# Patient Record
Sex: FEMALE | Race: WHITE | NOT HISPANIC OR LATINO | Employment: FULL TIME | URBAN - METROPOLITAN AREA
[De-identification: names, ages, dates, MRNs, and addresses within clinical notes are randomized per-mention and may not be internally consistent; named-entity substitution may affect disease eponyms.]

---

## 2016-12-28 ASSESSMENT — ENCOUNTER SYMPTOMS
NIGHT SWEATS: 0
PANIC: 1
ALTERED TEMPERATURE REGULATION: 1
JAUNDICE: 0
BLOOD IN STOOL: 0
HOT FLASHES: 0
POLYDIPSIA: 1
SHORTNESS OF BREATH: 1
DECREASED LIBIDO: 1
CHILLS: 0
WEIGHT LOSS: 0
BOWEL INCONTINENCE: 0
SNORES LOUDLY: 1
RESPIRATORY PAIN: 0
SINUS CONGESTION: 1
TASTE DISTURBANCE: 0
WHEEZING: 1
SPUTUM PRODUCTION: 1
SINUS PAIN: 0
DECREASED CONCENTRATION: 1
RECTAL PAIN: 0
DIARRHEA: 1
HEARTBURN: 0
POLYPHAGIA: 0
BREAST PAIN: 1
DEPRESSION: 1
COUGH DISTURBING SLEEP: 1
TROUBLE SWALLOWING: 0
INSOMNIA: 1
HEMOPTYSIS: 0
INCREASED ENERGY: 1
POSTURAL DYSPNEA: 0
DECREASED APPETITE: 1
COUGH: 1
RECTAL BLEEDING: 0
VOMITING: 0
FEVER: 0
HOARSE VOICE: 0
FATIGUE: 1
HALLUCINATIONS: 0
SORE THROAT: 0
BLOATING: 0
NERVOUS/ANXIOUS: 1
WEIGHT GAIN: 0
NAUSEA: 0
CONSTIPATION: 0
DYSPNEA ON EXERTION: 1
ABDOMINAL PAIN: 0
SMELL DISTURBANCE: 0
BREAST MASS: 0
NECK MASS: 0

## 2017-01-01 ENCOUNTER — MYC MEDICAL ADVICE (OUTPATIENT)
Dept: FAMILY MEDICINE | Facility: CLINIC | Age: 37
End: 2017-01-01

## 2017-01-01 DIAGNOSIS — J45.990 EXERCISE-INDUCED ASTHMA: Primary | ICD-10-CM

## 2017-01-02 NOTE — TELEPHONE ENCOUNTER
Sent [employer] message as per below advising to ED for SOB, office visit within 24 hours, and home care advice    Writer called patient left non detailed message requesting return call to clinic and ask to speak with nurse    Maria Antonia Clark RN

## 2017-01-03 RX ORDER — FLUTICASONE PROPIONATE 44 UG/1
2 AEROSOL, METERED RESPIRATORY (INHALATION) 2 TIMES DAILY
Qty: 1 INHALER | Refills: 1 | Status: SHIPPED | OUTPATIENT
Start: 2017-01-03 | End: 2017-01-18

## 2017-01-03 NOTE — TELEPHONE ENCOUNTER
Writer notes patient mychart message response. Patient has an appointment scheduled tomorrow in clinic at 1545.    Nata Giraldo RN  [facility]

## 2017-01-04 ENCOUNTER — OFFICE VISIT (OUTPATIENT)
Dept: ENDOCRINOLOGY | Facility: CLINIC | Age: 37
End: 2017-01-04

## 2017-01-04 ENCOUNTER — MYC MEDICAL ADVICE (OUTPATIENT)
Dept: ENDOCRINOLOGY | Facility: CLINIC | Age: 37
End: 2017-01-04

## 2017-01-04 VITALS
DIASTOLIC BLOOD PRESSURE: 86 MMHG | SYSTOLIC BLOOD PRESSURE: 122 MMHG | WEIGHT: 239.6 LBS | HEART RATE: 91 BPM | BODY MASS INDEX: 36.31 KG/M2 | HEIGHT: 68 IN

## 2017-01-04 DIAGNOSIS — E04.1 THYROID NODULE: Primary | ICD-10-CM

## 2017-01-04 DIAGNOSIS — E04.1 THYROID NODULE: ICD-10-CM

## 2017-01-04 DIAGNOSIS — R76.8 ANTI-TPO ANTIBODIES PRESENT: ICD-10-CM

## 2017-01-04 LAB
T4 FREE SERPL-MCNC: 1.21 NG/DL (ref 0.76–1.46)
TSH SERPL DL<=0.05 MIU/L-ACNC: 0.2 MU/L (ref 0.4–4)

## 2017-01-04 ASSESSMENT — PAIN SCALES - GENERAL: PAINLEVEL: NO PAIN (0)

## 2017-01-04 NOTE — Clinical Note
1/4/2017       RE: Cathy Arroyo  [address]  [facility] [address]     Dear Colleague,    Thank you for referring your patient, Cathy Arroyo, to the [facility] ENDOCRINOLOGY at [facility]. Please see a copy of my visit note below.    Endocrinology Attending Physician Progress note  A/p  1.  Left Thyroid nodule 1.8 cm. FNAB benign cytology 10/15 -   Repeat US shows the left dominat nodule has increased in size by significant amount.  Recommend repeat FNAB.    Repeat TFTS when not sick    2.  Autoimmune thyroid marker  - high TPO;     3.  Obesity    4.  Post partum < 1 year.     Leatha Franco MD        Cc/HPI:    Cathy returns for follow up of thyorid nodule, high TPO in the context of recent pregnancy.   I have seen her once prevoiusly, along with fellow Dr Murdock, in 1/16 . Since then, she has completed a full term pregnancy, with vaginal delivery 10/11/16 . She had TFTS regularly during the pregnancy, last was 7/17/16, normal for pregnancy.  She has not had TFTS since delivery.       Hypothyroidsm age 17, LT4 x 3 years, later didn't need it.  She has been off LT4 x about 11 years.  CT of the neck 10/13/15 showed poorly defined thyrorid nodule on the left.  Since then, she had  US thyroid, FNAB of thyroid nodule. Cytology was benign.     6/24/15: .32, TSH 0.52, free T4 0.99, T3 120    Review of neck US  Left  dominant nodule 1.9 x 1.2 x 2.3 - (previously 1.8 x 1 x 1.2)  volume 2.62 --was 1.08      ROS  Had a virus but not feeling better-- has been sick about one week  No fever  No neck symptoms  Voice a little hoarse now with URI  No choking  Not aware of thyroid nodule.   Respiratory: asthma worse now - more wheezing; Coughing - productive  She is seeing her PCP later today  Was breast feeding but has stopped.       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     Thyroid disease      hashimotos     Past Surgical History   Procedure Laterality Date     Craniotomy, excise tumor complex, combined  2/19/2014     Procedure: COMBINED CRANIOTOMY, EXCISE TUMOR COMPLEX;  Supracerabellar  Infratentorial Approach for Resection of Tumor ;  Surgeon: Kate Mckeon MD;  Location: [facility] OR     Procedure placeholder neuro  2/2014     Craniotomy for a benign turmo resection     Blood patch N/A 10/11/2016     Procedure: EPIDURAL BLOOD PATCH;  Surgeon: GENERIC ANESTHESIA PROVIDER;  Location: [facility] OR       Family History   Problem Relation Age of Onset     Thyroid Disease Paternal Aunt      Asthma Father      Asthma Maternal Grandmother      DIABETES Paternal Grandfather      Other Cancer Mother      Genitourinary Problems Mother      Bipolar Disorder Mother      unipolar depression     Bipolar Disorder Brother      unipolar depression     Depression Mother      Depression Brother      MENTAL ILLNESS Father      Anesthesia Reaction Brother      OSTEOPOROSIS Maternal Grandmother      Thyroid Disease Mother      benign tumor     Obesity Father      Current Outpatient Prescriptions   Medication Sig Dispense Refill     fluticasone (FLOVENT HFA) 44 MCG/ACT Inhaler Inhale 2 puffs into the lungs 2 times daily 1 Inhaler 1     montelukast (SINGULAIR) 10 MG tablet Take 1 tablet (10 mg) by mouth At Bedtime 90 tablet 3     Sertraline HCl (ZOLOFT PO) Take 50 mg by mouth daily       ARIPiprazole (ABILIFY) 1 MG/ML SOLN solution Take 10 mg by mouth daily        Fexofenadine HCl (ALLEGRA PO)        albuterol (PROAIR HFA, PROVENTIL HFA, VENTOLIN HFA) 108 (90 BASE) MCG/ACT inhaler Inhale 2 puffs into the lungs every 4 hours as needed for shortness of breath / dyspnea or wheezing 1 Inhaler 3     Prenatal Vit-Fe Fumarate-FA (PRENATAL MULTIVITAMIN  PLUS IRON) 27-0.8 MG TABS Take 1 tablet by mouth daily    "    Montelukast Sodium (SINGULAIR PO) Take 10 mg by mouth        Social History     Social History     Marital Status:      Spouse Name: N/A     Number of Children: N/A     Years of Education: N/A     Occupational History     Not on file.     Social History Main Topics     Smoking status: Former Smoker -- 0.30 packs/day for 12 years     Types: Cigarettes     Start date: 2004     Quit date: 2016     Smokeless tobacco: Never Used      Comment: quit 3 days ago     Alcohol Use: No     Drug Use: No     Sexual Activity:     Partners: Male     Birth Control/ Protection: Inserts      Comment: will use condoms for post-christian birth control     Other Topics Concern     Parent/Sibling W/ Cabg, Mi Or Angioplasty Before 65f 55m? No     Social History Narrative    Caffeine intake/servings daily - 1    Calcium intake/servings daily - 3    Exercise 0 times weekly - describe ; encouraged walking daily    Sunscreen used - Yes    Seatbelts used - Yes    Guns stored in the home - No    Self Breast Exam - Yes    Pap test up to date -  No    Eye exam up to date -  No    Dental exam up to date -  No    DEXA scan up to date -  No    Flex Sig/Colonoscopy up to date -  No    Mammography up to date -  No    Immunizations reviewed and up to date - Yes    Abuse: Current or Past (Physical, Sexual or Emotional) - No    Do you feel safe in your environment - Yes    Do you cope well with stress - Yes    Do you suffer from insomnia - No    Last updated by: Noemi Cortez  3/1/2016             Baby is 12 weeks old    GENERAL young woman in NAD  /86 mmHg  Pulse 91  Ht 1.727 m (5' 8\")  Wt 108.682 kg (239 lb 9.6 oz)  BMI 36.44 kg/m2  SKIN: normal color, temperature, texture   HEENT: PER, no scleral icterus, eyelid retraction, stare, lid lag, proptosis or conjunctival injection.    MOUTH: moist, pink, pharynx clear.    NECK: supple.  No visible neck masses, cervical adenopathy, carotid bruits.    Thyroid palpable " on the left - suggestion of nodule.    LUNGS: clear to auscultation bilaterally.   CARDIAC: tachy,S1, S2 without murmurs, rubs or gallops.    BACK: normal spinal contour.    NEURO: Alert, responds appropriately to questions, CN intact, moves all extremities, DTRs 1/4, gait normal, no tremor of the outstretched hand      DATA :    ENDO THYROID LABS-[facility] Latest Ref Rng 7/27/2016 5/31/2016 3/1/2016   TSH 0.40 - 4.00 mU/L 0.29 (L) 0.32 (L) 0.19 (L)   T4 TOTAL 4.5 - 13.9 ug/dL 12.9  12.9   T4 FREE 0.76 - 1.46 ng/dL  0.83    TRIIODOTHYRONINE(T3) 60 - 181 ng/dL        ENDO THYROID LABS-[facility] Latest Ref Rng 2/1/2016 6/30/2009 4/12/2007   TSH 0.40 - 4.00 mU/L 0.56 0.32 (L) 1.42   T4 TOTAL 4.5 - 13.9 ug/dL 10.4     T4 FREE 0.76 - 1.46 ng/dL 1.02 1.15    TRIIODOTHYRONINE(T3) 60 - 181 ng/dL  176        Patient Name: ZEINA NICHOLS   MR#: [id number]   Specimen #: [id number]   Collected: 1/13/2016   Received: 1/13/2016   Reported: 1/13/2016 17:42   Ordering Phy(s): JUANITO ROMO   Additional Phy(s): [facility], Dept. of Pathology     TEST(S):   6 Slides, case #[id number]     FINAL DIAGNOSIS:   Thyroid, Left, Ultrasound-guided fine needle aspiration (OSC [id number]   obtained 10/20/2015):   Benign thyroid nodule.     I have personally reviewed all specimens and or slides, including the   listed special stains, and used them with my medical judgement to   determine the final diagnosis.     Electronically signed out by:   Eduard Lopez M.D., [facility]     CLINICAL HISTORY:   The patient is a 35-year-old female.     GROSS:   Received from [facility] in [place], [place] are 6 stained   slides labeled [id number] (obtained 10/20/15) and a copy of the   referring pathologist's report with patient identifying information.   All slides are returned.     MICROSCOPIC:   Microscopic examination is performed.     Shamika Fitzgerald MD, cytology fellow, John Chapa MD     CPT Codes:   A: 26159-RYL1      TESTING LAB LOCATION:   [address], [facility] [address]   [place]   [address]   [phone]     COLLECTION SITE:   Client:  [facility]   Location:  CHRIS JACOBS)     EXAMINATION: Thyroid ultrasound on, 1/4/2017 4:03 PM       COMPARISON: None.     HISTORY: Nontoxic single thyroid nodule     Technique: Grayscale and color ultrasound imaging of the thyroid was  performed.     Findings:     Thyroid parenchyma: homogenous     The right lobe of the thyroid measures: 1.5 x 1.7 x 4.6 cm       The thyroid isthmus measures: 0.4 cm       The left lobe of the thyroid measures: 1.7 x 1.5 x 4.2 cm       Right lobe:  Nodule 1:  Nodule measurement: 0.7 x 0.7 x 0.8 cm ,  Echogenicity: Predominantly isoechoic  Consistency: solid  Calcifications: no  Hypervascular: Peripherally  Interval growth (>20%): No priors     Isthmus: No nodules are seen     Left Lobe:    Nodule 1:  Nodule measurement: 0.8 x 0.6 x 0.9 cm ,  Echogenicity: Isoechoic  Consistency: Solid and cystic  Calcifications: no  Hypervascular: Peripherally  Interval growth (>20%): No priors     Nodule 2:  Nodule measurement: 1.9 x 1.2 x 2.3 cm ,  Echogenicity: Mildly hyperechoic  Consistency: solid  Calcifications: no  Hypervascular: Peripherally  Interval growth (>20%): No priors                                                                       Impression:  Multiple thyroid nodules as detailed above, the largest is in the  inferior aspect of the left thyroid lobe measuring 1.9 x 1.2 x 2.3 cm.  This nodule according to the outside reports has been biopsied and the  biopsy result showed no evidence of malignancy.     I have personally reviewed the examination and initial interpretation  and I agree with the findings.     MARGARITO BURNETT MD

## 2017-01-04 NOTE — NURSING NOTE
"Chief Complaint   Patient presents with     RECHECK     f/u for thyroid nodule        Initial /86 mmHg  Pulse 91  Ht 1.727 m (5' 8\")  Wt 108.682 kg (239 lb 9.6 oz)  BMI 36.44 kg/m2 Estimated body mass index is 36.44 kg/(m^2) as calculated from the following:    Height as of this encounter: 1.727 m (5' 8\").    Weight as of this encounter: 108.682 kg (239 lb 9.6 oz).  BP completed using cuff size: noel Cornejo CMA     "

## 2017-01-04 NOTE — PROGRESS NOTES
Endocrinology Attending Physician Progress note  A/p  1.  Left Thyroid nodule 1.8 cm. FNAB benign cytology 10/15 -   Repeat US shows the left dominat nodule has increased in size by significant amount.  Recommend repeat FNAB.    Repeat TFTS when not sick    2.  Autoimmune thyroid marker  - high TPO;     3.  Obesity    4.  Post partum < 1 year.     Leatha Franco MD        Cc/HPI:    Cathy returns for follow up of thyorid nodule, high TPO in the context of recent pregnancy.   I have seen her once prevoiusly, along with fellow Dr Murdock, in 1/16 . Since then, she has completed a full term pregnancy, with vaginal delivery 10/11/16 . She had TFTS regularly during the pregnancy, last was 7/17/16, normal for pregnancy.  She has not had TFTS since delivery.       Hypothyroidsm age 17, LT4 x 3 years, later didn't need it.  She has been off LT4 x about 11 years.  CT of the neck 10/13/15 showed poorly defined thyrorid nodule on the left.  Since then, she had  US thyroid, FNAB of thyroid nodule. Cytology was benign.     6/24/15: .32, TSH 0.52, free T4 0.99, T3 120    Review of neck US  Left  dominant nodule 1.9 x 1.2 x 2.3 - (previously 1.8 x 1 x 1.2)  volume 2.62 --was 1.08      ROS  Had a virus but not feeling better-- has been sick about one week  No fever  No neck symptoms  Voice a little hoarse now with URI  No choking  Not aware of thyroid nodule.   Respiratory: asthma worse now - more wheezing; Coughing - productive  She is seeing her PCP later today  Was breast feeding but has stopped.       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     Thyroid disease      hashimotos     Past Surgical History   Procedure Laterality Date     Craniotomy, excise tumor complex, combined  2/19/2014     Procedure: COMBINED CRANIOTOMY, EXCISE TUMOR COMPLEX;   Supracerabellar  Infratentorial Approach for Resection of Tumor ;  Surgeon: Kate Mckeon MD;  Location: [facility] OR     Procedure placeholder neuro  2/2014     Craniotomy for a benign turmo resection     Blood patch N/A 10/11/2016     Procedure: EPIDURAL BLOOD PATCH;  Surgeon: GENERIC ANESTHESIA PROVIDER;  Location: [facility] OR       Family History   Problem Relation Age of Onset     Thyroid Disease Paternal Aunt      Asthma Father      Asthma Maternal Grandmother      DIABETES Paternal Grandfather      Other Cancer Mother      Genitourinary Problems Mother      Bipolar Disorder Mother      unipolar depression     Bipolar Disorder Brother      unipolar depression     Depression Mother      Depression Brother      MENTAL ILLNESS Father      Anesthesia Reaction Brother      OSTEOPOROSIS Maternal Grandmother      Thyroid Disease Mother      benign tumor     Obesity Father      Current Outpatient Prescriptions   Medication Sig Dispense Refill     fluticasone (FLOVENT HFA) 44 MCG/ACT Inhaler Inhale 2 puffs into the lungs 2 times daily 1 Inhaler 1     montelukast (SINGULAIR) 10 MG tablet Take 1 tablet (10 mg) by mouth At Bedtime 90 tablet 3     Sertraline HCl (ZOLOFT PO) Take 50 mg by mouth daily       ARIPiprazole (ABILIFY) 1 MG/ML SOLN solution Take 10 mg by mouth daily        Fexofenadine HCl (ALLEGRA PO)        albuterol (PROAIR HFA, PROVENTIL HFA, VENTOLIN HFA) 108 (90 BASE) MCG/ACT inhaler Inhale 2 puffs into the lungs every 4 hours as needed for shortness of breath / dyspnea or wheezing 1 Inhaler 3     Prenatal Vit-Fe Fumarate-FA (PRENATAL MULTIVITAMIN  PLUS IRON) 27-0.8 MG TABS Take 1 tablet by mouth daily       Montelukast Sodium (SINGULAIR PO) Take 10 mg by mouth        Social History     Social History     Marital Status:      Spouse Name: N/A     Number of Children: N/A     Years of Education: N/A     Occupational History     Not on file.     Social History Main Topics     Smoking status: Former  "Smoker -- 0.30 packs/day for 12 years     Types: Cigarettes     Start date: 2004     Quit date: 2016     Smokeless tobacco: Never Used      Comment: quit 3 days ago     Alcohol Use: No     Drug Use: No     Sexual Activity:     Partners: Male     Birth Control/ Protection: Inserts      Comment: will use condoms for post-christina birth control     Other Topics Concern     Parent/Sibling W/ Cabg, Mi Or Angioplasty Before 65f 55m? No     Social History Narrative    Caffeine intake/servings daily - 1    Calcium intake/servings daily - 3    Exercise 0 times weekly - describe ; encouraged walking daily    Sunscreen used - Yes    Seatbelts used - Yes    Guns stored in the home - No    Self Breast Exam - Yes    Pap test up to date -  No    Eye exam up to date -  No    Dental exam up to date -  No    DEXA scan up to date -  No    Flex Sig/Colonoscopy up to date -  No    Mammography up to date -  No    Immunizations reviewed and up to date - Yes    Abuse: Current or Past (Physical, Sexual or Emotional) - No    Do you feel safe in your environment - Yes    Do you cope well with stress - Yes    Do you suffer from insomnia - No    Last updated by: Noemi Cortez  3/1/2016             Baby is 12 weeks old    GENERAL young woman in NAD  /86 mmHg  Pulse 91  Ht 1.727 m (5' 8\")  Wt 108.682 kg (239 lb 9.6 oz)  BMI 36.44 kg/m2  SKIN: normal color, temperature, texture   HEENT: PER, no scleral icterus, eyelid retraction, stare, lid lag, proptosis or conjunctival injection.    MOUTH: moist, pink, pharynx clear.    NECK: supple.  No visible neck masses, cervical adenopathy, carotid bruits.    Thyroid palpable on the left - suggestion of nodule.    LUNGS: clear to auscultation bilaterally.   CARDIAC: tachy,S1, S2 without murmurs, rubs or gallops.    BACK: normal spinal contour.    NEURO: Alert, responds appropriately to questions, CN intact, moves all extremities, DTRs 1/4, gait normal, no tremor of the " outstretched hand      DATA :    ENDO THYROID LABS-[facility] Latest Ref Rng 7/27/2016 5/31/2016 3/1/2016   TSH 0.40 - 4.00 mU/L 0.29 (L) 0.32 (L) 0.19 (L)   T4 TOTAL 4.5 - 13.9 ug/dL 12.9  12.9   T4 FREE 0.76 - 1.46 ng/dL  0.83    TRIIODOTHYRONINE(T3) 60 - 181 ng/dL        ENDO THYROID LABS-[facility] Latest Ref Rng 2/1/2016 6/30/2009 4/12/2007   TSH 0.40 - 4.00 mU/L 0.56 0.32 (L) 1.42   T4 TOTAL 4.5 - 13.9 ug/dL 10.4     T4 FREE 0.76 - 1.46 ng/dL 1.02 1.15    TRIIODOTHYRONINE(T3) 60 - 181 ng/dL  176        Patient Name: ZEINA NICHOLS   MR#: [id number]   Specimen #: [id number]   Collected: 1/13/2016   Received: 1/13/2016   Reported: 1/13/2016 17:42   Ordering Phy(s): JUANITO ROMO   Additional Phy(s): [facility], Dept. of Pathology     TEST(S):   6 Slides, case #[id number]     FINAL DIAGNOSIS:   Thyroid, Left, Ultrasound-guided fine needle aspiration (OSC [id number]   obtained 10/20/2015):   Benign thyroid nodule.     I have personally reviewed all specimens and or slides, including the   listed special stains, and used them with my medical judgement to   determine the final diagnosis.     Electronically signed out by:   Eduard Lopez M.D., [facility] [place]     CLINICAL HISTORY:   The patient is a 35-year-old female.     GROSS:   Received from [facility] in [place], [place] are 6 stained   slides labeled [id number] (obtained 10/20/15) and a copy of the   referring pathologist's report with patient identifying information.   All slides are returned.     MICROSCOPIC:   Microscopic examination is performed.     Shamika Fitzgerald MD, cytology fellow, John Chapa MD     CPT Codes:   A: 94844-XPB7     TESTING LAB LOCATION:   [address], [address]   [address]   [phone]     COLLECTION SITE:   Client:  [facility]   Location:  [facility]     EXAMINATION: Thyroid ultrasound  on, 1/4/2017 4:03 PM       COMPARISON: None.     HISTORY: Nontoxic single thyroid nodule     Technique: Grayscale and color ultrasound imaging of the thyroid was  performed.     Findings:     Thyroid parenchyma: homogenous     The right lobe of the thyroid measures: 1.5 x 1.7 x 4.6 cm       The thyroid isthmus measures: 0.4 cm       The left lobe of the thyroid measures: 1.7 x 1.5 x 4.2 cm       Right lobe:  Nodule 1:  Nodule measurement: 0.7 x 0.7 x 0.8 cm ,  Echogenicity: Predominantly isoechoic  Consistency: solid  Calcifications: no  Hypervascular: Peripherally  Interval growth (>20%): No priors     Isthmus: No nodules are seen     Left Lobe:    Nodule 1:  Nodule measurement: 0.8 x 0.6 x 0.9 cm ,  Echogenicity: Isoechoic  Consistency: Solid and cystic  Calcifications: no  Hypervascular: Peripherally  Interval growth (>20%): No priors     Nodule 2:  Nodule measurement: 1.9 x 1.2 x 2.3 cm ,  Echogenicity: Mildly hyperechoic  Consistency: solid  Calcifications: no  Hypervascular: Peripherally  Interval growth (>20%): No priors                                                                       Impression:  Multiple thyroid nodules as detailed above, the largest is in the  inferior aspect of the left thyroid lobe measuring 1.9 x 1.2 x 2.3 cm.  This nodule according to the outside reports has been biopsied and the  biopsy result showed no evidence of malignancy.     I have personally reviewed the examination and initial interpretation  and I agree with the findings.     MARGARITO BURNETT MD

## 2017-01-06 ENCOUNTER — TELEPHONE (OUTPATIENT)
Dept: FAMILY MEDICINE | Facility: CLINIC | Age: 37
End: 2017-01-06

## 2017-01-10 ENCOUNTER — OFFICE VISIT (OUTPATIENT)
Dept: NEUROSURGERY | Facility: CLINIC | Age: 37
End: 2017-01-10
Payer: COMMERCIAL

## 2017-01-10 VITALS
WEIGHT: 240 LBS | SYSTOLIC BLOOD PRESSURE: 131 MMHG | BODY MASS INDEX: 36.37 KG/M2 | DIASTOLIC BLOOD PRESSURE: 80 MMHG | HEART RATE: 91 BPM | HEIGHT: 68 IN

## 2017-01-10 DIAGNOSIS — D44.5 PINEOCYTOMA (H): Primary | ICD-10-CM

## 2017-01-10 RX ORDER — ARIPIPRAZOLE 15 MG/1
5 TABLET ORAL EVERY MORNING
COMMUNITY
End: 2017-10-23

## 2017-01-10 ASSESSMENT — PAIN SCALES - GENERAL: PAINLEVEL: NO PAIN (0)

## 2017-01-10 NOTE — Clinical Note
1/10/2017       RE: Cathy Arroyo  [address]  [place] [address]     Dear Colleague,    Thank you for referring your patient, Cathy Arroyo, to the [facility] NEUROSURGERY at [facility]. Please see a copy of my visit note below.      We saw Mrs. 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 Today, she is doing well. She got  last January and gave birth to a girl 3 months ago. Her pregnancy was without complication and she delivered vaginally. She denies any vision changes. Her mother  of salivary gland cancer in November.     On exam, she appears well. Extraocular movements intact. There is no lid lag or ptosis. She moves upper and lower extremities equally and with good coordination. Fluent and coherent speech. Gross neurological examination is normal.     We reviewed her MRI from today and compared to previous studies. There is no evidence of tumor recurrence or residual. Ventricle size is normal.    We will see her back in 2 years with another MRI. Please do not hesitate to contact us with questions. We will keep you informed of her progress.       MD BROOKLYN MARKHAM Madison Smith, am serving as a scribe to document services personally performed by Kate Mckeon MD, based upon my observations and the provider's statements to me. All documentation has been reviewed by the aforementioned doctor prior to being entered into the official medical record.    I, Kate Mckeon, attest that above named individual is acting in scribe capacity, has observed my performance of the services and has documented them in accordance with my direction. The documentation recorded by the scribe accurately reflects the service I personally performed and the decisions made by me.          Again, thank you for allowing me to participate in  the care of your patient.      Sincerely,    Kate Mckeon MD

## 2017-01-10 NOTE — PROGRESS NOTES
We saw Mrs. 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 Today, she is doing well. She got  last January and gave birth to a girl 3 months ago. Her pregnancy was without complication and she delivered vaginally. She denies any vision changes. Her mother  of salivary gland cancer in November.     On exam, she appears well. Extraocular movements intact. There is no lid lag or ptosis. She moves upper and lower extremities equally and with good coordination. Fluent and coherent speech. Gross neurological examination is normal.     We reviewed her MRI from today and compared to previous studies. There is no evidence of tumor recurrence or residual. Ventricle size is normal.    We will see her back in 2 years with another MRI. Please do not hesitate to contact us with questions. We will keep you informed of her progress.       MD BROOKLYN MARKHAM Madison Smith, am serving as a scribe to document services personally performed by Kate Mckeon MD, based upon my observations and the provider's statements to me. All documentation has been reviewed by the aforementioned doctor prior to being entered into the official medical record.    I, Kate Mckeon, attest that above named individual is acting in scribe capacity, has observed my performance of the services and has documented them in accordance with my direction. The documentation recorded by the scribe accurately reflects the service I personally performed and the decisions made by me.

## 2017-01-17 NOTE — PROGRESS NOTES
SUBJECTIVE:                                                    Cathy Arroyo is a 36 year old female who presents to clinic today for the following health issues:    Asthma Follow-Up    Was ACT completed today?    Yes    ACT Total Scores 1/18/2017   ACT TOTAL SCORE (Goal Greater than or Equal to 20) 8   In the past 12 months, how many times did you visit the emergency room for your asthma without being admitted to the hospital? 0   In the past 12 months, how many times were you hospitalized overnight because of your asthma? 0         Amount of exercise or physical activity: None    Problems taking medications regularly: No    Medication side effects: none    Diet: vegetarian/vegan    Questions/Concerns: Singular is being dropped from insurance and want to keep taking it. Find a way to keep Singular.      Problem list and histories reviewed & adjusted, as indicated.  Additional history: as documented    Problem list, Medication list, Allergies, and Medical/Social/Surgical histories reviewed in EPIC and updated as appropriate.    ROS:  Constitutional, HEENT, cardiovascular, pulmonary, gi and gu systems are negative, except as otherwise noted.    OBJECTIVE:                                                    /79 mmHg  Pulse 93  Temp(Src) 97.7  F (36.5  C) (Oral)  Wt 241 lb 4.8 oz (109.453 kg)  SpO2 96%  Body mass index is 36.7 kg/(m^2).  GENERAL: alert and no distress  EYES: Eyes grossly normal to inspection, PERRL and conjunctivae and sclerae normal  HENT: normal cephalic/atraumatic, both ears: clear effusion, nose and mouth without ulcers or lesions, nasal mucosa edematous , oropharynx clear and oral mucous membranes moist  NECK: no adenopathy, no asymmetry, masses, or scars and thyroid normal to palpation  RESP: expiratory wheezes throughout and inspiratory wheezes throughout  CV: regular rate and rhythm, normal S1 S2, no S3 or S4, no murmur, click or rub, no peripheral edema and peripheral  pulses strong  ABDOMEN: soft, nontender, no hepatosplenomegaly, no masses and bowel sounds normal  MS: no gross musculoskeletal defects noted, no edema  SKIN: no suspicious lesions or rashes  PSYCH: mentation appears normal, affect normal/bright    Diagnostic Test Results:  none      ASSESSMENT/PLAN:                                                    Asthma: Mild persistent with exacerbation   Plan:  Asthma Action Plan given  Medications:  Anti-inflammatory inhaler:   Advair  Leukotriene:   Singulair          (J45.41) Moderate persistent asthma with acute exacerbation  (primary encounter diagnosis)  Comment:   Plan: fluticasone-salmeterol (ADVAIR-HFA) 115-21         MCG/ACT Inhaler, fexofenadine (ALLEGRA) 180 MG         tablet   Unable to take prednisone due to affect on moods and precipitating jose eduardo.  Has been on combined ICS-LABA in the past with good control.  Diagnosis changed from intermittent to mild persistent.  Restarting advair, allegra and sinugular.  Return in two weeks.    (J31.0) Chronic rhinitis  Comment:   Plan: montelukast (SINGULAIR) 10 MG tablet,         fexofenadine (ALLEGRA) 180 MG tablet            (E04.1) Thyroid nodule  Comment:   Plan: known    (R76.8) Anti-TPO antibodies present  Comment:   Plan: TSH, T4 free        Send labs to endo when completed    (K59.01) Slow transit constipation  Comment:   Plan:     (F17.200) Tobacco use disorder  Comment:   Plan: nicotine polacrilex 4 MG lozenge              Patient Instructions   Thyroid labs today and I will send to endo  Start Advair - if not covered, please have pharmacy look into which combined ICS-LABA is covered  Singulair reordered to new pharmacy - if not covered have them contact us to start a prior authorization  Stop smoking - can try the lozenges  Would be willing to try chantix with close follow-up    Return on 2/15  Sooner if fever or chills or abrupt worsening of symptoms  Please go to the ER if you are in respiratory  distress            JANNET Liu [facility]

## 2017-01-18 ENCOUNTER — OFFICE VISIT (OUTPATIENT)
Dept: FAMILY MEDICINE | Facility: CLINIC | Age: 37
End: 2017-01-18
Payer: COMMERCIAL

## 2017-01-18 VITALS
TEMPERATURE: 97.7 F | OXYGEN SATURATION: 96 % | SYSTOLIC BLOOD PRESSURE: 142 MMHG | BODY MASS INDEX: 36.7 KG/M2 | WEIGHT: 241.3 LBS | DIASTOLIC BLOOD PRESSURE: 79 MMHG | HEART RATE: 93 BPM

## 2017-01-18 DIAGNOSIS — E04.1 THYROID NODULE: ICD-10-CM

## 2017-01-18 DIAGNOSIS — F17.200 TOBACCO USE DISORDER: ICD-10-CM

## 2017-01-18 DIAGNOSIS — J31.0 CHRONIC RHINITIS: ICD-10-CM

## 2017-01-18 DIAGNOSIS — J45.41 MODERATE PERSISTENT ASTHMA WITH ACUTE EXACERBATION: Primary | ICD-10-CM

## 2017-01-18 DIAGNOSIS — R76.8 ANTI-TPO ANTIBODIES PRESENT: ICD-10-CM

## 2017-01-18 DIAGNOSIS — K59.01 SLOW TRANSIT CONSTIPATION: ICD-10-CM

## 2017-01-18 PROBLEM — J45.31 MILD PERSISTENT ASTHMA WITH ACUTE EXACERBATION: Status: ACTIVE | Noted: 2017-01-18

## 2017-01-18 PROBLEM — J45.40 MODERATE PERSISTENT ASTHMA WITHOUT COMPLICATION: Status: ACTIVE | Noted: 2017-01-18

## 2017-01-18 PROCEDURE — 84439 ASSAY OF FREE THYROXINE: CPT | Performed by: NURSE PRACTITIONER

## 2017-01-18 PROCEDURE — 84443 ASSAY THYROID STIM HORMONE: CPT | Performed by: NURSE PRACTITIONER

## 2017-01-18 PROCEDURE — 36415 COLL VENOUS BLD VENIPUNCTURE: CPT | Performed by: NURSE PRACTITIONER

## 2017-01-18 PROCEDURE — 99214 OFFICE O/P EST MOD 30 MIN: CPT | Performed by: NURSE PRACTITIONER

## 2017-01-18 RX ORDER — FEXOFENADINE HCL 180 MG/1
180 TABLET ORAL DAILY
Qty: 90 TABLET | Refills: 3 | Status: SHIPPED | OUTPATIENT
Start: 2017-01-18 | End: 2018-09-18

## 2017-01-18 RX ORDER — MONTELUKAST SODIUM 10 MG/1
10 TABLET ORAL AT BEDTIME
Qty: 90 TABLET | Refills: 3 | Status: SHIPPED | OUTPATIENT
Start: 2017-01-18 | End: 2017-11-06

## 2017-01-18 RX ORDER — FLUTICASONE PROPIONATE AND SALMETEROL XINAFOATE 115; 21 UG/1; UG/1
2 AEROSOL, METERED RESPIRATORY (INHALATION) 2 TIMES DAILY
Qty: 3 INHALER | Refills: 0 | Status: SHIPPED | OUTPATIENT
Start: 2017-01-18 | End: 2017-02-08

## 2017-01-18 NOTE — MR AVS SNAPSHOT
After Visit Summary   1/18/2017    Cathy Arroyo    MRN: [id number]           Patient Information     Date Of Birth          1980        Visit Information        Provider Department      1/18/2017 1:00 PM Sherrill Dasilva APRN CNP [facility]        Today's Diagnoses     Moderate persistent asthma with acute exacerbation    -  1     Chronic rhinitis         Thyroid nodule         Anti-TPO antibodies present         Slow transit constipation         Tobacco use disorder           Care Instructions    Thyroid labs today and I will send to endo  Start Advair - if not covered, please have pharmacy look into which combined ICS-LABA is covered  Singulair reordered to new pharmacy - if not covered have them contact us to start a prior authorization  Stop smoking - can try the lozenges  Would be willing to try chantix with close follow-up    Return on 2/15  Sooner if fever or chills or abrupt worsening of symptoms  Please go to the ER if you are in respiratory distress            Follow-ups after your visit        Who to contact     If you have questions or need follow up information about today's clinic visit or your schedule please contact [facility] directly at [phone].  Normal or non-critical lab and imaging results will be communicated to you by [employer], letter or phone within 4 business days after the clinic has received the results. If you do not hear from us within 7 days, please contact the clinic through [employer] or phone. If you have a critical or abnormal lab result, we will notify you by phone as soon as possible.  Submit refill requests through meevl or call your pharmacy and they will forward the refill request to us. Please allow 3 business days for your refill to be completed.          Additional Information About Your Visit        [employer]ishing Hunter[employer]DT Information     meevl gives you secure access to your electronic health record. If you see a primary  care provider, you can also send messages to your care team and make appointments. If you have questions, please call your primary care clinic.  If you do not have a primary care provider, please call [phone] and they will assist you.        Care EveryWhere ID     This is your Care EveryWhere ID. This could be used by other organizations to access your [facility] medical records  [id number]        Your Vitals Were     Pulse Temperature Pulse Oximetry             93 97.7  F (36.5  C) (Oral) 96%          Blood Pressure from Last 3 Encounters:   01/18/17 142/79   01/10/17 131/80   01/04/17 122/86    Weight from Last 3 Encounters:   01/18/17 241 lb 4.8 oz (109.453 kg)   01/10/17 240 lb (108.863 kg)   01/04/17 239 lb 9.6 oz (108.682 kg)              We Performed the Following     T4 free     TSH          Today's Medication Changes          These changes are accurate as of: 1/18/17  1:40 PM.  If you have any questions, ask your nurse or doctor.               Start taking these medicines.        Dose/Directions    fluticasone-salmeterol 115-21 MCG/ACT Inhaler   Commonly known as:  ADVAIR-HFA   Used for:  Moderate persistent asthma with acute exacerbation   Started by:  Sherrill Dasilva APRN CNP        Dose:  2 puff   Inhale 2 puffs into the lungs 2 times daily   Quantity:  3 Inhaler   Refills:  0       nicotine polacrilex 4 MG lozenge   Used for:  Tobacco use disorder   Started by:  Sherrill Dasilva APRN CNP        Dose:  4 mg   Place 1 lozenge (4 mg) inside cheek as needed for smoking cessation   Quantity:  360 tablet   Refills:  0         These medicines have changed or have updated prescriptions.        Dose/Directions    fexofenadine 180 MG tablet   Commonly known as:  ALLEGRA   This may have changed:    - medication strength  - how much to take  - when to take this   Used for:  Moderate persistent asthma with acute exacerbation, Chronic rhinitis   Changed by:  Sherrill Dasilva APRN CNP        Dose:  180 mg   Take  1 tablet (180 mg) by mouth daily   Quantity:  90 tablet   Refills:  3       * montelukast 10 MG tablet   Commonly known as:  SINGULAIR   This may have changed:  Another medication with the same name was changed. Make sure you understand how and when to take each.   Used for:  Mild persistent asthma without complication   Changed by:  Sherrill Dasilva APRN CNP        Dose:  10 mg   Take 1 tablet (10 mg) by mouth At Bedtime   Quantity:  90 tablet   Refills:  3       * montelukast 10 MG tablet   Commonly known as:  SINGULAIR   This may have changed:    - medication strength  - when to take this   Used for:  Chronic rhinitis   Changed by:  Sherrill Dasilva APRN CNP        Dose:  10 mg   Take 1 tablet (10 mg) by mouth At Bedtime   Quantity:  90 tablet   Refills:  3       * Notice:  This list has 2 medication(s) that are the same as other medications prescribed for you. Read the directions carefully, and ask your doctor or other care provider to review them with you.      Stop taking these medicines if you haven't already. Please contact your care team if you have questions.     fluticasone 44 MCG/ACT Inhaler   Commonly known as:  FLOVENT HFA   Stopped by:  Sherrill Dasilva APRN CNP                Where to get your medicines      These medications were sent to [address] [address]     Phone:  [phone]    - fexofenadine 180 MG tablet  - fluticasone-salmeterol 115-21 MCG/ACT Inhaler  - montelukast 10 MG tablet  - nicotine polacrilex 4 MG lozenge             Primary Care Provider    Physician No Ref-Primary       No address on file        Thank you!     Thank you for choosing [facility]  [place] your care. Our goal is always to provide you with excellent care. Hearing back from our patients is one way we can continue to improve our services. Please take a few minutes to complete the written survey that you may receive in the mail  after your visit with us. Thank you!             Your Updated Medication List - Protect others around you: Learn how to safely use, store and throw away your medicines at www.disposemymeds.org.          This list is accurate as of: 1/18/17  1:40 PM.  Always use your most recent med list.                   Brand Name Dispense Instructions for use    ABILIFY 15 MG tablet   Generic drug:  ARIPiprazole      Take 15 mg by mouth daily       albuterol 108 (90 BASE) MCG/ACT Inhaler    PROAIR HFA/PROVENTIL HFA/VENTOLIN HFA    1 Inhaler    Inhale 2 puffs into the lungs every 4 hours as needed for shortness of breath / dyspnea or wheezing       fexofenadine 180 MG tablet    ALLEGRA    90 tablet    Take 1 tablet (180 mg) by mouth daily       fluticasone-salmeterol 115-21 MCG/ACT Inhaler    ADVAIR-HFA    3 Inhaler    Inhale 2 puffs into the lungs 2 times daily       * montelukast 10 MG tablet    SINGULAIR    90 tablet    Take 1 tablet (10 mg) by mouth At Bedtime       * montelukast 10 MG tablet    SINGULAIR    90 tablet    Take 1 tablet (10 mg) by mouth At Bedtime       nicotine polacrilex 4 MG lozenge     360 tablet    Place 1 lozenge (4 mg) inside cheek as needed for smoking cessation       prenatal multivitamin  plus iron 27-0.8 MG Tabs per tablet      Take 1 tablet by mouth daily       ZOLOFT PO      Take 25 mg by mouth daily       * Notice:  This list has 2 medication(s) that are the same as other medications prescribed for you. Read the directions carefully, and ask your doctor or other care provider to review them with you.

## 2017-01-18 NOTE — PATIENT INSTRUCTIONS
Thyroid labs today and I will send to endo  Start Advair - if not covered, please have pharmacy look into which combined ICS-LABA is covered  Singulair reordered to new pharmacy - if not covered have them contact us to start a prior authorization  Stop smoking - can try the lozenges  Would be willing to try chantix with close follow-up    Return on 2/15  Sooner if fever or chills or abrupt worsening of symptoms  Please go to the ER if you are in respiratory distress

## 2017-01-18 NOTE — NURSING NOTE
"Chief Complaint   Patient presents with     Asthma       Initial /79 mmHg  Pulse 93  Temp(Src) 97.7  F (36.5  C) (Oral)  Wt 241 lb 4.8 oz (109.453 kg)  SpO2 96% Estimated body mass index is 36.7 kg/(m^2) as calculated from the following:    Height as of 1/10/17: 5' 8\" (1.727 m).    Weight as of this encounter: 241 lb 4.8 oz (109.453 kg).  BP completed using cuff size: noel Capps MA      "

## 2017-01-19 LAB
T4 FREE SERPL-MCNC: 1.12 NG/DL (ref 0.76–1.46)
TSH SERPL DL<=0.05 MIU/L-ACNC: 0.18 MU/L (ref 0.4–4)

## 2017-01-19 ASSESSMENT — ASTHMA QUESTIONNAIRES: ACT_TOTALSCORE: 8

## 2017-01-19 NOTE — PROGRESS NOTES
Quick Note:    Cathy,    Similar thyroid results as last check 2 weeks ago. I'll forward to endocrinology. Were you able to get the inhalers and singulair? If you have any questions, please feel free to contact the clinic.    DEVEN Winston  ______

## 2017-01-31 DIAGNOSIS — R79.89 LOW TSH LEVEL: ICD-10-CM

## 2017-01-31 DIAGNOSIS — E04.1 THYROID NODULE: Primary | ICD-10-CM

## 2017-02-01 ENCOUNTER — HOSPITAL ENCOUNTER (OUTPATIENT)
Facility: CLINIC | Age: 37
End: 2017-02-01

## 2017-02-01 DIAGNOSIS — R79.89 LOW TSH LEVEL: ICD-10-CM

## 2017-02-01 DIAGNOSIS — E04.1 THYROID NODULE: ICD-10-CM

## 2017-02-01 LAB — T3 SERPL-MCNC: 172 NG/DL (ref 60–181)

## 2017-02-01 PROCEDURE — 84480 ASSAY TRIIODOTHYRONINE (T3): CPT

## 2017-02-01 PROCEDURE — 36415 COLL VENOUS BLD VENIPUNCTURE: CPT

## 2017-02-01 PROCEDURE — 84439 ASSAY OF FREE THYROXINE: CPT

## 2017-02-01 PROCEDURE — 84443 ASSAY THYROID STIM HORMONE: CPT

## 2017-02-01 PROCEDURE — 84445 ASSAY OF TSI GLOBULIN: CPT | Mod: 90

## 2017-02-01 PROCEDURE — 99000 SPECIMEN HANDLING OFFICE-LAB: CPT

## 2017-02-02 LAB
T4 FREE SERPL-MCNC: 1.26 NG/DL (ref 0.76–1.46)
TSH SERPL DL<=0.05 MIU/L-ACNC: 0.02 MU/L (ref 0.4–4)

## 2017-02-07 LAB — TSI SER-ACNC: 5.8

## 2017-02-08 ENCOUNTER — TELEPHONE (OUTPATIENT)
Dept: ENDOCRINOLOGY | Facility: CLINIC | Age: 37
End: 2017-02-08

## 2017-02-08 ENCOUNTER — OFFICE VISIT (OUTPATIENT)
Dept: FAMILY MEDICINE | Facility: CLINIC | Age: 37
End: 2017-02-08
Payer: COMMERCIAL

## 2017-02-08 VITALS
SYSTOLIC BLOOD PRESSURE: 130 MMHG | HEART RATE: 103 BPM | DIASTOLIC BLOOD PRESSURE: 72 MMHG | TEMPERATURE: 97.4 F | BODY MASS INDEX: 36.04 KG/M2 | WEIGHT: 237.78 LBS | OXYGEN SATURATION: 96 % | HEIGHT: 68 IN

## 2017-02-08 DIAGNOSIS — J45.40 MODERATE PERSISTENT ASTHMA WITHOUT COMPLICATION: ICD-10-CM

## 2017-02-08 DIAGNOSIS — F17.200 TOBACCO USE DISORDER: ICD-10-CM

## 2017-02-08 DIAGNOSIS — J01.90 ACUTE SINUSITIS WITH SYMPTOMS > 10 DAYS: Primary | ICD-10-CM

## 2017-02-08 PROCEDURE — 99214 OFFICE O/P EST MOD 30 MIN: CPT | Performed by: PHYSICIAN ASSISTANT

## 2017-02-08 RX ORDER — NICOTINE 21 MG/24HR
1 PATCH, TRANSDERMAL 24 HOURS TRANSDERMAL EVERY 24 HOURS
Qty: 14 PATCH | Refills: 0 | Status: SHIPPED | OUTPATIENT
Start: 2017-02-08 | End: 2017-02-22

## 2017-02-08 RX ORDER — NICOTINE 21 MG/24HR
1 PATCH, TRANSDERMAL 24 HOURS TRANSDERMAL EVERY 24 HOURS
Qty: 42 PATCH | Refills: 0 | Status: SHIPPED | OUTPATIENT
Start: 2017-02-08 | End: 2017-03-22

## 2017-02-08 ASSESSMENT — ENCOUNTER SYMPTOMS
CHILLS: 0
COUGH: 1
ABDOMINAL PAIN: 0
DIARRHEA: 0
SHORTNESS OF BREATH: 1
WHEEZING: 1
FEVER: 0
VOMITING: 0
FOCAL WEAKNESS: 0
NAUSEA: 0
HEADACHES: 1

## 2017-02-08 NOTE — NURSING NOTE
"Chief Complaint   Patient presents with     Sinus Problem       Initial /72 mmHg  Pulse 103  Temp(Src) 97.4  F (36.3  C) (Oral)  Ht 5' 8\" (1.727 m)  Wt 237 lb 12.5 oz (107.856 kg)  BMI 36.16 kg/m2  SpO2 96% Estimated body mass index is 36.16 kg/(m^2) as calculated from the following:    Height as of this encounter: 5' 8\" (1.727 m).    Weight as of this encounter: 237 lb 12.5 oz (107.856 kg).  Medication Reconciliation: complete     Sade Villalta MA      "

## 2017-02-08 NOTE — Clinical Note
Mau Mccartney, I saw Cathy today for a sinus infection. She has decided she would prefer to try nicotine patch (rather than the lozenges) for her smoking cessation so I prescribed those for her. She had moderate wheezing b/c she was unable to fill the Advair due to insurance not covering it. She said you were aware of this and working on an alternative, and that she has an appt with you next week. I offered to try and get her a different inhaler but she stated she would rather wait until she sees you.   ANNABELLE YoungC

## 2017-02-08 NOTE — PROGRESS NOTES
SUBJECTIVE:                                                    Cathy Arroyo is a 36 year old female who presents to clinic today for the following health issues:    Acute Illness   Acute illness concerns: sinus congestion   Onset: 3 weeks     Fever: no     Chills/Sweats: no     Headache (location?): YES    Sinus Pressure:YES    Conjunctivitis:  no    Ear Pain: Yes     Rhinorrhea: YES- yellow and green     Congestion: YES    Sore Throat: no      Cough: YES-productive of yellow sputum    Wheeze: no     Decreased Appetite: no    Nausea: YES    Vomiting: no     Diarrhea:  no    Dysuria/Freq.: no     Fatigue/Achiness: no    Sick/Strep Exposure: no      Therapies Tried and outcome: Pt takes Allegra and has also tried Netipot- no relief    Additional complaints: Pt also reports her asthma is not doing well. C/o wheezing and SOB. She was seen by PCP on 1/18/17 and prescribed Singulair and Advair. She was unable to fill the Advair as her insurance wouldn't cover it. She states PCP is working on finding alternative and she has appt with PCP next week. She is using albuterol daily, as well as every night.      Chart Review:  History   Smoking status     Never Smoker    Smokeless tobacco     Never Used     Comment: quit 3 days ago     PHQ-9 SCORE 4/21/2016 6/13/2016   Total Score 1 2     No flowsheet data found.    Patient Active Problem List   Diagnosis     Pineal gland, tumor     Thyroid nodule     Anti-TPO antibodies present     Attention deficit disorder     Human papilloma virus (HPV) infection     Seasonal allergic rhinitis     Need for Tdap vaccination     Bipolar affective disorder in remission (H)     Pregnancy, normal first     GBS (group B Streptococcus carrier), +RV culture, currently pregnant     Labor and delivery, indication for care     Pre-eclampsia     Mild persistent asthma with acute exacerbation     Moderate persistent asthma without complication     Chronic rhinitis     Tobacco use disorder      Past Surgical History   Procedure Laterality Date     Craniotomy, excise tumor complex, combined  2/19/2014     Procedure: COMBINED CRANIOTOMY, EXCISE TUMOR COMPLEX;  Supracerabellar  Infratentorial Approach for Resection of Tumor ;  Surgeon: Kate Mckeon MD;  Location: [facility] OR     Procedure placeholder neuro  2/2014     Craniotomy for a benign turmo resection     Blood patch N/A 10/11/2016     Procedure: EPIDURAL BLOOD PATCH;  Surgeon: GENERIC ANESTHESIA PROVIDER;  Location: [facility] OR     Problem list, Medication list, Allergies, Medical/Social/Surg hx reviewed in [facility], updated as appropriate.    HPI      Review of Systems   Constitutional: Negative for fever and chills.   HENT: Positive for congestion and ear pain. Negative for ear discharge.         Rhinorrhea, sinus pressure   Respiratory: Positive for cough, shortness of breath and wheezing.    Cardiovascular: Negative for chest pain.   Gastrointestinal: Negative for nausea, vomiting, abdominal pain and diarrhea.   Skin: Negative for rash.   Neurological: Positive for headaches. Negative for focal weakness.   All other systems reviewed and are negative.        Physical Exam   Constitutional: She is oriented to person, place, and time and well-developed, well-nourished, and in no distress.   HENT:   Head: Normocephalic and atraumatic.   Right Ear: Tympanic membrane, external ear and ear canal normal.   Left Ear: Tympanic membrane, external ear and ear canal normal.   Nose: Mucosal edema and sinus tenderness present.   Mouth/Throat: Uvula is midline, oropharynx is clear and moist and mucous membranes are normal.   Postnasal drip   Cardiovascular: Normal rate, regular rhythm and normal heart sounds.    Pulmonary/Chest: Effort normal. No accessory muscle usage. No respiratory distress. She has wheezes.   Speaking in complete sentences   Musculoskeletal: Normal range of motion.   Neurological: She is alert and oriented to person, place, and time. Gait  "normal.   Skin: Skin is warm and dry.   Nursing note and vitals reviewed.      Vital Signs  /72 mmHg  Pulse 103  Temp(Src) 97.4  F (36.3  C) (Oral)  Ht 5' 8\" (1.727 m)  Wt 237 lb 12.5 oz (107.856 kg)  BMI 36.16 kg/m2  SpO2 96%   Body mass index is 36.16 kg/(m^2).    Diagnostic Test Results:  none     ASSESSMENT/PLAN:                                                    Tobacco Cessation:   reports that she has never smoked. She has never used smokeless tobacco.  Tobacco Cessation Action Plan: Pharmacotherapies : Nicotine patch      ICD-10-CM    1. Acute sinusitis with symptoms > 10 days J01.90 amoxicillin-clavulanate (AUGMENTIN) 875-125 MG per tablet   2. Moderate persistent asthma without complication J45.40    3. Tobacco use disorder F17.200 nicotine (NICODERM CQ) 21 MG/24HR 24 hr patch     nicotine (NICODERM CQ) 14 MG/24HR 24 hr patch     nicotine (NICODERM CQ) 7 MG/24HR 24 hr patch   Suspect bacterial sinusitis, Rx Augmentin. Continue Allegra, also recommended Flonase and nasal saline/warm compresses.    O2 sat 96%, no resp distress. Offered to attempt to find alternative to Advair for patient, she declines and states she would prefer to wait until she sees PCP next week. She states she cannot tolerate PO steroids as it precipitates jose eduardo. Go to urgent care or ER if SOB worsens.    Pt did not like nicotine lozenges- wants to try nicotine patch.      I have discussed any lab or imaging results, the patient's diagnosis, and my plan of treatment with the patient and/or family. Patient is aware to come back in if with worsening symptoms or if no relief despite treatment plan.  Patient voiced understanding and had no further questions.       Follow Up: Return in about 1 week (around 2/15/2017).    WILLIE Alexandra, PAMarloC  [facility]            "

## 2017-02-08 NOTE — TELEPHONE ENCOUNTER
----- Message from Verena Chin sent at 2/8/2017  4:08 PM CST -----  Regarding: RE: appt with me on Friday at 11 ???   She is coming on Friday!  ----- Message -----     From: Karina Zhou RN     Sent: 2/8/2017   3:24 PM       To: [id number]  Subject: FW: appt with me on Friday at 11 ???             Salvador will see her Friday at 11 if you can reach her to schedule .   ----- Message -----     From: Karina Zhou RN     Sent: 2/8/2017   3:19 PM       To:   Subject: FW: appt with me on Friday at 11 ???                 ----- Message -----     From: Leatha Franco MD     Sent: 2/8/2017   2:42 PM       To: Karina Zhou RN  Subject: appt with me on Friday at 11 ???                 Please call her and see if she can come see me on Friday at 11 (which is currently open).  I would like to discuss her thyroid with her, including recent labs and biopsy.    Leatha Franco

## 2017-02-09 ASSESSMENT — ENCOUNTER SYMPTOMS
DISTURBANCES IN COORDINATION: 0
ARTHRALGIAS: 0
TROUBLE SWALLOWING: 0
HEADACHES: 1
TINGLING: 1
SINUS CONGESTION: 1
TREMORS: 0
DEPRESSION: 0
NECK PAIN: 0
SORE THROAT: 1
MUSCLE WEAKNESS: 0
WHEEZING: 1
STIFFNESS: 0
SHORTNESS OF BREATH: 1
POSTURAL DYSPNEA: 1
WEAKNESS: 0
LOSS OF CONSCIOUSNESS: 0
NERVOUS/ANXIOUS: 1
SNORES LOUDLY: 1
SINUS PAIN: 1
DIZZINESS: 0
SPUTUM PRODUCTION: 1
NECK MASS: 0
DECREASED CONCENTRATION: 1
PARALYSIS: 0
MYALGIAS: 1
BACK PAIN: 1
SPEECH CHANGE: 0
TASTE DISTURBANCE: 1
SMELL DISTURBANCE: 1
JOINT SWELLING: 0
HOARSE VOICE: 1
COUGH: 1
MEMORY LOSS: 0
MUSCLE CRAMPS: 1
SEIZURES: 0
RESPIRATORY PAIN: 0
DYSPNEA ON EXERTION: 1
PANIC: 1
HEMOPTYSIS: 0
COUGH DISTURBING SLEEP: 1
INSOMNIA: 0
NUMBNESS: 1

## 2017-02-10 ENCOUNTER — OFFICE VISIT (OUTPATIENT)
Dept: ENDOCRINOLOGY | Facility: CLINIC | Age: 37
End: 2017-02-10

## 2017-02-10 ENCOUNTER — TRANSFERRED RECORDS (OUTPATIENT)
Dept: HEALTH INFORMATION MANAGEMENT | Facility: CLINIC | Age: 37
End: 2017-02-10

## 2017-02-10 VITALS
HEART RATE: 105 BPM | HEIGHT: 68 IN | SYSTOLIC BLOOD PRESSURE: 129 MMHG | BODY MASS INDEX: 36.98 KG/M2 | DIASTOLIC BLOOD PRESSURE: 85 MMHG | WEIGHT: 244 LBS

## 2017-02-10 DIAGNOSIS — E04.1 THYROID NODULE: Primary | ICD-10-CM

## 2017-02-10 DIAGNOSIS — E05.90 THYROTOXICOSIS WITHOUT THYROID STORM, UNSPECIFIED THYROTOXICOSIS TYPE: ICD-10-CM

## 2017-02-10 NOTE — PROGRESS NOTES
Endocrinology Attending Physician Progress note  A/p  1.  Abnormal thyroid cytology - [facility] - Left Thyroid nodule 2.3 cm. This nodule had   increased in size by significant amount, had past benign cytology outside.  Plan as per # 2.  Consider molecular if not clear answer on next step.      2.  Low TSH, consistent with subclinical thyrotoxicosis.  She has high TSI on most recent labs.  She is months postpartum.   Differential includes Subclinical Graves' hyperthyroidism but I think the differential also still includes thyroiditis and hot nodule with superimposed autoimmunity.  Recommend thyroid scan/ uptake.  She can't do it until next week.      3.  Obesity    4.  Post partum < 1 year. She is not lactating.      Greater than 50% of 20 minute appt on counseling.   RTC 2 months no matter what.     Leatha Franco MD        Cc/HPI:    Cathy returns, along with her  and baby,  for follow up of recent tests.  She has a history of  thyorid nodule, high TPO, abnormal TFTS, abrormal thyroid cytology in the context of recent pregnancy.       Cathy has a history of Hypothyroidsm age 17, treated with LT4 x 3 years.  She has been off LT4 x about 11 years.      CT of the neck 10/13/15 showed poorly defined thyrorid nodule on the left.  Neck US 1/4/17 showed that the left nodule had increased insize.   Left  dominant nodule 1.9 x 1.2 x 2.3 - (previously 1.8 x 1 x 1.2) - isoechoic with thick halo  volume 2.62 --was 1.08  Right thyroid nodule 0.7 x 0.7 x 0.8 cm inferior pole tip  Left thyroid nodule 0.8 x 0.6 x   She has not had thyroid scan.     She has now had FNAB of the left thyroid nodule x 2  10/20/15 FNAB cytology ([facility], [id number]): benign   2/6/17 FNAB cytology [facility]    6/24/15: .32, TSH 0.52, free T4 0.99, T3 120  7/27/16 during pregnancy TSH 0.29, total T4 12.9  10/16 baby born  1/4/17: TSH 0.2, free T4 1.21  1/18/17: TSH 0.18, free T4 1.12  2/1/17: TSH 0.02, free T4 1.26, T3 172, TSI 5.8      ROS  Not  lactating      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     Thyroid disease      hashimotos     Past Surgical History   Procedure Laterality Date     Craniotomy, excise tumor complex, combined  2/19/2014     Procedure: COMBINED CRANIOTOMY, EXCISE TUMOR COMPLEX;  Supracerabellar  Infratentorial Approach for Resection of Tumor ;  Surgeon: Kate Mckeon MD;  Location: [facility] OR     Procedure placeholder neuro  2/2014     Craniotomy for a benign turmo resection     Blood patch N/A 10/11/2016     Procedure: EPIDURAL BLOOD PATCH;  Surgeon: GENERIC ANESTHESIA PROVIDER;  Location: [facility] OR       Family History   Problem Relation Age of Onset     Thyroid Disease Paternal Aunt      Asthma Father      Asthma Maternal Grandmother      DIABETES Paternal Grandfather      Other Cancer Mother      Genitourinary Problems Mother      Bipolar Disorder Mother      unipolar depression     Bipolar Disorder Brother      unipolar depression     Depression Mother      Depression Brother      MENTAL ILLNESS Father      Anesthesia Reaction Brother      OSTEOPOROSIS Maternal Grandmother      Thyroid Disease Mother      benign tumor     Obesity Father      Current Outpatient Prescriptions   Medication Sig Dispense Refill     nicotine (NICODERM CQ) 21 MG/24HR 24 hr patch Place 1 patch onto the skin every 24 hours 42 patch 0     nicotine (NICODERM CQ) 14 MG/24HR 24 hr patch Place 1 patch onto the skin every 24 hours for 14 days 14 patch 0     nicotine (NICODERM CQ) 7 MG/24HR 24 hr patch Place 1 patch onto the skin every 24 hours for 14 days 14 patch 0     amoxicillin-clavulanate (AUGMENTIN) 875-125 MG per tablet Take 1 tablet by mouth 2 times daily for 10 days 20 tablet 0     montelukast (SINGULAIR) 10 MG tablet Take 1 tablet (10 mg) by mouth At Bedtime 90 tablet 3      fexofenadine (ALLEGRA) 180 MG tablet Take 1 tablet (180 mg) by mouth daily 90 tablet 3     ARIPiprazole (ABILIFY) 15 MG tablet Take 15 mg by mouth daily       albuterol (PROAIR HFA, PROVENTIL HFA, VENTOLIN HFA) 108 (90 BASE) MCG/ACT inhaler Inhale 2 puffs into the lungs every 4 hours as needed for shortness of breath / dyspnea or wheezing 1 Inhaler 3     Prenatal Vit-Fe Fumarate-FA (PRENATAL MULTIVITAMIN  PLUS IRON) 27-0.8 MG TABS Take 1 tablet by mouth daily       Social History     Social History     Marital Status:      Spouse Name: N/A     Number of Children: N/A     Years of Education: N/A     Occupational History     Not on file.     Social History Main Topics     Smoking status: Current Every Day Smoker -- 0.50 packs/day for 12 years     Types: Cigarettes     Start date: 2004     Last Attempt to Quit: 2016     Smokeless tobacco: Never Used      Comment: quit 3 days ago     Alcohol Use: No     Drug Use: No     Sexual Activity:     Partners: Male     Birth Control/ Protection: Inserts      Comment: will use condoms for post- birth control     Other Topics Concern     Parent/Sibling W/ Cabg, Mi Or Angioplasty Before 65f 55m? No     Social History Narrative    Caffeine intake/servings daily - 1    Calcium intake/servings daily - 3    Exercise 0 times weekly - describe ; encouraged walking daily    Sunscreen used - Yes    Seatbelts used - Yes    Guns stored in the home - No    Self Breast Exam - Yes    Pap test up to date -  No    Eye exam up to date -  No    Dental exam up to date -  No    DEXA scan up to date -  No    Flex Sig/Colonoscopy up to date -  No    Mammography up to date -  No    Immunizations reviewed and up to date - Yes    Abuse: Current or Past (Physical, Sexual or Emotional) - No    Do you feel safe in your environment - Yes    Do you cope well with stress - Yes    Do you suffer from insomnia - No    Last updated by: Noemi Cortez  3/1/2016          "    ; baby is months old; ;     GENERAL young woman in NAD  /85 mmHg  Pulse 105  Ht 1.727 m (5' 8\")  Wt 110.678 kg (244 lb)  BMI 37.11 kg/m2  SKIN: normal color,   HEENT: PER, no scleral icterus,   NEURO: Alert, responds appropriately to questions,  moves all extremities,  gait normal      DATA :    ENDO THYROID LABS-[facility] Latest Ref Rng 7/27/2016 5/31/2016 3/1/2016   TSH 0.40 - 4.00 mU/L 0.29 (L) 0.32 (L) 0.19 (L)   T4 TOTAL 4.5 - 13.9 ug/dL 12.9  12.9   T4 FREE 0.76 - 1.46 ng/dL  0.83    TRIIODOTHYRONINE(T3) 60 - 181 ng/dL        ENDO THYROID LABS-[facility] Latest Ref Rng 2/1/2016 6/30/2009 4/12/2007   TSH 0.40 - 4.00 mU/L 0.56 0.32 (L) 1.42   T4 TOTAL 4.5 - 13.9 ug/dL 10.4     T4 FREE 0.76 - 1.46 ng/dL 1.02 1.15    TRIIODOTHYRONINE(T3) 60 - 181 ng/dL  176        Patient Name: ZEINA NICHOLS   MR#: [id number]   Specimen #: [id number]   Collected: 1/13/2016   Received: 1/13/2016   Reported: 1/13/2016 17:42   Ordering Phy(s): JUANITO ROMO   Additional Phy(s): [facility], Dept. of Pathology     TEST(S):   6 Slides, case #[id number]     FINAL DIAGNOSIS:   Thyroid, Left, Ultrasound-guided fine needle aspiration ([facility] [id number]   obtained 10/20/2015):   Benign thyroid nodule.     I have personally reviewed all specimens and or slides, including the   listed special stains, and used them with my medical judgement to   determine the final diagnosis.     Electronically signed out by:   Eduard Lopez M.D., [facility]     CLINICAL HISTORY:   The patient is a 35-year-old female.     GROSS:   Received from [facility] in [place], [place] are 6 stained   slides labeled [id number] (obtained 10/20/15) and a copy of the   referring pathologist's report with patient identifying information.   All slides are returned.     MICROSCOPIC:   Microscopic examination is performed.     Shamika Fitzgerald MD, cytology fellow, John Chapa MD     CPT Codes:   A: 99035-[id number] "     TESTING LAB LOCATION:   [address], [facility] [address]   [place]   [address]   [phone]     COLLECTION SITE:   Client:  [facility]   Location:  CHRIS JACOBS)     EXAMINATION: Thyroid ultrasound on, 1/4/2017 4:03 PM       COMPARISON: None.     HISTORY: Nontoxic single thyroid nodule     Technique: Grayscale and color ultrasound imaging of the thyroid was  performed.     Findings:     Thyroid parenchyma: homogenous     The right lobe of the thyroid measures: 1.5 x 1.7 x 4.6 cm       The thyroid isthmus measures: 0.4 cm       The left lobe of the thyroid measures: 1.7 x 1.5 x 4.2 cm       Right lobe:  Nodule 1:  Nodule measurement: 0.7 x 0.7 x 0.8 cm ,  Echogenicity: Predominantly isoechoic  Consistency: solid  Calcifications: no  Hypervascular: Peripherally  Interval growth (>20%): No priors     Isthmus: No nodules are seen     Left Lobe:    Nodule 1:  Nodule measurement: 0.8 x 0.6 x 0.9 cm ,  Echogenicity: Isoechoic  Consistency: Solid and cystic  Calcifications: no  Hypervascular: Peripherally  Interval growth (>20%): No priors     Nodule 2:  Nodule measurement: 1.9 x 1.2 x 2.3 cm ,  Echogenicity: Mildly hyperechoic  Consistency: solid  Calcifications: no  Hypervascular: Peripherally  Interval growth (>20%): No priors                                                                       Impression:  Multiple thyroid nodules as detailed above, the largest is in the  inferior aspect of the left thyroid lobe measuring 1.9 x 1.2 x 2.3 cm.  This nodule according to the outside reports has been biopsied and the  biopsy result showed no evidence of malignancy.     I have personally reviewed the examination and initial interpretation  and I agree with the findings.     MARGARITO BURNETT MD

## 2017-02-10 NOTE — NURSING NOTE
"Chief Complaint   Patient presents with     RECHECK     f/u Thyroid Nodule        Initial /85 mmHg  Pulse 105  Ht 1.727 m (5' 8\")  Wt 110.678 kg (244 lb)  BMI 37.11 kg/m2 Estimated body mass index is 37.11 kg/(m^2) as calculated from the following:    Height as of this encounter: 1.727 m (5' 8\").    Weight as of this encounter: 110.678 kg (244 lb).  Medication Reconciliation: complete         Jennifer Cornejo CMA     "

## 2017-02-10 NOTE — LETTER
2/10/2017       RE: Cathy Arroyo  [address]  [place] [address]     Dear Colleague,    Thank you for referring your patient, Cathy Arroyo, to the [facility] ENDOCRINOLOGY at [facility]. Please see a copy of my visit note below.    Endocrinology Attending Physician Progress note  A/p  1.  Abnormal thyroid cytology - [facility] - Left Thyroid nodule 2.3 cm. This nodule had   increased in size by significant amount, had past benign cytology outside.  Plan as per # 2.  Consider molecular if not clear answer on next step.      2.  Low TSH, consistent with subclinical thyrotoxicosis.  She has high TSI on most recent labs.  She is months postpartum.   Differential includes Subclinical Graves' hyperthyroidism but I think the differential also still includes thyroiditis and hot nodule with superimposed autoimmunity.  Recommend thyroid scan/ uptake.  She can't do it until next week.      3.  Obesity    4.  Post partum < 1 year. She is not lactating.      Greater than 50% of 20 minute appt on counseling.   RTC 2 months no matter what.     Leatha Franco MD        Cc/HPI:    Cathy returns, along with her  and baby,  for follow up of recent tests.  She has a history of  thyorid nodule, high TPO, abnormal TFTS, abrormal thyroid cytology in the context of recent pregnancy.       Cathy has a history of Hypothyroidsm age 17, treated with LT4 x 3 years.  She has been off LT4 x about 11 years.      CT of the neck 10/13/15 showed poorly defined thyrorid nodule on the left.  Neck US 1/4/17 showed that the left nodule had increased insize.   Left  dominant nodule 1.9 x 1.2 x 2.3 - (previously 1.8 x 1 x 1.2) - isoechoic with thick halo  volume 2.62 --was 1.08  Right thyroid nodule 0.7 x 0.7 x 0.8 cm inferior pole tip  Left thyroid nodule 0.8 x 0.6 x   She has not had thyroid scan.     She has now had FNAB of the left thyroid nodule x 2  10/20/15 FNAB  cytology ([facility], [id number]): benign   2/6/17 FNAB cytology [facility]    6/24/15: .32, TSH 0.52, free T4 0.99, T3 120  7/27/16 during pregnancy TSH 0.29, total T4 12.9  10/16 baby born  1/4/17: TSH 0.2, free T4 1.21  1/18/17: TSH 0.18, free T4 1.12  2/1/17: TSH 0.02, free T4 1.26, T3 172, TSI 5.8      ROS  Not lactating      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     Thyroid disease      hashimotos     Past Surgical History   Procedure Laterality Date     Craniotomy, excise tumor complex, combined  2/19/2014     Procedure: COMBINED CRANIOTOMY, EXCISE TUMOR COMPLEX;  Supracerabellar  Infratentorial Approach for Resection of Tumor ;  Surgeon: Kate Mckeon MD;  Location: [facility] OR     Procedure placeholder neuro  2/2014     Craniotomy for a benign turmo resection     Blood patch N/A 10/11/2016     Procedure: EPIDURAL BLOOD PATCH;  Surgeon: GENERIC ANESTHESIA PROVIDER;  Location: [facility] OR       Family History   Problem Relation Age of Onset     Thyroid Disease Paternal Aunt      Asthma Father      Asthma Maternal Grandmother      DIABETES Paternal Grandfather      Other Cancer Mother      Genitourinary Problems Mother      Bipolar Disorder Mother      unipolar depression     Bipolar Disorder Brother      unipolar depression     Depression Mother      Depression Brother      MENTAL ILLNESS Father      Anesthesia Reaction Brother      OSTEOPOROSIS Maternal Grandmother      Thyroid Disease Mother      benign tumor     Obesity Father      Current Outpatient Prescriptions   Medication Sig Dispense Refill     nicotine (NICODERM CQ) 21 MG/24HR 24 hr patch Place 1 patch onto the skin every 24 hours 42 patch 0     nicotine (NICODERM CQ) 14 MG/24HR 24 hr patch Place 1 patch onto the skin every 24 hours for 14 days 14 patch 0     nicotine (NICODERM CQ) 7  MG/24HR 24 hr patch Place 1 patch onto the skin every 24 hours for 14 days 14 patch 0     amoxicillin-clavulanate (AUGMENTIN) 875-125 MG per tablet Take 1 tablet by mouth 2 times daily for 10 days 20 tablet 0     montelukast (SINGULAIR) 10 MG tablet Take 1 tablet (10 mg) by mouth At Bedtime 90 tablet 3     fexofenadine (ALLEGRA) 180 MG tablet Take 1 tablet (180 mg) by mouth daily 90 tablet 3     ARIPiprazole (ABILIFY) 15 MG tablet Take 15 mg by mouth daily       albuterol (PROAIR HFA, PROVENTIL HFA, VENTOLIN HFA) 108 (90 BASE) MCG/ACT inhaler Inhale 2 puffs into the lungs every 4 hours as needed for shortness of breath / dyspnea or wheezing 1 Inhaler 3     Prenatal Vit-Fe Fumarate-FA (PRENATAL MULTIVITAMIN  PLUS IRON) 27-0.8 MG TABS Take 1 tablet by mouth daily       Social History     Social History     Marital Status:      Spouse Name: N/A     Number of Children: N/A     Years of Education: N/A     Occupational History     Not on file.     Social History Main Topics     Smoking status: Current Every Day Smoker -- 0.50 packs/day for 12 years     Types: Cigarettes     Start date: 2004     Last Attempt to Quit: 2016     Smokeless tobacco: Never Used      Comment: quit 3 days ago     Alcohol Use: No     Drug Use: No     Sexual Activity:     Partners: Male     Birth Control/ Protection: Inserts      Comment: will use condoms for post-christina birth control     Other Topics Concern     Parent/Sibling W/ Cabg, Mi Or Angioplasty Before 65f 55m? No     Social History Narrative    Caffeine intake/servings daily - 1    Calcium intake/servings daily - 3    Exercise 0 times weekly - describe ; encouraged walking daily    Sunscreen used - Yes    Seatbelts used - Yes    Guns stored in the home - No    Self Breast Exam - Yes    Pap test up to date -  No    Eye exam up to date -  No    Dental exam up to date -  No    DEXA scan up to date -  No    Flex Sig/Colonoscopy up to date -  No    Mammography up to date -  No  "   Immunizations reviewed and up to date - Yes    Abuse: Current or Past (Physical, Sexual or Emotional) - No    Do you feel safe in your environment - Yes    Do you cope well with stress - Yes    Do you suffer from insomnia - No    Last updated by: Noemi Cortez  3/1/2016             ; baby is months old; ;     GENERAL young woman in NAD  /85 mmHg  Pulse 105  Ht 1.727 m (5' 8\")  Wt 110.678 kg (244 lb)  BMI 37.11 kg/m2  SKIN: normal color,   HEENT: PER, no scleral icterus,   NEURO: Alert, responds appropriately to questions,  moves all extremities,  gait normal      DATA :    ENDO THYROID LABS-[facility] Latest Ref Rng 7/27/2016 5/31/2016 3/1/2016   TSH 0.40 - 4.00 mU/L 0.29 (L) 0.32 (L) 0.19 (L)   T4 TOTAL 4.5 - 13.9 ug/dL 12.9  12.9   T4 FREE 0.76 - 1.46 ng/dL  0.83    TRIIODOTHYRONINE(T3) 60 - 181 ng/dL        ENDO THYROID LABS-[facility] Latest Ref Rng 2/1/2016 6/30/2009 4/12/2007   TSH 0.40 - 4.00 mU/L 0.56 0.32 (L) 1.42   T4 TOTAL 4.5 - 13.9 ug/dL 10.4     T4 FREE 0.76 - 1.46 ng/dL 1.02 1.15    TRIIODOTHYRONINE(T3) 60 - 181 ng/dL  176        Patient Name: ZEINA NICHOLS   MR#: [id number]   Specimen #: [id number]   Collected: 1/13/2016   Received: 1/13/2016   Reported: 1/13/2016 17:42   Ordering Phy(s): JUANITO ROMO   Additional Phy(s): [facility], Dept. of Pathology     TEST(S):   6 Slides, case #[id number]     FINAL DIAGNOSIS:   Thyroid, Left, Ultrasound-guided fine needle aspiration ([facility] [id number]   obtained 10/20/2015):   Benign thyroid nodule.     I have personally reviewed all specimens and or slides, including the   listed special stains, and used them with my medical judgement to   determine the final diagnosis.     Electronically signed out by:   Eduard Lopez M.D., [facility]     CLINICAL HISTORY:   The patient is a 35-year-old female.     GROSS:   Received from [facility] in [place], [place] are 6 stained   slides labeled " [id number] (obtained 10/20/15) and a copy of the   referring pathologist's report with patient identifying information.   All slides are returned.     MICROSCOPIC:   Microscopic examination is performed.     Shamika Fitzgerald MD, cytology fellow, John Chapa MD     CPT Codes:   A: 81943-IIU9     TESTING LAB LOCATION:   [address], [address]   [address] [phone]     COLLECTION SITE:   Client:  [facility]   [facility]     EXAMINATION: Thyroid ultrasound on, 1/4/2017 4:03 PM       COMPARISON: None.     HISTORY: Nontoxic single thyroid nodule     Technique: Grayscale and color ultrasound imaging of the thyroid was  performed.     Findings:     Thyroid parenchyma: homogenous     The right lobe of the thyroid measures: 1.5 x 1.7 x 4.6 cm       The thyroid isthmus measures: 0.4 cm       The left lobe of the thyroid measures: 1.7 x 1.5 x 4.2 cm       Right lobe:  Nodule 1:  Nodule measurement: 0.7 x 0.7 x 0.8 cm ,  Echogenicity: Predominantly isoechoic  Consistency: solid  Calcifications: no  Hypervascular: Peripherally  Interval growth (>20%): No priors     Isthmus: No nodules are seen     Left Lobe:    Nodule 1:  Nodule measurement: 0.8 x 0.6 x 0.9 cm ,  Echogenicity: Isoechoic  Consistency: Solid and cystic  Calcifications: no  Hypervascular: Peripherally  Interval growth (>20%): No priors     Nodule 2:  Nodule measurement: 1.9 x 1.2 x 2.3 cm ,  Echogenicity: Mildly hyperechoic  Consistency: solid  Calcifications: no  Hypervascular: Peripherally  Interval growth (>20%): No priors                                                                       Impression:  Multiple thyroid nodules as detailed above, the largest is in the  inferior aspect of the left thyroid lobe measuring 1.9 x 1.2 x 2.3 cm.  This nodule according to the outside reports has been biopsied and the  biopsy  result showed no evidence of malignancy.     I have personally reviewed the examination and initial interpretation  and I agree with the findings.     MARGARITO BURNETT MD    Again, thank you for allowing me to participate in the care of your patient.      Sincerely,    Leatha Franco MD

## 2017-02-12 ENCOUNTER — HOSPITAL ENCOUNTER (EMERGENCY)
Facility: CLINIC | Age: 37
Discharge: HOME | End: 2017-02-12
Attending: EMERGENCY MEDICINE | Admitting: EMERGENCY MEDICINE
Payer: COMMERCIAL

## 2017-02-12 VITALS
SYSTOLIC BLOOD PRESSURE: 126 MMHG | OXYGEN SATURATION: 98 % | RESPIRATION RATE: 18 BRPM | TEMPERATURE: 98 F | DIASTOLIC BLOOD PRESSURE: 71 MMHG | HEART RATE: 100 BPM

## 2017-02-12 DIAGNOSIS — R40.4 SPELL OF ALTERED CONSCIOUSNESS: ICD-10-CM

## 2017-02-12 DIAGNOSIS — R20.2 PARESTHESIA: ICD-10-CM

## 2017-02-12 PROCEDURE — 99282 EMERGENCY DEPT VISIT SF MDM: CPT | Performed by: EMERGENCY MEDICINE

## 2017-02-12 PROCEDURE — 99282 EMERGENCY DEPT VISIT SF MDM: CPT | Mod: Z6 | Performed by: EMERGENCY MEDICINE

## 2017-02-12 ASSESSMENT — ENCOUNTER SYMPTOMS
SHORTNESS OF BREATH: 0
FEVER: 0
NECK STIFFNESS: 0
SLEEP DISTURBANCE: 1
CONFUSION: 1
SPEECH DIFFICULTY: 1
NUMBNESS: 1
DIFFICULTY URINATING: 0
DECREASED CONCENTRATION: 1
ABDOMINAL PAIN: 0
HEADACHES: 0
COLOR CHANGE: 0
EYE REDNESS: 0
ARTHRALGIAS: 0

## 2017-02-12 NOTE — ED PROVIDER NOTES
History     Chief Complaint   Patient presents with     Dizziness     Numbness     Depression     post partum     HPI  Cathy Arroyo is a 36 year old female with a history of anxiety, bipolar disorder and recent diagnosis of thyroid nodule who presents for evaluation of an episode of paresthesias of bilateral upper and lower extremities as well as difficulty in remembering episode of yesterday, difficulty with sleeping and mild headache.  At the time of my interview, patient had been sleeping over an hour and had no symptoms other than feeling tired.  She's had similar symptoms in the past  related to stress/anxiety/lack of sleep/possible medications.  She apparently was told recently about this thyroid nodule and she notes that this has produced some exogenous stress.      I have reviewed the Medications, Allergies, Past Medical and Surgical History, and Social History in the Epic system.    Review of Systems   Constitutional: Negative for fever.   HENT: Negative for congestion.    Eyes: Negative for redness.   Respiratory: Negative for shortness of breath.    Cardiovascular: Negative for chest pain.   Gastrointestinal: Negative for abdominal pain.   Genitourinary: Negative for difficulty urinating.   Musculoskeletal: Negative for arthralgias and neck stiffness.   Skin: Negative for color change.   Neurological: Positive for speech difficulty and numbness. Negative for headaches.   Psychiatric/Behavioral: Positive for confusion, decreased concentration and sleep disturbance.       Physical Exam   BP: 119/63  Heart Rate: 84  Temp: 98  F (36.7  C)  Resp: 14  SpO2: 94 %  Physical Exam   Constitutional: No distress.   HENT:   Head: Atraumatic.   Mouth/Throat: Oropharynx is clear and moist. No oropharyngeal exudate.   Eyes: Pupils are equal, round, and reactive to light. No scleral icterus.   Cardiovascular: Normal heart sounds and intact distal pulses.    Pulmonary/Chest: Breath sounds normal. No  respiratory distress.   Abdominal: Soft. Bowel sounds are normal. There is no tenderness.   Musculoskeletal: She exhibits no edema or tenderness.   Skin: Skin is warm. No rash noted. She is not diaphoretic.       ED Course     Procedures               Labs Ordered and Resulted from Time of ED Arrival Up to the Time of Departure from the ED - No data to display    Assessments & Plan (with Medical Decision Making)   36-year-old female presents with a constellation of symptoms including inability to remember certain portion of the events yesterday, decreased concentration,  sleep issues, paresthesias, headache all of which have resolved.  Her neurologic exam is entirely unremarkable.  She has had a recent MRI in the past month that's entirely normal.  She has had these symptoms in the past without obvious organic basis.  I believe that underlying anxiety is most likely contributing to her symptoms.  I have recommended follow-up with primary physician and psychiatrist.    I have reviewed the nursing notes.    I have reviewed the findings, diagnosis, plan and need for follow up with the patient.    Discharge Medication List as of 2/12/2017  8:06 AM          Final diagnoses:   Paresthesia   Spell of altered consciousness       2/12/2017   [facility], [place], EMERGENCY DEPARTMENT     Scott Mcclain MD  02/12/17 [id number]

## 2017-02-12 NOTE — ED AVS SNAPSHOT
" [facility], Emergency Department    [address] [address]    Phone:  [phone]                                       Cathy Arroyo   MRN: [id number]    Department:  [facility], Emergency Department   Date of Visit:  2/12/2017           Patient Information     Date Of Birth          1980        Your diagnoses for this visit were:     Paresthesia     Spell of altered consciousness        You were seen by Scott Mcclain MD.      Follow-up Information     Call Your primary physician and psychiatrist.        Discharge Instructions         Paraesthesias  Paraesthesia refers to a burning or prickling sensation that is sometimes felt in the hands, arms, legs or feet. It can also occur in other parts of the body. It can also feel like tingling or numbness, skin crawling, or itching. The feeling is not comfortable, but it is not painful. (The \"pins and needles\" feeling that happens when a foot or hand \"falls asleep\" is a temporary paraesthesia.)  Paraesthesias that last or come and go may be caused by medical issues that need to be treated. These include stroke, bulging disk (pressing on a nerve), a trapped nerve), vitamin deficiencies, or even certain medicines.  Tests are often done. These tests may include blood tests, X-ray, CT (computerized tomography) scan, or a muscle test (electromyography). Depending on the cause, treatment may include physical therapy.  Home care    Tell the healthcare provider about all medicines you take. This includes prescription and over-the-counter medicines, vitamins, and herbs. Ask if any of the medicines may be causing your problems. Do not make any changes to prescription medicines without talking to your healthcare provider first.    You may be prescribed medicines to help relieve the tingling feeling or for pain. Take all medicines as directed.    A numb hand or foot may be more prone to injury. To help protect it:    Always use oven " mitts.    Test water with an unaffected hand or foot.    Use caution when trimming nails. File sharp areas.    Wear shoes that fit well to avoid pressure points, blisters, and ulcers.    Inspect your hands and feet carefully (including the soles of your feet and between your toes) at least once a week. If you see red areas, sores, or other problems, tell your healthcare provider.  Follow-up care  Follow up with your doctor or as advised by our staff. You may need further testing or evaluation.  When to seek medical advice  Call your healthcare provider right away if any of the following occur:    Numbness or weakness of the face, one arm, or one leg    Slurred speech, confusion, trouble speaking, walking, or seeing    Severe headache, fainting spell, dizziness, or seizure    Chest, arm, neck, or upper back pain    Loss of bladder or bowel control    Open wound with redness, swelling, or pus    6208-4285 [employer]. [address] [place] [address]. All rights reserved. This information is not intended as a substitute for professional medical care. Always follow your healthcare professional's instructions.          Altered Level of Consciousness  Level of consciousness (LOC) is a measure of a person s ability to interact with other people and to react to the surroundings. A person with an altered level of consciousness may not respond to touch or voices. He or she may look vacant or blank and may not make eye contact with others. He or she may be limp and may not move for a long time or show little interest in moving. He or she may also be confused.  There are many causes of altered LOC. They include low blood sugar, infection, medicines, injuries, or other medical problems    Altered LOC is a medical emergency. The healthcare provider will do tests to help find the cause. These may include blood tests and imaging tests. The person is treated so breathing and heart rate are stable. An  intravenous (IV) line may be put into a vein in the arm or hand to give medicines. Once the cause of altered LOC is found, the goal is to treat the cause.   In almost all cases, the person will be admitted to the hospital for observation.  Home care  When your loved one is released from the hospital, you will be given guidelines for caring for him or her. In general:    Follow the healthcare provider's instructions for giving any prescribed medicines to your child.     Stay with your loved one or have another responsible adult look after him or her. Watch carefully for any return of symptoms or changes in behavior.    If the person has diabetes, make sure that any approved medicines are given on time and as prescribed.  Follow-up care  Follow up with the healthcare provider or our staff.  When to seek medical advice  Call the healthcare provider right away for any of the following:    Symptoms of altered LOC return    New symptoms appear    3053-8209 [employer]. [address] [place] [address]. All rights reserved. This information is not intended as a substitute for professional medical care. Always follow your healthcare professional's instructions.          Future Appointments        Provider Department Dept Phone Center    2/15/2017 12:00 PM [facility] [phone] [facility]    2/16/2017 10:00 AM [facility] NUC MED INJECTION ROOM 2 [facility] Nuclear Medicine [phone] [place]    2/17/2017 10:00 AM [facility] NUC MED ROOM 3 [facility], Nuclear Medicine [phone] [facility]    4/19/2017 3:30 PM Leatha Franco MD  Health Endocrinology [phone] [facility]      24 Hour Appointment Hotline       To make an appointment at any [place] clinic, call [phone] ([phone]). If you don't have a family doctor or clinic, we will help you find one. [place] clinics are conveniently located to serve the needs of you and your family.              Review of your medicines      Our records show that you are taking the medicines listed below. If these are incorrect, please call your family doctor or clinic.        Dose / Directions Last dose taken    ABILIFY 15 MG tablet   Dose:  15 mg   Generic drug:  ARIPiprazole        Take 15 mg by mouth daily   Refills:  0        albuterol 108 (90 BASE) MCG/ACT Inhaler   Commonly known as:  PROAIR HFA/PROVENTIL HFA/VENTOLIN HFA   Dose:  2 puff   Quantity:  1 Inhaler        Inhale 2 puffs into the lungs every 4 hours as needed for shortness of breath / dyspnea or wheezing   Refills:  3        amoxicillin-clavulanate 875-125 MG per tablet   Commonly known as:  AUGMENTIN   Dose:  1 tablet   Quantity:  20 tablet        Take 1 tablet by mouth 2 times daily for 10 days   Refills:  0        fexofenadine 180 MG tablet   Commonly known as:  ALLEGRA   Dose:  180 mg   Quantity:  90 tablet        Take 1 tablet (180 mg) by mouth daily   Refills:  3        montelukast 10 MG tablet   Commonly known as:  SINGULAIR   Dose:  10 mg   Quantity:  90 tablet        Take 1 tablet (10 mg) by mouth At Bedtime   Refills:  3        * nicotine 21 MG/24HR 24 hr patch   Commonly known as:  NICODERM CQ   Dose:  1 patch   Quantity:  42 patch        Place 1 patch onto the skin every 24 hours   Refills:  0        * nicotine 14 MG/24HR 24 hr patch   Commonly known as:  NICODERM CQ   Dose:  1 patch   Quantity:  14 patch        Place 1 patch onto the skin every 24 hours for 14 days   Refills:  0        * nicotine 7 MG/24HR 24 hr patch   Commonly known as:  NICODERM CQ   Dose:  1 patch   Quantity:  14 patch        Place 1 patch onto the skin every 24 hours for 14 days   Refills:  0        prenatal multivitamin  plus iron 27-0.8 MG Tabs per tablet   Dose:  1 tablet        Take 1 tablet by mouth daily   Refills:  0        * Notice:  This list has 3 medication(s) that are the same as other medications prescribed for you. Read the directions carefully, and ask  your doctor or other care provider to review them with you.            Orders Needing Specimen Collection     None      Pending Results     No orders found from 2/10/2017 to 2/13/2017.            Pending Culture Results     No orders found from 2/10/2017 to 2/13/2017.            Thank you for choosing [facility]       Thank you for choosing [facility] for your care. Our goal is always to provide you with excellent care. Hearing back from our patients is one way we can continue to improve our services. Please take a few minutes to complete the written survey that you may receive in the mail after you visit with us. Thank you!        [facility] Information     [employer] gives you secure access to your electronic health record. If you see a primary care provider, you can also send messages to your care team and make appointments. If you have questions, please call your primary care clinic.  If you do not have a primary care provider, please call [phone] and they will assist you.        Care EveryWhere ID     This is your Care EveryWhere ID. This could be used by other organizations to access your [facility] medical records  [id number]        After Visit Summary       This is your record. Keep this with you and show to your community pharmacist(s) and doctor(s) at your next visit.

## 2017-02-12 NOTE — DISCHARGE INSTRUCTIONS
"  Paraesthesias  Paraesthesia refers to a burning or prickling sensation that is sometimes felt in the hands, arms, legs or feet. It can also occur in other parts of the body. It can also feel like tingling or numbness, skin crawling, or itching. The feeling is not comfortable, but it is not painful. (The \"pins and needles\" feeling that happens when a foot or hand \"falls asleep\" is a temporary paraesthesia.)  Paraesthesias that last or come and go may be caused by medical issues that need to be treated. These include stroke, bulging disk (pressing on a nerve), a trapped nerve), vitamin deficiencies, or even certain medicines.  Tests are often done. These tests may include blood tests, X-ray, CT (computerized tomography) scan, or a muscle test (electromyography). Depending on the cause, treatment may include physical therapy.  Home care    Tell the healthcare provider about all medicines you take. This includes prescription and over-the-counter medicines, vitamins, and herbs. Ask if any of the medicines may be causing your problems. Do not make any changes to prescription medicines without talking to your healthcare provider first.    You may be prescribed medicines to help relieve the tingling feeling or for pain. Take all medicines as directed.    A numb hand or foot may be more prone to injury. To help protect it:    Always use oven mitts.    Test water with an unaffected hand or foot.    Use caution when trimming nails. File sharp areas.    Wear shoes that fit well to avoid pressure points, blisters, and ulcers.    Inspect your hands and feet carefully (including the soles of your feet and between your toes) at least once a week. If you see red areas, sores, or other problems, tell your healthcare provider.  Follow-up care  Follow up with your doctor or as advised by our staff. You may need further testing or evaluation.  When to seek medical advice  Call your healthcare provider right away if any of the " following occur:    Numbness or weakness of the face, one arm, or one leg    Slurred speech, confusion, trouble speaking, walking, or seeing    Severe headache, fainting spell, dizziness, or seizure    Chest, arm, neck, or upper back pain    Loss of bladder or bowel control    Open wound with redness, swelling, or pus    0562-5799 [employer]. [address] [place] [address]. All rights reserved. This information is not intended as a substitute for professional medical care. Always follow your healthcare professional's instructions.          Altered Level of Consciousness  Level of consciousness (LOC) is a measure of a person s ability to interact with other people and to react to the surroundings. A person with an altered level of consciousness may not respond to touch or voices. He or she may look vacant or blank and may not make eye contact with others. He or she may be limp and may not move for a long time or show little interest in moving. He or she may also be confused.  There are many causes of altered LOC. They include low blood sugar, infection, medicines, injuries, or other medical problems    Altered LOC is a medical emergency. The healthcare provider will do tests to help find the cause. These may include blood tests and imaging tests. The person is treated so breathing and heart rate are stable. An intravenous (IV) line may be put into a vein in the arm or hand to give medicines. Once the cause of altered LOC is found, the goal is to treat the cause.   In almost all cases, the person will be admitted to the hospital for observation.  Home care  When your loved one is released from the hospital, you will be given guidelines for caring for him or her. In general:    Follow the healthcare provider's instructions for giving any prescribed medicines to your child.     Stay with your loved one or have another responsible adult look after him or her. Watch carefully for any return of  symptoms or changes in behavior.    If the person has diabetes, make sure that any approved medicines are given on time and as prescribed.  Follow-up care  Follow up with the healthcare provider or our staff.  When to seek medical advice  Call the healthcare provider right away for any of the following:    Symptoms of altered LOC return    New symptoms appear    7848-2175 [employer]. [address] [place] [address], [place] [address]. All rights reserved. This information is not intended as a substitute for professional medical care. Always follow your healthcare professional's instructions.

## 2017-02-12 NOTE — ED AVS SNAPSHOT
[facility], [place], Emergency Department    [address] [place] [address]    Phone:  [phone]                                       Cathy Arroyo   MRN: [id number]    Department:  [facility], Emergency Department   Date of Visit:  2/12/2017           After Visit Summary Signature Page     I have received my discharge instructions, and my questions have been answered. I have discussed any challenges I see with this plan with the nurse or doctor.    ..........................................................................................................................................  Patient/Patient Representative Signature      ..........................................................................................................................................  Patient Representative Print Name and Relationship to Patient    ..................................................               ................................................  Date                                            Time    ..........................................................................................................................................  Reviewed by Signature/Title    ...................................................              ..............................................  Date                                                            Time

## 2017-02-13 DIAGNOSIS — E04.1 THYROID NODULE: Primary | ICD-10-CM

## 2017-02-14 ENCOUNTER — TELEPHONE (OUTPATIENT)
Dept: ENDOCRINOLOGY | Facility: CLINIC | Age: 37
End: 2017-02-14

## 2017-02-14 NOTE — TELEPHONE ENCOUNTER
----- Message from Leatha Franco MD sent at 2/14/2017  9:44 AM CST -----  Regarding: cancel my last request to cancel .  Sorry  Please CANCEL my request to cancel her 123I thyroid scan for this week. She should still get it on 2/16 and 2/17.  Let me know if this can't happen. Make sure we don't mess up nuclear medicine with this back and forth (which is all due to the patient)    Leatha Dias

## 2017-02-14 NOTE — TELEPHONE ENCOUNTER
----- Message from Gela Hinkle RN sent at 2/14/2017  1:14 PM CST -----  Regarding: FW: next generation sequencing on thyroid specimen      ----- Message -----     From: Leatha Franco MD     Sent: 2/13/2017   8:46 PM       To: Leatha Franco MD, Gela Hinkle, RN  Subject: next generation sequencing on thyroid specim#    Please call pathology and tell them we want the IN HOUSE next generation sequencing panel on the thyroid cytology specimen they have on her form last week. I have filled out the order form on EPIC.   I have not filled ou the genetic testing form.  IF they seriously need this please get her in to sign the form.    Thanks    Leatha Franco

## 2017-02-14 NOTE — TELEPHONE ENCOUNTER
Contacted  Pathology  and had to fax the orders  for In house  Next Generation Sequencing  To [phone]. The orders did not print there and so was asked to fax them . I did verify received with  Isadora  that the fax was received and they will get that going and let us know if anything else is needed.

## 2017-02-14 NOTE — TELEPHONE ENCOUNTER
Verified with Nuclear Medicine the dates of 2/16/17 and 2/17/17 10 AM Uptake and scan scheduled with  pregnancy test   Wednesday .

## 2017-02-15 DIAGNOSIS — E05.90 THYROTOXICOSIS WITHOUT THYROID STORM, UNSPECIFIED THYROTOXICOSIS TYPE: ICD-10-CM

## 2017-02-15 DIAGNOSIS — E04.1 THYROID NODULE: ICD-10-CM

## 2017-02-15 LAB — B-HCG SERPL-ACNC: <1 IU/L

## 2017-02-16 ENCOUNTER — HOSPITAL ENCOUNTER (OUTPATIENT)
Dept: NUCLEAR MEDICINE | Facility: CLINIC | Age: 37
Setting detail: NUCLEAR MEDICINE
Discharge: HOME | End: 2017-02-17
Payer: COMMERCIAL

## 2017-02-16 DIAGNOSIS — E05.90 THYROTOXICOSIS WITHOUT THYROID STORM, UNSPECIFIED THYROTOXICOSIS TYPE: ICD-10-CM

## 2017-02-16 DIAGNOSIS — E04.1 THYROID NODULE: ICD-10-CM

## 2017-02-16 PROCEDURE — 34300033 ZZH RX 343

## 2017-02-16 PROCEDURE — A9516 IODINE I-123 SOD IODIDE MIC: HCPCS

## 2017-02-16 RX ADMIN — Medication 277 UCI.: at 10:29

## 2017-02-17 ENCOUNTER — HOSPITAL ENCOUNTER (OUTPATIENT)
Dept: NUCLEAR MEDICINE | Facility: CLINIC | Age: 37
Setting detail: NUCLEAR MEDICINE
End: 2017-02-17
Payer: COMMERCIAL

## 2017-02-17 ENCOUNTER — MYC MEDICAL ADVICE (OUTPATIENT)
Dept: FAMILY MEDICINE | Facility: CLINIC | Age: 37
End: 2017-02-17

## 2017-02-17 DIAGNOSIS — J45.40 MODERATE PERSISTENT ASTHMA WITHOUT COMPLICATION: Primary | ICD-10-CM

## 2017-02-17 PROCEDURE — 78014 THYROID IMAGING W/BLOOD FLOW: CPT

## 2017-02-21 NOTE — TELEPHONE ENCOUNTER
Routing to provider - Dasilva - please review and advise as appropriate  1. Formulary:  Per patient Dulera is covered    Thank you,  Maria Antonia Clark RN

## 2017-02-28 LAB — COPATH REPORT: NORMAL

## 2017-03-17 DIAGNOSIS — E05.00 GRAVES DISEASE: ICD-10-CM

## 2017-03-17 DIAGNOSIS — E05.90 SUBCLINICAL HYPERTHYROIDISM: ICD-10-CM

## 2017-03-17 DIAGNOSIS — E04.1 THYROID NODULE: ICD-10-CM

## 2017-03-17 LAB — T3 SERPL-MCNC: 179 NG/DL (ref 60–181)

## 2017-03-17 PROCEDURE — 84439 ASSAY OF FREE THYROXINE: CPT

## 2017-03-17 PROCEDURE — 84480 ASSAY TRIIODOTHYRONINE (T3): CPT

## 2017-03-17 PROCEDURE — 36415 COLL VENOUS BLD VENIPUNCTURE: CPT

## 2017-03-17 PROCEDURE — 84443 ASSAY THYROID STIM HORMONE: CPT

## 2017-03-18 LAB
T4 FREE SERPL-MCNC: 1.13 NG/DL (ref 0.76–1.46)
TSH SERPL DL<=0.05 MIU/L-ACNC: <0.01 MU/L (ref 0.4–4)

## 2017-03-20 ENCOUNTER — MYC MEDICAL ADVICE (OUTPATIENT)
Dept: ENDOCRINOLOGY | Facility: CLINIC | Age: 37
End: 2017-03-20

## 2017-03-20 DIAGNOSIS — R89.6 ABNORMAL CYTOLOGY: ICD-10-CM

## 2017-03-20 DIAGNOSIS — E05.90 SUBCLINICAL HYPERTHYROIDISM: Primary | ICD-10-CM

## 2017-03-20 DIAGNOSIS — E05.00 GRAVES DISEASE: ICD-10-CM

## 2017-03-20 DIAGNOSIS — E04.1 THYROID NODULE: ICD-10-CM

## 2017-03-20 RX ORDER — METHIMAZOLE 5 MG/1
5 TABLET ORAL DAILY
Qty: 30 TABLET | Refills: 3 | Status: SHIPPED | OUTPATIENT
Start: 2017-03-20 | End: 2017-05-02

## 2017-03-27 ENCOUNTER — TELEPHONE (OUTPATIENT)
Dept: FAMILY MEDICINE | Facility: CLINIC | Age: 37
End: 2017-03-27

## 2017-03-27 NOTE — LETTER
[facility]  [address] [place] [address] [address]  [facility] [address] [phone]        March 27, 2017      Cathy Arroyo  [address]  [facility] [address]          Dear Cathy    In order to ensure we are providing the best quality care, we have reviewed your chart and see that you are due for:    1. Asthma follow up    Please call the clinic at your earliest convenience to schedule an appointment.  If you have completed these please contact our office via phone or [email or web address] to update our records.  We would like to know the date (approximately month and year), the result, and ideally where the procedure was performed.    Thank you for trusting us with your health care.      Sincerely,    Care Team for DEVEN Winston

## 2017-03-27 NOTE — TELEPHONE ENCOUNTER
Panel Management Review      Patient has the following on her problem list:     Asthma review     ACT Total Scores 1/18/2017   ACT TOTAL SCORE (Goal Greater than or Equal to 20) 8   In the past 12 months, how many times did you visit the emergency room for your asthma without being admitted to the hospital? 0   In the past 12 months, how many times were you hospitalized overnight because of your asthma? 0      1. Is Asthma diagnosis on the Problem List? Yes    2. Is Asthma listed on Health Maintenance? Yes    3. Patient is due for:  ACT      Composite cancer screening  Chart review shows that this patient is due/due soon for the following None  Summary:    Patient is due/failing the following:   ACT    Action needed:   Patient needs office visit for asthma follow up.    Type of outreach:    Sent letter.    Questions for provider review:    None                                                                                                                                    Terell Capps MA       Chart routed to none.

## 2017-03-29 ENCOUNTER — TELEPHONE (OUTPATIENT)
Dept: FAMILY MEDICINE | Facility: CLINIC | Age: 37
End: 2017-03-29

## 2017-03-29 DIAGNOSIS — J45.40 MODERATE PERSISTENT ASTHMA WITHOUT COMPLICATION: Primary | ICD-10-CM

## 2017-03-29 NOTE — TELEPHONE ENCOUNTER
[facility] sent over a request for a new presctiption, for patient to use a spacer with inhalers.    [facility] telephone number: [phone]  [facility] Fax number: [phone]

## 2017-04-14 DIAGNOSIS — E05.00 GRAVES DISEASE: Primary | ICD-10-CM

## 2017-04-18 ENCOUNTER — OFFICE VISIT (OUTPATIENT)
Dept: FAMILY MEDICINE | Facility: CLINIC | Age: 37
End: 2017-04-18
Payer: COMMERCIAL

## 2017-04-18 VITALS
BODY MASS INDEX: 37.54 KG/M2 | TEMPERATURE: 98.2 F | HEART RATE: 95 BPM | WEIGHT: 246.9 LBS | SYSTOLIC BLOOD PRESSURE: 132 MMHG | DIASTOLIC BLOOD PRESSURE: 72 MMHG | OXYGEN SATURATION: 96 %

## 2017-04-18 DIAGNOSIS — E05.00 GRAVES DISEASE: ICD-10-CM

## 2017-04-18 DIAGNOSIS — R07.0 THROAT PAIN: ICD-10-CM

## 2017-04-18 DIAGNOSIS — L50.9 HIVES: Primary | ICD-10-CM

## 2017-04-18 LAB
BASOPHILS # BLD AUTO: 0 10E9/L (ref 0–0.2)
BASOPHILS NFR BLD AUTO: 0.1 %
DEPRECATED S PYO AG THROAT QL EIA: NORMAL
DIFFERENTIAL METHOD BLD: NORMAL
EOSINOPHIL # BLD AUTO: 0 10E9/L (ref 0–0.7)
EOSINOPHIL NFR BLD AUTO: 0 %
ERYTHROCYTE [DISTWIDTH] IN BLOOD BY AUTOMATED COUNT: 14.8 % (ref 10–15)
HCT VFR BLD AUTO: 41.5 % (ref 35–47)
HGB BLD-MCNC: 13.7 G/DL (ref 11.7–15.7)
LYMPHOCYTES # BLD AUTO: 2.2 10E9/L (ref 0.8–5.3)
LYMPHOCYTES NFR BLD AUTO: 29.5 %
MCH RBC QN AUTO: 28.4 PG (ref 26.5–33)
MCHC RBC AUTO-ENTMCNC: 33 G/DL (ref 31.5–36.5)
MCV RBC AUTO: 86 FL (ref 78–100)
MICRO REPORT STATUS: NORMAL
MONOCYTES # BLD AUTO: 0.6 10E9/L (ref 0–1.3)
MONOCYTES NFR BLD AUTO: 8.4 %
NEUTROPHILS # BLD AUTO: 4.7 10E9/L (ref 1.6–8.3)
NEUTROPHILS NFR BLD AUTO: 62 %
PLATELET # BLD AUTO: 288 10E9/L (ref 150–450)
RBC # BLD AUTO: 4.83 10E12/L (ref 3.8–5.2)
SPECIMEN SOURCE: NORMAL
T3 SERPL-MCNC: 143 NG/DL (ref 60–181)
WBC # BLD AUTO: 7.5 10E9/L (ref 4–11)

## 2017-04-18 PROCEDURE — 87880 STREP A ASSAY W/OPTIC: CPT | Performed by: PHYSICIAN ASSISTANT

## 2017-04-18 PROCEDURE — 84443 ASSAY THYROID STIM HORMONE: CPT

## 2017-04-18 PROCEDURE — 99214 OFFICE O/P EST MOD 30 MIN: CPT | Performed by: PHYSICIAN ASSISTANT

## 2017-04-18 PROCEDURE — 84439 ASSAY OF FREE THYROXINE: CPT

## 2017-04-18 PROCEDURE — 36415 COLL VENOUS BLD VENIPUNCTURE: CPT

## 2017-04-18 PROCEDURE — 84480 ASSAY TRIIODOTHYRONINE (T3): CPT

## 2017-04-18 PROCEDURE — 87081 CULTURE SCREEN ONLY: CPT | Performed by: PHYSICIAN ASSISTANT

## 2017-04-18 PROCEDURE — 85025 COMPLETE CBC W/AUTO DIFF WBC: CPT | Performed by: PHYSICIAN ASSISTANT

## 2017-04-18 RX ORDER — TRIAMCINOLONE ACETONIDE 1 MG/G
OINTMENT TOPICAL
Qty: 80 G | Refills: 0 | Status: SHIPPED | OUTPATIENT
Start: 2017-04-18 | End: 2017-05-02

## 2017-04-18 ASSESSMENT — ENCOUNTER SYMPTOMS
WEIGHT GAIN: 0
BOWEL INCONTINENCE: 0
ABDOMINAL PAIN: 0
BLOOD IN STOOL: 0
HEARTBURN: 0
RESPIRATORY PAIN: 0
DECREASED CONCENTRATION: 1
VOMITING: 0
HALLUCINATIONS: 0
INSOMNIA: 1
POLYDIPSIA: 1
CONSTIPATION: 0
POLYPHAGIA: 0
SMELL DISTURBANCE: 0
SWOLLEN GLANDS: 0
SINUS CONGESTION: 0
BRUISES/BLEEDS EASILY: 1
PANIC: 1
CHILLS: 0
NAUSEA: 0
DIARRHEA: 1
DEPRESSION: 0
RECTAL BLEEDING: 0
COUGH: 1
FEVER: 0
WHEEZING: 1
POSTURAL DYSPNEA: 1
WEIGHT LOSS: 0
SINUS PAIN: 0
HOARSE VOICE: 1
DYSPNEA ON EXERTION: 1
POOR WOUND HEALING: 0
RECTAL PAIN: 0
ALTERED TEMPERATURE REGULATION: 1
INCREASED ENERGY: 0
COUGH DISTURBING SLEEP: 1
JAUNDICE: 0
NECK MASS: 1
DECREASED APPETITE: 0
DIFFICULTY URINATING: 0
SORE THROAT: 1
TROUBLE SWALLOWING: 0
BLOATING: 1
NERVOUS/ANXIOUS: 1
TASTE DISTURBANCE: 0
SHORTNESS OF BREATH: 1
HEMATURIA: 0
DYSURIA: 0
SPUTUM PRODUCTION: 1
NAIL CHANGES: 0
NIGHT SWEATS: 1
FATIGUE: 0
FLANK PAIN: 0
SKIN CHANGES: 1
HEMOPTYSIS: 0
SNORES LOUDLY: 1

## 2017-04-18 NOTE — NURSING NOTE
"Chief Complaint   Patient presents with     Allergic Reaction       Initial /72  Pulse 95  Temp 98.2  F (36.8  C) (Oral)  Wt 246 lb 14.4 oz (112 kg)  SpO2 96%  BMI 37.54 kg/m2 Estimated body mass index is 37.54 kg/(m^2) as calculated from the following:    Height as of 2/10/17: 5' 8\" (1.727 m).    Weight as of this encounter: 246 lb 14.4 oz (112 kg).  Medication Reconciliation: complete     Dipika Contreras MA      "

## 2017-04-18 NOTE — PATIENT INSTRUCTIONS
Restart dulera  Use steroid cream as directed  Continue allegra  Ok to use benadryl at night as needed  Ibuprofen 800 mg three times daily for 5-7 days  Return to clinic for any new or worsening symptoms or go to ER Urgent care in off hours    Hives (Adult)  Hives are pink or red bumps on the skin. These bumps are also known as  wheals.  The bumps can itch, burn, or sting. Hives can occur anywhere on the body. They vary in size and shape and can form in clusters. Individual hives can appear and go away quickly. New hives may develop as old ones fade. Hives are common and usually harmless. Occasionally hives are a sign of a serious allergy.  Hives are often caused by an allergic reaction. It may be an allergic reaction to foods such as fruit, shellfish, chocolate, nuts, or tomatoes. It may be a reaction to pollens, animal fur, or mold spores. Medications, chemicals, and insect bites can also cause hives. And hives can be caused by hot sun or cold air. The cause of hives can be difficult to find.  You may be given medications to relieve swelling and itching. Follow all instructions when using these medications. The hives will fade in a few days, but can last up to 2 weeks.  Home care  Follow these tips:    Try to find the cause of the hives and eliminate it. Discuss possible causes with your health care provider. Future reactions to the same allergen may be worse.    Don t scratch the hives. Scratching will delay healing. To reduce itching, apply cool, wet compresses to the skin.    Dress in soft, loose cotton clothing.    Don t bathe in hot water. This can make the itching worse.    Apply an ice pack or cool pack wrapped in a thin towel to your skin. This will help reduce redness and itching.    Try a topical spray or cream with benzocaine to help reduce itching.    Use oral diphenhydramine to help reduce itching. This is an antihistamine you can buy at drug and grocery stores. It can make you sleepy, so use lower  doses during the daytime. Or you can use loratadine. This is an antihistamine that will not make you sleepy. Don t use diphenhydramine if you have glaucoma or have trouble urinating because of an enlarged prostate.  Follow-up care  Follow up with your health care provider if your symptoms don't get better in 2 days. Ask your provider about allergy testing if you have had a severe reaction, or have had several episodes of hives. He or she can use the allergy testing to find out what you are allergic to.  When to seek medical advice  Call your health care provider right away if any of these occur:    Fever of 100.4 F (38.0 C) or higher    Swelling of the face, throat, or tongue    Trouble breathing or swallowing    Redness, swelling, or pain    Foul-smelling fluid coming from the rash    Dizziness, weakness, or fainting    9972-2905 [employer]. [address] [place] [address], [place] [address]. All rights reserved. This information is not intended as a substitute for professional medical care. Always follow your healthcare professional's instructions.

## 2017-04-18 NOTE — PROGRESS NOTES
"  SUBJECTIVE:                                                    Cathy Arroyo is a 36 year old female who presents to clinic today for the following health issues:    Hives     Onset: Yesterday    Description:   Location: Stomach and lower back   Character: blotchy, red  Itching (Pruritis): YES    Progression of Symptoms:  same    Accompanying Signs & Symptoms:  Fever: no   Body aches or joint pain: no   Sore throat symptoms: no   Recent cold symptoms: YES- \"swollen uvula\"   History:   Previous similar rash: YES- has had them in the past but normally just 1 dot    Precipitating factors:   Exposure to similar rash: no   New exposures: medication Methimazole, Dulera, Valium   Recent travel: no     Alleviating factors:  Benadryl has helped with itching     Therapies Tried and outcome:     Is on Methimazole, accidentally skipped 1 dose, 2 days ago  Wants to put an order in for a CBC. Concerned about infection    Unsure of what she was exposed to. Has had 1-2 hives here and there in the past   No trouble breathing  She forgot to use her inhaler today      Denies visual changes, abdominal pain, ear pain, sore throat, numbness or tingling, bleeding problems, SI/HI      Problem list and histories reviewed & adjusted, as indicated.  Additional history: as documented    ROS:  C: NEGATIVE for fever, chills, change in weight  E/M: NEGATIVE for ear, mouth and throat problems  R: NEGATIVE for significant cough or SOB  CV: NEGATIVE for chest pain, palpitations or peripheral edema    Patient Active Problem List   Diagnosis     Pineal gland, tumor     Thyroid nodule     Anti-TPO antibodies present     Attention deficit disorder     Human papilloma virus (HPV) infection     Seasonal allergic rhinitis     Need for Tdap vaccination     Bipolar affective disorder in remission (H)     Pregnancy, normal first     GBS (group B Streptococcus carrier), +RV culture, currently pregnant     Labor and delivery, indication for care "     Pre-eclampsia     Mild persistent asthma with acute exacerbation     Moderate persistent asthma without complication     Chronic rhinitis     Tobacco use disorder     Past Surgical History:   Procedure Laterality Date     BLOOD PATCH N/A 10/11/2016    Procedure: EPIDURAL BLOOD PATCH;  Surgeon: GENERIC ANESTHESIA PROVIDER;  Location: [facility]     CRANIOTOMY, EXCISE TUMOR COMPLEX, COMBINED  2/19/2014    Procedure: COMBINED CRANIOTOMY, EXCISE TUMOR COMPLEX;  Supracerabellar  Infratentorial Approach for Resection of Tumor ;  Surgeon: Kate Mckeon MD;  Location: [facility] OR     PROCEDURE PLACEHOLDER NEURO  2/2014    Craniotomy for a benign turmo resection       Social History   Substance Use Topics     Smoking status: Current Every Day Smoker     Packs/day: 0.50     Years: 12.00     Types: Cigarettes     Start date: 1/21/2004     Last attempt to quit: 1/23/2016     Smokeless tobacco: Never Used      Comment: quit 3 days ago     Alcohol use No     Family History   Problem Relation Age of Onset     Thyroid Disease Paternal Aunt      Asthma Father      Asthma Maternal Grandmother      DIABETES Paternal Grandfather      Other Cancer Mother      Genitourinary Problems Mother      Bipolar Disorder Mother      unipolar depression     Bipolar Disorder Brother      unipolar depression     Depression Mother      Depression Brother      MENTAL ILLNESS Father      Anesthesia Reaction Brother      OSTEOPOROSIS Maternal Grandmother      Thyroid Disease Mother      benign tumor     Obesity Father            Problem list, Medication list, Allergies, and Medical/Social/Surgical histories reviewed in [facility] and updated as appropriate.  Labs reviewed in EPIC    OBJECTIVE:                                                    /72  Pulse 95  Temp 98.2  F (36.8  C) (Oral)  Wt 246 lb 14.4 oz (112 kg)  SpO2 96%  BMI 37.54 kg/m2 Body mass index is 37.54 kg/(m^2).   GEN: Pt in no acute distress.  HEENT: AT, NC, eyes and ears  "normal, throat normal. Mildly swollen and red uvula.   NODES: no anterior cervical LA, no supraclavicular LA  HEART: Regular rate and rhythm without murmurs, rubs or gallops  LUNGS: upper respiratory wheezing  SKIN:  Several urticarial lesions on abdomen and low back. No lymphangitis, fluctuance, induration, bleeding or discharge  Neuro: CN 2-12 grossly intact        Results for orders placed or performed in visit on 04/18/17 (from the past 24 hour(s))   Strep, Rapid Screen   Result Value Ref Range    Specimen Description Throat     Rapid Strep A Screen       NEGATIVE: No Group A streptococcal antigen detected by immunoassay, await   culture report.      Micro Report Status FINAL 04/18/2017    CBC with platelets and differential   Result Value Ref Range    WBC 7.5 4.0 - 11.0 10e9/L    RBC Count 4.83 3.8 - 5.2 10e12/L    Hemoglobin 13.7 11.7 - 15.7 g/dL    Hematocrit 41.5 35.0 - 47.0 %    MCV 86 78 - 100 fl    MCH 28.4 26.5 - 33.0 pg    MCHC 33.0 31.5 - 36.5 g/dL    RDW 14.8 10.0 - 15.0 %    Platelet Count 288 150 - 450 10e9/L    Diff Method Automated Method     % Neutrophils 62.0 %    % Lymphocytes 29.5 %    % Monocytes 8.4 %    % Eosinophils 0.0 %    % Basophils 0.1 %    Absolute Neutrophil 4.7 1.6 - 8.3 10e9/L    Absolute Lymphocytes 2.2 0.8 - 5.3 10e9/L    Absolute Monocytes 0.6 0.0 - 1.3 10e9/L    Absolute Eosinophils 0.0 0.0 - 0.7 10e9/L    Absolute Basophils 0.0 0.0 - 0.2 10e9/L          ASSESSMENT/PLAN:                                                        ICD-10-CM    1. Hives L50.9 triamcinolone (KENALOG) 0.1 % ointment   2. Graves disease E05.00 TSH     T4 free     T3 total   3. Throat pain R07.0 Strep, Rapid Screen     CBC with platelets and differential     Beta strep group A culture     Patient is unable to take oral steroids because it \"throws my into jose eduardo.\" I'll have her use topical steroids for the rash. No sign of breathing difficulties. Advised to restart dulera which will help with uvula " swelling and start ibuprofen. CBC normal. Negative for strep. Keep a journal of new exposures. Return to clinic for any new or worsening symptoms or go to ER Urgent care in off hours    Patient Instructions   Restart dulera  Use steroid cream as directed  Continue allegra  Ok to use benadryl at night as needed  Ibuprofen 800 mg three times daily for 5-7 days  Return to clinic for any new or worsening symptoms or go to ER Urgent care in off hours    Hives (Adult)  Hives are pink or red bumps on the skin. These bumps are also known as  wheals.  The bumps can itch, burn, or sting. Hives can occur anywhere on the body. They vary in size and shape and can form in clusters. Individual hives can appear and go away quickly. New hives may develop as old ones fade. Hives are common and usually harmless. Occasionally hives are a sign of a serious allergy.  Hives are often caused by an allergic reaction. It may be an allergic reaction to foods such as fruit, shellfish, chocolate, nuts, or tomatoes. It may be a reaction to pollens, animal fur, or mold spores. Medications, chemicals, and insect bites can also cause hives. And hives can be caused by hot sun or cold air. The cause of hives can be difficult to find.  You may be given medications to relieve swelling and itching. Follow all instructions when using these medications. The hives will fade in a few days, but can last up to 2 weeks.  Home care  Follow these tips:    Try to find the cause of the hives and eliminate it. Discuss possible causes with your health care provider. Future reactions to the same allergen may be worse.    Don t scratch the hives. Scratching will delay healing. To reduce itching, apply cool, wet compresses to the skin.    Dress in soft, loose cotton clothing.    Don t bathe in hot water. This can make the itching worse.    Apply an ice pack or cool pack wrapped in a thin towel to your skin. This will help reduce redness and itching.    Try a topical  "spray or cream with benzocaine to help reduce itching.    Use oral diphenhydramine to help reduce itching. This is an antihistamine you can buy at drug and grocery stores. It can make you sleepy, so use lower doses during the daytime. Or you can use loratadine. This is an antihistamine that will not make you sleepy. Don t use diphenhydramine if you have glaucoma or have trouble urinating because of an enlarged prostate.  Follow-up care  Follow up with your health care provider if your symptoms don't get better in 2 days. Ask your provider about allergy testing if you have had a severe reaction, or have had several episodes of hives. He or she can use the allergy testing to find out what you are allergic to.  When to seek medical advice  Call your health care provider right away if any of these occur:    Fever of 100.4 F (38.0 C) or higher    Swelling of the face, throat, or tongue    Trouble breathing or swallowing    Redness, swelling, or pain    Foul-smelling fluid coming from the rash    Dizziness, weakness, or fainting    5901-7341 [employer]. [address] [place] [address]. All rights reserved. This information is not intended as a substitute for professional medical care. Always follow your healthcare professional's instructions.              Estimated body mass index is 37.54 kg/(m^2) as calculated from the following:    Height as of 2/10/17: 5' 8\" (1.727 m).    Weight as of this encounter: 246 lb 14.4 oz (112 kg).       Allyson Melendez  [facility]      "

## 2017-04-18 NOTE — MR AVS SNAPSHOT
After Visit Summary   4/18/2017    Cathy Arroyo    MRN: [id number]           Patient Information     Date Of Birth          1980        Visit Information        Provider Department      4/18/2017 12:00 PM Allyson Melendez PA-C [facility]        Today's Diagnoses     Hives    -  1    Graves disease        Throat pain          Care Instructions    Restart dulera  Use steroid cream as directed  Continue allegra  Ok to use benadryl at night as needed  Ibuprofen 800 mg three times daily for 5-7 days  Return to clinic for any new or worsening symptoms or go to ER Urgent care in off hours    Hives (Adult)  Hives are pink or red bumps on the skin. These bumps are also known as  wheals.  The bumps can itch, burn, or sting. Hives can occur anywhere on the body. They vary in size and shape and can form in clusters. Individual hives can appear and go away quickly. New hives may develop as old ones fade. Hives are common and usually harmless. Occasionally hives are a sign of a serious allergy.  Hives are often caused by an allergic reaction. It may be an allergic reaction to foods such as fruit, shellfish, chocolate, nuts, or tomatoes. It may be a reaction to pollens, animal fur, or mold spores. Medications, chemicals, and insect bites can also cause hives. And hives can be caused by hot sun or cold air. The cause of hives can be difficult to find.  You may be given medications to relieve swelling and itching. Follow all instructions when using these medications. The hives will fade in a few days, but can last up to 2 weeks.  Home care  Follow these tips:    Try to find the cause of the hives and eliminate it. Discuss possible causes with your health care provider. Future reactions to the same allergen may be worse.    Don t scratch the hives. Scratching will delay healing. To reduce itching, apply cool, wet compresses to the skin.    Dress in soft, loose cotton  clothing.    Don t bathe in hot water. This can make the itching worse.    Apply an ice pack or cool pack wrapped in a thin towel to your skin. This will help reduce redness and itching.    Try a topical spray or cream with benzocaine to help reduce itching.    Use oral diphenhydramine to help reduce itching. This is an antihistamine you can buy at drug and grocery stores. It can make you sleepy, so use lower doses during the daytime. Or you can use loratadine. This is an antihistamine that will not make you sleepy. Don t use diphenhydramine if you have glaucoma or have trouble urinating because of an enlarged prostate.  Follow-up care  Follow up with your health care provider if your symptoms don't get better in 2 days. Ask your provider about allergy testing if you have had a severe reaction, or have had several episodes of hives. He or she can use the allergy testing to find out what you are allergic to.  When to seek medical advice  Call your health care provider right away if any of these occur:    Fever of 100.4 F (38.0 C) or higher    Swelling of the face, throat, or tongue    Trouble breathing or swallowing    Redness, swelling, or pain    Foul-smelling fluid coming from the rash    Dizziness, weakness, or fainting    8757-5325 [employer]. [address] [place] [address]. All rights reserved. This information is not intended as a substitute for professional medical care. Always follow your healthcare professional's instructions.              Follow-ups after your visit        Your next 10 appointments already scheduled     Apr 19, 2017  3:30 PM CDT   (Arrive by 3:15 PM)   RETURN ENDOCRINE with Leatha Franco MD   [facility] ([facility])    [address] [place] [id number]   [phone]            May 01, 2017  4:00 PM CDT   (Arrive by 3:45 PM)   New Patient Visit with MD NANCY De La Fuente [facility] ([facility])    [address]  [address]  [facility] [address] [phone]            May 03, 2017  1:30 PM CDT   Office Visit with JANNET Nascimento CNP   [facility] ([facility])    [address] [place] [address] [address]  [facility] [address] [phone]           Bring a current list of meds and any records pertaining to this visit.  For Physicals, please bring immunization records and any forms needing to be filled out.  Please arrive 10 minutes early to complete paperwork.              Who to contact     If you have questions or need follow up information about today's clinic visit or your schedule please contact [facility] directly at [phone].  Normal or non-critical lab and imaging results will be communicated to you by [employer], letter or phone within 4 business days after the clinic has received the results. If you do not hear from us within 7 days, please contact the clinic through [employer] or phone. If you have a critical or abnormal lab result, we will notify you by phone as soon as possible.  Submit refill requests through [employer] or call your pharmacy and they will forward the refill request to us. Please allow 3 business days for your refill to be completed.          Additional Information About Your Visit        [employer] Information     [employer] gives you secure access to your electronic health record. If you see a primary care provider, you can also send messages to your care team and make appointments. If you have questions, please call your primary care clinic.  If you do not have a primary care provider, please call [phone] and they will assist you.        Care EveryWhere ID     This is your Care EveryWhere ID. This could be used by other organizations to access your [place] medical records  [id number]        Your Vitals Were     Pulse Temperature Pulse Oximetry BMI (Body Mass Index)           95 98.2  F (36.8  C) (Oral) 96% 37.54 kg/m2         Blood Pressure from Last 3 Encounters:   04/18/17 132/72   02/12/17 126/71   02/10/17 129/85    Weight from Last 3 Encounters:   04/18/17 246 lb 14.4 oz (112 kg)   02/10/17 244 lb (110.7 kg)   02/08/17 237 lb 12.5 oz (107.9 kg)              We Performed the Following     CBC with platelets and differential     Strep, Rapid Screen     T3 total     T4 free     TSH          Today's Medication Changes          These changes are accurate as of: 4/18/17 12:17 PM.  If you have any questions, ask your nurse or doctor.               Start taking these medicines.        Dose/Directions    triamcinolone 0.1 % ointment   Commonly known as:  KENALOG   Used for:  Hives   Started by:  Allyson Melendez PA-C        Apply sparingly to affected area three times daily for 14 days.   Quantity:  80 g   Refills:  0            Where to get your medicines      These medications were sent to [address] [address]     Phone:  [phone]     triamcinolone 0.1 % ointment                Primary Care Provider    Physician No Ref-Primary       No address on file        Thank you!     Thank you for choosing [facility]  for your care. Our goal is always to provide you with excellent care. Hearing back from our patients is one way we can continue to improve our services. Please take a few minutes to complete the written survey that you may receive in the mail after your visit with us. Thank you!             Your Updated Medication List - Protect others around you: Learn how to safely use, store and throw away your medicines at www.disposemymeds.org.          This list is accurate as of: 4/18/17 12:17 PM.  Always use your most recent med list.                   Brand Name Dispense Instructions for use    ABILIFY 15 MG tablet   Generic drug:  ARIPiprazole      Take 15 mg by mouth daily        albuterol 108 (90 BASE) MCG/ACT Inhaler    PROAIR HFA/PROVENTIL HFA/VENTOLIN HFA    1 Inhaler    Inhale 2 puffs into the lungs every 4 hours as needed for shortness of breath / dyspnea or wheezing       fexofenadine 180 MG tablet    ALLEGRA    90 tablet    Take 1 tablet (180 mg) by mouth daily       methimazole 5 MG tablet    TAPAZOLE    30 tablet    Take 1 tablet (5 mg) by mouth daily       mometasone-formoterol 200-5 MCG/ACT oral inhaler    DULERA    3 Inhaler    Inhale 2 puffs into the lungs 2 times daily       montelukast 10 MG tablet    SINGULAIR    90 tablet    Take 1 tablet (10 mg) by mouth At Bedtime       order for DME     1 Device    Equipment being ordered: Inhalation Spacer       prenatal multivitamin  plus iron 27-0.8 MG Tabs per tablet      Take 1 tablet by mouth daily       triamcinolone 0.1 % ointment    KENALOG    80 g    Apply sparingly to affected area three times daily for 14 days.

## 2017-04-19 ENCOUNTER — OFFICE VISIT (OUTPATIENT)
Dept: ENDOCRINOLOGY | Facility: CLINIC | Age: 37
End: 2017-04-19

## 2017-04-19 VITALS
HEIGHT: 68 IN | HEART RATE: 88 BPM | BODY MASS INDEX: 37.28 KG/M2 | DIASTOLIC BLOOD PRESSURE: 84 MMHG | WEIGHT: 246 LBS | SYSTOLIC BLOOD PRESSURE: 132 MMHG

## 2017-04-19 DIAGNOSIS — R21 RASH: ICD-10-CM

## 2017-04-19 DIAGNOSIS — E05.00 GRAVES DISEASE: Primary | ICD-10-CM

## 2017-04-19 LAB
T4 FREE SERPL-MCNC: 1.07 NG/DL (ref 0.76–1.46)
TSH SERPL DL<=0.05 MIU/L-ACNC: 0.01 MU/L (ref 0.4–4)

## 2017-04-19 RX ORDER — DIAZEPAM 10 MG
10 TABLET ORAL PRN
COMMUNITY
Start: 2017-03-20 | End: 2017-05-18

## 2017-04-19 ASSESSMENT — PAIN SCALES - GENERAL: PAINLEVEL: NO PAIN (0)

## 2017-04-19 NOTE — PROGRESS NOTES
TELEPHONE VISIT  A/p  1.   Subclinical Graves' hyperthyroidism . Unstable due to start of MMI mid 3/17.  TFTS have improved but TSH remains suppressed.    She leans toward the surgical option but still hasn't seen surgeon.     RASH/ ? urticaria - onset Sunday night.    I have noted that if rash or allergic reaction concern gets worse will have to stop MMI.  For now we will keep it going.  I will see if I can help her get in with Dr Villasenor before 5/1/17, because if we have to stop the MMI she may not have the surgical option anymore.     Thyroid nodules, bilateral.      Abnormal thyroid cytology - AUS - Left  dominantThyroid nodule 2.3 cm. This nodule had   increased in size by significant amount, had past benign cytology outside. Molecular (in house panel) negative.  It is possibly cool on thyroid scan - not cold.    Obesity    Post partum < 1 year. She is not lactating.      SMOKER - she reports she is smoking less, now < 10 cigarettes/day.    Anxiety has been apparent .  Bipolar requiring more medication.        Leatha Franco MD        Cc/HPI:    Cathy presents for follow up of subclinical hyperthyroidism due to Graves'. She also has left dominant thyroid nodule with AUS cytology.      Cathy has a history of Hypothyroidsm age 17, treated with LT4 x 3 years.  She has been off LT4 x about 11 years.   During recent pregnancy she had normal TFTS.  However, since then, the TFTS have had suppressed TSH and high TSI. She is 6 months post partum.     We started methimazole 5 mg/day following the 3/15/17 telephone visit. She had TFTS yesterday in anticipation of this visit.  On 4/18/17 the TSH was 0.01, free T4 1.07, T3 143.  She states she missed one dose MMI on Sunday.  She doesn't have a surgical date scheduled.  She hasn't seen surgeon yet, is scheduled to see her 5/1/17. She is hoping to have surgery either late May or ASAP due to mid May conflict with her mother's memorial.    Cathy presents today with a  "new problem, which is a rash/ hives, starting on Sunday evening.  The rash involves her trunk and legs.  It also had been on her arm but has improved there.  The rash has not gotten worse, despite no intervention.    On Monday she had a new tattoo applied to the left inner forearm.    Yesterday (Tuesday) she had significant swelling of the uvula, to the point she was seen for this, had throat culture for strep.  The uvula swelling is much better today, not 100% back to normal, despite no intervention or treatment.      CT of the neck 10/13/15 showed poorly defined thyrorid nodule on the left.  Neck US 1/4/17 showed that the left nodule had increased in size.   Left  dominant nodule 1.9 x 1.2 x 2.3 - (previously 1.8 x 1 x 1.2) - isoechoic with thick halo  volume 2.62 --was 1.08  Right thyroid nodule 0.7 x 0.7 x 0.8 cm inferior pole tip  Left thyroid nodule 0.8 x 0.6 x   Thyroid scan 2/17/17 showed uptake 17.2%, slight inhomogeneity of the gland but the nodule is neither hot nor cold.      She has had FNAB of the left thyroid nodule x 2  10/20/15 FNAB cytology ([facility], [id number]): benign   2/6/17 FNAB cytology [facility]- in house molecular negative.       ROS  Weight is up  Mostly hot   Painful swallow  Uvula is slightly enlarged and still red ; onset yesterday, better today despite no intervention.    hives started on Sunday night; she ate brunch on Sunday - family had shellfish but she didn't  Eyes: negative; no dryness; no diplopia; \"dizziness where I see stars\" with getting up quickly  Cardiac: palpitations whenever she starts to talk too fast  Respiratory: SOB when talking too fast.  Asthma is doing better when she takes her medicine - forgot the Dulera yesterday, took today.    GI: BM 5 times/day;   Really manic all the time - using valium bid to control symptoms of anxiety  Rapid speech  Weird being bipolar and having graves.   LMP today - very heavy flow -      Past Medical History:   Diagnosis Date     Allergic state "      Anxiety      Bipolar 1 disorder (H)      Elevated cholesterol      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     Post partum depression      Thyroid disease     hashimotos     Uncomplicated asthma      History of benzodiazepine dependence     Past Surgical History:   Procedure Laterality Date     BLOOD PATCH N/A 10/11/2016    Procedure: EPIDURAL BLOOD PATCH;  Surgeon: GENERIC ANESTHESIA PROVIDER;  [facility]: [facility]     CRANIOTOMY, EXCISE TUMOR COMPLEX, COMBINED  2/19/2014    Procedure: COMBINED CRANIOTOMY, EXCISE TUMOR COMPLEX;  Supracerabellar  Infratentorial Approach for Resection of Tumor ;  Surgeon: Kate Mckeon MD;  [facility]: [facility]     PROCEDURE PLACEHOLDER NEURO  2/2014    Craniotomy for a benign turmo resection         Current Outpatient Prescriptions   Medication Sig Dispense Refill     triamcinolone (KENALOG) 0.1 % ointment Apply sparingly to affected area three times daily for 14 days. 80 g 0     order for DME Equipment being ordered: Inhalation Spacer 1 Device 0     methimazole (TAPAZOLE) 5 MG tablet Take 1 tablet (5 mg) by mouth daily 30 tablet 3     mometasone-formoterol (DULERA) 200-5 MCG/ACT oral inhaler Inhale 2 puffs into the lungs 2 times daily 3 Inhaler 1     montelukast (SINGULAIR) 10 MG tablet Take 1 tablet (10 mg) by mouth At Bedtime 90 tablet 3     fexofenadine (ALLEGRA) 180 MG tablet Take 1 tablet (180 mg) by mouth daily 90 tablet 3     ARIPiprazole (ABILIFY) 15 MG tablet Take 15 mg by mouth daily       albuterol (PROAIR HFA, PROVENTIL HFA, VENTOLIN HFA) 108 (90 BASE) MCG/ACT inhaler Inhale 2 puffs into the lungs every 4 hours as needed for shortness of breath / dyspnea or wheezing 1 Inhaler 3     Prenatal Vit-Fe Fumarate-FA (PRENATAL MULTIVITAMIN  PLUS IRON) 27-0.8 MG TABS Take 1 tablet by mouth daily       Valium 10 mg bid to tid - every 6 hours. This is being administered by her psychiatrist whom she says she is in close contact with.      ; 5 months  "post partum; mother  in   -- memorial is mid May, 2017.      GENERAL young woman in NAD. She is wearing tank top, which is inappropriate for the weather.    /84  Pulse 88  Ht 1.727 m (5' 8\")  Wt 111.6 kg (246 lb)  BMI 37.4 kg/m2  SKIN: normal color, temperature, texture with multiple tattoos on arms and chest.  No hives apparent on arm.  Abdomen has pink hives approx 1.5 cm diameter.    HEENT: + slight stare.  PERRL, EOMI, no scleral icterus,  proptosis or conjunctival injection.    MOUTH: moist, pink, pharynx clear.  Uvula touches tongue but width and color appears normal.    NECK: supple.  No visible neck masses, cervical adenopathy, carotid bruits or goiter.   LUNGS: clear to auscultation bilaterally. No wheezing.  She is moving air well.    CARDIAC: RRR, S1, S2 without murmurs, rubs or gallops.    BACK: normal spinal contour.    NEURO: Alert, responds appropriately to questions, moves all extremities, DTRs 2/4, gait normal, + tremor of the outstretched hand      DATA :    "

## 2017-04-19 NOTE — NURSING NOTE
Chief Complaint   Patient presents with     RECHECK     F/U THYROID NODULE     Dipika Marie, CMA  Endocrinology & Diabetes 3G

## 2017-04-19 NOTE — LETTER
4/19/2017       RE: Cathy Arroyo  [address]  [place] [address]     Dear Colleague,    Thank you for referring your patient, Cathy Arroyo, to the [facility] ENDOCRINOLOGY at [facility]. Please see a copy of my visit note below.    TELEPHONE VISIT  A/p  1.   Subclinical Graves' hyperthyroidism . Unstable due to start of MMI mid 3/17.  TFTS have improved but TSH remains suppressed.    She leans toward the surgical option but still hasn't seen surgeon.     RASH/ ? urticaria - onset Sunday night.    I have noted that if rash or allergic reaction concern gets worse will have to stop MMI.  For now we will keep it going.  I will see if I can help her get in with Dr Villasenor before 5/1/17, because if we have to stop the MMI she may not have the surgical option anymore.     Thyroid nodules, bilateral.      Abnormal thyroid cytology - [facility] - Left  dominantThyroid nodule 2.3 cm. This nodule had   increased in size by significant amount, had past benign cytology outside. Molecular (in house panel) negative.  It is possibly cool on thyroid scan - not cold.    Obesity    Post partum < 1 year. She is not lactating.      SMOKER - she reports she is smoking less, now < 10 cigarettes/day.    Anxiety has been apparent .  Bipolar requiring more medication.        Leatha Franco MD        Cc/HPI:    Cathy presents for follow up of subclinical hyperthyroidism due to Graves'. She also has left dominant thyroid nodule with AUS cytology.      Cathy has a history of Hypothyroidsm age 17, treated with LT4 x 3 years.  She has been off LT4 x about 11 years.   During recent pregnancy she had normal TFTS.  However, since then, the TFTS have had suppressed TSH and high TSI. She is 6 months post partum.     We started methimazole 5 mg/day following the 3/15/17 telephone visit. She had TFTS yesterday in anticipation of this visit.  On 4/18/17 the TSH was 0.01,  "free T4 1.07, T3 143.  She states she missed one dose MMI on Sunday.  She doesn't have a surgical date scheduled.  She hasn't seen surgeon yet, is scheduled to see her 5/1/17. She is hoping to have surgery either late May or ASAP due to mid May conflict with her mother's memorial.    Cathy presents today with a new problem, which is a rash/ hives, starting on Sunday evening.  The rash involves her trunk and legs.  It also had been on her arm but has improved there.  The rash has not gotten worse, despite no intervention.    On Monday she had a new tattoo applied to the left inner forearm.    Yesterday (Tuesday) she had significant swelling of the uvula, to the point she was seen for this, had throat culture for strep.  The uvula swelling is much better today, not 100% back to normal, despite no intervention or treatment.      CT of the neck 10/13/15 showed poorly defined thyrorid nodule on the left.  Neck US 1/4/17 showed that the left nodule had increased in size.   Left  dominant nodule 1.9 x 1.2 x 2.3 - (previously 1.8 x 1 x 1.2) - isoechoic with thick halo  volume 2.62 --was 1.08  Right thyroid nodule 0.7 x 0.7 x 0.8 cm inferior pole tip  Left thyroid nodule 0.8 x 0.6 x   Thyroid scan 2/17/17 showed uptake 17.2%, slight inhomogeneity of the gland but the nodule is neither hot nor cold.      She has had FNAB of the left thyroid nodule x 2  10/20/15 FNAB cytology ([facility], [id number]): benign   2/6/17 FNAB cytology AUS- in house molecular negative.       ROS  Weight is up  Mostly hot   Painful swallow  Uvula is slightly enlarged and still red ; onset yesterday, better today despite no intervention.    hives started on Sunday night; she ate brunch on Sunday - family had shellfish but she didn't  Eyes: negative; no dryness; no diplopia; \"dizziness where I see stars\" with getting up quickly  Cardiac: palpitations whenever she starts to talk too fast  Respiratory: SOB when talking too fast.  Asthma is doing better when " she takes her medicine - forgot the Dulera yesterday, took today.    GI: BM 5 times/day;   Really manic all the time - using valium bid to control symptoms of anxiety  Rapid speech  Weird being bipolar and having graves.   LMP today - very heavy flow -      Past Medical History:   Diagnosis Date     Allergic state      Anxiety      Bipolar 1 disorder (H)      Elevated cholesterol      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     Post partum depression      Thyroid disease     hashimotos     Uncomplicated asthma      History of benzodiazepine dependence     Past Surgical History:   Procedure Laterality Date     BLOOD PATCH N/A 10/11/2016    Procedure: EPIDURAL BLOOD PATCH;  Surgeon: GENERIC ANESTHESIA PROVIDER;  [facility]     CRANIOTOMY, EXCISE TUMOR COMPLEX, COMBINED  2/19/2014    Procedure: COMBINED CRANIOTOMY, EXCISE TUMOR COMPLEX;  Supracerabellar  Infratentorial Approach for Resection of Tumor ;  Surgeon: Kate Mckeon MD;  [facility] OR     PROCEDURE PLACEHOLDER NEURO  2/2014    Craniotomy for a benign turmo resection         Current Outpatient Prescriptions   Medication Sig Dispense Refill     triamcinolone (KENALOG) 0.1 % ointment Apply sparingly to affected area three times daily for 14 days. 80 g 0     order for DME Equipment being ordered: Inhalation Spacer 1 Device 0     methimazole (TAPAZOLE) 5 MG tablet Take 1 tablet (5 mg) by mouth daily 30 tablet 3     mometasone-formoterol (DULERA) 200-5 MCG/ACT oral inhaler Inhale 2 puffs into the lungs 2 times daily 3 Inhaler 1     montelukast (SINGULAIR) 10 MG tablet Take 1 tablet (10 mg) by mouth At Bedtime 90 tablet 3     fexofenadine (ALLEGRA) 180 MG tablet Take 1 tablet (180 mg) by mouth daily 90 tablet 3     ARIPiprazole (ABILIFY) 15 MG tablet Take 15 mg by mouth daily       albuterol (PROAIR HFA, PROVENTIL HFA, VENTOLIN HFA) 108 (90 BASE) MCG/ACT inhaler Inhale 2 puffs into the lungs every 4 hours as needed for shortness of  "breath / dyspnea or wheezing 1 Inhaler 3     Prenatal Vit-Fe Fumarate-FA (PRENATAL MULTIVITAMIN  PLUS IRON) 27-0.8 MG TABS Take 1 tablet by mouth daily       Valium 10 mg bid to tid - every 6 hours. This is being administered by her psychiatrist whom she says she is in close contact with.      ; 5 months post partum; mother  in   -- memorial is mid May, 2017.      GENERAL young woman in NAD. She is wearing tank top, which is inappropriate for the weather.    /84  Pulse 88  Ht 1.727 m (5' 8\")  Wt 111.6 kg (246 lb)  BMI 37.4 kg/m2  SKIN: normal color, temperature, texture with multiple tattoos on arms and chest.  No hives apparent on arm.  Abdomen has pink hives approx 1.5 cm diameter.    HEENT: + slight stare.  PERRL, EOMI, no scleral icterus,  proptosis or conjunctival injection.    MOUTH: moist, pink, pharynx clear.  Uvula touches tongue but width and color appears normal.    NECK: supple.  No visible neck masses, cervical adenopathy, carotid bruits or goiter.   LUNGS: clear to auscultation bilaterally. No wheezing.  She is moving air well.    CARDIAC: RRR, S1, S2 without murmurs, rubs or gallops.    BACK: normal spinal contour.    NEURO: Alert, responds appropriately to questions, moves all extremities, DTRs 2/4, gait normal, + tremor of the outstretched hand      DATA :      Again, thank you for allowing me to participate in the care of your patient.      Sincerely,    Leatha Franco MD      "

## 2017-04-19 NOTE — MR AVS SNAPSHOT
After Visit Summary   4/19/2017    Cathy Arroyo    MRN: [id number]           Patient Information     Date Of Birth          1980        Visit Information        Provider Department      4/19/2017 3:30 PM Leatha Franco MD [facility]        Care Instructions    To expedite your medication refill(s), please contact your pharmacy and have them fax a refill request to: [phone].  *Please allow 3 business days for routine medication refills.  *Please allow 5 business days for controlled substance medication refills.  --------------------  For scheduling appointments (including lab work), please request an appointment through [employer], or call: [phone].    For questions for your provider or the endocrine nurse, please send a [employer] message.  For after-hours urgent issues, please dial [phone], and ask to speak with the Endocrinologist On-Call.  --------------------  Please Note: If you are active on [employer], all future test results will be sent by [employer] message only and will no longer be sent by mail. You may also receive communication directly from your physician.          Follow-ups after your visit        Follow-up notes from your care team     Return in about 6 weeks (around 6/2/2017).      Your next 10 appointments already scheduled     May 01, 2017  4:00 PM CDT   (Arrive by 3:45 PM)   New Patient Visit with Pamela Villasenor MD   [facility] ([facility])    [address]  [address] [place] [address] [phone]            May 03, 2017  1:30 PM CDT   Office Visit with JANNET Nascimento [facility] [place] ([facility]    [address] [place] [address] [phone]           Bring a current list of meds and any records pertaining to this visit.  For Physicals, please bring immunization records and any forms needing to be filled  "out.  Please arrive 10 minutes early to complete paperwork.              Who to contact     Please call your clinic at [phone] to:    Ask questions about your health    Make or cancel appointments    Discuss your medicines    Learn about your test results    Speak to your doctor   If you have compliments or concerns about an experience at your clinic, or if you wish to file a complaint, please contact [facility] Patient Relations at [phone] or email us at [email or web address]         Additional Information About Your Visit        Where's [employer] Information     [employer] gives you secure access to your electronic health record. If you see a primary care provider, you can also send messages to your care team and make appointments. If you have questions, please call your primary care clinic.  If you do not have a primary care provider, please call [phone] and they will assist you.      [employer] is an electronic gateway that provides easy, online access to your medical records. With [employer], you can request a clinic appointment, read your test results, renew a prescription or communicate with your care team.     To access your existing account, please contact your [facility] or call [phone] for assistance.        Care EveryWhere ID     This is your Care EveryWhere ID. This could be used by other organizations to access your [facility] medical records  [id number]        Your Vitals Were     Pulse Height BMI (Body Mass Index)             88 1.727 m (5' 8\") 37.4 kg/m2          Blood Pressure from Last 3 Encounters:   04/19/17 132/84   04/18/17 132/72   02/12/17 126/71    Weight from Last 3 Encounters:   04/19/17 111.6 kg (246 lb)   04/18/17 112 kg (246 lb 14.4 oz)   02/10/17 110.7 kg (244 lb)              Today, you had the following     No orders found for display       Primary Care Provider    Physician No Ref-Primary       No address on " file        Thank you!     Thank you for choosing [facility] ENDOCRINOLOGY  for your care. Our goal is always to provide you with excellent care. Hearing back from our patients is one way we can continue to improve our services. Please take a few minutes to complete the written survey that you may receive in the mail after your visit with us. Thank you!             Your Updated Medication List - Protect others around you: Learn how to safely use, store and throw away your medicines at www.disposemymeds.org.          This list is accurate as of: 4/19/17  4:03 PM.  Always use your most recent med list.                   Brand Name Dispense Instructions for use    ABILIFY 15 MG tablet   Generic drug:  ARIPiprazole      Take 15 mg by mouth daily       albuterol 108 (90 BASE) MCG/ACT Inhaler    PROAIR HFA/PROVENTIL HFA/VENTOLIN HFA    1 Inhaler    Inhale 2 puffs into the lungs every 4 hours as needed for shortness of breath / dyspnea or wheezing       diazepam 10 MG tablet    VALIUM         fexofenadine 180 MG tablet    ALLEGRA    90 tablet    Take 1 tablet (180 mg) by mouth daily       methimazole 5 MG tablet    TAPAZOLE    30 tablet    Take 1 tablet (5 mg) by mouth daily       mometasone-formoterol 200-5 MCG/ACT oral inhaler    DULERA    3 Inhaler    Inhale 2 puffs into the lungs 2 times daily       montelukast 10 MG tablet    SINGULAIR    90 tablet    Take 1 tablet (10 mg) by mouth At Bedtime       order for DME     1 Device    Equipment being ordered: Inhalation Spacer       prenatal multivitamin  plus iron 27-0.8 MG Tabs per tablet      Take 1 tablet by mouth daily       triamcinolone 0.1 % ointment    KENALOG    80 g    Apply sparingly to affected area three times daily for 14 days.       VITAMIN D3 ULTRA STRENGTH 5000 UNITS Caps   Generic drug:  cholecalciferol

## 2017-04-19 NOTE — PATIENT INSTRUCTIONS
To expedite your medication refill(s), please contact your pharmacy and have them fax a refill request to: [phone].  *Please allow 3 business days for routine medication refills.  *Please allow 5 business days for controlled substance medication refills.  --------------------  For scheduling appointments (including lab work), please request an appointment through [employer], or call: [phone].    For questions for your provider or the endocrine nurse, please send a [employer] message.  For after-hours urgent issues, please dial [phone], and ask to speak with the Endocrinologist On-Call.  --------------------  Please Note: If you are active on [employer], all future test results will be sent by [employer] message only and will no longer be sent by mail. You may also receive communication directly from your physician.

## 2017-04-20 ENCOUNTER — OFFICE VISIT (OUTPATIENT)
Dept: SURGERY | Facility: CLINIC | Age: 37
End: 2017-04-20
Payer: COMMERCIAL

## 2017-04-20 ENCOUNTER — TELEPHONE (OUTPATIENT)
Dept: FAMILY MEDICINE | Facility: CLINIC | Age: 37
End: 2017-04-20

## 2017-04-20 VITALS
WEIGHT: 248 LBS | BODY MASS INDEX: 37.71 KG/M2 | HEART RATE: 83 BPM | SYSTOLIC BLOOD PRESSURE: 138 MMHG | DIASTOLIC BLOOD PRESSURE: 92 MMHG

## 2017-04-20 DIAGNOSIS — E05.00 GRAVES DISEASE: Primary | ICD-10-CM

## 2017-04-20 LAB
BACTERIA SPEC CULT: NORMAL
MICRO REPORT STATUS: NORMAL
SPECIMEN SOURCE: NORMAL

## 2017-04-20 PROCEDURE — 99243 OFF/OP CNSLTJ NEW/EST LOW 30: CPT | Performed by: SURGERY

## 2017-04-20 ASSESSMENT — PAIN SCALES - GENERAL: PAINLEVEL: NO PAIN (0)

## 2017-04-20 NOTE — MR AVS SNAPSHOT
After Visit Summary   4/20/2017    Cathy Arroyo    MRN: [id number]           Patient Information     Date Of Birth          1980        Visit Information        Provider Department      4/20/2017 12:15 PM Pamela Villasenor MD [facility]        Care Instructions    Surgery Instructions    Always follow your surgeon s instructions. If you don t, your surgery could be cancelled. Please use the following checklist.  Your surgery is on: The surgery scheduler will contact you within 1 week of your consult with the surgeon. If you do not hear from them, please call the clinic or RN Care Coordinator for your provider.    Time: Prearrival times can vary depending on location/type of surgery.  [place] - 2 hour pre-arrival  [facility] - 2 hour pre-arrival  [place] - 1 hour pre-arrival    Note:  These times may change. A nurse will call you before surgery to confirm. If you have not received a call or if you have more questions, please call us on the working day before your surgery:  ? [place]: [phone] or [phone] (9am to 5:30pm)  ? [facility]: [phone] (8am to 6pm)  ? [place]: [phone] (9am to 5pm)  ? [facility] [phone] (7am to 4pm)  Prior to surgery  ? Have a pre-op physical exam with your Primary Doctor within 30 days of surgery  - Ask your doctor to send all of your results to the surgery center/hospital before surgery. Your doctor also may ask you to bring the results with you on the day of surgery.  - Tell your doctor if:  - You are allergic to latex or rubber (latex and rubber gloves are often used in medical care).  - You are taking any medicines (including aspirin), vitamins, or herbal products. You may need to stop taking some medicines before surgery.  - You have any medical problems (allergies, diabetes, or heart disease, for example).  - You have a pacemaker or an AICD (automatic implanted cardiac defibrillator). If you  do, please bring the ID card with you on the day of surgery.  - You are a smoker. People who smoke have a higher risk of infection after surgery. Ask your doctor how you can quit smoking.  - If you Primary Doctor is not within the [email or web address] system, you will need to have your pre-op physical faxed to us to be scanned into your chart.  - [place]: [phone] or [phone]  - [facility] ([place]): [phone]  - [facility] ([facility]): [phone]  ? Call your insurance company. Ask if you need pre-approval for your surgery. If you do not have insurance, please let us know. If you wish to speak to the , please alert the clinic staff so this can be arranged.  ? Arrange for someone to drive you home after surgery.  will need to be a responsible adult (18 years or older) that will provide transportation to and from surgery and stay in the waiting room during your surgery. You may not drive yourself or take public transportation to and from surgery.  ? Arrange for someone to stay with you for 24 hours after you go home. This person must be a responsible adult (18 years or older).  ? Call your surgeon or their nurse if there is any change in your health (cold, flu, infections, hospitalizations).  ? Do not smoke, drink alcohol, or take over-the-counter medicine for 24 hours before and after surgery.  ? If you take prescribed drugs, you may need to stop them until after the surgery.  Discuss what medications to take or not take prior to surgery with your Primary Doctor at your pre-op physical. Avoid over-the-counter blood-thinning medications such as Aspirin, Ibuprofen, vitamin E, or fish oil 7 days prior to surgery (unless otherwise directed by your Primary Doctor). Tylenol is a good alternative for mild pain relief prior to surgery.  ? Eating and drinking guidelines prior to surgery:  - Stop all solid food consumption 8 hours prior to surgery  - You may drink clear liquids up to  2 hours prior to surgery (water, fruit juices without pulp, jello, tea/coffee without creamer, sports drinks, clear-fat free broth (bouillon or consomme), popsicles (without milk, bits of fruit, or seeds/nuts)  ? Follow instructions given for showering or bathing before surgery.    - Use 8 ounces of antiseptic surgical soap, like:  - Hibiclens, Scrub Care, or Exidine  - You can find it at your local pharmacy, clinic, or retail store. If you have trouble, ask your pharmacist to help you find the right substitute.  - Please wash with one of the above soaps twice before coming to the hospital for your surgery. This will decrease bacteria (germs) on your skin. It will also help reduce your chance of infection after surgery.  - Items you will need for showering:  - 4 newly washed washcloths  - 2 newly washed towels  - 8 ounces of one of the above soaps  - Following these instructions:  - The evening before surgery: Shower or bathe as you normally would, using your regular soap and a clean washcloth. Give special attention to places where your incision (surgical cut) or catheters will be. This includes your groin area. Rinse well. You may wash your hair with your regular shampoo. Next, wash your body with 4 ounces of the antiseptic soap. Use a clean, damp washcloth and gently clean your body (from the chin down). If your surgery involves your head, use the special soap on your head and scalp. Rinse well and dry off using a newly washed towel.  - The morning of surgery: Repeat the same process as the evening shower.  - Other suggestions:    Do not shave within 12 inches of your incision (surgical cut) area for at least 3 days before surgery. Shaving can make small cuts in the skin. This puts you at higher risk of infection.    Wear freshly washed pajamas or clothing after your evening shower.    Wear freshly washed clothes the day of surgery.    Wash and change your bed sheets the day before surgery to have clean bed  sheets after your shower and when you get home from surgery.    If you have trouble washing all areas, make sure someone helps you.    Don't use any deodorant, lotion or powder after your shower.    Women who are menstruating should wear a fresh sanitary pad to the hospital.  ? Do not wear or add deodorant, cologne, lotion, makeup, nail polish or jewelry to surgery. If you wear fake nails, please remove at least one nail before coming to surgery (an oxygen monitor needs to be placed on your finger during surgery).  ? Bring these items to the surgery center/hospital:  - Insurance card  - Money for parking and co-pays, if needed  - A list of all the medicines you take. Include vitamins, minerals, herbs, and over-the-counter drugs.  Note any drug allergies.  - A copy of your advance health care directive, if you have one. This tells us what treatment you would want--and who would make health care decisions--if you could no longer speak for yourself. You may request this form in advance or download it from [email or web address].  - A case for glasses, contact lenses, hearing aids, or dentures.  - Your inhaler or CPAP machine, if you use these at home.  ? Leave extra cash, jewelry, and other valuables at home.  When you arrive  When you get to the surgery center/hospital, you will:  ? Check in. If you are under age 18, you must be with a parent or legal guardian.  ? Sign consent forms, if you haven t already. These forms state that you know the risks and benefits of surgery. When you sign the forms, you give us permission to do the surgery. Do not sign them unless you understand what will happen during and after your surgery. If you have any questions about your surgery, ask to speak with your doctor before you sign the forms. If you don t understand the answers, ask again.  ? Receive a copy of the Patient s Bill of Rights. If you do not receive a copy, please ask for one.  ? Change into hospital clothes. Your  belongings will be placed in a bag. We will return them to you after surgery.  ? Meet with the anesthesia provider. He or she will tell you what kind of anesthesia (medicine) will be used to keep you comfortable during surgery.  Remember: it s okay to remind doctors and nurses to wash their hands before touching you.  In most cases, your surgeon will use a marker to write his or her initials on the surgery site. This ensures that the exact site is operated on.  For safety reasons, we will ask you the same questions many times. For example, we may ask your name and birth date over and over again.  Friends and family can stay with you until it s time for surgery. While you re in surgery, they will be in the waiting area. Please note that cell phones are not allowed in some patient care areas.  If you have questions about what will happen in the operating room, talk to your care team.  After surgery  We will move you to a recovery room, where we will watch you closely. If you have any pain or discomfort, tell your nurse. He or she will try to make you comfortable.  If you are staying overnight, we will move you to your hospital room after you are awake.  If you are going home, we will move you to another room. Friends and family may be able to join you. The length of time you spend in recovery depend on the type of medicine you received, your medical condition, the type of surgery you had, or your response to the anesthesia given during your procedure.  When you are discharged from the recovery room, the nurses will review instructions with you and your caregiver.  ? Please wash your hands every time you touch the wound or change bandages or dressings.  ? Do not submerge the wound in water.  You may not use a bathtub or hot tub until the wound is closed. The wait time frame is generally 2-3 weeks, but any open area can be a source of incoming bacteria, so it is better to be on the safe side and avoid water submersion  until your wound is fully healed.  ? You may take a shower 24 hours after surgery. Double check with your surgeon if it is OK for water to run over the wound, whether it has been sutured, stapled, glued, or is open. You may gently wash the wound using the antiseptic soap provided for your pre-surgery showering (do not use a washcloth). Any mild soap will work as well.  ? Many surgical wounds will have small white strips of tape on them called steri-strips.  Do not remove these. The edges will curl and fall off within 7-10 days with normal showering.  ? If you are going home with sutures (stitches) or staples, you must return to the clinic to have them taken out, usually within 1-2 weeks. Some stitches are dissolvable and do not require removal. Make sure to clarify with your surgeon or surgery nurse reviewing discharge paperwork what kind of sutures you have.  ? Signs and symptoms of infection include:  - Fever, temperature over 101.5   F  - Redness  - Swelling  - Increased pain  - Green or yellow drainage which may or may not have a foul odor  Dealing with pain  A nurse will check your comfort level often during your stay. He or she will work with you to manage your pain.  Remember:  ? All pain is real. There are many ways to control pain. We can help you decide what works best for you.  ? Ask for pain medicine when you need it. Don t try to  tough it out --this can make you feel worse. Always take your medicine as ordered.  ? Medicine doesn t work the same for everyone. If your medicine isn t working, tell your nurse. There may be other medicines or treatments we can try.  Going home  We will let you know when you re ready to leave the surgery center or hospital. Before you leave, we will tell you how to care for yourself at home and prevent infections. If you do not understand something, please say so. We will answer any questions you have. We will then help you get ready to leave.  Remember, you must have a  responsible adult (18 years or older) to stay with you 24 hours after you leave the hospital.  Take it easy when you get home. You will need some time to recover--you may be more tired than you realize at first. Rest and relax for at least the first 24 hours at home. You ll feel better and heal faster if you take good care of yourself.  Follow the discharge instructions that are given to you when you leave the surgery center or hospital  Please call the clinic if you experience any problems during regular clinic hours (Monday-Friday 8:00am-5:00pm).  If you experience problems during non-clinic hours, please call the [facility] on-call line at [phone] and ask the  to page the on-call Provider for your specialty. The on-call Provider will call you back and can triage your symptoms and further advise. If you are having an emergency, always call 911 or seek immediate evaluation at the Emergency Room.  Locations  [facility] ([facility]  [address] [place] [id number]  [phone]   [email or web address]                Follow-ups after your visit        Your next 10 appointments already scheduled     May 03, 2017  1:30 PM CDT   Office Visit with JANNET Nascimento [facility] ([facility])    [address] [place] [id number] [phone]           Bring a current list of meds and any records pertaining to this visit.  For Physicals, please bring immunization records and any forms needing to be filled out.  Please arrive 10 minutes early to complete paperwork.            Jun 16, 2017  1:00 PM CDT   (Arrive by 12:45 PM)   RETURN ENDOCRINE with Leatha Franco MD   [facility] ([facility] [place])    [address] [place] [id number] [phone]              Who to  contact     If you have questions or need follow up information about today's clinic visit or your schedule please contact [facility] directly at [phone].  Normal or non-critical lab and imaging results will be communicated to you by [employer], letter or phone within 4 business days after the clinic has received the results. If you do not hear from us within 7 days, please contact the clinic through [employer] or phone. If you have a critical or abnormal lab result, we will notify you by phone as soon as possible.  Submit refill requests through [employer] or call your pharmacy and they will forward the refill request to us. Please allow 3 business days for your refill to be completed.          Additional Information About Your Visit        [employer] Information     [employer] gives you secure access to your electronic health record. If you see a primary care provider, you can also send messages to your care team and make appointments. If you have questions, please call your primary care clinic.  If you do not have a primary care provider, please call [phone] and they will assist you.      [employer] is an electronic gateway that provides easy, online access to your medical records. With [employer], you can request a clinic appointment, read your test results, renew a prescription or communicate with your care team.     To access your existing account, please contact your [facility] or call [phone] for assistance.        Care EveryWhere ID     This is your Care EveryWhere ID. This could be used by other organizations to access your [facility] medical records  [id number]        Your Vitals Were     Pulse BMI (Body Mass Index)                83 37.71 kg/m2           Blood Pressure from Last 3 Encounters:   04/20/17 (!) 138/92   04/19/17 132/84   04/18/17 132/72    Weight from Last 3 Encounters:   04/20/17 112.5 kg (248 lb)   04/19/17 111.6 kg (246 lb)   04/18/17 112 kg (246  lb 14.4 oz)              Today, you had the following     No orders found for display       Primary Care Provider    Physician No Ref-Primary       No address on file        Thank you!     Thank you for choosing [facility]  for your care. Our goal is always to provide you with excellent care. Hearing back from our patients is one way we can continue to improve our services. Please take a few minutes to complete the written survey that you may receive in the mail after your visit with us. Thank you!             Your Updated Medication List - Protect others around you: Learn how to safely use, store and throw away your medicines at www.disposemymeds.org.          This list is accurate as of: 4/20/17  1:03 PM.  Always use your most recent med list.                   Brand Name Dispense Instructions for use    ABILIFY 15 MG tablet   Generic drug:  ARIPiprazole      Take 15 mg by mouth daily       albuterol 108 (90 BASE) MCG/ACT Inhaler    PROAIR HFA/PROVENTIL HFA/VENTOLIN HFA    1 Inhaler    Inhale 2 puffs into the lungs every 4 hours as needed for shortness of breath / dyspnea or wheezing       diazepam 10 MG tablet    VALIUM         fexofenadine 180 MG tablet    ALLEGRA    90 tablet    Take 1 tablet (180 mg) by mouth daily       methimazole 5 MG tablet    TAPAZOLE    30 tablet    Take 1 tablet (5 mg) by mouth daily       mometasone-formoterol 200-5 MCG/ACT oral inhaler    DULERA    3 Inhaler    Inhale 2 puffs into the lungs 2 times daily       montelukast 10 MG tablet    SINGULAIR    90 tablet    Take 1 tablet (10 mg) by mouth At Bedtime       order for DME     1 Device    Equipment being ordered: Inhalation Spacer       prenatal multivitamin  plus iron 27-0.8 MG Tabs per tablet      Take 1 tablet by mouth daily       triamcinolone 0.1 % ointment    KENALOG    80 g    Apply sparingly to affected area three times daily for 14 days.       VITAMIN D3 ULTRA STRENGTH 5000 UNITS Caps   Generic drug:   cholecalciferol

## 2017-04-20 NOTE — TELEPHONE ENCOUNTER
Telephone call to patient, informed her of provider message. She states her understanding, denies further questions or concerns at this time.     Nata Giraldo RN  [facility]

## 2017-04-20 NOTE — TELEPHONE ENCOUNTER
"What we typically say is \"put as much distance between stopping smoking and surgery as possible.\"  If unable to stop now, aim for two weeks.  DEVEN Winston   "

## 2017-04-20 NOTE — PATIENT INSTRUCTIONS
Surgery Instructions    Always follow your surgeon s instructions. If you don t, your surgery could be cancelled. Please use the following checklist.  Your surgery is on: The surgery scheduler will contact you within 1 week of your consult with the surgeon. If you do not hear from them, please call the clinic or RN Care Coordinator for your provider.    Time: Prearrival times can vary depending on location/type of surgery.  [place] - 2 hour pre-arrival  [facility] - 2 hour pre-arrival  [place] - 1 hour pre-arrival    Note:  These times may change. A nurse will call you before surgery to confirm. If you have not received a call or if you have more questions, please call us on the working day before your surgery:  ? [place]: [phone] or [phone] (9am to 5:30pm)  ? [facility]: [phone] (8am to 6pm)  ? [place]: [phone] (9am to 5pm)  ? [facility] [phone] (7am to 4pm)  Prior to surgery  ? Have a pre-op physical exam with your Primary Doctor within 30 days of surgery  - Ask your doctor to send all of your results to the surgery center/hospital before surgery. Your doctor also may ask you to bring the results with you on the day of surgery.  - Tell your doctor if:  - You are allergic to latex or rubber (latex and rubber gloves are often used in medical care).  - You are taking any medicines (including aspirin), vitamins, or herbal products. You may need to stop taking some medicines before surgery.  - You have any medical problems (allergies, diabetes, or heart disease, for example).  - You have a pacemaker or an AICD (automatic implanted cardiac defibrillator). If you do, please bring the ID card with you on the day of surgery.  - You are a smoker. People who smoke have a higher risk of infection after surgery. Ask your doctor how you can quit smoking.  - If you Primary Doctor is not within the [email or web address] system, you will need to have your pre-op physical faxed to us to be scanned  into your chart.  - [place]: [phone] or [phone]  - [facility] ([place]): [phone]  - [facility] [phone]  ? Call your insurance company. Ask if you need pre-approval for your surgery. If you do not have insurance, please let us know. If you wish to speak to the , please alert the clinic staff so this can be arranged.  ? Arrange for someone to drive you home after surgery.  will need to be a responsible adult (18 years or older) that will provide transportation to and from surgery and stay in the waiting room during your surgery. You may not drive yourself or take public transportation to and from surgery.  ? Arrange for someone to stay with you for 24 hours after you go home. This person must be a responsible adult (18 years or older).  ? Call your surgeon or their nurse if there is any change in your health (cold, flu, infections, hospitalizations).  ? Do not smoke, drink alcohol, or take over-the-counter medicine for 24 hours before and after surgery.  ? If you take prescribed drugs, you may need to stop them until after the surgery.  Discuss what medications to take or not take prior to surgery with your Primary Doctor at your pre-op physical. Avoid over-the-counter blood-thinning medications such as Aspirin, Ibuprofen, vitamin E, or fish oil 7 days prior to surgery (unless otherwise directed by your Primary Doctor). Tylenol is a good alternative for mild pain relief prior to surgery.  ? Eating and drinking guidelines prior to surgery:  - Stop all solid food consumption 8 hours prior to surgery  - You may drink clear liquids up to 2 hours prior to surgery (water, fruit juices without pulp, jello, tea/coffee without creamer, sports drinks, clear-fat free broth (bouillon or consomme), popsicles (without milk, bits of fruit, or seeds/nuts)  ? Follow instructions given for showering or bathing before surgery.    - Use 8 ounces of antiseptic  surgical soap, like:  - Hibiclens, Scrub Care, or Exidine  - You can find it at your local pharmacy, clinic, or retail store. If you have trouble, ask your pharmacist to help you find the right substitute.  - Please wash with one of the above soaps twice before coming to the hospital for your surgery. This will decrease bacteria (germs) on your skin. It will also help reduce your chance of infection after surgery.  - Items you will need for showering:  - 4 newly washed washcloths  - 2 newly washed towels  - 8 ounces of one of the above soaps  - Following these instructions:  - The evening before surgery: Shower or bathe as you normally would, using your regular soap and a clean washcloth. Give special attention to places where your incision (surgical cut) or catheters will be. This includes your groin area. Rinse well. You may wash your hair with your regular shampoo. Next, wash your body with 4 ounces of the antiseptic soap. Use a clean, damp washcloth and gently clean your body (from the chin down). If your surgery involves your head, use the special soap on your head and scalp. Rinse well and dry off using a newly washed towel.  - The morning of surgery: Repeat the same process as the evening shower.  - Other suggestions:    Do not shave within 12 inches of your incision (surgical cut) area for at least 3 days before surgery. Shaving can make small cuts in the skin. This puts you at higher risk of infection.    Wear freshly washed pajamas or clothing after your evening shower.    Wear freshly washed clothes the day of surgery.    Wash and change your bed sheets the day before surgery to have clean bed sheets after your shower and when you get home from surgery.    If you have trouble washing all areas, make sure someone helps you.    Don't use any deodorant, lotion or powder after your shower.    Women who are menstruating should wear a fresh sanitary pad to the hospital.  ? Do not wear or add deodorant, cologne,  lotion, makeup, nail polish or jewelry to surgery. If you wear fake nails, please remove at least one nail before coming to surgery (an oxygen monitor needs to be placed on your finger during surgery).  ? Bring these items to the surgery center/hospital:  - Insurance card  - Money for parking and co-pays, if needed  - A list of all the medicines you take. Include vitamins, minerals, herbs, and over-the-counter drugs.  Note any drug allergies.  - A copy of your advance health care directive, if you have one. This tells us what treatment you would want--and who would make health care decisions--if you could no longer speak for yourself. You may request this form in advance or download it from [email or web address].  - A case for glasses, contact lenses, hearing aids, or dentures.  - Your inhaler or CPAP machine, if you use these at home.  ? Leave extra cash, jewelry, and other valuables at home.  When you arrive  When you get to the surgery center/hospital, you will:  ? Check in. If you are under age 18, you must be with a parent or legal guardian.  ? Sign consent forms, if you haven t already. These forms state that you know the risks and benefits of surgery. When you sign the forms, you give us permission to do the surgery. Do not sign them unless you understand what will happen during and after your surgery. If you have any questions about your surgery, ask to speak with your doctor before you sign the forms. If you don t understand the answers, ask again.  ? Receive a copy of the Patient s Bill of Rights. If you do not receive a copy, please ask for one.  ? Change into hospital clothes. Your belongings will be placed in a bag. We will return them to you after surgery.  ? Meet with the anesthesia provider. He or she will tell you what kind of anesthesia (medicine) will be used to keep you comfortable during surgery.  Remember: it s okay to remind doctors and nurses to wash their hands before touching you.  In  most cases, your surgeon will use a marker to write his or her initials on the surgery site. This ensures that the exact site is operated on.  For safety reasons, we will ask you the same questions many times. For example, we may ask your name and birth date over and over again.  Friends and family can stay with you until it s time for surgery. While you re in surgery, they will be in the waiting area. Please note that cell phones are not allowed in some patient care areas.  If you have questions about what will happen in the operating room, talk to your care team.  After surgery  We will move you to a recovery room, where we will watch you closely. If you have any pain or discomfort, tell your nurse. He or she will try to make you comfortable.  If you are staying overnight, we will move you to your hospital room after you are awake.  If you are going home, we will move you to another room. Friends and family may be able to join you. The length of time you spend in recovery depend on the type of medicine you received, your medical condition, the type of surgery you had, or your response to the anesthesia given during your procedure.  When you are discharged from the recovery room, the nurses will review instructions with you and your caregiver.  ? Please wash your hands every time you touch the wound or change bandages or dressings.  ? Do not submerge the wound in water.  You may not use a bathtub or hot tub until the wound is closed. The wait time frame is generally 2-3 weeks, but any open area can be a source of incoming bacteria, so it is better to be on the safe side and avoid water submersion until your wound is fully healed.  ? You may take a shower 24 hours after surgery. Double check with your surgeon if it is OK for water to run over the wound, whether it has been sutured, stapled, glued, or is open. You may gently wash the wound using the antiseptic soap provided for your pre-surgery showering (do not use  a washcloth). Any mild soap will work as well.  ? Many surgical wounds will have small white strips of tape on them called steri-strips.  Do not remove these. The edges will curl and fall off within 7-10 days with normal showering.  ? If you are going home with sutures (stitches) or staples, you must return to the clinic to have them taken out, usually within 1-2 weeks. Some stitches are dissolvable and do not require removal. Make sure to clarify with your surgeon or surgery nurse reviewing discharge paperwork what kind of sutures you have.  ? Signs and symptoms of infection include:  - Fever, temperature over 101.5   F  - Redness  - Swelling  - Increased pain  - Green or yellow drainage which may or may not have a foul odor  Dealing with pain  A nurse will check your comfort level often during your stay. He or she will work with you to manage your pain.  Remember:  ? All pain is real. There are many ways to control pain. We can help you decide what works best for you.  ? Ask for pain medicine when you need it. Don t try to  tough it out --this can make you feel worse. Always take your medicine as ordered.  ? Medicine doesn t work the same for everyone. If your medicine isn t working, tell your nurse. There may be other medicines or treatments we can try.  Going home  We will let you know when you re ready to leave the surgery center or hospital. Before you leave, we will tell you how to care for yourself at home and prevent infections. If you do not understand something, please say so. We will answer any questions you have. We will then help you get ready to leave.  Remember, you must have a responsible adult (18 years or older) to stay with you 24 hours after you leave the hospital.  Take it easy when you get home. You will need some time to recover--you may be more tired than you realize at first. Rest and relax for at least the first 24 hours at home. You ll feel better and heal faster if you take good care of  yourself.  Follow the discharge instructions that are given to you when you leave the surgery center or hospital  Please call the clinic if you experience any problems during regular clinic hours (Monday-Friday 8:00am-5:00pm).  If you experience problems during non-clinic hours, please call the [facility] on-call line at [phone] and ask the  to page the on-call Provider for your specialty. The on-call Provider will call you back and can triage your symptoms and further advise. If you are having an emergency, always call 911 or seek immediate evaluation at the Emergency Room.  Locations  [facility] ([facility])  [address] [place] [id number]  [phone]   [email or web address]

## 2017-04-20 NOTE — TELEPHONE ENCOUNTER
"Zamzam--    Patient calling to inquire about an optimal \"quit date\" for smoking prior to thyroid surgery scheduled for 05/23/17.     She reports that she wants to stop without the use of medications, as that is what worked best for her during her pregnancy.     She currently smokes ten cigarettes per day. Patient has a pre-op scheduled at [facility] for 05/18/2017.       Thank you,  Nata Giraldo RN  [facility]        "

## 2017-04-20 NOTE — TELEPHONE ENCOUNTER
Reason for call: Other   Patient called regarding (reason for call): Quit date for smoking before surgery  Additional comments: Pt would like Zamzam to call her regarding her definite quit date for smoking before her surgery. She stated that she couldn't recall what the provider said in her pre-op meeting with her today, and she just wants to make sure that she is safe for the surgery.     Phone Number Pt can be reached at: Home number on file [phone] (home)  Best Time: Any  Can we leave a detailed message on this number? YES

## 2017-04-20 NOTE — NURSING NOTE
"Cathy Arroyo's goals for this visit include:   Chief Complaint   Patient presents with     Consult     hyperthyroidism, thyroid nodules      She requests these members of her care team be copied on today's visit information: YES -     Referring Provider:  Leatha Franco MD   [facility]  [address] [facility] [address]  [place] [address]    Initial BP (!) 138/92 (BP Location: Left arm, Patient Position: Chair, Cuff Size: Adult Large)  Pulse 83  Wt 112.5 kg (248 lb)  BMI 37.71 kg/m2 Estimated body mass index is 37.71 kg/(m^2) as calculated from the following:    Height as of 4/19/17: 1.727 m (5' 8\").    Weight as of this encounter: 112.5 kg (248 lb).  BP completed using cuff size: large    "

## 2017-04-24 ENCOUNTER — TELEPHONE (OUTPATIENT)
Dept: FAMILY MEDICINE | Facility: CLINIC | Age: 37
End: 2017-04-24

## 2017-04-24 ENCOUNTER — OFFICE VISIT (OUTPATIENT)
Dept: FAMILY MEDICINE | Facility: CLINIC | Age: 37
End: 2017-04-24
Payer: COMMERCIAL

## 2017-04-24 VITALS
WEIGHT: 245 LBS | BODY MASS INDEX: 37.25 KG/M2 | HEART RATE: 111 BPM | DIASTOLIC BLOOD PRESSURE: 68 MMHG | OXYGEN SATURATION: 95 % | SYSTOLIC BLOOD PRESSURE: 118 MMHG | TEMPERATURE: 99.1 F

## 2017-04-24 DIAGNOSIS — J45.40 MODERATE PERSISTENT ASTHMA WITHOUT COMPLICATION: ICD-10-CM

## 2017-04-24 DIAGNOSIS — L50.9 HIVES: Primary | ICD-10-CM

## 2017-04-24 PROCEDURE — 99214 OFFICE O/P EST MOD 30 MIN: CPT | Performed by: PHYSICIAN ASSISTANT

## 2017-04-24 NOTE — TELEPHONE ENCOUNTER
Telephone call to patient, left a message with return call request.       Nata Giraldo RN  [facility]

## 2017-04-24 NOTE — NURSING NOTE
"Chief Complaint   Patient presents with     Derm Problem       Initial /68  Pulse 111  Temp 99.1  F (37.3  C) (Oral)  Wt 245 lb (111.1 kg)  SpO2 95%  BMI 37.25 kg/m2 Estimated body mass index is 37.25 kg/(m^2) as calculated from the following:    Height as of 4/19/17: 5' 8\" (1.727 m).    Weight as of this encounter: 245 lb (111.1 kg).     Dipika Contreras MA      "

## 2017-04-24 NOTE — LETTER
My Asthma Action Plan  Name: Cathy Arroyo   YOB: 1980  Date: 4/24/2017   My doctor: Allyson Melendez PA-C   My clinic: [facility]        My Control Medicine: Mometasone + formoterol (Dulera) -  200/5 mcg 2 puffs every 2 hours  My Rescue Medicine: Albuterol (Proair/Ventolin/Proventil) inhaler 2 puffs every 6 hours   My Asthma Severity: moderate persistent  Avoid your asthma triggers: triggers               GREEN ZONE     Good Control    I feel good    No cough or wheeze    Can work, sleep and play without asthma symptoms       Take your asthma control medicine every day.     1. If exercise triggers your asthma, take your rescue medication    15 minutes before exercise or sports, and    During exercise if you have asthma symptoms  2. Spacer to use with inhaler: If you have a spacer, make sure to use it with your inhaler             YELLOW ZONE     Getting Worse  I have ANY of these:    I do not feel good    Cough or wheeze    Chest feels tight    Wake up at night   1. Keep taking your Green Zone medications  2. Start taking your rescue medicine:    every 20 minutes for up to 1 hour. Then every 4 hours for 24-48 hours.  3. If you stay in the Yellow Zone for more than 12-24 hours, contact your doctor.  4. If you do not return to the Green Zone in 12-24 hours or you get worse, start taking your oral steroid medicine if prescribed by your provider.           RED ZONE     Medical Alert - Get Help  I have ANY of these:    I feel awful    Medicine is not helping    Breathing getting harder    Trouble walking or talking    Nose opens wide to breathe       1. Take your rescue medicine NOW  2. If your provider has prescribed an oral steroid medicine, start taking it NOW  3. Call your doctor NOW  4. If you are still in the Red Zone after 20 minutes and you have not reached your doctor:    Take your rescue medicine again and    Call 911 or go to the emergency room right  away    See your regular doctor within 2 weeks of an Emergency Room or Urgent Care visit for follow-up treatment.        Electronically signed by: Dipika Contreras, April 24, 2017    Annual Reminders:  Meet with Asthma Educator,  Flu Shot in the Fall, consider Pneumonia Vaccination for patients with asthma (aged 19 and older).    Pharmacy: [facility] [address]                    Asthma Triggers  How To Control Things That Make Your Asthma Worse    Triggers are things that make your asthma worse.  Look at the list below to help you find your triggers and what you can do about them.  You can help prevent asthma flare-ups by staying away from your triggers.      Trigger                                                          What you can do   Cigarette Smoke  Tobacco smoke can make asthma worse. Do not allow smoking in your home, car or around you.  Be sure no one smokes at a child s day care or school.  If you smoke, ask your health care provider for ways to help you quit.  Ask family members to quit too.  Ask your health care provider for a referral to Quit Plan to help you quit smoking, or call 2-221-974-PLAN.     Colds, Flu, Bronchitis  These are common triggers of asthma. Wash your hands often.  Don t touch your eyes, nose or mouth.  Get a flu shot every year.     Dust Mites  These are tiny bugs that live in cloth or carpet. They are too small to see. Wash sheets and blankets in hot water every week.   Encase pillows and mattress in dust mite proof covers.  Avoid having carpet if you can. If you have carpet, vacuum weekly.   Use a dust mask and HEPA vacuum.   Pollen and Outdoor Mold  Some people are allergic to trees, grass, or weed pollen, or molds. Try to keep your windows closed.  Limit time out doors when pollen count is high.   Ask you health care provider about taking medicine during allergy season.     Animal Dander  Some people are allergic to skin flakes, urine or saliva  from pets with fur or feathers. Keep pets with fur or feathers out of your home.    If you can t keep the pet outdoors, then keep the pet out of your bedroom.  Keep the bedroom door closed.  Keep pets off cloth furniture and away from stuffed toys.     Mice, Rats, and Cockroaches  Some people are allergic to the waste from these pests.   Cover food and garbage.  Clean up spills and food crumbs.  Store grease in the refrigerator.   Keep food out of the bedroom.   Indoor Mold  This can be a trigger if your home has high moisture. Fix leaking faucets, pipes, or other sources of water.   Clean moldy surfaces.  Dehumidify basement if it is damp and smelly.   Smoke, Strong Odors, and Sprays  These can reduce air quality. Stay away from strong odors and sprays, such as perfume, powder, hair spray, paints, smoke incense, paint, cleaning products, candles and new carpet.   Exercise or Sports  Some people with asthma have this trigger. Be active!  Ask your doctor about taking medicine before sports or exercise to prevent symptoms.    Warm up for 5-10 minutes before and after sports or exercise.     Other Triggers of Asthma  Cold air:  Cover your nose and mouth with a scarf.  Sometimes laughing or crying can be a trigger.  Some medicines and food can trigger asthma.

## 2017-04-24 NOTE — PATIENT INSTRUCTIONS
Stop antihistamine  Ok to continue topical steroid three times daily  Follow up with allergist tomorrow  Return to clinic for any new or worsening symptoms or go to ER Urgent care in off hours

## 2017-04-24 NOTE — TELEPHONE ENCOUNTER
Reason for call:  Patient reporting a symptom    Symptom or request: Cathy is wondering if she should go to the ER, she has a rash all over her body. Did make an appointment with Zamzam for this week, but said that she had been in before for this and had gotten a script for a cream which does calm it down, but it has not gone away.    Duration (how long have symptoms been present): For a while now    Have you been treated for this before? Yes    Additional comments:     Phone Number patient can be reached at:  [phone]    Best Time:      Can we leave a detailed message on this number:  YES    Call taken on 4/24/2017 at 12:36 PM by Jennifer Weaver

## 2017-04-24 NOTE — MR AVS SNAPSHOT
After Visit Summary   4/24/2017    Cathy Arroyo    MRN: [id number]           Patient Information     Date Of Birth          1980        Visit Information        Provider Department      4/24/2017 3:40 PM Allyson Melendez PA-C [facility]        Today's Diagnoses     Hives    -  1    Moderate persistent asthma without complication          Care Instructions    Stop antihistamine  Ok to continue topical steroid three times daily  Follow up with allergist tomorrow  Return to clinic for any new or worsening symptoms or go to ER Urgent care in off hours          Follow-ups after your visit        Additional Services     ALLERGY/ASTHMA ADULT REFERRAL       Your provider has referred you to: FMG: [facility]  [phone] [email or web address].[email or web address]/[place]/  FMG: [facility] [phone] http://www.[place].[facility]/  FMG: [facility] [phone]  http://www.[place].[facility]/[place]/  FMG:  [facility] [phone] http://www.[facility]/  FMG: [facility] [phone] https://www.[place].[facility]/[place]/  [facility] Allergy/Ast[facility] - [phone]  http://[email or web address]/care/specialties/lung-care-pulmonology-adult/  N: [facility], [facility]. [facility] [phone]  Fax:  [phone] [email or web address]  FHN: [facility] [phone]   [email or web address]://[email or web address]  [place] [phone]   http://[email or web address]  [place]ell Spence [phone]   http://[email or web address]  FHN: [facility] [place] [phone]   http://www.[email or web address]-asthma-docs.[email or web address]/  [place] [phone]   http://www.[email or web address]-asthma-docs.com/  [place] [phone]   http://www.allergy-asthma-docs.com/  N:  [facility] [place] [phone]   http://www.[place].org/Clinics/ClinicalServices/EarNoseThroat(ENT).aspx  FHN: [facility] [place] [phone]   [email or web address]://www.[email or web address]/  [place] [phone]   https://www.[employer].net/  FHN: [facility], [facility]. - [place] [phone]   http://[employer]    Please be aware that coverage of these services is subject to the terms and limitations of your health insurance plan.  Call member services at your health plan with any benefit or coverage questions.      Please bring the following with you to your appointment:    (1) Any X-Rays, CTs or MRIs which have been performed.  Contact the facility where they were done to arrange for  prior to your scheduled appointment.    (2) List of current medications  (3) This referral request   (4) Any documents/labs given to you for this referral                  Your next 10 appointments already scheduled     May 03, 2017  1:30 PM CDT   Office Visit with JANNET Nascimento CNP   [facility] ([facility])    [address] [place] [address] [phone]           Bring a current list of meds and any records pertaining to this visit.  For Physicals, please bring immunization records and any forms needing to be filled out.  Please arrive 10 minutes early to complete paperwork.            May 18, 2017  1:30 PM CDT   Pre-Op physical with JANNET Nascimento CNP   [facility] ([facility])    [address] [place] [address] [phone]            May 19, 2017  3:00 PM CDT   (Arrive by 2:45 PM)   PAC PHONE RN ASSESSMENT with Mendoza Pac Rn   [facility] ([facility])    [address]  4th Floor  [facility] [address] [phone]           Note: this is not an onsite visit; there is no need to come to  the facility.            May 23, 2017   Procedure with Pamela Villasenor MD   [facility] ([facility])    [address] [place] [address]  5th [facility] [id number] [phone]           Located in the Clinics and Surgery Center at [address] [place] [address].   parking is very convenient and highly recommended.  is a $6 flat rate fee.  Both  and self parkers should enter the main arrival plaza from [facility]; parking attendants will direct you based on your parking preference.            Jun 12, 2017  1:30 PM CDT   (Arrive by 1:15 PM)   Return Visit with Pamela Villasenor MD   [facility] Nose [facility] Throat ([facility])    [address] [place] [address]  4th [facility] [id number] [phone]            Jun 16, 2017  1:00 PM CDT   (Arrive by 12:45 PM)   RETURN ENDOCRINE with Leatha Franco MD   [facility] Endocrinology ([facility])    [address] [place] [address]  3rd [facility] [id number] [phone]              Who to contact     If you have questions or need follow up information about today's clinic visit or your schedule please contact [facility] directly at [phone].  Normal or non-critical lab and imaging results will be communicated to you by [employer], letter or phone within 4 business days after the clinic has received the results. If you do not hear from us within 7 days, please contact the clinic through [employer] or phone. If you have a critical or abnormal lab result, we will notify you by phone as soon as possible.  Submit refill requests through [employer] or call your pharmacy and they will forward the refill request to us. Please allow 3 business days for your refill to be completed.          Additional Information About Your Visit        [employer] Information     [employer] gives you secure access to your electronic health record. If you  see a primary care provider, you can also send messages to your care team and make appointments. If you have questions, please call your primary care clinic.  If you do not have a primary care provider, please call [phone] and they will assist you.        Care EveryWhere ID     This is your Care EveryWhere ID. This could be used by other organizations to access your [place] medical records  [id number]        Your Vitals Were     Pulse Temperature Pulse Oximetry BMI (Body Mass Index)          111 99.1  F (37.3  C) (Oral) 95% 37.25 kg/m2         Blood Pressure from Last 3 Encounters:   04/24/17 118/68   04/20/17 (!) 138/92   04/19/17 132/84    Weight from Last 3 Encounters:   04/24/17 245 lb (111.1 kg)   04/20/17 248 lb (112.5 kg)   04/19/17 246 lb (111.6 kg)              We Performed the Following     ALLERGY/ASTHMA ADULT REFERRAL     Asthma Action Plan (AAP)        Primary Care Provider Office Phone # Fax #    JANNET Nascimento [facility] [phone]       [facility]  Student Nurse Practitioner        Thank you!     Thank you for choosing [facility]  for your care. Our goal is always to provide you with excellent care. Hearing back from our patients is one way we can continue to improve our services. Please take a few minutes to complete the written survey that you may receive in the mail after your visit with us. Thank you!             Your Updated Medication List - Protect others around you: Learn how to safely use, store and throw away your medicines at www.disposemymeds.org.          This list is accurate as of: 4/24/17  4:11 PM.  Always use your most recent med list.                   Brand Name Dispense Instructions for use    ABILIFY 15 MG tablet   Generic drug:  ARIPiprazole      Take 15 mg by mouth daily       albuterol 108 (90 BASE) MCG/ACT Inhaler    PROAIR HFA/PROVENTIL HFA/VENTOLIN HFA    1 Inhaler    Inhale 2 puffs into the lungs every 4 hours as needed for  shortness of breath / dyspnea or wheezing       diazepam 10 MG tablet    VALIUM         fexofenadine 180 MG tablet    ALLEGRA    90 tablet    Take 1 tablet (180 mg) by mouth daily       methimazole 5 MG tablet    TAPAZOLE    30 tablet    Take 1 tablet (5 mg) by mouth daily       mometasone-formoterol 200-5 MCG/ACT oral inhaler    DULERA    3 Inhaler    Inhale 2 puffs into the lungs 2 times daily       montelukast 10 MG tablet    SINGULAIR    90 tablet    Take 1 tablet (10 mg) by mouth At Bedtime       order for DME     1 Device    Equipment being ordered: Inhalation Spacer       prenatal multivitamin  plus iron 27-0.8 MG Tabs per tablet      Take 1 tablet by mouth daily       triamcinolone 0.1 % ointment    KENALOG    80 g    Apply sparingly to affected area three times daily for 14 days.       VITAMIN D3 ULTRA STRENGTH 5000 UNITS Caps   Generic drug:  cholecalciferol

## 2017-04-24 NOTE — PROGRESS NOTES
SUBJECTIVE:                                                    Cathy Arroyo is a 36 year old female who presents to clinic today for the following health issues:    Rash     Onset: 4/17/17    Description:   Location: legs, torso, lower back, shoulders, under her arms  Character: blotchy, painful, red  Itching (Pruritis): YES- itches mainly at night    Progression of Symptoms:  worsening    Accompanying Signs & Symptoms:  Fever: YES- 99.1 she states it is high for her   Body aches or joint pain: no   Sore throat symptoms: no   Recent cold symptoms: no    History:   Previous similar rash: no    Precipitating factors:   Exposure to similar rash: no   New exposures: None   Recent travel: no     Alleviating factors:       Therapies Tried and outcome: Cream- little relief    Wants a referral to see an allergist    Rash is worse  She is unable to tolerate prednisone so she hasn't been on this  She's been using topical steroids and taking allegra and singulair    The concern is that she's allergic to methimazole.         Problem list and histories reviewed & adjusted, as indicated.  Additional history: as documented    ROS:  C: NEGATIVE for fever, chills, change in weight  E/M: NEGATIVE for ear, mouth and throat problems  R: NEGATIVE for significant cough or SOB  CV: NEGATIVE for chest pain, palpitations or peripheral edema    Patient Active Problem List   Diagnosis     Pineal gland, tumor     Thyroid nodule     Anti-TPO antibodies present     Attention deficit disorder     Human papilloma virus (HPV) infection     Seasonal allergic rhinitis     Need for Tdap vaccination     Bipolar affective disorder in remission (H)     Pregnancy, normal first     GBS (group B Streptococcus carrier), +RV culture, currently pregnant     Labor and delivery, indication for care     Pre-eclampsia     Mild persistent asthma with acute exacerbation     Moderate persistent asthma without complication     Chronic rhinitis      Tobacco use disorder     Past Surgical History:   Procedure Laterality Date     BLOOD PATCH N/A 10/11/2016    Procedure: EPIDURAL BLOOD PATCH;  Surgeon: GENERIC ANESTHESIA PROVIDER;  Location: [facility] OR     CRANIOTOMY, EXCISE TUMOR COMPLEX, COMBINED  2/19/2014    Procedure: COMBINED CRANIOTOMY, EXCISE TUMOR COMPLEX;  Supracerabellar  Infratentorial Approach for Resection of Tumor ;  Surgeon: Kate Mckeon MD;  Location: [facility] OR     PROCEDURE PLACEHOLDER NEURO  2/2014    Craniotomy for a benign turmo resection       Social History   Substance Use Topics     Smoking status: Current Every Day Smoker     Packs/day: 0.50     Years: 12.00     Types: Cigarettes     Start date: 1/21/2004     Last attempt to quit: 1/23/2016     Smokeless tobacco: Never Used      Comment: quit 3 days ago     Alcohol use No     Family History   Problem Relation Age of Onset     Thyroid Disease Paternal Aunt      Asthma Father      MENTAL ILLNESS Father      Obesity Father      Asthma Maternal Grandmother      OSTEOPOROSIS Maternal Grandmother      DIABETES Paternal Grandfather      Other Cancer Mother      Genitourinary Problems Mother      Bipolar Disorder Mother      unipolar depression     Depression Mother      Thyroid Disease Mother      benign tumor     Bipolar Disorder Brother      unipolar depression     Depression Brother      Anesthesia Reaction Brother            Problem list, Medication list, Allergies, and Medical/Social/Surgical histories reviewed in [facility] and updated as appropriate.  Labs reviewed in EPIC    OBJECTIVE:                                                    /68  Pulse 111  Temp 99.1  F (37.3  C) (Oral)  Wt 245 lb (111.1 kg)  SpO2 95%  BMI 37.25 kg/m2 Body mass index is 37.25 kg/(m^2).   GEN: Pt in no acute distress.  HEENT: AT, NC, eyes and ears normal, throat normal.  NODES: no anterior cervical LA, no supraclavicular LA  HEART: Regular rate and rhythm without murmurs, rubs or gallops  LUNGS:  "Clear to auscultation  SKIN:  Diffuse hives over entire body sparing face  Neuro: CN 2-12 grossly intact         ASSESSMENT/PLAN:                                                        ICD-10-CM    1. Hives L50.9 ALLERGY/ASTHMA ADULT REFERRAL   2. Moderate persistent asthma without complication J45.40 ALLERGY/ASTHMA ADULT REFERRAL     STAT referral placed to allergist. She has an appointment tomorrow. Advised to stop antihistamine so allergy testing can be done sooner than later. Ok to continue topical steroid. Recent CBC and strep were normal. Return to clinic for any new or worsening symptoms or go to ER Urgent care in off hours        Estimated body mass index is 37.25 kg/(m^2) as calculated from the following:    Height as of 4/19/17: 5' 8\" (1.727 m).    Weight as of this encounter: 245 lb (111.1 kg).       Allyson Melendez  [facility]      "

## 2017-04-24 NOTE — TELEPHONE ENCOUNTER
"Patient reports that her rash is continuing to occur, \"looks like it has gotten worse\" on her stomach, legs, breasts and back. It is reddened and today she reports \"open sores\"     She has taken two benadryl this morning. She denies difficulty breathing.    Acute appointment scheduled for today with ARMEN Melendez at 3:40pm.       Nata Giraldo RN  [facility]        "

## 2017-04-25 ENCOUNTER — TRANSFERRED RECORDS (OUTPATIENT)
Dept: HEALTH INFORMATION MANAGEMENT | Facility: CLINIC | Age: 37
End: 2017-04-25

## 2017-04-25 ENCOUNTER — TELEPHONE (OUTPATIENT)
Dept: ENDOCRINOLOGY | Facility: CLINIC | Age: 37
End: 2017-04-25

## 2017-04-25 NOTE — TELEPHONE ENCOUNTER
Pt wants Dr. Franco to know allergist thinks hives not methimazole RXN. Instead: stress and/or Graves induced. Allergist Rx'd 20 mg Zyrtec q PM. Could not do allergy scratch test d/t pt having taken anti-histamine day before appt. Pt will not be taking anti-histamine 3 days before next allergy appt if still has hives. Can be reached at [phone]. DVNANCY fine. Sent to [facility].

## 2017-04-26 ENCOUNTER — TELEPHONE (OUTPATIENT)
Dept: ENDOCRINOLOGY | Facility: CLINIC | Age: 37
End: 2017-04-26

## 2017-04-26 NOTE — TELEPHONE ENCOUNTER
----- Message from Tiana Underwood RN sent at 4/25/2017  4:14 PM CDT -----  Regarding: Pt update  Pt wants Dr. Franco to know allergist thinks hives not methimazole RXN. Instead: stress and/or Graves induced. Allergist Rx'd 20 mg Zyrtec q PM. Could not do allergy scratch test d/t pt having taken anti-histamine day before appt. Pt will not be taking anti-histamine 3 days before next allergy appt if still has hives. Can be reached at [phone]. SAVANNAHM steffi.    Thanks,   Tiana NIX

## 2017-04-30 ENCOUNTER — HOSPITAL ENCOUNTER (EMERGENCY)
Facility: CLINIC | Age: 37
Discharge: HOME | End: 2017-04-30
Attending: INTERNAL MEDICINE | Admitting: INTERNAL MEDICINE
Payer: COMMERCIAL

## 2017-04-30 VITALS
RESPIRATION RATE: 18 BRPM | WEIGHT: 249.1 LBS | TEMPERATURE: 99.2 F | BODY MASS INDEX: 37.88 KG/M2 | DIASTOLIC BLOOD PRESSURE: 89 MMHG | OXYGEN SATURATION: 96 % | HEART RATE: 86 BPM | SYSTOLIC BLOOD PRESSURE: 128 MMHG

## 2017-04-30 DIAGNOSIS — T38.2X5A: ICD-10-CM

## 2017-04-30 DIAGNOSIS — L27.0 ALLERGIC DRUG RASH: ICD-10-CM

## 2017-04-30 LAB
ALBUMIN SERPL-MCNC: 3.7 G/DL (ref 3.4–5)
ALBUMIN UR-MCNC: NEGATIVE MG/DL
ALP SERPL-CCNC: 125 U/L (ref 40–150)
ALT SERPL W P-5'-P-CCNC: 22 U/L (ref 0–50)
ANION GAP SERPL CALCULATED.3IONS-SCNC: 7 MMOL/L (ref 3–14)
APPEARANCE UR: CLEAR
APTT PPP: 31 SEC (ref 22–37)
AST SERPL W P-5'-P-CCNC: 14 U/L (ref 0–45)
BACTERIA #/AREA URNS HPF: ABNORMAL /HPF
BASOPHILS # BLD AUTO: 0 10E9/L (ref 0–0.2)
BASOPHILS NFR BLD AUTO: 0 %
BILIRUB SERPL-MCNC: 0.2 MG/DL (ref 0.2–1.3)
BILIRUB UR QL STRIP: NEGATIVE
BUN SERPL-MCNC: 10 MG/DL (ref 7–30)
CALCIUM SERPL-MCNC: 8.9 MG/DL (ref 8.5–10.1)
CAOX CRY #/AREA URNS HPF: ABNORMAL /HPF
CHLORIDE SERPL-SCNC: 109 MMOL/L (ref 94–109)
CO2 SERPL-SCNC: 25 MMOL/L (ref 20–32)
COLOR UR AUTO: ABNORMAL
CREAT SERPL-MCNC: 0.62 MG/DL (ref 0.52–1.04)
CRP SERPL-MCNC: 8.9 MG/L (ref 0–8)
DIFFERENTIAL METHOD BLD: NORMAL
EOSINOPHIL # BLD AUTO: 0 10E9/L (ref 0–0.7)
EOSINOPHIL NFR BLD AUTO: 0 %
ERYTHROCYTE [DISTWIDTH] IN BLOOD BY AUTOMATED COUNT: 13.4 % (ref 10–15)
GFR SERPL CREATININE-BSD FRML MDRD: NORMAL ML/MIN/1.7M2
GLUCOSE SERPL-MCNC: 93 MG/DL (ref 70–99)
GLUCOSE UR STRIP-MCNC: NEGATIVE MG/DL
HCT VFR BLD AUTO: 42.3 % (ref 35–47)
HGB BLD-MCNC: 14 G/DL (ref 11.7–15.7)
HGB UR QL STRIP: NEGATIVE
IMM GRANULOCYTES # BLD: 0 10E9/L (ref 0–0.4)
IMM GRANULOCYTES NFR BLD: 0.2 %
INR PPP: 0.87 (ref 0.86–1.14)
KETONES UR STRIP-MCNC: NEGATIVE MG/DL
LEUKOCYTE ESTERASE UR QL STRIP: NEGATIVE
LYMPHOCYTES # BLD AUTO: 3.8 10E9/L (ref 0.8–5.3)
LYMPHOCYTES NFR BLD AUTO: 37.6 %
MCH RBC QN AUTO: 28.1 PG (ref 26.5–33)
MCHC RBC AUTO-ENTMCNC: 33.1 G/DL (ref 31.5–36.5)
MCV RBC AUTO: 85 FL (ref 78–100)
MONOCYTES # BLD AUTO: 0.5 10E9/L (ref 0–1.3)
MONOCYTES NFR BLD AUTO: 5.2 %
NEUTROPHILS # BLD AUTO: 5.8 10E9/L (ref 1.6–8.3)
NEUTROPHILS NFR BLD AUTO: 57 %
NITRATE UR QL: NEGATIVE
NRBC # BLD AUTO: 0 10*3/UL
NRBC BLD AUTO-RTO: 0 /100
PH UR STRIP: 5.5 PH (ref 5–7)
PLATELET # BLD AUTO: 356 10E9/L (ref 150–450)
POTASSIUM SERPL-SCNC: 4 MMOL/L (ref 3.4–5.3)
PROT SERPL-MCNC: 7.5 G/DL (ref 6.8–8.8)
RBC # BLD AUTO: 4.99 10E12/L (ref 3.8–5.2)
RBC #/AREA URNS AUTO: 1 /HPF (ref 0–2)
SODIUM SERPL-SCNC: 141 MMOL/L (ref 133–144)
SP GR UR STRIP: 1 (ref 1–1.03)
SQUAMOUS #/AREA URNS AUTO: <1 /HPF (ref 0–1)
T4 FREE SERPL-MCNC: 1.02 NG/DL (ref 0.76–1.46)
TSH SERPL DL<=0.005 MIU/L-ACNC: 0.04 MU/L (ref 0.4–4)
URN SPEC COLLECT METH UR: ABNORMAL
UROBILINOGEN UR STRIP-MCNC: NORMAL MG/DL (ref 0–2)
WBC # BLD AUTO: 10.1 10E9/L (ref 4–11)
WBC #/AREA URNS AUTO: 1 /HPF (ref 0–2)

## 2017-04-30 PROCEDURE — 81001 URINALYSIS AUTO W/SCOPE: CPT | Performed by: INTERNAL MEDICINE

## 2017-04-30 PROCEDURE — 84443 ASSAY THYROID STIM HORMONE: CPT | Performed by: INTERNAL MEDICINE

## 2017-04-30 PROCEDURE — 86140 C-REACTIVE PROTEIN: CPT | Performed by: INTERNAL MEDICINE

## 2017-04-30 PROCEDURE — 80053 COMPREHEN METABOLIC PANEL: CPT | Performed by: INTERNAL MEDICINE

## 2017-04-30 PROCEDURE — 99284 EMERGENCY DEPT VISIT MOD MDM: CPT | Performed by: INTERNAL MEDICINE

## 2017-04-30 PROCEDURE — 84439 ASSAY OF FREE THYROXINE: CPT | Performed by: INTERNAL MEDICINE

## 2017-04-30 PROCEDURE — 85730 THROMBOPLASTIN TIME PARTIAL: CPT | Performed by: INTERNAL MEDICINE

## 2017-04-30 PROCEDURE — 85025 COMPLETE CBC W/AUTO DIFF WBC: CPT | Performed by: INTERNAL MEDICINE

## 2017-04-30 PROCEDURE — 99283 EMERGENCY DEPT VISIT LOW MDM: CPT | Mod: Z6 | Performed by: INTERNAL MEDICINE

## 2017-04-30 PROCEDURE — 85610 PROTHROMBIN TIME: CPT | Performed by: INTERNAL MEDICINE

## 2017-04-30 ASSESSMENT — ENCOUNTER SYMPTOMS
CHILLS: 0
WHEEZING: 0
ADENOPATHY: 0
SHORTNESS OF BREATH: 0
VOMITING: 0
DIFFICULTY URINATING: 0
COUGH: 0
PALPITATIONS: 0
LIGHT-HEADEDNESS: 0
CONFUSION: 0
BACK PAIN: 0
ABDOMINAL PAIN: 0
FEVER: 0
NUMBNESS: 0
TROUBLE SWALLOWING: 0
NECK PAIN: 0
NAUSEA: 0
SORE THROAT: 0
FLANK PAIN: 0
HEADACHES: 0
WEAKNESS: 0

## 2017-04-30 NOTE — ED AVS SNAPSHOT
[facility], Emergency Department    [address]    [place] [address]    Phone:  [phone]    Fax:  [phone]                                       Cathy Arroyo   MRN: [id number]    Department:  [facility], Emergency Department   Date of Visit:  4/30/2017           Patient Information     Date Of Birth          1980        Your diagnoses for this visit were:     Allergic drug rash        You were seen by Monster Vazquez MD.      Follow-up Information     Follow up with Sherrill Dasilva APRN CNP.    Specialty:  Nurse Practitioner - Family    Contact information:    [facility]  Student Nurse Practitioner          Discharge Instructions       Follow up in the Dermatology clinic (at the [facility]) at 9AM tomorrow to see Dr Noyola.  Do not take Methimazole tomorrow until you have seen the dermatologist and the situation has been discussed with Dr Franco.  Continue your Zyrtec.  Return for swelling of the mouth, trouble breathing or swallowing.  It should be OK to use something like Aveeno bath or lotion for the irritation.    Future Appointments        Provider Department Dept Phone Center    5/3/2017 1:30 PM JANNET Liu CNP [facility] [phone] [facility]    5/18/2017 1:30 PM JANNET Liu CNP [facility] [phone] [facility]    5/19/2017 3:00 PM PAC Nurse [facility] [phone] [facility]    6/12/2017 1:30 PM Pamela Villasenor MD [facility] Ear Nose and Throat [phone] [facility]    6/16/2017 1:00 PM Leatha Franco MD [facility] Endocrinology [phone] [facility]      24 Hour Appointment Hotline       To make an appointment at any [facility], call [phone] ([phone]). If you don't have a family doctor or clinic, we will help you find one. [facility] are conveniently located to serve the needs of you and your family.             Review of  your medicines      Our records show that you are taking the medicines listed below. If these are incorrect, please call your family doctor or clinic.        Dose / Directions Last dose taken    ABILIFY 15 MG tablet   Dose:  15 mg   Generic drug:  ARIPiprazole        Take 15 mg by mouth daily   Refills:  0        albuterol 108 (90 BASE) MCG/ACT Inhaler   Commonly known as:  PROAIR HFA/PROVENTIL HFA/VENTOLIN HFA   Dose:  2 puff   Quantity:  1 Inhaler        Inhale 2 puffs into the lungs every 4 hours as needed for shortness of breath / dyspnea or wheezing   Refills:  3        diazepam 10 MG tablet   Commonly known as:  VALIUM        Refills:  0        fexofenadine 180 MG tablet   Commonly known as:  ALLEGRA   Dose:  180 mg   Quantity:  90 tablet        Take 1 tablet (180 mg) by mouth daily   Refills:  3        methimazole 5 MG tablet   Commonly known as:  TAPAZOLE   Dose:  5 mg   Quantity:  30 tablet        Take 1 tablet (5 mg) by mouth daily   Refills:  3        mometasone-formoterol 200-5 MCG/ACT oral inhaler   Commonly known as:  DULERA   Dose:  2 puff   Quantity:  3 Inhaler        Inhale 2 puffs into the lungs 2 times daily   Refills:  1        montelukast 10 MG tablet   Commonly known as:  SINGULAIR   Dose:  10 mg   Quantity:  90 tablet        Take 1 tablet (10 mg) by mouth At Bedtime   Refills:  3        order for DME   Quantity:  1 Device        Equipment being ordered: Inhalation Spacer   Refills:  0        prenatal multivitamin  plus iron 27-0.8 MG Tabs per tablet   Dose:  1 tablet        Take 1 tablet by mouth daily   Refills:  0        triamcinolone 0.1 % ointment   Commonly known as:  KENALOG   Quantity:  80 g        Apply sparingly to affected area three times daily for 14 days.   Refills:  0        VITAMIN D3 ULTRA STRENGTH 5000 UNITS Caps   Generic drug:  cholecalciferol        Refills:  0                Procedures and tests performed during your visit     CBC with platelets differential    CRP  inflammation    Comprehensive metabolic panel    INR    Partial thromboplastin time    T4 free    TSH with free T4 reflex    UA with Microscopic reflex to Culture      Orders Needing Specimen Collection     None      Pending Results     No orders found from 4/28/2017 to 5/1/2017.            Pending Culture Results     No orders found from 4/28/2017 to 5/1/2017.            Thank you for choosing [place]       Thank you for choosing [facility] for your care. Our goal is always to provide you with excellent care. Hearing back from our patients is one way we can continue to improve our services. Please take a few minutes to complete the written survey that you may receive in the mail after you visit with us. Thank you!        [employer] Information     [employer] gives you secure access to your electronic health record. If you see a primary care provider, you can also send messages to your care team and make appointments. If you have questions, please call your primary care clinic.  If you do not have a primary care provider, please call [phone] and they will assist you.        Care EveryWhere ID     This is your Care EveryWhere ID. This could be used by other organizations to access your [place] medical records  [id number]        After Visit Summary       This is your record. Keep this with you and show to your community pharmacist(s) and doctor(s) at your next visit.

## 2017-04-30 NOTE — ED AVS SNAPSHOT
[facility], Emergency Department    [address]    [place] [place] [address]    Phone:  [phone]    Fax:  [phone]                                       Cathy Arroyo   MRN: [id number]    Department:  [facility], Emergency Department   Date of Visit:  4/30/2017           After Visit Summary Signature Page     I have received my discharge instructions, and my questions have been answered. I have discussed any challenges I see with this plan with the nurse or doctor.    ..........................................................................................................................................  Patient/Patient Representative Signature      ..........................................................................................................................................  Patient Representative Print Name and Relationship to Patient    ..................................................               ................................................  Date                                            Time    ..........................................................................................................................................  Reviewed by Signature/Title    ...................................................              ..............................................  Date                                                            Time

## 2017-04-30 NOTE — ED NOTES
Patient presents with hives throughout body and red rash to bilateral inner thighs; patient states that she brook out into hives on Easter Sunday but unsure what caused it; today at 2pm, after drinking Guiness Stout, noticed redness/rash to bilateral inner thighs; increased itching and hot to touch.

## 2017-04-30 NOTE — ED PROVIDER NOTES
History     Chief Complaint   Patient presents with     Rash     Red blistery rash primarily to inner thigh area but redness/hives all over body. Started approx 2 weeks ago, worsened today     HPI  Cathy Arroyo is a 36 year old female who presents with worsening rash. She has had an urticarial rash for the past month for which she has been taking zyrtec. She did have some swelling of the uvula about 1 week ago which has resolved. She has been having itching and waxing and waning urticaria and erythematous macules on her trunk and extremities, but this morning woke with large erythematous patches on her inner thighs. She has developed an indeterminate thyroid nodule and hyperthyroidism since delivery of a child a couple of months ago. She was started on Methimazole in March. Her urticarial rash started about 4 weeks after starting the medication. Plan has been to control the hyperthyroidism in order to have surgical treatment of the thyroid. She has been trying to avoid radioactive iodine treatment since she is breast feeding. She has had no fever, chills, sweats, sore throat, cough, swallowing difficulty, palpitations, nausea, vomiting, abdominal pain or difficulty with urination. She does have anxiety which is managed with valium. 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 She delivered and infant earlier this year.    PAST MEDICAL HISTORY:   Past Medical History:   Diagnosis Date     Allergic state      Anxiety      Bipolar 1 disorder (H)      Elevated cholesterol      Graves disease 2017     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     Post partum depression      Thyroid disease     hashimotos     Uncomplicated asthma        PAST SURGICAL HISTORY:   Past Surgical History:   Procedure Laterality Date     BLOOD PATCH N/A 10/11/2016    Procedure: EPIDURAL BLOOD PATCH;  Surgeon: GENERIC ANESTHESIA  PROVIDER;  Location: [facility]     CRANIOTOMY, EXCISE TUMOR COMPLEX, COMBINED  2/19/2014    Procedure: COMBINED CRANIOTOMY, EXCISE TUMOR COMPLEX;  Supracerabellar  Infratentorial Approach for Resection of Tumor ;  Surgeon: Kate Mckeon MD;  Location: [facility] OR     PROCEDURE PLACEHOLDER NEURO  2/2014    Craniotomy for a benign turmo resection       FAMILY HISTORY:   Family History   Problem Relation Age of Onset     Thyroid Disease Paternal Aunt      Asthma Father      MENTAL ILLNESS Father      Obesity Father      Asthma Maternal Grandmother      OSTEOPOROSIS Maternal Grandmother      DIABETES Paternal Grandfather      Other Cancer Mother      Genitourinary Problems Mother      Bipolar Disorder Mother      unipolar depression     Depression Mother      Thyroid Disease Mother      benign tumor     Bipolar Disorder Brother      unipolar depression     Depression Brother      Anesthesia Reaction Brother        SOCIAL HISTORY:   Social History   Substance Use Topics     Smoking status: Current Every Day Smoker     Packs/day: 0.50     Years: 12.00     Types: Cigarettes     Start date: 1/21/2004     Last attempt to quit: 1/23/2016     Smokeless tobacco: Never Used     Alcohol use 0.0 oz/week      Comment: social         I have reviewed the Medications, Allergies, Past Medical and Surgical History, and Social History in the Epic system.    Review of Systems   Constitutional: Negative for chills and fever.   HENT: Negative for congestion, sore throat and trouble swallowing.    Eyes: Negative for visual disturbance.   Respiratory: Negative for cough, shortness of breath and wheezing.    Cardiovascular: Negative for chest pain, palpitations and leg swelling.   Gastrointestinal: Negative for abdominal pain, nausea and vomiting.   Endocrine: Negative for cold intolerance, heat intolerance and polyuria.   Genitourinary: Negative for difficulty urinating and flank pain.   Musculoskeletal: Negative for back pain and neck  pain.   Skin: Positive for rash.   Neurological: Negative for weakness, light-headedness, numbness and headaches.   Hematological: Negative for adenopathy.   Psychiatric/Behavioral: Negative for confusion.       Physical Exam   BP: (!) 152/97  Pulse: 86  Temp: 97.6  F (36.4  C)  Resp: 16  Weight: 113 kg (249 lb 1.6 oz)  SpO2: 97 %  Physical Exam   Constitutional: She is oriented to person, place, and time. She appears well-developed and well-nourished. No distress.   HENT:   Head: Normocephalic and atraumatic.   Right Ear: External ear normal.   Left Ear: External ear normal.   Nose: Nose normal.   Mouth/Throat: Oropharynx is clear and moist. No oropharyngeal exudate.   Eyes: EOM are normal. Pupils are equal, round, and reactive to light. No scleral icterus.   Neck: Normal range of motion. Neck supple. No JVD present. No tracheal deviation present.   Cardiovascular: Normal rate, regular rhythm and normal heart sounds.  Exam reveals no friction rub.    No murmur heard.  Pulmonary/Chest: Effort normal and breath sounds normal. No stridor. She has no wheezes. She has no rales.   Abdominal: Soft. Bowel sounds are normal. There is no tenderness. There is no rebound and no guarding.   Musculoskeletal: She exhibits no edema or tenderness.   Lymphadenopathy:     She has no cervical adenopathy.   Neurological: She is alert and oriented to person, place, and time. No cranial nerve deficit.   Skin: Skin is warm and dry. Rash noted. Rash is macular and urticarial. There is erythema.        Mild facial erythema. Scattered pink maculopapular lesions on the lower thighs and calves. Deann are symmetrical beefy red non blanching large patches covering the anterior and inner thighs bilaterally. These do not appear excoriated. There are no vesicles. These border on purpura.   Psychiatric: Her speech is normal and behavior is normal. Judgment and thought content normal. Her mood appears anxious. Cognition and memory are normal.    Nursing note and vitals reviewed.      ED Course     ED Course     Procedures      Labs/Imaging    Results for orders placed or performed during the hospital encounter of 04/30/17 (from the past 24 hour(s))   UA with Microscopic reflex to Culture   Result Value Ref Range    Color Urine Light Yellow     Appearance Urine Clear     Glucose Urine Negative NEG mg/dL    Bilirubin Urine Negative NEG    Ketones Urine Negative NEG mg/dL    Specific Gravity Urine 1.005 1.003 - 1.035    Blood Urine Negative NEG    pH Urine 5.5 5.0 - 7.0 pH    Protein Albumin Urine Negative NEG mg/dL    Urobilinogen mg/dL Normal 0.0 - 2.0 mg/dL    Nitrite Urine Negative NEG    Leukocyte Esterase Urine Negative NEG    Source Midstream Urine     WBC Urine 1 0 - 2 /HPF    RBC Urine 1 0 - 2 /HPF    Bacteria Urine Few (A) NEG /HPF    Squamous Epithelial /HPF Urine <1 0 - 1 /HPF    Calcium Oxalate Few (A) NEG /HPF   CBC with platelets differential   Result Value Ref Range    WBC 10.1 4.0 - 11.0 10e9/L    RBC Count 4.99 3.8 - 5.2 10e12/L    Hemoglobin 14.0 11.7 - 15.7 g/dL    Hematocrit 42.3 35.0 - 47.0 %    MCV 85 78 - 100 fl    MCH 28.1 26.5 - 33.0 pg    MCHC 33.1 31.5 - 36.5 g/dL    RDW 13.4 10.0 - 15.0 %    Platelet Count 356 150 - 450 10e9/L    Diff Method Automated Method     % Neutrophils 57.0 %    % Lymphocytes 37.6 %    % Monocytes 5.2 %    % Eosinophils 0.0 %    % Basophils 0.0 %    % Immature Granulocytes 0.2 %    Nucleated RBCs 0 0 /100    Absolute Neutrophil 5.8 1.6 - 8.3 10e9/L    Absolute Lymphocytes 3.8 0.8 - 5.3 10e9/L    Absolute Monocytes 0.5 0.0 - 1.3 10e9/L    Absolute Eosinophils 0.0 0.0 - 0.7 10e9/L    Absolute Basophils 0.0 0.0 - 0.2 10e9/L    Abs Immature Granulocytes 0.0 0 - 0.4 10e9/L    Absolute Nucleated RBC 0.0    Comprehensive metabolic panel   Result Value Ref Range    Sodium 141 133 - 144 mmol/L    Potassium 4.0 3.4 - 5.3 mmol/L    Chloride 109 94 - 109 mmol/L    Carbon Dioxide 25 20 - 32 mmol/L    Anion Gap 7 3 - 14  mmol/L    Glucose 93 70 - 99 mg/dL    Urea Nitrogen 10 7 - 30 mg/dL    Creatinine 0.62 0.52 - 1.04 mg/dL    GFR Estimate >90  Non  GFR Calc   >60 mL/min/1.7m2    GFR Estimate If Black >90   GFR Calc   >60 mL/min/1.7m2    Calcium 8.9 8.5 - 10.1 mg/dL    Bilirubin Total 0.2 0.2 - 1.3 mg/dL    Albumin 3.7 3.4 - 5.0 g/dL    Protein Total 7.5 6.8 - 8.8 g/dL    Alkaline Phosphatase 125 40 - 150 U/L    ALT 22 0 - 50 U/L    AST 14 0 - 45 U/L   CRP inflammation   Result Value Ref Range    CRP Inflammation 8.9 (H) 0.0 - 8.0 mg/L   TSH with free T4 reflex   Result Value Ref Range    TSH 0.04 (L) 0.40 - 4.00 mU/L   T4 free   Result Value Ref Range    T4 Free 1.02 0.76 - 1.46 ng/dL   INR   Result Value Ref Range    INR 0.87 0.86 - 1.14   Partial thromboplastin time   Result Value Ref Range    PTT 31 22 - 37 sec     Discussed with dermatology on call. She will see the patient tomorrow morning at 0900 in the [facility].    Will get photo of thigh rash for chart.    Assessments & Plan (with Medical Decision Making)   Impression:  Young woman with hyperthyroidism and thyroid nodule has been having a rash for several weeks which started 4 weeks after starting methimazole. I am highly suspicious that this rash is due to the medication. It has now evolved into a new pattern with pink indurated patches. This may represent evolution from urticaria to more of an erythema multiforme pattern. It may also represent onset of vasculitic process such as drug induced lupus or ANCA positive vasculitis. I will have her hold the methimazole tomorrow pending her appointment with dermatology. GINO and ANCA are pending. If there is agreement that the rash is medication related, it is now progressed to the degree that discontinuation of the medication will be needed, and alternate management of the thyroid disease such as I-131 may need to be considered.    I have reviewed the nursing notes.    I have reviewed the  findings, diagnosis, plan and need for follow up with the patient.    New Prescriptions    No medications on file       Final diagnoses:   Allergic drug rash       4/30/2017   [facility], [place], EMERGENCY DEPARTMENT     Monster Vazquez MD  04/30/17 2000

## 2017-05-01 ENCOUNTER — MYC MEDICAL ADVICE (OUTPATIENT)
Dept: ENDOCRINOLOGY | Facility: CLINIC | Age: 37
End: 2017-05-01

## 2017-05-01 ENCOUNTER — OFFICE VISIT (OUTPATIENT)
Dept: DERMATOLOGY | Facility: CLINIC | Age: 37
End: 2017-05-01

## 2017-05-01 DIAGNOSIS — L50.9 URTICARIA OF UNKNOWN ORIGIN: Primary | ICD-10-CM

## 2017-05-01 DIAGNOSIS — Z53.9 ERRONEOUS ENCOUNTER--DISREGARD: Primary | ICD-10-CM

## 2017-05-01 RX ORDER — LIDOCAINE HYDROCHLORIDE AND EPINEPHRINE 10; 10 MG/ML; UG/ML
3 INJECTION, SOLUTION INFILTRATION; PERINEURAL ONCE
Qty: 3 ML | Refills: 0 | OUTPATIENT
Start: 2017-05-01 | End: 2017-05-02

## 2017-05-01 RX ORDER — LIDOCAINE HYDROCHLORIDE AND EPINEPHRINE 10; 10 MG/ML; UG/ML
3 INJECTION, SOLUTION INFILTRATION; PERINEURAL ONCE
Qty: 3 ML | Refills: 0 | Status: CANCELLED | OUTPATIENT
Start: 2017-05-01 | End: 2017-05-01

## 2017-05-01 ASSESSMENT — PAIN SCALES - GENERAL
PAINLEVEL: NO PAIN (0)
PAINLEVEL: NO PAIN (0)

## 2017-05-01 NOTE — NURSING NOTE
"Dermatology Rooming Note    Cathydevendra Arroyo's goals for this visit include:   Chief Complaint   Patient presents with     Derm Problem     Cathy is here today for a rash on her thighs. She states \" I have been breaking out with hives since Easter. The rash on my thighs is hot to the touch and way worse than my hives have been\"           Pamela Morrison, RMFARNAZ    "

## 2017-05-01 NOTE — PATIENT INSTRUCTIONS
Allegra 180mg by mouth twice daily  Zyrtec 20mg by mouth twice daily    Wound Care After a Biopsy    What is a skin biopsy?  A skin biopsy allows the doctor to examine a very small piece of tissue under the microscope to determine the diagnosis and the best treatment for the skin condition. A local anesthetic (numbing medicine)  is injected with a very small needle into the skin area to be tested. A small piece of skin is taken from the area. Sometimes a suture (stitch) is used.     What are the risks of a skin biopsy?  I will experience scar, bleeding, swelling, pain, crusting and redness. I may experience incomplete removal or recurrence. Risks of this procedure are excessive bleeding, bruising, infection, nerve damage, numbness, thick (hypertrophic or keloidal) scar and non-diagnostic biopsy.    How should I care for my wound for the first 24 hours?    Keep the wound dry and covered for 24 hours    If it bleeds, hold direct pressure on the area for 15 minutes. If bleeding does not stop then go to the emergency room    Avoid strenuous exercise the first 1-2 days or as your doctor instructs you    How should I care for the wound after 24 hours?    After 24 hours, remove the bandage    You may bathe or shower as normal    If you had a scalp biopsy, you can shampoo as usual and can use shower water to clean the biopsy site daily    Clean the wound twice a day with gentle soap and water    Do not scrub, be gentle    Apply white petroleum/Vaseline after cleaning the wound with a cotton swab or a clean finger, and keep the site covered with a Bandaid /bandage. Bandages are not necessary with a scalp biopsy    If you are unable to cover the site with a Bandaid /bandage, re-apply ointment 2-3 times a day to keep the site moist. Moisture will help with healing    Avoid strenuous activity for first 1-2 days    Avoid lakes, rivers, pools, and oceans until the stitches are removed or the site is healed    How do I clean my  wound?    Wash hands for 15 minutes with soap or use hand  before all wound care    Clean the wound with gentle soap and water    Apply white petroleum/Vaseline  to wound after it is clean    Replace the Bandaid /bandage to keep the wound covered for the first few days or as instructed by your doctor    If you had a scalp biopsy, warm shower water to the area on a daily basis should suffice    What should I use to clean my wound?     Cotton-tipped applicators (Qtips )    White petroleum jelly (Vaseline ). Use a clean new container and use Q-tips to apply.    Bandaids   as needed    Gentle soap     How should I care for my wound long term?    Do not get your wound dirty    Keep up with wound care for one week or until the area is healed.    A small scab will form and fall off by itself when the area is completely healed. The area will be red and will become pink in color as it heals. Sun protection is very important for how your scar will turn out. Sunscreen with an SPF 30 or greater is recommended once the area is healed.    If you have stitches, stitches need to be removed in 14 days. You may return to our clinic for this or you may have it done locally at your doctor s office.    You should have some soreness but it should be mild and slowly go away over several days. Talk to your doctor about using tylenol for pain,    When should I call my doctor?  If you have increased:     Pain or swelling    Pus or drainage (clear or slightly yellow drainage is ok)    Temperature over 100F    Spreading redness or warmth around wound    When will I hear about my results?  The biopsy results can take 2-3 weeks to come back. The clinic will call you with the results, send you a [facility] [employer] message, or have you schedule a follow-up clinic or phone time to discuss the results. Contact our clinics if you do not hear from us in 3 weeks.     Who should I call with questions?    [facility]:  [phone]     [facility]: [phone]    For urgent needs outside of business hours call the [facility] at [phone] and ask for the dermatology resident on call

## 2017-05-01 NOTE — LETTER
"2017       RE: Cathy Arroyo  [address]  [place] [address]     Dear Colleague,    Thank you for referring your patient, Cathy Arroyo, to the [facility] DERMATOLOGY at [facility]. Please see a copy of my visit note below.    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 She states \" I have been breaking out with hives since . The rash on my thighs is hot to the touch and way worse than my hives have been\"         History of Present Illness:  Ms. Cathy Arroyo is a 36 year old female w/ hx of seasonal allergies, asthma, recent diagnosis of hyperthyroidism started on methimazole 6 weeks ago, BPD.   Her recent PMH is notable for:10/11/16 had , whch was complicated by pre-eclampsia. Not actively breast feeding. She also has a history of Grave's disease diagnosed 1.5 months ago, treated with methimazole. She was hoping to avoid radiation, so on  is slated to have thyroidectomy.  Since  she has been struggling with urticaria, and even reported uvular swelling initially which had resolved. She reports having a history of 1 \"hive\" on R medial forearm which would flare with allergies, however no history of urticaria before. They are incredibly pruritic but also burn. She says they have come and gone, until yesterday when she developed large erythematous pruritic plaques on the medial thighs.  She came into the ED last night and was told to hold the methimazole and followup with dermatology     She had previously been seen by an allergist, and " for her hives, taking Zyrtec 20 mg qHS, benadryl 25mg PO qAM, Allegra 180mg in the AM. Marijuana did help with the itching as did gentle emollient.    Past Medical History:   Patient Active Problem List   Diagnosis     Pineal gland, tumor     Thyroid nodule     Anti-TPO antibodies present     Attention deficit disorder     Human papilloma virus (HPV) infection     Seasonal allergic rhinitis     Need for Tdap vaccination     Bipolar affective disorder in remission (H)     Pregnancy, normal first     GBS (group B Streptococcus carrier), +RV culture, currently pregnant     Labor and delivery, indication for care     Pre-eclampsia     Mild persistent asthma with acute exacerbation     Moderate persistent asthma without complication     Chronic rhinitis     Tobacco use disorder     Past Medical History:   Diagnosis Date     Allergic state      Anxiety      Bipolar 1 disorder (H)      Elevated cholesterol      Graves disease 2017     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     Post partum depression      Thyroid disease     hashimotos     Uncomplicated asthma      Past Surgical History:   Procedure Laterality Date     BLOOD PATCH N/A 10/11/2016    Procedure: EPIDURAL BLOOD PATCH;  Surgeon: GENERIC ANESTHESIA PROVIDER;  Location[facility] OR     CRANIOTOMY, EXCISE TUMOR COMPLEX, COMBINED  2/19/2014    Procedure: COMBINED CRANIOTOMY, EXCISE TUMOR COMPLEX;  Supracerabellar  Infratentorial Approach for Resection of Tumor ;  Surgeon: Kate Mckeon MD;  Location: [facility] OR     PROCEDURE PLACEHOLDER NEURO  2/2014    Craniotomy for a benign turmo resection       Social History:  The patient works as a hairdresser,  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   Family History:  Mom- hyperthyroidism  Grandfather- T1D    Medications:  Current Outpatient Prescriptions   Medication Sig Dispense Refill     cholecalciferol (VITAMIN D3 ULTRA STRENGTH) 5000 UNITS CAPS        diazepam (VALIUM) 10 MG tablet        triamcinolone (KENALOG)  0.1 % ointment Apply sparingly to affected area three times daily for 14 days. 80 g 0     order for DME Equipment being ordered: Inhalation Spacer 1 Device 0     methimazole (TAPAZOLE) 5 MG tablet Take 1 tablet (5 mg) by mouth daily 30 tablet 3     mometasone-formoterol (DULERA) 200-5 MCG/ACT oral inhaler Inhale 2 puffs into the lungs 2 times daily 3 Inhaler 1     montelukast (SINGULAIR) 10 MG tablet Take 1 tablet (10 mg) by mouth At Bedtime 90 tablet 3     fexofenadine (ALLEGRA) 180 MG tablet Take 1 tablet (180 mg) by mouth daily 90 tablet 3     ARIPiprazole (ABILIFY) 15 MG tablet Take 15 mg by mouth daily       albuterol (PROAIR HFA, PROVENTIL HFA, VENTOLIN HFA) 108 (90 BASE) MCG/ACT inhaler Inhale 2 puffs into the lungs every 4 hours as needed for shortness of breath / dyspnea or wheezing 1 Inhaler 3     Prenatal Vit-Fe Fumarate-FA (PRENATAL MULTIVITAMIN  PLUS IRON) 27-0.8 MG TABS Take 1 tablet by mouth daily          Allergies   Allergen Reactions     Codeine Sulfate Nausea and Vomiting     Tetanus Toxoid      Pt states she had an infection at injection site.      Vicodin [Hydrocodone-Acetaminophen] Nausea and Vomiting         Review of Systems:  -+ fevers, chills, temperature intolerance  -Constitutional: The patient denies fatigue, fevers, chills, unintended weight loss, and night sweats.  -HEENT: Patient denies nonhealing oral sores.  -Skin: As above in HPI. No additional skin concerns.    Physical exam:  Vitals: LMP 04/16/2017 (Approximate)  GEN: This is a well developed, well-nourished female in no acute distress, in a pleasant mood.    SKIN: Focused examination of the thighs, legs, feet, arms, chest, face was performed.  -pale pink urticarial papules and plaques on the medial legs, thighs with blanching white halo  - bilateral medial thighs with bright red irregularly bordered blanching patches >10cm, no white halo or secondary change surrounding.   -No other lesions of concern on areas examined.      Impression/Plan:  1. Urticaria + new deeper red macular patches on medial thighs. 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 Punch biopsy obtained to assist with diagnosis  Increase allegra to 180 mg bid and zyrtec to 20 mg bid      Discussed with Dr. Franco, cannot stop the methimazole without a spike within days.     Punch Biopsy Procedure Note:  The risks and benefits of the procedure were described to the patient. These include but are not limited to bleeding, infection, scar, incomplete removal, and non-diagnostic biopsy. Written informed consent was obtained. The L medial thigh was cleansed with an alcohol pad and injected with lidocaine with epinephrine (<1mL used). Once anesthesia was obtained, a 4 mm punch biopsy was performed. The tissue was placed in a labeled container with formalin and sent to pathology. The site was closed using two simple interrupted 4-0 Prolene sutures. Vaseline and a bandage were applied to the wound. The patient tolerated the procedure well and was given post biopsy care instructions.      CC Dr. Franco on close of this encounter.  Follow-up in 5 weeks, earlier for new or changing lesions.        staffed the patient.    Staff Involved:  Resident(Luisa Butler)/Staff(as above)  I was present for key portions of the procedure. KB  .I, Noemi Ayala MD, saw this patient with the resident and agree with the resident s findings and plan of care as documented in the resident s note.      Again, thank you for allowing me to participate in the care of your patient.      Sincerely,    Noemi Ayala MD

## 2017-05-01 NOTE — PROGRESS NOTES
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 She states \" I have been breaking out with hives since . The rash on my thighs is hot to the touch and way worse than my hives have been\"         History of Present Illness:  Ms. Cathy Arroyo is a 36 year old female w/ hx of seasonal allergies, asthma, recent diagnosis of hyperthyroidism started on methimazole 6 weeks ago, BPD.   Her recent PMH is notable for:10/11/16 had , whch was complicated by pre-eclampsia. Not actively breast feeding. She also has a history of Grave's disease diagnosed 1.5 months ago, treated with methimazole. She was hoping to avoid radiation, so on  is slated to have thyroidectomy.  Since  she has been struggling with urticaria, and even reported uvular swelling initially which had resolved. She reports having a history of 1 \"hive\" on R medial forearm which would flare with allergies, however no history of urticaria before. They are incredibly pruritic but also burn. She says they have come and gone, until yesterday when she developed large erythematous pruritic plaques on the medial thighs.  She came into the ED last night and was told to hold the methimazole and followup with dermatology     She had previously been seen by an allergist, and for her hives, taking Zyrtec 20 mg qHS, benadryl 25mg PO qAM, Allegra 180mg in the AM. Marijuana did help with the itching as did gentle emollient.    Past Medical History:   Patient Active Problem List   Diagnosis     Pineal gland, tumor     Thyroid nodule     Anti-TPO antibodies present     Attention deficit " disorder     Human papilloma virus (HPV) infection     Seasonal allergic rhinitis     Need for Tdap vaccination     Bipolar affective disorder in remission (H)     Pregnancy, normal first     GBS (group B Streptococcus carrier), +RV culture, currently pregnant     Labor and delivery, indication for care     Pre-eclampsia     Mild persistent asthma with acute exacerbation     Moderate persistent asthma without complication     Chronic rhinitis     Tobacco use disorder     Past Medical History:   Diagnosis Date     Allergic state      Anxiety      Bipolar 1 disorder (H)      Elevated cholesterol      Graves disease 2017     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     Post partum depression      Thyroid disease     hashimotos     Uncomplicated asthma      Past Surgical History:   Procedure Laterality Date     BLOOD PATCH N/A 10/11/2016    Procedure: EPIDURAL BLOOD PATCH;  Surgeon: GENERIC ANESTHESIA PROVIDER;  Location: [facility] OR     CRANIOTOMY, EXCISE TUMOR COMPLEX, COMBINED  2/19/2014    Procedure: COMBINED CRANIOTOMY, EXCISE TUMOR COMPLEX;  Supracerabellar  Infratentorial Approach for Resection of Tumor ;  Surgeon: Kate Mckeon MD;  Location: [facility] OR     PROCEDURE PLACEHOLDER NEURO  2/2014    Craniotomy for a benign turmo resection       Social History:  The patient works as a hairdresser,  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   Family History:  Mom- hyperthyroidism  Grandfather- T1D    Medications:  Current Outpatient Prescriptions   Medication Sig Dispense Refill     cholecalciferol (VITAMIN D3 ULTRA STRENGTH) 5000 UNITS CAPS        diazepam (VALIUM) 10 MG tablet        triamcinolone (KENALOG) 0.1 % ointment Apply sparingly to affected area three times daily for 14 days. 80 g 0     order for DME Equipment being ordered: Inhalation Spacer 1 Device 0     methimazole (TAPAZOLE) 5 MG tablet Take 1 tablet (5 mg) by mouth daily 30 tablet 3     mometasone-formoterol (DULERA) 200-5 MCG/ACT oral inhaler  Inhale 2 puffs into the lungs 2 times daily 3 Inhaler 1     montelukast (SINGULAIR) 10 MG tablet Take 1 tablet (10 mg) by mouth At Bedtime 90 tablet 3     fexofenadine (ALLEGRA) 180 MG tablet Take 1 tablet (180 mg) by mouth daily 90 tablet 3     ARIPiprazole (ABILIFY) 15 MG tablet Take 15 mg by mouth daily       albuterol (PROAIR HFA, PROVENTIL HFA, VENTOLIN HFA) 108 (90 BASE) MCG/ACT inhaler Inhale 2 puffs into the lungs every 4 hours as needed for shortness of breath / dyspnea or wheezing 1 Inhaler 3     Prenatal Vit-Fe Fumarate-FA (PRENATAL MULTIVITAMIN  PLUS IRON) 27-0.8 MG TABS Take 1 tablet by mouth daily          Allergies   Allergen Reactions     Codeine Sulfate Nausea and Vomiting     Tetanus Toxoid      Pt states she had an infection at injection site.      Vicodin [Hydrocodone-Acetaminophen] Nausea and Vomiting         Review of Systems:  -+ fevers, chills, temperature intolerance  -Constitutional: The patient denies fatigue, fevers, chills, unintended weight loss, and night sweats.  -HEENT: Patient denies nonhealing oral sores.  -Skin: As above in HPI. No additional skin concerns.    Physical exam:  Vitals: LMP 04/16/2017 (Approximate)  GEN: This is a well developed, well-nourished female in no acute distress, in a pleasant mood.    SKIN: Focused examination of the thighs, legs, feet, arms, chest, face was performed.  -pale pink urticarial papules and plaques on the medial legs, thighs with blanching white halo  - bilateral medial thighs with bright red irregularly bordered blanching patches >10cm, no white halo or secondary change surrounding.   -No other lesions of concern on areas examined.     Impression/Plan:  1. Urticaria + new deeper red macular patches on medial thighs. Given timing, high concern this is associated with her methimazole, which we recommend stopping, particuarly in the setting of possible uvular swelling as reported previously.  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 She does have history of autoimmunity. We will proceed with further workup if biopsy returns with urticarial vasculitis (JACOB/ANCA). Punch biopsy obtained to assist with diagnosis  Increase allegra to 180 mg bid and zyrtec to 20 mg bid, with benadryl PRN. She is not breast feeding.       Discussed with Dr. Franco, cannot stop the methimazole without a spike within days, which could put her surgery at risk, as it's not scheduled until late this month. Dr. Franco will reach out to patient to plan next steps. Discussed with patient and recommended pushing her surgery forward to as soon as possible.     Punch Biopsy Procedure Note:  The risks and benefits of the procedure were described to the patient. These include but are not limited to bleeding, infection, scar, incomplete removal, and non-diagnostic biopsy. Written informed consent was obtained. The L medial thigh was cleansed with an alcohol pad and injected with lidocaine with epinephrine (<1mL used). Once anesthesia was obtained, a 4 mm punch biopsy was performed. The tissue was placed in a labeled container with formalin and sent to pathology. The site was closed using two simple interrupted 4-0 Prolene sutures. Vaseline and a bandage were applied to the wound. The patient tolerated the procedure well and was given post biopsy care instructions.      CC Dr. Franco on close of this encounter.  Follow-up in 5 weeks, earlier for new or changing lesions.        staffed the patient.    Staff Involved:  Resident(Luisa Butler)/Staff(as above)  I was present for key portions of the procedure. SANDY  .I, Noemi Ayala MD, saw this patient with the resident and agree with the resident s findings and plan of care as documented in the resident s note.

## 2017-05-01 NOTE — MR AVS SNAPSHOT
After Visit Summary   5/1/2017    Cathy Arroyo    MRN: [id number]           Patient Information     Date Of Birth          1980        Visit Information        Provider Department      5/1/2017 9:45 AM Noemi Ayala MD [facility] Dermatology        Today's Diagnoses     Urticaria of unknown origin    -  1      Care Instructions    Allegra 180mg by mouth twice daily  Zyrtec 20mg by mouth twice daily    Wound Care After a Biopsy    What is a skin biopsy?  A skin biopsy allows the doctor to examine a very small piece of tissue under the microscope to determine the diagnosis and the best treatment for the skin condition. A local anesthetic (numbing medicine)  is injected with a very small needle into the skin area to be tested. A small piece of skin is taken from the area. Sometimes a suture (stitch) is used.     What are the risks of a skin biopsy?  I will experience scar, bleeding, swelling, pain, crusting and redness. I may experience incomplete removal or recurrence. Risks of this procedure are excessive bleeding, bruising, infection, nerve damage, numbness, thick (hypertrophic or keloidal) scar and non-diagnostic biopsy.    How should I care for my wound for the first 24 hours?    Keep the wound dry and covered for 24 hours    If it bleeds, hold direct pressure on the area for 15 minutes. If bleeding does not stop then go to the emergency room    Avoid strenuous exercise the first 1-2 days or as your doctor instructs you    How should I care for the wound after 24 hours?    After 24 hours, remove the bandage    You may bathe or shower as normal    If you had a scalp biopsy, you can shampoo as usual and can use shower water to clean the biopsy site daily    Clean the wound twice a day with gentle soap and water    Do not scrub, be gentle    Apply white petroleum/Vaseline after cleaning the wound with a cotton swab or a clean finger, and keep the site covered with a  Bandaid /bandage. Bandages are not necessary with a scalp biopsy    If you are unable to cover the site with a Bandaid /bandage, re-apply ointment 2-3 times a day to keep the site moist. Moisture will help with healing    Avoid strenuous activity for first 1-2 days    Avoid lakes, rivers, pools, and oceans until the stitches are removed or the site is healed    How do I clean my wound?    Wash hands for 15 minutes with soap or use hand  before all wound care    Clean the wound with gentle soap and water    Apply white petroleum/Vaseline  to wound after it is clean    Replace the Bandaid /bandage to keep the wound covered for the first few days or as instructed by your doctor    If you had a scalp biopsy, warm shower water to the area on a daily basis should suffice    What should I use to clean my wound?     Cotton-tipped applicators (Qtips )    White petroleum jelly (Vaseline ). Use a clean new container and use Q-tips to apply.    Bandaids   as needed    Gentle soap     How should I care for my wound long term?    Do not get your wound dirty    Keep up with wound care for one week or until the area is healed.    A small scab will form and fall off by itself when the area is completely healed. The area will be red and will become pink in color as it heals. Sun protection is very important for how your scar will turn out. Sunscreen with an SPF 30 or greater is recommended once the area is healed.    If you have stitches, stitches need to be removed in 14 days. You may return to our clinic for this or you may have it done locally at your doctor s office.    You should have some soreness but it should be mild and slowly go away over several days. Talk to your doctor about using tylenol for pain,    When should I call my doctor?  If you have increased:     Pain or swelling    Pus or drainage (clear or slightly yellow drainage is ok)    Temperature over 100F    Spreading redness or warmth around wound    When  will I hear about my results?  The biopsy results can take 2-3 weeks to come back. The clinic will call you with the results, send you a mychart message, or have you schedule a follow-up clinic or phone time to discuss the results. Contact our clinics if you do not hear from us in 3 weeks.     Who should I call with questions?    [facility]: [phone]     [facility]: [phone]    For urgent needs outside of business hours call the [facility] at [phone] and ask for the dermatology resident on call            Follow-ups after your visit        Follow-up notes from your care team     Return in about 5 weeks (around 6/8/2017).      Your next 10 appointments already scheduled     May 03, 2017  1:30 PM CDT   Office Visit with JANNET Nascimento CNP   [facility] ([facility])    [address] [place] [id number] [phone]           Bring a current list of meds and any records pertaining to this visit.  For Physicals, please bring immunization records and any forms needing to be filled out.  Please arrive 10 minutes early to complete paperwork.            May 18, 2017  1:30 PM CDT   Pre-Op physical with JANNET Nascimento CNP   [facility] ([facility])    [address] [place] [id number] [phone]            May 19, 2017  3:00 PM CDT   (Arrive by 2:45 PM)   PAC PHONE RN ASSESSMENT with Mendoza Pac Rn   [facility] ([facility])    [address] [place] [address]  4th Floor  [facility] [id number] [phone]           Note: this is not an onsite visit; there is no need to come to the facility.            May 23, 2017   Procedure with Pamela Villasenor MD   [facility] ([facility])    [address] [place] [address]  5th  [facility] [id number] [phone]           Located in the [facility] at [address] [place] [address].   parking is very convenient and highly recommended.  is a $6 flat rate fee.  Both  and self parkers should enter the main arrival plaza from [facility]; parking attendants will direct you based on your parking preference.            Jun 08, 2017 10:30 AM CDT   (Arrive by 10:15 AM)   Return Visit with Luisa Butler MD   [facility] Dermat[facility] ([facility])    [address] [place] [address]  3rd [facility] [id number] [phone]            Jun 12, 2017  1:30 PM CDT   (Arrive by 1:15 PM)   Return Visit with Pamela Villasenor MD   [facility] Nose [facility] Throat ([facility])    [address] [place] [address]  4th [facility] [id number] [phone]            Jun 16, 2017  1:00 PM CDT   (Arrive by 12:45 PM)   RETURN ENDOCRINE with Leatha Franco MD   [facility] Endocrinology ([facility])    [address] [place] [id number] [phone]              Who to contact     Please call your clinic at [phone] to:    Ask questions about your health    Make or cancel appointments    Discuss your medicines    Learn about your test results    Speak to your doctor   If you have compliments or concerns about an experience at your clinic, or if you wish to file a complaint, please contact [facility] Patient Relations at [phone] or email us at [email or web address]         Additional Information About Your Visit        MyChart Information     [employer] gives you secure access to your electronic health record. If you see a primary care provider, you can also send messages to your care team and make appointments. If you have questions, please call your primary care clinic.  If you do not have a primary care provider,  please call [phone] and they will assist you.      [employer] is an electronic gateway that provides easy, online access to your medical records. With [employer], you can request a clinic appointment, read your test results, renew a prescription or communicate with your care team.     To access your existing account, please contact your [facility] or call [phone] for assistance.        Care EveryWhere ID     This is your Care EveryWhere ID. This could be used by other organizations to access your [place] medical records  [id number]        Your Vitals Were     Last Period                   04/16/2017 (Approximate)            Blood Pressure from Last 3 Encounters:   04/30/17 128/89   04/24/17 118/68   04/20/17 (!) 138/92    Weight from Last 3 Encounters:   04/30/17 113 kg (249 lb 1.6 oz)   04/24/17 111.1 kg (245 lb)   04/20/17 112.5 kg (248 lb)              Today, you had the following     No orders found for display       Primary Care Provider Office Phone # Fax #    JANNET Nascimento [facility] [phone]       [facility]  Student Nurse Practitioner        Thank you!     Thank you for choosing [facility] DERMATOLOGY  for your care. Our goal is always to provide you with excellent care. Hearing back from our patients is one way we can continue to improve our services. Please take a few minutes to complete the written survey that you may receive in the mail after your visit with us. Thank you!             Your Updated Medication List - Protect others around you: Learn how to safely use, store and throw away your medicines at www.disposemymeds.org.          This list is accurate as of: 5/1/17  9:51 AM.  Always use your most recent med list.                   Brand Name Dispense Instructions for use    ABILIFY 15 MG tablet   Generic drug:  ARIPiprazole      Take 15 mg by mouth daily       albuterol 108 (90 BASE) MCG/ACT Inhaler    PROAIR HFA/PROVENTIL HFA/VENTOLIN HFA     1 Inhaler    Inhale 2 puffs into the lungs every 4 hours as needed for shortness of breath / dyspnea or wheezing       diazepam 10 MG tablet    VALIUM         fexofenadine 180 MG tablet    ALLEGRA    90 tablet    Take 1 tablet (180 mg) by mouth daily       methimazole 5 MG tablet    TAPAZOLE    30 tablet    Take 1 tablet (5 mg) by mouth daily       mometasone-formoterol 200-5 MCG/ACT oral inhaler    DULERA    3 Inhaler    Inhale 2 puffs into the lungs 2 times daily       montelukast 10 MG tablet    SINGULAIR    90 tablet    Take 1 tablet (10 mg) by mouth At Bedtime       order for DME     1 Device    Equipment being ordered: Inhalation Spacer       prenatal multivitamin  plus iron 27-0.8 MG Tabs per tablet      Take 1 tablet by mouth daily       triamcinolone 0.1 % ointment    KENALOG    80 g    Apply sparingly to affected area three times daily for 14 days.       VITAMIN D3 ULTRA STRENGTH 5000 UNITS Caps   Generic drug:  cholecalciferol

## 2017-05-01 NOTE — DISCHARGE INSTRUCTIONS
Follow up in the Dermatology clinic (at the [facility] on [facility]) at 9AM tomorrow to see Dr Noyola.  Do not take Methimazole tomorrow until you have seen the dermatologist and the situation has been discussed with Dr Franco.  Continue your Zyrtec.  Return for swelling of the mouth, trouble breathing or swallowing.  It should be OK to use something like Aveeno bath or lotion for the irritation.

## 2017-05-02 ENCOUNTER — TELEPHONE (OUTPATIENT)
Dept: ENDOCRINOLOGY | Facility: CLINIC | Age: 37
End: 2017-05-02

## 2017-05-02 ENCOUNTER — OFFICE VISIT (OUTPATIENT)
Dept: SURGERY | Facility: CLINIC | Age: 37
End: 2017-05-02

## 2017-05-02 ENCOUNTER — ANESTHESIA EVENT (OUTPATIENT)
Dept: SURGERY | Facility: AMBULATORY SURGERY CENTER | Age: 37
End: 2017-05-02

## 2017-05-02 ENCOUNTER — ALLIED HEALTH/NURSE VISIT (OUTPATIENT)
Dept: SURGERY | Facility: CLINIC | Age: 37
End: 2017-05-02

## 2017-05-02 VITALS
WEIGHT: 245.7 LBS | TEMPERATURE: 98.9 F | SYSTOLIC BLOOD PRESSURE: 122 MMHG | DIASTOLIC BLOOD PRESSURE: 76 MMHG | BODY MASS INDEX: 37.24 KG/M2 | OXYGEN SATURATION: 95 % | HEIGHT: 68 IN | RESPIRATION RATE: 16 BRPM | HEART RATE: 86 BPM

## 2017-05-02 DIAGNOSIS — Z01.818 PREOP GENERAL PHYSICAL EXAM: Primary | ICD-10-CM

## 2017-05-02 DIAGNOSIS — E05.00 GRAVES DISEASE: ICD-10-CM

## 2017-05-02 DIAGNOSIS — R21 RASH: ICD-10-CM

## 2017-05-02 PROBLEM — R89.6 ABNORMAL CYTOLOGY: Status: ACTIVE | Noted: 2017-05-02

## 2017-05-02 LAB — T3 SERPL-MCNC: 124 NG/DL (ref 60–181)

## 2017-05-02 PROCEDURE — 36415 COLL VENOUS BLD VENIPUNCTURE: CPT

## 2017-05-02 PROCEDURE — 84439 ASSAY OF FREE THYROXINE: CPT

## 2017-05-02 PROCEDURE — 84480 ASSAY TRIIODOTHYRONINE (T3): CPT

## 2017-05-02 RX ORDER — CETIRIZINE HYDROCHLORIDE 10 MG/1
10 TABLET ORAL 2 TIMES DAILY
COMMUNITY
End: 2017-10-23

## 2017-05-02 ASSESSMENT — LIFESTYLE VARIABLES: TOBACCO_USE: 1

## 2017-05-02 NOTE — H&P
Pre-Operative H & P     CC:  Preoperative exam to assess for increased cardiopulmonary risk while undergoing surgery and anesthesia.    Date of Encounter: 5/2/2017  Primary Care Physician:  Sherrill Dasilva    LAISHA Arroyo is a 37 yo female scheduled for Total Thyroidectomy on 5/3/2017, at [facility],  by Dr. Villasenor. PAC referral by Dr. Villasenor for assessment and optimization of anesthesia due to Graves disease. Previous anesthesia without complications.   1) Cardiac: No current cardiac diagnosis or symptoms. EKG 10/14/2016 SR with non-specific T wave abnormality.  2) Pulmonary: Current smoker and has smoked   PPD for 12 years. Asthma, controlled with singular and dulera. Albuterol less than once a month  3) Endo: Subclinical Graves' hyperthyroidism , last dose of MMI 4/30/17 (developed rash). Was started on Lugols drops. 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     Post partum depression      Thyroid disease     hashimotos     Uncomplicated asthma        Past Surgical History  Past Surgical History:   Procedure Laterality Date     BLOOD PATCH N/A 10/11/2016    Procedure: EPIDURAL BLOOD PATCH;  Surgeon: GENERIC ANESTHESIA PROVIDER;  Location: [facility] OR     CRANIOTOMY, EXCISE TUMOR COMPLEX, COMBINED  2/19/2014    Procedure: COMBINED CRANIOTOMY, EXCISE TUMOR COMPLEX;  Supracerabellar  Infratentorial Approach for Resection of Tumor ;  Surgeon: Kate Mckeon MD;  Location: [facility] OR       Hx of Blood transfusions/reactions: none    Hx of abnormal bleeding or anti-platelet use: none    Menstrual history: Patient's last menstrual period was 04/16/2017 (approximate).:     Steroid use in the last year: none    Personal or FH with difficulty with Anesthesia:   None        Prior to Admission Medications  Current Outpatient Prescriptions   Medication Sig Dispense Refill     cetirizine (ZYRTEC) 10 MG tablet Take 10 mg by mouth 2 times daily       strong iodine (LUGOL'S) 5 % solution Take 0.3 mLs by mouth 3 times daily Take last dose 5/3/17 AM before surgery 5 mL 0     cholecalciferol (VITAMIN D3 ULTRA STRENGTH) 5000 UNITS CAPS Take 5,000 Units by mouth every morning        diazepam (VALIUM) 10 MG tablet Take 10 mg by mouth as needed        order for DME Equipment being ordered: Inhalation Spacer 1 Device 0     mometasone-formoterol (DULERA) 200-5 MCG/ACT oral inhaler Inhale 2 puffs into the lungs 2 times daily 3 Inhaler 1     montelukast (SINGULAIR) 10 MG tablet Take 1 tablet (10 mg) by mouth At Bedtime (Patient taking differently: Take 10 mg by mouth every morning ) 90 tablet 3     fexofenadine (ALLEGRA) 180 MG tablet Take 1 tablet (180 mg) by mouth daily (Patient taking differently: Take 180 mg by mouth 2 times daily ) 90 tablet 3     ARIPiprazole (ABILIFY) 15 MG tablet Take 15 mg by mouth every morning        albuterol (PROAIR HFA, PROVENTIL HFA, VENTOLIN HFA) 108 (90 BASE) MCG/ACT inhaler Inhale 2 puffs into the lungs every 4 hours as needed for shortness of breath / dyspnea or wheezing 1 Inhaler 3     Prenatal Vit-Fe Fumarate-FA (PRENATAL MULTIVITAMIN  PLUS IRON) 27-0.8 MG TABS Take 1 tablet by mouth every morning          Allergies  Allergies   Allergen Reactions     Codeine Sulfate Nausea and Vomiting     Tetanus Toxoid      Pt states she had an infection at injection site.      Vicodin [Hydrocodone-Acetaminophen] Nausea and Vomiting       Social History  Social History     Social History     Marital status:      Spouse name: N/A     Number of children: N/A     Years of education: N/A     Occupational History     Not on file.     Social History Main Topics     Smoking status: Current Every Day Smoker     Packs/day: 0.50     Years: 12.00     Types: Cigarettes      Start date: 2004     Last attempt to quit: 2016     Smokeless tobacco: Never Used     Alcohol use 0.0 oz/week      Comment: social     Drug use: Yes     Special: Marijuana     Sexual activity: Yes     Partners: Male     Birth control/ protection: Inserts      Comment: will use condoms for post- birth control     Other Topics Concern     Parent/Sibling W/ Cabg, Mi Or Angioplasty Before 65f 55m? No     Social History Narrative    Caffeine intake/servings daily - 1    Calcium intake/servings daily - 3    Exercise 0 times weekly - describe ; encouraged walking daily    Sunscreen used - Yes    Seatbelts used - Yes    Guns stored in the home - No    Self Breast Exam - Yes    Pap test up to date -  No    Eye exam up to date -  No    Dental exam up to date -  No    DEXA scan up to date -  No    Flex Sig/Colonoscopy up to date -  No    Mammography up to date -  No    Immunizations reviewed and up to date - Yes    Abuse: Current or Past (Physical, Sexual or Emotional) - No    Do you feel safe in your environment - Yes    Do you cope well with stress - Yes    Do you suffer from insomnia - No    Last updated by: Noemi Cortez  3/1/2016               Family History  Family History   Problem Relation Age of Onset     Thyroid Disease Paternal Aunt      Asthma Father      MENTAL ILLNESS Father      Obesity Father      Asthma Maternal Grandmother      OSTEOPOROSIS Maternal Grandmother      DIABETES Paternal Grandfather      Other Cancer Mother      Genitourinary Problems Mother      Bipolar Disorder Mother      unipolar depression     Depression Mother      Thyroid Disease Mother      benign tumor     Bipolar Disorder Brother      unipolar depression     Depression Brother      Anesthesia Reaction Brother            Anesthesia Evaluation     . Pt has had prior anesthetic. Type: General and MAC           ROS/MED HX    ENT/Pulmonary:     (+)tobacco use, Current use 1/2 PPD for 12 hours packs/day  Mild  "Persistent asthma Treatment: Inhaler prn and Inhaler daily,  , . .    Neurologic:  - neg neurologic ROS     Cardiovascular:  - neg cardiovascular ROS   (+) ----. : . . . :. . Previous cardiac testing date:results:date: results:ECG reviewed date:10/14/2016 results:SR, non- specific T wave abnormality date: results:          METS/Exercise Tolerance:  >4 METS   Hematologic:  - neg hematologic  ROS       Musculoskeletal:  - neg musculoskeletal ROS       GI/Hepatic:  - neg GI/hepatic ROS       Renal/Genitourinary:  - ROS Renal section negative       Endo:     (+) thyroid problem  hyperthyroidism, Obesity, .      Psychiatric:     (+) psychiatric history anxiety and bipolar      Infectious Disease:  - neg infectious disease ROS       Malignancy:      - no malignancy   Other:    (+) No chance of pregnancy C-spine cleared: N/A, no H/O Chronic Pain,no other significant disability              Physical Exam  Normal systems: cardiovascular, pulmonary and dental    Airway   Mallampati: II  TM distance: >3 FB  Neck ROM: full    Dental   Normal    Cardiovascular   Rhythm and rate: regular and normal      Pulmonary    breath sounds clear to auscultation             The complete review of systems is negative other than noted in the HPI or here.   Temp: 98.9  F (37.2  C) Temp src: Oral BP: 122/76 Pulse: 86   Resp: 16 SpO2: 95 %         245 lbs 11.2 oz  5' 8\"   Body mass index is 37.36 kg/(m^2).       Physical Exam  Constitutional: Awake, alert, cooperative, no apparent distress, and appears stated age.  Eyes: Pupils equal, round and reactive to light, extra ocular muscles intact, sclera clear, conjunctiva normal. Discoloration left eye  HENT: Normocephalic, oral pharynx with moist mucus membranes, good dentition. No goiter appreciated.   Respiratory: Clear to auscultation bilaterally, no crackles or wheezing.  Cardiovascular: Regular rate and rhythm, normal S1 and S2, and no murmur noted.  Carotids +2, no bruits. No edema. Palpable " pulses to radial  DP and PT arteries.   GI: Normal bowel sounds, soft, non-distended, non-tender, no masses palpated, no hepatosplenomegaly.    Lymph/Hematologic: No cervical lymphadenopathy and no supraclavicular lymphadenopathy.  Genitourinary:  deferred   Skin: Warm and dry.  No rashes at anticipated surgical site. Left thigh open area from recent biopsy  Musculoskeletal: Full ROM of neck. There is no redness, warmth, or swelling of the joints. Gross motor strength is normal.    Neurologic: Awake, alert, oriented to name, place and time. Cranial nerves II-XII are grossly intact. Gait is normal.   Neuropsychiatric: Calm, cooperative. Normal affect.     Labs: (personally reviewed)  Lab Results   Component Value Date    WBC 10.1 04/30/2017     Lab Results   Component Value Date    RBC 4.99 04/30/2017     Lab Results   Component Value Date    HGB 14.0 04/30/2017     Lab Results   Component Value Date    HCT 42.3 04/30/2017     No components found for: MCT  Lab Results   Component Value Date    MCV 85 04/30/2017     Lab Results   Component Value Date    MCH 28.1 04/30/2017     Lab Results   Component Value Date    MCHC 33.1 04/30/2017     Lab Results   Component Value Date    RDW 13.4 04/30/2017     Lab Results   Component Value Date     04/30/2017       Last Basic Metabolic Panel:  Lab Results   Component Value Date     04/30/2017      Lab Results   Component Value Date    POTASSIUM 4.0 04/30/2017     Lab Results   Component Value Date    CHLORIDE 109 04/30/2017     Lab Results   Component Value Date    RUPALI 8.9 04/30/2017     Lab Results   Component Value Date    CO2 25 04/30/2017     Lab Results   Component Value Date    BUN 10 04/30/2017     Lab Results   Component Value Date    CR 0.62 04/30/2017     Lab Results   Component Value Date    GLC 93 04/30/2017       UA RESULTS:  Recent Labs   Lab Test  04/30/17   1820   08/29/16   1017   COLOR  Light Yellow   < >  Yellow   APPEARANCE  Clear   < >  Clear    URINEGLC  Negative   < >  Negative   URINEBILI  Negative   < >  Negative   URINEKETONE  Negative   < >  Negative   SG  1.005   < >  1.020   UBLD  Negative   < >  Negative   URINEPH  5.5   < >  6.0   PROTEIN  Negative   < >  Negative   UROBILINOGEN   --    --   0.2   NITRITE  Negative   < >  Negative   LEUKEST  Negative   < >  Negative   RBCU  1   < >   --    WBCU  1   < >   --     < > = values in this interval not displayed.       EKG: Personally reviewed but formal cardiology read pending: 10/14/2016 SR with non-specific T wave abnormality        ASSESSMENT and PLAN  Cathy Arroyo is a 36 year old female scheduled to undergo Total Thyroidectomy. She has the following specific operative considerations:   - RCRI : Low serious cardiac risks.  0.4% risk of major adverse cardiac event.   - Anesthesia considerations:  Refer to PAC assessment in anesthesia records  - VTE risk: low risk  - MELINA # of risks 1/8 = 13%  - Post-op delirium risk: low risk  - Risk of PONV score = 2.  If > 2, anti-emetic intervention recommended.    Pt optimized for surgery. AVS with information on surgery time/arrival time, meds and NPO status given by nursing staff      Patient was discussed with Dr Branch.    JANNET Romero CNS  Preoperative Assessment Center  [facility]  [facility]  Phone: [phone]  Fax: [phone]

## 2017-05-02 NOTE — PATIENT INSTRUCTIONS
Preparing for Your Surgery      Name:  Cathy Arroyo   MRN:  [id number]   :  1980   Today's Date:  2017     Arriving for surgery:  Surgery date:  2017  Surgery time:  09:50 am  Arrival time:     08:10 am  Please come to:       [facility] [address]  [address] [place]  [address]    -   parking is available in front of the hospital from 5:15 am to 8:00 pm    -  Stop at the Information Desk in the lobby    -   Inform the information person that you are here for surgery. An escort to 3c will be provided. If you would not like an escort, please proceed to 3C on the 3rd floor. [phone]     What can I eat or drink?  -  You may have solid food or milk products until 8 hours prior to your surgery.  01:50 am.  -  You may have water, apple juice or 7up/Sprite until 2 hours prior to your surgery.  07:50 am.    Which medicines can I take?  -  Do NOT take these medications in the morning, the day of surgery:     No aspirin, vitamins, minerals, herbs, ibuprofen products.    -  Please take these medications the day of surgery:      Please have your inhalers along.          Take your tapazole, singulair, allegra, abilify    How do I prepare myself?  -  Take two showers: one the night before surgery; and one the morning of surgery.         Use Scrubcare or Hibiclens to wash from neck down.  You may use your own shampoo and conditioner. No other hair products.   -  Do NOT use lotion, powder, deodorant, or antiperspirant the day of your surgery.  -  Do NOT wear any makeup, fingernail polish or jewelry.  -Do not bring your own medications to the hospital, except for inhalers and eye drops.  -  Bring your ID and insurance card.    Questions or Concerns:  If you have questions or concerns, please call the  Preoperative Assessment Center, Monday-Friday 7AM-7PM:  [phone]          AFTER YOUR SURGERY  Breathing exercises   Breathing exercises help you  recover faster. Take deep breaths and let the air out slowly. This will:     Help you wake up after surgery.    Help prevent complications like pneumonia.  Preventing complications will help you go home sooner.   Nausea and vomiting   You may feel sick to your stomach after surgery; if so, let your nurse know.    Pain control:  After surgery, you may have pain. Our goal is to help you manage your pain. Pain medicine will help you feel comfortable enough to do activities that will help you heal.  These activities may include breathing exercises, walking and physical therapy.   To help your health care team treat your pain we will ask: 1) If you have pain  2) where it is located 3) describe your pain in your words  Methods of pain control include medications given by mouth, vein or by nerve block for some surgeries.  We may give you a pain control pump that will:  1) Deliver the medicine through a tube placed in your vein  2) Control the amount of medicine you receive  3) Allow you to push a button to deliver a dose of pain medicine  Sequential Compression Device (SCD) or Pneumo Boots:  You may need to wear SCD S on your legs or feet. These are wraps connected to a machine that pumps in air and releases it. The repeated pumping helps prevent blood clots from forming.

## 2017-05-02 NOTE — TELEPHONE ENCOUNTER
----- Message from Leatha Franco MD sent at 5/2/2017 10:43 AM CDT -----  Regarding: call  Please call her and read her the mychart I just wrote . I would like her to start Lugols (iodine) in preparation of surgery. This is to block the thyroid, something we sometimes do for surgery .     I submitted Rx to [facility] pharmacy. She should  asap and get started.  I am hoping we can get 2 doses in today and one tomorrow AM .    Leatha Franco

## 2017-05-02 NOTE — MR AVS SNAPSHOT
After Visit Summary   2017    Cathy Arroyo    MRN: [id number]           Patient Information     Date Of Birth          1980        Visit Information        Provider Department      2017 4:00 PM Rn, [facility]        Care Instructions    Preparing for Your Surgery      Name:  Cathy Arroyo   MRN:  [id number]   :  1980   Today's Date:  2017     Arriving for surgery:  Surgery date:  2017  Surgery time:  09:50 am  Arrival time:     08:10 am  Please come to:       [facility] [address]  [address] [place]  [address]    -   parking is available in front of the hospital from 5:15 am to 8:00 pm    -  Stop at the Information Desk in the lobby    -   Inform the information person that you are here for surgery. An escort to 3c will be provided. If you would not like an escort, please proceed to 3C on the 3rd floor. [phone]     What can I eat or drink?  -  You may have solid food or milk products until 8 hours prior to your surgery.  01:50 am.  -  You may have water, apple juice or 7up/Sprite until 2 hours prior to your surgery.  07:50 am.    Which medicines can I take?  -  Do NOT take these medications in the morning, the day of surgery:     No aspirin, vitamins, minerals, herbs, ibuprofen products.    -  Please take these medications the day of surgery:      Please have your inhalers along.          Take your tapazole, singulair, allegra, abilify    How do I prepare myself?  -  Take two showers: one the night before surgery; and one the morning of surgery.         Use Scrubcare or Hibiclens to wash from neck down.  You may use your own shampoo and conditioner. No other hair products.   -  Do NOT use lotion, powder, deodorant, or antiperspirant the day of your surgery.  -  Do NOT wear any makeup, fingernail polish or jewelry.  -Do not bring your own medications to the  hospital, except for inhalers and eye drops.  -  Bring your ID and insurance card.    Questions or Concerns:  If you have questions or concerns, please call the  Preoperative Assessment Center, Monday-Friday 7AM-7PM:  [phone]          AFTER YOUR SURGERY  Breathing exercises   Breathing exercises help you recover faster. Take deep breaths and let the air out slowly. This will:     Help you wake up after surgery.    Help prevent complications like pneumonia.  Preventing complications will help you go home sooner.   Nausea and vomiting   You may feel sick to your stomach after surgery; if so, let your nurse know.    Pain control:  After surgery, you may have pain. Our goal is to help you manage your pain. Pain medicine will help you feel comfortable enough to do activities that will help you heal.  These activities may include breathing exercises, walking and physical therapy.   To help your health care team treat your pain we will ask: 1) If you have pain  2) where it is located 3) describe your pain in your words  Methods of pain control include medications given by mouth, vein or by nerve block for some surgeries.  We may give you a pain control pump that will:  1) Deliver the medicine through a tube placed in your vein  2) Control the amount of medicine you receive  3) Allow you to push a button to deliver a dose of pain medicine  Sequential Compression Device (SCD) or Pneumo Boots:  You may need to wear SCD S on your legs or feet. These are wraps connected to a machine that pumps in air and releases it. The repeated pumping helps prevent blood clots from forming.         Follow-ups after your visit        Your next 10 appointments already scheduled     May 02, 2017  4:00 PM CDT   (Arrive by 3:45 PM)   PAC RN ASSESSMENT with Mendoza Pac Rn   [facility] ([facility])    [address] [place] [id number] [phone]            May 02,  2017  4:30 PM CDT   (Arrive by 4:15 PM)   PAC Anesthesia Consult with  Pac Anesthesiologist   [facility] ([facility])    [address] [place] [address]  4th Floor  [facility] [address] [phone]            May 02, 2017  4:45 PM CDT   LAB with  LAB   [facility] ([facility])    [address] [place] [address]  1st [facility] [id number] [phone]           Patient must bring picture ID.  Patient should be prepared to give a urine specimen  Please do not eat 10-12 hours before your appointment if you are coming in fasting for labs on lipids, cholesterol, or glucose (sugar).  Pregnant women should follow their Care Team instructions. Water with medications is okay. Do not drink coffee or other fluids.   If you have concerns about taking  your medications, please ask at office or if scheduling via [employer], send a message by clicking on Secure Messaging, Message Your Care Team.            May 03, 2017   Procedure with Pamela Villasenor MD   [facility] ([facility])    [address] [place] [address]  5th [facility] [id number] [phone]           Located in the Clinics [facility] at [address] [place] [address].   parking is very convenient and highly recommended.  is a $6 flat rate fee.  Both  and self parkers should enter the main arrival plaza from [facility]; parking attendants will direct you based on your parking preference.            May 03, 2017  1:30 PM CDT   Office Visit with JANNET Nascimento CNP   [facility] ([facility])    [address] [place] [address]  [facility] [address] [phone]           Bring a current list of meds and any records pertaining to this visit.  For Physicals, please bring immunization records and any forms needing to be filled out.  Please arrive 10 minutes  early to complete paperwork.            May 18, 2017  1:30 PM CDT   Pre-Op physical with JANNET Nascimento CNP   [facility] [place] ([facility] [place])    [address] [place] [id number] [phone]            May 22, 2017 12:30 PM CDT   (Arrive by 12:15 PM)   Return Visit with Pamela Villasenor MD   [facility] ([facility])    [address] [place] [address]  [address]th [facility] [phone] [phone]            Jun 08, 2017 10:30 AM CDT   (Arrive by 10:15 AM)   Return Visit with Luisa Butler MD   [facility] ([facility])    [address] [place] [id number] [phone]            Jun 16, 2017  1:00 PM CDT   (Arrive by 12:45 PM)   RETURN ENDOCRINE with Leatha Franco MD   [facility] ([facility])    [address] [place] [id number] [phone]              Future tests that were ordered for you today     Open Future Orders        Priority Expected Expires Ordered    Antineutrophil Cytoplasmic Ab ([facility]) Routine  5/2/2018 5/2/2017            Who to contact     Please call your clinic at [phone] to:    Ask questions about your health    Make or cancel appointments    Discuss your medicines    Learn about your test results    Speak to your doctor   If you have compliments or concerns about an experience at your clinic, or if you wish to file a complaint, please contact [facility] Patient Relations at [phone] or email us at [email or web address]         Additional Information About Your Visit        MyChart Information     [employer] gives you secure access to your electronic health record. If you see a primary care provider, you can also send messages to your care team and make appointments. If you have questions, please call your primary care  clinic.  If you do not have a primary care provider, please call [phone] and they will assist you.      [employer] is an electronic gateway that provides easy, online access to your medical records. With [employer], you can request a clinic appointment, read your test results, renew a prescription or communicate with your care team.     To access your existing account, please contact your [facility] or call [phone] for assistance.        Care EveryWhere ID     This is your Care EveryWhere ID. This could be used by other organizations to access your [place] medical records  [id number]        Your Vitals Were     Last Period                   04/16/2017 (Approximate)            Blood Pressure from Last 3 Encounters:   05/02/17 122/76   04/30/17 128/89   04/24/17 118/68    Weight from Last 3 Encounters:   05/02/17 111.4 kg (245 lb 11.2 oz)   04/30/17 113 kg (249 lb 1.6 oz)   04/24/17 111.1 kg (245 lb)              Today, you had the following     No orders found for display         Today's Medication Changes          These changes are accurate as of: 5/2/17  3:33 PM.  If you have any questions, ask your nurse or doctor.               Start taking these medicines.        Dose/Directions    strong iodine 5 % solution   Commonly known as:  LUGOL'S   Used for:  Graves disease, Rash   Started by:  Leatha Franco MD        Dose:  0.3 mL   Take 0.3 mLs by mouth 3 times daily Take last dose 5/3/17 AM before surgery   Quantity:  5 mL   Refills:  0         These medicines have changed or have updated prescriptions.        Dose/Directions    fexofenadine 180 MG tablet   Commonly known as:  ALLEGRA   This may have changed:  when to take this   Used for:  Moderate persistent asthma with acute exacerbation, Chronic rhinitis        Dose:  180 mg   Take 1 tablet (180 mg) by mouth daily   Quantity:  90 tablet   Refills:  3       montelukast 10 MG tablet   Commonly known as:  SINGULAIR   This  may have changed:  when to take this   Used for:  Chronic rhinitis, Moderate persistent asthma with acute exacerbation        Dose:  10 mg   Take 1 tablet (10 mg) by mouth At Bedtime   Quantity:  90 tablet   Refills:  3            Where to get your medicines      These medications were sent to [facility] [place], [place] - [address]  [address], [facility] [address]    Hours:  TRANSPLANT PHONE NUMBER [phone] Phone:  [phone]     strong iodine 5 % solution                Primary Care Provider Office Phone # Fax #    JANNET Nascimento [phone]       [facility]  Student Nurse Practitioner        Thank you!     Thank you for choosing [facility]  for your care. Our goal is always to provide you with excellent care. Hearing back from our patients is one way we can continue to improve our services. Please take a few minutes to complete the written survey that you may receive in the mail after your visit with us. Thank you!             Your Updated Medication List - Protect others around you: Learn how to safely use, store and throw away your medicines at www.disposemymeds.org.          This list is accurate as of: 5/2/17  3:33 PM.  Always use your most recent med list.                   Brand Name Dispense Instructions for use    ABILIFY 15 MG tablet   Generic drug:  ARIPiprazole      Take 15 mg by mouth every morning       albuterol 108 (90 BASE) MCG/ACT Inhaler    PROAIR HFA/PROVENTIL HFA/VENTOLIN HFA    1 Inhaler    Inhale 2 puffs into the lungs every 4 hours as needed for shortness of breath / dyspnea or wheezing       cetirizine 10 MG tablet    zyrTEC     Take 10 mg by mouth 2 times daily       diazepam 10 MG tablet    VALIUM     Take 10 mg by mouth as needed       fexofenadine 180 MG tablet    ALLEGRA    90 tablet    Take 1 tablet (180 mg) by mouth daily       mometasone-formoterol 200-5  MCG/ACT oral inhaler    DULERA    3 Inhaler    Inhale 2 puffs into the lungs 2 times daily       montelukast 10 MG tablet    SINGULAIR    90 tablet    Take 1 tablet (10 mg) by mouth At Bedtime       order for DME     1 Device    Equipment being ordered: Inhalation Spacer       prenatal multivitamin  plus iron 27-0.8 MG Tabs per tablet      Take 1 tablet by mouth every morning       strong iodine 5 % solution    LUGOL'S    5 mL    Take 0.3 mLs by mouth 3 times daily Take last dose 5/3/17 AM before surgery       VITAMIN D3 ULTRA STRENGTH 5000 UNITS Caps   Generic drug:  cholecalciferol      Take 5,000 Units by mouth every morning

## 2017-05-02 NOTE — ANESTHESIA PREPROCEDURE EVALUATION
Anesthesia Evaluation     . Pt has had prior anesthetic. Type: General and MAC           ROS/MED HX    ENT/Pulmonary:     (+)tobacco use, Current use 1/2 PPD for 12 hours packs/day  Mild Persistent asthma Treatment: Inhaler prn and Inhaler daily,  , . .    Neurologic:  - neg neurologic ROS     Cardiovascular:  - neg cardiovascular ROS   (+) ----. : . . . :. . Previous cardiac testing date:results:date: results:ECG reviewed date:10/14/2016 results:SR, non- specific T wave abnormality date: results:          METS/Exercise Tolerance:  >4 METS   Hematologic:  - neg hematologic  ROS       Musculoskeletal:  - neg musculoskeletal ROS       GI/Hepatic:  - neg GI/hepatic ROS       Renal/Genitourinary:  - ROS Renal section negative       Endo:     (+) thyroid problem  hyperthyroidism, Obesity, .      Psychiatric:     (+) psychiatric history anxiety and bipolar      Infectious Disease:  - neg infectious disease ROS       Malignancy:      - no malignancy   Other:    (+) No chance of pregnancy C-spine cleared: N/A, no H/O Chronic Pain,no other significant disability                    Physical Exam  Normal systems: cardiovascular, pulmonary and dental    Airway   Mallampati: II  TM distance: >3 FB  Neck ROM: full    Dental     Cardiovascular   Rhythm and rate: regular and normal      Pulmonary    breath sounds clear to auscultation    Other findings: Lab Results      Component                Value               Date                      WBC                      10.1                04/30/2017            Lab Results      Component                Value               Date                      RBC                      4.99                04/30/2017            Lab Results      Component                Value               Date                      HGB                      14.0                04/30/2017            Lab Results      Component                Value               Date                      HCT                      42.3                 04/30/2017            No components found for: MCT  Lab Results      Component                Value               Date                      MCV                      85                  04/30/2017            Lab Results      Component                Value               Date                      MCH                      28.1                04/30/2017            Lab Results      Component                Value               Date                      MCHC                     33.1                04/30/2017            Lab Results      Component                Value               Date                      RDW                      13.4                04/30/2017            Lab Results      Component                Value               Date                      PLT                      356                 04/30/2017              Last Basic Metabolic Panel:  Lab Results      Component                Value               Date                      NA                       141                 04/30/2017             Lab Results      Component                Value               Date                      POTASSIUM                4.0                 04/30/2017            Lab Results      Component                Value               Date                      CHLORIDE                 109                 04/30/2017            Lab Results      Component                Value               Date                      RUPALI                      8.9                 04/30/2017            Lab Results      Component                Value               Date                      CO2                      25                  04/30/2017            Lab Results      Component                Value               Date                      BUN                      10                  04/30/2017            Lab Results      Component                Value               Date                      CR                       0.62                04/30/2017             Lab Results      Component                Value               Date                      GLC                      93                  04/30/2017              Lab Results      Component                Value               Date                      AST                      14                  04/30/2017            Lab Results      Component                Value               Date                      ALT                      22                  04/30/2017            No results found for: BILICONJ   Lab Results      Component                Value               Date                      BILITOTAL                0.2                 04/30/2017            Lab Results      Component                Value               Date                      ALBUMIN                  3.7                 04/30/2017            Lab Results      Component                Value               Date                      PROTTOTAL                7.5                 04/30/2017             Lab Results      Component                Value               Date                      ALKPHOS                  125                 04/30/2017                     PAC Discussion and Assessment    ASA Classification: 2  Case is suitable for: ASC  Anesthetic techniques and relevant risks discussed: GA  Invasive monitoring and risk discussed: Yes  Types:   Possibility and Risk of blood transfusion discussed: Yes  NPO instructions given:   Additional anesthetic preparation and risks discussed:   Needs early admission to pre-op area:   Other:     PAC Resident/NP Anesthesia Assessment:  Cathy Arroyo is a 37 yo female scheduled for Total Thyroidectomy on 5/3/2017 by Dr. Villasenor. PAC referral by Dr. Villasenor for assessment and optimization of anesthesia due to Graves disease. Previous anesthesia without complications.    1) Cardiac: No current cardiac diagnosis or symptoms. EKG 10/14/2016 SR with non-specific T wave abnormality.  2) Pulmonary: Current smoker and has  smoked   PPD for 12 years. Asthma, controlled with singular and dulera. Albuterol less than once a month  3) Endo: Subclinical Graves' hyperthyroidism , last dose of MMI 4/30/17 (developed rash). Was started on Lugols drops. Thyroid levels drawn today      Pt also evaluated by Dr. Branch. See recommendations below.   I spent 20 minutes face to face with pt, assessing, examining, and educating            Reviewed and Signed by PAC Mid-Level Provider/Resident  Mid-Level Provider/Resident: Neetu Zafar, APRN  Date: 5/2/2017  Time: 1530    Attending Anesthesiologist Anesthesia Assessment:  I have examined the patient and reviewed the chart.  I have discussed the patient with the KYUNG and concur with her assessment.  The patient has a 6 mo daughter but does not breast feed.  Her RADz is under good control.  She denies and cardiac symptoms and has an activity level > 4 METS.  (She did have palpitations when had initial thyroiditis)    PE.  MPC 1, good extension.  Lungs clear, CVS  RRR without murmur.    No further testing beyond Hcg the day of surgery is needed.    Reviewed and Signed by PAC Anesthesiologist  Anesthesiologist: Prieto Branch MD  Date: 05/02/2017  Time:   Pass/Fail:   Disposition:     PAC Pharmacist Assessment:        Pharmacist:   Date:   Time:      Anesthesia Plan      History & Physical Review  History and physical reviewed and following examination; no interval change.    ASA Status:  3 .    NPO Status:  > 8 hours    Plan for General and ETT with Intravenous and Propofol induction. Maintenance will be Balanced.    PONV prophylaxis:  Ondansetron (or other 5HT-3) and Dexamethasone or Solumedrol       Postoperative Care  Postoperative pain management:  IV analgesics and Multi-modal analgesia.      Consents  Anesthetic plan, risks, benefits and alternatives discussed with:  Patient.  Use of blood products discussed: No .   .                          .

## 2017-05-03 ENCOUNTER — HOSPITAL ENCOUNTER (OUTPATIENT)
Facility: AMBULATORY SURGERY CENTER | Age: 37
End: 2017-05-03
Attending: SURGERY

## 2017-05-03 ENCOUNTER — ANESTHESIA (OUTPATIENT)
Dept: SURGERY | Facility: AMBULATORY SURGERY CENTER | Age: 37
End: 2017-05-03

## 2017-05-03 ENCOUNTER — SURGERY (OUTPATIENT)
Age: 37
End: 2017-05-03

## 2017-05-03 VITALS
DIASTOLIC BLOOD PRESSURE: 92 MMHG | SYSTOLIC BLOOD PRESSURE: 149 MMHG | OXYGEN SATURATION: 97 % | HEIGHT: 68 IN | BODY MASS INDEX: 37.24 KG/M2 | TEMPERATURE: 98.5 F | WEIGHT: 245.7 LBS | RESPIRATION RATE: 16 BRPM

## 2017-05-03 DIAGNOSIS — E89.0 POSTOPERATIVE HYPOTHYROIDISM: ICD-10-CM

## 2017-05-03 DIAGNOSIS — G89.18 ACUTE POST-OPERATIVE PAIN: Primary | ICD-10-CM

## 2017-05-03 LAB — T4 FREE SERPL-MCNC: 0.99 NG/DL (ref 0.76–1.46)

## 2017-05-03 RX ORDER — MEPERIDINE HYDROCHLORIDE 25 MG/ML
12.5 INJECTION INTRAMUSCULAR; INTRAVENOUS; SUBCUTANEOUS
Status: DISCONTINUED | OUTPATIENT
Start: 2017-05-03 | End: 2017-05-04 | Stop reason: HOSPADM

## 2017-05-03 RX ORDER — PROPOFOL 10 MG/ML
INJECTION, EMULSION INTRAVENOUS PRN
Status: DISCONTINUED | OUTPATIENT
Start: 2017-05-03 | End: 2017-05-03

## 2017-05-03 RX ORDER — SODIUM CHLORIDE, SODIUM LACTATE, POTASSIUM CHLORIDE, CALCIUM CHLORIDE 600; 310; 30; 20 MG/100ML; MG/100ML; MG/100ML; MG/100ML
INJECTION, SOLUTION INTRAVENOUS CONTINUOUS
Status: DISCONTINUED | OUTPATIENT
Start: 2017-05-03 | End: 2017-05-04 | Stop reason: HOSPADM

## 2017-05-03 RX ORDER — ONDANSETRON 4 MG/1
4 TABLET, ORALLY DISINTEGRATING ORAL EVERY 30 MIN PRN
Status: DISCONTINUED | OUTPATIENT
Start: 2017-05-03 | End: 2017-05-04 | Stop reason: HOSPADM

## 2017-05-03 RX ORDER — NALOXONE HYDROCHLORIDE 0.4 MG/ML
.1-.4 INJECTION, SOLUTION INTRAMUSCULAR; INTRAVENOUS; SUBCUTANEOUS
Status: DISCONTINUED | OUTPATIENT
Start: 2017-05-03 | End: 2017-05-04 | Stop reason: HOSPADM

## 2017-05-03 RX ORDER — EPHEDRINE SULFATE 50 MG/ML
INJECTION, SOLUTION INTRAMUSCULAR; INTRAVENOUS; SUBCUTANEOUS PRN
Status: DISCONTINUED | OUTPATIENT
Start: 2017-05-03 | End: 2017-05-03

## 2017-05-03 RX ORDER — LIDOCAINE HYDROCHLORIDE 20 MG/ML
INJECTION, SOLUTION INFILTRATION; PERINEURAL PRN
Status: DISCONTINUED | OUTPATIENT
Start: 2017-05-03 | End: 2017-05-03

## 2017-05-03 RX ORDER — GABAPENTIN 300 MG/1
300 CAPSULE ORAL ONCE
Status: COMPLETED | OUTPATIENT
Start: 2017-05-03 | End: 2017-05-03

## 2017-05-03 RX ORDER — LEVOTHYROXINE SODIUM 150 UG/1
150 TABLET ORAL DAILY
Qty: 90 TABLET | Refills: 1 | Status: SHIPPED | OUTPATIENT
Start: 2017-05-03 | End: 2017-06-16

## 2017-05-03 RX ORDER — ACETAMINOPHEN 325 MG/1
650 TABLET ORAL
Status: DISCONTINUED | OUTPATIENT
Start: 2017-05-03 | End: 2017-05-04 | Stop reason: HOSPADM

## 2017-05-03 RX ORDER — ONDANSETRON 2 MG/ML
4 INJECTION INTRAMUSCULAR; INTRAVENOUS EVERY 30 MIN PRN
Status: DISCONTINUED | OUTPATIENT
Start: 2017-05-03 | End: 2017-05-04 | Stop reason: HOSPADM

## 2017-05-03 RX ORDER — ACETAMINOPHEN 325 MG/1
975 TABLET ORAL ONCE
Status: COMPLETED | OUTPATIENT
Start: 2017-05-03 | End: 2017-05-03

## 2017-05-03 RX ORDER — FENTANYL CITRATE 50 UG/ML
INJECTION, SOLUTION INTRAMUSCULAR; INTRAVENOUS PRN
Status: DISCONTINUED | OUTPATIENT
Start: 2017-05-03 | End: 2017-05-03

## 2017-05-03 RX ORDER — DEXAMETHASONE SODIUM PHOSPHATE 4 MG/ML
INJECTION, SOLUTION INTRA-ARTICULAR; INTRALESIONAL; INTRAMUSCULAR; INTRAVENOUS; SOFT TISSUE PRN
Status: DISCONTINUED | OUTPATIENT
Start: 2017-05-03 | End: 2017-05-03

## 2017-05-03 RX ORDER — FENTANYL CITRATE 50 UG/ML
25-50 INJECTION, SOLUTION INTRAMUSCULAR; INTRAVENOUS EVERY 5 MIN PRN
Status: DISCONTINUED | OUTPATIENT
Start: 2017-05-03 | End: 2017-05-03 | Stop reason: HOSPADM

## 2017-05-03 RX ORDER — OXYCODONE AND ACETAMINOPHEN 10; 325 MG/1; MG/1
1-2 TABLET ORAL EVERY 4 HOURS PRN
Qty: 30 TABLET | Refills: 0 | Status: SHIPPED | OUTPATIENT
Start: 2017-05-03 | End: 2017-05-15

## 2017-05-03 RX ORDER — BUPIVACAINE HYDROCHLORIDE 2.5 MG/ML
INJECTION, SOLUTION EPIDURAL; INFILTRATION; INTRACAUDAL PRN
Status: DISCONTINUED | OUTPATIENT
Start: 2017-05-03 | End: 2017-05-03 | Stop reason: HOSPADM

## 2017-05-03 RX ORDER — CALCIUM CARBONATE 500 MG/1
2 TABLET, CHEWABLE ORAL 3 TIMES DAILY
Qty: 200 TABLET | Refills: 0 | Status: SHIPPED | OUTPATIENT
Start: 2017-05-03 | End: 2017-05-18

## 2017-05-03 RX ORDER — SODIUM CHLORIDE, SODIUM LACTATE, POTASSIUM CHLORIDE, CALCIUM CHLORIDE 600; 310; 30; 20 MG/100ML; MG/100ML; MG/100ML; MG/100ML
INJECTION, SOLUTION INTRAVENOUS CONTINUOUS
Status: DISCONTINUED | OUTPATIENT
Start: 2017-05-03 | End: 2017-05-03 | Stop reason: HOSPADM

## 2017-05-03 RX ORDER — ONDANSETRON 4 MG/1
4-8 TABLET, ORALLY DISINTEGRATING ORAL EVERY 8 HOURS PRN
Qty: 20 TABLET | Refills: 0 | Status: SHIPPED | OUTPATIENT
Start: 2017-05-03 | End: 2017-05-15

## 2017-05-03 RX ORDER — ONDANSETRON 4 MG/1
4 TABLET, ORALLY DISINTEGRATING ORAL
Status: DISCONTINUED | OUTPATIENT
Start: 2017-05-03 | End: 2017-05-04 | Stop reason: HOSPADM

## 2017-05-03 RX ORDER — CALCITRIOL 0.25 UG/1
0.25 CAPSULE, LIQUID FILLED ORAL DAILY
Qty: 30 CAPSULE | Refills: 1 | Status: SHIPPED | OUTPATIENT
Start: 2017-05-03 | End: 2017-05-18

## 2017-05-03 RX ORDER — FENTANYL CITRATE 50 UG/ML
25-50 INJECTION, SOLUTION INTRAMUSCULAR; INTRAVENOUS EVERY 5 MIN PRN
Status: DISCONTINUED | OUTPATIENT
Start: 2017-05-03 | End: 2017-05-04 | Stop reason: HOSPADM

## 2017-05-03 RX ORDER — OXYCODONE AND ACETAMINOPHEN 5; 325 MG/1; MG/1
1-2 TABLET ORAL
Status: COMPLETED | OUTPATIENT
Start: 2017-05-03 | End: 2017-05-03

## 2017-05-03 RX ADMIN — FENTANYL CITRATE 25 MCG: 50 INJECTION, SOLUTION INTRAMUSCULAR; INTRAVENOUS at 12:56

## 2017-05-03 RX ADMIN — EPHEDRINE SULFATE 10 MG: 50 INJECTION, SOLUTION INTRAMUSCULAR; INTRAVENOUS; SUBCUTANEOUS at 11:29

## 2017-05-03 RX ADMIN — FENTANYL CITRATE 25 MCG: 50 INJECTION, SOLUTION INTRAMUSCULAR; INTRAVENOUS at 12:45

## 2017-05-03 RX ADMIN — LIDOCAINE HYDROCHLORIDE 2 ML: 20 INJECTION, SOLUTION INFILTRATION; PERINEURAL at 10:44

## 2017-05-03 RX ADMIN — PROPOFOL 150 MG: 10 INJECTION, EMULSION INTRAVENOUS at 10:44

## 2017-05-03 RX ADMIN — PROPOFOL 50 MG: 10 INJECTION, EMULSION INTRAVENOUS at 11:02

## 2017-05-03 RX ADMIN — OXYCODONE AND ACETAMINOPHEN 1 TABLET: 5; 325 TABLET ORAL at 13:03

## 2017-05-03 RX ADMIN — GABAPENTIN 300 MG: 300 CAPSULE ORAL at 09:02

## 2017-05-03 RX ADMIN — SODIUM CHLORIDE, SODIUM LACTATE, POTASSIUM CHLORIDE, CALCIUM CHLORIDE: 600; 310; 30; 20 INJECTION, SOLUTION INTRAVENOUS at 10:35

## 2017-05-03 RX ADMIN — DEXAMETHASONE SODIUM PHOSPHATE 10 MG: 4 INJECTION, SOLUTION INTRA-ARTICULAR; INTRALESIONAL; INTRAMUSCULAR; INTRAVENOUS; SOFT TISSUE at 10:48

## 2017-05-03 RX ADMIN — FENTANYL CITRATE 50 MCG: 50 INJECTION, SOLUTION INTRAMUSCULAR; INTRAVENOUS at 12:49

## 2017-05-03 RX ADMIN — BUPIVACAINE HYDROCHLORIDE 4 ML: 2.5 INJECTION, SOLUTION EPIDURAL; INFILTRATION; INTRACAUDAL at 12:27

## 2017-05-03 RX ADMIN — ACETAMINOPHEN 975 MG: 325 TABLET ORAL at 09:02

## 2017-05-03 RX ADMIN — FENTANYL CITRATE 50 MCG: 50 INJECTION, SOLUTION INTRAMUSCULAR; INTRAVENOUS at 10:44

## 2017-05-03 RX ADMIN — FENTANYL CITRATE 50 MCG: 50 INJECTION, SOLUTION INTRAMUSCULAR; INTRAVENOUS at 12:35

## 2017-05-03 RX ADMIN — Medication 0.3 MG: at 13:05

## 2017-05-03 NOTE — PROGRESS NOTES
I called and left message for the ENT/ Surgical Oncology Clinic manager to call me back to explain why they did not send the patient  Back to us for the pre op as has been done for years

## 2017-05-03 NOTE — IP AVS SNAPSHOT
MRN:[id number]                      After Visit Summary   5/3/2017    Cathy Arroyo    MRN: [id number]           Thank you!     Thank you for choosing [facility] for your care. Our goal is always to provide you with excellent care. Hearing back from our patients is one way we can continue to improve our services. Please take a few minutes to complete the written survey that you may receive in the mail after you visit with us. Thank you!        Patient Information     Date Of Birth          1980        About your hospital stay     You were admitted on:  May 3, 2017 [facility]    You were discharged on:  May 3, 2017       Who to Call     For medical emergencies, please call 911.  For non-urgent questions about your medical care, please call your primary care provider or clinic, [phone]  For questions related to your surgery, please call your surgery clinic        Attending Provider     Provider Specialty    Pamela Villasenor MD General Surgery       Primary Care Provider Office Phone # Fax #    JANNET Nascimento [facility] [phone]       [facility]  Student Nurse Practitioner        After Care Instructions     Diet Instructions       Resume pre-procedure diet            Discharge Instructions       Patient to follow up with appointment in 2 weeks            Discharge Instructions       General Instructions    You may shower after operation, let warm soapy water run over incision and pat dry. Do not submerge, soak, or scrub incision. NO swimming, bathing or soaking for at least two weeks.     You will be discharged with narcotic pain medications. Please take them only as needed and do not operate a car or heavy machinery for 24 hours after taking them.  Narcotic pain medications like oxycodone are constipating. Please ensure that you are drinking adequate amounts of fluids and taking stool softeners  "while you are taking narcotics. Take Miralax as needed for constipation.     Do not drive until you can with stand pressing the brake pedal quickly and fully without pain and you are not distracted by pain. No driving within 12 hours of taking a narcotic pain medication.     Stop taking Lugol's solution and methimazole.     Please call clinic [phone] for fever greater than 101.5, chills, red skin around incision, drainage from incision, increased swelling from the incision, bleeding from the incision that is not controlled with light pressure, inability to tolerate diet,new nausea/vomiting or other new/worsening symptoms. If you develop neck pressure, or the feeling of choking, please report to the nearest emergency department.     For after hours questions or concerns you can contact the on-call surgical oncology resident (nights and weekends [phone] and ask \"I would like to page the Surgical Oncology Resident on call.\"). In case of emergencies, please call 911. If you need to reach Dr. Villasenor, her personal cell phone number is [phone].    Follow up in clinic with Dr Villasenor in 2 weeks. You should be called to make an appointment within 3 business days. If you are not contacted, call [phone] to make an appointment.            Dressing       Your wound is dressed with glue, this will fall off in 1-2 weeks.            No driving or operating machinery        until the day after procedure            Shower       No shower for 24 hours post procedure. May shower Postoperative Day (POD)  1                  Your next 10 appointments already scheduled     May 22, 2017 12:30 PM CDT   (Arrive by 12:15 PM)   Return Visit with Pamela Villasenor MD   [facility] ([facility] [place])    [address] [address] [phone]            Jun 08, 2017 10:30 AM CDT   (Arrive by 10:15 AM)   Return Visit with MD NANCY Simpson [facility] ([facility])    [address] [facility]  [address] [facility] [address] [phone]            Jun 16, 2017  1:00 PM CDT   (Arrive by 12:45 PM)   RETURN ENDOCRINE with Leatha Franco MD   [facility] ([facility] [place])    [address] [address]   [phone]              Further instructions from your care team       [facility]  Home Care Following Anesthesia  For 24 hours after surgery:  1. Get plenty of rest.  A responsible adult must stay with you for at least 24 hours after you leave the surgery center.  2. Do not drive or use heavy equipment.  If you have weakness or tingling, don't drive or use heavy equipment until this feeling goes away.   3. Do not drink alcohol.   4. Avoid strenuous or risky activities.  Ask for help when climbing stairs.  5. You may feel lightheaded.  IF so, sit for a few minutes before standing.  Have someone help you get up.   6. If you have nausea (feel sick to your stomach): Drink only clear liquids such as apple juice, ginger ale, broth or 7-Up.  Rest may also help.  Be sure to drink enough fluids.  Move to a regular diet as you feel able.   7. You may have a slight fever.  Call the doctor if your fever is over 100 F (37.7 C) (taken under the tongue) or lasts longer than 24 hours.  8. You may have a dry mouth, a sore throat, muscle aches or trouble sleeping. These should go away after 24 hours.  9. Do not make important or legal decisions.               Tips for taking pain medications  To get the best pain relief possible, remember these points:    Take pain medications as directed, before pain becomes severe.    Pain medication can upset your stomach: taking it with food may help.    Constipation is a common side effect of pain medication. Drink plenty of  fluids.    Eat foods high in fiber. Take a stool softener if  "recommended by your doctor or pharmacist.    Do not drink alcohol, drive or operate machinery while taking pain medications.    Ask about other ways to control pain, such as with heat, ice or relaxation.    Call a doctor for any of the followin. Signs of infection (fever, growing tenderness at the surgery site, a large amount of drainage or bleeding, severe pain, foul-smelling drainage, redness, swelling).  2. It has been over 8 to 10 hours since surgery and you are still not able to urinate (pass water).  3. Headache for over 24 hours.  4. Numbness, tingling or weakness the day after surgery (if you had spinal anesthesia).  Your doctor is:  Dr. Pamela Villasenor, ENT Otolaryngology: [phone]                    Or dial [phone] and ask for the resident on call for:  ENT Otolaryngology  For emergency care, call the:  [facility]:  [phone] (TTY for hearing impaired: [phone])                  Pending Results     Date and Time Order Name Status Description    2017 1041 SURGICAL PATHOLOGY EXAM In process             Admission Information     Date & Time Provider Department Dept. Phone    5/3/2017 Pamela Villasenor MD [facility] [phone]      Your Vitals Were     Blood Pressure Temperature Respirations Height Weight Last Period    157/108 97.6  F (36.4  C) (Temporal) 18 1.727 m (5' 7.99\") 111.4 kg (245 lb 11.2 oz) 2017 (Approximate)    Pulse Oximetry BMI (Body Mass Index)                97% 37.37 kg/m2          [employer] Information     [employer] gives you secure access to your electronic health record. If you see a primary care provider, you can also send messages to your care team and make appointments. If you have questions, please call your primary care clinic.  If you do not have a primary care provider, please call [phone] and they will assist you.      [employer] is an electronic gateway that provides easy, online access to your " medical records. With [employer], you can request a clinic appointment, read your test results, renew a prescription or communicate with your care team.     To access your existing account, please contact your [facility] or call [phone] for assistance.        Care EveryWhere ID     This is your Care EveryWhere ID. This could be used by other organizations to access your [facility] medical records  [id number]           Review of your medicines      START taking        Dose / Directions    calcitRIOL 0.25 MCG capsule   Commonly known as:  ROCALTROL   Used for:  Postoperative hypothyroidism        Dose:  0.25 mcg   Take 1 capsule (0.25 mcg) by mouth daily   Quantity:  30 capsule   Refills:  1       calcium carbonate 500 MG chewable tablet   Commonly known as:  TUMS   Used for:  Postoperative hypothyroidism        Dose:  2 chew tab   Take 2 tablets (1,000 mg) by mouth 3 times daily   Quantity:  200 tablet   Refills:  0       levothyroxine 150 MCG tablet   Commonly known as:  SYNTHROID/LEVOTHROID   Used for:  Postoperative hypothyroidism        Dose:  150 mcg   Take 1 tablet (150 mcg) by mouth daily   Quantity:  90 tablet   Refills:  1       ondansetron 4 MG ODT tab   Commonly known as:  ZOFRAN-ODT   Used for:  Acute post-operative pain        Dose:  4-8 mg   Take 1-2 tablets (4-8 mg) by mouth every 8 hours as needed for nausea Dissolve ON the tongue.   Quantity:  20 tablet   Refills:  0       oxyCODONE-acetaminophen  MG per tablet   Commonly known as:  PERCOCET   Used for:  Acute post-operative pain        Dose:  1-2 tablet   Take 1-2 tablets by mouth every 4 hours as needed for pain (moderate to severe)   Quantity:  30 tablet   Refills:  0         CONTINUE these medicines which may have CHANGED, or have new prescriptions. If we are uncertain of the size of tablets/capsules you have at home, strength may be listed as something that might have changed.        Dose / Directions     fexofenadine 180 MG tablet   Commonly known as:  ALLEGRA   This may have changed:  when to take this   Used for:  Moderate persistent asthma with acute exacerbation, Chronic rhinitis        Dose:  180 mg   Take 1 tablet (180 mg) by mouth daily   Quantity:  90 tablet   Refills:  3       montelukast 10 MG tablet   Commonly known as:  SINGULAIR   This may have changed:  when to take this   Used for:  Chronic rhinitis, Moderate persistent asthma with acute exacerbation        Dose:  10 mg   Take 1 tablet (10 mg) by mouth At Bedtime   Quantity:  90 tablet   Refills:  3         CONTINUE these medicines which have NOT CHANGED        Dose / Directions    ABILIFY 15 MG tablet   Generic drug:  ARIPiprazole        Dose:  15 mg   Take 15 mg by mouth every morning   Refills:  0       albuterol 108 (90 BASE) MCG/ACT Inhaler   Commonly known as:  PROAIR HFA/PROVENTIL HFA/VENTOLIN HFA   Used for:  Mild intermittent asthma without complication        Dose:  2 puff   Inhale 2 puffs into the lungs every 4 hours as needed for shortness of breath / dyspnea or wheezing   Quantity:  1 Inhaler   Refills:  3       cetirizine 10 MG tablet   Commonly known as:  zyrTEC        Dose:  10 mg   Take 10 mg by mouth 2 times daily   Refills:  0       diazepam 10 MG tablet   Commonly known as:  VALIUM        Dose:  10 mg   Take 10 mg by mouth as needed   Refills:  0       mometasone-formoterol 200-5 MCG/ACT oral inhaler   Commonly known as:  DULERA   Used for:  Moderate persistent asthma without complication        Dose:  2 puff   Inhale 2 puffs into the lungs 2 times daily   Quantity:  3 Inhaler   Refills:  1       order for DME   Used for:  Moderate persistent asthma without complication        Equipment being ordered: Inhalation Spacer   Quantity:  1 Device   Refills:  0       prenatal multivitamin  plus iron 27-0.8 MG Tabs per tablet        Dose:  1 tablet   Take 1 tablet by mouth every morning   Refills:  0       VITAMIN D3 ULTRA STRENGTH 5000  UNITS Caps   Generic drug:  cholecalciferol        Dose:  5000 Units   Take 5,000 Units by mouth every morning   Refills:  0         STOP taking     strong iodine 5 % solution   Commonly known as:  LUGOL'S                Where to get your medicines      These medications were sent to [place], [place] - [address] [facility] [address]  [address] [facility] [address], [facility] [address]    Hours:  TRANSPLANT PHONE NUMBER [phone] Phone:  [phone]     calcitRIOL 0.25 MCG capsule    calcium carbonate 500 MG chewable tablet    levothyroxine 150 MCG tablet    ondansetron 4 MG ODT tab         Some of these will need a paper prescription and others can be bought over the counter. Ask your nurse if you have questions.     Bring a paper prescription for each of these medications     oxyCODONE-acetaminophen  MG per tablet                Protect others around you: Learn how to safely use, store and throw away your medicines at www.disposemymeds.org.             Medication List: This is a list of all your medications and when to take them. Check marks below indicate your daily home schedule. Keep this list as a reference.      Medications           Morning Afternoon Evening Bedtime As Needed    ABILIFY 15 MG tablet   Take 15 mg by mouth every morning   Generic drug:  ARIPiprazole                                albuterol 108 (90 BASE) MCG/ACT Inhaler   Commonly known as:  PROAIR HFA/PROVENTIL HFA/VENTOLIN HFA   Inhale 2 puffs into the lungs every 4 hours as needed for shortness of breath / dyspnea or wheezing                                calcitRIOL 0.25 MCG capsule   Commonly known as:  ROCALTROL   Take 1 capsule (0.25 mcg) by mouth daily                                calcium carbonate 500 MG chewable tablet   Commonly known as:  TUMS   Take 2 tablets (1,000 mg) by mouth 3 times daily                                cetirizine 10 MG tablet   Commonly known as:  zyrTEC   Take 10 mg by  mouth 2 times daily                                diazepam 10 MG tablet   Commonly known as:  VALIUM   Take 10 mg by mouth as needed                                fexofenadine 180 MG tablet   Commonly known as:  ALLEGRA   Take 1 tablet (180 mg) by mouth daily                                levothyroxine 150 MCG tablet   Commonly known as:  SYNTHROID/LEVOTHROID   Take 1 tablet (150 mcg) by mouth daily                                mometasone-formoterol 200-5 MCG/ACT oral inhaler   Commonly known as:  DULERA   Inhale 2 puffs into the lungs 2 times daily                                montelukast 10 MG tablet   Commonly known as:  SINGULAIR   Take 1 tablet (10 mg) by mouth At Bedtime                                ondansetron 4 MG ODT tab   Commonly known as:  ZOFRAN-ODT   Take 1-2 tablets (4-8 mg) by mouth every 8 hours as needed for nausea Dissolve ON the tongue.                                order for DME   Equipment being ordered: Inhalation Spacer                                oxyCODONE-acetaminophen  MG per tablet   Commonly known as:  PERCOCET   Take 1-2 tablets by mouth every 4 hours as needed for pain (moderate to severe)                                prenatal multivitamin  plus iron 27-0.8 MG Tabs per tablet   Take 1 tablet by mouth every morning                                VITAMIN D3 ULTRA STRENGTH 5000 UNITS Caps   Take 5,000 Units by mouth every morning   Generic drug:  cholecalciferol

## 2017-05-03 NOTE — BRIEF OP NOTE
[facility]    Brief Operative Note    Pre-operative diagnosis: Grave's Disease  Post-operative diagnosis * No post-op diagnosis entered *  Procedure: Procedure(s):  Total Thyroidectomy - Wound Class: I-Clean  Surgeon: Surgeon(s) and Role:     * Pamela Villasenor MD - Primary  Anesthesia: General   Estimated blood loss: Less than 10 ml  Drains: None  Specimens:   ID Type Source Tests Collected by Time Destination   A : Total thyroid Tissue Thyroid SURGICAL PATHOLOGY EXAM Pamela Villasenor MD 5/3/2017 12:11 PM    B : Lymph node @ left posterior cricoid Tissue Neck SURGICAL PATHOLOGY EXAM Pamela Villasenor MD 5/3/2017 12:14 PM      Findings:   None.- large hard LN at level of left posterior cricoid  Complications: None.  Implants: None.

## 2017-05-03 NOTE — DISCHARGE INSTRUCTIONS
[facility]  Home Care Following Anesthesia  For 24 hours after surgery:  1. Get plenty of rest.  A responsible adult must stay with you for at least 24 hours after you leave the surgery center.  2. Do not drive or use heavy equipment.  If you have weakness or tingling, don't drive or use heavy equipment until this feeling goes away.   3. Do not drink alcohol.   4. Avoid strenuous or risky activities.  Ask for help when climbing stairs.  5. You may feel lightheaded.  IF so, sit for a few minutes before standing.  Have someone help you get up.   6. If you have nausea (feel sick to your stomach): Drink only clear liquids such as apple juice, ginger ale, broth or 7-Up.  Rest may also help.  Be sure to drink enough fluids.  Move to a regular diet as you feel able.   7. You may have a slight fever.  Call the doctor if your fever is over 100 F (37.7 C) (taken under the tongue) or lasts longer than 24 hours.  8. You may have a dry mouth, a sore throat, muscle aches or trouble sleeping. These should go away after 24 hours.  9. Do not make important or legal decisions.               Tips for taking pain medications  To get the best pain relief possible, remember these points:    Take pain medications as directed, before pain becomes severe.    Pain medication can upset your stomach: taking it with food may help.    Constipation is a common side effect of pain medication. Drink plenty of  fluids.    Eat foods high in fiber. Take a stool softener if recommended by your doctor or pharmacist.    Do not drink alcohol, drive or operate machinery while taking pain medications.    Ask about other ways to control pain, such as with heat, ice or relaxation.    Call a doctor for any of the followin. Signs of infection (fever, growing tenderness at the surgery site, a large amount of drainage or bleeding, severe pain, foul-smelling drainage, redness, swelling).  2. It has been over 8 to 10 hours  since surgery and you are still not able to urinate (pass water).  3. Headache for over 24 hours.  4. Numbness, tingling or weakness the day after surgery (if you had spinal anesthesia).  Your doctor is:  Dr. Pamela Villasenor, ENT Otolaryngology: [phone]                    Or dial [phone] and ask for the resident on call for:  ENT Otolaryngology  For emergency care, call the:  [facility]:  [phone] (TTY for hearing impaired: [phone])

## 2017-05-03 NOTE — ANESTHESIA POSTPROCEDURE EVALUATION
Patient: Cathy Arroyo    Procedure(s):  Total Thyroidectomy - Wound Class: I-Clean    Diagnosis:Grave's Disease  Diagnosis Additional Information: No value filed.    Anesthesia Type:  General, ETT    Note:  Anesthesia Post Evaluation    Patient location during evaluation: Phase 2 and PACU  Patient participation: Able to fully participate in evaluation  Level of consciousness: awake and alert  Pain management: adequate  Airway patency: patent  Cardiovascular status: acceptable  Respiratory status: acceptable  Hydration status: acceptable  PONV: none     Anesthetic complications: None          Last vitals:  Vitals:    05/03/17 1300 05/03/17 1315 05/03/17 1322   BP: (!) 162/114 (!) 173/108 (!) 161/107   Resp: 16 16    Temp: 36.7  C (98  F)     SpO2: 94% 95%          Electronically Signed By: Adonis Chin DO  May 3, 2017  1:31 PM

## 2017-05-03 NOTE — IP AVS SNAPSHOT
[facility]    [address] [place] [address]    Phone:  [phone]    Fax:  [phone]                                       After Visit Summary   5/3/2017    Cathy Arroyo    MRN: [id number]           After Visit Summary Signature Page     I have received my discharge instructions, and my questions have been answered. I have discussed any challenges I see with this plan with the nurse or doctor.    ..........................................................................................................................................  Patient/Patient Representative Signature      ..........................................................................................................................................  Patient Representative Print Name and Relationship to Patient    ..................................................               ................................................  Date                                            Time    ..........................................................................................................................................  Reviewed by Signature/Title    ...................................................              ..............................................  Date                                                            Time

## 2017-05-03 NOTE — ANESTHESIA CARE TRANSFER NOTE
Patient: Cathy Arroyo    Procedure(s):  Total Thyroidectomy - Wound Class: I-Clean    Diagnosis: Grave's Disease  Diagnosis Additional Information: No value filed.    Anesthesia Type:   General, ETT     Note:  Airway :Face Mask  Patient transferred to:PACU  Comments: VSS/WNL. C/O pain.      Vitals: (Last set prior to Anesthesia Care Transfer)    CRNA VITALS  5/3/2017 1206 - 5/3/2017 1243      5/3/2017             Pulse: 97    SpO2: 92 %    Resp Rate (observed): (!)  3                Electronically Signed By: JANNET Burks CRNA  May 3, 2017  12:43 PM

## 2017-05-04 LAB — PTH-INTACT SERPL-MCNC: 5 PG/ML (ref 12–72)

## 2017-05-05 NOTE — PROGRESS NOTES
REASON FOR CONSULTATION:  I was asked by Dr. Leatha Franco to see this patient for surgical options for Graves' disease.      Cathy Arroyo is a 36-year-old female who has a diagnosis of Graves' disease.  She has been placed on methimazole for her suppressed TSH and elevated T3 and T4.  However, there was some concern that she developed an allergic reaction to this based on this new onset of hives.  She presented to me today primarily for surgical options due to allergic reaction for MMI.      Today, the patient states that she does not have any signs of a rash.  She felt as if this was short-lived and she and her  thought it was due to allergic reaction.      Regarding the Graves' disease, the patient denies any problems with voice quality, inspiration or swallowing.  She stated that there was a recent time where it was difficult to swallow but that when she had what she believed to have a virus.  This has been completely resolved.  She does admit to getting more short of breath than she thought she would when walking.  However, she does have asthma and does smoke.  She has no significant weight loss, no diarrhea.  Her appetite is unchanged.  There is no family history of thyroid disease.  No previous history of thyroid carcinoma.      PAST MEDICAL HISTORY, SURGICAL HISTORY, MEDICATIONS:  Have been reviewed and are available in the EMR.      REVIEW OF SYSTEMS:  A 10-point review of systems is pertinent for that noted in the HPI.      PHYSICAL EXAMINATION:  The patient has no physical findings of a skin rash or hives.   She has no evidence of exophthalmus or proptosis.  Her neck is soft.  The thyroid gland is mildly enlarged but does not feel firm.  I put it at about 1-1/2 times the size of normal.  There are no masses noted within the thyroid gland.  Both the superior and inferior borders are palpable.  She has no lateral cervical lymphadenopathy.      LABORATORY DATA:  From 04/18 revealed a T4 of 1.07.  T3 of 143, TSH less than 0.01.  This is on methimazole.      ASSESSMENT:  Graves' disease, reported intolerance to methimazole.      PLAN:  I discussed with the patient that we should confirm whether indeed this allergic reaction is to methimazole or not.  The patient feels that this is related to her change in laundry detergent.  If this is indeed the case, she seems to be stabilized quite nicely on methimazole.  Yet as a result would not require an urgent surgery.  Yet I do recommend a total thyroidectomy for her Graves' disease.  I discussed with her the surgical procedure including but not limited to the risks of bleeding, infection, injury to the recurrent laryngeal nerve or nerves and potential permanent hypocalcemia.  I further stressed to her that patients undergoing total thyroidectomy for Graves' disease have a higher risk of developing hypocalcemia.  The patient is aware of this and will contact me as to the timing of when she would like to have the surgery.         MERCEDES TEJADA MD             D: 2017 13:08   T: 2017 06:12   MT: VERO#[id number]      Name:     ZEINA CABAN   MRN:      [id number]        Account:      [id number]   :      1980           Visit Date:   2017      Document: [id number]

## 2017-05-08 ENCOUNTER — TELEPHONE (OUTPATIENT)
Dept: DERMATOLOGY | Facility: CLINIC | Age: 37
End: 2017-05-08

## 2017-05-08 DIAGNOSIS — L95.8 URTICARIAL VASCULITIS: Primary | ICD-10-CM

## 2017-05-08 LAB — COPATH REPORT: NORMAL

## 2017-05-08 NOTE — TELEPHONE ENCOUNTER
Called Cathy to discuss the biopsy result- c/w urticarial vasculitis. Suspect her urticarial eruption is all 2/2 her thyroid disease, however concern with the vasculitis component, that further workup indicated. Have ordered JACOB/ANCA/SOHAIL, C3/4, CH50, C1q, Hep C to be drawn. Discussed this with Cathy, she will try to come in Wednesday for labs. She says her urticaria have improved significantly since she had her thyroid removed. Will followup with Cathy once labs come back- we have a f/u scheduled in June, but will call with results and further management once lab tests avail.     Discussed with Dr. Ayala.       Luisa Butler MD  Medicine/Dermatology, PGY-4  (p) [phone]

## 2017-05-10 LAB — COPATH REPORT: NORMAL

## 2017-05-15 ENCOUNTER — OFFICE VISIT (OUTPATIENT)
Dept: OTOLARYNGOLOGY | Facility: CLINIC | Age: 37
End: 2017-05-15

## 2017-05-15 VITALS — HEIGHT: 67 IN | WEIGHT: 241 LBS | BODY MASS INDEX: 37.83 KG/M2

## 2017-05-15 DIAGNOSIS — E89.0 S/P TOTAL THYROIDECTOMY: Primary | ICD-10-CM

## 2017-05-15 DIAGNOSIS — L95.8 URTICARIAL VASCULITIS: ICD-10-CM

## 2017-05-15 LAB — HCV AB SERPL QL IA: NORMAL

## 2017-05-15 ASSESSMENT — PAIN SCALES - GENERAL: PAINLEVEL: NO PAIN (0)

## 2017-05-15 NOTE — PATIENT INSTRUCTIONS
Follow up is on an as needed basis. Please call our triage RN at [phone], for questions or concerns.

## 2017-05-15 NOTE — MR AVS SNAPSHOT
After Visit Summary   5/15/2017    Cathy Arroyo    MRN: [id number]           Patient Information     Date Of Birth          1980        Visit Information        Provider Department      5/15/2017 1:30 PM Pamela Villasenor MD [facility]        Today's Diagnoses     S/P total thyroidectomy    -  1      Care Instructions    Follow up is on an as needed basis. Please call our triage RN at [phone], for questions or concerns.            Follow-ups after your visit        Your next 10 appointments already scheduled     Jun 01, 2017  1:00 PM CDT   Office Visit with JANNET Nascimento [facility] ([facility])    [address] [place] [id number] [phone]           Bring a current list of meds and any records pertaining to this visit.  For Physicals, please bring immunization records and any forms needing to be filled out.  Please arrive 10 minutes early to complete paperwork.            Jun 05, 2017  1:30 PM CDT   (Arrive by 1:15 PM)   MA DIAGNOSTIC BILATERAL W/ PATRICE with [email or web address]   [facility] ([facility])    [address] [place] [id number] [phone]           Do not use any powder, lotion or deodorant under your arms or on your breast. If you do, we will ask you to remove it before your exam.  Wear comfortable, two-piece clothing.  If you have any allergies, tell your care team.  Bring any previous mammograms from other facilities or have them mailed to the breast center.            Jun 05, 2017  2:00 PM CDT   US BREAST RIGHT LIMITED 1-3 QUAD with UCBCUS1   [facility] ([facility])    [address] [place] [id number] [phone]           Please bring a list of your medicines (including vitamins, minerals and over-the-counter drugs). Also, tell your  doctor about any allergies you may have. Wear comfortable clothes and leave your valuables at home.  You do not need to do anything special to prepare for your exam.  Please call the Imaging Department at your exam site with any questions.            Jun 08, 2017 10:30 AM CDT   (Arrive by 10:15 AM)   Return Visit with Luisa Butler MD   [facility] ([facility])    [address] [place] [address]  3rd [facility] [id number] [phone]            Jun 16, 2017  1:00 PM CDT   (Arrive by 12:45 PM)   RETURN ENDOCRINE with Leatha Franco MD   [facility] ([facility])    [address] [place] [id number] [phone]            Jul 05, 2017 11:00 AM CDT   (Arrive by 10:45 AM)   New Patient Visit with Aaliyah Alcazar MD   [facility] [place] ([facility])    [address] [place] [id number] [phone]              Future tests that were ordered for you today     Open Future Orders        Priority Expected Expires Ordered    US Breast Right Limited 1-3 Quadrants Routine  5/31/2018 5/31/2017    MA Diagnostic Bilateral w/Zain Routine  5/31/2018 5/31/2017            Who to contact     Please call your clinic at [phone] to:    Ask questions about your health    Make or cancel appointments    Discuss your medicines    Learn about your test results    Speak to your doctor   If you have compliments or concerns about an experience at your clinic, or if you wish to file a complaint, please contact [facility] Patient Relations at [phone] or email us at [email or web address]         Additional Information About Your Visit        MyChart Information     [employer] gives you secure access to your electronic health record. If you see a primary care provider, you can also send messages to your care team and make appointments. If  "you have questions, please call your primary care clinic.  If you do not have a primary care provider, please call [phone] and they will assist you.      [employer] is an electronic gateway that provides easy, online access to your medical records. With [employer], you can request a clinic appointment, read your test results, renew a prescription or communicate with your care team.     To access your existing account, please contact your [facility] or call [phone] for assistance.        Care EveryWhere ID     This is your Care EveryWhere ID. This could be used by other organizations to access your [facility] medical records  [id number]        Your Vitals Were     Height Last Period BMI (Body Mass Index)             1.69 m (5' 6.53\") 04/16/2017 (Approximate) 38.28 kg/m2          Blood Pressure from Last 3 Encounters:   05/26/17 120/82   05/24/17 132/82   05/18/17 140/89    Weight from Last 3 Encounters:   05/26/17 111.1 kg (245 lb)   05/24/17 111.1 kg (245 lb)   05/15/17 109.3 kg (241 lb)              Today, you had the following     No orders found for display         Today's Medication Changes          These changes are accurate as of: 5/15/17 11:59 PM.  If you have any questions, ask your nurse or doctor.               These medicines have changed or have updated prescriptions.        Dose/Directions    fexofenadine 180 MG tablet   Commonly known as:  ALLEGRA   This may have changed:  when to take this   Used for:  Moderate persistent asthma with acute exacerbation, Chronic rhinitis        Dose:  180 mg   Take 1 tablet (180 mg) by mouth daily   Quantity:  90 tablet   Refills:  3       montelukast 10 MG tablet   Commonly known as:  SINGULAIR   This may have changed:  when to take this   Used for:  Chronic rhinitis, Moderate persistent asthma with acute exacerbation        Dose:  10 mg   Take 1 tablet (10 mg) by mouth At Bedtime   Quantity:  90 tablet   Refills:  3         Stop " taking these medicines if you haven't already. Please contact your care team if you have questions.     ondansetron 4 MG ODT tab   Commonly known as:  ZOFRAN-ODT   Stopped by:  Pamela Villasenor MD           oxyCODONE-acetaminophen  MG per tablet   Commonly known as:  PERCOCET   Stopped by:  Pamela Villasenor MD                    Primary Care Provider Office Phone # Fax #    JANNET Nascimento [facility] [phone]       [facility]  Student Nurse Practitioner        Thank you!     Thank you for choosing [facility]  for your care. Our goal is always to provide you with excellent care. Hearing back from our patients is one way we can continue to improve our services. Please take a few minutes to complete the written survey that you may receive in the mail after your visit with us. Thank you!             Your Updated Medication List - Protect others around you: Learn how to safely use, store and throw away your medicines at www.disposemymeds.org.          This list is accurate as of: 5/15/17 11:59 PM.  Always use your most recent med list.                   Brand Name Dispense Instructions for use    ABILIFY 15 MG tablet   Generic drug:  ARIPiprazole      Take 10 mg by mouth every morning       calcitRIOL 0.25 MCG capsule    ROCALTROL    30 capsule    Take 1 capsule (0.25 mcg) by mouth daily       calcium carbonate 500 MG chewable tablet    TUMS    200 tablet    Take 2 tablets (1,000 mg) by mouth 3 times daily       cetirizine 10 MG tablet    zyrTEC     Take 10 mg by mouth 2 times daily       diazepam 10 MG tablet    VALIUM     Take 10 mg by mouth as needed       fexofenadine 180 MG tablet    ALLEGRA    90 tablet    Take 1 tablet (180 mg) by mouth daily       levothyroxine 150 MCG tablet    SYNTHROID/LEVOTHROID    90 tablet    Take 1 tablet (150 mcg) by mouth daily       mometasone-formoterol 200-5 MCG/ACT oral inhaler    DULERA    3 Inhaler    Inhale 2 puffs into the  lungs 2 times daily       montelukast 10 MG tablet    SINGULAIR    90 tablet    Take 1 tablet (10 mg) by mouth At Bedtime       order for DME     1 Device    Equipment being ordered: Inhalation Spacer       prenatal multivitamin  plus iron 27-0.8 MG Tabs per tablet      Take 1 tablet by mouth every morning       VITAMIN D3 ULTRA STRENGTH 5000 UNITS Caps   Generic drug:  cholecalciferol      Take 5,000 Units by mouth every morning

## 2017-05-16 LAB
ANA SER QL IA: NORMAL
C3 SERPL-MCNC: 154 MG/DL (ref 76–169)
C4 SERPL-MCNC: 29 MG/DL (ref 15–50)
ENA RNP IGG SER IA-ACNC: NORMAL AI (ref 0–0.9)
ENA SCL70 IGG SER IA-ACNC: NORMAL AI (ref 0–0.9)
ENA SM IGG SER-ACNC: NORMAL AI (ref 0–0.9)
ENA SS-A IGG SER IA-ACNC: NORMAL AI (ref 0–0.9)
ENA SS-B IGG SER IA-ACNC: 0.6 AI (ref 0–0.9)

## 2017-05-17 LAB — ANCA IGG TITR SER IF: NORMAL {TITER}

## 2017-05-18 ENCOUNTER — HOSPITAL ENCOUNTER (EMERGENCY)
Facility: CLINIC | Age: 37
Discharge: HOME | End: 2017-05-18
Attending: EMERGENCY MEDICINE | Admitting: EMERGENCY MEDICINE
Payer: COMMERCIAL

## 2017-05-18 ENCOUNTER — APPOINTMENT (OUTPATIENT)
Dept: GENERAL RADIOLOGY | Facility: CLINIC | Age: 37
End: 2017-05-18
Attending: EMERGENCY MEDICINE
Payer: COMMERCIAL

## 2017-05-18 VITALS
DIASTOLIC BLOOD PRESSURE: 89 MMHG | RESPIRATION RATE: 16 BRPM | SYSTOLIC BLOOD PRESSURE: 140 MMHG | HEART RATE: 90 BPM | OXYGEN SATURATION: 98 % | TEMPERATURE: 98.2 F

## 2017-05-18 DIAGNOSIS — F17.210 CIGARETTE SMOKER: ICD-10-CM

## 2017-05-18 DIAGNOSIS — J45.901 ASTHMA WITH ACUTE EXACERBATION, UNSPECIFIED ASTHMA SEVERITY: ICD-10-CM

## 2017-05-18 LAB — CH50 SERPL-ACNC: 154

## 2017-05-18 PROCEDURE — 71020 XR CHEST 2 VW: CPT

## 2017-05-18 PROCEDURE — 94640 AIRWAY INHALATION TREATMENT: CPT | Performed by: EMERGENCY MEDICINE

## 2017-05-18 PROCEDURE — 99284 EMERGENCY DEPT VISIT MOD MDM: CPT | Mod: Z6 | Performed by: EMERGENCY MEDICINE

## 2017-05-18 PROCEDURE — 25000125 ZZHC RX 250: Performed by: EMERGENCY MEDICINE

## 2017-05-18 PROCEDURE — 99284 EMERGENCY DEPT VISIT MOD MDM: CPT | Mod: 25 | Performed by: EMERGENCY MEDICINE

## 2017-05-18 RX ORDER — PREDNISONE 20 MG/1
60 TABLET ORAL ONCE
Status: COMPLETED | OUTPATIENT
Start: 2017-05-18 | End: 2017-05-18

## 2017-05-18 RX ORDER — PREDNISONE 20 MG/1
TABLET ORAL
Qty: 10 TABLET | Refills: 0 | Status: SHIPPED | OUTPATIENT
Start: 2017-05-18 | End: 2017-06-01

## 2017-05-18 RX ORDER — IPRATROPIUM BROMIDE AND ALBUTEROL SULFATE 2.5; .5 MG/3ML; MG/3ML
3 SOLUTION RESPIRATORY (INHALATION) ONCE
Status: COMPLETED | OUTPATIENT
Start: 2017-05-18 | End: 2017-05-18

## 2017-05-18 RX ORDER — ALBUTEROL SULFATE 0.83 MG/ML
2.5 SOLUTION RESPIRATORY (INHALATION) ONCE
Status: COMPLETED | OUTPATIENT
Start: 2017-05-18 | End: 2017-05-18

## 2017-05-18 RX ADMIN — ALBUTEROL SULFATE 2.5 MG: 2.5 SOLUTION RESPIRATORY (INHALATION) at 16:35

## 2017-05-18 RX ADMIN — IPRATROPIUM BROMIDE AND ALBUTEROL SULFATE 3 ML: .5; 3 SOLUTION RESPIRATORY (INHALATION) at 15:34

## 2017-05-18 RX ADMIN — PREDNISONE 60 MG: 20 TABLET ORAL at 16:34

## 2017-05-18 ASSESSMENT — ENCOUNTER SYMPTOMS
ABDOMINAL PAIN: 0
SHORTNESS OF BREATH: 1
COUGH: 1
WHEEZING: 1
FEVER: 1
NAUSEA: 0
VOMITING: 0

## 2017-05-18 NOTE — ED PROVIDER NOTES
History     Chief Complaint   Patient presents with     Respiratory Problems     asthma attack     HPI  Cathy Arroyo is a 36 year old female who presents for evaluation following an asthma attack. Patient reports she has been having difficulty breathing for the past 1.5 weeks, has become lethargic due to difficulty breathing. She states she has been taking her prescribed inhalers including Ventolin every four hours, Dulera in the morning and at night, and has been doubling her Zertec and Allegra with no improvement in her breathing. She has had a productive cough as well as low-grade fevers around 99 degrees. Patient does have an appointment in clinic tomorrow for further evaluation, but states she feels her breathing has worsened to the point where she cannot wait. She has been on prednisone in the past, though dislikes this medication because it makes her feel manic. She is a smoker.   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     Post partum depression      Thyroid disease     hashimotos     Uncomplicated asthma        Past Surgical History:   Procedure Laterality Date     BLOOD PATCH N/A 10/11/2016    Procedure: EPIDURAL BLOOD PATCH;  Surgeon: GENERIC ANESTHESIA PROVIDER;  Location:  OR     CRANIOTOMY, EXCISE TUMOR COMPLEX, COMBINED  2/19/2014    Procedure: COMBINED CRANIOTOMY, EXCISE TUMOR COMPLEX;  Supracerabellar  Infratentorial Approach for Resection of Tumor ;  Surgeon: Kate Mckeon MD;  Location: [facility] OR     THYROIDECTOMY N/A 5/3/2017    Procedure: THYROIDECTOMY;  Total Thyroidectomy;  Surgeon: Pamela Villasenor MD;  Location:  OR        Family History   Problem Relation Age of Onset     Thyroid Disease Paternal Aunt      Asthma Father      MENTAL ILLNESS Father      Obesity Father      Asthma Maternal Grandmother      OSTEOPOROSIS Maternal Grandmother      DIABETES Paternal Grandfather      Other Cancer Mother      Genitourinary Problems Mother      Bipolar Disorder Mother      unipolar depression     Depression Mother      Thyroid Disease Mother      benign tumor     Bipolar Disorder Brother      unipolar depression     Depression Brother      Anesthesia Reaction Brother        Social History   Substance Use Topics     Smoking status: Current Every Day Smoker     Packs/day: 0.50     Years: 12.00     Types: Cigarettes     Start date: 1/21/2004     Last attempt to quit: 1/23/2016     Smokeless tobacco: Never Used     Alcohol use No      Comment: quit 16 days ago     No current facility-administered medications for this encounter.      Current Outpatient Prescriptions   Medication     predniSONE (DELTASONE) 20 MG tablet     levothyroxine (SYNTHROID/LEVOTHROID) 150 MCG tablet     cetirizine (ZYRTEC) 10 MG tablet     cholecalciferol (VITAMIN D3 ULTRA STRENGTH) 5000 UNITS CAPS     mometasone-formoterol (DULERA) 200-5 MCG/ACT oral inhaler     montelukast (SINGULAIR) 10 MG tablet     fexofenadine (ALLEGRA) 180 MG tablet     ARIPiprazole (ABILIFY) 15 MG tablet     albuterol (PROAIR HFA, PROVENTIL HFA, VENTOLIN HFA) 108 (90 BASE) MCG/ACT inhaler     Prenatal Vit-Fe Fumarate-FA (PRENATAL MULTIVITAMIN  PLUS IRON) 27-0.8 MG TABS     order for DME        Allergies   Allergen Reactions     Codeine Sulfate Nausea and Vomiting     Tetanus Toxoid      Pt states she had an infection at injection site.      Vicodin [Hydrocodone-Acetaminophen] Nausea and Vomiting     Methimazole Rash       Review of Systems   Constitutional: Positive for fever (low-grade).   Respiratory: Positive for cough (productive), shortness of breath and wheezing.    Gastrointestinal:  Negative for abdominal pain, nausea and vomiting.   All other systems reviewed and are negative.      Physical Exam    /77  Pulse 89  Temp 97.7  F (36.5  C) (Oral)  Resp 16  LMP 05/13/2017  SpO2 98%   Physical Exam  Physical Exam   Constitutional:   well nourished, well developed, resting comfortably   HENT:   Head: Normocephalic and atraumatic.   Eyes: Conjunctivae are normal. Pupils are equal, round, and reactive to light.   pharynx has no erythema or exudate, mucous membranes are moist  Neck:   no adenopathy, no bony tenderness  Cardiovascular: regular rate and rhythm without murmurs or gallops  Pulmonary/Chest: Diffuse inspiratory and expiratory wheezes, no retractions No respiratory distress.  GI: Soft with good bowel sounds.  Non-tender, non-distended, with no guarding, no rebound, no peritoneal signs.   Back:  No bony or CVA tenderness   Musculoskeletal:  no edema or clubbing   Skin: Skin is warm and dry. No rash noted.   Neurological: alert and oriented to person, place, and time. Nonfocal exam  Psychiatric:  normal mood and affect.   ED Course     ED Course     Procedures       3:24 PM  The patient was seen and examined by Dr. Charles in Room 8.          Critical Care time:  none              Results for orders placed or performed during the hospital encounter of 05/18/17 (from the past 24 hour(s))   Chest XR,  PA & LAT    Narrative    XR CHEST 2 VW 5/18/2017 3:55 PM     HISTORY: asthma, cough    COMPARISON: 12/19/2014      Impression    IMPRESSION: The heart size and pulmonary vasculature are normal. The  lungs are clear.    CARLOS VILLALTA MD        Labs Ordered and Resulted from Time of ED Arrival Up to the Time of Departure from the ED - No data to display         Assessments & Plan (with Medical Decision Making)       I have reviewed the nursing notes.  Emergency Department course:  The patient was seen and examined at 1524 pm. I treated the patient with a duoneb po.  CXR is  unremarkable.  The patient was still wheezy when she returned from x-ray.  I discussed benefits of giving her a dose of prednisone and she agreed to take it.  She states she would prefer to be on it no longer than 5 days.  I treated her with an additional albuterol nebulizer treatment and prednisone 60 mg by mouth.  The patient has an acute asthma exacerbation.  She is feeling better after the above interventions.  I prescribed 5 days of prednisone.  She should follow-up with her regular physician within a week.  She actually has an appointment for tomorrow that she can keep for recheck.  She was counseled to stop smoking.  I have reviewed the findings, diagnosis, plan and need for follow up with the patient.    New Prescriptions    PREDNISONE (DELTASONE) 20 MG TABLET    Take two tablets (= 40mg) each day for 5 (five) days       Final diagnoses:   Asthma with acute exacerbation, unspecified asthma severity   I, Saundra Mckeon, am serving as a trained medical scribe to document services personally performed by Eva Charles MD, based on the provider's statements to me.   I, Eva Charles MD, was physically present and have reviewed and verified the accuracy of this note documented by Saundra Mckeon.  This note was created in part by the use of Dragon voice recognition dictation system. Inadvertent grammatical errors and typographical errors may still exist.  Eva Charles MD        5/18/2017   [facility], [place], EMERGENCY DEPARTMENT     Eva Charles MD  05/18/17 3320

## 2017-05-18 NOTE — ED AVS SNAPSHOT
[facility], Emergency Department    [address]    [place] [address]    Phone:  [phone]    Fax:  [phone]                                       Cathy Arroyo   MRN: [id number]    Department:  [facility], Emergency Department   Date of Visit:  5/18/2017           Patient Information     Date Of Birth          1980        Your diagnoses for this visit were:     Asthma with acute exacerbation, unspecified asthma severity        You were seen by Eva Charles MD.        Discharge Instructions       You should stop smoking!  Please follow-up with your regular doctor for help with this  Take prednisone daily for 5 days.  Please make an appointment to follow up with Your Primary Care Provider in 1-7 days.     Discharge References/Attachments     ASTHMA, ACUTE (ADULT) (ENGLISH)      Future Appointments        Provider Department Dept Phone Center    5/19/2017 10:20 AM Shelley Membreno PA-C [facility] [phone] [facility]    6/8/2017 10:30 AM Luisa Butler MD [facility] Dermatology [phone] [facility]    6/16/2017 1:00 PM Leatha Franco MD [facility] Endocrinology [phone] [facility]      24 Hour Appointment Hotline       To make an appointment at any [facility], call [phone] ([phone]). If you don't have a family doctor or clinic, we will help you find one. [facility] are conveniently located to serve the needs of you and your family.             Review of your medicines      START taking        Dose / Directions Last dose taken    predniSONE 20 MG tablet   Commonly known as:  DELTASONE   Quantity:  10 tablet        Take two tablets (= 40mg) each day for 5 (five) days   Refills:  0          CONTINUE these medicines which may have CHANGED, or have new prescriptions. If we are uncertain of the size of tablets/capsules you have at home, strength may be listed as something that might have changed.        Dose / Directions Last dose taken     fexofenadine 180 MG tablet   Commonly known as:  ALLEGRA   Dose:  180 mg   What changed:  when to take this   Quantity:  90 tablet        Take 1 tablet (180 mg) by mouth daily   Refills:  3        montelukast 10 MG tablet   Commonly known as:  SINGULAIR   Dose:  10 mg   What changed:  when to take this   Quantity:  90 tablet        Take 1 tablet (10 mg) by mouth At Bedtime   Refills:  3          Our records show that you are taking the medicines listed below. If these are incorrect, please call your family doctor or clinic.        Dose / Directions Last dose taken    ABILIFY 15 MG tablet   Dose:  15 mg   Generic drug:  ARIPiprazole        Take 15 mg by mouth every morning   Refills:  0        albuterol 108 (90 BASE) MCG/ACT Inhaler   Commonly known as:  PROAIR HFA/PROVENTIL HFA/VENTOLIN HFA   Dose:  2 puff   Quantity:  1 Inhaler        Inhale 2 puffs into the lungs every 4 hours as needed for shortness of breath / dyspnea or wheezing   Refills:  3        cetirizine 10 MG tablet   Commonly known as:  zyrTEC   Dose:  10 mg        Take 10 mg by mouth 2 times daily   Refills:  0        levothyroxine 150 MCG tablet   Commonly known as:  SYNTHROID/LEVOTHROID   Dose:  150 mcg   Quantity:  90 tablet        Take 1 tablet (150 mcg) by mouth daily   Refills:  1        mometasone-formoterol 200-5 MCG/ACT oral inhaler   Commonly known as:  DULERA   Dose:  2 puff   Quantity:  3 Inhaler        Inhale 2 puffs into the lungs 2 times daily   Refills:  1        order for DME   Quantity:  1 Device        Equipment being ordered: Inhalation Spacer   Refills:  0        prenatal multivitamin  plus iron 27-0.8 MG Tabs per tablet   Dose:  1 tablet        Take 1 tablet by mouth every morning   Refills:  0        VITAMIN D3 ULTRA STRENGTH 5000 UNITS Caps   Dose:  5000 Units   Generic drug:  cholecalciferol        Take 5,000 Units by mouth every morning   Refills:  0                Prescriptions were sent or printed at these locations (1  Prescription)                   Other Prescriptions                Printed at Department/Unit printer (1 of 1)         predniSONE (DELTASONE) 20 MG tablet                Procedures and tests performed during your visit     Chest XR,  PA & LAT      Orders Needing Specimen Collection     None      Pending Results     No orders found from 5/16/2017 to 5/19/2017.            Pending Culture Results     No orders found from 5/16/2017 to 5/19/2017.            Pending Results Instructions     If you had any lab results that were not finalized at the time of your Discharge, you can call the ED Lab Result RN at [phone]. You will be contacted by this team for any positive Lab results or changes in treatment. The nurses are available 7 days a week from 10A to 6:30P.  You can leave a message 24 hours per day and they will return your call.        Thank you for choosing [facility]       Thank you for choosing [facility] for your care. Our goal is always to provide you with excellent care. Hearing back from our patients is one way we can continue to improve our services. Please take a few minutes to complete the written survey that you may receive in the mail after you visit with us. Thank you!        [id number] Information     [employer] gives you secure access to your electronic health record. If you see a primary care provider, you can also send messages to your care team and make appointments. If you have questions, please call your primary care clinic.  If you do not have a primary care provider, please call [phone] and they will assist you.        Care EveryWhere ID     This is your Care EveryWhere ID. This could be used by other organizations to access your [facility] medical records  [id number]        After Visit Summary       This is your record. Keep this with you and show to your community pharmacist(s) and doctor(s) at your next visit.

## 2017-05-18 NOTE — ED AVS SNAPSHOT
[facility], Emergency Department    [address]    [place] [address]    Phone:  [phone]    Fax:  [phone]                                       Cathy Arroyo   MRN: [id number]    Department:  [facility], Emergency Department   Date of Visit:  5/18/2017           After Visit Summary Signature Page     I have received my discharge instructions, and my questions have been answered. I have discussed any challenges I see with this plan with the nurse or doctor.    ..........................................................................................................................................  Patient/Patient Representative Signature      ..........................................................................................................................................  Patient Representative Print Name and Relationship to Patient    ..................................................               ................................................  Date                                            Time    ..........................................................................................................................................  Reviewed by Signature/Title    ...................................................              ..............................................  Date                                                            Time

## 2017-05-18 NOTE — DISCHARGE INSTRUCTIONS
You should stop smoking!  Please follow-up with your regular doctor for help with this  Take prednisone daily for 5 days.  Please make an appointment to follow up with Your Primary Care Provider in 1-7 days.

## 2017-05-23 NOTE — PROGRESS NOTES
SUBJECTIVE:                                                    Cathy Arroyo is a 36 year old female who presents to clinic today for the following health issues:    Asthma Follow-Up    Was ACT completed today?    Yes    Smoking 3/4 ppd - chantix previously caused depressed symptoms  Has had no success with nicotine patches or gum  Stopped drinking 23 days ago  ACT Total Scores 5/24/2017   ACT TOTAL SCORE (Goal Greater than or Equal to 20) 6   In the past 12 months, how many times did you visit the emergency room for your asthma without being admitted to the hospital? 1   In the past 12 months, how many times were you hospitalized overnight because of your asthma? 0       Recent asthma triggers that patient is dealing with: smoke, pollens and hay fever      Amount of exercise or physical activity: 2-3 days/week for an average of 15-30 minutes    Problems taking medications regularly: No    Medication side effects: none    Diet: vegetarian/vegan    Questions/Concerns: Need a nebulizer? Prednisone doesn't seem to help.      Component      Latest Ref Rng & Units 3/17/2017 4/18/2017 4/30/2017 5/2/2017   TSH      0.40 - 4.00 mU/L <0.01 (L) 0.01 (L) 0.04 (L)    T4 Free      0.76 - 1.46 ng/dL 1.13 1.07 1.02 0.99   Triiodothyronine (T3)      60 - 181 ng/dL 179 143  124  Day prior to thyroidectomy - started on 150 mcg after surgery       Problem list and histories reviewed & adjusted, as indicated.  Additional history: as documented    Patient Active Problem List   Diagnosis     Pineal gland, tumor     Thyroid nodule     Anti-TPO antibodies present     Attention deficit disorder     Human papilloma virus (HPV) infection     Seasonal allergic rhinitis     Need for Tdap vaccination     Bipolar affective disorder in remission (H)     Pregnancy, normal first     GBS (group B Streptococcus carrier), +RV culture, currently pregnant     Labor and delivery, indication for care     Pre-eclampsia     Mild persistent  asthma with acute exacerbation     Moderate persistent asthma without complication     Chronic rhinitis     Tobacco use disorder     Abnormal cytology     Past Surgical History:   Procedure Laterality Date     BLOOD PATCH N/A 10/11/2016    Procedure: EPIDURAL BLOOD PATCH;  Surgeon: GENERIC ANESTHESIA PROVIDER;  Location: [facility] OR     CRANIOTOMY, EXCISE TUMOR COMPLEX, COMBINED  2/19/2014    Procedure: COMBINED CRANIOTOMY, EXCISE TUMOR COMPLEX;  Supracerabellar  Infratentorial Approach for Resection of Tumor ;  Surgeon: Kate Mckeon MD;  Location: [facility] OR     THYROIDECTOMY N/A 5/3/2017    Procedure: THYROIDECTOMY;  Total Thyroidectomy;  Surgeon: Pamela Villasenor MD;  Location:  OR       Social History   Substance Use Topics     Smoking status: Current Every Day Smoker     Packs/day: 0.50     Years: 12.00     Types: Cigarettes     Start date: 1/21/2004     Last attempt to quit: 1/23/2016     Smokeless tobacco: Never Used     Alcohol use No      Comment: quit 16 days ago     Family History   Problem Relation Age of Onset     Thyroid Disease Paternal Aunt      Asthma Father      MENTAL ILLNESS Father      Obesity Father      Asthma Maternal Grandmother      OSTEOPOROSIS Maternal Grandmother      DIABETES Paternal Grandfather      Other Cancer Mother      Genitourinary Problems Mother      Bipolar Disorder Mother      unipolar depression     Depression Mother      Thyroid Disease Mother      benign tumor     Bipolar Disorder Brother      unipolar depression     Depression Brother      Anesthesia Reaction Brother          Current Outpatient Prescriptions   Medication Sig Dispense Refill     predniSONE (DELTASONE) 20 MG tablet Take two tablets (= 40mg) each day for 5 (five) days 10 tablet 0     levothyroxine (SYNTHROID/LEVOTHROID) 150 MCG tablet Take 1 tablet (150 mcg) by mouth daily 90 tablet 1     cetirizine (ZYRTEC) 10 MG tablet Take 10 mg by mouth 2 times daily       cholecalciferol (VITAMIN D3 ULTRA  STRENGTH) 5000 UNITS CAPS Take 5,000 Units by mouth every morning        order for DME Equipment being ordered: Inhalation Spacer 1 Device 0     mometasone-formoterol (DULERA) 200-5 MCG/ACT oral inhaler Inhale 2 puffs into the lungs 2 times daily 3 Inhaler 1     montelukast (SINGULAIR) 10 MG tablet Take 1 tablet (10 mg) by mouth At Bedtime (Patient taking differently: Take 10 mg by mouth every morning ) 90 tablet 3     fexofenadine (ALLEGRA) 180 MG tablet Take 1 tablet (180 mg) by mouth daily (Patient taking differently: Take 180 mg by mouth 2 times daily ) 90 tablet 3     ARIPiprazole (ABILIFY) 15 MG tablet Take 10 mg by mouth every morning        albuterol (PROAIR HFA, PROVENTIL HFA, VENTOLIN HFA) 108 (90 BASE) MCG/ACT inhaler Inhale 2 puffs into the lungs every 4 hours as needed for shortness of breath / dyspnea or wheezing 1 Inhaler 3     Prenatal Vit-Fe Fumarate-FA (PRENATAL MULTIVITAMIN  PLUS IRON) 27-0.8 MG TABS Take 1 tablet by mouth every morning          Reviewed and updated as needed this visit by clinical staff       Reviewed and updated as needed this visit by Provider         ROS:  Constitutional, HEENT, cardiovascular, pulmonary, gi and gu systems are negative, except as otherwise noted.    OBJECTIVE:                                                    /82 (BP Location: Right arm, Patient Position: Chair, Cuff Size: Adult Large)  Pulse 107  Temp 98.6  F (37  C) (Oral)  Wt 245 lb (111.1 kg)  LMP 05/13/2017  SpO2 96%  BMI 38.91 kg/m2  Body mass index is 38.91 kg/(m^2).  GENERAL: healthy, alert and mild distress  EYES: Eyes grossly normal to inspection, PERRL and conjunctivae and sclerae normal  HENT: ear canals and TM's normal, nose and mouth without ulcers or lesions  NECK: no adenopathy, no asymmetry, masses, or scars and thyroid normal to palpation  RESP: expiratory wheezes throughout; minor improvement with duoneb inhaler  CV: regular rate and rhythm, normal S1 S2, no S3 or S4, no murmur,  "click or rub, no peripheral edema and peripheral pulses strong  SKIN: no suspicious lesions or rashes  PSYCH: mentation appears normal, affect normal/bright    Diagnostic Test Results:  none      ASSESSMENT/PLAN:                                                    Asthma: Mild persistent with exacerbation   Plan:  Referrals:  Pulmonary consult  Medications:  Oral steroid:  Continue prednisone      (J45.31) Mild persistent asthma with acute exacerbation  (primary encounter diagnosis)  Comment:   Plan: ALBUTEROL/IPRATROPIUM 3ML NEB - .001,         INHALATION/NEBULIZER TREATMENT, INITIAL, order         for DME, PULMONARY MEDICINE REFERRAL,         predniSONE (DELTASONE) 20 MG tablet,         ipratropium - albuterol 0.5 mg/2.5 mg/3 mL         (DUONEB) 0.5-2.5 (3) MG/3ML neb solution  Patient did not note much subjective change with duoneb, but lung sounds did improve somewhat.  She had avoided prednisone due to jose eduardo concerns and has seen some improvement, but needs to continue prednisone.  I'll see her again in two days and we will discuss taper then.  Discussed referral to pulmonology as she is taking the max dose of her combined ICS-LABA in addition to this current prednisone.  Also strongly encouraged smoking cessation.  She had quit \"cold turkey\" in pregnancy, but has restarted and is unsure what will be helpful to quit.  She had a bad experience with Chantix in the past.  In general, Wellbutrin causes jose eduardo for the patient.  However, she does typically experience unipolar depression in the summer so may be a period of time to consider wellbutrin.  Advised to discuss with psychiatry.  In the interim, she is interested in the nicotine inhaler which she has not tried in the past    (F17.200) Tobacco use disorder  Comment:   Plan: nicotine (NICOTROL) 10 MG Inhaler                Patient Instructions   Talk to psychiatry about Wellbutrin - consider dual purpose of depression and smoking cessation  Ask about " wellbutrin and chantix together    Try the nicotine inhaler for smoking cessation    Duoneb and nebulizer  Prednisone - continue 40 mg  Return on Friday 12:45 pm    Schedule pulmonology      JANNET Liu [facility]

## 2017-05-24 ENCOUNTER — OFFICE VISIT (OUTPATIENT)
Dept: FAMILY MEDICINE | Facility: CLINIC | Age: 37
End: 2017-05-24
Payer: COMMERCIAL

## 2017-05-24 VITALS
DIASTOLIC BLOOD PRESSURE: 82 MMHG | SYSTOLIC BLOOD PRESSURE: 132 MMHG | TEMPERATURE: 98.6 F | HEART RATE: 107 BPM | OXYGEN SATURATION: 96 % | WEIGHT: 245 LBS | BODY MASS INDEX: 38.91 KG/M2

## 2017-05-24 DIAGNOSIS — F17.200 TOBACCO USE DISORDER: ICD-10-CM

## 2017-05-24 DIAGNOSIS — J45.31 MILD PERSISTENT ASTHMA WITH ACUTE EXACERBATION: Primary | ICD-10-CM

## 2017-05-24 PROCEDURE — 94640 AIRWAY INHALATION TREATMENT: CPT | Performed by: NURSE PRACTITIONER

## 2017-05-24 PROCEDURE — 99214 OFFICE O/P EST MOD 30 MIN: CPT | Mod: 25 | Performed by: NURSE PRACTITIONER

## 2017-05-24 RX ORDER — IPRATROPIUM BROMIDE AND ALBUTEROL SULFATE 2.5; .5 MG/3ML; MG/3ML
1 SOLUTION RESPIRATORY (INHALATION) EVERY 6 HOURS PRN
Qty: 90 VIAL | Refills: 3 | Status: SHIPPED | OUTPATIENT
Start: 2017-05-24 | End: 2021-12-14

## 2017-05-24 RX ORDER — PREDNISONE 20 MG/1
40 TABLET ORAL DAILY
Qty: 10 TABLET | Refills: 0 | Status: SHIPPED | OUTPATIENT
Start: 2017-05-24 | End: 2017-05-29

## 2017-05-24 NOTE — NURSING NOTE
"Chief Complaint   Patient presents with     Asthma       Initial /82 (BP Location: Right arm, Patient Position: Chair, Cuff Size: Adult Large)  Pulse 107  Temp 98.6  F (37  C) (Oral)  Wt 245 lb (111.1 kg)  LMP 05/13/2017  SpO2 96%  BMI 38.91 kg/m2 Estimated body mass index is 38.91 kg/(m^2) as calculated from the following:    Height as of 5/15/17: 5' 6.53\" (1.69 m).    Weight as of this encounter: 245 lb (111.1 kg).  Medication Reconciliation: complete     Terell Capps MA      "

## 2017-05-24 NOTE — PATIENT INSTRUCTIONS
Talk to psychiatry about Wellbutrin - consider dual purpose of depression and smoking cessation  Ask about wellbutrin and chantix together    Try the nicotine inhaler for smoking cessation    Duoneb and nebulizer  Prednisone - continue 40 mg  Return on Friday 12:45 pm    Schedule pulmonology

## 2017-05-24 NOTE — MR AVS SNAPSHOT
After Visit Summary   5/24/2017    Cathy Arroyo    MRN: [id number]           Patient Information     Date Of Birth          1980        Visit Information        Provider Department      5/24/2017 3:45 PM Sherrill Dasilva APRN [facility]        Today's Diagnoses     Mild persistent asthma with acute exacerbation    -  1    Tobacco use disorder          Care Instructions    Talk to psychiatry about Wellbutrin - consider dual purpose of depression and smoking cessation  Ask about wellbutrin and chantix together    Try the nicotine inhaler for smoking cessation    Duoneb and nebulizer  Prednisone - continue 40 mg  Return on Friday 12:45 pm    Schedule pulmonology          Follow-ups after your visit        Additional Services     PULMONARY MEDICINE REFERRAL       Your provider has referred you to:   [facility]: [facility] [place] [phone]   [email or web address]  [facility] [place] [phone]   [email or web address]  [facility]: [facility] [phone]   [email or web address]    Please be aware that coverage of these services is subject to the terms and limitations of your health insurance plan.  Call member services at your health plan with any benefit or coverage questions.      Please bring the following with you to your appointment:    (1) Any X-Rays, CTs or MRIs which have been performed.  Contact the facility where they were done to arrange for  prior to your scheduled appointment.    (2) List of current medications   (3) This referral request   (4) Any documents/labs given to you for this referral                  Your next 10 appointments already scheduled     Jun 08, 2017 10:30 AM CDT   (Arrive by 10:15 AM)   Return Visit with MD NANCY Simpson [facility] Dermatology (M  [facility] [place])    [address]  [address] [facility] [id number] [phone]            Jun 16, 2017  1:00 PM CDT   (Arrive by 12:45 PM)   RETURN ENDOCRINE with Leatha Franco MD   [facility] Endocrinology ([facility] [place])    [address] [id number] [phone]              Who to contact     If you have questions or need follow up information about today's clinic visit or your schedule please contact [facility] directly at [phone].  Normal or non-critical lab and imaging results will be communicated to you by [employer]on[employer], letter or phone within 4 business days after the clinic has received the results. If you do not hear from us within 7 days, please contact the clinic through [employer] or phone. If you have a critical or abnormal lab result, we will notify you by phone as soon as possible.  Submit refill requests through [employer] or call your pharmacy and they will forward the refill request to us. Please allow 3 business days for your refill to be completed.          Additional Information About Your Visit        CrayonPixelhart Information     [employer] gives you secure access to your electronic health record. If you see a primary care provider, you can also send messages to your care team and make appointments. If you have questions, please call your primary care clinic.  If you do not have a primary care provider, please call [phone] and they will assist you.        Care EveryWhere ID     This is your Care EveryWhere ID. This could be used by other organizations to access your [place] medical records  [id number]        Your Vitals Were     Pulse Temperature Last Period Pulse Oximetry BMI (Body Mass Index)       107 98.6  F (37  C) (Oral) 05/13/2017 96% 38.91 kg/m2        Blood Pressure from Last 3 Encounters:   05/24/17 132/82   05/18/17 140/89   05/03/17 (!) 149/92    Weight from Last 3 Encounters:    05/24/17 245 lb (111.1 kg)   05/15/17 241 lb (109.3 kg)   05/03/17 245 lb 11.2 oz (111.4 kg)              We Performed the Following     ALBUTEROL/IPRATROPIUM 3ML NEB - .001     INHALATION/NEBULIZER TREATMENT, INITIAL     PULMONARY MEDICINE REFERRAL          Today's Medication Changes          These changes are accurate as of: 5/24/17  5:21 PM.  If you have any questions, ask your nurse or doctor.               Start taking these medicines.        Dose/Directions    ipratropium - albuterol 0.5 mg/2.5 mg/3 mL 0.5-2.5 (3) MG/3ML neb solution   Commonly known as:  DUONEB   Used for:  Mild persistent asthma with acute exacerbation   Started by:  Sherrill Dasilva APRN CNP        Dose:  1 vial   Take 1 vial (3 mLs) by nebulization every 6 hours as needed for shortness of breath / dyspnea or wheezing   Quantity:  90 vial   Refills:  3       nicotine 10 MG Inhaler   Commonly known as:  NICOTROL   Used for:  Tobacco use disorder   Started by:  Sherrill Dasilva APRN CNP        Dose:  6-10 cartridge   Inhale 6-10 cartridge into the lungs daily as needed for smoking cessation   Quantity:  180 each   Refills:  1         These medicines have changed or have updated prescriptions.        Dose/Directions    fexofenadine 180 MG tablet   Commonly known as:  ALLEGRA   This may have changed:  when to take this   Used for:  Moderate persistent asthma with acute exacerbation, Chronic rhinitis        Dose:  180 mg   Take 1 tablet (180 mg) by mouth daily   Quantity:  90 tablet   Refills:  3       montelukast 10 MG tablet   Commonly known as:  SINGULAIR   This may have changed:  when to take this   Used for:  Chronic rhinitis, Moderate persistent asthma with acute exacerbation        Dose:  10 mg   Take 1 tablet (10 mg) by mouth At Bedtime   Quantity:  90 tablet   Refills:  3       * order for DME   This may have changed:  Another medication with the same name was added. Make sure you understand how and when to take each.   Used for:   Moderate persistent asthma without complication   Changed by:  Sherrill Dasilva APRN CNP        Equipment being ordered: Inhalation Spacer   Quantity:  1 Device   Refills:  0       * order for DME   This may have changed:  You were already taking a medication with the same name, and this prescription was added. Make sure you understand how and when to take each.   Used for:  Mild persistent asthma with acute exacerbation   Changed by:  Sherrill Dasilva APRN CNP        nebulizer   Quantity:  1 each   Refills:  0       * predniSONE 20 MG tablet   Commonly known as:  DELTASONE   This may have changed:  Another medication with the same name was added. Make sure you understand how and when to take each.        Take two tablets (= 40mg) each day for 5 (five) days   Quantity:  10 tablet   Refills:  0       * predniSONE 20 MG tablet   Commonly known as:  DELTASONE   This may have changed:  You were already taking a medication with the same name, and this prescription was added. Make sure you understand how and when to take each.   Used for:  Mild persistent asthma with acute exacerbation   Changed by:  Sherrill Dasilva APRN CNP        Dose:  40 mg   Take 2 tablets (40 mg) by mouth daily for 5 days   Quantity:  10 tablet   Refills:  0       * Notice:  This list has 4 medication(s) that are the same as other medications prescribed for you. Read the directions carefully, and ask your doctor or other care provider to review them with you.         Where to get your medicines      These medications were sent to [facility] [address] [address]     Phone:  [phone]     ipratropium - albuterol 0.5 mg/2.5 mg/3 mL 0.5-2.5 (3) MG/3ML neb solution    nicotine 10 MG Inhaler    predniSONE 20 MG tablet         Some of these will need a paper prescription and others can be bought over the counter.  Ask your nurse if you have questions.     Bring a paper prescription for  each of these medications     order for DME                Primary Care Provider Office Phone # Fax #    JANNET Nascimento [facility] [phone]       [facility]  Student Nurse Practitioner        Thank you!     Thank you for choosing [facility]  for your care. Our goal is always to provide you with excellent care. Hearing back from our patients is one way we can continue to improve our services. Please take a few minutes to complete the written survey that you may receive in the mail after your visit with us. Thank you!             Your Updated Medication List - Protect others around you: Learn how to safely use, store and throw away your medicines at www.disposemymeds.org.          This list is accurate as of: 5/24/17  5:21 PM.  Always use your most recent med list.                   Brand Name Dispense Instructions for use    ABILIFY 15 MG tablet   Generic drug:  ARIPiprazole      Take 10 mg by mouth every morning       albuterol 108 (90 BASE) MCG/ACT Inhaler    PROAIR HFA/PROVENTIL HFA/VENTOLIN HFA    1 Inhaler    Inhale 2 puffs into the lungs every 4 hours as needed for shortness of breath / dyspnea or wheezing       cetirizine 10 MG tablet    zyrTEC     Take 10 mg by mouth 2 times daily       fexofenadine 180 MG tablet    ALLEGRA    90 tablet    Take 1 tablet (180 mg) by mouth daily       ipratropium - albuterol 0.5 mg/2.5 mg/3 mL 0.5-2.5 (3) MG/3ML neb solution    DUONEB    90 vial    Take 1 vial (3 mLs) by nebulization every 6 hours as needed for shortness of breath / dyspnea or wheezing       levothyroxine 150 MCG tablet    SYNTHROID/LEVOTHROID    90 tablet    Take 1 tablet (150 mcg) by mouth daily       mometasone-formoterol 200-5 MCG/ACT oral inhaler    DULERA    3 Inhaler    Inhale 2 puffs into the lungs 2 times daily       montelukast 10 MG tablet    SINGULAIR    90 tablet    Take 1 tablet (10 mg) by mouth At Bedtime       nicotine 10 MG Inhaler    NICOTROL    180 each     Inhale 6-10 cartridge into the lungs daily as needed for smoking cessation       * order for DME     1 Device    Equipment being ordered: Inhalation Spacer       * order for DME     1 each    nebulizer       * predniSONE 20 MG tablet    DELTASONE    10 tablet    Take two tablets (= 40mg) each day for 5 (five) days       * predniSONE 20 MG tablet    DELTASONE    10 tablet    Take 2 tablets (40 mg) by mouth daily for 5 days       prenatal multivitamin  plus iron 27-0.8 MG Tabs per tablet      Take 1 tablet by mouth every morning       VITAMIN D3 ULTRA STRENGTH 5000 UNITS Caps   Generic drug:  cholecalciferol      Take 5,000 Units by mouth every morning       * Notice:  This list has 4 medication(s) that are the same as other medications prescribed for you. Read the directions carefully, and ask your doctor or other care provider to review them with you.

## 2017-05-24 NOTE — NURSING NOTE
The following nebulizer treatment was given:     MEDICATION: Albuterol Sulfate 3 mg  : [employer]  LOT #: [id number]  EXPIRATION DATE:  09/2017   NDC # 2096-0620-03     Terell Capps MA

## 2017-05-25 ASSESSMENT — ASTHMA QUESTIONNAIRES: ACT_TOTALSCORE: 6

## 2017-05-25 NOTE — PROGRESS NOTES
HISTORY OF PRESENT ILLNESS:  This is a 36-year-old female who underwent a total thyroidectomy for poorly controlled Graves disease on 2017.  She was discharged to home the same day.  Since the surgery, the patient has had no problems with voice quality, inspiration or swallowing.  She has had no symptoms of hypocalcemia.  She was discharged home on 1 gram of elemental calcium daily and 2 Tums 3 times a day.  She states she has not required more Tums.      PHYSICAL EXAMINATION:  Her wound is healing well.  Dermabond was removed and the sutures were clipped at the skin edges.  There is no evidence of hematoma, seroma or infection.      Pathology was reviewed with the patient and there was no evidence of malignancy, consistent with Graves disease.      ASSESSMENT:  Followup status post total thyroidectomy.      PLAN:  Dr. Franco has placed an order for the patient to have thyroid function tests at 2-4 weeks postop.  We will also check her calcium and vitamin D to assure that she does not need any long-term calcium or vitamin D replacement.         MERCEDES TEJADA MD             D: 2017 13:46   T: 2017 14:11   MT: andrea      Name:     ZEINA CABAN   MRN:      [id number]        Account:      [id number]   :      1980           Service Date: 05/15/2017      Document: [id number]

## 2017-05-26 ENCOUNTER — OFFICE VISIT (OUTPATIENT)
Dept: FAMILY MEDICINE | Facility: CLINIC | Age: 37
End: 2017-05-26
Payer: COMMERCIAL

## 2017-05-26 VITALS
OXYGEN SATURATION: 96 % | WEIGHT: 245 LBS | BODY MASS INDEX: 38.91 KG/M2 | SYSTOLIC BLOOD PRESSURE: 120 MMHG | HEART RATE: 96 BPM | DIASTOLIC BLOOD PRESSURE: 82 MMHG | TEMPERATURE: 98.5 F

## 2017-05-26 DIAGNOSIS — E89.0 POSTSURGICAL HYPOTHYROIDISM: Primary | ICD-10-CM

## 2017-05-26 DIAGNOSIS — J45.31 MILD PERSISTENT ASTHMA WITH ACUTE EXACERBATION: Primary | ICD-10-CM

## 2017-05-26 DIAGNOSIS — K92.1 BLACK STOOLS: ICD-10-CM

## 2017-05-26 PROCEDURE — 99214 OFFICE O/P EST MOD 30 MIN: CPT | Performed by: NURSE PRACTITIONER

## 2017-05-26 RX ORDER — PREDNISONE 20 MG/1
20 TABLET ORAL DAILY
Qty: 10 TABLET | Refills: 0 | Status: SHIPPED | OUTPATIENT
Start: 2017-05-26 | End: 2017-06-16

## 2017-05-26 NOTE — NURSING NOTE
"Chief Complaint   Patient presents with     Asthma       Initial /82 (BP Location: Right arm, Patient Position: Chair, Cuff Size: Adult Large)  Pulse 96  Temp 98.5  F (36.9  C) (Oral)  Wt 245 lb (111.1 kg)  LMP 05/13/2017  SpO2 96%  BMI 38.91 kg/m2 Estimated body mass index is 38.91 kg/(m^2) as calculated from the following:    Height as of 5/15/17: 5' 6.53\" (1.69 m).    Weight as of this encounter: 245 lb (111.1 kg).  Medication Reconciliation: complete       Terell Capps MA      "

## 2017-05-26 NOTE — PROGRESS NOTES
SUBJECTIVE:                                                    Cathy Arroyo is a 36 year old female who presents to clinic today for the following health issues:    Asthma Follow-Up    Was ACT completed today?  No      Respiratory symptoms:   Cough: Yes-  Getting better   Wheezing: Yes-  Getting better    Shortness of breath: Yes-  Improving a little    Use of short- acting(rescue) inhaler: Not today.    Taking controlled (daily) meds as prescribed: Yes, singular    ER/UC visits or hospital admissions since last visit: none     Recent asthma triggers that patient is dealing with: pollens     Amount of exercise or physical activity: 2-3 days/week for an average of 30-45 minutes    Problems taking medications regularly: No    Medication side effects: Puking up the prednisone that she took this morning.     Diet: vegetarian/vegan      Questions/Concerns: Is it ok to use the nicotine vapor inside? Taking prednisone again?   Starting nicotine inhaler Monday  Psychiatry - wants patient to give inhaler a try, reassess in three weeks at appointment  Vomited with prednisone today and no food    Black stools for months  Not diarrhea, but loose    Problem list and histories reviewed & adjusted, as indicated.  Additional history: as documented    Reviewed and updated as needed this visit by clinical staff  Tobacco  Allergies  Meds  Med Hx  Surg Hx  Fam Hx  Soc Hx      Reviewed and updated as needed this visit by Provider         ROS:  Constitutional, HEENT, cardiovascular, pulmonary, gi and gu systems are negative, except as otherwise noted.    OBJECTIVE:                                                    /82 (BP Location: Right arm, Patient Position: Chair, Cuff Size: Adult Large)  Pulse 96  Temp 98.5  F (36.9  C) (Oral)  Wt 245 lb (111.1 kg)  LMP 05/13/2017  SpO2 96%  BMI 38.91 kg/m2  Body mass index is 38.91 kg/(m^2).  GENERAL: healthy, alert and no distress  EYES: Eyes grossly normal to  inspection, PERRL and conjunctivae and sclerae normal  HENT: ear canals and TM's normal, nose and mouth without ulcers or lesions  NECK: no adenopathy, no asymmetry, masses, or scars and thyroid normal to palpation  RESP: mild expiratory wheezes R mid posterior and L mid posterior  CV: regular rate and rhythm, normal S1 S2, no S3 or S4, no murmur, click or rub, no peripheral edema and peripheral pulses strong  PSYCH: mentation appears normal, affect normal/bright    Diagnostic Test Results:  none    Please note greater than 50% of this 25 minute appointment were spent in counseling with the patient of the issues described above in the history of present illness and in the plan, including asthma goals and management, smoking cessation, testing for blood in stools      ASSESSMENT/PLAN:                                                    Asthma: Mild persistent with exacerbation   Plan:  Medications:  Oral steroid:  Steroid taper          (J45.31) Mild persistent asthma with acute exacerbation  (primary encounter diagnosis)  Comment:   Plan: predniSONE (DELTASONE) 20 MG tablet, order for         DME    Improving lung sounds with no new worsening symptoms.  Will start tapering prednisone.    (K92.1) Black stools  Comment:   Plan: CBC with platelets, Occult blood stool 1-3         spec, Occult blood stool 1-3 spec, Occult blood        stool 1-3 spec              Patient Instructions   Finish out the prednisone course from previously, then start the taper   the nebulizer   the stool tests  Return next week      JANNET Liu [facility]

## 2017-05-26 NOTE — PATIENT INSTRUCTIONS
Finish out the prednisone course from previously, then start the taper   the nebulizer   the stool tests  Return next week

## 2017-05-29 LAB — ANTI-C1Q ABY IGG: 2

## 2017-05-30 ENCOUNTER — MYC MEDICAL ADVICE (OUTPATIENT)
Dept: FAMILY MEDICINE | Facility: CLINIC | Age: 37
End: 2017-05-30

## 2017-05-30 DIAGNOSIS — R22.31 AXILLARY LUMP, RIGHT: Primary | ICD-10-CM

## 2017-05-30 NOTE — TELEPHONE ENCOUNTER
Patient called to state she has a pea-sized lump on her right side between her breast and armpit. She said it is painful. She said it has been there since pregnancy, she is 7.5 months postpartum.     She requests to undergo an ultrasound. She has an appointment with DEVEN Albright on Thursday, June 1st. She was notified provider is out of clinic today.    Routing to [facility] for review.      Thank you,  Nata Giraldo RN  [facility]

## 2017-05-31 ENCOUNTER — TELEPHONE (OUTPATIENT)
Dept: DERMATOLOGY | Facility: CLINIC | Age: 37
End: 2017-05-31

## 2017-05-31 DIAGNOSIS — K92.1 BLACK STOOLS: ICD-10-CM

## 2017-05-31 DIAGNOSIS — J45.20 MILD INTERMITTENT ASTHMA WITHOUT COMPLICATION: ICD-10-CM

## 2017-05-31 LAB
ERYTHROCYTE [DISTWIDTH] IN BLOOD BY AUTOMATED COUNT: 14.6 % (ref 10–15)
HCT VFR BLD AUTO: 43.4 % (ref 35–47)
HGB BLD-MCNC: 14.3 G/DL (ref 11.7–15.7)
MCH RBC QN AUTO: 28.4 PG (ref 26.5–33)
MCHC RBC AUTO-ENTMCNC: 32.9 G/DL (ref 31.5–36.5)
MCV RBC AUTO: 86 FL (ref 78–100)
PLATELET # BLD AUTO: 353 10E9/L (ref 150–450)
RBC # BLD AUTO: 5.03 10E12/L (ref 3.8–5.2)
WBC # BLD AUTO: 15.7 10E9/L (ref 4–11)

## 2017-05-31 PROCEDURE — 36415 COLL VENOUS BLD VENIPUNCTURE: CPT | Performed by: NURSE PRACTITIONER

## 2017-05-31 PROCEDURE — 85027 COMPLETE CBC AUTOMATED: CPT | Performed by: NURSE PRACTITIONER

## 2017-05-31 RX ORDER — ALBUTEROL SULFATE 90 UG/1
2 AEROSOL, METERED RESPIRATORY (INHALATION) EVERY 4 HOURS PRN
Qty: 1 INHALER | Refills: 3 | Status: SHIPPED | OUTPATIENT
Start: 2017-05-31 | End: 2017-12-04

## 2017-05-31 NOTE — TELEPHONE ENCOUNTER
Patient returning call by Dr Castro regarding results. No notes documented in results, unable to give results to patient.    Routing to [facility].

## 2017-05-31 NOTE — TELEPHONE ENCOUNTER
Pending Prescriptions:                       Disp   Refills    albuterol (PROAIR HFA/PROVENTIL HFA/JAN*1 Inha*             Sig: Inhale 2 puffs into the lungs every 4 hours as           needed for shortness of breath / dyspnea or           wheezing    Last OV was 5/26/17 with [facility]. Should not be filled by [facility] who last saw pt on 3/31/16. Routing to [facility]. Please sign if okay. Thanks.   Beatriz Johnson, RN-BSN

## 2017-05-31 NOTE — PROGRESS NOTES
"  SUBJECTIVE:                                                    Cathy Arroyo is a 36 year old female who presents to clinic today for the following health issues:    Asthma Follow-Up    Was ACT completed today?  No    Taper down on prednisone    Respiratory symptoms:   Cough: Yes-  Staying the same    Wheezing: Yes-  same   Shortness of breath: Yes-  same    Use of short- acting(rescue) inhaler: Not using    Taking controlled (daily) meds as prescribed: Yes    ER/UC visits or hospital admissions since last visit: none     Recent asthma triggers that patient is dealing with: pollens     Headache - for three days, light sensitivity, \"feeling like crap,\" thirsty and nauseous       Amount of exercise or physical activity: 2-3 days/week for an average of 30-45 minutes    Problems taking medications regularly: No    Medication side effects: none    Diet: vegetarian/vegan    Questions/Concerns: want to go over the lab results. Feel the lump on breast.   Noticed the lump first in pregnancy and has not gone away.  Does seem to have gotten larger and smaller at times    Problem list and histories reviewed & adjusted, as indicated.  Additional history: as documented    Reviewed and updated as needed this visit by clinical staff       Reviewed and updated as needed this visit by Provider         ROS:  Constitutional, HEENT, cardiovascular, pulmonary, gi and gu systems are negative, except as otherwise noted.    OBJECTIVE:                                                    /86 (BP Location: Right arm, Patient Position: Chair, Cuff Size: Adult Large)  Pulse 97  Temp 99  F (37.2  C) (Oral)  Wt 240 lb 9.6 oz (109.1 kg)  LMP 05/13/2017  SpO2 96%  BMI 38.21 kg/m2  Body mass index is 38.21 kg/(m^2).  GENERAL: healthy, alert and no distress  EYES: Eyes grossly normal to inspection, PERRL and conjunctivae and sclerae normal  HENT: ear canals and TM's normal, nose and mouth without ulcers or lesions  NECK: no " adenopathy, no asymmetry, masses, or scars and thyroid normal to palpation  RESP: lungs clear to auscultation - no rales, rhonchi or wheezes and decreased breath sounds R mid posterior, L mid posterior and bibasilar  CV: regular rate and rhythm, normal S1 S2, no S3 or S4, no murmur, click or rub, no peripheral edema and peripheral pulses strong  SKIN: 1 cm round area of petechial rash on left knee  NEURO: Normal strength and tone, sensory exam grossly normal, mentation intact, oriented times 3, speech normal, cranial nerves 2-12 intact, DTR's normal and symmetric UE and LE, Romberg normal and rapid alternating movements normal  PSYCH: mentation appears normal, affect normal/bright  LYMPH: axillary: enlarged lymph nodes in the right axilla area.    Diagnostic Test Results:  Results for orders placed or performed in visit on 05/31/17   CBC with platelets   Result Value Ref Range    WBC 15.7 (H) 4.0 - 11.0 10e9/L    RBC Count 5.03 3.8 - 5.2 10e12/L    Hemoglobin 14.3 11.7 - 15.7 g/dL    Hematocrit 43.4 35.0 - 47.0 %    MCV 86 78 - 100 fl    MCH 28.4 26.5 - 33.0 pg    MCHC 32.9 31.5 - 36.5 g/dL    RDW 14.6 10.0 - 15.0 %    Platelet Count 353 150 - 450 10e9/L        ASSESSMENT/PLAN:                                                    Asthma: Mild persistent with exacerbation, improving   Plan:  Medications:  Continue prednisone taper    (G44.209) Tension headache  (primary encounter diagnosis)  Comment:   Plan: KETOROLAC TROMETHAMINE 15MG, INJECTION         INTRAMUSCULAR OR SUB-Q    Improvement with 30 mg.  Suspect side effect of taper prednisone    (J45.31) Mild persistent asthma with acute exacerbation  Comment:   Plan: Continues to be improved.  Has not been able to start nicotine inhaler due to stress.  Finish taper      (R22.31) Axillary lump, right  Comment:   Plan: [facility] referral            JANNET Liu [facility]

## 2017-06-01 ENCOUNTER — OFFICE VISIT (OUTPATIENT)
Dept: FAMILY MEDICINE | Facility: CLINIC | Age: 37
End: 2017-06-01
Payer: COMMERCIAL

## 2017-06-01 VITALS
DIASTOLIC BLOOD PRESSURE: 86 MMHG | OXYGEN SATURATION: 96 % | WEIGHT: 240.6 LBS | TEMPERATURE: 99 F | HEART RATE: 97 BPM | SYSTOLIC BLOOD PRESSURE: 130 MMHG | BODY MASS INDEX: 38.21 KG/M2

## 2017-06-01 DIAGNOSIS — R22.31 AXILLARY LUMP, RIGHT: ICD-10-CM

## 2017-06-01 DIAGNOSIS — J45.31 MILD PERSISTENT ASTHMA WITH ACUTE EXACERBATION: ICD-10-CM

## 2017-06-01 DIAGNOSIS — G44.209 TENSION HEADACHE: Primary | ICD-10-CM

## 2017-06-01 PROBLEM — E89.0 S/P TOTAL THYROIDECTOMY: Status: ACTIVE | Noted: 2017-06-01

## 2017-06-01 PROBLEM — Z90.89 S/P TOTAL THYROIDECTOMY: Status: ACTIVE | Noted: 2017-06-01

## 2017-06-01 PROBLEM — Z98.890 S/P TOTAL THYROIDECTOMY: Status: ACTIVE | Noted: 2017-06-01

## 2017-06-01 PROCEDURE — 96372 THER/PROPH/DIAG INJ SC/IM: CPT | Performed by: NURSE PRACTITIONER

## 2017-06-01 PROCEDURE — 99214 OFFICE O/P EST MOD 30 MIN: CPT | Mod: 25 | Performed by: NURSE PRACTITIONER

## 2017-06-01 NOTE — MR AVS SNAPSHOT
After Visit Summary   6/1/2017    Cathy Arroyo    MRN: [id number]           Patient Information     Date Of Birth          1980        Visit Information        Provider Department      6/1/2017 1:00 PM Sherrill Dasilva APRN CNP [facility] [place]        Today's Diagnoses     Tension headache    -  1    Mild persistent asthma with acute exacerbation        Axillary lump, right           Follow-ups after your visit        Your next 10 appointments already scheduled     Jun 16, 2017  1:00 PM CDT   (Arrive by 12:45 PM)   RETURN ENDOCRINE with Leatha Franco MD   [facility] ([facility])    [address] [place] [address]  [address] [facility] [id number] [phone]            Jul 05, 2017 11:00 AM CDT   (Arrive by 10:45 AM)   New Patient Visit with Aaliyah Alcazar MD   [facility] ([facility])    [address] [place] [address]  [address] [facility] [id number] [phone]              Who to contact     If you have questions or need follow up information about today's clinic visit or your schedule please contact [facility] at [phone].  Normal or non-critical lab and imaging results will be communicated to you by MyChart, letter or phone within 4 business days after the clinic has received the results. If you do not hear from us within 7 days, please contact the clinic through MyChart or phone. If you have a critical or abnormal lab result, we will notify you by phone as soon as possible.  Submit refill requests through [employer] or call your pharmacy and they will forward the refill request to us. Please allow 3 business days for your refill to be completed.          Additional Information About Your Visit        MyChart Information     [employer] gives you secure access to your electronic health record. If you see a primary care provider, you can also send messages to your care team  and make appointments. If you have questions, please call your primary care clinic.  If you do not have a primary care provider, please call [phone] and they will assist you.        Care EveryWhere ID     This is your Care EveryWhere ID. This could be used by other organizations to access your [facility] medical records  [id number]        Your Vitals Were     Pulse Temperature Last Period Pulse Oximetry BMI (Body Mass Index)       97 99  F (37.2  C) (Oral) 05/13/2017 96% 38.21 kg/m2        Blood Pressure from Last 3 Encounters:   06/01/17 130/86   05/26/17 120/82   05/24/17 132/82    Weight from Last 3 Encounters:   06/01/17 240 lb 9.6 oz (109.1 kg)   05/26/17 245 lb (111.1 kg)   05/24/17 245 lb (111.1 kg)              We Performed the Following     INJECTION INTRAMUSCULAR OR SUB-Q     KETOROLAC TROMETHAMINE 15MG          Today's Medication Changes          These changes are accurate as of: 6/1/17 11:59 PM.  If you have any questions, ask your nurse or doctor.               These medicines have changed or have updated prescriptions.        Dose/Directions    fexofenadine 180 MG tablet   Commonly known as:  ALLEGRA   This may have changed:  when to take this   Used for:  Moderate persistent asthma with acute exacerbation, Chronic rhinitis        Dose:  180 mg   Take 1 tablet (180 mg) by mouth daily   Quantity:  90 tablet   Refills:  3       montelukast 10 MG tablet   Commonly known as:  SINGULAIR   This may have changed:  when to take this   Used for:  Chronic rhinitis, Moderate persistent asthma with acute exacerbation        Dose:  10 mg   Take 1 tablet (10 mg) by mouth At Bedtime   Quantity:  90 tablet   Refills:  3       predniSONE 20 MG tablet   Commonly known as:  DELTASONE   This may have changed:  Another medication with the same name was removed. Continue taking this medication, and follow the directions you see here.   Used for:  Mild persistent asthma with acute exacerbation   Changed by:  Brown  JANNET Chicas CNP        Dose:  20 mg   Take 1 tablet (20 mg) by mouth daily CHANGE TO: take 1.5 tab (30 mg) for three days, then 1 tab (20 mg) daily for three days, then 1/2 tab (10 mg) for three days, then stop.   Quantity:  10 tablet   Refills:  0                Primary Care Provider Office Phone # Fax #    JANNET Nascimento [phone]       [facility]  Student Nurse Practitioner        Thank you!     Thank you for choosing [facility]  for your care. Our goal is always to provide you with excellent care. Hearing back from our patients is one way we can continue to improve our services. Please take a few minutes to complete the written survey that you may receive in the mail after your visit with us. Thank you!             Your Updated Medication List - Protect others around you: Learn how to safely use, store and throw away your medicines at www.disposemymeds.org.          This list is accurate as of: 6/1/17 11:59 PM.  Always use your most recent med list.                   Brand Name Dispense Instructions for use    ABILIFY 15 MG tablet   Generic drug:  ARIPiprazole      Take 10 mg by mouth every morning       albuterol 108 (90 BASE) MCG/ACT Inhaler    PROAIR HFA/PROVENTIL HFA/VENTOLIN HFA    1 Inhaler    Inhale 2 puffs into the lungs every 4 hours as needed for shortness of breath / dyspnea or wheezing       cetirizine 10 MG tablet    zyrTEC     Take 10 mg by mouth 2 times daily       fexofenadine 180 MG tablet    ALLEGRA    90 tablet    Take 1 tablet (180 mg) by mouth daily       ipratropium - albuterol 0.5 mg/2.5 mg/3 mL 0.5-2.5 (3) MG/3ML neb solution    DUONEB    90 vial    Take 1 vial (3 mLs) by nebulization every 6 hours as needed for shortness of breath / dyspnea or wheezing       levothyroxine 150 MCG tablet    SYNTHROID/LEVOTHROID    90 tablet    Take 1 tablet (150 mcg) by mouth daily       mometasone-formoterol 200-5 MCG/ACT oral inhaler    DULERA    3  Inhaler    Inhale 2 puffs into the lungs 2 times daily       montelukast 10 MG tablet    SINGULAIR    90 tablet    Take 1 tablet (10 mg) by mouth At Bedtime       nicotine 10 MG Inhaler    NICOTROL    180 each    Inhale 6-10 cartridge into the lungs daily as needed for smoking cessation       * order for DME     1 Device    Equipment being ordered: Inhalation Spacer       * order for DME     1 each    nebulizer       * order for DME     1 each    nebulizer       predniSONE 20 MG tablet    DELTASONE    10 tablet    Take 1 tablet (20 mg) by mouth daily CHANGE TO: take 1.5 tab (30 mg) for three days, then 1 tab (20 mg) daily for three days, then 1/2 tab (10 mg) for three days, then stop.       prenatal multivitamin  plus iron 27-0.8 MG Tabs per tablet      Take 1 tablet by mouth every morning       VITAMIN D3 ULTRA STRENGTH 5000 UNITS Caps   Generic drug:  cholecalciferol      Take 5,000 Units by mouth every morning       * Notice:  This list has 3 medication(s) that are the same as other medications prescribed for you. Read the directions carefully, and ask your doctor or other care provider to review them with you.

## 2017-06-01 NOTE — NURSING NOTE
"Chief Complaint   Patient presents with     Asthma       Initial /86 (BP Location: Right arm, Patient Position: Chair, Cuff Size: Adult Large)  Pulse 97  Temp 99  F (37.2  C) (Oral)  Wt 240 lb 9.6 oz (109.1 kg)  LMP 05/13/2017  SpO2 96%  BMI 38.21 kg/m2 Estimated body mass index is 38.21 kg/(m^2) as calculated from the following:    Height as of 5/15/17: 5' 6.53\" (1.69 m).    Weight as of this encounter: 240 lb 9.6 oz (109.1 kg).  Medication Reconciliation: complete     Terell Capps MA       "

## 2017-06-02 NOTE — TELEPHONE ENCOUNTER
Spoke with patient who states she has still not heard back regarding results. She states that her labs were released to her mychart and the results caused her to worry as she looked them up online and felt they were indicative of cancer. States her PCC denied this being a factor. Writer informed patient message would be sent Resident on call who could go over results with her.    Routing to DIONISIO

## 2017-06-02 NOTE — TELEPHONE ENCOUNTER
Patient called back concerned she hasn't heard anything back yet. Assured her that she will receive a call and sometimes can take up to 24 hours. Patient wanted writer to let DIONISIO know it is okay to leave a detailed message with lab information if she does not reach her.

## 2017-06-02 NOTE — TELEPHONE ENCOUNTER
Called patient about lab values that Dr. Ayala ordered, all are nml save for her elevated total complement level (which is only slightly elevated) I do suspect this is due to her underlying thyroid disease and reassured her that all other lab values are nml.  Her PCP ordered a CBC which as expected on prednisone had leukocytosis.      Nata Mcdaniel MD  Dermatology Resident, PGY4  [phone]

## 2017-06-08 ENCOUNTER — OFFICE VISIT (OUTPATIENT)
Dept: DERMATOLOGY | Facility: CLINIC | Age: 37
End: 2017-06-08

## 2017-06-08 DIAGNOSIS — L50.9 URTICARIA: Primary | ICD-10-CM

## 2017-06-08 DIAGNOSIS — L74.3 MILIARIA: ICD-10-CM

## 2017-06-08 ASSESSMENT — PAIN SCALES - GENERAL: PAINLEVEL: NO PAIN (0)

## 2017-06-08 NOTE — PROGRESS NOTES
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    - taking allegra 180mg qHS  now for seasonal allergies  2. Mild folliculitis/ miliaria: BPO 5% wash     Encounter Date: Jun 8, 2017    CC:   Chief Complaint   Patient presents with     Derm Problem     Cathy is here today for a follow up for a rash.  States that the rash has improved.            History of Present Illness:  Ms. Cathy Arroyo is a 36 year old female w/ hx of seasonal allergies, asthma, BPD, recent episode of urticaria after starting methimazole for hyperthyroidism. Since we last saw her she underwent thyroidectomy and her urticaria have resolved. She has been taking a zyrtec or allegra BID since she was seen. Skin is clear except for small bumps in the intertriginous skin that are asymptomatic but occur every summer when she is \"hot and sweaty\".      Past Medical History:   Patient Active Problem List   Diagnosis     Pineal gland, tumor     Thyroid nodule     Anti-TPO antibodies present     Attention deficit disorder     Human papilloma virus (HPV) infection     Seasonal allergic rhinitis     Need for Tdap vaccination     Bipolar affective disorder in remission (H)     Pregnancy, normal first     GBS (group B Streptococcus carrier), +RV culture, currently pregnant     Labor and delivery, indication for care     Pre-eclampsia     Mild persistent asthma with acute exacerbation     Moderate persistent asthma without complication     Chronic rhinitis     Tobacco use disorder     Abnormal cytology     S/P total thyroidectomy     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     Post partum depression      " Thyroid disease     hashimotos     Uncomplicated asthma      Past Surgical History:   Procedure Laterality Date     BLOOD PATCH N/A 10/11/2016    Procedure: EPIDURAL BLOOD PATCH;  Surgeon: GENERIC ANESTHESIA PROVIDER;  Location: [facility] OR     CRANIOTOMY, EXCISE TUMOR COMPLEX, COMBINED  2/19/2014    Procedure: COMBINED CRANIOTOMY, EXCISE TUMOR COMPLEX;  Supracerabellar  Infratentorial Approach for Resection of Tumor ;  Surgeon: Kate Mckeon MD;  Location: [facility] OR     THYROIDECTOMY N/A 5/3/2017    Procedure: THYROIDECTOMY;  Total Thyroidectomy;  Surgeon: Pamela Villasenor MD;  Location:  OR       Social History:  The patient works as a hairdresser,  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   Family History:  Mom- hyperthyroidism  Grandfather- T1D    Medications:  Current Outpatient Prescriptions   Medication Sig Dispense Refill     albuterol (PROAIR HFA/PROVENTIL HFA/VENTOLIN HFA) 108 (90 BASE) MCG/ACT Inhaler Inhale 2 puffs into the lungs every 4 hours as needed for shortness of breath / dyspnea or wheezing 1 Inhaler 3     predniSONE (DELTASONE) 20 MG tablet Take 1 tablet (20 mg) by mouth daily CHANGE TO: take 1.5 tab (30 mg) for three days, then 1 tab (20 mg) daily for three days, then 1/2 tab (10 mg) for three days, then stop. 10 tablet 0     order for DME nebulizer 1 each 0     nicotine (NICOTROL) 10 MG Inhaler Inhale 6-10 cartridge into the lungs daily as needed for smoking cessation 180 each 1     order for DME nebulizer 1 each 0     ipratropium - albuterol 0.5 mg/2.5 mg/3 mL (DUONEB) 0.5-2.5 (3) MG/3ML neb solution Take 1 vial (3 mLs) by nebulization every 6 hours as needed for shortness of breath / dyspnea or wheezing 90 vial 3     levothyroxine (SYNTHROID/LEVOTHROID) 150 MCG tablet Take 1 tablet (150 mcg) by mouth daily 90 tablet 1     cetirizine (ZYRTEC) 10 MG tablet Take 10 mg by mouth 2 times daily       cholecalciferol (VITAMIN D3 ULTRA STRENGTH) 5000 UNITS CAPS Take 5,000 Units by mouth every morning         order for DME Equipment being ordered: Inhalation Spacer 1 Device 0     mometasone-formoterol (DULERA) 200-5 MCG/ACT oral inhaler Inhale 2 puffs into the lungs 2 times daily 3 Inhaler 1     montelukast (SINGULAIR) 10 MG tablet Take 1 tablet (10 mg) by mouth At Bedtime (Patient taking differently: Take 10 mg by mouth every morning ) 90 tablet 3     fexofenadine (ALLEGRA) 180 MG tablet Take 1 tablet (180 mg) by mouth daily (Patient taking differently: Take 180 mg by mouth 2 times daily ) 90 tablet 3     ARIPiprazole (ABILIFY) 15 MG tablet Take 10 mg by mouth every morning        Prenatal Vit-Fe Fumarate-FA (PRENATAL MULTIVITAMIN  PLUS IRON) 27-0.8 MG TABS Take 1 tablet by mouth every morning           Allergies   Allergen Reactions     Codeine Sulfate Nausea and Vomiting     Tetanus Toxoid      Pt states she had an infection at injection site.      Vicodin [Hydrocodone-Acetaminophen] Nausea and Vomiting     Methimazole Rash         Review of Systems:  -+ fevers, chills, temperature intolerance  -Constitutional: The patient denies fatigue, fevers, chills, unintended weight loss, and night sweats.  -HEENT: Patient denies nonhealing oral sores.  -Skin: As above in HPI. No additional skin concerns.    Physical exam:  Vitals: LMP 05/13/2017  GEN: This is a well developed, well-nourished female in no acute distress, in a pleasant mood.    SKIN: Focused examination of the thighs, legs, feet, arms, chest, face was performed.  -no urticarial lesions  - well healed biopsy site on L medial thigh  - between breasts, as well as in L axilla small 1mm follicularly based papules. No pusutles  -No other lesions of concern on areas examined.     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 No other underlying cause identified other than thyroid issues as above. At this point, she can scale back on her antihistamines to  seasonal allergy dosing once nightly. Instructed her to call back if she notices new urticaria with peeling back BID dosing of antihistamine    - taking allegra 180mg qHS  now for seasonal allergies    2. Mild folliculitis/ miliaria: asymptomatic to patient, she's not particularly in any treatments, hwoever encouraged her to try daily  BPO 5% wash, rinsing thoroughly only as needed. Cautioned that it can be irritating or drying, so to be careful with BPO especially during the winter. She will consider using BPO if it becomes bothersome.     Follow-up PRN       staffed the patient.    Staff Involved:  Resident(Luisa Butler)/Staff(as above)   .I, Noemi Ayala MD, saw this patient with the resident and agree with the resident s findings and plan of care as documented in the resident s note.

## 2017-06-08 NOTE — MR AVS SNAPSHOT
After Visit Summary   6/8/2017    Cathy Arroyo    MRN: [id number]           Patient Information     Date Of Birth          1980        Visit Information        Provider Department      6/8/2017 10:30 AM Luisa Butler MD [facility] Derm[facility]        Care Instructions    Benzoyl peroxide 5% wash daily to body, wash for 20 secs and then rinse off thoroughly          Follow-ups after your visit        Follow-up notes from your care team     Return if symptoms worsen or fail to improve.      Your next 10 appointments already scheduled     Jun 16, 2017  1:00 PM CDT   (Arrive by 12:45 PM)   RETURN ENDOCRINE with Leatha Franco MD   [facility] End[facility] ([facility])    [address] [place] [address]  3rd [facility] [id number] [phone]            Jul 05, 2017 11:00 AM CDT   (Arrive by 10:45 AM)   New Patient Visit with Aaliyah Alcazar MD   [facility] ([facility])    [address] [place] [id number] [phone]              Who to contact     Please call your clinic at [phone] to:    Ask questions about your health    Make or cancel appointments    Discuss your medicines    Learn about your test results    Speak to your doctor   If you have compliments or concerns about an experience at your clinic, or if you wish to file a complaint, please contact [facility] Patient Relations at [phone] or email us at [email or web address]         Additional Information About Your Visit        MyChart Information     [employer] gives you secure access to your electronic health record. If you see a primary care provider, you can also send messages to your care team and make appointments. If you have questions, please call your primary care clinic.  If you do not have a primary care provider, please call [phone] and they will assist you.      [employer] is  an electronic gateway that provides easy, online access to your medical records. With [employer], you can request a clinic appointment, read your test results, renew a prescription or communicate with your care team.     To access your existing account, please contact your [facility] or call [phone] for assistance.        Care EveryWhere ID     This is your Care EveryWhere ID. This could be used by other organizations to access your [facility] medical records  [id number]        Your Vitals Were     Last Period                   05/13/2017            Blood Pressure from Last 3 Encounters:   06/01/17 130/86   05/26/17 120/82   05/24/17 132/82    Weight from Last 3 Encounters:   06/01/17 109.1 kg (240 lb 9.6 oz)   05/26/17 111.1 kg (245 lb)   05/24/17 111.1 kg (245 lb)              Today, you had the following     No orders found for display         Today's Medication Changes          These changes are accurate as of: 6/8/17 11:16 AM.  If you have any questions, ask your nurse or doctor.               These medicines have changed or have updated prescriptions.        Dose/Directions    fexofenadine 180 MG tablet   Commonly known as:  ALLEGRA   This may have changed:  when to take this   Used for:  Moderate persistent asthma with acute exacerbation, Chronic rhinitis        Dose:  180 mg   Take 1 tablet (180 mg) by mouth daily   Quantity:  90 tablet   Refills:  3       montelukast 10 MG tablet   Commonly known as:  SINGULAIR   This may have changed:  when to take this   Used for:  Chronic rhinitis, Moderate persistent asthma with acute exacerbation        Dose:  10 mg   Take 1 tablet (10 mg) by mouth At Bedtime   Quantity:  90 tablet   Refills:  3                Primary Care Provider Office Phone # Fax #    JANNET Nascimento [phone]       [facility]  Student Nurse Practitioner        Thank you!     Thank you for choosing [facility]  for your care.  Our goal is always to provide you with excellent care. Hearing back from our patients is one way we can continue to improve our services. Please take a few minutes to complete the written survey that you may receive in the mail after your visit with us. Thank you!             Your Updated Medication List - Protect others around you: Learn how to safely use, store and throw away your medicines at www.disposemymeds.org.          This list is accurate as of: 6/8/17 11:16 AM.  Always use your most recent med list.                   Brand Name Dispense Instructions for use    ABILIFY 15 MG tablet   Generic drug:  ARIPiprazole      Take 10 mg by mouth every morning       albuterol 108 (90 BASE) MCG/ACT Inhaler    PROAIR HFA/PROVENTIL HFA/VENTOLIN HFA    1 Inhaler    Inhale 2 puffs into the lungs every 4 hours as needed for shortness of breath / dyspnea or wheezing       cetirizine 10 MG tablet    zyrTEC     Take 10 mg by mouth 2 times daily       fexofenadine 180 MG tablet    ALLEGRA    90 tablet    Take 1 tablet (180 mg) by mouth daily       ipratropium - albuterol 0.5 mg/2.5 mg/3 mL 0.5-2.5 (3) MG/3ML neb solution    DUONEB    90 vial    Take 1 vial (3 mLs) by nebulization every 6 hours as needed for shortness of breath / dyspnea or wheezing       levothyroxine 150 MCG tablet    SYNTHROID/LEVOTHROID    90 tablet    Take 1 tablet (150 mcg) by mouth daily       mometasone-formoterol 200-5 MCG/ACT oral inhaler    DULERA    3 Inhaler    Inhale 2 puffs into the lungs 2 times daily       montelukast 10 MG tablet    SINGULAIR    90 tablet    Take 1 tablet (10 mg) by mouth At Bedtime       nicotine 10 MG Inhaler    NICOTROL    180 each    Inhale 6-10 cartridge into the lungs daily as needed for smoking cessation       * order for DME     1 Device    Equipment being ordered: Inhalation Spacer       * order for DME     1 each    nebulizer       * order for DME     1 each    nebulizer       predniSONE 20 MG tablet    DELTASONE     10 tablet    Take 1 tablet (20 mg) by mouth daily CHANGE TO: take 1.5 tab (30 mg) for three days, then 1 tab (20 mg) daily for three days, then 1/2 tab (10 mg) for three days, then stop.       prenatal multivitamin  plus iron 27-0.8 MG Tabs per tablet      Take 1 tablet by mouth every morning       VITAMIN D3 ULTRA STRENGTH 5000 UNITS Caps   Generic drug:  cholecalciferol      Take 5,000 Units by mouth every morning       * Notice:  This list has 3 medication(s) that are the same as other medications prescribed for you. Read the directions carefully, and ask your doctor or other care provider to review them with you.

## 2017-06-08 NOTE — NURSING NOTE
Dermatology Rooming Note    Cathy Arroyo's goals for this visit include:   Chief Complaint   Patient presents with     Derm Problem     Cathy is here today for a follow up for a rash.  States that the rash has improved.          Zamzam Reveles LPN

## 2017-06-08 NOTE — LETTER
"6/8/2017       RE: Cathy Arroyo  [address]  [address]  [place] [place] [facility] [address]     Dear Colleague,    Thank you for referring your patient, Cathy Arroyo, to the [facility] DERMATOLOGY at [facility]. Please see a copy of my visit note below.    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    - taking allegra 180mg qHS  now for seasonal allergies  2. Mild folliculitis/ miliaria: BPO 5% wash     Encounter Date: Jun 8, 2017    CC:   Chief Complaint   Patient presents with     Derm Problem     Cathy is here today for a follow up for a rash.  States that the rash has improved.            History of Present Illness:  Ms. Cathy Arroyo is a 36 year old female w/ hx of seasonal allergies, asthma, BPD, recent episode of urticaria after starting methimazole for hyperthyroidism. Since we last saw her she underwent thyroidectomy and her urticaria have resolved. She has been taking a zyrtec or allegra BID since she was seen. Skin is clear except for small bumps in the intertriginous skin that are asymptomatic but occur every summer when she is \"hot and sweaty\".      Past Medical History:   Patient Active Problem List   Diagnosis     Pineal gland, tumor     Thyroid nodule     Anti-TPO antibodies present     Attention deficit disorder     Human papilloma virus (HPV) infection     Seasonal allergic rhinitis     Need for Tdap vaccination     Bipolar affective disorder in remission (H)     Pregnancy, normal first     GBS (group B Streptococcus carrier), +RV culture, currently pregnant     Labor and delivery, indication for care     Pre-eclampsia     Mild persistent asthma with acute exacerbation     Moderate persistent asthma without complication     Chronic rhinitis     Tobacco use disorder     " Abnormal cytology     S/P total thyroidectomy     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     Post partum depression      Thyroid disease     hashimotos     Uncomplicated asthma      Past Surgical History:   Procedure Laterality Date     BLOOD PATCH N/A 10/11/2016    Procedure: EPIDURAL BLOOD PATCH;  Surgeon: GENERIC ANESTHESIA PROVIDER;  Location: [facility] OR     CRANIOTOMY, EXCISE TUMOR COMPLEX, COMBINED  2/19/2014    Procedure: COMBINED CRANIOTOMY, EXCISE TUMOR COMPLEX;  Supracerabellar  Infratentorial Approach for Resection of Tumor ;  Surgeon: Kate Mckeon MD;  Location: [facility] OR     THYROIDECTOMY N/A 5/3/2017    Procedure: THYROIDECTOMY;  Total Thyroidectomy;  Surgeon: Pamela Villasenor MD;  Location:  OR       Social History:  The patient works as a hairdresser,  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   Family History:  Mom- hyperthyroidism  Grandfather- T1D    Medications:  Current Outpatient Prescriptions   Medication Sig Dispense Refill     albuterol (PROAIR HFA/PROVENTIL HFA/VENTOLIN HFA) 108 (90 BASE) MCG/ACT Inhaler Inhale 2 puffs into the lungs every 4 hours as needed for shortness of breath / dyspnea or wheezing 1 Inhaler 3     predniSONE (DELTASONE) 20 MG tablet Take 1 tablet (20 mg) by mouth daily CHANGE TO: take 1.5 tab (30 mg) for three days, then 1 tab (20 mg) daily for three days, then 1/2 tab (10 mg) for three days, then stop. 10 tablet 0     order for DME nebulizer 1 each 0     nicotine (NICOTROL) 10 MG Inhaler Inhale 6-10 cartridge into the lungs daily as needed for smoking cessation 180 each 1     order for DME nebulizer 1 each 0     ipratropium - albuterol 0.5 mg/2.5 mg/3 mL (DUONEB) 0.5-2.5 (3) MG/3ML neb solution Take 1 vial (3 mLs) by nebulization every 6 hours as needed for shortness of breath /  dyspnea or wheezing 90 vial 3     levothyroxine (SYNTHROID/LEVOTHROID) 150 MCG tablet Take 1 tablet (150 mcg) by mouth daily 90 tablet 1     cetirizine (ZYRTEC) 10 MG tablet Take 10 mg by mouth 2 times daily       cholecalciferol (VITAMIN D3 ULTRA STRENGTH) 5000 UNITS CAPS Take 5,000 Units by mouth every morning        order for DME Equipment being ordered: Inhalation Spacer 1 Device 0     mometasone-formoterol (DULERA) 200-5 MCG/ACT oral inhaler Inhale 2 puffs into the lungs 2 times daily 3 Inhaler 1     montelukast (SINGULAIR) 10 MG tablet Take 1 tablet (10 mg) by mouth At Bedtime (Patient taking differently: Take 10 mg by mouth every morning ) 90 tablet 3     fexofenadine (ALLEGRA) 180 MG tablet Take 1 tablet (180 mg) by mouth daily (Patient taking differently: Take 180 mg by mouth 2 times daily ) 90 tablet 3     ARIPiprazole (ABILIFY) 15 MG tablet Take 10 mg by mouth every morning        Prenatal Vit-Fe Fumarate-FA (PRENATAL MULTIVITAMIN  PLUS IRON) 27-0.8 MG TABS Take 1 tablet by mouth every morning           Allergies   Allergen Reactions     Codeine Sulfate Nausea and Vomiting     Tetanus Toxoid      Pt states she had an infection at injection site.      Vicodin [Hydrocodone-Acetaminophen] Nausea and Vomiting     Methimazole Rash         Review of Systems:  -+ fevers, chills, temperature intolerance  -Constitutional: The patient denies fatigue, fevers, chills, unintended weight loss, and night sweats.  -HEENT: Patient denies nonhealing oral sores.  -Skin: As above in HPI. No additional skin concerns.    Physical exam:  Vitals: LMP 05/13/2017  GEN: This is a well developed, well-nourished female in no acute distress, in a pleasant mood.    SKIN: Focused examination of the thighs, legs, feet, arms, chest, face was performed.  -no urticarial lesions  - well healed biopsy site on L medial thigh  - between breasts, as well as in L axilla small 1mm follicularly based papules. No pusutles  -No other lesions of  concern on areas examined.     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 No other underlying cause identified other than thyroid issues as above. At this point, she can scale back on her antihistamines to seasonal allergy dosing once nightly. Instructed her to call back if she notices new urticaria with peeling back BID dosing of antihistamine    - taking allegra 180mg qHS  now for seasonal allergies    2. Mild folliculitis/ miliaria: asymptomatic to patient, she's not particularly in any treatments, hwoever encouraged her to try daily  BPO 5% wash, rinsing thoroughly only as needed. Cautioned that it can be irritating or drying, so to be careful with BPO especially during the winter. She will consider using BPO if it becomes bothersome.     Follow-up PRN       staffed the patient.    Staff Involved:  Resident(Luisa Butler)/Staff(as above)   .I, Noemi Ayala MD, saw this patient with the resident and agree with the resident s findings and plan of care as documented in the resident s note.

## 2017-06-13 DIAGNOSIS — E89.0 POSTSURGICAL HYPOTHYROIDISM: ICD-10-CM

## 2017-06-13 LAB
CALCIUM SERPL-MCNC: 8.3 MG/DL (ref 8.5–10.1)
PHOSPHATE SERPL-MCNC: 2.3 MG/DL (ref 2.5–4.5)
T4 FREE SERPL-MCNC: 1.02 NG/DL (ref 0.76–1.46)
TSH SERPL DL<=0.05 MIU/L-ACNC: 6.96 MU/L (ref 0.4–4)

## 2017-06-13 PROCEDURE — 84480 ASSAY TRIIODOTHYRONINE (T3): CPT

## 2017-06-13 PROCEDURE — 84443 ASSAY THYROID STIM HORMONE: CPT

## 2017-06-13 PROCEDURE — 84439 ASSAY OF FREE THYROXINE: CPT

## 2017-06-13 PROCEDURE — 84100 ASSAY OF PHOSPHORUS: CPT

## 2017-06-13 PROCEDURE — 82310 ASSAY OF CALCIUM: CPT

## 2017-06-13 PROCEDURE — 36415 COLL VENOUS BLD VENIPUNCTURE: CPT

## 2017-06-14 LAB — T3 SERPL-MCNC: 78 NG/DL (ref 60–181)

## 2017-06-14 ASSESSMENT — ENCOUNTER SYMPTOMS
SHORTNESS OF BREATH: 1
RECTAL BLEEDING: 0
DYSPNEA ON EXERTION: 1
COUGH DISTURBING SLEEP: 1
NIGHT SWEATS: 0
POSTURAL DYSPNEA: 1
FATIGUE: 1
NAIL CHANGES: 0
ALTERED TEMPERATURE REGULATION: 1
COUGH: 1
CHILLS: 0
BREAST MASS: 1
WEIGHT LOSS: 0
NAUSEA: 0
SKIN CHANGES: 0
FEVER: 1
BOWEL INCONTINENCE: 0
HEARTBURN: 0
JAUNDICE: 0
WEIGHT GAIN: 1
BREAST PAIN: 1
VOMITING: 0
INCREASED ENERGY: 1
SNORES LOUDLY: 1
HEMOPTYSIS: 0
POLYPHAGIA: 0
WHEEZING: 1
HALLUCINATIONS: 0
DIARRHEA: 1
POOR WOUND HEALING: 0
SPUTUM PRODUCTION: 1
RESPIRATORY PAIN: 0
RECTAL PAIN: 0
CONSTIPATION: 0
ABDOMINAL PAIN: 1
POLYDIPSIA: 1
BLOOD IN STOOL: 0
BLOATING: 1
DECREASED APPETITE: 0

## 2017-06-16 ENCOUNTER — OFFICE VISIT (OUTPATIENT)
Dept: ENDOCRINOLOGY | Facility: CLINIC | Age: 37
End: 2017-06-16

## 2017-06-16 VITALS
HEIGHT: 67 IN | BODY MASS INDEX: 37.79 KG/M2 | DIASTOLIC BLOOD PRESSURE: 81 MMHG | HEART RATE: 83 BPM | SYSTOLIC BLOOD PRESSURE: 121 MMHG | WEIGHT: 240.8 LBS

## 2017-06-16 DIAGNOSIS — E89.0 POSTOPERATIVE HYPOTHYROIDISM: ICD-10-CM

## 2017-06-16 DIAGNOSIS — E83.51 HYPOCALCEMIA: Primary | ICD-10-CM

## 2017-06-16 RX ORDER — LEVOTHYROXINE SODIUM 150 UG/1
TABLET ORAL
Qty: 96 TABLET | Refills: 3 | Status: SHIPPED | OUTPATIENT
Start: 2017-06-16 | End: 2017-10-23 | Stop reason: DRUGHIGH

## 2017-06-16 ASSESSMENT — PAIN SCALES - GENERAL: PAINLEVEL: NO PAIN (0)

## 2017-06-16 NOTE — LETTER
6/16/2017       RE: Cathy Arroyo  [address]  [address]  [place] [facility] [address]     Dear Colleague,    Thank you for referring your patient, Cathy Arroyo, to the [facility] ENDOCRINOLOGY at [facility]. Please see a copy of my visit note below.    TELEPHONE VISIT  A/p  1.   Post surgical hypothyroidism.  Unstable.    Increase LT4 to 150* 7.5/week, divided.  I have printed, signed and handed her the paper X.  Get follow up labs in 2 months.  RTC 4 months.    2 . Hypocalcemia noted post op.  Calcium today is borderline. I have counseled her on calcium and vitamin D intake. Resume cholecalciferol that she had been taking previously     History of Graves'    SMOKER - smoking cessation is advised.    Leatha Franco MD      Cc/HPI:    Cathy presents for follow up of  hyperthyroidism due to Graves' and left dominant thyroid nodule with AUS cytology and bipolar disorder. She had developed rash on methimazole.   Since our lst telephone visit on 5/2/17 she has been treated with total thyroidectomy on 5/3/17. We prepped her for the surgery with only one day of Lugols solution. 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  There was a 0.3 cm follicular adenoma on the left in what was grossly called lymph node, left posterior cricoid excision.  Post op PTPH was 5.     She already had labs in anticipation of this appt.  On 6/13/17 the TSH was 6.96, free T4 1.02, T3 78. Calcium 8.3, phos 2.3     She is on LT4 150 mcg/day  She is not on calcium or calcitriol.   She is not on the vitamin D3 - she has been off this about 3 weeks.   She has not been taking the prenatal vitamin - forgettin got take it.    ROS  Lethargic  No longer having the hives rash  Eyes are OK  Cardiac: negative  Respiratory: REHMAN with walking.    GI: loose BM all the time; they were black a while ago - stool culture was already ordered which she doesn't want to do;   Menses monthly lately ?   Davi howe       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     Post partum depression      Thyroid disease     hashimotos     Uncomplicated asthma      History of benzodiazepine dependence     Past Surgical History:   Procedure Laterality Date     BLOOD PATCH N/A 10/11/2016    Procedure: EPIDURAL BLOOD PATCH;  Surgeon: GENERIC ANESTHESIA PROVIDER;  Location[facility] OR     CRANIOTOMY, EXCISE TUMOR COMPLEX, COMBINED  2/19/2014    Procedure: COMBINED CRANIOTOMY, EXCISE TUMOR COMPLEX;  Supracerabellar  Infratentorial Approach for Resection of Tumor ;  Surgeon: Kate Mckeon MD;  Location: [facility] OR     THYROIDECTOMY N/A 5/3/2017    Procedure: THYROIDECTOMY;  Total Thyroidectomy;  Surgeon: Pamela Villasenor MD;  [facility] OR         Current Outpatient Prescriptions   Medication Sig Dispense Refill     levothyroxine (SYNTHROID/LEVOTHROID) 150 MCG tablet Monday-Saturday: 1 tablet/day;   Sunday: 1.5 tablet 96 tablet 3     albuterol (PROAIR HFA/PROVENTIL HFA/VENTOLIN HFA) 108 (90 BASE) MCG/ACT Inhaler Inhale 2 puffs into the lungs every 4 hours as needed for shortness of breath / dyspnea or wheezing 1 Inhaler 3     order for DME nebulizer 1 each 0     nicotine (NICOTROL) 10 MG Inhaler Inhale 6-10 cartridge into the lungs daily as needed for smoking cessation 180 each 1     order for DME nebulizer 1 each 0     ipratropium - albuterol 0.5 mg/2.5 mg/3 mL (DUONEB) 0.5-2.5 (3) MG/3ML neb solution Take 1 vial (3 mLs) by nebulization every 6 hours as needed for shortness of breath / dyspnea or wheezing 90 vial 3     cetirizine (ZYRTEC) 10 MG tablet Take 10 mg by mouth 2 times daily        order for DME Equipment being ordered: Inhalation Spacer 1 Device 0     mometasone-formoterol (DULERA) 200-5 MCG/ACT oral inhaler Inhale 2 puffs into the lungs 2 times daily 3 Inhaler 1     montelukast (SINGULAIR) 10 MG tablet Take 1 tablet (10 mg) by mouth At Bedtime (Patient taking differently: Take 10 mg by mouth every morning ) 90 tablet 3     fexofenadine (ALLEGRA) 180 MG tablet Take 1 tablet (180 mg) by mouth daily (Patient taking differently: Take 180 mg by mouth 2 times daily ) 90 tablet 3     ARIPiprazole (ABILIFY) 15 MG tablet Take 10 mg by mouth every morning        Prenatal Vit-Fe Fumarate-FA (PRENATAL MULTIVITAMIN  PLUS IRON) 27-0.8 MG TABS Take 1 tablet by mouth every morning        [DISCONTINUED] levothyroxine (SYNTHROID/LEVOTHROID) 150 MCG tablet Take 1 tablet (150 mcg) by mouth daily 90 tablet 1     Taking valium < daily    Social History     Social History     Marital status:      Spouse name: N/A     Number of children: N/A     Years of education: N/A     Occupational History     Not on file.     Social History Main Topics     Smoking status: Current Every Day Smoker     Packs/day: 0.50     Years: 12.00     Types: Cigarettes     Start date: 2004     Last attempt to quit: 2016     Smokeless tobacco: Never Used     Alcohol use No      Comment: quit 16 days ago     Drug use: Yes     Special: Marijuana     Sexual activity: Yes     Partners: Male     Birth control/ protection: Inserts      Comment: will use condoms for post- birth control     Other Topics Concern     Parent/Sibling W/ Cabg, Mi Or Angioplasty Before 65f 55m? No     Social History Narrative    Caffeine intake/servings daily - 1    Calcium intake/servings daily - 3    Exercise 0 times weekly - describe ; encouraged walking daily    Sunscreen used - Yes    Seatbelts used - Yes    Guns stored in the home - No    Self Breast Exam - Yes    Pap test up to date -  No    Eye exam up to date -  No    Dental exam up to  "date -  No    DEXA scan up to date -  No    Flex Sig/Colonoscopy up to date -  No    Mammography up to date -  No    Immunizations reviewed and up to date - Yes    Abuse: Current or Past (Physical, Sexual or Emotional) - No    Do you feel safe in your environment - Yes    Do you cope well with stress - Yes    Do you suffer from insomnia - No    Last updated by: Noemi Cortez  3/1/2016             Smoking about 15 cigs/day; baby is great- 8 months old    GENERAL pleasant young woman in NAD  /81  Pulse 83  Ht 1.689 m (5' 6.5\")  Wt 109.2 kg (240 lb 12.8 oz)  LMP 05/13/2017  BMI 38.28 kg/m2  SKIN: normal color, temperature, texture. No rash  HEENT: PER, EOMI, no scleral icterus, eyelid retraction, stare, lid lag, proptosis or conjunctival injection.    NECK:  Transverse surgical scar is healed.  No masses or swelling in the neck  LUNGS: clear to auscultation bilaterally.   CARDIAC: RRR, S1, S2 without murmurs, rubs or gallops.    BACK: normal spinal contour.    NEURO: Alert, responds appropriately to questions, moves all extremities, DTRs 2/4, gait normal, +/- slight tremor of the outstretched hand      DATA :    ENDO THYROID LABS-[facility] Latest Ref Rng & Units 6/13/2017 5/2/2017   TSH 0.40 - 4.00 mU/L 6.96 (H)    T4 TOTAL 4.5 - 13.9 ug/dL     T4 FREE 0.76 - 1.46 ng/dL 1.02 0.99   TRIIODOTHYRONINE(T3) 60 - 181 ng/dL 78 124     ENDO THYROID LABS-[facility] Latest Ref Rng & Units 4/30/2017 4/18/2017   TSH 0.40 - 4.00 mU/L 0.04 (L) 0.01 (L)   T4 TOTAL 4.5 - 13.9 ug/dL     T4 FREE 0.76 - 1.46 ng/dL 1.02 1.07   TRIIODOTHYRONINE(T3) 60 - 181 ng/dL  143     ENDO CALCIUM LABS-[facility] Latest Ref Rng & Units 6/13/2017 5/3/2017   CALCIUM 8.5 - 10.1 mg/dL 8.3 (L)    CALCIUM IONIZED WHOLE BLOOD 4.4 - 5.2 mg/dL     PHOSPHOROUS 2.5 - 4.5 mg/dL 2.3 (L)    MAGNESIUM 1.6 - 2.3 mg/dL     ALBUMIN 3.4 - 5.0 g/dL     BUN 7 - 30 mg/dL     CREATININE 0.52 - 1.04 mg/dL     PARATHYROID HORMONE INTACT 12 - 72 pg/mL  5 (L)   ALKPHOS 40 - " 150 U/L     VITAMIN D DEFICIENCY SCREENING 20 - 75 ug/L     PROTEIN, TOTAL 6.8 - 8.8 g/dL       ENDO CALCIUM LABS-[facility] Latest Ref Rng & Units 4/30/2017   CALCIUM 8.5 - 10.1 mg/dL 8.9   CALCIUM IONIZED WHOLE BLOOD 4.4 - 5.2 mg/dL    PHOSPHOROUS 2.5 - 4.5 mg/dL    MAGNESIUM 1.6 - 2.3 mg/dL    ALBUMIN 3.4 - 5.0 g/dL 3.7   BUN 7 - 30 mg/dL 10   CREATININE 0.52 - 1.04 mg/dL 0.62   PARATHYROID HORMONE INTACT 12 - 72 pg/mL    ALKPHOS 40 - 150 U/L 125   VITAMIN D DEFICIENCY SCREENING 20 - 75 ug/L    PROTEIN, TOTAL 6.8 - 8.8 g/dL 7.5     ENDO CALCIUM LABS-[facility] Latest Ref Rng & Units 10/14/2016   CALCIUM 8.5 - 10.1 mg/dL 8.8   CALCIUM IONIZED WHOLE BLOOD 4.4 - 5.2 mg/dL    PHOSPHOROUS 2.5 - 4.5 mg/dL    MAGNESIUM 1.6 - 2.3 mg/dL 1.9   ALBUMIN 3.4 - 5.0 g/dL 2.5 (L)   BUN 7 - 30 mg/dL 8   CREATININE 0.52 - 1.04 mg/dL 0.60   PARATHYROID HORMONE INTACT 12 - 72 pg/mL    ALKPHOS 40 - 150 U/L 141   VITAMIN D DEFICIENCY SCREENING 20 - 75 ug/L    PROTEIN, TOTAL 6.8 - 8.8 g/dL 6.4 (L)        05/03/2017 12:11 PM 88      Patient Name: ZEINA CABAN   MR#: [id number]   Specimen #: [id number]   Collected: 5/3/2017   Received: 5/3/2017   Reported: 5/10/2017 16:18   Ordering Phy(s): MERCEDES TEJADA     For improved result formatting, select 'View Enhanced Report Format'   under Linked Documents section.     SPECIMEN(S):   A: Total thyroid   B: Lymph node, left posterior cricoid     FINAL DIAGNOSIS:   A: THYROID, TOTAL THYROIDECTOMY:   - Multiple adenomatoid nodules, up to 2.3 cm in greatest dimension.   - Four benign lymph nodes.   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   - Prior biopsy site changes identified.   - No malignancy identified, see comment.     B: LYMPH NODE, LEFT POSTERIOR CRICOID, EXCISION:   - Thyroid tissue with follicular adenoma with calcified capsule, 0.3 cm.   - No lymphoid tissue identified.   - No malignancy identified.     COMMENT:   Immunohistochemical stains with the  "appropriate controls were performed   on block A2 (largest nodule). The cells are variably immunopositive for   CK19 and are negative for HBME1. Mutational analysis performed on   previous cytology specimen (see cases [id number], [id number]) was negative   for relevant mutations.   I have personally reviewed all specimens and or slides, including the   listed special stains, and used them with my medical judgement to   determine the final diagnosis.     Electronically signed out by:     Marguerite Woodall M.D., [facility]     CLINICAL HISTORY:   The patient is a 36-year-old with a thyroid nodule and a history of   Grave's disease.     GROSS:   A:  The specimen is received in formalin with proper patient   identification, labeled \"total thyroid\", and consists of a 19.0 g total   thyroidectomy specimen to include right lobe (4.2 x 2.8 x 1.5 cm), left   lobe (4.5 x 2.3 x 1.4 cm), and isthmus (3.6 x 1.2 x 0.9 cm).  The   thyroid capsule is uniformly dark red-tan, smooth, and intact.  The   specimen is inked as follows: anterior right lobe = green, anterior left   lobe = blue, anterior isthmus = orange, and posterior = black.  The   specimen is serially sectioned from superior to inferior, revealing dark   red-brown, glistening parenchyma.  Within the inferior pole of the right   lobe is a 0.8 x 0.6 x 0.5 cm tan-red, well-circumscribed nodule which   focally abuts the anterior capsule.  Within the mid pole of the left   lobe, extending towards the inferior pole, is a 2.3 x 1.8 x 1.2 cm   moderately circumscribed, tan-pink, glistening nodule with focally   gritty cut surfaces.  This nodule focally abuts the posterior capsule,   and comes to within 0.2 cm of the anterior capsule.  No other focal   lesions are grossly identified.  Representative sections are submitted   as follows:     Summary of Sections:   A1 - left lobe superior pole   A2 - left lobe nodule, nearest posterior capsule, including isthmus   A3 - left lobe " "nodule, nearest anterior capsule   A4 - left lobe nodule, perpendicular sections to inferior pole   A5 - right lobe superior pole   A6 - right lobe mid pole   A7-A8 - right lobe inferior pole nodule     B:  The specimen is received in formalin with proper patient   identification, labeled \"lymph node at left posterior cricoid\", and   consists of a 0.8 x 0.6 x 0.4 cm tan lymph node.  The specimen is   bisected, wrapped, and entirely submitted in cassette B1. (Dictated by:   Svetlana Dalal 5/4/2017 09:37 AM)     MICROSCOPIC:   Microscopic examination was performed.     CPT Codes:   A: 11311-CQ9, 34405-AJHBN.T, 65001-DDB.P, 02918-MJEYP.T, 86171-AKP.P   B: 58542-HY9     TESTING LAB LOCATION:   [facility], [address]   [address]   [phone]     COLLECTION SITE:   Client: [facility]   Location: [facility] (NICOLAS)        "

## 2017-06-16 NOTE — PATIENT INSTRUCTIONS
Increase the levothyroxine by 1/2 tablet per WEEK, to the following, using the 150 mcg tablets:  Monday-Saturday: 1 tablet/day;   Sunday: 1.5 tablet  You should have labs to follow up on the change in about 2 months.  I am leaving orders for follow up labs on the medical record.  Please call [phone]  to schedule lab follow up testing as directed.  Alternatively, it can be drawn in any [facility] lab.      Restart the vitamin D.    Information for patients on Levo-thyroxine      The thyroid hormone, Levo-thyroxine (L-T4) is one of the main hormones normally produced by the thyroid.  The drug is identical in chemical structure to the natural product.  Levothyroxine is used to treat hypothyroidism (underactive thyroid).  Sometimes it is used even when the thyroid is not underactive, to treat a goiter (enlarged thyroid) or a thyroid nodule.  For patients with a history of thyroid removal, L-T4 is used to replace the deficiency created by the surgery.    The purpose of giving L-T4 to treat hypothyroidism is to make up for the thyroid hormone previously produced by your thyroid before it failed.  Depending on the extent of your thyroid failure, you may require a higher or a lower dose of L-T4.  The goal is to make your blood level of TSH (a hormone made by the master gland, the pituitary, the gland which controls and judges the thyroid) normal.    This drug is usually well-tolerated and safe but it is important to take the proper dose for YOU.  This involves periodic measurements of TSH, usually within 1-2 months of a dose change.  You should be off biotin containing supplements for at least one week prior to thyroid blood tests.    You should alert your doctor if you have signs you are getting too much L-T4.  These include excessive nervousness, heart fluttering or skipping beats, diarrhea, or feeling too hot and/or sweaty.      There are many brand names for Levo-thyroxine (L-T4), including Synthroid, Levoxyl,  Levothroid, Unithroid, Tirosint and others.  Changing from one brand name thyroid hormone product to another or to a generic product (all generics are called levothyroxine) can cause changes in your thyroid hormone balance, even if the dose stays the same.  Since generic products can come from many different companies (such as [employer], [employer], [employer] and others), there may be less consistency among the different generic preparations.  The decision to change brands or to change to a generic product must be made with your physician or other caregiver.  Follow-up testing should be arranged to monitor for any needed adjustments.  Monitoring may need to be more frequent if you always receive a generic thyroid hormone product.    For proper thyroid hormone balance the dose of thyroid hormone in your pills must be precise and consistent.    Be sure to take the medication as prescribed on an empty stomach, and at least 4 hours apart from calcium supplements, iron and soy products.  Store the medication away from severe heat and humidity.    You should be OFF any biotin containing supplements at least 7 days prior to thyroid lab draws.       Many insurance companies and other providers charge higher co-payments for brand name medications.  Since brand name L-T4 is not very expensive, be sure to ask if the actual cost of buying the medication is less than the co-payment.  In some instances the charge for a co-payment may be greater than buying the drug outright, especially if the prescription needs to be refilled every 30 days.    _______________________________      CALCIUM Recommendation 1200 mg/day in divided dose of no more than 500 mg/dose. This includes what is in your food and also what is in any supplements you are taking.      Dietary sources of calcium: These also contain vitamin D  Milk / buttermilk              8 oz            300 mg  Dry milk powder       5 Tbsp             300 mg  Yogurt                           1 cup           400 mg  Ice cream   1/2 cup          100 mg  Hard cheese                     1.5 oz          300 mg  Cottage cheese                1/2 cup        65 mg  Spinach, cooked  1 cup  240 mg  Tofu, soft regular           4 oz          120 to 390 mg  Sardines                       3 oz               370 mg  Mackerel canned         3 oz                250 mg  Canned salmon with bones 3 oz        170-210 mg  Calcium fortified cereals are available  SILK soy and almond milk products are calcium fortified  Orange juice  Fortified with Calcium       8 oz            300 mg  Low fat dairy sources are recommended      Recommended exercise goals:    30 minutes of moderate exercise on most days of the week .  Weight bearing exercise (ie, walking, jogging, hiking, dancing)    Strength training 2 or more times/week in addition to other weight -being exercise.  Strength training -- uses weight or resistance beyond that seen in everyday activities -(pilates, weight training with free weights, weight machines or resistance bands)

## 2017-06-16 NOTE — PROGRESS NOTES
TELEPHONE VISIT  A/p  1.   Post surgical hypothyroidism.  Unstable.    Increase LT4 to 150* 7.5/week, divided.  I have printed, signed and handed her the paper X.  Get follow up labs in 2 months.  RTC 4 months.    2 . Hypocalcemia noted post op.  Calcium today is borderline. I have counseled her on calcium and vitamin D intake. Resume cholecalciferol that she had been taking previously     History of Graves'    SMOKER - smoking cessation is advised.    Leatha Franco MD      Cc/HPI:    Cathy presents for follow up of  hyperthyroidism due to Graves' and left dominant thyroid nodule with AUS cytology and bipolar disorder. She had developed rash on methimazole.   Since our lst telephone visit on 5/2/17 she has been treated with total thyroidectomy on 5/3/17. We prepped her for the surgery with only one day of Lugols solution. 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  There was a 0.3 cm follicular adenoma on the left in what was grossly called lymph node, left posterior cricoid excision.  Post op PTPH was 5.     She already had labs in anticipation of this appt.  On 6/13/17 the TSH was 6.96, free T4 1.02, T3 78. Calcium 8.3, phos 2.3     She is on LT4 150 mcg/day  She is not on calcium or calcitriol.   She is not on the vitamin D3 - she has been off this about 3 weeks.   She has not been taking the prenatal vitamin - forgettin got take it.    ROS  Lethargic  No longer having the hives rash  Eyes are OK  Cardiac: negative  Respiratory: REHMAN with walking.   GI: loose BM all the time; they were black a while ago - stool culture was already ordered which she doesn't want to do;   Menses monthly lately ?   Davi horses       Past Medical History:   Diagnosis Date     Allergic state      Anxiety      Bipolar 1 disorder (H)      Elevated cholesterol      Graves disease 2017      Obese     BMI 37.48     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     Post partum depression      Thyroid disease     hashimotos     Uncomplicated asthma      History of benzodiazepine dependence     Past Surgical History:   Procedure Laterality Date     BLOOD PATCH N/A 10/11/2016    Procedure: EPIDURAL BLOOD PATCH;  Surgeon: GENERIC ANESTHESIA PROVIDER;  Location: [facility] OR     CRANIOTOMY, EXCISE TUMOR COMPLEX, COMBINED  2/19/2014    Procedure: COMBINED CRANIOTOMY, EXCISE TUMOR COMPLEX;  Supracerabellar  Infratentorial Approach for Resection of Tumor ;  Surgeon: Kate Mckeon MD;  Location: [facility] OR     THYROIDECTOMY N/A 5/3/2017    Procedure: THYROIDECTOMY;  Total Thyroidectomy;  Surgeon: Pamela Villasenor MD;  Location:  OR         Current Outpatient Prescriptions   Medication Sig Dispense Refill     levothyroxine (SYNTHROID/LEVOTHROID) 150 MCG tablet Monday-Saturday: 1 tablet/day;   Sunday: 1.5 tablet 96 tablet 3     albuterol (PROAIR HFA/PROVENTIL HFA/VENTOLIN HFA) 108 (90 BASE) MCG/ACT Inhaler Inhale 2 puffs into the lungs every 4 hours as needed for shortness of breath / dyspnea or wheezing 1 Inhaler 3     order for DME nebulizer 1 each 0     nicotine (NICOTROL) 10 MG Inhaler Inhale 6-10 cartridge into the lungs daily as needed for smoking cessation 180 each 1     order for DME nebulizer 1 each 0     ipratropium - albuterol 0.5 mg/2.5 mg/3 mL (DUONEB) 0.5-2.5 (3) MG/3ML neb solution Take 1 vial (3 mLs) by nebulization every 6 hours as needed for shortness of breath / dyspnea or wheezing 90 vial 3     cetirizine (ZYRTEC) 10 MG tablet Take 10 mg by mouth 2 times daily       order for DME Equipment being ordered: Inhalation Spacer 1 Device 0     mometasone-formoterol (DULERA) 200-5 MCG/ACT oral inhaler Inhale 2 puffs into the lungs 2 times daily 3 Inhaler 1     montelukast (SINGULAIR) 10 MG tablet Take 1 tablet (10 mg) by mouth At Bedtime (Patient taking differently: Take 10 mg by  mouth every morning ) 90 tablet 3     fexofenadine (ALLEGRA) 180 MG tablet Take 1 tablet (180 mg) by mouth daily (Patient taking differently: Take 180 mg by mouth 2 times daily ) 90 tablet 3     ARIPiprazole (ABILIFY) 15 MG tablet Take 10 mg by mouth every morning        Prenatal Vit-Fe Fumarate-FA (PRENATAL MULTIVITAMIN  PLUS IRON) 27-0.8 MG TABS Take 1 tablet by mouth every morning        [DISCONTINUED] levothyroxine (SYNTHROID/LEVOTHROID) 150 MCG tablet Take 1 tablet (150 mcg) by mouth daily 90 tablet 1     Taking valium < daily    Social History     Social History     Marital status:      Spouse name: N/A     Number of children: N/A     Years of education: N/A     Occupational History     Not on file.     Social History Main Topics     Smoking status: Current Every Day Smoker     Packs/day: 0.50     Years: 12.00     Types: Cigarettes     Start date: 2004     Last attempt to quit: 2016     Smokeless tobacco: Never Used     Alcohol use No      Comment: quit 16 days ago     Drug use: Yes     Special: Marijuana     Sexual activity: Yes     Partners: Male     Birth control/ protection: Inserts      Comment: will use condoms for post- birth control     Other Topics Concern     Parent/Sibling W/ Cabg, Mi Or Angioplasty Before 65f 55m? No     Social History Narrative    Caffeine intake/servings daily - 1    Calcium intake/servings daily - 3    Exercise 0 times weekly - describe ; encouraged walking daily    Sunscreen used - Yes    Seatbelts used - Yes    Guns stored in the home - No    Self Breast Exam - Yes    Pap test up to date -  No    Eye exam up to date -  No    Dental exam up to date -  No    DEXA scan up to date -  No    Flex Sig/Colonoscopy up to date -  No    Mammography up to date -  No    Immunizations reviewed and up to date - Yes    Abuse: Current or Past (Physical, Sexual or Emotional) - No    Do you feel safe in your environment - Yes    Do you cope well with stress - Yes    Do you  "suffer from insomnia - No    Last updated by: Noemi Cortez  3/1/2016             Smoking about 15 cigs/day; baby is great- 8 months old    GENERAL pleasant young woman in NAD  /81  Pulse 83  Ht 1.689 m (5' 6.5\")  Wt 109.2 kg (240 lb 12.8 oz)  LMP 05/13/2017  BMI 38.28 kg/m2  SKIN: normal color, temperature, texture. No rash  HEENT: PER, EOMI, no scleral icterus, eyelid retraction, stare, lid lag, proptosis or conjunctival injection.    NECK:  Transverse surgical scar is healed.  No masses or swelling in the neck  LUNGS: clear to auscultation bilaterally.   CARDIAC: RRR, S1, S2 without murmurs, rubs or gallops.    BACK: normal spinal contour.    NEURO: Alert, responds appropriately to questions, moves all extremities, DTRs 2/4, gait normal, +/- slight tremor of the outstretched hand      DATA :    ENDO THYROID LABS-[facility] Latest Ref Rng & Units 6/13/2017 5/2/2017   TSH 0.40 - 4.00 mU/L 6.96 (H)    T4 TOTAL 4.5 - 13.9 ug/dL     T4 FREE 0.76 - 1.46 ng/dL 1.02 0.99   TRIIODOTHYRONINE(T3) 60 - 181 ng/dL 78 124     ENDO THYROID LABS-[facility] Latest Ref Rng & Units 4/30/2017 4/18/2017   TSH 0.40 - 4.00 mU/L 0.04 (L) 0.01 (L)   T4 TOTAL 4.5 - 13.9 ug/dL     T4 FREE 0.76 - 1.46 ng/dL 1.02 1.07   TRIIODOTHYRONINE(T3) 60 - 181 ng/dL  143     ENDO CALCIUM LABS-[facility] Latest Ref Rng & Units 6/13/2017 5/3/2017   CALCIUM 8.5 - 10.1 mg/dL 8.3 (L)    CALCIUM IONIZED WHOLE BLOOD 4.4 - 5.2 mg/dL     PHOSPHOROUS 2.5 - 4.5 mg/dL 2.3 (L)    MAGNESIUM 1.6 - 2.3 mg/dL     ALBUMIN 3.4 - 5.0 g/dL     BUN 7 - 30 mg/dL     CREATININE 0.52 - 1.04 mg/dL     PARATHYROID HORMONE INTACT 12 - 72 pg/mL  5 (L)   ALKPHOS 40 - 150 U/L     VITAMIN D DEFICIENCY SCREENING 20 - 75 ug/L     PROTEIN, TOTAL 6.8 - 8.8 g/dL       ENDO CALCIUM LABS-[facility] Latest Ref Rng & Units 4/30/2017   CALCIUM 8.5 - 10.1 mg/dL 8.9   CALCIUM IONIZED WHOLE BLOOD 4.4 - 5.2 mg/dL    PHOSPHOROUS 2.5 - 4.5 mg/dL    MAGNESIUM 1.6 - 2.3 mg/dL    ALBUMIN 3.4 - 5.0 g/dL 3.7 "   BUN 7 - 30 mg/dL 10   CREATININE 0.52 - 1.04 mg/dL 0.62   PARATHYROID HORMONE INTACT 12 - 72 pg/mL    ALKPHOS 40 - 150 U/L 125   VITAMIN D DEFICIENCY SCREENING 20 - 75 ug/L    PROTEIN, TOTAL 6.8 - 8.8 g/dL 7.5     ENDO CALCIUM LABS-[facility] Latest Ref Rng & Units 10/14/2016   CALCIUM 8.5 - 10.1 mg/dL 8.8   CALCIUM IONIZED WHOLE BLOOD 4.4 - 5.2 mg/dL    PHOSPHOROUS 2.5 - 4.5 mg/dL    MAGNESIUM 1.6 - 2.3 mg/dL 1.9   ALBUMIN 3.4 - 5.0 g/dL 2.5 (L)   BUN 7 - 30 mg/dL 8   CREATININE 0.52 - 1.04 mg/dL 0.60   PARATHYROID HORMONE INTACT 12 - 72 pg/mL    ALKPHOS 40 - 150 U/L 141   VITAMIN D DEFICIENCY SCREENING 20 - 75 ug/L    PROTEIN, TOTAL 6.8 - 8.8 g/dL 6.4 (L)        05/03/2017 12:11 PM 88      Patient Name: ZEINA CABAN   MR#: [id number]   Specimen #: [id number]   Collected: 5/3/2017   Received: 5/3/2017   Reported: 5/10/2017 16:18   Ordering Phy(s): MERCDEES TEJADA     For improved result formatting, select 'View Enhanced Report Format'   under Linked Documents section.     SPECIMEN(S):   A: Total thyroid   B: Lymph node, left posterior cricoid     FINAL DIAGNOSIS:   A: THYROID, TOTAL THYROIDECTOMY:   - Multiple adenomatoid nodules, up to 2.3 cm in greatest dimension.   - Four benign lymph nodes.   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   - Prior biopsy site changes identified.   - No malignancy identified, see comment.     B: LYMPH NODE, LEFT POSTERIOR CRICOID, EXCISION:   - Thyroid tissue with follicular adenoma with calcified capsule, 0.3 cm.   - No lymphoid tissue identified.   - No malignancy identified.     COMMENT:   Immunohistochemical stains with the appropriate controls were performed   on block A2 (largest nodule). The cells are variably immunopositive for   CK19 and are negative for HBME1. Mutational analysis performed on   previous cytology specimen (see cases [id number], [id number]) was negative   for relevant mutations.   I have personally reviewed all specimens and or  "slides, including the   listed special stains, and used them with my medical judgement to   determine the final diagnosis.     Electronically signed out by:     Marguerite Woodall M.D., Lani     CLINICAL HISTORY:   The patient is a 36-year-old with a thyroid nodule and a history of   Grave's disease.     GROSS:   A:  The specimen is received in formalin with proper patient   identification, labeled \"total thyroid\", and consists of a 19.0 g total   thyroidectomy specimen to include right lobe (4.2 x 2.8 x 1.5 cm), left   lobe (4.5 x 2.3 x 1.4 cm), and isthmus (3.6 x 1.2 x 0.9 cm).  The   thyroid capsule is uniformly dark red-tan, smooth, and intact.  The   specimen is inked as follows: anterior right lobe = green, anterior left   lobe = blue, anterior isthmus = orange, and posterior = black.  The   specimen is serially sectioned from superior to inferior, revealing dark   red-brown, glistening parenchyma.  Within the inferior pole of the right   lobe is a 0.8 x 0.6 x 0.5 cm tan-red, well-circumscribed nodule which   focally abuts the anterior capsule.  Within the mid pole of the left   lobe, extending towards the inferior pole, is a 2.3 x 1.8 x 1.2 cm   moderately circumscribed, tan-pink, glistening nodule with focally   gritty cut surfaces.  This nodule focally abuts the posterior capsule,   and comes to within 0.2 cm of the anterior capsule.  No other focal   lesions are grossly identified.  Representative sections are submitted   as follows:     Summary of Sections:   A1 - left lobe superior pole   A2 - left lobe nodule, nearest posterior capsule, including isthmus   A3 - left lobe nodule, nearest anterior capsule   A4 - left lobe nodule, perpendicular sections to inferior pole   A5 - right lobe superior pole   A6 - right lobe mid pole   A7-A8 - right lobe inferior pole nodule     B:  The specimen is received in formalin with proper patient   identification, labeled \"lymph node at left posterior cricoid\", and "   consists of a 0.8 x 0.6 x 0.4 cm tan lymph node.  The specimen is   bisected, wrapped, and entirely submitted in cassette B1. (Dictated by:   Svetlana Dalal 5/4/2017 09:37 AM)     MICROSCOPIC:   Microscopic examination was performed.     CPT Codes:   A: 58483-AD8, 19714-XJIHF.T, 99482-LHR.P, 12539-ZARRG.T, 07755-JEW.P   B: 55525-ZL1     TESTING LAB LOCATION:   [facility], [address]   [address]   [phone]     COLLECTION SITE:   Client: [facility]   Location: MIRELLA JACOBS)

## 2017-06-16 NOTE — MR AVS SNAPSHOT
After Visit Summary   6/16/2017    Cathy Arroyo    MRN: [id number]           Patient Information     Date Of Birth          1980        Visit Information        Provider Department      6/16/2017 1:00 PM Leatha Franco MD [facility]        Today's Diagnoses     Hypocalcemia    -  1    Postoperative hypothyroidism          Care Instructions    Increase the levothyroxine by 1/2 tablet per WEEK, to the following, using the 150 mcg tablets:  Monday-Saturday: 1 tablet/day;   Sunday: 1.5 tablet  You should have labs to follow up on the change in about 2 months.  I am leaving orders for follow up labs on the medical record.  Please call [phone]  to schedule lab follow up testing as directed.  Alternatively, it can be drawn in any [facility] lab.      Restart the vitamin D.    Information for patients on Levo-thyroxine      The thyroid hormone, Levo-thyroxine (L-T4) is one of the main hormones normally produced by the thyroid.  The drug is identical in chemical structure to the natural product.  Levothyroxine is used to treat hypothyroidism (underactive thyroid).  Sometimes it is used even when the thyroid is not underactive, to treat a goiter (enlarged thyroid) or a thyroid nodule.  For patients with a history of thyroid removal, L-T4 is used to replace the deficiency created by the surgery.    The purpose of giving L-T4 to treat hypothyroidism is to make up for the thyroid hormone previously produced by your thyroid before it failed.  Depending on the extent of your thyroid failure, you may require a higher or a lower dose of L-T4.  The goal is to make your blood level of TSH (a hormone made by the master gland, the pituitary, the gland which controls and judges the thyroid) normal.    This drug is usually well-tolerated and safe but it is important to take the proper dose for YOU.  This involves periodic measurements of TSH, usually within 1-2 months of a dose change.   You should be off biotin containing supplements for at least one week prior to thyroid blood tests.    You should alert your doctor if you have signs you are getting too much L-T4.  These include excessive nervousness, heart fluttering or skipping beats, diarrhea, or feeling too hot and/or sweaty.      There are many brand names for Levo-thyroxine (L-T4), including Synthroid, Levoxyl, Levothroid, Unithroid, Tirosint and others.  Changing from one brand name thyroid hormone product to another or to a generic product (all generics are called levothyroxine) can cause changes in your thyroid hormone balance, even if the dose stays the same.  Since generic products can come from many different companies (such as [employer], [employer], [employer] and others), there may be less consistency among the different generic preparations.  The decision to change brands or to change to a generic product must be made with your physician or other caregiver.  Follow-up testing should be arranged to monitor for any needed adjustments.  Monitoring may need to be more frequent if you always receive a generic thyroid hormone product.    For proper thyroid hormone balance the dose of thyroid hormone in your pills must be precise and consistent.    Be sure to take the medication as prescribed on an empty stomach, and at least 4 hours apart from calcium supplements, iron and soy products.  Store the medication away from severe heat and humidity.    You should be OFF any biotin containing supplements at least 7 days prior to thyroid lab draws.       Many insurance companies and other providers charge higher co-payments for brand name medications.  Since brand name L-T4 is not very expensive, be sure to ask if the actual cost of buying the medication is less than the co-payment.  In some instances the charge for a co-payment may be greater than buying the drug outright, especially if the prescription needs to be refilled every 30  days.    _______________________________      CALCIUM Recommendation 1200 mg/day in divided dose of no more than 500 mg/dose. This includes what is in your food and also what is in any supplements you are taking.      Dietary sources of calcium: These also contain vitamin D  Milk / buttermilk              8 oz            300 mg  Dry milk powder       5 Tbsp             300 mg  Yogurt                          1 cup           400 mg  Ice cream   1/2 cup          100 mg  Hard cheese                     1.5 oz          300 mg  Cottage cheese                1/2 cup        65 mg  Spinach, cooked  1 cup  240 mg  Tofu, soft regular           4 oz          120 to 390 mg  Sardines                       3 oz               370 mg  Mackerel canned         3 oz                250 mg  Canned salmon with bones 3 oz        170-210 mg  Calcium fortified cereals are available  SILK soy and almond milk products are calcium fortified  Orange juice  Fortified with Calcium       8 oz            300 mg  Low fat dairy sources are recommended      Recommended exercise goals:    30 minutes of moderate exercise on most days of the week .  Weight bearing exercise (ie, walking, jogging, hiking, dancing)    Strength training 2 or more times/week in addition to other weight -being exercise.  Strength training -- uses weight or resistance beyond that seen in everyday activities -(pilates, weight training with free weights, weight machines or resistance bands)                Follow-ups after your visit        Follow-up notes from your care team     Return in about 4 months (around 10/16/2017).      Your next 10 appointments already scheduled     Jun 16, 2017  1:00 PM CDT   (Arrive by 12:45 PM)   RETURN ENDOCRINE with Leatha Franco MD   [facility] ([facility] [place])    [address] [place] [id number] [phone]            Jul 05, 2017 11:00 AM CDT   (Arrive by 10:45 AM)   New Patient  Visit with Aaliyah Alcazar MD   [facility] ([facility] [place])    [address]  [address]  [facility] [address] [phone]            Oct 23, 2017  2:30 PM CDT   (Arrive by 2:15 PM)   RETURN ENDOCRINE with Leatha Franco MD   [facility] ([facility])    [address]  [address]rd Floor  [facility] [id number] [phone]              Future tests that were ordered for you today     Open Future Orders        Priority Expected Expires Ordered    TSH Routine 8/16/2017 6/16/2018 6/16/2017    T4 free Routine 8/16/2017 6/16/2018 6/16/2017            Who to contact     Please call your clinic at [phone] to:    Ask questions about your health    Make or cancel appointments    Discuss your medicines    Learn about your test results    Speak to your doctor   If you have compliments or concerns about an experience at your clinic, or if you wish to file a complaint, please contact [facility] Patient Relations at [phone] or email us at [email or web address]         Additional Information About Your Visit        [employer] Information     [employer] gives you secure access to your electronic health record. If you see a primary care provider, you can also send messages to your care team and make appointments. If you have questions, please call your primary care clinic.  If you do not have a primary care provider, please call [phone] and they will assist you.      [employer] is an electronic gateway that provides easy, online access to your medical records. With [employer], you can request a clinic appointment, read your test results, renew a prescription or communicate with your care team.     To access your existing account, please contact your [facility] or call [phone] for assistance.        Care EveryWhere ID     This is your Care EveryWhere ID. This could be  "used by other organizations to access your [facility] medical records  [id number]        Your Vitals Were     Pulse Height Last Period BMI (Body Mass Index)          83 1.689 m (5' 6.5\") 05/13/2017 38.28 kg/m2         Blood Pressure from Last 3 Encounters:   06/16/17 121/81   06/01/17 130/86   05/26/17 120/82    Weight from Last 3 Encounters:   06/16/17 109.2 kg (240 lb 12.8 oz)   06/01/17 109.1 kg (240 lb 9.6 oz)   05/26/17 111.1 kg (245 lb)                 Today's Medication Changes          These changes are accurate as of: 6/16/17 12:56 PM.  If you have any questions, ask your nurse or doctor.               These medicines have changed or have updated prescriptions.        Dose/Directions    fexofenadine 180 MG tablet   Commonly known as:  ALLEGRA   This may have changed:  when to take this   Used for:  Moderate persistent asthma with acute exacerbation, Chronic rhinitis        Dose:  180 mg   Take 1 tablet (180 mg) by mouth daily   Quantity:  90 tablet   Refills:  3       levothyroxine 150 MCG tablet   Commonly known as:  SYNTHROID/LEVOTHROID   This may have changed:    - how much to take  - how to take this  - when to take this  - additional instructions   Used for:  Postoperative hypothyroidism   Changed by:  Leatha Franco MD        Monday-Saturday: 1 tablet/day;  Sunday: 1.5 tablet   Quantity:  96 tablet   Refills:  3       montelukast 10 MG tablet   Commonly known as:  SINGULAIR   This may have changed:  when to take this   Used for:  Chronic rhinitis, Moderate persistent asthma with acute exacerbation        Dose:  10 mg   Take 1 tablet (10 mg) by mouth At Bedtime   Quantity:  90 tablet   Refills:  3         Stop taking these medicines if you haven't already. Please contact your care team if you have questions.     predniSONE 20 MG tablet   Commonly known as:  DELTASONE   Stopped by:  Leatha Franco MD                Where to get your medicines      Some of these will need a paper prescription " and others can be bought over the counter.  Ask your nurse if you have questions.     Bring a paper prescription for each of these medications     levothyroxine 150 MCG tablet                Primary Care Provider Office Phone # Fax #    AJNNET Nascimento YOGI [phone]       [facility]  Student Nurse Practitioner        Thank you!     Thank you for choosing [facility]  for your care. Our goal is always to provide you with excellent care. Hearing back from our patients is one way we can continue to improve our services. Please take a few minutes to complete the written survey that you may receive in the mail after your visit with us. Thank you!             Your Updated Medication List - Protect others around you: Learn how to safely use, store and throw away your medicines at www.disposemymeds.org.          This list is accurate as of: 6/16/17 12:56 PM.  Always use your most recent med list.                   Brand Name Dispense Instructions for use    ABILIFY 15 MG tablet   Generic drug:  ARIPiprazole      Take 10 mg by mouth every morning       albuterol 108 (90 BASE) MCG/ACT Inhaler    PROAIR HFA/PROVENTIL HFA/VENTOLIN HFA    1 Inhaler    Inhale 2 puffs into the lungs every 4 hours as needed for shortness of breath / dyspnea or wheezing       cetirizine 10 MG tablet    zyrTEC     Take 10 mg by mouth 2 times daily       fexofenadine 180 MG tablet    ALLEGRA    90 tablet    Take 1 tablet (180 mg) by mouth daily       ipratropium - albuterol 0.5 mg/2.5 mg/3 mL 0.5-2.5 (3) MG/3ML neb solution    DUONEB    90 vial    Take 1 vial (3 mLs) by nebulization every 6 hours as needed for shortness of breath / dyspnea or wheezing       levothyroxine 150 MCG tablet    SYNTHROID/LEVOTHROID    96 tablet    Monday-Saturday: 1 tablet/day;  Sunday: 1.5 tablet       mometasone-formoterol 200-5 MCG/ACT oral inhaler    DULERA    3 Inhaler    Inhale 2 puffs into the lungs 2 times daily        montelukast 10 MG tablet    SINGULAIR    90 tablet    Take 1 tablet (10 mg) by mouth At Bedtime       nicotine 10 MG Inhaler    NICOTROL    180 each    Inhale 6-10 cartridge into the lungs daily as needed for smoking cessation       * order for DME     1 Device    Equipment being ordered: Inhalation Spacer       * order for DME     1 each    nebulizer       * order for DME     1 each    nebulizer       prenatal multivitamin  plus iron 27-0.8 MG Tabs per tablet      Take 1 tablet by mouth every morning       * Notice:  This list has 3 medication(s) that are the same as other medications prescribed for you. Read the directions carefully, and ask your doctor or other care provider to review them with you.

## 2017-08-14 ENCOUNTER — TELEPHONE (OUTPATIENT)
Dept: FAMILY MEDICINE | Facility: CLINIC | Age: 37
End: 2017-08-14

## 2017-08-14 NOTE — LETTER
Cathy Arroyo  [address]  [place] [address]        August 14, 2017          Dear Cathy,    In order to ensure we are providing the best quality care, we have reviewed your chart and see that you are due for:    1. Asthma follow up    Please call the clinic at your earliest convenience to schedule an appointment.  If you have completed these please contact our office via phone or [employer] to update our records.  We would like to know the date (approximately month and year), the result, and ideally where the procedure was performed.    Thank you for trusting us with your health care.      Sincerely,    Care Team for DEVEN Winston

## 2017-08-14 NOTE — TELEPHONE ENCOUNTER
Panel Management Review      Patient has the following on her problem list:     Asthma review     ACT Total Scores 5/24/2017   ACT TOTAL SCORE (Goal Greater than or Equal to 20) 6   In the past 12 months, how many times did you visit the emergency room for your asthma without being admitted to the hospital? 1   In the past 12 months, how many times were you hospitalized overnight because of your asthma? 0      1. Is Asthma diagnosis on the Problem List? Yes    2. Is Asthma listed on Health Maintenance? Yes    3. Patient is due for:  ACT        Composite cancer screening  Chart review shows that this patient is due/due soon for the following None  Summary:    Patient is due/failing the following:   ACT    Action needed:   Patient needs to do ACT.    Type of outreach:    Phone, left message for patient to call back.  and Sent letter.    Questions for provider review:    None                                                                                                                                    Terell Capps MA     Chart routed to none.

## 2017-10-13 ENCOUNTER — OFFICE VISIT (OUTPATIENT)
Dept: FAMILY MEDICINE | Facility: CLINIC | Age: 37
End: 2017-10-13
Payer: COMMERCIAL

## 2017-10-13 VITALS
HEART RATE: 97 BPM | SYSTOLIC BLOOD PRESSURE: 124 MMHG | DIASTOLIC BLOOD PRESSURE: 82 MMHG | BODY MASS INDEX: 38.25 KG/M2 | OXYGEN SATURATION: 96 % | TEMPERATURE: 99 F | WEIGHT: 240.6 LBS

## 2017-10-13 DIAGNOSIS — Z32.00 POSSIBLE PREGNANCY, NOT CONFIRMED: Primary | ICD-10-CM

## 2017-10-13 DIAGNOSIS — J45.31 MILD PERSISTENT ASTHMA WITH ACUTE EXACERBATION: ICD-10-CM

## 2017-10-13 LAB
BETA HCG QUAL IFA URINE: NEGATIVE
ERYTHROCYTE [DISTWIDTH] IN BLOOD BY AUTOMATED COUNT: 13.7 % (ref 10–15)
HCT VFR BLD AUTO: 41.5 % (ref 35–47)
HGB BLD-MCNC: 13.9 G/DL (ref 11.7–15.7)
MCH RBC QN AUTO: 29.2 PG (ref 26.5–33)
MCHC RBC AUTO-ENTMCNC: 33.5 G/DL (ref 31.5–36.5)
MCV RBC AUTO: 87 FL (ref 78–100)
PLATELET # BLD AUTO: 314 10E9/L (ref 150–450)
RBC # BLD AUTO: 4.76 10E12/L (ref 3.8–5.2)
WBC # BLD AUTO: 10.5 10E9/L (ref 4–11)

## 2017-10-13 PROCEDURE — 85027 COMPLETE CBC AUTOMATED: CPT | Performed by: FAMILY MEDICINE

## 2017-10-13 PROCEDURE — 84703 CHORIONIC GONADOTROPIN ASSAY: CPT | Performed by: FAMILY MEDICINE

## 2017-10-13 PROCEDURE — 36415 COLL VENOUS BLD VENIPUNCTURE: CPT | Performed by: FAMILY MEDICINE

## 2017-10-13 PROCEDURE — 99214 OFFICE O/P EST MOD 30 MIN: CPT | Performed by: FAMILY MEDICINE

## 2017-10-13 RX ORDER — PREDNISONE 20 MG/1
20 TABLET ORAL 2 TIMES DAILY
Qty: 6 TABLET | Refills: 0 | Status: SHIPPED | OUTPATIENT
Start: 2017-10-13 | End: 2017-10-16

## 2017-10-13 RX ORDER — AZITHROMYCIN 250 MG/1
TABLET, FILM COATED ORAL
Qty: 6 TABLET | Refills: 0 | Status: SHIPPED | OUTPATIENT
Start: 2017-10-13 | End: 2017-10-23

## 2017-10-13 NOTE — PROGRESS NOTES
SUBJECTIVE:   Cathy Arroyo is a 37 year old female who presents to clinic today for the following health issues:    Acute Illness   Acute illness concerns: fever  Onset: 1 week     Fever: YES- 99's    Chills/Sweats: YES    Headache (location?): YES    Sinus Pressure:no    Conjunctivitis:  no    Ear Pain: no    Rhinorrhea: YES    Congestion: YES    Sore Throat: no     Cough: YES    Wheeze: YES    Decreased Appetite: YES    Nausea: YES    Vomiting: no    Diarrhea:  YES    Dysuria/Freq.: YES- frequency     Fatigue/Achiness: YES    Sick/Strep Exposure: YES    LMP: 9/21/17, concerned about possible pregnancy. Negative test at home a few days ago      Therapies Tried and outcome: Tylenol      Could be pregnat  Asthma Follow-Up    Was ACT completed today?    Yes    ACT Total Scores 10/13/2017   ACT TOTAL SCORE (Goal Greater than or Equal to 20) 22   In the past 12 months, how many times did you visit the emergency room for your asthma without being admitted to the hospital? 0   In the past 12 months, how many times were you hospitalized overnight because of your asthma? 0       Recent asthma triggers that patient is dealing with: upper respiratory infections          wooreid about pregnany, had unprotected sex 2 weks ago    Problem list and histories reviewed & adjusted, as indicated.  Additional history: as documented    Labs reviewed in EPIC    [facility] and [facility]       Reviewed and updated as needed this visit by Provider         ROS:  Constitutional, HEENT, cardiovascular, pulmonary, gi and gu systems are negative, except as otherwise noted.      OBJECTIVE:   /82 (BP Location: Right arm, Patient Position: Chair, Cuff Size: Adult Large)  Pulse 97  Temp 99  F (37.2  C) (Oral)  Wt 240 lb 9.6 oz (109.1 kg)  LMP 09/21/2017  SpO2 96%  BMI 38.25 kg/m2  Body mass index is 38.25 kg/(m^2).  GENERAL: alert, mild distress and fatigued  NECK: no  adenopathy, no asymmetry, masses, or scars and thyroid normal to palpation  RESP: rhonchi bilateral and expiratory wheezes throughout  CV: regular rate and rhythm, normal S1 S2, no S3 or S4, no murmur, click or rub, no peripheral edema and peripheral pulses strong  ABDOMEN: soft, nontender, no hepatosplenomegaly, no masses and bowel sounds normal  PSYCH: mentation appears normal, affect flat, fatigued and judgement and insight intact    Diagnostic Test Results:  Results for orders placed or performed in visit on 10/13/17   CBC with platelets   Result Value Ref Range    WBC 10.5 4.0 - 11.0 10e9/L    RBC Count 4.76 3.8 - 5.2 10e12/L    Hemoglobin 13.9 11.7 - 15.7 g/dL    Hematocrit 41.5 35.0 - 47.0 %    MCV 87 78 - 100 fl    MCH 29.2 26.5 - 33.0 pg    MCHC 33.5 31.5 - 36.5 g/dL    RDW 13.7 10.0 - 15.0 %    Platelet Count 314 150 - 450 10e9/L       ASSESSMENT/PLAN:             1. Mild persistent asthma with acute exacerbation  drashan treat  - azithromycin (ZITHROMAX) 250 MG tablet; Two tablets first day, then one tablet daily for four days.  Dispense: 6 tablet; Refill: 0  - predniSONE (DELTASONE) 20 MG tablet; Take 1 tablet (20 mg) by mouth 2 times daily for 3 days  Dispense: 6 tablet; Refill: 0    2. Possible pregnancy, not confirmed  negative , urged BC plan      See Patient Instructions    Jarrod Escalante MD  [facility] [place]

## 2017-10-13 NOTE — MR AVS SNAPSHOT
After Visit Summary   10/13/2017    Cathy Arroyo    MRN: [id number]           Patient Information     Date Of Birth          1980        Visit Information        Provider Department      10/13/2017 11:30 AM Jarrod Escalante MD [facility]        Today's Diagnoses     Possible pregnancy, not confirmed    -  1    Mild persistent asthma with acute exacerbation           Follow-ups after your visit        Your next 10 appointments already scheduled     Oct 16, 2017 10:00 AM CDT   LAB with RD LAB   [facility] ([facility])    [address] [place] [address] [phone]           Patient must bring picture ID. Patient should be prepared to give a urine specimen  Please do not eat 10-12 hours before your appointment if you are coming in fasting for labs on lipids, cholesterol, or glucose (sugar). Pregnant women should follow their Care Team instructions. Water with medications is okay. Do not drink coffee or other fluids. If you have concerns about taking  your medications, please ask at office or if scheduling via Medifocus, send a message by clicking on Secure Messaging, Message Your Care Team.            Oct 23, 2017  2:30 PM CDT   (Arrive by 2:15 PM)   RETURN ENDOCRINE with Leatha Franco MD   [facility] ([facility])    [address] [address] [phone]              Who to contact     If you have questions or need follow up information about today's clinic visit or your schedule please contact [facility] directly at [phone].  Normal or non-critical lab and imaging results will be communicated to you by MyChart, letter or phone within 4 business days after the clinic has received the results. If you do not hear from us within 7 days, please contact the clinic through MyChart or phone. If you have a critical  or abnormal lab result, we will notify you by phone as soon as possible.  Submit refill requests through Softricity or call your pharmacy and they will forward the refill request to us. Please allow 3 business days for your refill to be completed.          Additional Information About Your Visit        ITS KOOLhart Information     Softricity gives you secure access to your electronic health record. If you see a primary care provider, you can also send messages to your care team and make appointments. If you have questions, please call your primary care clinic.  If you do not have a primary care provider, please call [phone] and they will assist you.        Care EveryWhere ID     This is your Care EveryWhere ID. This could be used by other organizations to access your [place] medical records  [id number]        Your Vitals Were     Pulse Temperature Last Period Pulse Oximetry BMI (Body Mass Index)       97 99  F (37.2  C) (Oral) 09/21/2017 96% 38.25 kg/m2        Blood Pressure from Last 3 Encounters:   10/13/17 124/82   06/16/17 121/81   06/01/17 130/86    Weight from Last 3 Encounters:   10/13/17 240 lb 9.6 oz (109.1 kg)   06/16/17 240 lb 12.8 oz (109.2 kg)   06/01/17 240 lb 9.6 oz (109.1 kg)              We Performed the Following     Beta HCG qual IFA urine - [facility] and [place]     CBC with platelets          Today's Medication Changes          These changes are accurate as of: 10/13/17 12:23 PM.  If you have any questions, ask your nurse or doctor.               Start taking these medicines.        Dose/Directions    azithromycin 250 MG tablet   Commonly known as:  ZITHROMAX   Used for:  Mild persistent asthma with acute exacerbation        Two tablets first day, then one tablet daily for four days.   Quantity:  6 tablet   Refills:  0       predniSONE 20 MG tablet   Commonly known as:  DELTASONE   Used for:  Mild persistent asthma with acute exacerbation        Dose:  20 mg   Take 1 tablet (20 mg) by mouth 2 times  daily for 3 days   Quantity:  6 tablet   Refills:  0         These medicines have changed or have updated prescriptions.        Dose/Directions    montelukast 10 MG tablet   Commonly known as:  SINGULAIR   This may have changed:  when to take this   Used for:  Chronic rhinitis, Moderate persistent asthma with acute exacerbation        Dose:  10 mg   Take 1 tablet (10 mg) by mouth At Bedtime   Quantity:  90 tablet   Refills:  3            Where to get your medicines      These medications were sent to [id number] [address] [place] [address] AT [address] [address]    Hours:  24-hours Phone:  [phone]     azithromycin 250 MG tablet    predniSONE 20 MG tablet                Primary Care Provider Office Phone # Fax #    Sherrill CRUZ Brown, APRN [facility] [phone]       [facility] [address] [facility] [address]  [facility] [address]        Equal Access to Services     NALDO HDEZ : Hadii samia ku hadasho Soomaali, waaxda luqadaha, qaybta kaalmada adeegyada, waxay idiin hayaan jeff schrader . So [facility] [phone].    ATENCIÓN: Si habla español, tiene a willard disposición servicios gratuitos de asistencia lingüística. Portia al [phone].    We comply with applicable federal civil rights laws and [place] laws. We do not discriminate on the basis of race, color, national origin, age, disability, sex, sexual orientation, or gender identity.            Thank you!     Thank you for choosing [facility]  for your care. Our goal is always to provide you with excellent care. Hearing back from our patients is one way we can continue to improve our services. Please take a few minutes to complete the written survey that you may receive in the mail after your visit with us. Thank you!             Your Updated Medication List - Protect others around you: Learn how to safely use, store and throw away your medicines at www.disposemymeds.org.           This list is accurate as of: 10/13/17 12:23 PM.  Always use your most recent med list.                   Brand Name Dispense Instructions for use Diagnosis    ABILIFY 15 MG tablet   Generic drug:  ARIPiprazole      Take 5 mg by mouth every morning        albuterol 108 (90 BASE) MCG/ACT Inhaler    PROAIR HFA/PROVENTIL HFA/VENTOLIN HFA    1 Inhaler    Inhale 2 puffs into the lungs every 4 hours as needed for shortness of breath / dyspnea or wheezing    Mild intermittent asthma without complication       azithromycin 250 MG tablet    ZITHROMAX    6 tablet    Two tablets first day, then one tablet daily for four days.    Mild persistent asthma with acute exacerbation       cetirizine 10 MG tablet    zyrTEC     Take 10 mg by mouth 2 times daily        fexofenadine 180 MG tablet    ALLEGRA    90 tablet    Take 1 tablet (180 mg) by mouth daily    Moderate persistent asthma with acute exacerbation, Chronic rhinitis       ipratropium - albuterol 0.5 mg/2.5 mg/3 mL 0.5-2.5 (3) MG/3ML neb solution    DUONEB    90 vial    Take 1 vial (3 mLs) by nebulization every 6 hours as needed for shortness of breath / dyspnea or wheezing    Mild persistent asthma with acute exacerbation       levothyroxine 150 MCG tablet    SYNTHROID/LEVOTHROID    96 tablet    Monday-Saturday: 1 tablet/day;  Sunday: 1.5 tablet    Postoperative hypothyroidism       mometasone-formoterol 200-5 MCG/ACT oral inhaler    DULERA    3 Inhaler    Inhale 2 puffs into the lungs 2 times daily    Moderate persistent asthma without complication       montelukast 10 MG tablet    SINGULAIR    90 tablet    Take 1 tablet (10 mg) by mouth At Bedtime    Chronic rhinitis, Moderate persistent asthma with acute exacerbation       nicotine 10 MG Inhaler    NICOTROL    180 each    Inhale 6-10 cartridge into the lungs daily as needed for smoking cessation    Tobacco use disorder       * order for DME     1 Device    Equipment being ordered: Inhalation Spacer    Moderate  persistent asthma without complication       * order for DME     1 each    nebulizer    Mild persistent asthma with acute exacerbation       * order for DME     1 each    nebulizer    Mild persistent asthma with acute exacerbation       predniSONE 20 MG tablet    DELTASONE    6 tablet    Take 1 tablet (20 mg) by mouth 2 times daily for 3 days    Mild persistent asthma with acute exacerbation       prenatal multivitamin plus iron 27-0.8 MG Tabs per tablet      Take 1 tablet by mouth every morning        * Notice:  This list has 3 medication(s) that are the same as other medications prescribed for you. Read the directions carefully, and ask your doctor or other care provider to review them with you.

## 2017-10-13 NOTE — NURSING NOTE
"Chief Complaint   Patient presents with     Fever       Initial /82 (BP Location: Right arm, Patient Position: Chair, Cuff Size: Adult Large)  Pulse 97  Temp 99  F (37.2  C) (Oral)  Wt 240 lb 9.6 oz (109.1 kg)  LMP 09/21/2017  SpO2 96%  BMI 38.25 kg/m2 Estimated body mass index is 38.25 kg/(m^2) as calculated from the following:    Height as of 6/16/17: 5' 6.5\" (1.689 m).    Weight as of this encounter: 240 lb 9.6 oz (109.1 kg).  Medication Reconciliation: complete     Jahaira Treadwell CMA    "

## 2017-10-14 ASSESSMENT — ASTHMA QUESTIONNAIRES: ACT_TOTALSCORE: 22

## 2017-10-16 DIAGNOSIS — E89.0 POSTOPERATIVE HYPOTHYROIDISM: ICD-10-CM

## 2017-10-16 LAB
T4 FREE SERPL-MCNC: 1.13 NG/DL (ref 0.76–1.46)
TSH SERPL DL<=0.005 MIU/L-ACNC: 11.17 MU/L (ref 0.4–4)

## 2017-10-16 PROCEDURE — 84443 ASSAY THYROID STIM HORMONE: CPT | Performed by: FAMILY MEDICINE

## 2017-10-16 PROCEDURE — 84439 ASSAY OF FREE THYROXINE: CPT | Performed by: FAMILY MEDICINE

## 2017-10-16 PROCEDURE — 36415 COLL VENOUS BLD VENIPUNCTURE: CPT | Performed by: FAMILY MEDICINE

## 2017-10-17 ASSESSMENT — ENCOUNTER SYMPTOMS
BACK PAIN: 1
NECK MASS: 0
JAUNDICE: 0
HALLUCINATIONS: 0
CONSTIPATION: 0
FLANK PAIN: 0
DIFFICULTY URINATING: 0
SNORES LOUDLY: 1
SHORTNESS OF BREATH: 1
NAIL CHANGES: 1
RECTAL PAIN: 0
HEMATURIA: 0
STIFFNESS: 0
ABDOMINAL PAIN: 1
SORE THROAT: 0
FEVER: 1
HEMOPTYSIS: 0
SMELL DISTURBANCE: 0
TASTE DISTURBANCE: 1
ARTHRALGIAS: 0
WEIGHT LOSS: 0
JOINT SWELLING: 0
BLOATING: 1
COUGH DISTURBING SLEEP: 1
CHILLS: 1
COUGH: 1
BOWEL INCONTINENCE: 0
DECREASED APPETITE: 1
DEPRESSION: 1
DECREASED CONCENTRATION: 0
NERVOUS/ANXIOUS: 1
ALTERED TEMPERATURE REGULATION: 1
SINUS PAIN: 0
FATIGUE: 1
MUSCLE CRAMPS: 1
NAUSEA: 1
POLYPHAGIA: 0
SKIN CHANGES: 0
NECK PAIN: 1
PANIC: 0
INCREASED ENERGY: 1
DYSURIA: 0
SINUS CONGESTION: 1
HOARSE VOICE: 0
WHEEZING: 1
HEARTBURN: 0
INSOMNIA: 0
RECTAL BLEEDING: 0
NIGHT SWEATS: 1
TROUBLE SWALLOWING: 0
WEIGHT GAIN: 0
DYSPNEA ON EXERTION: 1
MUSCLE WEAKNESS: 0
VOMITING: 0
BLOOD IN STOOL: 0
DIARRHEA: 1
RESPIRATORY PAIN: 0
MYALGIAS: 1
POSTURAL DYSPNEA: 1
POOR WOUND HEALING: 0
SPUTUM PRODUCTION: 1
POLYDIPSIA: 1

## 2017-10-23 ENCOUNTER — OFFICE VISIT (OUTPATIENT)
Dept: ENDOCRINOLOGY | Facility: CLINIC | Age: 37
End: 2017-10-23

## 2017-10-23 VITALS
WEIGHT: 247 LBS | DIASTOLIC BLOOD PRESSURE: 90 MMHG | HEIGHT: 67 IN | BODY MASS INDEX: 38.77 KG/M2 | HEART RATE: 96 BPM | SYSTOLIC BLOOD PRESSURE: 142 MMHG

## 2017-10-23 DIAGNOSIS — Z86.39 HISTORY OF GRAVES' DISEASE: ICD-10-CM

## 2017-10-23 DIAGNOSIS — E83.51 HYPOCALCEMIA: ICD-10-CM

## 2017-10-23 DIAGNOSIS — E89.0 POSTSURGICAL HYPOTHYROIDISM: Primary | ICD-10-CM

## 2017-10-23 RX ORDER — ARIPIPRAZOLE 5 MG/1
TABLET ORAL
COMMUNITY
Start: 2017-09-18 | End: 2018-04-27 | Stop reason: SINTOL

## 2017-10-23 RX ORDER — LEVOTHYROXINE SODIUM 175 UG/1
175 TABLET ORAL DAILY
Qty: 90 TABLET | Refills: 3 | Status: SHIPPED | OUTPATIENT
Start: 2017-10-23 | End: 2018-08-27 | Stop reason: DRUGHIGH

## 2017-10-23 ASSESSMENT — PAIN SCALES - GENERAL: PAINLEVEL: NO PAIN (0)

## 2017-10-23 NOTE — LETTER
10/23/2017       RE: Cathy Arroyo  [address]  [facility] [address]     Dear Colleague,    Thank you for referring your patient, Cathy Arroyo, to the [facility] ENDOCR[facility]OLOGY at [facility]. Please see a copy of my visit note below.    Endocrinology Attending Physician Progress note    A/p  1.   Post surgical hypothyroidism.  Unstable.    Increase LT4 to 175 mcg/day since I am not confident we can get compliance with an extra half tablet/week..    Repeat labs in 2 months    2 . Hypocalcemia noted post op.  Current status unknown. Recommend labs with next draw in 2 months. Work on vitamin D intake  In the meantime.      History of Graves'    SMOKER -5-10 cigs/day;  smoking cessation is advised.    Leatha Franco MD      Cc/HPI:    Cathy presents for follow up of  Post surgical hypothyroidism in the context of history of Graves'.      she was treated with total thyroidectomy on 5/3/17.  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  There was a 0.3 cm follicular adenoma on the left in what was grossly called lymph node, left posterior cricoid excision.  Post op PTPH was 5.  She was biochemically  hypothyroid when I saw her in June.that visit was erroneously labeled as a phone visit but it was a face to face visit. We increased the LT4 dose from 150 mcg/day to 150 * 7.5/week, divided. She was to have follow up labs in 2 months, but didn't.  However,  She already had labs in anticipation of this appt.  The free T4 is 1.13, TSH 11.17 on 10/16/17. She admitted missing the weekly extra half dose LT4 which might explain the failure of the TFTS to improve.        ROS  Lethargic  Tired all the time  Sleep at night is good; too much  Can't lose weight despite eating smaller portions  EYES: some eye matter; weepy;   Cardiac: had manic episode recently  with heart symptoms then but not now   Respiratory; recent bacteria infection/ has prednisone she was supposed to take for the asthma; + Wheezing;  + coughing  GI: alternating constipation/ diarrhea more frequent  LMP 5 days ago; might like to try again to get pregnant in January  Joints are starting to hurt due to weight  + a lot of muscle cramps - in the thighs and calves;       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     Post partum depression      Thyroid disease     hashimotos     Uncomplicated asthma      History of benzodiazepine dependence     Past Surgical History:   Procedure Laterality Date     BLOOD PATCH N/A 10/11/2016    Procedure: EPIDURAL BLOOD PATCH;  Surgeon: GENERIC ANESTHESIA PROVIDER;  Location: [facility] OR     CRANIOTOMY, EXCISE TUMOR COMPLEX, COMBINED  2/19/2014    Procedure: COMBINED CRANIOTOMY, EXCISE TUMOR COMPLEX;  Supracerabellar  Infratentorial Approach for Resection of Tumor ;  Surgeon: Kate Mckeon MD;  Location: [facility] OR     THYROIDECTOMY N/A 5/3/2017    Procedure: THYROIDECTOMY;  Total Thyroidectomy;  Surgeon: Pamela Villasenor MD;  Location: [facility] OR       Current Outpatient Prescriptions   Medication Sig Dispense Refill     ARIPiprazole (ABILIFY) 5 MG tablet        levothyroxine (SYNTHROID/LEVOTHROID) 150 MCG tablet Monday-Saturday: 1 tablet/day;   Sunday: 1.5 tablet (Patient taking differently: 150 mcg daily ) 96 tablet 3     albuterol (PROAIR HFA/PROVENTIL HFA/VENTOLIN HFA) 108 (90 BASE) MCG/ACT Inhaler Inhale 2 puffs into the lungs every 4 hours as needed for shortness of breath / dyspnea or wheezing 1 Inhaler 3     order for DME nebulizer 1 each 0     order for DME nebulizer 1 each 0     ipratropium - albuterol 0.5 mg/2.5 mg/3 mL (DUONEB) 0.5-2.5 (3) MG/3ML neb solution Take 1 vial  (3 mLs) by nebulization every 6 hours as needed for shortness of breath / dyspnea or wheezing 90 vial 3     order for DME Equipment being ordered: Inhalation Spacer 1 Device 0     mometasone-formoterol (DULERA) 200-5 MCG/ACT oral inhaler Inhale 2 puffs into the lungs 2 times daily 3 Inhaler 1     montelukast (SINGULAIR) 10 MG tablet Take 1 tablet (10 mg) by mouth At Bedtime (Patient taking differently: Take 10 mg by mouth every morning ) 90 tablet 3     fexofenadine (ALLEGRA) 180 MG tablet Take 1 tablet (180 mg) by mouth daily 90 tablet 3     Prenatal Vit-Fe Fumarate-FA (PRENATAL MULTIVITAMIN  PLUS IRON) 27-0.8 MG TABS Take 1 tablet by mouth every morning        She is not taking calcium or vitamin D      Social History     Social History     Marital status:      Spouse name: N/A     Number of children: N/A     Years of education: N/A     Occupational History     Not on file.     Social History Main Topics     Smoking status: Current Every Day Smoker     Packs/day: 0.50     Years: 12.00     Types: Cigarettes     Start date: 2004     Last attempt to quit: 2016     Smokeless tobacco: Never Used     Alcohol use No      Comment: quit 16 days ago     Drug use: Yes     Special: Marijuana     Sexual activity: Yes     Partners: Male     Birth control/ protection: Inserts      Comment: will use condoms for post- birth control     Other Topics Concern     Parent/Sibling W/ Cabg, Mi Or Angioplasty Before 65f 55m? No     Social History Narrative    Caffeine intake/servings daily - 1    Calcium intake/servings daily - 3    Exercise 0 times weekly - describe ; encouraged walking daily    Sunscreen used - Yes    Seatbelts used - Yes    Guns stored in the home - No    Self Breast Exam - Yes    Pap test up to date -  No    Eye exam up to date -  No    Dental exam up to date -  No    DEXA scan up to date -  No    Flex Sig/Colonoscopy up to date -  No    Mammography up to date -  No    Immunizations reviewed and  "up to date - Yes    Abuse: Current or Past (Physical, Sexual or Emotional) - No    Do you feel safe in your environment - Yes    Do you cope well with stress - Yes    Do you suffer from insomnia - No    Last updated by: Noemi Cortez  3/1/2016             Baby is 1 year and 2 weeks;    GENERAL pleasant young woman in NAD  /90  Pulse 96  Ht 1.689 m (5' 6.5\")  Wt 112 kg (247 lb)  LMP 09/21/2017  BMI 39.27 kg/m2  SKIN: normal color, temperature, texture.   HEENT: PER, EOMI, no scleral icterus, eyelid retraction, stare, lid lag, proptosis or conjunctival injection.    NECK:  Nearly invisible neck scar.  No palpable thyroid.   LUNGS: clear to auscultation bilaterally.   CARDIAC: RRR, S1, S2 without murmurs, rubs or gallops.    BACK: normal spinal contour.    NEURO: Alert, responds appropriately to questions, moves all extremities, DTRs 1/4, gait normal, no tremor of the outstretched hand      DATA :    ENDO THYROID LABS-[facility] Latest Ref Rng & Units 10/16/2017 6/13/2017   TSH 0.40 - 4.00 mU/L 11.17 (H) 6.96 (H)   T4 TOTAL 4.5 - 13.9 ug/dL     T4 FREE 0.76 - 1.46 ng/dL 1.13 1.02   TRIIODOTHYRONINE(T3) 60 - 181 ng/dL  78   THYROID STIM IMMUNOG        [facility] [place] [facility] 5/2/2017 4/30/2017   TSH 0.40 - 4.00 mU/L  0.04 (L)   T4 TOTAL 4.5 - 13.9 ug/dL     T4 FREE 0.76 - 1.46 ng/dL 0.99 1.02   TRIIODOTHYRONINE(T3) 60 - 181 ng/dL 124    THYROID STIM IMMUNOG        [facility] [place] [facility] 4/18/2017 3/17/2017   TSH 0.40 - 4.00 mU/L 0.01 (L) <0.01 (L)   T4 TOTAL 4.5 - 13.9 ug/dL     T4 FREE 0.76 - 1.46 ng/dL 1.07 1.13   TRIIODOTHYRONINE(T3) 60 - 181 ng/dL 143 179   THYROID STIM IMMUNOG        END[facility]ROID LABS-[facility] Latest Ref Rng & Units 2/1/2017   TSH 0.40 - 4.00 mU/L 0.02 (L)   T4 TOTAL 4.5 - 13.9 ug/dL    T4 FREE 0.76 - 1.46 ng/dL 1.26   TRIIODOTHYRONINE(T3) 60 - 181 ng/dL 172   THYROID STIM IMMUNOG  5.8 (H)     Results for ZEINA CABAN (MRN " [id number]) as of 10/23/2017 08:53   Ref. Range 6/13/2017 13:09   Calcium Latest Ref Range: 8.5 - 10.1 mg/dL 8.3 (L)   Phosphorus Latest Ref Range: 2.5 - 4.5 mg/dL 2.3 (L)

## 2017-10-23 NOTE — PATIENT INSTRUCTIONS
Get labs in about 2 months.    Increase the levothyroxine to 175 mcg/day    Get back on the vitamin D that the psychiatrist recommended.        Information for patients on Levo-thyroxine      The thyroid hormone, Levo-thyroxine (L-T4) is one of the main hormones normally produced by the thyroid.  The drug is identical in chemical structure to the natural product.  Levothyroxine is used to treat hypothyroidism (underactive thyroid).  Sometimes it is used even when the thyroid is not underactive, to treat a goiter (enlarged thyroid) or a thyroid nodule.  For patients with a history of thyroid removal, L-T4 is used to replace the deficiency created by the surgery.    The purpose of giving L-T4 to treat hypothyroidism is to make up for the thyroid hormone previously produced by your thyroid before it failed.  Depending on the extent of your thyroid failure, you may require a higher or a lower dose of L-T4.  The goal is to make your blood level of TSH (a hormone made by the master gland, the pituitary, the gland which controls and judges the thyroid) normal.    This drug is usually well-tolerated and safe but it is important to take the proper dose for YOU.  This involves periodic measurements of TSH, usually within 1-2 months of a dose change.  You should be off biotin containing supplements for at least one week prior to thyroid blood tests.    You should alert your doctor if you have signs you are getting too much L-T4.  These include excessive nervousness, heart fluttering or skipping beats, diarrhea, or feeling too hot and/or sweaty.      There are many brand names for Levo-thyroxine (L-T4), including Synthroid, Levoxyl, Levothroid, Unithroid, Tirosint and others.  Changing from one brand name thyroid hormone product to another or to a generic product (all generics are called levothyroxine) can cause changes in your thyroid hormone balance, even if the dose stays the same.  Since generic products can come from many  different companies (such as [employer], [employer], [employer] and others), there may be less consistency among the different generic preparations.  The decision to change brands or to change to a generic product must be made with your physician or other caregiver.  Follow-up testing should be arranged to monitor for any needed adjustments.  Monitoring may need to be more frequent if you always receive a generic thyroid hormone product.    For proper thyroid hormone balance the dose of thyroid hormone in your pills must be precise and consistent.    Be sure to take the medication as prescribed on an empty stomach, and at least 4 hours apart from calcium supplements, iron and soy products.  Store the medication away from severe heat and humidity.    You should be OFF any biotin containing supplements at least 7 days prior to thyroid lab draws.       Many insurance companies and other providers charge higher co-payments for brand name medications.  Since brand name L-T4 is not very expensive, be sure to ask if the actual cost of buying the medication is less than the co-payment.  In some instances the charge for a co-payment may be greater than buying the drug outright, especially if the prescription needs to be refilled every 30 days.

## 2017-10-23 NOTE — MR AVS SNAPSHOT
After Visit Summary   10/23/2017    Cathy Arroyo    MRN: [id number]           Patient Information     Date Of Birth          1980        Visit Information        Provider Department      10/23/2017 2:30 PM Leatha Franco MD [facility]        Today's Diagnoses     Postsurgical hypothyroidism    -  1    Hypocalcemia          Care Instructions    Get labs in about 2 months.    Increase the levothyroxine to 175 mcg/day    Get back on the vitamin D that the psychiatrist recommended.        Information for patients on Levo-thyroxine      The thyroid hormone, Levo-thyroxine (L-T4) is one of the main hormones normally produced by the thyroid.  The drug is identical in chemical structure to the natural product.  Levothyroxine is used to treat hypothyroidism (underactive thyroid).  Sometimes it is used even when the thyroid is not underactive, to treat a goiter (enlarged thyroid) or a thyroid nodule.  For patients with a history of thyroid removal, L-T4 is used to replace the deficiency created by the surgery.    The purpose of giving L-T4 to treat hypothyroidism is to make up for the thyroid hormone previously produced by your thyroid before it failed.  Depending on the extent of your thyroid failure, you may require a higher or a lower dose of L-T4.  The goal is to make your blood level of TSH (a hormone made by the master gland, the pituitary, the gland which controls and judges the thyroid) normal.    This drug is usually well-tolerated and safe but it is important to take the proper dose for YOU.  This involves periodic measurements of TSH, usually within 1-2 months of a dose change.  You should be off biotin containing supplements for at least one week prior to thyroid blood tests.    You should alert your doctor if you have signs you are getting too much L-T4.  These include excessive nervousness, heart fluttering or skipping beats, diarrhea, or feeling too hot and/or  sweaty.      There are many brand names for Levo-thyroxine (L-T4), including Synthroid, Levoxyl, Levothroid, Unithroid, Tirosint and others.  Changing from one brand name thyroid hormone product to another or to a generic product (all generics are called levothyroxine) can cause changes in your thyroid hormone balance, even if the dose stays the same.  Since generic products can come from many different companies (such as [employer], [employer], [employer] and others), there may be less consistency among the different generic preparations.  The decision to change brands or to change to a generic product must be made with your physician or other caregiver.  Follow-up testing should be arranged to monitor for any needed adjustments.  Monitoring may need to be more frequent if you always receive a generic thyroid hormone product.    For proper thyroid hormone balance the dose of thyroid hormone in your pills must be precise and consistent.    Be sure to take the medication as prescribed on an empty stomach, and at least 4 hours apart from calcium supplements, iron and soy products.  Store the medication away from severe heat and humidity.    You should be OFF any biotin containing supplements at least 7 days prior to thyroid lab draws.       Many insurance companies and other providers charge higher co-payments for brand name medications.  Since brand name L-T4 is not very expensive, be sure to ask if the actual cost of buying the medication is less than the co-payment.  In some instances the charge for a co-payment may be greater than buying the drug outright, especially if the prescription needs to be refilled every 30 days.            Follow-ups after your visit        Future tests that were ordered for you today     Open Future Orders        Priority Expected Expires Ordered    TSH Routine 12/22/2017 10/23/2018 10/23/2017    T4 free Routine 12/22/2017 10/23/2018 10/23/2017    Calcium Routine 12/22/2017 10/23/2018 10/23/2017     "Phosphorus Routine 12/22/2017 10/23/2018 10/23/2017            Who to contact     Please call your clinic at [phone] to:    Ask questions about your health    Make or cancel appointments    Discuss your medicines    Learn about your test results    Speak to your doctor   If you have compliments or concerns about an experience at your clinic, or if you wish to file a complaint, please contact [facility] Patient Relations at [phone] or email us at [email or web address]         Additional Information About Your Visit        [employer] Information     [email or web address] gives you secure access to your electronic health record. If you see a primary care provider, you can also send messages to your care team and make appointments. If you have questions, please call your primary care clinic.  If you do not have a primary care provider, please call [phone] and they will assist you.      [email or web address] is an electronic gateway that provides easy, online access to your medical records. With [email or web address], you can request a clinic appointment, read your test results, renew a prescription or communicate with your care team.     To access your existing account, please contact your [facility] or call [phone] for assistance.        Care EveryWhere ID     This is your Care EveryWhere ID. This could be used by other organizations to access your [place] medical records  [id number]        Your Vitals Were     Pulse Height Last Period BMI (Body Mass Index)          96 1.689 m (5' 6.5\") 09/21/2017 39.27 kg/m2         Blood Pressure from Last 3 Encounters:   10/23/17 142/90   10/13/17 124/82   06/16/17 121/81    Weight from Last 3 Encounters:   10/23/17 112 kg (247 lb)   10/13/17 109.1 kg (240 lb 9.6 oz)   06/16/17 109.2 kg (240 lb 12.8 oz)                 Today's Medication Changes          These changes are accurate as of: 10/23/17  2:54 PM.  If you have any questions, ask " your nurse or doctor.               These medicines have changed or have updated prescriptions.        Dose/Directions    ABILIFY 5 MG tablet   This may have changed:  Another medication with the same name was removed. Continue taking this medication, and follow the directions you see here.   Used for:  Postsurgical hypothyroidism, Hypocalcemia   Generic drug:  ARIPiprazole        Refills:  0       levothyroxine 175 MCG tablet   Commonly known as:  SYNTHROID/LEVOTHROID   This may have changed:    - medication strength  - how much to take  - how to take this  - when to take this  - additional instructions   Used for:  Postsurgical hypothyroidism, Hypocalcemia        Dose:  175 mcg   Take 1 tablet (175 mcg) by mouth daily   Quantity:  90 tablet   Refills:  3       montelukast 10 MG tablet   Commonly known as:  SINGULAIR   This may have changed:  when to take this   Used for:  Chronic rhinitis, Moderate persistent asthma with acute exacerbation        Dose:  10 mg   Take 1 tablet (10 mg) by mouth At Bedtime   Quantity:  90 tablet   Refills:  3         Stop taking these medicines if you haven't already. Please contact your care team if you have questions.     azithromycin 250 MG tablet   Commonly known as:  ZITHROMAX           cetirizine 10 MG tablet   Commonly known as:  zyrTEC           nicotine 10 MG Inhaler   Commonly known as:  NICOTROL                Where to get your medicines      These medications were sent to [facility] [address] [place] [address] AT [address] [address]    Hours:  24-hours Phone:  [phone]     levothyroxine 175 MCG tablet                Primary Care Provider Office Phone # Fax #    JANNET Nascimento [facility] [phone]       [facility] [address] [address]        Equal Access to Services     NALDO HDEZ : Ashly Schneider, sidney stanley, becca lin  louise graemehillary vivianaaraceli schrader ah. So [facility] [phone].    ATENCIÓN: Si robla nikolai, tiene a willard disposición servicios gratuitos de asistencia lingüística. Portia al [phone].    We comply with applicable federal civil rights laws and [place] laws. We do not discriminate on the basis of race, color, national origin, age, disability, sex, sexual orientation, or gender identity.            Thank you!     Thank you for choosing [facility]  for your care. Our goal is always to provide you with excellent care. Hearing back from our patients is one way we can continue to improve our services. Please take a few minutes to complete the written survey that you may receive in the mail after your visit with us. Thank you!             Your Updated Medication List - Protect others around you: Learn how to safely use, store and throw away your medicines at www.disposemymeds.org.          This list is accurate as of: 10/23/17  2:54 PM.  Always use your most recent med list.                   Brand Name Dispense Instructions for use Diagnosis    ABILIFY 5 MG tablet   Generic drug:  ARIPiprazole       Postsurgical hypothyroidism, Hypocalcemia       albuterol 108 (90 BASE) MCG/ACT Inhaler    PROAIR HFA/PROVENTIL HFA/VENTOLIN HFA    1 Inhaler    Inhale 2 puffs into the lungs every 4 hours as needed for shortness of breath / dyspnea or wheezing    Mild intermittent asthma without complication       fexofenadine 180 MG tablet    ALLEGRA    90 tablet    Take 1 tablet (180 mg) by mouth daily    Moderate persistent asthma with acute exacerbation, Chronic rhinitis       ipratropium - albuterol 0.5 mg/2.5 mg/3 mL 0.5-2.5 (3) MG/3ML neb solution    DUONEB    90 vial    Take 1 vial (3 mLs) by nebulization every 6 hours as needed for shortness of breath / dyspnea or wheezing    Mild persistent asthma with acute exacerbation       levothyroxine 175 MCG tablet    SYNTHROID/LEVOTHROID    90 tablet    Take 1 tablet (175 mcg) by mouth  daily    Postsurgical hypothyroidism, Hypocalcemia       mometasone-formoterol 200-5 MCG/ACT oral inhaler    DULERA    3 Inhaler    Inhale 2 puffs into the lungs 2 times daily    Moderate persistent asthma without complication       montelukast 10 MG tablet    SINGULAIR    90 tablet    Take 1 tablet (10 mg) by mouth At Bedtime    Chronic rhinitis, Moderate persistent asthma with acute exacerbation       * order for DME     1 Device    Equipment being ordered: Inhalation Spacer    Moderate persistent asthma without complication       * order for DME     1 each    nebulizer    Mild persistent asthma with acute exacerbation       * order for DME     1 each    nebulizer    Mild persistent asthma with acute exacerbation       prenatal multivitamin plus iron 27-0.8 MG Tabs per tablet      Take 1 tablet by mouth every morning        * Notice:  This list has 3 medication(s) that are the same as other medications prescribed for you. Read the directions carefully, and ask your doctor or other care provider to review them with you.

## 2017-10-23 NOTE — PROGRESS NOTES
Endocrinology Attending Physician Progress note    A/p  1.   Post surgical hypothyroidism.  Unstable.    Increase LT4 to 175 mcg/day since I am not confident we can get compliance with an extra half tablet/week..    Repeat labs in 2 months    2 . Hypocalcemia noted post op.  Current status unknown. Recommend labs with next draw in 2 months. Work on vitamin D intake  In the meantime.      History of Graves'    SMOKER -5-10 cigs/day;  smoking cessation is advised.    Leatha Franco MD      Cc/HPI:    Cathy presents for follow up of  Post surgical hypothyroidism in the context of history of Graves'.      she was treated with total thyroidectomy on 5/3/17.  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  There was a 0.3 cm follicular adenoma on the left in what was grossly called lymph node, left posterior cricoid excision.  Post op PTPH was 5.  She was biochemically  hypothyroid when I saw her in June.that visit was erroneously labeled as a phone visit but it was a face to face visit. We increased the LT4 dose from 150 mcg/day to 150 * 7.5/week, divided. She was to have follow up labs in 2 months, but didn't.  However,  She already had labs in anticipation of this appt.  The free T4 is 1.13, TSH 11.17 on 10/16/17. She admitted missing the weekly extra half dose LT4 which might explain the failure of the TFTS to improve.        ROS  Lethargic  Tired all the time  Sleep at night is good; too much  Can't lose weight despite eating smaller portions  EYES: some eye matter; weepy;   Cardiac: had manic episode recently with heart symptoms then but not now   Respiratory; recent bacteria infection/ has prednisone she was supposed to take for the asthma; + Wheezing;  + coughing  GI: alternating constipation/ diarrhea more frequent  LMP 5 days ago; might like to try again to get pregnant in January  Joints are starting to hurt due  to weight  + a lot of muscle cramps - in the thighs and calves;       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     Post partum depression      Thyroid disease     hashimotos     Uncomplicated asthma      History of benzodiazepine dependence     Past Surgical History:   Procedure Laterality Date     BLOOD PATCH N/A 10/11/2016    Procedure: EPIDURAL BLOOD PATCH;  Surgeon: GENERIC ANESTHESIA PROVIDER;  Location: [facility] OR     CRANIOTOMY, EXCISE TUMOR COMPLEX, COMBINED  2/19/2014    Procedure: COMBINED CRANIOTOMY, EXCISE TUMOR COMPLEX;  Supracerabellar  Infratentorial Approach for Resection of Tumor ;  Surgeon: Kate Mckeon MD;  Location: [facility] OR     THYROIDECTOMY N/A 5/3/2017    Procedure: THYROIDECTOMY;  Total Thyroidectomy;  Surgeon: Pamela Villasenor MD;  Location: [facility] OR       Current Outpatient Prescriptions   Medication Sig Dispense Refill     ARIPiprazole (ABILIFY) 5 MG tablet        levothyroxine (SYNTHROID/LEVOTHROID) 150 MCG tablet Monday-Saturday: 1 tablet/day;   Sunday: 1.5 tablet (Patient taking differently: 150 mcg daily ) 96 tablet 3     albuterol (PROAIR HFA/PROVENTIL HFA/VENTOLIN HFA) 108 (90 BASE) MCG/ACT Inhaler Inhale 2 puffs into the lungs every 4 hours as needed for shortness of breath / dyspnea or wheezing 1 Inhaler 3     order for DME nebulizer 1 each 0     order for DME nebulizer 1 each 0     ipratropium - albuterol 0.5 mg/2.5 mg/3 mL (DUONEB) 0.5-2.5 (3) MG/3ML neb solution Take 1 vial (3 mLs) by nebulization every 6 hours as needed for shortness of breath / dyspnea or wheezing 90 vial 3     order for DME Equipment being ordered: Inhalation Spacer 1 Device 0     mometasone-formoterol (DULERA) 200-5 MCG/ACT oral inhaler Inhale 2 puffs into the lungs 2 times daily 3 Inhaler 1     montelukast  (SINGULAIR) 10 MG tablet Take 1 tablet (10 mg) by mouth At Bedtime (Patient taking differently: Take 10 mg by mouth every morning ) 90 tablet 3     fexofenadine (ALLEGRA) 180 MG tablet Take 1 tablet (180 mg) by mouth daily 90 tablet 3     Prenatal Vit-Fe Fumarate-FA (PRENATAL MULTIVITAMIN  PLUS IRON) 27-0.8 MG TABS Take 1 tablet by mouth every morning        She is not taking calcium or vitamin D      Social History     Social History     Marital status:      Spouse name: N/A     Number of children: N/A     Years of education: N/A     Occupational History     Not on file.     Social History Main Topics     Smoking status: Current Every Day Smoker     Packs/day: 0.50     Years: 12.00     Types: Cigarettes     Start date: 2004     Last attempt to quit: 2016     Smokeless tobacco: Never Used     Alcohol use No      Comment: quit 16 days ago     Drug use: Yes     Special: Marijuana     Sexual activity: Yes     Partners: Male     Birth control/ protection: Inserts      Comment: will use condoms for post-christina birth control     Other Topics Concern     Parent/Sibling W/ Cabg, Mi Or Angioplasty Before 65f 55m? No     Social History Narrative    Caffeine intake/servings daily - 1    Calcium intake/servings daily - 3    Exercise 0 times weekly - describe ; encouraged walking daily    Sunscreen used - Yes    Seatbelts used - Yes    Guns stored in the home - No    Self Breast Exam - Yes    Pap test up to date -  No    Eye exam up to date -  No    Dental exam up to date -  No    DEXA scan up to date -  No    Flex Sig/Colonoscopy up to date -  No    Mammography up to date -  No    Immunizations reviewed and up to date - Yes    Abuse: Current or Past (Physical, Sexual or Emotional) - No    Do you feel safe in your environment - Yes    Do you cope well with stress - Yes    Do you suffer from insomnia - No    Last updated by: Noemi Cortez  3/1/2016             Baby is 1 year and 2 weeks;    GENERAL  "pleasant young woman in NAD  /90  Pulse 96  Ht 1.689 m (5' 6.5\")  Wt 112 kg (247 lb)  LMP 09/21/2017  BMI 39.27 kg/m2  SKIN: normal color, temperature, texture.   HEENT: PER, EOMI, no scleral icterus, eyelid retraction, stare, lid lag, proptosis or conjunctival injection.    NECK:  Nearly invisible neck scar.  No palpable thyroid.   LUNGS: clear to auscultation bilaterally.   CARDIAC: RRR, S1, S2 without murmurs, rubs or gallops.    BACK: normal spinal contour.    NEURO: Alert, responds appropriately to questions, moves all extremities, DTRs 1/4, gait normal, no tremor of the outstretched hand      DATA :    END[facility] THYROID LABS-[facility] Latest Ref Rng & Units 10/16/2017 6/13/2017   TSH 0.40 - 4.00 mU/L 11.17 (H) 6.96 (H)   T4 TOTAL 4.5 - 13.9 ug/dL     T4 FREE 0.76 - 1.46 ng/dL 1.13 1.02   TRIIODOTHYRONINE(T3) 60 - 181 ng/dL  78   THYROID STIM IMMUNOG        [facility] 5/2/2017 4/30/2017   TSH 0.40 - 4.00 mU/L  0.04 (L)   T4 TOTAL 4.5 - 13.9 ug/dL     T4 FREE 0.76 - 1.46 ng/dL 0.99 1.02   TRIIODOTHYRONINE(T3) 60 - 181 ng/dL 124    THYROID STIM IMMUNOG        [facility] Latest Ref Rng & Units 4/18/2017 3/17/2017   TSH 0.40 - 4.00 mU/L 0.01 (L) <0.01 (L)   T4 TOTAL 4.5 - 13.9 ug/dL     T4 FREE 0.76 - 1.46 ng/dL 1.07 1.13   TRIIODOTHYRONINE(T3) 60 - 181 ng/dL 143 179   THYROID STIM IMMUNOG        [facility]RO[facility] Latest Ref Rng & Units 2/1/2017   TSH 0.40 - 4.00 mU/L 0.02 (L)   T4 TOTAL 4.5 - 13.9 ug/dL    T4 FREE 0.76 - 1.46 ng/dL 1.26   TRIIODOTHYRONINE(T3) 60 - 181 ng/dL 172   THYROID STIM IMMUNOG  5.8 (H)     Results for MILDREDLEIGHANN ZEINA ALVAREZINE (MRN [id number]) as of 10/23/2017 08:53   Ref. Range 6/13/2017 13:09   Calcium Latest Ref Range: 8.5 - 10.1 mg/dL 8.3 (L)   Phosphorus Latest Ref Range: 2.5 - 4.5 mg/dL 2.3 (L)     "

## 2017-10-24 PROBLEM — Z86.39 HISTORY OF GRAVES' DISEASE: Status: ACTIVE | Noted: 2017-10-24

## 2017-11-01 ENCOUNTER — TELEPHONE (OUTPATIENT)
Dept: FAMILY MEDICINE | Facility: CLINIC | Age: 37
End: 2017-11-01

## 2017-11-01 DIAGNOSIS — Z71.6 ENCOUNTER FOR SMOKING CESSATION COUNSELING: Primary | ICD-10-CM

## 2017-11-01 RX ORDER — NICOTINE 21 MG/24HR
1 PATCH, TRANSDERMAL 24 HOURS TRANSDERMAL EVERY 24 HOURS
Qty: 30 PATCH | Refills: 1 | Status: SHIPPED | OUTPATIENT
Start: 2017-11-01 | End: 2017-12-08

## 2017-11-01 NOTE — TELEPHONE ENCOUNTER
Zamzam,  Please see request below.    Last office visit was on 10/13/17 with Dr. Escalante.    Christine Castillo RN  [facility]

## 2017-11-01 NOTE — TELEPHONE ENCOUNTER
Reason for call:  Other   Patient called regarding (reason for call): prescription  Additional comments: Pt would like to know if she needs to have an office visit to be prescribed smoking cessation patches, or if Zamzam would prescribe them for her without being seen. She stated that if she can just prescribe them for her, she is currently smoking about 1 pack a day and would need the 21mg patches to start. Pharmacy is queued.      Phone number to reach patient:  Home number on file [phone] (home)    Best Time:  Any    Can we leave a detailed message on this number?  YES

## 2017-11-06 ENCOUNTER — OFFICE VISIT (OUTPATIENT)
Dept: FAMILY MEDICINE | Facility: CLINIC | Age: 37
End: 2017-11-06
Payer: COMMERCIAL

## 2017-11-06 VITALS
HEART RATE: 111 BPM | DIASTOLIC BLOOD PRESSURE: 72 MMHG | TEMPERATURE: 98.9 F | OXYGEN SATURATION: 97 % | WEIGHT: 242.5 LBS | SYSTOLIC BLOOD PRESSURE: 118 MMHG | BODY MASS INDEX: 38.06 KG/M2 | HEIGHT: 67 IN

## 2017-11-06 DIAGNOSIS — R30.0 DYSURIA: ICD-10-CM

## 2017-11-06 DIAGNOSIS — R07.0 THROAT PAIN: ICD-10-CM

## 2017-11-06 DIAGNOSIS — F31.60 BIPOLAR AFFECTIVE DISORDER, CURRENT EPISODE MIXED, CURRENT EPISODE SEVERITY UNSPECIFIED (H): ICD-10-CM

## 2017-11-06 DIAGNOSIS — Z72.0 TOBACCO ABUSE: ICD-10-CM

## 2017-11-06 DIAGNOSIS — J45.41 MODERATE PERSISTENT ASTHMA WITH ACUTE EXACERBATION: Primary | ICD-10-CM

## 2017-11-06 LAB
ALBUMIN UR-MCNC: NEGATIVE MG/DL
APPEARANCE UR: CLEAR
BACTERIA #/AREA URNS HPF: ABNORMAL /HPF
BILIRUB UR QL STRIP: NEGATIVE
COLOR UR AUTO: YELLOW
DEPRECATED S PYO AG THROAT QL EIA: NORMAL
GLUCOSE UR STRIP-MCNC: NEGATIVE MG/DL
HGB UR QL STRIP: NEGATIVE
KETONES UR STRIP-MCNC: NEGATIVE MG/DL
LEUKOCYTE ESTERASE UR QL STRIP: ABNORMAL
MUCOUS THREADS #/AREA URNS LPF: PRESENT /LPF
NITRATE UR QL: NEGATIVE
NON-SQ EPI CELLS #/AREA URNS LPF: ABNORMAL /LPF
PH UR STRIP: 6 PH (ref 5–7)
RBC #/AREA URNS AUTO: ABNORMAL /HPF
SOURCE: ABNORMAL
SP GR UR STRIP: 1.02 (ref 1–1.03)
SPECIMEN SOURCE: NORMAL
UROBILINOGEN UR STRIP-ACNC: 0.2 EU/DL (ref 0.2–1)
WBC #/AREA URNS AUTO: ABNORMAL /HPF
YEAST #/AREA URNS HPF: ABNORMAL /HPF

## 2017-11-06 PROCEDURE — 87880 STREP A ASSAY W/OPTIC: CPT | Performed by: NURSE PRACTITIONER

## 2017-11-06 PROCEDURE — 87081 CULTURE SCREEN ONLY: CPT | Performed by: NURSE PRACTITIONER

## 2017-11-06 PROCEDURE — 99214 OFFICE O/P EST MOD 30 MIN: CPT | Performed by: NURSE PRACTITIONER

## 2017-11-06 PROCEDURE — 81001 URINALYSIS AUTO W/SCOPE: CPT | Performed by: NURSE PRACTITIONER

## 2017-11-06 RX ORDER — MONTELUKAST SODIUM 10 MG/1
10 TABLET ORAL EVERY MORNING
Qty: 90 TABLET | Refills: 1 | Status: SHIPPED | OUTPATIENT
Start: 2017-11-06 | End: 2018-05-21

## 2017-11-06 NOTE — PROGRESS NOTES
SUBJECTIVE:   Cathy Arroyo is a 37 year old female who presents to clinic today for the following health issues:    Acute Illness   Acute illness concerns: Sore throat  Onset: this morning    Fever: YES- she said the temp was a little high for her today here. She did not take it at home.     Chills/Sweats: YES- not sure if from thyroid, feels hot most of the time.      Headache (location?): no    Sinus Pressure:YES- tender little bit    Conjunctivitis:  no     Ear Pain: no    Rhinorrhea: no    Congestion: YES- little bit in head    Sore Throat: YES     Cough: YES- coughing up white discharge, with shortness of breath, barking, worsening over time    Wheeze: YES    Decreased Appetite: YES    Nausea: YES    Vomiting: YES- little bit from coughing hard.     Diarrhea:  YES- not sure if from  Med change, thyroid med.     Dysuria/Freq.: YES- little pain and some pressure    Fatigue/Achiness: YES    Sick/Strep Exposure: no     Therapies Tried and outcome: not yet        URINARY TRACT SYMPTOMS  Onset: off and on for about 4 days    Description:   Painful urination (Dysuria): YES  Blood in urine (Hematuria): no   Delay in urine (Hesitency): YES    Intensity: mild    Progression of Symptoms:  same and intermittent    Accompanying Signs & Symptoms:  Fever/chills: YES- possible from medication change  Flank pain no, always has some back pain, but not in kidney area.   Nausea and vomiting: YES  Any vaginal symptoms: none  Abdominal/Pelvic Pain: YES- some cramping    History:   History of frequent UTI's: YES- has had a kidney infection.  History of kidney stones: YES  Sexually Active: YES  Possibility of pregnancy: always possible, using condoms for birth control.    Precipitating factors:   Medication changes    Therapies Tried and outcome: nothing so far.     Has had some trouble breathing, has been out of asthma medications for a while. Was forgetting to take regularly.  Tried taking prednisone for a few days  last year at a small dose- didn't cause jose eduardo, but didn't particularly help breathing. Has been smoking daily as well. Has tried chantix in the past- caused jose eduardo. Doesn't like nicotine gum or lozenges. Patches have been okay, plans to start next week. Has been smoking more due to anniversary of mother's death. Sees a counselor and a therapist, and getting good support from friends.   Problem list and histories reviewed & adjusted, as indicated.  Additional history: as documented    Patient Active Problem List   Diagnosis     Pineal gland, tumor     Attention deficit disorder     Human papilloma virus (HPV) infection     Seasonal allergic rhinitis     Need for Tdap vaccination     Bipolar affective disorder in remission (H)     Pregnancy, normal first     GBS (group B Streptococcus carrier), +RV culture, currently pregnant     Labor and delivery, indication for care     Pre-eclampsia     Mild persistent asthma with acute exacerbation     Moderate persistent asthma without complication     Chronic rhinitis     Tobacco use disorder     Abnormal cytology     S/P total thyroidectomy     Hypocalcemia     History of Graves' disease     Past Surgical History:   Procedure Laterality Date     BLOOD PATCH N/A 10/11/2016    Procedure: EPIDURAL BLOOD PATCH;  Surgeon: GENERIC ANESTHESIA PROVIDER;  Location: [facility]     CRANIOTOMY, EXCISE TUMOR COMPLEX, COMBINED  2/19/2014    Procedure: COMBINED CRANIOTOMY, EXCISE TUMOR COMPLEX;  Supracerabellar  Infratentorial Approach for Resection of Tumor ;  Surgeon: Kate Mckeon MD;  Location: [facility] OR     THYROIDECTOMY N/A 5/3/2017    Procedure: THYROIDECTOMY;  Total Thyroidectomy;  Surgeon: Pamela Villasenor MD;  Location:  OR       Social History   Substance Use Topics     Smoking status: Current Every Day Smoker     Packs/day: 0.50     Years: 12.00     Types: Cigarettes     Start date: 1/21/2004     Last attempt to quit: 1/23/2016     Smokeless tobacco: Never Used      "Alcohol use No      Comment: quit 16 days ago     Family History   Problem Relation Age of Onset     Thyroid Disease Paternal Aunt      Asthma Father      MENTAL ILLNESS Father      Obesity Father      Asthma Maternal Grandmother      OSTEOPOROSIS Maternal Grandmother      DIABETES Paternal Grandfather      Other Cancer Mother      Genitourinary Problems Mother      Bipolar Disorder Mother      unipolar depression     Depression Mother      Thyroid Disease Mother      benign tumor     Skin Cancer Mother      Bipolar Disorder Brother      unipolar depression     Depression Brother      Anesthesia Reaction Brother      Melanoma No family hx of              Reviewed and updated as needed this visit by clinical staffTobacco  Allergies  Meds       Reviewed and updated as needed this visit by Provider         ROS:  Constitutional, HEENT, cardiovascular, pulmonary, GI, , musculoskeletal, neuro, skin, endocrine and psych systems are negative, except as otherwise noted.      OBJECTIVE:   /72 (BP Location: Right arm, Patient Position: Sitting, Cuff Size: Adult Large)  Pulse 111  Temp 98.9  F (37.2  C) (Oral)  Ht 5' 6.5\" (1.689 m)  Wt 242 lb 8 oz (110 kg)  LMP 10/17/2017 (Exact Date)  SpO2 97%  Breastfeeding? No  BMI 38.55 kg/m2  Body mass index is 38.55 kg/(m^2).   GENERAL: healthy, alert and no distress  EYES: Eyes grossly normal to inspection, PERRL and conjunctivae and sclerae normal  HENT: ear canals and TM's normal, nose and mouth without ulcers or lesions  NECK: no adenopathy, no asymmetry, masses, or scars and thyroid normal to palpation  RESP: lungs clear to auscultation - no rales, rhonchi or wheezes  CV: regular rate and rhythm, normal S1 S2, no S3 or S4, no murmur, click or rub, no peripheral edema and peripheral pulses strong  ABDOMEN: soft, nontender, no hepatosplenomegaly, no masses and bowel sounds normal  PSYCH: mentation appears normal, affect normal/bright    Diagnostic Test " Results:  Results for orders placed or performed in visit on 11/06/17 (from the past 24 hour(s))   Strep, Rapid Screen   Result Value Ref Range    Specimen Description Throat     Rapid Strep A Screen       NEGATIVE: No Group A streptococcal antigen detected by immunoassay, await culture report.   UA reflex to Microscopic and Culture   Result Value Ref Range    Color Urine Yellow     Appearance Urine Clear     Glucose Urine Negative NEG^Negative mg/dL    Bilirubin Urine Negative NEG^Negative    Ketones Urine Negative NEG^Negative mg/dL    Specific Gravity Urine 1.020 1.003 - 1.035    Blood Urine Negative NEG^Negative    pH Urine 6.0 5.0 - 7.0 pH    Protein Albumin Urine Negative NEG^Negative mg/dL    Urobilinogen Urine 0.2 0.2 - 1.0 EU/dL    Nitrite Urine Negative NEG^Negative    Leukocyte Esterase Urine Trace (A) NEG^Negative    Source Midstream Urine    Urine Microscopic   Result Value Ref Range    WBC Urine O - 2 OTO2^O - 2 /HPF    RBC Urine O - 2 OTO2^O - 2 /HPF    Squamous Epithelial /LPF Urine Few FEW^Few /LPF    Bacteria Urine Few (A) NEG^Negative /HPF    Yeast Urine Few (A) NEG^Negative /HPF    Mucous Urine Present (A) NEG^Negative /LPF       ASSESSMENT/PLAN:     Problem List Items Addressed This Visit     None      Visit Diagnoses     Moderate persistent asthma with acute exacerbation    -  Primary    Relevant Medications    mometasone-formoterol (DULERA) 200-5 MCG/ACT oral inhaler    montelukast (SINGULAIR) 10 MG tablet    Throat pain        Relevant Orders    Strep, Rapid Screen (Completed)    Beta strep group A culture (Completed)    Urine Microscopic (Completed)    Dysuria        Relevant Orders    UA reflex to Microscopic and Culture (Completed)    Bipolar affective disorder, current episode mixed, current episode severity unspecified (H)        Tobacco abuse           Discussed supportive care for cold. Discussed strategies to remember to take duoneb.gave her new PRESCRIPTION for nebulizer. Discussed  increasing water and fluids over next several days to help with URI symptoms and dysuria. Follow up if symptoms worsen. Avoiding Prednisone due to history of prednisone leading to jose eduardo for her, which would be especially difficult for her on the anniversary of her mother's death. Discussed seeing therapist regularly at this time and letting friends now her situation.   She plans to start her nicotine patches next week  [facility]  Please note greater than 50% of this 30 minute appointment were spent in counseling with the patient of the issues described above in the history of present illness and in the plan, including interpreteing labs

## 2017-11-06 NOTE — MR AVS SNAPSHOT
After Visit Summary   11/6/2017    Cathy Arroyo    MRN: [id number]           Patient Information     Date Of Birth          1980        Visit Information        Provider Department      11/6/2017 1:30 PM Ashley Hebert APRN CNP [facility]        Today's Diagnoses     Throat pain    -  1    Dysuria        Moderate persistent asthma without complication        Other chronic rhinitis        Moderate persistent asthma with acute exacerbation          Care Instructions    -warm liquids  -Cough drops  -Start dulera again  -Avoid smoking as much as possible for now  -tooth picks for smoking?  - support from counselor and friends  - avoid drinking for at least 1 week until feeling better          Follow-ups after your visit        Who to contact     If you have questions or need follow up information about today's clinic visit or your schedule please contact [facility] directly at [phone].  Normal or non-critical lab and imaging results will be communicated to you by [employer], letter or phone within 4 business days after the clinic has received the results. If you do not hear from us within 7 days, please contact the clinic through [employer] or phone. If you have a critical or abnormal lab result, we will notify you by phone as soon as possible.  Submit refill requests through [employer] or call your pharmacy and they will forward the refill request to us. Please allow 3 business days for your refill to be completed.          Additional Information About Your Visit        MyChart Information     [employer] gives you secure access to your electronic health record. If you see a primary care provider, you can also send messages to your care team and make appointments. If you have questions, please call your primary care clinic.  If you do not have a primary care provider, please call [phone] and they will assist you.        Care EveryWhere ID     This is  "your Care EveryWhere ID. This could be used by other organizations to access your [place] medical records  [id number]        Your Vitals Were     Pulse Temperature Height Last Period Pulse Oximetry Breastfeeding?    111 98.9  F (37.2  C) (Oral) 5' 6.5\" (1.689 m) 10/17/2017 (Exact Date) 97% No    BMI (Body Mass Index)                   38.55 kg/m2            Blood Pressure from Last 3 Encounters:   11/06/17 118/72   10/23/17 142/90   10/13/17 124/82    Weight from Last 3 Encounters:   11/06/17 242 lb 8 oz (110 kg)   10/23/17 247 lb (112 kg)   10/13/17 240 lb 9.6 oz (109.1 kg)              We Performed the Following     Beta strep group A culture     Strep, Rapid Screen     UA reflex to Microscopic and Culture     Urine Microscopic          Today's Medication Changes          These changes are accurate as of: 11/6/17  2:03 PM.  If you have any questions, ask your nurse or doctor.               These medicines have changed or have updated prescriptions.        Dose/Directions    * order for DME   This may have changed:  Another medication with the same name was removed. Continue taking this medication, and follow the directions you see here.   Used for:  Mild persistent asthma with acute exacerbation   Changed by:  Sherrill Dasilva APRN CNP        nebulizer   Quantity:  1 each   Refills:  0       * order for DME   This may have changed:  Another medication with the same name was removed. Continue taking this medication, and follow the directions you see here.   Used for:  Mild persistent asthma with acute exacerbation   Changed by:  Sherrill Dasilva APRN CNP        nebulizer   Quantity:  1 each   Refills:  0       * Notice:  This list has 2 medication(s) that are the same as other medications prescribed for you. Read the directions carefully, and ask your doctor or other care provider to review them with you.      Stop taking these medicines if you haven't already. Please contact your care team if you have " questions.     prenatal multivitamin plus iron 27-0.8 MG Tabs per tablet   Stopped by:  Ashley Hebert, JANNET CALIX                Where to get your medicines      These medications were sent to [facility] [address] [facility]  [address] [place] [address]    Hours:  24-hours Phone:  [phone]     mometasone-formoterol 200-5 MCG/ACT oral inhaler    montelukast 10 MG tablet                Primary Care Provider Office Phone # Fax #    Sherrill CRUZ Brown, JANNET CALIX [phone]       [facility] [address]  [facility] [address]        Equal Access to Services     NALDO HDEZ AH: Hadii aad ku hadasho Soomaali, waaxda luqadaha, qaybta kaalmada adeegyada, becca schrader . So [facility] [phone].    ATENCIÓN: Si habla español, tiene a willard disposición servicios gratuitos de asistencia lingüística. [facility] [phone].    We comply with applicable federal civil rights laws and [place] laws. We do not discriminate on the basis of race, color, national origin, age, disability, sex, sexual orientation, or gender identity.            Thank you!     Thank you for choosing [facility]  for your care. Our goal is always to provide you with excellent care. Hearing back from our patients is one way we can continue to improve our services. Please take a few minutes to complete the written survey that you may receive in the mail after your visit with us. Thank you!             Your Updated Medication List - Protect others around you: Learn how to safely use, store and throw away your medicines at www.disposemymeds.org.          This list is accurate as of: 11/6/17  2:03 PM.  Always use your most recent med list.                   Brand Name Dispense Instructions for use Diagnosis    ABILIFY 5 MG tablet   Generic drug:  ARIPiprazole       Postsurgical hypothyroidism, Hypocalcemia       albuterol 108 (90 BASE)  MCG/ACT Inhaler    PROAIR HFA/PROVENTIL HFA/VENTOLIN HFA    1 Inhaler    Inhale 2 puffs into the lungs every 4 hours as needed for shortness of breath / dyspnea or wheezing    Mild intermittent asthma without complication       fexofenadine 180 MG tablet    ALLEGRA    90 tablet    Take 1 tablet (180 mg) by mouth daily    Moderate persistent asthma with acute exacerbation, Chronic rhinitis       ipratropium - albuterol 0.5 mg/2.5 mg/3 mL 0.5-2.5 (3) MG/3ML neb solution    DUONEB    90 vial    Take 1 vial (3 mLs) by nebulization every 6 hours as needed for shortness of breath / dyspnea or wheezing    Mild persistent asthma with acute exacerbation       levothyroxine 175 MCG tablet    SYNTHROID/LEVOTHROID    90 tablet    Take 1 tablet (175 mcg) by mouth daily    Postsurgical hypothyroidism, Hypocalcemia       mometasone-formoterol 200-5 MCG/ACT oral inhaler    DULERA    3 Inhaler    Inhale 2 puffs into the lungs 2 times daily    Moderate persistent asthma without complication, Moderate persistent asthma with acute exacerbation       montelukast 10 MG tablet    SINGULAIR    90 tablet    Take 1 tablet (10 mg) by mouth every morning    Other chronic rhinitis, Moderate persistent asthma with acute exacerbation       nicotine 21 MG/24HR 24 hr patch    NICODERM CQ    30 patch    Place 1 patch onto the skin every 24 hours    Encounter for smoking cessation counseling       * order for DME     1 each    nebulizer    Mild persistent asthma with acute exacerbation       * order for DME     1 each    nebulizer    Mild persistent asthma with acute exacerbation       * Notice:  This list has 2 medication(s) that are the same as other medications prescribed for you. Read the directions carefully, and ask your doctor or other care provider to review them with you.

## 2017-11-06 NOTE — PATIENT INSTRUCTIONS
-warm liquids  -Cough drops  -Start dulera again  -Avoid smoking as much as possible for now  -tooth picks for smoking?  - support from counselor and friends  - avoid drinking for at least 1 week until feeling better

## 2017-11-07 LAB
BACTERIA SPEC CULT: NORMAL
SPECIMEN SOURCE: NORMAL

## 2017-12-04 DIAGNOSIS — J45.20 MILD INTERMITTENT ASTHMA WITHOUT COMPLICATION: ICD-10-CM

## 2017-12-04 RX ORDER — ALBUTEROL SULFATE 90 UG/1
2 AEROSOL, METERED RESPIRATORY (INHALATION) EVERY 4 HOURS PRN
Qty: 1 INHALER | Refills: 3 | Status: SHIPPED | OUTPATIENT
Start: 2017-12-04 | End: 2019-01-28

## 2017-12-04 NOTE — TELEPHONE ENCOUNTER
albuterol (PROAIR HFA/PROVENTIL HFA/VENTOLIN HFA) 108 (90 BASE) MCG/ACT Inhaler      Last Written Prescription Date:  5/31/17  Last Fill Quantity: 1,   # refills: 3  Last Office Visit: 11/6/17  Future Office visit:       Routing refill request to provider for review/approval because:  Does not meet protocol criteria

## 2017-12-08 ENCOUNTER — NURSE TRIAGE (OUTPATIENT)
Dept: NURSING | Facility: CLINIC | Age: 37
End: 2017-12-08

## 2017-12-08 ENCOUNTER — HOSPITAL ENCOUNTER (EMERGENCY)
Facility: CLINIC | Age: 37
Discharge: HOME | End: 2017-12-08
Attending: FAMILY MEDICINE | Admitting: FAMILY MEDICINE
Payer: COMMERCIAL

## 2017-12-08 VITALS
BODY MASS INDEX: 38.45 KG/M2 | OXYGEN SATURATION: 96 % | TEMPERATURE: 98.4 F | DIASTOLIC BLOOD PRESSURE: 68 MMHG | SYSTOLIC BLOOD PRESSURE: 128 MMHG | RESPIRATION RATE: 16 BRPM | WEIGHT: 245 LBS | HEART RATE: 89 BPM | HEIGHT: 67 IN

## 2017-12-08 DIAGNOSIS — L08.9 INFECTED CYST OF SKIN: ICD-10-CM

## 2017-12-08 DIAGNOSIS — J45.901 MILD ASTHMA EXACERBATION: ICD-10-CM

## 2017-12-08 DIAGNOSIS — L72.9 INFECTED CYST OF SKIN: ICD-10-CM

## 2017-12-08 PROCEDURE — 25000125 ZZHC RX 250: Performed by: STUDENT IN AN ORGANIZED HEALTH CARE EDUCATION/TRAINING PROGRAM

## 2017-12-08 PROCEDURE — 94640 AIRWAY INHALATION TREATMENT: CPT | Performed by: FAMILY MEDICINE

## 2017-12-08 PROCEDURE — 99283 EMERGENCY DEPT VISIT LOW MDM: CPT | Mod: 25 | Performed by: FAMILY MEDICINE

## 2017-12-08 PROCEDURE — 99283 EMERGENCY DEPT VISIT LOW MDM: CPT | Mod: GC | Performed by: FAMILY MEDICINE

## 2017-12-08 RX ORDER — TERCONAZOLE 0.4 %
1 CREAM WITH APPLICATOR VAGINAL AT BEDTIME
Qty: 45 G | Refills: 0 | Status: SHIPPED | OUTPATIENT
Start: 2017-12-08 | End: 2017-12-15

## 2017-12-08 RX ORDER — IPRATROPIUM BROMIDE AND ALBUTEROL SULFATE 2.5; .5 MG/3ML; MG/3ML
3 SOLUTION RESPIRATORY (INHALATION) ONCE
Status: COMPLETED | OUTPATIENT
Start: 2017-12-08 | End: 2017-12-08

## 2017-12-08 RX ORDER — CEPHALEXIN 500 MG/1
500 CAPSULE ORAL 4 TIMES DAILY
Qty: 28 CAPSULE | Refills: 0 | Status: SHIPPED | OUTPATIENT
Start: 2017-12-08 | End: 2017-12-12

## 2017-12-08 RX ADMIN — IPRATROPIUM BROMIDE AND ALBUTEROL SULFATE 3 ML: .5; 3 SOLUTION RESPIRATORY (INHALATION) at 20:09

## 2017-12-08 ASSESSMENT — ENCOUNTER SYMPTOMS
DIARRHEA: 0
ABDOMINAL PAIN: 1
HEADACHES: 1
CONSTIPATION: 0
FEVER: 1
VOMITING: 1
CHILLS: 1
NAUSEA: 0

## 2017-12-08 NOTE — ED AVS SNAPSHOT
[facility], Emergency Department    [address] [place] [address]    Phone:  [phone]    Fax:  [phone]                                       Cathy Arroyo   MRN: [id number]    Department:  [facility], Emergency Department   Date of Visit:  12/8/2017           Patient Information     Date Of Birth          1980        Your diagnoses for this visit were:     Mild asthma exacerbation     Infected cyst of skin        You were seen by Marcos Mendez MD.        Discharge Instructions       Please see your primary care provider in the next 7-10 days to follow up from being seen in the emergency department as well as to monitor the area of concern on your chest.     Please return if you have a fever (100.4) that will not respond to tylenol or feels short of breath.    You may use warm compresses upon the area of concern.  Please take the full course of antibiotics regardless if the area gets better before you finish the course.    Future Appointments        Provider Department Dept Phone Center    12/12/2017 11:30 AM JANNET [facility] [phone] [facility]      24 Hour Appointment Hotline       To make an appointment at any [facility], call [phone] ([phone]). If you don't have a family doctor or clinic, we will help you find one. [facility] are conveniently located to serve the needs of you and your family.             Review of your medicines      START taking        Dose / Directions Last dose taken    cephALEXin 500 MG capsule   Commonly known as:  KEFLEX   Dose:  500 mg   Quantity:  28 capsule        Take 1 capsule (500 mg) by mouth 4 times daily for 7 days   Refills:  0          Our records show that you are taking the medicines listed below. If these are incorrect, please call your family doctor or clinic.        Dose / Directions Last dose taken    ABILIFY 5 MG tablet   Generic drug:  ARIPiprazole        Refills:   0        ADDERALL XR PO   Dose:  5 mg        Take 5 mg by mouth daily   Refills:  0        albuterol 108 (90 BASE) MCG/ACT Inhaler   Commonly known as:  PROAIR HFA/PROVENTIL HFA/VENTOLIN HFA   Dose:  2 puff   Quantity:  1 Inhaler        Inhale 2 puffs into the lungs every 4 hours as needed for shortness of breath / dyspnea or wheezing   Refills:  3        fexofenadine 180 MG tablet   Commonly known as:  ALLEGRA   Dose:  180 mg   Quantity:  90 tablet        Take 1 tablet (180 mg) by mouth daily   Refills:  3        ipratropium - albuterol 0.5 mg/2.5 mg/3 mL 0.5-2.5 (3) MG/3ML neb solution   Commonly known as:  DUONEB   Dose:  1 vial   Quantity:  90 vial        Take 1 vial (3 mLs) by nebulization every 6 hours as needed for shortness of breath / dyspnea or wheezing   Refills:  3        levothyroxine 175 MCG tablet   Commonly known as:  SYNTHROID/LEVOTHROID   Dose:  175 mcg   Quantity:  90 tablet        Take 1 tablet (175 mcg) by mouth daily   Refills:  3        mometasone-formoterol 200-5 MCG/ACT oral inhaler   Commonly known as:  DULERA   Dose:  2 puff   Quantity:  3 Inhaler        Inhale 2 puffs into the lungs 2 times daily   Refills:  1        montelukast 10 MG tablet   Commonly known as:  SINGULAIR   Dose:  10 mg   Quantity:  90 tablet        Take 1 tablet (10 mg) by mouth every morning   Refills:  1        * order for DME   Quantity:  1 each        nebulizer   Refills:  0        * order for DME   Quantity:  1 each        nebulizer   Refills:  0        * order for DME   Quantity:  1 Units        nebulizer   Refills:  0        * Notice:  This list has 3 medication(s) that are the same as other medications prescribed for you. Read the directions carefully, and ask your doctor or other care provider to review them with you.            Prescriptions were sent or printed at these locations (1 Prescription)                   Other Prescriptions                Printed at Department/Unit printer (1 of 1)          cephALEXin (KEFLEX) 500 MG capsule                Orders Needing Specimen Collection     None      Pending Results     No orders found from 12/6/2017 to 12/9/2017.            Pending Culture Results     No orders found from 12/6/2017 to 12/9/2017.            Pending Results Instructions     If you had any lab results that were not finalized at the time of your Discharge, you can call the ED Lab Result RN at [phone]. You will be contacted by this team for any positive Lab results or changes in treatment. The nurses are available 7 days a week from 10A to 6:30P.  You can leave a message 24 hours per day and they will return your call.        Thank you for choosing [facility]       Thank you for choosing [facility] for your care. Our goal is always to provide you with excellent care. Hearing back from our patients is one way we can continue to improve our services. Please take a few minutes to complete the written survey that you may receive in the mail after you visit with us. Thank you!        [employer] Information     [employer] gives you secure access to your electronic health record. If you see a primary care provider, you can also send messages to your care team and make appointments. If you have questions, please call your primary care clinic.  If you do not have a primary care provider, please call [phone] and they will assist you.        Care EveryWhere ID     This is your Care EveryWhere ID. This could be used by other organizations to access your [facility] medical records  [id number]        Equal Access to Services     NALDO HDEZ : Hadii samia Schneider, sidney stanley, qaybta becca de la cruz. So [facility] [phone].    ATENCIÓN: Si habla español, tiene a willard disposición servicios gratuitos de asistencia lingüística. Llame al [phone].    We comply with applicable federal civil rights laws and [place] laws. We do not discriminate on the basis of  race, color, national origin, age, disability, sex, sexual orientation, or gender identity.            After Visit Summary       This is your record. Keep this with you and show to your community pharmacist(s) and doctor(s) at your next visit.

## 2017-12-08 NOTE — ED AVS SNAPSHOT
[facility], [place], Emergency Department    [address]    [facility], [place] [address]    Phone:  [phone]    Fax:  [phone]                                       Cathy Arroyo   MRN: [id number]    Department:  [facility], Emergency Department   Date of Visit:  12/8/2017           After Visit Summary Signature Page     I have received my discharge instructions, and my questions have been answered. I have discussed any challenges I see with this plan with the nurse or doctor.    ..........................................................................................................................................  Patient/Patient Representative Signature      ..........................................................................................................................................  Patient Representative Print Name and Relationship to Patient    ..................................................               ................................................  Date                                            Time    ..........................................................................................................................................  Reviewed by Signature/Title    ...................................................              ..............................................  Date                                                            Time

## 2017-12-09 NOTE — ED PROVIDER NOTES
History     Chief Complaint   Patient presents with     Shortness of Breath     SOB for 1 week, no neb tx at home     HPI  Cathy Arroyo is a 37 year old female with a past medical history of bipolar, tobacco abuse, and asthma who presents with 5 days of subjective fever and 4 days of serosanguineous drainage from sternum.  Patient reports noting about 5 days ago that she was having a low grade fever (reports 100 F). The following day while she was in the shower patient noticed a sore spot on her sternum. Patient proceeded to press on the area and noted pus and blood coming out from the area. Patient presents as the area continues to spread and become more painful.  Reports placing witch hazel daily over the area of concern. Denies history of abscesses. Patient also denies any history of trauma or recent scratches to her chest. Patient denies being pregnant.  Patient additionally reports having 3 episodes of nonbilious nonbloody vomiting with 2 episodes occurring yesterday and once this morning. Patient reports not trying any intervention for shortness of breath at home including home nebulizing treatments. She states she has not been using them because she is afraid her 14 month-old daughter would be awoken by the nebulizing machine. She also states having mild rhinorrhea and nonproductive cough.    Patient endorses headache, chills, vomiting and abdominal cramps (secondary to menses). She denies any blurry vision or nausea.    I have reviewed the Medications, Allergies, Past Medical and Surgical History, and Social History in the Epic system.    Review of Systems   Constitutional: Positive for chills and fever (Subjective ).   Eyes: Negative for visual disturbance.   Gastrointestinal: Positive for abdominal pain (Cramping secondary to menstruation) and vomiting. Negative for constipation, diarrhea and nausea.   Neurological: Positive for headaches.   All other systems reviewed and are  "negative.      Physical Exam   BP: 129/87  Pulse: 89  Temp: 98.4  F (36.9  C)  Resp: 18  Height: 170.2 cm (5' 7\")  Weight: 111.1 kg (245 lb)  SpO2: 91 %      Physical Exam   Constitutional: She is oriented to person, place, and time. She appears well-developed and well-nourished. No distress.   HENT:   Head: Normocephalic and atraumatic.   Eyes: Conjunctivae are normal. No scleral icterus.   Neck: Neck supple.   Cardiovascular: Normal rate, regular rhythm and intact distal pulses.  Exam reveals no gallop and no friction rub.    No murmur heard.  Pulmonary/Chest: Effort normal. No respiratory distress. She has wheezes (Diffuse bilaterally). She has no rales. She exhibits tenderness (At the anterior aspect with mild area of 1 cm x 1 cm erythema).   Abdominal: Soft. Bowel sounds are normal. She exhibits no distension. There is no tenderness. There is no rebound and no guarding.   Lymphadenopathy:     She has no cervical adenopathy.   Neurological: She is alert and oriented to person, place, and time.   Skin: Skin is warm and dry.   Small area of erythema at the anterior aspect of the sternum with no notable fluctuance, appears to have a pinpoint opening in the middle of the area of concern, tender to palpation   Psychiatric: She has a normal mood and affect. Her behavior is normal.   Vitals reviewed.      ED Course     ED Course   Nursing evaluation  [place] ordered  Resident assessment  Attending assessment    Procedures        Critical Care time:  none          Labs Ordered and Resulted from Time of ED Arrival Up to the Time of Departure from the ED - No data to display         Assessments & Plan (with Medical Decision Making)   Sallie Arroyo is a 37-year-old female with a past medical history of bipolar 1, tobacco abuse, and asthma who presents with 5 days of subjective fever as well as 4 days of serosanguineous drainage from anterior sternum. Differential includes, but not limited to asthma " "exacerbation, pneumonia, sebaceous cyst, cellulitis, viral upper respiratory, or other etiology.  Vitals were stable.  Physical exam was remarkable for bilateral wheezing and a 1 cm x 1 cm area of cellulitis at the anterior aspect of the sternum with no drainage appreciated that was tender to palpation. No labs or imaging were ordered. Patient received 1 dose of DuoNeb while in the emergency department with great improvement of bilateral wheezing.This appears to be mild asthma exacerbation most likely secondary to viral upper respiratory infection.  Discussed with patient following up with primary care to further evaluate the area of concern on her sternum.  Patient declined the use of prednisone and she reports this causes her to become \"manic\". Patient will be discharged with antibiotics. Patient is agreeable to plan and appropriate for home discharge.    I have reviewed the nursing notes.    I have reviewed the findings, diagnosis, plan and need for follow up with the patient.    New Prescriptions    CEPHALEXIN (KEFLEX) 500 MG CAPSULE    Take 1 capsule (500 mg) by mouth 4 times daily for 7 days       Final diagnoses:   Mild asthma exacerbation   Infected cyst of skin       12/8/2017   [facility], [place], EMERGENCY DEPARTMENT    Dorita Mulligan MD  [facility] Resident PGY-1  Pager [phone]  This data collected with the Resident working in the Emergency Department.  Patient was seen and evaluated by myself and I repeated the history and physical exam with the patient.  The plan of care was discussed with them.  The key portions of the note including the entire assessment and plan reflect my documentation.  Patient with a history of mild asthma presents with acute exacerbation.  She is in no respiratory distress, speaking complete sentences, is moving air well, however there is diffuse end expiratory wheezes bilaterally.  Symptoms and exam improved after a single neb.  She has no symptoms of pneumonia or lower respiratory " tract infection and I do not think a chest x-ray is indicated.  Patient declines prednisone due to mental health side effects in the past.  In addition, she had a small area of cellulitis on the anterior sternum and a firm, rubbery cystic type lesion underneath.  This exam was not consistent with an abscess at this time, more likely this is some type of infected soft tissue cyst such as a sebaceous cyst.  There is no sign of communication with the sternum itself, the thyroid, the neck, or other surrounding structures.  We will treat for the overlying cellulitis and the patient will follow up with her primary care clinic.  If this becomes a recurrent problem she can be referred to surgery for removal of subcutaneous cyst.  If her symptoms worsen acutely she will return to the emergency department.     Marcos Mendez MD  12/08/17 [id number]

## 2017-12-09 NOTE — DISCHARGE INSTRUCTIONS
Please see your primary care provider in the next 7-10 days to follow up from being seen in the emergency department as well as to monitor the area of concern on your chest.     Please return if you have a fever (100.4) that will not respond to tylenol or feels short of breath.    You may use warm compresses upon the area of concern.  Please take the full course of antibiotics regardless if the area gets better before you finish the course.

## 2017-12-09 NOTE — TELEPHONE ENCOUNTER
Shiva states that she has a skin abcess on her chest that is about 1-2 inches that is pus and blood filled, severe pain with touching, low grade fever of 99.5 but she usually runs a whole degree below normal, she has history of asthma and she is a smoker and she notes more shortness of breath.  Reviewed guidelines and recommended urgent care or ER tonight. She agreed to have her  take her to [facility].     Reason for Disposition    SEVERE pain (e.g., excruciating)    Protocols used: BOIL (SKIN ABSCESS)-ADULT-MAYKEL Parnell RN, BSN  [facility]

## 2017-12-11 NOTE — PROGRESS NOTES
SUBJECTIVE:   Cathy Arroyo is a 37 year old female who presents to clinic today for the following health issues:    Rash  Onset: 12/03/2017    Description:   Location: Upper mid chest   Character: painful, draining; no redness; seems to drain a cheesy- white substance  Itching (Pruritis): no     Progression of Symptoms:  Worsening, started small and now it is a large pore     Accompanying Signs & Symptoms:  Fever: YES-  F  Body aches or joint pain: YES  Sore throat symptoms: no   Recent cold symptoms: YES    History:   Previous similar rash: no     Precipitating factors:   Exposure to similar rash: no   New exposures: None   Recent travel: no     Alleviating factors:  none    Therapies Tried and outcome: Witch hazel, did not help    Was prescribed medication in the ER but currently not taking it  Had ultrasound done in the ER  Has a dull headache for a long time  Asthma issue and feeling shortness of breath, using Albuterol 3 times a day; has not been remembering to take regular inhaler        Problem list and histories reviewed & adjusted, as indicated.  Additional history: as documented    BP Readings from Last 3 Encounters:   12/12/17 132/70   12/08/17 128/68   11/06/17 118/72    Wt Readings from Last 3 Encounters:   12/12/17 244 lb (110.7 kg)   12/08/17 245 lb (111.1 kg)   11/06/17 242 lb 8 oz (110 kg)                  Labs reviewed in EPIC      Reviewed and updated as needed this visit by clinical staff     Reviewed and updated as needed this visit by Provider         ROS:  Constitutional, HEENT, cardiovascular, pulmonary, gi and gu systems are negative, except as otherwise noted.      OBJECTIVE:   /70  Pulse 96  Temp 98.4  F (36.9  C) (Oral)  Wt 244 lb (110.7 kg)  SpO2 98%  BMI 38.22 kg/m2  Body mass index is 38.22 kg/(m^2).   GENERAL: healthy, alert and no distress  NECK: no adenopathy, no asymmetry, masses, or scars and thyroid normal to palpation  RESP: lungs clear to  auscultation - no rales, rhonchi or wheezes  CV: regular rate and rhythm, normal S1 S2, no S3 or S4, no murmur, click or rub, no peripheral edema and peripheral pulses strong  ABDOMEN: soft, nontender, no hepatosplenomegaly, no masses and bowel sounds normal  SKIN: lesion at upper sternum; about 2 cm in diameter; no surrounding erythema; expels a small amount of serosanguinous fluid when pressed.    Diagnostic Test Results:  No results found for this or any previous visit (from the past 24 hour(s)).    ASSESSMENT/PLAN:     Problem List Items Addressed This Visit     None      Visit Diagnoses     Cellulitis, unspecified cellulitis site    -  Primary    Relevant Medications    sulfamethoxazole-trimethoprim (BACTRIM DS/SEPTRA DS) 800-160 MG per tablet    Other Relevant Orders    Wound Culture Aerobic Bacterial (Completed)    Moderate persistent asthma with acute exacerbation           - lesion may be an infected sebaceous cyst; will culture to check for type of pathogen, asked her to follow up with her dermatologist after completing antibiotic treatment.  -asthma not well controlled; discussed methods to remember to take prescribed inhalers to control symptoms.  - follow up 3-4 weeks    JANNET [facility]  Please note greater than 50% of this 30 minute appointment were spent in counseling with the patient of the issues described above in the history of present illness and in the plan, including prescribed medications

## 2017-12-12 ENCOUNTER — OFFICE VISIT (OUTPATIENT)
Dept: FAMILY MEDICINE | Facility: CLINIC | Age: 37
End: 2017-12-12
Payer: COMMERCIAL

## 2017-12-12 VITALS
BODY MASS INDEX: 38.22 KG/M2 | WEIGHT: 244 LBS | SYSTOLIC BLOOD PRESSURE: 132 MMHG | TEMPERATURE: 98.4 F | DIASTOLIC BLOOD PRESSURE: 70 MMHG | HEART RATE: 96 BPM | OXYGEN SATURATION: 98 %

## 2017-12-12 DIAGNOSIS — L03.90 CELLULITIS, UNSPECIFIED CELLULITIS SITE: Primary | ICD-10-CM

## 2017-12-12 DIAGNOSIS — E89.0 POSTSURGICAL HYPOTHYROIDISM: ICD-10-CM

## 2017-12-12 DIAGNOSIS — E83.51 HYPOCALCEMIA: ICD-10-CM

## 2017-12-12 DIAGNOSIS — J45.41 MODERATE PERSISTENT ASTHMA WITH ACUTE EXACERBATION: ICD-10-CM

## 2017-12-12 PROCEDURE — 84439 ASSAY OF FREE THYROXINE: CPT | Performed by: NURSE PRACTITIONER

## 2017-12-12 PROCEDURE — 87070 CULTURE OTHR SPECIMN AEROBIC: CPT | Performed by: NURSE PRACTITIONER

## 2017-12-12 PROCEDURE — 99214 OFFICE O/P EST MOD 30 MIN: CPT | Performed by: NURSE PRACTITIONER

## 2017-12-12 PROCEDURE — 84100 ASSAY OF PHOSPHORUS: CPT | Performed by: NURSE PRACTITIONER

## 2017-12-12 PROCEDURE — 84443 ASSAY THYROID STIM HORMONE: CPT | Performed by: NURSE PRACTITIONER

## 2017-12-12 PROCEDURE — 36415 COLL VENOUS BLD VENIPUNCTURE: CPT | Performed by: NURSE PRACTITIONER

## 2017-12-12 PROCEDURE — 82310 ASSAY OF CALCIUM: CPT | Performed by: NURSE PRACTITIONER

## 2017-12-12 RX ORDER — SULFAMETHOXAZOLE/TRIMETHOPRIM 800-160 MG
1 TABLET ORAL 2 TIMES DAILY
Qty: 20 TABLET | Refills: 0 | Status: SHIPPED | OUTPATIENT
Start: 2017-12-12 | End: 2018-01-30

## 2017-12-12 RX ORDER — FLUCONAZOLE 150 MG/1
150 TABLET ORAL ONCE
Qty: 2 TABLET | Refills: 0 | Status: SHIPPED | OUTPATIENT
Start: 2017-12-12 | End: 2017-12-12

## 2017-12-12 NOTE — PATIENT INSTRUCTIONS
- use nebulizer during the day  -set alarm for second dose of asthma medication  -I will call if we need to change the antibiotic

## 2017-12-12 NOTE — MR AVS SNAPSHOT
After Visit Summary   12/12/2017    Cathy Arroyo    MRN: [id number]           Patient Information     Date Of Birth          1980        Visit Information        Provider Department      12/12/2017 11:30 AM Ashley Hebert APRN CNP [facility]        Today's Diagnoses     Cellulitis, unspecified cellulitis site    -  1    Moderate persistent asthma with acute exacerbation          Care Instructions    - use nebulizer during the day  -set alarm for second dose of asthma medication  -I will call if we need to change the antibiotic          Follow-ups after your visit        Who to contact     If you have questions or need follow up information about today's clinic visit or your schedule please contact [facility] directly at [phone].  Normal or non-critical lab and imaging results will be communicated to you by Complexahart, letter or phone within 4 business days after the clinic has received the results. If you do not hear from us within 7 days, please contact the clinic through Complexahart or phone. If you have a critical or abnormal lab result, we will notify you by phone as soon as possible.  Submit refill requests through Smallable or call your pharmacy and they will forward the refill request to us. Please allow 3 business days for your refill to be completed.          Additional Information About Your Visit        MyChart Information     Smallable gives you secure access to your electronic health record. If you see a primary care provider, you can also send messages to your care team and make appointments. If you have questions, please call your primary care clinic.  If you do not have a primary care provider, please call [phone] and they will assist you.        Care EveryWhere ID     This is your Care EveryWhere ID. This could be used by other organizations to access your [facility] medical records  [id number]        Your Vitals Were     Pulse  Temperature Pulse Oximetry BMI (Body Mass Index)          96 98.4  F (36.9  C) (Oral) 98% 38.22 kg/m2         Blood Pressure from Last 3 Encounters:   12/12/17 132/70   12/08/17 128/68   11/06/17 118/72    Weight from Last 3 Encounters:   12/12/17 244 lb (110.7 kg)   12/08/17 245 lb (111.1 kg)   11/06/17 242 lb 8 oz (110 kg)              Today, you had the following     No orders found for display         Today's Medication Changes          These changes are accurate as of: 12/12/17 11:55 AM.  If you have any questions, ask your nurse or doctor.               Start taking these medicines.        Dose/Directions    fluconazole 150 MG tablet   Commonly known as:  DIFLUCAN   Used for:  Cellulitis, unspecified cellulitis site   Started by:  Ashley Hebert APRN CNP        Dose:  150 mg   Take 1 tablet (150 mg) by mouth once for 1 dose   Quantity:  2 tablet   Refills:  0       sulfamethoxazole-trimethoprim 800-160 MG per tablet   Commonly known as:  BACTRIM DS/SEPTRA DS   Used for:  Cellulitis, unspecified cellulitis site   Started by:  Ashley Hebert APRN CNP        Dose:  1 tablet   Take 1 tablet by mouth 2 times daily   Quantity:  20 tablet   Refills:  0         Stop taking these medicines if you haven't already. Please contact your care team if you have questions.     cephALEXin 500 MG capsule   Commonly known as:  KEFLEX   Stopped by:  Ashley Hebert APRN CNP                Where to get your medicines      These medications were sent to [facility] [address] [place] [address] AT [address] [address]    Hours:  24-hours Phone:  [phone]     fluconazole 150 MG tablet    sulfamethoxazole-trimethoprim 800-160 MG per tablet                Primary Care Provider Office Phone # Fax #    JANNET Nascimento [phone]       [facility] [address] [facility] [address]  [facility] [address]        Equal Access to Services     NALDO HDEZ  AH: Hadii samia olverarandketan Jennie, waaxda luqadaha, qaybta kaandres gallo, becca rodríguezin hayaachristy bedollahillary burt raciel tan. So [facility] [phone].    ATENCIÓN: Si robla nikolai, tiene a willard disposición servicios gratuitos de asistencia lingüística. Llame al [phone].    We comply with applicable federal civil rights laws and [place] laws. We do not discriminate on the basis of race, color, national origin, age, disability, sex, sexual orientation, or gender identity.            Thank you!     Thank you for choosing [facility]  for your [place]. Our goal is always to provide you with excellent care. Hearing back from our patients is one way we can continue to improve our services. Please take a few minutes to complete the written survey that you may receive in the mail after your visit with us. Thank you!             Your Updated Medication List - Protect others around you: Learn how to safely use, store and throw away your medicines at www.disposemymeds.org.          This list is accurate as of: 12/12/17 11:55 AM.  Always use your most recent med list.                   Brand Name Dispense Instructions for use Diagnosis    ABILIFY 5 MG tablet   Generic drug:  ARIPiprazole       Postsurgical hypothyroidism, Hypocalcemia       ADDERALL XR PO      Take 5 mg by mouth daily        albuterol 108 (90 BASE) MCG/ACT Inhaler    PROAIR HFA/PROVENTIL HFA/VENTOLIN HFA    1 Inhaler    Inhale 2 puffs into the lungs every 4 hours as needed for shortness of breath / dyspnea or wheezing    Mild intermittent asthma without complication       fexofenadine 180 MG tablet    ALLEGRA    90 tablet    Take 1 tablet (180 mg) by mouth daily    Moderate persistent asthma with acute exacerbation, Chronic rhinitis       fluconazole 150 MG tablet    DIFLUCAN    2 tablet    Take 1 tablet (150 mg) by mouth once for 1 dose    Cellulitis, unspecified cellulitis site       ipratropium - albuterol 0.5 mg/2.5 mg/3 mL 0.5-2.5 (3) MG/3ML neb  solution    DUONEB    90 vial    Take 1 vial (3 mLs) by nebulization every 6 hours as needed for shortness of breath / dyspnea or wheezing    Mild persistent asthma with acute exacerbation       levothyroxine 175 MCG tablet    SYNTHROID/LEVOTHROID    90 tablet    Take 1 tablet (175 mcg) by mouth daily    Postsurgical hypothyroidism, Hypocalcemia       mometasone-formoterol 200-5 MCG/ACT oral inhaler    DULERA    3 Inhaler    Inhale 2 puffs into the lungs 2 times daily    Moderate persistent asthma with acute exacerbation       montelukast 10 MG tablet    SINGULAIR    90 tablet    Take 1 tablet (10 mg) by mouth every morning    Moderate persistent asthma with acute exacerbation       * order for DME     1 each    nebulizer    Mild persistent asthma with acute exacerbation       * order for DME     1 each    nebulizer    Mild persistent asthma with acute exacerbation       * order for DME     1 Units    nebulizer        sulfamethoxazole-trimethoprim 800-160 MG per tablet    BACTRIM DS/SEPTRA DS    20 tablet    Take 1 tablet by mouth 2 times daily    Cellulitis, unspecified cellulitis site       terconazole 0.4 % cream    TERAZOL 7    45 g    Place 1 applicator vaginally At Bedtime for 7 days        * Notice:  This list has 3 medication(s) that are the same as other medications prescribed for you. Read the directions carefully, and ask your doctor or other care provider to review them with you.

## 2017-12-13 LAB
CALCIUM SERPL-MCNC: 8.6 MG/DL (ref 8.5–10.1)
PHOSPHATE SERPL-MCNC: 2.4 MG/DL (ref 2.5–4.5)
T4 FREE SERPL-MCNC: 1.33 NG/DL (ref 0.76–1.46)
TSH SERPL DL<=0.005 MIU/L-ACNC: 1.47 MU/L (ref 0.4–4)

## 2017-12-14 LAB
BACTERIA SPEC CULT: NO GROWTH
SPECIMEN SOURCE: NORMAL

## 2017-12-15 ENCOUNTER — TELEPHONE (OUTPATIENT)
Dept: FAMILY MEDICINE | Facility: CLINIC | Age: 37
End: 2017-12-15

## 2017-12-15 NOTE — TELEPHONE ENCOUNTER
Reason for call:  Results   Name of test or procedure: Wound Culture  Date of test or procedure: 12/12/2017  Location of test or procedure: Rd    Additional comments: Patient called and is wondering about results from the wound culture      Phone number to reach patient:  Home number on file [phone] (home)    Best Time:  n/a    Can we leave a detailed message on this number?  YES

## 2017-12-15 NOTE — TELEPHONE ENCOUNTER
Provider Bassam/Ashley Hebert--    Please see message below. Patient requesting results from Chest wound culture on 12/12/17      Nata Giraldo RN  [facility]

## 2017-12-19 ENCOUNTER — OFFICE VISIT (OUTPATIENT)
Dept: DERMATOLOGY | Facility: CLINIC | Age: 37
End: 2017-12-19
Payer: COMMERCIAL

## 2017-12-19 DIAGNOSIS — L72.0 EIC (EPIDERMAL INCLUSION CYST): Primary | ICD-10-CM

## 2017-12-19 ASSESSMENT — PAIN SCALES - GENERAL: PAINLEVEL: MILD PAIN (2)

## 2017-12-19 NOTE — MR AVS SNAPSHOT
After Visit Summary   12/19/2017    Cathy Arroyo    MRN: [id number]           Patient Information     Date Of Birth          1980        Visit Information        Provider Department      12/19/2017 10:15 AM Noemi Ayala MD [facility] Dermatology        Today's Diagnoses     EIC (epidermal inclusion cyst)    -  1       Follow-ups after your visit        Additional Services     DERMATOLOGY SURGERY REFERRAL       Result Note/Instructions:                  Follow-up notes from your care team     Return if symptoms worsen or fail to improve.      Your next 10 appointments already scheduled     Dec 26, 2017 11:00 AM CST   Office Visit with [place] [place]    [address] [place] [id number] [phone]           Bring a current list of meds and any records pertaining to this visit. For Physicals, please bring immunization records and any forms needing to be filled out. Please arrive 10 minutes early to complete paperwork.              Who to contact     Please call your clinic at [phone] to:    Ask questions about your health    Make or cancel appointments    Discuss your medicines    Learn about your test results    Speak to your doctor   If you have compliments or concerns about an experience at your clinic, or if you wish to file a complaint, please contact [facility] Patient Relations at [phone] or email us at [email or web address]         Additional Information About Your Visit        MyChart Information     [employer] gives you secure access to your electronic health record. If you see a primary care provider, you can also send messages to your care team and make appointments. If you have questions, please call your primary care clinic.  If you do not have a primary care provider, please call [phone] and they will assist you.       Playto is an electronic gateway that provides easy, online access to your medical records. With Playto, you can request a clinic appointment, read your test results, renew a prescription or communicate with your care team.     To access your existing account, please contact your [facility] or call [phone] for assistance.        Care EveryWhere ID     This is your Care EveryWhere ID. This could be used by other organizations to access your [facility] medical records  [id number]         Blood Pressure from Last 3 Encounters:   12/12/17 132/70   12/08/17 128/68   11/06/17 118/72    Weight from Last 3 Encounters:   12/12/17 110.7 kg (244 lb)   12/08/17 111.1 kg (245 lb)   11/06/17 110 kg (242 lb 8 oz)              We Performed the Following     DERMATOLOGY SURGERY REFERRAL        Primary Care Provider Office Phone # Fax #    Sherrill Dasilva, JANNET [facility] [phone]       [facility] [address]  [facility] [address]        Equal Access to Services     [facility]: Hadii aad ku hadasho Soomaali, waaxda luqadaha, qaybta kaalmada adeegyada, waxay idiin haycaden jeff schrader . So [facility] [phone].    ATENCIÓN: Si habla español, tiene a willard disposición servicios gratuitos de asistencia lingüística. Llame al [phone].    We comply with applicable federal civil rights laws and [place] laws. We do not discriminate on the basis of race, color, national origin, age, disability, sex, sexual orientation, or gender identity.            Thank you!     Thank you for choosing [facility] DERMATOLOGY  for your care. Our goal is always to provide you with excellent care. Hearing back from our patients is one way we can continue to improve our services. Please take a few minutes to complete the written survey that you may receive in the mail after your visit with us. Thank you!             Your Updated Medication List - Protect others around you: Learn how to  safely use, store and throw away your medicines at www.disposemymeds.org.          This list is accurate as of: 12/19/17 11:59 PM.  Always use your most recent med list.                   Brand Name Dispense Instructions for use Diagnosis    ABILIFY 5 MG tablet   Generic drug:  ARIPiprazole       Postsurgical hypothyroidism, Hypocalcemia       ADDERALL XR PO      Take 5 mg by mouth daily        albuterol 108 (90 BASE) MCG/ACT Inhaler    PROAIR HFA/PROVENTIL HFA/VENTOLIN HFA    1 Inhaler    Inhale 2 puffs into the lungs every 4 hours as needed for shortness of breath / dyspnea or wheezing    Mild intermittent asthma without complication       fexofenadine 180 MG tablet    ALLEGRA    90 tablet    Take 1 tablet (180 mg) by mouth daily    Moderate persistent asthma with acute exacerbation, Chronic rhinitis       ipratropium - albuterol 0.5 mg/2.5 mg/3 mL 0.5-2.5 (3) MG/3ML neb solution    DUONEB    90 vial    Take 1 vial (3 mLs) by nebulization every 6 hours as needed for shortness of breath / dyspnea or wheezing    Mild persistent asthma with acute exacerbation       levothyroxine 175 MCG tablet    SYNTHROID/LEVOTHROID    90 tablet    Take 1 tablet (175 mcg) by mouth daily    Postsurgical hypothyroidism, Hypocalcemia       mometasone-formoterol 200-5 MCG/ACT oral inhaler    DULERA    3 Inhaler    Inhale 2 puffs into the lungs 2 times daily    Moderate persistent asthma with acute exacerbation       montelukast 10 MG tablet    SINGULAIR    90 tablet    Take 1 tablet (10 mg) by mouth every morning    Moderate persistent asthma with acute exacerbation       * order for DME     1 each    nebulizer    Mild persistent asthma with acute exacerbation       * order for DME     1 each    nebulizer    Mild persistent asthma with acute exacerbation       * order for DME     1 Units    nebulizer        sulfamethoxazole-trimethoprim 800-160 MG per tablet    BACTRIM DS/SEPTRA DS    20 tablet    Take 1 tablet by mouth 2 times daily     Cellulitis, unspecified cellulitis site       * Notice:  This list has 3 medication(s) that are the same as other medications prescribed for you. Read the directions carefully, and ask your doctor or other care provider to review them with you.

## 2017-12-19 NOTE — PROGRESS NOTES
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    - taking allegra 180mg qHS  now for seasonal allergies  2. Mild folliculitis/ miliaria: BPO 5% wash   3. Likely EIC on the central upper chest between tail feathers of bird tattoo--pending excision  - currently on bactrim for inflammation      Encounter Date: Dec 19, 2017    CC:   Chief Complaint   Patient presents with     Derm Problem     Cathy is here today for a cyst on her sternum.          History of Present Illness:  Ms. Cathy Arroyo is a 37 year old female who returns to clinic today for concerns regarding a cyst on her chest that she is worried is infected. She was last seen 6/8/17 in follow up of an urticarial eruption that was thought 2/2 hyperthyroidism/methimazole and has since resolved. Today she reports a cyst on her upper chest that she thinks is infected. She reports is has been present for many months but became red and tender and inflamed last week. She was started on bactrim by a primary care provider and has had significant improvement in symptoms. She reports that the culture they obtained showed no growth. She denies previous inflammation or drainage of this spot. She denies pain currently. She is interested in potentially removing the cyst. She is otherwise feeling well today. Denies any other skin concerns.     Past Medical History:   Patient Active Problem List   Diagnosis     Pineal gland, tumor     Attention deficit disorder     Human papilloma virus (HPV) infection     Seasonal allergic rhinitis     Need for Tdap vaccination     Bipolar affective disorder in remission (H)     Pregnancy, normal first     GBS (group B Streptococcus carrier), +RV culture, currently pregnant     Labor and delivery, indication for care     Pre-eclampsia     Mild persistent asthma with acute  exacerbation     Moderate persistent asthma without complication     Chronic rhinitis     Tobacco use disorder     Abnormal cytology     S/P total thyroidectomy     Hypocalcemia     History of Graves' disease     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     Post partum depression      Thyroid disease     hashimotos     Uncomplicated asthma      Past Surgical History:   Procedure Laterality Date     BLOOD PATCH N/A 10/11/2016    Procedure: EPIDURAL BLOOD PATCH;  Surgeon: GENERIC ANESTHESIA PROVIDER;  Location: [facility]     CRANIOTOMY, EXCISE TUMOR COMPLEX, COMBINED  2/19/2014    Procedure: COMBINED CRANIOTOMY, EXCISE TUMOR COMPLEX;  Supracerabellar  Infratentorial Approach for Resection of Tumor ;  Surgeon: Kate Mckeon MD;  Location: [facility] OR     THYROIDECTOMY N/A 5/3/2017    Procedure: THYROIDECTOMY;  Total Thyroidectomy;  Surgeon: Pamela Villasenor MD;  Location:  OR       Social History:  The patient works as a hairdresser,  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  Family History:  Mom- hyperthyroidism  Grandfather- T1D    Medications:  Current Outpatient Prescriptions   Medication Sig Dispense Refill     sulfamethoxazole-trimethoprim (BACTRIM DS/SEPTRA DS) 800-160 MG per tablet Take 1 tablet by mouth 2 times daily 20 tablet 0     Amphetamine-Dextroamphetamine (ADDERALL XR PO) Take 5 mg by mouth daily       albuterol (PROAIR HFA/PROVENTIL HFA/VENTOLIN HFA) 108 (90 BASE) MCG/ACT Inhaler Inhale 2 puffs into the lungs every 4 hours as needed for shortness of breath / dyspnea or wheezing 1 Inhaler 3     mometasone-formoterol (DULERA) 200-5 MCG/ACT oral inhaler Inhale 2 puffs into the lungs 2 times daily 3 Inhaler 1     montelukast (SINGULAIR) 10 MG tablet Take 1 tablet (10 mg) by mouth every morning 90 tablet 1     order for DME  nebulizer 1 Units 0     ARIPiprazole (ABILIFY) 5 MG tablet        levothyroxine (SYNTHROID/LEVOTHROID) 175 MCG tablet Take 1 tablet (175 mcg) by mouth daily 90 tablet 3     order for DME nebulizer 1 each 0     order for DME nebulizer 1 each 0     ipratropium - albuterol 0.5 mg/2.5 mg/3 mL (DUONEB) 0.5-2.5 (3) MG/3ML neb solution Take 1 vial (3 mLs) by nebulization every 6 hours as needed for shortness of breath / dyspnea or wheezing 90 vial 3     fexofenadine (ALLEGRA) 180 MG tablet Take 1 tablet (180 mg) by mouth daily 90 tablet 3        Allergies   Allergen Reactions     Codeine Sulfate Nausea and Vomiting     Tetanus Toxoid      Pt states she had an infection at injection site.      Vicodin [Hydrocodone-Acetaminophen] Nausea and Vomiting     Methimazole Rash         Review of Systems:  -Constitutional: The patient is otherwise feeling well  -Skin: As above in HPI. No additional skin concerns.    Physical exam:  Vitals: There were no vitals taken for this visit.  GEN: This is a well developed, well-nourished female in no acute distress, in a pleasant mood.    SKIN: Focused examination of the chest reveals:  - subcutaneous nodule on the central upper chest that measures ~ 1 cm x 2 cm with central punctum, no overlying erythema or induration  -No other lesions of concern on areas examined.     Impression/Plan:  1. EIC upper chest. Previously inflamed, now on bactrim per primary care provider. Discussed etiology of this condition and that it may have ruptured previously into the skin causing inflammation. She reports culture obtained prior to starting antibiotics was negative which would support this. Discussed benign nature of this lesion but that it certainly could become inflamed again. Discussed option of excision vs continued observation. Patient is somewhat interested in excision but would like to give it some thought.   - derm surg referral placed  - patient will decide whether to proceed with surgery      Follow-up PRN       staffed the patient.    Staff Involved:  Resident(Aliya Amos)/Staff(as above)    .I, Noemi Ayala MD, saw this patient with the resident and agree with the resident s findings and plan of care as documented in the resident s note.

## 2017-12-19 NOTE — LETTER
12/19/2017       RE: Cathy Arroyo  [address]  [facility] [address]     Dear Colleague,    Thank you for referring your patient, Cathy Arroyo, to the [facility] DERMATOLOGY at [facility]. Please see a copy of my visit note below.    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    - taking allegra 180mg qHS  now for seasonal allergies  2. Mild folliculitis/ miliaria: BPO 5% wash   3. Likely EIC on the central upper chest between tail feathers of bird tattoo--pending excision  - currently on bactrim for inflammation      Encounter Date: Dec 19, 2017    CC:   Chief Complaint   Patient presents with     Derm Problem     Cathy is here today for a cyst on her sternum.          History of Present Illness:  Ms. Cathy Arroyo is a 37 year old female who returns to clinic today for concerns regarding a cyst on her chest that she is worried is infected. She was last seen 6/8/17 in follow up of an urticarial eruption that was thought 2/2 hyperthyroidism/methimazole and has since resolved. Today she reports a cyst on her upper chest that she thinks is infected. She reports is has been present for many months but became red and tender and inflamed last week. She was started on bactrim by a primary care provider and has had significant improvement in symptoms. She reports that the culture they obtained showed no growth. She denies previous inflammation or drainage of this spot. She denies pain currently. She is interested in potentially removing the cyst. She is otherwise feeling well today. Denies any other skin concerns.     Past Medical History:   Patient Active Problem List   Diagnosis     Pineal gland, tumor     Attention deficit disorder     Human papilloma virus (HPV) infection      Seasonal allergic rhinitis     Need for Tdap vaccination     Bipolar affective disorder in remission (H)     Pregnancy, normal first     GBS (group B Streptococcus carrier), +RV culture, currently pregnant     Labor and delivery, indication for care     Pre-eclampsia     Mild persistent asthma with acute exacerbation     Moderate persistent asthma without complication     Chronic rhinitis     Tobacco use disorder     Abnormal cytology     S/P total thyroidectomy     Hypocalcemia     History of Graves' disease     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     Post partum depression      Thyroid disease     hashimotos     Uncomplicated asthma      Past Surgical History:   Procedure Laterality Date     BLOOD PATCH N/A 10/11/2016    Procedure: EPIDURAL BLOOD PATCH;  Surgeon: GENERIC ANESTHESIA PROVIDER;  [facility]     CRANIOTOMY, EXCISE TUMOR COMPLEX, COMBINED  2/19/2014    Procedure: COMBINED CRANIOTOMY, EXCISE TUMOR COMPLEX;  Supracerabellar  Infratentorial Approach for Resection of Tumor ;  Surgeon: Kate Mckeon MD;  Location: [facility] OR     THYROIDECTOMY N/A 5/3/2017    Procedure: THYROIDECTOMY;  Total Thyroidectomy;  Surgeon: Pamela Villasenor MD;  Location:  OR       Social History:  The patient works as a hairdresser,  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  Family History:  Mom- hyperthyroidism  Grandfather- T1D    Medications:  Current Outpatient Prescriptions   Medication Sig Dispense Refill     sulfamethoxazole-trimethoprim (BACTRIM DS/SEPTRA DS) 800-160 MG per tablet Take 1 tablet by mouth 2 times daily 20 tablet 0     Amphetamine-Dextroamphetamine (ADDERALL XR PO) Take 5 mg by mouth daily       albuterol (PROAIR HFA/PROVENTIL HFA/VENTOLIN HFA) 108 (90 BASE) MCG/ACT Inhaler Inhale 2 puffs into the lungs every 4  hours as needed for shortness of breath / dyspnea or wheezing 1 Inhaler 3     mometasone-formoterol (DULERA) 200-5 MCG/ACT oral inhaler Inhale 2 puffs into the lungs 2 times daily 3 Inhaler 1     montelukast (SINGULAIR) 10 MG tablet Take 1 tablet (10 mg) by mouth every morning 90 tablet 1     order for DME nebulizer 1 Units 0     ARIPiprazole (ABILIFY) 5 MG tablet        levothyroxine (SYNTHROID/LEVOTHROID) 175 MCG tablet Take 1 tablet (175 mcg) by mouth daily 90 tablet 3     order for DME nebulizer 1 each 0     order for DME nebulizer 1 each 0     ipratropium - albuterol 0.5 mg/2.5 mg/3 mL (DUONEB) 0.5-2.5 (3) MG/3ML neb solution Take 1 vial (3 mLs) by nebulization every 6 hours as needed for shortness of breath / dyspnea or wheezing 90 vial 3     fexofenadine (ALLEGRA) 180 MG tablet Take 1 tablet (180 mg) by mouth daily 90 tablet 3        Allergies   Allergen Reactions     Codeine Sulfate Nausea and Vomiting     Tetanus Toxoid      Pt states she had an infection at injection site.      Vicodin [Hydrocodone-Acetaminophen] Nausea and Vomiting     Methimazole Rash         Review of Systems:  -Constitutional: The patient is otherwise feeling well  -Skin: As above in HPI. No additional skin concerns.    Physical exam:  Vitals: There were no vitals taken for this visit.  GEN: This is a well developed, well-nourished female in no acute distress, in a pleasant mood.    SKIN: Focused examination of the chest reveals:  - subcutaneous nodule on the central upper chest that measures ~ 1 cm x 2 cm with central punctum, no overlying erythema or induration  -No other lesions of concern on areas examined.     Impression/Plan:  1. EIC upper chest. Previously inflamed, now on bactrim per primary care provider. Discussed etiology of this condition and that it may have ruptured previously into the skin causing inflammation. She reports culture obtained prior to starting antibiotics was negative which would support this. Discussed  benign nature of this lesion but that it certainly could become inflamed again. Discussed option of excision vs continued observation. Patient is somewhat interested in excision but would like to give it some thought.   - derm surg referral placed  - patient will decide whether to proceed with surgery    Follow-up PRN     staffed the patient.    Staff Involved:  Resident(Aliya Amos)/Staff(as above)    .I, Noemi Ayala MD, saw this patient with the resident and agree with the resident s findings and plan of care as documented in the resident s note.

## 2017-12-19 NOTE — NURSING NOTE
Dermatology Rooming Note    Cathy Arroyo's goals for this visit include:   Chief Complaint   Patient presents with     Derm Problem     Cathy is here today for a cyst on her sternum.      XANDER Gamez

## 2018-01-30 ENCOUNTER — OFFICE VISIT (OUTPATIENT)
Dept: FAMILY MEDICINE | Facility: CLINIC | Age: 38
End: 2018-01-30
Payer: COMMERCIAL

## 2018-01-30 VITALS
OXYGEN SATURATION: 97 % | SYSTOLIC BLOOD PRESSURE: 138 MMHG | RESPIRATION RATE: 22 BRPM | TEMPERATURE: 98.9 F | BODY MASS INDEX: 37.87 KG/M2 | DIASTOLIC BLOOD PRESSURE: 89 MMHG | WEIGHT: 241.8 LBS | HEART RATE: 88 BPM

## 2018-01-30 DIAGNOSIS — N94.6 DYSMENORRHEA: ICD-10-CM

## 2018-01-30 DIAGNOSIS — J45.41 MODERATE PERSISTENT ASTHMA WITH EXACERBATION: Primary | ICD-10-CM

## 2018-01-30 DIAGNOSIS — F17.200 SMOKING ADDICTION: ICD-10-CM

## 2018-01-30 LAB — BETA HCG QUAL IFA URINE: NEGATIVE

## 2018-01-30 PROCEDURE — 84703 CHORIONIC GONADOTROPIN ASSAY: CPT | Performed by: NURSE PRACTITIONER

## 2018-01-30 PROCEDURE — 99214 OFFICE O/P EST MOD 30 MIN: CPT | Performed by: NURSE PRACTITIONER

## 2018-01-30 RX ORDER — PREDNISONE 20 MG/1
20 TABLET ORAL 2 TIMES DAILY
Qty: 10 TABLET | Refills: 0 | Status: SHIPPED | OUTPATIENT
Start: 2018-01-30 | End: 2018-02-08

## 2018-01-30 NOTE — PROGRESS NOTES
SUBJECTIVE:   Cathy Arroyo is a 37 year old female who presents to clinic today for the following health issues:      Asthma Follow-Up    Was ACT completed today?    Yes    ACT Total Scores 10/13/2017   ACT TOTAL SCORE (Goal Greater than or Equal to 20) 22   In the past 12 months, how many times did you visit the emergency room for your asthma without being admitted to the hospital? 0   In the past 12 months, how many times were you hospitalized overnight because of your asthma? 0       Recent asthma triggers that patient is dealing with: upper respiratory infections        Amount of exercise or physical activity: None    Problems taking medications regularly: No    Medication side effects: none    Diet: vegetarian/vegan    Caught a cold from her baby a few weeks ago and went into her chest  Neb and continues wheezing, last neb was last night   Hasn't been good at taking her dulera but will restart now. Continues smoking, not ready to quit yet but has all supplies she needs at home. Has plenty of nebs and inhalers   Prednisone 20 mg for 5 days works well without messing up her mood/ is bipolar    Pt also wants to take a pregnancy test, has been taking home tests and is due for her period today, feels pregnancy symptoms of nausea and tenderness         Problem list and histories reviewed & adjusted, as indicated.  Additional history: as documented    Patient Active Problem List   Diagnosis     Pineal gland, tumor     Attention deficit disorder     Human papilloma virus (HPV) infection     Seasonal allergic rhinitis     Need for Tdap vaccination     Bipolar affective disorder in remission (H)     Pregnancy, normal first     GBS (group B Streptococcus carrier), +RV culture, currently pregnant     Labor and delivery, indication for care     Pre-eclampsia     Mild persistent asthma with acute exacerbation     Moderate persistent asthma without complication     Chronic rhinitis     Tobacco use disorder      Abnormal cytology     S/P total thyroidectomy     Hypocalcemia     History of Graves' disease     Past Surgical History:   Procedure Laterality Date     BLOOD PATCH N/A 10/11/2016    Procedure: EPIDURAL BLOOD PATCH;  Surgeon: GENERIC ANESTHESIA PROVIDER;  [facility]: [facility]     CRANIOTOMY, EXCISE TUMOR COMPLEX, COMBINED  2/19/2014    Procedure: COMBINED CRANIOTOMY, EXCISE TUMOR COMPLEX;  Supracerabellar  Infratentorial Approach for Resection of Tumor ;  Surgeon: Kate Mckeon MD;  [facility]: [facility] OR     THYROIDECTOMY N/A 5/3/2017    Procedure: THYROIDECTOMY;  Total Thyroidectomy;  Surgeon: Pamela Villasenor MD;  Location:  OR       Social History   Substance Use Topics     Smoking status: Current Every Day Smoker     Packs/day: 0.50     Years: 12.00     Types: Cigarettes     Start date: 1/21/2004     Last attempt to quit: 1/23/2016     Smokeless tobacco: Never Used     Alcohol use 0.0 oz/week      Comment: quit 16 days ago     Family History   Problem Relation Age of Onset     Thyroid Disease Paternal Aunt      Asthma Father      MENTAL ILLNESS Father      Obesity Father      Asthma Maternal Grandmother      OSTEOPOROSIS Maternal Grandmother      DIABETES Paternal Grandfather      Other Cancer Mother      Genitourinary Problems Mother      Bipolar Disorder Mother      unipolar depression     Depression Mother      Thyroid Disease Mother      benign tumor     Skin Cancer Mother      Bipolar Disorder Brother      unipolar depression     Depression Brother      Anesthesia Reaction Brother      Melanoma No family hx of          Allergies   Allergen Reactions     Codeine Sulfate Nausea and Vomiting     Tetanus Toxoid      Pt states she had an infection at injection site.      Vicodin [Hydrocodone-Acetaminophen] Nausea and Vomiting     Methimazole Rash     BP Readings from Last 3 Encounters:   01/30/18 138/89   12/12/17 132/70   12/08/17 128/68    Wt Readings from Last 3 Encounters:   01/30/18 241 lb 12.8  oz (109.7 kg)   12/12/17 244 lb (110.7 kg)   12/08/17 245 lb (111.1 kg)                  Labs reviewed in EPIC    Reviewed and updated as needed this visit by clinical staff       Reviewed and updated as needed this visit by Provider         ROS:  Constitutional, HEENT, cardiovascular, pulmonary, GI, , musculoskeletal, neuro, skin, endocrine and psych systems are negative, except as otherwise noted.    OBJECTIVE:     /89  Pulse 88  Temp 98.9  F (37.2  C) (Oral)  Resp 22  Wt 241 lb 12.8 oz (109.7 kg)  SpO2 97%  BMI 37.87 kg/m2  Body mass index is 37.87 kg/(m^2).  GENERAL: mildly ill appearing, overweight, alert and in no distress  EYES: Eyes grossly normal to inspection, no discharge.   HENT: Normocephalic, ear canals- normal; TMs- normal ; Nose- normal with clear rhinnorhea; oropharynx clear without erythema or exudates, Mouth- no ulcers, no lesions and mucous membranes moist  NECK: no tenderness, no adenopathy, no asymmetry, no masses, no stiffness  RESP: lungs with mod exp wheezes throughout   CV: regular rates and rhythm, normal S1 S2, no S3 or S4 and no murmur, no click or rub - peripheral pulses normal and brisk cap refill   ABDOMEN: soft, no tenderness, no hepatosplenomegaly, no masses, normal bowel sounds  MS: extremities- no gross deformities noted, no edema  SKIN: no suspicious lesions, no rashes, good skin turgor  NEURO: strength and tone- normal, sensory exam- grossly normal, mentation appears normal  PSYCH: well groomed, affect normal and pleasant, reports mood as stable/ good        Diagnostic Test Results:  Results for orders placed or performed in visit on 01/30/18 (from the past 24 hour(s))   Beta HCG qual IFA urine - [facility]   Result Value Ref Range    Beta HCG Qual IFA Urine Negative NEG^Negative          ASSESSMENT/PLAN:         ICD-10-CM    1. Moderate persistent asthma with exacerbation J45.41 predniSONE (DELTASONE) 20 MG tablet   2. Smoking addiction F17.200    3.  Dysmenorrhea N94.6 Beta HCG qual IFA urine - [facility] and [place]   exacerbation with viral illness, will do prednisone burst, counseled, strongly recommended smoking cessation and she has what she needs at home. Will pick a quit date when she is ready, gave tips.     upt can recheck at home since today is when period is due and may be too early     Return to Clinic for asthma follow up visit when feeling well. If illness not improving or any fevers or other concerns follow up sooner       JANNET Alfaro CNP  [facility]

## 2018-01-30 NOTE — MR AVS SNAPSHOT
After Visit Summary   1/30/2018    Cathy Arroyo    MRN: [id number]           Patient Information     Date Of Birth          1980        Visit Information        Provider Department      1/30/2018 11:00 AM Bing Bales APRN [facility]        Today's Diagnoses     Moderate persistent asthma with exacerbation    -  1    Smoking addiction        Dysmenorrhea          Care Instructions    Here are my favorite cough and cold instructions that might help with your symptoms     Drink plenty of extra fluids, honey soothes the cough (honey and lemon in tea is great), and cold fluids help keep swelling down.     Gargle salt water a few times a day, some studies show this can kill the virus sooner. Chicken noodle soup, and good nutrition in small amounts. Vitamin C and Zinc can help boost your immune system and can be taken short term while you fight this off. Extra fluids and rest also help your body fight off the virus.     Do saline washes with neti pot or saline nasal spray a few times throughout the day    Over the counter decongestants you can try:   Oxymetazolone nasal spray  (Afrin or generic is fine):  two sprays twice daily for 3-4 days then stop to avoid rebound congestion.    OR  Pseudoephedrine: 30mg three times daily by mouth for 1-2 weeks. You need to go to the Pharmacist counter and show your ID to get this medication. Do not take if you have high blood pressure, and this medication may also keep you awake.     I would also recommend to thin mucous:  Guaifenesin 100mg/teaspoon, 1-2 teaspoons four times daily for one week as needed.     For pain and discomfort:  Ibuprofen 600mg three times daily for 5-7 days (anti-inflammatory) to decrease swelling and help with pain of sinuses, nose, throat and chest. If you have Diabetes or Kidney issues, do not take this or any other NSAID     Return to clinic at any time if you develop high fevers, if worsening or in  1-2 weeks if not improving.              Follow-ups after your visit        Who to contact     If you have questions or need follow up information about today's clinic visit or your schedule please contact [facility] directly at [phone].  Normal or non-critical lab and imaging results will be communicated to you by Aha Mobilehart, letter or phone within 4 business days after the clinic has received the results. If you do not hear from us within 7 days, please contact the clinic through iSironat or phone. If you have a critical or abnormal lab result, we will notify you by phone as soon as possible.  Submit refill requests through BigBarn or call your pharmacy and they will forward the refill request to us. Please allow 3 business days for your refill to be completed.          Additional Information About Your Visit        Aha MobileharMaimaibao Information     BigBarn gives you secure access to your electronic health record. If you see a primary care provider, you can also send messages to your care team and make appointments. If you have questions, please call your primary care clinic.  If you do not have a primary care provider, please call [phone] and they will assist you.        Care EveryWhere ID     This is your Care EveryWhere ID. This could be used by other organizations to access your [place] medical records  [id number]        Your Vitals Were     Pulse Temperature Respirations Pulse Oximetry BMI (Body Mass Index)       88 98.9  F (37.2  C) (Oral) 22 97% 37.87 kg/m2        Blood Pressure from Last 3 Encounters:   01/30/18 138/89   12/12/17 132/70   12/08/17 128/68    Weight from Last 3 Encounters:   01/30/18 241 lb 12.8 oz (109.7 kg)   12/12/17 244 lb (110.7 kg)   12/08/17 245 lb (111.1 kg)              We Performed the Following     Beta HCG qual IFA urine - [facility] [place]          Today's Medication Changes          These changes are accurate as of 1/30/18  1:22 PM.  If you have any questions,  ask your nurse or doctor.               Start taking these medicines.        Dose/Directions    predniSONE 20 MG tablet   Commonly known as:  DELTASONE   Used for:  Moderate persistent asthma with exacerbation   Started by:  Bing Bales APRN CNP        Dose:  20 mg   Take 1 tablet (20 mg) by mouth 2 times daily   Quantity:  10 tablet   Refills:  0            Where to get your medicines      These medications were sent to [facility] [address] [place] [address] AT [facility]  [address] [place] [address]    Hours:  24-hours Phone:  [phone]     predniSONE 20 MG tablet                Primary Care Provider Office Phone # Fax #    Sherrill CRUZ JANNET Dasilva [phone]       [facility] [address] [facility] [address]  [facility] [address]        Equal Access to Services     NALDO HDEZ : Hadii samia de la cruzo Soyvette, waaxda luqadaha, qaybta kaalmada adeegyada, becca schrader . [facility] [phone].    ATENCIÓN: Si habla español, tiene a willard disposición servicios gratuitos de asistencia lingüística. Portia al [phone].    We comply with applicable federal civil rights laws and [place] laws. We do not discriminate on the basis of race, color, national origin, age, disability, sex, sexual orientation, or gender identity.            Thank you!     Thank you for choosing [facility]  for your care. Our goal is always to provide you with excellent care. Hearing back from our patients is one way we can continue to improve our services. Please take a few minutes to complete the written survey that you may receive in the mail after your visit with us. Thank you!             Your Updated Medication List - Protect others around you: Learn how to safely use, store and throw away your medicines at www.disposemymeds.org.          This list is accurate as of 1/30/18  1:22 PM.  Always use your most recent med list.                    Brand Name Dispense Instructions for use Diagnosis    ABILIFY 5 MG tablet   Generic drug:  ARIPiprazole       Postsurgical hypothyroidism, Hypocalcemia       ADDERALL XR PO      Take 5 mg by mouth daily        albuterol 108 (90 BASE) MCG/ACT Inhaler    PROAIR HFA/PROVENTIL HFA/VENTOLIN HFA    1 Inhaler    Inhale 2 puffs into the lungs every 4 hours as needed for shortness of breath / dyspnea or wheezing    Mild intermittent asthma without complication       fexofenadine 180 MG tablet    ALLEGRA    90 tablet    Take 1 tablet (180 mg) by mouth daily    Moderate persistent asthma with acute exacerbation, Chronic rhinitis       ipratropium - albuterol 0.5 mg/2.5 mg/3 mL 0.5-2.5 (3) MG/3ML neb solution    DUONEB    90 vial    Take 1 vial (3 mLs) by nebulization every 6 hours as needed for shortness of breath / dyspnea or wheezing    Mild persistent asthma with acute exacerbation       levothyroxine 175 MCG tablet    SYNTHROID/LEVOTHROID    90 tablet    Take 1 tablet (175 mcg) by mouth daily    Postsurgical hypothyroidism, Hypocalcemia       mometasone-formoterol 200-5 MCG/ACT oral inhaler    DULERA    3 Inhaler    Inhale 2 puffs into the lungs 2 times daily    Moderate persistent asthma with acute exacerbation       montelukast 10 MG tablet    SINGULAIR    90 tablet    Take 1 tablet (10 mg) by mouth every morning    Moderate persistent asthma with acute exacerbation       * order for DME     1 each    nebulizer    Mild persistent asthma with acute exacerbation       * order for DME     1 each    nebulizer    Mild persistent asthma with acute exacerbation       * order for DME     1 Units    nebulizer        predniSONE 20 MG tablet    DELTASONE    10 tablet    Take 1 tablet (20 mg) by mouth 2 times daily    Moderate persistent asthma with exacerbation       * Notice:  This list has 3 medication(s) that are the same as other medications prescribed for you. Read the directions carefully, and ask your doctor or other care  provider to review them with you.

## 2018-01-31 ASSESSMENT — ASTHMA QUESTIONNAIRES: ACT_TOTALSCORE: 9

## 2018-02-04 DIAGNOSIS — E89.0 POSTSURGICAL HYPOTHYROIDISM: Primary | ICD-10-CM

## 2018-02-07 NOTE — PROGRESS NOTES
SUBJECTIVE:   CC: Cathy Arroyo is an 37 year old woman who presents for preventive health visit.     Healthy Habits:    Do you get at least three servings of calcium containing foods daily (dairy, green leafy vegetables, etc.)? yes    Amount of exercise or daily activities, outside of work: none    Problems taking medications regularly No    Medication side effects: No    Have you had an eye exam in the past two years? no    Do you see a dentist twice per year? no    Do you have sleep apnea, excessive snoring or daytime drowsiness?yes          Today's PHQ-2 Score:   PHQ-2 ( 1999 Pfizer) 2/8/2018 6/1/2017   Q1: Little interest or pleasure in doing things 0 0   Q2: Feeling down, depressed or hopeless 0 0   PHQ-2 Score 0 0       Abuse: Current or Past(Physical, Sexual or Emotional)- No  Do you feel safe in your environment - Yes    Social History   Substance Use Topics     Smoking status: Former Smoker     Packs/day: 0.50     Years: 12.00     Types: Cigarettes     Start date: 1/21/2004     Quit date: 2/15/2018     Smokeless tobacco: Never Used     Alcohol use 0.0 oz/week      Comment: quit 16 days ago     If you drink alcohol do you typically have >3 drinks per day or >7 drinks per week? No                     Reviewed orders with patient.  Reviewed health maintenance and updated orders accordingly - Yes    Mammogram not appropriate for this patient based on age.    Pertinent mammograms are reviewed under the imaging tab.  History of abnormal Pap smear: NO - age 30- 65 PAP every 3 years recommended    Reviewed and updated as needed this visit by clinical staff  Tobacco  Allergies  Meds  Med Hx  Surg Hx  Fam Hx  Soc Hx        Reviewed and updated as needed this visit by Provider        Past Medical History:   Diagnosis Date     Allergic state      Anxiety      Bipolar 1 disorder (H)      Elevated cholesterol      Graves disease 2017     Obese     BMI 37.48     Pineal tumor     s/p resection 2/19/14  "benign tumor     Post partum depression      Thyroid disease     hashimotos     Uncomplicated asthma       Past Surgical History:   Procedure Laterality Date     BLOOD PATCH N/A 10/11/2016    Procedure: EPIDURAL BLOOD PATCH;  Surgeon: GENERIC ANESTHESIA PROVIDER;  [facility]     CRANIOTOMY, EXCISE TUMOR COMPLEX, COMBINED  2/19/2014    Procedure: COMBINED CRANIOTOMY, EXCISE TUMOR COMPLEX;  Supracerabellar  Infratentorial Approach for Resection of Tumor ;  Surgeon: Kate Mckeon MD;  [facility] OR     THYROIDECTOMY N/A 5/3/2017    Procedure: THYROIDECTOMY;  Total Thyroidectomy;  Surgeon: Pamela Villasenor MD;  [facility]       ROS:  C: NEGATIVE for fever, chills, change in weight  I: NEGATIVE for worrisome rashes, moles or lesions  E: NEGATIVE for vision changes or irritation  ENT: NEGATIVE for ear, mouth and throat problems  R: sick ongoing for at least a month between daughter and patient, breathing and coughing have been worse, taking inhalers, has prednisone at home, usually avoids due to increase in jose eduardo with prednisone  B: NEGATIVE for masses, tenderness or discharge  CV: NEGATIVE for chest pain, palpitations or peripheral edema  GI: NEGATIVE for nausea, abdominal pain, heartburn, or change in bowel habits  : mild discomfort with peeing. Periods are regular, every 26 days, using condoms for contraception and thinking about trying for another pregnancy this summer  M: NEGATIVE for significant arthralgias or myalgia  N: NEGATIVE for weakness, dizziness or paresthesias  E: NEGATIVE for temperature intolerance, skin/hair changes  H: NEGATIVE for bleeding problems  P: NEGATIVE for changes in mood or affect    OBJECTIVE:   /76  Pulse 90  Temp 98  F (36.7  C) (Oral)  Ht 5' 8.31\" (1.735 m)  Wt 247 lb 3.2 oz (112.1 kg)  SpO2 96%  BMI 37.25 kg/m2  EXAM:  GENERAL: healthy, alert and no distress  EYES: Eyes grossly normal to inspection, PERRL and conjunctivae and sclerae " normal  HENT: ear canals and TM's normal, nose and mouth without ulcers or lesions  NECK: no adenopathy, no asymmetry, masses, or scars and thyroid normal to palpation  RESP: lungs clear to auscultation - no rales, rhonchi or wheezes  BREAST: normal without masses, tenderness or nipple discharge and no palpable axillary masses or adenopathy  CV: regular rate and rhythm, normal S1 S2, no S3 or S4, no murmur, click or rub, no peripheral edema and peripheral pulses strong  ABDOMEN: soft, nontender, no hepatosplenomegaly, no masses and bowel sounds normal   (female): normal female external genitalia, normal urethral meatus, vaginal mucosa pink, moist, well rugated, and normal cervix/adnexa/uterus without masses or discharge  MS: no gross musculoskeletal defects noted, no edema  SKIN: no suspicious lesions or rashes  NEURO: Normal strength and tone, mentation intact and speech normal  PSYCH: mentation appears normal, affect normal/bright    ASSESSMENT/PLAN:   (Z00.01) Encounter for routine adult medical exam with abnormal findings  (primary encounter diagnosis)  Comment:   Plan:     (Z12.4) Screening for cervical cancer  Comment:   Plan: Pap imaged thin layer screen with HPV -         recommended age 30 - 65, HPV High Risk Types         DNA Cervical            (J20.9,  J45.41) Moderate persistent asthma with acute bronchitis and acute exacerbation  Comment:   Plan: XR Chest 2 Views        Lungs relatively clear for patient's baseline.  Rule out pneumonia due to length of time    (R30.0) Dysuria  Comment:   Plan: Wet prep            (E89.0) Postsurgical hypothyroidism  Comment:   Plan: followed by endo    COUNSELING:   Reviewed preventive health counseling, as reflected in patient instructions     reports that she quit smoking 8 days ago. Her smoking use included Cigarettes. She started smoking about 14 years ago. She has a 6.00 pack-year smoking history. She has never used smokeless tobacco.    Estimated body mass  "index is 37.25 kg/(m^2) as calculated from the following:    Height as of this encounter: 5' 8.31\" (1.735 m).    Weight as of this encounter: 247 lb 3.2 oz (112.1 kg).   Weight management plan: Discussed healthy diet and exercise guidelines and patient will follow up in 12 months in clinic to re-evaluate.    Counseling Resources:  ATP IV Guidelines  Pooled Cohorts Equation Calculator  Breast Cancer Risk Calculator  FRAX Risk Assessment  ICSI Preventive Guidelines  Dietary Guidelines for Americans, 2010  USDA's MyPlate  ASA Prophylaxis  Lung CA Screening    JANNET Liu [facility]  "

## 2018-02-07 NOTE — PATIENT INSTRUCTIONS
"Use inhaled steroid twice a day everyday  Set a quit date    Today after you leave, please go to the [facility] RADIOLOGY for a chest xray.  To get there you   1.  Go to the first floor down the elevators  2.  Turn right and go to the end of the fontaine  3.  Turn right through the door  4.  Turn right in the corridor to go toward the \"[facility]\"  5.  Go to the end of the long hallway  6.  Turn right to go to \"Diagnostic imaging/Radiology\"    You just need to show up - you do not need any paperwork      Preventive Health Recommendations  Female Ages 26 - 39  Yearly exam:   See your health care provider every year in order to    Review health changes.     Discuss preventive care.      Review your medicines if you your doctor has prescribed any.    Until age 30: Get a Pap test every three years (more often if you have had an abnormal result).    After age 30: Talk to your doctor about whether you should have a Pap test every 3 years or have a Pap test with HPV screening every 5 years.   You do not need a Pap test if your uterus was removed (hysterectomy) and you have not had cancer.  You should be tested each year for STDs (sexually transmitted diseases), if you're at risk.   Talk to your provider about how often to have your cholesterol checked.  If you are at risk for diabetes, you should have a diabetes test (fasting glucose).  Shots: Get a flu shot each year. Get a tetanus shot every 10 years.   Nutrition:     Eat at least 5 servings of fruits and vegetables each day.    Eat whole-grain bread, whole-wheat pasta and brown rice instead of white grains and rice.    Talk to your provider about Calcium and Vitamin D.     Lifestyle    Exercise at least 150 minutes a week (30 minutes a day, 5 days of the week). This will help you control your weight and prevent disease.    Limit alcohol to one drink per day.    No smoking.     Wear sunscreen to prevent skin cancer.    See your dentist every six months for an exam and " cleaning.

## 2018-02-08 ENCOUNTER — HOSPITAL ENCOUNTER (OUTPATIENT)
Dept: GENERAL RADIOLOGY | Facility: CLINIC | Age: 38
Discharge: HOME | End: 2018-02-08
Attending: NURSE PRACTITIONER | Admitting: NURSE PRACTITIONER
Payer: COMMERCIAL

## 2018-02-08 ENCOUNTER — APPOINTMENT (OUTPATIENT)
Dept: LAB | Facility: CLINIC | Age: 38
End: 2018-02-08
Payer: COMMERCIAL

## 2018-02-08 ENCOUNTER — OFFICE VISIT (OUTPATIENT)
Dept: FAMILY MEDICINE | Facility: CLINIC | Age: 38
End: 2018-02-08
Payer: COMMERCIAL

## 2018-02-08 VITALS
HEIGHT: 68 IN | WEIGHT: 247.2 LBS | BODY MASS INDEX: 37.47 KG/M2 | SYSTOLIC BLOOD PRESSURE: 120 MMHG | TEMPERATURE: 98 F | OXYGEN SATURATION: 96 % | HEART RATE: 90 BPM | DIASTOLIC BLOOD PRESSURE: 76 MMHG

## 2018-02-08 DIAGNOSIS — J45.41 MODERATE PERSISTENT ASTHMA WITH ACUTE BRONCHITIS AND ACUTE EXACERBATION: ICD-10-CM

## 2018-02-08 DIAGNOSIS — Z12.4 SCREENING FOR CERVICAL CANCER: ICD-10-CM

## 2018-02-08 DIAGNOSIS — J20.9 MODERATE PERSISTENT ASTHMA WITH ACUTE BRONCHITIS AND ACUTE EXACERBATION: ICD-10-CM

## 2018-02-08 DIAGNOSIS — R30.0 DYSURIA: ICD-10-CM

## 2018-02-08 DIAGNOSIS — Z00.01 ENCOUNTER FOR ROUTINE ADULT MEDICAL EXAM WITH ABNORMAL FINDINGS: Primary | ICD-10-CM

## 2018-02-08 DIAGNOSIS — E89.0 POSTSURGICAL HYPOTHYROIDISM: ICD-10-CM

## 2018-02-08 LAB
SPECIMEN SOURCE: NORMAL
WET PREP SPEC: NORMAL

## 2018-02-08 PROCEDURE — 71046 X-RAY EXAM CHEST 2 VIEWS: CPT

## 2018-02-08 PROCEDURE — 99213 OFFICE O/P EST LOW 20 MIN: CPT | Mod: 25 | Performed by: NURSE PRACTITIONER

## 2018-02-08 PROCEDURE — 84443 ASSAY THYROID STIM HORMONE: CPT | Performed by: NURSE PRACTITIONER

## 2018-02-08 PROCEDURE — G0476 HPV COMBO ASSAY CA SCREEN: HCPCS | Performed by: NURSE PRACTITIONER

## 2018-02-08 PROCEDURE — 84439 ASSAY OF FREE THYROXINE: CPT | Performed by: NURSE PRACTITIONER

## 2018-02-08 PROCEDURE — 36415 COLL VENOUS BLD VENIPUNCTURE: CPT | Performed by: NURSE PRACTITIONER

## 2018-02-08 PROCEDURE — 87210 SMEAR WET MOUNT SALINE/INK: CPT | Performed by: NURSE PRACTITIONER

## 2018-02-08 PROCEDURE — 99395 PREV VISIT EST AGE 18-39: CPT | Performed by: NURSE PRACTITIONER

## 2018-02-08 PROCEDURE — G0145 SCR C/V CYTO,THINLAYER,RESCR: HCPCS | Performed by: NURSE PRACTITIONER

## 2018-02-08 NOTE — NURSING NOTE
"Chief Complaint   Patient presents with     Physical       Initial /76  Pulse 90  Temp 98  F (36.7  C) (Oral)  Ht 5' 8.31\" (1.735 m)  Wt 247 lb 3.2 oz (112.1 kg)  SpO2 96%  BMI 37.25 kg/m2 Estimated body mass index is 37.25 kg/(m^2) as calculated from the following:    Height as of this encounter: 5' 8.31\" (1.735 m).    Weight as of this encounter: 247 lb 3.2 oz (112.1 kg).  Medication Reconciliation: complete     Terell Capps MA      "

## 2018-02-08 NOTE — PROGRESS NOTES
Cathy,    Your wet prep was normal - no yeast or BV.  If you have any questions, please feel free to contact the clinic.    DEVEN Winston

## 2018-02-08 NOTE — PROGRESS NOTES
Cathy,    The chest xray was normal - no pneumonia.  Please restart your inhaled steroid and give it a couple of days.  If you are not seeing improvement, please let me know.  If you have any questions, please feel free to contact the clinic.    DEVEN Winston

## 2018-02-08 NOTE — MR AVS SNAPSHOT
"              After Visit Summary   2/8/2018    Cathy Arroyo    MRN: [id number]           Patient Information     Date Of Birth          1980        Visit Information        Provider Department      2/8/2018 11:00 AM Sherrill Dasilva APRN [facility]        Today's Diagnoses     Encounter for routine adult medical exam with abnormal findings    -  1    Screening for cervical cancer        Moderate persistent asthma with acute bronchitis and acute exacerbation        Dysuria          Care Instructions    Use inhaled steroid twice a day everyday  Set a quit date    Today after you leave, please go to the [facility] for a chest xray.  To get there you   1.  Go to the first floor down the elevators  2.  Turn right and go to the end of the fontaine  3.  Turn right through the door  4.  Turn right in the corridor to go toward the \"[facility]\"  5.  Go to the end of the long hallway  6.  Turn right to go to \"Diagnostic imaging/Radiology\"    You just need to show up - you do not need any paperwork      Preventive Health Recommendations  Female Ages 26 - 39  Yearly exam:   See your health care provider every year in order to    Review health changes.     Discuss preventive care.      Review your medicines if you your doctor has prescribed any.    Until age 30: Get a Pap test every three years (more often if you have had an abnormal result).    After age 30: Talk to your doctor about whether you should have a Pap test every 3 years or have a Pap test with HPV screening every 5 years.   You do not need a Pap test if your uterus was removed (hysterectomy) and you have not had cancer.  You should be tested each year for STDs (sexually transmitted diseases), if you're at risk.   Talk to your provider about how often to have your cholesterol checked.  If you are at risk for diabetes, you should have a diabetes test (fasting glucose).  Shots: Get a flu shot each year. Get a tetanus " shot every 10 years.   Nutrition:     Eat at least 5 servings of fruits and vegetables each day.    Eat whole-grain bread, whole-wheat pasta and brown rice instead of white grains and rice.    Talk to your provider about Calcium and Vitamin D.     Lifestyle    Exercise at least 150 minutes a week (30 minutes a day, 5 days of the week). This will help you control your weight and prevent disease.    Limit alcohol to one drink per day.    No smoking.     Wear sunscreen to prevent skin cancer.    See your dentist every six months for an exam and cleaning.            Follow-ups after your visit        Who to contact     If you have questions or need follow up information about today's clinic visit or your schedule please contact [facility] [place] [phone].  Normal or non-critical lab and imaging results will be communicated to you by MyChart, letter or phone within 4 business days after the clinic has received the results. If you do not hear from us within 7 days, please contact the clinic through [employer] or phone. If you have a critical or abnormal lab result, we will notify you by phone as soon as possible.  Submit refill requests through [employer] or call your pharmacy and they will forward the refill request to us. Please allow 3 business days for your refill to be completed.          Additional Information About Your Visit        [employer]Home Information     [employer] gives you secure access to your electronic health record. If you see a primary care provider, you can also send messages to your care team and make appointments. If you have questions, please call your primary care clinic.  If you do not have a primary care provider, please call [phone] and they will assist you.        Care EveryWhere ID     This is your Care EveryWhere ID. This could be used by other organizations to access your [facility] medical records  [id number]        Your Vitals Were     Pulse Temperature Height Pulse  "Oximetry BMI (Body Mass Index)       90 98  F (36.7  C) (Oral) 5' 8.31\" (1.735 m) 96% 37.25 kg/m2        Blood Pressure from Last 3 Encounters:   02/08/18 120/76   01/30/18 138/89   12/12/17 132/70    Weight from Last 3 Encounters:   02/08/18 247 lb 3.2 oz (112.1 kg)   01/30/18 241 lb 12.8 oz (109.7 kg)   12/12/17 244 lb (110.7 kg)              We Performed the Following     HPV High Risk Types DNA Cervical     Pap imaged thin layer screen with HPV - recommended age 30 - 65     Wet prep     XR Chest 2 Views          Today's Medication Changes          These changes are accurate as of 2/8/18 11:53 AM.  If you have any questions, ask your nurse or doctor.               Stop taking these medicines if you haven't already. Please contact your care team if you have questions.     predniSONE 20 MG tablet   Commonly known as:  DELTASONE   Stopped by:  Sherrill Dasilva APRN CNP                    Primary Care Provider Office Phone # Fax #    JANNET Nascimento [phone]       [facility] [address] [address]        Equal Access to Services     NALDO HDEZ : Ashly de la cruzo Soyvette, waaxda luqadaha, qaybta kaalmada adeegyada, becca tan. So [facility] [phone].    ATENCIÓN: Si habla español, tiene a willard disposición servicios gratuitos de asistencia lingüística. Llame al [phone].    We comply with applicable federal civil rights laws and [place] laws. We do not discriminate on the basis of race, color, national origin, age, disability, sex, sexual orientation, or gender identity.            Thank you!     Thank you for [place] [facility]  [place] your [place]. Our goal is always to provide you with excellent care. Hearing back from our patients is one way we can continue to improve our services. Please take a few minutes to complete the written survey that you may receive in the mail after your visit with us. Thank you!      "        Your Updated Medication List - Protect others around you: Learn how to safely use, store and throw away your medicines at www.disposemymeds.org.          This list is accurate as of 2/8/18 11:53 AM.  Always use your most recent med list.                   Brand Name Dispense Instructions for use Diagnosis    ABILIFY 5 MG tablet   Generic drug:  ARIPiprazole       Postsurgical hypothyroidism, Hypocalcemia       ADDERALL XR PO      Take 5 mg by mouth daily        albuterol 108 (90 BASE) MCG/ACT Inhaler    PROAIR HFA/PROVENTIL HFA/VENTOLIN HFA    1 Inhaler    Inhale 2 puffs into the lungs every 4 hours as needed for shortness of breath / dyspnea or wheezing    Mild intermittent asthma without complication       fexofenadine 180 MG tablet    ALLEGRA    90 tablet    Take 1 tablet (180 mg) by mouth daily    Moderate persistent asthma with acute exacerbation, Chronic rhinitis       ipratropium - albuterol 0.5 mg/2.5 mg/3 mL 0.5-2.5 (3) MG/3ML neb solution    DUONEB    90 vial    Take 1 vial (3 mLs) by nebulization every 6 hours as needed for shortness of breath / dyspnea or wheezing    Mild persistent asthma with acute exacerbation       levothyroxine 175 MCG tablet    SYNTHROID/LEVOTHROID    90 tablet    Take 1 tablet (175 mcg) by mouth daily    Postsurgical hypothyroidism, Hypocalcemia       mometasone-formoterol 200-5 MCG/ACT oral inhaler    DULERA    3 Inhaler    Inhale 2 puffs into the lungs 2 times daily    Moderate persistent asthma with acute exacerbation       montelukast 10 MG tablet    SINGULAIR    90 tablet    Take 1 tablet (10 mg) by mouth every morning    Moderate persistent asthma with acute exacerbation       * order for DME     1 each    nebulizer    Mild persistent asthma with acute exacerbation       * order for DME     1 each    nebulizer    Mild persistent asthma with acute exacerbation       * order for DME     1 Units    nebulizer        * Notice:  This list has 3 medication(s) that are the  same as other medications prescribed for you. Read the directions carefully, and ask your doctor or other care provider to review them with you.

## 2018-02-09 LAB
T4 FREE SERPL-MCNC: 1.31 NG/DL (ref 0.76–1.46)
TSH SERPL DL<=0.005 MIU/L-ACNC: 1.93 MU/L (ref 0.4–4)

## 2018-02-12 LAB
COPATH REPORT: NORMAL
PAP: NORMAL

## 2018-02-13 ENCOUNTER — TELEPHONE (OUTPATIENT)
Dept: FAMILY MEDICINE | Facility: CLINIC | Age: 38
End: 2018-02-13

## 2018-02-13 NOTE — TELEPHONE ENCOUNTER
NIL pap, HPV is still pending. I called the pt and informed her of this.  Catalina Jerez RN-Pap Tracking

## 2018-02-13 NOTE — TELEPHONE ENCOUNTER
Reason for call:  Other   Patient called regarding (reason for call): call back  Additional comments: Patient had a pap smear with Zamzam Dasilva and called wondering what the results were and would like a call back    Phone number to reach patient:  Home number on file [phone] (home)    Best Time:  Any    Can we leave a detailed message on this number?  YES

## 2018-02-13 NOTE — TELEPHONE ENCOUNTER
Routing to PAP Tracking RNs---    Please see message below.     Thanks,   Nata Giraldo, BSN RN  [facility]

## 2018-02-14 LAB
FINAL DIAGNOSIS: NORMAL
HPV HR 12 DNA CVX QL NAA+PROBE: NEGATIVE
HPV16 DNA SPEC QL NAA+PROBE: NEGATIVE
HPV18 DNA SPEC QL NAA+PROBE: NEGATIVE
SPECIMEN DESCRIPTION: NORMAL
SPECIMEN SOURCE CVX/VAG CYTO: NORMAL

## 2018-02-14 NOTE — PROGRESS NOTES
SUBJECTIVE:   Cathy Arroyo is a 37 year old female who presents to clinic today for the following health issues:    Acute Illness   Acute illness concerns: cough  Onset: Saturday     Fever: YES    Chills/Sweats: YES    Headache (location?): YES    Sinus Pressure:YES    Conjunctivitis:  no    Ear Pain: no    Rhinorrhea: YES    Congestion: YES    Sore Throat: YES     Cough: YES-productive of yellow sputum    Wheeze: YES    Decreased Appetite: YES    Nausea: no    Vomiting: YES- from coughing     Diarrhea:  YES    Dysuria/Freq.: YES- pressure     Fatigue/Achiness: YES    Sick/Strep Exposure: YES- daughter diagnosed with influenza on Friday      Therapies Tried and outcome: Nyquil, inhalers   Dulera consistently twice a day  Added back allergy medication which has helped  Used patch a couple times last week - went pretty good.  Only had one today   noticing snoring and apneic episodes both preceding this illness, but worsening significantly with illness.  Psych recommended sleep study, but she is not sure how to arrange this test due to excessive daytime sleepiness and absence of dreams      Problem list and histories reviewed & adjusted, as indicated.  Additional history: as documented    Reviewed and updated as needed this visit by clinical staff       Reviewed and updated as needed this visit by Provider         ROS:    ROS:5 point ROS including CONST, HEENT, Respiratory, CV, and GI other than that noted in the HPI,  is negative       OBJECTIVE:     BP (!) 139/92 (BP Location: Left arm, Patient Position: Chair, Cuff Size: Adult Large)  Pulse 97  Temp 98.8  F (37.1  C) (Oral)  Wt 241 lb 12.8 oz (109.7 kg)  SpO2 100%  BMI 36.44 kg/m2  Body mass index is 36.44 kg/(m^2).  GENERAL: alert and fatigued  EYES: Eyes grossly normal to inspection, PERRL and conjunctivae and sclerae normal  HENT: normal cephalic/atraumatic, both ears: clear effusion, nose and mouth without ulcers or lesions, nasal  mucosa edematous , oropharynx clear and oral mucous membranes moist  NECK: no adenopathy, no asymmetry, masses, or scars and thyroid normal to palpation  RESP: expiratory wheezes throughout and inspiratory wheezes throughout  CV: regular rate and rhythm, normal S1 S2, no S3 or S4, no murmur, click or rub, no peripheral edema and peripheral pulses strong  PSYCH: mentation appears normal, affect normal/bright    Diagnostic Test Results:  none     ASSESSMENT/PLAN:     Asthma: Moderate persistent with exacerbation   Plan:  Smoking cessation strongly encouraged  Medications:  Oral steroid:  prednisone    (J45.41) Moderate persistent asthma with acute exacerbation  (primary encounter diagnosis)  Comment:   Plan: Recommend steroid burst - has prednisone at home    (J11.1) Influenza  Comment:   Plan: XR Chest 2 Views        At higher risk for secondary pneumonia    (R06.83) Snoring  Comment:   Plan: SLEEP EVALUATION & MANAGEMENT REFERRAL - [facility]         -[facility]  [phone] (Age 2 and up)   Look into pineal gland and melatonin      Patient Instructions   Please take the prednisone 20 mg daily for 5 days  If you have a return of fever (over 100.4) then please go get a chest xray.  If the clinic is closed (Sat) you should go to urgent care instead.  If it is Sunday afternoon-evening, you can get the chest xray and someone at the clinic will look at it Monday.    Schedule sleep study for after you are recovered from illness  With the asthma exacerbation, I would plan to schedule this a month out      JANNET Liu [facility]

## 2018-02-15 ENCOUNTER — OFFICE VISIT (OUTPATIENT)
Dept: FAMILY MEDICINE | Facility: CLINIC | Age: 38
End: 2018-02-15
Payer: COMMERCIAL

## 2018-02-15 VITALS
TEMPERATURE: 98.8 F | SYSTOLIC BLOOD PRESSURE: 139 MMHG | DIASTOLIC BLOOD PRESSURE: 92 MMHG | WEIGHT: 241.8 LBS | OXYGEN SATURATION: 100 % | BODY MASS INDEX: 36.44 KG/M2 | HEART RATE: 97 BPM

## 2018-02-15 DIAGNOSIS — J11.1 INFLUENZA: ICD-10-CM

## 2018-02-15 DIAGNOSIS — J45.41 MODERATE PERSISTENT ASTHMA WITH ACUTE EXACERBATION: Primary | ICD-10-CM

## 2018-02-15 DIAGNOSIS — R06.83 SNORING: ICD-10-CM

## 2018-02-15 PROBLEM — J45.31 MILD PERSISTENT ASTHMA WITH ACUTE EXACERBATION: Status: RESOLVED | Noted: 2017-01-18 | Resolved: 2018-02-15

## 2018-02-15 PROCEDURE — 99214 OFFICE O/P EST MOD 30 MIN: CPT | Performed by: NURSE PRACTITIONER

## 2018-02-15 NOTE — MR AVS SNAPSHOT
After Visit Summary   2/15/2018    Cathy Arroyo    MRN: [id number]           Patient Information     Date Of Birth          1980        Visit Information        Provider Department      2/15/2018 1:00 PM Sherrill Dasilva APRN CNP [facility]        Today's Diagnoses     Influenza    -  1    Moderate persistent asthma with acute exacerbation        Snoring          Care Instructions    Please take the prednisone 20 mg daily for 5 days  If you have a return of fever (over 100.4) then please go get a chest xray.  If the clinic is closed (Sat) you should go to urgent care instead.  If it is Sunday afternoon-evening, you can get the chest xray and someone at the clinic will look at it Monday.    Schedule sleep study for after you are recovered from illness  With the asthma exacerbation, I would plan to schedule this a month out          Follow-ups after your visit        Additional Services     SLEEP EVALUATION & MANAGEMENT REFERRAL - [facility]  [phone] (Age 2 and up)       Please be aware that coverage of these services is subject to the terms and limitations of your health insurance plan.  Call member services at your health plan with any benefit or coverage questions.      Please bring the following to your appointment:    >>   List of current medications   >>   This referral request   >>   Any documents/labs given to you for this referral                      Future tests that were ordered for you today     Open Future Orders        Priority Expected Expires Ordered    SLEEP EVALUATION & MANAGEMENT REFERRAL - [facility]  [phone] (Age 2 and up) Routine  2/15/2019 2/15/2018            Who to contact     If you have questions or need follow up information about today's clinic visit or your schedule please contact [facility] [place] at  [phone].  Normal or non-critical lab and imaging results will be communicated to you by MyChart, letter or phone within 4 business days after the clinic has received the results. If you do not hear from us within 7 days, please contact the clinic through [employer] or phone. If you have a critical or abnormal lab result, we will notify you by phone as soon as possible.  Submit refill requests through "[employer]" or call your pharmacy and they will forward the refill request to us. Please allow 3 business days for your refill to be completed.          Additional Information About Your Visit        [employer] Information     "[employer]" gives you secure access to your electronic health record. If you see a primary care provider, you can also send messages to your care team and make appointments. If you have questions, please call your primary care clinic.  If you do not have a primary care provider, please call [phone] and they will assist you.        Care EveryWhere ID     This is your Care EveryWhere ID. This could be used by other organizations to access your [facility] medical records  [id number]        Your Vitals Were     Pulse Temperature Pulse Oximetry BMI (Body Mass Index)          97 98.8  F (37.1  C) (Oral) 100% 36.44 kg/m2         Blood Pressure from Last 3 Encounters:   02/15/18 (!) 139/92   02/08/18 120/76   01/30/18 138/89    Weight from Last 3 Encounters:   02/15/18 241 lb 12.8 oz (109.7 kg)   02/08/18 247 lb 3.2 oz (112.1 kg)   01/30/18 241 lb 12.8 oz (109.7 kg)              We Performed the Following     XR Chest 2 Views        Primary Care Provider Office Phone # Fax #    JANNET Nascimento [phone]       [facility] [address] [facility] [address]  [facility] [address]        Equal Access to Services     NALDO HDEZ : Ashly Schneider, waaxda luqadaha, qaybta kaalmada cleo, becca tan. So [facility] [phone].    ATENCIÓN: Lindsey duke  español, tiene a willard disposición servicios gratuitos de asistencia lingüística. Portia ren [phone].    We comply with applicable federal civil rights laws and [place] laws. We do not discriminate on the basis of race, color, national origin, age, disability, sex, sexual orientation, or gender identity.            Thank you!     Thank you for choosing [facility]  for your care. Our goal is always to provide you with excellent care. Hearing back from our patients is one way we can continue to improve our services. Please take a few minutes to complete the written survey that you may receive in the mail after your visit with us. Thank you!             Your Updated Medication List - Protect others around you: Learn how to safely use, store and throw away your medicines at www.disposemymeds.org.          This list is accurate as of 2/15/18  1:24 PM.  Always use your most recent med list.                   Brand Name Dispense Instructions for use Diagnosis    ABILIFY 5 MG tablet   Generic drug:  ARIPiprazole       Postsurgical hypothyroidism, Hypocalcemia       ADDERALL XR PO      Take 5 mg by mouth daily        albuterol 108 (90 BASE) MCG/ACT Inhaler    PROAIR HFA/PROVENTIL HFA/VENTOLIN HFA    1 Inhaler    Inhale 2 puffs into the lungs every 4 hours as needed for shortness of breath / dyspnea or wheezing    Mild intermittent asthma without complication       fexofenadine 180 MG tablet    ALLEGRA    90 tablet    Take 1 tablet (180 mg) by mouth daily    Moderate persistent asthma with acute exacerbation, Chronic rhinitis       ipratropium - albuterol 0.5 mg/2.5 mg/3 mL 0.5-2.5 (3) MG/3ML neb solution    DUONEB    90 vial    Take 1 vial (3 mLs) by nebulization every 6 hours as needed for shortness of breath / dyspnea or wheezing    Mild persistent asthma with acute exacerbation       levothyroxine 175 MCG tablet    SYNTHROID/LEVOTHROID    90 tablet    Take 1 tablet (175 mcg) by mouth daily     Postsurgical hypothyroidism, Hypocalcemia       mometasone-formoterol 200-5 MCG/ACT oral inhaler    DULERA    3 Inhaler    Inhale 2 puffs into the lungs 2 times daily    Moderate persistent asthma with acute exacerbation       montelukast 10 MG tablet    SINGULAIR    90 tablet    Take 1 tablet (10 mg) by mouth every morning    Moderate persistent asthma with acute exacerbation       * order for DME     1 each    nebulizer    Mild persistent asthma with acute exacerbation       * order for DME     1 each    nebulizer    Mild persistent asthma with acute exacerbation       * order for DME     1 Units    nebulizer        * Notice:  This list has 3 medication(s) that are the same as other medications prescribed for you. Read the directions carefully, and ask your doctor or other care provider to review them with you.

## 2018-02-15 NOTE — NURSING NOTE
"Chief Complaint   Patient presents with     Cough       Initial BP (!) 139/92 (BP Location: Left arm, Patient Position: Chair, Cuff Size: Adult Large)  Pulse 97  Temp 98.8  F (37.1  C) (Oral)  Wt 241 lb 12.8 oz (109.7 kg)  SpO2 100%  BMI 36.44 kg/m2 Estimated body mass index is 36.44 kg/(m^2) as calculated from the following:    Height as of 2/8/18: 5' 8.31\" (1.735 m).    Weight as of this encounter: 241 lb 12.8 oz (109.7 kg).  Medication Reconciliation: complete     Jahaira Treadwell CMA    "

## 2018-02-15 NOTE — PATIENT INSTRUCTIONS
Please take the prednisone 20 mg daily for 5 days  If you have a return of fever (over 100.4) then please go get a chest xray.  If the clinic is closed (Sat) you should go to urgent care instead.  If it is Sunday afternoon-evening, you can get the chest xray and someone at the clinic will look at it Monday.    Schedule sleep study for after you are recovered from illness  With the asthma exacerbation, I would plan to schedule this a month out

## 2018-02-19 ENCOUNTER — HOSPITAL ENCOUNTER (OUTPATIENT)
Dept: GENERAL RADIOLOGY | Facility: CLINIC | Age: 38
Discharge: HOME | End: 2018-02-19
Attending: NURSE PRACTITIONER | Admitting: NURSE PRACTITIONER
Payer: COMMERCIAL

## 2018-02-19 ENCOUNTER — MYC MEDICAL ADVICE (OUTPATIENT)
Dept: FAMILY MEDICINE | Facility: CLINIC | Age: 38
End: 2018-02-19

## 2018-02-19 PROCEDURE — 71046 X-RAY EXAM CHEST 2 VIEWS: CPT

## 2018-02-19 NOTE — TELEPHONE ENCOUNTER
Chest X-Ray results are currently in process.     Nata Giraldo, BSN RN  [facility]

## 2018-03-01 NOTE — PROGRESS NOTES
Cathy,    Your xray had been normal.  How are you feeling? If you have any questions, please feel free to contact the clinic.    DEVEN Winston

## 2018-04-20 ENCOUNTER — TELEPHONE (OUTPATIENT)
Dept: MIDWIFE SERVICES | Facility: CLINIC | Age: 38
End: 2018-04-20

## 2018-04-20 DIAGNOSIS — Z32.01 POSITIVE PREGNANCY TEST: ICD-10-CM

## 2018-04-20 DIAGNOSIS — Z32.01 POSITIVE PREGNANCY TEST: Primary | ICD-10-CM

## 2018-04-20 LAB — B-HCG SERPL-ACNC: <1 IU/L (ref 0–5)

## 2018-04-20 PROCEDURE — 84702 CHORIONIC GONADOTROPIN TEST: CPT | Performed by: ADVANCED PRACTICE MIDWIFE

## 2018-04-20 PROCEDURE — 36415 COLL VENOUS BLD VENIPUNCTURE: CPT | Performed by: ADVANCED PRACTICE MIDWIFE

## 2018-04-20 NOTE — TELEPHONE ENCOUNTER
Patient c/o pregnancy. LMP was 3/23. Took home UPT on 4/18 and positive with a faint line. [facility].  Yesterday had bright red light bleeding but didn't fill tampon and no bleeding today. Ok for pt to come in for hcg quant to confirm positive or negative? Thanks.   Beatriz Johnson, RN-BSN

## 2018-04-23 ENCOUNTER — TELEPHONE (OUTPATIENT)
Dept: FAMILY MEDICINE | Facility: CLINIC | Age: 38
End: 2018-04-23

## 2018-04-23 ENCOUNTER — APPOINTMENT (OUTPATIENT)
Dept: CT IMAGING | Facility: CLINIC | Age: 38
End: 2018-04-23
Attending: FAMILY MEDICINE
Payer: COMMERCIAL

## 2018-04-23 ENCOUNTER — HOSPITAL ENCOUNTER (EMERGENCY)
Facility: CLINIC | Age: 38
Discharge: HOME | End: 2018-04-24
Attending: FAMILY MEDICINE | Admitting: FAMILY MEDICINE
Payer: COMMERCIAL

## 2018-04-23 ENCOUNTER — APPOINTMENT (OUTPATIENT)
Dept: MRI IMAGING | Facility: CLINIC | Age: 38
End: 2018-04-23
Attending: FAMILY MEDICINE
Payer: COMMERCIAL

## 2018-04-23 DIAGNOSIS — G44.201 ACUTE INTRACTABLE TENSION-TYPE HEADACHE: ICD-10-CM

## 2018-04-23 LAB
ALBUMIN SERPL-MCNC: 3.8 G/DL (ref 3.4–5)
ALBUMIN UR-MCNC: NEGATIVE MG/DL
ALP SERPL-CCNC: 86 U/L (ref 40–150)
ALT SERPL W P-5'-P-CCNC: 26 U/L (ref 0–50)
ANION GAP SERPL CALCULATED.3IONS-SCNC: 9 MMOL/L (ref 3–14)
APPEARANCE UR: ABNORMAL
APTT PPP: 29 SEC (ref 22–37)
AST SERPL W P-5'-P-CCNC: 14 U/L (ref 0–45)
B-HCG SERPL-ACNC: <1 IU/L (ref 0–5)
BACTERIA #/AREA URNS HPF: ABNORMAL /HPF
BASOPHILS # BLD AUTO: 0 10E9/L (ref 0–0.2)
BASOPHILS NFR BLD AUTO: 0.2 %
BILIRUB SERPL-MCNC: 0.4 MG/DL (ref 0.2–1.3)
BILIRUB UR QL STRIP: NEGATIVE
BUN SERPL-MCNC: 11 MG/DL (ref 7–30)
CALCIUM SERPL-MCNC: 8.8 MG/DL (ref 8.5–10.1)
CHLORIDE SERPL-SCNC: 105 MMOL/L (ref 94–109)
CO2 SERPL-SCNC: 27 MMOL/L (ref 20–32)
COLOR UR AUTO: YELLOW
CREAT SERPL-MCNC: 0.64 MG/DL (ref 0.52–1.04)
DIFFERENTIAL METHOD BLD: NORMAL
EOSINOPHIL # BLD AUTO: 0.1 10E9/L (ref 0–0.7)
EOSINOPHIL NFR BLD AUTO: 1.2 %
ERYTHROCYTE [DISTWIDTH] IN BLOOD BY AUTOMATED COUNT: 13.4 % (ref 10–15)
GFR SERPL CREATININE-BSD FRML MDRD: >90 ML/MIN/1.7M2
GLUCOSE SERPL-MCNC: 88 MG/DL (ref 70–99)
GLUCOSE UR STRIP-MCNC: NEGATIVE MG/DL
HCT VFR BLD AUTO: 41.4 % (ref 35–47)
HGB BLD-MCNC: 13.9 G/DL (ref 11.7–15.7)
HGB UR QL STRIP: ABNORMAL
IMM GRANULOCYTES # BLD: 0 10E9/L (ref 0–0.4)
IMM GRANULOCYTES NFR BLD: 0.1 %
INR PPP: 1.01 (ref 0.86–1.14)
KETONES UR STRIP-MCNC: NEGATIVE MG/DL
LEUKOCYTE ESTERASE UR QL STRIP: ABNORMAL
LIPASE SERPL-CCNC: 72 U/L (ref 73–393)
LYMPHOCYTES # BLD AUTO: 3.8 10E9/L (ref 0.8–5.3)
LYMPHOCYTES NFR BLD AUTO: 38.8 %
MCH RBC QN AUTO: 29.3 PG (ref 26.5–33)
MCHC RBC AUTO-ENTMCNC: 33.6 G/DL (ref 31.5–36.5)
MCV RBC AUTO: 87 FL (ref 78–100)
MONOCYTES # BLD AUTO: 0.4 10E9/L (ref 0–1.3)
MONOCYTES NFR BLD AUTO: 4 %
MUCOUS THREADS #/AREA URNS LPF: PRESENT /LPF
NEUTROPHILS # BLD AUTO: 5.5 10E9/L (ref 1.6–8.3)
NEUTROPHILS NFR BLD AUTO: 55.7 %
NITRATE UR QL: NEGATIVE
NRBC # BLD AUTO: 0 10*3/UL
NRBC BLD AUTO-RTO: 0 /100
PH UR STRIP: 6.5 PH (ref 5–7)
PLATELET # BLD AUTO: 322 10E9/L (ref 150–450)
POTASSIUM SERPL-SCNC: 3.6 MMOL/L (ref 3.4–5.3)
PROT SERPL-MCNC: 7.4 G/DL (ref 6.8–8.8)
RBC # BLD AUTO: 4.75 10E12/L (ref 3.8–5.2)
RBC #/AREA URNS AUTO: 4 /HPF (ref 0–2)
SODIUM SERPL-SCNC: 140 MMOL/L (ref 133–144)
SOURCE: ABNORMAL
SP GR UR STRIP: 1.02 (ref 1–1.03)
SQUAMOUS #/AREA URNS AUTO: 5 /HPF (ref 0–1)
TSH SERPL DL<=0.005 MIU/L-ACNC: 1.93 MU/L (ref 0.4–4)
UROBILINOGEN UR STRIP-MCNC: NORMAL MG/DL (ref 0–2)
WBC # BLD AUTO: 9.8 10E9/L (ref 4–11)
WBC #/AREA URNS AUTO: 7 /HPF (ref 0–5)

## 2018-04-23 PROCEDURE — A9585 GADOBUTROL INJECTION: HCPCS | Performed by: FAMILY MEDICINE

## 2018-04-23 PROCEDURE — 70553 MRI BRAIN STEM W/O & W/DYE: CPT

## 2018-04-23 PROCEDURE — 80053 COMPREHEN METABOLIC PANEL: CPT | Performed by: FAMILY MEDICINE

## 2018-04-23 PROCEDURE — 25000128 H RX IP 250 OP 636: Performed by: FAMILY MEDICINE

## 2018-04-23 PROCEDURE — 99285 EMERGENCY DEPT VISIT HI MDM: CPT | Mod: 25 | Performed by: FAMILY MEDICINE

## 2018-04-23 PROCEDURE — 84443 ASSAY THYROID STIM HORMONE: CPT | Performed by: FAMILY MEDICINE

## 2018-04-23 PROCEDURE — 83690 ASSAY OF LIPASE: CPT | Performed by: FAMILY MEDICINE

## 2018-04-23 PROCEDURE — 85730 THROMBOPLASTIN TIME PARTIAL: CPT | Performed by: FAMILY MEDICINE

## 2018-04-23 PROCEDURE — 70450 CT HEAD/BRAIN W/O DYE: CPT

## 2018-04-23 PROCEDURE — 85025 COMPLETE CBC W/AUTO DIFF WBC: CPT | Performed by: FAMILY MEDICINE

## 2018-04-23 PROCEDURE — 85610 PROTHROMBIN TIME: CPT | Performed by: FAMILY MEDICINE

## 2018-04-23 PROCEDURE — 81001 URINALYSIS AUTO W/SCOPE: CPT | Performed by: FAMILY MEDICINE

## 2018-04-23 PROCEDURE — 84702 CHORIONIC GONADOTROPIN TEST: CPT | Performed by: FAMILY MEDICINE

## 2018-04-23 PROCEDURE — 99285 EMERGENCY DEPT VISIT HI MDM: CPT | Mod: Z6 | Performed by: FAMILY MEDICINE

## 2018-04-23 RX ORDER — LIDOCAINE 40 MG/G
CREAM TOPICAL
Status: DISCONTINUED | OUTPATIENT
Start: 2018-04-23 | End: 2018-04-24 | Stop reason: HOSPADM

## 2018-04-23 RX ORDER — GADOBUTROL 604.72 MG/ML
10 INJECTION INTRAVENOUS ONCE
Status: COMPLETED | OUTPATIENT
Start: 2018-04-23 | End: 2018-04-23

## 2018-04-23 RX ADMIN — GADOBUTROL 10 ML: 604.72 INJECTION INTRAVENOUS at 22:06

## 2018-04-23 ASSESSMENT — ENCOUNTER SYMPTOMS
WEAKNESS: 0
DIZZINESS: 1
NECK PAIN: 0
FEVER: 0
HEADACHES: 1
NAUSEA: 1
ABDOMINAL PAIN: 0
PHOTOPHOBIA: 1
VOMITING: 1
NUMBNESS: 0

## 2018-04-23 NOTE — ED PROVIDER NOTES
"  History     Chief Complaint   Patient presents with     Headache     HPI  Cathy Arroyo is a 37 year old female with a history of anxiety, bipolar 1 disorder, Graves disease, pineal tumor s/p resection (2014), thyroid disease, and asthma who presents for evaluation of a headache. Patient complains of a constant bilateral temporal headache wrapping across her eyes/face with associated mild dizziness, photophobia, nausea, and vomiting for the past one week. She reports she recently discontinued Abilify on 04/14 as instructed by her psychiatrist. She followed up with psychiatrist regarding whether or not her headache could be a side effect of stopping this medication, and her psychiatrist did not believe it would, so referred the patient here to the Emergency Department for further evaluation and management. Patient states she \"just doesn't feel right\". She fever, abdominal pain, leg pain or swelling. No focal numbness or weakness. No vision changes, no neck pain. No balance difficulty or gait instability. No recent heavy lifting or trauma. No other symptoms or complaints at this time.     I have reviewed the Medications, Allergies, Past Medical and Surgical History, and Social History in the [employer] system.  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     Post partum depression      Thyroid disease     hashimotos     Uncomplicated asthma        Past Surgical History:   Procedure Laterality Date     BLOOD PATCH N/A 10/11/2016    Procedure: EPIDURAL BLOOD PATCH;  Surgeon: GENERIC ANESTHESIA PROVIDER;  Location: [facility] OR     CRANIOTOMY, EXCISE TUMOR COMPLEX, COMBINED  2/19/2014    Procedure: COMBINED CRANIOTOMY, EXCISE TUMOR COMPLEX;  Supracerabellar  Infratentorial Approach for Resection of Tumor ;  Surgeon: Kate Mckeon " MD Julio;  Location: [facility] OR     THYROIDECTOMY N/A 5/3/2017    Procedure: THYROIDECTOMY;  Total Thyroidectomy;  Surgeon: Pamela Villasenor MD;  Location: [facility] OR       Family History   Problem Relation Age of Onset     Thyroid Disease Paternal Aunt      Asthma Father      MENTAL ILLNESS Father      Obesity Father      Asthma Maternal Grandmother      OSTEOPOROSIS Maternal Grandmother      DIABETES Paternal Grandfather      Other Cancer Mother      Genitourinary Problems Mother      Bipolar Disorder Mother      unipolar depression     Depression Mother      Thyroid Disease Mother      benign tumor     Skin Cancer Mother      Bipolar Disorder Brother      unipolar depression     Depression Brother      Anesthesia Reaction Brother      Melanoma No family hx of        Social History   Substance Use Topics     Smoking status: Former Smoker     Packs/day: 0.50     Years: 12.00     Types: Cigarettes     Start date: 1/21/2004     Quit date: 2/15/2018     Smokeless tobacco: Never Used     Alcohol use 0.0 oz/week      Comment: quit 16 days ago       No current facility-administered medications for this encounter.      Current Outpatient Prescriptions   Medication     albuterol (PROAIR HFA/PROVENTIL HFA/VENTOLIN HFA) 108 (90 BASE) MCG/ACT Inhaler     Amphetamine-Dextroamphetamine (ADDERALL XR PO)     fexofenadine (ALLEGRA) 180 MG tablet     levothyroxine (SYNTHROID/LEVOTHROID) 175 MCG tablet     mometasone-formoterol (DULERA) 200-5 MCG/ACT oral inhaler     montelukast (SINGULAIR) 10 MG tablet     ARIPiprazole (ABILIFY) 5 MG tablet     ipratropium - albuterol 0.5 mg/2.5 mg/3 mL (DUONEB) 0.5-2.5 (3) MG/3ML neb solution     order for DME     order for DME     order for DME        Allergies   Allergen Reactions     Codeine Sulfate Nausea and Vomiting     Tetanus Toxoid      Pt states she had an infection at injection site.      Vicodin [Hydrocodone-Acetaminophen] Nausea and Vomiting     Methimazole Rash       Review of Systems    Constitutional: Positive for activity change and appetite change. Negative for fever.        Patient with non-vertigo dizziness at times and also some nausea.   HENT: Negative for congestion and hearing loss.    Eyes: Positive for photophobia. Negative for redness and visual disturbance.   Respiratory: Negative for shortness of breath.    Cardiovascular: Negative for chest pain and leg swelling.   Gastrointestinal: Positive for nausea and vomiting. Negative for abdominal pain.   Genitourinary: Negative for difficulty urinating.   Musculoskeletal: Negative for arthralgias, gait problem, neck pain and neck stiffness.   Skin: Negative for color change.   Neurological: Positive for dizziness and headaches. Negative for weakness and numbness.   Psychiatric/Behavioral: Positive for decreased concentration and dysphoric mood. Negative for confusion. The patient is nervous/anxious.    All other systems reviewed and are negative.      Physical Exam   BP: 142/83  Pulse: 89  Temp: 98.6  F (37  C)  Resp: 16  Weight: 106.9 kg (235 lb 10.8 oz)  SpO2: 98 %      Physical Exam   Constitutional: She is oriented to person, place, and time. She appears well-developed and well-nourished. She appears distressed.   Patient nontoxic minimal distress alert and oriented no obvious neurological focal findings seen.   HENT:   Head: Normocephalic and atraumatic.   Mouth/Throat: Oropharynx is clear and moist. No oropharyngeal exudate.   Eyes: Pupils are equal, round, and reactive to light. No scleral icterus.   Neck: Normal range of motion. Neck supple.   Cardiovascular: Regular rhythm, normal heart sounds and intact distal pulses.    Pulmonary/Chest: Breath sounds normal. No respiratory distress.   Abdominal: Soft. Bowel sounds are normal. There is no tenderness.   Musculoskeletal: She exhibits no edema or tenderness.   Neurological: She is alert and oriented to person, place, and time. She has normal reflexes. No cranial nerve deficit.  Coordination normal.   No neuro deficits seen.   Skin: Skin is warm and dry. No rash noted. She is not diaphoretic. No erythema. No pallor.   Psychiatric:   Patient appropriate here in the ER.  Minimally anxious otherwise stable   Nursing note and vitals reviewed.      ED Course     ED Course     Procedures       6:01 PM  The patient was seen and examined by Dr. Olivares in Room 9.   Patient was evaluated in the ER.  Records reviewed in epic.  Discussed with neurosurgery.  Patient had initial head CT done recommended per neurosurgery.  No acute findings are identified for bleeding etc.  Other labs were done and reviewed.  Thyroid testing along with pregnancy test were within normal limits.  Urinalysis showed 7 white cells for red cells no other findings noted CBC chemistries otherwise normal.  Neurosurgery consult in the ER.  Recommendations for MRI brain with and without contrast this was done findings were normal no abnormalities.  Patient feeling somewhat anxious he wants to go home.  Discussed with neurosurgery recommended cinematic treatment home and follow-up with neurosurgery.  Patient agrees with plan comfortable plan and discharge.       Critical Care time:  none             Labs Ordered and Resulted from Time of ED Arrival Up to the Time of Departure from the ED   LIPASE - Abnormal; Notable for the following:        Result Value    Lipase 72 (*)     All other components within normal limits   ROUTINE UA WITH MICROSCOPIC REFLEX TO CULTURE - Abnormal; Notable for the following:     Blood Urine Large (*)     Leukocyte Esterase Urine Trace (*)     WBC Urine 7 (*)     RBC Urine 4 (*)     Bacteria Urine Moderate (*)     Squamous Epithelial /HPF Urine 5 (*)     Mucous Urine Present (*)     All other components within normal limits   CBC WITH PLATELETS DIFFERENTIAL   INR   PARTIAL THROMBOPLASTIN TIME   COMPREHENSIVE METABOLIC PANEL   TSH   HCG QUANTITATIVE PREGNANCY   PERIPHERAL IV CATHETER     Results for orders  placed or performed during the hospital encounter of 04/23/18   CT Head w/o Contrast    Narrative    CT HEAD W/O CONTRAST 4/23/2018 7:30 PM    History: headache eval hx of pineal gland tumor resection 2014;     Comparison: 1/10/2017 brain MRI.    Technique: Using multidetector thin collimation helical acquisition  technique, axial, coronal and sagittal CT images from the skull base  to the vertex were obtained without intravenous contrast.     Findings:    No intracranial hemorrhage, extra-axial fluid collection, mass effect,  or midline shift. The ventricles are proportionate to the cerebral  sulci. The gray to white matter differentiation of the cerebral  hemispheres is preserved. The basal cisterns are patent.    The visualized paranasal sinuses are clear. Chronic left mastoid  fluid. 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       Impression    Impression: No acute intracranial pathology.    I have personally reviewed the examination and initial interpretation  and I agree with the findings.    JAMES WEIR MD   MR Brain w/o & w Contrast    Narrative     MR BRAIN W/O & W CONTRAST 4/23/2018 10:08 PM    Provided History: pineal tumor resection hx with new onset headache x  1 week.; .. Tumor resected in 2014, pineocytoma    Comparison: MRI 1/10/2017, 1/15/2014.    Technique: Multiplanar T1-weighted, axial FLAIR, and susceptibility  images were obtained without intravenous contrast. Following  intravenous gadolinium-based contrast administration, axial  T2-weighted, diffusion, and T1-weighted images (in multiple planes)  were obtained.    Contrast: 10 mL Gadavist    Findings:  There is no mass effect, midline shift, or evidence of intracranial  hemorrhage. The ventricles are proportionate to the cerebral sulci.  Unchanged findings of pineal region mass resection. No evidence of  recurrent tumor. Mild FLAIR hyperintensity along the superior vermis  is more apparent on the prior  studies, but this could be technical.    Postcontrast images demonstrate no abnormal intracranial enhancing  lesions.    No definite abnormality of the skull marrow signal is noted. The major  vascular intracranial flow-voids are present. The visualized portions  of paranasal sinuses are clear. Chronic left mastoid effusion. The  orbits are grossly unremarkable.      Impression    Impression:   1. No evidence of recurrent pineocytoma.  2. No other acute findings.    I have personally reviewed the examination and initial interpretation  and I agree with the findings.    JAMES WEIR MD   CBC with platelets differential   Result Value Ref Range    WBC 9.8 4.0 - 11.0 10e9/L    RBC Count 4.75 3.8 - 5.2 10e12/L    Hemoglobin 13.9 11.7 - 15.7 g/dL    Hematocrit 41.4 35.0 - 47.0 %    MCV 87 78 - 100 fl    MCH 29.3 26.5 - 33.0 pg    MCHC 33.6 31.5 - 36.5 g/dL    RDW 13.4 10.0 - 15.0 %    Platelet Count 322 150 - 450 10e9/L    Diff Method Automated Method     % Neutrophils 55.7 %    % Lymphocytes 38.8 %    % Monocytes 4.0 %    % Eosinophils 1.2 %    % Basophils 0.2 %    % Immature Granulocytes 0.1 %    Nucleated RBCs 0 0 /100    Absolute Neutrophil 5.5 1.6 - 8.3 10e9/L    Absolute Lymphocytes 3.8 0.8 - 5.3 10e9/L    Absolute Monocytes 0.4 0.0 - 1.3 10e9/L    Absolute Eosinophils 0.1 0.0 - 0.7 10e9/L    Absolute Basophils 0.0 0.0 - 0.2 10e9/L    Abs Immature Granulocytes 0.0 0 - 0.4 10e9/L    Absolute Nucleated RBC 0.0    INR   Result Value Ref Range    INR 1.01 0.86 - 1.14   Partial thromboplastin time   Result Value Ref Range    PTT 29 22 - 37 sec   Comprehensive metabolic panel   Result Value Ref Range    Sodium 140 133 - 144 mmol/L    Potassium 3.6 3.4 - 5.3 mmol/L    Chloride 105 94 - 109 mmol/L    Carbon Dioxide 27 20 - 32 mmol/L    Anion Gap 9 3 - 14 mmol/L    Glucose 88 70 - 99 mg/dL    Urea Nitrogen 11 7 - 30 mg/dL    Creatinine 0.64 0.52 - 1.04 mg/dL    GFR Estimate >90 >60 mL/min/1.7m2    GFR Estimate If Black  >90 >60 mL/min/1.7m2    Calcium 8.8 8.5 - 10.1 mg/dL    Bilirubin Total 0.4 0.2 - 1.3 mg/dL    Albumin 3.8 3.4 - 5.0 g/dL    Protein Total 7.4 6.8 - 8.8 g/dL    Alkaline Phosphatase 86 40 - 150 U/L    ALT 26 0 - 50 U/L    AST 14 0 - 45 U/L   Lipase   Result Value Ref Range    Lipase 72 (L) 73 - 393 U/L   TSH   Result Value Ref Range    TSH 1.93 0.40 - 4.00 mU/L   HCG quantitative pregnancy   Result Value Ref Range    HCG Quantitative Serum <1 0 - 5 IU/L   UA with Microscopic reflex to Culture   Result Value Ref Range    Color Urine Yellow     Appearance Urine Slightly Cloudy     Glucose Urine Negative NEG^Negative mg/dL    Bilirubin Urine Negative NEG^Negative    Ketones Urine Negative NEG^Negative mg/dL    Specific Gravity Urine 1.017 1.003 - 1.035    Blood Urine Large (A) NEG^Negative    pH Urine 6.5 5.0 - 7.0 pH    Protein Albumin Urine Negative NEG^Negative mg/dL    Urobilinogen mg/dL Normal 0.0 - 2.0 mg/dL    Nitrite Urine Negative NEG^Negative    Leukocyte Esterase Urine Trace (A) NEG^Negative    Source Midstream Urine     WBC Urine 7 (H) 0 - 5 /HPF    RBC Urine 4 (H) 0 - 2 /HPF    Bacteria Urine Moderate (A) NEG^Negative /HPF    Squamous Epithelial /HPF Urine 5 (H) 0 - 1 /HPF    Mucous Urine Present (A) NEG^Negative /LPF         Consults  Neurosurgery: Called (04/23/18 3843)    Assessments & Plan (with Medical Decision Making)  37-year-old female who has history of bipolar disorder history of a pineal gland resection 4 years ago presents with 1 week of headache is appears to be more frontal and bitemporal.  It may be more tension.  I discussed case with neurosurgery we did get a CT scan of the head and then MRI which were both negative.  She was seen by neurosurgery.  Patient really recently stopped her Abilify 10 days ago.  Could this be side effects that was as a tension type headache.  At this point patient otherwise stable labs are stable patient did want to go home she will follow-up with MD in  neurosurgery return if any concerns.         I have reviewed the nursing notes.    I have reviewed the findings, diagnosis, plan and need for follow up with the patient.    Discharge Medication List as of 4/24/2018 12:14 AM          Final diagnoses:   Acute intractable tension-type headache   ISaundra, am serving as a trained medical scribe to document services personally performed by Bernardo Olivares MD, based on the provider's statements to me.   IBernardo MD, was physically present and have reviewed and verified the accuracy of this note documented by Saundra Mckeon.      4/23/2018   [facility] EMERGENCY DEPARTMENT    This note was created at least in part by the use of dragon voice dictation system. Inadvertent typographical errors may still exist.  Bernardo Olivares MD.         Bernardo Olivares MD  04/24/18 1016

## 2018-04-23 NOTE — ED TRIAGE NOTES
Pt presents ambulatory to triage from home. Pt states for past 1 week has had headache that is dull, nausea with emesis and urinary frequency. Pt states topped taking Abilify on the 14th of this month. Pt state believes she is withdrawing from this medication. Hx Pineal tumor removed in 2014. Pt states has had difficulty with word finding. Pt able to identify pen, mouse, stethoscope and their uses. A and O x4.

## 2018-04-23 NOTE — TELEPHONE ENCOUNTER
Reason for call:  Other   Patient called regarding (reason for call): call back  Additional comments: The patient has been having a lot of headaches lately and has had a brain tumor in the past and is due to go back and get a scan. She had an appointment with her psychiatrist because she had gotten off of ARIPiprazole and he did not seem to think it was any sort of withdrawal symptom. He told her to follow up with her pcp about what she should do from here. She would like a call back to discuss what her plan of action should be.      Phone number to reach patient:  Cell number on file:    Telephone Information:   Mobile [phone]       Best Time:  Any    Can we leave a detailed message on this number?  YES

## 2018-04-23 NOTE — ED AVS SNAPSHOT
" [facility], Emergency Department    [address] [facility] [address]    Phone:  [phone]                                       Cathy Arroyo   MRN: [id number]    Department:  [facility], Emergency Department   Date of Visit:  4/23/2018           Patient Information     Date Of Birth          1980        Your diagnoses for this visit were:     Acute intractable tension-type headache        You were seen by Benrardo Olivares MD.      Follow-up Information     Schedule an appointment as soon as possible for a visit with Sherrill Dasilva APRN CNP.    Specialty:  Nurse Practitioner - Family    Contact information:    [facility]  [address]  [facility] [address] [phone]          Discharge Instructions       Home.  Your MRI looks normal.  Neurosurgery recommends to follow up with Dr. Gloria  Ibuprofen and tylenol  See your MD.  REturn if concerns.        * Tension Headache    Muscle Tension Headache (also called \"stress headache\") is a very common cause of head pain. Under stress, some people tense the muscles of their shoulder, neck and scalp without knowing it. If this lasts long enough, a headache can occur. These headaches can be very painful and last for hours or even days.  Home Care:    If you were given pain medicine for this headache, do not drive yourself home. Arrange for a ride, instead. When you get home, try to sleep. You should feel much better when you wake up.    Heat to the back of your neck may relieve neck spasm.    Drink only clear liquids or eat a very light diet to avoid nausea/vomiting until symptoms improve.  Preventing Future Headaches    Identify the sources of stress in your life. These may not be obvious! Learn new ways to handle your stress, such as regular exercise, biofeedback, self-hypnosis and meditation. For more information about this, consult your doctor or go to a local bookstore and review the many books and tapes on this " subject.    At the first sign of a tension headache, take time out if possible. Remove yourself from the stressful situation, find a quiet comfortable place to sit or lie down and let yourself relax. Heat and deep massage of the tight areas in the neck and shoulders may help reduce muscle spasm. Medicine, such as ibuprofen (Advil or Motrin) or a prescribed muscle relaxant may be helpful at this point.  Follow Up with your doctor if the headache is not better within the next 24 hours. If you have frequent headaches you should discuss a treatment plan with your primary care doctor. Ask if you can have medicine to take at home the next time you get a bad headache. This may avoid the need for a visit to the emergency department in the future. Poorly controlled chronic headaches may require a referral to a neurologist (headache specialist).  Call your Doctor Or Get Prompt Medical Attention if any of the following occur:    Worsening of your head pain or no improvement within 24 hours    Repeated vomiting (unable to keep liquids down)    Fever of 100.4 F (38.0 C) or higher, or as directed by your healthcare provider    Stiff neck    Extreme drowsiness, confusion or fainting    Dizziness, vertigo (dizziness with spinning sensation)    Weakness of an arm or leg or one side of the face    Difficulty with speech or vision    5180-8121 [employer]. [address] [place] [address], [place] [address]. All rights reserved. This information is not intended as a substitute for professional medical care. Always follow your healthcare professional's instructions.  This information has been modified by your health care provider with permission from the publisher.          Your next 10 appointments already scheduled     Apr 27, 2018 11:00 AM CDT   Office Visit with Bernardo Angel MD   [facility] ([facility])    [address] [place] [address] [phone]            Bring a current list of meds and any records pertaining to this visit. For Physicals, please bring immunization records and any forms needing to be filled out. Please arrive 10 minutes early to complete paperwork.              24 Hour Appointment Hotline       To make an appointment at any [facility], call [phone] ([phone]). If you don't have a family doctor or clinic, we will help you find one. [facility] clinics are conveniently located to serve the needs of you and your family.             Review of your medicines      Our records show that you are taking the medicines listed below. If these are incorrect, please call your family doctor or clinic.        Dose / Directions Last dose taken    ABILIFY 5 MG tablet   Generic drug:  ARIPiprazole        Refills:  0        ADDERALL XR PO   Dose:  5 mg        Take 5 mg by mouth daily   Refills:  0        albuterol 108 (90 Base) MCG/ACT Inhaler   Commonly known as:  PROAIR HFA/PROVENTIL HFA/VENTOLIN HFA   Dose:  2 puff   Quantity:  1 Inhaler        Inhale 2 puffs into the lungs every 4 hours as needed for shortness of breath / dyspnea or wheezing   Refills:  3        fexofenadine 180 MG tablet   Commonly known as:  ALLEGRA   Dose:  180 mg   Quantity:  90 tablet        Take 1 tablet (180 mg) by mouth daily   Refills:  3        ipratropium - albuterol 0.5 mg/2.5 mg/3 mL 0.5-2.5 (3) MG/3ML neb solution   Commonly known as:  DUONEB   Dose:  1 vial   Quantity:  90 vial        Take 1 vial (3 mLs) by nebulization every 6 hours as needed for shortness of breath / dyspnea or wheezing   Refills:  3        levothyroxine 175 MCG tablet   Commonly known as:  SYNTHROID/LEVOTHROID   Dose:  175 mcg   Quantity:  90 tablet        Take 1 tablet (175 mcg) by mouth daily   Refills:  3        mometasone-formoterol 200-5 MCG/ACT oral inhaler   Commonly known as:  DULERA   Dose:  2 puff   Quantity:  3 Inhaler        Inhale 2 puffs into the lungs 2 times daily   Refills:  1         montelukast 10 MG tablet   Commonly known as:  SINGULAIR   Dose:  10 mg   Quantity:  90 tablet        Take 1 tablet (10 mg) by mouth every morning   Refills:  1        * order for DME   Quantity:  1 each        nebulizer   Refills:  0        * order for DME   Quantity:  1 each        nebulizer   Refills:  0        * order for DME   Quantity:  1 Units        nebulizer   Refills:  0        * Notice:  This list has 3 medication(s) that are the same as other medications prescribed for you. Read the directions carefully, and ask your doctor or other care provider to review them with you.            Procedures and tests performed during your visit     CBC with platelets differential    CT Head w/o Contrast    Comprehensive metabolic panel    HCG quantitative pregnancy    INR    Lipase    MR Brain w/o & w Contrast    Partial thromboplastin time    Peripheral IV catheter    TSH    UA with Microscopic reflex to Culture      Orders Needing Specimen Collection     None      Pending Results     Date and Time Order Name Status Description    4/23/2018 2033 MR Brain w/o & w Contrast Preliminary             Pending Culture Results     No orders found for last 3 day(s).            Pending Results Instructions     If you had any lab results that were not finalized at the time of your Discharge, you can call the ED Lab Result RN at [phone]. You will be contacted by this team for any positive Lab results or changes in treatment. The nurses are available 7 days a week from 10A to 6:30P.  You can leave a message 24 hours per day and they will return your call.        Thank you for choosing [facility]       Thank you for choosing [facility] for your care. Our goal is always to provide you with excellent care. Hearing back from our patients is one way we can continue to improve our services. Please take a few minutes to complete the written survey that you may receive in the mail after you visit with us. Thank you!        Erica  Information     [email or web address] gives you secure access to your electronic health record. If you see a primary care provider, you can also send messages to your care team and make appointments. If you have questions, please call your primary care clinic.  If you do not have a primary care provider, please call [phone] and they will assist you.        Care EveryWhere ID     This is your Care EveryWhere ID. This could be used by other organizations to access your [facility] medical records  [id number]        Equal Access to Services     NALDO HDEZ : Hadii samia de la cruzo Somandyali, waaxda luqadaha, qaybta kaalmada adekristel, becca tan. So [facility] [phone].    ATENCIÓN: Si habla español, tiene a willard disposición servicios gratuitos de asistencia lingüística. Llame al [phone].    We comply with applicable federal civil rights laws and [place] laws. We do not discriminate on the basis of race, color, national origin, age, disability, sex, sexual orientation, or gender identity.            After Visit Summary       This is your record. Keep this with you and show to your community pharmacist(s) and doctor(s) at your next visit.

## 2018-04-23 NOTE — ED AVS SNAPSHOT
[facility], [place], Emergency Department    [address] [place] [address]    Phone:  [phone]                                       Cathy Arroyo   MRN: [id number]    Department:  [facility], Emergency Department   Date of Visit:  4/23/2018           After Visit Summary Signature Page     I have received my discharge instructions, and my questions have been answered. I have discussed any challenges I see with this plan with the nurse or doctor.    ..........................................................................................................................................  Patient/Patient Representative Signature      ..........................................................................................................................................  Patient Representative Print Name and Relationship to Patient    ..................................................               ................................................  Date                                            Time    ..........................................................................................................................................  Reviewed by Signature/Title    ...................................................              ..............................................  Date                                                            Time

## 2018-04-23 NOTE — TELEPHONE ENCOUNTER
Spoke with patient who stated that she went off of Abilify on the 14th of this month (04/2018). Having nausea, dizziness, frequent urination, and dizziness since stopping medication.     Spoke with psychiatrist, he doesn't think that it is due to withdrawal from being off medication. He is more worried about something neurological going on because of her past history of brain tumors. He stated that he thought that she should have blood work and another MRI done.    Last scan was clean 01/10/2017, and she was told to wait about two years. It has been about a year and a half.    Prescribed Zofran from psychiatrist for nausea- has been having vomiting at least once a day for the past week. Patient stated that she also feels like she has had some word finding difficulty lately. Previously when her brain tumor was discovered, she was asymptomatic.    Recommended going to ER because of her recent symptoms and need for imaging/blood work. Scheduled for follow up appt on 04/27/18 in our clinic. Patient stated that she can have a friend drive her to the ER. Patient in agreement to go in this evening.    Meme Brown RN  [facility]

## 2018-04-24 ENCOUNTER — TELEPHONE (OUTPATIENT)
Dept: FAMILY MEDICINE | Facility: CLINIC | Age: 38
End: 2018-04-24

## 2018-04-24 ENCOUNTER — PRE VISIT (OUTPATIENT)
Dept: NEUROSURGERY | Facility: CLINIC | Age: 38
End: 2018-04-24

## 2018-04-24 VITALS
TEMPERATURE: 98.6 F | DIASTOLIC BLOOD PRESSURE: 85 MMHG | RESPIRATION RATE: 16 BRPM | OXYGEN SATURATION: 97 % | HEART RATE: 82 BPM | BODY MASS INDEX: 35.51 KG/M2 | SYSTOLIC BLOOD PRESSURE: 151 MMHG | WEIGHT: 235.67 LBS

## 2018-04-24 ASSESSMENT — ENCOUNTER SYMPTOMS
APPETITE CHANGE: 1
DIFFICULTY URINATING: 0
NERVOUS/ANXIOUS: 1
DECREASED CONCENTRATION: 1
SHORTNESS OF BREATH: 0
NECK STIFFNESS: 0
ARTHRALGIAS: 0
COLOR CHANGE: 0
EYE REDNESS: 0
DYSPHORIC MOOD: 1
CONFUSION: 0
ACTIVITY CHANGE: 1

## 2018-04-24 NOTE — TELEPHONE ENCOUNTER
Reason for call:  Other   Patient called regarding (reason for call): call back  Additional comments: The patient called regarding some results from some tests that she had done on 4/23/18. She has questions about some weird test results as she put it. She would like a call back to discuss these.    Phone number to reach patient:  Cell number on file:    Telephone Information:   Mobile [phone]       Best Time:  Any    Can we leave a detailed message on this number?  YES

## 2018-04-24 NOTE — DISCHARGE INSTRUCTIONS
"Home.  Your MRI looks normal.  Neurosurgery recommends to follow up with Dr. Gloria  Ibuprofen and tylenol  See your MD.  REturn if concerns.        * Tension Headache    Muscle Tension Headache (also called \"stress headache\") is a very common cause of head pain. Under stress, some people tense the muscles of their shoulder, neck and scalp without knowing it. If this lasts long enough, a headache can occur. These headaches can be very painful and last for hours or even days.  Home Care:    If you were given pain medicine for this headache, do not drive yourself home. Arrange for a ride, instead. When you get home, try to sleep. You should feel much better when you wake up.    Heat to the back of your neck may relieve neck spasm.    Drink only clear liquids or eat a very light diet to avoid nausea/vomiting until symptoms improve.  Preventing Future Headaches    Identify the sources of stress in your life. These may not be obvious! Learn new ways to handle your stress, such as regular exercise, biofeedback, self-hypnosis and meditation. For more information about this, consult your doctor or go to a local bookstore and review the many books and tapes on this subject.    At the first sign of a tension headache, take time out if possible. Remove yourself from the stressful situation, find a quiet comfortable place to sit or lie down and let yourself relax. Heat and deep massage of the tight areas in the neck and shoulders may help reduce muscle spasm. Medicine, such as ibuprofen (Advil or Motrin) or a prescribed muscle relaxant may be helpful at this point.  Follow Up with your doctor if the headache is not better within the next 24 hours. If you have frequent headaches you should discuss a treatment plan with your primary care doctor. Ask if you can have medicine to take at home the next time you get a bad headache. This may avoid the need for a visit to the emergency department in the future. Poorly controlled chronic " headaches may require a referral to a neurologist (headache specialist).  Call your Doctor Or Get Prompt Medical Attention if any of the following occur:    Worsening of your head pain or no improvement within 24 hours    Repeated vomiting (unable to keep liquids down)    Fever of 100.4 F (38.0 C) or higher, or as directed by your healthcare provider    Stiff neck    Extreme drowsiness, confusion or fainting    Dizziness, vertigo (dizziness with spinning sensation)    Weakness of an arm or leg or one side of the face    Difficulty with speech or vision    7156-9141 [employer]. [address] [place] [address]. All rights reserved. This information is not intended as a substitute for professional medical care. Always follow your healthcare professional's instructions.  This information has been modified by your health care provider with permission from the publisher.

## 2018-04-24 NOTE — CONSULTS
[facility]  NEUROSURGERY CONSULTATION NOTE    This consultation was requested by Dr. Olivares from the emergency medicine service.    Reason for Consultation: concern for pineal tumor recurrence    HPI:  Ms. Arroyo is a 37 year old woman who presents with dull headache behind her eyes and temples, with associated nausea and emesis x 1 week. She has a hx of bipolar disorder and recently stopped taking abilify, therefore is unsure if she is withdrawing from this medication or having side effects from stopping the medication. 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 Mckeon, her psychiatrist recommended she seek medical evaluation for possible recurrence as a cause of her symptoms. Prior to resection of her pineal tumor in 2014, she had an episode of facial numbness which prompted work-up and incidental finding of the pineal tumor. Currently, she complains of no numbness, weakness, or tingling. No loss of consciousness. No changes in sensation, taste, smell, nor trouble speaking or other neurologic symptoms.    PAST MEDICAL HISTORY:   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     Post partum depression      Thyroid disease     hashimotos     Uncomplicated asthma        PAST SURGICAL HISTORY:   Past Surgical History:   Procedure Laterality Date     BLOOD PATCH N/A 10/11/2016    Procedure: EPIDURAL BLOOD PATCH;  Surgeon: GENERIC ANESTHESIA PROVIDER;  Location: [facility] OR     CRANIOTOMY, EXCISE TUMOR COMPLEX, COMBINED  2/19/2014    Procedure: COMBINED CRANIOTOMY, EXCISE TUMOR COMPLEX;  Supracerabellar  Infratentorial Approach for Resection of Tumor ;  Surgeon: Kate Mckeon MD;  Location: [facility] OR     THYROIDECTOMY N/A 5/3/2017    Procedure: THYROIDECTOMY;   Total Thyroidectomy;  Surgeon: Pamela Villasenor MD;  Location: [facility] OR       FAMILY HISTORY:   Family History   Problem Relation Age of Onset     Thyroid Disease Paternal Aunt      Asthma Father      MENTAL ILLNESS Father      Obesity Father      Asthma Maternal Grandmother      OSTEOPOROSIS Maternal Grandmother      DIABETES Paternal Grandfather      Other Cancer Mother      Genitourinary Problems Mother      Bipolar Disorder Mother      unipolar depression     Depression Mother      Thyroid Disease Mother      benign tumor     Skin Cancer Mother      Bipolar Disorder Brother      unipolar depression     Depression Brother      Anesthesia Reaction Brother      Melanoma No family hx of        SOCIAL HISTORY:   Social History   Substance Use Topics     Smoking status: Former Smoker     Packs/day: 0.50     Years: 12.00     Types: Cigarettes     Start date: 1/21/2004     Quit date: 2/15/2018     Smokeless tobacco: Never Used     Alcohol use 0.0 oz/week      Comment: quit 16 days ago       MEDICATIONS:  Current Outpatient Prescriptions   Medication Sig Dispense Refill     albuterol (PROAIR HFA/PROVENTIL HFA/VENTOLIN HFA) 108 (90 BASE) MCG/ACT Inhaler Inhale 2 puffs into the lungs every 4 hours as needed for shortness of breath / dyspnea or wheezing 1 Inhaler 3     Amphetamine-Dextroamphetamine (ADDERALL XR PO) Take 5 mg by mouth daily       fexofenadine (ALLEGRA) 180 MG tablet Take 1 tablet (180 mg) by mouth daily 90 tablet 3     levothyroxine (SYNTHROID/LEVOTHROID) 175 MCG tablet Take 1 tablet (175 mcg) by mouth daily 90 tablet 3     mometasone-formoterol (DULERA) 200-5 MCG/ACT oral inhaler Inhale 2 puffs into the lungs 2 times daily 3 Inhaler 1     montelukast (SINGULAIR) 10 MG tablet Take 1 tablet (10 mg) by mouth every morning 90 tablet 1     ARIPiprazole (ABILIFY) 5 MG tablet        ipratropium - albuterol 0.5 mg/2.5 mg/3 mL (DUONEB) 0.5-2.5 (3) MG/3ML neb solution Take 1 vial (3 mLs) by nebulization every 6  hours as needed for shortness of breath / dyspnea or wheezing 90 vial 3     order for DME nebulizer 1 each 0     order for DME nebulizer 1 each 0     order for DME nebulizer 1 Units 0       Allergies:  Allergies   Allergen Reactions     Codeine Sulfate Nausea and Vomiting     Tetanus Toxoid      Pt states she had an infection at injection site.      Vicodin [Hydrocodone-Acetaminophen] Nausea and Vomiting     Methimazole Rash         ROS: 10 point ROS of systems including Constitutional, Eyes, Respiratory, Cardiovascular, Gastroenterology, Genitourinary, Integumentary, Muscularskeletal, Psychiatric were all negative except for pertinent positives noted in my HPI.    EXAM:  /83  Pulse 89  Temp 98.6  F (37  C) (Oral)  Resp 16  Wt 106.9 kg (235 lb 10.8 oz)  LMP 04/19/2018  SpO2 98%  BMI 35.51 kg/m2  CV: RRR, no m/r/g  Resp: CTAB, no wheezes or rubs  Abd: soft, NT, ND. BS+ throughout.  Neuro:   AAOx3, NAD.   PERRL, EOMI. Face symmetric, tongue midline. Uvula midline, palate elevated symmetrically.   Motor: 5/5 BUE, 5/5 BLE. Normal tone, no fasciculations or atrophy.   Sensation: intact to light touch and pinprick throughout.   Reflexes 2+ throughout.   Normal FNF, normal HTS.   Gait deferred.     LABS/IMAGING:  Results for ZEINA CABAN (MRN [id number]) as of 4/23/2018 20:48   Ref. Range 4/23/2018 18:14   Sodium Latest Ref Range: 133 - 144 mmol/L 140   Potassium Latest Ref Range: 3.4 - 5.3 mmol/L 3.6   Chloride Latest Ref Range: 94 - 109 mmol/L 105   Carbon Dioxide Latest Ref Range: 20 - 32 mmol/L 27   Urea Nitrogen Latest Ref Range: 7 - 30 mg/dL 11   Creatinine Latest Ref Range: 0.52 - 1.04 mg/dL 0.64   GFR Estimate Latest Ref Range: >60 mL/min/1.7m2 >90   GFR Estimate If Black Latest Ref Range: >60 mL/min/1.7m2 >90   Calcium Latest Ref Range: 8.5 - 10.1 mg/dL 8.8   Anion Gap Latest Ref Range: 3 - 14 mmol/L 9   Albumin Latest Ref Range: 3.4 - 5.0 g/dL 3.8   Protein Total Latest Ref Range: 6.8 -  8.8 g/dL 7.4   Bilirubin Total Latest Ref Range: 0.2 - 1.3 mg/dL 0.4   Alkaline Phosphatase Latest Ref Range: 40 - 150 U/L 86   ALT Latest Ref Range: 0 - 50 U/L 26   AST Latest Ref Range: 0 - 45 U/L 14   HCG Quantitative Serum Latest Ref Range: 0 - 5 IU/L <1   Lipase Latest Ref Range: 73 - 393 U/L 72 (L)   TSH Latest Ref Range: 0.40 - 4.00 mU/L 1.93     Results for ZEINA CABAN (MRN [id number]) as of 4/23/2018 20:48   Ref. Range 4/23/2018 18:14   WBC Latest Ref Range: 4.0 - 11.0 10e9/L 9.8   Hemoglobin Latest Ref Range: 11.7 - 15.7 g/dL 13.9   Hematocrit Latest Ref Range: 35.0 - 47.0 % 41.4   Platelet Count Latest Ref Range: 150 - 450 10e9/L 322       CT Head w/o Contrast   Final Result   Impression: No acute intracranial pathology.      I have personally reviewed the examination and initial interpretation   and I agree with the findings.      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 Neurologically appears intact. CT head negative.     RECOMMENDATIONS:  - Can obtain MRI brain with/without contrast to rule out recurrence of pineocytoma, although low likelihood of this as cause of her symptoms. If stable, ok to dc.   - Otherwise would recommend she follow up with her psychiatrist to re-evaluate discontinuance of abilify and/or altering other medications that could be the etiology.  - Follow up with Dr. Mckeon in neurosurgery as regularly scheduled.    The patient was discussed with Dr. Gonzáles, neurosurgery chief resident, and he agrees with the above.    Nata Bergeron MD  Neurosurgery, PGY-3    I have reviewed the history above and agree with the resident's assessment and plan.  DELMI Mckeon MD

## 2018-04-24 NOTE — TELEPHONE ENCOUNTER
Called patient. She was wondering what the chronic mastoid fluid collection meant on her head CT. Discussed that she should review this with Dr. Angel at her appointment with him on Friday, but assured her that the CT was reviewed by the ED doctor and showed no acute process. Pt also wondering about abnormal results on her UA. Patient confirmed that she has recently had her menses. Writer stated that that is what was most likely what the abnormal results would be attributed to.    Patient stated that she is planning to follow up with Dr. Angel and has placed a call to Neuro to schedule follow up.    Meme Brown RN  [facility]

## 2018-04-25 ENCOUNTER — TELEPHONE (OUTPATIENT)
Dept: NEUROSURGERY | Facility: CLINIC | Age: 38
End: 2018-04-25

## 2018-04-25 NOTE — TELEPHONE ENCOUNTER
Pt called to report nausea, vomiting, and severe HA that will not respond to Ibuprofen or Tylenol.  Pt stated that she is missing work due to the headaches.  On 4/23, pt was sent to the ED by her psychiatrist.  She recently stopped Abilify and thought she might be having side effects.      In the ED she had a CT and MRI which were reviewed by neurosurgery.  Both exam were normal.  Pt was concerned because the CT mentioned chronic left mastoid fluid.  She was wondering if this could the cause of her trouble.    Pt stated that she called to make an appointment with Dr. Mckeon and the  was very rude and shaming to her.  Writer will ask another  to reach out and set up an appointment with Dr. Mckeon.    Pt has an appointment with her PCP on Friday 4/27.

## 2018-04-26 PROBLEM — G43.009 NONINTRACTABLE MIGRAINE, UNSPECIFIED MIGRAINE TYPE: Status: ACTIVE | Noted: 2018-04-26

## 2018-04-26 NOTE — PROGRESS NOTES
SUBJECTIVE:   Cathy Arroyo is a 37 year old female who presents to clinic today for the following health issues:    ED/UC Followup:    Facility:  [facility]  Date of visit: 4/23/18  Reason for visit: Constant bilateral temporal headache  Current Status: Headaches, nausea and vomiting     Patient was having problems with acathisia while on abilify, so she was advised by psychiatry to discontinue the medication. She was taking the abilify for bipolar disorder, and says that her bipolar has been unchanged since stopping the medication. However, a few days after stopping the medication she developed problems with headaches, dizziness, nausea and vomiting. She wondered if the symptoms were withdrawal from the abilify, but her psychiatrist told her that her symptoms were not withdrawal and asked her to go to the emergency room. Patient had a CT and MRI in the hospital, and both were normal. She has been using advil and tylenol to no relief for her headaches, and she has been using CBD oil under her tongue which provides some relief. She is still having headaches in waves and vomiting and nausea in the morning, with current headache here is clinic. Patient is status post resection for a pineal tumor in 2014, and she wonders if these symptoms are related to that. She has had some problems with word finding, and says that it hurts her brain to talk. She has been working, but has missed some days due to the current symptoms. Patient has an 18 month old child at home, which she says adds to her stress. She is not currently seen at the pain clinic. She says she feels as though she is pregnant, though her labs have been negative, and wants a thyroid check today. Patient reports that she is at risk for high thyroid functioning after coming off the abilify.    Problem list and histories reviewed & adjusted, as indicated.  Additional history: as documented    Patient Active Problem List   Diagnosis     Pineal  gland, tumor     Attention deficit disorder     Seasonal allergic rhinitis     Need for Tdap vaccination     Bipolar affective disorder in remission (H)     Pregnancy, normal first     GBS (group B Streptococcus carrier), +RV culture, currently pregnant     Labor and delivery, indication for care     Pre-eclampsia     Moderate persistent asthma without complication     Chronic rhinitis     Tobacco use disorder     Abnormal cytology     S/P total thyroidectomy     Hypocalcemia     History of Graves' disease     Moderate persistent asthma with acute exacerbation     Nonintractable migraine, unspecified migraine type     Past Surgical History:   Procedure Laterality Date     BLOOD PATCH N/A 10/11/2016    Procedure: EPIDURAL BLOOD PATCH;  Surgeon: GENERIC ANESTHESIA PROVIDER;  Location: [facility] OR     CRANIOTOMY, EXCISE TUMOR COMPLEX, COMBINED  2/19/2014    Procedure: COMBINED CRANIOTOMY, EXCISE TUMOR COMPLEX;  Supracerabellar  Infratentorial Approach for Resection of Tumor ;  Surgeon: Kate Mckeon MD;  Location: [facility] OR     THYROIDECTOMY N/A 5/3/2017    Procedure: THYROIDECTOMY;  Total Thyroidectomy;  Surgeon: Pamela Villasenor MD;  Location:  OR       Social History   Substance Use Topics     Smoking status: Former Smoker     Packs/day: 0.50     Years: 12.00     Types: Cigarettes     Start date: 1/21/2004     Quit date: 2/15/2018     Smokeless tobacco: Never Used     Alcohol use 0.0 oz/week      Comment: quit 16 days ago     Family History   Problem Relation Age of Onset     Thyroid Disease Paternal Aunt      Asthma Father      MENTAL ILLNESS Father      Obesity Father      Asthma Maternal Grandmother      OSTEOPOROSIS Maternal Grandmother      DIABETES Paternal Grandfather      Other Cancer Mother      Genitourinary Problems Mother      Bipolar Disorder Mother      unipolar depression     Depression Mother      Thyroid Disease Mother      benign tumor     Skin Cancer Mother      Bipolar Disorder  Brother      unipolar depression     Depression Brother      Anesthesia Reaction Brother      Melanoma No family hx of          Current Outpatient Prescriptions   Medication Sig Dispense Refill     albuterol (PROAIR HFA/PROVENTIL HFA/VENTOLIN HFA) 108 (90 BASE) MCG/ACT Inhaler Inhale 2 puffs into the lungs every 4 hours as needed for shortness of breath / dyspnea or wheezing 1 Inhaler 3     Amphetamine-Dextroamphetamine (ADDERALL XR PO) Take 5 mg by mouth daily       ARIPiprazole (ABILIFY) 5 MG tablet        fexofenadine (ALLEGRA) 180 MG tablet Take 1 tablet (180 mg) by mouth daily 90 tablet 3     ipratropium - albuterol 0.5 mg/2.5 mg/3 mL (DUONEB) 0.5-2.5 (3) MG/3ML neb solution Take 1 vial (3 mLs) by nebulization every 6 hours as needed for shortness of breath / dyspnea or wheezing 90 vial 3     levothyroxine (SYNTHROID/LEVOTHROID) 175 MCG tablet Take 1 tablet (175 mcg) by mouth daily 90 tablet 3     mometasone-formoterol (DULERA) 200-5 MCG/ACT oral inhaler Inhale 2 puffs into the lungs 2 times daily 3 Inhaler 1     montelukast (SINGULAIR) 10 MG tablet Take 1 tablet (10 mg) by mouth every morning 90 tablet 1     order for DME nebulizer 1 each 0     order for DME nebulizer 1 each 0     order for DME nebulizer 1 Units 0     Allergies   Allergen Reactions     Codeine Sulfate Nausea and Vomiting     Tetanus Toxoid      Pt states she had an infection at injection site.      Vicodin [Hydrocodone-Acetaminophen] Nausea and Vomiting     Methimazole Rash       Reviewed and updated as needed this visit by clinical staff       Reviewed and updated as needed this visit by Provider         ROS:  Positive for headaches, nausea and vomiting.    Denies insomnia, chest pain, shortness of breath, cough, heartburn, bowel issues, bladder issues, neck pain, back pain, hip pain, knee pain, ankle pain, or foot pain. Remainder of ROS is negative unless otherwise noted above or in HPI.    This document serves as a record of the services  and decisions personally performed and made by Bernardo Angel MD. It was created on his behalf by Davi Bowie, a trained medical scribe. The creation of this document is based on the provider's statements to the medical scribe.  Davi Bowie 11:24 AM April 27, 2018    OBJECTIVE:     /76  Pulse 89  Temp 98.7  F (37.1  C) (Oral)  Wt 107.6 kg (237 lb 4.8 oz)  LMP 04/19/2018  SpO2 99%  BMI 35.76 kg/m2  Body mass index is 35.76 kg/(m^2).  GENERAL: healthy, alert and no distress  EYES: Eyes grossly normal to inspection, PERRL and conjunctivae and sclerae normal. EOMI.  HENT: ear canals and TM's normal, mouth without ulcers or lesions  NEURO: Normal strength and tone, mentation intact and speech normal, cranial facial nerves intact  PSYCH: mentation appears normal, affect normal/bright    Diagnostic Test Results:  No results found for this or any previous visit (from the past 24 hour(s)).    ASSESSMENT/PLAN:     (G43.009) Nonintractable migraine, unspecified migraine type  (primary encounter diagnosis)  Comment: Prescription given for sumatriptan. Patient is to follow up in 3 days if no improvement, and will consider other maintenance medications. Labs completed today.  Plan: SUMAtriptan (IMITREX) 25 MG tablet, TSH with         free T4 reflex        Start on sumatriptan. Follow up if no improvements in 3 days, and will consider alternative medications.      Patient Instructions   -Start on the imitrex as needed for your migraines. If you are not getting relief on the medication, please follow up with me on Monday, 4/30/18, before your work shift.  -Follow up with psychiatrist to discuss potentially using depakote or tramadol for your headaches to see what they have to say about how you've done on those medications in the past.     30 minutes were spent with the patient with more than 50% of time spent in counseling and coordination of care  The information in this document, created by the medical  scribe for me, accurately reflects the services I personally performed and the decisions made by me. I have reviewed and approved this document for accuracy prior to leaving the patient care area.   Bernardo Angel MD 11:24 AM April 27, 2018  [facility]

## 2018-04-27 ENCOUNTER — OFFICE VISIT (OUTPATIENT)
Dept: FAMILY MEDICINE | Facility: CLINIC | Age: 38
End: 2018-04-27
Payer: COMMERCIAL

## 2018-04-27 VITALS
HEART RATE: 89 BPM | TEMPERATURE: 98.7 F | OXYGEN SATURATION: 99 % | SYSTOLIC BLOOD PRESSURE: 130 MMHG | BODY MASS INDEX: 35.76 KG/M2 | DIASTOLIC BLOOD PRESSURE: 76 MMHG | WEIGHT: 237.3 LBS

## 2018-04-27 DIAGNOSIS — G43.009 NONINTRACTABLE MIGRAINE, UNSPECIFIED MIGRAINE TYPE: Primary | ICD-10-CM

## 2018-04-27 LAB — TSH SERPL DL<=0.005 MIU/L-ACNC: 2.02 MU/L (ref 0.4–4)

## 2018-04-27 PROCEDURE — 36415 COLL VENOUS BLD VENIPUNCTURE: CPT | Performed by: FAMILY MEDICINE

## 2018-04-27 PROCEDURE — 99214 OFFICE O/P EST MOD 30 MIN: CPT | Performed by: FAMILY MEDICINE

## 2018-04-27 PROCEDURE — 84443 ASSAY THYROID STIM HORMONE: CPT | Performed by: FAMILY MEDICINE

## 2018-04-27 RX ORDER — SUMATRIPTAN 25 MG/1
25 TABLET, FILM COATED ORAL
Qty: 9 TABLET | Refills: 0 | Status: SHIPPED | OUTPATIENT
Start: 2018-04-27 | End: 2018-06-01

## 2018-04-27 NOTE — PATIENT INSTRUCTIONS
-Start on the imitrex as needed for your migraines. If you are not getting relief on the medication, please follow up with me on Monday, 4/30/18, before your work shift.  -Follow up with psychiatrist to discuss potentially using depakote or tramadol for your headaches to see what they have to say about how you've done on those medications in the past.

## 2018-04-27 NOTE — MR AVS SNAPSHOT
After Visit Summary   4/27/2018    Cathy Arroyo    MRN: [id number]           Patient Information     Date Of Birth          1980        Visit Information        Provider Department      4/27/2018 11:00 AM Bernardo Angel MD [facility]        Today's Diagnoses     Nonintractable migraine, unspecified migraine type    -  1      Care Instructions    -Start on the imitrex as needed for your migraines. If you are not getting relief on the medication, please follow up with me on Monday, 4/30/18, before your work shift.  -Follow up with psychiatrist to discuss potentially using depakote or tramadol for your headaches to see what they have to say about how you've done on those medications in the past.          Follow-ups after your visit        Your next 10 appointments already scheduled     Jun 05, 2018  1:00 PM CDT   (Arrive by 12:45 PM)   Return Visit with Kate Mckeon MD   [facility] ([facility] [place])    [address] [place] [id number] [phone]              Who to contact     If you have questions or need follow up information about today's clinic visit or your schedule please contact [facility] directly at [phone].  Normal or non-critical lab and imaging results will be communicated to you by MyChart, letter or phone within 4 business days after the clinic has received the results. If you do not hear from us within 7 days, please contact the clinic through [employer] or phone. If you have a critical or abnormal lab result, we will notify you by phone as soon as possible.  Submit refill requests through [employer] or call your pharmacy and they will forward the refill request to us. Please allow 3 business days for your refill to be completed.          Additional Information About Your Visit        [employer] Information     [employer] gives you secure access to your electronic  health record. If you see a primary care provider, you can also send messages to your care team and make appointments. If you have questions, please call your primary care clinic.  If you do not have a primary care provider, please call [phone] and they will assist you.        Care EveryWhere ID     This is your Care EveryWhere ID. This could be used by other organizations to access your [facility] medical records  [id number]        Your Vitals Were     Pulse Temperature Last Period Pulse Oximetry BMI (Body Mass Index)       89 98.7  F (37.1  C) (Oral) 04/19/2018 99% 35.76 kg/m2        Blood Pressure from Last 3 Encounters:   04/27/18 130/76   04/24/18 151/85   02/15/18 (!) 139/92    Weight from Last 3 Encounters:   04/27/18 237 lb 4.8 oz (107.6 kg)   04/23/18 235 lb 10.8 oz (106.9 kg)   02/15/18 241 lb 12.8 oz (109.7 kg)              We Performed the Following     TSH with free T4 reflex          Today's Medication Changes          These changes are accurate as of 4/27/18 11:59 PM.  If you have any questions, ask your nurse or doctor.               Start taking these medicines.        Dose/Directions    SUMAtriptan 25 MG tablet   Commonly known as:  IMITREX   Used for:  Nonintractable migraine, unspecified migraine type   Started by:  Bernardo Angel MD        Dose:  25 mg   Take 1 tablet (25 mg) by mouth at onset of headache for migraine   Quantity:  9 tablet   Refills:  0         Stop taking these medicines if you haven't already. Please contact your care team if you have questions.     ABILIFY 5 MG tablet   Generic drug:  ARIPiprazole   Stopped by:  Bernardo Angel MD                Where to get your medicines      These medications were sent to [facility] [address] [place] [address] [address] [place] [address] AT [address] [address]    Hours:  24-hours Phone:  [phone]     SUMAtriptan 25 MG tablet                Primary Care Provider Office Phone # Fax #    [phone]  NANCY Dasilva, APRN [phone]       [facility] [address]  [facility] [address]        Equal Access to Services     NALDO HDEZ : Hadii aad ku hadrando Somandyali, waaxda luqadaha, qaybta kaalmada adeegyada, becca valverden graemehillary burt raciel tan. So [facility] [phone].    ATENCIÓN: Si habla español, tiene a willard disposición servicios gratuitos de asistencia lingüística. Llame al [phone].    We comply with applicable federal civil rights laws and [place] laws. We do not discriminate on the basis of race, color, national origin, age, disability, sex, sexual orientation, or gender identity.            Thank you!     Thank you for choosing [facility]  for your care. Our goal is always to provide you with excellent care. Hearing back from our patients is one way we can continue to improve our services. Please take a few minutes to complete the written survey that you may receive in the mail after your visit with us. Thank you!             Your Updated Medication List - Protect others around you: Learn how to safely use, store and throw away your medicines at www.disposemymeds.org.          This list is accurate as of 4/27/18 11:59 PM.  Always use your most recent med list.                   Brand Name Dispense Instructions for use Diagnosis    ADDERALL XR PO      Take 5 mg by mouth daily        albuterol 108 (90 Base) MCG/ACT Inhaler    PROAIR HFA/PROVENTIL HFA/VENTOLIN HFA    1 Inhaler    Inhale 2 puffs into the lungs every 4 hours as needed for shortness of breath / dyspnea or wheezing    Mild intermittent asthma without complication       fexofenadine 180 MG tablet    ALLEGRA    90 tablet    Take 1 tablet (180 mg) by mouth daily    Moderate persistent asthma with acute exacerbation, Chronic rhinitis       ipratropium - albuterol 0.5 mg/2.5 mg/3 mL 0.5-2.5 (3) MG/3ML neb solution    DUONEB    90 vial    Take 1 vial (3 mLs) by nebulization every 6 hours as needed  for shortness of breath / dyspnea or wheezing    Mild persistent asthma with acute exacerbation       levothyroxine 175 MCG tablet    SYNTHROID/LEVOTHROID    90 tablet    Take 1 tablet (175 mcg) by mouth daily    Postsurgical hypothyroidism, Hypocalcemia       mometasone-formoterol 200-5 MCG/ACT oral inhaler    DULERA    3 Inhaler    Inhale 2 puffs into the lungs 2 times daily    Moderate persistent asthma with acute exacerbation       montelukast 10 MG tablet    SINGULAIR    90 tablet    Take 1 tablet (10 mg) by mouth every morning    Moderate persistent asthma with acute exacerbation       * order for DME     1 each    nebulizer    Mild persistent asthma with acute exacerbation       * order for DME     1 each    nebulizer    Mild persistent asthma with acute exacerbation       * order for DME     1 Units    nebulizer        SUMAtriptan 25 MG tablet    IMITREX    9 tablet    Take 1 tablet (25 mg) by mouth at onset of headache for migraine    Nonintractable migraine, unspecified migraine type       * Notice:  This list has 3 medication(s) that are the same as other medications prescribed for you. Read the directions carefully, and ask your doctor or other care provider to review them with you.

## 2018-05-03 NOTE — PROGRESS NOTES
Mau Morgan, your recent thyroid result is normal.  Stay on the same medication dose.  Please contact if any questions.   Bernardo

## 2018-05-10 ENCOUNTER — TELEPHONE (OUTPATIENT)
Dept: FAMILY MEDICINE | Facility: CLINIC | Age: 38
End: 2018-05-10

## 2018-05-10 NOTE — TELEPHONE ENCOUNTER
Reviewed - agree with OV.  Asthma has been poorly controlled for some time.  It looks like patient may be pregnant as well.  DEVEN Winston

## 2018-05-10 NOTE — TELEPHONE ENCOUNTER
Panel Management Review      Patient has the following on her problem list:     Asthma review     ACT Total Scores 1/30/2018   ACT TOTAL SCORE (Goal Greater than or Equal to 20) 9   In the past 12 months, how many times did you visit the emergency room for your asthma without being admitted to the hospital? 1   In the past 12 months, how many times were you hospitalized overnight because of your asthma? 0      1. Is Asthma diagnosis on the Problem List? Yes    2. Is Asthma listed on Health Maintenance? Yes    3. Patient is due for:  ACT and AAP      Composite cancer screening  Chart review shows that this patient is due/due soon for the following None  Summary:    Patient is due/failing the following:   AAP and ACT    Action needed:   Patient needs office visit for asthma follow up.    Type of outreach:    Sent letter.    Questions for provider review:    None                                                                                                                                    Terell Capps MA     Chart routed to Zamzam Dasilva.    ACT Total Scores 5/24/2017 10/13/2017 1/30/2018   ACT TOTAL SCORE (Goal Greater than or Equal to 20) 6 22 9   In the past 12 months, how many times did you visit the emergency room for your asthma without being admitted to the hospital? 1 0 1   In the past 12 months, how many times were you hospitalized overnight because of your asthma? 0 0 0       Last OV: 04/27/2018

## 2018-05-10 NOTE — LETTER
May 10, 2018      Cathy Arroyo  [address]  [facility] [address]        Dear Cathy    In order to ensure we are providing the best quality care, we have reviewed your chart and see that you are due for:    1. Asthma Follow up    Please call the clinic at your earliest convenience to schedule an appointment.  If you have completed these please contact our office via phone or [email or web address] to update our records.  We would like to know the date (approximately month and year), the result, and ideally where the procedure was performed.    Thank you for trusting us with your health care.      Sincerely,    Care Team for DEVEN Winston

## 2018-05-21 DIAGNOSIS — J45.41 MODERATE PERSISTENT ASTHMA WITH ACUTE EXACERBATION: ICD-10-CM

## 2018-05-21 NOTE — TELEPHONE ENCOUNTER
Zamzam,     Please review/sign or advise on refill for singulair. Called patient. She is aware she is due for asthma follow up, she was at work but said she would call back to schedule.     Thank you,   Nata Giraldo RN

## 2018-05-23 RX ORDER — MONTELUKAST SODIUM 10 MG/1
TABLET ORAL
Qty: 90 TABLET | Refills: 0 | Status: SHIPPED | OUTPATIENT
Start: 2018-05-23 | End: 2018-06-01

## 2018-05-31 NOTE — PROGRESS NOTES
SUBJECTIVE:   Cathy Arroyo is a 37 year old female who presents to clinic today for the following health issues:    Asthma Follow-Up    Was ACT completed today?    Yes    Needs refill of Dulera and Singulair  Taking zrytec and allegra  ACT Total Scores 6/1/2018   ACT TOTAL SCORE (Goal Greater than or Equal to 20) 22   In the past 12 months, how many times did you visit the emergency room for your asthma without being admitted to the hospital? 0   In the past 12 months, how many times were you hospitalized overnight because of your asthma? 0       Recent asthma triggers that patient is dealing with: pollens    Amount of exercise or physical activity: 2-3 days/week for an average of 30-45 minutes    Problems taking medications regularly: No    Medication side effects: none    Diet: vegetarian/vegan    Has to move and smoking with anxiety.  Back down to 5 cigarettes per day.  Quit date a month after move.  Planning to use vape to taper down cigarette.  Not taking antipsychotic and antidepressant - developed akastahia  Stopped cold turkey and went into withdraw - had to get blood tests and head CT  Tried imitrex  Was prescribed medical cannabis  - appt is next week  New dx PTSD    Problem list and histories reviewed & adjusted, as indicated.  Additional history: as documented    Reviewed and updated as needed this visit by clinical staff       Reviewed and updated as needed this visit by Provider         ROS:  Constitutional, HEENT, cardiovascular, pulmonary, gi and gu systems are negative, except as otherwise noted.    OBJECTIVE:     /82  Pulse 84  Temp 98.9  F (37.2  C) (Oral)  Wt 235 lb 1.6 oz (106.6 kg)  SpO2 96%  BMI 35.43 kg/m2  Body mass index is 35.43 kg/(m^2).  GENERAL: healthy, alert and no distress  EYES: Eyes grossly normal to inspection, PERRL and conjunctivae and sclerae normal  HENT: ear canals and TM's normal, nose and mouth without ulcers or lesions  NECK: no adenopathy, no  asymmetry, masses, or scars and thyroid normal to palpation  RESP: lungs clear to auscultation - no rales, rhonchi or wheezes  CV: regular rate and rhythm, normal S1 S2, no S3 or S4, no murmur, click or rub, no peripheral edema and peripheral pulses strong  SKIN: no suspicious lesions or rashes  NEURO: Normal strength and tone, mentation intact and speech normal  PSYCH: mentation appears normal, affect normal/bright    Diagnostic Test Results:  none     ASSESSMENT/PLAN:     Asthma: Moderate persistent--controlled   Plan:  Singulair and flonase    (J45.40) Moderate persistent asthma without complication  (primary encounter diagnosis)  Comment:   Plan: Asthma Action Plan (AAP), fluticasone (FLONASE)        50 MCG/ACT spray            (J45.41) Moderate persistent asthma with acute exacerbation  Comment:   Plan: montelukast (SINGULAIR) 10 MG tablet,         mometasone-formoterol (DULERA) 200-5 MCG/ACT         oral inhaler, fluticasone (FLONASE) 50 MCG/ACT         spray            (J30.2) Chronic seasonal allergic rhinitis due to other allergen  Comment:   Plan:     Patient Instructions   Add floanse to allergy medications  Continue asthma medications (unless insurance has changed formulary)  Stop smoking!      JANNET Liu [facility]

## 2018-06-01 ENCOUNTER — OFFICE VISIT (OUTPATIENT)
Dept: FAMILY MEDICINE | Facility: CLINIC | Age: 38
End: 2018-06-01
Payer: COMMERCIAL

## 2018-06-01 VITALS
TEMPERATURE: 98.9 F | OXYGEN SATURATION: 96 % | WEIGHT: 235.1 LBS | HEART RATE: 84 BPM | BODY MASS INDEX: 35.43 KG/M2 | DIASTOLIC BLOOD PRESSURE: 82 MMHG | SYSTOLIC BLOOD PRESSURE: 126 MMHG

## 2018-06-01 DIAGNOSIS — J30.2 CHRONIC SEASONAL ALLERGIC RHINITIS DUE TO OTHER ALLERGEN: ICD-10-CM

## 2018-06-01 DIAGNOSIS — J45.40 MODERATE PERSISTENT ASTHMA WITHOUT COMPLICATION: Primary | ICD-10-CM

## 2018-06-01 DIAGNOSIS — J45.41 MODERATE PERSISTENT ASTHMA WITH ACUTE EXACERBATION: ICD-10-CM

## 2018-06-01 PROCEDURE — 99214 OFFICE O/P EST MOD 30 MIN: CPT | Performed by: NURSE PRACTITIONER

## 2018-06-01 RX ORDER — CETIRIZINE HYDROCHLORIDE 10 MG/1
5 TABLET ORAL DAILY
COMMUNITY
End: 2019-03-14

## 2018-06-01 RX ORDER — MONTELUKAST SODIUM 10 MG/1
TABLET ORAL
Qty: 90 TABLET | Refills: 3 | Status: SHIPPED | OUTPATIENT
Start: 2018-06-01 | End: 2018-08-31

## 2018-06-01 RX ORDER — FLUTICASONE PROPIONATE 50 MCG
1-2 SPRAY, SUSPENSION (ML) NASAL DAILY
Qty: 3 BOTTLE | Refills: 11 | Status: SHIPPED | OUTPATIENT
Start: 2018-06-01 | End: 2018-09-18

## 2018-06-01 NOTE — MR AVS SNAPSHOT
After Visit Summary   6/1/2018    Cathy Arroyo    MRN: [id number]           Patient Information     Date Of Birth          1980        Visit Information        Provider Department      6/1/2018 1:00 PM Sherrill Dasilva APRN CNP [facility]        Today's Diagnoses     Moderate persistent asthma without complication    -  1    Moderate persistent asthma with acute exacerbation        Chronic seasonal allergic rhinitis due to other allergen          Care Instructions    Add floanse to allergy medications  Continue asthma medications (unless insurance has changed formulary)  Stop smoking!          Follow-ups after your visit        Your next 10 appointments already scheduled     Jun 05, 2018  1:00 PM CDT   (Arrive by 12:45 PM)   Return Visit with Kate Mckeon MD   formerly [facility] ([facility])    [address] [place] [address] [phone]              Who to contact     If you have questions or need follow up information about today's clinic visit or your schedule please contact [facility] directly at [phone].  Normal or non-critical lab and imaging results will be communicated to you by MyChart, letter or phone within 4 business days after the clinic has received the results. If you do not hear from us within 7 days, please contact the clinic through LearnSomething[employer] or phone. If you have a critical or abnormal lab result, we will notify you by phone as soon as possible.  Submit refill requests through [facility] or call your pharmacy and they will forward the refill request to us. Please allow 3 business days for your refill to be completed.          Additional Information About Your Visit        MyChart Information     [employer] gives you secure access to your electronic health record. If you see a primary care provider, you can also send messages to your care team and make appointments.  If you have questions, please call your primary care clinic.  If you do not have a primary care provider, please call [phone] and they will assist you.        Care EveryWhere ID     This is your Care EveryWhere ID. This could be used by other organizations to access your [facility] medical records  [id number]        Your Vitals Were     Pulse Temperature Pulse Oximetry BMI (Body Mass Index)          84 98.9  F (37.2  C) (Oral) 96% 35.43 kg/m2         Blood Pressure from Last 3 Encounters:   06/01/18 126/82   04/27/18 130/76   04/24/18 151/85    Weight from Last 3 Encounters:   06/01/18 235 lb 1.6 oz (106.6 kg)   04/27/18 237 lb 4.8 oz (107.6 kg)   04/23/18 235 lb 10.8 oz (106.9 kg)              We Performed the Following     Asthma Action Plan (AAP)          Today's Medication Changes          These changes are accurate as of 6/1/18  1:38 PM.  If you have any questions, ask your nurse or doctor.               Start taking these medicines.        Dose/Directions    fluticasone 50 MCG/ACT spray   Commonly known as:  FLONASE   Used for:  Moderate persistent asthma with acute exacerbation, Moderate persistent asthma without complication   Started by:  Sherrill Dasilva APRN CNP        Dose:  1-2 spray   Spray 1-2 sprays into both nostrils daily   Quantity:  3 Bottle   Refills:  11            Where to get your medicines      These medications were sent to [facility] [address] [place] [address] AT [address] [address]    Hours:  24-hours Phone:  [phone]     fluticasone 50 MCG/ACT spray    mometasone-formoterol 200-5 MCG/ACT oral inhaler    montelukast 10 MG tablet                Primary Care Provider Office Phone # Fax #    JANNET Nascimento [phone]       [address] [facility] [[email or web address]] [address]  [facility] [address]        Equal Access to Services     NALDO HDEZ AH: sidney Dover, becca lin  gab bedollahillary bradley'aan ah. So [facility] [phone].    ATENCIÓN: Si robla nikolai, tiene a willard disposición servicios gratuitos de asistencia lingüística. Portia al [phone].    We comply with applicable federal civil rights laws and [place] laws. We do not discriminate on the basis of race, color, national origin, age, disability, sex, sexual orientation, or gender identity.            Thank you!     Thank you for choosing [facility]  [place] your [place]. Our goal is always to provide you with excellent care. Hearing back from our patients is one way we can continue to improve our services. Please take a few minutes to complete the written survey that you may receive in the mail after your visit with us. Thank you!             Your Updated Medication List - Protect others around you: Learn how to safely use, store and throw away your medicines at www.disposemymeds.org.          This list is accurate as of 6/1/18  1:38 PM.  Always use your most recent med list.                   Brand Name Dispense Instructions for use Diagnosis    albuterol 108 (90 Base) MCG/ACT Inhaler    PROAIR HFA/PROVENTIL HFA/VENTOLIN HFA    1 Inhaler    Inhale 2 puffs into the lungs every 4 hours as needed for shortness of breath / dyspnea or wheezing    Mild intermittent asthma without complication       cetirizine 10 MG tablet    zyrTEC     Take 5 mg by mouth daily        fexofenadine 180 MG tablet    ALLEGRA    90 tablet    Take 1 tablet (180 mg) by mouth daily    Moderate persistent asthma with acute exacerbation, Chronic rhinitis       fluticasone 50 MCG/ACT spray    FLONASE    3 Bottle    Spray 1-2 sprays into both nostrils daily    Moderate persistent asthma with acute exacerbation, Moderate persistent asthma without complication       ipratropium - albuterol 0.5 mg/2.5 mg/3 mL 0.5-2.5 (3) MG/3ML neb solution    DUONEB    90 vial    Take 1 vial (3 mLs) by nebulization every 6 hours as needed for shortness of breath  / dyspnea or wheezing    Mild persistent asthma with acute exacerbation       levothyroxine 175 MCG tablet    SYNTHROID/LEVOTHROID    90 tablet    Take 1 tablet (175 mcg) by mouth daily    Postsurgical hypothyroidism, Hypocalcemia       mometasone-formoterol 200-5 MCG/ACT oral inhaler    DULERA    3 Inhaler    Inhale 2 puffs into the lungs 2 times daily    Moderate persistent asthma with acute exacerbation       montelukast 10 MG tablet    SINGULAIR    90 tablet    TAKE 1 TABLET(10 MG) BY MOUTH EVERY MORNING    Moderate persistent asthma with acute exacerbation       * order for DME     1 each    nebulizer    Mild persistent asthma with acute exacerbation       * order for DME     1 each    nebulizer    Mild persistent asthma with acute exacerbation       * order for DME     1 Units    nebulizer        * Notice:  This list has 3 medication(s) that are the same as other medications prescribed for you. Read the directions carefully, and ask your doctor or other care provider to review them with you.

## 2018-06-01 NOTE — PATIENT INSTRUCTIONS
Add floanse to allergy medications  Continue asthma medications (unless insurance has changed formulary)  Stop smoking!

## 2018-06-01 NOTE — LETTER
My Asthma Action Plan  Name: Cathy Arroyo   YOB: 1980  Date: 6/1/2018   My doctor: JANNET Liu CNP   My clinic: [facility]        My Control Medicine: Mometasone + formoterol (Dulera) -  200/5 mcg oral inhaler  My Rescue Medicine: Albuterol (Proair/Ventolin/Proventil) inhaler 108 mcg   My Asthma Severity: moderate persistent  Avoid your asthma triggers:  upper respiratory infections        GREEN ZONE   Good Control    I feel good    No cough or wheeze    Can work, sleep and play without asthma symptoms       Take your asthma control medicine every day.     1. If exercise triggers your asthma, take your rescue medication    15 minutes before exercise or sports, and    During exercise if you have asthma symptoms  2. Spacer to use with inhaler: If you have a spacer, make sure to use it with your inhaler             YELLOW ZONE Getting Worse  I have ANY of these:    I do not feel good    Cough or wheeze    Chest feels tight    Wake up at night   1. Keep taking your Green Zone medications  2. Start taking your rescue medicine:    every 20 minutes for up to 1 hour. Then every 4 hours for 24-48 hours.  3. If you stay in the Yellow Zone for more than 12-24 hours, contact your doctor.  4. If you do not return to the Green Zone in 12-24 hours or you get worse, start taking your oral steroid medicine if prescribed by your provider.           RED ZONE Medical Alert - Get Help  I have ANY of these:    I feel awful    Medicine is not helping    Breathing getting harder    Trouble walking or talking    Nose opens wide to breathe       1. Take your rescue medicine NOW  2. If your provider has prescribed an oral steroid medicine, start taking it NOW  3. Call your doctor NOW  4. If you are still in the Red Zone after 20 minutes and you have not reached your doctor:    Take your rescue medicine again and    Call 911 or go to the emergency room right away    See your regular doctor  within 2 weeks of an Emergency Room or Urgent Care visit for follow-up treatment.          Annual Reminders:  Meet with Asthma Educator,  Flu Shot in the Fall, consider Pneumonia Vaccination for patients with asthma (aged 19 and older).    Pharmacy: [facility] [id number] [address]                      Asthma Triggers  How To Control Things That Make Your Asthma Worse    Triggers are things that make your asthma worse.  Look at the list below to help you find your triggers and what you can do about them.  You can help prevent asthma flare-ups by staying away from your triggers.      Trigger                                                          What you can do   Cigarette Smoke  Tobacco smoke can make asthma worse. Do not allow smoking in your home, car or around you.  Be sure no one smokes at a child s day care or school.  If you smoke, ask your health care provider for ways to help you quit.  Ask family members to quit too.  Ask your health care provider for a referral to Quit Plan to help you quit smoking, or call 9-139-805-PLAN.     Colds, Flu, Bronchitis  These are common triggers of asthma. Wash your hands often.  Don t touch your eyes, nose or mouth.  Get a flu shot every year.     Dust Mites  These are tiny bugs that live in cloth or carpet. They are too small to see. Wash sheets and blankets in hot water every week.   Encase pillows and mattress in dust mite proof covers.  Avoid having carpet if you can. If you have carpet, vacuum weekly.   Use a dust mask and HEPA vacuum.   Pollen and Outdoor Mold  Some people are allergic to trees, grass, or weed pollen, or molds. Try to keep your windows closed.  Limit time out doors when pollen count is high.   Ask you health care provider about taking medicine during allergy season.     Animal Dander  Some people are allergic to skin flakes, urine or saliva from pets with fur or feathers. Keep pets with fur or feathers out of  your home.    If you can t keep the pet outdoors, then keep the pet out of your bedroom.  Keep the bedroom door closed.  Keep pets off cloth furniture and away from stuffed toys.     Mice, Rats, and Cockroaches  Some people are allergic to the waste from these pests.   Cover food and garbage.  Clean up spills and food crumbs.  Store grease in the refrigerator.   Keep food out of the bedroom.   Indoor Mold  This can be a trigger if your home has high moisture. Fix leaking faucets, pipes, or other sources of water.   Clean moldy surfaces.  Dehumidify basement if it is damp and smelly.   Smoke, Strong Odors, and Sprays  These can reduce air quality. Stay away from strong odors and sprays, such as perfume, powder, hair spray, paints, smoke incense, paint, cleaning products, candles and new carpet.   Exercise or Sports  Some people with asthma have this trigger. Be active!  Ask your doctor about taking medicine before sports or exercise to prevent symptoms.    Warm up for 5-10 minutes before and after sports or exercise.     Other Triggers of Asthma  Cold air:  Cover your nose and mouth with a scarf.  Sometimes laughing or crying can be a trigger.  Some medicines and food can trigger asthma.

## 2018-06-02 ASSESSMENT — ASTHMA QUESTIONNAIRES: ACT_TOTALSCORE: 22

## 2018-06-05 ENCOUNTER — OFFICE VISIT (OUTPATIENT)
Dept: NEUROSURGERY | Facility: CLINIC | Age: 38
End: 2018-06-05

## 2018-06-05 VITALS
WEIGHT: 234 LBS | HEART RATE: 97 BPM | DIASTOLIC BLOOD PRESSURE: 87 MMHG | BODY MASS INDEX: 35.26 KG/M2 | SYSTOLIC BLOOD PRESSURE: 126 MMHG

## 2018-06-05 DIAGNOSIS — Z53.21 PROCEDURE AND TREATMENT NOT CARRIED OUT DUE TO PATIENT LEAVING PRIOR TO BEING SEEN BY HEALTH CARE PROVIDER: Primary | ICD-10-CM

## 2018-06-05 ASSESSMENT — PAIN SCALES - GENERAL: PAINLEVEL: NO PAIN (0)

## 2018-06-05 NOTE — MR AVS SNAPSHOT
After Visit Summary   6/5/2018    Cathy Arroyo    MRN: [id number]           Patient Information     Date Of Birth          1980        Visit Information        Provider Department      6/5/2018 1:00 PM Kate Mckeon MD [facility]        Today's Diagnoses     Procedure and treatment not carried out due to patient leaving prior to being seen by health care provider    -  1       Follow-ups after your visit        Who to contact     Please call your clinic at [phone] to:    Ask questions about your health    Make or cancel appointments    Discuss your medicines    Learn about your test results    Speak to your doctor            Additional Information About Your Visit        MyChart Information     TMAT gives you secure access to your electronic health record. If you see a primary care provider, you can also send messages to your care team and make appointments. If you have questions, please call your primary care clinic.  If you do not have a primary care provider, please call [phone] and they will assist you.      TMAT is an electronic gateway that provides easy, online access to your medical records. With TMAT, you can request a clinic appointment, read your test results, renew a prescription or communicate with your care team.     To access your existing account, please contact your [facility] or call [phone] for assistance.        Care EveryWhere ID     This is your Care EveryWhere ID. This could be used by other organizations to access your [facility] medical records  [id number]        Your Vitals Were     Pulse BMI (Body Mass Index)                97 35.26 kg/m2           Blood Pressure from Last 3 Encounters:   06/05/18 126/87   06/01/18 126/82   04/27/18 130/76    Weight from Last 3 Encounters:   06/05/18 106.1 kg (234 lb)   06/01/18 106.6 kg (235 lb 1.6 oz)   04/27/18 107.6 kg (237 lb 4.8 oz)               Today, you had the following     No orders found for display       Primary Care Provider Office Phone # Fax #    Sherrill Dasilva, JANNET [facility] [phone]       [address] [facility] [address]  [facility] [address]        Equal Access to Services     NALDO HDEZ : Hadii aad ku hadasho Soomaali, waaxda luqadaha, qaybta kaalmada adeegyada, waxay idiin hayaan adeeg kharaceli lakaty . So [facility] [phone].    ATENCIÓN: Si habla español, tiene a willard disposición servicios gratuitos de asistencia lingüística. Llame al [phone].    We comply with applicable federal civil rights laws and [place] laws. We do not discriminate on the basis of race, color, national origin, age, disability, sex, sexual orientation, or gender identity.            Thank you!     Thank you for choosing [facility]  for your care. Our goal is always to provide you with excellent care. Hearing back from our patients is one way we can continue to improve our services. Please take a few minutes to complete the written survey that you may receive in the mail after your visit with us. Thank you!             Your Updated Medication List - Protect others around you: Learn how to safely use, store and throw away your medicines at www.disposemymeds.org.          This list is accurate as of 6/5/18 11:59 PM.  Always use your most recent med list.                   Brand Name Dispense Instructions for use Diagnosis    albuterol 108 (90 Base) MCG/ACT Inhaler    PROAIR HFA/PROVENTIL HFA/VENTOLIN HFA    1 Inhaler    Inhale 2 puffs into the lungs every 4 hours as needed for shortness of breath / dyspnea or wheezing    Mild intermittent asthma without complication       cetirizine 10 MG tablet    zyrTEC     Take 5 mg by mouth daily        fexofenadine 180 MG tablet    ALLEGRA    90 tablet    Take 1 tablet (180 mg) by mouth daily    Moderate persistent asthma with acute exacerbation, Chronic rhinitis       fluticasone 50 MCG/ACT spray    FLONASE     3 Bottle    Spray 1-2 sprays into both nostrils daily    Moderate persistent asthma with acute exacerbation, Moderate persistent asthma without complication       ipratropium - albuterol 0.5 mg/2.5 mg/3 mL 0.5-2.5 (3) MG/3ML neb solution    DUONEB    90 vial    Take 1 vial (3 mLs) by nebulization every 6 hours as needed for shortness of breath / dyspnea or wheezing    Mild persistent asthma with acute exacerbation       levothyroxine 175 MCG tablet    SYNTHROID/LEVOTHROID    90 tablet    Take 1 tablet (175 mcg) by mouth daily    Postsurgical hypothyroidism, Hypocalcemia       mometasone-formoterol 200-5 MCG/ACT oral inhaler    DULERA    3 Inhaler    Inhale 2 puffs into the lungs 2 times daily    Moderate persistent asthma with acute exacerbation       montelukast 10 MG tablet    SINGULAIR    90 tablet    TAKE 1 TABLET(10 MG) BY MOUTH EVERY MORNING    Moderate persistent asthma with acute exacerbation       * order for DME     1 each    nebulizer    Mild persistent asthma with acute exacerbation       * order for DME     1 each    nebulizer    Mild persistent asthma with acute exacerbation       * order for DME     1 Units    nebulizer        * Notice:  This list has 3 medication(s) that are the same as other medications prescribed for you. Read the directions carefully, and ask your doctor or other care provider to review them with you.

## 2018-06-05 NOTE — LETTER
6/5/2018     RE: Cathy Arroyo  [address]  [facility] [address]     Dear Colleague,    Thank you for referring your patient, Cathy Arroyo, to the [facility]UR[facility]ERY at [facility]. Please see a copy of my visit note below.    I was unable to see the patient today.  DELMI Mckeon MD    Again, thank you for allowing me to participate in the care of your patient.      Sincerely,    Kate Mckeon MD

## 2018-06-19 ENCOUNTER — MYC MEDICAL ADVICE (OUTPATIENT)
Dept: FAMILY MEDICINE | Facility: CLINIC | Age: 38
End: 2018-06-19

## 2018-07-10 ENCOUNTER — HOSPITAL ENCOUNTER (EMERGENCY)
Facility: CLINIC | Age: 38
Discharge: HOME | End: 2018-07-11
Attending: EMERGENCY MEDICINE | Admitting: EMERGENCY MEDICINE
Payer: COMMERCIAL

## 2018-07-10 ENCOUNTER — TELEPHONE (OUTPATIENT)
Dept: MIDWIFE SERVICES | Facility: CLINIC | Age: 38
End: 2018-07-10

## 2018-07-10 DIAGNOSIS — Z33.1 PREGNANCY, INCIDENTAL: ICD-10-CM

## 2018-07-10 DIAGNOSIS — N93.9 VAGINAL BLEEDING: ICD-10-CM

## 2018-07-10 DIAGNOSIS — Z86.39 HISTORY OF GRAVES' DISEASE: ICD-10-CM

## 2018-07-10 DIAGNOSIS — E89.0 POSTSURGICAL HYPOTHYROIDISM: ICD-10-CM

## 2018-07-10 DIAGNOSIS — O20.0 THREATENED MISCARRIAGE: Primary | ICD-10-CM

## 2018-07-10 DIAGNOSIS — O03.4 INEVITABLE ABORTION: ICD-10-CM

## 2018-07-10 DIAGNOSIS — Z86.39 HISTORY OF GRAVES' DISEASE: Primary | ICD-10-CM

## 2018-07-10 DIAGNOSIS — O20.0 THREATENED MISCARRIAGE: ICD-10-CM

## 2018-07-10 LAB
B-HCG SERPL-ACNC: <1 IU/L (ref 0–5)
T4 SERPL-MCNC: 13.8 UG/DL (ref 4.5–13.9)
TSH SERPL DL<=0.005 MIU/L-ACNC: 0.06 MU/L (ref 0.4–4)

## 2018-07-10 PROCEDURE — 84443 ASSAY THYROID STIM HORMONE: CPT | Performed by: ADVANCED PRACTICE MIDWIFE

## 2018-07-10 PROCEDURE — 84702 CHORIONIC GONADOTROPIN TEST: CPT | Performed by: ADVANCED PRACTICE MIDWIFE

## 2018-07-10 PROCEDURE — 99283 EMERGENCY DEPT VISIT LOW MDM: CPT | Mod: Z6 | Performed by: EMERGENCY MEDICINE

## 2018-07-10 PROCEDURE — 83520 IMMUNOASSAY QUANT NOS NONAB: CPT | Mod: 90 | Performed by: ADVANCED PRACTICE MIDWIFE

## 2018-07-10 PROCEDURE — 84445 ASSAY OF TSI GLOBULIN: CPT | Mod: 90 | Performed by: ADVANCED PRACTICE MIDWIFE

## 2018-07-10 PROCEDURE — 36415 COLL VENOUS BLD VENIPUNCTURE: CPT | Performed by: ADVANCED PRACTICE MIDWIFE

## 2018-07-10 PROCEDURE — 99282 EMERGENCY DEPT VISIT SF MDM: CPT | Performed by: EMERGENCY MEDICINE

## 2018-07-10 PROCEDURE — 99000 SPECIMEN HANDLING OFFICE-LAB: CPT | Performed by: ADVANCED PRACTICE MIDWIFE

## 2018-07-10 PROCEDURE — 84436 ASSAY OF TOTAL THYROXINE: CPT | Performed by: ADVANCED PRACTICE MIDWIFE

## 2018-07-10 NOTE — TELEPHONE ENCOUNTER
Pt called back. Discussed message below. Scheduled lab only appt for 10am today and Thurs.   Beatriz Johnson RN-BSN

## 2018-07-10 NOTE — ED AVS SNAPSHOT
[facility], Emergency Department    [address] [address]    Phone:  [phone]                                       Cathy Arroyo   MRN: [id number]    Department:  [facility], Emergency Department   Date of Visit:  7/10/2018           Patient Information     Date Of Birth          1980        Your diagnoses for this visit were:     Vaginal bleeding     Inevitable         You were seen by Kimmie Anne MD.        Discharge Instructions         Thank you for your patience today.  Please follow-up with your regular doctor in the next 2-3 days for further evaluation and follow-up care.  Please call to schedule an appointment.  Please continue your own medications.  Please rest, drink plenty of water.  Please take Tylenol or ibuprofen as needed for pain.  Please return to the ER if you develop high fever, persistent heavy bleeding, severe pain, or any worsening of your current symptoms.  It was a pleasure taking care of you today.  We hope you feel better soon.    Discharge Instructions for Miscarriage  You have had a miscarriage. This is the unplanned end of a pregnancy before the baby is able to live outside the womb. You may have experienced a shock to your system, both physically and emotionally. Because of this, you may not feel well for a few days. Your body is going through changes, and you can expect mood swings. When you are ready, start back to your normal routine.  Home care  Suggestions for care at home include:    Return to work or your daily routines when you feel ready. This might be right away, or you may want to wait a few days.    Take showers instead of tub baths. This helps prevent infection. Ask your healthcare provider when you can take baths again.    Avoid strenuous exercise, such as aerobics or running, until the bleeding slows to the rate of a normal period.    Don t have sexual intercourse or use tampons or douches until your  healthcare provider says it s OK.    Get emotional support. Ask your healthcare provider about support groups in your area. Many women find it helpful to talk to other women who have had a miscarriage.  Follow-up  Make a follow-up appointment with your healthcare provider.  When to call your healthcare provider  Call your healthcare provider right away if you have any of the following:    Fever above 100.4 F (38 C) or chills    Bright red vaginal bleeding or a smelly discharge    Vaginal bleeding that soaks more than one menstrual pad per hour    Belly pain that is severe or getting worse   Date Last Reviewed: 6/1/2016 2000-2017 [facility]. [address] [place] [address]. All rights reserved. This information is not intended as a substitute for professional medical care. Always follow your healthcare professional's instructions.          Your next 10 appointments already scheduled     Jul 12, 2018 10:00 AM CDT   LAB with SPENSER LAB   [facility] ([facility])    [address] [place] [address] [phone]           Please do not eat 10-12 hours before your appointment if you are coming in fasting for labs on lipids, cholesterol, or glucose (sugar). This does not apply to pregnant women. Water, hot tea and black coffee (with nothing added) are okay. Do not drink other fluids, diet soda or chew gum.            Jul 24, 2018  2:30 PM CDT   New Prenatal with SPENSER OB NURSE EDUCATION   [facility] ([facility])    [address] [place] [id number] [phone]            Aug 07, 2018  1:30 PM CDT   (Arrive by 1:15 PM)   Return Visit with Kate Mckeon MD   [facility] ([facility])    [address]  3rd Floor  [facility] [address] [phone]              24 Hour Appointment Hotline       To make an appointment at any  [facility], call [phone] ([phone]). If you don't have a family doctor or clinic, we will help you find one. [facility] are conveniently located to serve the needs of you and your family.             Review of your medicines      Our records show that you are taking the medicines listed below. If these are incorrect, please call your family doctor or clinic.        Dose / Directions Last dose taken    albuterol 108 (90 Base) MCG/ACT Inhaler   Commonly known as:  PROAIR HFA/PROVENTIL HFA/VENTOLIN HFA   Dose:  2 puff   Quantity:  1 Inhaler        Inhale 2 puffs into the lungs every 4 hours as needed for shortness of breath / dyspnea or wheezing   Refills:  3        cetirizine 10 MG tablet   Commonly known as:  zyrTEC   Dose:  5 mg        Take 5 mg by mouth daily   Refills:  0        fexofenadine 180 MG tablet   Commonly known as:  ALLEGRA   Dose:  180 mg   Quantity:  90 tablet        Take 1 tablet (180 mg) by mouth daily   Refills:  3        fluticasone 50 MCG/ACT spray   Commonly known as:  FLONASE   Dose:  1-2 spray   Quantity:  3 Bottle        Spray 1-2 sprays into both nostrils daily   Refills:  11        ipratropium - albuterol 0.5 mg/2.5 mg/3 mL 0.5-2.5 (3) MG/3ML neb solution   Commonly known as:  DUONEB   Dose:  1 vial   Quantity:  90 vial        Take 1 vial (3 mLs) by nebulization every 6 hours as needed for shortness of breath / dyspnea or wheezing   Refills:  3        levothyroxine 175 MCG tablet   Commonly known as:  SYNTHROID/LEVOTHROID   Dose:  175 mcg   Quantity:  90 tablet        Take 1 tablet (175 mcg) by mouth daily   Refills:  3        medical cannabis (Patient's own supply.  Not a prescription)        See Admin Instructions (This is NOT a prescription, and does not certify that the patient has a qualifying medical condition for medical cannabis.  The purpose of this order is  to document that the patient reports taking medical cannabis.)   Refills:  0         mometasone-formoterol 200-5 MCG/ACT oral inhaler   Commonly known as:  DULERA   Dose:  2 puff   Quantity:  3 Inhaler        Inhale 2 puffs into the lungs 2 times daily   Refills:  1        montelukast 10 MG tablet   Commonly known as:  SINGULAIR   Quantity:  90 tablet        TAKE 1 TABLET(10 MG) BY MOUTH EVERY MORNING   Refills:  3        * order for DME   Quantity:  1 each        nebulizer   Refills:  0        * order for DME   Quantity:  1 each        nebulizer   Refills:  0        * order for DME   Quantity:  1 Units        nebulizer   Refills:  0        * Notice:  This list has 3 medication(s) that are the same as other medications prescribed for you. Read the directions carefully, and ask your doctor or other care provider to review them with you.            Orders Needing Specimen Collection     None      Pending Results     Date and Time Order Name Status Description    7/10/2018 1013 THYROTROPIN RECEPTOR ANTIBODY In process     7/10/2018 1004 THYROID STIMULATING IMMUNOGLOBULIN In process             Pending Culture Results     No orders found for last 3 day(s).            Pending Results Instructions     If you had any lab results that were not finalized at the time of your Discharge, you can call the ED Lab Result RN at [phone]. You will be contacted by this team for any positive Lab results or changes in treatment. The nurses are available 7 days a week from 10A to 6:30P.  You can leave a message 24 hours per day and they will return your call.        Thank you for choosing [facility]       Thank you for choosing [facility] for your care. Our goal is always to provide you with excellent care. Hearing back from our patients is one way we can continue to improve our services. Please take a few minutes to complete the written survey that you may receive in the mail after you visit with us. Thank you!        [facility] Information     [employer] gives you secure access to your electronic health record. If you see a  primary care provider, you can also send messages to your care team and make appointments. If you have questions, please call your primary care clinic.  If you do not have a primary care provider, please call [phone] and they will assist you.        Care EveryWhere ID     This is your Care EveryWhere ID. This could be used by other organizations to access your [place] medical records  [id number]        Equal Access to Services     NALDO HDEZ : Ashly Schneider, sidney stanley, patrick kaalmagene gallo, becca tan. So [facility] [phone].    ATENCIÓN: Si habla español, tiene a willard disposición servicios gratuitos de asistencia lingüística. Llame al [phone].    We comply with applicable federal civil rights laws and [place] laws. We do not discriminate on the basis of race, color, national origin, age, disability, sex, sexual orientation, or gender identity.            After Visit Summary       This is your record. Keep this with you and show to your community pharmacist(s) and doctor(s) at your next visit.

## 2018-07-10 NOTE — TELEPHONE ENCOUNTER
Hello, she can come today and Thursday for beta levels to see if they are going up or down and we can plan an ultrasound if they continue to go up around 6 weeks. Let me know if she has other questions. Thanks! Idalia Prakash CNM

## 2018-07-10 NOTE — ED AVS SNAPSHOT
[facility] [place], Emergency Department    [address] [place] [address]    Phone:  [phone]                                       Cathy Arroyo   MRN: [id number]    Department:  [facility], Emergency Department   Date of Visit:  7/10/2018           After Visit Summary Signature Page     I have received my discharge instructions, and my questions have been answered. I have discussed any challenges I see with this plan with the nurse or doctor.    ..........................................................................................................................................  Patient/Patient Representative Signature      ..........................................................................................................................................  Patient Representative Print Name and Relationship to Patient    ..................................................               ................................................  Date                                            Time    ..........................................................................................................................................  Reviewed by Signature/Title    ...................................................              ..............................................  Date                                                            Time

## 2018-07-10 NOTE — TELEPHONE ENCOUNTER
TC to patient. Discussed message below. Passing some clots or mucus as it's pooling at the bottom of the toilet. Not bright red, looks more orange. Having some cramping and dizziness. No recent SI, constipated but hasn't had a BM for 2 days, no heavy lifting. Took pregnancy test 2 days ago. LMP was 6/11/18, so 4w1d. Usually gets her period every 26 days and has been really regular. Advised pt to monitor and when to present to ED with heavy bleeding. Routing to on-call CNM. Do you want pt to come in for beta hcgs?   Beatriz Johsnon, RN-BSN

## 2018-07-10 NOTE — TELEPHONE ENCOUNTER
Pt stated that she noticed was seemed like spotting last night, and now has been noticing blood in her urine. It is only present when she urinates - there is no blood in her underwear at all. This didn't happen with her first pregnancy so she's not sure if it's normal or not. Her LMP is 6/11

## 2018-07-11 ENCOUNTER — MYC MEDICAL ADVICE (OUTPATIENT)
Dept: FAMILY MEDICINE | Facility: CLINIC | Age: 38
End: 2018-07-11

## 2018-07-11 VITALS
HEART RATE: 80 BPM | SYSTOLIC BLOOD PRESSURE: 140 MMHG | DIASTOLIC BLOOD PRESSURE: 80 MMHG | RESPIRATION RATE: 16 BRPM | BODY MASS INDEX: 34.4 KG/M2 | WEIGHT: 227 LBS | OXYGEN SATURATION: 98 % | TEMPERATURE: 98.5 F | HEIGHT: 68 IN

## 2018-07-11 ASSESSMENT — ENCOUNTER SYMPTOMS
SORE THROAT: 0
CHILLS: 0
FEVER: 0
CONFUSION: 0
COUGH: 0
BACK PAIN: 0
FLANK PAIN: 0
VOMITING: 0
DYSURIA: 0
SHORTNESS OF BREATH: 0
RHINORRHEA: 0
EYE DISCHARGE: 0
MYALGIAS: 0
DIARRHEA: 0
BRUISES/BLEEDS EASILY: 0
NAUSEA: 0
NERVOUS/ANXIOUS: 1
ABDOMINAL PAIN: 1
HEADACHES: 0
WEAKNESS: 0

## 2018-07-11 NOTE — DISCHARGE INSTRUCTIONS
Thank you for your patience today.  Please follow-up with your regular doctor in the next 2-3 days for further evaluation and follow-up care.  Please call to schedule an appointment.  Please continue your own medications.  Please rest, drink plenty of water.  Please take Tylenol or ibuprofen as needed for pain.  Please return to the ER if you develop high fever, persistent heavy bleeding, severe pain, or any worsening of your current symptoms.  It was a pleasure taking care of you today.  We hope you feel better soon.    Discharge Instructions for Miscarriage  You have had a miscarriage. This is the unplanned end of a pregnancy before the baby is able to live outside the womb. You may have experienced a shock to your system, both physically and emotionally. Because of this, you may not feel well for a few days. Your body is going through changes, and you can expect mood swings. When you are ready, start back to your normal routine.  Home care  Suggestions for care at home include:    Return to work or your daily routines when you feel ready. This might be right away, or you may want to wait a few days.    Take showers instead of tub baths. This helps prevent infection. Ask your healthcare provider when you can take baths again.    Avoid strenuous exercise, such as aerobics or running, until the bleeding slows to the rate of a normal period.    Don t have sexual intercourse or use tampons or douches until your healthcare provider says it s OK.    Get emotional support. Ask your healthcare provider about support groups in your area. Many women find it helpful to talk to other women who have had a miscarriage.  Follow-up  Make a follow-up appointment with your healthcare provider.  When to call your healthcare provider  Call your healthcare provider right away if you have any of the following:    Fever above 100.4 F (38 C) or chills    Bright red vaginal bleeding or a smelly discharge    Vaginal bleeding that soaks  more than one menstrual pad per hour    Belly pain that is severe or getting worse   Date Last Reviewed: 6/1/2016 2000-2017 [employer]. [address] [place] [address], [place] [address]. All rights reserved. This information is not intended as a substitute for professional medical care. Always follow your healthcare professional's instructions.

## 2018-07-11 NOTE — ED TRIAGE NOTES
"States she miscarried today at four weeks. Cramping from mid and abdomin 'and down'. \"I think I'm having a panic attack\".  "

## 2018-07-11 NOTE — ED PROVIDER NOTES
[facility] EMERGENCY DEPARTMENT ([facility])  7/10/18   ED 6  History     Chief Complaint   Patient presents with     Miscarriage     HPI  Cathy Arroyo is a  37 year old female who presents for evaluation after a possible miscarriage.  She has a history of bipolar 1 disorder, Graves' disease, pineal gland tumor status post resection in , Hashimoto's thyroiditis s/p thyroidectomy on 5/3/17, asthma.  She took a pregnancy test 2 days ago and this was positive.  She has an LMP of 18, approximately 4 weeks 1 days gestation.   She typically has very regular periods every 26 days.  She contacted her endocrinologist yesterday reporting that she was pregnant.  Last night patient developed some spotting along with some cramping and lightheadedness.     Today, patient presented to her Endocrinologist, Dr. Franco, for bloodwork and was found to have an HCG of 0, despite having a TSH of .06 (at 2 previously). Patient had not yet completed any confirmatory tests for her pregnancy. Since then, patient has been experiencing heavy vaginal bleeding with clots upon urination. Just before arriving here today, she also developed painful abdominal cramping and tenderness, which is what prompted her to present here today. She thinks her abdominal pain was due to flatulence, as she states she had passed some gas in the waiting room and her pain alleviated, causing her to feel stupid for coming in. As a result, the patient reports having had a panic attack in the waiting room, unsure if she should stay for further evaluation. Currently, patient also endorses dizziness, fatigue, weakness, and a headache (though thinks this is related to her crying). She denies dysuria, chest pain, SOB, or difficulty urinating. Patient reports she had been experiencing urinary frequency up until today. She notes she has not smoked in ~48 hours, which might have contributed to her panic attack earlier in the waiting room.  Patient states she has been breathing fine and has a inhaler at home, though she has not used it in awhile.     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     Post partum depression      Thyroid disease     hashimotos     Uncomplicated asthma        Past Surgical History:   Procedure Laterality Date     BLOOD PATCH N/A 10/11/2016    Procedure: EPIDURAL BLOOD PATCH;  Surgeon: GENERIC ANESTHESIA PROVIDER;  Location: [facility] OR     CRANIOTOMY, EXCISE TUMOR COMPLEX, COMBINED  2/19/2014    Procedure: COMBINED CRANIOTOMY, EXCISE TUMOR COMPLEX;  Supracerabellar  Infratentorial Approach for Resection of Tumor ;  Surgeon: Kate Mckeon MD;  Location: [facility] OR     THYROIDECTOMY N/A 5/3/2017    Procedure: THYROIDECTOMY;  Total Thyroidectomy;  Surgeon: Pamela Villasenor MD;  Location: [facility] OR       Family History   Problem Relation Age of Onset     Thyroid Disease Paternal Aunt      Asthma Father      Mental Illness Father      Obesity Father      Asthma Maternal Grandmother      Osteoperosis Maternal Grandmother      Diabetes Paternal Grandfather      Other Cancer Mother      Genitourinary Problems Mother      Bipolar Disorder Mother      unipolar depression     Depression Mother      Thyroid Disease Mother      benign tumor     Skin Cancer Mother      Bipolar Disorder Brother      unipolar depression     Depression Brother      Anesthesia Reaction Brother      Melanoma No family hx of        Social History   Substance Use Topics     Smoking status: Former Smoker     Packs/day: 0.50     Years: 12.00     Types: Cigarettes     Start date: 1/21/2004     Quit date: 2/15/2018     Smokeless tobacco: Never Used     Alcohol use 0.0 oz/week      Comment: quit 16 days ago       No current facility-administered medications for this encounter.   "    Current Outpatient Prescriptions   Medication     medical cannabis (Patient's own supply.  Not a prescription)     albuterol (PROAIR HFA/PROVENTIL HFA/VENTOLIN HFA) 108 (90 BASE) MCG/ACT Inhaler     cetirizine (ZYRTEC) 10 MG tablet     fexofenadine (ALLEGRA) 180 MG tablet     fluticasone (FLONASE) 50 MCG/ACT spray     ipratropium - albuterol 0.5 mg/2.5 mg/3 mL (DUONEB) 0.5-2.5 (3) MG/3ML neb solution     levothyroxine (SYNTHROID/LEVOTHROID) 175 MCG tablet     mometasone-formoterol (DULERA) 200-5 MCG/ACT oral inhaler     montelukast (SINGULAIR) 10 MG tablet     order for DME     order for DME     order for DME        Allergies   Allergen Reactions     Codeine Sulfate Nausea and Vomiting     Tetanus Toxoid      Pt states she had an infection at injection site.      Vicodin [Hydrocodone-Acetaminophen] Nausea and Vomiting     Methimazole Rash         I have reviewed the Medications, Allergies, Past Medical and Surgical History, and Social History in the Epic system.    Review of Systems   Constitutional: Negative for chills and fever.   HENT: Negative for congestion, rhinorrhea and sore throat.    Eyes: Negative for discharge.   Respiratory: Negative for cough and shortness of breath.    Cardiovascular: Negative for chest pain and leg swelling.   Gastrointestinal: Positive for abdominal pain. Negative for diarrhea, nausea and vomiting.   Endocrine: Negative for polyuria.   Genitourinary: Positive for vaginal bleeding. Negative for dysuria and flank pain.   Musculoskeletal: Negative for back pain and myalgias.   Skin: Negative for rash.   Allergic/Immunologic: Negative for immunocompromised state.   Neurological: Negative for weakness and headaches.   Hematological: Does not bruise/bleed easily.   Psychiatric/Behavioral: Negative for confusion and suicidal ideas. The patient is nervous/anxious.        Physical Exam   BP: 146/90  Pulse: 83  Temp: 98.5  F (36.9  C)  Resp: 16  Height: 172.7 cm (5' 8\")  Weight: 103 kg " (227 lb)  SpO2: 97 %      Physical Exam   Constitutional: She is oriented to person, place, and time. She appears well-developed. No distress.   HENT:   Head: Normocephalic and atraumatic.   Right Ear: External ear normal.   Left Ear: External ear normal.   Mouth/Throat: Oropharynx is clear and moist.   Eyes: Conjunctivae and EOM are normal. Pupils are equal, round, and reactive to light.   Neck: Normal range of motion. Neck supple.   Cardiovascular: Normal rate, regular rhythm and normal heart sounds.    Pulmonary/Chest: Effort normal and breath sounds normal. No respiratory distress. She has no wheezes. She has no rales.   Abdominal: Soft. She exhibits no distension. There is no tenderness. There is no rebound and no guarding.   Musculoskeletal: Normal range of motion. She exhibits no tenderness or deformity.   Neurological: She is alert and oriented to person, place, and time. No cranial nerve deficit. Coordination normal.   Skin: Skin is warm and dry. No rash noted.   Psychiatric: She has a normal mood and affect. Her behavior is normal.   tearful       ED Course     ED Course     Procedures       12:19 AM  The patient was seen and examined by Dr. Anne in Room 11.          Critical Care time:  none             Labs Ordered and Resulted from Time of ED Arrival Up to the Time of Departure from the ED - No data to display         Assessments & Plan (with Medical Decision Making)   Cathy Arroyo is a 37 year old  female who has a past medical history of OCD, bipolar, Hashimoto's thyroiditis, asthma who is currently approximately 4 weeks pregnant who presents emergency department from home with a complaint of vaginal bleeding and concern for miscarriage.  Patient reports vaginal spotting with heavy bleeding consistent with spots along with lower abdominal pain and cramping 2 days.  Patient reports she had blood tests outside which demonstrated hCG level of 0.  Patient reports concern with the pain  along with anxiety/panic regarding the likely miscarriage.  Upon arrival patient is tearful but otherwise well-appearing, afebrile, no distress.  Abdomen is soft, nontender, nondistended, no peritoneal signs.  Patient reports pain has improved and she is just sad and tired.  At this time patient would like to go home and get some rest and follow-up with her doctor this week.  I discussed with patient regarding likely miscarriage  along with continue supportive care with monitoring bleeding, Tylenol and ibuprofen as needed for pain, and oral hydration with water.  At this time no emergent need for any acute interventions, laboratory testing, ultrasound at this time however recommend her follow-up with her primary doctor later this week.  Patient understands and agrees with the plan.  Return precautions discussed. .The patient is discharged home with instructions to return if their symptoms persist or worsen.  Plan for close follow-up with their primary physician.  I discussed workup, results, treatment, and plan with the patient.  Patient understands and agrees with the plan.       I have reviewed the nursing notes.    I have reviewed the findings, diagnosis, plan and need for follow up with the patient.    New Prescriptions    No medications on file       Final diagnoses:   Vaginal bleeding   Inevitable    IPetr, am serving as a trained medical scribe to document services personally performed by Kimmie Anne MD, based on the provider's statements to me.   IKimmie MD, was physically present and have reviewed and verified the accuracy of this note documented by Petr Stephenson.      7/10/2018   [facility], EMERGENCY DEPARTMENT     Kimmie Anne MD  [phone]

## 2018-07-11 NOTE — TELEPHONE ENCOUNTER
Zamzam     See pt message, huddled with Jennifer and scheduled pt for appointment on Friday with you    Cristin Landry, BOYD   [facility]

## 2018-07-12 LAB — TSH RECEP AB SER-ACNC: <1 IU/L

## 2018-07-12 NOTE — PROGRESS NOTES
SUBJECTIVE:   Cathy Arroyo is a 37 year old female who presents to clinic today for the following health issues:    Concern - Miscarriage   Onset: on Tuesday afternoon    Description:   Had a positive response on digital review. Started spotting on Tuesday morning and midwife said test was negative with 0 and said she might not be pregnant. Thyroid test showed pregnancy and had positive results for pregnancy.     Intensity: mild    Progression of Symptoms:  same    Accompanying Signs & Symptoms:  Blood is not normal and a lot of clotting. Coloration of blood has orange- yellowish. Feels nausea and dizzy this morning.    Sad - pregnancy was not planned, but was excited to get a positive test and is sad for the loss.  Wondering when she can try again.  Had stopped smoking right away, but has since restarted.  Has plans to quit again.    Previous history of similar problem:   none    Precipitating factors:   Worsened by: none    Alleviating factors:  Improved by: none    Therapies Tried and outcome: 4 advil in one day, one time only.     Bleeding started Tuesday morning, bleeding continues      Problem list and histories reviewed & adjusted, as indicated.  Additional history: as documented    Reviewed and updated as needed this visit by clinical staff       Reviewed and updated as needed this visit by Provider         ROS:    ROS:5 point ROS including CONST, HEENT, Respiratory, CV, and GI other than that noted in the HPI,  is negative       OBJECTIVE:     /84  Pulse 87  Temp 99  F (37.2  C) (Oral)  Wt 227 lb 4.8 oz (103.1 kg)  SpO2 96%  BMI 34.56 kg/m2  Body mass index is 34.56 kg/(m^2)  GENERAL: healthy, alert and no distress  NECK: no adenopathy, no asymmetry, masses, or scars and thyroid normal to palpation  RESP: lungs clear to auscultation - no rales, rhonchi or wheezes  CV: regular rate and rhythm, normal S1 S2, no S3 or S4, no murmur, click or rub, no peripheral edema and peripheral  pulses strong  ABDOMEN: soft, tenderness suprapubic, no organomegaly or masses, liver span normal to percussion and bowel sounds normal  PSYCH: mentation appears normal, affect tearful    Diagnostic Test Results:  Results for orders placed or performed in visit on 07/13/18   HCG Quantitative Pregnancy, Blood (TCJ602)   Result Value Ref Range    HCG Quantitative Serum <1 0 - 5 IU/L   *UA reflex to Microscopic and Culture ([place] and [facility] (except [place] and [place])   Result Value Ref Range    Color Urine Yellow     Appearance Urine Clear     Glucose Urine Negative NEG^Negative mg/dL    Bilirubin Urine Negative NEG^Negative    Ketones Urine Negative NEG^Negative mg/dL    Specific Gravity Urine >1.030 1.003 - 1.035    Blood Urine Large (A) NEG^Negative    pH Urine 5.5 5.0 - 7.0 pH    Protein Albumin Urine Trace (A) NEG^Negative mg/dL    Urobilinogen Urine 0.2 0.2 - 1.0 EU/dL    Nitrite Urine Negative NEG^Negative    Leukocyte Esterase Urine Negative NEG^Negative    Source Midstream Urine    Urine Microscopic   Result Value Ref Range    WBC Urine 0 - 5 OTO5^0 - 5 /HPF    RBC Urine 2-5 (A) OTO2^O - 2 /HPF    Squamous Epithelial /LPF Urine Few FEW^Few /LPF    Bacteria Urine Moderate (A) NEG^Negative /HPF       ASSESSMENT/PLAN:       (O03.9) Miscarriage  (primary encounter diagnosis)  Comment:   Plan: HCG Quantitative Pregnancy, Blood (HBM860),         Urine Microscopic   Patient had positive UPT from two different brands.  The positive UPT and negative HCG are not explainable, but patient is experiencing symptoms of pregnancy as well as pelvic pain.  Will recheck HCG quant and UMAC.  If pelvic pain continues or worsens consider pelvic US.  Advised to wait another cycle before trying to conceive, start prenatal and stop smoking.  Optimize asthma management.  Declines assistance with smoking cessation.  Tends to do well with cold turkey.    (R10.2) Pelvic pain in female  Comment:   Plan: *UA reflex to  Microscopic and Culture ([place]         and [facility] (except [place] and         [place])              Patient Instructions   We will recheck the hcg today  If the bleeding continues next week or the HCG is above 5, then I'll order a pelvic ultrasound  Urine check today  Take the Dulera as often as you can remember      JANNET Liu [facility]

## 2018-07-13 ENCOUNTER — OFFICE VISIT (OUTPATIENT)
Dept: FAMILY MEDICINE | Facility: CLINIC | Age: 38
End: 2018-07-13
Payer: COMMERCIAL

## 2018-07-13 ENCOUNTER — MYC MEDICAL ADVICE (OUTPATIENT)
Dept: FAMILY MEDICINE | Facility: CLINIC | Age: 38
End: 2018-07-13

## 2018-07-13 VITALS
OXYGEN SATURATION: 96 % | HEART RATE: 87 BPM | WEIGHT: 227.3 LBS | DIASTOLIC BLOOD PRESSURE: 84 MMHG | BODY MASS INDEX: 34.56 KG/M2 | TEMPERATURE: 99 F | SYSTOLIC BLOOD PRESSURE: 138 MMHG

## 2018-07-13 DIAGNOSIS — R10.2 PELVIC PAIN IN FEMALE: ICD-10-CM

## 2018-07-13 DIAGNOSIS — O03.9 MISCARRIAGE: Primary | ICD-10-CM

## 2018-07-13 LAB
ALBUMIN UR-MCNC: ABNORMAL MG/DL
APPEARANCE UR: CLEAR
B-HCG SERPL-ACNC: <1 IU/L (ref 0–5)
BACTERIA #/AREA URNS HPF: ABNORMAL /HPF
BILIRUB UR QL STRIP: NEGATIVE
COLOR UR AUTO: YELLOW
GLUCOSE UR STRIP-MCNC: NEGATIVE MG/DL
HGB UR QL STRIP: ABNORMAL
KETONES UR STRIP-MCNC: NEGATIVE MG/DL
LEUKOCYTE ESTERASE UR QL STRIP: NEGATIVE
NITRATE UR QL: NEGATIVE
NON-SQ EPI CELLS #/AREA URNS LPF: ABNORMAL /LPF
PH UR STRIP: 5.5 PH (ref 5–7)
RBC #/AREA URNS AUTO: ABNORMAL /HPF
SOURCE: ABNORMAL
SP GR UR STRIP: >1.03 (ref 1–1.03)
TSI SER-ACNC: 2.3 TSI INDEX
UROBILINOGEN UR STRIP-ACNC: 0.2 EU/DL (ref 0.2–1)
WBC #/AREA URNS AUTO: ABNORMAL /HPF

## 2018-07-13 PROCEDURE — 36415 COLL VENOUS BLD VENIPUNCTURE: CPT | Performed by: NURSE PRACTITIONER

## 2018-07-13 PROCEDURE — 84702 CHORIONIC GONADOTROPIN TEST: CPT | Performed by: NURSE PRACTITIONER

## 2018-07-13 PROCEDURE — 99214 OFFICE O/P EST MOD 30 MIN: CPT | Performed by: NURSE PRACTITIONER

## 2018-07-13 PROCEDURE — 81001 URINALYSIS AUTO W/SCOPE: CPT | Performed by: NURSE PRACTITIONER

## 2018-07-13 NOTE — MR AVS SNAPSHOT
After Visit Summary   7/13/2018    Cathy Arroyo    MRN: [id number]           Patient Information     Date Of Birth          1980        Visit Information        Provider Department      7/13/2018 10:00 AM Sherrill Dasilva APRN CNP [facility]        Today's Diagnoses     Miscarriage    -  1    Pelvic pain in female          Care Instructions    We will recheck the hcg today  If the bleeding continues next week or the HCG is above 5, then I'll order a pelvic ultrasound  Urine check today  Take the Dulera as often as you can remember          Follow-ups after your visit        Your next 10 appointments already scheduled     Aug 07, 2018  1:30 PM CDT   (Arrive by 1:15 PM)   Return Visit with Kate Mckeon MD   [facility] ([facility] [place])    [address] [place] [address]  3rd [facility] [address] [phone]              Who to contact     If you have questions or need follow up information about today's clinic visit or your schedule please contact [facility] directly at [phone].  Normal or non-critical lab and imaging results will be communicated to you by Inn[employer], letter or phone within 4 business days after the clinic has received the results. If you do not hear from us within 7 days, please contact the clinic through Innographyhart or phone. If you have a critical or abnormal lab result, we will notify you by phone as soon as possible.  Submit refill requests through [employer] or call your pharmacy and they will forward the refill request to us. Please allow 3 business days for your refill to be completed.          Additional Information About Your Visit        MyChart Information     [employer] gives you secure access to your electronic health record. If you see a primary care provider, you can also send messages to your care team and make appointments. If you have questions, please call your primary care  clinic.  If you do not have a primary care provider, please call [phone] and they will assist you.        Care EveryWhere ID     This is your Care EveryWhere ID. This could be used by other organizations to access your [place] medical records  [id number]        Your Vitals Were     Pulse Temperature Pulse Oximetry BMI (Body Mass Index)          87 99  F (37.2  C) (Oral) 96% 34.56 kg/m2         Blood Pressure from Last 3 Encounters:   07/13/18 138/84   07/11/18 140/80   06/05/18 126/87    Weight from Last 3 Encounters:   07/13/18 227 lb 4.8 oz (103.1 kg)   07/10/18 227 lb (103 kg)   06/05/18 234 lb (106.1 kg)              We Performed the Following     *UA reflex to Microscopic and Culture ([place] and [facility] (except [place] and [place])     HCG Quantitative Pregnancy, Blood (OGI429)          Today's Medication Changes          These changes are accurate as of 7/13/18 10:42 AM.  If you have any questions, ask your nurse or doctor.               Stop taking these medicines if you haven't already. Please contact your care team if you have questions.     medical cannabis (Patient's own supply.  Not a prescription)   Stopped by:  Sherrill Dasilva APRN CNP                    Primary Care Provider Office Phone # Fax #    JANNET Nascimento [phone]       [address]  [facility] [address]        Equal Access to Services     NALDO HDEZ : Ashly de la cruzo Soyvette, waaxda luqadaha, qaybta kaalmada becca gallo . So [facility] [phone].    ATENCIÓN: Si habla español, tiene a willard disposición servicios gratuitos de asistencia lingüística. Llame al [phone].    We comply with applicable federal civil rights laws and [place] laws. We do not discriminate on the basis of race, color, national origin, age, disability, sex, sexual orientation, or gender identity.            Thank you!     Thank you for choosing [facility]  for your care. Our goal is always to provide you with excellent care. Hearing back from our patients is one way we can continue to improve our services. Please take a few minutes to complete the written survey that you may receive in the mail after your visit with us. Thank you!             Your Updated Medication List - Protect others around you: Learn how to safely use, store and throw away your medicines at www.disposemymeds.org.          This list is accurate as of 7/13/18 10:42 AM.  Always use your most recent med list.                   Brand Name Dispense Instructions for use Diagnosis    albuterol 108 (90 Base) MCG/ACT Inhaler    PROAIR HFA/PROVENTIL HFA/VENTOLIN HFA    1 Inhaler    Inhale 2 puffs into the lungs every 4 hours as needed for shortness of breath / dyspnea or wheezing    Mild intermittent asthma without complication       cetirizine 10 MG tablet    zyrTEC     Take 5 mg by mouth daily        fexofenadine 180 MG tablet    ALLEGRA    90 tablet    Take 1 tablet (180 mg) by mouth daily    Moderate persistent asthma with acute exacerbation, Chronic rhinitis       fluticasone 50 MCG/ACT spray    FLONASE    3 Bottle    Spray 1-2 sprays into both nostrils daily    Moderate persistent asthma with acute exacerbation, Moderate persistent asthma without complication       ipratropium - albuterol 0.5 mg/2.5 mg/3 mL 0.5-2.5 (3) MG/3ML neb solution    DUONEB    90 vial    Take 1 vial (3 mLs) by nebulization every 6 hours as needed for shortness of breath / dyspnea or wheezing    Mild persistent asthma with acute exacerbation       levothyroxine 175 MCG tablet    SYNTHROID/LEVOTHROID    90 tablet    Take 1 tablet (175 mcg) by mouth daily    Postsurgical hypothyroidism, Hypocalcemia       mometasone-formoterol 200-5 MCG/ACT oral inhaler    DULERA    3 Inhaler    Inhale 2 puffs into the lungs 2 times daily    Moderate persistent asthma with acute exacerbation       montelukast 10 MG tablet    SINGULAIR     90 tablet    TAKE 1 TABLET(10 MG) BY MOUTH EVERY MORNING    Moderate persistent asthma with acute exacerbation       * order for DME     1 each    nebulizer    Mild persistent asthma with acute exacerbation       * order for DME     1 each    nebulizer    Mild persistent asthma with acute exacerbation       * order for DME     1 Units    nebulizer        * Notice:  This list has 3 medication(s) that are the same as other medications prescribed for you. Read the directions carefully, and ask your doctor or other care provider to review them with you.

## 2018-07-13 NOTE — PATIENT INSTRUCTIONS
We will recheck the hcg today  If the bleeding continues next week or the HCG is above 5, then I'll order a pelvic ultrasound  Urine check today  Take the Dulera as often as you can remember

## 2018-07-14 ASSESSMENT — PATIENT HEALTH QUESTIONNAIRE - PHQ9: SUM OF ALL RESPONSES TO PHQ QUESTIONS 1-9: 12

## 2018-07-19 NOTE — PROGRESS NOTES
Cathy,    I think these were released, but just a note that the hcg was 0, so we can be confident about no remaining tissue to cause problems.  The urine was also normal (some blood, but this is not unusual if you had vaginal bleeding for it to contaminate the urine).  If you are still having pelvic pain, please let me know.  If you have any questions, please feel free to contact the clinic.    DEVEN Winston

## 2018-07-31 ENCOUNTER — OFFICE VISIT (OUTPATIENT)
Dept: MIDWIFE SERVICES | Facility: CLINIC | Age: 38
End: 2018-07-31
Payer: COMMERCIAL

## 2018-07-31 VITALS
WEIGHT: 224.4 LBS | BODY MASS INDEX: 34.01 KG/M2 | HEART RATE: 82 BPM | TEMPERATURE: 98.8 F | SYSTOLIC BLOOD PRESSURE: 140 MMHG | HEIGHT: 68 IN | DIASTOLIC BLOOD PRESSURE: 86 MMHG

## 2018-07-31 DIAGNOSIS — F31.70 BIPOLAR AFFECTIVE DISORDER IN REMISSION (H): Primary | ICD-10-CM

## 2018-07-31 DIAGNOSIS — R10.2 VAGINAL PAIN: ICD-10-CM

## 2018-07-31 PROCEDURE — 99202 OFFICE O/P NEW SF 15 MIN: CPT | Performed by: ADVANCED PRACTICE MIDWIFE

## 2018-07-31 NOTE — MR AVS SNAPSHOT
After Visit Summary   7/31/2018    Cathy Arroyo    MRN: [id number]           Patient Information     Date Of Birth          1980        Visit Information        Provider Department      7/31/2018 2:00 PM Jennifer Santos CNM [facility]        Today's Diagnoses     Bipolar affective disorder in remission (H)    -  1    Vaginal pain           Follow-ups after your visit        Follow-up notes from your care team     Return if symptoms worsen or fail to improve.      Your next 10 appointments already scheduled     Aug 07, 2018  1:30 PM CDT   (Arrive by 1:15 PM)   Return Visit with Kate Mckeon MD   [facility] ([facility] [place])    [address]  3rd Floor  [facility] [address] [phone]              Who to contact     If you have questions or need follow up information about today's clinic visit or your schedule please contact [facility] directly at [phone].  Normal or non-critical lab and imaging results will be communicated to you by [employer]hart, letter or phone within 4 business days after the clinic has received the results. If you do not hear from us within 7 days, please contact the clinic through [email or web address] or phone. If you have a critical or abnormal lab result, we will notify you by phone as soon as possible.  Submit refill requests through [employer] or call your pharmacy and they will forward the refill request to us. Please allow 3 business days for your refill to be completed.          Additional Information About Your Visit        MyChart Information     [employer] gives you secure access to your electronic health record. If you see a primary care provider, you can also send messages to your care team and make appointments. If you have questions, please call your primary care clinic.  If you do not have a primary care provider, please call [phone] and they will assist you.       "  Care EveryWhere ID     This is your Care EveryWhere ID. This could be used by other organizations to access your [place] medical records  [id number]        Your Vitals Were     Pulse Temperature Height Last Period BMI (Body Mass Index)       82 98.8  F (37.1  C) (Oral) 5' 8\" (1.727 m) 06/09/2018 34.12 kg/m2        Blood Pressure from Last 3 Encounters:   07/31/18 140/86   07/13/18 138/84   07/11/18 140/80    Weight from Last 3 Encounters:   07/31/18 224 lb 6.4 oz (101.8 kg)   07/13/18 227 lb 4.8 oz (103.1 kg)   07/10/18 227 lb (103 kg)              Today, you had the following     No orders found for display       Primary Care Provider Office Phone # Fax #    Sherrill CRUZ JANNET Dasilva [facility] [phone]       [address] [facility] [address]  [facility] [address]        Equal Access to Services     [facility] Hospital[facility]ALINO : Hadii aad ku hadasho Soomaali, waaxda luqadaha, qaybta kaalmada adeegyada, waxay idiin hayaan jeff schrader . So [facility] [phone].    ATENCIÓN: Si habla español, tiene a willard disposición servicios gratuitos de asistencia lingüística. Llame al [phone].    We comply with applicable federal civil rights laws and [place] laws. We do not discriminate on the basis of race, color, national origin, age, disability, sex, sexual orientation, or gender identity.            Thank you!     Thank you for choosing [facility]  for your care. Our goal is always to provide you with excellent care. Hearing back from our patients is one way we can continue to improve our services. Please take a few minutes to complete the written survey that you may receive in the mail after your visit with us. Thank you!             Your Updated Medication List - Protect others around you: Learn how to safely use, store and throw away your medicines at www.disposemymeds.org.          This list is accurate as of 7/31/18  3:14 PM.  Always use your most recent med list.                   Brand Name Dispense " Instructions for use Diagnosis    albuterol 108 (90 Base) MCG/ACT Inhaler    PROAIR HFA/PROVENTIL HFA/VENTOLIN HFA    1 Inhaler    Inhale 2 puffs into the lungs every 4 hours as needed for shortness of breath / dyspnea or wheezing    Mild intermittent asthma without complication       cetirizine 10 MG tablet    zyrTEC     Take 5 mg by mouth daily        DHA OMEGA 3 PO           fexofenadine 180 MG tablet    ALLEGRA    90 tablet    Take 1 tablet (180 mg) by mouth daily    Moderate persistent asthma with acute exacerbation, Chronic rhinitis       fluticasone 50 MCG/ACT spray    FLONASE    3 Bottle    Spray 1-2 sprays into both nostrils daily    Moderate persistent asthma with acute exacerbation, Moderate persistent asthma without complication       ipratropium - albuterol 0.5 mg/2.5 mg/3 mL 0.5-2.5 (3) MG/3ML neb solution    DUONEB    90 vial    Take 1 vial (3 mLs) by nebulization every 6 hours as needed for shortness of breath / dyspnea or wheezing    Mild persistent asthma with acute exacerbation       levothyroxine 175 MCG tablet    SYNTHROID/LEVOTHROID    90 tablet    Take 1 tablet (175 mcg) by mouth daily    Postsurgical hypothyroidism, Hypocalcemia       mometasone-formoterol 200-5 MCG/ACT oral inhaler    DULERA    3 Inhaler    Inhale 2 puffs into the lungs 2 times daily    Moderate persistent asthma with acute exacerbation       montelukast 10 MG tablet    SINGULAIR    90 tablet    TAKE 1 TABLET(10 MG) BY MOUTH EVERY MORNING    Moderate persistent asthma with acute exacerbation       * order for DME     1 each    nebulizer    Mild persistent asthma with acute exacerbation       * order for DME     1 each    nebulizer    Mild persistent asthma with acute exacerbation       * order for DME     1 Units    nebulizer        PRENATAL VITAMIN PO           * Notice:  This list has 3 medication(s) that are the same as other medications prescribed for you. Read the directions carefully, and ask your doctor or other care  provider to review them with you.

## 2018-07-31 NOTE — NURSING NOTE
"Chief Complaint   Patient presents with     Vaginal Problem     pain w/ intercourse, spotting.        Initial /86  Pulse 82  Temp 98.8  F (37.1  C) (Oral)  Ht 5' 8\" (1.727 m)  Wt 224 lb 6.4 oz (101.8 kg)  LMP 2018  BMI 34.12 kg/m2 Estimated body mass index is 34.12 kg/(m^2) as calculated from the following:    Height as of this encounter: 5' 8\" (1.727 m).    Weight as of this encounter: 224 lb 6.4 oz (101.8 kg).  BP completed using cuff size: large        The following HM Due: NONE      The following patient reported/Care Every where data was sent to:  P ABSTRACT QUALITY INITIATIVES [[id number]]        patient has appointment for today    [place] Moses CMA                "
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
## 2018-08-07 ENCOUNTER — OFFICE VISIT (OUTPATIENT)
Dept: NEUROSURGERY | Facility: CLINIC | Age: 38
End: 2018-08-07

## 2018-08-07 VITALS
HEART RATE: 77 BPM | BODY MASS INDEX: 34.25 KG/M2 | OXYGEN SATURATION: 95 % | DIASTOLIC BLOOD PRESSURE: 80 MMHG | WEIGHT: 226 LBS | HEIGHT: 68 IN | SYSTOLIC BLOOD PRESSURE: 130 MMHG

## 2018-08-07 DIAGNOSIS — D44.5 PINEOCYTOMA (H): Primary | ICD-10-CM

## 2018-08-07 NOTE — MR AVS SNAPSHOT
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 Mckeon in 2 years with a MRI prior to the appointment.          Follow-ups after your visit        Follow-up notes from your care team     Return in about 1 year (around 8/7/2019).      Future tests that were ordered for you today     Open Future Orders        Priority Expected Expires Ordered    MRI Brain with & without gadolinium with pituitary protocol [RNC381] Routine  8/7/2019 8/7/2018            Who to contact     Please call your clinic at [phone] to:    Ask questions about your health    Make or cancel appointments    Discuss your medicines    Learn about your test results    Speak to your doctor            Additional Information About Your Visit        [employer] Information     [email or web address] gives you secure access to your electronic health record. If you see a primary care provider, you can also send messages to your care team and make appointments. If you have questions, please call your primary care clinic.  If you do not have a primary care provider, please call [phone] and they will assist you.      [email or web address] is an electronic gateway that provides easy, online access to your medical records. With [email or web address], you can request a clinic appointment, read your test results, renew a prescription or communicate with your care team.     To access your existing account, please contact your [facility] or call [phone] for assistance.        Care EveryWhere ID     This is your Care EveryWhere ID. This could be used by other organizations to access  "your [place] medical records  [id number]        Your Vitals Were     Pulse Height Last Period Pulse Oximetry BMI (Body Mass Index)       77 1.727 m (5' 8\") 08/04/2018 95% 34.36 kg/m2        Blood Pressure from Last 3 Encounters:   08/07/18 130/80   07/31/18 140/86   07/13/18 138/84    Weight from Last 3 Encounters:   08/07/18 102.5 kg (226 lb)   07/31/18 101.8 kg (224 lb 6.4 oz)   07/13/18 103.1 kg (227 lb 4.8 oz)               Primary Care Provider Office Phone # Fax #    Sherrill CRUZ Dasilva, JANNET [facility] [phone]       [address] [address]        Equal Access to Services     [facility] KETTY : Ashly Schneider, waaxda luqadaha, qaybta kaalmada cleo, becca schrader . So [facility] [phone].    ATENCIÓN: Si habla español, tiene a willard disposición servicios gratuitos de asistencia lingüística. Portia al [phone].    We comply with applicable federal civil rights laws and [place] laws. We do not discriminate on the basis of race, color, national origin, age, disability, sex, sexual orientation, or gender identity.            Thank you!     Thank you for choosing [facility]  for your care. Our goal is always to provide you with excellent care. Hearing back from our patients is one way we can continue to improve our services. Please take a few minutes to complete the written survey that you may receive in the mail after your visit with us. Thank you!             Your Updated Medication List - Protect others around you: Learn how to safely use, store and throw away your medicines at www.disposemymeds.org.          This list is accurate as of 8/7/18  2:23 PM.  Always use your most recent med list.                   Brand Name Dispense Instructions for use Diagnosis    albuterol 108 (90 Base) MCG/ACT Inhaler    PROAIR HFA/PROVENTIL HFA/VENTOLIN HFA    1 Inhaler    Inhale 2 puffs into the lungs every 4 hours as needed for shortness of " breath / dyspnea or wheezing    Mild intermittent asthma without complication       cetirizine 10 MG tablet    zyrTEC     Take 5 mg by mouth daily        DHA OMEGA 3 PO           fexofenadine 180 MG tablet    ALLEGRA    90 tablet    Take 1 tablet (180 mg) by mouth daily    Moderate persistent asthma with acute exacerbation, Chronic rhinitis       fluticasone 50 MCG/ACT spray    FLONASE    3 Bottle    Spray 1-2 sprays into both nostrils daily    Moderate persistent asthma with acute exacerbation, Moderate persistent asthma without complication       ipratropium - albuterol 0.5 mg/2.5 mg/3 mL 0.5-2.5 (3) MG/3ML neb solution    DUONEB    90 vial    Take 1 vial (3 mLs) by nebulization every 6 hours as needed for shortness of breath / dyspnea or wheezing    Mild persistent asthma with acute exacerbation       levothyroxine 175 MCG tablet    SYNTHROID/LEVOTHROID    90 tablet    Take 1 tablet (175 mcg) by mouth daily    Postsurgical hypothyroidism, Hypocalcemia       mometasone-formoterol 200-5 MCG/ACT oral inhaler    DULERA    3 Inhaler    Inhale 2 puffs into the lungs 2 times daily    Moderate persistent asthma with acute exacerbation       montelukast 10 MG tablet    SINGULAIR    90 tablet    TAKE 1 TABLET(10 MG) BY MOUTH EVERY MORNING    Moderate persistent asthma with acute exacerbation       * order for DME     1 each    nebulizer    Mild persistent asthma with acute exacerbation       * order for DME     1 each    nebulizer    Mild persistent asthma with acute exacerbation       * order for DME     1 Units    nebulizer        PRENATAL VITAMIN PO           * Notice:  This list has 3 medication(s) that are the same as other medications prescribed for you. Read the directions carefully, and ask your doctor or other care provider to review them with you.

## 2018-08-07 NOTE — LETTER
2018       RE: Zeina Caban  [address] [facility] [address]     Dear Colleague,    Thank you for referring your patient, Zeina Caban, to the [facility] NEUROSURGERY at [facility]. Please see a copy of my visit note below.    See dictated note.      Service Date: 2018        RE:  Zeina Caban.       Dear Ms. Dasilva:      We saw Mrs. 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  She was recently in our emergency department with headaches, but this was apparently due to changes in her bipolar medication.  She is currently feeling well and has no complaints.      PHYSICAL EXAMINATION:  Her gross neurological examination remains normal.      REVIEW OF STUDIES:  We went over her MRI from April with her.  This looked quite good with no recurrence of her tumor.  Her ventricle size was normal.        ASSESSMENT/PLAN:  Given her stability for nearly 5 years, we will extend the duration of followup to 2 years.  We will see her back in 2 years with another MRI.   Please do not hesitate to contact us with questions.           D: 2018   T: 2018   MT: BOAZ      Name:     ZEINA CABAN   MRN:      [id number]        Account:      [id number]   :      1980           Service Date: 2018      Document: [id number]         Again, thank you for allowing me to participate in the care of your patient.      Sincerely,    Kate Mckeon MD    CC:  Sherrill Dasilva, APRN CNP   [address] [place] [address], [address]   [place]  [address]

## 2018-08-09 NOTE — PROGRESS NOTES
Service Date: 2018      Sherrill Dasilva, APRN CNP   [address] [place] [address], [address]   [place]  [address]      RE:  Cathy Caban.       Dear MsWillow Brown:      We saw Mrs. 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  She was recently in our emergency department with headaches, but this was apparently due to changes in her bipolar medication.  She is currently feeling well and has no complaints.      PHYSICAL EXAMINATION:  Her gross neurological examination remains normal.      REVIEW OF STUDIES:  We went over her MRI from April with her.  This looked quite good with no recurrence of her tumor.  Her ventricle size was normal.        ASSESSMENT/PLAN:  Given her stability for nearly 5 years, we will extend the duration of followup to 2 years.  We will see her back in 2 years with another MRI.      Please do not hesitate to contact us with questions.         LISA JAMES MD             D: 2018   T: 2018   MT: BOAZ      Name:     CATHY CABAN   MRN:      [id number]        Account:      [id number]   :      1980           Service Date: 2018      Document: [id number]

## 2018-08-26 ENCOUNTER — NURSE TRIAGE (OUTPATIENT)
Dept: NURSING | Facility: CLINIC | Age: 38
End: 2018-08-26

## 2018-08-26 DIAGNOSIS — Z33.1 PREGNANCY, INCIDENTAL: ICD-10-CM

## 2018-08-26 DIAGNOSIS — O20.0 THREATENED MISCARRIAGE: ICD-10-CM

## 2018-08-26 DIAGNOSIS — Z86.39 HISTORY OF GRAVES' DISEASE: ICD-10-CM

## 2018-08-26 DIAGNOSIS — E89.0 POSTSURGICAL HYPOTHYROIDISM: ICD-10-CM

## 2018-08-26 LAB
B-HCG SERPL-ACNC: <1 IU/L (ref 0–5)
T4 SERPL-MCNC: 13.8 UG/DL (ref 4.5–13.9)
TSH SERPL DL<=0.005 MIU/L-ACNC: 0.09 MU/L (ref 0.4–4)

## 2018-08-26 PROCEDURE — 84702 CHORIONIC GONADOTROPIN TEST: CPT

## 2018-08-26 PROCEDURE — 84436 ASSAY OF TOTAL THYROXINE: CPT

## 2018-08-26 PROCEDURE — 36415 COLL VENOUS BLD VENIPUNCTURE: CPT

## 2018-08-26 PROCEDURE — 84443 ASSAY THYROID STIM HORMONE: CPT

## 2018-08-26 NOTE — TELEPHONE ENCOUNTER
Patient has Grave's Disease and bipolar.  She is trying to figure out if she is having an episode of hyperthyroidism or an episode of jose eduardo.  She sees her psychiatrist tomorrow and would like to have her TSH drawn today in order to know her thyroid levels.  She is wondering if she has a standing order for TSH?  Yes, she has a standing order for TSH and thyroxine.  She states she will go to Urgent Care today and have those drawn.    Protocol and care advice reviewed  Caller states understanding of the recommended disposition  Advised to call back if further questions or concerns      Additional Information    Health Information question, no triage required and triager able to answer question    Protocols used: INFORMATION ONLY CALL-ADULT-

## 2018-08-27 ENCOUNTER — TELEPHONE (OUTPATIENT)
Dept: ENDOCRINOLOGY | Facility: CLINIC | Age: 38
End: 2018-08-27

## 2018-08-27 DIAGNOSIS — E89.0 POSTABLATIVE HYPOTHYROIDISM: Primary | ICD-10-CM

## 2018-08-27 RX ORDER — LEVOTHYROXINE SODIUM 150 UG/1
150 TABLET ORAL DAILY
Qty: 90 TABLET | Refills: 3 | Status: SHIPPED | OUTPATIENT
Start: 2018-08-27 | End: 2018-10-25 | Stop reason: DRUGHIGH

## 2018-08-27 NOTE — TELEPHONE ENCOUNTER
[facility] Call Center    Phone Message    May a detailed message be left on voicemail: yes    Reason for Call: Other: Pt requesting call back from Dr. Franco in regards to a [employer] message that she sent over today. Pt wanted to add that if the clinic does not feel comfortable calling her back in regards to this, they can call her psychiatrist Dr. Loco Zeng at [phone]     Action Taken: Message routed to:  [facility]o clinic

## 2018-08-30 DIAGNOSIS — E89.0 POSTSURGICAL HYPOTHYROIDISM: ICD-10-CM

## 2018-08-30 DIAGNOSIS — Z86.39 HISTORY OF GRAVES' DISEASE: Primary | ICD-10-CM

## 2018-08-31 ENCOUNTER — TELEPHONE (OUTPATIENT)
Dept: MIDWIFE SERVICES | Facility: CLINIC | Age: 38
End: 2018-08-31

## 2018-08-31 DIAGNOSIS — E89.0 POSTABLATIVE HYPOTHYROIDISM: ICD-10-CM

## 2018-08-31 DIAGNOSIS — Z32.01 PREGNANCY EXAMINATION OR TEST, POSITIVE RESULT: ICD-10-CM

## 2018-08-31 DIAGNOSIS — J45.41 MODERATE PERSISTENT ASTHMA WITH ACUTE EXACERBATION: ICD-10-CM

## 2018-08-31 LAB
B-HCG SERPL-ACNC: 82 IU/L (ref 0–5)
T3 SERPL-MCNC: 124 NG/DL (ref 60–181)
T4 SERPL-MCNC: 11.8 UG/DL (ref 4.5–13.9)

## 2018-08-31 PROCEDURE — 84439 ASSAY OF FREE THYROXINE: CPT

## 2018-08-31 PROCEDURE — 84480 ASSAY TRIIODOTHYRONINE (T3): CPT

## 2018-08-31 PROCEDURE — 36415 COLL VENOUS BLD VENIPUNCTURE: CPT

## 2018-08-31 PROCEDURE — 84702 CHORIONIC GONADOTROPIN TEST: CPT | Performed by: ADVANCED PRACTICE MIDWIFE

## 2018-08-31 PROCEDURE — 84443 ASSAY THYROID STIM HORMONE: CPT

## 2018-08-31 RX ORDER — MONTELUKAST SODIUM 10 MG/1
TABLET ORAL
Qty: 90 TABLET | Refills: 3 | Status: SHIPPED | OUTPATIENT
Start: 2018-08-31 | End: 2018-09-18

## 2018-08-31 NOTE — TELEPHONE ENCOUNTER
Prescription approved per [facility] Refill Protocol.    Cristin Landry RN   [facility]

## 2018-08-31 NOTE — TELEPHONE ENCOUNTER
"Requested Prescriptions   Pending Prescriptions Disp Refills     montelukast (SINGULAIR) 10 MG tablet    Last Written Prescription Date:  6/1/18  Last Fill Quantity: 90,  # refills: 3   Last office visit: 7/13/2018 with prescribing provider:  7/13/18   Future Office Visit:   Next 5 appointments (look out 90 days)     Sep 18, 2018 10:00 AM CDT   New Prenatal with RD OB NURSE EDUCATION   [facility] ([facility])    [address] [place] [address] [phone]                  90 tablet 3     Sig: TAKE 1 TABLET(10 MG) BY MOUTH EVERY MORNING    Leukotriene Inhibitors Protocol Passed    8/31/2018  8:12 AM       Passed - Patient is age 12 or older    If patient is under 16, ok to refill using age based dosing.          Passed - Asthma control assessment score within normal limits in last 6 months    Please review ACT score.          Passed - Recent (6 mo) or future (30 days) visit within the authorizing provider's specialty    Patient had office visit in the last 6 months or has a visit in the next 30 days with authorizing provider or within the authorizing provider's specialty.  See \"Patient Info\" tab in inbasket, or \"Choose Columns\" in Meds & Orders section of the refill encounter.              "

## 2018-08-31 NOTE — TELEPHONE ENCOUNTER
Called Cathy with positive hcg results.  She said that she had multiple positive UPTs at home.  She had just had a negative hcg on Sunday.  She is accepting of pregnancy and has a nurse intake scheduled.

## 2018-09-01 LAB
T4 FREE SERPL-MCNC: 1.18 NG/DL (ref 0.76–1.46)
TSH SERPL DL<=0.005 MIU/L-ACNC: 0.35 MU/L (ref 0.4–4)

## 2018-09-18 ENCOUNTER — PRENATAL OFFICE VISIT (OUTPATIENT)
Dept: NURSING | Facility: CLINIC | Age: 38
End: 2018-09-18
Payer: COMMERCIAL

## 2018-09-18 VITALS
HEIGHT: 68 IN | HEART RATE: 82 BPM | WEIGHT: 229 LBS | SYSTOLIC BLOOD PRESSURE: 113 MMHG | BODY MASS INDEX: 34.71 KG/M2 | DIASTOLIC BLOOD PRESSURE: 72 MMHG | TEMPERATURE: 98 F

## 2018-09-18 DIAGNOSIS — Z86.39 HISTORY OF GRAVES' DISEASE: ICD-10-CM

## 2018-09-18 DIAGNOSIS — O09.529 AMA (ADVANCED MATERNAL AGE) MULTIGRAVIDA 35+: Primary | ICD-10-CM

## 2018-09-18 DIAGNOSIS — E89.0 POSTSURGICAL HYPOTHYROIDISM: ICD-10-CM

## 2018-09-18 DIAGNOSIS — Z32.01 PREGNANCY EXAMINATION OR TEST, POSITIVE RESULT: ICD-10-CM

## 2018-09-18 DIAGNOSIS — Z23 NEED FOR TDAP VACCINATION: ICD-10-CM

## 2018-09-18 DIAGNOSIS — E89.0 S/P COMPLETE THYROIDECTOMY: ICD-10-CM

## 2018-09-18 LAB
ABO + RH BLD: NORMAL
ABO + RH BLD: NORMAL
ALBUMIN UR-MCNC: NEGATIVE MG/DL
APPEARANCE UR: CLEAR
B-HCG SERPL-ACNC: ABNORMAL IU/L (ref 0–5)
BILIRUB UR QL STRIP: NEGATIVE
BLD GP AB SCN SERPL QL: NORMAL
BLOOD BANK CMNT PATIENT-IMP: NORMAL
COLOR UR AUTO: YELLOW
ERYTHROCYTE [DISTWIDTH] IN BLOOD BY AUTOMATED COUNT: 13.8 % (ref 10–15)
GLUCOSE UR STRIP-MCNC: NEGATIVE MG/DL
HCT VFR BLD AUTO: 39.9 % (ref 35–47)
HGB BLD-MCNC: 13.5 G/DL (ref 11.7–15.7)
HGB UR QL STRIP: NEGATIVE
KETONES UR STRIP-MCNC: NEGATIVE MG/DL
LEUKOCYTE ESTERASE UR QL STRIP: NEGATIVE
MCH RBC QN AUTO: 28.8 PG (ref 26.5–33)
MCHC RBC AUTO-ENTMCNC: 33.8 G/DL (ref 31.5–36.5)
MCV RBC AUTO: 85 FL (ref 78–100)
NITRATE UR QL: NEGATIVE
PH UR STRIP: 6 PH (ref 5–7)
PLATELET # BLD AUTO: 303 10E9/L (ref 150–450)
RBC # BLD AUTO: 4.69 10E12/L (ref 3.8–5.2)
SOURCE: NORMAL
SP GR UR STRIP: >1.03 (ref 1–1.03)
SPECIMEN EXP DATE BLD: NORMAL
T3FREE SERPL-MCNC: 2 PG/ML (ref 2.3–4.2)
T4 SERPL-MCNC: 11.2 UG/DL (ref 4.5–13.9)
TSH SERPL DL<=0.005 MIU/L-ACNC: 2.25 MU/L (ref 0.4–4)
UROBILINOGEN UR STRIP-ACNC: 0.2 EU/DL (ref 0.2–1)
WBC # BLD AUTO: 10.1 10E9/L (ref 4–11)

## 2018-09-18 PROCEDURE — 84481 FREE ASSAY (FT-3): CPT | Performed by: ADVANCED PRACTICE MIDWIFE

## 2018-09-18 PROCEDURE — 86780 TREPONEMA PALLIDUM: CPT | Performed by: ADVANCED PRACTICE MIDWIFE

## 2018-09-18 PROCEDURE — 86901 BLOOD TYPING SEROLOGIC RH(D): CPT | Performed by: ADVANCED PRACTICE MIDWIFE

## 2018-09-18 PROCEDURE — 85027 COMPLETE CBC AUTOMATED: CPT | Performed by: ADVANCED PRACTICE MIDWIFE

## 2018-09-18 PROCEDURE — 84443 ASSAY THYROID STIM HORMONE: CPT | Performed by: ADVANCED PRACTICE MIDWIFE

## 2018-09-18 PROCEDURE — 86900 BLOOD TYPING SEROLOGIC ABO: CPT | Performed by: ADVANCED PRACTICE MIDWIFE

## 2018-09-18 PROCEDURE — 84702 CHORIONIC GONADOTROPIN TEST: CPT | Performed by: ADVANCED PRACTICE MIDWIFE

## 2018-09-18 PROCEDURE — 36415 COLL VENOUS BLD VENIPUNCTURE: CPT | Performed by: ADVANCED PRACTICE MIDWIFE

## 2018-09-18 PROCEDURE — 86762 RUBELLA ANTIBODY: CPT | Performed by: ADVANCED PRACTICE MIDWIFE

## 2018-09-18 PROCEDURE — 86850 RBC ANTIBODY SCREEN: CPT | Performed by: ADVANCED PRACTICE MIDWIFE

## 2018-09-18 PROCEDURE — 87086 URINE CULTURE/COLONY COUNT: CPT | Performed by: ADVANCED PRACTICE MIDWIFE

## 2018-09-18 PROCEDURE — 84436 ASSAY OF TOTAL THYROXINE: CPT | Performed by: ADVANCED PRACTICE MIDWIFE

## 2018-09-18 PROCEDURE — 99207 ZZC NO CHARGE NURSE ONLY: CPT

## 2018-09-18 PROCEDURE — 81003 URINALYSIS AUTO W/O SCOPE: CPT | Performed by: ADVANCED PRACTICE MIDWIFE

## 2018-09-18 PROCEDURE — 87340 HEPATITIS B SURFACE AG IA: CPT | Performed by: ADVANCED PRACTICE MIDWIFE

## 2018-09-18 PROCEDURE — 87389 HIV-1 AG W/HIV-1&-2 AB AG IA: CPT | Performed by: ADVANCED PRACTICE MIDWIFE

## 2018-09-18 NOTE — MR AVS SNAPSHOT
After Visit Summary   9/18/2018    Cathy Arroyo    MRN: [id number]           Patient Information     Date Of Birth          1980        Visit Information        Provider Department      9/18/2018 10:00 AM RD OB NURSE EDUCATION [facility]        Today's Diagnoses     AMA (advanced maternal age) multigravida 35+    -  1    S/P complete thyroidectomy        Pregnancy examination or test, positive result        History of Graves' disease        Postsurgical hypothyroidism        Need for Tdap vaccination           Follow-ups after your visit        Additional Services     MAT FETAL MED CTR REFERRAL-PREGNANCY       Body mass index is 34.82 kg/(m^2).    >> Patient may proceed with recommendations for further testing as directed by the Maternal Fetal Medicine Specialist >>    >> If requesting Fetal Echo: MFM will determine appropriate location for exam due to indication.    >> If requesting Lung Maturity Amnio:  If results indicate fetal lung maturity, induction or C/S is recommended within 36 hours.  Please schedule accordingly.     Dear Patient:   Please be aware that coverage of these services is subject to the terms and limitations of your health insurance plan.  Call member services at your health plan with any benefit or coverage questions.      Please bring the following to your appointment:    >>  Any x-rays, CTs or MRIs which have been performed.  Contact the facility where they were done to arrange for  prior to your scheduled appointment.  Any new CT, MRI or other procedures ordered by your specialist must be performed at a [facility] facility or coordinated by your clinic's referral office.  >>  List of current medications   >>  This referral request   >>  Any documents/labs given to you for this referral                  Your next 10 appointments already scheduled     Sep 25, 2018 12:40 PM CDT   US OB < 14 WEEKS SINGLE with RDUS1   [facility])    [address] [place] [address] [address]  [facility] [address]   [phone]           How do I prepare for my exam? (Food and drink instructions) Drink four 8-ounce glasses of fluid an hour before your exam. If you need to empty your bladder before your exam, try to release only a little urine. Then, drink another glass of fluid.  How do I prepare for my exam? (Other instructions) You may have up to two family members in the exam room. If you bring a small child, an adult must be there to care for him or her. No video or camera photography during the procedure.  What should I wear: Wear comfortable clothes.  How long does the exam take: Most ultrasounds take 30 to 60 minutes.  What should I bring: Bring a list of your medicines, including vitamins, minerals and over-the-counter drugs. It is safest to leave personal items at home.  Do I need a :  No  is needed.  What do I need to tell my doctor: Tell your doctor about any allergies you may have.  What should I do after the exam: No restrictions, You may resume normal activities.  What is this test: An ultrasound uses sound waves to make pictures of the body. Sound waves do not cause pain. The only discomfort may be the pressure of the wand against your skin or full bladder.  Who should I call with questions: If you have any questions, please call the Imaging Department where you will have your exam. Directions, parking instructions, and other information is available on our website, [email or web address].            Sep 25, 2018  1:30 PM CDT   SHORT with JANNET Pillai CNM   [facility] ([facility])    [address] [place] [address]   [phone]            Oct 16, 2018 10:00 AM CDT   New Prenatal with JANNET Yao CNM   [facility] ([facility])    [address] [place] [address]   [phone]  "             Who to contact     If you have questions or need follow up information about today's clinic visit or your schedule please contact [facility] directly at [phone].  Normal or non-critical lab and imaging results will be communicated to you by MyChart, letter or phone within 4 business days after the clinic has received the results. If you do not hear from us within 7 days, please contact the clinic through [facility] or phone. If you have a critical or abnormal lab result, we will notify you by phone as soon as possible.  Submit refill requests through [employer] or call your pharmacy and they will forward the refill request to us. Please allow 3 business days for your refill to be completed.          Additional Information About Your Visit        [employer] Information     [employer] gives you secure access to your electronic health record. If you see a primary care provider, you can also send messages to your care team and make appointments. If you have questions, please call your primary care clinic.  If you do not have a primary care provider, please call [phone] and they will assist you.        Care EveryWhere ID     This is your Care EveryWhere ID. This could be used by other organizations to access your [facility] medical records  [id number]        Your Vitals Were     Pulse Temperature Height Last Period BMI (Body Mass Index)       82 98  F (36.7  C) 5' 8\" (1.727 m) 08/04/2018 34.82 kg/m2        Blood Pressure from Last 3 Encounters:   09/18/18 113/72   08/07/18 130/80   07/31/18 140/86    Weight from Last 3 Encounters:   09/18/18 229 lb (103.9 kg)   08/07/18 226 lb (102.5 kg)   07/31/18 224 lb 6.4 oz (101.8 kg)              We Performed the Following     ABO/Rh type and screen     CBC with platelets     HCG Quantitative Pregnancy, Blood (YBB934)     Hepatitis B surface antigen     HIV Antigen Antibody Combo     MAT FETAL MED CTR REFERRAL-PREGNANCY     Rubella Antibody IgG " Quantitative     T3 Free     Thyroxine total     Treponema Abs w Reflex to RPR and Titer     TSH with free T4 reflex     UA without Microscopic     Urine Culture Aerobic Bacterial          Today's Medication Changes          These changes are accurate as of 9/18/18 10:30 AM.  If you have any questions, ask your nurse or doctor.               Stop taking these medicines if you haven't already. Please contact your care team if you have questions.     fexofenadine 180 MG tablet   Commonly known as:  ALLEGRA           fluticasone 50 MCG/ACT spray   Commonly known as:  FLONASE           montelukast 10 MG tablet   Commonly known as:  SINGULAIR           order for DME                    Primary Care Provider Office Phone # Fax #    Sherrill CRUZ Brown, APRN [facility] [phone]       [address] [facility] [address]  [facility] [address]        Equal Access to Services     NALDO HDEZ : Ashly de la cruzo Soyvette, waaxda luqadaha, qaybta kaalmada adeegyada, becca schrader . So [facility] [phone].    ATENCIÓN: Si habla español, tiene a willard disposición servicios gratuitos de asistencia lingüística. Llame al [phone].    We comply with applicable federal civil rights laws and [place] laws. We do not discriminate on the basis of race, color, national origin, age, disability, sex, sexual orientation, or gender identity.            Thank you!     Thank you for choosing [facility]  for your care. Our goal is always to provide you with excellent care. Hearing back from our patients is one way we can continue to improve our services. Please take a few minutes to complete the written survey that you may receive in the mail after your visit with us. Thank you!             Your Updated Medication List - Protect others around you: Learn how to safely use, store and throw away your medicines at www.disposemymeds.org.          This list is accurate as of 9/18/18 10:30 AM.  Always use your most  recent med list.                   Brand Name Dispense Instructions for use Diagnosis    albuterol 108 (90 Base) MCG/ACT inhaler    PROAIR HFA/PROVENTIL HFA/VENTOLIN HFA    1 Inhaler    Inhale 2 puffs into the lungs every 4 hours as needed for shortness of breath / dyspnea or wheezing    Mild intermittent asthma without complication       cetirizine 10 MG tablet    zyrTEC     Take 5 mg by mouth daily        DHA OMEGA 3 PO           ipratropium - albuterol 0.5 mg/2.5 mg/3 mL 0.5-2.5 (3) MG/3ML neb solution    DUONEB    90 vial    Take 1 vial (3 mLs) by nebulization every 6 hours as needed for shortness of breath / dyspnea or wheezing    Mild persistent asthma with acute exacerbation       levothyroxine 150 MCG tablet    SYNTHROID/LEVOTHROID    90 tablet    Take 1 tablet (150 mcg) by mouth daily    Postablative hypothyroidism       mometasone-formoterol 200-5 MCG/ACT oral inhaler    DULERA    3 Inhaler    Inhale 2 puffs into the lungs 2 times daily    Moderate persistent asthma with acute exacerbation       PRENATAL VITAMIN PO
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
## 2018-09-19 LAB
BACTERIA SPEC CULT: NORMAL
BACTERIA SPEC CULT: NORMAL
HBV SURFACE AG SERPL QL IA: NONREACTIVE
HIV 1+2 AB+HIV1 P24 AG SERPL QL IA: NONREACTIVE
RUBV IGG SERPL IA-ACNC: 15 IU/ML
SPECIMEN SOURCE: NORMAL
T PALLIDUM AB SER QL: NONREACTIVE

## 2018-09-25 ENCOUNTER — OFFICE VISIT (OUTPATIENT)
Dept: MIDWIFE SERVICES | Facility: CLINIC | Age: 38
End: 2018-09-25
Attending: ADVANCED PRACTICE MIDWIFE
Payer: COMMERCIAL

## 2018-09-25 ENCOUNTER — HOSPITAL ENCOUNTER (OUTPATIENT)
Dept: ULTRASOUND IMAGING | Facility: CLINIC | Age: 38
Discharge: HOME | End: 2018-09-25
Attending: ADVANCED PRACTICE MIDWIFE | Admitting: ADVANCED PRACTICE MIDWIFE
Payer: COMMERCIAL

## 2018-09-25 VITALS
OXYGEN SATURATION: 97 % | HEIGHT: 68 IN | SYSTOLIC BLOOD PRESSURE: 127 MMHG | WEIGHT: 231.3 LBS | HEART RATE: 80 BPM | TEMPERATURE: 98.5 F | BODY MASS INDEX: 35.06 KG/M2 | DIASTOLIC BLOOD PRESSURE: 77 MMHG

## 2018-09-25 DIAGNOSIS — Z34.81 NORMAL PREGNANCY IN MULTIGRAVIDA IN FIRST TRIMESTER: Primary | ICD-10-CM

## 2018-09-25 DIAGNOSIS — Z32.01 PREGNANCY EXAMINATION OR TEST, POSITIVE RESULT: ICD-10-CM

## 2018-09-25 PROCEDURE — 99213 OFFICE O/P EST LOW 20 MIN: CPT | Performed by: ADVANCED PRACTICE MIDWIFE

## 2018-09-25 PROCEDURE — 76801 OB US < 14 WKS SINGLE FETUS: CPT

## 2018-09-25 NOTE — PROGRESS NOTES
7w3d  Pt here for results of U/S.   Confirmed 7 1.7 weeks with JILL; 5/13/19  Pt feeling really nauseated.   Discussed nausea, morning sickness remedies and dietary changes, herbal handout given.  Pt has appt in 3 weeks on 10/16/18 for NOB and   End of October for 1st trimester screening.  Warning signs reviewed and told to call if cannot keep foods or fluids down in 24 hrs.  Pt. 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    Enc. Rest as much as possible, pt has 2 year old at home.   rtc in 3 weeks for NOB  Za Telles CNM

## 2018-09-25 NOTE — PATIENT INSTRUCTIONS
MORNING SICKNESS AND NAUSEA  -walk one mile every day  -open a window at night for fresh air  -AVOID sudden movements when getting out of bed  -avoid getting too tired or worn out: GET ENOUGH REST!    -Try taking a prenatal vitamin with food or at night before going to bed, if you feel the prenatal vitamin is contributing to your nausea you may stop taking it daily or you may try 2 children s chewable until daily nausea has ended.    -TAKE AS A SUPPLEMENT OR INCREASE DIETARY INTAKE OF:  Iron and vitamin B complex; poultry, dairy products, whole grains, fish, eggs, dark leafy green vegetables, bananas, nuts, dried beans.  -Vitamin B6 25 mg every 8 hours and   tab of Unisome twice per day. (This takes at least one week before this remedy can be helpful)    DIETARY CHANGES UNTIL NAUSEA SUBSIDES:  -EAT often and slow, chew thoroughly   -If nausea occurs at a specific time eat 30 minutes before that time.  -Eat something every 2-3 hours  -Eat dry foods secretly form liquids.  -Increase fresh fruits, vegetables, unrefined carbohydrates (whole wheat bread, pasta, unpeeled potatoes)  -Decrease fats, fatty foods, dairy, sugar, spicy or greasy foods.  -Try very hot or very cold foods or drinks.  -[place], white food: rice, mashed potatoes, vanilla milk shakes, bananas, applesauce  -Try a BRAT diet: Bananas, Rice, Applesauce, Toast  -eat crackers before getting out of bed  -Do not eat ANYTHING that disagrees with you    -Honey tea: hot water, milk and honey  -Warm milk and honey or molasses  -1 tsp. of apple cider vinegar in 8 ounces of warm water in the morning  -Itzel tablets 250 mg four times per day.    SUCK ON OR CHEW:  Peppermint leaves Cinnamon bark lemon wedge  itzel root  Slippery elm tablets (used for sore throats too) may need to mix and change back and forth from different remedies/teas.    REMEMBER SMELL PLAYS AN IMPORTANAT ROLE HERE TRY TO USE IT TO YOUR ADVANTAGE< MOST TEAS AND SPICES MAY ASSIST YOU IN BEING  LESS NAUSEATED IF YOU DRINK OR SMELL THEM.    DRINK AS A STRONG TEA:  Anise or fennel seed Raspberry leaf peppermint or spearmint  Itzel root wild yam root dried peach tree leaves  Chamomile cinnamon  rosemary  Thyme verbena lemon balm (Laura)  Lavender catnip tea dandelion tea or leaves    DO NOT INGEST LARGE DOSES OF PARSLEY, LUKE OR CHAMOMILE IN PREGNANCY.    PLEASE CALL YOUR PHYSICIAN OR CERTIFIED NURSE MIDWIFE IF YOU ARE UNABLE TO KEP LIQUID OR FOOD DOWN FOR 24 HOURS.  Prepared by Za Telles CNM

## 2018-09-25 NOTE — MR AVS SNAPSHOT
After Visit Summary   9/25/2018    Cathy Arroyo    MRN: [id number]           Patient Information     Date Of Birth          1980        Visit Information        Provider Department      9/25/2018 2:00 PM Za Telles APRN [facility] [place] Instructions    MORNING SICKNESS AND NAUSEA  -walk one mile every day  -open a window at night for fresh air  -AVOID sudden movements when getting out of bed  -avoid getting too tired or worn out: GET ENOUGH REST!    -Try taking a prenatal vitamin with food or at night before going to bed, if you feel the prenatal vitamin is contributing to your nausea you may stop taking it daily or you may try 2 children s chewable until daily nausea has ended.    -TAKE AS A SUPPLEMENT OR INCREASE DIETARY INTAKE OF:  Iron and vitamin B complex; poultry, dairy products, whole grains, fish, eggs, dark leafy green vegetables, bananas, nuts, dried beans.  -Vitamin B6 25 mg every 8 hours and   tab of Unisome twice per day. (This takes at least one week before this remedy can be helpful)    DIETARY CHANGES UNTIL NAUSEA SUBSIDES:  -EAT often and slow, chew thoroughly   -If nausea occurs at a specific time eat 30 minutes before that time.  -Eat something every 2-3 hours  -Eat dry foods secretly form liquids.  -Increase fresh fruits, vegetables, unrefined carbohydrates (whole wheat bread, pasta, unpeeled potatoes)  -Decrease fats, fatty foods, dairy, sugar, spicy or greasy foods.  -Try very hot or very cold foods or drinks.  -[place], white food: rice, mashed potatoes, vanilla milk shakes, bananas, applesauce  -Try a BRAT diet: Bananas, Rice, Applesauce, Toast  -eat crackers before getting out of bed  -Do not eat ANYTHING that disagrees with you    -Honey tea: hot water, milk and honey  -Warm milk and honey or molasses  -1 tsp. of apple cider vinegar in 8 ounces of warm water in the morning  -Itzel tablets 250 mg four times per day.    SUCK  ON OR CHEW:  Peppermint leaves Cinnamon bark lemon wedge  nicky root  Slippery elm tablets (used for sore throats too) may need to mix and change back and forth from different remedies/teas.    REMEMBER SMELL PLAYS AN IMPORTANAT ROLE HERE TRY TO USE IT TO YOUR ADVANTAGE< MOST TEAS AND SPICES MAY ASSIST YOU IN BEING LESS NAUSEATED IF YOU DRINK OR SMELL THEM.    DRINK AS A STRONG TEA:  Anise or fennel seed Raspberry leaf peppermint or spearmint  Nicky root wild yam root dried peach tree leaves  Chamomile cinnamon  rosemary  Thyme verbena lemon balm (Laura)  Lavender catnip tea dandelion tea or leaves    DO NOT INGEST LARGE DOSES OF PARSLEY, LUKE OR CHAMOMILE IN PREGNANCY.    PLEASE CALL YOUR PHYSICIAN OR CERTIFIED NURSE MIDWIFE IF YOU ARE UNABLE TO KEP LIQUID OR FOOD DOWN FOR 24 HOURS.  Prepared by Za Telles CNM          Follow-ups after your visit        Your next 10 appointments already scheduled     Oct 16, 2018 10:00 AM CDT   New Prenatal with JANNET Yao CNM   [facility] ([address] [address]   [phone]            Oct 29, 2018 11:00 AM CDT   Genetic Counseling with UR GEN COUNSELOR 1   [facility] - [facility])    [address]  [facility] [address]   [phone]            Oct 29, 2018 11:45 AM CDT   MFM NUCHAL TRANSLUCENCY with [id number]   [facility] Ultrasound - [facility])    [address]  [facility] [address]   [phone]            Oct 29, 2018 12:15 PM CDT   Radiology MD with GÓMEZ SANTANA MD   [facility])    [address]  [facility] [address]   [phone]           Please arrive at the time given for your first appointment. This visit is used internally  "to schedule the physician's time during your ultrasound.              Who to contact     If you have questions or need follow up information about today's clinic visit or your schedule please contact [facility] directly at [phone].  Normal or non-critical lab and imaging results will be communicated to you by MyChart, letter or phone within 4 business days after the clinic has received the results. If you do not hear from us within 7 days, please contact the clinic through [employer] or phone. If you have a critical or abnormal lab result, we will notify you by phone as soon as possible.  Submit refill requests through [email or web address] or call your pharmacy and they will forward the refill request to us. Please allow 3 business days for your refill to be completed.          Additional Information About Your Visit        Drive[facility]ki Information     [email or web address] gives you secure access to your electronic health record. If you see a primary care provider, you can also send messages to your care team and make appointments. If you have questions, please call your primary care clinic.  If you do not have a primary care provider, please call [phone] and they will assist you.        Care EveryWhere ID     This is your Care EveryWhere ID. This could be used by other organizations to access your [facility] medical records  [id number]        Your Vitals Were     Pulse Temperature Height Last Period Pulse Oximetry BMI (Body Mass Index)    80 98.5  F (36.9  C) (Oral) 5' 8\" (1.727 m) 08/04/2018 97% 35.17 kg/m2       Blood Pressure from Last 3 Encounters:   09/25/18 127/77   09/18/18 113/72   08/07/18 130/80    Weight from Last 3 Encounters:   09/25/18 231 lb 4.8 oz (104.9 kg)   09/18/18 229 lb (103.9 kg)   08/07/18 226 lb (102.5 kg)              Today, you had the following     No orders found for display       Primary Care Provider Office Phone # Fax #    JANNET Nascimento [phone]       [address] [facility] [address]  [facility] [address]        Equal Access to Services     NALDO HDEZ : Hadii aad ku hadrandketan Schneider, waerinnda jaden, qalitzyta aleidamabecca bolanos. So [facility] [phone].    ATENCIÓN: Si habla español, tiene a willard disposición servicios gratuitos de asistencia lingüística. [facility] [phone].    We comply with applicable federal civil rights laws and [place] laws. We do not discriminate on the basis of race, color, national origin, age, disability, sex, sexual orientation, or gender identity.            Thank you!     Thank you for choosing [facility]  for your care. Our goal is always to provide you with excellent care. Hearing back from our patients is one way we can continue to improve our services. Please take a few minutes to complete the written survey that you may receive in the mail after your visit with us. Thank you!             Your Updated Medication List - Protect others around you: Learn how to safely use, store and throw away your medicines at www.disposemymeds.org.          This list is accurate as of 9/25/18  2:28 PM.  Always use your most recent med list.                   Brand Name Dispense Instructions for use Diagnosis    albuterol 108 (90 Base) MCG/ACT inhaler    PROAIR HFA/PROVENTIL HFA/VENTOLIN HFA    1 Inhaler    Inhale 2 puffs into the lungs every 4 hours as needed for shortness of breath / dyspnea or wheezing    Mild intermittent asthma without complication       ALLEGRA PO           cetirizine 10 MG tablet    zyrTEC     Take 5 mg by mouth daily        DHA OMEGA 3 PO           ipratropium - albuterol 0.5 mg/2.5 mg/3 mL 0.5-2.5 (3) MG/3ML neb solution    DUONEB    90 vial    Take 1 vial (3 mLs) by nebulization every 6 hours as needed for shortness of breath / dyspnea or wheezing    Mild persistent asthma with acute exacerbation       levothyroxine 150 MCG tablet    SYNTHROID/LEVOTHROID    90 tablet    Take 1 tablet (150  mcg) by mouth daily    Postablative hypothyroidism       mometasone-formoterol 200-5 MCG/ACT oral inhaler    DULERA    3 Inhaler    Inhale 2 puffs into the lungs 2 times daily    Moderate persistent asthma with acute exacerbation       PRENATAL VITAMIN PO

## 2018-09-25 NOTE — PROGRESS NOTES
"Chief Complaint   Patient presents with     Ultrasound       Initial /77 (BP Location: Left arm, Patient Position: Sitting, Cuff Size: Adult Large)  Pulse 80  Temp 98.5  F (36.9  C) (Oral)  Ht 5' 8\" (1.727 m)  Wt 231 lb 4.8 oz (104.9 kg)  LMP 2018  SpO2 97%  BMI 35.17 kg/m2 Estimated body mass index is 35.17 kg/(m^2) as calculated from the following:    Height as of this encounter: 5' 8\" (1.727 m).    Weight as of this encounter: 231 lb 4.8 oz (104.9 kg).  BP completed using cuff size: large        The following HM Due: NONE      The following patient reported/Care Every where data was sent to:  P ABSTRACT QUALITY INITIATIVES [17956]  n/a      n/a and patient has appointment for today              "

## 2018-10-16 ENCOUNTER — PRENATAL OFFICE VISIT (OUTPATIENT)
Dept: MIDWIFE SERVICES | Facility: CLINIC | Age: 38
End: 2018-10-16
Payer: COMMERCIAL

## 2018-10-16 VITALS
BODY MASS INDEX: 36.34 KG/M2 | SYSTOLIC BLOOD PRESSURE: 117 MMHG | DIASTOLIC BLOOD PRESSURE: 80 MMHG | WEIGHT: 239 LBS | TEMPERATURE: 98.6 F | HEART RATE: 86 BPM

## 2018-10-16 DIAGNOSIS — Z23 NEED FOR PROPHYLACTIC VACCINATION AND INOCULATION AGAINST INFLUENZA: ICD-10-CM

## 2018-10-16 DIAGNOSIS — E89.0 POSTSURGICAL HYPOTHYROIDISM: ICD-10-CM

## 2018-10-16 DIAGNOSIS — Z34.81 ENCOUNTER FOR SUPERVISION OF OTHER NORMAL PREGNANCY IN FIRST TRIMESTER: Primary | ICD-10-CM

## 2018-10-16 DIAGNOSIS — Z86.39 HISTORY OF GRAVES' DISEASE: ICD-10-CM

## 2018-10-16 DIAGNOSIS — Z87.59 HISTORY OF GESTATIONAL HYPERTENSION: ICD-10-CM

## 2018-10-16 DIAGNOSIS — F17.200 TOBACCO USE DISORDER: ICD-10-CM

## 2018-10-16 PROBLEM — J45.41 MODERATE PERSISTENT ASTHMA WITH ACUTE EXACERBATION: Status: RESOLVED | Noted: 2018-02-15 | Resolved: 2018-10-16

## 2018-10-16 LAB
ALT SERPL W P-5'-P-CCNC: 20 U/L (ref 0–50)
AST SERPL W P-5'-P-CCNC: 9 U/L (ref 0–45)
CREAT SERPL-MCNC: 0.55 MG/DL (ref 0.52–1.04)
GFR SERPL CREATININE-BSD FRML MDRD: >90 ML/MIN/1.7M2
PROT UR-MCNC: <0.05 G/L
PROT/CREAT 24H UR: NORMAL G/G CR (ref 0–0.2)
T4 SERPL-MCNC: 14.9 UG/DL (ref 4.5–13.9)
TSH SERPL DL<=0.005 MIU/L-ACNC: 6.12 MU/L (ref 0.4–4)

## 2018-10-16 PROCEDURE — 84450 TRANSFERASE (AST) (SGOT): CPT | Performed by: ADVANCED PRACTICE MIDWIFE

## 2018-10-16 PROCEDURE — 90686 IIV4 VACC NO PRSV 0.5 ML IM: CPT | Performed by: ADVANCED PRACTICE MIDWIFE

## 2018-10-16 PROCEDURE — 84460 ALANINE AMINO (ALT) (SGPT): CPT | Performed by: ADVANCED PRACTICE MIDWIFE

## 2018-10-16 PROCEDURE — 90471 IMMUNIZATION ADMIN: CPT | Performed by: ADVANCED PRACTICE MIDWIFE

## 2018-10-16 PROCEDURE — 84156 ASSAY OF PROTEIN URINE: CPT | Performed by: ADVANCED PRACTICE MIDWIFE

## 2018-10-16 PROCEDURE — 82565 ASSAY OF CREATININE: CPT | Performed by: ADVANCED PRACTICE MIDWIFE

## 2018-10-16 PROCEDURE — 84443 ASSAY THYROID STIM HORMONE: CPT | Performed by: ADVANCED PRACTICE MIDWIFE

## 2018-10-16 PROCEDURE — 84436 ASSAY OF TOTAL THYROXINE: CPT | Performed by: ADVANCED PRACTICE MIDWIFE

## 2018-10-16 PROCEDURE — 36415 COLL VENOUS BLD VENIPUNCTURE: CPT | Performed by: ADVANCED PRACTICE MIDWIFE

## 2018-10-16 PROCEDURE — 99207 ZZC FIRST OB VISIT: CPT | Performed by: ADVANCED PRACTICE MIDWIFE

## 2018-10-16 NOTE — PROGRESS NOTES
10w3d   Cathy Arroyo is a 38 year old who presents to the clinic for an new ob visit. She is a previous CNM patient. Delivered daughter Nery 2 years ago. FOB: Jamil. They have no questions or concerns today. She is feeling better with nausea but feeling very fatigue. She is following her psych team for her bipolar. Feels like she is in good control right now. She is going to therapy weekly. No medications right now. Taking Synthroid for history of thyroidectomy, 150 mcg, following endocrine for any need to adjust medications. Set up for genetic testing with [facility] in a few weeks. She had a history of gestational hypertension and had IOL postdates. She was treated with Magnesium and Labetalol during labor. We discussed getting labs drawn today for baseline and starting baby aspirin. RX sent. She is sure about her LMP dating and had an early ultrasound that agreed with LMP dating. Fetal heart and fetal movement seen in BSUS today.     Estimated Date of Delivery: May 11, 2019 is calculated from Patient's last menstrual period was 08/04/2018.     She has not had bleeding since her LMP.   She has had mild nausea. Weigh loss has not occurred.   This was a planned pregnancy.   FOB is involved, Jamil   OTHER CONCERNS: no concerns     INFECTION HISTORY  HIV: no  Hepatitis B: no  Hepatitis C: no  Syphilis:  no  Tuberculosis: no   PPD- no  Herpes self: no  Herpes partner:  no  Chlamydia:  no  Gonorrhea:  no  HPV: no  BV:  no  Trichomonis:  no  Chicken Pox:  YES  ====================================================  GENETIC SCREENING  Genetic screening reviewed. High Risk? Autism   ====================================================  PERSONAL/SOCIAL HISTORY  Lives lives with their family.  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  Her job involves moderate activity .  HX OF ABUSE: no  =====================================================   REVIEW OF SYSTEMS  CONSTITUTIONAL: NEGATIVE for fever, chills  EYES: NEGATIVE for  vision changes   RESP: NEGATIVE for significant cough or SOB  CV: NEGATIVE for chest pain, palpitations   GI: NEGATIVE for nausea, abdominal pain, heartburn, or change in bowel habits  : NEGATIVE for frequency, dysuria, or hematuria  MUSCULOSKELETAL: NEGATIVE for significant arthralgias or myalgia  NEURO: NEGATIVE for weakness, dizziness or paresthesias or headache  ====================================================    PHYSICAL EXAM:  /80  Pulse 86  Temp 98.6  F (37  C) (Oral)  Wt 239 lb (108.4 kg)  LMP 08/04/2018  BMI 36.34 kg/m2  BMI- Body mass index is 36.34 kg/(m^2).,     RECOMMENDED WEIGHT GAIN: 15-25 lbs.  PHQ9- Today's Depression Rating was No Value exists for the : HP#PHQ9  GENERAL:  Pleasant pregnant female, alert, well groomed.   SKIN:  Warm and dry, without lesions or rashes  HEAD: Symmetrical features.  EYES:  PERRLA,   MOUTH:  Buccal mucosa pink, moist without lesions.    NECK:  Thyroid without enlargement and nodules.  Lymph nodes not palpable.   LUNGS:  Clear to auscultation.  HEART:  RRR without murmur.  ABDOMEN: Soft without masses , tenderness or organomegaly.  No CVA tenderness. No scars noted.   FHT seen on BSUS  MUSCULOSKELETAL:  Full range of motion  EXTREMITIES:  No edema. No significant varicosities.   GENITALIA: deferred.    =========================================  ASSESSMENT:  10w3d  (Z34.81) Encounter for supervision of other normal pregnancy in first trimester  (primary encounter diagnosis)    (Z87.59) History of gestational hypertension  Plan: ALT, AST, Creatinine, Protein  random urine         with Creat Ratio, aspirin 81 MG tablet    (Z86.39) History of Graves' disease    (E89.0) Postsurgical hypothyroidism    (F17.200) Tobacco use disorder    (Z23) Need for prophylactic vaccination and inoculation against influenza  (primary encounter diagnosis)  Plan: FLU VACCINE, SPLIT VIRUS, IM (QUADRIVALENT)         [09185]- >3 YRS, Vaccine Administration,         Initial  [21578]    PREGNANCY AT RISK? no  ==========================================  PLAN:  Instructed on use of triage nurse line and contacting the on call CNM after hours for an urgent need such as fever, vagina bleeding, bladder or vaginal infection, rupture of membranes,  or term labor.    Discussed the indications, uses for and false positives for quad screen, nuchal translucency and fetal survey ultrasound at 18-20 weeks gestation. Scheduled with MFM.   Instructed on best evidence for: weight gain for her BMI for pregnancy; healthy diet and foods to avoid; exercise and activity during pregnancy;avoiding exposure to toxoplasmosis; and maintenance of a generally healthy lifestyle.   Discussed the harms, benefits, side effects and alternative therapies for current prescribed and OTC medications.  Follow up in 4 weeks.     JANNET Yao CNM

## 2018-10-16 NOTE — PROGRESS NOTES

## 2018-10-16 NOTE — NURSING NOTE
"Chief Complaint   Patient presents with     Prenatal Care       Initial /80  Pulse 86  Temp 98.6  F (37  C) (Oral)  Wt 239 lb (108.4 kg)  LMP 2018  BMI 36.34 kg/m2 Estimated body mass index is 36.34 kg/(m^2) as calculated from the following:    Height as of 18: 5' 8\" (1.727 m).    Weight as of this encounter: 239 lb (108.4 kg).  BP completed using cuff size: large        The following HM Due: NONE      The following patient reported/Care Every where data was sent to:  P ABSTRACT QUALITY INITIATIVES [25350]  na      n/a              "

## 2018-10-16 NOTE — MR AVS SNAPSHOT
After Visit Summary   10/16/2018    Cathy Arroyo    MRN: [id number]           Patient Information     Date Of Birth          1980        Visit Information        Provider Department      10/16/2018 10:00 AM Idalia Prakash APRN CNM [facility]        Today's Diagnoses     Encounter for supervision of other normal pregnancy in first trimester    -  1    Need for prophylactic vaccination and inoculation against influenza        History of gestational hypertension        History of Graves' disease        Postsurgical hypothyroidism        Tobacco use disorder           Follow-ups after your visit        Your next 10 appointments already scheduled     Oct 29, 2018 11:00 AM CDT   Genetic Counseling with GÓMEZ GEN COUNSELOR 1   [facility] - [facility])    [address]  [facility] [address]   [phone]            Oct 29, 2018 11:45 AM CDT   ESTHER NUCHAL TRANSLUCENCY with [id number]   [facility] Maternal Fetal Medicine Ultrasound - [place] ([facility])    [address]  [facility] [address]   [phone]            Oct 29, 2018 12:15 PM CDT   Radiology MD with UR MFM MD   [facility] - [facility])    [address]  [facility] [address]   [phone]           Please arrive at the time given for your first appointment. This visit is used internally to schedule the physician's time during your ultrasound.              Who to contact     If you have questions or need follow up information about today's clinic visit or your schedule please contact [facility] directly at [phone].  Normal or non-critical lab and imaging results will be communicated to you by MyChart, letter or phone within 4 business days after the clinic has received the results. If you do not hear  from us within 7 days, please contact the clinic through Exajoule or phone. If you have a critical or abnormal lab result, we will notify you by phone as soon as possible.  Submit refill requests through Exajoule or call your pharmacy and they will forward the refill request to us. Please allow 3 business days for your refill to be completed.          Additional Information About Your Visit        SynchronicaharNifty After Fifty Information     Exajoule gives you secure access to your electronic health record. If you see a primary care provider, you can also send messages to your care team and make appointments. If you have questions, please call your primary care clinic.  If you do not have a primary care provider, please call [phone] and they will assist you.        Care EveryWhere ID     This is your Care EveryWhere ID. This could be used by other organizations to access your [place] medical records  [id number]        Your Vitals Were     Pulse Temperature Last Period BMI (Body Mass Index)          86 98.6  F (37  C) (Oral) 08/04/2018 36.34 kg/m2         Blood Pressure from Last 3 Encounters:   10/16/18 117/80   09/25/18 127/77   09/18/18 113/72    Weight from Last 3 Encounters:   10/16/18 239 lb (108.4 kg)   09/25/18 231 lb 4.8 oz (104.9 kg)   09/18/18 229 lb (103.9 kg)              We Performed the Following     ALT     AST     Creatinine     FLU VACCINE, SPLIT VIRUS, IM (QUADRIVALENT) [41137]- >3 YRS     Protein  random urine with Creat Ratio     Thyroxine total     TSH     Vaccine Administration, Initial [42652]          Today's Medication Changes          These changes are accurate as of 10/16/18 11:21 AM.  If you have any questions, ask your nurse or doctor.               Start taking these medicines.        Dose/Directions    aspirin 81 MG tablet   Used for:  History of gestational hypertension   Started by:  Idalia Prakash APRN CNM        Dose:  81 mg   Take 1 tablet (81 mg) by mouth daily   Quantity:  90 tablet    Refills:  3            Where to get your medicines      These medications were sent to [facility] [address] [place] [address] AT [facility]  [address] [facility] [place] [address]    Hours:  24-hours Phone:  [phone]     aspirin 81 MG tablet                Primary Care Provider Office Phone # Fax #    Sherrill Dasilva, APRN [facility] [phone]       [address] [facility] [address]  [facility] [address]        Equal Access to Services     NALDO HDEZ : Hadii aad ku hadasho Soomaali, waaxda luqadaha, qaybta kaalmada adeegyada, waxay idiin hayaan adeeg kharash raciel . So [facility] [phone].    ATENCIÓN: Si habla español, tiene a willard disposición servicios gratuitos de asistencia lingüística. [facility] [phone].    We comply with applicable federal civil rights laws and [place] laws. We do not discriminate on the basis of race, color, national origin, age, disability, sex, sexual orientation, or gender identity.            Thank you!     Thank you for choosing [facility]  for your care. Our goal is always to provide you with excellent care. Hearing back from our patients is one way we can continue to improve our services. Please take a few minutes to complete the written survey that you may receive in the mail after your visit with us. Thank you!             Your Updated Medication List - Protect others around you: Learn how to safely use, store and throw away your medicines at www.disposemymeds.org.          This list is accurate as of 10/16/18 11:21 AM.  Always use your most recent med list.                   Brand Name Dispense Instructions for use Diagnosis    albuterol 108 (90 Base) MCG/ACT inhaler    PROAIR HFA/PROVENTIL HFA/VENTOLIN HFA    1 Inhaler    Inhale 2 puffs into the lungs every 4 hours as needed for shortness of breath / dyspnea or wheezing    Mild intermittent asthma without complication       ALLEGRA PO           aspirin 81 MG tablet     90 tablet     Take 1 tablet (81 mg) by mouth daily    History of gestational hypertension       cetirizine 10 MG tablet    zyrTEC     Take 5 mg by mouth daily        DHA OMEGA 3 PO           ipratropium - albuterol 0.5 mg/2.5 mg/3 mL 0.5-2.5 (3) MG/3ML neb solution    DUONEB    90 vial    Take 1 vial (3 mLs) by nebulization every 6 hours as needed for shortness of breath / dyspnea or wheezing    Mild persistent asthma with acute exacerbation       levothyroxine 150 MCG tablet    SYNTHROID/LEVOTHROID    90 tablet    Take 1 tablet (150 mcg) by mouth daily    Postablative hypothyroidism       mometasone-formoterol 200-5 MCG/ACT oral inhaler    DULERA    3 Inhaler    Inhale 2 puffs into the lungs 2 times daily    Moderate persistent asthma with acute exacerbation       PRENATAL VITAMIN PO

## 2018-10-17 ENCOUNTER — TELEPHONE (OUTPATIENT)
Dept: MIDWIFE SERVICES | Facility: CLINIC | Age: 38
End: 2018-10-17

## 2018-10-17 NOTE — TELEPHONE ENCOUNTER
Patient c/o TSH level and whether or not she should take her Levothyroxine dose this morning. Advised pt to take dose this morning and she doesn't want to skip that and contact Dr Franco to see if there needs to be a dose adjustment in her prescription. Pt stated understanding.   Beatriz Johnson, RN-BSN

## 2018-10-23 DIAGNOSIS — E89.0 POSTSURGICAL HYPOTHYROIDISM: ICD-10-CM

## 2018-10-23 DIAGNOSIS — E83.51 HYPOCALCEMIA: ICD-10-CM

## 2018-10-24 RX ORDER — LEVOTHYROXINE SODIUM 175 UG/1
TABLET ORAL
Qty: 90 TABLET | Refills: 0 | OUTPATIENT
Start: 2018-10-24

## 2018-10-25 ENCOUNTER — MYC MEDICAL ADVICE (OUTPATIENT)
Dept: ENDOCRINOLOGY | Facility: CLINIC | Age: 38
End: 2018-10-25

## 2018-10-25 ENCOUNTER — PRE VISIT (OUTPATIENT)
Dept: MATERNAL FETAL MEDICINE | Facility: CLINIC | Age: 38
End: 2018-10-25

## 2018-10-25 DIAGNOSIS — Z86.39 HISTORY OF GRAVES' DISEASE: ICD-10-CM

## 2018-10-25 DIAGNOSIS — Z33.1 PREGNANCY, INCIDENTAL: ICD-10-CM

## 2018-10-25 DIAGNOSIS — E89.0 POST-SURGICAL HYPOTHYROIDISM: Primary | ICD-10-CM

## 2018-10-25 RX ORDER — LEVOTHYROXINE SODIUM 175 UG/1
175 TABLET ORAL DAILY
Qty: 90 TABLET | Refills: 3 | Status: ON HOLD | OUTPATIENT
Start: 2018-10-25 | End: 2019-05-08

## 2018-10-25 RX ORDER — LEVOTHYROXINE SODIUM 175 UG/1
175 TABLET ORAL DAILY
Qty: 90 TABLET | Refills: 3 | OUTPATIENT
Start: 2018-10-25

## 2018-10-29 ENCOUNTER — HOSPITAL ENCOUNTER (OUTPATIENT)
Dept: ULTRASOUND IMAGING | Facility: CLINIC | Age: 38
Discharge: HOME | End: 2018-10-29
Attending: ADVANCED PRACTICE MIDWIFE | Admitting: OBSTETRICS & GYNECOLOGY
Payer: COMMERCIAL

## 2018-10-29 ENCOUNTER — OFFICE VISIT (OUTPATIENT)
Dept: MATERNAL FETAL MEDICINE | Facility: CLINIC | Age: 38
End: 2018-10-29
Attending: ADVANCED PRACTICE MIDWIFE
Payer: COMMERCIAL

## 2018-10-29 DIAGNOSIS — O26.90 PREGNANCY RELATED CONDITION, ANTEPARTUM: ICD-10-CM

## 2018-10-29 DIAGNOSIS — Z86.39 HISTORY OF GRAVES' DISEASE: ICD-10-CM

## 2018-10-29 DIAGNOSIS — Z33.1 PREGNANCY, INCIDENTAL: ICD-10-CM

## 2018-10-29 DIAGNOSIS — Z34.80 SUPERVISION OF OTHER NORMAL PREGNANCY, ANTEPARTUM: ICD-10-CM

## 2018-10-29 DIAGNOSIS — O09.521 ELDERLY MULTIGRAVIDA IN FIRST TRIMESTER: Primary | ICD-10-CM

## 2018-10-29 DIAGNOSIS — O09.521 SUPERVISION OF ELDERLY MULTIGRAVIDA IN FIRST TRIMESTER: ICD-10-CM

## 2018-10-29 DIAGNOSIS — O09.521 SUPERVISION OF ELDERLY MULTIGRAVIDA IN FIRST TRIMESTER: Primary | ICD-10-CM

## 2018-10-29 LAB
LOCATION PERFORMED: 0
MISCELLANEOUS TEST: NORMAL
NORMAL RANGE FOR SEND OUTS MISC TEST: NORMAL
RESULT: NORMAL
SEND OUTS MISC TEST CODE: 0
SEND OUTS MISC TEST SPECIMEN: 0
TEST NAME: 0

## 2018-10-29 PROCEDURE — 76813 OB US NUCHAL MEAS 1 GEST: CPT

## 2018-10-29 PROCEDURE — 40000791 ZZHCL STATISTIC VERIFI PRENATAL TRISOMY 21,18,13: Performed by: OBSTETRICS & GYNECOLOGY

## 2018-10-29 PROCEDURE — 83520 IMMUNOASSAY QUANT NOS NONAB: CPT | Performed by: OBSTETRICS & GYNECOLOGY

## 2018-10-29 PROCEDURE — 36415 COLL VENOUS BLD VENIPUNCTURE: CPT | Performed by: OBSTETRICS & GYNECOLOGY

## 2018-10-29 PROCEDURE — 40000072 ZZH STATISTIC GENETIC COUNSELING, < 16 MIN: Mod: ZF | Performed by: GENETIC COUNSELOR, MS

## 2018-10-29 PROCEDURE — 96040 ZZH GENETIC COUNSELING, EACH 30 MINUTES: CPT | Performed by: GENETIC COUNSELOR, MS

## 2018-10-29 NOTE — MR AVS SNAPSHOT
After Visit Summary   10/29/2018    Cathy Arroyo    MRN: [id number]           Patient Information     Date Of Birth          1980        Visit Information        Provider Department      10/29/2018 12:15 PM Allyson Austin,  [facility] Maternal Fetal Medicine [facility]        Today's Diagnoses     Elderly multigravida in first trimester    -  1       Follow-ups after your visit        Your next 10 appointments already scheduled     Oct 29, 2018 12:30 PM CDT   LAB with OP LAB UR   [facility] Laboratory Services ([facility])    [address]  [facility] [address]   [phone]           Please do not eat 10-12 hours before your appointment if you are coming in fasting for labs on lipids, cholesterol, or glucose (sugar). This does not apply to pregnant women. Water, hot tea and black coffee (with nothing added) are okay. Do not drink other fluids, diet soda or chew gum.            Nov 12, 2018  9:00 AM CST   ESTABLISHED PRENATAL with JANNET Yao CNM   [facility] ([facility])    [address] [place] [address]   [phone]            Dec 10, 2018  9:30 AM CST   ESTHER US COMP with [id number]   [facility])    [address]  [facility] [address]   [phone]           Wear comfortable clothes and leave your valuables at home.            Dec 10, 2018 10:00 AM CST   Radiology MD with UR ESTHER KANG   [facility])    [address]  [facility] [address]   [phone]           Please arrive at the time given for your first appointment. This visit is used internally to schedule the physician's time during your ultrasound.            Jan 28, 2019  2:40 PM CST   (Arrive by 2:25  PM)   RETURN ENDOCRINE with Leatha Franco MD   [facility] Endocrinology ([facility])    [address] [facility]  [address] [facility] [address] [phone]              Future tests that were ordered for you today     Open Future Orders        Priority Expected Expires Ordered    [facility] Comprehensive Single Routine  8/29/2019 10/29/2018            Who to contact     If you have questions or need follow up information about today's clinic visit or your schedule please contact [facility] FETAL [facility]IC[facility] directly at [phone].  Normal or non-critical lab and imaging results will be communicated to you by AdInnovationhart, letter or phone within 4 business days after the clinic has received the results. If you do not hear from us within 7 days, please contact the clinic through Get Int or phone. If you have a critical or abnormal lab result, we will notify you by phone as soon as possible.  Submit refill requests through Jamgo or call your pharmacy and they will forward the refill request to us. Please allow 3 business days for your refill to be completed.          Additional Information About Your Visit        AdInnovationhart Information     Jamgo gives you secure access to your electronic health record. If you see a primary care provider, you can also send messages to your care team and make appointments. If you have questions, please call your primary care clinic.  If you do not have a primary care provider, please call [phone] and they will assist you.        Care EveryWhere ID     This is your Care EveryWhere ID. This could be used by other organizations to access your [place] medical records  [id number]        Your Vitals Were     Last Period                   08/04/2018            Blood Pressure from Last 3 Encounters:   10/16/18 117/80   09/25/18 127/77   09/18/18 113/72    Weight from Last 3 Encounters:   10/16/18 108.4 kg (239 lb)   09/25/18 104.9 kg (231 lb 4.8  oz)   09/18/18 103.9 kg (229 lb)               Primary Care Provider Office Phone # Fax #    JANNET Nascimento [facility] [phone]       [address] [address]        Equal Access to Services     NALDO HDEZ : Hadii aad ku hadasho Soomaali, waaxda luqadaha, qaybta kaalmada adeegyada, waxay idiin hayaan adehillary renaldoyolette lakaty . So [facility] [phone].    ATENCIÓN: Si habla español, tiene a willard disposición servicios gratuitos de asistencia lingüística. Portia al [phone].    We comply with applicable federal civil rights laws and [place] laws. We do not discriminate on the basis of race, color, national origin, age, disability, sex, sexual orientation, or gender identity.            Thank you!     Thank you for choosing [facility]  for your care. Our goal is always to provide you with excellent care. Hearing back from our patients is one way we can continue to improve our services. Please take a few minutes to complete the written survey that you may receive in the mail after your visit with us. Thank you!             Your Updated Medication List - Protect others around you: Learn how to safely use, store and throw away your medicines at www.disposemymeds.org.          This list is accurate as of 10/29/18 12:22 PM.  Always use your most recent med list.                   Brand Name Dispense Instructions for use Diagnosis    albuterol 108 (90 Base) MCG/ACT inhaler    PROAIR HFA/PROVENTIL HFA/VENTOLIN HFA    1 Inhaler    Inhale 2 puffs into the lungs every 4 hours as needed for shortness of breath / dyspnea or wheezing    Mild intermittent asthma without complication       ALLEGRA PO           aspirin 81 MG tablet     90 tablet    Take 1 tablet (81 mg) by mouth daily    History of gestational hypertension       cetirizine 10 MG tablet    zyrTEC     Take 5 mg by mouth daily        DHA OMEGA 3 PO           ipratropium - albuterol 0.5 mg/2.5 mg/3 mL  0.5-2.5 (3) MG/3ML neb solution    DUONEB    90 vial    Take 1 vial (3 mLs) by nebulization every 6 hours as needed for shortness of breath / dyspnea or wheezing    Mild persistent asthma with acute exacerbation       levothyroxine 175 MCG tablet    SYNTHROID/LEVOTHROID    90 tablet    Take 1 tablet (175 mcg) by mouth daily    Post-surgical hypothyroidism, History of Graves' disease, Pregnancy, incidental       mometasone-formoterol 200-5 MCG/ACT oral inhaler    DULERA    3 Inhaler    Inhale 2 puffs into the lungs 2 times daily    Moderate persistent asthma with acute exacerbation       PRENATAL VITAMIN PO

## 2018-10-29 NOTE — MR AVS SNAPSHOT
After Visit Summary   10/29/2018    Cathy Arroyo    MRN: [id number]           Patient Information     Date Of Birth          1980        Visit Information        Provider Department      10/29/2018 11:00 AM UR GEN COUNSELOR 1 [facility]        Today's Diagnoses     Supervision of elderly multigravida in first trimester    -  1    Pregnancy related condition, antepartum        Supervision of other normal pregnancy, antepartum           Follow-ups after your visit        Your next 10 appointments already scheduled     Oct 29, 2018 12:30 PM CDT   LAB with OP LAB UR   [facility] ([facility])    [address]  [facility] [address]   [phone]           Please do not eat 10-12 hours before your appointment if you are coming in fasting for labs on lipids, cholesterol, or glucose (sugar). This does not apply to pregnant women. Water, hot tea and black coffee (with nothing added) are okay. Do not drink other fluids, diet soda or chew gum.            Nov 12, 2018  9:00 AM CST   ESTABLISHED PRENATAL with JANNET Yao CNM   [facility]    [address] [place] [id number] [phone]            Dec 10, 2018  9:30 AM ZACKARY SANTANA US COMP with [id number]   [facility])    [address]  [facility] [address] [phone]           Wear comfortable clothes and leave your valuables at home.            Dec 10, 2018 10:00 AM ZACKARY   Radiology MD with UR ESTHER KANG   [facility])    [address]  [facility] [address]   [phone]           Please arrive at the time given for your first appointment. This visit is used internally  to schedule the physician's time during your ultrasound.            Jan 28, 2019  2:40 PM CST   (Arrive by 2:25 PM)   RETURN ENDOCRINE with Leatha Franco MD   [facility] Endocrinology ([facility])    [address] [place] [id number] [phone]              Future tests that were ordered for you today     Open Future Orders        Priority Expected Expires Ordered    M US Comprehensive Single Routine  8/29/2019 10/29/2018            Who to contact     If you have questions or need follow up information about today's clinic visit or your schedule please contact [facility] directly at [phone].  Normal or non-critical lab and imaging results will be communicated to you by [employer], letter or phone within 4 business days after the clinic has received the results. If you do not hear from us within 7 days, please contact the clinic through [employer] or phone. If you have a critical or abnormal lab result, we will notify you by phone as soon as possible.  Submit refill requests through [employer] or call your pharmacy and they will forward the refill request to us. Please allow 3 business days for your refill to be completed.          Additional Information About Your Visit        [employer] Information     [employer] gives you secure access to your electronic health record. If you see a primary care provider, you can also send messages to your care team and make appointments. If you have questions, please call your primary care clinic.  If you do not have a primary care provider, please call [phone] and they will assist you.        Care EveryWhere ID     This is your Care EveryWhere ID. This could be used by other organizations to access your [facility] medical records  [id number]        Your Vitals Were     Last Period                   08/04/2018            Blood Pressure from Last 3 Encounters:   10/16/18 117/80   09/25/18 127/77    09/18/18 113/72    Weight from Last 3 Encounters:   10/16/18 108.4 kg (239 lb)   09/25/18 104.9 kg (231 lb 4.8 oz)   09/18/18 103.9 kg (229 lb)              We Performed the Following     [facility] Genetic Counseling        Primary Care Provider Office Phone # Fax #    Sherrill Dasilva, APRN [facility] [phone]       [address] [facility] [address]  [facility] [address]        Equal Access to Services     NALDO HDEZ : Hadii aad ku hadasho Soomaali, waaxda luqadaha, qaybta kaalmada adeegyada, waxay idiin hayaan adehillary kharayolette schrader . So [facility] [phone].    ATENCIÓN: Si habla español, tiene a willard disposición servicios gratuitos de asistencia lingüística. Llame al [phone].    We comply with applicable federal civil rights laws and [place] laws. We do not discriminate on the basis of race, color, national origin, age, disability, sex, sexual orientation, or gender identity.            Thank you!     Thank you for choosing [facility]  for your care. Our goal is always to provide you with excellent care. Hearing back from our patients is one way we can continue to improve our services. Please take a few minutes to complete the written survey that you may receive in the mail after your visit with us. Thank you!             Your Updated Medication List - Protect others around you: Learn how to safely use, store and throw away your medicines at www.disposemymeds.org.          This list is accurate as of 10/29/18 12:22 PM.  Always use your most recent med list.                   Brand Name Dispense Instructions for use Diagnosis    albuterol 108 (90 Base) MCG/ACT inhaler    PROAIR HFA/PROVENTIL HFA/VENTOLIN HFA    1 Inhaler    Inhale 2 puffs into the lungs every 4 hours as needed for shortness of breath / dyspnea or wheezing    Mild intermittent asthma without complication       ALLEGRA PO           aspirin 81 MG tablet     90 tablet    Take 1 tablet (81 mg) by mouth daily    History of  gestational hypertension       cetirizine 10 MG tablet    zyrTEC     Take 5 mg by mouth daily        DHA OMEGA 3 PO           ipratropium - albuterol 0.5 mg/2.5 mg/3 mL 0.5-2.5 (3) MG/3ML neb solution    DUONEB    90 vial    Take 1 vial (3 mLs) by nebulization every 6 hours as needed for shortness of breath / dyspnea or wheezing    Mild persistent asthma with acute exacerbation       levothyroxine 175 MCG tablet    SYNTHROID/LEVOTHROID    90 tablet    Take 1 tablet (175 mcg) by mouth daily    Post-surgical hypothyroidism, History of Graves' disease, Pregnancy, incidental       mometasone-formoterol 200-5 MCG/ACT oral inhaler    DULERA    3 Inhaler    Inhale 2 puffs into the lungs 2 times daily    Moderate persistent asthma with acute exacerbation       PRENATAL VITAMIN PO

## 2018-10-29 NOTE — PROGRESS NOTES
[facility] [place]  Genetic Counseling Consult    Patient: Cathy Arroyo YOB: 1980   Date of Service: 10/29/18      Cathy Arroyo was seen with her partner, Jamil, at [facility] for genetic consultation to discuss the options for screening and testing for fetal chromosome abnormalities.  The indication for genetic counseling is advanced maternal age.        Impression/Plan:   1.  Cathy had an ultrasound and blood draw for NIPT (Innatal test through [employer]).  Results are expected within 7-10 days, and will be available in [place].  We will contact her at [phone] discuss the results, and a copy will be forwarded to the office of the referring OB provider.    2.  Maternal serum AFP (single marker screen) is recommended after 15 weeks to screen for open neural tube defects. A quad screen should not be performed.    3.  An 18-20 week comprehensive ultrasound is standard of care for all women 35 or older at delivery.    Pregnancy History:   /Parity:    Age at Delivery: 38 year old  JILL: 2019, by Last Menstrual Period  Gestational Age: 12w2d    No significant complications or exposures were reported in the current pregnancy.    Cathy s pregnancy history is significant for one term vaginal delivery and one SAB.    Medical History:   Cathy s reported medical history is not expected to impact pregnancy management or risks to fetal development. 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       Family History:   A three-generation pedigree was obtained during a previous pregnancy, was updated, and is scanned under the  Media  tab.   Cathy did not report any new family history of multiple miscarriages, stillbirths, birth defects, mental retardation, known genetic conditions, and consanguinity.     The following  significant findings were previously reported by Annabella:    Jamil has a brother with autism spectrum disorder. The cause of autism is typically multifactorial, with both genetic and non-genetic factors playing a role.  A small percentage of individuals with autism have a genetic syndrome or chromosome abnormality.  There is no prenatal screening available for autism unless a genetic cause is identified in the affected individual.  Should further details become available about the cause of autism in this family, we would be happy to discuss them with the patient.          Carrier Screening:   The patient reports that she and the father of the pregnancy have  ancestry:      Cystic fibrosis is an autosomal recessive genetic condition that occurs with increased frequency in individuals of  ancestry and carrier screening for this condition is available.  In addition,  screening in the [facility] includes cystic fibrosis.      Expanded carrier screening for mutations in a large panel of genes associated with autosomal recessive conditions including cystic fibrosis, spinal muscular atrophy, and others, is now available.      Carrier screening was not discussed today.       Risk Assessment for Chromosome Conditions:   We explained that the risk for fetal chromosome abnormalities increases with maternal age. We discussed specific features of common chromosome abnormalities, including Down syndrome, trisomy 13, trisomy 18, and sex chromosome trisomies.      - At age 38 at midtrimester, the risk to have a baby with Down syndrome is 1 in 216.     - At age 38 at midtrimester, the risk to have a baby with any chromosome abnormality is 1 in 105.          Testing Options:   We discussed the following options:   First trimester screening    First trimester ultrasound with nuchal translucency and nasal bone assessments, maternal plasma hCG, DORINA-A, and AFP measurement    Screens for fetal trisomy  21, trisomy 13, and trisomy 18    Cannot screen for open neural tube defects; maternal serum AFP after 15 weeks is recommended       Non-invasive Prenatal Testing (NIPT)    Maternal plasma cell-free DNA testing; first trimester ultrasound with nuchal translucency and nasal bone assessment is recommended, when appropriate    Screens for fetal trisomy 21, trisomy 13, trisomy 18, and sex chromosome aneuploidy    Cannot screen for open neural tube defects; maternal serum AFP after 15 weeks is recommended       Genetic Amniocentesis    Invasive procedure typically performed in the second trimester by which amniotic fluid is obtained for the purpose of chromosome analysis and/or other prenatal genetic analysis    Diagnostic results; >99% sensitivity for fetal chromosome abnormalities    AFAFP measurement tests for open neural tube defects       Comprehensive (Level II) ultrasound: Detailed ultrasound performed between 18-22 weeks gestation to screen for major birth defects and markers for aneuploidy.        We reviewed the benefits and limitations of this testing.  Screening tests provide a risk assessment specific to the pregnancy for certain fetal chromosome abnormalities, but cannot definitively diagnose or exclude a fetal chromosome abnormality.  Follow-up genetic counseling and consideration of diagnostic testing is recommended with any abnormal screening result.     Diagnostic tests carry inherent risks- including risk of miscarriage- that require careful consideration.  These tests can detect fetal chromosome abnormalities with greater than 99% certainty.  Results can be compromised by maternal cell contamination or mosaicism, and are limited by the resolution of cytogenetic G-banding technology.  There is no screening nor diagnostic test that can detect all forms of birth defects or mental disability.     It was a pleasure to be involved with Cathy graham care. Face-to-face time of the meeting was 25 minutes.    Kristine  [facility]  Certified Genetic Counselor  [phone]      Patient seen, evaluated and discussed with the Genetic Counseling Intern. I have verified the content of the note, which accurately reflects my assessment of the patient and the plan of care.   Supervising Genetic Counselor   Hamilton Prakash MS, [facility]   Maternal Fetal Medicine  [facility]   Phone: [phone]   Email: [email or web address]

## 2018-10-29 NOTE — PROGRESS NOTES
"Please see \"Imaging\" tab under \"Chart Review\" for details of today's US.    Allyson Austin, DO    "

## 2018-10-31 LAB — TSH RECEP AB SER-ACNC: <1 IU/L

## 2018-11-02 ENCOUNTER — TELEPHONE (OUTPATIENT)
Dept: MATERNAL FETAL MEDICINE | Facility: CLINIC | Age: 38
End: 2018-11-02

## 2018-11-02 LAB — LAB SCANNED RESULT: NORMAL

## 2018-11-02 NOTE — TELEPHONE ENCOUNTER
"11/2/2018    I called Cathy to discuss her normal \"Innatal\" cell-free fetal DNA screening results.  Results came back negative for chromosome abnormalities in chromosomes 21, 18, & 13, as well as the sex chromosomes.  These normal results suggest the likelihood of fetal Down syndrome, trisomy 13, or trisomy 18 is very low. Results consistent with two sex chromosomes (XX) (See scanned report under lab tab in epic). Sex chromosome information was relayed to the patient.    Discussed high detection rates for fetal Down syndrome, trisomies 13 and 18, and fetal sex chromosome abnormalities; however, not 100%. While this test is a highly accurate screen, it does not replace the diagnostic capabilities of standard prenatal diagnostic tests such as amniocentesis and CV[facility]. Cathy indicated her understanding and currently declines prenatal diagnosis.     Plan:  Level II ultrasound is recommended, given advanced maternal age. This has been scheduled within [facility] for 12/10/18.  MSAFP is the appropriate second trimester screening test for open neural tube defects; the maternal quad screen is not indicated.    Kristine Brantley MS, [facility]  Licensed Genetic Counselor  Phone: [phone]  "

## 2018-11-04 ENCOUNTER — NURSE TRIAGE (OUTPATIENT)
Dept: NURSING | Facility: CLINIC | Age: 38
End: 2018-11-04

## 2018-11-04 NOTE — TELEPHONE ENCOUNTER
Additional Information    Negative: Shock suspected (e.g., cold/pale/clammy skin, too weak to stand, low BP, rapid pulse)    Negative: SEVERE abdominal pain    Negative: Sounds like a life-threatening emergency to the triager    Negative: Major injury to the back (e.g., MVA, fall > 10 feet or 3 meters, penetrating injury, etc.)    Negative: Followed a tailbone injury    Negative: [1] Having contractions or other symptoms of labor AND [2] >= 37 weeks pregnant (i.e., term pregnancy)    Negative: [1] Having contractions or other symptoms of labor AND [2] < 37 weeks pregnant (i.e., )    Negative: [1] Abdominal pain AND [2] pregnant > 20 weeks    Negative: [1] Abdominal pain AND [2] pregnant < 20 weeks    Negative: [1] Pain in the upper back AND [2] overlies the rib cage    Negative: [1] Pain in the upper back AND [2] worsened by coughing (or clearly increases with breathing)    Negative: [1] Unable to urinate (or only a few drops) > 4 hours AND     [2] bladder feels very full (e.g., palpable bladder or strong urge to urinate)    Negative: Numbness in groin or rectal area (i.e., loss of sensation)    Negative: Leakage of fluid from vagina    Negative: [1] Pregnant 23 or more weeks AND [2] baby is moving less today (e.g., kick count < 5 in 1 hour or < 10 in 2 hours)    Negative: Weakness of a leg or foot (e.g., unable to bear weight, dragging foot)    Negative: Unable to walk    Negative: Patient sounds very sick or weak to the triager    Negative: [1] SEVERE back pain (e.g., excruciating, unable to do any normal activities) AND [2] not improved 2 hours after pain medicine    Negative: [1] Pain radiates into the thigh or further down the leg AND [2] both legs    Negative: [1] Flank pain (i.e., in side, below ribs and above hip) AND [2] fever > 100.5 F (38.1 C)    Negative: Pain or burning with urination    Negative: Numbness in a leg or foot (i.e., loss of sensation)    Negative: High-risk adult (e.g., history  "of cancer, HIV, or IV drug abuse)    Negative: Flank pain  (Exception: pain that is only present with movement)    Negative: Rash in same area as pain (may be described as \"small blisters\")    Negative: Blood in urine (red, pink, or tea-colored)    Negative: [1] Increase in vaginal discharge AND [2] < 37 weeks pregnant (i.e., )    Negative: [1] MODERATE back pain (e.g., interferes with normal activities or sleep) AND [2] present > 3 days    Negative: [1] Pain radiates into the thigh or further down the leg AND [2] one leg    Caused by a twisting, bending, or lifting injury (all triage questions negative)    Negative: Back pain present > 2 weeks    Answer Assessment - Initial Assessment Questions  1. ONSET: \"When did the pain begin?\"       today  2. LOCATION: \"Where does it hurt?\" (upper, mid or lower back)      Low back  3. SEVERITY: \"How bad is the pain?\"  (e.g., Scale 1-10; mild, moderate, or severe)    - MILD (1-3): doesn't interfere with normal activities     - MODERATE (4-7): interferes with normal activities or awakens from sleep     - SEVERE (8-10): excruciating pain, unable to do any normal activities       moderate  4. PATTERN: \"Is the pain constant?\" (e.g., yes, no; constant, intermittent)       constant  5. RADIATION: \"Does the pain shoot into your legs or elsewhere?\"      no  6. CAUSE:  \"What do you think is causing the back pain?\"       Has history of back pain  7. BACK OVERUSE:  \"Any recent lifting of heavy objects, strenuous work or exercise?\"      Bending over  8. MEDICATIONS: \"What have you taken so far for the pain?\" (e.g., nothing, acetaminophen)      no  9. NEUROLOGIC SYMPTOMS: \"Do you have any weakness, numbness, or problems with bowel/bladder control?\"      no  10. OTHER SYMPTOMS: \"Do you have any other symptoms?\" (e.g., fever, abdominal pain, burning with urination, blood in urine, fluid leaking from vagina)        no  11. JILL: \"What date are you expecting to deliver?\"        13 " weeks    Protocols used: PREGNANCY - BACK PAIN-ADULT-AH

## 2018-11-12 ENCOUNTER — PRENATAL OFFICE VISIT (OUTPATIENT)
Dept: MIDWIFE SERVICES | Facility: CLINIC | Age: 38
End: 2018-11-12
Payer: MEDICAID

## 2018-11-12 VITALS
WEIGHT: 248 LBS | TEMPERATURE: 98.2 F | DIASTOLIC BLOOD PRESSURE: 81 MMHG | HEART RATE: 103 BPM | SYSTOLIC BLOOD PRESSURE: 125 MMHG | BODY MASS INDEX: 37.71 KG/M2

## 2018-11-12 DIAGNOSIS — Z86.39 HISTORY OF GRAVES' DISEASE: ICD-10-CM

## 2018-11-12 DIAGNOSIS — O09.522 ELDERLY MULTIGRAVIDA IN SECOND TRIMESTER: Primary | ICD-10-CM

## 2018-11-12 DIAGNOSIS — E89.0 POSTSURGICAL HYPOTHYROIDISM: ICD-10-CM

## 2018-11-12 LAB — TSH SERPL DL<=0.005 MIU/L-ACNC: 2.63 MU/L (ref 0.4–4)

## 2018-11-12 PROCEDURE — 36415 COLL VENOUS BLD VENIPUNCTURE: CPT | Performed by: ADVANCED PRACTICE MIDWIFE

## 2018-11-12 PROCEDURE — 84436 ASSAY OF TOTAL THYROXINE: CPT

## 2018-11-12 PROCEDURE — 84443 ASSAY THYROID STIM HORMONE: CPT | Performed by: PATHOLOGY

## 2018-11-12 PROCEDURE — 99213 OFFICE O/P EST LOW 20 MIN: CPT | Performed by: ADVANCED PRACTICE MIDWIFE

## 2018-11-12 NOTE — PROGRESS NOTES
14w2d  Patient feeling well. Denies any vaginal bleeding, water leaking, abdominal pain, or other concerns.   Discussed genetic testing. Found out they are having a girl, Nicol Means. Innatal screen normal. Needs AFP at next visit.   Feeling like she has more palpations than she did last pregnancy. Getting them daily. Needs thyroid labs today. If they are normal discussed going to cardiology for a work up. If labs are abnormal, potentially related to her thyroid. She use to get this as a symptom prior to having her thyroid removed and starting on Synthroid. Drinking enough water and eating enough.   Also wondering about taking magnesium for her constipation. Senna/Colace were too strong and gave her diarrhea. Will try supplementing with magnesium.   Danger signs discussed. Patient knows when to call triage and has numbers to call.   Follow up after fetal survey with M.    Idalia Prakash CNM

## 2018-11-12 NOTE — MR AVS SNAPSHOT
After Visit Summary   11/12/2018    Cathy Arroyo    MRN: [id number]           Patient Information     Date Of Birth          1980        Visit Information        Provider Department      11/12/2018 9:00 AM [facility]ash [facility]        Today's Diagnoses     Elderly multigravida in second trimester    -  1    History of Graves' disease        Postsurgical hypothyroidism           Follow-ups after your visit        Your next 10 appointments already scheduled     Nov 20, 2018  3:00 PM CST   New Sleep Patient with JANNET Roe [facility])    [address] [facility] [address]   [phone]            Dec 10, 2018  9:30 AM CST   ESTHER US COMP with [id number]   [facility] - [facility])    [address]  [facility] [id number] [phone]           Wear comfortable clothes and leave your valuables at home.            Dec 10, 2018 10:00 AM CST   Radiology MD with UR ESTHER KANG   [facility] - [facility])    [address]  [facility] [address]   [phone]           Please arrive at the time given for your first appointment. This visit is used internally to schedule the physician's time during your ultrasound.            Dec 10, 2018 10:30 AM CST   ESTABLISHED PRENATAL with JANNET Farnsworth [facility] ([facility])    [address] [place] [address] 700  [facility] [id number] [phone]            Jan 28, 2019  2:40 PM CST   (Arrive by 2:25 PM)   RETURN ENDOCRINE with Leatha Franco MD   [facility] ([facility])    [address]  3rd Floor  [facility] [id number] [phone]               Who to contact     If you have questions or need follow up information about today's clinic visit or your schedule please contact [facility] directly at [phone].  Normal or non-critical lab and imaging results will be communicated to you by MyChart, letter or phone within 4 business days after the clinic has received the results. If you do not hear from us within 7 days, please contact the clinic through [employer] or phone. If you have a critical or abnormal lab result, we will notify you by phone as soon as possible.  Submit refill requests through [employer] or call your pharmacy and they will forward the refill request to us. Please allow 3 business days for your refill to be completed.          Additional Information About Your Visit        Ontodia Information     [employer] gives you secure access to your electronic health record. If you see a primary care provider, you can also send messages to your care team and make appointments. If you have questions, please call your primary care clinic.  If you do not have a primary care provider, please call [phone] and they will assist you.        Care EveryWhere ID     This is your Care EveryWhere ID. This could be used by other organizations to access your [facility] medical records  [id number]        Your Vitals Were     Pulse Temperature Last Period BMI (Body Mass Index)          103 98.2  F (36.8  C) (Oral) 08/04/2018 37.71 kg/m2         Blood Pressure from Last 3 Encounters:   11/12/18 125/81   10/16/18 117/80   09/25/18 127/77    Weight from Last 3 Encounters:   11/12/18 248 lb (112.5 kg)   10/16/18 239 lb (108.4 kg)   09/25/18 231 lb 4.8 oz (104.9 kg)              We Performed the Following     Thyroxine total     TSH        Primary Care Provider Office Phone # Fax #    JANNET Nascimento [phone]       [address] [facility] 700  [facility] [address]        Equal Access to Services     NALDO HDEZ AH: Ashly diana  Soyvette, waerinnda luqadaha, qaybta kaalmada cleo, becca rodríguezmatthias aguilar vivianaaraceli lasarahchristy dominick. So [facility] [phone].    ATENCIÓN: Si srikanth menchaca, tiene a willard disposición servicios gratuitos de asistencia lingüística. Portia al [phone].    We comply with applicable federal civil rights laws and [place] laws. We do not discriminate on the basis of race, color, national origin, age, disability, sex, sexual orientation, or gender identity.            Thank you!     Thank you for choosing [facility]  for your care. Our goal is always to provide you with excellent care. Hearing back from our patients is one way we can continue to improve our services. Please take a few minutes to complete the written survey that you may receive in the mail after your visit with us. Thank you!             Your Updated Medication List - Protect others around you: Learn how to safely use, store and throw away your medicines at www.disposemymeds.org.          This list is accurate as of 11/12/18  9:26 AM.  Always use your most recent med list.                   Brand Name Dispense Instructions for use Diagnosis    albuterol 108 (90 Base) MCG/ACT inhaler    PROAIR HFA/PROVENTIL HFA/VENTOLIN HFA    1 Inhaler    Inhale 2 puffs into the lungs every 4 hours as needed for shortness of breath / dyspnea or wheezing    Mild intermittent asthma without complication       ALLEGRA PO           aspirin 81 MG tablet     90 tablet    Take 1 tablet (81 mg) by mouth daily    History of gestational hypertension       cetirizine 10 MG tablet    zyrTEC     Take 5 mg by mouth daily        DHA OMEGA 3 PO           ipratropium - albuterol 0.5 mg/2.5 mg/3 mL 0.5-2.5 (3) MG/3ML neb solution    DUONEB    90 vial    Take 1 vial (3 mLs) by nebulization every 6 hours as needed for shortness of breath / dyspnea or wheezing    Mild persistent asthma with acute exacerbation       levothyroxine 175 MCG tablet    SYNTHROID/LEVOTHROID    90 tablet     Take 1 tablet (175 mcg) by mouth daily    Post-surgical hypothyroidism, History of Graves' disease, Pregnancy, incidental       mometasone-formoterol 200-5 MCG/ACT oral inhaler    DULERA    3 Inhaler    Inhale 2 puffs into the lungs 2 times daily    Moderate persistent asthma with acute exacerbation       PRENATAL VITAMIN PO

## 2018-11-13 LAB — T4 SERPL-MCNC: 17.1 UG/DL (ref 4.5–13.9)

## 2018-11-19 ENCOUNTER — PRE VISIT (OUTPATIENT)
Dept: SLEEP MEDICINE | Facility: CLINIC | Age: 38
End: 2018-11-19

## 2018-11-19 NOTE — TELEPHONE ENCOUNTER
"  1.  Reason for the visit:  Consul to discuss snoring  2.  Referring provider and clinic name:  Sherrill Dasilva JANNET  3.  Previous Sleep Doctor or Pulmonlogist (clinic name)?  None  4.  Records, Procedures, Imaging, and Labs (see below)  No records to obtain        All NOTES from previous office visits that pertain to why they are being seen in the Sleep Center    Previous Sleep Studies, Chest CT, Echos and reports that pertain to why they are seeing Sleep Center    All Sleep records that have been done in the last 2 years that pertain to why they are seeing Sleep Center            Are they being seen for continuation of care for Cpap/Bipap/Avap/Trilogy/Dental Device? none    If yes to above Who and Where was Device issued/currently getting supplies from? na    Are you currently on \"Supplemental Oxygen\" during the day or night?   na                                                                                                                                                      Please remind pt to bring Cpap machine and ask to arrive 15 minutes early to appointment due traffic and congestion                                                 5. Pt Sleep Center Packet received pt will complete and bring to appt    Yes: \"please make sure that you bring this to your appointment completed, either the doctor will not see you until this completed or you may be asked to reschedule your appointment.\"     No: mail or email to the pt and explain, \"please make sure that you bring this to your appointment completed, either the doctor will not see you until this completed or you may be asked to reschedule your appointment.\"     ~If pt coming early to fill packet out, ask that they come 30 minutes prior to their appointment~     6. Has the pt's medication list been updated and preferred pharmacy added?     7. Has the allergy list been reviewed?    \"Thank you for choosing [facility] and we look forward to seeing you at your " "upcoming appointment\"     "

## 2018-11-20 ENCOUNTER — THERAPY VISIT (OUTPATIENT)
Dept: SLEEP MEDICINE | Facility: CLINIC | Age: 38
End: 2018-11-20
Payer: MEDICAID

## 2018-11-20 ENCOUNTER — OFFICE VISIT (OUTPATIENT)
Dept: SLEEP MEDICINE | Facility: CLINIC | Age: 38
End: 2018-11-20
Payer: MEDICAID

## 2018-11-20 VITALS
BODY MASS INDEX: 37.74 KG/M2 | WEIGHT: 249 LBS | OXYGEN SATURATION: 98 % | SYSTOLIC BLOOD PRESSURE: 113 MMHG | RESPIRATION RATE: 18 BRPM | HEIGHT: 68 IN | DIASTOLIC BLOOD PRESSURE: 68 MMHG | HEART RATE: 84 BPM

## 2018-11-20 DIAGNOSIS — G25.81 RESTLESS LEGS SYNDROME (RLS): ICD-10-CM

## 2018-11-20 DIAGNOSIS — G25.81 RESTLESS LEGS SYNDROME (RLS): Primary | ICD-10-CM

## 2018-11-20 DIAGNOSIS — R06.83 SNORING: ICD-10-CM

## 2018-11-20 PROCEDURE — 95810 POLYSOM 6/> YRS 4/> PARAM: CPT | Performed by: INTERNAL MEDICINE

## 2018-11-20 PROCEDURE — 99215 OFFICE O/P EST HI 40 MIN: CPT | Performed by: NURSE PRACTITIONER

## 2018-11-20 NOTE — PROGRESS NOTES
Allergies:    Allergies   Allergen Reactions     Codeine Sulfate Nausea and Vomiting     Vicodin [Hydrocodone-Acetaminophen] Nausea and Vomiting     Methimazole Rash       Medications:    Current Outpatient Prescriptions   Medication Sig Dispense Refill     albuterol (PROAIR HFA/PROVENTIL HFA/VENTOLIN HFA) 108 (90 BASE) MCG/ACT Inhaler Inhale 2 puffs into the lungs every 4 hours as needed for shortness of breath / dyspnea or wheezing 1 Inhaler 3     aspirin 81 MG tablet Take 1 tablet (81 mg) by mouth daily 90 tablet 3     cetirizine (ZYRTEC) 10 MG tablet Take 5 mg by mouth daily       Docosahexaenoic Acid (DHA OMEGA 3 PO)        Fexofenadine HCl (ALLEGRA PO)        ipratropium - albuterol 0.5 mg/2.5 mg/3 mL (DUONEB) 0.5-2.5 (3) MG/3ML neb solution Take 1 vial (3 mLs) by nebulization every 6 hours as needed for shortness of breath / dyspnea or wheezing 90 vial 3     levothyroxine (SYNTHROID/LEVOTHROID) 175 MCG tablet Take 1 tablet (175 mcg) by mouth daily 90 tablet 3     mometasone-formoterol (DULERA) 200-5 MCG/ACT oral inhaler Inhale 2 puffs into the lungs 2 times daily 3 Inhaler 1     Montelukast Sodium (SINGULAIR PO) Take 10 mg by mouth       Prenatal Vit-Fe Fumarate-FA (PRENATAL VITAMIN PO)          Problem List:  Patient Active Problem List    Diagnosis Date Noted     Supervision of other normal pregnancy, antepartum 10/16/2018     Priority: Medium     FOB: Jamil, daughter Nery, 4 years old  Returning CN patient  History of gestational hypertension with postdates IOL last pregnancy - treated with magnesium and labetalol during labor - RX given for aspirin therapy and labs drawn      Genetic testing with MFM - NT WNL, innatal labs WNL. Needs AFP.        Need for Tdap vaccination 09/18/2018     Priority: Medium     Nonintractable migraine, unspecified migraine type 04/26/2018     Priority: Medium     History of Graves' disease 10/24/2017     Priority: Medium     Hypocalcemia 06/16/2017     Priority: Medium      S/P total thyroidectomy 06/01/2017     Priority: Medium     10/16/2018 Taking 150 mcg of synthroid. Labs drawn with new OB visit, following endocrinology closely.        Abnormal cytology 05/02/2017     Priority: Medium     Moderate persistent asthma without complication 01/18/2017     Priority: Medium     Chronic rhinitis 01/18/2017     Priority: Medium     Tobacco use disorder 01/18/2017     Priority: Medium     Quit once she found out about the pregnancy        Pre-eclampsia 10/14/2016     Priority: Medium     10/16/2018 - was induced for postdates and gestational hypertension with last pregnancy. No history of chronic hypertension. RX for aspirin sent and labs drawn at new OB visit.        Bipolar affective disorder in remission (H) 03/15/2016     Priority: Medium     10/16/2018 Not taking medications at this time. Feels like things are going well. Seeing a therapist once a week and has regular contact with her psychiatrist team.        Pineal gland, tumor 01/15/2014     Priority: Medium     Seasonal allergic rhinitis 06/03/2013     Priority: Medium     Attention deficit disorder 09/21/2011     Priority: Medium        Past Medical/Surgical History:  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     Post partum depression      Post-surgical hypothyroidism 05/2017     Uncomplicated asthma      Past Surgical History:   Procedure Laterality Date     BLOOD PATCH N/A 10/11/2016    Procedure: EPIDURAL BLOOD PATCH;  Surgeon: GENERIC ANESTHESIA PROVIDER;  Location: [facility] OR     CRANIOTOMY, EXCISE TUMOR COMPLEX, COMBINED  2/19/2014    Procedure: COMBINED CRANIOTOMY, EXCISE TUMOR COMPLEX;  Supracerabellar  Infratentorial Approach for Resection of Tumor ;  Surgeon: Kate Mckeon MD;  Location: [facility] OR     THYROIDECTOMY N/A  "5/3/2017    Procedure: THYROIDECTOMY;  Total Thyroidectomy;  Surgeon: Pamela Villasenor MD;  Location[facility] OR           Physical Examination:  Vitals: /68  Pulse 84  Resp 18  Ht 1.727 m (5' 8\")  Wt 112.9 kg (249 lb)  LMP 08/04/2018  SpO2 98%  BMI 37.86 kg/m2  BMI= Body mass index is 37.86 kg/(m^2).    Neck Cir (cm): 40 cm    [place] Total Score 11/20/2018   Total score - [place] 6   cta, mal III-IV redund  elong with scallop nsd dec on R    {SHORT EXAM:343987}  Musculoskeletal:  Mallampati Class: {MARGAUX NUMERAL I-IV:783411}.  Tonsillar Stage: {NUMBERS 1-4:064090}.  Dental:         CC: Sherrill Dasilva        "

## 2018-11-20 NOTE — MR AVS SNAPSHOT
After Visit Summary   11/20/2018    Cathy Arroyo    MRN: [id number]           Patient Information     Date Of Birth          1980        Visit Information        Provider Department      11/20/2018 8:00 PM SLEEP STUDY RM 3 [facility]        Today's Diagnoses     Snoring        Restless legs syndrome (RLS)          Care Instructions    [facility]    1. Your sleep study will be reviewed by a sleep physician within the next few days.     2. Please follow up in the sleep clinic as scheduled, or, make an appointment with your sleep provider to be seen within two weeks to discuss the results of the sleep study.    3. If you have any questions or problems with your treatment plan, please contact your sleep clinic provider at [phone] to further manage your condition.    4. Please review your attached medication list, and, at your follow-up appointment advise your sleep clinic provider about any changes.    5. Go to [email or web address] for more information about your sleep problems.    Avila Saldana, [facility]  November 21, 2018                Follow-ups after your visit        Your next 10 appointments already scheduled     Nov 26, 2018 11:00 AM CST   [facility] Nury Bales OD   [facility]    [address]  [address]  [facility] [address] [phone]            Dec 10, 2018  9:30 AM ZACKARY SANTANA US COMP with URMFMUSR3   [facility])    [address]  [facility] [address] [phone]           Wear comfortable clothes and leave your valuables at home.            Dec 10, 2018 10:00 AM CST   Radiology MD with GÓMEZ SANTANA MD   [facility])    [address]  [facility] [address]    [phone]           Please arrive at the time given for your first appointment. This visit is used internally to schedule the physician's time during your ultrasound.            Dec 10, 2018 10:30 AM CST   ESTABLISHED PRENATAL with JANNET Farnsworth CNM   [facility]    [address] [place] [address] 700  [facility] [id number] [phone]            Dec 18, 2018  9:00 AM CST   Return Sleep Patient with JANNET Roe CNP   [facility] ([facility])    [address] [place] [id number] [phone]            Jan 28, 2019  2:40 PM CST   (Arrive by 2:25 PM)   RETURN ENDOCRINE with Leatha Franco MD   [facility] End[facility]ology ([facility])    [address] [place] [address]  3rd Floor  [facility] [id number] [phone]              Future tests that were ordered for you today     Open Future Orders        Priority Expected Expires Ordered    Ferritin Routine  1/19/2019 11/20/2018    Iron and Iron Binding Capacity Routine  1/19/2019 11/20/2018            Who to contact     If you have questions or need follow up information about today's clinic visit or your schedule please contact [facility] directly at [phone].  Normal or non-critical lab and imaging results will be communicated to you by MyChart, letter or phone within 4 business days after the clinic has received the results. If you do not hear from us within 7 days, please contact the clinic through MyChart or phone. If you have a critical or abnormal lab result, we will notify you by phone as soon as possible.  Submit refill requests through Monitor or call your pharmacy and they will forward the refill request to us. Please allow 3 business days for your refill to be completed.          Additional Information About Your Visit        MyChart Information      [employer] gives you secure access to your electronic health record. If you see a primary care provider, you can also send messages to your care team and make appointments. If you have questions, please call your primary care clinic.  If you do not have a primary care provider, please call [phone] and they will assist you.        Care EveryWhere ID     This is your Care EveryWhere ID. This could be used by other organizations to access your [facility] medical records  [id number]        Your Vitals Were     Last Period                   08/04/2018            Blood Pressure from Last 3 Encounters:   11/20/18 113/68   11/12/18 125/81   10/16/18 117/80    Weight from Last 3 Encounters:   11/20/18 112.9 kg (249 lb)   11/12/18 112.5 kg (248 lb)   10/16/18 108.4 kg (239 lb)              We Performed the Following     Comprehensive Sleep Study        Primary Care Provider Office Phone # Fax #    Sherrill JANNET [facility] [phone]       [address] [facility] 700  [facility] [address]        Equal Access to Services     [facility] KETTY : Hadii aad ku hadasho Soomaali, waaxda luqadaha, qaybta kaalmada adeegyagene, becca schrader . So [facility] [phone].    ATENCIÓN: Si habla español, tiene a willard disposición servicios gratuitos de asistencia lingüística. Portia al [phone].    We comply with applicable federal civil rights laws and [place] laws. We do not discriminate on the basis of race, color, national origin, age, disability, sex, sexual orientation, or gender identity.            Thank you!     Thank you for choosing [facility]  for your care. Our goal is always to provide you with excellent care. Hearing back from our patients is one way we can continue to improve our services. Please take a few minutes to complete the written survey that you may receive in the mail after your visit with us. Thank you!             Your Updated Medication List - Protect others  around you: Learn how to safely use, store and throw away your medicines at www.disposemymeds.org.          This list is accurate as of 11/20/18 11:59 PM.  Always use your most recent med list.                   Brand Name Dispense Instructions for use Diagnosis    albuterol 108 (90 Base) MCG/ACT inhaler    PROAIR HFA/PROVENTIL HFA/VENTOLIN HFA    1 Inhaler    Inhale 2 puffs into the lungs every 4 hours as needed for shortness of breath / dyspnea or wheezing    Mild intermittent asthma without complication       ALLEGRA PO           aspirin 81 MG tablet     90 tablet    Take 1 tablet (81 mg) by mouth daily    History of gestational hypertension       cetirizine 10 MG tablet    zyrTEC     Take 5 mg by mouth daily        DHA OMEGA 3 PO           ipratropium - albuterol 0.5 mg/2.5 mg/3 mL 0.5-2.5 (3) MG/3ML neb solution    DUONEB    90 vial    Take 1 vial (3 mLs) by nebulization every 6 hours as needed for shortness of breath / dyspnea or wheezing    Mild persistent asthma with acute exacerbation       levothyroxine 175 MCG tablet    SYNTHROID/LEVOTHROID    90 tablet    Take 1 tablet (175 mcg) by mouth daily    Post-surgical hypothyroidism, History of Graves' disease, Pregnancy, incidental       mometasone-formoterol 200-5 MCG/ACT oral inhaler    DULERA    3 Inhaler    Inhale 2 puffs into the lungs 2 times daily    Moderate persistent asthma with acute exacerbation       PRENATAL VITAMIN PO           SINGULAIR PO      Take 10 mg by mouth

## 2018-11-20 NOTE — PATIENT INSTRUCTIONS
"MY TREATMENT INFORMATION FOR SLEEP APNEA-  Cathy Arroyo  NP : Jesenia Caballero [facility]  SLEEP CENTER :  [facility]    MY CONTACT NUMBER: [phone]  DB [place]  If you need to cancel your sleep study, please call as soon as possible.  If you were prescribed a sleep aid, bring that to the sleep lab.    Please remember: do not drive if sleepy    I'll contact you: re: unisom dose and when we'll start cpap with your study  When we return: pineal gland      Am I having a sleep study at a sleep center?  Make sure you have an appointment for the study before you leave!    Am I having a home sleep study?  Watch this video:  [email or web address]/watch?v=[email or web address]&[email or web address]  Please verify your insurance coverage with your insurance carrier    Frequently asked questions:  1. What is Obstructive Sleep Apnea (MELINA)? MELINA is the most common type of sleep apnea. Apnea means, \"without breath.\"  Apnea is most often caused by narrowing or collapse of the upper airway as muscles relax during sleep.   Almost everyone has occasional apneas. Most people with sleep apnea have had brief interruptions at night frequently for many years.  The severity of sleep apnea is related to how frequent and severe the events are.   2. What are the consequences of MELINA? Symptoms include: feeling sleepy during the day, snoring loudly, gasping or stopping of breathing, trouble sleeping, and occasionally morning headaches or heartburn at night.  Sleepiness can be serious and even increase the risk of falling asleep while driving. Other health consequences may include development of high blood pressure and other cardiovascular disease in persons who are susceptible. Untreated MELINA  can contribute to heart disease, stroke and diabetes.   3. What are the treatment options? In most situations, sleep apnea is a lifelong disease that must be managed with daily therapy. Medications are not effective for sleep apnea " and surgery is generally not considered until other therapies have been tried. Your treatment is your choice . Continuous Positive Airway (CPAP) works right away and is the therapy that is effective in nearly everyone. An oral device to hold your jaw forward is usually the next most reliable option. Other options include postioning devices (to keep you off your back), weight loss, and surgery including a tongue pacing device. There is more detail about some of these options below.    Important tips for using CPAP and similar devices   Know your equipment:  CPAP is continuous positive airway pressure that prevents obstructive sleep apnea by keeping the throat from collapsing while you are sleeping. In most cases, the device is  smart  and can slowly self-adjusts if your throat collapses and keeps a record every day of how well you are treated-this information is available to you and your care team.  BPAP is bilevel positive airway pressure that keeps your throat open and also assists each breath with a pressure boost to maintain adequate breathing.  Special kinds of BPAP are used in patients who have inadequate breathing from lung or heart disease. In most cases, the device is  smart  and can slowly self-adjusts to assist breathing. Like CPAP, the device keeps a record of how well you are treated.  Your mask is your connection to the device. You get to choose what feels most comfortable and the staff will help to make sure if fits. Here: are some examples of the different masks that are available:       Key points to remember on your journey with sleep apnea:  1. Sleep study.  PAP devices often need to be adjusted during a sleep study to show that they are effective and adjusted right.  2. Good tips to remember: Try wearing just the mask during a quiet time during the day so your body adapts to wearing it. A humidifier is recommended for comfort in most cases to prevent drying of your nose and throat. Allergy  medication from your provider may help you if you are having nasal congestion.  3. Getting settled-in. It takes more than one night for most of us to get used to wearing a mask. Try wearing just the mask during a quiet time during the day so your body adapts to wearing it. A humidifier is recommended for comfort in most cases. Our team will work with you carefully on the first day and will be in contact within 4 days and again at 2 and 4 weeks for advice and remote device adjustments. Your therapy is evaluated by the device each day.   4. Use it every night. The more you are able to sleep naturally for 7-8 hours, the more likely you will have good sleep and to prevent health risks or symptoms from sleep apnea. Even if you use it 4 hours it helps. Occasionally all of us are unable to use a medical therapy, in sleep apnea, it is not dangerous to miss one night.   5. Communicate. Call our skilled team on the number provided on the first day if your visit for problems that make it difficult to wear the device. Over 2 out of 3 patients can learn to wear the device long-term with help from our team. Remember to call our team or your sleep providers if you are unable to wear the device as we may have other solutions for those who cannot adapt to mask CPAP therapy. It is recommended that you sleep your sleep provider within the first 3 months and yearly after that if you are not having problems.   Take care of your equipment. Make sure you clean your mask and tubing using directions every day and that your filter and mask are replaced as recommended or if they are not working.     BESIDES CPAP, WHAT OTHER THERAPIES ARE THERE?  Positioning Device  Positioning devices are generally used when sleep apnea is mild and only occurs on your back.This example shows a pillow that straps around the waist. It may be appropriate for those whose sleep study shows milder sleep apnea that occurs primarily when lying flat on one's back.  Preliminary studies have shown benefit but effectiveness at home may need to be verified by a home sleep test. These devices are generally not covered by medical insurance.    FFor a less expensive option:          Nov[employer]re FP or [employer]apt[employer] for slumber bump pillow, or backpack w/ chest strap stuffed w/ pillow;  or t shirt w/2 pockets, sew in tennis balls    Positioning Device  Positioning devices are generally used when sleep apnea is mild and only occurs on your back.This example shows a pillow that straps around the waist. It may be appropriate for those whose sleep study shows milder sleep apnea that occurs primarily when lying flat on one's back. Preliminary studies have shown benefit but effectiveness at home may need to be verified by a home sleep test. These devices are generally not covered by medical insurance.  Examples of devices that maintain sleeping on the back to prevent snoring and mild sleep apnea.    Belt type body positioner  Http://Mychebao.com/    Electronic reminder  Http://nightshifttherapy.com/  Http://www.2degreesmobile.Kimengi.au/    Oral Appliance  What is oral appliance therapy?  An oral appliance device fits on your teeth at night like a retainer used after having braces. The device is made by a specialized dentist and requires several visits over 1-2 months before a manufactured device is made to fit your teeth and is adjusted to prevent your sleep apnea. Once an oral device is working properly, snoring should be improved. A home sleep test may be recommended at that time if to determine whether the sleep apnea is adequately treated.       Some things to remember:  -Oral devices are often, but not always, covered by your medical insurance. Be sure to check with your insurance provider.   -If you are referred for oral therapy, you will be given a list of specialized dentists to consider or you may choose to visit the Web site of the American Academy of Dental Sleep Medicine  -Oral devices are less likely to  work if you have severe sleep apnea or are extremely overweight.     More detailed information  An oral appliance is a small acrylic device that fits over the upper and lower teeth  (similar to a retainer or a mouth guard). This device slightly moves jaw forward, which moves the base of the tongue forward, opens the airway, improves breathing for effective treat snoring and obstructive sleep apnea in perhaps 7 out of 10 people .  The best working devices are custom-made by a dental device  after a mold is made of the teeth 1, 2, 3.  When is an oral appliance indicated?  Oral appliance therapy is recommended as a first-line treatment for patients with primary snoring, mild sleep apnea, and for patients with moderate sleep apnea who prefer appliance therapy to use of CPAP4, 5. Severity of sleep apnea is determined by sleep testing and is based on the number of respiratory events per hour of sleep.   How successful is oral appliance therapy?  The success rate of oral appliance therapy in patients with mild sleep apnea is 75-80% while in patients with moderate sleep apnea it is 50-70%. The chance of success in patients with severe sleep apnea is 40-50%. The research also shows that oral appliances have a beneficial effect on the cardiovascular health of MELINA patients at the same magnitude as CPAP therapy7.  Oral appliances should be a second-line treatment in cases of severe sleep apnea, but if not completely successful then a combination therapy utilizing CPAP plus oral appliance therapy may be effective. Oral appliances tend to be effective in a broad range of patients although studies show that the patients who have the highest success are females, younger patients, those with milder disease, and less severe obesity. 3, 6.   Finding a dentist that practices dental sleep medicine  Specific training is available through the American Academy of Dental Sleep Medicine for dentists interested in working in the  field of sleep. To find a dentist who is educated in the field of sleep and the use of oral appliances, near you, visit the Web site of the American Academy of Dental Sleep Medicine.    References  1. Umair et al. Objectively measured vs self-reported compliance during oral appliance therapy for sleep-disordered breathing. Chest 2013; 144(5): 7265-7496.  2. Tano, et al. Objective measurement of compliance during oral appliance therapy for sleep-disordered breathing. Thorax 2013; 68(1): 91-96.  3. Kana et al. Mandibular advancement devices in 620 men and women with MELINA and snoring: tolerability and predictors of treatment success. Chest 2004; 125: 5594-3188.  4. Josue et al. Oral appliances for snoring and MELINA: a review. Sleep 2006; 29: 244-262.  5. Louis et al. Oral appliance treatment for MELINA: an update. J Clin Sleep Med 2014; 10(2): 215-227.  6. Sarah et al. Predictors of OSAH treatment outcome. J Dent Res 2007; 86: 2293-2284.      Weight Loss:    Weight loss is a long-term strategy that may improve sleep apnea in some patients.    Weight management is a personal decision and the decision should be based on your interest and the potential benefits.  If you are interested in exploring weight loss strategies, the following discussion covers the impact on weight loss on sleep apnea and the approaches that may be successful.    Being overweight does not necessarily mean you will have health consequences.  Those who have BMI over 35 or over 27 with existing medical conditions carries greater risk.   Weight loss decreases severity of sleep apnea in most people with obesity. For those with mild obesity who have developed snoring with weight gain, even 15-30 pound weight loss can improve and occasionally eliminate sleep apnea.  Structured and life-long dietary and health habits are necessary to lose weight and keep healthier weight levels.     Though there may be significant health benefits  from weight loss, long-term weight loss is very difficult to achieve- studies show success with dietary management in less than 10% of people. In addition, substantial weight loss may require years of dietary control and may be difficult if patients have severe obesity. In these cases, surgical management may be considered.  Finally, older individuals who have tolerated obesity without health complications may be less likely to benefit from weight loss strategies.      [unfilled]    Surgery:    Surgery for obstructive sleep apnea is considered generally only when other therapies fail to work. Surgery may be discussed with you if you are having a difficult time tolerating CPAP and or when there is an abnormal structure that requires surgical correction.  Nose and throat surgeries often enlarge the airway to prevent collapse.  Most of these surgeries create pain for 1-2 weeks and up to half of the most common surgeries are not effective throughout life.  You should carefully discuss the benefits and drawbacks to surgery with your sleep provider and surgeon to determine if it is the best solution for you.   More information  Surgery for MELINA is directed at areas that are responsible for narrowing or complete obstruction of the airway during sleep.  There are a wide range of procedures available to enlarge and/or stabilize the airway to prevent blockage of breathing in the three major areas where it can occur: the palate, tongue, and nasal regions.  Successful surgical treatment depends on the accurate identification of the factors responsible for obstructive sleep apnea in each person.  A personalized approach is required because there is no single treatment that works well for everyone.  Because of anatomic variation, consultation with an examination by a sleep surgeon is a critical first step in determining what surgical options are best for each patient.  In some cases, examination during sedation may be recommended  in order to guide the selection of procedures.  Patients will be counseled about risks and benefits as well as the typical recovery course after surgery. Surgery is typically not a cure for a person s MELINA.  However, surgery will often significantly improve one s MELINA severity (termed  success rate ).  Even in the absence of a cure, surgery will decrease the cardiovascular risk associated with OSA7; improve overall quality of life8 (sleepiness, functionality, sleep quality, etc).      Palate Procedures:  Patients with MELINA often have narrowing of their airway in the region of their tonsils and uvula.  The goals of palate procedures are to widen the airway in this region as well as to help the tissues resist collapse.  Modern palate procedure techniques focus on tissue conservation and soft tissue rearrangement, rather than tissue removal.  Often the uvula is preserved in this procedure. Residual sleep apnea is common in patient after pharyngoplasty with an average reduction in sleep apnea events of 33%2.      Tongue Procedures:  ExamWhile patients are awake, the muscles that surround the throat are active and keep this region open for breathing. These muscles relax during sleep, allowing the tongue and other structures to collapse and block breathing.  There are several different tongue procedures available.  Selection of a tongue base procedure depends on characteristics seen on physical exam.  Generally, procedures are aimed at removing bulky tissues in this area or preventing the back of the tongue from falling back during sleep.  Success rates for tongue surgery range from 50-62%3.    Hypoglossal Nerve Stimulation:  Hypoglossal nerve stimulation has recently received approval from the [place] Food and Drug Administration for the treatment of obstructive sleep apnea.  This is based on research showing that the system was safe and effective in treating sleep apnea6.  Results showed that the median AHI score  decreased 68%, from 29.3 to 9.0. This therapy uses an implant system that senses breathing patterns and delivers mild stimulation to airway muscles, which keeps the airway open during sleep.  The system consists of three fully implanted components: a small generator (similar in size to a pacemaker), a breathing sensor, and a stimulation lead.  Using a small handheld remote, a patient turns the therapy on before bed and off upon awakening.    Candidates for this device must be greater than 22 years of age, have moderate to severe MELINA (AHI between 20-65), BMI less than 32, have tried CPAP/oral appliance without success, and have appropriate upper airway anatomy (determined by a sleep endoscopy performed by Dr. Vega).    Hypoglossal Nerve Stimulation Pathway:    The sleep surgeon s office will work with the patient through the insurance prior-authorization process (including communications and appeals).    Nasal Procedures:  Nasal obstruction can interfere with nasal breathing during the day and night.  Studies have shown that relief of nasal obstruction can improve the ability of some patients to tolerate positive airway pressure therapy for obstructive sleep apnea1.  Treatment options include medications such as nasal saline, topical corticosteroid and antihistamine sprays, and oral medications such as antihistamines or decongestants. Non-surgical treatments can include external nasal dilators for selected patients. If these are not successful by themselves, surgery can improve the nasal airway either alone or in combination with these other options.      Combination Procedures:  Combination of surgical procedures and other treatments may be recommended, particularly if patients have more than one area of narrowing or persistent positional disease.  The success rate of combination surgery ranges from 66-80%2,3.    References  1. Storm ROMEO. The Role of the Nose in Snoring and Obstructive Sleep Apnoea: An Update.   Eur Arch Otorhinolaryngol. 2011; 268: 1365-73.  2.  Markel SM; Vira JA; Russ JR; Pallanch JF; Sheila MB; Franki SG; Joseph RAY. Surgical modifications of the upper airway for obstructive sleep apnea in adults: a systematic review and meta-analysis. SLEEP 2010;33(10):2726-1101. Fran ANTONIO. Hypopharyngeal surgery in obstructive sleep apnea: an evidence-based medicine review.  Arch Otolaryngol Head Neck Surg. 2006 Feb;132(2):206-13.  3. Joe YH1, Lizeth Y, Ze SELIN. The efficacy of anatomically based multilevel surgery for obstructive sleep apnea. Otolaryngol Head Neck Surg. 2003 Oct;129(4):327-35.  4. Kezirian E, Goldberg A. Hypopharyngeal Surgery in Obstructive Sleep Apnea: An Evidence-Based Medicine Review. Arch Otolaryngol Head Neck Surg. 2006 Feb;132(2):206-13.  5. Bismark FELIPE et al. Upper-Airway Stimulation for Obstructive Sleep Apnea.  N Engl J Med. 2014 Jan 9;370(2):139-49.  6. Peromel Y et al. Increased Incidence of Cardiovascular Disease in Middle-aged Men with Obstructive Sleep Apnea. Am J Respir Crit Care Med; 2002 166: 159-165  7. Jatinder EM et al. Studying Life Effects and Effectiveness of Palatopharyngoplasty (SLEEP) study: Subjective Outcomes of Isolated Uvulopalatopharyngoplasty. Otolaryngol Head Neck Surg. 2011; 144: 623-631.              Your BMI is Body mass index is 37.86 kg/(m^2).  Weight management is a personal decision.  If you are interested in exploring weight loss strategies, the following discussion covers the approaches that may be successful. Body mass index (BMI) is one way to tell whether you are at a healthy weight, overweight, or obese. It measures your weight in relation to your height.  A BMI of 18.5 to 24.9 is in the healthy range. A person with a BMI of 25 to 29.9 is considered overweight, and someone with a BMI of 30 or greater is considered obese. More than two-thirds of American adults are considered overweight or obese.  Being overweight or obese increases the risk for further  weight gain. Excess weight may lead to heart disease and diabetes.  Creating and following plans for healthy eating and physical activity may help you improve your health.  Weight control is part of healthy lifestyle and includes exercise, emotional health, and healthy eating habits. Careful eating habits lifelong are the mainstay of weight control. Though there are significant health benefits from weight loss, long-term weight loss with diet alone may be very difficult to achieve- studies show long-term success with dietary management in less than 10% of people. Attaining a healthy weight may be especially difficult to achieve in those with severe obesity. In some cases, medications, devices and surgical management might be considered.  What can you do?  If you are overweight or obese and are interested in methods for weight loss, you should discuss this with your provider.     Consider reducing daily calorie intake by 500 calories.     Keep a food journal.     Avoiding skipping meals, consider cutting portions instead.    Diet combined with exercise helps maintain muscle while optimizing fat loss. Strength training is particularly important for building and maintaining muscle mass. Exercise helps reduce stress, increase energy, and improves fitness. Increasing exercise without diet control, however, may not burn enough calories to loose weight.       Start walking three days a week 10-20 minutes at a time    Work towards walking thirty minutes five days a week     Eventually, increase the speed of your walking for 1-2 minutes at time    In addition, we recommend that you review healthy lifestyles and methods for weight loss available through the National Institutes of Health patient information sites:  http://win.niddk.nih.gov/publications/index.htm    And look into health and wellness programs that may be available through your health insurance provider, employer, local community center, or donato club.    Weight  management plan: Patient was referred to their PCP to discuss a diet and exercise plan.

## 2018-11-20 NOTE — MR AVS SNAPSHOT
"              After Visit Summary   11/20/2018    Cathy Arroyo    MRN: [id number]           Patient Information     Date Of Birth          1980        Visit Information        Provider Department      11/20/2018 3:00 PM Jesenia Santamaria APRN CNP [facility] [place]        Today's Diagnoses     Restless legs syndrome (RLS)    -  1    Snoring          Care Instructions    MY TREATMENT INFORMATION FOR SLEEP APNEA-  Cathy Valadez Cruz  NP : Jesenia Santamaria  SLEEP CENTER :  [place]    MY CONTACT NUMBER: [phone]  DB Mosqueda  If you need to cancel your sleep study, please call as soon as possible.  If you were prescribed a sleep aid, bring that to the sleep lab.    Please remember: do not drive if sleepy    I'll contact you: re: unisom dose and when we'll start cpap with your study  When we return: pineal gland      Am I having a sleep study at a sleep center?  Make sure you have an appointment for the study before you leave!    Am I having a home sleep study?  Watch this video:  [email or web address]  Please verify your insurance coverage with your insurance carrier    Frequently asked questions:  1. What is Obstructive Sleep Apnea (MELINA)? MELINA is the most common type of sleep apnea. Apnea means, \"without breath.\"  Apnea is most often caused by narrowing or collapse of the upper airway as muscles relax during sleep.   Almost everyone has occasional apneas. Most people with sleep apnea have had brief interruptions at night frequently for many years.  The severity of sleep apnea is related to how frequent and severe the events are.   2. What are the consequences of MELINA? Symptoms include: feeling sleepy during the day, snoring loudly, gasping or stopping of breathing, trouble sleeping, and occasionally morning headaches or heartburn at night.  Sleepiness can be serious and even increase the risk of " falling asleep while driving. Other health consequences may include development of high blood pressure and other cardiovascular disease in persons who are susceptible. Untreated MELINA  can contribute to heart disease, stroke and diabetes.   3. What are the treatment options? In most situations, sleep apnea is a lifelong disease that must be managed with daily therapy. Medications are not effective for sleep apnea and surgery is generally not considered until other therapies have been tried. Your treatment is your choice . Continuous Positive Airway (CPAP) works right away and is the therapy that is effective in nearly everyone. An oral device to hold your jaw forward is usually the next most reliable option. Other options include postioning devices (to keep you off your back), weight loss, and surgery including a tongue pacing device. There is more detail about some of these options below.    Important tips for using CPAP and similar devices   Know your equipment:  CPAP is continuous positive airway pressure that prevents obstructive sleep apnea by keeping the throat from collapsing while you are sleeping. In most cases, the device is  smart  and can slowly self-adjusts if your throat collapses and keeps a record every day of how well you are treated-this information is available to you and your care team.  BPAP is bilevel positive airway pressure that keeps your throat open and also assists each breath with a pressure boost to maintain adequate breathing.  Special kinds of BPAP are used in patients who have inadequate breathing from lung or heart disease. In most cases, the device is  smart  and can slowly self-adjusts to assist breathing. Like CPAP, the device keeps a record of how well you are treated.  Your mask is your connection to the device. You get to choose what feels most comfortable and the staff will help to make sure if fits. Here: are some examples of the different masks that are available:       Key  points to remember on your journey with sleep apnea:  1. Sleep study.  PAP devices often need to be adjusted during a sleep study to show that they are effective and adjusted right.  2. Good tips to remember: Try wearing just the mask during a quiet time during the day so your body adapts to wearing it. A humidifier is recommended for comfort in most cases to prevent drying of your nose and throat. Allergy medication from your provider may help you if you are having nasal congestion.  3. Getting settled-in. It takes more than one night for most of us to get used to wearing a mask. Try wearing just the mask during a quiet time during the day so your body adapts to wearing it. A humidifier is recommended for comfort in most cases. Our team will work with you carefully on the first day and will be in contact within 4 days and again at 2 and 4 weeks for advice and remote device adjustments. Your therapy is evaluated by the device each day.   4. Use it every night. The more you are able to sleep naturally for 7-8 hours, the more likely you will have good sleep and to prevent health risks or symptoms from sleep apnea. Even if you use it 4 hours it helps. Occasionally all of us are unable to use a medical therapy, in sleep apnea, it is not dangerous to miss one night.   5. Communicate. Call our skilled team on the number provided on the first day if your visit for problems that make it difficult to wear the device. Over 2 out of 3 patients can learn to wear the device long-term with help from our team. Remember to call our team or your sleep providers if you are unable to wear the device as we may have other solutions for those who cannot adapt to mask CPAP therapy. It is recommended that you sleep your sleep provider within the first 3 months and yearly after that if you are not having problems.   Take care of your equipment. Make sure you clean your mask and tubing using directions every day and that your filter and mask  are replaced as recommended or if they are not working.     BESIDES CPAP, WHAT OTHER THERAPIES ARE THERE?  Positioning Device  Positioning devices are generally used when sleep apnea is mild and only occurs on your back.This example shows a pillow that straps around the waist. It may be appropriate for those whose sleep study shows milder sleep apnea that occurs primarily when lying flat on one's back. Preliminary studies have shown benefit but effectiveness at home may need to be verified by a home sleep test. These devices are generally not covered by medical insurance.    FFor a less expensive option:          Handle or Azuki (Vozero/Gengibre) for slumber bump pillow, or backpack w/ chest strap stuffed w/ pillow;  or t shirt w/2 pockets, sew in tennis balls    Positioning Device  Positioning devices are generally used when sleep apnea is mild and only occurs on your back.This example shows a pillow that straps around the waist. It may be appropriate for those whose sleep study shows milder sleep apnea that occurs primarily when lying flat on one's back. Preliminary studies have shown benefit but effectiveness at home may need to be verified by a home sleep test. These devices are generally not covered by medical insurance.  Examples of devices that maintain sleeping on the back to prevent snoring and mild sleep apnea.    Belt type body positioner  Http://Vend.Station X/    Electronic reminder  Http://nightshifttherapy.com/  Http://www.[employer].Station [employer].au/    Oral Appliance  What is oral appliance therapy?  An oral appliance device fits on your teeth at night like a retainer used after having braces. The device is made by a specialized dentist and requires several visits over 1-2 months before a manufactured device is made to fit your teeth and is adjusted to prevent your sleep apnea. Once an oral device is working properly, snoring should be improved. A home sleep test may be recommended at that time if to determine whether the sleep  apnea is adequately treated.       Some things to remember:  -Oral devices are often, but not always, covered by your medical insurance. Be sure to check with your insurance provider.   -If you are referred for oral therapy, you will be given a list of specialized dentists to consider or you may choose to visit the Web site of the American Academy of Dental Sleep Medicine  -Oral devices are less likely to work if you have severe sleep apnea or are extremely overweight.     More detailed information  An oral appliance is a small acrylic device that fits over the upper and lower teeth  (similar to a retainer or a mouth guard). This device slightly moves jaw forward, which moves the base of the tongue forward, opens the airway, improves breathing for effective treat snoring and obstructive sleep apnea in perhaps 7 out of 10 people .  The best working devices are custom-made by a dental device  after a mold is made of the teeth 1, 2, 3.  When is an oral appliance indicated?  Oral appliance therapy is recommended as a first-line treatment for patients with primary snoring, mild sleep apnea, and for patients with moderate sleep apnea who prefer appliance therapy to use of CPAP4, 5. Severity of sleep apnea is determined by sleep testing and is based on the number of respiratory events per hour of sleep.   How successful is oral appliance therapy?  The success rate of oral appliance therapy in patients with mild sleep apnea is 75-80% while in patients with moderate sleep apnea it is 50-70%. The chance of success in patients with severe sleep apnea is 40-50%. The research also shows that oral appliances have a beneficial effect on the cardiovascular health of MELINA patients at the same magnitude as CPAP therapy7.  Oral appliances should be a second-line treatment in cases of severe sleep apnea, but if not completely successful then a combination therapy utilizing CPAP plus oral appliance therapy may be effective.  Oral appliances tend to be effective in a broad range of patients although studies show that the patients who have the highest success are females, younger patients, those with milder disease, and less severe obesity. 3, 6.   Finding a dentist that practices dental sleep medicine  Specific training is available through the American Academy of Dental Sleep Medicine for dentists interested in working in the field of sleep. To find a dentist who is educated in the field of sleep and the use of oral appliances, near you, visit the Web site of the American Academy of Dental Sleep Medicine.    References  1. Umair et al. Objectively measured vs self-reported compliance during oral appliance therapy for sleep-disordered breathing. Chest 2013; 144(5): 3726-6994.  2. Tano et al. Objective measurement of compliance during oral appliance therapy for sleep-disordered breathing. Thorax 2013; 68(1): 91-96.  3. Kana et al. Mandibular advancement devices in 620 men and women with MELINA and snoring: tolerability and predictors of treatment success. Chest 2004; 125: 2552-9448.  4. Josue et al. Oral appliances for snoring and MELINA: a review. Sleep 2006; 29: 244-262.  5. oLuis, et al. Oral appliance treatment for MELINA: an update. J Clin Sleep Med 2014; 10(2): 215-227.  6. Sarah et al. Predictors of OSAH treatment outcome. J Dent Res 2007; 86: 8312-9238.      Weight Loss:    Weight loss is a long-term strategy that may improve sleep apnea in some patients.    Weight management is a personal decision and the decision should be based on your interest and the potential benefits.  If you are interested in exploring weight loss strategies, the following discussion covers the impact on weight loss on sleep apnea and the approaches that may be successful.    Being overweight does not necessarily mean you will have health consequences.  Those who have BMI over 35 or over 27 with existing medical conditions carries  greater risk.   Weight loss decreases severity of sleep apnea in most people with obesity. For those with mild obesity who have developed snoring with weight gain, even 15-30 pound weight loss can improve and occasionally eliminate sleep apnea.  Structured and life-long dietary and health habits are necessary to lose weight and keep healthier weight levels.     Though there may be significant health benefits from weight loss, long-term weight loss is very difficult to achieve- studies show success with dietary management in less than 10% of people. In addition, substantial weight loss may require years of dietary control and may be difficult if patients have severe obesity. In these cases, surgical management may be considered.  Finally, older individuals who have tolerated obesity without health complications may be less likely to benefit from weight loss strategies.      [unfilled]    Surgery:    Surgery for obstructive sleep apnea is considered generally only when other therapies fail to work. Surgery may be discussed with you if you are having a difficult time tolerating CPAP and or when there is an abnormal structure that requires surgical correction.  Nose and throat surgeries often enlarge the airway to prevent collapse.  Most of these surgeries create pain for 1-2 weeks and up to half of the most common surgeries are not effective throughout life.  You should carefully discuss the benefits and drawbacks to surgery with your sleep provider and surgeon to determine if it is the best solution for you.   More information  Surgery for MELINA is directed at areas that are responsible for narrowing or complete obstruction of the airway during sleep.  There are a wide range of procedures available to enlarge and/or stabilize the airway to prevent blockage of breathing in the three major areas where it can occur: the palate, tongue, and nasal regions.  Successful surgical treatment depends on the accurate identification  of the factors responsible for obstructive sleep apnea in each person.  A personalized approach is required because there is no single treatment that works well for everyone.  Because of anatomic variation, consultation with an examination by a sleep surgeon is a critical first step in determining what surgical options are best for each patient.  In some cases, examination during sedation may be recommended in order to guide the selection of procedures.  Patients will be counseled about risks and benefits as well as the typical recovery course after surgery. Surgery is typically not a cure for a person s MELINA.  However, surgery will often significantly improve one s MELINA severity (termed  success rate ).  Even in the absence of a cure, surgery will decrease the cardiovascular risk associated with OSA7; improve overall quality of life8 (sleepiness, functionality, sleep quality, etc).      Palate Procedures:  Patients with MELINA often have narrowing of their airway in the region of their tonsils and uvula.  The goals of palate procedures are to widen the airway in this region as well as to help the tissues resist collapse.  Modern palate procedure techniques focus on tissue conservation and soft tissue rearrangement, rather than tissue removal.  Often the uvula is preserved in this procedure. Residual sleep apnea is common in patient after pharyngoplasty with an average reduction in sleep apnea events of 33%2.      Tongue Procedures:  ExamWhile patients are awake, the muscles that surround the throat are active and keep this region open for breathing. These muscles relax during sleep, allowing the tongue and other structures to collapse and block breathing.  There are several different tongue procedures available.  Selection of a tongue base procedure depends on characteristics seen on physical exam.  Generally, procedures are aimed at removing bulky tissues in this area or preventing the back of the tongue from falling  back during sleep.  Success rates for tongue surgery range from 50-62%3.    Hypoglossal Nerve Stimulation:  Hypoglossal nerve stimulation has recently received approval from the [place] Food and Drug Administration for the treatment of obstructive sleep apnea.  This is based on research showing that the system was safe and effective in treating sleep apnea6.  Results showed that the median AHI score decreased 68%, from 29.3 to 9.0. This therapy uses an implant system that senses breathing patterns and delivers mild stimulation to airway muscles, which keeps the airway open during sleep.  The system consists of three fully implanted components: a small generator (similar in size to a pacemaker), a breathing sensor, and a stimulation lead.  Using a small handheld remote, a patient turns the therapy on before bed and off upon awakening.    Candidates for this device must be greater than 22 years of age, have moderate to severe MELINA (AHI between 20-65), BMI less than 32, have tried CPAP/oral appliance without success, and have appropriate upper airway anatomy (determined by a sleep endoscopy performed by Dr. Vega).    Hypoglossal Nerve Stimulation Pathway:    The sleep surgeon s office will work with the patient through the insurance prior-authorization process (including communications and appeals).    Nasal Procedures:  Nasal obstruction can interfere with nasal breathing during the day and night.  Studies have shown that relief of nasal obstruction can improve the ability of some patients to tolerate positive airway pressure therapy for obstructive sleep apnea1.  Treatment options include medications such as nasal saline, topical corticosteroid and antihistamine sprays, and oral medications such as antihistamines or decongestants. Non-surgical treatments can include external nasal dilators for selected patients. If these are not successful by themselves, surgery can improve the nasal airway either alone or in  combination with these other options.      Combination Procedures:  Combination of surgical procedures and other treatments may be recommended, particularly if patients have more than one area of narrowing or persistent positional disease.  The success rate of combination surgery ranges from 66-80%2,3.    References  1. Storm ROMEO. The Role of the Nose in Snoring and Obstructive Sleep Apnoea: An Update.  Eur Arch Otorhinolaryngol. 2011; 268: 1365-73.  2.  Markel SM; Vira JA; Russ JR; Pallanch JF; Sheila MB; Franki SG; Joseph RAY. Surgical modifications of the upper airway for obstructive sleep apnea in adults: a systematic review and meta-analysis. SLEEP 2010;33(10):3850-9959. Fran ANTONIO. Hypopharyngeal surgery in obstructive sleep apnea: an evidence-based medicine review.  Arch Otolaryngol Head Neck Surg. 2006 Feb;132(2):206-13.  3. Joe YH1, Lizeth Y, Ze SELIN. The efficacy of anatomically based multilevel surgery for obstructive sleep apnea. Otolaryngol Head Neck Surg. 2003 Oct;129(4):327-35.  4. Fran ANTONIO, Goldberg A. Hypopharyngeal Surgery in Obstructive Sleep Apnea: An Evidence-Based Medicine Review. Arch Otolaryngol Head Neck Surg. 2006 Feb;132(2):206-13.  5. Bismark FELIPE et al. Upper-Airway Stimulation for Obstructive Sleep Apnea.  N Engl J Med. 2014 Jan 9;370(2):139-49.  6. Lorelei Y et al. Increased Incidence of Cardiovascular Disease in Middle-aged Men with Obstructive Sleep Apnea. Am J Respir Crit Care Med; 2002 166: 159-165  7. Tobias EM et al. Studying Life Effects and Effectiveness of Palatopharyngoplasty (SLEEP) study: Subjective Outcomes of Isolated Uvulopalatopharyngoplasty. Otolaryngol Head Neck Surg. 2011; 144: 623-631.              Your BMI is Body mass index is 37.86 kg/(m^2).  Weight management is a personal decision.  If you are interested in exploring weight loss strategies, the following discussion covers the approaches that may be successful. Body mass index (BMI) is one way to tell  whether you are at a healthy weight, overweight, or obese. It measures your weight in relation to your height.  A BMI of 18.5 to 24.9 is in the healthy range. A person with a BMI of 25 to 29.9 is considered overweight, and someone with a BMI of 30 or greater is considered obese. More than two-thirds of American adults are considered overweight or obese.  Being overweight or obese increases the risk for further weight gain. Excess weight may lead to heart disease and diabetes.  Creating and following plans for healthy eating and physical activity may help you improve your health.  Weight control is part of healthy lifestyle and includes exercise, emotional health, and healthy eating habits. Careful eating habits lifelong are the mainstay of weight control. Though there are significant health benefits from weight loss, long-term weight loss with diet alone may be very difficult to achieve- studies show long-term success with dietary management in less than 10% of people. Attaining a healthy weight may be especially difficult to achieve in those with severe obesity. In some cases, medications, devices and surgical management might be considered.  What can you do?  If you are overweight or obese and are interested in methods for weight loss, you should discuss this with your provider.     Consider reducing daily calorie intake by 500 calories.     Keep a food journal.     Avoiding skipping meals, consider cutting portions instead.    Diet combined with exercise helps maintain muscle while optimizing fat loss. Strength training is particularly important for building and maintaining muscle mass. Exercise helps reduce stress, increase energy, and improves fitness. Increasing exercise without diet control, however, may not burn enough calories to loose weight.       Start walking three days a week 10-20 minutes at a time    Work towards walking thirty minutes five days a week     Eventually, increase the speed of your  walking for 1-2 minutes at time    In addition, we recommend that you review healthy lifestyles and methods for weight loss available through the National Institutes of Health patient information sites:  http://win.niddk.nih.gov/publications/index.htm    And look into health and wellness programs that may be available through your health insurance provider, employer, local community center, or donato club.    Weight management plan: Patient was referred to their PCP to discuss a diet and exercise plan.              Follow-ups after your visit        Follow-up notes from your care team     Return for psg urgent, early read, follow up with me.      Your next 10 appointments already scheduled     Nov 26, 2018 11:00 AM CST   [facility] Visit with Nury Bales OD   [facility]    [address] [facility]  [address]  [facility] [id number] [phone]            Dec 10, 2018  9:30 AM CST   ESTHER US COMP with [id number]   [facility])    [address]  [facility] [address] [phone]           Wear comfortable clothes and leave your valuables at home.            Dec 10, 2018 10:00 AM CST   Radiology MD with UR ESTHER KANG   [facility])    [address]  [facility] [address] [phone]           Please arrive at the time given for your first appointment. This visit is used internally to schedule the physician's time during your ultrasound.            Dec 10, 2018 10:30 AM CST   ESTABLISHED PRENATAL with JANNET Farnsworth CNM   [facility] ([facility])    [address] [place] [address]  [address]  [facility] [address] [phone]            Dec 11, 2018  8:00 PM CST   PSG Split with SLEEP STUDY  4   [facility] ([facility])    [address] [place] [id number]   [phone]            Dec 18, 2018  9:00 AM CST   Return Sleep Patient with JANNET Roe CNP   [facility] ([facility])    [address] [place] [address]   [phone]            Jan 28, 2019  2:40 PM CST   (Arrive by 2:25 PM)   RETURN ENDOCRINE with Leatha Franco MD   [facility] ([facility] [place])    [address] [place], [place] [id number]  [address] [facility] [id number] [phone]              Future tests that were ordered for you today     Open Future Orders        Priority Expected Expires Ordered    Comprehensive Sleep Study Routine  5/19/2019 11/20/2018    Ferritin Routine  1/19/2019 11/20/2018    Iron and Iron Binding Capacity Routine  1/19/2019 11/20/2018            Who to contact     If you have questions or need follow up information about today's clinic visit or your schedule please contact [facility] directly at [phone].  Normal or non-critical lab and imaging results will be communicated to you by MyChart, letter or phone within 4 business days after the clinic has received the results. If you do not hear from us within 7 days, please contact the clinic through [employer] or phone. If you have a critical or abnormal lab result, we will notify you by phone as soon as possible.  Submit refill requests through [employer] or call your pharmacy and they will forward the refill request to us. Please allow 3 business days for your refill to be completed.          Additional Information About Your Visit        [employer] Information     [employer] gives you secure access to your electronic health record. If you see a primary care provider, you can also send messages to your care team and make appointments. If you have questions, please call your primary care clinic.  If you do not  "have a primary care provider, please call [phone] and they will assist you.        Care EveryWhere ID     This is your Care EveryWhere ID. This could be used by other organizations to access your [place] medical records  [id number]        Your Vitals Were     Pulse Respirations Height Last Period Pulse Oximetry BMI (Body Mass Index)    84 18 1.727 m (5' 8\") 08/04/2018 98% 37.86 kg/m2       Blood Pressure from Last 3 Encounters:   11/20/18 113/68   11/12/18 125/81   10/16/18 117/80    Weight from Last 3 Encounters:   11/20/18 112.9 kg (249 lb)   11/12/18 112.5 kg (248 lb)   10/16/18 108.4 kg (239 lb)              We Performed the Following     SLEEP EVALUATION & MANAGEMENT REFERRAL - ADULT -[place] [facility]  [phone] (Age 2 and up)        Primary Care Provider Office Phone # Fax #    Sherrill JANNET Pardo [facility] [phone]       [address] [facility] [address]  [facility] [address]        Equal Access to Services     NALDO HDEZ : Hadii aad ku hadrando Soyvette, waaxda luqadaha, qaybta kaalmada adeegyada, becca schrader . [facility] [phone].    ATENCIÓN: Si habla español, tiene a willard disposición servicios gratuitos de asistencia lingüística. Llame al [phone].    We comply with applicable federal civil rights laws and [place] laws. We do not discriminate on the basis of race, color, national origin, age, disability, sex, sexual orientation, or gender identity.            Thank you!     Thank you for choosing [facility]  for your care. Our goal is always to provide you with excellent care. Hearing back from our patients is one way we can continue to improve our services. Please take a few minutes to complete the written survey that you may receive in the mail after your visit with us. Thank you!             Your Updated Medication List - Protect others around you: Learn how to safely use, store and throw " away your medicines at www.disposemymeds.org.          This list is accurate as of 11/20/18  4:29 PM.  Always use your most recent med list.                   Brand Name Dispense Instructions for use Diagnosis    albuterol 108 (90 Base) MCG/ACT inhaler    PROAIR HFA/PROVENTIL HFA/VENTOLIN HFA    1 Inhaler    Inhale 2 puffs into the lungs every 4 hours as needed for shortness of breath / dyspnea or wheezing    Mild intermittent asthma without complication       ALLEGRA PO           aspirin 81 MG tablet     90 tablet    Take 1 tablet (81 mg) by mouth daily    History of gestational hypertension       cetirizine 10 MG tablet    zyrTEC     Take 5 mg by mouth daily        DHA OMEGA 3 PO           ipratropium - albuterol 0.5 mg/2.5 mg/3 mL 0.5-2.5 (3) MG/3ML neb solution    DUONEB    90 vial    Take 1 vial (3 mLs) by nebulization every 6 hours as needed for shortness of breath / dyspnea or wheezing    Mild persistent asthma with acute exacerbation       levothyroxine 175 MCG tablet    SYNTHROID/LEVOTHROID    90 tablet    Take 1 tablet (175 mcg) by mouth daily    Post-surgical hypothyroidism, History of Graves' disease, Pregnancy, incidental       mometasone-formoterol 200-5 MCG/ACT oral inhaler    DULERA    3 Inhaler    Inhale 2 puffs into the lungs 2 times daily    Moderate persistent asthma with acute exacerbation       PRENATAL VITAMIN PO           SINGULAIR PO      Take 10 mg by mouth

## 2018-11-21 NOTE — PATIENT INSTRUCTIONS
[place] [facility]    1. Your sleep study will be reviewed by a sleep physician within the next few days.     2. Please follow up in the sleep clinic as scheduled, or, make an appointment with your sleep provider to be seen within two weeks to discuss the results of the sleep study.    3. If you have any questions or problems with your treatment plan, please contact your sleep clinic provider at [phone] to further manage your condition.    4. Please review your attached medication list, and, at your follow-up appointment advise your sleep clinic provider about any changes.    5. Go to [email or web address] for more information about your sleep problems.    Avila Saldana, RPSGT  November 21, 2018

## 2018-11-22 NOTE — PROGRESS NOTES
Visit Date:   11/20/2018      LOCATION[facility]  [facility]      REQUESTING PROVIDER:  Sherrill Dasilva NP      CHIEF COMPLAINT:  I have been asked to see Ms. Arroyo, who prefers to be called Cathy, in consultation for problems with snoring and possible obstructive sleep apnea.      HISTORY OF PRESENT ILLNESS:  NP Simmonds referred Cathy in February of this year.  The patient did not respond immediately to this referral, was aware of snoring but also was a smoker.  She became pregnant on 08/29/2018.  Snoring still persists but the long pauses between breaths have subsided somewhat.  She still is concerned about possible obstructive sleep apnea in the setting of 15.5 wks pregnancy at the age of 38 and a prior history of preeclampsia.  She does enter bed a bit on the early side to eat, read, watch TV or use a phone or computer in bed.  Bedtime is somewhere usually between 12:00 and 1 p.m.; however, as her pregnancy has progressed, she finds that she is getting to sleep somewhere between 10 and 11 p.m easier.  She exits bed at 6:30 a.m. throughout the week and does have a 2-year-old which regulates her morning wakeup time.  She normally is a night owl.  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  She has taken some low dose melatonin over the years without knowing if it made any significant difference.  Sleep latency can be anywhere from 1-3 hours.  She wakes up due to bad dreams, need to urinate 3 or 4 times/night, or for her first child awakening.  Total sleep time is somewhere between 6 and 7 hours, 6 hours on weekends.  She feels her best if she gets 8-9 and 3-4 days a week she will take a nap for an hour in length which is not restorative with difficulty in awakening.  Bed partner reports loud snoring and long pauses in her breathing have not been present since she stopped smoking on the date of her awareness of her pregnancy in August; however, there  is an occasional snort or gasping arousal.  She had some problems with a dry throat, breathing through her nose, and certainly pregnancy-related reflux or it could be reflux due to other conditions.  She sleeps on back and sides but that is not since pregnancy.  No signs of orexin deficiency.  She does grind her teeth and clench as well, has some nightly nightmares and has a remote history of sleepwalking, I believe as a child.  She does sleep talk, however, but not often.  She reports RLS 1/7 nights a week.      SOCIAL, PERSONAL, FAMILY HISTORY AND SLEEP SCALE:  She is , lives with her partner and 2-year-old child.  She currently is not working, previously worked as a .  Sleep environment:  Her child might wake up 1-2 times a night.  Family history is positive for obstructive sleep apnea.  No alcohol or recreational drug use.  She does use cannabis prescribed for anxiety and history of bipolar but has said this is prescriptive.  Caffeine was stopped early in November due to palpitations.  Former smoker, quit on 08/29/2018.   [place] Sleepiness Scale today is 6/24, 30/30 days she is tired and fatigued, 0/30 days mental health not good.      REVIEW OF SYSTEMS:  A 14-point comprehensive review of systems was completed and negative with the exception of the following:  Weight gain, which is likely related to pregnancy.  DRUG SENSITIVITY:  CODEINE DERIVATIVES.     SKIN:  A new mole.   PULMONARY:  Shortness of breath with activity but could do 2 flights of stairs without stopping.  She has a history of asthma.  She used her albuterol once since pregnancy.  It has been a long time since she has done PFTs.   LYMPH SYSTEM:  Some changes in breasts with pregnancy.   DIGESTIVE:  Constipation.   URINARY TRACT:  Nocturia.   MUSCLES AND BONES:  Joint pain.   ENDOCRINE:  Urination.      SURGICAL HISTORY:     1.  Pineal cytoma resection in 1984.   2.  Total thyroidectomy in May 2017.      PAST MEDICAL HISTORY:   She has listed here:   1.  Asthma.   2.  Bipolar.   3.  ADD.   4.  PTSD.   5.  Graves' disease (in remission)   6.  Preeclampsia in first pregnancy.      MEDICATIONS:   1.  Singulair.   2.  Allegra.   3.  Levothyroxine.   4.  Aspirin.   5.  PHA.   6.  [address] 3.   7.  [place] prenatal pill.      Allergies:    Allergies   Allergen Reactions     Codeine Sulfate Nausea and Vomiting     Vicodin [Hydrocodone-Acetaminophen] Nausea and Vomiting     Methimazole Rash       Medications:    Current Outpatient Prescriptions   Medication Sig Dispense Refill     albuterol (PROAIR HFA/PROVENTIL HFA/VENTOLIN HFA) 108 (90 BASE) MCG/ACT Inhaler Inhale 2 puffs into the lungs every 4 hours as needed for shortness of breath / dyspnea or wheezing 1 Inhaler 3     aspirin 81 MG tablet Take 1 tablet (81 mg) by mouth daily 90 tablet 3     cetirizine (ZYRTEC) 10 MG tablet Take 5 mg by mouth daily       Docosahexaenoic Acid (DHA OMEGA 3 PO)        Fexofenadine HCl (ALLEGRA PO)        ipratropium - albuterol 0.5 mg/2.5 mg/3 mL (DUONEB) 0.5-2.5 (3) MG/3ML neb solution Take 1 vial (3 mLs) by nebulization every 6 hours as needed for shortness of breath / dyspnea or wheezing 90 vial 3     levothyroxine (SYNTHROID/LEVOTHROID) 175 MCG tablet Take 1 tablet (175 mcg) by mouth daily 90 tablet 3     mometasone-formoterol (DULERA) 200-5 MCG/ACT oral inhaler Inhale 2 puffs into the lungs 2 times daily 3 Inhaler 1     Montelukast Sodium (SINGULAIR PO) Take 10 mg by mouth       Prenatal Vit-Fe Fumarate-FA (PRENATAL VITAMIN PO)          Problem List:  Patient Active Problem List    Diagnosis Date Noted     Supervision of other normal pregnancy, antepartum 10/16/2018     Priority: Medium     FOB: Jamil, daughter Nery, 2 years old  Returning CN patient  History of gestational hypertension with postdates IOL last pregnancy - treated with magnesium and labetalol during labor - RX given for aspirin therapy and labs drawn      Genetic testing with [facility] - NT  WNL, innatal labs WNL. Needs AFP.        Need for Tdap vaccination 09/18/2018     Priority: Medium     Nonintractable migraine, unspecified migraine type 04/26/2018     Priority: Medium     History of Graves' disease 10/24/2017     Priority: Medium     Hypocalcemia 06/16/2017     Priority: Medium     S/P total thyroidectomy 06/01/2017     Priority: Medium     10/16/2018 Taking 150 mcg of synthroid. Labs drawn with new OB visit, following endocrinology closely.        Abnormal cytology 05/02/2017     Priority: Medium     Moderate persistent asthma without complication 01/18/2017     Priority: Medium     Chronic rhinitis 01/18/2017     Priority: Medium     Tobacco use disorder 01/18/2017     Priority: Medium     Quit once she found out about the pregnancy        Pre-eclampsia 10/14/2016     Priority: Medium     10/16/2018 - was induced for postdates and gestational hypertension with last pregnancy. No history of chronic hypertension. RX for aspirin sent and labs drawn at new OB visit.        Bipolar affective disorder in remission (H) 03/15/2016     Priority: Medium     10/16/2018 Not taking medications at this time. Feels like things are going well. Seeing a therapist once a week and has regular contact with her psychiatrist team.        Pineal gland, tumor 01/15/2014     Priority: Medium     Seasonal allergic rhinitis 06/03/2013     Priority: Medium     Attention deficit disorder 09/21/2011     Priority: Medium        Past Medical/Surgical History:  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     Post partum depression      Post-surgical hypothyroidism 05/2017     Uncomplicated asthma      Past Surgical History:   Procedure Laterality Date     BLOOD PATCH N/A 10/11/2016    Procedure: EPIDURAL BLOOD PATCH;  Surgeon: GENERIC ANESTHESIA  "PROVIDER;  Location: [facility] OR     CRANIOTOMY, EXCISE TUMOR COMPLEX, COMBINED  2/19/2014    Procedure: COMBINED CRANIOTOMY, EXCISE TUMOR COMPLEX;  Supracerabellar  Infratentorial Approach for Resection of Tumor ;  Surgeon: Kate Mckeon MD;  Location: [facility] OR     THYROIDECTOMY N/A 5/3/2017    Procedure: THYROIDECTOMY;  Total Thyroidectomy;  Surgeon: Pamela Villasenor MD;  Location: [facility] OR           Physical Examination:  Vitals: /68  Pulse 84  Resp 18  Ht 1.727 m (5' 8\")  Wt 112.9 kg (249 lb)  LMP 08/04/2018  SpO2 98%  BMI 37.86 kg/m2  BMI= Body mass index is 37.86 kg/(m^2).    Neck Cir (cm): 40 cm    [place] Total Score 11/20/2018   Total score - [place] 6   cta, mal III-IV redund  elong with scallop nsd dec on R    GENERAL APPEARANCE: healthy, alert and no distress  EYES: Eyes grossly normal to inspection  HENT: oropharynx crowded, soft palate dependent in setting of redundant tissue and nasal septal defect with decrease flow on R  NECK: thyroid normal to palpation  RESP: lungs clear to auscultation - no rales, rhonchi or wheezes  CV: regular rates and rhythm, normal S1 S2, no S3 or S4 and no murmur, click or rub  Extremities are without clubbing, edema  Musculoskeletal:Moves without difficulty  Mallampati Class: III-IV  Tonsillar Stage: 1  hidden by pillars.  Dental: Dentition in good condition.  Good advancement of lower jaw without tmd  Skin: without facial rash    ASSESSMENT AND PLAN:  It is my impression that Ms. Arroyo, who was obese in her first trimester, has a high pretest probability for obstructive sleep apnea at 15.5 wks pregnancy with loud snoring and snort/gasping arousals.  Snoring still persists, obvious apneas are not as prevalent as they were at the time that she was still smoking.  She does have a previous history of prior preeclampsia at the end of her first pregnancy as well as risk being greater than 34 years of age for other complications of pregnancy.  To that end, " she is an excellent candidate for an in-lab polysomnogram due to the irregularity of her ability to initiate sleep with a history of delayed sleep phase and then prolonged wakeups at times throughout the night.  To get a more accurate diagnosis, the in-lab polysomnogram has been utilized and the following plan of care has been developed:   1.  Snoring with BMI of 37.86 at 15-1/2 weeks of pregnancy.  I have ordered an in-lab polysomnogram, discussed treatment options for sleep apnea should she have obstructive sleep apnea.  She is interested in all modalities with the exception of surgery.  We did discuss the urgency in correcting for possible outcomes of untreated MELINA in pregnancy, and pap therapy was discussed and is acceptable to her at this point in time.  We will perform her polysomnogram in a rapid fashion and she will follow up with me or Dr. Saenz or Dr. Tracey, whoever is next available.     2.  Obesity, BMI >30 during first trimester with slight weightgain since then. We will support the weight recommendations made by the midwives and her OB Clinic.   3.  Delayed sleep phase disorder.  She may benefit from solidifying some regularity to her sleep schedule at this point to improve her homeostatic drive to improve sleep latency at the beginning of her night as well as ability to re-fall back asleep once she has awoken.  Certainly sleep habits and limiting extra activities performed in bed may also help with regard to improving stimulus control of the bed for sleep.      Thank you for allowing me to participate in this kind woman's care.      Time spent with patient 60 minutes, of which greater than 50% was spent in counseling, education and coordination of care.         JANNET CALDWELL, CNP             D: 2018   T: 2018   MT: GASTON      Name:     ZEINA CABAN   MRN:      [id number]        Account:      [id number]   :      1980           Visit Date:   2018      Document:  [id number]       cc: Sherrill POWER CNP

## 2018-11-26 ENCOUNTER — APPOINTMENT (OUTPATIENT)
Dept: OPTOMETRY | Facility: CLINIC | Age: 38
End: 2018-11-26
Payer: MEDICAID

## 2018-11-26 ENCOUNTER — OFFICE VISIT (OUTPATIENT)
Dept: SLEEP MEDICINE | Facility: CLINIC | Age: 38
End: 2018-11-26
Payer: MEDICAID

## 2018-11-26 ENCOUNTER — OFFICE VISIT (OUTPATIENT)
Dept: OPTOMETRY | Facility: CLINIC | Age: 38
End: 2018-11-26
Payer: MEDICAID

## 2018-11-26 VITALS
OXYGEN SATURATION: 96 % | HEART RATE: 82 BPM | WEIGHT: 249 LBS | HEIGHT: 68 IN | DIASTOLIC BLOOD PRESSURE: 80 MMHG | SYSTOLIC BLOOD PRESSURE: 119 MMHG | BODY MASS INDEX: 37.74 KG/M2

## 2018-11-26 DIAGNOSIS — G25.81 RESTLESS LEGS SYNDROME (RLS): ICD-10-CM

## 2018-11-26 DIAGNOSIS — H52.13 MYOPIA OF BOTH EYES: Primary | ICD-10-CM

## 2018-11-26 DIAGNOSIS — G47.33 OSA (OBSTRUCTIVE SLEEP APNEA): Primary | ICD-10-CM

## 2018-11-26 LAB
FERRITIN SERPL-MCNC: 20 NG/ML (ref 12–150)
IRON SATN MFR SERPL: 29 % (ref 15–46)
IRON SERPL-MCNC: 109 UG/DL (ref 35–180)
SLPCOMP: NORMAL
TIBC SERPL-MCNC: 371 UG/DL (ref 240–430)

## 2018-11-26 PROCEDURE — 92310 CONTACT LENS FITTING OU: CPT | Performed by: OPTOMETRIST

## 2018-11-26 PROCEDURE — 36415 COLL VENOUS BLD VENIPUNCTURE: CPT | Performed by: NURSE PRACTITIONER

## 2018-11-26 PROCEDURE — 92015 DETERMINE REFRACTIVE STATE: CPT | Performed by: OPTOMETRIST

## 2018-11-26 PROCEDURE — 92004 COMPRE OPH EXAM NEW PT 1/>: CPT | Performed by: OPTOMETRIST

## 2018-11-26 PROCEDURE — 82728 ASSAY OF FERRITIN: CPT | Performed by: NURSE PRACTITIONER

## 2018-11-26 PROCEDURE — 83540 ASSAY OF IRON: CPT | Performed by: NURSE PRACTITIONER

## 2018-11-26 PROCEDURE — 99214 OFFICE O/P EST MOD 30 MIN: CPT | Performed by: NURSE PRACTITIONER

## 2018-11-26 PROCEDURE — 92340 FIT SPECTACLES MONOFOCAL: CPT | Performed by: OPTOMETRIST

## 2018-11-26 PROCEDURE — 83550 IRON BINDING TEST: CPT | Performed by: NURSE PRACTITIONER

## 2018-11-26 ASSESSMENT — REFRACTION_MANIFEST
OD_SPHERE: -5.25
OS_CYLINDER: +0.50
OS_SPHERE: -5.75
OD_SPHERE: -6.00
OD_CYLINDER: SPHERE
OS_AXIS: 063
OS_CYLINDER: SPHERE
OS_SPHERE: -6.00
OD_CYLINDER: +0.50
OD_AXIS: 029
OD_CYLINDER: SPHERE
OS_SPHERE: -5.50
OD_SPHERE: -5.75
OS_CYLINDER: SPHERE
METHOD_AUTOREFRACTION: 1

## 2018-11-26 ASSESSMENT — REFRACTION_CURRENTRX
OS_BRAND: ALCON AIR OPTIX AQUA BC 8.6, D 14.2
OD_BRAND: ALCON AIR OPTIX AQUA BC 8.6, D 14.2
OS_SPHERE: -5.00
OD_SPHERE: -5.00

## 2018-11-26 ASSESSMENT — VISUAL ACUITY
OS_SC: HM
CORRECTION_TYPE: CONTACTS
METHOD_MR: LAST MR
OD_CC: 20/20-1
METHOD: SNELLEN - LINEAR
OD_CC: 20/20
OS_CC: 20/20
OD_SC: HM
OS_CC: 20/20

## 2018-11-26 ASSESSMENT — KERATOMETRY
OS_K1POWER_DIOPTERS: 44.62
OS_AXISANGLE_DEGREES: 85
OD_K1POWER_DIOPTERS: 44.50
OD_AXISANGLE_DEGREES: 70
OS_K2POWER_DIOPTERS: 45.00
OD_K2POWER_DIOPTERS: 45.12
OS_AXISANGLE2_DEGREES: 175
OD_AXISANGLE2_DEGREES: 160

## 2018-11-26 ASSESSMENT — CONF VISUAL FIELD
METHOD: COUNTING FINGERS
OD_NORMAL: 1
OS_NORMAL: 1

## 2018-11-26 ASSESSMENT — TONOMETRY
OD_IOP_MMHG: 16
IOP_METHOD: APPLANATION
OS_IOP_MMHG: 16

## 2018-11-26 ASSESSMENT — EXTERNAL EXAM - RIGHT EYE: OD_EXAM: NORMAL

## 2018-11-26 ASSESSMENT — SLIT LAMP EXAM - LIDS
COMMENTS: NORMAL
COMMENTS: NORMAL

## 2018-11-26 ASSESSMENT — CUP TO DISC RATIO
OS_RATIO: 0.15
OD_RATIO: 0.15

## 2018-11-26 ASSESSMENT — EXTERNAL EXAM - LEFT EYE: OS_EXAM: NORMAL

## 2018-11-26 NOTE — NURSING NOTE
[facility].    [place] given to patient via email.    Mary Kate Mello [facility] ~[place]

## 2018-11-26 NOTE — LETTER
11/26/2018         RE: Cathy Arroyo  [address] [facility] [address]        Dear Colleague,    Thank you for referring your patient, Cathy Arroyo, to the [facility]. Please see a copy of my visit note below.    Chief Complaint   Patient presents with     COMPREHENSIVE EYE EXAM     2.5 years since last exam, wants updated CL exam- signed waiver     Wants a contact lens not tested on animals   Concerned with amd , mom had it  Previous contact lens wearer? Yes: Acuvue Oasys 8.4 (-5.50 each eye)  Comfort of contact lenses Good  Satisfied with current lenses: No- would like to try a monthly CL- preferably a brand that does not test its products on animals        Last Eye Exam: 2.5 years  Dilated Previously: Yes    What are you currently using to see?  glasses, contacts most days    Distance Vision Acuity: Noticed gradual change in both eyes    Near Vision Acuity: Satisfied with vision while reading with contacts  Eye Comfort: dry  Do you use eye drops? 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       OBJECTIVE: See Ophthalmology exam    ASSESSMENT:    ICD-10-CM    1. Myopia of both eyes H52.13 CONTACT LENS FITTING,BILAT     EYE EXAM (SIMPLE-NONBILLABLE)     REFRACTION      PLAN:   Discussed diet/ UV protection for AMD controllable risk factors       Nury Bales OD                     Again, thank you for allowing me to participate in the care of your patient.        Sincerely,        Nury Bales, OD

## 2018-11-26 NOTE — PROGRESS NOTES
CC: return to discuss results of PSG    HPI: pt with RLS (rubs feet together), snoring (reduced since quit smoking but also parallels her knowledge of her pregnancy and the beginning of side sleeping) gasping arousals, and currently approximately 16 weeks pregnant.  Previously pregnancy had preeclampsia, patient is motivated to do what ever is right for treatment for herself and baby.  History of anxiety, asthma.  Recently has been using medical marijuana for her anxiety/bipolar.  Has stopped using the medical marijuana with concerns about vaping with her underlying asthma, and unknown effects on the fetus.    11/20/2018 [facility] (249.0 lbs) - AHI 12.8 (central apnea/hypopnea index 4.3/hr)  RDI 38.7, Supine AHI -, REM AHI 34.2, Low O2 82.2%, Time Spent ?88% 0.5 minutes / Time Spent ?89% 0.8 minutes. PLM idex 37.2, PLM AI 4.4    Discussed treatment options: pt chose cpap with [facility] for increase in speed of reduction of sleepiness and knowledge that things get worse for her following pregnancy when sleeping supine.      Allergies:    Allergies   Allergen Reactions     Codeine Sulfate Nausea and Vomiting     Vicodin [Hydrocodone-Acetaminophen] Nausea and Vomiting     Methimazole Rash       Medications:    Current Outpatient Prescriptions   Medication Sig Dispense Refill     albuterol (PROAIR HFA/PROVENTIL HFA/VENTOLIN HFA) 108 (90 BASE) MCG/ACT Inhaler Inhale 2 puffs into the lungs every 4 hours as needed for shortness of breath / dyspnea or wheezing 1 Inhaler 3     aspirin 81 MG tablet Take 1 tablet (81 mg) by mouth daily 90 tablet 3     cetirizine (ZYRTEC) 10 MG tablet Take 5 mg by mouth daily       Docosahexaenoic Acid (DHA OMEGA 3 PO)        Fexofenadine HCl (ALLEGRA PO)        ipratropium - albuterol 0.5 mg/2.5 mg/3 mL (DUONEB) 0.5-2.5 (3) MG/3ML neb solution Take 1 vial (3 mLs) by nebulization every 6 hours as needed for shortness of breath / dyspnea or wheezing 90 vial 3     levothyroxine  (SYNTHROID/LEVOTHROID) 175 MCG tablet Take 1 tablet (175 mcg) by mouth daily 90 tablet 3     mometasone-formoterol (DULERA) 200-5 MCG/ACT oral inhaler Inhale 2 puffs into the lungs 2 times daily 3 Inhaler 1     Montelukast Sodium (SINGULAIR PO) Take 10 mg by mouth       Prenatal Vit-Fe Fumarate-FA (PRENATAL VITAMIN PO)          Problem List:  Patient Active Problem List    Diagnosis Date Noted     Supervision of other normal pregnancy, antepartum 10/16/2018     Priority: Medium     FOB: Jamil, daughter Nery, 2 years old  Returning CNM patient  History of gestational hypertension with postdates IOL last pregnancy - treated with magnesium and labetalol during labor - RX given for aspirin therapy and labs drawn      Genetic testing with MFM - NT WNL, innatal labs WNL. Needs AFP.        Need for Tdap vaccination 09/18/2018     Priority: Medium     Nonintractable migraine, unspecified migraine type 04/26/2018     Priority: Medium     History of Graves' disease 10/24/2017     Priority: Medium     Hypocalcemia 06/16/2017     Priority: Medium     S/P total thyroidectomy 06/01/2017     Priority: Medium     10/16/2018 Taking 150 mcg of synthroid. Labs drawn with new OB visit, following endocrinology closely.        Abnormal cytology 05/02/2017     Priority: Medium     Moderate persistent asthma without complication 01/18/2017     Priority: Medium     Chronic rhinitis 01/18/2017     Priority: Medium     Tobacco use disorder 01/18/2017     Priority: Medium     Quit once she found out about the pregnancy        Pre-eclampsia 10/14/2016     Priority: Medium     10/16/2018 - was induced for postdates and gestational hypertension with last pregnancy. No history of chronic hypertension. RX for aspirin sent and labs drawn at new OB visit.        Bipolar affective disorder in remission (H) 03/15/2016     Priority: Medium     10/16/2018 Not taking medications at this time. Feels like things are going well. Seeing a therapist once a  "week and has regular contact with her psychiatrist team.        Pineal gland, tumor 01/15/2014     Priority: Medium     Seasonal allergic rhinitis 06/03/2013     Priority: Medium     Attention deficit disorder 09/21/2011     Priority: Medium        Past Medical/Surgical History:  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     Post partum depression      Post-surgical hypothyroidism 05/2017     Uncomplicated asthma      Past Surgical History:   Procedure Laterality Date     BLOOD PATCH N/A 10/11/2016    Procedure: EPIDURAL BLOOD PATCH;  Surgeon: GENERIC ANESTHESIA PROVIDER;  Location: [facility] OR     CRANIOTOMY, EXCISE TUMOR COMPLEX, COMBINED  2/19/2014    Procedure: COMBINED CRANIOTOMY, EXCISE TUMOR COMPLEX;  Supracerabellar  Infratentorial Approach for Resection of Tumor ;  Surgeon: Kate Mckeon MD;  Location: [facility] OR     THYROIDECTOMY N/A 5/3/2017    Procedure: THYROIDECTOMY;  Total Thyroidectomy;  Surgeon: Pamela Villasenor MD;  Location: [facility] OR           Physical Examination:  Vitals: /80  Pulse 82  Ht 1.727 m (5' 8\")  Wt 112.9 kg (249 lb)  LMP 08/04/2018  SpO2 96%  BMI 37.86 kg/m2  BMI= Body mass index is 37.86 kg/(m^2).         Victoria Total Score 11/26/2018   Total score - Victoria 7       GENERAL APPEARANCE: healthy, alert and no distress    Assessment/Plan:  1. Darren: mild in 2nd trimester of pregnancy, likely reduced in severity with sleeping lateral.  cpap with [facility] with explanation of adaptation, support via [facility] and follow up with me in 7 wks.  2. RLS: feet rubbing likely occurring throughout the night with arousals.  Will place call to midwifes re: pt's concern with ferritin of 20, when do they order iron infusions, should gabapentin be considered.  3. Insomnia: reports of some prolonged wakeups: 1 " and 2 above, worry time.  Follow up in 7 wks.    Time spent with patient is 25 minutes, of which >50% was spent in counseling, education and coordination of care.

## 2018-11-26 NOTE — PROGRESS NOTES
Chief Complaint   Patient presents with     COMPREHENSIVE EYE EXAM     2.5 years since last exam, wants updated CL exam- signed waiver     Wants a contact lens not tested on animals   Concerned with amd , mom had it  Previous contact lens wearer? Yes: Acuvue Oasys 8.4 (-5.50 each eye)  Comfort of contact lenses Good  Satisfied with current lenses: No- would like to try a monthly CL- preferably a brand that does not test its products on animals        Last Eye Exam: 2.5 years  Dilated Previously: Yes    What are you currently using to see?  glasses, contacts most days    Distance Vision Acuity: Noticed gradual change in both eyes    Near Vision Acuity: Satisfied with vision while reading with contacts  Eye Comfort: dry  Do you use eye drops? 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       OBJECTIVE: See Ophthalmology exam    ASSESSMENT:    ICD-10-CM    1. Myopia of both eyes H52.13 CONTACT LENS FITTING,BILAT     EYE EXAM (SIMPLE-NONBILLABLE)     REFRACTION      PLAN:   Discussed diet/ UV protection for AMD controllable risk factors       Nury Bales OD

## 2018-11-26 NOTE — MR AVS SNAPSHOT
After Visit Summary   11/26/2018    Cathy Arroyo    MRN: [id number]           Patient Information     Date Of Birth          1980        Visit Information        Provider Department      11/26/2018 11:00 AM Nury Bales, MARIA ESTHER [facility] [place]        Today's Diagnoses     Myopia of both eyes    -  1      Care Instructions    Once your contact lens prescription is finalized and you are not having any problems with the trials or current lenses you can order your supply of lenses.   You can order your contact lenses online at [email or web address].  Click on services at the top of the page, then eye care and you will see the link to order contact lenses.  You can also order contact lenses at [phone]. There is no shipping fee if you order an annual supply otherwise  be sure to let them know which office you would like to  the lenses, [place] [phone].    Recommend clearcare solution     Or if you want a multipurpose solution, I would recommend revitalens               Follow-ups after your visit        Follow-up notes from your care team     Return in about 1 year (around 11/26/2019).      Your next 10 appointments already scheduled     Nov 26, 2018  1:00 PM CST   Return Sleep Patient with JANNET Roe [facility])    [address] [place] [id number] [phone]            Dec 10, 2018  9:30 AM CST   MFM US COMP with [id number]   [facility])    [address] [facility] [address] [phone]           Wear comfortable clothes and leave your valuables at home.            Dec 10, 2018 10:00 AM CST   Radiology MD with [facility] ESTHER KANG   [facility])    [address]  [facility] [place] [address]   [phone]           Please arrive at the time given for your first appointment. This visit is used internally to schedule the physician's time during your ultrasound.            Dec 10, 2018 10:30 AM CST   ESTABLISHED PRENATAL with JANNET Farnsworth CNM   [facility] ([facility])    [address] [place] [address] [address]  [facility] [id number] [phone]            Jan 28, 2019  2:40 PM CST   (Arrive by 2:25 PM)   RETURN ENDOCRINE with Leatha Franco MD   [facility] ([facility])    [address] [facility]  3rd Floor  [facility] [id number] [phone]              Who to contact     If you have questions or need follow up information about today's clinic visit or your schedule please contact [facility] directly at [phone].  Normal or non-critical lab and imaging results will be communicated to you by MyChart, letter or phone within 4 business days after the clinic has received the results. If you do not hear from us within 7 days, please contact the clinic through [employer] or phone. If you have a critical or abnormal lab result, we will notify you by phone as soon as possible.  Submit refill requests through [employer] or call your pharmacy and they will forward the refill request to us. Please allow 3 business days for your refill to be completed.          Additional Information About Your Visit        [employer] Information     [employer] gives you secure access to your electronic health record. If you see a primary care provider, you can also send messages to your care team and make appointments. If you have questions, please call your primary care clinic.  If you do not have a primary care provider, please call [phone] and they will assist you.        Care EveryWhere ID     This is your Care EveryWhere ID. This could be used by other organizations to access your [facility]  records  [id number]        Your Vitals Were     Last Period                   08/04/2018            Blood Pressure from Last 3 Encounters:   11/20/18 113/68   11/12/18 125/81   10/16/18 117/80    Weight from Last 3 Encounters:   11/20/18 112.9 kg (249 lb)   11/12/18 112.5 kg (248 lb)   10/16/18 108.4 kg (239 lb)              We Performed the Following     CONTACT LENS FITTING,BILAT     EYE EXAM (SIMPLE-NONBILLABLE)     REFRACTION        Primary Care Provider Office Phone # Fax #    Sherrill Dasilva, APRN [phone]       [address] [place] [address]  [facility] [address]        Equal Access to Services     NALDO HDEZ : Hadii aad ku hadasho Soomaali, waaxda luqadaha, qaybta kaalmada adeegyada, waxpelon arreguinin haybaldev schrader . So [facility] [phone].    ATENCIÓN: Si habla español, tiene a willard disposición servicios gratuitos de asistencia lingüística. Llame al [phone].    We comply with applicable federal civil rights laws and [place] laws. We do not discriminate on the basis of race, color, national origin, age, disability, sex, sexual orientation, or gender identity.            Thank you!     Thank you for choosing [facility]  for your care. Our goal is always to provide you with excellent care. Hearing back from our patients is one way we can continue to improve our services. Please take a few minutes to complete the written survey that you may receive in the mail after your visit with us. Thank you!             Your Updated Medication List - Protect others around you: Learn how to safely use, store and throw away your medicines at www.disposemymeds.org.          This list is accurate as of 11/26/18 11:51 AM.  Always use your most recent med list.                   Brand Name Dispense Instructions for use Diagnosis    albuterol 108 (90 Base) MCG/ACT inhaler    PROAIR HFA/PROVENTIL HFA/VENTOLIN HFA    1 Inhaler    Inhale 2 puffs into the lungs every 4 hours as needed for  shortness of breath / dyspnea or wheezing    Mild intermittent asthma without complication       ALLEGRA PO           aspirin 81 MG tablet    ASA    90 tablet    Take 1 tablet (81 mg) by mouth daily    History of gestational hypertension       cetirizine 10 MG tablet    zyrTEC     Take 5 mg by mouth daily        DHA OMEGA 3 PO           ipratropium - albuterol 0.5 mg/2.5 mg/3 mL 0.5-2.5 (3) MG/3ML neb solution    DUONEB    90 vial    Take 1 vial (3 mLs) by nebulization every 6 hours as needed for shortness of breath / dyspnea or wheezing    Mild persistent asthma with acute exacerbation       levothyroxine 175 MCG tablet    SYNTHROID/LEVOTHROID    90 tablet    Take 1 tablet (175 mcg) by mouth daily    Post-surgical hypothyroidism, History of Graves' disease, Pregnancy, incidental       mometasone-formoterol 200-5 MCG/ACT inhaler    DULERA    3 Inhaler    Inhale 2 puffs into the lungs 2 times daily    Moderate persistent asthma with acute exacerbation       PRENATAL VITAMIN PO           SINGULAIR PO      Take 10 mg by mouth

## 2018-11-26 NOTE — PATIENT INSTRUCTIONS
"You should hear from someone by Tues at the latest.  When you get your equipment, it may help to practice with your cpap when you first get home for work for 10-15 mins-read a book or listen to music.  Then move to the bedside for the night's rest.    Our sleep therapy management group will follow you closely-please call them back when they contact you with questions you have on adapting to cpap.  They are here to help.    Please don't drive if sleepy, it may take awhile till your sleepiness resolves.    I'll see you back in clinic about 7 wks after your set up.    Time spent in bed:  Reduce time in bed not devoted to sleep  Worry time: 3 hrs before bedtime.  Journal : list: to do list for next day, for week, thoughts/feelings/concerns  With wakeup: \"i've already done this work\"  Breathing  4/8 breathing,   Iphone: insight timer-ideas on mental distraction, visualization.  Calm  Avoid looking at the clock.  "

## 2018-11-26 NOTE — PATIENT INSTRUCTIONS
Once your contact lens prescription is finalized and you are not having any problems with the trials or current lenses you can order your supply of lenses.   You can order your contact lenses online at [email or web address].  Click on services at the top of the page, then eye care and you will see the link to order contact lenses.  You can also order contact lenses at [phone]. There is no shipping fee if you order an annual supply otherwise  be sure to let them know which office you would like to  the lenses, [place] [phone].    Recommend clearcare solution     Or if you want a multipurpose solution, I would recommend revitalens

## 2018-11-26 NOTE — MR AVS SNAPSHOT
"              After Visit Summary   11/26/2018    Cathy Arroyo    MRN: [id number]           Patient Information     Date Of Birth          1980        Visit Information        Provider Department      11/26/2018 1:00 PM Jesenia Santamaria APRN CNP [facility]        Today's Diagnoses     MELINA (obstructive sleep apnea)    -  1    Restless legs syndrome (RLS)          Care Instructions    You should hear from someone by Tues at the latest.  When you get your equipment, it may help to practice with your cpap when you first get home for work for 10-15 mins-read a book or listen to music.  Then move to the bedside for the night's rest.    Our sleep therapy management group will follow you closely-please call them back when they contact you with questions you have on adapting to cpap.  They are here to help.    Please don't drive if sleepy, it may take awhile till your sleepiness resolves.    I'll see you back in clinic about 7 wks after your set up.    Time spent in bed:  Reduce time in bed not devoted to sleep  Worry time: 3 hrs before bedtime.  Journal : list: to do list for next day, for week, thoughts/feelings/concerns  With wakeup: \"i've already done this work\"  Breathing  4/8 breathing,   Iphone: insight timer-ideas on mental distraction, visualization.  Calm  Avoid looking the car,          Follow-ups after your visit        Follow-up notes from your care team     Return in about 7 weeks (around 1/14/2019) for cpap set up w/ MARGY new follow up with me 7 wks.      Your next 10 appointments already scheduled     Nov 30, 2018  1:00 PM CST   PAP SETUP with DME SCHEDULE   [facility] ([facility])    [address] [place] [address]   [phone]            Dec 10, 2018  9:30 AM CST   MFM US COMP with [id number]   [facility]" [facility])    [address]  [facility] [id number] [phone]           Wear comfortable clothes and leave your valuables at home.            Dec 10, 2018 10:00 AM CST   Radiology MD with UR ESTHER KANG   [facility] [place] ([facility])    [address]  [facility] [address]   [phone]           Please arrive at the time given for your first appointment. This visit is used internally to schedule the physician's time during your ultrasound.            Dec 10, 2018 10:30 AM CST   ESTABLISHED PRENATAL with JANNET Farnsworth CNM   [facility] ([facility])    [address] [place] [address] [address]  [facility] [id number] [phone]            Jan 28, 2019  2:40 PM CST   (Arrive by 2:25 PM)   RETURN ENDOCRINE with Leatha Franco MD   [facility] ([facility])    [address] [place] [address]  3rd Floor  [facility] [id number] [phone]              Who to contact     If you have questions or need follow up information about today's clinic visit or your schedule please contact [facility] directly at [phone].  Normal or non-critical lab and imaging results will be communicated to you by MyChart, letter or phone within 4 business days after the clinic has received the results. If you do not hear from us within 7 days, please contact the clinic through [facility] or phone. If you have a critical or abnormal lab result, we will notify you by phone as soon as possible.  Submit refill requests through [employer] or call your pharmacy and they will forward the refill request to us. Please allow 3 business days for your refill to be completed.          Additional Information About Your Visit        [employer] Information     [employer] gives you secure access to your electronic health record. If you see a primary care provider, you can also  "send messages to your care team and make appointments. If you have questions, please call your primary care clinic.  If you do not have a primary care provider, please call [phone] and they will assist you.        Care EveryWhere ID     This is your Care EveryWhere ID. This could be used by other organizations to access your [facility] medical records  [id number]        Your Vitals Were     Pulse Height Last Period Pulse Oximetry BMI (Body Mass Index)       82 1.727 m (5' 8\") 08/04/2018 96% 37.86 kg/m2        Blood Pressure from Last 3 Encounters:   11/26/18 119/80   11/20/18 113/68   11/12/18 125/81    Weight from Last 3 Encounters:   11/26/18 112.9 kg (249 lb)   11/20/18 112.9 kg (249 lb)   11/12/18 112.5 kg (248 lb)              We Performed the Following     Comprehensive DME        Primary Care Provider Office Phone # Fax #    Sherrill Dasilva, APRN [facility] [phone]       [address] [facility] [address]  [facility] [address]        Equal Access to Services     NALDO HDEZ : Hadii aad ku hadasho Soomaali, waaxda luqadaha, qaybta kaalmada adeegyada, becca schrader . So [facility] [phone].    ATENCIÓN: Si habla español, tiene a willard disposición servicios gratuitos de asistencia lingüística. Llame al [phone].    We comply with applicable federal civil rights laws and [place] laws. We do not discriminate on the basis of race, color, national origin, age, disability, sex, sexual orientation, or gender identity.            Thank you!     Thank you for choosing [facility]  for your care. Our goal is always to provide you with excellent care. Hearing back from our patients is one way we can continue to improve our services. Please take a few minutes to complete the written survey that you may receive in the mail after your visit with us. Thank you!             Your Updated Medication List - Protect others around you: Learn how to safely use, store and throw " away your medicines at www.disposemymeds.org.          This list is accurate as of 11/26/18  2:14 PM.  Always use your most recent med list.                   Brand Name Dispense Instructions for use Diagnosis    albuterol 108 (90 Base) MCG/ACT inhaler    PROAIR HFA/PROVENTIL HFA/VENTOLIN HFA    1 Inhaler    Inhale 2 puffs into the lungs every 4 hours as needed for shortness of breath / dyspnea or wheezing    Mild intermittent asthma without complication       ALLEGRA PO           aspirin 81 MG tablet    ASA    90 tablet    Take 1 tablet (81 mg) by mouth daily    History of gestational hypertension       cetirizine 10 MG tablet    zyrTEC     Take 5 mg by mouth daily        DHA OMEGA 3 PO           ipratropium - albuterol 0.5 mg/2.5 mg/3 mL 0.5-2.5 (3) MG/3ML neb solution    DUONEB    90 vial    Take 1 vial (3 mLs) by nebulization every 6 hours as needed for shortness of breath / dyspnea or wheezing    Mild persistent asthma with acute exacerbation       levothyroxine 175 MCG tablet    SYNTHROID/LEVOTHROID    90 tablet    Take 1 tablet (175 mcg) by mouth daily    Post-surgical hypothyroidism, History of Graves' disease, Pregnancy, incidental       mometasone-formoterol 200-5 MCG/ACT inhaler    DULERA    3 Inhaler    Inhale 2 puffs into the lungs 2 times daily    Moderate persistent asthma with acute exacerbation       PRENATAL VITAMIN PO           SINGULAIR PO      Take 10 mg by mouth

## 2018-11-28 ENCOUNTER — MYC MEDICAL ADVICE (OUTPATIENT)
Dept: FAMILY MEDICINE | Facility: CLINIC | Age: 38
End: 2018-11-28

## 2018-11-28 ENCOUNTER — MYC MEDICAL ADVICE (OUTPATIENT)
Dept: MIDWIFE SERVICES | Facility: CLINIC | Age: 38
End: 2018-11-28

## 2018-11-28 NOTE — TELEPHONE ENCOUNTER
Routing to provider - Brown - please review and advise as appropriate    Writer notes iron/ferritin checked 11/26/18 with normal results - no CBC/Hgb - she has sent a message to her OB team    Orders Only on 11/26/2018   Component Date Value Ref Range Status     Ferritin 11/26/2018 20  12 - 150 ng/mL Final     Iron 11/26/2018 109  35 - 180 ug/dL Final     Iron Binding Cap 11/26/2018 371  240 - 430 ug/dL Final     Iron Saturation Index 11/26/2018 29  15 - 46 % Final     Thank you,  Maria Antonia Clark RN

## 2018-11-28 NOTE — TELEPHONE ENCOUNTER
Patient calling regarding message. Feels like something is wrong with her. Feels way more tired than usual and really fatigued. Pt is really anxious. Pt just doesn't understand why she is so drained. Discussed iron results are normal. Pt asking for a response from CNM on FTBprot or a phone call.   Beatriz Johnson RN-BSN

## 2018-11-29 ENCOUNTER — TELEPHONE (OUTPATIENT)
Dept: MIDWIFE SERVICES | Facility: CLINIC | Age: 38
End: 2018-11-29

## 2018-11-29 NOTE — TELEPHONE ENCOUNTER
Pt is 16 weeks pregnant.  She fell at home and she hit her belly on a doorknob.  It hit above her belly button and there is a heather there.  Discussed that her uterus is still further down in her abdomen and she likely did not cause any problems to her pregnancy.  She will watch for vaginal bleeding or cramping and will go to ED for evaluation if needed.  She can apply ice to the affected area to decrease bruising/inflammation.  Amirah Denney RN

## 2018-11-29 NOTE — TELEPHONE ENCOUNTER
ACT Total Scores 10/13/2017 1/30/2018 6/1/2018   ACT TOTAL SCORE (Goal Greater than or Equal to 20) 22 9 22   In the past 12 months, how many times did you visit the emergency room for your asthma without being admitted to the hospital? 0 1 0   In the past 12 months, how many times were you hospitalized overnight because of your asthma? 0 0 0

## 2018-11-30 ENCOUNTER — TELEPHONE (OUTPATIENT)
Dept: SLEEP MEDICINE | Facility: CLINIC | Age: 38
End: 2018-11-30

## 2018-11-30 ENCOUNTER — TELEPHONE (OUTPATIENT)
Dept: MIDWIFE SERVICES | Facility: CLINIC | Age: 38
End: 2018-11-30

## 2018-11-30 NOTE — TELEPHONE ENCOUNTER
Reason for call:  Other   Patient called regarding (reason for call): call back  Additional comments:   Patient was assessed and has restless legs.   Sleep services recommend an infusion and are placing the patient on cpap.   Please call back to discuss further.      Phone number to reach patient:  Other phone number:    Jesenia Santamaria: [phone]    Best Time:  any    Can we leave a detailed message on this number?  YES

## 2018-11-30 NOTE — TELEPHONE ENCOUNTER
TC to Jesenia. Patient has restless leg and she is wondering about IV iron infusion since her ferritin is at 20. Pt canceled her CPAP set up and Jesenia will reach out to her regarding this. Jesenia would like to know what the threshold is to do IV iron infusion.  Hgb below 8, ferritin lower than reference range per MB. Relayed this to Jesenia.   Beatriz Johnson, BOYD-BSN

## 2018-11-30 NOTE — TELEPHONE ENCOUNTER
Call to Midwife line re: ferritin level of 20ng/ml in setting of some RLS.  Pt is going to start cpap-or after phone call just checked and cpap set up has been cancelled.  This is often helpful in RLS.      Call back from Midwife line: pt was told yesterday by midwives that use of cpap can be helpful in RLS.    Pt c/o's were tiredness/fatigue with yesterday's call to me.  I indicated that starting her cpap is likely the next best step, and that a lower ferritin can cause RLS, which will indirectly make one tired and fatigued, but not necessarily be a reason for an iron infusion.    With today's call to the midwife line [phone] reference for iron infusion is ferritin < 12 and Hbg <8 is reserved for severe anemia due to risk.    Will update pt.

## 2018-12-05 ENCOUNTER — PRENATAL OFFICE VISIT (OUTPATIENT)
Dept: MIDWIFE SERVICES | Facility: CLINIC | Age: 38
End: 2018-12-05
Payer: COMMERCIAL

## 2018-12-05 VITALS
WEIGHT: 253 LBS | BODY MASS INDEX: 38.47 KG/M2 | DIASTOLIC BLOOD PRESSURE: 76 MMHG | TEMPERATURE: 99.2 F | HEART RATE: 89 BPM | SYSTOLIC BLOOD PRESSURE: 132 MMHG

## 2018-12-05 DIAGNOSIS — Z34.92 PRENATAL CARE IN SECOND TRIMESTER: Primary | ICD-10-CM

## 2018-12-05 DIAGNOSIS — E89.0 POSTSURGICAL HYPOTHYROIDISM: ICD-10-CM

## 2018-12-05 DIAGNOSIS — Z86.39 HISTORY OF GRAVES' DISEASE: ICD-10-CM

## 2018-12-05 LAB — T4 SERPL-MCNC: 17.8 UG/DL (ref 4.5–13.9)

## 2018-12-05 PROCEDURE — 99000 SPECIMEN HANDLING OFFICE-LAB: CPT | Performed by: ADVANCED PRACTICE MIDWIFE

## 2018-12-05 PROCEDURE — 99207 ZZC PRENATAL VISIT: CPT | Performed by: ADVANCED PRACTICE MIDWIFE

## 2018-12-05 PROCEDURE — 36415 COLL VENOUS BLD VENIPUNCTURE: CPT | Performed by: ADVANCED PRACTICE MIDWIFE

## 2018-12-05 PROCEDURE — 82105 ALPHA-FETOPROTEIN SERUM: CPT | Mod: 90 | Performed by: ADVANCED PRACTICE MIDWIFE

## 2018-12-05 PROCEDURE — 84436 ASSAY OF TOTAL THYROXINE: CPT | Performed by: ADVANCED PRACTICE MIDWIFE

## 2018-12-05 PROCEDURE — 84443 ASSAY THYROID STIM HORMONE: CPT | Performed by: ADVANCED PRACTICE MIDWIFE

## 2018-12-05 NOTE — PROGRESS NOTES
Feeling well.  Baby is active. Denies any leaking of fluid, vaginal bleeding, regular uterine contractions, or headaches or other concerns.  Has [facility] US  Thyroid tests ordered by Endocrine today.  AFP ordered.    Reviewed to call [phone] for contractions, loss of fluid, vaginal bleeding, decreased fetal movement or any other questions or concerns.    RTC in 4 weeks.  Jennifer Santos, GRECIA, APRN, CNM

## 2018-12-05 NOTE — MR AVS SNAPSHOT
After Visit Summary   12/5/2018    Cathy Arroyo    MRN: [id number]           Patient Information     Date Of Birth          1980        Visit Information        Provider Department      12/5/2018 12:30 PM Jennifer Santos CNM [facility]        Today's Diagnoses     Prenatal care in second trimester    -  1    History of Graves' disease        Postsurgical hypothyroidism           Follow-ups after your visit        Follow-up notes from your care team     Return in about 4 weeks (around 1/2/2019) for Prenatal care with RAVEN.      Your next 10 appointments already scheduled     Dec 10, 2018  9:30 AM CST   ESTHER US COMP with [id number]   [facility] Ultrasound - [facility])    [address]  [facility] [address] [phone]           Wear comfortable clothes and leave your valuables at home.            Dec 10, 2018 10:00 AM CST   Radiology MD with UR ESTHER KANG   [facility] - [facility])    [address]  [facility] [address] [phone]           Please arrive at the time given for your first appointment. This visit is used internally to schedule the physician's time during your ultrasound.            Jan 21, 2019  9:45 AM CST   LAB with RD LAB   [facility] ([facility])    [address] [place] [address] [phone]           Please do not eat 10-12 hours before your appointment if you are coming in fasting for labs on lipids, cholesterol, or glucose (sugar). This does not apply to pregnant women. Water, hot tea and black coffee (with nothing added) are okay. Do not drink other fluids, diet soda or chew gum.            Jan 21, 2019 10:00 AM CST   ESTABLISHED PRENATAL with Jennifer Santos CNM   [facility] ([facility])     [address] [place] [id number] [phone]            Jan 28, 2019  2:40 PM CST   (Arrive by 2:25 PM)   RETURN ENDOCRINE with Leatha Franco MD   [facility] Endocrinology ([facility] [place])    [address] [facility]  [address] [facility] [id number] [phone]              Who to contact     If you have questions or need follow up information about today's clinic visit or your schedule please contact [facility] directly at [phone].  Normal or non-critical lab and imaging results will be communicated to you by MyTinkshart, letter or phone within 4 business days after the clinic has received the results. If you do not hear from us within 7 days, please contact the clinic through "[employer]t or phone. If you have a critical or abnormal lab result, we will notify you by phone as soon as possible.  Submit refill requests through [employer] or call your pharmacy and they will forward the refill request to us. Please allow 3 business days for your refill to be completed.          Additional Information About Your Visit        MyChart Information     [employer] gives you secure access to your electronic health record. If you see a primary care provider, you can also send messages to your care team and make appointments. If you have questions, please call your primary care clinic.  If you do not have a primary care provider, please call [phone] and they will assist you.        Care EveryWhere ID     This is your Care EveryWhere ID. This could be used by other organizations to access your [place] medical records  [id number]        Your Vitals Were     Pulse Temperature Last Period BMI (Body Mass Index)          89 99.2  F (37.3  C) (Oral) 08/04/2018 38.47 kg/m2         Blood Pressure from Last 3 Encounters:   12/05/18 132/76   11/26/18 119/80   11/20/18 113/68    Weight from Last 3 Encounters:   12/05/18 253 lb (114.8 kg)   11/26/18 249 lb (112.9 kg)    11/20/18 249 lb (112.9 kg)              We Performed the Following     Alpha fetoprotein maternal screen     Thyroxine total     TSH        Primary Care Provider Office Phone # Fax #    JANNET Nascimento [facility] [phone]       [address] [facility] [address]  [facility] [address]        Equal Access to Services     CYNTHIA HDEZ : Hadii aad ku hadasho Soomaali, waaxda luqadaha, qaybta kaalmada adeegyada, waxay idiin hayaan adeeg kharash la'aan . So [facility] [phone].    ATENCIÓN: Si habla español, tiene a willard disposición servicios gratuitos de asistencia lingüística. Portia al [phone].    We comply with applicable federal civil rights laws and [place] laws. We do not discriminate on the basis of race, color, national origin, age, disability, sex, sexual orientation, or gender identity.            Thank you!     Thank you for choosing [facility]  for your care. Our goal is always to provide you with excellent care. Hearing back from our patients is one way we can continue to improve our services. Please take a few minutes to complete the written survey that you may receive in the mail after your visit with us. Thank you!             Your Updated Medication List - Protect others around you: Learn how to safely use, store and throw away your medicines at www.disposemymeds.org.          This list is accurate as of 12/5/18  3:05 PM.  Always use your most recent med list.                   Brand Name Dispense Instructions for use Diagnosis    albuterol 108 (90 Base) MCG/ACT inhaler    PROAIR HFA/PROVENTIL HFA/VENTOLIN HFA    1 Inhaler    Inhale 2 puffs into the lungs every 4 hours as needed for shortness of breath / dyspnea or wheezing    Mild intermittent asthma without complication       ALLEGRA PO           aspirin 81 MG tablet    ASA    90 tablet    Take 1 tablet (81 mg) by mouth daily    History of gestational hypertension       cetirizine 10 MG tablet    zyrTEC     Take 5 mg by mouth  daily        DHA OMEGA 3 PO           ipratropium - albuterol 0.5 mg/2.5 mg/3 mL 0.5-2.5 (3) MG/3ML neb solution    DUONEB    90 vial    Take 1 vial (3 mLs) by nebulization every 6 hours as needed for shortness of breath / dyspnea or wheezing    Mild persistent asthma with acute exacerbation       levothyroxine 175 MCG tablet    SYNTHROID/LEVOTHROID    90 tablet    Take 1 tablet (175 mcg) by mouth daily    Post-surgical hypothyroidism, History of Graves' disease, Pregnancy, incidental       mometasone-formoterol 200-5 MCG/ACT inhaler    DULERA    3 Inhaler    Inhale 2 puffs into the lungs 2 times daily    Moderate persistent asthma with acute exacerbation       PRENATAL VITAMIN PO           SINGULAIR PO      Take 10 mg by mouth

## 2018-12-06 LAB
# FETUSES US: NORMAL
# FETUSES: NORMAL
AFP ADJ MOM AMN: 0.64
AFP SERPL-MCNC: 18 NG/ML
AGE - REPORTED: 36.6 YR
CURRENT SMOKER: NO
CURRENT SMOKER: NO
DIABETES STATUS PATIENT: NO
FAMILY MEMBER DISEASES HX: NO
FAMILY MEMBER DISEASES HX: NO
GA METHOD: NORMAL
GA METHOD: NORMAL
GA: NORMAL WK
IDDM PATIENT QL: NO
INTEGRATED SCN PATIENT-IMP: NORMAL
LMP START DATE: NORMAL
MONOCHORIONIC TWINS: NO
SERVICE CMNT-IMP: NO
SPECIMEN DRAWN SERPL: NORMAL
TSH SERPL DL<=0.005 MIU/L-ACNC: 2.38 MU/L (ref 0.4–4)
VALPROIC/CARBAMAZEPINE STATUS: NO
WEIGHT UNITS: NORMAL

## 2018-12-09 ENCOUNTER — NURSE TRIAGE (OUTPATIENT)
Dept: NURSING | Facility: CLINIC | Age: 38
End: 2018-12-09

## 2018-12-09 NOTE — TELEPHONE ENCOUNTER
Reason for Disposition    MODERATE-SEVERE abdominal pain (e.g., interferes with normal activities, awakens from sleep)    Additional Information    Negative: Passed out (i.e., lost consciousness, collapsed and was not responding)    Negative: Shock suspected (e.g., cold/pale/clammy skin, too weak to stand, low BP, rapid pulse)    Negative: Difficult to awaken or acting confused  (e.g., disoriented, slurred speech)    Negative: Sounds like a life-threatening emergency to the triager    Negative: Followed an abdomen (stomach) injury    Negative: [1] Abdominal pain AND [2] pregnant > 20 weeks    Protocols used: PREGNANCY - ABDOMINAL PAIN LESS THAN 20 WEEKS EGA-ADULT-AH    Caller states she is 18 weeks pregnant. JILL is 05/11/19. Caller states she is having pain in her left side, and rates pain 6/10. Caller states she can't sleep due to the pain. Tylenol taking with no relief. Triage guidelines recommend to call provider immediately. Triager called out to answering service, left message for on call provider Tom to call patient at [phone]. Caller advised to call back if no return call within 20 minutes. Caller verbalized and understands directives.

## 2018-12-10 ENCOUNTER — HOSPITAL ENCOUNTER (OUTPATIENT)
Dept: ULTRASOUND IMAGING | Facility: CLINIC | Age: 38
Discharge: HOME | End: 2018-12-10
Attending: OBSTETRICS & GYNECOLOGY | Admitting: OBSTETRICS & GYNECOLOGY
Payer: COMMERCIAL

## 2018-12-10 ENCOUNTER — OFFICE VISIT (OUTPATIENT)
Dept: MATERNAL FETAL MEDICINE | Facility: CLINIC | Age: 38
End: 2018-12-10
Attending: OBSTETRICS & GYNECOLOGY
Payer: COMMERCIAL

## 2018-12-10 DIAGNOSIS — O09.522 ELDERLY MULTIGRAVIDA IN SECOND TRIMESTER: Primary | ICD-10-CM

## 2018-12-10 DIAGNOSIS — O09.521 ELDERLY MULTIGRAVIDA IN FIRST TRIMESTER: ICD-10-CM

## 2018-12-10 PROCEDURE — 76811 OB US DETAILED SNGL FETUS: CPT

## 2018-12-10 NOTE — PROGRESS NOTES
"Please see \"Imaging\" tab under \"Chart Review\" for details of today's US at the [facility].    Emory Sandoval MD  Maternal-Fetal Medicine      "

## 2018-12-13 DIAGNOSIS — Z79.899 ENCOUNTER FOR LONG-TERM (CURRENT) USE OF MEDICATIONS: Primary | ICD-10-CM

## 2018-12-13 LAB
FOLATE SERPL-MCNC: 40.1 NG/ML
HBA1C MFR BLD: 5.3 % (ref 0–5.6)
VIT B12 SERPL-MCNC: 323 PG/ML (ref 193–986)

## 2018-12-13 PROCEDURE — 82306 VITAMIN D 25 HYDROXY: CPT

## 2018-12-13 PROCEDURE — 36415 COLL VENOUS BLD VENIPUNCTURE: CPT

## 2018-12-13 PROCEDURE — 83735 ASSAY OF MAGNESIUM: CPT

## 2018-12-13 PROCEDURE — 82607 VITAMIN B-12: CPT

## 2018-12-13 PROCEDURE — 83036 HEMOGLOBIN GLYCOSYLATED A1C: CPT

## 2018-12-13 PROCEDURE — 82746 ASSAY OF FOLIC ACID SERUM: CPT

## 2018-12-13 PROCEDURE — 80048 BASIC METABOLIC PNL TOTAL CA: CPT

## 2018-12-13 NOTE — PROGRESS NOTES
Pt reports she could not see herself adapting to cpap, with a mask.  She wishes an MAD.  I did caution her as to the delay in reaching an effective adjustment on her device, and offered to contact a provider.  She [place], and has a limited but very good list of providers to pick from.  I let her know through my chart, and have placed an order for the dental device.  Recent research shows that at about 3 months, most pts have reached an effect level of response from a dental appliance.  I reminded her that alvin does progress along with pregnancy, and if she changes her mind, I am prepared to help her.

## 2018-12-14 LAB
ANION GAP SERPL CALCULATED.3IONS-SCNC: 9 MMOL/L (ref 3–14)
BUN SERPL-MCNC: 7 MG/DL (ref 7–30)
CALCIUM SERPL-MCNC: 8.8 MG/DL (ref 8.5–10.1)
CHLORIDE SERPL-SCNC: 106 MMOL/L (ref 94–109)
CO2 SERPL-SCNC: 21 MMOL/L (ref 20–32)
CREAT SERPL-MCNC: 0.43 MG/DL (ref 0.52–1.04)
DEPRECATED CALCIDIOL+CALCIFEROL SERPL-MC: 27 UG/L (ref 20–75)
GFR SERPL CREATININE-BSD FRML MDRD: >90 ML/MIN/1.7M2
GLUCOSE SERPL-MCNC: 115 MG/DL (ref 70–99)
MAGNESIUM SERPL-MCNC: 1.6 MG/DL (ref 1.6–2.3)
POTASSIUM SERPL-SCNC: 3.6 MMOL/L (ref 3.4–5.3)
SODIUM SERPL-SCNC: 136 MMOL/L (ref 133–144)

## 2019-01-15 ENCOUNTER — PRENATAL OFFICE VISIT (OUTPATIENT)
Dept: MIDWIFE SERVICES | Facility: CLINIC | Age: 39
End: 2019-01-15
Payer: COMMERCIAL

## 2019-01-15 VITALS
SYSTOLIC BLOOD PRESSURE: 123 MMHG | HEART RATE: 100 BPM | TEMPERATURE: 98.1 F | DIASTOLIC BLOOD PRESSURE: 77 MMHG | WEIGHT: 262 LBS | BODY MASS INDEX: 39.84 KG/M2

## 2019-01-15 DIAGNOSIS — Z34.80 SUPERVISION OF OTHER NORMAL PREGNANCY, ANTEPARTUM: ICD-10-CM

## 2019-01-15 PROCEDURE — 99207 ZZC PRENATAL VISIT: CPT | Performed by: ADVANCED PRACTICE MIDWIFE

## 2019-01-15 NOTE — PROGRESS NOTES
Problem visit because a dog jumped on her belly last night.  Baby has been active since the incident. No leaking of fluid, vaginal bleeding, regular uterine contractions, or headaches.  Baby very active and normal fetal heart rate.  Reassurance given.    Reviewed to call [phone] for contractions, loss of fluid, vaginal bleeding, decreased fetal movement or any other questions or concerns.    RTC Monday as scheduled for GCT and hemoglobin.  Jennifer Santos, DNP, APRN, CNM

## 2019-01-17 ENCOUNTER — MYC MEDICAL ADVICE (OUTPATIENT)
Dept: MIDWIFE SERVICES | Facility: CLINIC | Age: 39
End: 2019-01-17

## 2019-01-21 ENCOUNTER — PRENATAL OFFICE VISIT (OUTPATIENT)
Dept: MIDWIFE SERVICES | Facility: CLINIC | Age: 39
End: 2019-01-21
Payer: COMMERCIAL

## 2019-01-21 VITALS
BODY MASS INDEX: 40.6 KG/M2 | WEIGHT: 267 LBS | TEMPERATURE: 97.3 F | SYSTOLIC BLOOD PRESSURE: 134 MMHG | HEART RATE: 99 BPM | DIASTOLIC BLOOD PRESSURE: 79 MMHG

## 2019-01-21 DIAGNOSIS — Z34.80 SUPERVISION OF OTHER NORMAL PREGNANCY, ANTEPARTUM: ICD-10-CM

## 2019-01-21 PROCEDURE — 99207 ZZC PRENATAL VISIT: CPT | Performed by: ADVANCED PRACTICE MIDWIFE

## 2019-01-21 NOTE — PROGRESS NOTES
Feeling well.  Baby is active. Denies any leaking of fluid, vaginal bleeding, regular uterine contractions, or headaches or other concerns.  BP retaken per her request 122/62.  GCT and hemoglobin today.    Reviewed to call [phone] for contractions, loss of fluid, vaginal bleeding, decreased fetal movement or any other questions or concerns.    RTC in 2 weeks.  Jennifer Santos, GRECIA, APRN, CNM

## 2019-01-25 ENCOUNTER — MYC MEDICAL ADVICE (OUTPATIENT)
Dept: MIDWIFE SERVICES | Facility: CLINIC | Age: 39
End: 2019-01-25

## 2019-01-25 ENCOUNTER — TELEPHONE (OUTPATIENT)
Dept: MIDWIFE SERVICES | Facility: CLINIC | Age: 39
End: 2019-01-25

## 2019-01-25 NOTE — TELEPHONE ENCOUNTER
See mychart message.  Pt called earlier (see TE for documentation of our discussion.)  Please respond and let her know what you'd advise.  Amirah Denney RN

## 2019-01-25 NOTE — TELEPHONE ENCOUNTER
"Called patient to discuss her concerns of pelvic pain/pressure. She states that she has been feeling \"pretty intense pelvic pain\" for about 3-4 days. States that her pelvis feels heavy, and that it is \"pulling apart\". She has been having some radha hood contractions, but nothing that is painful. Denies bleeding, leaking of fluid. Baby is active. Discussed warning signs of PTL including lower abdominal cramping or lower back pain, leaking of fluid, bleeding, more than 5 contractions in an hour. Discussed staying well hydrated, eating small snacks throughout the day, and resting when she feels more discomfort. Discussed that if she feels 5 contractions in an hour, she should drink a large glass of water and lay down. If she has 5-6 contractions in the next hour, she should call. Encouraged her to use her pregnancy support belt, which she says she has at home. After our conversation, she verbalized understanding and that she is feeling better and more confident that she knows what to report. Next appt is 2/18/18, discussed that she could follow-up in clinic sooner if she has further concerns or continued discomfort.  Calin Alejandro CNM    "

## 2019-01-25 NOTE — TELEPHONE ENCOUNTER
Pt is calling because she's concerned that her baby dropped.  The shape of her belly changed overnight.  Discussed that it's most likely that her baby changed position so her belly looks different.  Encouraged increasing fluids to see if that helps with radha hood contractions.  She will monitor for pelvic pressure and contractions and will call or go to L&D for eval if she ses an increase.  Amirah Denney RN

## 2019-01-28 DIAGNOSIS — J45.20 MILD INTERMITTENT ASTHMA WITHOUT COMPLICATION: ICD-10-CM

## 2019-01-28 PROBLEM — E83.51 HYPOCALCEMIA: Status: RESOLVED | Noted: 2017-06-16 | Resolved: 2019-01-28

## 2019-01-28 PROBLEM — E89.0 POST-SURGICAL HYPOTHYROIDISM: Status: ACTIVE | Noted: 2019-01-28

## 2019-01-28 RX ORDER — ALBUTEROL SULFATE 90 UG/1
2 AEROSOL, METERED RESPIRATORY (INHALATION) EVERY 4 HOURS PRN
Qty: 1 INHALER | Refills: 1 | Status: SHIPPED | OUTPATIENT
Start: 2019-01-28 | End: 2019-04-05

## 2019-01-28 NOTE — TELEPHONE ENCOUNTER
"Requested Prescriptions   Pending Prescriptions Disp Refills     albuterol (PROAIR HFA/PROVENTIL HFA/VENTOLIN HFA) 108 (90 Base) MCG/ACT inhaler 1 Inhaler 3    Last Written Prescription Date:  12/04/2017  Last Fill Quantity: 1,  # refills: 3   Last office visit: 7/13/2018 with prescribing provider:  07/13/2018   Future Office Visit:   Next 5 appointments (look out 90 days)    Feb 18, 2019 10:00 AM CST  ESTABLISHED PRENATAL with Calin Alejandro CNM  [facility] [place] [address] [place] [id number] [phone]        Sig: Inhale 2 puffs into the lungs every 4 hours as needed for shortness of breath / dyspnea or wheezing    Asthma Maintenance Inhalers - Anticholinergics Failed - 1/28/2019 11:19 AM       Failed - Asthma control assessment score within normal limits in last 6 months    Please review ACT score.          Failed - Recent (6 mo) or future (30 days) visit within the authorizing provider's specialty    Patient had office visit in the last 6 months or has a visit in the next 30 days with authorizing provider or within the authorizing provider's specialty.  See \"Patient Info\" tab in inbasket, or \"Choose Columns\" in Meds & Orders section of the refill encounter.           Passed - Patient is age 12 years or older       Passed - Medication is active on med list        "

## 2019-01-28 NOTE — TELEPHONE ENCOUNTER
Called patient. ACT updated.    Prescription approved per [facility] Refill Protocol.    Meme Brown RN  [facility]

## 2019-01-29 ASSESSMENT — ASTHMA QUESTIONNAIRES: ACT_TOTALSCORE: 23

## 2019-02-12 NOTE — PROGRESS NOTES
SUBJECTIVE:   Cathy Arroyo is a 38 year old female who presents to clinic today for the following health issues:    RESPIRATORY SYMPTOMS      Duration: 1 week     Description  Feeling gross, seeing stars, a little coughing, dizziness, heart burn, nausea, worried about preeclampsia, sinus infection  Seeing stars after coughing    Severity: mild, but headache more moderate.     Accompanying signs and symptoms: Denies fever, chills.  Does have constant headache - similar to first pregnancy    History (predisposing factors):  Anemic when she was vegan back in high school.    Precipitating or alleviating factors: None    Therapies tried and outcome:  Tylenol and ASA 81 mg and did not do anything, a lot of resting, and more fluid intake.      Questions/Concerns: would like to test for hemoglobin test, urine test for any infection - not having symptoms or anything but want to check.     27w4d pregnant second pregnancy  Hasn't needed inhaler until this point in pregnancy or with this illness  Took it last night due to emotional distress  Worried about pre-eclampsia with seeing stars  Trying to eat more meat since last hemoglobin was low  Forgets to take Dulera    Problem list and histories reviewed & adjusted, as indicated.  Additional history: as documented    Reviewed and updated as needed this visit by clinical staff  Tobacco  Allergies  Meds  Med Hx  Surg Hx  Fam Hx  Soc Hx      Reviewed and updated as needed this visit by Provider         ROS:  Constitutional, HEENT, cardiovascular, pulmonary, gi and gu systems are negative, except as otherwise noted.    OBJECTIVE:     /62   Pulse 102   Temp 98.8  F (37.1  C) (Oral)   Wt 124 kg (273 lb 6.4 oz)   LMP 08/04/2018   SpO2 97%   BMI 41.57 kg/m    Body mass index is 41.57 kg/m .  GENERAL: healthy, alert and no distress  EYES: Eyes grossly normal to inspection, PERRL and conjunctivae and sclerae normal  HENT: normal cephalic/atraumatic, both  ears: clear effusion, nose and mouth without ulcers or lesions, oropharynx clear and oral mucous membranes moist  NECK: no adenopathy, no asymmetry, masses, or scars and thyroid normal to palpation  RESP: decreased breath sounds R mid posterior, R lower posterior, L mid posterior, L lower posterior and diffuse wheezing  CV: regular rate and rhythm, normal S1 S2, no S3 or S4, no murmur, click or rub, no peripheral edema and peripheral pulses strong  ABDOMEN: gravid  PSYCH: mentation appears normal, affect normal/bright    Diagnostic Test Results:  Results for orders placed or performed in visit on 02/13/19 (from the past 24 hour(s))   *UA reflex to Microscopic and Culture ([place] [facility] (except [place] and [place])   Result Value Ref Range    Color Urine Yellow     Appearance Urine Clear     Glucose Urine Negative NEG^Negative mg/dL    Bilirubin Urine Negative NEG^Negative    Ketones Urine Negative NEG^Negative mg/dL    Specific Gravity Urine <=1.005 1.003 - 1.035    Blood Urine Negative NEG^Negative    pH Urine 6.5 5.0 - 7.0 pH    Protein Albumin Urine Negative NEG^Negative mg/dL    Urobilinogen Urine 0.2 0.2 - 1.0 EU/dL    Nitrite Urine Negative NEG^Negative    Leukocyte Esterase Urine Negative NEG^Negative    Source Midstream Urine        ASSESSMENT/PLAN:     Asthma: Mild persistent with exacerbation   Plan:  Medications:  Anti-inflammatory inhaler:   Pulmicort (budesonide)      (J45.41) Moderate persistent asthma with acute exacerbation  (primary encounter diagnosis)  Comment:   Plan: budesonide (PULMICORT FLEXHALER) 180 MCG/ACT         inhaler    Tight lung sounds.  Declines prednisone - can provoke jose eduardo for patient.  Restarting ICS, but also ask midwife about ICS-LABA combo in pregnancy should ICS monotherapy not improve symptoms.  Return in four weeks for recheck - sooner if fever or worsening symptoms    (J06.9) Viral upper respiratory tract infection  Comment:   Plan:     (N89.8) Vaginal  discharge  Comment:   Plan: *UA reflex to Microscopic and Culture ([place]         and [facility] (except [place] and         [place])            (R42) Lightheadedness  Comment:   Plan: Hemoglobin              See Patient Instructions    JANNET Liu [facility]

## 2019-02-13 ENCOUNTER — MYC MEDICAL ADVICE (OUTPATIENT)
Dept: FAMILY MEDICINE | Facility: CLINIC | Age: 39
End: 2019-02-13

## 2019-02-13 ENCOUNTER — OFFICE VISIT (OUTPATIENT)
Dept: FAMILY MEDICINE | Facility: CLINIC | Age: 39
End: 2019-02-13
Payer: COMMERCIAL

## 2019-02-13 VITALS
BODY MASS INDEX: 41.57 KG/M2 | WEIGHT: 273.4 LBS | DIASTOLIC BLOOD PRESSURE: 62 MMHG | SYSTOLIC BLOOD PRESSURE: 136 MMHG | HEART RATE: 102 BPM | OXYGEN SATURATION: 97 % | TEMPERATURE: 98.8 F

## 2019-02-13 DIAGNOSIS — R42 LIGHTHEADEDNESS: ICD-10-CM

## 2019-02-13 DIAGNOSIS — J06.9 VIRAL UPPER RESPIRATORY TRACT INFECTION: ICD-10-CM

## 2019-02-13 DIAGNOSIS — J45.41 MODERATE PERSISTENT ASTHMA WITH ACUTE EXACERBATION: Primary | ICD-10-CM

## 2019-02-13 DIAGNOSIS — N89.8 VAGINAL DISCHARGE: ICD-10-CM

## 2019-02-13 LAB
ALBUMIN UR-MCNC: NEGATIVE MG/DL
APPEARANCE UR: CLEAR
BILIRUB UR QL STRIP: NEGATIVE
COLOR UR AUTO: YELLOW
GLUCOSE UR STRIP-MCNC: NEGATIVE MG/DL
HGB UR QL STRIP: NEGATIVE
KETONES UR STRIP-MCNC: NEGATIVE MG/DL
LEUKOCYTE ESTERASE UR QL STRIP: NEGATIVE
NITRATE UR QL: NEGATIVE
PH UR STRIP: 6.5 PH (ref 5–7)
SOURCE: NORMAL
SP GR UR STRIP: <=1.005 (ref 1–1.03)
UROBILINOGEN UR STRIP-ACNC: 0.2 EU/DL (ref 0.2–1)

## 2019-02-13 PROCEDURE — 81003 URINALYSIS AUTO W/O SCOPE: CPT | Performed by: NURSE PRACTITIONER

## 2019-02-13 PROCEDURE — 99214 OFFICE O/P EST MOD 30 MIN: CPT | Performed by: NURSE PRACTITIONER

## 2019-02-13 NOTE — PATIENT INSTRUCTIONS
Start budesonide inhaler (steroid only)  If there is a delay because we need a PA, then use the Dulera for a short term  When you see midewife next week, ask which combined ICS-LABA we can use

## 2019-02-13 NOTE — Clinical Note
When you see Cathy next week if asthma hasn't improved on the budesonide monotherapy, could you recommend a safe ICS-LABA med (like Advair, Dulera) in pregnancy?  Thanks!Zamzam

## 2019-02-15 ENCOUNTER — PRENATAL OFFICE VISIT (OUTPATIENT)
Dept: MIDWIFE SERVICES | Facility: CLINIC | Age: 39
End: 2019-02-15
Payer: COMMERCIAL

## 2019-02-15 VITALS
HEIGHT: 68 IN | HEART RATE: 98 BPM | WEIGHT: 275 LBS | SYSTOLIC BLOOD PRESSURE: 126 MMHG | DIASTOLIC BLOOD PRESSURE: 78 MMHG | OXYGEN SATURATION: 96 % | BODY MASS INDEX: 41.68 KG/M2

## 2019-02-15 DIAGNOSIS — O26.892 PELVIC PAIN IN PREGNANCY, ANTEPARTUM, SECOND TRIMESTER: ICD-10-CM

## 2019-02-15 DIAGNOSIS — R10.2 PELVIC PAIN IN PREGNANCY, ANTEPARTUM, SECOND TRIMESTER: ICD-10-CM

## 2019-02-15 DIAGNOSIS — Z34.82 ENCOUNTER FOR SUPERVISION OF OTHER NORMAL PREGNANCY IN SECOND TRIMESTER: Primary | ICD-10-CM

## 2019-02-15 DIAGNOSIS — Z23 NEED FOR TDAP VACCINATION: ICD-10-CM

## 2019-02-15 PROCEDURE — 90471 IMMUNIZATION ADMIN: CPT | Performed by: ADVANCED PRACTICE MIDWIFE

## 2019-02-15 PROCEDURE — 90715 TDAP VACCINE 7 YRS/> IM: CPT | Performed by: ADVANCED PRACTICE MIDWIFE

## 2019-02-15 PROCEDURE — 99207 ZZC PRENATAL VISIT: CPT | Performed by: ADVANCED PRACTICE MIDWIFE

## 2019-02-15 ASSESSMENT — MIFFLIN-ST. JEOR: SCORE: 1985.89

## 2019-02-15 NOTE — PROGRESS NOTES
27w6d  Gloria is here with her  and daughter today. She is having significant pelvic pain, especially when she walks. Had some contractions yesterday, 5 in an hour. Improved after she rested. Discussed using maternity belt, which she states she has at home. She can try chiropractic care as well. Give referral to physical therapy today. Discussed using ice/heat, whichever give her comfort; warm baths. Stay well hydrated, and rest as much as possible. She was seen by primary care provider for respiratory issues, doing ok on the inhaled short-acting steroid; declines need for additional support right now for asthma control. Tdap given today. Return to care in 2 weeks.  Calin Alejandro CNM

## 2019-02-18 ENCOUNTER — OFFICE VISIT (OUTPATIENT)
Dept: FAMILY MEDICINE | Facility: CLINIC | Age: 39
End: 2019-02-18
Payer: COMMERCIAL

## 2019-02-18 VITALS
HEART RATE: 108 BPM | BODY MASS INDEX: 41.62 KG/M2 | SYSTOLIC BLOOD PRESSURE: 130 MMHG | OXYGEN SATURATION: 97 % | DIASTOLIC BLOOD PRESSURE: 75 MMHG | WEIGHT: 273.7 LBS | TEMPERATURE: 100 F

## 2019-02-18 DIAGNOSIS — E89.0 POSTSURGICAL HYPOTHYROIDISM: ICD-10-CM

## 2019-02-18 DIAGNOSIS — R42 LIGHTHEADEDNESS: ICD-10-CM

## 2019-02-18 DIAGNOSIS — T50.Z95A ADVERSE REACTION TO VACCINE, INITIAL ENCOUNTER: Primary | ICD-10-CM

## 2019-02-18 DIAGNOSIS — Z86.39 HISTORY OF GRAVES' DISEASE: ICD-10-CM

## 2019-02-18 LAB
HGB BLD-MCNC: 11.7 G/DL (ref 11.7–15.7)
T4 SERPL-MCNC: 16.6 UG/DL (ref 4.5–13.9)

## 2019-02-18 PROCEDURE — 84443 ASSAY THYROID STIM HORMONE: CPT | Performed by: NURSE PRACTITIONER

## 2019-02-18 PROCEDURE — 84436 ASSAY OF TOTAL THYROXINE: CPT | Performed by: NURSE PRACTITIONER

## 2019-02-18 PROCEDURE — 84445 ASSAY OF TSI GLOBULIN: CPT | Mod: 90 | Performed by: NURSE PRACTITIONER

## 2019-02-18 PROCEDURE — 85018 HEMOGLOBIN: CPT | Performed by: NURSE PRACTITIONER

## 2019-02-18 PROCEDURE — 99214 OFFICE O/P EST MOD 30 MIN: CPT | Performed by: FAMILY MEDICINE

## 2019-02-18 PROCEDURE — 83520 IMMUNOASSAY QUANT NOS NONAB: CPT | Mod: 90

## 2019-02-18 PROCEDURE — 99000 SPECIMEN HANDLING OFFICE-LAB: CPT | Performed by: NURSE PRACTITIONER

## 2019-02-18 PROCEDURE — 36415 COLL VENOUS BLD VENIPUNCTURE: CPT | Performed by: NURSE PRACTITIONER

## 2019-02-18 NOTE — PROGRESS NOTES
"  SUBJECTIVE:   Cathy Arroyo is a 38 year old female who presents to clinic today for the following health issues:    Location: upper arm left arm at adacel injectio site  Symptoms: swelling, redness, hurts, running temp on and off  Therapies tried: has not tried anything     No swallowing or breathing concerns  Improving   no streaking or fluctuance  Has history of \" local reaction and possible staph infection after same shot several years ago\"    Problem list and histories reviewed & adjusted, as indicated.  Additional history: as documented    Labs reviewed in EPIC    Reviewed and updated as needed this visit by clinical staff       Reviewed and updated as needed this visit by Provider         ROS:  Constitutional, HEENT, cardiovascular, pulmonary, gi and gu systems are negative, except as otherwise noted.    OBJECTIVE:     /75   Pulse 108   Temp 100  F (37.8  C) (Oral)   Wt 124.1 kg (273 lb 11.2 oz)   LMP 08/04/2018   SpO2 97%   BMI 41.62 kg/m    Body mass index is 41.62 kg/m .  GENERAL: alert, no distress and over weight  NECK: no adenopathy, no asymmetry, masses, or scars and thyroid normal to palpation  RESP: lungs clear to auscultation - no rales, rhonchi or wheezes  CV: regular rate and rhythm, normal S1 S2, no S3 or S4, no murmur, click or rub, no peripheral edema and peripheral pulses strong  SKIN: mildly tender, slightly erythematous non fluctuant nodule - left upper shoulder   NEURO: Normal strength and tone, mentation intact and speech normal  PSYCH: mentation appears normal, affect normal/bright    Diagnostic Test Results:  none     ASSESSMENT/PLAN:             1. Adverse reaction to vaccine, initial encounter  Likely to adacel, does not look infected  No other systemic symptoms   Allergies update      Rest, ice packs   call asap if worse    Jarrod Escalante MD  [facility]  "

## 2019-02-19 LAB — TSH SERPL DL<=0.005 MIU/L-ACNC: 2.14 MU/L (ref 0.4–4)

## 2019-02-20 ENCOUNTER — TELEPHONE (OUTPATIENT)
Dept: MIDWIFE SERVICES | Facility: CLINIC | Age: 39
End: 2019-02-20

## 2019-02-20 DIAGNOSIS — Z34.83 ENCOUNTER FOR SUPERVISION OF OTHER NORMAL PREGNANCY, THIRD TRIMESTER: Primary | ICD-10-CM

## 2019-02-20 DIAGNOSIS — R89.9 ABNORMAL LABORATORY TEST RESULT: ICD-10-CM

## 2019-02-20 DIAGNOSIS — Z86.39 HX OF GRAVES' DISEASE: ICD-10-CM

## 2019-02-20 LAB — TSH RECEP AB SER-ACNC: 1.4 IU/L

## 2019-02-20 NOTE — RESULT ENCOUNTER NOTE
Cathy,    Your hemoglobin is low normal - same as last time.  Have you been able to take your inhaler?  And is breathing any better?  If you have any questions, please feel free to contact the clinic.    DEVEN Winston

## 2019-02-20 NOTE — TELEPHONE ENCOUNTER
"Patient calling to discuss abnormal Thyroid labs. Per the results Dr Franco wrote:    \"The thyroid receptor antibody is from the Graves' disease.  It can cross the placenta and affect the baby's thyroid function. Your level is high and higher than recent levels.  It is still not as high as I would expect it to affect the baby's thyroid.  Nevertheless, you should let the OB know about this result.  IF it were affecting the baby's thyroid it could cause goiter (which might be seen in fetal ultrasound) or an increase in fetal heart rate over what is considered normal. \"      Recommend consultation from maternal fetal medicine for a plan of care. Referral was placed and will await for their advice. Recommend coming for prenatal appts q2 wks now to check FHts. FHTs rate has been normal thus far.     Fior Richmond CNM         "

## 2019-02-20 NOTE — TELEPHONE ENCOUNTER
Patient calling to discuss thyroid results. Pt would like to speak with midwife. Pt received results from Dr Franco on [employer]. Pt googled the results and now mind is racing. Pt moved her prenatal appointment up to Mon 2/25. Please call patient at [phone]. Thanks.   Beatriz Johnson, RN-BSN

## 2019-02-21 DIAGNOSIS — Z86.39 HISTORY OF GRAVES' DISEASE: Primary | ICD-10-CM

## 2019-02-21 LAB — TSI SER-ACNC: 1.5 TSI INDEX

## 2019-02-25 ENCOUNTER — PRENATAL OFFICE VISIT (OUTPATIENT)
Dept: MIDWIFE SERVICES | Facility: CLINIC | Age: 39
End: 2019-02-25
Payer: COMMERCIAL

## 2019-02-25 VITALS
DIASTOLIC BLOOD PRESSURE: 77 MMHG | HEART RATE: 98 BPM | OXYGEN SATURATION: 95 % | BODY MASS INDEX: 42.12 KG/M2 | SYSTOLIC BLOOD PRESSURE: 132 MMHG | WEIGHT: 277 LBS

## 2019-02-25 DIAGNOSIS — Z34.80 SUPERVISION OF OTHER NORMAL PREGNANCY, ANTEPARTUM: ICD-10-CM

## 2019-02-25 PROCEDURE — 99207 ZZC PRENATAL VISIT: CPT | Performed by: ADVANCED PRACTICE MIDWIFE

## 2019-02-25 NOTE — PROGRESS NOTES
29w2d  Feeling well except for had left inner ear pain for the past 5 days that is improved today. Exam of ears reveal a pimple in the ear canal with a head. Likely this is the cause of the pain. She has an appointment with [facility] on Friday to discuss her recent abnormal thyroid antibody lab as well as a comprehensive [facility] ultrasound. She has no further questions about this for me. Will await plan from [facility]. Reports good fetal movement. Denies leaking of fluid, vaginal bleeding, regular uterine contractions, headache or other concerns.  RTC in 1 wk for weekly doptones until we hear official plan from [facility]. SCOTT

## 2019-02-26 ENCOUNTER — PRENATAL OFFICE VISIT (OUTPATIENT)
Dept: MIDWIFE SERVICES | Facility: CLINIC | Age: 39
End: 2019-02-26
Payer: COMMERCIAL

## 2019-02-26 VITALS
OXYGEN SATURATION: 96 % | HEART RATE: 117 BPM | DIASTOLIC BLOOD PRESSURE: 76 MMHG | HEIGHT: 68 IN | SYSTOLIC BLOOD PRESSURE: 126 MMHG | WEIGHT: 274 LBS | BODY MASS INDEX: 41.52 KG/M2

## 2019-02-26 DIAGNOSIS — B96.89 BV (BACTERIAL VAGINOSIS): Primary | ICD-10-CM

## 2019-02-26 DIAGNOSIS — N76.0 BV (BACTERIAL VAGINOSIS): Primary | ICD-10-CM

## 2019-02-26 DIAGNOSIS — N89.8 VAGINAL DISCHARGE: ICD-10-CM

## 2019-02-26 DIAGNOSIS — Z34.83 ENCOUNTER FOR SUPERVISION OF OTHER NORMAL PREGNANCY IN THIRD TRIMESTER: ICD-10-CM

## 2019-02-26 LAB
SPECIMEN SOURCE: ABNORMAL
WET PREP SPEC: ABNORMAL

## 2019-02-26 PROCEDURE — 87210 SMEAR WET MOUNT SALINE/INK: CPT | Performed by: ADVANCED PRACTICE MIDWIFE

## 2019-02-26 PROCEDURE — 99213 OFFICE O/P EST LOW 20 MIN: CPT | Performed by: ADVANCED PRACTICE MIDWIFE

## 2019-02-26 RX ORDER — METRONIDAZOLE 500 MG/1
500 TABLET ORAL 2 TIMES DAILY
Qty: 14 TABLET | Refills: 0 | Status: SHIPPED | OUTPATIENT
Start: 2019-02-26 | End: 2019-03-14

## 2019-02-26 ASSESSMENT — MIFFLIN-ST. JEOR: SCORE: 1981.36

## 2019-02-26 NOTE — PROGRESS NOTES
"S:  Cathy Arroyo is a 38 year old  who is at 29w3d here today for evaluation of a vaginal infection. She has had high anxiety recently and reports it is situational. She has been working closely with her therapist and her psychiatrist. She is taking CBD but feels like it is not fully controlling her anxiety. She is talking with her therapist about starting another medication but wants to wait to see once the situation resolves if her anxiety resolves some with it. She did not describe the situation that is causing her anxiety but does report it is short term. She does not need further assistance from us. She is here today because she is unsure if her anxiety is causing her to feel symptoms or if she actually does have symptoms of a vaginal infection. She reports some cramping, discharge, and pressure feeling in her vaginal. She remembers having some more pressure with her first pregnancy so wondering if also that is just because of the stage of pregnancy. We collected a wet prep which was positive for bacterial vaginosis so that could explain some of her symptoms. We also discussed some of them could be related to the pregnancy. Cervix is long and closed which also reassured Cathy. She has no other questions or concerns. She was seen yesterday for routine visit and has follow up with [facility] on Friday. She is coming weekly for doppler checks due to her abnormal thyroid.     O:  /76   Pulse 117   Ht 1.727 m (5' 8\")   Wt 124.3 kg (274 lb)   LMP 2018   SpO2 96%   BMI 41.66 kg/m    Pelvic: white discharge. No lesions. Normal external genitalia. Cervix L/C/P  Wet prep: Clue cells     A:  Bacterial vaginosis   IUP at 29w3d    P:  (N76.0,  B96.89) BV (bacterial vaginosis)  (primary encounter diagnosis)   Plan: RX Flagyl     (N89.8) Vaginal discharge  Plan: Wet prep    (Z34.83) Encounter for supervision of other normal pregnancy in third trimester    Follow up next week for PNV or PRN. " Danger signs discussed.   Idalia Prakash CNM

## 2019-02-27 ENCOUNTER — THERAPY VISIT (OUTPATIENT)
Dept: PHYSICAL THERAPY | Facility: CLINIC | Age: 39
End: 2019-02-27
Attending: ADVANCED PRACTICE MIDWIFE
Payer: COMMERCIAL

## 2019-02-27 DIAGNOSIS — M79.18 PAIN IN SYMPHYSIS PUBIS DURING PREGNANCY: ICD-10-CM

## 2019-02-27 DIAGNOSIS — O26.892 PELVIC PAIN IN PREGNANCY, ANTEPARTUM, SECOND TRIMESTER: ICD-10-CM

## 2019-02-27 DIAGNOSIS — R10.2 PELVIC PAIN IN PREGNANCY, ANTEPARTUM, SECOND TRIMESTER: ICD-10-CM

## 2019-02-27 DIAGNOSIS — O99.891 PAIN IN SYMPHYSIS PUBIS DURING PREGNANCY: ICD-10-CM

## 2019-02-27 PROCEDURE — 97110 THERAPEUTIC EXERCISES: CPT | Mod: GP | Performed by: PHYSICAL THERAPIST

## 2019-02-27 PROCEDURE — 97530 THERAPEUTIC ACTIVITIES: CPT | Mod: GP | Performed by: PHYSICAL THERAPIST

## 2019-02-27 PROCEDURE — 97162 PT EVAL MOD COMPLEX 30 MIN: CPT | Mod: GP | Performed by: PHYSICAL THERAPIST

## 2019-02-27 NOTE — PROGRESS NOTES
[facility] Initial Evaluation  Subjective:  Currently 29 weeks gestation, JILL May 11th, 2019. . Also pelvic pain with first pregnancy but not nearly as intense. Pain is primarily pubic R>L also central LB. Gets some grinding/shifting/clicking with transitions and with walking. WORSE with getting up from laying down, out of bathtub, prolonged standing/walking. Had worn a back support for first pregnancy. BETTER with heat. Goal is to get some relief. Pain varies from 5/10 to 7/10.       The history is provided by the patient. No  was used.   Cathy Arroyo is a 38 year old female with a sacroiliac (pubic) condition.  Occurance: pregnancy.  Condition occurred: at home.  This is a recurrent condition         Pain is described as sharp, shooting and aching and is constant and reported as 3/10 and 7/10.  Associated symptoms:  Pregnancy. Pain is worse during the day and worse during the night.     Since onset symptoms are gradually worsening.        General health as reported by patient is good.  Pertinent medical history includes:  Asthma, depression, currently pregnant, overweight, sleep disorder/apnea, smoking and thyroid problems.  Medical allergies: yes (see EPIC).  Other surgeries include:  Other (thyroidectomy/brain tumor resection).  Medication history: see EPIC.    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                                  Objective:  Standing Alignment:        Lumbar:  Lordosis incr  Pelvic:  Normal                         Lumbar/SI Evaluation  ROM:      Strength: pain with transitional movement from sit/stand/supine/rolling. B bridge with fair control, no pain. Pain with resisted abduction B hips. Poor TA in neutral. Fair control with pelvic tilt.   Lumbar Myotomes:  not assessed                            SI joint/Sacrum:          Left negative at:    Forward bend standing    Right negative at:  Forward bend standing                                                  General     ROS    Assessment/Plan:    Patient is a 38 year old female with lumbar, sacral and pelvic complaints.    Patient has the following significant findings with corresponding treatment plan.                Diagnosis 1:  Pubic/pelvic pain with pregnancy  Pain -  hot/cold therapy, manual therapy, splint/taping/bracing/orthotics, self management, education, directional preference exercise and home program  Decreased ROM/flexibility - manual therapy and therapeutic exercise  Decreased strength - therapeutic exercise and therapeutic activities  Decreased proprioception - neuro re-education and therapeutic activities  Inflammation - self management/home program  Impaired gait - gait training  Impaired muscle performance - neuro re-education  Decreased function - therapeutic activities  Impaired posture - neuro re-education  Instability -  Therapeutic Activity  Therapeutic Exercise    Therapy Evaluation Codes:   1) History comprised of:   Personal factors that impact the plan of care:      Time since onset of symptoms.    Comorbidity factors that impact the plan of care are:      Current pregnancy and Overweight.     Medications impacting care: None.  2) Examination of Body Systems comprised of:   Body structures and functions that impact the plan of care:      Pelvis.   Activity limitations that impact the plan of care are:      Dressing, Sitting, Standing, Walking, Sleeping and Laying down.  3) Clinical presentation characteristics are:   Evolving/Changing.  4) Decision-Making    Moderate complexity using standardized patient assessment instrument and/or measureable assessment of functional outcome.  Cumulative Therapy Evaluation is: Moderate complexity.    Previous and current functional limitations:  (See Goal Flow Sheet for this information)    Short term and Long term goals: (See Goal Flow Sheet for this information)     Communication ability:  Patient appears to be able to clearly  communicate and understand verbal and written communication and follow directions correctly.  Treatment Explanation - The following has been discussed with the patient:   RX ordered/plan of care  Anticipated outcomes  Possible risks and side effects  This patient would benefit from PT intervention to resume normal activities.   Rehab potential is good.    Frequency:  1 X week, once daily  Duration:  for 8 weeks  Discharge Plan:  Achieve all LTG.  Independent in home treatment program.  Reach maximal therapeutic benefit.    Please refer to the daily flowsheet for treatment today, total treatment time and time spent performing 1:1 timed codes.

## 2019-02-28 DIAGNOSIS — R10.2 PELVIC PAIN IN FEMALE: Primary | ICD-10-CM

## 2019-03-01 ENCOUNTER — HOSPITAL ENCOUNTER (OUTPATIENT)
Dept: ULTRASOUND IMAGING | Facility: CLINIC | Age: 39
Discharge: HOME | End: 2019-03-01
Attending: OBSTETRICS & GYNECOLOGY | Admitting: OBSTETRICS & GYNECOLOGY
Payer: COMMERCIAL

## 2019-03-01 ENCOUNTER — OFFICE VISIT (OUTPATIENT)
Dept: MATERNAL FETAL MEDICINE | Facility: CLINIC | Age: 39
End: 2019-03-01
Attending: OBSTETRICS & GYNECOLOGY
Payer: COMMERCIAL

## 2019-03-01 DIAGNOSIS — E05.00 GRAVES' DISEASE: ICD-10-CM

## 2019-03-01 DIAGNOSIS — Z34.80 SUPERVISION OF OTHER NORMAL PREGNANCY, ANTEPARTUM: ICD-10-CM

## 2019-03-01 DIAGNOSIS — Z86.39 HISTORY OF GRAVES' DISEASE: Primary | ICD-10-CM

## 2019-03-01 DIAGNOSIS — Z86.39 HISTORY OF GRAVES' DISEASE: ICD-10-CM

## 2019-03-01 DIAGNOSIS — E89.0 POST-SURGICAL HYPOTHYROIDISM: ICD-10-CM

## 2019-03-01 PROBLEM — R94.6 ABNORMAL RESULTS OF THYROID FUNCTION STUDIES: Status: ACTIVE | Noted: 2019-03-01

## 2019-03-01 PROCEDURE — 76816 OB US FOLLOW-UP PER FETUS: CPT

## 2019-03-05 ENCOUNTER — PRENATAL OFFICE VISIT (OUTPATIENT)
Dept: MIDWIFE SERVICES | Facility: CLINIC | Age: 39
End: 2019-03-05
Payer: COMMERCIAL

## 2019-03-05 VITALS
WEIGHT: 229 LBS | DIASTOLIC BLOOD PRESSURE: 80 MMHG | HEIGHT: 68 IN | OXYGEN SATURATION: 96 % | HEART RATE: 106 BPM | BODY MASS INDEX: 34.71 KG/M2 | SYSTOLIC BLOOD PRESSURE: 138 MMHG

## 2019-03-05 DIAGNOSIS — R94.6 ABNORMAL RESULTS OF THYROID FUNCTION STUDIES: ICD-10-CM

## 2019-03-05 DIAGNOSIS — Z34.83 ENCOUNTER FOR SUPERVISION OF OTHER NORMAL PREGNANCY IN THIRD TRIMESTER: Primary | ICD-10-CM

## 2019-03-05 PROCEDURE — 99207 ZZC PRENATAL VISIT: CPT | Performed by: ADVANCED PRACTICE MIDWIFE

## 2019-03-05 ASSESSMENT — MIFFLIN-ST. JEOR: SCORE: 1777.24

## 2019-03-05 NOTE — PROGRESS NOTES
30w3d   Patient feeling well. Positive fetal movement. Denies water leaking, vaginal bleeding, decreased fetal movement, contraction pain, or headaches.   Feeling anxious about a lot of things, mostly their own life things and not involving the pregnancy. She has some anxiety about epidural and birth. She had a spinal headache after her epidural and as well as when she needed it previously for surgery. Both post epidural placements were miserable for 10 days afterwards and she does not want that to happen again. She also feels like Nery's birth was bad and does not feel like she could handle an experience like that again. If she gets a  section she would consider going under versus a spinal. Offered an anesthesia appointment to discuss options prior to birth. She feels like that would help with her anxiety but does not want to pursue that yet. She will let us know if she desires that closer to the birth.   She is also feeling anxious about her CBD use. She wants to increase or change her CBD but is worried if they change it to something with THC in it she will get in trouble for using it. We discussed if they do change it we can go through the steps to prevent any problems. She feels really good hearing that. She is going to make an appointment with the pharmacist to go over what her other options are. She will let us know if she uses something with THC in it. Now she is using only CBD. She is registered in the [place] registry and sees a pharmacist.   She was seen by M for her +TSH antibodies. No concerns on ultrasound. Has another growth. Baby was breech on ultrasound. Recommend weekly dopplers for tachycardia.   No other questions or concerns today. Flagyl worked well for her bacterial vaginosis infection.       Danger signs reviewed, pre-eclampsia signs and symptoms discussed.   Knows when to call triage and has phone numbers.   Follow up in 1 week.   Idalia Prakash CNM

## 2019-03-11 NOTE — PROGRESS NOTES
SUBJECTIVE:   Cathy Arroyo is a 38 year old female who presents to clinic today for the following health issues:    Acute Illness   Acute illness concerns: Ear infection   Onset: Sunday     Fever: no    Chills/Sweats: no    Headache (location?): YES    Sinus Pressure:no    Conjunctivitis:  no    Ear Pain: YES: right side, pain     Rhinorrhea: YES    Congestion: YES    Sore Throat: no     Cough: no    Wheeze: no    Decreased Appetite: YES    Nausea: no    Vomiting: no    Diarrhea:  Yes    Dysuria/Freq.: no    Fatigue/Achiness: YES, due to pregnancy     Sick/Strep Exposure: no     Therapies Tried and outcome: tylenol and lots of water.    -Patient is worried she has an ear infection in her right ear  -Her right ear is painful, tender to the touch  -She states that it is difficult for her to lie down on her side due to the pain  -Patient uses q-tips in her ears  -She has been feeling congested for several months, she believes it is due to her pregnancy  -Patient has not taken Afrin in the past, notes that she does not do well with Sudafed    Problem list and histories reviewed & adjusted, as indicated.  Additional history: as documented    ROS:  CONSTITUTIONAL: NEGATIVE for fever, chills, change in weight  INTEGUMENTARY/SKIN: NEGATIVE for worrisome rashes, moles or lesions  EYES: NEGATIVE for vision changes or irritation  ENT/MOUTH: NEGATIVE for mouth and throat problems, POSITIVE ear problems  RESP: NEGATIVE for significant cough or SOB  BREAST: NEGATIVE for masses, tenderness or discharge  CV: NEGATIVE for chest pain, palpitations or peripheral edema  GI: NEGATIVE for nausea, abdominal pain, heartburn, or change in bowel habits  : NEGATIVE for frequency, dysuria, or hematuria  MUSCULOSKELETAL: NEGATIVE for significant arthralgias or myalgia  NEURO: NEGATIVE for weakness, dizziness or paresthesias  ENDOCRINE: NEGATIVE for temperature intolerance, skin/hair changes  HEME: NEGATIVE for bleeding  problems  PSYCHIATRIC: NEGATIVE for changes in mood or affect    This document serves as a record of the services and decisions personally performed and made by Allyson Melendez PA-C. It was created on her behalf by Milotn Hitchcock, trained medical scribe. The creation of this document is based on the provider's statements to the medical scribe.  Milton Hitchcock 7:59 AM 2019    Patient Active Problem List   Diagnosis     Pineal gland, tumor     Attention deficit disorder     Seasonal allergic rhinitis     Bipolar affective disorder in remission (H)     Pre-eclampsia     Moderate persistent asthma without complication     Chronic rhinitis     Tobacco use disorder     Abnormal cytology     S/P total thyroidectomy     History of Graves' disease     Nonintractable migraine, unspecified migraine type     Need for Tdap vaccination     Supervision of other normal pregnancy, antepartum     Post-surgical hypothyroidism     Pain in symphysis pubis during pregnancy     Abnormal results of thyroid function studies     Past Surgical History:   Procedure Laterality Date     BLOOD PATCH N/A 10/11/2016    Procedure: EPIDURAL BLOOD PATCH;  Surgeon: GENERIC ANESTHESIA PROVIDER;  Location: [facility] OR     CRANIOTOMY, EXCISE TUMOR COMPLEX, COMBINED  2014    Procedure: COMBINED CRANIOTOMY, EXCISE TUMOR COMPLEX;  Supracerabellar  Infratentorial Approach for Resection of Tumor ;  Surgeon: Kate Mckeon MD;  Location: [facility] OR     THYROIDECTOMY N/A 5/3/2017    Procedure: THYROIDECTOMY;  Total Thyroidectomy;  Surgeon: Pamela Villasenor MD;  Location:  OR       Social History     Tobacco Use     Smoking status: Former Smoker     Packs/day: 0.50     Years: 12.00     Pack years: 6.00     Types: Cigarettes     Start date: 2004     Last attempt to quit: 2/15/2018     Years since quittin.0     Smokeless tobacco: Never Used   Substance Use Topics     Alcohol use: No     Alcohol/week: 0.0 oz     Comment: quit 16  days ago,     Family History   Problem Relation Age of Onset     Thyroid Disease Paternal Aunt      Asthma Father      Mental Illness Father      Obesity Father      Asthma Maternal Grandmother      Osteoporosis Maternal Grandmother      Glaucoma Maternal Grandmother      Hypertension Maternal Grandmother      Macular Degeneration Maternal Grandmother      Diabetes Paternal Grandfather      Other Cancer Mother      Genitourinary Problems Mother      Bipolar Disorder Mother         unipolar depression     Depression Mother      Thyroid Disease Mother         benign tumor     Skin Cancer Mother      Cancer Mother      Bipolar Disorder Brother         unipolar depression     Asthma Brother            Labs reviewed in EPIC  Patient Active Problem List   Diagnosis     Pineal gland, tumor     Attention deficit disorder     Seasonal allergic rhinitis     Bipolar affective disorder in remission (H)     Pre-eclampsia     Moderate persistent asthma without complication     Chronic rhinitis     Tobacco use disorder     Abnormal cytology     S/P total thyroidectomy     History of Graves' disease     Nonintractable migraine, unspecified migraine type     Need for Tdap vaccination     Supervision of other normal pregnancy, antepartum     Post-surgical hypothyroidism     Pain in symphysis pubis during pregnancy     Abnormal results of thyroid function studies     Past Surgical History:   Procedure Laterality Date     BLOOD PATCH N/A 10/11/2016    Procedure: EPIDURAL BLOOD PATCH;  Surgeon: GENERIC ANESTHESIA PROVIDER;  Location: [facility]     CRANIOTOMY, EXCISE TUMOR COMPLEX, COMBINED  2/19/2014    Procedure: COMBINED CRANIOTOMY, EXCISE TUMOR COMPLEX;  Supracerabellar  Infratentorial Approach for Resection of Tumor ;  Surgeon: Kate Mckeon MD;  Location: [facility] OR     THYROIDECTOMY N/A 5/3/2017    Procedure: THYROIDECTOMY;  Total Thyroidectomy;  Surgeon: Pamela Villasenor MD;  Location:  OR       Social History      Tobacco Use     Smoking status: Former Smoker     Packs/day: 0.50     Years: 12.00     Pack years: 6.00     Types: Cigarettes     Start date: 2004     Last attempt to quit: 2/15/2018     Years since quittin.0     Smokeless tobacco: Never Used   Substance Use Topics     Alcohol use: No     Alcohol/week: 0.0 oz     Comment: quit 16 days ago,     Family History   Problem Relation Age of Onset     Thyroid Disease Paternal Aunt      Asthma Father      Mental Illness Father      Obesity Father      Asthma Maternal Grandmother      Osteoporosis Maternal Grandmother      Glaucoma Maternal Grandmother      Hypertension Maternal Grandmother      Macular Degeneration Maternal Grandmother      Diabetes Paternal Grandfather      Other Cancer Mother      Genitourinary Problems Mother      Bipolar Disorder Mother         unipolar depression     Depression Mother      Thyroid Disease Mother         benign tumor     Skin Cancer Mother      Cancer Mother      Bipolar Disorder Brother         unipolar depression     Asthma Brother          Current Outpatient Medications   Medication Sig Dispense Refill     albuterol (PROAIR HFA/PROVENTIL HFA/VENTOLIN HFA) 108 (90 Base) MCG/ACT inhaler Inhale 2 puffs into the lungs every 4 hours as needed for shortness of breath / dyspnea or wheezing 1 Inhaler 1     aspirin 81 MG tablet Take 1 tablet (81 mg) by mouth daily 90 tablet 3     budesonide (PULMICORT FLEXHALER) 180 MCG/ACT inhaler Inhale 1 puff into the lungs 2 times daily 1 Inhaler 3     cetirizine (ZYRTEC) 10 MG tablet Take 5 mg by mouth daily       Docosahexaenoic Acid (DHA OMEGA 3 PO)        Fexofenadine HCl (ALLEGRA PO)        ipratropium - albuterol 0.5 mg/2.5 mg/3 mL (DUONEB) 0.5-2.5 (3) MG/3ML neb solution Take 1 vial (3 mLs) by nebulization every 6 hours as needed for shortness of breath / dyspnea or wheezing 90 vial 3     levothyroxine (SYNTHROID/LEVOTHROID) 175 MCG tablet Take 1 tablet (175 mcg) by mouth daily 90  tablet 3     mometasone-formoterol (DULERA) 200-5 MCG/ACT oral inhaler Inhale 2 puffs into the lungs 2 times daily 3 Inhaler 1     Montelukast Sodium (SINGULAIR PO) Take 10 mg by mouth       neomycin-polymyxin-hydrocortisone (CORTISPORIN) 3.5-61638-4 otic solution Place 3 drops in ear(s) 4 times daily 10 mL 0     order for DME Equipment being ordered: SI Belt 1 Product 0     Prenatal Vit-Fe Fumarate-FA (PRENATAL VITAMIN PO)        fluconazole (DIFLUCAN) 150 MG tablet Take 1 tablet (150 mg) by mouth once for 1 dose 1 tablet 0     medical cannabis (Patient's own supply.  Not a prescription) 1 Dose by Other route See Admin Instructions (This is NOT a prescription, and does not certify that the patient has a qualifying medical condition for medical cannabis.  The purpose of this order is  to document that the patient reports taking medical cannabis.)          OBJECTIVE:                                                    /76   Pulse 120   Temp 98.6  F (37  C) (Oral)   Wt 127.6 kg (281 lb 3.2 oz)   LMP 08/04/2018   SpO2 96%   BMI 42.76 kg/m   Body mass index is 42.76 kg/m .   GENERAL:: healthy, alert and no distress  EYES: Eyes grossly normal to inspection, extraocular movements - intact, and PERRL  HENT: right TM bulging and mildly pink, mild erythema of the right ear canal; Nose- normal; Mouth- no ulcers, no lesions. Left ear normal  RESP: lungs clear to auscultation - no rales, no rhonchi, no wheezes  CV: regular rates and rhythm, normal S1 S2, no S3 or S4 and no murmur, no click or rub -  NEURO: strength and tone- normal, sensory exam- grossly normal, mentation- intact, speech- normal  PSYCH: Alert and oriented times 3; speech- coherent , normal rate and volume; able to articulate logical thoughts, able to abstract reason, no tangential thoughts, no hallucinations or delusions, affect- normal      No results found for this or any previous visit (from the past 24 hour(s)).       ASSESSMENT/PLAN:              "                                           ICD-10-CM    1. Dysfunction of right eustachian tube H69.81 OTOLARYNGOLOGY REFERRAL     neomycin-polymyxin-hydrocortisone (CORTISPORIN) 3.5-71465-4 otic solution   2. Acute otitis externa of left ear, unspecified type H60.502      Cathy is currently pregnant and adverse to medications, she'll run the medications by her midwife to see what they think. TM is bulging enough at this point that TM rupture may occur. Return to clinic for any new or worsening symptoms or go to ER Urgent care in off hours    Patient Instructions   If your comfortable, take afrin 2 sprays each nostril twice daily for 3 days  Starting on day 2, start flonase 2 sprays each nostril daily for 1-2 weeks   OR   Follow up with ENT if pain persists  Return to clinic for any new or worsening symptoms or go to ER Urgent care in off hours          Estimated body mass index is 42.76 kg/m  as calculated from the following:    Height as of 3/5/19: 1.727 m (5' 8\").    Weight as of this encounter: 127.6 kg (281 lb 3.2 oz).       The information in this document, created by the medical scribe for me, accurately reflects the services I personally performed and the decisions made by me. I have reviewed and approved this document for accuracy prior to leaving the patient care area.  March 12, 2019 7:59 AM    Allyson Barr [facility]  [facility]    "

## 2019-03-12 ENCOUNTER — TELEPHONE (OUTPATIENT)
Dept: FAMILY MEDICINE | Facility: CLINIC | Age: 39
End: 2019-03-12

## 2019-03-12 ENCOUNTER — OFFICE VISIT (OUTPATIENT)
Dept: FAMILY MEDICINE | Facility: CLINIC | Age: 39
End: 2019-03-12
Payer: COMMERCIAL

## 2019-03-12 ENCOUNTER — HOSPITAL ENCOUNTER (EMERGENCY)
Facility: CLINIC | Age: 39
Discharge: HOME | End: 2019-03-12
Attending: EMERGENCY MEDICINE | Admitting: EMERGENCY MEDICINE
Payer: COMMERCIAL

## 2019-03-12 ENCOUNTER — PRENATAL OFFICE VISIT (OUTPATIENT)
Dept: MIDWIFE SERVICES | Facility: CLINIC | Age: 39
End: 2019-03-12
Payer: COMMERCIAL

## 2019-03-12 VITALS
WEIGHT: 282.2 LBS | RESPIRATION RATE: 18 BRPM | BODY MASS INDEX: 42.91 KG/M2 | TEMPERATURE: 97.6 F | DIASTOLIC BLOOD PRESSURE: 88 MMHG | HEART RATE: 97 BPM | SYSTOLIC BLOOD PRESSURE: 134 MMHG | OXYGEN SATURATION: 97 %

## 2019-03-12 VITALS
OXYGEN SATURATION: 96 % | DIASTOLIC BLOOD PRESSURE: 76 MMHG | HEART RATE: 120 BPM | SYSTOLIC BLOOD PRESSURE: 136 MMHG | BODY MASS INDEX: 42.76 KG/M2 | WEIGHT: 281.2 LBS | TEMPERATURE: 98.6 F

## 2019-03-12 VITALS
SYSTOLIC BLOOD PRESSURE: 121 MMHG | TEMPERATURE: 99.1 F | DIASTOLIC BLOOD PRESSURE: 70 MMHG | WEIGHT: 281 LBS | HEART RATE: 106 BPM | BODY MASS INDEX: 42.73 KG/M2

## 2019-03-12 DIAGNOSIS — O99.213 MATERNAL OBESITY SYNDROME IN THIRD TRIMESTER: ICD-10-CM

## 2019-03-12 DIAGNOSIS — N89.8 VAGINAL DISCHARGE: Primary | ICD-10-CM

## 2019-03-12 DIAGNOSIS — H60.502 ACUTE OTITIS EXTERNA OF LEFT EAR, UNSPECIFIED TYPE: ICD-10-CM

## 2019-03-12 DIAGNOSIS — F41.9 ANXIETY: ICD-10-CM

## 2019-03-12 DIAGNOSIS — B37.31 YEAST INFECTION OF THE VAGINA: ICD-10-CM

## 2019-03-12 DIAGNOSIS — Z34.80 SUPERVISION OF OTHER NORMAL PREGNANCY, ANTEPARTUM: ICD-10-CM

## 2019-03-12 DIAGNOSIS — R94.6 ABNORMAL RESULTS OF THYROID FUNCTION STUDIES: ICD-10-CM

## 2019-03-12 DIAGNOSIS — H60.501 ACUTE OTITIS EXTERNA OF RIGHT EAR, UNSPECIFIED TYPE: ICD-10-CM

## 2019-03-12 DIAGNOSIS — H69.91 DYSFUNCTION OF RIGHT EUSTACHIAN TUBE: Primary | ICD-10-CM

## 2019-03-12 LAB
DEPRECATED S PYO AG THROAT QL EIA: NORMAL
SPECIMEN SOURCE: ABNORMAL
SPECIMEN SOURCE: NORMAL
WET PREP SPEC: ABNORMAL

## 2019-03-12 PROCEDURE — 87210 SMEAR WET MOUNT SALINE/INK: CPT | Performed by: ADVANCED PRACTICE MIDWIFE

## 2019-03-12 PROCEDURE — 99283 EMERGENCY DEPT VISIT LOW MDM: CPT | Performed by: EMERGENCY MEDICINE

## 2019-03-12 PROCEDURE — 99214 OFFICE O/P EST MOD 30 MIN: CPT | Performed by: PHYSICIAN ASSISTANT

## 2019-03-12 PROCEDURE — 87880 STREP A ASSAY W/OPTIC: CPT | Performed by: EMERGENCY MEDICINE

## 2019-03-12 PROCEDURE — 99284 EMERGENCY DEPT VISIT MOD MDM: CPT | Mod: GC | Performed by: EMERGENCY MEDICINE

## 2019-03-12 PROCEDURE — 25000132 ZZH RX MED GY IP 250 OP 250 PS 637: Performed by: EMERGENCY MEDICINE

## 2019-03-12 PROCEDURE — 87081 CULTURE SCREEN ONLY: CPT | Performed by: EMERGENCY MEDICINE

## 2019-03-12 PROCEDURE — 25000131 ZZH RX MED GY IP 250 OP 636 PS 637: Performed by: EMERGENCY MEDICINE

## 2019-03-12 PROCEDURE — 99207 ZZC PRENATAL VISIT: CPT | Performed by: ADVANCED PRACTICE MIDWIFE

## 2019-03-12 RX ORDER — ONDANSETRON 4 MG/1
4 TABLET, ORALLY DISINTEGRATING ORAL EVERY 8 HOURS PRN
Qty: 10 TABLET | Refills: 0 | Status: ON HOLD | OUTPATIENT
Start: 2019-03-12 | End: 2019-03-16

## 2019-03-12 RX ORDER — ONDANSETRON 4 MG/1
4 TABLET, ORALLY DISINTEGRATING ORAL ONCE
Status: COMPLETED | OUTPATIENT
Start: 2019-03-12 | End: 2019-03-12

## 2019-03-12 RX ORDER — FLUCONAZOLE 150 MG/1
150 TABLET ORAL ONCE
Qty: 1 TABLET | Refills: 0 | Status: ON HOLD | OUTPATIENT
Start: 2019-03-12 | End: 2019-03-16

## 2019-03-12 RX ORDER — OXYCODONE HYDROCHLORIDE 5 MG/1
5 TABLET ORAL ONCE
Status: COMPLETED | OUTPATIENT
Start: 2019-03-12 | End: 2019-03-12

## 2019-03-12 RX ORDER — NEOMYCIN SULFATE, POLYMYXIN B SULFATE, HYDROCORTISONE 3.5; 10000; 1 MG/ML; [USP'U]/ML; MG/ML
3 SOLUTION/ DROPS AURICULAR (OTIC) 4 TIMES DAILY
Qty: 10 ML | Refills: 0 | Status: SHIPPED | OUTPATIENT
Start: 2019-03-12 | End: 2019-03-14

## 2019-03-12 RX ORDER — OXYCODONE HYDROCHLORIDE 5 MG/1
5 TABLET ORAL EVERY 6 HOURS PRN
Qty: 8 TABLET | Refills: 0 | Status: SHIPPED | OUTPATIENT
Start: 2019-03-12 | End: 2019-03-18

## 2019-03-12 RX ADMIN — ONDANSETRON 4 MG: 4 TABLET, ORALLY DISINTEGRATING ORAL at 19:04

## 2019-03-12 RX ADMIN — OXYCODONE HYDROCHLORIDE 5 MG: 5 TABLET ORAL at 19:00

## 2019-03-12 ASSESSMENT — ENCOUNTER SYMPTOMS
HEMATOLOGIC/LYMPHATIC NEGATIVE: 1
CONSTITUTIONAL NEGATIVE: 1
NEUROLOGICAL NEGATIVE: 1
GASTROINTESTINAL NEGATIVE: 1
SORE THROAT: 1
MUSCULOSKELETAL NEGATIVE: 1
RESPIRATORY NEGATIVE: 1
PSYCHIATRIC NEGATIVE: 1
ALLERGIC/IMMUNOLOGIC NEGATIVE: 1
EYES NEGATIVE: 1
CARDIOVASCULAR NEGATIVE: 1
ENDOCRINE NEGATIVE: 1

## 2019-03-12 NOTE — ED PROVIDER NOTES
"  History     Chief Complaint   Patient presents with     Otalgia     AO weks, denies any vag bleed or contractions. Otalgia started Sun, went and saw MD today, was Rx Afrin, \"ear gtss bec I have outer ear infxn but the pain is getting worse.\" Per pt she called clinic again to update MD on the worsening pain, & is also haveing jaw pain now. Pt was advised to come to ED       HPI  Cathy Arroyo is a 38 year old female  who presents  in the ED with right ear pain.  Patient reports that she has been having ear pain for the past 2 -3 days, patient reports also ongoing sore throat, denies fever, cough, runny nose,although she  Reports that she use the q- tips on daily basis . Patient reports having h/o multiple recurrent otitis media in the Rt side that she has devloped chronic mastoiditis in the past.  Patient was evaluated today morning at her PCP office and she was DX with otitis externa and got Cortisporin and afrin nasal spray that did not help , patient reports that the pain has been getting worse since this morning, she rates the pain at 9/10 VAS, it is sharp in nature and it dose refer to her facial area and down to her mandibular area.   She reports that she has tried Tylenol at home without pain relief.      I have reviewed the Medications, Allergies, Past Medical and Surgical History, and Social History in the Epic system.    Review of Systems   Constitutional: Negative.    HENT: Positive for congestion, ear pain, postnasal drip and sore throat.    Eyes: Negative.    Respiratory: Negative.    Cardiovascular: Negative.    Gastrointestinal: Negative.    Endocrine: Negative.    Genitourinary: Negative.    Musculoskeletal: Negative.    Allergic/Immunologic: Negative.    Neurological: Negative.    Hematological: Negative.    Psychiatric/Behavioral: Negative.        Physical Exam   BP: 140/78  Pulse: 105  Temp: 97.8  F (36.6  C)  Resp: 16  Weight: 128 kg (282 lb 3.2 oz)  SpO2: 97 %      Physical Exam "   Constitutional: She is oriented to person, place, and time. She appears well-developed and well-nourished.   Appears to be uncomfortable due to pain     HENT:   Head: Normocephalic and atraumatic.   Nose: Nose normal.   Mouth/Throat: Posterior oropharyngeal erythema present.   Oropharynx with mild erythema.  right TM bulging and mildly pink with visible clear fluid , mild erythema of the right ear canal.   Eyes: Conjunctivae and EOM are normal. Pupils are equal, round, and reactive to light.   Neck: Normal range of motion. Neck supple.   Cardiovascular: Normal rate and regular rhythm.   Pulmonary/Chest: Effort normal and breath sounds normal.   Abdominal: Soft. Bowel sounds are normal.   Musculoskeletal: Normal range of motion.   Neurological: She is alert and oriented to person, place, and time.   Skin: Skin is warm. Capillary refill takes less than 2 seconds.   Psychiatric: She has a normal mood and affect. Her behavior is normal. Judgment and thought content normal.   Vitals reviewed.      ED Course   Patient received Oxycodone 5 mg po x1 and Zofran for the nausea.   Strep rapid test was negative.       Labs Ordered and Resulted from Time of ED Arrival Up to the Time of Departure from the ED   RAPID STREP SCREEN   BETA STREP GROUP A CULTURE            Assessments & Plan (with Medical Decision Making)   37 yo 31 week pregnant female who presented with 3 days h/o  Rt ear pain along that has been getting worse, patient physical exam is remarkable for erythema of the external canal which is consistent with acute otitis externa, her fast strep test is negative.    #Acute Otitis externa:  - continue the Corticosporin drops     Sever Otalgia:   - s/p Oxycodone in the ED  - Tylenol PRN for pain    -  Oxycodone 5 mg bid PRN for pain( 8 tablets) no refills  - Zofran 4 mg  po PRN for pain total of 10 tablets, No refill     Attestation- Patient discussed with resident.  Above documentation accurately reflects history and  physical.  Patient seen independently of house staff.  38 year old female who is 31 weeks pregnant to the emergency department for right ear pain.  She was already diagnosed with otitis externa today but has increased pain.  She has been taking acetaminophen.  Her physical examination is remarkable for debris in her right external auditory canal and pain with movement of her right pinna.  Her right TM appears erythematous but her effusion is clear.  The patient has mild pharyngeal erythema but no other abnormality.  The patient does have some mild cervical lymphadenopathy but no other abnormalities on palpation of her neck.  The remainder of her physical examination is normal.  She denies any vaginal bleeding or abdominal pain.  The patient had a strep screen performed which is negative.  Oxycodone was given for pain.  She will be discharged with the same to use for breakthrough pain.  She was encouraged to continue using Cortisporin otic drops already prescribed earlier today as well as acetaminophen for pain.    ROMEO HEDRICK MD     I have reviewed the nursing notes.    I have reviewed the findings, diagnosis, plan and need for follow up with the patient.       Medication List      Started    ondansetron 4 MG ODT tab  Commonly known as:  ZOFRAN ODT  4 mg, Oral, EVERY 8 HOURS PRN     oxyCODONE 5 MG tablet  Commonly known as:  ROXICODONE  5 mg, Oral, EVERY 6 HOURS PRN        ASK your doctor about these medications    metroNIDAZOLE 500 MG tablet  Commonly known as:  FLAGYL  500 mg, Oral, 2 TIMES DAILY  Ask about: Should I take this medication?            Final diagnoses:   Acute otitis externa of right ear, unspecified type     The patient was seen and discussed with the attending .  3/12/2019   [facility], [place], EMERGENCY DEPARTMENT     Moreno Hernández MD  Resident  03/12/19 1946       Moreno Hernández MD  Resident  03/12/19 1954       Romeo Hedrick MD  03/12/19 [id number]

## 2019-03-12 NOTE — PATIENT INSTRUCTIONS
If your comfortable, take afrin 2 sprays each nostril twice daily for 3 days  Starting on day 2, start flonase 2 sprays each nostril daily for 1-2 weeks   OR   Follow up with ENT if pain persists  Return to clinic for any new or worsening symptoms or go to ER Urgent care in off hours

## 2019-03-12 NOTE — ED AVS SNAPSHOT
[facility], [place], Emergency Department  [address]  [facility] [place] [facility] [place] [address]  Phone:  [phone]  Fax:  [phone]                                    Cathy Arroyo   MRN: [id number]    Department:  [facility], Emergency Department   Date of Visit:  3/12/2019           After Visit Summary Signature Page    I have received my discharge instructions, and my questions have been answered. I have discussed any challenges I see with this plan with the nurse or doctor.    ..........................................................................................................................................  Patient/Patient Representative Signature      ..........................................................................................................................................  Patient Representative Print Name and Relationship to Patient    ..................................................               ................................................  Date                                   Time    ..........................................................................................................................................  Reviewed by Signature/Title    ...................................................              ..............................................  Date                                               Time          22EPIC Rev 08/18

## 2019-03-12 NOTE — PROGRESS NOTES
Feeling well.  Baby is active. Denies any leaking of fluid, vaginal bleeding, regular uterine contractions, or headaches or other concerns.  She requests another wet prep because of increased discharge.  She declines the recommended 7 day yeast cream.  1 dose of Diflucan ordered per her request.    She was seen for an ear infection.  We discussed that it is OK to use prescribed meditations.    She started medical marijuana.  It is prescribed from [facility].  She has a picture of the prescription bottle.    Reviewed to call [phone] for contractions, loss of fluid, vaginal bleeding, decreased fetal movement or any other questions or concerns.    RTC in 1 weeks.  Jennifer Santos, GRECIA, APRN, CNM

## 2019-03-12 NOTE — TELEPHONE ENCOUNTER
Received call from patient's . Patient's  states the patient was seen in the clinic earlier today and diagnosed with an ear infection. Patient has been taking antibiotic drops as prescribed as well as extra strength tylenol. Patient's  states that the pain has increased and gotten much worse. Patient  states she can barely talk and is having a difficult time chewing related to the pain. Patient rates the pain 9/10. Patient reports intermittent difficulty hearing, redness and warmth of the ear. Denies drainage and fever    Writer advised patient go to the emergency room for evaluation for possible rupture. Patient's  verbalized understanding and is in agreement with plan    Faith Espinoza, BOYD  Triage Nurse

## 2019-03-12 NOTE — TELEPHONE ENCOUNTER
Prior Authorization Retail Medication Request    Medication/Dose: fluconazole (DIFLUCAN) 150 MG tablet  ICD code (if different than what is on RX):    Previously Tried and Failed:    Rationale:      Insurance Name:  [facility]  Insurance ID:  Key: [id number]      Pharmacy Information (if different than what is on RX)  Name:  [facility]  Phone:  [phone]  Fax: [phone]

## 2019-03-13 ENCOUNTER — TELEPHONE (OUTPATIENT)
Dept: FAMILY MEDICINE | Facility: CLINIC | Age: 39
End: 2019-03-13

## 2019-03-13 ENCOUNTER — OFFICE VISIT (OUTPATIENT)
Dept: FAMILY MEDICINE | Facility: CLINIC | Age: 39
End: 2019-03-13
Payer: COMMERCIAL

## 2019-03-13 VITALS
TEMPERATURE: 98.8 F | DIASTOLIC BLOOD PRESSURE: 88 MMHG | SYSTOLIC BLOOD PRESSURE: 138 MMHG | HEART RATE: 100 BPM | OXYGEN SATURATION: 96 %

## 2019-03-13 DIAGNOSIS — B37.31 YEAST INFECTION OF THE VAGINA: ICD-10-CM

## 2019-03-13 DIAGNOSIS — H60.391 OTHER INFECTIVE ACUTE OTITIS EXTERNA OF RIGHT EAR: Primary | ICD-10-CM

## 2019-03-13 PROCEDURE — 99214 OFFICE O/P EST MOD 30 MIN: CPT | Performed by: NURSE PRACTITIONER

## 2019-03-13 RX ORDER — AMOXICILLIN AND CLAVULANATE POTASSIUM 500; 125 MG/1; MG/1
1 TABLET, FILM COATED ORAL 2 TIMES DAILY
Qty: 20 TABLET | Refills: 0 | Status: SHIPPED | OUTPATIENT
Start: 2019-03-13 | End: 2019-03-14

## 2019-03-13 RX ORDER — FLUCONAZOLE 150 MG/1
150 TABLET ORAL EVERY OTHER DAY
Qty: 4 TABLET | Refills: 0 | Status: ON HOLD | OUTPATIENT
Start: 2019-03-13 | End: 2019-05-08

## 2019-03-13 NOTE — DISCHARGE INSTRUCTIONS
Continue Cortisporin drops.  Continue acetaminophen as needed for pain.  Take oxycodone as needed for pain not controlled by acetaminophen.  Do not drive for 8 hours after taking oxycodone.  Follow-up as previously directed.    Return if any concerns.

## 2019-03-13 NOTE — TELEPHONE ENCOUNTER
Called patient, relayed provider message below. Patient verbalized understanding    Appointment scheduled 03/13/2019 at 4:45pm    Faith Espinoza RN  Triage Nurse

## 2019-03-13 NOTE — TELEPHONE ENCOUNTER
Zamzam    Right ear  Face is swollen and distorted on the right side,    drainage pale yellow, using drops as instructed  Also now has decreased hearing in that ear  All new symptoms since ED visit last night  Oxycodone and tylenol and cannabis med are not helping with the pain    Copied from ER visit last night  Continue Cortisporin drops.  Continue acetaminophen as needed for pain.  Take oxycodone as needed for pain not controlled by acetaminophen. Do not drive for 8 hours after taking oxycodone.  Follow-up as previously directed.   Return if any concerns.     Started on diflucan yesterday for yeast, but if antibiotic is ordered she would like another script for yeast as needed    Pharm cued    Cristin Landry RN   [facility]

## 2019-03-13 NOTE — TELEPHONE ENCOUNTER
Belen/Zamzam,    Spoke with patient and her spouse, Jamil (obtained verbal consent from pt). Pt states that she is very miserable and her pain is worse now. Pt tearful on phone. She states that none of the pain medications are helping. ER did not see anything other than an ear infection.     Pt is wondering if there is any imaging that can be done at this point. She is unable to do a CT because she is pregnant. She is wondering if she can do an MRI. She is in her 3rd Trimester.    Cued MRI- sinus face, unsure if this is correct or appropriate.     Advised spouse, Jamil, to try calling more ENT locations to see if they can get a sooner appointment with specialist. He said that soonest opening with [facility] was the 29th. Advised to go back to ER as needed for worsening pain. Pt/spouse verbalized understanding.    Meme Brown RN  [facility]

## 2019-03-13 NOTE — TELEPHONE ENCOUNTER
Reason for call:  Symptom   Symptom or request: otitis, pt now has drainage and inlfamation    Duration (how long have symptoms been present): 4 days  Have you been treated for this before? Yes    Additional comments: pt was in ER last night, sx continue to get worse    Phone number to reach patient:  Home number on file [phone] (home)    Best Time:  n/a    Can we leave a detailed message on this number?  YES

## 2019-03-13 NOTE — PROGRESS NOTES
SUBJECTIVE:   Cathy Arroyo is a 38 year old female who presents to clinic today for the following health issues:    Acute Illness   Acute illness concerns: ear infection   Onset: Sunday     Fever: no     Chills/Sweats: no     Headache (location?): YES    Sinus Pressure:no    Conjunctivitis:  no    Ear Pain: YES: right side. Feel that the ear drum burst due to a lot of pain earlier and can no longer feel or hear anything.     Rhinorrhea: YES    Congestion: YES    Sore Throat: no     Cough: no    Wheeze: no    Decreased Appetite: YES    Nausea: Yes, from pain    Vomiting: no    Diarrhea:  No, but constipated     Dysuria/Freq.: no    Fatigue/Achiness: YES- pregnancy     Sick/Strep Exposure: no     Therapies Tried and outcome: tylenol, and lots of water, went to the ED yesterday also and was given oxycodone, cannabis, and zofran at the ED, 3-4 hours for tylenol.       Problem list and histories reviewed & adjusted, as indicated.  Additional history: as documented    Reviewed and updated as needed this visit by clinical staff  Tobacco  Allergies  Meds  Med Hx  Surg Hx  Fam Hx  Soc Hx      Reviewed and updated as needed this visit by Provider         ROS:    ROS:5 point ROS including CONST, HEENT, Respiratory, CV, and GI other than that noted in the HPI,  is negative     OBJECTIVE:     /88   Pulse 100   Temp 98.8  F (37.1  C) (Oral)   LMP 08/04/2018   SpO2 96%   There is no height or weight on file to calculate BMI.  GENERAL: alert and moderate-severe distress  EYES: Eyes grossly normal to inspection, PERRL and conjunctivae and sclerae normal  HENT: normal cephalic/atraumatic, right ear: white, swollen right EAC with purulent drainage in canal and unable to visualize TM, extremely painful exam, swelling of periauricular tissue ;  left ear: normal: no effusions, no erythema, normal landmarks, nose and mouth without ulcers or lesions, oropharynx clear and oral mucous membranes moist  NECK:  cervical adenopathy right side, no asymmetry, masses, or scars and thyroid normal to palpation  RESP: lungs clear to auscultation - no rales, rhonchi or wheezes  CV: regular rate and rhythm, normal S1 S2, no S3 or S4, no murmur, click or rub, no peripheral edema and peripheral pulses strong    Diagnostic Test Results:  none     ASSESSMENT/PLAN:     (H67.592) Other infective acute otitis externa of right ear  (primary encounter diagnosis)  Comment:   Plan: amoxicillin-clavulanate         (AUGMENTIN) 500-125 MG tablet  More significant infection than limited to EAC.  Starting to have swelling in tissue - possibly mastoiditis vs cellulitis.  Start oral abx tonight.  Fortunately patient has ENT appt. tomorrow morning otherwise would need ED for consideration of imaging.    (B37.3) Yeast infection of the vagina  Comment:   Plan: fluconazole (DIFLUCAN) 150 MG tablet        Prophylaxis for yeast infection with abx      See Patient Instructions    JANNET Liu [facility]

## 2019-03-14 ENCOUNTER — TELEPHONE (OUTPATIENT)
Dept: FAMILY MEDICINE | Facility: CLINIC | Age: 39
End: 2019-03-14

## 2019-03-14 ENCOUNTER — APPOINTMENT (OUTPATIENT)
Dept: CT IMAGING | Facility: CLINIC | Age: 39
End: 2019-03-14
Attending: EMERGENCY MEDICINE
Payer: COMMERCIAL

## 2019-03-14 ENCOUNTER — TRANSFERRED RECORDS (OUTPATIENT)
Dept: HEALTH INFORMATION MANAGEMENT | Facility: CLINIC | Age: 39
End: 2019-03-14

## 2019-03-14 ENCOUNTER — HOSPITAL ENCOUNTER (INPATIENT)
Facility: CLINIC | Age: 39
LOS: 2 days | Discharge: HOME | End: 2019-03-16
Attending: OBSTETRICS & GYNECOLOGY | Admitting: OBSTETRICS & GYNECOLOGY
Payer: COMMERCIAL

## 2019-03-14 DIAGNOSIS — Z3A.31 31 WEEKS GESTATION OF PREGNANCY: ICD-10-CM

## 2019-03-14 DIAGNOSIS — Z33.1 PREGNANCY, INCIDENTAL: ICD-10-CM

## 2019-03-14 DIAGNOSIS — H60.20 ACUTE MALIGNANT OTITIS EXTERNA, UNSPECIFIED LATERALITY: ICD-10-CM

## 2019-03-14 DIAGNOSIS — R11.2 NON-INTRACTABLE VOMITING WITH NAUSEA, UNSPECIFIED VOMITING TYPE: ICD-10-CM

## 2019-03-14 DIAGNOSIS — R11.2 NAUSEA AND VOMITING, INTRACTABILITY OF VOMITING NOT SPECIFIED, UNSPECIFIED VOMITING TYPE: ICD-10-CM

## 2019-03-14 DIAGNOSIS — H60.509 ACUTE OTITIS EXTERNA, UNSPECIFIED LATERALITY, UNSPECIFIED TYPE: Primary | ICD-10-CM

## 2019-03-14 DIAGNOSIS — H60.391 INFECTIVE OTITIS EXTERNA, RIGHT: ICD-10-CM

## 2019-03-14 PROBLEM — H60.90 OTITIS EXTERNA: Status: ACTIVE | Noted: 2019-03-14

## 2019-03-14 LAB
ANION GAP SERPL CALCULATED.3IONS-SCNC: 7 MMOL/L (ref 3–14)
BACTERIA SPEC CULT: NORMAL
BASOPHILS # BLD AUTO: 0 10E9/L (ref 0–0.2)
BASOPHILS NFR BLD AUTO: 0.1 %
BUN SERPL-MCNC: 5 MG/DL (ref 7–30)
CALCIUM SERPL-MCNC: 8.7 MG/DL (ref 8.5–10.1)
CHLORIDE SERPL-SCNC: 107 MMOL/L (ref 94–109)
CO2 SERPL-SCNC: 24 MMOL/L (ref 20–32)
CREAT SERPL-MCNC: 0.44 MG/DL (ref 0.52–1.04)
CRP SERPL-MCNC: 103 MG/L (ref 0–8)
DIFFERENTIAL METHOD BLD: ABNORMAL
EOSINOPHIL # BLD AUTO: 0.1 10E9/L (ref 0–0.7)
EOSINOPHIL NFR BLD AUTO: 0.7 %
ERYTHROCYTE [DISTWIDTH] IN BLOOD BY AUTOMATED COUNT: 14.3 % (ref 10–15)
ERYTHROCYTE [SEDIMENTATION RATE] IN BLOOD BY WESTERGREN METHOD: 52 MM/H (ref 0–20)
GFR SERPL CREATININE-BSD FRML MDRD: >90 ML/MIN/{1.73_M2}
GLUCOSE SERPL-MCNC: 86 MG/DL (ref 70–99)
HCT VFR BLD AUTO: 35.4 % (ref 35–47)
HGB BLD-MCNC: 11.2 G/DL (ref 11.7–15.7)
IMM GRANULOCYTES # BLD: 0.1 10E9/L (ref 0–0.4)
IMM GRANULOCYTES NFR BLD: 0.4 %
LACTATE BLD-SCNC: 0.6 MMOL/L (ref 0.7–2)
LYMPHOCYTES # BLD AUTO: 2.4 10E9/L (ref 0.8–5.3)
LYMPHOCYTES NFR BLD AUTO: 17.5 %
Lab: NORMAL
MCH RBC QN AUTO: 27.8 PG (ref 26.5–33)
MCHC RBC AUTO-ENTMCNC: 31.6 G/DL (ref 31.5–36.5)
MCV RBC AUTO: 88 FL (ref 78–100)
MONOCYTES # BLD AUTO: 1 10E9/L (ref 0–1.3)
MONOCYTES NFR BLD AUTO: 7.7 %
NEUTROPHILS # BLD AUTO: 9.9 10E9/L (ref 1.6–8.3)
NEUTROPHILS NFR BLD AUTO: 73.6 %
NRBC # BLD AUTO: 0 10*3/UL
NRBC BLD AUTO-RTO: 0 /100
PLATELET # BLD AUTO: 263 10E9/L (ref 150–450)
POTASSIUM SERPL-SCNC: 3.9 MMOL/L (ref 3.4–5.3)
RBC # BLD AUTO: 4.03 10E12/L (ref 3.8–5.2)
SODIUM SERPL-SCNC: 138 MMOL/L (ref 133–144)
SPECIMEN SOURCE: NORMAL
WBC # BLD AUTO: 13.5 10E9/L (ref 4–11)

## 2019-03-14 PROCEDURE — 86140 C-REACTIVE PROTEIN: CPT | Performed by: EMERGENCY MEDICINE

## 2019-03-14 PROCEDURE — 85025 COMPLETE CBC W/AUTO DIFF WBC: CPT | Performed by: EMERGENCY MEDICINE

## 2019-03-14 PROCEDURE — 96375 TX/PRO/DX INJ NEW DRUG ADDON: CPT | Performed by: EMERGENCY MEDICINE

## 2019-03-14 PROCEDURE — 25000132 ZZH RX MED GY IP 250 OP 250 PS 637: Performed by: STUDENT IN AN ORGANIZED HEALTH CARE EDUCATION/TRAINING PROGRAM

## 2019-03-14 PROCEDURE — 96366 THER/PROPH/DIAG IV INF ADDON: CPT | Performed by: EMERGENCY MEDICINE

## 2019-03-14 PROCEDURE — 85652 RBC SED RATE AUTOMATED: CPT | Performed by: EMERGENCY MEDICINE

## 2019-03-14 PROCEDURE — 70480 CT ORBIT/EAR/FOSSA W/O DYE: CPT

## 2019-03-14 PROCEDURE — 25000128 H RX IP 250 OP 636: Performed by: STUDENT IN AN ORGANIZED HEALTH CARE EDUCATION/TRAINING PROGRAM

## 2019-03-14 PROCEDURE — 80048 BASIC METABOLIC PNL TOTAL CA: CPT | Performed by: EMERGENCY MEDICINE

## 2019-03-14 PROCEDURE — 99285 EMERGENCY DEPT VISIT HI MDM: CPT | Mod: 25 | Performed by: EMERGENCY MEDICINE

## 2019-03-14 PROCEDURE — 96365 THER/PROPH/DIAG IV INF INIT: CPT | Performed by: EMERGENCY MEDICINE

## 2019-03-14 PROCEDURE — 12000001 ZZH R&B MED SURG/OB UMMC

## 2019-03-14 PROCEDURE — 25000128 H RX IP 250 OP 636: Performed by: EMERGENCY MEDICINE

## 2019-03-14 PROCEDURE — 99285 EMERGENCY DEPT VISIT HI MDM: CPT | Mod: Z6 | Performed by: EMERGENCY MEDICINE

## 2019-03-14 PROCEDURE — 87040 BLOOD CULTURE FOR BACTERIA: CPT | Performed by: EMERGENCY MEDICINE

## 2019-03-14 PROCEDURE — 83605 ASSAY OF LACTIC ACID: CPT | Performed by: EMERGENCY MEDICINE

## 2019-03-14 RX ORDER — BISACODYL 10 MG
10 SUPPOSITORY, RECTAL RECTAL DAILY PRN
Status: DISCONTINUED | OUTPATIENT
Start: 2019-03-16 | End: 2019-03-16 | Stop reason: HOSPADM

## 2019-03-14 RX ORDER — ONDANSETRON 2 MG/ML
4 INJECTION INTRAMUSCULAR; INTRAVENOUS EVERY 6 HOURS PRN
Status: DISCONTINUED | OUTPATIENT
Start: 2019-03-14 | End: 2019-03-16 | Stop reason: HOSPADM

## 2019-03-14 RX ORDER — AMOXICILLIN AND CLAVULANATE POTASSIUM 500; 125 MG/1; MG/1
1 TABLET, FILM COATED ORAL 2 TIMES DAILY
Status: ON HOLD | COMMUNITY
End: 2019-03-16

## 2019-03-14 RX ORDER — ACETAMINOPHEN 325 MG/1
650 TABLET ORAL EVERY 4 HOURS PRN
COMMUNITY
End: 2019-09-19

## 2019-03-14 RX ORDER — PIPERACILLIN SODIUM, TAZOBACTAM SODIUM 4; .5 G/20ML; G/20ML
4.5 INJECTION, POWDER, LYOPHILIZED, FOR SOLUTION INTRAVENOUS EVERY 6 HOURS
Status: DISCONTINUED | OUTPATIENT
Start: 2019-03-14 | End: 2019-03-16

## 2019-03-14 RX ORDER — NALOXONE HYDROCHLORIDE 0.4 MG/ML
.1-.4 INJECTION, SOLUTION INTRAMUSCULAR; INTRAVENOUS; SUBCUTANEOUS
Status: DISCONTINUED | OUTPATIENT
Start: 2019-03-14 | End: 2019-03-16 | Stop reason: HOSPADM

## 2019-03-14 RX ORDER — DOCUSATE SODIUM 100 MG/1
100 CAPSULE, LIQUID FILLED ORAL 2 TIMES DAILY
Status: DISCONTINUED | OUTPATIENT
Start: 2019-03-14 | End: 2019-03-16 | Stop reason: HOSPADM

## 2019-03-14 RX ORDER — ONDANSETRON 4 MG/1
4 TABLET, ORALLY DISINTEGRATING ORAL EVERY 8 HOURS PRN
Status: DISCONTINUED | OUTPATIENT
Start: 2019-03-14 | End: 2019-03-16

## 2019-03-14 RX ORDER — MONTELUKAST SODIUM 10 MG/1
10 TABLET ORAL EVERY MORNING
COMMUNITY
End: 2019-05-16

## 2019-03-14 RX ORDER — ASPIRIN 81 MG/1
81 TABLET ORAL EVERY MORNING
Status: ON HOLD | COMMUNITY
End: 2019-05-08

## 2019-03-14 RX ORDER — OXYCODONE HYDROCHLORIDE 5 MG/1
5 TABLET ORAL EVERY 6 HOURS PRN
Status: DISCONTINUED | OUTPATIENT
Start: 2019-03-14 | End: 2019-03-16 | Stop reason: HOSPADM

## 2019-03-14 RX ORDER — HYDROMORPHONE HYDROCHLORIDE 1 MG/ML
0.5 INJECTION, SOLUTION INTRAMUSCULAR; INTRAVENOUS; SUBCUTANEOUS ONCE
Status: COMPLETED | OUTPATIENT
Start: 2019-03-14 | End: 2019-03-14

## 2019-03-14 RX ORDER — IPRATROPIUM BROMIDE AND ALBUTEROL SULFATE 2.5; .5 MG/3ML; MG/3ML
1 SOLUTION RESPIRATORY (INHALATION) EVERY 6 HOURS PRN
Status: DISCONTINUED | OUTPATIENT
Start: 2019-03-14 | End: 2019-03-16 | Stop reason: HOSPADM

## 2019-03-14 RX ORDER — FLUCONAZOLE 150 MG/1
150 TABLET ORAL EVERY OTHER DAY
Status: DISCONTINUED | OUTPATIENT
Start: 2019-03-15 | End: 2019-03-16 | Stop reason: HOSPADM

## 2019-03-14 RX ORDER — ASPIRIN 81 MG/1
81 TABLET ORAL EVERY MORNING
Status: DISCONTINUED | OUTPATIENT
Start: 2019-03-15 | End: 2019-03-16 | Stop reason: HOSPADM

## 2019-03-14 RX ORDER — CETIRIZINE HYDROCHLORIDE 5 MG/1
5 TABLET ORAL EVERY MORNING
COMMUNITY
End: 2020-03-20

## 2019-03-14 RX ORDER — ALBUTEROL SULFATE 90 UG/1
2 AEROSOL, METERED RESPIRATORY (INHALATION) EVERY 4 HOURS PRN
Status: DISCONTINUED | OUTPATIENT
Start: 2019-03-14 | End: 2019-03-16 | Stop reason: HOSPADM

## 2019-03-14 RX ORDER — FEXOFENADINE HCL 60 MG/1
60 TABLET, FILM COATED ORAL EVERY MORNING
COMMUNITY
End: 2020-03-20

## 2019-03-14 RX ORDER — LIDOCAINE 40 MG/G
CREAM TOPICAL
Status: DISCONTINUED | OUTPATIENT
Start: 2019-03-14 | End: 2019-03-16 | Stop reason: HOSPADM

## 2019-03-14 RX ORDER — NEOMYCIN SULFATE, POLYMYXIN B SULFATE, HYDROCORTISONE 3.5; 10000; 1 MG/ML; [USP'U]/ML; MG/ML
3 SOLUTION/ DROPS AURICULAR (OTIC) 4 TIMES DAILY
COMMUNITY
End: 2019-04-19

## 2019-03-14 RX ORDER — ACETAMINOPHEN 325 MG/1
650 TABLET ORAL EVERY 4 HOURS PRN
Status: DISCONTINUED | OUTPATIENT
Start: 2019-03-14 | End: 2019-03-16 | Stop reason: HOSPADM

## 2019-03-14 RX ORDER — MONTELUKAST SODIUM 10 MG/1
10 TABLET ORAL EVERY MORNING
Status: DISCONTINUED | OUTPATIENT
Start: 2019-03-15 | End: 2019-03-16 | Stop reason: HOSPADM

## 2019-03-14 RX ORDER — PIPERACILLIN SODIUM, TAZOBACTAM SODIUM 3; .375 G/15ML; G/15ML
3.38 INJECTION, POWDER, LYOPHILIZED, FOR SOLUTION INTRAVENOUS ONCE
Status: COMPLETED | OUTPATIENT
Start: 2019-03-14 | End: 2019-03-14

## 2019-03-14 RX ORDER — FEXOFENADINE HCL 60 MG/1
60 TABLET, FILM COATED ORAL EVERY MORNING
Status: DISCONTINUED | OUTPATIENT
Start: 2019-03-15 | End: 2019-03-16 | Stop reason: HOSPADM

## 2019-03-14 RX ADMIN — Medication 0.5 MG: at 16:54

## 2019-03-14 RX ADMIN — PIPERACILLIN SODIUM,TAZOBACTAM SODIUM 3.38 G: 3; .375 INJECTION, POWDER, FOR SOLUTION INTRAVENOUS at 16:56

## 2019-03-14 RX ADMIN — PROCHLORPERAZINE EDISYLATE 10 MG: 5 INJECTION INTRAMUSCULAR; INTRAVENOUS at 16:50

## 2019-03-14 RX ADMIN — SODIUM CHLORIDE 1000 ML: 9 INJECTION, SOLUTION INTRAVENOUS at 16:50

## 2019-03-14 RX ADMIN — PIPERACILLIN AND TAZOBACTAM 4.5 G: 4; .5 INJECTION, POWDER, FOR SOLUTION INTRAVENOUS at 22:51

## 2019-03-14 RX ADMIN — OXYCODONE HYDROCHLORIDE 5 MG: 5 TABLET ORAL at 21:46

## 2019-03-14 RX ADMIN — DOCUSATE SODIUM 100 MG: 100 CAPSULE, LIQUID FILLED ORAL at 21:46

## 2019-03-14 ASSESSMENT — ENCOUNTER SYMPTOMS: FACIAL SWELLING: 1

## 2019-03-14 NOTE — ED PROVIDER NOTES
History     Chief Complaint   Patient presents with     Facial Swelling      31+ weeks pregnant. Seen here 2 nights ago for same. Pain in right ear since  night. Went to ENT and they are very concerned about facial swelling on right and was sent back here. Swelling noted since last night. Pain very bad.      The history is provided by the patient, medical records and the spouse.     Cathy Arroyo is a 38 year old  at 31 weeks pregnant with a medical history significant for recurrent otitis media in the right side, developed mastoiditis in the past, hypothyroidism s/p thyroidectomy (2017), pineal tumor s/p excision of tumor (2014), bipolar 1 disorder, and Graves disease who presents to the Emergency Department for evaluation of right-sided facial swelling.    Per chart review, patient was seen here on 3/12/19 complaining of right ear pain. She was treated with oxycodone, cannabis, Zofran, and Tylenol. She was continued on topical antibiotics for otitis externa. She was then seen at [facility] yesterday for complaint of continued right ear pain and was instructed to visit an ENT specialist today. At this visit she was started on Augmentin. She notes that she took 1 dose of Augmentin last night and 1 this morning; however, vomited this morning in the parking lot prior to seeing the ENT specialist.     Here, patient reports that after taking prescribed medication from her visit here a couple of days ago, her pain level dropped from 10/10 to 8/10; however, she is still in pain. Yesterday, she noticed right-sided facial swelling, tender to the touch from right skull down to right neck. She was seen by an ENT specialist in [place] this morning and was instructed to visit the ED for imaging.    She reports that she has previously been on medical cannabis, but was stopped due to pregnancy. She was on CBD for a couple of month with no improvement, so was started back on THC/CBD  again last week, prescribed by her mental health provider at [facility] in [place]. She notes a childhood history of chronic ear infection, but not as an adult. She notes of a yeast infection 2 days ago, treated. She notes of right ear drainage for the past 2 days. She notes that she is getting regular prenatal care with a midwife here in the [facility]. She notes that after taking pain medication at 9am this morning, her baby was moving sluggishly; however, the pain medication has worn off and her baby is kicking normally again. She notes that she is due on 5/11/19. Denies contractions, vaginal bleeding, leak of amniotic fluid.     Pt's case was discussed with the outpatient ENT resident provider who saw her today. Audiogram shows pure conductive hearing loss.     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     Post partum depression      Post-surgical hypothyroidism 05/2017     Uncomplicated asthma        Past Surgical History:   Procedure Laterality Date     BLOOD PATCH N/A 10/11/2016    Procedure: EPIDURAL BLOOD PATCH;  Surgeon: GENERIC ANESTHESIA PROVIDER;  Location: [facility] OR     CRANIOTOMY, EXCISE TUMOR COMPLEX, COMBINED  2/19/2014    Procedure: COMBINED CRANIOTOMY, EXCISE TUMOR COMPLEX;  Supracerabellar  Infratentorial Approach for Resection of Tumor ;  Surgeon: Kate Mckeon MD;  Location: [facility] OR     THYROIDECTOMY N/A 5/3/2017    Procedure: THYROIDECTOMY;  Total Thyroidectomy;  Surgeon: Pamela Villasenor MD;  Location:  OR       Family History   Problem Relation Age of Onset     Thyroid Disease Paternal Aunt      Asthma Father      Mental Illness Father      Obesity Father      Asthma Maternal Grandmother      Osteoporosis Maternal Grandmother      Glaucoma Maternal Grandmother      Hypertension Maternal  Grandmother      Macular Degeneration Maternal Grandmother      Diabetes Paternal Grandfather      Other Cancer Mother      Genitourinary Problems Mother      Bipolar Disorder Mother         unipolar depression     Depression Mother      Thyroid Disease Mother         benign tumor     Skin Cancer Mother      Cancer Mother      Bipolar Disorder Brother         unipolar depression     Asthma Brother        Social History     Tobacco Use     Smoking status: Former Smoker     Packs/day: 0.50     Years: 12.00     Pack years: 6.00     Types: Cigarettes     Start date: 2004     Last attempt to quit: 2/15/2018     Years since quittin.0     Smokeless tobacco: Never Used   Substance Use Topics     Alcohol use: No     Alcohol/week: 0.0 oz     Comment: quit 16 days ago,       No current facility-administered medications for this encounter.      Current Outpatient Medications   Medication     amoxicillin-clavulanate (AUGMENTIN) 500-125 MG tablet     aspirin 81 MG tablet     budesonide (PULMICORT FLEXHALER) 180 MCG/ACT inhaler     Docosahexaenoic Acid (DHA OMEGA 3 PO)     Fexofenadine HCl (ALLEGRA PO)     fluconazole (DIFLUCAN) 150 MG tablet     levothyroxine (SYNTHROID/LEVOTHROID) 175 MCG tablet     medical cannabis (Patient's own supply.  Not a prescription)     Montelukast Sodium (SINGULAIR PO)     neomycin-polymyxin-hydrocortisone (CORTISPORIN) 3.5-84526-8 otic solution     ondansetron (ZOFRAN ODT) 4 MG ODT tab     oxyCODONE (ROXICODONE) 5 MG tablet     Prenatal Vit-Fe Fumarate-FA (PRENATAL VITAMIN PO)     albuterol (PROAIR HFA/PROVENTIL HFA/VENTOLIN HFA) 108 (90 Base) MCG/ACT inhaler     cetirizine (ZYRTEC) 10 MG tablet     fluconazole (DIFLUCAN) 150 MG tablet     ipratropium - albuterol 0.5 mg/2.5 mg/3 mL (DUONEB) 0.5-2.5 (3) MG/3ML neb solution     order for DME        Allergies   Allergen Reactions     Adacel [Daptacel] Swelling     Codeine Sulfate Nausea and Vomiting     Tetanus Toxoid      Pt states she had an  infection at injection site.      Vicodin [Hydrocodone-Acetaminophen] Nausea and Vomiting     Methimazole Rash       I have reviewed the Medications, Allergies, Past Medical and Surgical History, and Social History in the Epic system.    Review of Systems   Constitutional:        Positive for dehydration   HENT: Positive for ear pain (Right; draining) and facial swelling (Right-sided; tender to the touch from right skull down to right neck).    Musculoskeletal:        Positive for inflamed lymph node under right arm   All other systems reviewed and are negative.      Physical Exam   BP: 134/63  Heart Rate: 101  Temp: 97.6  F (36.4  C)  Resp: 18  Weight: 127.5 kg (281 lb 3 oz)  SpO2: 95 %      Physical Exam   Gen:A&Ox3, uncomfortable.   HEENT:PERRL, mild soft tissue swelling and proptosis of the right ear pinna with an erythematous and edematous narrow canal with drainage. TM unable to be visualized. Tenderness mastoid without swelling. Left outer ear and TM unremarkable.   Neck: right lymphadenopathy  CV:RRR without murmurs  PULM:Clear to auscultation bilaterally  Abd:soft, nontender, nondistended. Bowel sounds present and normal. Gravid uterus that is non-tender. Fetal movement noted.    UE:No traumatic injuries, skin normal  LE:no traumatic injuries, skin normal, no LE edema.   Neuro:CN II-XII intact, strength 5/5 throughout  Skin: no rashes or ecchymoses      ED Course   3:58 PM  The patient was seen and examined by Darlene Silvestre MD in Room ED07.        Procedures             Critical Care time:  none    Labs Ordered and Resulted from Time of ED Arrival Up to the Time of Departure from the ED   CBC WITH PLATELETS DIFFERENTIAL - Abnormal; Notable for the following components:       Result Value    WBC 13.5 (*)     Hemoglobin 11.2 (*)     Absolute Neutrophil 9.9 (*)     All other components within normal limits   BASIC METABOLIC PANEL - Abnormal; Notable for the following components:    Urea Nitrogen 5 (*)      Creatinine 0.44 (*)     All other components within normal limits   ERYTHROCYTE SEDIMENTATION RATE AUTO - Abnormal; Notable for the following components:    Sed Rate 52 (*)     All other components within normal limits   CRP INFLAMMATION - Abnormal; Notable for the following components:    CRP Inflammation 103.0 (*)     All other components within normal limits   LACTIC ACID WHOLE BLOOD - Abnormal; Notable for the following components:    Lactic Acid 0.6 (*)     All other components within normal limits   PERIPHERAL IV CATHETER   FETAL HEART RATE   BLOOD CULTURE   BLOOD CULTURE            Assessments & Plan (with Medical Decision Making)   38-year-old female  at 31 5/7 weeks of pregnancy presenting with acute otitis externa. Difficult symptom management despite topical and oral antibiotics and pain management.      Sent from her outpatient ENT clinic for imaging due to concern for possible mastoiditis or osteomyelitis.     Arrives with stable vitals other than mild tachycardia with heart rate of 101 and mildly uncomfortable.      IV access was obtained and testing done, white cell count elevated at 13.5, hemoglobin 11.2, with normal platelets, CRP increased to 103 electrolytes and creatinine within normal limits and lactate was 0.6.     Discussed with the radiologist on call to select imaging to assess for bony  Metastases, recommended abdominal shielding and a CT temporal bone with IV without contrast.  CT showing no mastoiditis no signs of concerning soft tissue involvement she does have soft tissue in the middle ear thought to be either inflammatory debris or granulation tissue but no bony obstruction to suggest cholesteatoma.      Opacification of the right external auditory canal consistent with otitis externa. Treated with IV Zosyn since she has been having difficult time with tolerating oral antibiotics as well as increasing pain despite topical antibiotics. Given IV Compazine and later given a normal  saline and blood for pain.      OB was contacted again since she is without pregnancy related concerns they will plan to follow along but does not have any indication for NST at this time.      On physical exam she has significant narrowing of the right external canal and a wick was placed.  Plan to discuss with internal medicine the plan of admitting her overnight for IV fluids and IV antibiotics as well as pain management.    This part of the medical record was transcribed by Avila Gastelum, Medical Scribe, from a dictation done by Darlene Silvestre MD.     I have reviewed the nursing notes.    I have reviewed the findings, diagnosis, plan and need for follow up with the patient.       Medication List      There are no discharge medications for this visit.         Final diagnoses:   Infective otitis externa, right   Non-intractable vomiting with nausea, unspecified vomiting type     I, Osiris Kunz, ekta serving as a trained medical scribe to document services personally performed by Darlene Silvestre MD, based on the provider's statements to me.      I, Darlene Silvestre MD, was physically present and have reviewed and verified the accuracy of this note documented by Osiris Kunz.     3/14/2019   [facility], [place], EMERGENCY DEPARTMENT    MD JAYLIN Celis Katrina Anne, MD  03/14/19 5688

## 2019-03-14 NOTE — RESULT ENCOUNTER NOTE
Final Beta strep group A r/o culture is NEGATIVE for Group A streptococcus.    No treatment or change in treatment per [place] Strep protocol.

## 2019-03-14 NOTE — PHARMACY-ADMISSION MEDICATION HISTORY
Admission Medication History status for the 3/14/2019 admission is complete.  See EPIC admission navigator for Prior to Admission medications.    Medication history sources:  Patient, [facility] on [address] Fill History, [place] Records, ED discharge note from 3/12/2019    Medication history source reliability: Good - patient report matches [facility] fill history    Medication adherence:  Good - patient reports excellent adherence    Changes made to PTA medication list (reason)  Added: Acetaminophen  Deleted:   Pulmicort Flexhaler: 180 mcg/act inhaler: Inhale 1 puff into lung twice daily   Patient said this was a short term med, hasn't take in 2 weeks  Dulera 200-5 mcg/act inhaler: Inhale 2 puffs into lungs twice daily   Patient said this was stopped several months ago  Changed: Clarified directions and doses for DHA Omega 3, Zyrtec, Singulair, Allegra    Additional medication history information (including reliability of information, actions taken by pharmacist):   - Patient was a good historian, she knew her medications well and denied all other Rx/OTC medications  - Medications on hold and has at home:    Fluconazole 150 mg tablet: take one tablet by mouth every other day   Duoneb Nebulizer Solution   Zyrtec  - Patient currently alternating between oxycodone 5 mg and Tylenol 650 mg every 4 hours  - Oxycodone IR 5 mg #8 picked up on 3/12/2019 at [facility] on [address]   - Time spent in this activity: 35 min  - Medication history completed by: Jen Reyna    Prior to Admission medications    Medication Sig Last Dose Taking? Auth Provider   acetaminophen (TYLENOL) 325 MG tablet Take 650 mg by mouth every 4 hours as needed for mild pain 3/14/2019 at AM Yes Unknown, Entered By History   albuterol (PROAIR HFA/PROVENTIL HFA/VENTOLIN HFA) 108 (90 Base) MCG/ACT inhaler Inhale 2 puffs into the lungs every 4 hours as needed for shortness of breath / dyspnea or wheezing Past Week at Unknown time Yes Brown  JANNET Chicas CNP   amoxicillin-clavulanate (AUGMENTIN) 500-125 MG tablet Take 1 tablet by mouth 2 times daily Starting with 3/13/2019 PM dose and ending with 3/23/2019 AM dose 3/14/2019 at AM Yes Unknown, Entered By History   aspirin 81 MG EC tablet Take 81 mg by mouth every morning 3/14/2019 at AM Yes Unknown, Entered By History   Docosahexaenoic Acid (DHA NATURAL OMEGA-3) 200 MG capsule Take 200 mg by mouth every morning 3/14/2019 at AM Yes Unknown, Entered By History   fexofenadine (ALLEGRA) 60 MG tablet Take 60 mg by mouth every morning 3/14/2019 at AM Yes Unknown, Entered By History   fluconazole (DIFLUCAN) 150 MG tablet Take 1 tablet (150 mg) by mouth once for 1 dose Past Week at Unknown time Yes Jennifer Santos CNM   levothyroxine (SYNTHROID/LEVOTHROID) 175 MCG tablet Take 1 tablet (175 mcg) by mouth daily 3/14/2019 at AM Yes Leatha Franco MD   medical cannabis (Patient's own supply.  Not a prescription) 1 Dose by Other route See Admin Instructions (This is NOT a prescription, and does not certify that the patient has a qualifying medical condition for medical cannabis.  The purpose of this order is  to document that the patient reports taking medical cannabis.)  3/14/2019 at Unknown time Yes Reported, Patient   montelukast (SINGULAIR) 10 MG tablet Take 10 mg by mouth every morning 3/14/2019 at AM Yes Unknown, Entered By History   neomycin-polymyxin-hydrocortisone (CORTISPORIN) 3.5-00722-9 otic solution Place 3 drops in ear(s) 4 times daily For 7 days, starting 3/12/2019 and ending 3/19/2019 3/14/2019 at AM Yes Unknown, Entered By History   ondansetron (ZOFRAN ODT) 4 MG ODT tab Take 1 tablet (4 mg) by mouth every 8 hours as needed for nausea 3/12/2019 at Unknown time Yes Romeo Guan MD   oxyCODONE (ROXICODONE) 5 MG tablet Take 1 tablet (5 mg) by mouth every 6 hours as needed for pain 3/14/2019 at AM Yes Romeo Guan MD   Prenatal Vit-Fe Fumarate-FA (PRENATAL VITAMIN PO) Take 1 tablet by mouth  every morning  3/14/2019 at AM Yes Reported, Patient   cetirizine (ZYRTEC) 5 MG tablet Take 5 mg by mouth every morning When not using the Allegra   Unknown, Entered By History   fluconazole (DIFLUCAN) 150 MG tablet Take 1 tablet (150 mg) by mouth every other day   Sherrill Dasilva APRN CNP   ipratropium - albuterol 0.5 mg/2.5 mg/3 mL (DUONEB) 0.5-2.5 (3) MG/3ML neb solution Take 1 vial (3 mLs) by nebulization every 6 hours as needed for shortness of breath / dyspnea or wheezing   Sherrill Dasilva, JANNET CNP   order for DME Equipment being ordered: Calin Covarrubias, RAVEN

## 2019-03-14 NOTE — TELEPHONE ENCOUNTER
Prior Authorization Retail Medication Request    Medication/Dose: fluconazole (DIFLUCAN) 150 MG tablet  ICD code (if different than what is on RX):    Previously Tried and Failed:    Rationale:      Insurance Name:  [facility]  Insurance ID:  [id number] different faxes have 3 different Keys for same medication.   Key: [id number]  Key: [id number]  Key: [id number]      Pharmacy Information (if different than what is on RX)  Name:  [facility]  Phone:  [phone]  Fax: [phone]

## 2019-03-14 NOTE — ED NOTES
[facility], [place]   ED Nurse to Floor Handoff     Cathy Arroyo is a 38 year old female who speaks English and lives with family members,  in a home  They arrived in the ED by car from home    ED Chief Complaint: Facial Swelling ( 31+ weeks pregnant. Seen here 2 nights ago for same. Pain in right ear since Sunday night. Went to ENT and they are very concerned about facial swelling on right and was sent back here. Swelling noted since last night. Pain very bad. )    ED Dx;   Final diagnoses:   Infective otitis externa, right         Needed?: No    Allergies:   Allergies   Allergen Reactions     Adacel [Daptacel] Swelling     Codeine Sulfate Nausea and Vomiting     Tetanus Toxoid      Pt states she had an infection at injection site.      Vicodin [Hydrocodone-Acetaminophen] Nausea and Vomiting     Methimazole Rash   .  Past Medical Hx:   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     Post partum depression      Post-surgical hypothyroidism 05/2017     Uncomplicated asthma       Baseline Mental status: WDL  Current Mental Status changes: at basesline    Infection present or suspected this encounter: cultures pending  Sepsis suspected: No  Isolation type: No active isolations     Activity level - Baseline/Home:  Independent  Activity Level - Current:   Independent    Bariatric equipment needed?: No    In the ED these meds were given:   Medications   0.9% sodium chloride BOLUS (1,000 mLs Intravenous New Bag 3/14/19 1650)   piperacillin-tazobactam (ZOSYN) 3.375 g vial to attach to  mL bag (3.375 g Intravenous New Bag 3/14/19 1656)   prochlorperazine (COMPAZINE) injection 10 mg (10 mg Intravenous Given 3/14/19 1650)   HYDROmorphone (PF) (DILAUDID) injection 0.5 mg (0.5 mg  Intravenous Given 3/14/19 5512)       Drips running?  Yes    Home pump  No    Current LDAs  Peripheral IV 03/14/19 Right Upper forearm (Active)   Site Assessment WDL 3/14/2019  4:32 PM   Number of days: 0       Incision/Surgical Site 05/03/17 Neck (Active)   Number of days: 680       Labs results:   Labs Ordered and Resulted from Time of ED Arrival Up to the Time of Departure from the ED   CBC WITH PLATELETS DIFFERENTIAL - Abnormal; Notable for the following components:       Result Value    WBC 13.5 (*)     Hemoglobin 11.2 (*)     Absolute Neutrophil 9.9 (*)     All other components within normal limits   BASIC METABOLIC PANEL - Abnormal; Notable for the following components:    Urea Nitrogen 5 (*)     Creatinine 0.44 (*)     All other components within normal limits   ERYTHROCYTE SEDIMENTATION RATE AUTO - Abnormal; Notable for the following components:    Sed Rate 52 (*)     All other components within normal limits   CRP INFLAMMATION - Abnormal; Notable for the following components:    CRP Inflammation 103.0 (*)     All other components within normal limits   LACTIC ACID WHOLE BLOOD - Abnormal; Notable for the following components:    Lactic Acid 0.6 (*)     All other components within normal limits   PERIPHERAL IV CATHETER   FETAL HEART RATE   BLOOD CULTURE   BLOOD CULTURE       Imaging Studies:   Recent Results (from the past 24 hour(s))   CT Temporal Bones wo Contrast    Narrative    CT SCAN OF TEMPORAL BONES WITHOUT CONTRAST  3/14/2019 5:13 PM     HISTORY: Right ear pain and hearing loss, concern for malignant otitis  externa versus mastoiditis. 31 weeks pregnant.     COMPARISON:  None.    TECHNIQUE: Noncontrast axial scans and coronal reformations. Radiation  dose for this scan was reduced using automated exposure control,  adjustment of the mA and/or kV according to patient size, or iterative  reconstruction technique.     FINDINGS:  Right Temporal Bone: The external auditory canal is completely  opacified  with soft tissue attenuation material, but there is no bone  destruction or apparent erosion. There is soft tissue material  posteriorly in the middle ear in the tegmen tympani as well as  surrounding the head of the malleus and the long and short processes  of the incus. This also involves the stapes diffusely. The tegmen  tympani is intact. Mastoid air cells are clear. Internal auditory  canal, vestibule, semicircular canals and cochlea appear normal.    Left Temporal Bone: The structures of the middle ear, inner ear, and  external auditory canal appear normal. Mastoid air cells are  completely opacified and somewhat sclerotic indicating chronic  coalescent mastoiditis.    Sinuses: The visualized portions of the sinuses and nasopharynx appear  normal.       Impression    IMPRESSION:    1. Opacification of the right external auditory canal with no  underlying bone destruction.  2. No evidence of right mastoiditis.  3. Soft tissue material in the right middle ear could represent  inflammatory debris or granulation tissue. No bone destruction to  suggest cholesteatoma.  4. Left-sided chronic coalescent mastoiditis.  5. Postoperative changes from suboccipital craniotomy.    TORIE MARQUES MD       Recent vital signs:   /63   Temp 97.6  F (36.4  C) (Oral)   Resp 18   Wt 127.5 kg (281 lb 3 oz)   LMP 08/04/2018   SpO2 95%   BMI 42.75 kg/m      Cardiac Rhythm: Other  Pt needs tele? No  Skin/wound Issues: None    Code Status: no    Pain control: fair    Nausea control: fair    Abnormal labs/tests/findings requiring intervention: see lab results    Family present during ED course? Yes   Family Comments/Social Situation comments: spouse    Tasks needing completion: None    Yvette Tamez RN  [facility]- [phone] [place] ED  [phone] [facility] ED

## 2019-03-15 LAB
ABO + RH BLD: NORMAL
ABO + RH BLD: NORMAL
ALT SERPL W P-5'-P-CCNC: 13 U/L (ref 0–50)
AST SERPL W P-5'-P-CCNC: 10 U/L (ref 0–45)
BASOPHILS # BLD AUTO: 0 10E9/L (ref 0–0.2)
BASOPHILS NFR BLD AUTO: 0.2 %
BLD GP AB SCN SERPL QL: NORMAL
BLOOD BANK CMNT PATIENT-IMP: NORMAL
CREAT SERPL-MCNC: 0.52 MG/DL (ref 0.52–1.04)
CREAT UR-MCNC: 44 MG/DL
DIFFERENTIAL METHOD BLD: ABNORMAL
EOSINOPHIL # BLD AUTO: 0.2 10E9/L (ref 0–0.7)
EOSINOPHIL NFR BLD AUTO: 1.2 %
ERYTHROCYTE [DISTWIDTH] IN BLOOD BY AUTOMATED COUNT: 14.3 % (ref 10–15)
GFR SERPL CREATININE-BSD FRML MDRD: >90 ML/MIN/{1.73_M2}
HCT VFR BLD AUTO: 34.5 % (ref 35–47)
HGB BLD-MCNC: 11 G/DL (ref 11.7–15.7)
IMM GRANULOCYTES # BLD: 0.1 10E9/L (ref 0–0.4)
IMM GRANULOCYTES NFR BLD: 0.4 %
LYMPHOCYTES # BLD AUTO: 3 10E9/L (ref 0.8–5.3)
LYMPHOCYTES NFR BLD AUTO: 24.5 %
MCH RBC QN AUTO: 28 PG (ref 26.5–33)
MCHC RBC AUTO-ENTMCNC: 31.9 G/DL (ref 31.5–36.5)
MCV RBC AUTO: 88 FL (ref 78–100)
MONOCYTES # BLD AUTO: 1 10E9/L (ref 0–1.3)
MONOCYTES NFR BLD AUTO: 8.7 %
NEUTROPHILS # BLD AUTO: 7.8 10E9/L (ref 1.6–8.3)
NEUTROPHILS NFR BLD AUTO: 65 %
NRBC # BLD AUTO: 0 10*3/UL
NRBC BLD AUTO-RTO: 0 /100
PLATELET # BLD AUTO: 268 10E9/L (ref 150–450)
PROT UR-MCNC: 0.08 G/L
PROT/CREAT 24H UR: 0.18 G/G CR (ref 0–0.2)
RBC # BLD AUTO: 3.93 10E12/L (ref 3.8–5.2)
SPECIMEN EXP DATE BLD: NORMAL
WBC # BLD AUTO: 12 10E9/L (ref 4–11)

## 2019-03-15 PROCEDURE — 99222 1ST HOSP IP/OBS MODERATE 55: CPT | Performed by: PHYSICIAN ASSISTANT

## 2019-03-15 PROCEDURE — 25000132 ZZH RX MED GY IP 250 OP 250 PS 637: Performed by: STUDENT IN AN ORGANIZED HEALTH CARE EDUCATION/TRAINING PROGRAM

## 2019-03-15 PROCEDURE — 86900 BLOOD TYPING SEROLOGIC ABO: CPT | Performed by: STUDENT IN AN ORGANIZED HEALTH CARE EDUCATION/TRAINING PROGRAM

## 2019-03-15 PROCEDURE — 85025 COMPLETE CBC W/AUTO DIFF WBC: CPT | Performed by: STUDENT IN AN ORGANIZED HEALTH CARE EDUCATION/TRAINING PROGRAM

## 2019-03-15 PROCEDURE — 36415 COLL VENOUS BLD VENIPUNCTURE: CPT | Performed by: STUDENT IN AN ORGANIZED HEALTH CARE EDUCATION/TRAINING PROGRAM

## 2019-03-15 PROCEDURE — 84460 ALANINE AMINO (ALT) (SGPT): CPT | Performed by: STUDENT IN AN ORGANIZED HEALTH CARE EDUCATION/TRAINING PROGRAM

## 2019-03-15 PROCEDURE — 25000128 H RX IP 250 OP 636: Performed by: STUDENT IN AN ORGANIZED HEALTH CARE EDUCATION/TRAINING PROGRAM

## 2019-03-15 PROCEDURE — 86850 RBC ANTIBODY SCREEN: CPT | Performed by: STUDENT IN AN ORGANIZED HEALTH CARE EDUCATION/TRAINING PROGRAM

## 2019-03-15 PROCEDURE — 12000001 ZZH R&B MED SURG/OB UMMC

## 2019-03-15 PROCEDURE — 82565 ASSAY OF CREATININE: CPT | Performed by: STUDENT IN AN ORGANIZED HEALTH CARE EDUCATION/TRAINING PROGRAM

## 2019-03-15 PROCEDURE — 84156 ASSAY OF PROTEIN URINE: CPT | Performed by: STUDENT IN AN ORGANIZED HEALTH CARE EDUCATION/TRAINING PROGRAM

## 2019-03-15 PROCEDURE — 84450 TRANSFERASE (AST) (SGOT): CPT | Performed by: STUDENT IN AN ORGANIZED HEALTH CARE EDUCATION/TRAINING PROGRAM

## 2019-03-15 PROCEDURE — 86901 BLOOD TYPING SEROLOGIC RH(D): CPT | Performed by: STUDENT IN AN ORGANIZED HEALTH CARE EDUCATION/TRAINING PROGRAM

## 2019-03-15 PROCEDURE — 99207 ZZC CONSULT E&M CHANGED TO INITIAL LEVEL: CPT | Performed by: PHYSICIAN ASSISTANT

## 2019-03-15 RX ORDER — OXYCODONE HYDROCHLORIDE 5 MG/1
5 TABLET ORAL ONCE
Status: COMPLETED | OUTPATIENT
Start: 2019-03-15 | End: 2019-03-15

## 2019-03-15 RX ORDER — OXYTOCIN/0.9 % SODIUM CHLORIDE 30/500 ML
PLASTIC BAG, INJECTION (ML) INTRAVENOUS
Status: DISPENSED
Start: 2019-03-15 | End: 2019-03-15

## 2019-03-15 RX ORDER — PRENATAL VIT/IRON FUM/FOLIC AC 27MG-0.8MG
1 TABLET ORAL DAILY
Status: DISCONTINUED | OUTPATIENT
Start: 2019-03-15 | End: 2019-03-16 | Stop reason: HOSPADM

## 2019-03-15 RX ORDER — PROCHLORPERAZINE MALEATE 5 MG
5-10 TABLET ORAL EVERY 6 HOURS PRN
Status: DISCONTINUED | OUTPATIENT
Start: 2019-03-15 | End: 2019-03-16 | Stop reason: HOSPADM

## 2019-03-15 RX ORDER — CIPROFLOXACIN AND DEXAMETHASONE 3; 1 MG/ML; MG/ML
4 SUSPENSION/ DROPS AURICULAR (OTIC) 2 TIMES DAILY
Status: DISCONTINUED | OUTPATIENT
Start: 2019-03-15 | End: 2019-03-16 | Stop reason: HOSPADM

## 2019-03-15 RX ADMIN — CIPROFLOXACIN AND DEXAMETHASONE 4 DROP: 3; 1 SUSPENSION/ DROPS AURICULAR (OTIC) at 20:22

## 2019-03-15 RX ADMIN — MONTELUKAST 10 MG: 10 TABLET, FILM COATED ORAL at 09:03

## 2019-03-15 RX ADMIN — ACETAMINOPHEN 650 MG: 325 TABLET, FILM COATED ORAL at 11:10

## 2019-03-15 RX ADMIN — ACETAMINOPHEN 650 MG: 325 TABLET, FILM COATED ORAL at 05:46

## 2019-03-15 RX ADMIN — ONDANSETRON 4 MG: 2 INJECTION INTRAMUSCULAR; INTRAVENOUS at 17:03

## 2019-03-15 RX ADMIN — FEXOFENADINE HCL 60 MG: 60 TABLET, FILM COATED ORAL at 09:03

## 2019-03-15 RX ADMIN — DOCUSATE SODIUM 100 MG: 100 CAPSULE, LIQUID FILLED ORAL at 09:03

## 2019-03-15 RX ADMIN — OXYCODONE HYDROCHLORIDE 5 MG: 5 TABLET ORAL at 23:07

## 2019-03-15 RX ADMIN — ONDANSETRON 4 MG: 2 INJECTION INTRAMUSCULAR; INTRAVENOUS at 11:10

## 2019-03-15 RX ADMIN — Medication 200 MG: at 09:03

## 2019-03-15 RX ADMIN — ASPIRIN 81 MG: 81 TABLET, COATED ORAL at 09:03

## 2019-03-15 RX ADMIN — PIPERACILLIN AND TAZOBACTAM 4.5 G: 4; .5 INJECTION, POWDER, FOR SOLUTION INTRAVENOUS at 23:07

## 2019-03-15 RX ADMIN — DOCUSATE SODIUM 100 MG: 100 CAPSULE, LIQUID FILLED ORAL at 21:35

## 2019-03-15 RX ADMIN — PIPERACILLIN AND TAZOBACTAM 4.5 G: 4; .5 INJECTION, POWDER, FOR SOLUTION INTRAVENOUS at 16:54

## 2019-03-15 RX ADMIN — ACETAMINOPHEN 650 MG: 325 TABLET, FILM COATED ORAL at 23:55

## 2019-03-15 RX ADMIN — LEVOTHYROXINE SODIUM 175 MCG: 25 TABLET ORAL at 06:04

## 2019-03-15 RX ADMIN — OXYCODONE HYDROCHLORIDE 5 MG: 5 TABLET ORAL at 05:54

## 2019-03-15 RX ADMIN — PROCHLORPERAZINE EDISYLATE 5 MG: 5 INJECTION INTRAMUSCULAR; INTRAVENOUS at 07:18

## 2019-03-15 RX ADMIN — OXYCODONE HYDROCHLORIDE 5 MG: 5 TABLET ORAL at 03:49

## 2019-03-15 RX ADMIN — PIPERACILLIN AND TAZOBACTAM 4.5 G: 4; .5 INJECTION, POWDER, FOR SOLUTION INTRAVENOUS at 05:12

## 2019-03-15 RX ADMIN — OXYCODONE HYDROCHLORIDE 5 MG: 5 TABLET ORAL at 13:49

## 2019-03-15 RX ADMIN — PRENATAL VIT W/ FE FUMARATE-FA TAB 27-0.8 MG 1 TABLET: 27-0.8 TAB at 09:03

## 2019-03-15 RX ADMIN — PIPERACILLIN AND TAZOBACTAM 4.5 G: 4; .5 INJECTION, POWDER, FOR SOLUTION INTRAVENOUS at 11:19

## 2019-03-15 RX ADMIN — ACETAMINOPHEN 650 MG: 325 TABLET, FILM COATED ORAL at 17:03

## 2019-03-15 NOTE — PLAN OF CARE
Patient complaining of nausea and pain in her ear.  Patient Zosyn dose started.  Patient given Tylenol, Zofran and Jessica-Ease aromatherapy stick.  Student nurse  in to give patient hand massage for comfort.  Patient states she now has some relief and would like to rest.  Will continue to monitor patient and notify MD of any changes in patient status.

## 2019-03-15 NOTE — PLAN OF CARE
AFVSS. Denies HA, blurred vision or epigastric pain. Patient states roxicodone for pain worked well after first dose. States that she was able to sleep for a few hours. Patient took another dose of roxicodone at 0345. States did not really do anything for pain. Dr. Chow notified and additional dose of roxicodone given. Patient also encouraged to take tylenol. States now feeling much better. IV antibiotics given as ordered. Fetal NST reactive. Continue present cares.

## 2019-03-15 NOTE — PROVIDER NOTIFICATION
03/15/19 0536   Provider Notification   Provider Name/Title Geraldo   Method of Notification Electronic Page   Request Evaluate - Remote   Notification Reason Pain   Dr. Chow notified of patient requesting additional pain medication for right ear. Await MD order.

## 2019-03-15 NOTE — TELEPHONE ENCOUNTER
Prior Authorization Not Needed per Insurance    Medication: fluconazole (DIFLUCAN) 150 MG tablet - NOT NEEDED  Insurance Company: [employer] - Phone [phone] Fax [phone]  Expected CoPay:    $0  Pharmacy [facility]illing the Rx[facility] [address] [place] [address] AT [facility]  Pharmacy Notified: Yes  Patient Notified: Comment:  **Pharmacy will notify patient when script is ready for .**    Covered per Kathy at pharmacy

## 2019-03-15 NOTE — CONSULTS
[facility], [place]  Consult Note - Hospitalist Service     Date of Admission:  3/14/2019  Consult Requested by: Dr. Alexis, OB/GYN  Reason for Consult: Otitis externa, assist with management    Assessment & Plan   Cathy Arroyo is a 38 year old 36  at 31 6/7 weeks pregnant with a history of recurrent right sided otitis media in childhood, graves disease s/p thyroidectomy (2017), asthma, pineal tumor s/p excision (2014) and bipolar 1 disorder admitted on 3/14/2019 for worsening otitis externa with concerns for mastoiditis, requiring IV antibiotics.      #Severe otitis externa: Patient w/ 6 day h/o ear pain, drainage, has swelling and erythema of the right ear, consistent with OE. CT notes opacification of right external auditory canal w/ no underlying bone destruction, no e/o right sided mastoiditis, inflammatory debris in right middle ear. WBC 12 (13.5), with absolute neutrophils 9.9. ESR 52, , LA normal. HIV NR in 2018. No recent swimming or pool exposure. Unable to visualize past distal external canal given wick placement, reviewed ED note, no visualization of TM. Patient notes improved pain management today w/ oxycodone. VSS.   -Agree with IV zosyn for now, no need for vanc at this point given improvement, no h/o MRSA (nasal swab negative in ), would repeat MRSA nasal swab if concern for treatment failure, consider transition to Augmentin vs keflex in coming days to complete 7 days of treatment on discharge  -Would initiate cipro hc (Instill 0.25 mL (0.5 mg) solution (contents of 1 single-dose container) into affected ear twice daily for 7 days) drops if ok from OB/GYN perspective, could also use Cortisporin otic 4 drops 3 to 4 times daily for 7 days- would defer to ENT, latter not to be used if TM perforated  -Recommend repeating CBC and CRP in AM to ensure improvement  -Pain management per primary team  -Blood cultures pending  -Recommend  "discussion w/ ENT given duration and severeity of symptoms for antibiotic and treatment recommendations, evaluation, do note patient has f/u apt  but will likely need to be seen sooner     #Graves disease s/p thyroidectomy: Continuing synthroid per primary team, OB/GYN     #Asthma: Continue singulair, PRN albuterol per primary team, OB/GYN     #Bipolar 1 disorder: Defer to primary team, OB/GYN    The patient's care was discussed with the Attending Physician, Dr. Guevara, Bedside Nurse, Patient and Primary team.    Sammie Agrawal PA-C  [facility], [place]    ______________________________________________________________________    Chief Complaint   Ear pain, infectoin    History is obtained from the patient    History of Present Illness   Cathy Arroyo is a 38 year old  at 31 6/7 weeks pregnant with a history of recurrent right sided ostitis media c/b hx of mastoiditis, hypothyroidism s/p thyroidectomy (2017), graves disease, pineal tumor s/p excision (2014) and bipolar 1 disorder who presented to the ED on 3/14 due to right sided facial swelling. Per chart review, the patient was seen at her PCP office on 3/12 with right ear pain, prescribed Cortisporin drops without improvement. She then presented to the ED later that day for the same and prescribed Tylenol, 8 tablets of 5 mg Oxycodone and 10 tablets of 4 mg Zofran, which she took without improvement. Seen by her PCP on 3/13 for the same and prescribed Augmentin. ENT specialist on 3/14 evaluated her and sent her to the ED with concerns for mastoiditis. CT showed \"opacification of the right external auditory canal with no underlying bone destruction. No evidence of right mastoiditis. Soft tissue material in the right middle ear could represent inflammatory debris or granulation tissue. No bone destruction to suggest cholesteatoma. Left-sided chronic coalescent mastoiditis. Postoperative changes from " "suboccipital craniotomy.\" She was treated with IV Zosyn in the ED for otitis externa, has been experiencing difficulty with oral antibiotics.      Patient reports right ear pain since 3/10. Improvement with oxycodone, exacerbated with chewing. Additionally, she reports clear/yellow drainage from the ear, sore throat and right sided facial swelling. She denies neck pain, confusion, chest pain, shortness of breath, abdominal pain, nausea, vomiting, swelling, rashes, difficulty swallowing or any other concerns or complaints at this time. No recent sick contacts. 2 year old daughter at home, does not attend day care. No recent swimming or hot tub use.    Review of Systems   The 10 point Review of Systems is negative other than noted in the HPI or here.     Past Medical History    I have reviewed this patient's medical history and updated it with pertinent information if needed.   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     Post partum depression      Post-surgical hypothyroidism 05/2017     Uncomplicated asthma        Past Surgical History   I have reviewed this patient's surgical history and updated it with pertinent information if needed.  Past Surgical History:   Procedure Laterality Date     BLOOD PATCH N/A 10/11/2016    Procedure: EPIDURAL BLOOD PATCH;  Surgeon: GENERIC ANESTHESIA PROVIDER;  Location: [facility] OR     CRANIOTOMY, EXCISE TUMOR COMPLEX, COMBINED  2/19/2014    Procedure: COMBINED CRANIOTOMY, EXCISE TUMOR COMPLEX;  Supracerabellar  Infratentorial Approach for Resection of Tumor ;  Surgeon: Kate Mckeon MD;  Location: [facility] OR     THYROIDECTOMY N/A 5/3/2017    Procedure: THYROIDECTOMY;  Total Thyroidectomy;  Surgeon: Pamela Villasenor MD;  Location:  OR       Social History   I " have reviewed this patient's social history and updated it with pertinent information if needed.  Social History     Tobacco Use     Smoking status: Former Smoker     Packs/day: 0.50     Years: 12.00     Pack years: 6.00     Types: Cigarettes     Start date: 2004     Last attempt to quit: 2/15/2018     Years since quittin.0     Smokeless tobacco: Never Used   Substance Use Topics     Alcohol use: No     Alcohol/week: 0.0 oz     Comment: quit 16 days ago,     Drug use: No       Family History   I have reviewed this patient's family history and updated it with pertinent information if needed.   Family History   Problem Relation Age of Onset     Thyroid Disease Paternal Aunt      Asthma Father      Mental Illness Father      Obesity Father      Asthma Maternal Grandmother      Osteoporosis Maternal Grandmother      Glaucoma Maternal Grandmother      Hypertension Maternal Grandmother      Macular Degeneration Maternal Grandmother      Diabetes Paternal Grandfather      Other Cancer Mother      Genitourinary Problems Mother      Bipolar Disorder Mother         unipolar depression     Depression Mother      Thyroid Disease Mother         benign tumor     Skin Cancer Mother      Cancer Mother      Bipolar Disorder Brother         unipolar depression     Asthma Brother        Medications   I have reviewed this patient's current medications    Allergies   Allergies   Allergen Reactions     Adacel [Daptacel] Swelling     Codeine Sulfate Nausea and Vomiting     Tetanus Toxoid      Pt states she had an infection at injection site.      Vicodin [Hydrocodone-Acetaminophen] Nausea and Vomiting     Methimazole Rash       Physical Exam   Blood pressure 120/59, pulse 95, temperature 98.1  F (36.7  C), temperature source Oral, resp. rate 18, weight 127.6 kg (281 lb 3.2 oz), last menstrual period 2018, SpO2 98 %, not currently breastfeeding.  GENERAL: Alert and oriented x 3. Lying comfortably in bed.   HEENT: Anicteric  sclera. Mucous membranes moist and without lesions. Posterior pharynx with mild erythema. Pre auricular lymphadenopathy. Right sided auricular edema, warmth and erythema not extending to face, but facial edema.   CV: RRR. S1, S2. No murmurs appreciated.   RESPIRATORY: Effort normal on RA. Lungs CTAB with no wheezing, rales, rhonchi.   GI: Abdomen soft and non distended, bowel sounds present. No tenderness, rebound, guarding.   MUSCULOSKELETAL: No joint swelling or tenderness. Moves all extremities.   NEUROLOGICAL: No focal deficits.   EXTREMITIES: No peripheral edema.   SKIN: No jaundice. No rashes.       Data   Reviewed CBC, urine protein & creatinine

## 2019-03-15 NOTE — PROGRESS NOTES
Antepartum Progress Note    S: Still feeling quite sick. Right ear and head surrounding ear are hurting. She cannot hear out of right ear. Denies any pregnancy related complaints. Some Shashank-Zamora, but these are not overall bothersome. No VB, no LOF. +FM.     O:   Patient Vitals for the past 24 hrs:   BP Temp Temp src Pulse Resp SpO2 Weight   03/15/19 1710 105/56 97.8  F (36.6  C) Oral -- 18 -- --   03/15/19 0950 112/59 98.4  F (36.9  C) Oral -- 18 -- --   03/15/19 0343 120/59 98.1  F (36.7  C) Oral -- 18 -- 127.6 kg (281 lb 3.2 oz)   19 2350 120/62 98.5  F (36.9  C) Oral -- 18 -- --   193 130/64 -- -- -- 18 -- --   195 133/71 -- -- -- -- -- --   19 213 142/69 -- -- -- -- -- --   19 2114 143/75 -- -- -- 18 -- --   19 118/73 -- -- 95 -- 98 % --     Gen: appears tired  HEENT: right ear grossly normal  CV: RR  Resp: normal respiratory effort  Abd: gravid, soft, non-tender  Extremities: trace edema, no calf tenderness    NST to be done tonight    A/P: 39 yo  at 31w6d LMP c/w 7w1d US who is HD#2 for otitis externa.    1. Otitis externa  - Medicine consult, appreciate recs  - D#2 IV Zosyn  - per ENT: added cirpodex otic drops today  - after 24 hrs, plan to switch to PO abx: Augmentin+Ciprodex drops   - pt will need antiemetics for discharge due to vomiting with Augmentin   - if Cirpodex drops are too expensive, can rx ciprofloxacin and dexamethasone ophthalmic drops (2 separate) to help with cost   - ENT appt made with Dr. Rainey at [facility] 3/19/19 2:30 [phone] to change or cancel, patient ok w/appt though worried it will conflict with psych appt on 3/19 (unable to see when this is as it is not in our system.) ENT appt info will need to be added to discharge instructions.    2. IUP at 31w6d  - no pregnancy complaints, monitoring reassuring  - next PNV w/CNMs is 3/18/19    3. Elevated BPs  - h/o preE w/SF in previous pregnancy  - HELLP labs wnl, UPC nl  -  BPs normal other than initially at admission    Ning Alexis MD  Ob/Gyn PGY-2    Physician Attestation   I, Michelle Cisse, saw this patient with the resident and agree with the resident/fellow's findings and plan of care as documented in the note.      I personally reviewed vital signs, medications, labs and fetal heart tones and toco.    Key findings: patient here with significant ear infection requiring IV abx.  Hearing is diminished d/t infection. Also has a wick in the outer ear placed by the ED team. Will follow abx rec's by hospitalist team.  Possible discharge tomorrow.      Michelle Cisse MD

## 2019-03-15 NOTE — PLAN OF CARE
Patient presented to antepartum for R ear infection and IV antibiotic and pain management,  Concern for mastoiditis.  IV zosyn Q 6 hours.  Negative blood cultures x2. Negative Strept test. Blood pressures elevated, reflexes normal, two beats clonus bilaterally. FHT moderate variability, normal baseline, accelerations present, no decelerations. Raffi Q 4 minutes, Cervix checked, closed and Dr. Geraldo duran to take patient off the monitor.

## 2019-03-15 NOTE — TELEPHONE ENCOUNTER
PA Initiation    Medication: fluconazole (DIFLUCAN) 150 MG tablet -   Insurance Company: [employer] - Phone [phone] Fax [phone]  [facility] [address] [place] [address] AT [facility]  Filling Pharmacy Phone: [phone]  Filling Pharmacy Fax: [phone]  Start Date: 3/15/2019

## 2019-03-15 NOTE — H&P
Antepartum History and Physical   2019  Cathy Caban  [id number]      HPI: Cathy Caban is a 38 year old  at 31w5d by 7w1d US who presents for treatment of otitis externa.  The patient reports right-sided ear pain currently which is relieved with oxycodone.  She states that she is bothered only by ear pain today.  No further nausea/vomiting but had vomiting after taking Augmentin.  No fevers or chills.  She denies headache, vision changes, chest pain, shortness of breath. She denies contractions, vaginal bleeding or loss of fluid and is feeling normal fetal movement.  Asthma has been stable.     The patient has a history of recurrent otitis media.  She was seen by her PCP on 3/12 and diagnosed with otitis externa and prescribed Cortisporin ear drops.  Her pain worsened that day and she presented to the ED where she was treated with pain medication.  She was seen again in the office yesterday by her PCP, prescribed Augmentin, and a referral to ENT was made.  The ENT recommended presenting to the ED for imaging.    ROS: As above    Her pregnancy has been complicated by:  Bipolar 1 disorder  Graves' disease  History of preeclampsia with severe features    OBHX:   Obstetric History       T1      L1     SAB1   TAB0   Ectopic0   Multiple0   Live Births1       # Outcome Date GA Lbr Darren/2nd Weight Sex Delivery Anes PTL Lv   3 Current            2 SAB 07/10/18           1 Term 10/11/16 41w1d 08:43 / 06:05 3.402 kg (7 lb 8 oz) F Vag-Spont Local, EPI N NORAH      Name: TAB CABAN      Apgar1:  8                Apgar5: 9          MedicalHX:   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     Post partum depression      Post-surgical hypothyroidism 2017      Uncomplicated asthma        SurgicalHX:   Past Surgical History:   Procedure Laterality Date     BLOOD PATCH N/A 10/11/2016    Procedure: EPIDURAL BLOOD PATCH;  Surgeon: GENERIC ANESTHESIA PROVIDER;  Location: [facility] OR     CRANIOTOMY, EXCISE TUMOR COMPLEX, COMBINED  2/19/2014    Procedure: COMBINED CRANIOTOMY, EXCISE TUMOR COMPLEX;  Supracerabellar  Infratentorial Approach for Resection of Tumor ;  Surgeon: Kate Mckeon MD;  Location: [facility] OR     THYROIDECTOMY N/A 5/3/2017    Procedure: THYROIDECTOMY;  Total Thyroidectomy;  Surgeon: Pamela Villasenor MD;  Location:  OR       Medications:     No current facility-administered medications on file prior to encounter.   Current Outpatient Medications on File Prior to Encounter:  acetaminophen (TYLENOL) 325 MG tablet Take 650 mg by mouth every 4 hours as needed for mild pain   albuterol (PROAIR HFA/PROVENTIL HFA/VENTOLIN HFA) 108 (90 Base) MCG/ACT inhaler Inhale 2 puffs into the lungs every 4 hours as needed for shortness of breath / dyspnea or wheezing   amoxicillin-clavulanate (AUGMENTIN) 500-125 MG tablet Take 1 tablet by mouth 2 times daily Starting with 3/13/2019 PM dose and ending with 3/23/2019 AM dose   aspirin 81 MG EC tablet Take 81 mg by mouth every morning   Docosahexaenoic Acid (DHA NATURAL OMEGA-3) 200 MG capsule Take 200 mg by mouth every morning   fexofenadine (ALLEGRA) 60 MG tablet Take 60 mg by mouth every morning   fluconazole (DIFLUCAN) 150 MG tablet Take 1 tablet (150 mg) by mouth once for 1 dose   levothyroxine (SYNTHROID/LEVOTHROID) 175 MCG tablet Take 1 tablet (175 mcg) by mouth daily   medical cannabis (Patient's own supply.  Not a prescription) 1 Dose by Other route See Admin Instructions (This is NOT a prescription, and does not certify that the patient has a qualifying medical condition for medical cannabis.  The purpose of this order is  to document that the patient reports taking medical cannabis.)    montelukast (SINGULAIR)  10 MG tablet Take 10 mg by mouth every morning   neomycin-polymyxin-hydrocortisone (CORTISPORIN) 3.5-27313-7 otic solution Place 3 drops in ear(s) 4 times daily For 7 days, starting 3/12/2019 and ending 3/19/2019   ondansetron (ZOFRAN ODT) 4 MG ODT tab Take 1 tablet (4 mg) by mouth every 8 hours as needed for nausea   oxyCODONE (ROXICODONE) 5 MG tablet Take 1 tablet (5 mg) by mouth every 6 hours as needed for pain   Prenatal Vit-Fe Fumarate-FA (PRENATAL VITAMIN PO) Take 1 tablet by mouth every morning    cetirizine (ZYRTEC) 5 MG tablet Take 5 mg by mouth every morning When not using the Allegra   fluconazole (DIFLUCAN) 150 MG tablet Take 1 tablet (150 mg) by mouth every other day   ipratropium - albuterol 0.5 mg/2.5 mg/3 mL (DUONEB) 0.5-2.5 (3) MG/3ML neb solution Take 1 vial (3 mLs) by nebulization every 6 hours as needed for shortness of breath / dyspnea or wheezing   order for DME Equipment being ordered: SI Belt       Allergies:  Allergies   Allergen Reactions     Adacel [Daptacel] Swelling     Codeine Sulfate Nausea and Vomiting     Tetanus Toxoid      Pt states she had an infection at injection site.      Vicodin [Hydrocodone-Acetaminophen] Nausea and Vomiting     Methimazole Rash       FamilyHX:  Family History   Problem Relation Age of Onset     Thyroid Disease Paternal Aunt      Asthma Father      Mental Illness Father      Obesity Father      Asthma Maternal Grandmother      Osteoporosis Maternal Grandmother      Glaucoma Maternal Grandmother      Hypertension Maternal Grandmother      Macular Degeneration Maternal Grandmother      Diabetes Paternal Grandfather      Other Cancer Mother      Genitourinary Problems Mother      Bipolar Disorder Mother         unipolar depression     Depression Mother      Thyroid Disease Mother         benign tumor     Skin Cancer Mother      Cancer Mother      Bipolar Disorder Brother         unipolar depression     Asthma Brother        SocialHX:   Social History      Socioeconomic History     Marital status:      Spouse name: None     Number of children: None     Years of education: None     Highest education level: None   Occupational History     None   Social Needs     Financial resource strain: None     Food insecurity:     Worry: None     Inability: None     Transportation needs:     Medical: None     Non-medical: None   Tobacco Use     Smoking status: Former Smoker     Packs/day: 0.50     Years: 12.00     Pack years: 6.00     Types: Cigarettes     Start date: 2004     Last attempt to quit: 2/15/2018     Years since quittin.0     Smokeless tobacco: Never Used   Substance and Sexual Activity     Alcohol use: No     Alcohol/week: 0.0 oz     Comment: quit 16 days ago,     Drug use: No     Sexual activity: Yes     Partners: Male     Birth control/protection: None   Lifestyle     Physical activity:     Days per week: None     Minutes per session: None     Stress: None   Relationships     Social connections:     Talks on phone: None     Gets together: None     Attends Caodaism service: None     Active member of club or organization: None     Attends meetings of clubs or organizations: None     Relationship status: None     Intimate partner violence:     Fear of current or ex partner: None     Emotionally abused: None     Physically abused: None     Forced sexual activity: None   Other Topics Concern     Parent/sibling w/ CABG, MI or angioplasty before 65F 55M? No   Social History Narrative    Caffeine intake/servings daily - 1    Calcium intake/servings daily - 3    Exercise 5 times weekly - describe ; encouraged walking daily    Sunscreen used - Yes    Seatbelts used - Yes    Guns stored in the home - No    Self Breast Exam - Yes    Pap test up to date -  No    Eye exam up to date -  No    Dental exam up to date -  No    DEXA scan up to date -  No    Flex Sig/Colonoscopy up to date -  No    Mammography up to date -  No    Immunizations reviewed and up to date  - Yes    Abuse: Current or Past (Physical, Sexual or Emotional) - No    Do you feel safe in your environment - Yes    Do you cope well with stress - Yes    Do you suffer from insomnia - No               ROS: 10-point ROS negative except as indicated in HPI.    Physical Exam:  Vitals:    03/14/19 1521 03/14/19 1525 03/14/19 2114   BP: 134/63  143/75   Resp: 18  18   Temp: 97.6  F (36.4  C)     TempSrc: Oral     SpO2: 95%     Weight:  127.5 kg (281 lb 3 oz)      General: alert, oriented female, resting in bed in NAD  HEENT: right ear swelling and erythema  CV: regular rate and rhythm  Lungs: clear bilaterally, no crackles or wheezes  Abdomen: soft, gravid, non-tender  Extremities: bilateral lower extremities non-tender with trace edema  SVE: Cervix is closed and long.  Firm and posterior.    FHT: baseline 145, moderate variability, +accelerations, no decelerations  Cushman: q8-10min    Prenatal ultrasounds:  IMPRESSION  ---------------------------------------------------------------------------------------------------------  1) Intrauterine pregnancy at 29 6/7 weeks gestational age.  2) Visualized fetal anatomy appears normal. No evidence of fetal goiter based on normal appearing head/neck flexion and extension.  3) Growth parameters and estimated fetal weight were consistent with an appropriate for gestation age pattern of growth.  4) The amniotic fluid volume appeared normal.    Prenatal Labs:    Lab Results   Component Value Date    ABO O 09/18/2018    RH Pos 09/18/2018    AS Neg 09/18/2018    HEPBANG Nonreactive 09/18/2018    CHPCRT  05/26/2016     Negative   Negative for C. trachomatis rRNA by transcription mediated amplification.   A negative result by transcription mediated amplification does not preclude the   presence of C. trachomatis infection because results are dependent on proper   and adequate collection, absence of inhibitors, and sufficient rRNA to be   detected.      GCPCRT  05/26/2016     Negative    Negative for N. gonorrhoeae rRNA by transcription mediated amplification.   A negative result by transcription mediated amplification does not preclude the   presence of N. gonorrhoeae infection because results are dependent on proper   and adequate collection, absence of inhibitors, and sufficient rRNA to be   detected.      TREPAB Negative 03/01/2016    HGB 11.2 (L) 03/14/2019    HIV Negative 06/30/2009     Lab Results   Component Value Date    PAP NIL 02/08/2018       Labs:   Results for orders placed or performed during the hospital encounter of 03/14/19 (from the past 24 hour(s))   CBC with platelets differential   Result Value Ref Range    WBC 13.5 (H) 4.0 - 11.0 10e9/L    RBC Count 4.03 3.8 - 5.2 10e12/L    Hemoglobin 11.2 (L) 11.7 - 15.7 g/dL    Hematocrit 35.4 35.0 - 47.0 %    MCV 88 78 - 100 fl    MCH 27.8 26.5 - 33.0 pg    MCHC 31.6 31.5 - 36.5 g/dL    RDW 14.3 10.0 - 15.0 %    Platelet Count 263 150 - 450 10e9/L    Diff Method Automated Method     % Neutrophils 73.6 %    % Lymphocytes 17.5 %    % Monocytes 7.7 %    % Eosinophils 0.7 %    % Basophils 0.1 %    % Immature Granulocytes 0.4 %    Nucleated RBCs 0 0 /100    Absolute Neutrophil 9.9 (H) 1.6 - 8.3 10e9/L    Absolute Lymphocytes 2.4 0.8 - 5.3 10e9/L    Absolute Monocytes 1.0 0.0 - 1.3 10e9/L    Absolute Eosinophils 0.1 0.0 - 0.7 10e9/L    Absolute Basophils 0.0 0.0 - 0.2 10e9/L    Abs Immature Granulocytes 0.1 0 - 0.4 10e9/L    Absolute Nucleated RBC 0.0    Basic metabolic panel   Result Value Ref Range    Sodium 138 133 - 144 mmol/L    Potassium 3.9 3.4 - 5.3 mmol/L    Chloride 107 94 - 109 mmol/L    Carbon Dioxide 24 20 - 32 mmol/L    Anion Gap 7 3 - 14 mmol/L    Glucose 86 70 - 99 mg/dL    Urea Nitrogen 5 (L) 7 - 30 mg/dL    Creatinine 0.44 (L) 0.52 - 1.04 mg/dL    GFR Estimate >90 >60 mL/min/[1.73_m2]    GFR Estimate If Black >90 >60 mL/min/[1.73_m2]    Calcium 8.7 8.5 - 10.1 mg/dL   Blood culture   Result Value Ref Range    Specimen  Description Blood Right Arm     Special Requests Aerobic and anaerobic bottles received     Culture Micro No growth after 2 hours    Erythrocyte sedimentation rate auto   Result Value Ref Range    Sed Rate 52 (H) 0 - 20 mm/h   CRP inflammation   Result Value Ref Range    CRP Inflammation 103.0 (H) 0.0 - 8.0 mg/L   Blood culture   Result Value Ref Range    Specimen Description Blood Right Hand     Special Requests Aerobic and anaerobic bottles received     Culture Micro No growth after 2 hours    Lactic acid   Result Value Ref Range    Lactic Acid 0.6 (L) 0.7 - 2.0 mmol/L   CT Temporal Bones wo Contrast    Narrative    CT SCAN OF TEMPORAL BONES WITHOUT CONTRAST  3/14/2019 5:13 PM     HISTORY: Right ear pain and hearing loss, concern for malignant otitis  externa versus mastoiditis. 31 weeks pregnant.     COMPARISON:  None.    TECHNIQUE: Noncontrast axial scans and coronal reformations. Radiation  dose for this scan was reduced using automated exposure control,  adjustment of the mA and/or kV according to patient size, or iterative  reconstruction technique.     FINDINGS:  Right Temporal Bone: The external auditory canal is completely  opacified with soft tissue attenuation material, but there is no bone  destruction or apparent erosion. There is soft tissue material  posteriorly in the middle ear in the tegmen tympani as well as  surrounding the head of the malleus and the long and short processes  of the incus. This also involves the stapes diffusely. The tegmen  tympani is intact. Mastoid air cells are clear. Internal auditory  canal, vestibule, semicircular canals and cochlea appear normal.    Left Temporal Bone: The structures of the middle ear, inner ear, and  external auditory canal appear normal. Mastoid air cells are  completely opacified and somewhat sclerotic indicating chronic  coalescent mastoiditis.    Sinuses: The visualized portions of the sinuses and nasopharynx appear  normal.       Impression     IMPRESSION:    1. Opacification of the right external auditory canal with no  underlying bone destruction.  2. No evidence of right mastoiditis.  3. Soft tissue material in the right middle ear could represent  inflammatory debris or granulation tissue. No bone destruction to  suggest cholesteatoma.  4. Left-sided chronic coalescent mastoiditis.  5. Postoperative changes from suboccipital craniotomy.    TORIE MARQUES MD     Assessment: 38 year old  at 31w5d by 7w1d US admitted for IV antibiotics for treatment of otitis externa.    Plan:    Otitis externa: Patient has swelling and erythema of the right ear consistent with otitis externa, the patient was seen by an otolaryngologist who recommended imaging studies.  CT scan does not show mastoiditis.  Notably ESR and CRP are elevated.  The patient also has a mild leukocytosis to 13.5  - Admit to antepartum  - Continue IV Zosyn, as patient was not tolerating p.o. medications and there has been concern for extensive infection  - Hospitalist consult has been placed to assist with management of otitis externa  - Repeat CBC with differential in a.m.    Status post thyroidectomy: Continue Synthroid    Mild persistent asthma: Continue Singulair and albuterol, patient reports good control    Asymptomatic  contractions: Noted on NST, patient denies symptoms.  Cervix was checked and was closed.    Bipolar 1 disorder: No current medications, following with psych team    History of preeclampsia with severe features: Continue baby aspirin.  Patient has had normal baseline HELLP labs.  The patient had an induction for late term gestation and gestational hypertension and developed preeclampsia with severe features postpartum.    Fetal well-being  - Reactive NST, daily NST while on the antepartum service    Patient seen and care plan discussed under supervision of Dr. Rutledge.    Solange Chow MD  OBGYN PGY3  3/14/2019 10:26 PM       Physician Attestation   I discussed the  patient with the resident and with the ED attending. I agree with plan. Admission to Pregnancy [facility] Care Unit due to gestational age with management of otitis per hospitalists.    Kathy Rutledge MD

## 2019-03-16 VITALS
BODY MASS INDEX: 42.76 KG/M2 | OXYGEN SATURATION: 98 % | RESPIRATION RATE: 18 BRPM | SYSTOLIC BLOOD PRESSURE: 117 MMHG | TEMPERATURE: 97.7 F | DIASTOLIC BLOOD PRESSURE: 55 MMHG | HEART RATE: 74 BPM | WEIGHT: 281.2 LBS

## 2019-03-16 LAB
CRP SERPL-MCNC: 45 MG/L (ref 0–8)
ERYTHROCYTE [DISTWIDTH] IN BLOOD BY AUTOMATED COUNT: 14.2 % (ref 10–15)
HCT VFR BLD AUTO: 35 % (ref 35–47)
HGB BLD-MCNC: 11 G/DL (ref 11.7–15.7)
MCH RBC QN AUTO: 27.6 PG (ref 26.5–33)
MCHC RBC AUTO-ENTMCNC: 31.4 G/DL (ref 31.5–36.5)
MCV RBC AUTO: 88 FL (ref 78–100)
PLATELET # BLD AUTO: 259 10E9/L (ref 150–450)
RBC # BLD AUTO: 3.98 10E12/L (ref 3.8–5.2)
WBC # BLD AUTO: 9.9 10E9/L (ref 4–11)

## 2019-03-16 PROCEDURE — 99231 SBSQ HOSP IP/OBS SF/LOW 25: CPT | Performed by: PHYSICIAN ASSISTANT

## 2019-03-16 PROCEDURE — 36415 COLL VENOUS BLD VENIPUNCTURE: CPT | Performed by: STUDENT IN AN ORGANIZED HEALTH CARE EDUCATION/TRAINING PROGRAM

## 2019-03-16 PROCEDURE — 25000132 ZZH RX MED GY IP 250 OP 250 PS 637: Performed by: STUDENT IN AN ORGANIZED HEALTH CARE EDUCATION/TRAINING PROGRAM

## 2019-03-16 PROCEDURE — 25000132 ZZH RX MED GY IP 250 OP 250 PS 637: Performed by: OBSTETRICS & GYNECOLOGY

## 2019-03-16 PROCEDURE — 99238 HOSP IP/OBS DSCHRG MGMT 30/<: CPT | Mod: GC | Performed by: OBSTETRICS & GYNECOLOGY

## 2019-03-16 PROCEDURE — 85027 COMPLETE CBC AUTOMATED: CPT | Performed by: STUDENT IN AN ORGANIZED HEALTH CARE EDUCATION/TRAINING PROGRAM

## 2019-03-16 PROCEDURE — 86140 C-REACTIVE PROTEIN: CPT | Performed by: STUDENT IN AN ORGANIZED HEALTH CARE EDUCATION/TRAINING PROGRAM

## 2019-03-16 PROCEDURE — 25800030 ZZH RX IP 258 OP 636: Performed by: STUDENT IN AN ORGANIZED HEALTH CARE EDUCATION/TRAINING PROGRAM

## 2019-03-16 PROCEDURE — 25000128 H RX IP 250 OP 636: Performed by: STUDENT IN AN ORGANIZED HEALTH CARE EDUCATION/TRAINING PROGRAM

## 2019-03-16 PROCEDURE — 25000131 ZZH RX MED GY IP 250 OP 636 PS 637: Performed by: OBSTETRICS & GYNECOLOGY

## 2019-03-16 RX ORDER — ONDANSETRON 4 MG/1
8 TABLET, ORALLY DISINTEGRATING ORAL EVERY 6 HOURS PRN
Status: DISCONTINUED | OUTPATIENT
Start: 2019-03-16 | End: 2019-03-16 | Stop reason: HOSPADM

## 2019-03-16 RX ORDER — CIPROFLOXACIN AND DEXAMETHASONE 3; 1 MG/ML; MG/ML
4 SUSPENSION/ DROPS AURICULAR (OTIC) 2 TIMES DAILY
Qty: 1 BOTTLE | Refills: 0 | Status: SHIPPED | OUTPATIENT
Start: 2019-03-16 | End: 2019-03-17

## 2019-03-16 RX ORDER — AMOXICILLIN AND CLAVULANATE POTASSIUM 400; 57 MG/1; MG/1
1 TABLET, CHEWABLE ORAL EVERY 12 HOURS SCHEDULED
Status: DISCONTINUED | OUTPATIENT
Start: 2019-03-16 | End: 2019-03-16

## 2019-03-16 RX ORDER — PROMETHAZINE HYDROCHLORIDE 12.5 MG/1
12.5 SUPPOSITORY RECTAL EVERY 6 HOURS PRN
Status: DISCONTINUED | OUTPATIENT
Start: 2019-03-16 | End: 2019-03-16 | Stop reason: HOSPADM

## 2019-03-16 RX ORDER — ONDANSETRON 8 MG/1
8 TABLET, ORALLY DISINTEGRATING ORAL EVERY 6 HOURS PRN
Qty: 30 TABLET | Refills: 0 | Status: SHIPPED | OUTPATIENT
Start: 2019-03-16 | End: 2019-03-16

## 2019-03-16 RX ORDER — ONDANSETRON 4 MG/1
4 TABLET, FILM COATED ORAL EVERY 6 HOURS PRN
Qty: 30 TABLET | Refills: 0 | Status: SHIPPED | OUTPATIENT
Start: 2019-03-16 | End: 2019-03-17

## 2019-03-16 RX ADMIN — DOCUSATE SODIUM 100 MG: 100 CAPSULE, LIQUID FILLED ORAL at 08:30

## 2019-03-16 RX ADMIN — ONDANSETRON 8 MG: 4 TABLET, ORALLY DISINTEGRATING ORAL at 11:02

## 2019-03-16 RX ADMIN — ASPIRIN 81 MG: 81 TABLET, COATED ORAL at 08:31

## 2019-03-16 RX ADMIN — PIPERACILLIN AND TAZOBACTAM 4.5 G: 4; .5 INJECTION, POWDER, FOR SOLUTION INTRAVENOUS at 04:55

## 2019-03-16 RX ADMIN — MONTELUKAST 10 MG: 10 TABLET, FILM COATED ORAL at 08:31

## 2019-03-16 RX ADMIN — OXYCODONE HYDROCHLORIDE 5 MG: 5 TABLET ORAL at 05:46

## 2019-03-16 RX ADMIN — CIPROFLOXACIN AND DEXAMETHASONE 4 DROP: 3; 1 SUSPENSION/ DROPS AURICULAR (OTIC) at 08:31

## 2019-03-16 RX ADMIN — Medication 200 MG: at 08:31

## 2019-03-16 RX ADMIN — PRENATAL VIT W/ FE FUMARATE-FA TAB 27-0.8 MG 1 TABLET: 27-0.8 TAB at 08:30

## 2019-03-16 RX ADMIN — ACETAMINOPHEN 650 MG: 325 TABLET, FILM COATED ORAL at 12:54

## 2019-03-16 RX ADMIN — SODIUM CHLORIDE, POTASSIUM CHLORIDE, SODIUM LACTATE AND CALCIUM CHLORIDE 500 ML: 600; 310; 30; 20 INJECTION, SOLUTION INTRAVENOUS at 05:51

## 2019-03-16 RX ADMIN — AMOXICILLIN AND CLAVULANATE POTASSIUM 1 TABLET: 875; 125 TABLET, FILM COATED ORAL at 11:49

## 2019-03-16 RX ADMIN — ONDANSETRON 4 MG: 2 INJECTION INTRAMUSCULAR; INTRAVENOUS at 05:46

## 2019-03-16 RX ADMIN — LEVOTHYROXINE SODIUM 175 MCG: 25 TABLET ORAL at 06:00

## 2019-03-16 RX ADMIN — FEXOFENADINE HCL 60 MG: 60 TABLET, FILM COATED ORAL at 08:30

## 2019-03-16 NOTE — PLAN OF CARE
Ok from Dr. Diego to take patient off monitor. No contractions noted on [place]. Patient complaining on LLQ pain that is intermittent. Offered Tylenol per Dr. Diego but patient declined at this time. Stated she will take it later when it is due. Encouraged patient to try position changes, increased oral fluid intake and empty bladder Q 2 hrs. Patient agreed to POC. Continue to monitor.

## 2019-03-16 NOTE — DISCHARGE SUMMARY
[facility] Discharge Summary    Cathy Arroyo MRN# [id number]   Age: 38 year old YOB: 1980     Date of Admission:  3/14/2019  Date of Discharge:  3/16/2019  Admitting Physician:  Kathy Rutledge MD  Discharge Physician:  Jennifer Nevarez MD     Admission Diagnosis:  -  at 31w5d  - Otitis externa  - Bipolar 1  - Graves disease  - Hx preE w/ SF    Discharge Diagnosis:  -  at 31w5d  - Otitis externa  - Bipolar 1  - Graves disease  - Hx preE w/ SF    Procedures:  None    Consultations:  ENT, Internal medicine    Medications prior to admission:  Medications Prior to Admission   Medication Sig Dispense Refill Last Dose     acetaminophen (TYLENOL) 325 MG tablet Take 650 mg by mouth every 4 hours as needed for mild pain   3/14/2019 at AM     albuterol (PROAIR HFA/PROVENTIL HFA/VENTOLIN HFA) 108 (90 Base) MCG/ACT inhaler Inhale 2 puffs into the lungs every 4 hours as needed for shortness of breath / dyspnea or wheezing 1 Inhaler 1 Past Month at Unknown time     aspirin 81 MG EC tablet Take 81 mg by mouth every morning   3/14/2019 at AM     Docosahexaenoic Acid (DHA NATURAL OMEGA-3) 200 MG capsule Take 200 mg by mouth every morning   3/14/2019 at AM     fexofenadine (ALLEGRA) 60 MG tablet Take 60 mg by mouth every morning   3/14/2019 at AM     levothyroxine (SYNTHROID/LEVOTHROID) 175 MCG tablet Take 1 tablet (175 mcg) by mouth daily 90 tablet 3 3/14/2019 at AM     medical cannabis (Patient's own supply.  Not a prescription) 1 Dose by Other route See Admin Instructions (This is NOT a prescription, and does not certify that the patient has a qualifying medical condition for medical cannabis.  The purpose of this order is  to document that the patient reports taking medical cannabis.)    3/14/2019 at Unknown time     montelukast (SINGULAIR) 10 MG tablet Take 10 mg by mouth every morning   3/14/2019 at AM     neomycin-polymyxin-hydrocortisone (CORTISPORIN)  3.5-64725-5 otic solution Place 3 drops in ear(s) 4 times daily For 7 days, starting 3/12/2019 and ending 3/19/2019   3/14/2019 at AM     oxyCODONE (ROXICODONE) 5 MG tablet Take 1 tablet (5 mg) by mouth every 6 hours as needed for pain 8 tablet 0 3/14/2019 at AM     Prenatal Vit-Fe Fumarate-FA (PRENATAL VITAMIN PO) Take 1 tablet by mouth every morning    3/14/2019 at AM     cetirizine (ZYRTEC) 5 MG tablet Take 5 mg by mouth every morning When not using the Allegra   Unknown at Unknown time     fluconazole (DIFLUCAN) 150 MG tablet Take 1 tablet (150 mg) by mouth every other day 4 tablet 0      ipratropium - albuterol 0.5 mg/2.5 mg/3 mL (DUONEB) 0.5-2.5 (3) MG/3ML neb solution Take 1 vial (3 mLs) by nebulization every 6 hours as needed for shortness of breath / dyspnea or wheezing 90 vial 3      order for DME Equipment being ordered: SI Belt 1 Product 0 Taking     [DISCONTINUED] amoxicillin-clavulanate (AUGMENTIN) 500-125 MG tablet Take 1 tablet by mouth 2 times daily Starting with 3/13/2019 PM dose and ending with 3/23/2019 AM dose   3/14/2019 at AM     [DISCONTINUED] fluconazole (DIFLUCAN) 150 MG tablet Take 1 tablet (150 mg) by mouth once for 1 dose 1 tablet 0 Past Week at Unknown time     [DISCONTINUED] ondansetron (ZOFRAN ODT) 4 MG ODT tab Take 1 tablet (4 mg) by mouth every 8 hours as needed for nausea 10 tablet 0 Past Week at Unknown time         Brief History of Presentation:    Cathy Arroyo is a 38 year old  at 31w5d by 7w1d US who presents for treatment of otitis externa.  The patient reports right-sided ear pain currently which is relieved with oxycodone.  She states that she is bothered only by ear pain today.  No further nausea/vomiting but had vomiting after taking Augmentin.  No fevers or chills.  She denies headache, vision changes, chest pain, shortness of breath. She denies contractions, vaginal bleeding or loss of fluid and is feeling normal fetal movement.  Asthma has been  stable.      The patient has a history of recurrent otitis media.  She was seen by her PCP on 3/12 and diagnosed with otitis externa and prescribed Cortisporin ear drops.  Her pain worsened that day and she presented to the ED where she was treated with pain medication.  She was seen again in the office yesterday by her PCP, prescribed Augmentin, and a referral to ENT was made.  The ENT recommended presenting to the ED for imaging.      Hospital Course:  A CT scan was done that did not show mastoiditis. She has a mild leukocytosis. She was started on IV zosyn and Ciprodex ear drops. On HD#3, she was transitioned to Augmentin, which she tolerated well. She was meeting goals for discharge and was discharged to home on HD#3.      Discharge Instructions:  Call or present to labor and delivery if you experience:   -Regular painful contractions concerning for labor   -Leakage of fluid concerning for ruptured membranes   -Decreased fetal movement   -Bright red vaginal bleeding    -Headache, vision changes, upper abdominal pain, significant increase in swelling,   generalized unwell feeling    Follow up:  Follow up with NET on 3/19 at 1430, follow-up with psychiatry 3/19. Follow-up with CNM on 3/18.       Discharge Medications:     Review of your medicines      START taking      Dose / Directions   amoxicillin-clavulanate 875-125 MG tablet  Commonly known as:  AUGMENTIN  Indication:  otitis externa  Replaces:  amoxicillin-clavulanate 500-125 MG tablet      Dose:  1 tablet  Take 1 tablet by mouth every 12 hours for 10 days  Quantity:  20 tablet  Refills:  0     ciprofloxacin-dexamethasone 0.3-0.1 % otic suspension  Commonly known as:  CIPRODEX      Dose:  4 drop  Place 4 drops into the right ear 2 times daily  Quantity:  1 Bottle  Refills:  0     ondansetron 4 MG tablet  Commonly known as:  ZOFRAN      Dose:  4 mg  Take 1 tablet (4 mg) by mouth every 6 hours as needed for nausea  Quantity:  30 tablet  Refills:  0         CONTINUE these medicines which may have CHANGED, or have new prescriptions. If we are uncertain of the size of tablets/capsules you have at home, strength may be listed as something that might have changed.      Dose / Directions   fluconazole 150 MG tablet  Commonly known as:  DIFLUCAN  This may have changed:  Another medication with the same name was removed. Continue taking this medication, and follow the directions you see here.  Used for:  Yeast infection of the vagina      Dose:  150 mg  Take 1 tablet (150 mg) by mouth every other day  Quantity:  4 tablet  Refills:  0        CONTINUE these medicines which have NOT CHANGED      Dose / Directions   acetaminophen 325 MG tablet  Commonly known as:  TYLENOL      Dose:  650 mg  Take 650 mg by mouth every 4 hours as needed for mild pain  Refills:  0     albuterol 108 (90 Base) MCG/ACT inhaler  Commonly known as:  PROAIR HFA/PROVENTIL HFA/VENTOLIN HFA  Used for:  Mild intermittent asthma without complication      Dose:  2 puff  Inhale 2 puffs into the lungs every 4 hours as needed for shortness of breath / dyspnea or wheezing  Quantity:  1 Inhaler  Refills:  1     aspirin 81 MG EC tablet      Dose:  81 mg  Take 81 mg by mouth every morning  Refills:  0     cetirizine 5 MG tablet  Commonly known as:  zyrTEC      Dose:  5 mg  Take 5 mg by mouth every morning When not using the Allegra  Refills:  0     DHA NATURAL OMEGA-3 200 MG capsule  Generic drug:  Docosahexaenoic Acid      Dose:  200 mg  Take 200 mg by mouth every morning  Refills:  0     fexofenadine 60 MG tablet  Commonly known as:  ALLEGRA      Dose:  60 mg  Take 60 mg by mouth every morning  Refills:  0     ipratropium - albuterol 0.5 mg/2.5 mg/3 mL 0.5-2.5 (3) MG/3ML neb solution  Commonly known as:  DUONEB  Used for:  Mild persistent asthma with acute exacerbation      Dose:  1 vial  Take 1 vial (3 mLs) by nebulization every 6 hours as needed for shortness of breath / dyspnea or wheezing  Quantity:  90  vial  Refills:  3     levothyroxine 175 MCG tablet  Commonly known as:  SYNTHROID/LEVOTHROID  Used for:  Post-surgical hypothyroidism, History of Graves' disease, Pregnancy, incidental      Dose:  175 mcg  Take 1 tablet (175 mcg) by mouth daily  Quantity:  90 tablet  Refills:  3     medical cannabis (Patient's own supply.  Not a prescription)  Indication:  This is Prescriptioned Medical Cannbis.gets it from [employer].  Referred by Jose Juan Psychiatrist      Dose:  1 Dose  1 Dose by Other route See Admin Instructions (This is NOT a prescription, and does not certify that the patient has a qualifying medical condition for medical cannabis.  The purpose of this order is  to document that the patient reports taking medical cannabis.)  Refills:  0     montelukast 10 MG tablet  Commonly known as:  SINGULAIR      Dose:  10 mg  Take 10 mg by mouth every morning  Refills:  0     neomycin-polymyxin-hydrocortisone 3.5-18610-6 otic solution  Commonly known as:  CORTISPORIN      Dose:  3 drop  Place 3 drops in ear(s) 4 times daily For 7 days, starting 3/12/2019 and ending 3/19/2019  Refills:  0     order for DME  Used for:  Pelvic pain in female      Equipment being ordered: SI Belt  Quantity:  1 Product  Refills:  0     oxyCODONE 5 MG tablet  Commonly known as:  ROXICODONE      Dose:  5 mg  Take 1 tablet (5 mg) by mouth every 6 hours as needed for pain  Quantity:  8 tablet  Refills:  0     PRENATAL VITAMIN PO      Dose:  1 tablet  Take 1 tablet by mouth every morning  Refills:  0        STOP taking    amoxicillin-clavulanate 500-125 MG tablet  Commonly known as:  AUGMENTIN  Replaced by:  amoxicillin-clavulanate 875-125 MG tablet        ondansetron 4 MG ODT tab  Commonly known as:  ZOFRAN ODT              Where to get your medicines      These medications were sent to [facility] [place], [place] - [address]  [address] [address]    Phone:  [phone]      amoxicillin-clavulanate 875-125 MG tablet    ciprofloxacin-dexamethasone 0.3-0.1 % otic suspension    ondansetron 4 MG tablet           Fabienne Sheppard MD PhD  Ob/Gyn PGY-3  3/16/2019 3:40 PM    Physician Attestation   I, Jennifer Nevarez, personally saw and evaluated Cathy Arroyo as part of a shared visit.  I have reviewed and discussed with the advanced practice provider their discharge plan.    My key history or physical exam findings from the day of discharge: Improved symptoms.  I saw patient with hospitalist consultant Sammie RILEY.      Rousseau management decisions made by me: po antibiotic (Augmention) and po antiemetic.  Pt to continue cipro-dexamethasone otic suspension ear drops.  F/u in ENT clinic in 3 days.      Jennifer Nevarez MD   Date of Service (when I saw the patient): 3/16/19

## 2019-03-16 NOTE — PLAN OF CARE
Patient seen by Dr. Camara and dicharged orders placed. Pain well controlled with Tylenol, transitioned to oral antibiotics, and patient states she intends to use oral Zofran and Medically prescribed Cannabis to control nausea/vomiting. Dr. Camara educated patient on concerns of Cannabis use in pregnancy. Patient instructed to follow-up with [facility] on 3/19 with scheduled appointment. Instructed to call or return to hospital with worsening symptoms, fever decrease in fetal movement, abdominal pain, contractions, bleeding or leaking of fluid. Prescriptions for Zofran, Augmentin, and ear drops sent to patients preferred pharmacy. Patient in agreement with plan and discharged ambulatory at 1615.

## 2019-03-16 NOTE — PROGRESS NOTES
[facility], [facility] Progress Note - Hospitalist Service       Date of Admission:  3/14/2019    Assessment & Plan   Cathy Arroyo is a 38 year old 36  at 31 6/7 weeks pregnant with a history of recurrent right sided otitis media in childhood, graves disease s/p thyroidectomy (2017), asthma, pineal tumor s/p excision (2014) and bipolar 1 disorder admitted on 3/14/2019 for worsening otitis externa with concerns for mastoiditis, requiring IV antibiotics.      #Severe otitis externa: Patient w/ 6 day h/o ear pain, drainage, has swelling and erythema of the right ear, consistent with OE. CT notes opacification of right external auditory canal w/ no underlying bone destruction, no e/o right sided mastoiditis, inflammatory debris in right middle ear. Inflammatory makers & WBC count improving, wick in place, patient notes improved pain management today w/ oxycodone. VSS.   -Agree with transition to PO Augmentin to complete 7-10 days of treatment on discharge (would give dose here to ensure tolerates oral dose)  -Would continue cipro hc drops BID x7 days  -Pain management on discharge per primary team  -Blood cultures pending, NGTD  -Per primary team, OP appt scheduled with ENT on       #Graves disease s/p thyroidectomy: Continuing synthroid per primary team, OB/GYN.     #Asthma: Continue singulair, PRN albuterol per primary team, OB/GYN     #Bipolar 1 disorder: Defer to primary team, OB/GYN    Diet: Room Service  Regular Diet Adult    DVT Prophylaxis: Pneumatic Compression Devices and Ambulate every shift  Lee Catheter: not present  Code Status: FULL    Disposition Plan   Expected discharge: Today vs tomorrow, recommended to prior living arrangement once antibiotic plan established and tolerating oral antibiotics, pain well controlled.  Entered: Sammie Agrawal PA-C 2019, 9:20 AM       The patient's care was discussed with the  Attending Physician, Dr. Guevara, Bedside Nurse, Patient and Primary team.    Sammie Agrawal PA-C  Hospitalist Service  [facility] [place]    ______________________________________________________________________    Interval History   The patient notes pain is improved, no drainage, continues to have swelling. Feels ready to trial oral antibiotics and go home.     Data reviewed today: I reviewed all medications, new labs and imaging results over the last 24 hours. I personally reviewed no images or EKG's today.    Physical Exam   Vital Signs: Temp: 97.7  F (36.5  C) Temp src: Oral BP: 117/55 Pulse: 74   Resp: 18   O2 Device: None (Room air)    Weight: 281 lbs 3.2 oz  GENERAL: Alert, Sleepy, lying comfortably in bed.   HEENT: Anicteric sclera. PERRL. Mucous membranes moist and without lesions. Pre auricular lymphadenopathy. Right sided auricular edema, warmth and erythema not extending to face, but facial edema.   PULMONARY: effort appears normal and unlabored on RA.  MUSCULOSKELETAL: No joint swelling or tenderness. Moves all extremities.   NEUROLOGICAL: No focal deficits.   EXTREMITIES: No peripheral edema.   SKIN: No jaundice. No rashes.       Data   Reviewed CBC, CRP

## 2019-03-16 NOTE — PROVIDER NOTIFICATION
03/15/19 1940   Provider Notification   Provider Name/Title Dr. MARISA Alexis   Method of Notification Electronic Page   Request Evaluate - Remote   Notification Reason Uterine Activity   Paged Dr. Alexis to inform patient was put on monitor. Patient stated she was feeling contractions/tightening. Patient isn't sure if it is contractions or cramping due to constipation. Will keep on monitor for a while to assess if any contractions are occurring.

## 2019-03-16 NOTE — PROGRESS NOTES
[facility]TE[facility] PROGRESS NOTE:   March 16, 2019   9:48 AM          SUBJECTIVE:   Patient is feeling better today with less ear pain.  She denies nausea/vomiting.  She notes active fetal movement.  She does not have painful contractions, loss of fluid or vaginal bleeding.     She has no UTI symptoms.             OBJECTIVE:   /55   Pulse 74   Temp 97.7  F (36.5  C) (Oral)   Resp 18   Wt 127.6 kg (281 lb 3.2 oz)   LMP 08/04/2018   SpO2 98%   BMI 42.76 kg/m      NST:  Fetal Heart Rate Tracing:   Baseline: 140  + accelerations     Tocometer: No contractions (did have occasional earlier in the morning before breakfast)       Lungs:   No increased work of breathing, good air exchange, no cough or wheezing   Cardiovascular:   normal apical pulses    Abdomen:  Gravid, soft nontender   Musculoskeletal:   no lower extremity pitting edema present   Spec/SVE:  Deferred         Recent Results (from the past 24 hour(s))   AST    Collection Time: 03/15/19 10:08 AM   Result Value Ref Range    AST 10 0 - 45 U/L   Creatinine    Collection Time: 03/15/19 10:08 AM   Result Value Ref Range    Creatinine 0.52 0.52 - 1.04 mg/dL    GFR Estimate >90 >60 mL/min/[1.73_m2]    GFR Estimate If Black >90 >60 mL/min/[1.73_m2]   ALT    Collection Time: 03/15/19 10:08 AM   Result Value Ref Range    ALT 13 0 - 50 U/L   WBC count    Collection Time: 03/16/19  6:35 AM   Result Value Ref Range    WBC 9.9 4.0 - 11.0 10e9/L   Convert WBC to CBC    Collection Time: 03/16/19  6:35 AM   Result Value Ref Range    RBC Count 3.98 3.8 - 5.2 10e12/L    Hemoglobin 11.0 (L) 11.7 - 15.7 g/dL    Hematocrit 35.0 35.0 - 47.0 %    MCV 88 78 - 100 fl    MCH 27.6 26.5 - 33.0 pg    MCHC 31.4 (L) 31.5 - 36.5 g/dL    RDW 14.2 10.0 - 15.0 %    Platelet Count 259 150 - 450 10e9/L   CRP inflammation    Collection Time: 03/16/19  8:26 AM   Result Value Ref Range    CRP Inflammation 45.0 (H) 0.0 - 8.0 mg/L                IMAGING:   No imaging studies have been  ordered             ASSESSMENT:    1. 38 year old  32w0d with external otitis media .  Afebrile and wbc within normal limits.  CRP now 44 (was 110 2 days ago).    Feeling improved.  History of nausea with antibiotics.             PLAN:    1.  Evaluated with hospitalist team.  Will transition to po augmentin (with Zofran prior).  If she tolerates this well will discharge to home today with clinic f/u in 2 days and ENT appointment in 3 days.  Po Augmentin, cipro ear drops            Jennifer Nevarez MD

## 2019-03-16 NOTE — PROVIDER NOTIFICATION
03/16/19 1316   Provider Notification   Provider Name/Title Dr Camara   Method of Notification Electronic Page   Request Evaluate in Person   Notification Reason Status Update   Pt vomiting in the bathroom. Received augmentin at 1149 but also received oral zofran at 1102, and tylenol at 1254. Provider paged and at bedside with patient. Will continue to monitor patient.

## 2019-03-16 NOTE — DISCHARGE INSTRUCTIONS
Discharge Instruction for Undelivered Patients      You were seen for: RIght ear infection  We Consulted: [facility] Team  You had (Test or Medicine): Fetal monitoring, Labs, IV antibiotics  Diet:   Drink 8 to 12 glasses of liquids (milk, juice, water) every day.  You may eat meals and snacks.    Call your provider if you notice:  Swelling in your face or increased swelling in your hands or legs.  Headaches that are not relieved by Tylenol (acetaminophen).  Changes in your vision (blurring: seeing spots or stars.)  Nausea (sick to your stomach) and vomiting (throwing up).   Weight gain of 5 pounds or more per week.  Heartburn that doesn't go away.  Signs of bladder infection: pain when you urinate (use the toilet), need to go more often and more urgently.  The bag of pires (rupture of membranes) breaks, or you notice leaking in your underwear.  Bright red blood in your underwear.  Abdominal (lower belly) or stomach pain.  Second (plus) baby: Contractions (tightening) less than 10 minutes apart and getting stronger.  *If less than 34 weeks: Contractions (tightenings) more than 6 times in one hour.  Increase or change in vaginal discharge (note the color and amount)      Follow-up:  As scheduled in the clinic

## 2019-03-16 NOTE — PROVIDER NOTIFICATION
03/16/19 0628   Provider Notification   Provider Name/Title Dr. Diego   Method of Notification Electronic Page   Notification Reason Uterine Activity     Put pt on for NST, having reg painful ctx. Gave 500cc bolus with no change. Palpate mild. FHR reactive. Will continue EFM.

## 2019-03-16 NOTE — PLAN OF CARE
Data: Today is hospital day 3. Maternal status stable. Fetal assessment is category fetal heart tracing.   Action: Continue with plan of care, which is transitioning to PO medications this shift.. Patient encouraged to move extremities while in bed.  Response: Patient coping fairly, pt was crying an hour after having taken the PO augmentin, reports feeling nauseated and then about 50 cc of yellow emesis, with no evidence of medication in the toilet bowl .

## 2019-03-16 NOTE — PLAN OF CARE
VSS. Afebrile. Patient denies epigastric pain, vision changes, increased swelling, SOB, pelvic pressure, LOF and vaginal bleeding. Patient reports intermittent HA due to the ear pain. Gets better with pain medications. Patient reported feeling contractions/tightening this evening. No ctx noted on [place]. Patient still feeling constipated. Has only had small results. Has not had normal BM since Sunday. Patient reported feeling a lot better after taking a shower. FHT tracing Cat 1. +FM. Patient on IV Zosyn ear infection and taking [place] Q6 hrs for the ear pain associated. Continue with current POC.

## 2019-03-16 NOTE — PLAN OF CARE
Afebrile, VSS. Pt denies LOF, bldg. Having some mild but regular ctx; SVE done by MD and pt remains closed/long/high. FHR AGA, reactive. Pain being managed by PRN Oxycodone and Tylenol. Plan to transition to PO abx today and possible discharge. Continue to monitor, contact MD with questions/concerns.

## 2019-03-18 ENCOUNTER — PRENATAL OFFICE VISIT (OUTPATIENT)
Dept: MIDWIFE SERVICES | Facility: CLINIC | Age: 39
End: 2019-03-18
Payer: COMMERCIAL

## 2019-03-18 VITALS
OXYGEN SATURATION: 97 % | DIASTOLIC BLOOD PRESSURE: 68 MMHG | HEART RATE: 115 BPM | WEIGHT: 282.3 LBS | SYSTOLIC BLOOD PRESSURE: 101 MMHG | BODY MASS INDEX: 42.78 KG/M2 | TEMPERATURE: 98.5 F | HEIGHT: 68 IN

## 2019-03-18 DIAGNOSIS — R94.6 ABNORMAL RESULTS OF THYROID FUNCTION STUDIES: ICD-10-CM

## 2019-03-18 DIAGNOSIS — E66.01 MATERNAL MORBID OBESITY IN THIRD TRIMESTER, ANTEPARTUM (H): Primary | ICD-10-CM

## 2019-03-18 DIAGNOSIS — O99.213 MATERNAL MORBID OBESITY IN THIRD TRIMESTER, ANTEPARTUM (H): Primary | ICD-10-CM

## 2019-03-18 DIAGNOSIS — Z34.80 SUPERVISION OF OTHER NORMAL PREGNANCY, ANTEPARTUM: ICD-10-CM

## 2019-03-18 PROCEDURE — 99207 ZZC PRENATAL VISIT: CPT | Performed by: ADVANCED PRACTICE MIDWIFE

## 2019-03-18 ASSESSMENT — MIFFLIN-ST. JEOR: SCORE: 2019

## 2019-03-18 NOTE — PROGRESS NOTES
Feeling well.  Baby is active. Denies any leaking of fluid, vaginal bleeding, regular uterine contractions, or headaches or other concerns.  She was seen for her ear ache/ear infection in the ED.  Is having less pain know.    Has an MFM US scheduled and will check for fetal position.    Reviewed to call [phone] for contractions, loss of fluid, vaginal bleeding, decreased fetal movement or any other questions or concerns.    RTC in 1 week per recommendation of MFM due to abnormal thyroid test.  Jennifer Santos, DNP, APRN, CNM

## 2019-03-20 LAB
BACTERIA SPEC CULT: NO GROWTH
BACTERIA SPEC CULT: NO GROWTH
Lab: NORMAL
Lab: NORMAL
SPECIMEN SOURCE: NORMAL
SPECIMEN SOURCE: NORMAL

## 2019-03-22 ENCOUNTER — OFFICE VISIT (OUTPATIENT)
Dept: MATERNAL FETAL MEDICINE | Facility: CLINIC | Age: 39
End: 2019-03-22
Attending: OBSTETRICS & GYNECOLOGY
Payer: COMMERCIAL

## 2019-03-22 ENCOUNTER — AMBULATORY - HEALTHEAST (OUTPATIENT)
Dept: MATERNAL FETAL MEDICINE | Facility: HOSPITAL | Age: 39
End: 2019-03-22

## 2019-03-22 ENCOUNTER — HOSPITAL ENCOUNTER (OUTPATIENT)
Dept: ULTRASOUND IMAGING | Facility: CLINIC | Age: 39
Discharge: HOME | End: 2019-03-22
Attending: OBSTETRICS & GYNECOLOGY | Admitting: OBSTETRICS & GYNECOLOGY
Payer: COMMERCIAL

## 2019-03-22 DIAGNOSIS — Z86.39 HISTORY OF GRAVES' DISEASE: ICD-10-CM

## 2019-03-22 DIAGNOSIS — E05.00 GRAVES' DISEASE: ICD-10-CM

## 2019-03-22 DIAGNOSIS — O40.3XX0 POLYHYDRAMNIOS IN THIRD TRIMESTER COMPLICATION, SINGLE OR UNSPECIFIED FETUS: Primary | ICD-10-CM

## 2019-03-22 DIAGNOSIS — O26.90 PREGNANCY, ANTEPARTUM, COMPLICATIONS: ICD-10-CM

## 2019-03-22 PROCEDURE — 76816 OB US FOLLOW-UP PER FETUS: CPT

## 2019-03-23 ENCOUNTER — NURSE TRIAGE (OUTPATIENT)
Dept: NURSING | Facility: CLINIC | Age: 39
End: 2019-03-23

## 2019-03-23 ENCOUNTER — HOSPITAL ENCOUNTER (OUTPATIENT)
Facility: CLINIC | Age: 39
Discharge: HOME | End: 2019-03-24
Attending: ADVANCED PRACTICE MIDWIFE | Admitting: ADVANCED PRACTICE MIDWIFE
Payer: COMMERCIAL

## 2019-03-23 PROCEDURE — 96361 HYDRATE IV INFUSION ADD-ON: CPT

## 2019-03-23 RX ORDER — ONDANSETRON 2 MG/ML
4 INJECTION INTRAMUSCULAR; INTRAVENOUS EVERY 6 HOURS PRN
Status: DISCONTINUED | OUTPATIENT
Start: 2019-03-23 | End: 2019-03-24 | Stop reason: HOSPADM

## 2019-03-23 RX ORDER — SODIUM CHLORIDE, SODIUM LACTATE, POTASSIUM CHLORIDE, CALCIUM CHLORIDE 600; 310; 30; 20 MG/100ML; MG/100ML; MG/100ML; MG/100ML
INJECTION, SOLUTION INTRAVENOUS CONTINUOUS
Status: DISCONTINUED | OUTPATIENT
Start: 2019-03-24 | End: 2019-03-24 | Stop reason: HOSPADM

## 2019-03-23 RX ORDER — ACETAMINOPHEN 325 MG/1
650 TABLET ORAL EVERY 4 HOURS PRN
Status: DISCONTINUED | OUTPATIENT
Start: 2019-03-23 | End: 2019-03-24 | Stop reason: HOSPADM

## 2019-03-24 VITALS — SYSTOLIC BLOOD PRESSURE: 134 MMHG | DIASTOLIC BLOOD PRESSURE: 60 MMHG | RESPIRATION RATE: 18 BRPM | TEMPERATURE: 98.5 F

## 2019-03-24 PROBLEM — Z36.89 ENCOUNTER FOR TRIAGE IN PREGNANT PATIENT: Status: ACTIVE | Noted: 2019-03-24

## 2019-03-24 LAB
ALBUMIN UR-MCNC: NEGATIVE MG/DL
APPEARANCE UR: CLEAR
BACTERIA #/AREA URNS HPF: ABNORMAL /HPF
BILIRUB UR QL STRIP: NEGATIVE
COLOR UR AUTO: ABNORMAL
FIBRONECTIN FETAL VAG QL: NEGATIVE
GLUCOSE UR STRIP-MCNC: NEGATIVE MG/DL
HGB UR QL STRIP: NEGATIVE
KETONES UR STRIP-MCNC: NEGATIVE MG/DL
LEUKOCYTE ESTERASE UR QL STRIP: NEGATIVE
NITRATE UR QL: NEGATIVE
PH UR STRIP: 7 PH (ref 5–7)
RBC #/AREA URNS AUTO: <1 /HPF (ref 0–2)
SOURCE: ABNORMAL
SP GR UR STRIP: 1 (ref 1–1.03)
SPECIMEN SOURCE: NORMAL
SQUAMOUS #/AREA URNS AUTO: 1 /HPF (ref 0–1)
UROBILINOGEN UR STRIP-MCNC: NORMAL MG/DL (ref 0–2)
WBC #/AREA URNS AUTO: 0 /HPF (ref 0–5)
WET PREP SPEC: NORMAL

## 2019-03-24 PROCEDURE — 25000132 ZZH RX MED GY IP 250 OP 250 PS 637: Performed by: ADVANCED PRACTICE MIDWIFE

## 2019-03-24 PROCEDURE — 99213 OFFICE O/P EST LOW 20 MIN: CPT | Mod: 25 | Performed by: ADVANCED PRACTICE MIDWIFE

## 2019-03-24 PROCEDURE — 96360 HYDRATION IV INFUSION INIT: CPT

## 2019-03-24 PROCEDURE — 59025 FETAL NON-STRESS TEST: CPT | Mod: 26 | Performed by: ADVANCED PRACTICE MIDWIFE

## 2019-03-24 PROCEDURE — 87210 SMEAR WET MOUNT SALINE/INK: CPT | Performed by: ADVANCED PRACTICE MIDWIFE

## 2019-03-24 PROCEDURE — 87653 STREP B DNA AMP PROBE: CPT | Performed by: ADVANCED PRACTICE MIDWIFE

## 2019-03-24 PROCEDURE — 25800030 ZZH RX IP 258 OP 636: Performed by: ADVANCED PRACTICE MIDWIFE

## 2019-03-24 PROCEDURE — 82731 ASSAY OF FETAL FIBRONECTIN: CPT | Performed by: ADVANCED PRACTICE MIDWIFE

## 2019-03-24 PROCEDURE — 81001 URINALYSIS AUTO W/SCOPE: CPT | Performed by: ADVANCED PRACTICE MIDWIFE

## 2019-03-24 PROCEDURE — 59025 FETAL NON-STRESS TEST: CPT

## 2019-03-24 PROCEDURE — G0463 HOSPITAL OUTPT CLINIC VISIT: HCPCS | Mod: 25

## 2019-03-24 RX ORDER — CALCIUM CARBONATE 500 MG/1
500 TABLET, CHEWABLE ORAL DAILY PRN
Status: DISCONTINUED | OUTPATIENT
Start: 2019-03-24 | End: 2019-03-24 | Stop reason: HOSPADM

## 2019-03-24 RX ADMIN — CALCIUM CARBONATE (ANTACID) CHEW TAB 500 MG 500 MG: 500 CHEW TAB at 00:53

## 2019-03-24 RX ADMIN — SODIUM CHLORIDE, POTASSIUM CHLORIDE, SODIUM LACTATE AND CALCIUM CHLORIDE: 600; 310; 30; 20 INJECTION, SOLUTION INTRAVENOUS at 02:22

## 2019-03-24 RX ADMIN — SODIUM CHLORIDE, POTASSIUM CHLORIDE, SODIUM LACTATE AND CALCIUM CHLORIDE 500 ML: 600; 310; 30; 20 INJECTION, SOLUTION INTRAVENOUS at 01:46

## 2019-03-24 NOTE — PLAN OF CARE
Data: Reason for maternal/fetal assessment per patient is Rule Out Labor  . Patient is a .    . Gestational Age 33w1d. VSS.  Fetal movement present. Patient denies  backache, vaginal discharge, pelvic pressure, UTI symptoms, GI problems, bloody show, vaginal bleeding, edema, headache, visual disturbances, epigastric or URQ pain, abdominal pain, rupture of membranes. Support persons Jared present.  Action: Verbal consent for EFM. Triage assessment completed. Presumed adequate fetal oxygenation documented (see flow record). Patient education pamphlets given on fetal movement counts and  labor. Patient instructed to report change in fetal movement, vaginal leaking of fluid or bleeding, abdominal pain, or any concerns related to the pregnancy to her nurse/physician. IV fluid bolus given.  Response: Olinda CARBAJAL informed of discussed plan of care with pt. Pt to keep regular OB appointment this week and call with further concerns. Patient verbalized understanding of education and verbalized agreement with plan. Discharged ambulatory at 0444.

## 2019-03-24 NOTE — DISCHARGE SUMMARY
S: Patient is doing well. Has been sleeping the past couple of hours and feels like contractions have spaced out and become less intense. Cervical check unchanged from previous exam 4 hours ago, 0/50/-4. Baby category 1 tracing. She feels ready to be discharged home.     O:  Blood pressure 134/60, temperature 98.5  F (36.9  C), temperature source Oral, resp. rate 18, last menstrual period 2018, not currently breastfeeding.  General appearance: comfortable    CONTACTIONS: Contractions irregular and mild   FETAL HEART TONES: baseline 135 with moderate FHR variability and    accelerations yes. Decelerations no.    NST: REACTIVE  ROM: not ruptured  PELVIC EXAM:PELVIC EXAM: closed/ 50%/ Posterior/ average/ -4   Fetal Position: cephalic   Bloody show: No   FFN: negative   Wet prep: negative   UA: negative   Patient declined GC/CT    ASSESSMENT:  ==============  IUP @ 33w1d   Rule out  labor    Fetal Heart rate tracing Category category one  GBS- pending     PLAN:  ===========  MD consulted, okay for discharge with no cervical change. No betamethasone recommended.   Discharge instructions given    Follow up tomorrow as scheduled     Idalia Prakash CNM

## 2019-03-24 NOTE — TELEPHONE ENCOUNTER
"Caller: self  Reason for call: \"earlier today I started having clear, heavier [vaginal] discharge, it was thick and sticky; now I've been having contractions every 4 minutes consistently (duration 1-2 min), back pain, it feels like I have to poop.\"  Symptoms: thick, sticky, clear vaginal discharge, contractions Q 4 mins (started at 8:40pm), back pain, \"feels like I have to poop\"  Symptoms started: today   JILL: 19 33w0d  Vaginal fluids, bleeding or unusual discharge? Thicker, sticky clear vaginal discharge earlier today    Changes in fetal movement? denies  Abdominal Pain? Yes, with back pain   Pain worsens when walking   Pain is better when still    Home cares tried: bath  Care advice given per triage protocol. Triage Disposition: advised caller to speak to the on call CNM now for 2nd level triage.     Caller verbalized understanding of care advice given and plans to speak to the on call CNM now. Caller had no further questions.     This nurse paged the on call provider (Idalia Prakash CNM) for the [facility] at 9:56pm via smart web to call the patient back directly at [phone] for 2nd level triage. Caller advised to call FNA back if no call from provider within 20-30 minutes. Caller agreed.    Encouraged call back to FNA  for nurse line services, new/worsening symptoms or further questions.    Nichole Zuniga RN  [facility]  HQ plus Web message:   Patient Cathy Arroyo   1980  Call back # [phone], pt  Synopsis: 38 yr old, JILL 19, , thick clear vag dis, contract 4 min apart > 1 hr  MRN [id number]  CN Tom  PCP Brown Varela RN/FNA      Reason for Disposition    Contractions < 10 minutes apart for 1 hour (i.e., 6 or more contractions an hour)     Contractions Q 4 minutes x 1 hour    Additional Information    Negative: Passed out (i.e., lost consciousness, collapsed and was not responding)    Negative: Shock suspected (e.g., cold/pale/clammy skin, too " "weak to stand, low BP, rapid pulse)    Negative: Severe bleeding (e.g., continuous red blood from vagina, or large blood clots)    Negative: Umbilical cord hanging out of the vagina (shiny, white, curled appearance, \"like telephone cord\")    Negative: Uncontrollable urge to push (i.e., feels like baby is coming out now)    Negative: Can see baby    Negative: Sounds like a life-threatening emergency to the triager    Negative: Difficult to awaken or acting confused  (e.g., disoriented, slurred speech)    Negative: [1] SEVERE abdominal pain (e.g., excruciating) AND [2] constant AND [3] present > 1 hour    Negative: MODERATE-SEVERE abdominal pain    Protocols used: PREGNANCY - LABOR - -ADULT-      "

## 2019-03-24 NOTE — DISCHARGE INSTRUCTIONS
Discharge Instruction for Undelivered Patients      You were seen for: Labor Assessment  We Consulted: Olinda CARBAJAL      Diet:   You may eat meals and snacks.     Activity:  Call your doctor or nurse midwife if your baby is moving less than usual.     Call your provider if you notice:  Swelling in your face or increased swelling in your hands or legs.  Headaches that are not relieved by Tylenol (acetaminophen).  Changes in your vision (blurring: seeing spots or stars.)  Nausea (sick to your stomach) and vomiting (throwing up).   Weight gain of 5 pounds or more per week.  Heartburn that doesn't go away.  Signs of bladder infection: pain when you urinate (use the toilet), need to go more often and more urgently.  The bag of pires (rupture of membranes) breaks, or you notice leaking in your underwear.  Bright red blood in your underwear.  Abdominal (lower belly) or stomach pain.  For first baby: Contractions (tightening) less than 5 minutes apart for one hour or more.  Second (plus) baby: Contractions (tightening) less than 10 minutes apart and getting stronger.  *If less than 34 weeks: Contractions (tightenings) more than 6 times in one hour.  Increase or change in vaginal discharge (note the color and amount)    Follow-up:  As scheduled in the clinic

## 2019-03-24 NOTE — PROGRESS NOTES
"Pt presenting to the birth[facility] for mild contractions. States that the ctxs started about 2100. States that it is \"uncomfortable\" but tolerate. Denies LOF, vaginal bleed. Per pt ctxs were ~ 4 mins apart. EFM applied for assessment of fetal activity. Report given to Tea MACEDO RN. Continue with plan of care.   "

## 2019-03-24 NOTE — H&P
HOSPITAL TRIAGE NOTE  ===================    CHIEF COMPLAINT  ========================  Cathy Arroyo is a 38 year old patient presenting today at 33w1d for evaluation of uterine contractions.    Patient's last menstrual period was 2018.  Estimated Date of Delivery: May 11, 2019     HPI  ==================   Cathy reports around 9 pm she noticed some contractions that were getting more uncomfortable. She monitored them over the hour and had contractions every 4 minutes. They seemed to be getting worse. She tried water therapy and hydration but that did not change the contractions. She denies water leaking or vaginal bleeding. She feels lots of movements. She does not feel like they are as strong as labor contractions. She has had radha hood contractions this pregnancy but usually she does not feel them. She was checked about a week ago when she was in antepartum for a ear infection and was found to have radha hood and she was closed. Baby was breech on ultrasound yesterday.   She reports some vaginal discharge but not itching or irritating just a little more than normal. She is taking antibiotics for an ear infection and usually gets a yeast infection from antibiotics. She took Diflucan for that.   CONTRACTIONS: mild  ABDOMINAL PAIN: none  FETAL MOVEMENT: active    VAGINAL BLEEDING: none  RUPTURE OF MEMBRANES: no  PELVIC PAIN: none  OTHER: none    REVIEW OF SYSTEMS  =====================  C: NEGATIVE for fever, chills  I: NEGATIVE for worrisome rashes, moles or lesions  E: NEGATIVE for vision changes or irritation  R: NEGATIVE for significant cough or SOB  CV: NEGATIVE for chest pain, palpitations or varicosities  GI: NEGATIVE for nausea, abdominal pain, heartburn, or change in bowel habits  : NEGATIVE for frequency, dysuria, or hematuria  M: NEGATIVE for significant arthralgias or myalgia  N: NEGATIVE for headache, weakness, dizziness or paresthesias  P: NEGATIVE for changes in mood  or affect    HISTORIES  ==============  ALLERGIES:      Allergies   Allergen Reactions     Adacel [Daptacel] Swelling     Codeine Sulfate Nausea and Vomiting     Tetanus Toxoid      Pt states she had an infection at injection site.      Vicodin [Hydrocodone-Acetaminophen] Nausea and Vomiting     Methimazole Rash     PAST MEDICAL HISTORY  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     Post partum depression      Post-surgical hypothyroidism 2017     Uncomplicated asthma      PARTNER: Gamal     FAMILY HISTORY  Family History   Problem Relation Age of Onset     Thyroid Disease Paternal Aunt      Asthma Father      Mental Illness Father      Obesity Father      Asthma Maternal Grandmother      Osteoporosis Maternal Grandmother      Glaucoma Maternal Grandmother      Hypertension Maternal Grandmother      Macular Degeneration Maternal Grandmother      Diabetes Paternal Grandfather      Other Cancer Mother      Genitourinary Problems Mother      Bipolar Disorder Mother         unipolar depression     Depression Mother      Thyroid Disease Mother         benign tumor     Skin Cancer Mother      Cancer Mother      Bipolar Disorder Brother         unipolar depression     Asthma Brother      OB HISTORY  Obstetric History       T1      L1     SAB1   TAB0   Ectopic0   Multiple0   Live Births1       # Outcome Date GA Lbr Darren/2nd Weight Sex Delivery Anes PTL Lv   3 Current            2 SAB 07/10/18           1 Term 10/11/16 41w1d 08:43 / 06:05 3.402 kg (7 lb 8 oz) F Vag-Spont Local, EPI N NORAH      Name: TAB CABAN      Apgar1:  8                Apgar5: 9          EXAM  ============  /72   Temp 98.7  F (37.1  C) (Oral)   Resp 16   LMP 2018   GENERAL APPEARANCE: healthy, alert  and no distress  RESP: lungs clear to auscultation - no rales, rhonchi or wheezes  BREAST: normal without masses, tenderness or nipple discharge and no palpable axillary masses or adenopathy  CV: regular rates and rhythm, normal S1 S2, no S3 or S4 and no murmur,and no varicosities  ABDOMEN:  soft, nontender,non-tender between contractions no epigastric pain  NEURO: Denies headache, blurred vision  PSYCH: mentation appears normal. and affect normal/bright  LEGS: Reflexes normal bilaterally    CONTRACTIONS:  EVERY 4-6 MINUTES  mild  FETAL HEART TONES:  145 with moderate variability, accelerations-yes, decels none.  NST: REACTIVE  EFW: N/A    PELVIC EXAM: closed/ 50%/ Posterior/ average/-4  ART SCORE: 2  PRESENTATION: VERTEX per BSUS   BLOOD: no  DISCHARGE: none    ROM: No    LABS:  UA, UC, WET PREP, fFN and GBS    DIAGNOSIS  ============  33w1d  Rule out  labor   NST: REACTIVE  Fetal Heart rate tracing: category one    PLAN  ============  Admit inpatient for observation   Consult with MD, consider beta shot  Tums for heart burn   IV fluids     JANNET YaoM

## 2019-03-24 NOTE — PROGRESS NOTES
Fetal Non-Stress Test Results    NST Ordered By: Idalia Prakash CNM        NST Start & Stop Times  NST Start Time: 2310  NST Stop Time: 2330      NST Results  Fetus A   Baseline Rate: 145  Accelerations: Present  Decelerations: None  Interpretation: reactive    Idalia Prakash CNM

## 2019-03-25 ENCOUNTER — PRENATAL OFFICE VISIT (OUTPATIENT)
Dept: MIDWIFE SERVICES | Facility: CLINIC | Age: 39
End: 2019-03-25
Payer: COMMERCIAL

## 2019-03-25 VITALS
WEIGHT: 284 LBS | BODY MASS INDEX: 43.18 KG/M2 | SYSTOLIC BLOOD PRESSURE: 134 MMHG | TEMPERATURE: 98.5 F | DIASTOLIC BLOOD PRESSURE: 83 MMHG | HEART RATE: 113 BPM

## 2019-03-25 DIAGNOSIS — R94.6 ABNORMAL RESULTS OF THYROID FUNCTION STUDIES: ICD-10-CM

## 2019-03-25 DIAGNOSIS — Z34.80 SUPERVISION OF OTHER NORMAL PREGNANCY, ANTEPARTUM: ICD-10-CM

## 2019-03-25 DIAGNOSIS — Z86.39 HISTORY OF GRAVES' DISEASE: ICD-10-CM

## 2019-03-25 DIAGNOSIS — E89.0 POSTSURGICAL HYPOTHYROIDISM: ICD-10-CM

## 2019-03-25 DIAGNOSIS — Z34.83 ENCOUNTER FOR SUPERVISION OF OTHER NORMAL PREGNANCY, THIRD TRIMESTER: Primary | ICD-10-CM

## 2019-03-25 PROBLEM — O40.9XX0 POLYHYDRAMNIOS AFFECTING PREGNANCY: Status: ACTIVE | Noted: 2019-03-25

## 2019-03-25 LAB
GP B STREP DNA SPEC QL NAA+PROBE: NEGATIVE
SPECIMEN SOURCE: NORMAL
T4 SERPL-MCNC: 17.8 UG/DL (ref 4.5–13.9)

## 2019-03-25 PROCEDURE — 36415 COLL VENOUS BLD VENIPUNCTURE: CPT

## 2019-03-25 PROCEDURE — 84436 ASSAY OF TOTAL THYROXINE: CPT

## 2019-03-25 PROCEDURE — 99207 ZZC PRENATAL VISIT: CPT | Performed by: ADVANCED PRACTICE MIDWIFE

## 2019-03-25 PROCEDURE — 84443 ASSAY THYROID STIM HORMONE: CPT

## 2019-03-26 ENCOUNTER — TELEPHONE (OUTPATIENT)
Dept: ENDOCRINOLOGY | Facility: CLINIC | Age: 39
End: 2019-03-26

## 2019-03-26 DIAGNOSIS — E89.0 POST-SURGICAL HYPOTHYROIDISM: Primary | ICD-10-CM

## 2019-03-26 DIAGNOSIS — E89.0 POST-SURGICAL HYPOTHYROIDISM: ICD-10-CM

## 2019-03-26 LAB — TSH SERPL DL<=0.005 MIU/L-ACNC: 2.6 MU/L (ref 0.4–4)

## 2019-03-26 NOTE — TELEPHONE ENCOUNTER
Please call the lab and see if they can add on TSH to the sample they have on her from 3/25/19.    Also, I am trying to figure out how half of my standing orders for thyroid labs, written on 8/30/18 for monthly TSH and total T4 , has disappeared.  The standing order for TSH is no longer there, apparently discontinued by someone? Can you see if the lab can tell us who/when that order was discontinued?     I am rewriting the standing order for the TSH today.    Leatha Franco

## 2019-04-02 ENCOUNTER — PRENATAL OFFICE VISIT (OUTPATIENT)
Dept: MIDWIFE SERVICES | Facility: CLINIC | Age: 39
End: 2019-04-02
Payer: COMMERCIAL

## 2019-04-02 ENCOUNTER — MYC MEDICAL ADVICE (OUTPATIENT)
Dept: MIDWIFE SERVICES | Facility: CLINIC | Age: 39
End: 2019-04-02

## 2019-04-02 VITALS
HEART RATE: 129 BPM | SYSTOLIC BLOOD PRESSURE: 125 MMHG | BODY MASS INDEX: 43.18 KG/M2 | TEMPERATURE: 98.3 F | DIASTOLIC BLOOD PRESSURE: 82 MMHG | WEIGHT: 284 LBS

## 2019-04-02 DIAGNOSIS — Z79.899 MEDICAL CANNABIS USE: ICD-10-CM

## 2019-04-02 DIAGNOSIS — O09.523 ELDERLY MULTIGRAVIDA IN THIRD TRIMESTER: Primary | ICD-10-CM

## 2019-04-02 PROCEDURE — 99207 ZZC PRENATAL VISIT: CPT | Performed by: ADVANCED PRACTICE MIDWIFE

## 2019-04-02 NOTE — PROGRESS NOTES
34w3d  Patient feeling well. Positive fetal movement. Denies water leaking, vaginal bleeding, decreased fetal movement, contraction pain, or headaches.   Doing well. Feels like she is way bigger and getting nervous that this baby is measuring larger. Feels like if baby is breech she would be more likely to do a  rather than version due to the size. She will make her next appointment with the physicians to discuss both options. Ultrasound is scheduled for Friday at [facility].   Cathy has started taking both CBD and THC for her anxiety. She came with forms/prescription from the clinic. Will scan into chart. She is very sensitive to this and does have some concerns about THC use and being at the hospital. She feels she has been shamed for using this and is frustrated with providers and staff who are not understanding. She has done significant work with her psychiatrist and best control her bipolar and this has worked the best for her. Will also look into how she can take her doses while at the hospital. It works really well for her anxiety and she is high risk for postpartum depression and anxiety.   No other questions or concerns today.   Danger signs reviewed, pre-eclampsia signs and symptoms discussed.   Knows when to call triage and has phone numbers.   Follow up in 1 week.   Idalia Prakash CNM

## 2019-04-03 ENCOUNTER — AMBULATORY - HEALTHEAST (OUTPATIENT)
Dept: MATERNAL FETAL MEDICINE | Facility: HOSPITAL | Age: 39
End: 2019-04-03

## 2019-04-04 ENCOUNTER — TELEPHONE (OUTPATIENT)
Dept: MIDWIFE SERVICES | Facility: CLINIC | Age: 39
End: 2019-04-04

## 2019-04-04 NOTE — TELEPHONE ENCOUNTER
Pt is calling to discuss bleeding noted after a BM, says it is not vaginal, but is coming from a possible rash in her perineum.  Encouraged soaking in the tub.  If bleeding/pain gets worse, she could go to ED for eval.  Discussed clinic visit if discomfort is tolerable and she can wait until tomorrow to be seen.  Could see CNM or FP provider. Pt scheduled appt to be seen in clinic tomorrow afternoon.  Amirah Denney RN

## 2019-04-05 ENCOUNTER — RECORDS - HEALTHEAST (OUTPATIENT)
Dept: ULTRASOUND IMAGING | Facility: HOSPITAL | Age: 39
End: 2019-04-05

## 2019-04-05 ENCOUNTER — OFFICE VISIT - HEALTHEAST (OUTPATIENT)
Dept: MATERNAL FETAL MEDICINE | Facility: HOSPITAL | Age: 39
End: 2019-04-05

## 2019-04-05 ENCOUNTER — TRANSCRIBE ORDERS (OUTPATIENT)
Dept: MATERNAL FETAL MEDICINE | Facility: CLINIC | Age: 39
End: 2019-04-05

## 2019-04-05 ENCOUNTER — RECORDS - HEALTHEAST (OUTPATIENT)
Dept: ADMINISTRATIVE | Facility: OTHER | Age: 39
End: 2019-04-05

## 2019-04-05 ENCOUNTER — PRENATAL OFFICE VISIT (OUTPATIENT)
Dept: MIDWIFE SERVICES | Facility: CLINIC | Age: 39
End: 2019-04-05
Payer: COMMERCIAL

## 2019-04-05 ENCOUNTER — TELEPHONE (OUTPATIENT)
Dept: MIDWIFE SERVICES | Facility: CLINIC | Age: 39
End: 2019-04-05

## 2019-04-05 VITALS
TEMPERATURE: 98.5 F | SYSTOLIC BLOOD PRESSURE: 120 MMHG | WEIGHT: 288 LBS | DIASTOLIC BLOOD PRESSURE: 76 MMHG | BODY MASS INDEX: 43.79 KG/M2 | HEART RATE: 112 BPM

## 2019-04-05 DIAGNOSIS — O26.90 PREGNANCY RELATED CONDITION, ANTEPARTUM: Primary | ICD-10-CM

## 2019-04-05 DIAGNOSIS — Z34.80 SUPERVISION OF OTHER NORMAL PREGNANCY, ANTEPARTUM: ICD-10-CM

## 2019-04-05 DIAGNOSIS — R94.6 ABNORMAL RESULTS OF THYROID FUNCTION STUDIES: ICD-10-CM

## 2019-04-05 DIAGNOSIS — Z34.80 SUPERVISION OF OTHER NORMAL PREGNANCY, ANTEPARTUM: Primary | ICD-10-CM

## 2019-04-05 DIAGNOSIS — O09.523 ELDERLY MULTIGRAVIDA IN THIRD TRIMESTER: Primary | ICD-10-CM

## 2019-04-05 DIAGNOSIS — O40.9XX0 POLYHYDRAMNIOS AFFECTING PREGNANCY: ICD-10-CM

## 2019-04-05 DIAGNOSIS — O26.90 PREGNANCY RELATED CONDITIONS, UNSPECIFIED, UNSPECIFIED TRIMESTER: ICD-10-CM

## 2019-04-05 PROBLEM — Z79.899 MEDICAL CANNABIS USE: Status: ACTIVE | Noted: 2019-04-05

## 2019-04-05 LAB
CREAT UR-MCNC: 177 MG/DL
GLUCOSE 1H P 50 G GLC PO SERPL-MCNC: 102 MG/DL (ref 60–129)

## 2019-04-05 PROCEDURE — 80349 CANNABINOIDS NATURAL: CPT | Performed by: ADVANCED PRACTICE MIDWIFE

## 2019-04-05 PROCEDURE — 80307 DRUG TEST PRSMV CHEM ANLYZR: CPT | Performed by: ADVANCED PRACTICE MIDWIFE

## 2019-04-05 PROCEDURE — 36415 COLL VENOUS BLD VENIPUNCTURE: CPT | Performed by: ADVANCED PRACTICE MIDWIFE

## 2019-04-05 PROCEDURE — 99207 ZZC PRENATAL VISIT: CPT | Performed by: ADVANCED PRACTICE MIDWIFE

## 2019-04-05 PROCEDURE — 82950 GLUCOSE TEST: CPT | Performed by: ADVANCED PRACTICE MIDWIFE

## 2019-04-05 NOTE — TELEPHONE ENCOUNTER
Pt is 34w6d, went to [facility] appointment this morning. US showed a lot of fluid. Per Dr Hoover, recommends repeating the glucose test. Suspects she has GD. Pt has appt with ML at 1:30pm so wants to do it today. Placed order for 1 hr. Routing to  as FYI.   Beatriz Johnson, RN-BSN

## 2019-04-05 NOTE — PROGRESS NOTES
MFM US today at [facility] shows polyhydramnios (CAITLIN - 33.5, total max pocket 11.2). Repeating GCT today per [facility] recommendation. Baby is cephalic, active. Discussed urine drug screen today and on admission to hospital for both her and baby since she is using medical marijuana. Pt was able to sign in to view her certificate and printed it off. Will scan one into chart and place another in Imindi's basket in case it is needed before it gets scanned. Had an episode of bleeding after a BM, discussed likely internal hemorrhoid and not concerning if not painful or continuing to happen. Also having some external vaginal irritation. Will try topical Miconazole. Baby is active. No regular contractions, LOF or bleeding. RTC weekly, plan for GBS at next visit. MML

## 2019-04-09 ENCOUNTER — OFFICE VISIT (OUTPATIENT)
Dept: MATERNAL FETAL MEDICINE | Facility: CLINIC | Age: 39
End: 2019-04-09
Attending: OBSTETRICS & GYNECOLOGY
Payer: COMMERCIAL

## 2019-04-09 ENCOUNTER — HOSPITAL ENCOUNTER (OUTPATIENT)
Dept: ULTRASOUND IMAGING | Facility: CLINIC | Age: 39
Discharge: HOME | End: 2019-04-09
Attending: OBSTETRICS & GYNECOLOGY | Admitting: OBSTETRICS & GYNECOLOGY
Payer: COMMERCIAL

## 2019-04-09 DIAGNOSIS — O40.3XX0 POLYHYDRAMNIOS IN THIRD TRIMESTER COMPLICATION, SINGLE OR UNSPECIFIED FETUS: Primary | ICD-10-CM

## 2019-04-09 DIAGNOSIS — O26.90 PREGNANCY RELATED CONDITION, ANTEPARTUM: ICD-10-CM

## 2019-04-09 PROCEDURE — 76819 FETAL BIOPHYS PROFIL W/O NST: CPT

## 2019-04-10 ENCOUNTER — TELEPHONE (OUTPATIENT)
Dept: MIDWIFE SERVICES | Facility: CLINIC | Age: 39
End: 2019-04-10

## 2019-04-10 ENCOUNTER — NURSE TRIAGE (OUTPATIENT)
Dept: NURSING | Facility: CLINIC | Age: 39
End: 2019-04-10

## 2019-04-10 ENCOUNTER — HOSPITAL ENCOUNTER (OUTPATIENT)
Facility: CLINIC | Age: 39
Discharge: HOME | End: 2019-04-10
Attending: ADVANCED PRACTICE MIDWIFE | Admitting: ADVANCED PRACTICE MIDWIFE
Payer: COMMERCIAL

## 2019-04-10 VITALS — DIASTOLIC BLOOD PRESSURE: 71 MMHG | TEMPERATURE: 99.8 F | SYSTOLIC BLOOD PRESSURE: 131 MMHG

## 2019-04-10 DIAGNOSIS — Z34.80 SUPERVISION OF OTHER NORMAL PREGNANCY, ANTEPARTUM: ICD-10-CM

## 2019-04-10 PROBLEM — O47.00 PRETERM CONTRACTIONS: Status: ACTIVE | Noted: 2019-04-10

## 2019-04-10 LAB
ALBUMIN UR-MCNC: NEGATIVE MG/DL
AMPHETAMINES UR QL SCN: NEGATIVE
APPEARANCE UR: ABNORMAL
BACTERIA #/AREA URNS HPF: ABNORMAL /HPF
BILIRUB UR QL STRIP: NEGATIVE
CANNABINOIDS UR CFM-MCNC: 75 NG/ML
CANNABINOIDS UR QL: ABNORMAL
CARBOXYTHC/CREAT UR: 42 NG/MG{CREAT}
COCAINE UR QL: NEGATIVE
COLOR UR AUTO: ABNORMAL
CREAT UR-MCNC: 25 MG/DL
GLUCOSE UR STRIP-MCNC: NEGATIVE MG/DL
HGB UR QL STRIP: NEGATIVE
KETONES UR STRIP-MCNC: NEGATIVE MG/DL
LEUKOCYTE ESTERASE UR QL STRIP: NEGATIVE
MUCOUS THREADS #/AREA URNS LPF: PRESENT /LPF
NITRATE UR QL: NEGATIVE
OPIATES UR QL SCN: NEGATIVE
PCP UR QL SCN: NEGATIVE
PH UR STRIP: 6.5 PH (ref 5–7)
RBC #/AREA URNS AUTO: 1 /HPF (ref 0–2)
SOURCE: ABNORMAL
SP GR UR STRIP: 1 (ref 1–1.03)
SPECIMEN SOURCE: NORMAL
SQUAMOUS #/AREA URNS AUTO: 10 /HPF (ref 0–1)
UROBILINOGEN UR STRIP-MCNC: NORMAL MG/DL (ref 0–2)
WBC #/AREA URNS AUTO: 4 /HPF (ref 0–5)
WET PREP SPEC: NORMAL

## 2019-04-10 PROCEDURE — 59025 FETAL NON-STRESS TEST: CPT

## 2019-04-10 PROCEDURE — 99213 OFFICE O/P EST LOW 20 MIN: CPT | Mod: 25 | Performed by: ADVANCED PRACTICE MIDWIFE

## 2019-04-10 PROCEDURE — G0463 HOSPITAL OUTPT CLINIC VISIT: HCPCS | Mod: 25

## 2019-04-10 PROCEDURE — 80349 CANNABINOIDS NATURAL: CPT | Performed by: ADVANCED PRACTICE MIDWIFE

## 2019-04-10 PROCEDURE — 87491 CHLMYD TRACH DNA AMP PROBE: CPT | Performed by: ADVANCED PRACTICE MIDWIFE

## 2019-04-10 PROCEDURE — 87210 SMEAR WET MOUNT SALINE/INK: CPT | Performed by: ADVANCED PRACTICE MIDWIFE

## 2019-04-10 PROCEDURE — 87186 SC STD MICRODIL/AGAR DIL: CPT | Performed by: ADVANCED PRACTICE MIDWIFE

## 2019-04-10 PROCEDURE — 87653 STREP B DNA AMP PROBE: CPT | Performed by: ADVANCED PRACTICE MIDWIFE

## 2019-04-10 PROCEDURE — 59020 FETAL CONTRACT STRESS TEST: CPT | Mod: 26 | Performed by: ADVANCED PRACTICE MIDWIFE

## 2019-04-10 PROCEDURE — 80307 DRUG TEST PRSMV CHEM ANLYZR: CPT | Performed by: ADVANCED PRACTICE MIDWIFE

## 2019-04-10 PROCEDURE — 87591 N.GONORRHOEAE DNA AMP PROB: CPT | Performed by: ADVANCED PRACTICE MIDWIFE

## 2019-04-10 PROCEDURE — 59025 FETAL NON-STRESS TEST: CPT | Mod: 26 | Performed by: ADVANCED PRACTICE MIDWIFE

## 2019-04-10 PROCEDURE — 81001 URINALYSIS AUTO W/SCOPE: CPT | Performed by: ADVANCED PRACTICE MIDWIFE

## 2019-04-10 RX ORDER — ONDANSETRON 2 MG/ML
4 INJECTION INTRAMUSCULAR; INTRAVENOUS EVERY 6 HOURS PRN
Status: DISCONTINUED | OUTPATIENT
Start: 2019-04-10 | End: 2019-04-11 | Stop reason: HOSPADM

## 2019-04-10 RX ORDER — HYDROXYZINE HYDROCHLORIDE 50 MG/1
50 TABLET, FILM COATED ORAL EVERY 6 HOURS PRN
Status: DISCONTINUED | OUTPATIENT
Start: 2019-04-10 | End: 2019-04-11 | Stop reason: HOSPADM

## 2019-04-10 RX ORDER — ACETAMINOPHEN 325 MG/1
650 TABLET ORAL EVERY 4 HOURS PRN
Status: DISCONTINUED | OUTPATIENT
Start: 2019-04-10 | End: 2019-04-11 | Stop reason: HOSPADM

## 2019-04-11 LAB
C TRACH DNA SPEC QL NAA+PROBE: NEGATIVE
GP B STREP DNA SPEC QL NAA+PROBE: POSITIVE
N GONORRHOEA DNA SPEC QL NAA+PROBE: NEGATIVE
SPECIMEN SOURCE: ABNORMAL
SPECIMEN SOURCE: NORMAL
SPECIMEN SOURCE: NORMAL

## 2019-04-11 NOTE — DISCHARGE INSTRUCTIONS
Discharge Instruction for Undelivered Patients      You were seen for: Labor Assessment  We Consulted: Za Telles  You had (Test or Medicine): fetal non stress  test     Diet:   Drink 8 to 12 glasses of liquids (milk, juice, water) every day.  You may eat meals and snacks.     Activity:  Count fetal kicks everyday (see handout)  Call your doctor or nurse midwife if your baby is moving less than usual.     Call your provider if you notice:  Swelling in your face or increased swelling in your hands or legs.  Headaches that are not relieved by Tylenol (acetaminophen).  Changes in your vision (blurring: seeing spots or stars.)  Nausea (sick to your stomach) and vomiting (throwing up).   Weight gain of 5 pounds or more per week.  Heartburn that doesn't go away.  Signs of bladder infection: pain when you urinate (use the toilet), need to go more often and more urgently.  The bag of pires (rupture of membranes) breaks, or you notice leaking in your underwear.  Bright red blood in your underwear.  Abdominal (lower belly) or stomach pain.  For first baby: Contractions (tightening) less than 5 minutes apart for one hour or more.  Second (plus) baby: Contractions (tightening) less than 10 minutes apart and getting stronger.  *If less than 34 weeks: Contractions (tightenings) more than 6 times in one hour.  Increase or change in vaginal discharge (note the color and amount)      Follow-up:  Make an appointment to be seen on Friday, April 12th

## 2019-04-11 NOTE — H&P
"  Cathy Arroyo is a 38 year old female    Estimated Date of Delivery: May 11, 2019 is calculated from  Patient's last menstrual period was 2018. is [facility] on 4/10/2019 at 10:13 PM  in for observation.   contractions  labor.   Membranes are intact    Labor start time 1900 started contractions.    PRENATAL COURSE  Prenatal care began at 6 wks gestation for a total of 14 prenatal  visits.  Total wt gain 56 lbs  Prenatal vital signs WNL  Medications in pregnancy   Current Facility-Administered Medications:      acetaminophen (TYLENOL) tablet 650 mg, 650 mg, Oral, Q4H PRN, Za Telles APRN CNM     hydrOXYzine (ATARAX) tablet 50 mg, 50 mg, Oral, Q6H PRN, Za Telles APRN CNM     NO Rho (D) immune globulin (RhoGam) needed - mother Rh POSITIVE, , Does not apply, Continuous PRN, Za Telles APRN CNM     ondansetron (ZOFRAN) injection 4 mg, 4 mg, Intravenous, Q6H PRN, Za Telles APRN CNM  Prenatal course was   complicated by    Patient Active Problem List    Diagnosis Date Noted      contractions 04/10/2019     Priority: Medium     Medical cannabis use 2019     Priority: Medium     1. Prescriptions were scanned into media tab. Taking for PTSD. Copies are in my (Suzie) inbox in the office  2. Entered the medications into chart, under \"unable to find\" medications. Kelso is the CBD and Tangerine is the THC.   3. Patient verified she is part of the [place] registry on multiple appointments, last on .   4. Patient visited with pharmacist prior to starting THC. Is aware there are no to minimal studies and is experimental only. Was using only CBD until 31 weeks prior to starting THC due to uncontrolled symptoms.    5. Urine drug testing: done   6. Discussed with inpatient pharmacist on . Recommendation is for patient to bring her Cobalt and [place] to the hospital for inpatient use. They will verify her medications and barcode them. " Then she can take them inpatient.  7. Patient aware of inpatient drug testing for both her and baby. She is very sensitive about use, please discuss with patient kindly.          Polyhydramnios affecting pregnancy 2019     Priority: Medium     3/22/19: CAITLIN of 28  Repeat GCT was normal    MFM BPP showed moderate polyhydramnios        Encounter for triage in pregnant patient 2019     Priority: Medium     Labor and delivery, indication for care 2019     Priority: Medium     Otitis externa 2019     Priority: Medium     Abnormal results of thyroid function studies 2019     Priority: Medium       Patient has a hx of Graves dz s/p thyroidectomy. She is on 175 mcg of synthroid. Her thyroid stimulating antibodies are positive. Discussed that fetuses can have transient   hyperthryoidism which affects 2-5% of neonates due to transplacental transfer of the stimulating antibodies. There are no signs of fetal hyperthyroidism today (fetal  tachycardia, polyhydramnios, goiter). This is more common if the TSI index is > 5 (Patient's level is 1.5).       Pain in symphysis pubis during pregnancy 2019     Priority: Medium     Post-surgical hypothyroidism 2019     Priority: Medium     Supervision of other normal pregnancy, antepartum 10/16/2018     Priority: Medium     FOB: Jamil, daughter Nery, 2 years old  Returning CNM patient  History of gestational hypertension with postdates IOL last pregnancy - treated with magnesium and labetalol during labor - RX given for aspirin therapy and labs drawn      Genetic testing with MFM - NT WNL, innatal labs WNL. Needs AFP.   3/22/19: BPP 8/8 polyhydramnios with CAITLIN of 28  19 1hour GCT after polyhydramnios diagnosed 102.        Need for Tdap vaccination 2018     Priority: Medium     Nonintractable migraine, unspecified migraine type 2018     Priority: Medium     History of Graves' disease 10/24/2017     Priority: Medium      S/P total thyroidectomy 06/01/2017     Priority: Medium     10/16/2018 Taking 150 mcg of synthroid. Labs drawn with new OB visit, following endocrinology closely.        Abnormal cytology 05/02/2017     Priority: Medium     Moderate persistent asthma without complication 01/18/2017     Priority: Medium     Chronic rhinitis 01/18/2017     Priority: Medium     Tobacco use disorder 01/18/2017     Priority: Medium     Quit once she found out about the pregnancy        Pre-eclampsia 10/14/2016     Priority: Medium     10/16/2018 - was induced for postdates and gestational hypertension with last pregnancy. No history of chronic hypertension. RX for aspirin sent and labs drawn at new OB visit.        Bipolar affective disorder in remission (H) 03/15/2016     Priority: Medium     10/16/2018 Not taking medications at this time. Feels like things are going well. Seeing a therapist once a week and has regular contact with her psychiatrist team.        Pineal gland, tumor 01/15/2014     Priority: Medium     Seasonal allergic rhinitis 06/03/2013     Priority: Medium     Attention deficit disorder 09/21/2011     Priority: Medium       HISTORIES  Allergies   Allergen Reactions     Adacel [Daptacel] Swelling     Codeine Sulfate Nausea and Vomiting     Tetanus Toxoid      Pt states she had an infection at injection site.      Vicodin [Hydrocodone-Acetaminophen] Nausea and Vomiting     Methimazole Rash     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     Post partum depression      Post-surgical hypothyroidism 05/2017     Uncomplicated asthma      Past Surgical History:   Procedure Laterality Date     BLOOD PATCH N/A 10/11/2016    Procedure: EPIDURAL BLOOD PATCH;  Surgeon: GENERIC ANESTHESIA PROVIDER;  Location:  OR      CRANIOTOMY, EXCISE TUMOR COMPLEX, COMBINED  2014    Procedure: COMBINED CRANIOTOMY, EXCISE TUMOR COMPLEX;  Supracerabellar  Infratentorial Approach for Resection of Tumor ;  Surgeon: Kate Mckeon MD;  Location: [facility] OR     THYROIDECTOMY N/A 5/3/2017    Procedure: THYROIDECTOMY;  Total Thyroidectomy;  Surgeon: Pamela Villasenor MD;  Location: [facility] OR     Family History   Problem Relation Age of Onset     Thyroid Disease Paternal Aunt      Asthma Father      Mental Illness Father      Obesity Father      Asthma Maternal Grandmother      Osteoporosis Maternal Grandmother      Glaucoma Maternal Grandmother      Hypertension Maternal Grandmother      Macular Degeneration Maternal Grandmother      Diabetes Paternal Grandfather      Other Cancer Mother      Genitourinary Problems Mother      Bipolar Disorder Mother         unipolar depression     Depression Mother      Thyroid Disease Mother         benign tumor     Skin Cancer Mother      Cancer Mother      Bipolar Disorder Brother         unipolar depression     Asthma Brother      Social History     Tobacco Use     Smoking status: Former Smoker     Packs/day: 0.50     Years: 12.00     Pack years: 6.00     Types: Cigarettes     Start date: 2004     Last attempt to quit: 2/15/2018     Years since quittin.1     Smokeless tobacco: Never Used   Substance Use Topics     Alcohol use: No     Alcohol/week: 0.0 oz     Comment: quit 16 days ago,     OB History    Para Term  AB Living   3 1 1 0 1 1   SAB TAB Ectopic Multiple Live Births   1 0 0 0 1      # Outcome Date GA Lbr Darren/2nd Weight Sex Delivery Anes PTL Lv   3 Current            2 SAB 07/10/18           1 Term 10/11/16 41w1d 08:43 / 06:05 3.402 kg (7 lb 8 oz) F Vag-Spont Local, EPI N NORAH      Name: TAB CABAN ZEINA      Apgar1: 8  Apgar5: 9       LABS:    Blood Type O positive   Rhogam n/a   Rubella immune  RPR non reactive   HIV non reactive   HEP non reactive   GCT 83,   Repeat on 19 102  Lab Results   Component Value Date    HGB 11.0 2019      Lab Results   Component Value Date    PAP NIL 2018     GBS not done     ROS  C: NEGATIVE for fever, chills, change in weight  I: NEGATIVE for worrisome rashes, moles or lesions  E: NEGATIVE for vision changes or irritation  E/M: NEGATIVE for ear, mouth and throat problems  R: NEGATIVE for significant cough or SOB  B: NEGATIVE for masses, tenderness or discharge  CV: NEGATIVE for chest pain, palpitations or peripheral edema  GI: NEGATIVE for nausea, abdominal pain, heartburn, or change in bowel habits  : NEGATIVE for frequency, dysuria, or hematuria  E: NEGATIVE for temperature intolerance, skin/hair changes    PHYSICAL EXAM:  Vital signs stable, afebrile and BP is /71   Temp 99.8  F (37.7  C) (Oral)   LMP 2018    General appearance healthy, alert, active, cooperative and smiling  Heart: RRR without murmur  Lungs:  clear to auscultationr   Neuro:  denies headache and visual disturbances  Psych: Mentation normal and bright   Legs: 2+/2+, no clonus, no edema       Abdomen: gravid, single vertex fetus, non-tender, EFW 5 lbs. Pt  is riri every 6 minutes, lasting 60 seconds and palpates mild    FETAL HEART TONES: baseline 150 with moderate FHRV variability and  accelerations.  No decelerations present.     PELVIC EXAM:  SVE attempted so posterior unable to assess, presenting part floating  ART SCORE:    BLOODY SHOW:: no  Membranes as listed above    ASSESSMENT:  IUP @ 35w4d in for observation.   contractions   NST  reactive  CST negative   Fetal-maternal status reassuring  polyhydramnios    PLAN:  All labs done, will await results, attempting to change positions to facilitate moving fetus out of likely OP position.    Continue close observation.   Za Telles CNM

## 2019-04-11 NOTE — TELEPHONE ENCOUNTER
35w4d  Had 8 contraction in 1 hour,  Thinks she is in  labor patient states has been more last few hours, has tried to lay down and drink lots of water and they are not going away.    Pt told to come to [facility] now, enc. To drink water as coming.  Za Telles CNM

## 2019-04-11 NOTE — TELEPHONE ENCOUNTER
"Caller is  35 weeks pregnant and  believes she  is having frequent contractions \" 10 in  20 minutes\"  No bleeding and membranes in tact; baby active     Triage protocol  reviewed   Advised to  Prepare to go to L&D and  CNM will be paged to call her   On call CNM Za NICOLAS paged via  @ 8:05 PM to call patient at home @ [phone]     Christina Clark RN  FNA      Reason for Disposition    Contractions < 10 minutes apart for 1 hour (i.e., 6 or more contractions an hour)    Additional Information    Negative: Passed out (i.e., lost consciousness, collapsed and was not responding)    Negative: Shock suspected (e.g., cold/pale/clammy skin, too weak to stand, low BP, rapid pulse)    Negative: Difficult to awaken or acting confused  (e.g., disoriented, slurred speech)    Negative: [1] SEVERE abdominal pain (e.g., excruciating) AND [2] constant AND [3] present > 1 hour    Negative: Severe bleeding (e.g., continuous red blood from vagina, or large blood clots)    Negative: Umbilical cord hanging out of the vagina (shiny, white, curled appearance, \"like telephone cord\")    Negative: Uncontrollable urge to push (i.e., feels like baby is coming out now)    Negative: Can see baby    Negative: Sounds like a life-threatening emergency to the triager    Protocols used: PREGNANCY - LABOR - -ADULT-AH      "

## 2019-04-11 NOTE — PROGRESS NOTES
Fetal Non-Stress Test Results    NST Ordered By: Karine Telles CNM        NST Start & Stop Times  4/10/19 start 2120  4/10/19 end 2140         NST Results  Fetus A   Baseline Rate: 140  Accelerations: Present  Decelerations: None  Interpretation: reactive  Karine Telles CNM

## 2019-04-11 NOTE — PROGRESS NOTES
Data: Patient presented to the [facility] at 2130.   Reason for maternal/fetal assessment per patient is Rule Out Labor  Patient is a . Prenatal record reviewed.      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 Gestational Age 35w4d. VSS. Cervix: very posterior.  Fetal movement present. Patient denies  vaginal discharge, pelvic pressure, UTI symptoms, GI problems, bloody show, vaginal bleeding, edema, headache, visual disturbances, epigastric or URQ pain, rupture of membranes. Support persons present.  Action: Verbal consent for EFM. Triage assessment completed. EFM applied for NST. Uterine assessment WNL. Fetal assessment: Presumed adequate fetal oxygenation documented (see flow record). Patient education pamphlets given on fetal movement counts and encouraged to make appointment in clinic on . Patient instructed to report change in fetal movement, vaginal leaking of fluid or bleeding, abdominal pain, or any concerns related to the pregnancy to her nurse/physician.   Response: Za Telles CNM informed of patient arrival and concerns. Plan per provider is discharge home and return to triage if contractions move closer together or worsen. Patient verbalized understanding of education and verbalized  agreement with plan. Discharged ambulatory at 9050

## 2019-04-11 NOTE — PROGRESS NOTES
Blood pressure 131/71, temperature 99.8  F (37.7  C), temperature source Oral, last menstrual period 2018, not currently breastfeeding.  Patient Vitals for the past 24 hrs:   BP Temp Temp [facility]   04/10/19 2159 131/71 -- --   04/10/19 2131 140/68 99.8  F (37.7  C) Oral     General appearance: comfortable  States contractions are not as strong she can feel them tired and uncomfortable in triage bed.  CONTACTIONS: every 6-10 minutes  Pitocin- none,  Antibiotics- none  FETAL HEART TONES: Intermittent auscultation- 150. No decelerations heard.  ROM: not ruptured  PELVIC EXAM:deferred, see earlier note  # Pain Assessment:  Current Pain Score 4/10/2019   Patient currently in pain? yes   Pain score (0-10) -   Pain location -   Pain descriptors -   Cathy s pain level was assessed and she currently denies pain.      Wet prep negative  UA RESULTS:  Recent Labs   Lab Test 04/10/19  2205  19  1339   COLOR Straw   < > Yellow   APPEARANCE Slightly Cloudy   < > Clear   URINEGLC Negative   < > Negative   URINEBILI Negative   < > Negative   URINEKETONE Negative   < > Negative   SG 1.004   < > <=1.005   UBLD Negative   < > Negative   URINEPH 6.5   < > 6.5   PROTEIN Negative   < > Negative   UROBILINOGEN  --   --  0.2   NITRITE Negative   < > Negative   LEUKEST Negative   < > Negative   RBCU 1   < >  --    WBCU 4   < >  --     < > = values in this interval not displayed.     + HARRISON 7 for THC, patient uses medical CBD,     ASSESSMENT:  ==============  IUP @ 35w4d for observation.   contractions   polyhydramnios  Fetal Heart Rate Tracing category one  GBS- pending  Patient Active Problem List   Diagnosis     Pineal gland, tumor     Attention deficit disorder     Seasonal allergic rhinitis     Bipolar affective disorder in remission (H)     Pre-eclampsia     Moderate persistent asthma without complication     Chronic rhinitis     Tobacco use disorder     Abnormal cytology     S/P total thyroidectomy     History of Graves'  disease     Nonintractable migraine, unspecified migraine type     Need for Tdap vaccination     Supervision of other normal pregnancy, antepartum     Post-surgical hypothyroidism     Pain in symphysis pubis during pregnancy     Abnormal results of thyroid function studies     Otitis externa     Labor and delivery, indication for care     Encounter for triage in pregnant patient     Polyhydramnios affecting pregnancy     Medical cannabis use      contractions     PLAN:  ===========  Patient will go home and take benadryl offered vistaril but pt would prefer benadryl.  Instructions to call back if she has to breath through contractions and contractions are every 5 minutes or less pt should go home and try to sleep.  Pt will rtc in 2 days to assess cervix and contractions.  Talked at length with patient about positional changes to help back pain and possibly contractions.  We suspected OP positioning and pt was placed in position that she states helped,  Way on her side,  enc. To do that at home if  Having contractions,  Increase fluid and rest if contraction  Discharge home  JANNET Abernathy CNM

## 2019-04-12 ENCOUNTER — PRENATAL OFFICE VISIT (OUTPATIENT)
Dept: MIDWIFE SERVICES | Facility: CLINIC | Age: 39
End: 2019-04-12
Payer: COMMERCIAL

## 2019-04-12 VITALS
DIASTOLIC BLOOD PRESSURE: 77 MMHG | BODY MASS INDEX: 44.4 KG/M2 | TEMPERATURE: 97.8 F | HEART RATE: 112 BPM | WEIGHT: 292 LBS | SYSTOLIC BLOOD PRESSURE: 122 MMHG

## 2019-04-12 DIAGNOSIS — Z34.80 SUPERVISION OF OTHER NORMAL PREGNANCY, ANTEPARTUM: ICD-10-CM

## 2019-04-12 DIAGNOSIS — R94.6 ABNORMAL RESULTS OF THYROID FUNCTION STUDIES: ICD-10-CM

## 2019-04-12 DIAGNOSIS — Z34.83 ENCOUNTER FOR SUPERVISION OF OTHER NORMAL PREGNANCY IN THIRD TRIMESTER: Primary | ICD-10-CM

## 2019-04-12 LAB
AMPHETAMINES UR QL SCN: NEGATIVE
CANNABINOIDS UR CFM-MCNC: 17 NG/ML
CANNABINOIDS UR QL: ABNORMAL
CARBOXYTHC/CREAT UR: 68 NG/MG{CREAT}
COCAINE UR QL: NEGATIVE
OPIATES UR QL SCN: NEGATIVE
PCP UR QL SCN: NEGATIVE

## 2019-04-12 PROCEDURE — 99207 ZZC PRENATAL VISIT: CPT | Performed by: ADVANCED PRACTICE MIDWIFE

## 2019-04-12 NOTE — PROGRESS NOTES
35w6d  Patient feeling well. Positive fetal movement. Denies water leaking, vaginal bleeding, decreased fetal movement, contraction pain, or headaches.   Doing the same, really uncomfortable and wants to be done. She continues to have polyhydramnios. Seeing MFM weekly for BPPs, all WNL. She called MFM to see if they would consider moving up her growth since she is so uncomfortable. If the baby is big she is considering having a  section. They would not move up her appointment and advised her if she is having pain she should come to the clinic so she came here today. She knows there is nothing more that can be done and does not desire anything specific today. We talked through her pain. She plans to return to physical therapy, went once but feels like she has so many appointments now that she is unable to go to another one. We discussed maternity support belts and how to try different ones. She has two already she uses. She has been seen twice in the birthplace for contractions. Discussed she has to keep coming if she feels stronger contractions, there is no way to differentiate if they are stronger and still not making changes or if she is actually going into labor early. She is okay with that plan and very understanding that this is all pregnancy related and related to the large weight gain this pregnancy. She is very grateful to be having a child and accepts the pain knowing the discomfort of pregnancy will allow her to parent another daughter. She is aware of the weight gain this pregnancy but has difficulties with exercise due to the discomfort.   She asked about signing tubal papers today in case she has a  section. Signed today. Discussed it may be too late since she is on [employer] but can figure that out when timing of delivery is known. She will make an appointment to discuss the tubal next week with the physicians. Signed the consent today and placed to be scanned into the chart. No other  questions or concerns today.   GBS positive. Will collect hemoglobin next visit.   Danger signs reviewed, pre-eclampsia signs and symptoms discussed.   Knows when to call triage and has phone numbers.   Follow up in 1 week.   Idalia Prakash CNM

## 2019-04-15 LAB
BACTERIA SPEC CULT: ABNORMAL
SPECIMEN SOURCE: ABNORMAL

## 2019-04-16 PROBLEM — O47.00 PRETERM CONTRACTIONS: Status: RESOLVED | Noted: 2019-04-10 | Resolved: 2019-04-16

## 2019-04-16 PROBLEM — Z36.89 ENCOUNTER FOR TRIAGE IN PREGNANT PATIENT: Status: RESOLVED | Noted: 2019-03-24 | Resolved: 2019-04-16

## 2019-04-19 ENCOUNTER — PRENATAL OFFICE VISIT (OUTPATIENT)
Dept: OBGYN | Facility: CLINIC | Age: 39
End: 2019-04-19
Payer: COMMERCIAL

## 2019-04-19 ENCOUNTER — HOSPITAL ENCOUNTER (OUTPATIENT)
Dept: ULTRASOUND IMAGING | Facility: CLINIC | Age: 39
Discharge: HOME | End: 2019-04-19
Attending: OBSTETRICS & GYNECOLOGY | Admitting: OBSTETRICS & GYNECOLOGY
Payer: COMMERCIAL

## 2019-04-19 ENCOUNTER — OFFICE VISIT (OUTPATIENT)
Dept: MATERNAL FETAL MEDICINE | Facility: CLINIC | Age: 39
End: 2019-04-19
Attending: OBSTETRICS & GYNECOLOGY
Payer: COMMERCIAL

## 2019-04-19 VITALS
TEMPERATURE: 98 F | DIASTOLIC BLOOD PRESSURE: 82 MMHG | BODY MASS INDEX: 44.09 KG/M2 | SYSTOLIC BLOOD PRESSURE: 130 MMHG | HEART RATE: 96 BPM | WEIGHT: 290 LBS

## 2019-04-19 DIAGNOSIS — E05.00 GRAVES' DISEASE: ICD-10-CM

## 2019-04-19 DIAGNOSIS — O40.3XX0 POLYHYDRAMNIOS IN THIRD TRIMESTER COMPLICATION, SINGLE OR UNSPECIFIED FETUS: Primary | ICD-10-CM

## 2019-04-19 DIAGNOSIS — R94.6 ABNORMAL RESULTS OF THYROID FUNCTION STUDIES: ICD-10-CM

## 2019-04-19 DIAGNOSIS — Z34.80 SUPERVISION OF OTHER NORMAL PREGNANCY, ANTEPARTUM: ICD-10-CM

## 2019-04-19 PROCEDURE — 76816 OB US FOLLOW-UP PER FETUS: CPT

## 2019-04-19 PROCEDURE — 76819 FETAL BIOPHYS PROFIL W/O NST: CPT | Performed by: OBSTETRICS & GYNECOLOGY

## 2019-04-19 PROCEDURE — 99207 ZZC PRENATAL VISIT: CPT | Performed by: OBSTETRICS & GYNECOLOGY

## 2019-04-19 ASSESSMENT — PATIENT HEALTH QUESTIONNAIRE - PHQ9: SUM OF ALL RESPONSES TO PHQ QUESTIONS 1-9: 4

## 2019-04-19 NOTE — PROGRESS NOTES
Cathy is very uncomfortable and riri quite a bit.  I discussed her with Dr. Castillo in [facility] because baby is transverse and moderate polyhydramnios (23) is present.  We made a plan for attempted version (difficult w/unstable lie) to cephalic, try induction (prostaglandins OK).  If unsuccessful, C/S.  Discussed rationale for plan with Cathy, who accepts C/S if necessary.   All of the patients questions were answered to her apparent satisfaction.  Monday April 29 would be day of attempted version/IOL, C/S if unsuccessful.  LT

## 2019-04-19 NOTE — Clinical Note
This patient would be scheduled Monday or Tuesday (your call day or mine) April 29/30 for attempted version/sandbags and IOL.

## 2019-04-26 ENCOUNTER — PRENATAL OFFICE VISIT (OUTPATIENT)
Dept: OBGYN | Facility: CLINIC | Age: 39
End: 2019-04-26
Payer: COMMERCIAL

## 2019-04-26 ENCOUNTER — OFFICE VISIT (OUTPATIENT)
Dept: MATERNAL FETAL MEDICINE | Facility: CLINIC | Age: 39
End: 2019-04-26
Attending: OBSTETRICS & GYNECOLOGY
Payer: COMMERCIAL

## 2019-04-26 ENCOUNTER — HOSPITAL ENCOUNTER (OUTPATIENT)
Dept: ULTRASOUND IMAGING | Facility: CLINIC | Age: 39
Discharge: HOME | End: 2019-04-26
Attending: OBSTETRICS & GYNECOLOGY | Admitting: OBSTETRICS & GYNECOLOGY
Payer: COMMERCIAL

## 2019-04-26 VITALS
BODY MASS INDEX: 44.41 KG/M2 | DIASTOLIC BLOOD PRESSURE: 78 MMHG | HEART RATE: 117 BPM | WEIGHT: 293 LBS | HEIGHT: 68 IN | SYSTOLIC BLOOD PRESSURE: 138 MMHG | OXYGEN SATURATION: 98 %

## 2019-04-26 DIAGNOSIS — E89.0 POST-SURGICAL HYPOTHYROIDISM: ICD-10-CM

## 2019-04-26 DIAGNOSIS — E89.0 POSTSURGICAL HYPOTHYROIDISM: ICD-10-CM

## 2019-04-26 DIAGNOSIS — Z34.80 SUPERVISION OF OTHER NORMAL PREGNANCY, ANTEPARTUM: ICD-10-CM

## 2019-04-26 DIAGNOSIS — R94.6 ABNORMAL RESULTS OF THYROID FUNCTION STUDIES: ICD-10-CM

## 2019-04-26 DIAGNOSIS — E89.0 POST-SURGICAL HYPOTHYROIDISM: Primary | ICD-10-CM

## 2019-04-26 DIAGNOSIS — O40.3XX0 POLYHYDRAMNIOS IN THIRD TRIMESTER COMPLICATION, SINGLE OR UNSPECIFIED FETUS: ICD-10-CM

## 2019-04-26 DIAGNOSIS — Z86.39 HISTORY OF GRAVES' DISEASE: ICD-10-CM

## 2019-04-26 LAB
HGB BLD-MCNC: 12.2 G/DL (ref 11.7–15.7)
T4 SERPL-MCNC: 16.1 UG/DL (ref 4.5–13.9)
TSH SERPL DL<=0.005 MIU/L-ACNC: 1.96 MU/L (ref 0.4–4)

## 2019-04-26 PROCEDURE — 36415 COLL VENOUS BLD VENIPUNCTURE: CPT

## 2019-04-26 PROCEDURE — 76819 FETAL BIOPHYS PROFIL W/O NST: CPT

## 2019-04-26 PROCEDURE — 99207 ZZC PRENATAL VISIT: CPT | Performed by: OBSTETRICS & GYNECOLOGY

## 2019-04-26 PROCEDURE — 84436 ASSAY OF TOTAL THYROXINE: CPT

## 2019-04-26 PROCEDURE — 00000218 ZZHCL STATISTIC OBHBG - HEMOGLOBIN

## 2019-04-26 PROCEDURE — 84443 ASSAY THYROID STIM HORMONE: CPT

## 2019-04-26 ASSESSMENT — MIFFLIN-ST. JEOR: SCORE: 2081.15

## 2019-04-26 NOTE — Clinical Note
Cathy Whipple had oblique cephalic presentation today so we are putting off ECV. She has US on Thursday then I had her schedule with you Friday because you had more openings. I am in clinic so can be available for advice if needed. Kathy

## 2019-04-26 NOTE — PROGRESS NOTES
"Please see \"Imaging\" tab under \"Chart Review\" for details of today's US.      Justa Douglas, DO  Maternal-Fetal Medicine        "

## 2019-04-27 NOTE — PROGRESS NOTES
Doing ok.   Ultrasound today showed oblique/cephalic presentation, hand presenting. Discussed likely restitution to cephalic if onset of labor. Will defer ECV at this point. MVP down from 10.4 to 6.9. Discussed immediate presentation if ROM.   Has BPP next week, will see us shortly after to ensure no intervention required.   Hgb, thyroid studies today.

## 2019-04-28 ENCOUNTER — HOSPITAL ENCOUNTER (OUTPATIENT)
Facility: CLINIC | Age: 39
Discharge: HOME | End: 2019-04-29
Attending: ADVANCED PRACTICE MIDWIFE | Admitting: ADVANCED PRACTICE MIDWIFE
Payer: COMMERCIAL

## 2019-04-28 ENCOUNTER — NURSE TRIAGE (OUTPATIENT)
Dept: NURSING | Facility: CLINIC | Age: 39
End: 2019-04-28

## 2019-04-29 VITALS — SYSTOLIC BLOOD PRESSURE: 132 MMHG | DIASTOLIC BLOOD PRESSURE: 65 MMHG | RESPIRATION RATE: 16 BRPM | TEMPERATURE: 98.9 F

## 2019-04-29 LAB
ALT SERPL W P-5'-P-CCNC: 14 U/L (ref 0–50)
AST SERPL W P-5'-P-CCNC: 14 U/L (ref 0–45)
CREAT SERPL-MCNC: 0.5 MG/DL (ref 0.52–1.04)
CREAT UR-MCNC: 95 MG/DL
ERYTHROCYTE [DISTWIDTH] IN BLOOD BY AUTOMATED COUNT: 14.8 % (ref 10–15)
GFR SERPL CREATININE-BSD FRML MDRD: >90 ML/MIN/{1.73_M2}
HCT VFR BLD AUTO: 36.6 % (ref 35–47)
HGB BLD-MCNC: 11.7 G/DL (ref 11.7–15.7)
MCH RBC QN AUTO: 27.9 PG (ref 26.5–33)
MCHC RBC AUTO-ENTMCNC: 32 G/DL (ref 31.5–36.5)
MCV RBC AUTO: 87 FL (ref 78–100)
PLATELET # BLD AUTO: 271 10E9/L (ref 150–450)
PROT UR-MCNC: 0.2 G/L
PROT/CREAT 24H UR: 0.22 G/G CR (ref 0–0.2)
RBC # BLD AUTO: 4.2 10E12/L (ref 3.8–5.2)
RUPTURE OF FETAL MEMBRANES BY ROM PLUS: NEGATIVE
WBC # BLD AUTO: 13.2 10E9/L (ref 4–11)

## 2019-04-29 PROCEDURE — 85027 COMPLETE CBC AUTOMATED: CPT | Performed by: ADVANCED PRACTICE MIDWIFE

## 2019-04-29 PROCEDURE — 84450 TRANSFERASE (AST) (SGOT): CPT | Performed by: ADVANCED PRACTICE MIDWIFE

## 2019-04-29 PROCEDURE — 99213 OFFICE O/P EST LOW 20 MIN: CPT | Mod: 25 | Performed by: ADVANCED PRACTICE MIDWIFE

## 2019-04-29 PROCEDURE — G0463 HOSPITAL OUTPT CLINIC VISIT: HCPCS | Mod: 25

## 2019-04-29 PROCEDURE — 36415 COLL VENOUS BLD VENIPUNCTURE: CPT | Performed by: ADVANCED PRACTICE MIDWIFE

## 2019-04-29 PROCEDURE — 59025 FETAL NON-STRESS TEST: CPT

## 2019-04-29 PROCEDURE — 84156 ASSAY OF PROTEIN URINE: CPT | Performed by: ADVANCED PRACTICE MIDWIFE

## 2019-04-29 PROCEDURE — 59025 FETAL NON-STRESS TEST: CPT | Mod: 26 | Performed by: ADVANCED PRACTICE MIDWIFE

## 2019-04-29 PROCEDURE — 84460 ALANINE AMINO (ALT) (SGPT): CPT | Performed by: ADVANCED PRACTICE MIDWIFE

## 2019-04-29 PROCEDURE — 82565 ASSAY OF CREATININE: CPT | Performed by: ADVANCED PRACTICE MIDWIFE

## 2019-04-29 PROCEDURE — 84112 EVAL AMNIOTIC FLUID PROTEIN: CPT | Performed by: ADVANCED PRACTICE MIDWIFE

## 2019-04-29 RX ORDER — HYDROXYZINE HYDROCHLORIDE 50 MG/1
50 TABLET, FILM COATED ORAL EVERY 6 HOURS PRN
Status: DISCONTINUED | OUTPATIENT
Start: 2019-04-29 | End: 2019-04-29 | Stop reason: HOSPADM

## 2019-04-29 RX ORDER — ONDANSETRON 2 MG/ML
4 INJECTION INTRAMUSCULAR; INTRAVENOUS EVERY 6 HOURS PRN
Status: DISCONTINUED | OUTPATIENT
Start: 2019-04-29 | End: 2019-04-29 | Stop reason: HOSPADM

## 2019-04-29 RX ORDER — ACETAMINOPHEN 325 MG/1
650 TABLET ORAL EVERY 4 HOURS PRN
Status: DISCONTINUED | OUTPATIENT
Start: 2019-04-29 | End: 2019-04-29 | Stop reason: HOSPADM

## 2019-04-29 NOTE — PROGRESS NOTES
Fetal Non-Stress Test Results    NST Ordered By: Calin Alejandro CNM  NST Medical Indication: polyhydramnios    NST Start & Stop Times  NST Start Time: 0020  NST Stop Time: 0040          NST Results  Fetus A   Baseline Rate: 150  Accelerations: Present  Decelerations: None  Interpretation: reactive    Calin Alejandro CNM

## 2019-04-29 NOTE — PROGRESS NOTES
2327 Arrived ambulatory with  and daughter. Reports contractions and right upper quadrant pain. No bleeding or leaking of fluids. 2340 , contractions mild by palpation, CNM called. 0025 labs drawn, urine sent, ROM + aquiered. US done, cephalic.

## 2019-04-29 NOTE — PLAN OF CARE
Pt complaining of lower uterine cramping, headaches, seeing spots in vision. Mild edema and upper abdominal pain, that is worse when moving. Reports that her abdomen feels taut. Declines tylenol and hot packs. See flowsheets for EFM. VSS. Intact. No vaginal bleeding. Upon entering room for repeat SVE with CNM, pt snoring and sleeping. Reports contractions feel milder. Agreed that she didn't feel need for repeat SVE. Questions answered, discharged papers given. Discharged ambulatory to home.

## 2019-04-29 NOTE — TELEPHONE ENCOUNTER
Cathy calls and says that she is 38w2d pregnant and her baby dropped today. Pt. Says that her baby dropped alot today and that her belly is painful to touch. Calin Anaya RD OB/GYN-CNM, was paged to call pt., at: [phone], at: [phone], via smart web.    Reason for Disposition    [1] SEVERE abdominal pain (e.g., excruciating) AND [2] constant AND [3] present > 1 hour    Additional Information    Negative: Passed out (i.e., lost consciousness, collapsed and was not responding)    Negative: Shock suspected (e.g., cold/pale/clammy skin, too weak to stand, low BP, rapid pulse)    Negative: Difficult to awaken or acting confused  (e.g., disoriented, slurred speech)    Protocols used: PREGNANCY - LABOR-ADULT-

## 2019-04-29 NOTE — DISCHARGE INSTRUCTIONS
Discharge Instruction for Undelivered Patients      You were seen for: abdominal pain  We Consulted: Calin Alejandro CNM  You had (Test or Medicine): fetal and uterine surveillance, labs     Diet:   {Diet:3036724}     Activity:  {Activity:2795423}     Call your provider if you notice:  Swelling in your face or increased swelling in your hands or legs.  Headaches that are not relieved by Tylenol (acetaminophen).  Changes in your vision (blurring: seeing spots or stars.)  Nausea (sick to your stomach) and vomiting (throwing up).   Weight gain of 5 pounds or more per week.  Heartburn that doesn't go away.  Signs of bladder infection: pain when you urinate (use the toilet), need to go more often and more urgently.  The bag of pires (rupture of membranes) breaks, or you notice leaking in your underwear.  Bright red blood in your underwear.  Abdominal (lower belly) or stomach pain.  Second (plus) baby: Contractions (tightening) less than 10 minutes apart and getting stronger.  Increase or change in vaginal discharge (note the color and amount)      Follow-up:  As scheduled in the clinic

## 2019-04-29 NOTE — H&P
"HOSPITAL TRIAGE NOTE  ===================    CHIEF COMPLAINT  ========================  Cathy Arroyo is a 38 year old patient presenting today at 38w2d for evaluation of epigastric/upper abdominal pain, uterine contractions.    Patient's last menstrual period was 2018.  Estimated Date of Delivery: May 11, 2019     HPI  ==================   Gloria came to the [facility] around 2330 with complaints of \"feeling like the baby dropped\" causing cramping, as well as pain across the top of her belly. In addition, her skin on her abdomen \"feels like it is going to rip apart\". She states that she has a very bad cough, and thinks it is possible that she pulled a muscle in her upper abdomen from coughing. Denies bleeding, leaking of fluid. Has had a headache today, no vision changes. Baby is active.   CONTRACTIONS: moderate and cramping  ABDOMINAL PAIN: severe in upper abdomen  FETAL MOVEMENT: active    VAGINAL BLEEDING: none  RUPTURE OF MEMBRANES: no  PELVIC PAIN: none, cramping and severe pelvic pain, has been seeing PT  OTHER: skin feels tight    REVIEW OF SYSTEMS  =====================  C: NEGATIVE for fever, chills  E: NEGATIVE for vision changes or irritation  RESP:POSITIVE for cough-non productive and Hx asthma  CV: NEGATIVE for chest pain, palpitations or varicosities  GI: NEGATIVE for nausea, abdominal pain, heartburn, or change in bowel habits  : NEGATIVE for frequency, dysuria, or hematuria  M: NEGATIVE for significant arthralgias or myalgia  N: NEGATIVE for headache, weakness, dizziness or paresthesias  P: NEGATIVE for changes in mood or affect; has severe anxiety/PTSD    HISTORIES  ==============  ALLERGIES:      Allergies   Allergen Reactions     Adacel [Daptacel] Swelling     Codeine Sulfate Nausea and Vomiting     Tetanus Toxoid      Pt states she had an infection at injection site.      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     Post partum depression      Post-surgical hypothyroidism 2017     Uncomplicated asthma      FAMILY HISTORY  Family History   Problem Relation Age of Onset     Thyroid Disease Paternal Aunt      Asthma Father      Mental Illness Father      Obesity Father      Asthma Maternal Grandmother      Osteoporosis Maternal Grandmother      Glaucoma Maternal Grandmother      Hypertension Maternal Grandmother      Macular Degeneration Maternal Grandmother      Diabetes Paternal Grandfather      Other Cancer Mother      Genitourinary Problems Mother      Bipolar Disorder Mother         unipolar depression     Depression Mother      Thyroid Disease Mother         benign tumor     Skin Cancer Mother      Cancer Mother      Bipolar Disorder Brother         unipolar depression     Asthma Brother      OB HISTORY  OB History    Para Term  AB Living   3 1 1 0 1 1   SAB TAB Ectopic Multiple Live Births   1 0 0 0 1      # Outcome Date GA Lbr Darren/2nd Weight Sex Delivery Anes PTL Lv   3 Current            2 SAB 07/10/18           1 Term 10/11/16 41w1d 08:43 / 06:05 3.402 kg (7 lb 8 oz) F Vag-Spont Local, EPI N NORAH      Name: TAB CABAN ZEINA      Apgar1: 8  Apgar5: 9       EXAM  ============  LMP 2018   GENERAL APPEARANCE: mild distress and cooperative  RESP: lungs clear to auscultation; frequent cough  CV: regular rates and rhythm, normal S1 S2, no S3 or S4 and no murmur,and no varicosities  ABDOMEN: soft, nontender, without hepatosplenomegaly or masses  NEURO: Normal strength and tone, sensory exam grossly normal  PSYCH: anxious  LEGS: 1+ edema     CONTRACTIONS:  EVERY 1-7.5 MINUTES  mild  FETAL HEART TONES:  150 with moderate variability,  accelerations-yes, decels none.  NST: REACTIVE  CST: NOT DONE  EFW:9lbs  Her baby has had an unstable lie, was cephalic in clinic on Friday with hand presenting. Has had polyhydramnios, but MVP was back in normal range on Friday. Cephalic by BSUS today.    PELVIC EXAM: 1/ DENIS/ Posterior/ average/ FLOATING    PRESENTATION: VERTEX  BLOOD: no  DISCHARGE: none    ROM: No  POOLING: not done  AMNISURE: negative  FLUID: clear and none    LABS:  PIH LABS WNL    DIAGNOSIS  ============  38w2d  Anxiety  Cough with history of asthma  NST: REACTIVE  Fetal Heart rate tracing: category one    PLAN  ============  D/C home  Follow up in clinic as scheduled  Discussed danger signs to report including RUQ pain, headache, vision changes, sudden edema, bleeding, leaking of fluid, decreased fetal movement  Discussed s/s of labor, when to call  Cathy already has medications at home to manage her cough including cough medication, nebulizer and inhaler. Encouraged her to use those medications as prescribed, and notify primary care provider if her cough or symptoms worsen. She has an appointment scheduled for today, verbalizes that she may cancel it. She does not want to be put on prednisone. Encouraged her to seek care for her asthma and cough per recommendations of her primary care provider.    Calin Alejandro CNM

## 2019-05-02 ENCOUNTER — OFFICE VISIT (OUTPATIENT)
Dept: MATERNAL FETAL MEDICINE | Facility: CLINIC | Age: 39
End: 2019-05-02
Attending: OBSTETRICS & GYNECOLOGY
Payer: COMMERCIAL

## 2019-05-02 ENCOUNTER — HOSPITAL ENCOUNTER (OUTPATIENT)
Dept: ULTRASOUND IMAGING | Facility: CLINIC | Age: 39
Discharge: HOME | End: 2019-05-02
Attending: OBSTETRICS & GYNECOLOGY | Admitting: OBSTETRICS & GYNECOLOGY
Payer: COMMERCIAL

## 2019-05-02 DIAGNOSIS — O40.3XX0 POLYHYDRAMNIOS AFFECTING PREGNANCY IN THIRD TRIMESTER: Primary | ICD-10-CM

## 2019-05-02 DIAGNOSIS — O40.3XX0 POLYHYDRAMNIOS IN THIRD TRIMESTER COMPLICATION, SINGLE OR UNSPECIFIED FETUS: ICD-10-CM

## 2019-05-02 PROCEDURE — 76819 FETAL BIOPHYS PROFIL W/O NST: CPT

## 2019-05-03 ENCOUNTER — PRENATAL OFFICE VISIT (OUTPATIENT)
Dept: MIDWIFE SERVICES | Facility: CLINIC | Age: 39
End: 2019-05-03
Payer: COMMERCIAL

## 2019-05-03 VITALS
DIASTOLIC BLOOD PRESSURE: 72 MMHG | TEMPERATURE: 98.6 F | WEIGHT: 293 LBS | HEART RATE: 82 BPM | BODY MASS INDEX: 45.46 KG/M2 | SYSTOLIC BLOOD PRESSURE: 124 MMHG

## 2019-05-03 DIAGNOSIS — Z34.80 SUPERVISION OF OTHER NORMAL PREGNANCY, ANTEPARTUM: ICD-10-CM

## 2019-05-03 DIAGNOSIS — Z34.83 ENCOUNTER FOR SUPERVISION OF OTHER NORMAL PREGNANCY IN THIRD TRIMESTER: Primary | ICD-10-CM

## 2019-05-03 DIAGNOSIS — R94.6 ABNORMAL RESULTS OF THYROID FUNCTION STUDIES: ICD-10-CM

## 2019-05-03 PROCEDURE — 59426 ANTEPARTUM CARE ONLY: CPT | Performed by: ADVANCED PRACTICE MIDWIFE

## 2019-05-03 PROCEDURE — 99207 ZZC PRENATAL VISIT: CPT | Performed by: ADVANCED PRACTICE MIDWIFE

## 2019-05-03 NOTE — PROGRESS NOTES
38w6d  Patient feeling well. Positive fetal movement. Denies water leaking, vaginal bleeding, decreased fetal movement, contraction pain, or headaches.  Doing as well as she can. Very uncomfortable. Does not want to be induced though. Still thinking she wants a vaginal delivery. She is planning to get one more growth with MFM next week. Baby has been head down or oblique over the past few weeks. Cephalic at the ultrasound yesterday and head down today by BSUS. Planning to come twice weekly to make sure head is down. She feels like she will go into labor soon and worried about the position.    Danger signs reviewed, pre-eclampsia signs and symptoms discussed.   Knows when to call triage and has phone numbers.   Follow up next week with MDs for discussion on tubal (papers signed) and with midwives with ultrasound on Friday.   Idalia Prakash CNM

## 2019-05-06 ENCOUNTER — HOSPITAL ENCOUNTER (INPATIENT)
Facility: CLINIC | Age: 39
LOS: 3 days | Discharge: HOME HEALTH | End: 2019-05-09
Attending: OBSTETRICS & GYNECOLOGY | Admitting: ADVANCED PRACTICE MIDWIFE
Payer: COMMERCIAL

## 2019-05-06 ENCOUNTER — ANESTHESIA (OUTPATIENT)
Dept: OBGYN | Facility: CLINIC | Age: 39
End: 2019-05-06
Payer: COMMERCIAL

## 2019-05-06 ENCOUNTER — PRENATAL OFFICE VISIT (OUTPATIENT)
Dept: OBGYN | Facility: CLINIC | Age: 39
End: 2019-05-06
Payer: COMMERCIAL

## 2019-05-06 ENCOUNTER — ANESTHESIA EVENT (OUTPATIENT)
Dept: OBGYN | Facility: CLINIC | Age: 39
End: 2019-05-06
Payer: COMMERCIAL

## 2019-05-06 VITALS — SYSTOLIC BLOOD PRESSURE: 143 MMHG | WEIGHT: 293 LBS | BODY MASS INDEX: 45.92 KG/M2 | DIASTOLIC BLOOD PRESSURE: 90 MMHG

## 2019-05-06 DIAGNOSIS — Z34.80 SUPERVISION OF OTHER NORMAL PREGNANCY, ANTEPARTUM: ICD-10-CM

## 2019-05-06 DIAGNOSIS — Z98.891 S/P CESAREAN SECTION: Primary | ICD-10-CM

## 2019-05-06 DIAGNOSIS — Z86.39 HISTORY OF GRAVES' DISEASE: ICD-10-CM

## 2019-05-06 DIAGNOSIS — R94.6 ABNORMAL RESULTS OF THYROID FUNCTION STUDIES: ICD-10-CM

## 2019-05-06 DIAGNOSIS — E89.0 S/P TOTAL THYROIDECTOMY: ICD-10-CM

## 2019-05-06 LAB
ABO + RH BLD: NORMAL
ABO + RH BLD: NORMAL
ALT SERPL W P-5'-P-CCNC: 12 U/L (ref 0–50)
AST SERPL W P-5'-P-CCNC: 13 U/L (ref 0–45)
BLD GP AB SCN SERPL QL: NORMAL
BLOOD BANK CMNT PATIENT-IMP: NORMAL
CREAT SERPL-MCNC: 0.47 MG/DL (ref 0.52–1.04)
CREAT SERPL-MCNC: 0.49 MG/DL (ref 0.52–1.04)
CREAT UR-MCNC: 83 MG/DL
CREAT UR-MCNC: 83 MG/DL
ERYTHROCYTE [DISTWIDTH] IN BLOOD BY AUTOMATED COUNT: 15 % (ref 10–15)
GFR SERPL CREATININE-BSD FRML MDRD: >90 ML/MIN/{1.73_M2}
GFR SERPL CREATININE-BSD FRML MDRD: >90 ML/MIN/{1.73_M2}
HCT VFR BLD AUTO: 38.1 % (ref 35–47)
HGB BLD-MCNC: 12.1 G/DL (ref 11.7–15.7)
MCH RBC QN AUTO: 27.7 PG (ref 26.5–33)
MCHC RBC AUTO-ENTMCNC: 31.8 G/DL (ref 31.5–36.5)
MCV RBC AUTO: 87 FL (ref 78–100)
PLATELET # BLD AUTO: 248 10E9/L (ref 150–450)
PLATELET # BLD AUTO: 273 10E9/L (ref 150–450)
PROT UR-MCNC: 0.22 G/L
PROT/CREAT 24H UR: 0.26 G/G CR (ref 0–0.2)
RBC # BLD AUTO: 4.37 10E12/L (ref 3.8–5.2)
SPECIMEN EXP DATE BLD: NORMAL
WBC # BLD AUTO: 12.4 10E9/L (ref 4–11)

## 2019-05-06 PROCEDURE — 86901 BLOOD TYPING SEROLOGIC RH(D): CPT | Performed by: ADVANCED PRACTICE MIDWIFE

## 2019-05-06 PROCEDURE — C1765 ADHESION BARRIER: HCPCS | Performed by: OBSTETRICS & GYNECOLOGY

## 2019-05-06 PROCEDURE — 25000128 H RX IP 250 OP 636: Performed by: OBSTETRICS & GYNECOLOGY

## 2019-05-06 PROCEDURE — 88307 TISSUE EXAM BY PATHOLOGIST: CPT | Mod: 26 | Performed by: OBSTETRICS & GYNECOLOGY

## 2019-05-06 PROCEDURE — 88302 TISSUE EXAM BY PATHOLOGIST: CPT | Performed by: OBSTETRICS & GYNECOLOGY

## 2019-05-06 PROCEDURE — C9290 INJ, BUPIVACAINE LIPOSOME: HCPCS | Performed by: STUDENT IN AN ORGANIZED HEALTH CARE EDUCATION/TRAINING PROGRAM

## 2019-05-06 PROCEDURE — 25000128 H RX IP 250 OP 636: Performed by: STUDENT IN AN ORGANIZED HEALTH CARE EDUCATION/TRAINING PROGRAM

## 2019-05-06 PROCEDURE — 85027 COMPLETE CBC AUTOMATED: CPT | Performed by: ADVANCED PRACTICE MIDWIFE

## 2019-05-06 PROCEDURE — 25000125 ZZHC RX 250: Performed by: NURSE ANESTHETIST, CERTIFIED REGISTERED

## 2019-05-06 PROCEDURE — 84450 TRANSFERASE (AST) (SGOT): CPT | Performed by: ADVANCED PRACTICE MIDWIFE

## 2019-05-06 PROCEDURE — 37000008 ZZH ANESTHESIA TECHNICAL FEE, 1ST 30 MIN: Performed by: OBSTETRICS & GYNECOLOGY

## 2019-05-06 PROCEDURE — 25000128 H RX IP 250 OP 636: Performed by: NURSE ANESTHETIST, CERTIFIED REGISTERED

## 2019-05-06 PROCEDURE — 0UB70ZZ EXCISION OF BILATERAL FALLOPIAN TUBES, OPEN APPROACH: ICD-10-PCS | Performed by: OBSTETRICS & GYNECOLOGY

## 2019-05-06 PROCEDURE — 25800030 ZZH RX IP 258 OP 636: Performed by: STUDENT IN AN ORGANIZED HEALTH CARE EDUCATION/TRAINING PROGRAM

## 2019-05-06 PROCEDURE — 36415 COLL VENOUS BLD VENIPUNCTURE: CPT | Performed by: ADVANCED PRACTICE MIDWIFE

## 2019-05-06 PROCEDURE — 86850 RBC ANTIBODY SCREEN: CPT | Performed by: ADVANCED PRACTICE MIDWIFE

## 2019-05-06 PROCEDURE — 27210794 ZZH OR GENERAL SUPPLY STERILE: Performed by: OBSTETRICS & GYNECOLOGY

## 2019-05-06 PROCEDURE — 82565 ASSAY OF CREATININE: CPT | Performed by: ADVANCED PRACTICE MIDWIFE

## 2019-05-06 PROCEDURE — 27110028 ZZH OR GENERAL SUPPLY NON-STERILE: Performed by: OBSTETRICS & GYNECOLOGY

## 2019-05-06 PROCEDURE — 25800030 ZZH RX IP 258 OP 636: Performed by: NURSE ANESTHETIST, CERTIFIED REGISTERED

## 2019-05-06 PROCEDURE — 36415 COLL VENOUS BLD VENIPUNCTURE: CPT | Performed by: OBSTETRICS & GYNECOLOGY

## 2019-05-06 PROCEDURE — 80349 CANNABINOIDS NATURAL: CPT | Performed by: ADVANCED PRACTICE MIDWIFE

## 2019-05-06 PROCEDURE — 12000001 ZZH R&B MED SURG/OB UMMC

## 2019-05-06 PROCEDURE — 25000132 ZZH RX MED GY IP 250 OP 250 PS 637: Performed by: OBSTETRICS & GYNECOLOGY

## 2019-05-06 PROCEDURE — 88307 TISSUE EXAM BY PATHOLOGIST: CPT | Performed by: OBSTETRICS & GYNECOLOGY

## 2019-05-06 PROCEDURE — 37000009 ZZH ANESTHESIA TECHNICAL FEE, EACH ADDTL 15 MIN: Performed by: OBSTETRICS & GYNECOLOGY

## 2019-05-06 PROCEDURE — 59515 CESAREAN DELIVERY: CPT | Performed by: OBSTETRICS & GYNECOLOGY

## 2019-05-06 PROCEDURE — 25000125 ZZHC RX 250: Performed by: OBSTETRICS & GYNECOLOGY

## 2019-05-06 PROCEDURE — 58611 LIGATE OVIDUCT(S) ADD-ON: CPT | Performed by: OBSTETRICS & GYNECOLOGY

## 2019-05-06 PROCEDURE — 80307 DRUG TEST PRSMV CHEM ANLYZR: CPT | Performed by: ADVANCED PRACTICE MIDWIFE

## 2019-05-06 PROCEDURE — 82565 ASSAY OF CREATININE: CPT | Performed by: OBSTETRICS & GYNECOLOGY

## 2019-05-06 PROCEDURE — 84460 ALANINE AMINO (ALT) (SGPT): CPT | Performed by: ADVANCED PRACTICE MIDWIFE

## 2019-05-06 PROCEDURE — 88302 TISSUE EXAM BY PATHOLOGIST: CPT | Mod: 26 | Performed by: OBSTETRICS & GYNECOLOGY

## 2019-05-06 PROCEDURE — 99207 ZZC PRENATAL VISIT: CPT | Performed by: OBSTETRICS & GYNECOLOGY

## 2019-05-06 PROCEDURE — 40000170 ZZH STATISTIC PRE-PROCEDURE ASSESSMENT II: Performed by: OBSTETRICS & GYNECOLOGY

## 2019-05-06 PROCEDURE — 36000059 ZZH SURGERY LEVEL 3 EA 15 ADDTL MIN UMMC: Performed by: OBSTETRICS & GYNECOLOGY

## 2019-05-06 PROCEDURE — 84156 ASSAY OF PROTEIN URINE: CPT | Performed by: ADVANCED PRACTICE MIDWIFE

## 2019-05-06 PROCEDURE — 71000014 ZZH RECOVERY PHASE 1 LEVEL 2 FIRST HR: Performed by: OBSTETRICS & GYNECOLOGY

## 2019-05-06 PROCEDURE — 25800030 ZZH RX IP 258 OP 636: Performed by: OBSTETRICS & GYNECOLOGY

## 2019-05-06 PROCEDURE — 85049 AUTOMATED PLATELET COUNT: CPT | Performed by: OBSTETRICS & GYNECOLOGY

## 2019-05-06 PROCEDURE — 36000057 ZZH SURGERY LEVEL 3 1ST 30 MIN - UMMC: Performed by: OBSTETRICS & GYNECOLOGY

## 2019-05-06 PROCEDURE — 71000015 ZZH RECOVERY PHASE 1 LEVEL 2 EA ADDTL HR: Performed by: OBSTETRICS & GYNECOLOGY

## 2019-05-06 PROCEDURE — 86900 BLOOD TYPING SEROLOGIC ABO: CPT | Performed by: ADVANCED PRACTICE MIDWIFE

## 2019-05-06 RX ORDER — AMOXICILLIN 250 MG
2 CAPSULE ORAL 2 TIMES DAILY PRN
Status: DISCONTINUED | OUTPATIENT
Start: 2019-05-06 | End: 2019-05-09 | Stop reason: HOSPADM

## 2019-05-06 RX ORDER — NALOXONE HYDROCHLORIDE 0.4 MG/ML
.1-.4 INJECTION, SOLUTION INTRAMUSCULAR; INTRAVENOUS; SUBCUTANEOUS
Status: CANCELLED | OUTPATIENT
Start: 2019-05-06 | End: 2019-05-07

## 2019-05-06 RX ORDER — CEFAZOLIN SODIUM IN 0.9 % NACL 3 G/100 ML
3 INTRAVENOUS SOLUTION, PIGGYBACK (ML) INTRAVENOUS
Status: COMPLETED | OUTPATIENT
Start: 2019-05-06 | End: 2019-05-06

## 2019-05-06 RX ORDER — CETIRIZINE HYDROCHLORIDE 5 MG/1
5 TABLET ORAL EVERY MORNING
Status: DISCONTINUED | OUTPATIENT
Start: 2019-05-06 | End: 2019-05-06

## 2019-05-06 RX ORDER — NALOXONE HYDROCHLORIDE 0.4 MG/ML
.1-.4 INJECTION, SOLUTION INTRAMUSCULAR; INTRAVENOUS; SUBCUTANEOUS
Status: DISCONTINUED | OUTPATIENT
Start: 2019-05-06 | End: 2019-05-09 | Stop reason: HOSPADM

## 2019-05-06 RX ORDER — BUPIVACAINE HYDROCHLORIDE 7.5 MG/ML
INJECTION, SOLUTION INTRASPINAL PRN
Status: DISCONTINUED | OUTPATIENT
Start: 2019-05-06 | End: 2019-05-06

## 2019-05-06 RX ORDER — ACETAMINOPHEN 325 MG/1
650 TABLET ORAL EVERY 4 HOURS PRN
Status: DISCONTINUED | OUTPATIENT
Start: 2019-05-06 | End: 2019-05-06

## 2019-05-06 RX ORDER — MORPHINE SULFATE 1 MG/ML
INJECTION, SOLUTION EPIDURAL; INTRATHECAL; INTRAVENOUS PRN
Status: DISCONTINUED | OUTPATIENT
Start: 2019-05-06 | End: 2019-05-06

## 2019-05-06 RX ORDER — METHYLERGONOVINE MALEATE 0.2 MG/ML
200 INJECTION INTRAVENOUS
Status: DISCONTINUED | OUTPATIENT
Start: 2019-05-06 | End: 2019-05-06

## 2019-05-06 RX ORDER — PENICILLIN G POTASSIUM 5000000 [IU]/1
5 INJECTION, POWDER, FOR SOLUTION INTRAMUSCULAR; INTRAVENOUS ONCE
Status: DISCONTINUED | OUTPATIENT
Start: 2019-05-06 | End: 2019-05-09 | Stop reason: HOSPADM

## 2019-05-06 RX ORDER — AMOXICILLIN 250 MG
1 CAPSULE ORAL 2 TIMES DAILY PRN
Status: DISCONTINUED | OUTPATIENT
Start: 2019-05-06 | End: 2019-05-09 | Stop reason: HOSPADM

## 2019-05-06 RX ORDER — OXYTOCIN/0.9 % SODIUM CHLORIDE 30/500 ML
340 PLASTIC BAG, INJECTION (ML) INTRAVENOUS CONTINUOUS PRN
Status: DISCONTINUED | OUTPATIENT
Start: 2019-05-06 | End: 2019-05-09 | Stop reason: HOSPADM

## 2019-05-06 RX ORDER — MONTELUKAST SODIUM 10 MG/1
10 TABLET ORAL EVERY MORNING
Status: DISCONTINUED | OUTPATIENT
Start: 2019-05-07 | End: 2019-05-09 | Stop reason: HOSPADM

## 2019-05-06 RX ORDER — ONDANSETRON 2 MG/ML
4 INJECTION INTRAMUSCULAR; INTRAVENOUS EVERY 6 HOURS PRN
Status: DISCONTINUED | OUTPATIENT
Start: 2019-05-06 | End: 2019-05-06

## 2019-05-06 RX ORDER — NALOXONE HYDROCHLORIDE 0.4 MG/ML
.1-.4 INJECTION, SOLUTION INTRAMUSCULAR; INTRAVENOUS; SUBCUTANEOUS
Status: DISCONTINUED | OUTPATIENT
Start: 2019-05-06 | End: 2019-05-06

## 2019-05-06 RX ORDER — OXYCODONE AND ACETAMINOPHEN 5; 325 MG/1; MG/1
1 TABLET ORAL
Status: DISCONTINUED | OUTPATIENT
Start: 2019-05-06 | End: 2019-05-06

## 2019-05-06 RX ORDER — ONDANSETRON 2 MG/ML
4 INJECTION INTRAMUSCULAR; INTRAVENOUS EVERY 6 HOURS PRN
Status: DISCONTINUED | OUTPATIENT
Start: 2019-05-06 | End: 2019-05-09 | Stop reason: HOSPADM

## 2019-05-06 RX ORDER — OXYTOCIN/0.9 % SODIUM CHLORIDE 30/500 ML
100-340 PLASTIC BAG, INJECTION (ML) INTRAVENOUS CONTINUOUS PRN
Status: DISCONTINUED | OUTPATIENT
Start: 2019-05-06 | End: 2019-05-06

## 2019-05-06 RX ORDER — OXYCODONE HYDROCHLORIDE 5 MG/1
5-10 TABLET ORAL
Status: DISCONTINUED | OUTPATIENT
Start: 2019-05-06 | End: 2019-05-09 | Stop reason: HOSPADM

## 2019-05-06 RX ORDER — CARBOPROST TROMETHAMINE 250 UG/ML
250 INJECTION, SOLUTION INTRAMUSCULAR
Status: DISCONTINUED | OUTPATIENT
Start: 2019-05-06 | End: 2019-05-06

## 2019-05-06 RX ORDER — ONDANSETRON 2 MG/ML
4 INJECTION INTRAMUSCULAR; INTRAVENOUS EVERY 30 MIN PRN
Status: CANCELLED | OUTPATIENT
Start: 2019-05-06

## 2019-05-06 RX ORDER — ONDANSETRON 2 MG/ML
INJECTION INTRAMUSCULAR; INTRAVENOUS PRN
Status: DISCONTINUED | OUTPATIENT
Start: 2019-05-06 | End: 2019-05-06

## 2019-05-06 RX ORDER — LANOLIN 100 %
OINTMENT (GRAM) TOPICAL
Status: DISCONTINUED | OUTPATIENT
Start: 2019-05-06 | End: 2019-05-09 | Stop reason: HOSPADM

## 2019-05-06 RX ORDER — DEXTROSE, SODIUM CHLORIDE, SODIUM LACTATE, POTASSIUM CHLORIDE, AND CALCIUM CHLORIDE 5; .6; .31; .03; .02 G/100ML; G/100ML; G/100ML; G/100ML; G/100ML
INJECTION, SOLUTION INTRAVENOUS CONTINUOUS
Status: DISCONTINUED | OUTPATIENT
Start: 2019-05-06 | End: 2019-05-09 | Stop reason: HOSPADM

## 2019-05-06 RX ORDER — ACETAMINOPHEN 325 MG/1
975 TABLET ORAL EVERY 8 HOURS
Status: DISPENSED | OUTPATIENT
Start: 2019-05-06 | End: 2019-05-09

## 2019-05-06 RX ORDER — LIDOCAINE 40 MG/G
CREAM TOPICAL
Status: DISCONTINUED | OUTPATIENT
Start: 2019-05-06 | End: 2019-05-06

## 2019-05-06 RX ORDER — HYDROCORTISONE 2.5 %
CREAM (GRAM) TOPICAL 3 TIMES DAILY PRN
Status: DISCONTINUED | OUTPATIENT
Start: 2019-05-06 | End: 2019-05-09 | Stop reason: HOSPADM

## 2019-05-06 RX ORDER — SODIUM CHLORIDE, SODIUM LACTATE, POTASSIUM CHLORIDE, CALCIUM CHLORIDE 600; 310; 30; 20 MG/100ML; MG/100ML; MG/100ML; MG/100ML
INJECTION, SOLUTION INTRAVENOUS CONTINUOUS PRN
Status: DISCONTINUED | OUTPATIENT
Start: 2019-05-06 | End: 2019-05-06

## 2019-05-06 RX ORDER — FENTANYL CITRATE 50 UG/ML
50-100 INJECTION, SOLUTION INTRAMUSCULAR; INTRAVENOUS
Status: DISCONTINUED | OUTPATIENT
Start: 2019-05-06 | End: 2019-05-06

## 2019-05-06 RX ORDER — ACETAMINOPHEN 325 MG/1
650 TABLET ORAL EVERY 4 HOURS PRN
Status: DISCONTINUED | OUTPATIENT
Start: 2019-05-09 | End: 2019-05-09 | Stop reason: HOSPADM

## 2019-05-06 RX ORDER — IPRATROPIUM BROMIDE AND ALBUTEROL SULFATE 2.5; .5 MG/3ML; MG/3ML
1 SOLUTION RESPIRATORY (INHALATION) EVERY 6 HOURS PRN
Status: DISCONTINUED | OUTPATIENT
Start: 2019-05-06 | End: 2019-05-09 | Stop reason: HOSPADM

## 2019-05-06 RX ORDER — ONDANSETRON 4 MG/1
4 TABLET, FILM COATED ORAL EVERY 6 HOURS PRN
Status: DISCONTINUED | OUTPATIENT
Start: 2019-05-06 | End: 2019-05-06

## 2019-05-06 RX ORDER — CEFAZOLIN SODIUM 1 G/3ML
1 INJECTION, POWDER, FOR SOLUTION INTRAMUSCULAR; INTRAVENOUS SEE ADMIN INSTRUCTIONS
Status: DISCONTINUED | OUTPATIENT
Start: 2019-05-06 | End: 2019-05-06

## 2019-05-06 RX ORDER — IBUPROFEN 800 MG/1
800 TABLET, FILM COATED ORAL EVERY 6 HOURS PRN
Status: DISCONTINUED | OUTPATIENT
Start: 2019-05-06 | End: 2019-05-09 | Stop reason: HOSPADM

## 2019-05-06 RX ORDER — OXYTOCIN/0.9 % SODIUM CHLORIDE 30/500 ML
100 PLASTIC BAG, INJECTION (ML) INTRAVENOUS CONTINUOUS
Status: DISCONTINUED | OUTPATIENT
Start: 2019-05-06 | End: 2019-05-09 | Stop reason: HOSPADM

## 2019-05-06 RX ORDER — ONDANSETRON 4 MG/1
4 TABLET, ORALLY DISINTEGRATING ORAL EVERY 30 MIN PRN
Status: CANCELLED | OUTPATIENT
Start: 2019-05-06

## 2019-05-06 RX ORDER — OXYCODONE HYDROCHLORIDE 5 MG/1
5 TABLET ORAL EVERY 4 HOURS PRN
Status: CANCELLED | OUTPATIENT
Start: 2019-05-06

## 2019-05-06 RX ORDER — SODIUM CHLORIDE, SODIUM LACTATE, POTASSIUM CHLORIDE, CALCIUM CHLORIDE 600; 310; 30; 20 MG/100ML; MG/100ML; MG/100ML; MG/100ML
INJECTION, SOLUTION INTRAVENOUS CONTINUOUS
Status: DISCONTINUED | OUTPATIENT
Start: 2019-05-06 | End: 2019-05-06

## 2019-05-06 RX ORDER — MISOPROSTOL 200 UG/1
400 TABLET ORAL
Status: DISCONTINUED | OUTPATIENT
Start: 2019-05-06 | End: 2019-05-09 | Stop reason: HOSPADM

## 2019-05-06 RX ORDER — SIMETHICONE 80 MG
80 TABLET,CHEWABLE ORAL 4 TIMES DAILY PRN
Status: DISCONTINUED | OUTPATIENT
Start: 2019-05-06 | End: 2019-05-09 | Stop reason: HOSPADM

## 2019-05-06 RX ORDER — IBUPROFEN 800 MG/1
800 TABLET, FILM COATED ORAL
Status: DISCONTINUED | OUTPATIENT
Start: 2019-05-06 | End: 2019-05-06

## 2019-05-06 RX ORDER — FENTANYL CITRATE 50 UG/ML
25-50 INJECTION, SOLUTION INTRAMUSCULAR; INTRAVENOUS EVERY 5 MIN PRN
Status: DISCONTINUED | OUTPATIENT
Start: 2019-05-06 | End: 2019-05-06 | Stop reason: HOSPADM

## 2019-05-06 RX ORDER — CITRIC ACID/SODIUM CITRATE 334-500MG
30 SOLUTION, ORAL ORAL
Status: COMPLETED | OUTPATIENT
Start: 2019-05-06 | End: 2019-05-06

## 2019-05-06 RX ORDER — OXYTOCIN 10 [USP'U]/ML
10 INJECTION, SOLUTION INTRAMUSCULAR; INTRAVENOUS
Status: DISCONTINUED | OUTPATIENT
Start: 2019-05-06 | End: 2019-05-06

## 2019-05-06 RX ORDER — OXYTOCIN/0.9 % SODIUM CHLORIDE 30/500 ML
PLASTIC BAG, INJECTION (ML) INTRAVENOUS CONTINUOUS PRN
Status: DISCONTINUED | OUTPATIENT
Start: 2019-05-06 | End: 2019-05-06

## 2019-05-06 RX ORDER — OXYTOCIN 10 [USP'U]/ML
10 INJECTION, SOLUTION INTRAMUSCULAR; INTRAVENOUS
Status: DISCONTINUED | OUTPATIENT
Start: 2019-05-06 | End: 2019-05-09 | Stop reason: HOSPADM

## 2019-05-06 RX ORDER — FENTANYL CITRATE 50 UG/ML
INJECTION, SOLUTION INTRAMUSCULAR; INTRAVENOUS PRN
Status: DISCONTINUED | OUTPATIENT
Start: 2019-05-06 | End: 2019-05-06

## 2019-05-06 RX ORDER — BUPIVACAINE HYDROCHLORIDE 2.5 MG/ML
INJECTION, SOLUTION EPIDURAL; INFILTRATION; INTRACAUDAL PRN
Status: DISCONTINUED | OUTPATIENT
Start: 2019-05-06 | End: 2019-05-06

## 2019-05-06 RX ORDER — BISACODYL 10 MG
10 SUPPOSITORY, RECTAL RECTAL DAILY PRN
Status: DISCONTINUED | OUTPATIENT
Start: 2019-05-08 | End: 2019-05-09 | Stop reason: HOSPADM

## 2019-05-06 RX ORDER — FEXOFENADINE HCL 60 MG/1
60 TABLET, FILM COATED ORAL EVERY MORNING
Status: DISCONTINUED | OUTPATIENT
Start: 2019-05-06 | End: 2019-05-09 | Stop reason: HOSPADM

## 2019-05-06 RX ORDER — LEVOTHYROXINE SODIUM 175 UG/1
175 TABLET ORAL DAILY
Status: DISCONTINUED | OUTPATIENT
Start: 2019-05-06 | End: 2019-05-06

## 2019-05-06 RX ORDER — KETOROLAC TROMETHAMINE 30 MG/ML
INJECTION, SOLUTION INTRAMUSCULAR; INTRAVENOUS PRN
Status: DISCONTINUED | OUTPATIENT
Start: 2019-05-06 | End: 2019-05-06

## 2019-05-06 RX ORDER — PRENATAL VIT/IRON FUM/FOLIC AC 27MG-0.8MG
1 TABLET ORAL EVERY MORNING
Status: DISCONTINUED | OUTPATIENT
Start: 2019-05-07 | End: 2019-05-06

## 2019-05-06 RX ORDER — LEVOTHYROXINE SODIUM 75 UG/1
150 TABLET ORAL DAILY
Status: DISCONTINUED | OUTPATIENT
Start: 2019-05-07 | End: 2019-05-09 | Stop reason: HOSPADM

## 2019-05-06 RX ORDER — KETOROLAC TROMETHAMINE 30 MG/ML
30 INJECTION, SOLUTION INTRAMUSCULAR; INTRAVENOUS EVERY 6 HOURS
Status: DISPENSED | OUTPATIENT
Start: 2019-05-06 | End: 2019-05-07

## 2019-05-06 RX ADMIN — PHENYLEPHRINE HYDROCHLORIDE 0.2 MCG/KG/MIN: 10 INJECTION, SOLUTION INTRAMUSCULAR; INTRAVENOUS; SUBCUTANEOUS at 15:09

## 2019-05-06 RX ADMIN — ONDANSETRON 4 MG: 2 INJECTION INTRAMUSCULAR; INTRAVENOUS at 15:30

## 2019-05-06 RX ADMIN — SODIUM CHLORIDE, POTASSIUM CHLORIDE, SODIUM LACTATE AND CALCIUM CHLORIDE: 600; 310; 30; 20 INJECTION, SOLUTION INTRAVENOUS at 14:55

## 2019-05-06 RX ADMIN — PHENYLEPHRINE HYDROCHLORIDE 50 MCG: 10 INJECTION, SOLUTION INTRAMUSCULAR; INTRAVENOUS; SUBCUTANEOUS at 15:54

## 2019-05-06 RX ADMIN — MORPHINE SULFATE 150 MCG: 1 INJECTION, SOLUTION EPIDURAL; INTRATHECAL; INTRAVENOUS at 15:09

## 2019-05-06 RX ADMIN — OXYTOCIN-SODIUM CHLORIDE 0.9% IV SOLN 30 UNIT/500ML 600 ML/HR: 30-0.9/5 SOLUTION at 15:29

## 2019-05-06 RX ADMIN — SODIUM CHLORIDE, POTASSIUM CHLORIDE, SODIUM LACTATE AND CALCIUM CHLORIDE 500 ML: 600; 310; 30; 20 INJECTION, SOLUTION INTRAVENOUS at 22:13

## 2019-05-06 RX ADMIN — PHENYLEPHRINE HYDROCHLORIDE 50 MCG: 10 INJECTION, SOLUTION INTRAMUSCULAR; INTRAVENOUS; SUBCUTANEOUS at 15:52

## 2019-05-06 RX ADMIN — ACETAMINOPHEN 975 MG: 325 TABLET, FILM COATED ORAL at 18:52

## 2019-05-06 RX ADMIN — OXYTOCIN-SODIUM CHLORIDE 0.9% IV SOLN 30 UNIT/500ML 100 ML/HR: 30-0.9/5 SOLUTION at 19:02

## 2019-05-06 RX ADMIN — KETOROLAC TROMETHAMINE 30 MG: 30 INJECTION, SOLUTION INTRAMUSCULAR at 16:11

## 2019-05-06 RX ADMIN — PROCHLORPERAZINE EDISYLATE 5 MG: 5 INJECTION INTRAMUSCULAR; INTRAVENOUS at 20:22

## 2019-05-06 RX ADMIN — BUPIVACAINE 10 ML: 13.3 INJECTION, SUSPENSION, LIPOSOMAL INFILTRATION at 16:42

## 2019-05-06 RX ADMIN — KETOROLAC TROMETHAMINE 30 MG: 30 INJECTION, SOLUTION INTRAMUSCULAR at 22:12

## 2019-05-06 RX ADMIN — BUPIVACAINE HYDROCHLORIDE IN DEXTROSE 1.6 ML: 7.5 INJECTION, SOLUTION SUBARACHNOID at 15:09

## 2019-05-06 RX ADMIN — SODIUM CHLORIDE, POTASSIUM CHLORIDE, SODIUM LACTATE AND CALCIUM CHLORIDE 1000 ML: 600; 310; 30; 20 INJECTION, SOLUTION INTRAVENOUS at 13:11

## 2019-05-06 RX ADMIN — SODIUM CITRATE AND CITRIC ACID MONOHYDRATE 30 ML: 500; 334 SOLUTION ORAL at 14:51

## 2019-05-06 RX ADMIN — Medication 3 G: at 15:22

## 2019-05-06 RX ADMIN — SODIUM CHLORIDE, SODIUM LACTATE, POTASSIUM CHLORIDE, CALCIUM CHLORIDE AND DEXTROSE MONOHYDRATE: 5; 600; 310; 30; 20 INJECTION, SOLUTION INTRAVENOUS at 20:22

## 2019-05-06 RX ADMIN — ONDANSETRON 4 MG: 2 INJECTION INTRAMUSCULAR; INTRAVENOUS at 19:06

## 2019-05-06 RX ADMIN — FENTANYL CITRATE 50 MCG: 50 INJECTION INTRAMUSCULAR; INTRAVENOUS at 17:46

## 2019-05-06 RX ADMIN — BUPIVACAINE 10 ML: 13.3 INJECTION, SUSPENSION, LIPOSOMAL INFILTRATION at 16:40

## 2019-05-06 RX ADMIN — BUPIVACAINE HYDROCHLORIDE 10 ML: 2.5 INJECTION, SOLUTION EPIDURAL; INFILTRATION; INTRACAUDAL at 16:40

## 2019-05-06 RX ADMIN — FENTANYL CITRATE 15 MCG: 50 INJECTION, SOLUTION INTRAMUSCULAR; INTRAVENOUS at 15:09

## 2019-05-06 RX ADMIN — BUPIVACAINE HYDROCHLORIDE 10 ML: 2.5 INJECTION, SOLUTION EPIDURAL; INFILTRATION; INTRACAUDAL at 16:42

## 2019-05-06 ASSESSMENT — MIFFLIN-ST. JEOR: SCORE: 2108.23

## 2019-05-06 NOTE — ANESTHESIA CARE TRANSFER NOTE
Patient: Cathy Arroyo    Procedure(s):   SECTION, WITH TUBAL LIGATION    Diagnosis: pregnancy  Diagnosis Additional Information: No value filed.    Anesthesia Type:   No value filed.     Note:  Airway :Room Air  Patient transferred to:Labor and Delivery  Comments: Transferred to PACU at [facility] Report: Identifed the Patient, Identified the Reponsible Provider, Reviewed the pertinent medical history, Discussed the surgical course, Reviewed Intra-OP anesthesia mangement and issues during anesthesia, Set expectations for post-procedure period and Allowed opportunity for questions and acknowledgement of understanding      Vitals: (Last set prior to Anesthesia Care Transfer)    CRNA VITALS  2019 1544 - 2019 1625      2019             NIBP:  124/84    Pulse:  86    NIBP Mean:  93    Temp:  36.8  C (98.2  F)    SpO2:  97 %    Resp Rate (observed):  15    EKG:  Sinus rhythm                Electronically Signed By: JANNET Ronquillo P.G., CRNA  May 6, 2019  4:25 PM

## 2019-05-06 NOTE — PLAN OF CARE
CNM in to evaluate. Bedside ultrasound performed, transverse lie. Discussion with CNM about change of plans from IOL to C/S. Pt ok with plan  in see pt. Another bedside ultrasound performed, oblique lie. MD and pt had discussion about C/S. Consent signed. CRNA placed 2 IV's per MD request. Pt prepped for C/S.  present.

## 2019-05-06 NOTE — PLAN OF CARE
Data: [facility]   Reason for maternal/fetal assessment per patient is Hypertension (pre-e eval)  .  Patient is a . Prenatal record reviewed.      OB History    Para Term  AB Living   3 1 1 0 1 1   SAB TAB Ectopic Multiple Live Births   1 0 0 0 1      # Outcome Date GA Lbr Darren/2nd Weight Sex Delivery Anes PTL Lv   3 Current            2 SAB 07/10/18           1 Term 10/11/16 41w1d 08:43 / 06:05 3.402 kg (7 lb 8 oz) F Vag-Spont Local, EPI N NORAH      Name: TAB CABAN      Apgar1: 8  Apgar5: 9   . 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 Gestational Age 39w2d. VSS. Fetal movement present. Patient denies backache, vaginal discharge, pelvic pressure, UTI symptoms, GI problems, bloody show, vaginal bleeding, edema, headache, visual disturbances, epigastric or URQ pain, rupture of membranes. Support persons Jamil present.  Action: Verbal consent for EFM. Triage assessment completed. EFM applied for fetal assessment. Uterine assessment irregular. Fetal assessment: Presumed adequate fetal oxygenation documented (see flow record).   Response: Calin Alejandro CNM informed of pt arrival. Plan per provider is admit and start induction of labor. Patient verbalized agreement with plan. Patient transferred to room 442 ambulatory, oriented to room and call light.

## 2019-05-06 NOTE — ANESTHESIA PROCEDURE NOTES
Peripheral nerve/Neuraxial procedure note : TAP  Pre-Procedure  Performed by  Merline Lema MD, in the presence of a teaching physician  Bhavya Breen MD  Location: OB    Procedure Times:2019 4:35 PM and 2019 4:45 PM  Pre-Anesthestic Checklist: patient identified, IV checked, site marked, risks and benefits discussed, informed consent, monitors and equipment checked, pre-op evaluation, at physician/surgeon's request and post-op pain management    Timeout  Correct Patient: Yes   Correct Procedure: Yes   Correct Site: Yes   Correct Laterality: Yes   Correct Position: Yes   Site Marked: N/A   .   Procedure Documentation    Diagnosis:POST  PAIN.    Procedure:  bilateral  TAP.     Ultrasound used to identify targeted nerve, plexus, or vascular marker and placed a needle adjacent to it., Ultrasound was used to visualize the spread of the anesthetic in close proximity to the above stated nerve. A permanent image is entered into the patient's record.  Patient Prep;chlorhexidine gluconate and isopropyl alcohol.  .  Needle: other Needle Gauge: 21.    Needle Length (Inches) 3.13  Insertion Method: Single Shot.       Assessment/Narrative  Paresthesias: No.  Injection made incrementally with aspirations every 5 mL..  The placement was negative for: blood aspirated, painful injection and site bleeding.  Bolus given via needle..   Secured via.   Complications: none.

## 2019-05-06 NOTE — H&P
"ADMIT NOTE  =================  39w2d  Cathy Arroyo is a 38 year old female     with an Patient's last menstrual period was 2018. and Estimated Date of Delivery: May 11, 2019 [facility] 2019 at 11:00 AM for .   Fetal movement- active  ROM- no   GBS- positive    HPI  ================  Cathy came from the clinic for evaluation of gestational hypertension. She had elevated BP in clinic today, 143/90. Has history of preeclampsia with last pregnancy, has been taking 81mg ASA during pregnancy. Has been having intermittent headaches for the last few weeks. Feels some intermittent contractions that started around 5am, not getting more painful or more frequent.   FOB- is involved, Jared  Other labor support- none    PRENATAL COURSE  =================  Prenatal course was   complicated by    Patient Active Problem List    Diagnosis Date Noted     Labor and delivery indication for care or intervention 2019     Priority: Medium     Medical cannabis use 2019     Priority: Medium     1. Prescriptions were scanned into media tab. Taking for PTSD. Copies are in my ([employer]) inbox in the office  2. Entered the medications into chart, under \"unable to find\" medications. [place] is the CBD and [place] is the THC.   3. Patient verified she is part of the [place] registry on multiple appointments, last on .   4. Patient visited with pharmacist prior to starting THC. Is aware there are no to minimal studies and is experimental only. Was using only CBD until 31 weeks prior to starting THC due to uncontrolled symptoms.    5. Urine drug testing: done   Positive,   4/10/19 [facility] for hospital obs-    6. Discussed with inpatient pharmacist on . Recommendation is for patient to bring her [place] and [place] to the hospital for inpatient use. They will verify her medications and barcode them. Then she can take them inpatient.  7. Patient aware of inpatient drug testing for both her " and baby. She is very sensitive about use, please discuss with patient kindly.          Polyhydramnios affecting pregnancy 2019     Priority: Medium     3/22/19: CAITLIN of 28  Repeat GCT was normal    MFM BPP showed moderate polyhydramnios        Otitis externa 2019     Priority: Medium     Abnormal results of thyroid function studies 2019     Priority: Medium       Patient has a hx of Graves dz s/p thyroidectomy. She is on 175 mcg of synthroid. Her thyroid stimulating antibodies are positive. Discussed that fetuses can have transient   hyperthryoidism which affects 2-5% of neonates due to transplacental transfer of the stimulating antibodies. There are no signs of fetal hyperthyroidism today (fetal  tachycardia, polyhydramnios, goiter). This is more common if the TSI index is > 5 (Patient's level is 1.5).       Pain in symphysis pubis during pregnancy 2019     Priority: Medium     Post-surgical hypothyroidism 2019     Priority: Medium     Supervision of other normal pregnancy, antepartum 10/16/2018     Priority: Medium     FOB: Jamil, daughter Nery, 2 years old  Returning CNM patient  History of gestational hypertension with postdates IOL last pregnancy - treated with magnesium and labetalol during labor - RX given for aspirin therapy and labs drawn      Genetic testing with MFM - NT WNL, innatal labs WNL. Needs AFP.   3/22/19: BPP 8/8 polyhydramnios with CAITLIN of 28  19 1hour GCT after polyhydramnios diagnosed 102.        Need for Tdap vaccination 2018     Priority: Medium     Nonintractable migraine, unspecified migraine type 2018     Priority: Medium     History of Graves' disease 10/24/2017     Priority: Medium     S/P total thyroidectomy 2017     Priority: Medium     10/16/2018 Taking 150 mcg of synthroid. Labs drawn with new OB visit, following endocrinology closely.        Abnormal cytology 2017     Priority: Medium     Moderate persistent  asthma without complication 01/18/2017     Priority: Medium     Chronic rhinitis 01/18/2017     Priority: Medium     Tobacco use disorder 01/18/2017     Priority: Medium     Quit once she found out about the pregnancy        Pre-eclampsia 10/14/2016     Priority: Medium     10/16/2018 - was induced for postdates and gestational hypertension with last pregnancy. No history of chronic hypertension. RX for aspirin sent and labs drawn at new OB visit.        GBS (group B Streptococcus carrier), +RV culture, currently pregnant 09/12/2016     Priority: Medium     Treat in labor resistant to clindamycin        Bipolar affective disorder in remission (H) 03/15/2016     Priority: Medium     10/16/2018 Not taking medications at this time. Feels like things are going well. Seeing a therapist once a week and has regular contact with her psychiatrist team.        Pineal gland, tumor 01/15/2014     Priority: Medium     Seasonal allergic rhinitis 06/03/2013     Priority: Medium     Attention deficit disorder 09/21/2011     Priority: Medium      Prenatal care began at 6w3d for a total of 20 visits.    HISTORIES  ============  Allergies   Allergen Reactions     Adacel [Daptacel] Swelling     Codeine Sulfate Nausea and Vomiting     Tetanus Toxoid      Pt states she had an infection at injection site.      Vicodin [Hydrocodone-Acetaminophen] Nausea and Vomiting     Methimazole Rash     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     Post partum depression      Post-surgical hypothyroidism 05/2017     Uncomplicated asthma      Past Surgical History:   Procedure Laterality Date     BLOOD PATCH N/A 10/11/2016    Procedure: EPIDURAL BLOOD PATCH;  Surgeon: GENERIC ANESTHESIA PROVIDER;  [facility]     CRANIOTOMY, EXCISE TUMOR  COMPLEX, COMBINED  2014    Procedure: COMBINED CRANIOTOMY, EXCISE TUMOR COMPLEX;  Supracerabellar  Infratentorial Approach for Resection of Tumor ;  Surgeon: Kate Mckeon MD;  Location: [facility]     THYROIDECTOMY N/A 5/3/2017    Procedure: THYROIDECTOMY;  Total Thyroidectomy;  Surgeon: Pamela Villasenor MD;  [facility]   .  Family History   Problem Relation Age of Onset     Thyroid Disease Paternal Aunt      Asthma Father      Mental Illness Father      Obesity Father      Asthma Maternal Grandmother      Osteoporosis Maternal Grandmother      Glaucoma Maternal Grandmother      Hypertension Maternal Grandmother      Macular Degeneration Maternal Grandmother      Diabetes Paternal Grandfather      Other Cancer Mother      Genitourinary Problems Mother      Bipolar Disorder Mother         unipolar depression     Depression Mother      Thyroid Disease Mother         benign tumor     Skin Cancer Mother      Cancer Mother      Bipolar Disorder Brother         unipolar depression     Asthma Brother      Social History     Tobacco Use     Smoking status: Former Smoker     Packs/day: 0.50     Years: 12.00     Pack years: 6.00     Types: Cigarettes     Start date: 2004     Last attempt to quit: 2/15/2018     Years since quittin.2     Smokeless tobacco: Never Used   Substance Use Topics     Alcohol use: No     Alcohol/week: 0.0 oz     Comment: quit 16 days ago,     OB History    Para Term  AB Living   3 1 1 0 1 1   SAB TAB Ectopic Multiple Live Births   1 0 0 0 1      # Outcome Date GA Lbr Darren/2nd Weight Sex Delivery Anes PTL Lv   3 Current            2 SAB 07/10/18           1 Term 10/11/16 41w1d 08:43 / 06:05 3.402 kg (7 lb 8 oz) F Vag-Spont Local, EPI N NORAH      Name: TAB CABAN ZEINA      Apgar1: 8  Apgar5: 9        LABS:   ===========  Rhogam not indicated  Lab Results   Component Value Date    ABO PENDING 2019       Lab Results   Component Value Date    RH Pos  03/15/2019     RUBELLAABIGG: 15 IU/mL  RPR: nonreactive    Lab Results   Component Value Date    HIV Negative 2009     Lab Results   Component Value Date    HGB 12.1 2019      Lab Results   Component Value Date    HEPBANG Nonreactive 2018     Lab Results   Component Value Date    GBS Positive 04/10/2019       ROS  =========  Pt denies significant respiratory, cardiovacular, GI, or muscular/skeletalcomplaints.    See RN data base ROS.     PHYSICAL EXAM:  ===============  Last menstrual period 2018, not currently breastfeeding.  General appearance: comfortable  Heart: RRR without murmur  Lungs: clear to auscultation   Neuro: slight headache and occasional visual disturbances  Psych: Mentation normal and bright   Legs: 2+/2+, no clonus, edema +1     Abdomen: gravid, single TRANSVERSE fetus, non-tender between contractions.  EFW-  10 lbs.   CONTACTIONS: Contractions every occasional.  Palpate: mild  FETAL HEART TONES: baseline 140 with moderate FHR variability and  pos accelerations. No decelerations present.      PELVIC EXAM: / Posterior/ average/ FLOATING   ART SCORE: 2  BLOODY SHOW: no   ROM: No  FLUID: none  AMNISURE: not done    ASSESSMENT:  ==============  IUP @ 39w2d not in labor   NST REACTIVE  CST NOT DONE  Fetal Heart rate tracing Category category one  GBS- positive  Transverse lie of fetus     PLAN:  ===========  Admit - see IP orders  MD consultant on call Dr. Rutledge-patient transferred to MD care for transverse lie of fetus. Recommend C/S delivery. Patient agrees.   Prepare for  section     Calin Alejandro CNM

## 2019-05-06 NOTE — PROGRESS NOTES
[facility] PROGRESS NOTE:   May 6, 2019   1200 pm         SUBJECTIVE:   Patient c/o nothing.    Contractions:  q occ minutes  Leakage of fluid:  No  Vaginal bleeding:  No  Pain controlled:  Yes           OBJECTIVE:     Vitals:    19 0955 19 1005 19 1020 19 1315   BP: 153/83 140/81 137/90 139/73         NST:  Fetal Heart Rate Tracing:   Baseline: 140  Variability: Moderate  Accels, no decels    Tocometer: q occ minutes    Abdomen:  Gravid, NT  Cervix:   Deferred.    Bedside ultrasound: oblique lie, shoulder presenting.          LABS:     Recent Results (from the past 12 hour(s))   Drug Screen Urine /    Collection Time: 19  9:50 AM   Result Value Ref Range    Amphetamine Qual Urine Negative NEG^Negative    Cannabinoids Qual Urine Positive, sent to Drug Analysis for confirmation. (A) NEG^Negative    Cocaine Qual Urine Negative NEG^Negative    Opiates Qualitative Urine Negative NEG^Negative    Pcp Qual Urine Negative NEG^Negative   Protein  random urine with Creat Ratio    Collection Time: 19  9:50 AM   Result Value Ref Range    Protein Random Urine 0.22 g/L    Protein Total Urine g/gr Creatinine 0.26 (H) 0 - 0.2 g/g Cr   Creatinine urine calculation only    Collection Time: 19  9:50 AM   Result Value Ref Range    Creatinine Urine 83 mg/dL   Creatinine random urine    Collection Time: 19  9:50 AM   Result Value Ref Range    Creatinine Urine Random 83 mg/dL   ABO/Rh type and screen    Collection Time: 19 10:23 AM   Result Value Ref Range    ABO O     RH(D) Pos     Antibody Screen Neg     Test Valid Only At          [facility]    Specimen Expires 2019    CBC with platelets    Collection Time: 19 10:23 AM   Result Value Ref Range    WBC 12.4 (H) 4.0 - 11.0 10e9/L    RBC Count 4.37 3.8 - 5.2 10e12/L    Hemoglobin 12.1 11.7 - 15.7 g/dL    Hematocrit 38.1 35.0 - 47.0 %    MCV 87 78  - 100 fl    MCH 27.7 26.5 - 33.0 pg    MCHC 31.8 31.5 - 36.5 g/dL    RDW 15.0 10.0 - 15.0 %    Platelet Count 273 150 - 450 10e9/L   AST    Collection Time: 19 10:23 AM   Result Value Ref Range    AST 13 0 - 45 U/L   ALT    Collection Time: 19 10:23 AM   Result Value Ref Range    ALT 12 0 - 50 U/L   Creatinine    Collection Time: 19 10:23 AM   Result Value Ref Range    Creatinine 0.49 (L) 0.52 - 1.04 mg/dL    GFR Estimate >90 >60 mL/min/[1.73_m2]    GFR Estimate If Black >90 >60 mL/min/[1.73_m2]               ASSESSMENT:   38 year old  at 39w2d   Gestational hypertension  Oblique lie   Large for gestational age fetus with AC>>HC           PLAN:   Discussed options with the patient.   She would like to proceed with  delivery and tubal ligation. She is aware of option for attempt at version and induction. She would like  delivery.   PARQ held, consent signed.   Sure she would like to proceed with tubal ligation.    MD Kathy Bowen MD

## 2019-05-06 NOTE — BRIEF OP NOTE
[facility], [place]    Brief Operative Note    Pre-operative diagnosis: Transverse lie, desires sterilization  Post-operative diagnosis Same  Procedure: Procedure(s):   SECTION, WITH TUBAL LIGATION  Surgeon: Surgeon(s) and Role:     * Kathy Rutledge MD - Primary     * Nicole Baker MD - Assisting  Anesthesia: Spinal   Estimated blood loss: 800 ml  Drains: Lee  Specimens:   ID Type Source Tests Collected by Time Destination   1 :  Cord blood Blood OR DOCUMENTATION ONLY Kathy Rutledge MD 2019  3:56 PM    2 :  Placenta Placenta SEE PROVIDERS ORDERS Kathy Rutledge MD 2019  3:57 PM    3 :  Tissue Fallopian Tube, Right SEE PROVIDERS ORDERS Kathy Rutledge MD 2019  3:57 PM    4 :  Tissue Fallopian Tube, Left SEE PROVIDERS ORDERS Kathy Rutledge MD 2019  3:58 PM    5 : CORD SEGMENT FOR DRUG SCREEN Other (specify in comments) Umbilical Cord SEE PROVIDERS ORDERS Kathy Rutledge MD 2019  3:58 PM      Findings:   Viable bazzi female in oblique lie, Apgars 8/9, weight 9 pounds 2 ounces. .  Complications: None.  Implants:  Sepra film

## 2019-05-06 NOTE — PLAN OF CARE
Data: Cathy Arroyo transferred to 7141 via cart at 1815. Baby transferred via parent's arms.  Action: Receiving unit notified of transfer: Yes. Patient and family notified of room change.  Belongings sent to receiving unit. Accompanied by Registered Nurse. Oriented patient to surroundings. Call light within reach. ID bands double-checked with receiving RN.  Response: Patient tolerated transfer and is stable.

## 2019-05-06 NOTE — LACTATION NOTE
"This note was copied from a baby's chart.  Infant delivered this afternoon, received call from RN in OR to check on breastfeeding with medical cannabis.  Per policy, check in with infant's provider on call to verify ok to initiate breastfeeding.  Mom has been on prescribed medical cannabis for treatment of PTSD, took it during pregnancy, verified on [place] registry during prenatal care and good communication from OB providers, anticipated delivery for this patient with known medical use. See notes in mom's prenatal/ rounding also. Per OB RN, mom plans to breastfeed, plans follow up care for baby with [facility].     Talked to Dr. Wayne via phone at 1600, update on prescribed medical cannabis use for this mom during pregnancy and plan to breastfeed. Discussed resource from MD available for families with information on use in pregnancy & breastfeeding and Jensen's \"Medications & Mothers' Milk\" with cannabis in L4 category, limited data, possibly hazardous, to weigh many benefits of breastfeeding with possible risks of this medication. Per MD request, shared with him our email from [facility], informing NF in preparation for this mom's delivery here, including link to [place] policy on medical use of cannabis. He will discuss breastfeeding plan with mom's primary provider (Dr. Wayne report that Zamzam Dasilva is mom's provider & she also routinely works with lactation support) and will then discuss risks and benefits with mom at time of initial exam. For now, received ok to initiate breastfeeding if mom desires. He will call if decides contraindication to breastfeeding after discussing with mom's primary/baby's anticipated provider.     Addendum: Went to L&D, offer to visit mom for support and give information about breastfeeding and medical cannabis use but not recommended to meet with her at this time per RN.  "

## 2019-05-06 NOTE — ANESTHESIA PROCEDURE NOTES
Spinal/LP Procedure Note    Spinal Block  Staff:     Anesthesiologist:  Bhavya Breen MD  Location: OB  Procedure Start/Stop Times:      2019 2:55 PM     2019 3:10 PM    patient identified, IV checked, site marked, risks and benefits discussed, informed consent, monitors and equipment checked, pre-op evaluation, at physician/surgeon's request and post-op pain management      Correct Patient: Yes      Correct Position: Yes      Correct Site: Yes      Correct Procedure: Yes      Correct Laterality:  N/A    Site Marked:  N/A  Procedure:     Procedure:  Intrathecal    ASA:  2    Diagnosis:   section for transverse lie    Position:  Sitting    Sterile Prep: chloraprep      Insertion site:  L3-4    Approach:  Midline    Needle Type:  Xavier    Needle gauge (G):  25    Local Skin Infiltration:  1% lidocaine    amount (ml):  2    Needle Length (in):  4    Introducer used: Yes      Introducer gauge:  20 G    Attempts:  1    Redirects:  1    CSF:  Clear    Paresthesias:  No    Time injected:  15:08  Assessment/Narrative:     Sensory Level:  T5

## 2019-05-06 NOTE — PROGRESS NOTES
I saw Cathy to discuss tubal ligation in the setting of a C section.  Very uncomfortable with lower pelvic pain especially with movement.  When asked she endorses quite a few headaches over the weekend but not now.  BP is in low range today and she has a history of pre-eclampsia, risk factors for same.  Therefore she is [facility] the [facility] right away to be evaluated for PIH.  Being 39 weeks she would benefit from delivery.    We discussed tubal ligation:.  Discussed TL permanence and irreversibility, also failure rate of approximately 1:200 to 1:300.  Alternative reversible forms of contraception which are highly reliable, such as hormonal and IUD, also discussed.    All of the patients questions were answered to her apparent satisfaction  Written information on TL given  Jennifer Nevarez

## 2019-05-06 NOTE — ANESTHESIA PREPROCEDURE EVALUATION
Anesthesia Pre-Procedure Evaluation    Patient: Cathy Arroyo   MRN:     [id number] Gender:   female   Age:    38 year old :      1980        Preoperative Diagnosis: pregnancy   Procedure(s):   SECTION, WITH TUBAL LIGATION     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     Post partum depression      Post-surgical hypothyroidism 2017     Uncomplicated asthma       Past Surgical History:   Procedure Laterality Date     BLOOD PATCH N/A 10/11/2016    Procedure: EPIDURAL BLOOD PATCH;  Surgeon: GENERIC ANESTHESIA PROVIDER;  [facility]     CRANIOTOMY, EXCISE TUMOR COMPLEX, COMBINED  2014    Procedure: COMBINED CRANIOTOMY, EXCISE TUMOR COMPLEX;  Supracerabellar  Infratentorial Approach for Resection of Tumor ;  Surgeon: Kate Mckeon MD;  [facility]: [facility] OR     THYROIDECTOMY N/A 5/3/2017    Procedure: THYROIDECTOMY;  Total Thyroidectomy;  Surgeon: Pamela Villasenor MD;  [facility]          Anesthesia Evaluation     .             ROS/MED HX    ENT/Pulmonary:  - neg pulmonary ROS   (+)asthma , . .    Neurologic:  - neg neurologic ROS     Cardiovascular:  - neg cardiovascular ROS   (+) hypertension----. : . . . :. .       METS/Exercise Tolerance:     Hematologic:  - neg hematologic  ROS       Musculoskeletal:  - neg musculoskeletal ROS       GI/Hepatic:  - neg GI/hepatic ROS       Renal/Genitourinary:  - ROS Renal section negative       Endo:  - neg endo ROS   (+) thyroid problem hypothyroidism, Obesity, .      Psychiatric:  - neg psychiatric ROS   (+) psychiatric history bipolar and depression      Infectious Disease:  - neg infectious disease ROS       Malignancy:      - no malignancy   Other:    - neg other ROS                     PHYSICAL EXAM:   Mental  "Status/Neuro: A/A/O   Airway: Facies: Feasible  Mallampati: I  Mouth/Opening: Full  TM distance: > 6 cm  Neck ROM: Full   Respiratory: Auscultation: CTAB     Resp. Rate: Normal     Resp. Effort: Normal      CV: Rhythm: Regular  Rate: Age appropriate  Heart: Normal Sounds   Comments:      Dental: Normal                  Lab Results   Component Value Date    WBC 12.4 (H) 05/06/2019    HGB 12.1 05/06/2019    HCT 38.1 05/06/2019     05/06/2019    CRP 45.0 (H) 03/16/2019    SED 52 (H) 03/14/2019     03/14/2019    POTASSIUM 3.9 03/14/2019    CHLORIDE 107 03/14/2019    CO2 24 03/14/2019    BUN 5 (L) 03/14/2019    CR 0.49 (L) 05/06/2019    GLC 86 03/14/2019    RUPALI 8.7 03/14/2019    PHOS 2.4 (L) 12/12/2017    MAG 1.6 12/13/2018    ALBUMIN 3.8 04/23/2018    PROTTOTAL 7.4 04/23/2018    ALT 12 05/06/2019    AST 13 05/06/2019    ALKPHOS 86 04/23/2018    BILITOTAL 0.4 04/23/2018    LIPASE 72 (L) 04/23/2018    PTT 29 04/23/2018    INR 1.01 04/23/2018    TSH 1.96 04/26/2019    T4 16.1 (H) 04/26/2019    T3 124 08/31/2018    HCG Pos 02/03/2016    HCGS Negative 02/11/2015       Preop Vitals  BP Readings from Last 3 Encounters:   05/06/19 143/90   05/03/19 124/72   04/29/19 132/65    Pulse Readings from Last 3 Encounters:   05/03/19 82   04/26/19 117   04/19/19 96      Resp Readings from Last 3 Encounters:   04/29/19 16   03/24/19 18   03/16/19 18    SpO2 Readings from Last 3 Encounters:   04/26/19 98%   03/18/19 97%   03/14/19 98%      Temp Readings from Last 1 Encounters:   05/03/19 37  C (98.6  F) (Oral)    Ht Readings from Last 1 Encounters:   04/26/19 1.727 m (5' 8\")      Wt Readings from Last 1 Encounters:   05/06/19 137 kg (302 lb)    Estimated body mass index is 45.92 kg/m  as calculated from the following:    Height as of 4/26/19: 1.727 m (5' 8\").    Weight as of an earlier encounter on 5/6/19: 137 kg (302 lb).     LDA:            Assessment:   ASA SCORE: 2    NPO Status: > 6 hours since completed Solid Foods "   Documentation: H&P complete; Preop Testing complete; Consents complete   Proceeding: Proceed without further delay  Tobacco Use:  NO Active use of Tobacco/UNKNOWN Tobacco use status     Plan:   Anes. Type:  Regional; MAC     RA Details:  Labor/OB Procedure; SS     RA-Location/Type: Spinal   Pre-Induction: None     Drips/Meds-Preparation: Oxytocin   Induction:  Not applicable   Airway: Native Airway   Access/Monitoring: PIV   Maintenance: N/a   Emergence: Not Applicable   Logistics: Observation/Admission     Postop Pain/Sedation Strategy:  Standard-Options: Block SS     PONV Management:  Adult Risk Factors: Female, Non-Smoker  Prevention: Ondansetron     CONSENT: Direct conversation   Plan and risks discussed with: Patient   Blood Products: Consented (ALL Blood Products)                         Víctor Stephens MD

## 2019-05-06 NOTE — OP NOTE
[facility], [place]  Obstetrics Operative Note     Pre-operative diagnosis:         Transverse lie, desires sterilization  Post-operative diagnosis        Same  Procedure:      Procedure(s):   SECTION, WITH TUBAL LIGATION  Surgeon:         Surgeon(s) and Role:     * Kathy Rutledge MD - Primary     * Nicole Baker MD - Assisting  Anesthesia:     Spinal    Estimated blood loss:  800 ml  Drains: Lee  Specimens:       ID Type Source Tests Collected by Time Destination   1 :  Cord blood Blood OR DOCUMENTATION ONLY Kathy Rutledge MD 2019  3:56 PM     2 :  Placenta Placenta SEE PROVIDERS ORDERS Kathy Rutledge MD 2019  3:57 PM     3 :  Tissue Fallopian Tube, Right SEE PROVIDERS ORDERS Kathy Rutledge MD 2019  3:57 PM     4 :  Tissue Fallopian Tube, Left SEE PROVIDERS ORDERS Kathy Rutledge MD 2019  3:58 PM     5 : CORD SEGMENT FOR DRUG SCREEN Other (specify in comments) Umbilical Cord SEE PROVIDERS ORDERS Kathy Rutledge MD 2019  3:58 PM        Findings:                     Viable bazzi female in oblique lie, Apgars 8/9, weight 9 pounds 2 ounces. .  Complications:            None.  Implants:  Sepra film            Indications: 38 year old   presented at 39w2d for induction of labor due to gestational hypertension. Fetus was noted to be in transverse lie. She declined cephalic version.  Procedure:   The patient was taken to the operating room after PARQ held. A brief pause was held.   Spinal anesthesia was placed and found to be adequate. She was positioned in the dorsal supine position with a left tilt in what was felt to be a neurologically safe position. Lee was placed. She was prepped and draped in sterile fashion.   A time out was held.   A pfannenstiel incision was made with the scalpel and carried down through the underlying layers with the scalpel. The fascia was scored in the  midline and extended with Main scissors. The superior aspect of the incision was grasped with Kocher clamps and tented. The underlying rectus muscles were taken down sharply and bluntly. This was repeated on the inferior aspect of the incision.   The rectus muscles were  in the midline. The peritoneum was entered bluntly through a clear window and extended with stretching. The bladder blade was placed. The fetal vertex was palpable in the right lower quadrant of the uterus. It was gently verted to cephalic presentation. The lower uterine segment was incised in a transverse fashion with the scalpel and extended bluntly. The fetal vertex was  elevated to the hysterotomy and the infant delivered with pressure.   The cord was clamped and cut after delayed cord clamping. The infant was handed off to the waiting nurses. The placenta delivered with external massage. Intact, 3VC.   The hysterotomy was closed with 0-vicryl in a running locked fashion. A second layer of the same suture was used for imbrication. The hysterotomy was hemostatic after minimal cautery and a figure of 8 suture in each apex. The tubal ligation was performed by making windows with the cautery in avascular planes of the mesosalpinx and tying off the mesosalpinx and cornua with 0 plain gut. The fallopian tube was then dissected away with the Bovie. This was repeated on the contralateral side. Mesosalpinx was hemostatic.  The uterus was returned to the abdomen. The hysterotomy was re-inspected for hemostasis. Seprafilm was placed over the hysterotomy. The rectus muscles were inspected for hemostasis. A second layer of Seprafilm was placed over the rectus muscles.   The fascia was closed with looped 0-PDS in a running fashion. The subcutaneous tissue was irrigated and hemostasis achieved with the bovie. It was reapproximated with 3-0 vicryl. The skin was closed with 4-0 vicryl on a Bolivar needle.   The patient tolerated the procedure well.   She  received 3 gm of Ancef prior to the procedure.   Sponge, lap, and needle counts were correct x 2.    Dr Baker was required as a skilled surgical assistant due to maternal obesity and fetal malpresentation.  Kathy Rutledge MD

## 2019-05-06 NOTE — PHARMACY
Medical Cannabis: Registration in the [facility] Medical Cannabis Registry is confirmed.       Medical Cannabis visually inspected and does NOT appear obviously adulterated.       Cyrus Ceron, PharmD

## 2019-05-06 NOTE — ANESTHESIA POSTPROCEDURE EVALUATION
Anesthesia POST Procedure Evaluation    Patient: Cathy Arroyo   MRN:     [id number] Gender:   female   Age:    38 year old :      1980        Preoperative Diagnosis: pregnancy   Procedure(s):   SECTION, WITH TUBAL LIGATION   Postop Comments: No value filed.       Anesthesia Type:  Regional, MAC  Spinal, Peripheral Nerve Block for post-op pain at surgeon's request    Reportable Event: NO     PAIN: Uncomplicated   Sign Out status: Comfortable, Well controlled pain     PONV: No PONV   Sign Out status:  No Nausea or Vomiting     Neuro/Psych: Uneventful perioperative course   Sign Out Status: Preoperative baseline; Age appropriate mentation     Airway/Resp.: Uneventful perioperative course   Sign Out Status: Non labored breathing, age appropriate RR; Resp. Status within EXPECTED Parameters     CV: Uneventful perioperative course   Sign Out status: Appropriate BP and perfusion indices; Appropriate HR/Rhythm     Disposition:   Sign Out in:  Labor and Delivery  Disposition:  Labor and Delivery  Recovery Course: Uneventful  Follow-Up: Not required     Comments/Narrative:  Patient comfortable. Motor and sensory LE function recovering           Last Anesthesia Record Vitals:  CRNA VITALS  2019 1544 - 2019 1644      2019             NIBP:  124/84    Pulse:  86    NIBP Mean:  93    Temp:  36.8  C (98.2  F)    SpO2:  97 %    Resp Rate (observed):  15    EKG:  Sinus rhythm          Last PACU Vitals:  Vitals Value Taken Time   /83 2019  5:55 PM   Temp 37  C (98.6  F) 2019  4:20 PM   Pulse 80 2019  5:55 PM   Resp 10 2019  5:59 PM   SpO2 97 % 2019  6:02 PM   Temp src     NIBP 124/84 2019  4:18 PM   Pulse 86 2019  4:18 PM   SpO2 97 % 2019  4:18 PM   Resp     Temp 36.8  C (98.2  F) 2019  4:18 PM   Ht Rate     Temp 2     Vitals shown include unvalidated device data.      Electronically Signed By: Bhavya Garcia MD, May 6, 2019, 6:11 PM

## 2019-05-07 LAB — HGB BLD-MCNC: 10.2 G/DL (ref 11.7–15.7)

## 2019-05-07 PROCEDURE — 25000132 ZZH RX MED GY IP 250 OP 250 PS 637: Performed by: OBSTETRICS & GYNECOLOGY

## 2019-05-07 PROCEDURE — 85018 HEMOGLOBIN: CPT | Performed by: OBSTETRICS & GYNECOLOGY

## 2019-05-07 PROCEDURE — 12000001 ZZH R&B MED SURG/OB UMMC

## 2019-05-07 PROCEDURE — 25000131 ZZH RX MED GY IP 250 OP 636 PS 637: Performed by: STUDENT IN AN ORGANIZED HEALTH CARE EDUCATION/TRAINING PROGRAM

## 2019-05-07 PROCEDURE — 36415 COLL VENOUS BLD VENIPUNCTURE: CPT | Performed by: OBSTETRICS & GYNECOLOGY

## 2019-05-07 PROCEDURE — 25000128 H RX IP 250 OP 636: Performed by: OBSTETRICS & GYNECOLOGY

## 2019-05-07 PROCEDURE — 25000132 ZZH RX MED GY IP 250 OP 250 PS 637: Performed by: STUDENT IN AN ORGANIZED HEALTH CARE EDUCATION/TRAINING PROGRAM

## 2019-05-07 RX ORDER — ONDANSETRON 4 MG/1
4 TABLET, ORALLY DISINTEGRATING ORAL EVERY 6 HOURS PRN
Status: DISCONTINUED | OUTPATIENT
Start: 2019-05-07 | End: 2019-05-09 | Stop reason: HOSPADM

## 2019-05-07 RX ORDER — HYDROXYZINE HYDROCHLORIDE 25 MG/1
25-50 TABLET, FILM COATED ORAL 3 TIMES DAILY PRN
Status: DISCONTINUED | OUTPATIENT
Start: 2019-05-07 | End: 2019-05-09 | Stop reason: HOSPADM

## 2019-05-07 RX ADMIN — SENNOSIDES AND DOCUSATE SODIUM 1 TABLET: 8.6; 5 TABLET ORAL at 08:43

## 2019-05-07 RX ADMIN — OXYCODONE HYDROCHLORIDE 5 MG: 5 TABLET ORAL at 20:02

## 2019-05-07 RX ADMIN — MONTELUKAST 10 MG: 10 TABLET, FILM COATED ORAL at 08:43

## 2019-05-07 RX ADMIN — OXYCODONE HYDROCHLORIDE 5 MG: 5 TABLET ORAL at 23:34

## 2019-05-07 RX ADMIN — ENOXAPARIN SODIUM 40 MG: 40 INJECTION SUBCUTANEOUS at 10:41

## 2019-05-07 RX ADMIN — IBUPROFEN 800 MG: 800 TABLET ORAL at 23:01

## 2019-05-07 RX ADMIN — Medication 200 MG: at 08:43

## 2019-05-07 RX ADMIN — ONDANSETRON 4 MG: 4 TABLET, ORALLY DISINTEGRATING ORAL at 12:58

## 2019-05-07 RX ADMIN — LEVOTHYROXINE SODIUM 150 MCG: 75 TABLET ORAL at 06:36

## 2019-05-07 RX ADMIN — SENNOSIDES AND DOCUSATE SODIUM 2 TABLET: 8.6; 5 TABLET ORAL at 20:02

## 2019-05-07 RX ADMIN — OXYCODONE HYDROCHLORIDE 5 MG: 5 TABLET ORAL at 13:10

## 2019-05-07 RX ADMIN — IBUPROFEN 800 MG: 800 TABLET ORAL at 10:41

## 2019-05-07 RX ADMIN — ONDANSETRON 4 MG: 4 TABLET, ORALLY DISINTEGRATING ORAL at 20:02

## 2019-05-07 RX ADMIN — OXYCODONE HYDROCHLORIDE 5 MG: 5 TABLET ORAL at 16:58

## 2019-05-07 RX ADMIN — ACETAMINOPHEN 975 MG: 325 TABLET, FILM COATED ORAL at 11:56

## 2019-05-07 RX ADMIN — IBUPROFEN 800 MG: 800 TABLET ORAL at 16:58

## 2019-05-07 RX ADMIN — ACETAMINOPHEN 975 MG: 325 TABLET, FILM COATED ORAL at 20:02

## 2019-05-07 RX ADMIN — OXYCODONE HYDROCHLORIDE 5 MG: 5 TABLET ORAL at 23:01

## 2019-05-07 RX ADMIN — ACETAMINOPHEN 975 MG: 325 TABLET, FILM COATED ORAL at 03:32

## 2019-05-07 RX ADMIN — KETOROLAC TROMETHAMINE 30 MG: 30 INJECTION, SOLUTION INTRAMUSCULAR at 04:03

## 2019-05-07 ASSESSMENT — MIFFLIN-ST. JEOR: SCORE: 2088.27

## 2019-05-07 NOTE — PLAN OF CARE
Patient had already arrived and been settled in room 7141 upon start of this RN's shift at 1900. Resource RN had helped settle patient and check bands. , Jamil at bedside. Baby stable. Received report from Kristine ROY RN at 1915 and checked bands. Unit and room orientation completd. Call light given and within arms reach; no concerns present at this time. Continue with plan of care.

## 2019-05-07 NOTE — LACTATION NOTE
This note was copied from a baby's chart.  Consult for: Medical Cannabis use and history of difficult breastfeeding experience    Delivery Information: Baby Nicol was born at 39.2 weeks via  on 19 at 1528.    Maternal Health History: Cathy has a history of seasonal allergies, PTSD (managed with medical cannabis), AMA, migraines and Graves disease (thyroidectomy, now managed with Synthroid).    Cathy takes Allegra for her seasonal allergies. Per Сергей Jensen's Medications and Mother's Milk, Allegra is a category L2-Limited Data-Probably Compatible.     Cathy has reviewed medical cannabis information with her pediatrician, cnm and primary care provider. She denies questions at this time. ?    Maternal Breast Exam: Cathy noted breast growth in pregnancy. ?    Infant information:?Baby Nicol has age appropriate output and her birthweight was 9lb 1.7oz. She has been waking regularly to breastfeed, more frequently this afternoon, and is < 24 hours old.     Feeding Assessment: Cathy had recently  Nicol. She is independently positioning and latching Nicol and denies pain. She will call for latch assistance as needed.     Education: early feeding cues, benefits of feeding on cue, signs breastfeeding is going well (comfortable latch, age appropriate output and weight loss, swallowing heard at the breast), satiety cues, expected  output,  weight loss, nutritive vs non-nutritive sucking, benefits of skin to skin, the Second Night, benefits of breast massage and hand expression of colostrum, pumping recommendations if weight concerning or not seeing increase in milk volume by day 3, inpatient lactation support and outpatient lactation resources.      Plan: Continue breastfeeding on cue with RN support as needed with a goal of 8-12 feedings per day. Encourage frequent skin to skin, breast massage and hand expression.     Rounding lactation consultant will check in with family tomorrow  for latch assessment and discharge planning.  Family will follow up with Zamzam Dasilva NP at [facility] for pediatric care and breastfeeding support.    Cathy was given flyer for  [facility] Breastfeeding Support Group and encouraged to attend (facilitated by MARISA Dasilva NP at [facility]).

## 2019-05-07 NOTE — PROVIDER NOTIFICATION
05/06/19 8662   Provider Notification   Provider Name/Title Dr. James G2   Method of Notification Electronic Page   Notification Reason Medication Request;Status Update  (bolus, low urine output)   FYI urine output has only been 75mL since 1800. Do you want to order a bolus? Currently Pitocin and D5LR are infusing.  Also notified of last /93

## 2019-05-07 NOTE — PROVIDER NOTIFICATION
05/06/19 2003   Provider Notification   Provider Name/Title Dr. James G2   Method of Notification Electronic Page   Notification Reason Medication Request  (compazine)   Pt has been very nauseated and is vomiting. Not relieved w/ Zofran. Can you please order Compazine? Pt stated this has worked in the past for her. Thanks!

## 2019-05-07 NOTE — PROGRESS NOTES
"Post  Anesthesia Follow Up Note    Patient: Cathy Arroyo    Patient location: Postpartum floor.    Chief complaint: Acute postoperative pain managment    Procedure(s) Performed:  No admission procedures for hospital encounter.    Anesthesia type: Spinal Block and transversus abdominus plane (TAP) nerve block        Subjective:   The patient reports good pain control.  Pain Intensity: 3/10 with activity and 1/10 at rest.  Patient reports mild pruritus, denies weakness, paresthesia.  Denies numbness, tingling lips, tinnitus, metallic taste, difficulties breathing or difficulty voiding. She endorses nausea, vomiting overnight which has since resolved . She is able to ambulate and tolerates regular diet.        Objective:  Respiratory Function (RR / SpO2 / Airway Patency): Satisfactory    Cardiac Function (HR / Rhythm / BP): Satisfactory    Strength and sensation lower extremities: Strength 5/5 and grossly symmetric bilateral LE    Site of spinal/epidural insertion: clean and intact, No tenderness, swelling or erythema    Last Vitals: /85   Pulse 82   Temp 36.8  C (98.3  F) (Axillary)   Resp 16   Ht 1.727 m (5' 7.99\")   Wt 135 kg (297 lb 9.6 oz)   LMP 2018   SpO2 98%   Breastfeeding? Unknown   BMI 45.26 kg/m        Assessment and Plan:   Cathy Arroyo is a 38 year old female POD #1 s/p  No admission procedures for hospital encounter. with spinal 150mcg morphine, 15mcg fentanyl IT and single shot TAP nerve block injections bupivacaine 0.75% with epinephrine 1:200,000 1.6 mL, then liposome bupivacaine (Exparel) long-acting 1.3% 20mL given on 2019 for postoperative analgesia.  Pt is ambulating with no difficulty, no weakness or paresthesias.  No evidence of adverse side effects associated with spinal and nerve block injections. Pt is receiving adequate incisional pain control.  Anticipate up to 72 hours of incisional pain control.  Anticipate patient will require " opioid/nonopioid analgesics for visceral and muscle pain not controlled with local anesthetic.      - NO other local anesthetic use within 96 hours of liposome bupivacaine (Exparel) long acting  - patient received verbal and written instructions about liposome bupivacaine   - please call if questions or concerns  - discussed plan with attending anesthesiologist      Stephan Gallegos M.D. CA-1  [facility]  5/7/2019 6:41 AM    If questions or concerns, please contact OB Anesthesiologist  Phone [phone]

## 2019-05-07 NOTE — CONSULTS
"[facility]  MATERNAL CHILD HEALTH   SOCIAL WORK PROGRESS NOTE      DATA:     SW acknowledges consult placed for mental health concerns.  Per chart review, Mom has a history of bipolar disorder. Generalized anxiety during this pregnancy is also noted, but not listed as a diagnosis.  Staff document that mom is seeing a psychiatrist and therapist regularly, and that she is prescribed medical cannabis to address mood issues.  Medical staff have confirmed valid rx for this prescription.     INTERVENTION:     SW conducted chart review, met with Mom at bedside, assessed for psychosocial needs.    ASSESSMENT:     Mom states that she has no concerns for SW at this time.  She is well-connected with mental health resources and states that her hospital course this admission has been \"very smooth\".  She is breastfeeding baby during writer's visit and has calm affect.  She is accompanied by her father and baby's father, whom she states are very supportive for her.    PLAN:     SW will continue to follow, should additional concerns arise.  Mom is aware that SW is available to her during this admission.    HENRY Concepcion, ZACKARY   Social Worker  Maternal Child Health  [facility]  Direct: [phone]  Pager: [phone]    "

## 2019-05-07 NOTE — PLAN OF CARE
Data: Vital signs within normal limits; BPs 140s/80s. Postpartum checks within normal limits - see flow record. Patient was initially nauseated and vomited during the evening, pt received zofran which did not help much and compazine which did help. Patient also used aromatherapy and seabands. Patient was eventually able to tolerate water and solids by early AM. Lee catheter in place, urine output was low at 2200 (only 75mL out in 4 hrs), a 500mL LR bolus was given which helped urine output. Output now normal.  Patient was able to get out of bed and ambulate around room without dizziness or weakness. No apparent signs of infection. Incision UTV. Patient breastfeeding infant with minimal to no assist. Lactation consult released due to pt taking medical CBD/THC and hx of poor  breastfeeding success with last infant.   Action: Patient medicated during the shift for pain. See MAR. Patient reassessed within 1 hour after each medication and pain was improved - patient stated she was comfortable. Patient education done about safe sleep for baby, bulb syringe suction, swaddling, feeding cues, latching, breastfeeding positioning, pain management, and expected vaginal bleeding. See flow record.  Response: Positive attachment behaviors observed with infant. Support persons Jamil present.   Plan: continue plan of care.

## 2019-05-07 NOTE — PROGRESS NOTES
[facility]  Postpartum Note    Name:  Cathy Arroyo  MRN: [id number]    S: Feeling well.  Pain well controlled.  Tolerating regular diet, did have nausea last night, improved with compazine and now tolerating regular diet.  Ambulating without dizziness.  Not yet voiding as corley in place, passing flatus, no bowel movement. Lochia similar to menstrual flow.  Breast feeding. S/p salpingectomy for contraception.    O:   Patient Vitals for the past 12 hrs:   BP Temp Temp src Heart Rate Resp SpO2 Weight   19 0525 -- -- -- -- 16 98 % 135 kg (297 lb 9.6 oz)   19 0325 142/85 98.3  F (36.8  C) Axillary 91 16 98 % --   19 0137 -- -- -- -- 14 95 % --   19 2327 143/83 98  F (36.7  C) Axillary 92 16 96 % --   19 2240 131/72 98.1  F (36.7  C) Axillary 90 14 97 % --   19 2145 (!) 145/93 97.8  F (36.6  C) Axillary 90 16 96 % --   19 2020 132/78 97.6  F (36.4  C) Axillary 89 14 95 % --   19 1920 132/79 97.4  F (36.3  C) Oral 89 16 96 % --   19 1825 127/83 97.6  F (36.4  C) Oral 77 17 98 % --     Weight: 302# () > 297# 9oz ()    I/O last 3 completed shifts:  In: 1713 [P.O.:700; I.V.:1013]  Out: 1393 [Urine:200; Emesis/NG output:300; Blood:893]  UOP 200ml/hr    Gen:  Resting comfortably, NAD  CV:  Regular rate and rhythm   Pulm:  Non-labored breathing. No cough or wheezing.   Abd:  Soft, appropriately tender to palpation, non-distended.  Fundus at the umbilicus, difficult to assess with obesity  Incision: Bandage without shadowing - remove in shower today  Ext:  Non-tender, 1+ LE edema b/l    Hgb:   Recent Labs   Lab Test 19  1023   HGB 12.1       Assessment/Plan:  38 year old  on POD #1 s/p PLTCS with distal salpingectomy for transverse lie & desire of permanent sterilization, initially a CNM patient. Doing well postpartum.    #Low UOP: S/p 500cc bolus, now improved    #gHTN: BPs normo to low mild range  - HELLP labs wnl on admit  -  asymptomatic  - daily weights, strict I/Os    #H/o anxiety, using medical marijuana  - Admit UDS pos for THC  - SW consulted    #Hypothyroidism s/p thyroidectomy for Grave's: + thyroid stim antibodies  - reduced to prepregnancy synthroid 150mcg    Continue with routine postpartum management.  Pain: Well-controlled with sched tylenol, ibuprofen, PRN oxy  Hgb: 12.1>> AM pending. VSS as noted above, asymptomatic.   GI:  BID Senna/Colace.  PRN Simethicone.   PPx:  Encouraged ambulation   Rh: Positive  Rubella: Immune  Feed: Breastfeeding  BC: S/p salpingectomy  Dispo: Plan for home on POD#2-3.     Gaviota Armstrong MD   OB/Gyn Resident, PGY-3    Attestation:   This patient was seen and evaluated by me, separately from the house staff team. I have reviewed the note/plan above and agree.     Doing well. Routine cares discussed and plan for discharge POD #3. Plan discontinue IV today and shower. Pain is controlled.    Rain Colón MD

## 2019-05-07 NOTE — PLAN OF CARE
VSS. Afebrile. Breast feeding well. Attentive to baby's needs. Lee discontinued and voiding well. Showered. PIV infiltrated and discontinued. Passing gas. Tolerating regular diet. Attentive to baby's needs. FOB present and supportive. C/o pain and medicated with relief. Abdominal binding on. Drsg off and steristrips c/d/I. Will continue to monitor.

## 2019-05-08 LAB
AMPHETAMINES UR QL SCN: NEGATIVE
CANNABINOIDS UR CFM-MCNC: 28 NG/ML
CANNABINOIDS UR QL: ABNORMAL
CARBOXYTHC/CREAT UR: 34 NG/MG{CREAT}
COCAINE UR QL: NEGATIVE
COPATH REPORT: NORMAL
OPIATES UR QL SCN: NEGATIVE
PCP UR QL SCN: NEGATIVE

## 2019-05-08 PROCEDURE — 12000001 ZZH R&B MED SURG/OB UMMC

## 2019-05-08 PROCEDURE — 25000132 ZZH RX MED GY IP 250 OP 250 PS 637: Performed by: STUDENT IN AN ORGANIZED HEALTH CARE EDUCATION/TRAINING PROGRAM

## 2019-05-08 PROCEDURE — 25000131 ZZH RX MED GY IP 250 OP 636 PS 637: Performed by: STUDENT IN AN ORGANIZED HEALTH CARE EDUCATION/TRAINING PROGRAM

## 2019-05-08 PROCEDURE — 25000128 H RX IP 250 OP 636: Performed by: OBSTETRICS & GYNECOLOGY

## 2019-05-08 PROCEDURE — 25000132 ZZH RX MED GY IP 250 OP 250 PS 637: Performed by: OBSTETRICS & GYNECOLOGY

## 2019-05-08 RX ORDER — LEVOTHYROXINE SODIUM 150 UG/1
150 TABLET ORAL DAILY
Qty: 60 TABLET | Refills: 0 | Status: SHIPPED | OUTPATIENT
Start: 2019-05-09 | End: 2019-07-29

## 2019-05-08 RX ORDER — AMOXICILLIN 250 MG
2 CAPSULE ORAL 2 TIMES DAILY PRN
Qty: 30 TABLET | Refills: 0 | Status: SHIPPED | OUTPATIENT
Start: 2019-05-08 | End: 2019-11-06

## 2019-05-08 RX ORDER — IBUPROFEN 800 MG/1
800 TABLET, FILM COATED ORAL EVERY 6 HOURS PRN
Qty: 30 TABLET | Refills: 0 | Status: SHIPPED | OUTPATIENT
Start: 2019-05-08 | End: 2019-09-19

## 2019-05-08 RX ADMIN — IBUPROFEN 800 MG: 800 TABLET ORAL at 04:56

## 2019-05-08 RX ADMIN — ACETAMINOPHEN 975 MG: 325 TABLET, FILM COATED ORAL at 21:33

## 2019-05-08 RX ADMIN — SENNOSIDES AND DOCUSATE SODIUM 2 TABLET: 8.6; 5 TABLET ORAL at 20:10

## 2019-05-08 RX ADMIN — OXYCODONE HYDROCHLORIDE 10 MG: 5 TABLET ORAL at 02:08

## 2019-05-08 RX ADMIN — ACETAMINOPHEN 975 MG: 325 TABLET, FILM COATED ORAL at 13:03

## 2019-05-08 RX ADMIN — LEVOTHYROXINE SODIUM 150 MCG: 75 TABLET ORAL at 06:43

## 2019-05-08 RX ADMIN — MONTELUKAST 10 MG: 10 TABLET, FILM COATED ORAL at 09:05

## 2019-05-08 RX ADMIN — ONDANSETRON 4 MG: 4 TABLET, ORALLY DISINTEGRATING ORAL at 21:33

## 2019-05-08 RX ADMIN — ONDANSETRON 4 MG: 4 TABLET, ORALLY DISINTEGRATING ORAL at 02:08

## 2019-05-08 RX ADMIN — ENOXAPARIN SODIUM 40 MG: 40 INJECTION SUBCUTANEOUS at 11:28

## 2019-05-08 RX ADMIN — OXYCODONE HYDROCHLORIDE 5 MG: 5 TABLET ORAL at 16:05

## 2019-05-08 RX ADMIN — OXYCODONE HYDROCHLORIDE 5 MG: 5 TABLET ORAL at 20:11

## 2019-05-08 RX ADMIN — ONDANSETRON 4 MG: 4 TABLET, ORALLY DISINTEGRATING ORAL at 16:00

## 2019-05-08 RX ADMIN — IBUPROFEN 800 MG: 800 TABLET ORAL at 17:30

## 2019-05-08 RX ADMIN — OXYCODONE HYDROCHLORIDE 10 MG: 5 TABLET ORAL at 05:21

## 2019-05-08 RX ADMIN — SIMETHICONE CHEW TAB 80 MG 80 MG: 80 TABLET ORAL at 02:11

## 2019-05-08 RX ADMIN — OXYCODONE HYDROCHLORIDE 10 MG: 5 TABLET ORAL at 13:03

## 2019-05-08 RX ADMIN — Medication 200 MG: at 09:05

## 2019-05-08 RX ADMIN — ONDANSETRON 4 MG: 4 TABLET, ORALLY DISINTEGRATING ORAL at 09:06

## 2019-05-08 RX ADMIN — OXYCODONE HYDROCHLORIDE 5 MG: 5 TABLET ORAL at 09:05

## 2019-05-08 RX ADMIN — SENNOSIDES AND DOCUSATE SODIUM 2 TABLET: 8.6; 5 TABLET ORAL at 09:05

## 2019-05-08 RX ADMIN — IBUPROFEN 800 MG: 800 TABLET ORAL at 11:28

## 2019-05-08 RX ADMIN — ACETAMINOPHEN 975 MG: 325 TABLET, FILM COATED ORAL at 04:28

## 2019-05-08 ASSESSMENT — MIFFLIN-ST. JEOR: SCORE: 2084.65

## 2019-05-08 NOTE — PLAN OF CARE
Data: Vital signs within normal limits. Postpartum checks within normal limits - see flow record. Patient eating and drinking normally. Patient able to empty bladder independently and is up ambulating. Incision open to air and healing well. Patient performing self cares and is able to care for infant.  Action: Patient medicated during the shift for incisional soreness.   Response: Positive attachment behaviors observed with infant. Nervous about being alone with  tonight as  had to go home to care for their daughter. Reassured that RNs will be here for support.   Will continue to monitor and provide support.

## 2019-05-08 NOTE — PLAN OF CARE
"Data: Vital signs within normal limits BPs remain 140s/80s. Postpartum checks within normal limits - see flow record. Patient eating and drinking normally. Patient able to empty bladder independently and is up ambulating. No apparent signs of infection. Incision healing well, gave pt interdry for incision. Patient breastfeeding baby mostly independently.   Patient was feeling very anxious around 2300, atarax ordered but pt stated this medication makes her feel \"wired\" and does not work for her to relax. Talked with patient for a while and anxiety resolved. Patient later realized around 0200 that she did not take her CBD/THC oil at 1800 which may have caused her to feel anxious.   Action: Patient medicated during the shift for pain and cramping. See MAR. Patient reassessed within 1 hour after each medication and pain was improved - patient stated she was comfortable.   Response: Positive attachment behaviors observed with infant.   Plan: continue plan of care.   "

## 2019-05-08 NOTE — PROGRESS NOTES
[facility]  Postpartum Note    Name:  Cathy Arroyo  MRN: [id number]    S: Feeling well.  Pain well controlled.  Tolerating regular diet, denies nausea, tolerating regular diet. Ambulating without dizziness.  Voiding, passing flatus, no bowel movement. Lochia similar to menstrual flow.  Breast feeding. S/p salpingectomy for contraception.    O:   Patient Vitals for the past 12 hrs:   BP Temp Temp src Heart Rate Resp Weight   19 0629 -- -- -- -- -- 134.6 kg (296 lb 12.8 oz)   19 142/88 98.2  F (36.8  C) Oral 85 16 --   19 132/79 98.4  F (36.9  C) Oral 82 18 --     Weight: 302# () > 297# 9oz () > 296# ()    I/O last 3 completed shifts:  In: 4940.67 [P.O.:2700; I.V.:1740.67; IV Piggyback:500]  Out: 2735 [Urine:2735]  UOP 100ml/hr    Gen:  Resting comfortably, NAD  CV:  Regular rate and rhythm   Pulm:  Non-labored breathing. No cough or wheezing.   Abd:  Soft, appropriately tender to palpation, non-distended.  Fundus at the umbilicus, difficult to assess with obesity  Incision: c/d/i  Ext:  Non-tender, 1+ LE edema b/l    Hgb:   Hemoglobin   Date Value Ref Range Status   2019 10.2 (L) 11.7 - 15.7 g/dL Final     Assessment/Plan:  38 year old  on POD #2 s/p PLTCS with distal salpingectomy for transverse lie & desire of permanent sterilization, initially a CNM patient. Doing well postpartum.    #Low UOP: S/p 500cc bolus, now improved    #gHTN: BPs normo to low mild range  - HELLP labs wnl on admit  - mild headache, feels it is minor  - daily weights, strict I/Os - diueresing    #H/o anxiety, using medical marijuana  - Admit UDS pos for THC  - SW has seen, following PRN    #Hypothyroidism s/p thyroidectomy for Grave's: + thyroid stim antibodies  - reduced to prepregnancy synthroid 150mcg    Continue with routine postpartum management.  Pain: Ok controlled with sched tylenol, ibuprofen, PRN oxy  Hgb: 12.1>> 10.2. VSS as noted above,  asymptomatic.   GI:  BID Senna/Colace.  PRN Simethicone.   PPx:  Encouraged ambulation   Rh: Positive  Rubella: Immune  Feed: Breastfeeding  BC: S/p salpingectomy  Dispo: Plan for home tomorrow possibly    Gaviota Armstrong MD   OB/Gyn Resident, PGY-3

## 2019-05-09 VITALS
DIASTOLIC BLOOD PRESSURE: 76 MMHG | WEIGHT: 293 LBS | SYSTOLIC BLOOD PRESSURE: 128 MMHG | HEART RATE: 100 BPM | TEMPERATURE: 97.5 F | RESPIRATION RATE: 16 BRPM | HEIGHT: 68 IN | OXYGEN SATURATION: 99 % | BODY MASS INDEX: 44.41 KG/M2

## 2019-05-09 LAB — PLATELET # BLD AUTO: 228 10E9/L (ref 150–450)

## 2019-05-09 PROCEDURE — 85049 AUTOMATED PLATELET COUNT: CPT | Performed by: OBSTETRICS & GYNECOLOGY

## 2019-05-09 PROCEDURE — 25000132 ZZH RX MED GY IP 250 OP 250 PS 637: Performed by: OBSTETRICS & GYNECOLOGY

## 2019-05-09 PROCEDURE — 25000128 H RX IP 250 OP 636: Performed by: OBSTETRICS & GYNECOLOGY

## 2019-05-09 PROCEDURE — 25000132 ZZH RX MED GY IP 250 OP 250 PS 637: Performed by: STUDENT IN AN ORGANIZED HEALTH CARE EDUCATION/TRAINING PROGRAM

## 2019-05-09 PROCEDURE — 36415 COLL VENOUS BLD VENIPUNCTURE: CPT | Performed by: OBSTETRICS & GYNECOLOGY

## 2019-05-09 PROCEDURE — 25000131 ZZH RX MED GY IP 250 OP 636 PS 637: Performed by: STUDENT IN AN ORGANIZED HEALTH CARE EDUCATION/TRAINING PROGRAM

## 2019-05-09 RX ORDER — OXYCODONE HYDROCHLORIDE 5 MG/1
5-10 TABLET ORAL EVERY 4 HOURS PRN
Qty: 30 TABLET | Refills: 0 | Status: SHIPPED | OUTPATIENT
Start: 2019-05-09 | End: 2019-05-13

## 2019-05-09 RX ORDER — ACETAMINOPHEN 325 MG/1
650 TABLET ORAL EVERY 4 HOURS PRN
Qty: 100 TABLET | Refills: 0 | Status: SHIPPED | OUTPATIENT
Start: 2019-05-09 | End: 2019-11-06

## 2019-05-09 RX ADMIN — Medication 200 MG: at 08:24

## 2019-05-09 RX ADMIN — OXYCODONE HYDROCHLORIDE 10 MG: 5 TABLET ORAL at 06:15

## 2019-05-09 RX ADMIN — OXYCODONE HYDROCHLORIDE 10 MG: 5 TABLET ORAL at 12:53

## 2019-05-09 RX ADMIN — ONDANSETRON 4 MG: 4 TABLET, ORALLY DISINTEGRATING ORAL at 03:00

## 2019-05-09 RX ADMIN — IBUPROFEN 800 MG: 800 TABLET ORAL at 08:25

## 2019-05-09 RX ADMIN — OXYCODONE HYDROCHLORIDE 10 MG: 5 TABLET ORAL at 09:10

## 2019-05-09 RX ADMIN — LEVOTHYROXINE SODIUM 150 MCG: 75 TABLET ORAL at 06:15

## 2019-05-09 RX ADMIN — ACETAMINOPHEN 975 MG: 325 TABLET, FILM COATED ORAL at 06:15

## 2019-05-09 RX ADMIN — ENOXAPARIN SODIUM 40 MG: 40 INJECTION SUBCUTANEOUS at 09:11

## 2019-05-09 RX ADMIN — SENNOSIDES AND DOCUSATE SODIUM 2 TABLET: 8.6; 5 TABLET ORAL at 08:25

## 2019-05-09 RX ADMIN — MONTELUKAST 10 MG: 10 TABLET, FILM COATED ORAL at 08:24

## 2019-05-09 RX ADMIN — OXYCODONE HYDROCHLORIDE 10 MG: 5 TABLET ORAL at 00:05

## 2019-05-09 RX ADMIN — ONDANSETRON 4 MG: 4 TABLET, ORALLY DISINTEGRATING ORAL at 08:50

## 2019-05-09 RX ADMIN — OXYCODONE HYDROCHLORIDE 10 MG: 5 TABLET ORAL at 03:00

## 2019-05-09 RX ADMIN — IBUPROFEN 800 MG: 800 TABLET ORAL at 00:05

## 2019-05-09 ASSESSMENT — MIFFLIN-ST. JEOR: SCORE: 2112.77

## 2019-05-09 NOTE — PLAN OF CARE
VSS. AFebrile. Up ad luis. Had hard, impacted stools. Writer helped with a little dig stim and able to evacuate bowels better. C/o pain and medicated with relief. Zofran given as a precaution for nausea. Breast feeding well. Continues to manage own medical cannabis. Will continue to monitor.

## 2019-05-09 NOTE — PLAN OF CARE
Data: Vital signs within normal limits. Postpartum checks within normal limits - see flow record. Patient eating and drinking normally. Patient able to empty bladder independently and is up ambulating. No apparent signs of infection. Incision healing well. Patient performing self cares and is able to care for infant.  Action: Patient medicated during the shift for pain. See MAR. Patient reassessed within 1 hour after each medication and pain was improved - patient stated she was comfortable. Patient education done about breastfeeding, lochia, and treatment plan. See flow record.  Response: Positive attachment behaviors observed with infant.   Plan: Will continue plan of care.

## 2019-05-09 NOTE — PLAN OF CARE
VSS. AFebrile. C/o pain and medicated with relief. Steristrips c/d/I. Breast feeding well. Discharge instructions and meds reviewed and given to pt. Has old breast pump at home that will use. Attentive to baby's needs. Discharged today.

## 2019-05-09 NOTE — PROGRESS NOTES
Courtesy visit by RAVEN. Encouraged to call if she has any concerns we can help her with.     Fior Richmond CNM

## 2019-05-09 NOTE — PLAN OF CARE
Data: Vital signs within normal limits. Postpartum checks within normal limits - see flow record. Patient eating and drinking normally. Patient able to empty bladder independently and is up ambulating. No apparent signs of infection. Incision healing well. Patient performing self cares and is able to care for infant. Breastfeeding baby on demand. Started pumping overnight due to mother feeling full-pumped 25mL which is in nursery fridge.   Action: Patient medicated during the shift for pain. See MAR. Patient reassessed within 1 hour after each medication and pain was improved - patient stated she was comfortable. Patient education done about electric breast pump. See flow record.  Response: Positive attachment behaviors observed with infant.   Plan: Anticipate discharge on 5/9.

## 2019-05-09 NOTE — DISCHARGE INSTRUCTIONS
Postop  Birth Instructions    Activity       Do not lift more than 10 pounds for 6 weeks after surgery.  Ask family and friends for help when you need it.    No driving until you have stopped taking your pain medications (usually two weeks after surgery).    No heavy exercise or activity for 6 weeks.  Don't do anything that will put a strain on your surgery site.    Don't strain when using the toilet.  Your care team may prescribe a stool softener if you have problems with your bowel movements.     To care for your incision:       Keep the incision clean and dry.    Do not soak your incision in water. No swimming or hot tubs until it has fully healed. You may soak in the bathtub if the water level is below your incision.    Do not use peroxide, gel, cream, lotion, or ointment on your incision.    Adjust your clothes to avoid pressure on your surgery site (check the elastic in your underwear for example).     You may see a small amount of clear or pink drainage and this is normal.  Check with your health care provider:       If the drainage increases or has an odor.    If the incision reddens, you have swelling, or develop a rash.    If you have increased pain and the medicine we prescribed doesn't help.    If you have a fever above 100.4 F (38 C) with or without chills when placing thermometer under your tongue.   The area around your incision (surgery wound), will feel numb.  This is normal. The numbness should go away in less than a year.     Keep your hands clean:  Always wash your hands before touching your incision (surgery wound). This helps reduce your risk of infection. If your hands aren't dirty, you may use an alcohol hand-rub to clean your hands. Keep your nails clean and short.    Call your healthcare provider if you have any of these symptoms:       You soak a sanitary pad with blood within 1 hour, or you see blood clots larger than a golf ball.    Bleeding that lasts more than 6  weeks.    Vaginal discharge that smells bad.    Severe pain, cramping or tenderness in your lower belly area.    A need to urinate more frequently (use the toilet more often), more urgently (use the toilet very quickly), or it burns when you urinate.    Nausea and vomiting.    Redness, swelling or pain around a vein in your leg.    Problems breastfeeding or a red or painful area on your breast.    Chest pain and cough or are gasping for air.    Problems with coping with sadness, anxiety or depression. If you have concerns about hurting yourself or the baby, call your provider immediately.      You have questions or concerns after you return home.     Preeclampsia   Call your doctor right away if you have any of the following:  - Edema (swelling) in your face or hands  - Rapid weight gain-about 1 pound or more in a day  - Headache  - Abdominal pain on your right side  - Vision problems (flashes or spots)  - You have questions or concerns once you return home.

## 2019-05-09 NOTE — PROGRESS NOTES
[facility]  Postpartum Note    Name:  Cathy Arroyo  MRN: [id number]    S: Feeling well.  Pain well controlled. Tolerating regular diet, denies nausea, tolerating regular diet. Ambulating without dizziness.  Voiding, passing flatus, had bowel movement. Lochia similar to menstrual flow.  Breast feeding. S/p salpingectomy for contraception. Denies HA, vision changes, chest pain, SOB, upper abd pain.    O:   Patient Vitals for the past 24 hrs:   BP Temp Temp src Pulse Heart Rate Resp Weight   19 0625 -- -- -- -- -- -- 137.4 kg (303 lb)   19 0000 135/82 98.3  F (36.8  C) Oral -- 81 16 --   19 2000 126/65 98.4  F (36.9  C) Oral -- 93 18 --   19 1550 130/84 98.4  F (36.9  C) Oral -- 100 18 --   19 1441 138/83 98.4  F (36.9  C) Oral 88 -- 20 --   19 0900 127/65 98.2  F (36.8  C) Oral 91 -- 20 --     Weight: 302# (5/6) > 297# 9oz (5/7) > 296# 12oz (8) > 303#    I/O last 3 completed shifts:  In: 4890 [P.O.:4890]  Out: 3690 [Urine:3690]   1 unmeasured void  -200ml/hr    Gen:  Resting comfortably, NAD  CV:  Regular rate and rhythm   Pulm:  Non-labored breathing. No cough or wheezing.   Abd:  Soft, appropriately tender to palpation, non-distended.  Fundus at the umbilicus, difficult to assess with obesity  Incision: c/d/i  Ext:  Non-tender, 1+ LE edema b/l    Hgb:   Hemoglobin   Date Value Ref Range Status   2019 10.2 (L) 11.7 - 15.7 g/dL Final     Assessment/Plan:  38 year old  on POD#3 s/p PLTCS with distal salpingectomy for transverse lie & desire of permanent sterilization, initially a CNM patient. Doing well postpartum.    #gHTN: BPs normo to low mild range  - HELLP labs wnl on admit  - HA a few times yesterday, none this AM  - daily weights, strict I/Os - up per I/Os and weight (1# since del, 6# since yesterday) Given not yet diuresing some concern that blood pressure may worsen. Will discuss with staff plan for lasix today with  discharge today and BP check tmr vs monitoring for another day    #H/o anxiety, using medical marijuana  - Admit UDS pos for THC  - SW has seen, following PRN    #Hypothyroidism s/p thyroidectomy for Grave's: + thyroid stim antibodies  - reduced to prepregnancy synthroid 150mcg    Continue with routine postpartum management.  Pain: Well controlled with sched tylenol, ibuprofen, PRN oxy  Hgb: 12.1>> 10.2. VSS as noted above, asymptomatic.   GI:  BID Senna/Colace.  PRN Simethicone.   PPx:  Encouraged ambulation   Rh: Positive  Rubella: Immune  Feed: Breastfeeding  BC: S/p salpingectomy  Dispo: Plan for home possibly today pending BP    Gaviota Armstrong MD   OB/Gyn Resident, PGY-3    Physician Attestation   I, Aliya Brooks, personally examined and evaluated this patient.  I discussed the patient with the medical student and/or resident and care team, and agree with the assessment and plan of care as documented in the note of 2019  [date].      I personally reviewed vital signs, medications, labs and exam.    Key findings: 38 year old  on POD 3 s/p PLTCS for malpresentation.   Discharge to home today.   # Pain Assessment:  Current Pain Score 2019   Patient currently in pain? yes   Pain score (0-10) -   Pain location -   Pain descriptors Sore   - Cathy is experiencing pain due to surgery. Pain management was discussed and the plan was created in a collaborative fashion.  Cathy's response to the current recommendations: engaged  - Opioid regimen: 5-10mg PO q4h. Took 40mg PO so far today.   - Response to opioid medications: Reduction of symptoms   - Bowel regimen: senna and docusate    Reviewed discharge instructions including activity restrictions, pelvic rest and follow up plan. Reviewed signs of excessive bleeding, infection, postpartum pre-eclampsia, mastitis, DVT and postpartum depression - instructed patient to call if concerned about any of these or other concerns. Patient to follow  up in 1 week for BP and incision check, then 6 weeks for routine postpartum visit, s/p salpingectomy for contraception. All questions answered.    Aliya Brooks MD  Date of Service (when I saw the patient): 05/09/19

## 2019-05-13 ENCOUNTER — PRENATAL OFFICE VISIT (OUTPATIENT)
Dept: MIDWIFE SERVICES | Facility: CLINIC | Age: 39
End: 2019-05-13
Payer: COMMERCIAL

## 2019-05-13 VITALS
TEMPERATURE: 97.5 F | DIASTOLIC BLOOD PRESSURE: 89 MMHG | SYSTOLIC BLOOD PRESSURE: 139 MMHG | BODY MASS INDEX: 43.8 KG/M2 | HEART RATE: 90 BPM | WEIGHT: 288 LBS

## 2019-05-13 DIAGNOSIS — R30.0 DYSURIA: ICD-10-CM

## 2019-05-13 DIAGNOSIS — Z98.891 S/P CESAREAN SECTION: Primary | ICD-10-CM

## 2019-05-13 PROBLEM — Z34.80 SUPERVISION OF OTHER NORMAL PREGNANCY, ANTEPARTUM: Status: RESOLVED | Noted: 2018-10-16 | Resolved: 2019-05-13

## 2019-05-13 LAB
ALBUMIN UR-MCNC: NEGATIVE MG/DL
APPEARANCE UR: CLEAR
BACTERIA #/AREA URNS HPF: ABNORMAL /HPF
BILIRUB UR QL STRIP: NEGATIVE
COLOR UR AUTO: YELLOW
COPATH REPORT: NORMAL
GLUCOSE UR STRIP-MCNC: NEGATIVE MG/DL
HGB UR QL STRIP: ABNORMAL
KETONES UR STRIP-MCNC: NEGATIVE MG/DL
LEUKOCYTE ESTERASE UR QL STRIP: ABNORMAL
NITRATE UR QL: NEGATIVE
NON-SQ EPI CELLS #/AREA URNS LPF: ABNORMAL /LPF
PH UR STRIP: 7.5 PH (ref 5–7)
RBC #/AREA URNS AUTO: ABNORMAL /HPF
SOURCE: ABNORMAL
SP GR UR STRIP: 1.01 (ref 1–1.03)
UROBILINOGEN UR STRIP-ACNC: 0.2 EU/DL (ref 0.2–1)
WBC #/AREA URNS AUTO: ABNORMAL /HPF

## 2019-05-13 PROCEDURE — 87086 URINE CULTURE/COLONY COUNT: CPT | Performed by: ADVANCED PRACTICE MIDWIFE

## 2019-05-13 PROCEDURE — 81001 URINALYSIS AUTO W/SCOPE: CPT | Performed by: ADVANCED PRACTICE MIDWIFE

## 2019-05-13 PROCEDURE — 99024 POSTOP FOLLOW-UP VISIT: CPT | Performed by: ADVANCED PRACTICE MIDWIFE

## 2019-05-13 RX ORDER — OXYCODONE HYDROCHLORIDE 5 MG/1
5 TABLET ORAL EVERY 6 HOURS PRN
Qty: 5 TABLET | Refills: 0 | Status: SHIPPED | OUTPATIENT
Start: 2019-05-13 | End: 2019-05-13

## 2019-05-13 NOTE — NURSING NOTE
"Chief Complaint   Patient presents with     Post Partum Exam       Initial /89 (BP Location: Left arm, Patient Position: Sitting, Cuff Size: Adult Regular)   Pulse 90   Temp 97.5  F (36.4  C) (Oral)   Wt 130.6 kg (288 lb)   LMP 2018   BMI 43.80 kg/m   Estimated body mass index is 43.8 kg/m  as calculated from the following:    Height as of 19: 1.727 m (5' 7.99\").    Weight as of this encounter: 130.6 kg (288 lb).  BP completed using cuff size: regular    Questioned patient about current smoking habits  Patient quit some time ago          The following HM Due: NONE      The following patient reported/Care Every where data was sent to:  P ABSTRACT QUALITY INITIATIVES [[id number]]  MICHELE Warren           "

## 2019-05-13 NOTE — PROGRESS NOTES
"S: Cathy is here with her , daughter and  for 1 week postpartum check. Her pregnancy was complicated by gestational hypertension and her baby was breech, so C/S performed on 19 by Dr. Rutledge for transverse presentation. She had no post-op complications, and is here today for BP, incision and mood check. She has a history of PTSD and anxiety for which she takes medical marijuana. Today, she complains of continued pain in her abdomen requiring continued use of her narcotic pain medication. She has weaned to only 5mg every 6 hours from 10mg, but does not feel that she can completely stop it yet. She is also taking ibuprofen every 6 hours. She has stopped taking the tylenol because \"it wasn't doing anything\". She complains of soreness in her sides, near her tap block injection sites as being the most painful. She is trying to increase her activity, and tries to get outside once per day, but usually has to come in and lay down because the pain is just too much. As far as her mood is concerned, she describes \"losing it\" a few times, but feels that her mood is more stable than it was after her first child is born. She describes attempting open communication with her partner, and that helps when she is feeling really stressed. She continues to take her medical cannabis as prescribed.    Review Of Systems  Skin: itching, soreness and odor at C/S incision site  Gastrointestinal: constipation  Genitourinary: positive for dysuria  Psychiatric: positive for feeling anxious and labile mood, negative for thoughts of self-harm and thoughts of hurting someone else    O:/89 (BP Location: Left arm, Patient Position: Sitting, Cuff Size: Adult Regular)   Pulse 90   Temp 97.5  F (36.4  C) (Oral)   Wt 130.6 kg (288 lb)   LMP 2018   BMI 43.80 kg/m    Exam:  Constitutional: healthy, alert and mild distress  Respiratory: negative  Gastrointestinal: positive findings: incision is well approximated and no " drainage noted; approx 2 inch in diameter bruises on lateral abdomen at sites of TAP block injections  Skin: erythema around steri-strips covering C/S incision;  Psychiatric: mentation appears normal, fatigued and judgment and insight intact  UA:  Results for CATHY CABAN (MRN [id number]) as of 2019 14:22   Ref. Range 2019 13:36   Color Urine Unknown Yellow   Appearance Urine Unknown Clear   Glucose Urine Latest Ref Range: NEG^Negative mg/dL Negative   Bilirubin Urine Latest Ref Range: NEG^Negative  Negative   Ketones Urine Latest Ref Range: NEG^Negative mg/dL Negative   Specific Gravity Urine Latest Ref Range: 1.003 - 1.035  1.010   pH Urine Latest Ref Range: 5.0 - 7.0 pH 7.5 (H)   Protein Albumin Urine Latest Ref Range: NEG^Negative mg/dL Negative   Urobilinogen Urine Latest Ref Range: 0.2 - 1.0 EU/dL 0.2   Nitrite Urine Latest Ref Range: NEG^Negative  Negative   Blood Urine Latest Ref Range: NEG^Negative  Large (A)   Leukocyte Esterase Urine Latest Ref Range: NEG^Negative  Trace (A)   Source Unknown Midstream Urine   WBC Urine Latest Ref Range: OTO5^0 - 5 /HPF 5-10 (A)   RBC Urine Latest Ref Range: OTO2^O - 2 /HPF 2-5 (A)   Bacteria Urine Latest Ref Range: NEG^Negative /HPF Few (A)   Squamous Epithelial /LPF Urine Latest Ref Range: FEW^Few /LPF Few     A/P:  (Z98.891) S/P  section  (primary encounter diagnosis)  Plan: UA with Microscopic reflex to Culture,   Discussed patient with Dr. Colón, ok to remove steri-strips and prescribe 5 more 5mg oxycodone tabs if needed, but that is it.  Steri-strips removed from incision. Discussed still not tub soaks, but ok to wash/dry incision. No creams for now, expect resolution of erythema now that adhesive is gone.  Discussed adding tylenol back in to supplement the oxycodone, Cathy verbalizes understanding. She is going to try to get by with the supply she has at home by adding tylenol back in, and continuing the scheduled ibuprofen. If her  pain is not well controlled, will call the clinic. Did NOT send her with a prescription for more oxycodone today.   Discussed her resources for managing her mood. She has a therapist, and will move up her scheduled June appointment if needed. Will continue her medical cannabis as ordered by other practitioner.   Discussed s/s of mastitis and when to call.   Return to care for any concerns with incision, or if skin irritation is not improving.     (R30.0) Dysuria  Plan: UA with Microscopic reflex to Culture, Urine         Culture Aerobic Bacterial  Will send urine for culture and treat based on culture results as needed.    Return to care at 6 weeks postpartum, or sooner for preeclampsia symptoms, uncontrolled pain, incision concerns, mood instability, or other concerns.  Calin Alejandro CNM

## 2019-05-14 LAB
BACTERIA SPEC CULT: NORMAL
SPECIMEN SOURCE: NORMAL

## 2019-05-15 NOTE — PROGRESS NOTES
SUBJECTIVE:   Cathy Arroyo is a 38 year old female who presents to clinic today for the following   health issues:    Acute Illness   Acute illness concerns: cough  Onset: at least 2 weeks ago     Fever: no     Chills/Sweats: YES    Headache (location?): YES    Sinus Pressure:YES    Conjunctivitis:  no    Ear Pain: no    Rhinorrhea: YES    Congestion: YES    Sore Throat: no      Cough: YES    Wheeze: no    Decreased Appetite: no    Nausea: no    Vomiting: no    Diarrhea:  no     Dysuria/Freq.: YES- but might be the catheter     Fatigue/Achiness: YES    Sick/Strep Exposure: no      Therapies Tried and outcome: Robitussin, warm shower and have baby in the shower for the steam.       is 10 days old and doing well - breastfeeding good, milk supply great.  Patient is tearful and worries often about measles among other worries - it keeps her up at night.  Their relatives did not vaccinate their kids and are their only support in the city and who helps with the kids.  She doesn't think her childhood records will be recoverable and would like to do a booster shot today.  Otherwise mood is relatively stable and she reports no jose eduardo despite lack of sleep and tears are no associated with deep depression.  Currently only using medical cannabis for bipolar.  Had akasthesia reaction to Abilify, but would consider another atypical antipsychotic if needed.  Cannot take lithium while nursing    Taking allergy medication - zyrtec and singulair.  Not taking flonase.    Additional history: as documented    Reviewed  and updated as needed this visit by clinical staff  Tobacco  Allergies  Meds  Med Hx  Surg Hx  Fam Hx  Soc Hx        Reviewed and updated as needed this visit by Provider         ROS:  Constitutional, HEENT, cardiovascular, pulmonary, gi and gu systems are negative, except as otherwise noted.    OBJECTIVE:     /88   Pulse 106   Temp 98.8  F (37.1  C) (Oral)   SpO2 96%   There is no height  or weight on file to calculate BMI.  GENERAL: healthy, alert and no distress  EYES: Eyes grossly normal to inspection, PERRL and conjunctivae and sclerae normal  HENT: ear canals and TM's normal, nose and mouth without ulcers or lesions  NECK: no adenopathy, no asymmetry, masses, or scars and thyroid normal to palpation  RESP: lungs clear to auscultation - no rales, rhonchi or wheezes; dry cough noted  CV: regular rate and rhythm, normal S1 S2, no S3 or S4, no murmur, click or rub, no peripheral edema and peripheral pulses strong  PSYCH: mentation appears normal, affect normal/bright, tearful at times    Diagnostic Test Results:  none     ASSESSMENT/PLAN:     Asthma: Moderate persistent--controlled   Plan:  No changes in the patient's current treatment plan  Addressed due to URI      (J06.9,  B97.89) Viral URI with cough  (primary encounter diagnosis)  Comment:   Plan: Remarkably good lung sounds despite URI.  Keep allergies under control and hopefully avoid asthma exacerbation.  Ok to take robitussin DM with nursing.    (J45.40) Moderate persistent asthma without complication  Comment:   Plan: montelukast (SINGULAIR) 10 MG tablet,         fluticasone (FLONASE) 50 MCG/ACT nasal spray            (J30.2) Seasonal allergic rhinitis, unspecified trigger  Comment:   Plan: montelukast (SINGULAIR) 10 MG tablet,         fluticasone (FLONASE) 50 MCG/ACT nasal spray            (F31.70) Bipolar affective disorder in remission (H)  Comment:   Plan: Normal tearfulness postpartum. Will have close contact in postpartum period.    (Z23) Need for measles-mumps-rubella (MMR) vaccine  Comment:   Plan: MMR, SUBQ (12+ MO)        In age group that may have had one dose of MMR.  Reasonable to give booster.      See Patient Instructions    JANNET Liu [facility]

## 2019-05-16 ENCOUNTER — OFFICE VISIT (OUTPATIENT)
Dept: FAMILY MEDICINE | Facility: CLINIC | Age: 39
End: 2019-05-16
Payer: COMMERCIAL

## 2019-05-16 VITALS
SYSTOLIC BLOOD PRESSURE: 136 MMHG | OXYGEN SATURATION: 96 % | DIASTOLIC BLOOD PRESSURE: 88 MMHG | HEART RATE: 106 BPM | TEMPERATURE: 98.8 F

## 2019-05-16 DIAGNOSIS — J30.2 SEASONAL ALLERGIC RHINITIS, UNSPECIFIED TRIGGER: ICD-10-CM

## 2019-05-16 DIAGNOSIS — J45.40 MODERATE PERSISTENT ASTHMA WITHOUT COMPLICATION: ICD-10-CM

## 2019-05-16 DIAGNOSIS — J06.9 VIRAL URI WITH COUGH: Primary | ICD-10-CM

## 2019-05-16 DIAGNOSIS — F31.70 BIPOLAR AFFECTIVE DISORDER IN REMISSION (H): ICD-10-CM

## 2019-05-16 DIAGNOSIS — Z23 NEED FOR MEASLES-MUMPS-RUBELLA (MMR) VACCINE: ICD-10-CM

## 2019-05-16 PROCEDURE — 90707 MMR VACCINE SC: CPT | Performed by: NURSE PRACTITIONER

## 2019-05-16 PROCEDURE — 90471 IMMUNIZATION ADMIN: CPT | Performed by: NURSE PRACTITIONER

## 2019-05-16 PROCEDURE — 99214 OFFICE O/P EST MOD 30 MIN: CPT | Mod: 25 | Performed by: NURSE PRACTITIONER

## 2019-05-16 RX ORDER — FLUTICASONE PROPIONATE 50 MCG
1 SPRAY, SUSPENSION (ML) NASAL DAILY
Qty: 16 G | Refills: 3 | Status: SHIPPED | OUTPATIENT
Start: 2019-05-16 | End: 2019-09-30

## 2019-05-16 RX ORDER — MONTELUKAST SODIUM 10 MG/1
10 TABLET ORAL EVERY MORNING
Qty: 90 TABLET | Refills: 3 | Status: SHIPPED | OUTPATIENT
Start: 2019-05-16 | End: 2020-03-20

## 2019-05-20 ENCOUNTER — NURSE TRIAGE (OUTPATIENT)
Dept: NURSING | Facility: CLINIC | Age: 39
End: 2019-05-20

## 2019-05-20 PROCEDURE — 99283 EMERGENCY DEPT VISIT LOW MDM: CPT | Performed by: EMERGENCY MEDICINE

## 2019-05-20 PROCEDURE — 99283 EMERGENCY DEPT VISIT LOW MDM: CPT | Mod: Z6 | Performed by: EMERGENCY MEDICINE

## 2019-05-20 ASSESSMENT — MIFFLIN-ST. JEOR: SCORE: 1966.39

## 2019-05-21 ENCOUNTER — HOSPITAL ENCOUNTER (EMERGENCY)
Facility: CLINIC | Age: 39
Discharge: HOME | End: 2019-05-21
Attending: EMERGENCY MEDICINE | Admitting: EMERGENCY MEDICINE
Payer: COMMERCIAL

## 2019-05-21 VITALS
BODY MASS INDEX: 41.03 KG/M2 | HEIGHT: 68 IN | OXYGEN SATURATION: 97 % | SYSTOLIC BLOOD PRESSURE: 126 MMHG | DIASTOLIC BLOOD PRESSURE: 76 MMHG | TEMPERATURE: 97.1 F | WEIGHT: 270.7 LBS | HEART RATE: 100 BPM | RESPIRATION RATE: 16 BRPM

## 2019-05-21 RX ORDER — FLUCONAZOLE 150 MG/1
TABLET ORAL
Qty: 2 TABLET | Refills: 0 | Status: SHIPPED | OUTPATIENT
Start: 2019-05-21 | End: 2019-09-19

## 2019-05-21 RX ORDER — CEPHALEXIN 500 MG/1
500 CAPSULE ORAL 4 TIMES DAILY
Qty: 40 CAPSULE | Refills: 0 | Status: SHIPPED | OUTPATIENT
Start: 2019-05-21 | End: 2019-09-19

## 2019-05-21 RX ORDER — CEPHALEXIN 500 MG/1
500 CAPSULE ORAL 4 TIMES DAILY
Qty: 28 CAPSULE | Refills: 0 | Status: SHIPPED | OUTPATIENT
Start: 2019-05-21 | End: 2019-05-21

## 2019-05-21 NOTE — CONSULTS
[facility] History and Physical  Gynecology Consult     Cathy Arroyo MRN# [id number]   Age: 38 year old YOB: 1980     Date of Admission: 2019    Primary care provider: Sherrill Dasilva    CC/HPI:  Cathy Arroyo is a 38 year old 2 wks s/p PLTCS + BTL on 19 for fetal transverse lie with new diagnosis of gHTN at term, who presents now with drainage from the right aspect of her incision. She was seen in clinic last Mon for incision check and noted continued pain at that time--no infection was noted, she was recommended to restart tylenol in addition to continuing ibuprofen. She is now taking those scheduled and still feels like she has baseline 5/10 pain and has a new URI and with coughing pain is severe. Today the incision opened and started draining white fluid with a bad odor. She cannot see the incision and does not want to look. Has noted the area seems moist though in general. Denies fever, chills, nausea, vomiting. Did note erythema near the incision has she had a reaction to the steristrips, but this doesn't seem to have gotten worse (again doesn't look mostly). She is eating and drinking normally and denies any other complaints.    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     Post partum depression      Post-surgical hypothyroidism 2017     Uncomplicated asthma        PSHx:   Past Surgical History:   Procedure Laterality Date     BLOOD PATCH N/A 10/11/2016    Procedure: EPIDURAL BLOOD PATCH;  Surgeon: GENERIC ANESTHESIA PROVIDER;  Location: [facility] OR      SECTION, TUBAL LIGATION, COMBINED N/A 2019    Procedure:  SECTION, WITH TUBAL LIGATION;  Surgeon: Kathy Rutledge  MD Rain;  Location: [facility] L+D     CRANIOTOMY, EXCISE TUMOR COMPLEX, COMBINED  2/19/2014    Procedure: COMBINED CRANIOTOMY, EXCISE TUMOR COMPLEX;  Supracerabellar  Infratentorial Approach for Resection of Tumor ;  Surgeon: Kate Mckeon MD;  Location: [facility] OR     THYROIDECTOMY N/A 5/3/2017    Procedure: THYROIDECTOMY;  Total Thyroidectomy;  Surgeon: Pamela Villasenor MD;  Location:  OR       Medications:     No current facility-administered medications on file prior to encounter.   Current Outpatient Medications on File Prior to Encounter:  acetaminophen (TYLENOL) 325 MG tablet Take 2 tablets (650 mg) by mouth every 4 hours as needed for mild pain   acetaminophen (TYLENOL) 325 MG tablet Take 650 mg by mouth every 4 hours as needed for mild pain   cetirizine (ZYRTEC) 5 MG tablet Take 5 mg by mouth every morning When not using the Allegra   Docosahexaenoic Acid (DHA NATURAL OMEGA-3) 200 MG capsule Take 200 mg by mouth every morning   fexofenadine (ALLEGRA) 60 MG tablet Take 60 mg by mouth every morning   fluticasone (FLONASE) 50 MCG/ACT nasal spray Spray 1 spray into both nostrils daily   ibuprofen (ADVIL/MOTRIN) 800 MG tablet Take 1 tablet (800 mg) by mouth every 6 hours as needed for other (cramping)   ipratropium - albuterol 0.5 mg/2.5 mg/3 mL (DUONEB) 0.5-2.5 (3) MG/3ML neb solution Take 1 vial (3 mLs) by nebulization every 6 hours as needed for shortness of breath / dyspnea or wheezing   levothyroxine (SYNTHROID/LEVOTHROID) 150 MCG tablet Take 1 tablet (150 mcg) by mouth daily   medical cannabis (Patient's own supply) Take 1 Dose by mouth See Admin Instructions (The purpose of this order is to document that the patient reports taking medical cannabis.  This is not a prescription, and is not used to certify that the patient has a qualifying medical condition.)Tangerine Oral Suspension Unflavored 1-5 ml up to two times daily.Total THC = 240 mg, Total CBD Trace   medical cannabis (Patient's own  "supply) Take 1 Dose by mouth See Admin Instructions (The purpose of this order is to document that the patient reports taking medical cannabis.  This is not a prescription, and is not used to certify that the patient has a qualifying medical condition.)Cobalt oral suspension: take 1-3 ml by mouth two times dailyTotal CBD 1200 mg. Total THC 60 mg   Misc. Devices (BREAST PUMP) MISC 1 each as needed (Breast feeding)   montelukast (SINGULAIR) 10 MG tablet Take 1 tablet (10 mg) by mouth every morning   Prenatal Vit-Fe Fumarate-FA (PRENATAL VITAMIN PO) Take 1 tablet by mouth every morning    senna-docusate (SENOKOT-S/PERICOLACE) 8.6-50 MG tablet Take 2 tablets by mouth 2 times daily as needed for constipation       Allergies:      Allergies   Allergen Reactions     Adacel [Daptacel] Swelling     Codeine Sulfate Nausea and Vomiting     Tetanus Toxoid      Pt states she had an infection at injection site.      Vicodin [Hydrocodone-Acetaminophen] Nausea and Vomiting     Methimazole Rash       Social History:  Lives w/  and 2 children.    Physical Exam:   Vitals:    05/20/19 2316   BP: 135/87   Pulse: 106   Resp: 16   Temp: 99.1  F (37.3  C)   TempSrc: Oral   SpO2: 97%   Weight: 122.8 kg (270 lb 11.2 oz)   Height: 1.727 m (5' 8\")      Gen: sitting up and pumping when I walked in room, NAD  CV: RRR  Lungs: nonlabored breathing  Abdomen: obese, soft, nontender  Incision: Pfannensteil incision underneath pannus with contact dermatitis erythema of superior and inferior aspect of incision. No cellulitis-liek erythema noted. A 5mm very superficial opening is on the right aspect of the incision with scant purulent drainage--probed and unable to track at all. Superior to the left corner of the incision is a 1cm firm nodule with no fluctuance. With much pressur on this area and probing of the left corner of the incision was unable to express fluid and no separation of the incision at this site  Extremities: non-tender BLEs, no " edema    Labs: none    A&P: Ms Arroyo is a 40yo  PPD#15 s/p PLTCS + BTL via Pfannenstiel incision with scant purulent drainage and superficial wound separation. Likely very mild cellulitis.  - kelfex 500mg QID x10 days  - instructed patient to call clinic for f/u Fri or Mon to ensure continued improvement  - pain is tolerable with tylenol and ibuprofen and does not want anything else    D/w Dr Charlie Armstrong MD  OB/GYN PGY3  5/21/2019 1:48 AM     Agree with note and plan for antibiotics as she has superficial wound separation and increased risk for infection.     KACEY MENG MD

## 2019-05-21 NOTE — TELEPHONE ENCOUNTER
"Betsey had a  2 weeks ago (19).    About 6 pm, she noticed that her incision is opening and that there is foul smelling drainage.    She reports the area is on the right corner of the incision. The opening is about 1/2\" long and about 1/8\" to 1/4\" wide/gaping.    There is increased redness, warmth and tenderness to the site extending about 1/4\" all around the opening    She reports severe pain for about a week not controlled by pain medication. She rates it 5/10 continuous and about 8/10 when she coughs.    She reports she's had a cough for about 5 days. She feels this might have contributed to the wound opening.    Notified that the on-call provider would be paged. If no provider contact within 20-30 minutes, call back to the nurse line.    9:36 pm - Smart Web page sent to on-call OB Dr. Christine Guerra.    Taylor Mcpherson RN  [facility]        Reason for Disposition    [1] Pus or bad-smelling fluid draining from incision AND [2] no fever    Additional Information    Negative: Sounds like a life-threatening emergency to the triager    Negative: [1] Widespread rash AND [2] bright red, sunburn-like    Negative: Fever > 100.4 F (38.0 C)    Negative: [1] SEVERE pain AND [2] not relieved by pain medications    Negative: [1] SEVERE headache AND [2] not relieved with pain medications (e.g., acetaminophen/Tylenol)    Negative: [1] Vomiting AND [2] persists > 4 hours    Negative: [1] Vomiting AND [2] abdomen is getting more swollen    Negative: [1] Drinking very little AND [2] dehydration suspected (e.g., no urine > 12 hours, very dry mouth, very lightheaded)    Negative: Patient sounds very sick or weak to the triager    Negative: Foul smelling vaginal discharge (i.e., lochia)    Surgical incision symptoms and questions    Protocols used: POST-OP INCISION SYMPTOMS-A-AH, POSTPARTUM -  SYMPTOMS-A-AH, POST-OP SYMPTOMS AND [id number]      "

## 2019-05-21 NOTE — ED AVS SNAPSHOT
[facility], [place], Emergency Department  [address]  [facility] [place] [address]  Phone:  [phone]  Fax:  [phone]                                    Cathy Arroyo   MRN: [id number]    Department:  [facility], Emergency Department   Date of Visit:  5/20/2019           After Visit Summary Signature Page    I have received my discharge instructions, and my questions have been answered. I have discussed any challenges I see with this plan with the nurse or doctor.    ..........................................................................................................................................  Patient/Patient Representative Signature      ..........................................................................................................................................  Patient Representative Print Name and Relationship to Patient    ..................................................               ................................................  Date                                   Time    ..........................................................................................................................................  Reviewed by Signature/Title    ...................................................              ..............................................  Date                                               Time          22EPIC Rev 08/18

## 2019-05-21 NOTE — ED PROVIDER NOTES
History     Chief Complaint   Patient presents with     Wound Dehiscence     post  2 weeks ago. pt noted aroundd 1800  that the end, right,  of incision was draining and open      HPI  Cathy Arroyo is a 38 year old female 2 weeks post  who presents with pain and drainage from  incision. Drainage started today. Incision has been painful for one week. No fever or chills. No other systemic symptoms. Some pain in incision persistent since surgery. She notes the drainage is foul smelling.    I have reviewed the Medications, Allergies, Past Medical and Surgical History, and Social History in the [employer] system.  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     Post partum depression      Post-surgical hypothyroidism 2017     Uncomplicated asthma        Review of Systems   Constitutional: Negative.  Negative for appetite change, chills, diaphoresis, fatigue and fever.   HENT: Negative for congestion and rhinorrhea.    Respiratory: Negative.  Negative for cough, chest tightness and shortness of breath.    Cardiovascular: Negative.  Negative for chest pain, palpitations and leg swelling.   Gastrointestinal: Negative for abdominal distention, abdominal pain, blood in stool, diarrhea, nausea and vomiting.   Genitourinary: Positive for pelvic pain. Negative for difficulty urinating, dysuria and flank pain.   Musculoskeletal: Negative for arthralgias, back pain, myalgias, neck pain and neck stiffness.   Skin: Positive for wound. Negative for color change and rash.        Surgical wound with small superficial opening on the right side with small amount of drainage. Some tenderness at this area. No fluctuance. Small surrounding area of erythema.   Allergic/Immunologic: Negative for  "immunocompromised state.   Neurological: Negative for syncope, weakness and headaches.   Hematological: Does not bruise/bleed easily.   Psychiatric/Behavioral: Negative for confusion.       Physical Exam   BP: 135/87  Pulse: 106  Temp: 99.1  F (37.3  C)  Resp: 16  Height: 172.7 cm (5' 8\")  Weight: 122.8 kg (270 lb 11.2 oz)  SpO2: 97 %      Physical Exam   Constitutional: She is oriented to person, place, and time. She appears well-developed and well-nourished. No distress.   HENT:   Head: Normocephalic and atraumatic.   Mouth/Throat: Oropharynx is clear and moist.   Eyes: Pupils are equal, round, and reactive to light. Conjunctivae and EOM are normal.   Neck: Normal range of motion. Neck supple.   Cardiovascular: Normal rate, regular rhythm, normal heart sounds and intact distal pulses.   Pulmonary/Chest: Effort normal and breath sounds normal. No respiratory distress. She exhibits no tenderness.   Abdominal: Soft. Bowel sounds are normal. There is no tenderness.   Musculoskeletal: Normal range of motion. She exhibits no tenderness.        Cervical back: She exhibits no tenderness.        Thoracic back: She exhibits no tenderness.        Lumbar back: She exhibits no tenderness.   Neurological: She is alert and oriented to person, place, and time.   Skin: Skin is warm and dry. No abrasion and no laceration noted. She is not diaphoretic.   Small opening on right side of surgical incision. Small surrounding area of erythema. No fluctuance. Mild tenderness.   Psychiatric: She has a normal mood and affect. Her behavior is normal.   Nursing note and vitals reviewed.      ED Course        Procedures             Critical Care time:  none             Labs Ordered and Resulted from Time of ED Arrival Up to the Time of Departure from the ED - No data to display         Assessments & Plan (with Medical Decision Making)   Superficial surgical wound infection with small open area. OB consult in the ED; see consult note. Will " start antibiotics. Patient requests medication for possible candidiasis which she often gets when she takes antibiotics. No evidence of severe infection or abscess or sepsis. She is to follow up with OB.    I have reviewed the nursing notes.    I have reviewed the findings, diagnosis, plan and need for follow up with the patient.       Medication List      Started    cephALEXin 500 MG capsule  Commonly known as:  KEFLEX  500 mg, Oral, 4 TIMES DAILY        ASK your doctor about these medications    fluconazole 150 MG tablet  Commonly known as:  DIFLUCAN  Take one tablet now, and one tablet in three days  Ask about: Should I take this medication?            Final diagnoses:   Wound infection following  section, postpartum       2019   [facility], [place], EMERGENCY DEPARTMENT     Avinash Giles MD  19 0059

## 2019-05-21 NOTE — DISCHARGE INSTRUCTIONS
Take antibiotic Keflex as directed.  If needed for yeast infection take diflucan.  Follow up in OB clinic and return if persistent or worsening symptoms.

## 2019-05-23 ENCOUNTER — MYC MEDICAL ADVICE (OUTPATIENT)
Dept: FAMILY MEDICINE | Facility: CLINIC | Age: 39
End: 2019-05-23

## 2019-05-24 NOTE — TELEPHONE ENCOUNTER
[facility]/Provider Pool;  Please see patient's MyChart message     Patient seen in ED for C -section infection, prescribed 10-day course of cephALEXin (KEFLEX) 500 MG capsule    Patient reports diarrhea.     Is there alternative form of antibiotic patient can be given?    Please advise    Thank You!  Faith Espinoza, RN  Triage Nurse

## 2019-05-24 NOTE — TELEPHONE ENCOUNTER
Copied from Dr. Escalante's routing comment:  As this was postc-section wound infection this needs to go to her GYN     MyChart message sent to patient.     Faith Espinoza, RN  Triage Nurse

## 2019-05-28 ENCOUNTER — OFFICE VISIT (OUTPATIENT)
Dept: OBGYN | Facility: CLINIC | Age: 39
End: 2019-05-28
Payer: COMMERCIAL

## 2019-05-28 VITALS
OXYGEN SATURATION: 95 % | BODY MASS INDEX: 40.47 KG/M2 | DIASTOLIC BLOOD PRESSURE: 78 MMHG | SYSTOLIC BLOOD PRESSURE: 122 MMHG | HEART RATE: 104 BPM | TEMPERATURE: 98.6 F | HEIGHT: 68 IN | WEIGHT: 267 LBS

## 2019-05-28 DIAGNOSIS — E66.01 MORBID OBESITY (H): ICD-10-CM

## 2019-05-28 DIAGNOSIS — K52.1 ANTIBIOTIC-ASSOCIATED DIARRHEA: ICD-10-CM

## 2019-05-28 DIAGNOSIS — L08.9 WOUND INFECTION: Primary | ICD-10-CM

## 2019-05-28 DIAGNOSIS — T14.8XXA WOUND INFECTION: Primary | ICD-10-CM

## 2019-05-28 DIAGNOSIS — T36.95XA ANTIBIOTIC-ASSOCIATED DIARRHEA: ICD-10-CM

## 2019-05-28 LAB
C DIFF TOX B STL QL: POSITIVE
SPECIMEN SOURCE: ABNORMAL

## 2019-05-28 PROCEDURE — 87493 C DIFF AMPLIFIED PROBE: CPT | Performed by: OBSTETRICS & GYNECOLOGY

## 2019-05-28 PROCEDURE — 87070 CULTURE OTHR SPECIMN AEROBIC: CPT | Performed by: OBSTETRICS & GYNECOLOGY

## 2019-05-28 PROCEDURE — 99213 OFFICE O/P EST LOW 20 MIN: CPT | Performed by: OBSTETRICS & GYNECOLOGY

## 2019-05-28 PROCEDURE — 87077 CULTURE AEROBIC IDENTIFY: CPT | Performed by: OBSTETRICS & GYNECOLOGY

## 2019-05-28 PROCEDURE — 87186 SC STD MICRODIL/AGAR DIL: CPT | Performed by: OBSTETRICS & GYNECOLOGY

## 2019-05-28 RX ORDER — SULFAMETHOXAZOLE/TRIMETHOPRIM 800-160 MG
1 TABLET ORAL 2 TIMES DAILY
Qty: 20 TABLET | Refills: 0 | Status: SHIPPED | OUTPATIENT
Start: 2019-05-28 | End: 2019-07-29

## 2019-05-28 ASSESSMENT — ENCOUNTER SYMPTOMS
COLOR CHANGE: 0
CARDIOVASCULAR NEGATIVE: 1
RESPIRATORY NEGATIVE: 1
FEVER: 0
NAUSEA: 0
CONFUSION: 0
ARTHRALGIAS: 0
BACK PAIN: 0
SHORTNESS OF BREATH: 0
WEAKNESS: 0
HEADACHES: 0
WOUND: 1
DIARRHEA: 0
NECK PAIN: 0
COUGH: 0
DIAPHORESIS: 0
ABDOMINAL PAIN: 0
CHILLS: 0
APPETITE CHANGE: 0
MYALGIAS: 0
FATIGUE: 0
RHINORRHEA: 0
NECK STIFFNESS: 0
CHEST TIGHTNESS: 0
ABDOMINAL DISTENTION: 0
BRUISES/BLEEDS EASILY: 0
DYSURIA: 0
FLANK PAIN: 0
PALPITATIONS: 0
BLOOD IN STOOL: 0
DIFFICULTY URINATING: 0
VOMITING: 0
CONSTITUTIONAL NEGATIVE: 1

## 2019-05-28 ASSESSMENT — MIFFLIN-ST. JEOR: SCORE: 1949.6

## 2019-05-28 NOTE — NURSING NOTE
"Chief Complaint   Patient presents with     Follow Up       Initial /78   Pulse 104   Temp 98.6  F (37  C) (Oral)   Ht 1.727 m (5' 8\")   Wt 121.1 kg (267 lb)   SpO2 95%   Breastfeeding? Yes   BMI 40.60 kg/m   Estimated body mass index is 40.6 kg/m  as calculated from the following:    Height as of this encounter: 1.727 m (5' 8\").    Weight as of this encounter: 121.1 kg (267 lb).  BP completed using cuff size: large    Questioned patient about current smoking habits.  Pt. quit smoking some time ago.          The following HM Due: NONE      The following patient reported/Care Every where data was sent to:  P ABSTRACT QUALITY INITIATIVES [[id number]]  N/A      patient has appointment for today              "

## 2019-05-28 NOTE — Clinical Note
Please abstract the following data from this visit with this patient into the appropriate field in Epic:Pap smear done on this date: 02/08/2018 (approximately), by this group: [facility], results were NORMAL.

## 2019-05-30 ENCOUNTER — TELEPHONE (OUTPATIENT)
Dept: OBGYN | Facility: CLINIC | Age: 39
End: 2019-05-30

## 2019-05-30 ENCOUNTER — OFFICE VISIT (OUTPATIENT)
Dept: OBGYN | Facility: CLINIC | Age: 39
End: 2019-05-30
Payer: COMMERCIAL

## 2019-05-30 VITALS
TEMPERATURE: 98.2 F | SYSTOLIC BLOOD PRESSURE: 112 MMHG | DIASTOLIC BLOOD PRESSURE: 78 MMHG | WEIGHT: 271 LBS | BODY MASS INDEX: 41.21 KG/M2

## 2019-05-30 DIAGNOSIS — R42 DIZZINESS: Primary | ICD-10-CM

## 2019-05-30 DIAGNOSIS — A04.72 C. DIFFICILE COLITIS: ICD-10-CM

## 2019-05-30 LAB
BASOPHILS # BLD AUTO: 0 10E9/L (ref 0–0.2)
BASOPHILS NFR BLD AUTO: 0.4 %
DIFFERENTIAL METHOD BLD: NORMAL
EOSINOPHIL # BLD AUTO: 0.3 10E9/L (ref 0–0.7)
EOSINOPHIL NFR BLD AUTO: 4 %
ERYTHROCYTE [DISTWIDTH] IN BLOOD BY AUTOMATED COUNT: 14.5 % (ref 10–15)
HCT VFR BLD AUTO: 41.6 % (ref 35–47)
HGB BLD-MCNC: 13.4 G/DL (ref 11.7–15.7)
LYMPHOCYTES # BLD AUTO: 2.5 10E9/L (ref 0.8–5.3)
LYMPHOCYTES NFR BLD AUTO: 31.6 %
MCH RBC QN AUTO: 27.6 PG (ref 26.5–33)
MCHC RBC AUTO-ENTMCNC: 32.2 G/DL (ref 31.5–36.5)
MCV RBC AUTO: 86 FL (ref 78–100)
MONOCYTES # BLD AUTO: 0.9 10E9/L (ref 0–1.3)
MONOCYTES NFR BLD AUTO: 10.9 %
NEUTROPHILS # BLD AUTO: 4.1 10E9/L (ref 1.6–8.3)
NEUTROPHILS NFR BLD AUTO: 53.1 %
PLATELET # BLD AUTO: 384 10E9/L (ref 150–450)
RBC # BLD AUTO: 4.85 10E12/L (ref 3.8–5.2)
WBC # BLD AUTO: 7.8 10E9/L (ref 4–11)

## 2019-05-30 PROCEDURE — 99213 OFFICE O/P EST LOW 20 MIN: CPT | Performed by: OBSTETRICS & GYNECOLOGY

## 2019-05-30 PROCEDURE — 85025 COMPLETE CBC W/AUTO DIFF WBC: CPT | Performed by: OBSTETRICS & GYNECOLOGY

## 2019-05-30 PROCEDURE — 36415 COLL VENOUS BLD VENIPUNCTURE: CPT | Performed by: OBSTETRICS & GYNECOLOGY

## 2019-05-30 PROCEDURE — 80053 COMPREHEN METABOLIC PANEL: CPT | Performed by: OBSTETRICS & GYNECOLOGY

## 2019-05-30 RX ORDER — VANCOMYCIN HYDROCHLORIDE 125 MG/1
125 CAPSULE ORAL 4 TIMES DAILY
Qty: 30 CAPSULE | Refills: 0 | Status: SHIPPED | OUTPATIENT
Start: 2019-05-30 | End: 2019-07-29

## 2019-05-30 NOTE — PROGRESS NOTES
S:  Cathy presents for f/u wound cellulitis.   done 19 for fetal malpresentation, uncomplication. She started having drainage from the incision on  and was seen in the ED. The wound was probed and couldn't be opened. There was some erythema and cephalexin was started.   Today she reports no improvement. Ongoing drainage. No fevers.   She complains additionally of diarrhea since starting the antibiotics, about 4 times a day. She feels her baby has diarrhea as well.    Past Medical History:   Diagnosis Date     Allergic state      Anxiety      Bipolar 1 disorder (H)      Depressive disorder      Elevated cholesterol      Graves disease      Obese     BMI 37.48     Pineal tumor     s/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 tumor     Post partum depression      Post-surgical hypothyroidism 2017     Uncomplicated asthma      Past Surgical History:   Procedure Laterality Date     BLOOD PATCH N/A 10/11/2016    Procedure: EPIDURAL BLOOD PATCH;  Surgeon: GENERIC ANESTHESIA PROVIDER;  Location: [facility] OR      SECTION, TUBAL LIGATION, COMBINED N/A 2019    Procedure:  SECTION, WITH TUBAL LIGATION;  Surgeon: Kathy Rutledge MD;  [facility] L+D     CRANIOTOMY, EXCISE TUMOR COMPLEX, COMBINED  2014    Procedure: COMBINED CRANIOTOMY, EXCISE TUMOR COMPLEX;  Supracerabellar  Infratentorial Approach for Resection of Tumor ;  Surgeon: Kate Mckeon MD;  [facility]     THYROIDECTOMY N/A 5/3/2017    Procedure: THYROIDECTOMY;  Total Thyroidectomy;  Surgeon: Pamela Villasenor MD;  [facility]     Current Outpatient Medications   Medication     sulfamethoxazole-trimethoprim (BACTRIM DS/SEPTRA DS) 800-160 MG tablet     acetaminophen (TYLENOL) 325 MG tablet     acetaminophen (TYLENOL) 325 MG tablet     cephALEXin (KEFLEX) 500 MG capsule     cetirizine (ZYRTEC) 5 MG tablet     Docosahexaenoic Acid (DHA NATURAL OMEGA-3) 200 MG capsule     fexofenadine (ALLEGRA) 60 MG tablet  "    fluticasone (FLONASE) 50 MCG/ACT nasal spray     ibuprofen (ADVIL/MOTRIN) 800 MG tablet     ipratropium - albuterol 0.5 mg/2.5 mg/3 mL (DUONEB) 0.5-2.5 (3) MG/3ML neb solution     levothyroxine (SYNTHROID/LEVOTHROID) 150 MCG tablet     medical cannabis (Patient's own supply)     medical cannabis (Patient's own supply)     Misc. Devices (BREAST PUMP) MISC     montelukast (SINGULAIR) 10 MG tablet     Prenatal Vit-Fe Fumarate-FA (PRENATAL VITAMIN PO)     senna-docusate (SENOKOT-S/PERICOLACE) 8.6-50 MG tablet     vancomycin (VANCOCIN) 125 MG capsule     No current facility-administered medications for this visit.           Allergies   Allergen Reactions     Adacel [Daptacel] Swelling     Codeine Sulfate Nausea and Vomiting     Tetanus Toxoid      Pt states she had an infection at injection site.      Vicodin [Hydrocodone-Acetaminophen] Nausea and Vomiting     Methimazole Rash       O:  Vitals:    19 1303   BP: 122/78   Pulse: 104   Temp: 98.6  F (37  C)   TempSrc: Oral   SpO2: 95%   Weight: 121.1 kg (267 lb)   Height: 1.727 m (5' 8\")     Gen: well appearing  Abd: soft, NT  Incision: there is about 3 cm of erythema inferior to the incision at the midline. There is a small central opening in the incision with some purulent drainage. The wound was marked.     A/P:  38 year old  with   (T14.8XXA,  L08.9) Wound infection  (primary encounter diagnosis)  Comment:   Plan: Wound Culture Aerobic Bacterial,         sulfamethoxazole-trimethoprim (BACTRIM         DS/SEPTRA DS) 800-160 MG tablet        Antibiotic chosen for MRSA coverage     (K52.1,  T36.95XA) Antibiotic-associated diarrhea  Comment:   Plan: Clostridium difficile Toxin B PCR            (E66.01) Morbid obesity (H)  Comment:   Plan: Risk factor for wound complications.       "

## 2019-05-30 NOTE — TELEPHONE ENCOUNTER
Pt was only given 10 tablets of her antibiotic today.  She's supposed to take it 4x/day.  I'm assuming she needs more than 10.  Can you please look at the rx and send an order for whatever quantity she needs?  Amirah Denney RN

## 2019-05-30 NOTE — PROGRESS NOTES
S:  Cathy presents for follow-up from visit on 2019.  At that visit she was switched from Keflex to Bactrim for wound infection.  She notes that the incision is still draining and still uncomfortable.  There may be some mild improvement.  She notes some occasional dizziness and muscle cramping.  Her sense of taste seems different.  She denies fevers, worsening abdominal pain, worsening skin changes around the incision.    Past Medical History:   Diagnosis Date     Allergic state      Anxiety      Bipolar 1 disorder (H)      Depressive disorder      Elevated cholesterol      Graves disease      Obese     BMI 37.48     Pineal tumor     s/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 tumor     Post partum depression      Post-surgical hypothyroidism 2017     Uncomplicated asthma      Past Surgical History:   Procedure Laterality Date     BLOOD PATCH N/A 10/11/2016    Procedure: EPIDURAL BLOOD PATCH;  Surgeon: GENERIC ANESTHESIA PROVIDER;  [facility]      SECTION, TUBAL LIGATION, COMBINED N/A 2019    Procedure:  SECTION, WITH TUBAL LIGATION;  Surgeon: Kathy Rutledge MD;  [facility] L[facility]     CRANIOTOMY, EXCISE TUMOR COMPLEX, COMBINED  2014    Procedure: COMBINED CRANIOTOMY, EXCISE TUMOR COMPLEX;  Supracerabellar  Infratentorial Approach for Resection of Tumor ;  Surgeon: Kate Mckeon MD;  [facility]     THYROIDECTOMY N/A 5/3/2017    Procedure: THYROIDECTOMY;  Total Thyroidectomy;  Surgeon: Pamela Villasenor MD;  [facility]     Current Outpatient Medications   Medication     acetaminophen (TYLENOL) 325 MG tablet     acetaminophen (TYLENOL) 325 MG tablet     cephALEXin (KEFLEX) 500 MG capsule     cetirizine (ZYRTEC) 5 MG tablet     Docosahexaenoic Acid (DHA NATURAL OMEGA-3) 200 MG capsule     fexofenadine (ALLEGRA) 60 MG tablet     fluticasone (FLONASE) 50 MCG/ACT nasal spray     ibuprofen (ADVIL/MOTRIN) 800 MG tablet     ipratropium - albuterol 0.5 mg/2.5  mg/3 mL (DUONEB) 0.5-2.5 (3) MG/3ML neb solution     levothyroxine (SYNTHROID/LEVOTHROID) 150 MCG tablet     medical cannabis (Patient's own supply)     medical cannabis (Patient's own supply)     Misc. Devices (BREAST PUMP) MISC     montelukast (SINGULAIR) 10 MG tablet     Prenatal Vit-Fe Fumarate-FA (PRENATAL VITAMIN PO)     senna-docusate (SENOKOT-S/PERICOLACE) 8.6-50 MG tablet     sulfamethoxazole-trimethoprim (BACTRIM DS/SEPTRA DS) 800-160 MG tablet     vancomycin (VANCOCIN) 125 MG capsule     No current facility-administered medications for this visit.         Allergies   Allergen Reactions     Adacel [Daptacel] Swelling     Codeine Sulfate Nausea and Vomiting     Tetanus Toxoid      Pt states she had an infection at injection site.      Vicodin [Hydrocodone-Acetaminophen] Nausea and Vomiting     Methimazole Rash       O:  Vitals:    19 1315   BP: 112/78   Weight: 122.9 kg (271 lb)     Gen: Well appearing, ambulating without difficulty.  Abd: soft ,NT  Incision: erythema is reduced from the previous markings. There is still some purulent drainage but reduced from last week. Small central opening in the incision.    Component      Latest Ref Rng & Units 2019   Specimen Description       Feces   C Diff Toxin B PCR      NEG:Negative Positive (A)       Component Collected Lab   Specimen Description 2019  1:10    Abdominal Wound    Special Requests 2019  1:10    Specimen collected in eSwab transport (white cap)    Culture Micro Abnormal  2019  1:10    Light growth   Morganella (Proteus) morganii   Susceptibility testing in progress    Culture Micro 2019  1:10    Culture in progress    Culture Micro 2019  1:10    Light growth   Normal skin armida          A/P:  38 year old  with   1.  wound infection  Improving  Continue bactrim, await sensitivities  Remarked incision  RTC one week    2. C difficile colitis  Discussed diagnosis and  provided with written patient information.  Vancomycin previously ordered to patient's pharmacy.  CBC and CMP today given symptoms of dizziness.  Return to clinic 1 week

## 2019-05-30 NOTE — NURSING NOTE
"Chief Complaint   Patient presents with     Consult       Initial /78   Wt 122.9 kg (271 lb)   BMI 41.21 kg/m   Estimated body mass index is 41.21 kg/m  as calculated from the following:    Height as of 19: 1.727 m (5' 8\").    Weight as of this encounter: 122.9 kg (271 lb).  BP completed using cuff size: regular    Questioned patient about current smoking habits.  Pt. has never smoked.          The following HM Due: NONE      The following patient reported/Care Every where data was sent to:  P ABSTRACT QUALITY INITIATIVES [[id number]]        N/a      Lilibeth Scott MA                "

## 2019-05-31 LAB
ALBUMIN SERPL-MCNC: 3.5 G/DL (ref 3.4–5)
ALP SERPL-CCNC: 123 U/L (ref 40–150)
ALT SERPL W P-5'-P-CCNC: 37 U/L (ref 0–50)
ANION GAP SERPL CALCULATED.3IONS-SCNC: 6 MMOL/L (ref 3–14)
AST SERPL W P-5'-P-CCNC: 18 U/L (ref 0–45)
BACTERIA SPEC CULT: ABNORMAL
BILIRUB SERPL-MCNC: 0.2 MG/DL (ref 0.2–1.3)
BUN SERPL-MCNC: 11 MG/DL (ref 7–30)
CALCIUM SERPL-MCNC: 8.7 MG/DL (ref 8.5–10.1)
CHLORIDE SERPL-SCNC: 108 MMOL/L (ref 94–109)
CO2 SERPL-SCNC: 25 MMOL/L (ref 20–32)
CREAT SERPL-MCNC: 0.9 MG/DL (ref 0.52–1.04)
GFR SERPL CREATININE-BSD FRML MDRD: 81 ML/MIN/{1.73_M2}
GLUCOSE SERPL-MCNC: 80 MG/DL (ref 70–99)
Lab: ABNORMAL
POTASSIUM SERPL-SCNC: 4.7 MMOL/L (ref 3.4–5.3)
PROT SERPL-MCNC: 7.4 G/DL (ref 6.8–8.8)
SODIUM SERPL-SCNC: 139 MMOL/L (ref 133–144)
SPECIMEN SOURCE: ABNORMAL

## 2019-06-05 ENCOUNTER — OFFICE VISIT (OUTPATIENT)
Dept: OBGYN | Facility: CLINIC | Age: 39
End: 2019-06-05
Payer: COMMERCIAL

## 2019-06-05 VITALS
DIASTOLIC BLOOD PRESSURE: 70 MMHG | OXYGEN SATURATION: 99 % | HEIGHT: 68 IN | BODY MASS INDEX: 41.07 KG/M2 | SYSTOLIC BLOOD PRESSURE: 118 MMHG | HEART RATE: 126 BPM | WEIGHT: 271 LBS

## 2019-06-05 DIAGNOSIS — L08.9 WOUND INFECTION: ICD-10-CM

## 2019-06-05 DIAGNOSIS — A04.72 C. DIFFICILE COLITIS: Primary | ICD-10-CM

## 2019-06-05 DIAGNOSIS — L95.8 URTICARIAL VASCULITIS: ICD-10-CM

## 2019-06-05 DIAGNOSIS — D44.5 PINEOCYTOMA (H): ICD-10-CM

## 2019-06-05 DIAGNOSIS — T14.8XXA WOUND INFECTION: ICD-10-CM

## 2019-06-05 PROCEDURE — 99213 OFFICE O/P EST LOW 20 MIN: CPT | Performed by: OBSTETRICS & GYNECOLOGY

## 2019-06-05 ASSESSMENT — MIFFLIN-ST. JEOR: SCORE: 1967.75

## 2019-06-05 NOTE — NURSING NOTE
"Chief Complaint   Patient presents with     Follow Up       Initial /70   Pulse 126   Ht 1.727 m (5' 8\")   Wt 122.9 kg (271 lb)   SpO2 99%   Breastfeeding? Yes   BMI 41.21 kg/m   Estimated body mass index is 41.21 kg/m  as calculated from the following:    Height as of this encounter: 1.727 m (5' 8\").    Weight as of this encounter: 122.9 kg (271 lb).  BP completed using cuff size: large    Questioned patient about current smoking habits.  Pt. quit smoking some time ago.          The following HM Due: NONE      The following patient reported/Care Every where data was sent to:  P ABSTRACT QUALITY INITIATIVES [[id number]]  n/a      patient has appointment for today              "

## 2019-06-05 NOTE — PROGRESS NOTES
"S:  Cathy presents for follow-up c difficile colitis. On day #8 of vancomycinc  Bowel movements 5-12 times a day, no improvement. Maybe a little less watery sometimes.   Nasty smell hasn't changed.  Still blood in stool maybe, but can't tell completely.    Remains on antibiotics for incisional cellulitis.  Incision maybe improving a little .  No fevers.   Had some mild bleeding.    Frustrated that she always seems to get uncommon medical problems.   Baby has been up a lot recently at night, making it harder to cope with these problems.     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     Post partum depression      Post-surgical hypothyroidism 2017     Uncomplicated asthma      Past Surgical History:   Procedure Laterality Date     BLOOD PATCH N/A 10/11/2016    Procedure: EPIDURAL BLOOD PATCH;  Surgeon: GENERIC ANESTHESIA PROVIDER;  [facility]      SECTION, TUBAL LIGATION, COMBINED N/A 2019    Procedure:  SECTION, WITH TUBAL LIGATION;  Surgeon: Kathy Rutledge MD;  [facility] L[facility]     CRANIOTOMY, EXCISE TUMOR COMPLEX, COMBINED  2014    Procedure: COMBINED CRANIOTOMY, EXCISE TUMOR COMPLEX;  Supracerabellar  Infratentorial Approach for Resection of Tumor ;  Surgeon: Kate Mckeon MD;  [facility]     THYROIDECTOMY N/A 5/3/2017    Procedure: THYROIDECTOMY;  Total Thyroidectomy;  Surgeon: Pamela Villasenor MD;  [facility]       Exam:  Vitals:    19 1020   BP: 118/70   Pulse: 126   SpO2: 99%   Weight: 122.9 kg (271 lb)   Height: 1.727 m (5' 8\")     Body mass index is 41.21 kg/m .     Gen: well appearing  Psych: anxious  Abd: soft, NT, no masses  Skin: incision with only 0.5 cm central defect. No surrounding erythema/induration. No " purulent drainage.       A/P:  38 year old  with  (A04.72) C. 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 Continue singulair for maintenance.     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 Currently asymptomatic.     (T14.8XXA,  L08.9) Wound infection  Comment:   Plan: Much improved from last exam.   Finish TMP/SMZ  RTC one week

## 2019-06-06 ENCOUNTER — TELEPHONE (OUTPATIENT)
Dept: FAMILY MEDICINE | Facility: CLINIC | Age: 39
End: 2019-06-06

## 2019-06-06 ASSESSMENT — ENCOUNTER SYMPTOMS
PANIC: 0
WEAKNESS: 1
SPEECH CHANGE: 0
MEMORY LOSS: 0
BACK PAIN: 1
NIGHT SWEATS: 1
TROUBLE SWALLOWING: 0
SINUS PAIN: 0
NERVOUS/ANXIOUS: 1
HEARTBURN: 0
FATIGUE: 1
POLYPHAGIA: 0
BOWEL INCONTINENCE: 1
TASTE DISTURBANCE: 1
ABDOMINAL PAIN: 1
NECK PAIN: 1
JOINT SWELLING: 0
INCREASED ENERGY: 1
BLOATING: 1
VOMITING: 0
DISTURBANCES IN COORDINATION: 0
MUSCLE WEAKNESS: 1
DEPRESSION: 1
WEIGHT GAIN: 0
SINUS CONGESTION: 1
DIZZINESS: 1
DECREASED APPETITE: 1
TREMORS: 0
HYPOTENSION: 1
CONSTIPATION: 1
PARALYSIS: 0
HEADACHES: 1
LIGHT-HEADEDNESS: 1
SYNCOPE: 0
ALTERED TEMPERATURE REGULATION: 1
SEIZURES: 0
BLOOD IN STOOL: 1
NAUSEA: 0
LOSS OF CONSCIOUSNESS: 0
LEG PAIN: 0
MUSCLE CRAMPS: 1
POLYDIPSIA: 1
PALPITATIONS: 0
NUMBNESS: 0
HOARSE VOICE: 0
MYALGIAS: 1
HYPERTENSION: 0
SLEEP DISTURBANCES DUE TO BREATHING: 0
SORE THROAT: 0
DIARRHEA: 1
EXERCISE INTOLERANCE: 0
SMELL DISTURBANCE: 0
FEVER: 0
STIFFNESS: 0
HALLUCINATIONS: 0
TINGLING: 0
CHILLS: 1
DECREASED CONCENTRATION: 1
ORTHOPNEA: 0
ARTHRALGIAS: 1
JAUNDICE: 0
INSOMNIA: 1
RECTAL PAIN: 1
NECK MASS: 0
WEIGHT LOSS: 1

## 2019-06-06 NOTE — TELEPHONE ENCOUNTER
Discussed Dr. Rutledge's recommendation with pt.  She verbalized understanding.  Amirah Denney RN

## 2019-06-06 NOTE — TELEPHONE ENCOUNTER
Reason for call:  Symptom   Symptom or request: fever of 100.6 while being treated to c diff and staph    Duration (how long have symptoms been present): 1 day  Have you been treated for this before? No    Additional comments: n/a    Phone number to reach patient:  Home number on file [phone] (home)    Best Time:  n/a    Can we leave a detailed message on this number?  YES

## 2019-06-06 NOTE — TELEPHONE ENCOUNTER
Is she keeping down fluids?    I talked to FP:  Continue to try to hydrate.   Keep appt with ID tomorrow.   If worsening will have to go to ER.    Incision looked dramatically better and only very small defect yesterday so I don't think it's that.

## 2019-06-06 NOTE — TELEPHONE ENCOUNTER
Pt is being treated by Dr Rutledge , [facility], for this infection. Will route to that clinic    Nury Johnson RN, BSN  [facility]

## 2019-06-06 NOTE — TELEPHONE ENCOUNTER
"Call to patient regarding symptoms. She developed a fever today - is 100.6F after Tylenol/ibuprofen. Has chills, aches, headache. Is still having 5-12 loose, watery stools per day. Bad odor. Blood in stool.    Unsure what her incision looks like as she cannot see it. Does not know if the cellulitis is worsening. Does not feel any more pain or discomfort in the area.     No abnormal vaginal discharge at all.      Did instruct patient that I will send a message to on call MD and to AO for further recommendations. Patient adamantly declines going to the ER \"they will not help me. They never do. I will not go there\".    Patient does have an appt tomorrow with ID. Is taking her antibiotics as prescribed. Do you have any additional recommendations at this time?  "

## 2019-06-07 ENCOUNTER — OFFICE VISIT (OUTPATIENT)
Dept: INFECTIOUS DISEASES | Facility: CLINIC | Age: 39
End: 2019-06-07
Attending: OBSTETRICS & GYNECOLOGY
Payer: COMMERCIAL

## 2019-06-07 VITALS
OXYGEN SATURATION: 96 % | DIASTOLIC BLOOD PRESSURE: 74 MMHG | WEIGHT: 270.4 LBS | HEART RATE: 115 BPM | SYSTOLIC BLOOD PRESSURE: 113 MMHG | BODY MASS INDEX: 41.11 KG/M2 | TEMPERATURE: 98.3 F

## 2019-06-07 DIAGNOSIS — A04.72 C. DIFFICILE COLITIS: Primary | ICD-10-CM

## 2019-06-07 DIAGNOSIS — E86.0 DEHYDRATION: ICD-10-CM

## 2019-06-07 DIAGNOSIS — A04.72 C. DIFFICILE COLITIS: ICD-10-CM

## 2019-06-07 LAB
ALBUMIN SERPL-MCNC: 3.3 G/DL (ref 3.4–5)
ALBUMIN UR-MCNC: 30 MG/DL
ALP SERPL-CCNC: 132 U/L (ref 40–150)
ALT SERPL W P-5'-P-CCNC: 62 U/L (ref 0–50)
ANION GAP SERPL CALCULATED.3IONS-SCNC: 6 MMOL/L (ref 3–14)
APPEARANCE UR: ABNORMAL
AST SERPL W P-5'-P-CCNC: 38 U/L (ref 0–45)
BASOPHILS # BLD AUTO: 0 10E9/L (ref 0–0.2)
BASOPHILS NFR BLD AUTO: 0.3 %
BILIRUB SERPL-MCNC: 0.2 MG/DL (ref 0.2–1.3)
BILIRUB UR QL STRIP: NEGATIVE
BUN SERPL-MCNC: 15 MG/DL (ref 7–30)
CALCIUM SERPL-MCNC: 8.2 MG/DL (ref 8.5–10.1)
CHLORIDE SERPL-SCNC: 107 MMOL/L (ref 94–109)
CO2 SERPL-SCNC: 24 MMOL/L (ref 20–32)
COLOR UR AUTO: YELLOW
CREAT SERPL-MCNC: 0.87 MG/DL (ref 0.52–1.04)
DIFFERENTIAL METHOD BLD: ABNORMAL
EOSINOPHIL # BLD AUTO: 0 10E9/L (ref 0–0.7)
EOSINOPHIL NFR BLD AUTO: 0.7 %
ERYTHROCYTE [DISTWIDTH] IN BLOOD BY AUTOMATED COUNT: 14.1 % (ref 10–15)
GFR SERPL CREATININE-BSD FRML MDRD: 85 ML/MIN/{1.73_M2}
GLUCOSE SERPL-MCNC: 90 MG/DL (ref 70–99)
GLUCOSE UR STRIP-MCNC: NEGATIVE MG/DL
HCT VFR BLD AUTO: 42.1 % (ref 35–47)
HGB BLD-MCNC: 13.3 G/DL (ref 11.7–15.7)
HGB UR QL STRIP: ABNORMAL
HYALINE CASTS #/AREA URNS LPF: 4 /LPF (ref 0–2)
IMM GRANULOCYTES # BLD: 0 10E9/L (ref 0–0.4)
IMM GRANULOCYTES NFR BLD: 0 %
KETONES UR STRIP-MCNC: NEGATIVE MG/DL
LEUKOCYTE ESTERASE UR QL STRIP: ABNORMAL
LYMPHOCYTES # BLD AUTO: 0.8 10E9/L (ref 0.8–5.3)
LYMPHOCYTES NFR BLD AUTO: 28 %
MCH RBC QN AUTO: 27.4 PG (ref 26.5–33)
MCHC RBC AUTO-ENTMCNC: 31.6 G/DL (ref 31.5–36.5)
MCV RBC AUTO: 87 FL (ref 78–100)
MONOCYTES # BLD AUTO: 0.4 10E9/L (ref 0–1.3)
MONOCYTES NFR BLD AUTO: 14.2 %
MUCOUS THREADS #/AREA URNS LPF: PRESENT /LPF
NEUTROPHILS # BLD AUTO: 1.6 10E9/L (ref 1.6–8.3)
NEUTROPHILS NFR BLD AUTO: 56.8 %
NITRATE UR QL: NEGATIVE
NRBC # BLD AUTO: 0 10*3/UL
NRBC BLD AUTO-RTO: 0 /100
PH UR STRIP: 6 PH (ref 5–7)
PLATELET # BLD AUTO: 138 10E9/L (ref 150–450)
POTASSIUM SERPL-SCNC: 4 MMOL/L (ref 3.4–5.3)
PROT SERPL-MCNC: 7.2 G/DL (ref 6.8–8.8)
RBC # BLD AUTO: 4.86 10E12/L (ref 3.8–5.2)
RBC #/AREA URNS AUTO: 8 /HPF (ref 0–2)
SODIUM SERPL-SCNC: 138 MMOL/L (ref 133–144)
SOURCE: ABNORMAL
SP GR UR STRIP: 1.03 (ref 1–1.03)
SQUAMOUS #/AREA URNS AUTO: 1 /HPF (ref 0–1)
UROBILINOGEN UR STRIP-MCNC: 0 MG/DL (ref 0–2)
WBC # BLD AUTO: 2.9 10E9/L (ref 4–11)
WBC #/AREA URNS AUTO: 3 /HPF (ref 0–5)

## 2019-06-07 PROCEDURE — 85025 COMPLETE CBC W/AUTO DIFF WBC: CPT | Performed by: INTERNAL MEDICINE

## 2019-06-07 PROCEDURE — 80053 COMPREHEN METABOLIC PANEL: CPT | Performed by: INTERNAL MEDICINE

## 2019-06-07 PROCEDURE — G0463 HOSPITAL OUTPT CLINIC VISIT: HCPCS | Mod: ZF

## 2019-06-07 PROCEDURE — 81001 URINALYSIS AUTO W/SCOPE: CPT | Performed by: INTERNAL MEDICINE

## 2019-06-07 PROCEDURE — 36415 COLL VENOUS BLD VENIPUNCTURE: CPT | Performed by: INTERNAL MEDICINE

## 2019-06-07 RX ORDER — VANCOMYCIN HYDROCHLORIDE 125 MG/1
125 CAPSULE ORAL 4 TIMES DAILY
Qty: 28 CAPSULE | Refills: 1 | Status: SHIPPED | OUTPATIENT
Start: 2019-06-07 | End: 2019-07-29

## 2019-06-07 ASSESSMENT — PAIN SCALES - GENERAL: PAINLEVEL: NO PAIN (0)

## 2019-06-07 NOTE — PATIENT INSTRUCTIONS
It was nice to meet you today!    Here is what we discussed:  -I am going to give you another week of oral vancomycin. Your stool should be getting less frequent but if it doesn't please let me know  -I am checking labs because you seem dehydrated. I will also ask my nurse to set up a visit with the clinic for IV fluids tomorrow here at the [facility]  -You should go to the hospital if you have worsening h/a, dizziness, become confused/disoriented, start vomiting, become short of breath, or have any new symptoms  -continue with good hand-washing

## 2019-06-07 NOTE — NURSING NOTE
"Chief Complaint   Patient presents with     Consult     c-diff     Vital signs:  Temp: 98.3  F (36.8  C) Temp src: Oral BP: 113/74 Pulse: 115     SpO2: 96 %       Weight: 122.7 kg (270 lb 6.4 oz)  Estimated body mass index is 41.11 kg/m  as calculated from the following:    Height as of 6/5/19: 1.727 m (5' 8\").    Weight as of this encounter: 122.7 kg (270 lb 6.4 oz).        Tanna Swift    "

## 2019-06-07 NOTE — PROGRESS NOTES
ID clinic initial visit note    Assessment:  1. CDI. Possibly provoked by abx during pregnancy and/or PCN during delivery. She received bactrim for incisional CS drainage, but this was after her diarrhea started so this was not a provocative event.  Her episode is mild by my estimation, but at present I believe she has a superimposed viral infection that is confounding assessment.  Her response to vanco is a bit delayed, even despite bactrim, which has fortunately concluded. She may have diarrhea due to a viral illness, or may be having withdrawal from CBD and/or THC which she was taking during pregnancy to manage Bipolar symptoms - now off both (Though I acknowledge THC GI symptoms are often more prominently upper GI.)  I would not proceed to IMT at this juncture, based on lack of repeated CDI episodes.    2.  Low-grade temp, dizziness, splotchy rash, and exposure to 2-1/2-year-old daughter with similar symptoms.  The symptoms are most consistent with a viral infection.  Unfortunately, her hydration status is already compromised by #1 and I suspect that a viral illness has further exacerbated dehydration.  I suggested she go to the emergency department today for fluids, given her a pulse of 114 and blood pressure of 113 systolic which is low for her baseline.  She was concerned that she may eat be exposed to other pathogens in the emergency department and declined.  We discussed in detail the symptoms that should prompt her to go directly to the emergency department, including disorientation, worsening headache, vomiting, shortness of breath, or any worsening of her existing symptoms.    3. Bipolar disorder    4. Obesity    Plan:  - continue vanco - I have prescribed an additional 1 week  - Lab today to look for evidence of dehydration that may not be self-correcting  - Regardless of labs today, will arrange for IVF, 2L NS  - Present to ED if symptoms appear as outlined above.    It is a pleasure to participate in  [facility]'s care. Visit length 60 min, >50% clinical counseling/care coordination.    Nimisha Gonzalez MD   of Medicine, Division of Infectious Diseases  [facility] [phone]    HPI:    This is a pleasant -0-1-2 status post delivery by  at 39-3/7 weeks of a healthy infant girl.  Prenatal course reportedly complicated by polyhydramnios and elevated blood pressure.  She was diagnosed in the postpartum period with a  incision with superficial cultures showing light growth staph lugdunensis and Morganella morganii.  She is presenting for evaluation of a concomitant diagnosis of C. difficile.    She reports an episode of otitis media during pregnancy and had prolonged antibiotics at that time.  She also was group B strep positive and received antibiotics during her delivery.  She reports diarrhea started approximately .  She attributed this initially to the laxative that she was taking, but it did not rajeev.  She also wondered if she had consumed improperly cooked food.  Concurrently, she noted some swelling and serous drainage from her incision.  She was seen in the emergency department on .  Culture of her  incision revealed the organisms cited above, and stool testing revealed C. difficile positivity.  She was placed on Bactrim and oral vancomycin.  She has been on oral vancomycin for 11 days, but reports her stools are still frequent with 5-12 episodes per day.  She has had some form to her stool intermittently but largely the consistency is liquid.  She has had no incontinence.  She has had anorexia.    New symptoms in the last 2 days include being chilled which prompted her to take her temperature which revealed 100 point 6F.  She is also felt dizzy and notes a somewhat blotchy rash over her shoulders.  She has had diffuse myalgias for which she has been taking alternating Advil and Tylenol.    On additional review of systems she is having some vaginal spotting.  No  shortness of breath.  She feels her abdominal incision is looking well.    She reports that her 2-1/2-year-old daughter has had fever also to around 100 F and has felt somewhat poorly, but has no diarrhea, no rash, and no other localizing symptoms.    Current Outpatient Medications   Medication     acetaminophen (TYLENOL) 325 MG tablet     acetaminophen (TYLENOL) 325 MG tablet     cetirizine (ZYRTEC) 5 MG tablet     Docosahexaenoic Acid (DHA NATURAL OMEGA-3) 200 MG capsule     fexofenadine (ALLEGRA) 60 MG tablet     fluticasone (FLONASE) 50 MCG/ACT nasal spray     ibuprofen (ADVIL/MOTRIN) 800 MG tablet     ipratropium - albuterol 0.5 mg/2.5 mg/3 mL (DUONEB) 0.5-2.5 (3) MG/3ML neb solution     levothyroxine (SYNTHROID/LEVOTHROID) 150 MCG tablet     medical cannabis (Patient's own supply)     medical cannabis (Patient's own supply)     Misc. Devices (BREAST PUMP) MISC     montelukast (SINGULAIR) 10 MG tablet     Prenatal Vit-Fe Fumarate-FA (PRENATAL VITAMIN PO)     senna-docusate (SENOKOT-S/PERICOLACE) 8.6-50 MG tablet     sulfamethoxazole-trimethoprim (BACTRIM DS/SEPTRA DS) 800-160 MG tablet     vancomycin (VANCOCIN) 125 MG capsule     vancomycin (VANCOCIN) 125 MG capsule     No current facility-administered medications for this visit.      Past Medical History:   Diagnosis Date     Allergic state      Anxiety      Bipolar 1 disorder (H)      Depressive disorder      Elevated cholesterol      Graves disease      Obese     BMI 37.48     Pineal tumor     s/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 tumor     Post partum depression      Post-surgical hypothyroidism 2017     Uncomplicated asthma      Past Surgical History:   Procedure Laterality Date     BLOOD PATCH N/A 10/11/2016    Procedure: EPIDURAL BLOOD PATCH;  Surgeon: GENERIC ANESTHESIA PROVIDER;  [facility]      SECTION, TUBAL LIGATION, COMBINED N/A 2019    Procedure:  SECTION, WITH TUBAL LIGATION;  Surgeon: Kathy Rutledge MD;   [facility] L[facility]     CRANIOTOMY, EXCISE TUMOR COMPLEX, COMBINED  2014    Procedure: COMBINED CRANIOTOMY, EXCISE TUMOR COMPLEX;  Supracerabellar  Infratentorial Approach for Resection of Tumor ;  Surgeon: Kate Mckeon MD;  [facility]     THYROIDECTOMY N/A 5/3/2017    Procedure: THYROIDECTOMY;  Total Thyroidectomy;  Surgeon: Pamela Villasenor MD;  Location:  OR     Social History     Tobacco Use     Smoking status: Former Smoker     Packs/day: 0.50     Years: 12.00     Pack years: 6.00     Types: Cigarettes     Start date: 2004     Last attempt to quit: 2/15/2018     Years since quittin.3     Smokeless tobacco: Never Used   Substance Use Topics     Alcohol use: No     Alcohol/week: 0.0 oz     Comment: quit 16 days ago,     Drug use: Yes     Types: Marijuana     Comment: medical marijuana     Used CBD and THC to medicate her Bipolar disorder during pregnancy, now off both. Resides with her male partner and 2 young children.    Exam:  /74   Pulse 115   Temp 98.3  F (36.8  C) (Oral)   Wt 122.7 kg (270 lb 6.4 oz)   SpO2 96%   BMI 41.11 kg/m    Note that SBP is low for her normal baseline  Moist mucous membranes  HR tachycardic, no appreciable murmur  Lungs clear  TMs  Normal  Gait, strength normal  No skin rash

## 2019-06-07 NOTE — LETTER
6/7/2019      RE: Cathy Arroyo  [address] [facility] [address]       ID clinic initial visit note    Assessment:  1. CDI. Possibly provoked by abx during pregnancy and/or PCN during delivery. She received bactrim for incisional CS drainage, but this was after her diarrhea started so this was not a provocative event.  Her episode is mild by my estimation, but at present I believe she has a superimposed viral infection that is confounding assessment.  Her response to vanco is a bit delayed, even despite bactrim, which has fortunately concluded. She may have diarrhea due to a viral illness, or may be having withdrawal from CBD and/or THC which she was taking during pregnancy to manage Bipolar symptoms - now off both (Though I acknowledge THC GI symptoms are often more prominently upper GI.)  I would not proceed to IMT at this juncture, based on lack of repeated CDI episodes.    2.  Low-grade temp, dizziness, splotchy rash, and exposure to 2-1/2-year-old daughter with similar symptoms.  The symptoms are most consistent with a viral infection.  Unfortunately, her hydration status is already compromised by #1 and I suspect that a viral illness has further exacerbated dehydration.  I suggested she go to the emergency department today for fluids, given her a pulse of 114 and blood pressure of 113 systolic which is low for her baseline.  She was concerned that she may eat be exposed to other pathogens in the emergency department and declined.  We discussed in detail the symptoms that should prompt her to go directly to the emergency department, including disorientation, worsening headache, vomiting, shortness of breath, or any worsening of her existing symptoms.    3. Bipolar disorder    4. Obesity    Plan:  - continue vanco - I have prescribed an additional 1 week  - Lab today to look for evidence of dehydration that may not be self-correcting  - Regardless of labs today, will arrange for IVF, 2L NS  -  Present to ED if symptoms appear as outlined above.    It is a pleasure to participate in Cathy's care. Visit length 60 min, >50% clinical counseling/care coordination.    Nimisha Gonzalez MD   of Medicine, Division of Infectious Diseases  [facility] [phone]    HPI:    This is a pleasant -0-1-2 status post delivery by  at 39-3/7 weeks of a healthy infant girl.  Prenatal course reportedly complicated by polyhydramnios and elevated blood pressure.  She was diagnosed in the postpartum period with a  incision with superficial cultures showing light growth staph lugdunensis and Morganella morganii.  She is presenting for evaluation of a concomitant diagnosis of C. difficile.    She reports an episode of otitis media during pregnancy and had prolonged antibiotics at that time.  She also was group B strep positive and received antibiotics during her delivery.  She reports diarrhea started approximately .  She attributed this initially to the laxative that she was taking, but it did not rajeev.  She also wondered if she had consumed improperly cooked food.  Concurrently, she noted some swelling and serous drainage from her incision.  She was seen in the emergency department on .  Culture of her  incision revealed the organisms cited above, and stool testing revealed C. difficile positivity.  She was placed on Bactrim and oral vancomycin.  She has been on oral vancomycin for 11 days, but reports her stools are still frequent with 5-12 episodes per day.  She has had some form to her stool intermittently but largely the consistency is liquid.  She has had no incontinence.  She has had anorexia.    New symptoms in the last 2 days include being chilled which prompted her to take her temperature which revealed 100 point 6F.  She is also felt dizzy and notes a somewhat blotchy rash over her shoulders.  She has had diffuse myalgias for which she has been taking alternating  Advil and Tylenol.    On additional review of systems she is having some vaginal spotting.  No shortness of breath.  She feels her abdominal incision is looking well.    She reports that her 2-1/2-year-old daughter has had fever also to around 100 F and has felt somewhat poorly, but has no diarrhea, no rash, and no other localizing symptoms.    Current Outpatient Medications   Medication     acetaminophen (TYLENOL) 325 MG tablet     acetaminophen (TYLENOL) 325 MG tablet     cetirizine (ZYRTEC) 5 MG tablet     Docosahexaenoic Acid (DHA NATURAL OMEGA-3) 200 MG capsule     fexofenadine (ALLEGRA) 60 MG tablet     fluticasone (FLONASE) 50 MCG/ACT nasal spray     ibuprofen (ADVIL/MOTRIN) 800 MG tablet     ipratropium - albuterol 0.5 mg/2.5 mg/3 mL (DUONEB) 0.5-2.5 (3) MG/3ML neb solution     levothyroxine (SYNTHROID/LEVOTHROID) 150 MCG tablet     medical cannabis (Patient's own supply)     medical cannabis (Patient's own supply)     Misc. Devices (BREAST PUMP) MISC     montelukast (SINGULAIR) 10 MG tablet     Prenatal Vit-Fe Fumarate-FA (PRENATAL VITAMIN PO)     senna-docusate (SENOKOT-S/PERICOLACE) 8.6-50 MG tablet     sulfamethoxazole-trimethoprim (BACTRIM DS/SEPTRA DS) 800-160 MG tablet     vancomycin (VANCOCIN) 125 MG capsule     vancomycin (VANCOCIN) 125 MG capsule     No current facility-administered medications for this visit.      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     Post partum depression      Post-surgical hypothyroidism 2017     Uncomplicated asthma      Past Surgical History:   Procedure Laterality Date     BLOOD PATCH N/A 10/11/2016    Procedure: EPIDURAL BLOOD PATCH;  Surgeon: GENERIC ANESTHESIA PROVIDER;  [facility]      SECTION, TUBAL LIGATION, COMBINED N/A 2019     Procedure:  SECTION, WITH TUBAL LIGATION;  Surgeon: Kathy Rutledge MD;  [facility]     CRANIOTOMY, EXCISE TUMOR COMPLEX, COMBINED  2014    Procedure: COMBINED CRANIOTOMY, EXCISE TUMOR COMPLEX;  Supracerabellar  Infratentorial Approach for Resection of Tumor ;  Surgeon: Kate Mckeon MD;  [facility]     THYROIDECTOMY N/A 5/3/2017    Procedure: THYROIDECTOMY;  Total Thyroidectomy;  Surgeon: Pamela Villasenor MD;  Location:  OR     Social History     Tobacco Use     Smoking status: Former Smoker     Packs/day: 0.50     Years: 12.00     Pack years: 6.00     Types: Cigarettes     Start date: 2004     Last attempt to quit: 2/15/2018     Years since quittin.3     Smokeless tobacco: Never Used   Substance Use Topics     Alcohol use: No     Alcohol/week: 0.0 oz     Comment: quit 16 days ago,     Drug use: Yes     Types: Marijuana     Comment: medical marijuana     Used CBD and THC to medicate her Bipolar disorder during pregnancy, now off both. Resides with her male partner and 2 young children.    Exam:  /74   Pulse 115   Temp 98.3  F (36.8  C) (Oral)   Wt 122.7 kg (270 lb 6.4 oz)   SpO2 96%   BMI 41.11 kg/m     Note that SBP is low for her normal baseline  Moist mucous membranes  HR tachycardic, no appreciable murmur  Lungs clear  TMs  Normal  Gait, strength normal  No skin rash      Nimisha Gonzalez MD

## 2019-06-09 ENCOUNTER — INFUSION THERAPY VISIT (OUTPATIENT)
Dept: INFUSION THERAPY | Facility: CLINIC | Age: 39
End: 2019-06-09
Attending: NURSE PRACTITIONER
Payer: COMMERCIAL

## 2019-06-09 VITALS
DIASTOLIC BLOOD PRESSURE: 82 MMHG | TEMPERATURE: 98 F | SYSTOLIC BLOOD PRESSURE: 127 MMHG | OXYGEN SATURATION: 95 % | HEART RATE: 78 BPM

## 2019-06-09 DIAGNOSIS — E86.0 DEHYDRATION: Primary | ICD-10-CM

## 2019-06-09 LAB
ALBUMIN SERPL-MCNC: 3.3 G/DL (ref 3.4–5)
ALP SERPL-CCNC: 108 U/L (ref 40–150)
ALT SERPL W P-5'-P-CCNC: 73 U/L (ref 0–50)
ANION GAP SERPL CALCULATED.3IONS-SCNC: 5 MMOL/L (ref 3–14)
AST SERPL W P-5'-P-CCNC: 40 U/L (ref 0–45)
BILIRUB SERPL-MCNC: 0.3 MG/DL (ref 0.2–1.3)
BUN SERPL-MCNC: 11 MG/DL (ref 7–30)
CALCIUM SERPL-MCNC: 7.7 MG/DL (ref 8.5–10.1)
CHLORIDE SERPL-SCNC: 108 MMOL/L (ref 94–109)
CO2 SERPL-SCNC: 25 MMOL/L (ref 20–32)
CREAT SERPL-MCNC: 0.69 MG/DL (ref 0.52–1.04)
GFR SERPL CREATININE-BSD FRML MDRD: >90 ML/MIN/{1.73_M2}
GLUCOSE SERPL-MCNC: 92 MG/DL (ref 70–99)
POTASSIUM SERPL-SCNC: 4.4 MMOL/L (ref 3.4–5.3)
PROT SERPL-MCNC: 7 G/DL (ref 6.8–8.8)
SODIUM SERPL-SCNC: 138 MMOL/L (ref 133–144)

## 2019-06-09 PROCEDURE — 96360 HYDRATION IV INFUSION INIT: CPT

## 2019-06-09 PROCEDURE — 96361 HYDRATE IV INFUSION ADD-ON: CPT

## 2019-06-09 PROCEDURE — 25000128 H RX IP 250 OP 636: Mod: ZF | Performed by: INTERNAL MEDICINE

## 2019-06-09 PROCEDURE — 80053 COMPREHEN METABOLIC PANEL: CPT | Performed by: INTERNAL MEDICINE

## 2019-06-09 RX ADMIN — SODIUM CHLORIDE 2000 ML: 9 INJECTION, SOLUTION INTRAVENOUS at 08:29

## 2019-06-09 NOTE — PATIENT INSTRUCTIONS
Dear Cathy Arroyo    Thank you for choosing [facility] ([facility]) for your infusion.  The following information is a summary of our appointment as well as important reminders.      Your infusion today of 2 liters Normal Saline.    We look forward in seeing you on your next appointment here at [facility] ([facility]).  Please don t hesitate to call us at [phone] to reschedule any of your appointments or to speak with one of the [facility] registered nurses.  It was a pleasure taking care of you today.    Sincerely,    [facility]  [facility]  [address] [place] [address]  [address]  Phone:  [phone]

## 2019-06-09 NOTE — PROGRESS NOTES
Nursing Note  Cathy Arroyo presents today [facility] for:   Chief Complaint   Patient presents with     Infusion     2 liters NS     During today's [facility] appointment, orders from Nimisha Gonzalez MD were completed.  Frequency: once    Progress note:  Patient identification verified by name and date of birth.  Assessment completed.  Vitals recorded in Doc Flowsheets.  Patient was provided with education regarding infusion and possible side effects.  Patient verbalized understanding.     present during visit today: Not Applicable.    Treatment Conditions: patient denies fever, chills, signs of infection, recent illness, on antibiotics, productive cough or elevated temperature.    Premedications: were not ordered.  Drug Waste Record: No  Infusion length and rate:  999 ml/hr., over 2 hours  Labs: were drawn per orders.   Vascular access: peripheral IV placed today.    Post Infusion Assessment:  Patient tolerated infusion without incident.     Discharge Plan:   Follow up plan of care with: primary medical doctor.  Discharge instructions were reviewed with patient.  Patient/representative verbalized understanding of discharge instructions and all questions answered.  Patient discharged from [facility] in stable condition.    Annmarie Reveles RN    Administrations This Visit     0.9% sodium chloride BOLUS     Admin Date  06/09/2019 Action  New Bag Dose  2000 mL Rate  1,000 mL/hr Route  Intravenous Administered By  Annmarie Reveles RN                /83   Pulse 108   Temp 98  F (36.7  C) (Oral)   SpO2 95%

## 2019-06-14 ENCOUNTER — TELEPHONE (OUTPATIENT)
Dept: OBGYN | Facility: CLINIC | Age: 39
End: 2019-06-14

## 2019-06-14 NOTE — TELEPHONE ENCOUNTER
Pt's  cancelled pt's wound check appointment for today but wanted to reschedule with Dr Rutledge. Please advise when an appropriate time to schedule with the provider would be.    Pt can be reached @ [phone] tyler

## 2019-06-18 ENCOUNTER — OFFICE VISIT (OUTPATIENT)
Dept: OBGYN | Facility: CLINIC | Age: 39
End: 2019-06-18
Payer: COMMERCIAL

## 2019-06-18 VITALS
SYSTOLIC BLOOD PRESSURE: 145 MMHG | OXYGEN SATURATION: 99 % | BODY MASS INDEX: 40.47 KG/M2 | DIASTOLIC BLOOD PRESSURE: 86 MMHG | WEIGHT: 267 LBS | HEIGHT: 68 IN | HEART RATE: 89 BPM

## 2019-06-18 PROCEDURE — 99024 POSTOP FOLLOW-UP VISIT: CPT | Performed by: OBSTETRICS & GYNECOLOGY

## 2019-06-18 ASSESSMENT — MIFFLIN-ST. JEOR: SCORE: 1949.6

## 2019-06-23 NOTE — PROGRESS NOTES
"S:  Cathy presents for f/u for wound cellulitis after  on 19.   Doing much better, feels resolved.   Seeing ID for management of c difficile colitis. Improving substantially.    O:  Vitals:    19 1405   BP: 145/86   Pulse: 89   SpO2: 99%   Weight: 121.1 kg (267 lb)   Height: 1.727 m (5' 8\")     Gen: well appearing  Skin: CDI, no defects, no erythema.     A/P:  38 year old  with wound cellulitis s/p .  Now resolved.  Off antibiotics.   Precautions reviewed.   "

## 2019-06-24 PROBLEM — M79.18 PAIN IN SYMPHYSIS PUBIS DURING PREGNANCY: Status: RESOLVED | Noted: 2019-02-27 | Resolved: 2019-06-24

## 2019-06-24 PROBLEM — O99.891 PAIN IN SYMPHYSIS PUBIS DURING PREGNANCY: Status: RESOLVED | Noted: 2019-02-27 | Resolved: 2019-06-24

## 2019-06-27 ENCOUNTER — TELEPHONE (OUTPATIENT)
Dept: INFECTIOUS DISEASES | Facility: CLINIC | Age: 39
End: 2019-06-27

## 2019-06-28 ENCOUNTER — OFFICE VISIT (OUTPATIENT)
Dept: INFECTIOUS DISEASES | Facility: CLINIC | Age: 39
End: 2019-06-28
Attending: INTERNAL MEDICINE
Payer: COMMERCIAL

## 2019-06-28 VITALS
TEMPERATURE: 98 F | OXYGEN SATURATION: 96 % | HEIGHT: 68 IN | DIASTOLIC BLOOD PRESSURE: 77 MMHG | WEIGHT: 270 LBS | SYSTOLIC BLOOD PRESSURE: 114 MMHG | BODY MASS INDEX: 40.92 KG/M2 | HEART RATE: 85 BPM

## 2019-06-28 DIAGNOSIS — A04.72 C. DIFFICILE COLITIS: Primary | ICD-10-CM

## 2019-06-28 PROCEDURE — G0463 HOSPITAL OUTPT CLINIC VISIT: HCPCS | Mod: ZF

## 2019-06-28 ASSESSMENT — MIFFLIN-ST. JEOR: SCORE: 1963.21

## 2019-06-28 ASSESSMENT — PAIN SCALES - GENERAL: PAINLEVEL: NO PAIN (0)

## 2019-06-28 NOTE — NURSING NOTE
"Chief Complaint   Patient presents with     RECHECK     Vital signs:  Temp: 98  F (36.7  C) Temp src: Oral BP: 114/77 Pulse: 85     SpO2: 96 %     Height: 172.7 cm (5' 8\") Weight: 122.5 kg (270 lb)  Estimated body mass index is 41.05 kg/m  as calculated from the following:    Height as of this encounter: 1.727 m (5' 8\").    Weight as of this encounter: 122.5 kg (270 lb).      Lucia Jara CMA    6/28/2019 2:04 PM      "

## 2019-07-06 NOTE — PROGRESS NOTES
ID clinic return patient visit    Assessment  1. CDI following C/S delivery for term infant that was complicated by C/S incisional drainage requiring antibiotics. Her CDI was followed by an additional wave of constitutional sx and household ill contact that in my view represented viral illness with diarrhea. 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 She also has a  infant and lacks sleep and as a consequence may be more susceptible to intestinal microbiome perturbation.  We discussed the pathophysiology of post-infectious IBS in detail. We discussed that gluten avoidance can be helpful in some cases, but not all. We also discussed CD transmission and measures she should take to ensure her partner and her other young child do not become ill.  2. Bipolar d/o, prior medication with medical cannibis/CBD. This may nor may not be additional affecting her stool microbiome. I would not view IBS as an absolute contraindication to CBD use, but Cathy reports that regardless she has stopped using these products for management of her bipolar disorder and feels like it is under fairly good control.    Plan:  - no further testing or treatment at this time  - if diarrhea increases to >5-6 stools/day, and especially if abdominal pain or fever appear, would advocate re-testing for CDI and starting QID vancomycin    It is a pleasure to participate in Cathy's care. Visit length 25 min, >50% clinical counseling/care coordination.    Nimisha Gonzalez MD   of Medicine, Division of Infectious Diseases  [facility] [phone]    HPI:  This is a pleasant 37 y/o woman whom I previously met for evaluation of CDI. Please see above for additional detail.  She is feeling much better, though still does have episodes of loose stool (~Graham 5-6) and has some cramping that resolves with defecation. No longer feeling dehydrated. Household contacts are well. Still  breastfeeding.    Current Outpatient Medications   Medication     cetirizine (ZYRTEC) 5 MG tablet     fexofenadine (ALLEGRA) 60 MG tablet     fluticasone (FLONASE) 50 MCG/ACT nasal spray     levothyroxine (SYNTHROID/LEVOTHROID) 150 MCG tablet     medical cannabis (Patient's own supply)     medical cannabis (Patient's own supply)     Misc. Devices (BREAST PUMP) MISC     montelukast (SINGULAIR) 10 MG tablet     Prenatal Vit-Fe Fumarate-FA (PRENATAL VITAMIN PO)     acetaminophen (TYLENOL) 325 MG tablet     acetaminophen (TYLENOL) 325 MG tablet     Docosahexaenoic Acid (DHA NATURAL OMEGA-3) 200 MG capsule     ibuprofen (ADVIL/MOTRIN) 800 MG tablet     ipratropium - albuterol 0.5 mg/2.5 mg/3 mL (DUONEB) 0.5-2.5 (3) MG/3ML neb solution     senna-docusate (SENOKOT-S/PERICOLACE) 8.6-50 MG tablet     No current facility-administered medications for this visit.      Past Medical History:   Diagnosis Date     Allergic state      Anxiety      Bipolar 1 disorder (H)      Depressive disorder      Elevated cholesterol      Graves disease      Obese     BMI 37.48     Pineal tumor     s/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 tumor     Post partum depression      Post-surgical hypothyroidism 2017     Uncomplicated asthma      Past Surgical History:   Procedure Laterality Date     BLOOD PATCH N/A 10/11/2016    Procedure: EPIDURAL BLOOD PATCH;  Surgeon: GENERIC ANESTHESIA PROVIDER;  [facility]      SECTION, TUBAL LIGATION, COMBINED N/A 2019    Procedure:  SECTION, WITH TUBAL LIGATION;  Surgeon: Kathy Rultedge MD;  [facility] L[facility]     CRANIOTOMY, EXCISE TUMOR COMPLEX, COMBINED  2014    Procedure: COMBINED CRANIOTOMY, EXCISE TUMOR COMPLEX;  Supracerabellar  Infratentorial Approach for Resection of Tumor ;  Surgeon: Kate Mckeon MD;  [facility]     THYROIDECTOMY N/A 5/3/2017    Procedure: THYROIDECTOMY;  Total Thyroidectomy;  Surgeon: Pamela Villasenor MD;  Location:  OR  "    Exam:  /77 (BP Location: Right arm, Patient Position: Sitting, Cuff Size: Adult Regular)   Pulse 85   Temp 98  F (36.7  C) (Oral)   Ht 1.727 m (5' 8\")   Wt 122.5 kg (270 lb)   SpO2 96%   BMI 41.05 kg/m    Awake, alert. Pleasant  No abdominal tenderness  No skin rash    "

## 2019-07-17 ENCOUNTER — TELEPHONE (OUTPATIENT)
Dept: FAMILY MEDICINE | Facility: CLINIC | Age: 39
End: 2019-07-17

## 2019-07-17 ENCOUNTER — TELEPHONE (OUTPATIENT)
Dept: INFECTIOUS DISEASES | Facility: CLINIC | Age: 39
End: 2019-07-17

## 2019-07-17 ENCOUNTER — MYC MEDICAL ADVICE (OUTPATIENT)
Dept: FAMILY MEDICINE | Facility: CLINIC | Age: 39
End: 2019-07-17

## 2019-07-17 ENCOUNTER — OFFICE VISIT (OUTPATIENT)
Dept: FAMILY MEDICINE | Facility: CLINIC | Age: 39
End: 2019-07-17
Payer: COMMERCIAL

## 2019-07-17 VITALS
TEMPERATURE: 96.4 F | SYSTOLIC BLOOD PRESSURE: 120 MMHG | HEART RATE: 99 BPM | BODY MASS INDEX: 41.3 KG/M2 | WEIGHT: 272.5 LBS | RESPIRATION RATE: 12 BRPM | OXYGEN SATURATION: 96 % | DIASTOLIC BLOOD PRESSURE: 84 MMHG | HEIGHT: 68 IN

## 2019-07-17 DIAGNOSIS — F31.70 BIPOLAR AFFECTIVE DISORDER IN REMISSION (H): ICD-10-CM

## 2019-07-17 DIAGNOSIS — R50.9 FEVER, UNSPECIFIED FEVER CAUSE: ICD-10-CM

## 2019-07-17 DIAGNOSIS — A04.72 C. DIFFICILE COLITIS: Primary | ICD-10-CM

## 2019-07-17 DIAGNOSIS — M79.602 LEFT ARM PAIN: Primary | ICD-10-CM

## 2019-07-17 DIAGNOSIS — A04.72 C. DIFFICILE COLITIS: ICD-10-CM

## 2019-07-17 DIAGNOSIS — E89.0 POST-SURGICAL HYPOTHYROIDISM: ICD-10-CM

## 2019-07-17 DIAGNOSIS — R12 HEARTBURN: ICD-10-CM

## 2019-07-17 DIAGNOSIS — N89.8 VAGINAL DISCHARGE: ICD-10-CM

## 2019-07-17 LAB
ALBUMIN UR-MCNC: ABNORMAL MG/DL
APPEARANCE UR: CLEAR
BASOPHILS # BLD AUTO: 0 10E9/L (ref 0–0.2)
BASOPHILS NFR BLD AUTO: 0.3 %
BILIRUB UR QL STRIP: ABNORMAL
COLOR UR AUTO: YELLOW
DIFFERENTIAL METHOD BLD: NORMAL
EOSINOPHIL # BLD AUTO: 0 10E9/L (ref 0–0.7)
EOSINOPHIL NFR BLD AUTO: 0.2 %
ERYTHROCYTE [DISTWIDTH] IN BLOOD BY AUTOMATED COUNT: 14.9 % (ref 10–15)
GLUCOSE UR STRIP-MCNC: NEGATIVE MG/DL
HCT VFR BLD AUTO: 41.9 % (ref 35–47)
HGB BLD-MCNC: 13.8 G/DL (ref 11.7–15.7)
HGB UR QL STRIP: NEGATIVE
KETONES UR STRIP-MCNC: ABNORMAL MG/DL
LEUKOCYTE ESTERASE UR QL STRIP: NEGATIVE
LYMPHOCYTES # BLD AUTO: 3 10E9/L (ref 0.8–5.3)
LYMPHOCYTES NFR BLD AUTO: 33.5 %
MCH RBC QN AUTO: 27.4 PG (ref 26.5–33)
MCHC RBC AUTO-ENTMCNC: 32.9 G/DL (ref 31.5–36.5)
MCV RBC AUTO: 83 FL (ref 78–100)
MONOCYTES # BLD AUTO: 0.7 10E9/L (ref 0–1.3)
MONOCYTES NFR BLD AUTO: 8.1 %
NEUTROPHILS # BLD AUTO: 5.2 10E9/L (ref 1.6–8.3)
NEUTROPHILS NFR BLD AUTO: 57.9 %
NITRATE UR QL: NEGATIVE
NON-SQ EPI CELLS #/AREA URNS LPF: NORMAL /LPF
PH UR STRIP: 5.5 PH (ref 5–7)
PLATELET # BLD AUTO: 334 10E9/L (ref 150–450)
RBC # BLD AUTO: 5.04 10E12/L (ref 3.8–5.2)
RBC #/AREA URNS AUTO: NORMAL /HPF
SOURCE: ABNORMAL
SP GR UR STRIP: 1.02 (ref 1–1.03)
SPECIMEN SOURCE: ABNORMAL
UROBILINOGEN UR STRIP-ACNC: 0.2 EU/DL (ref 0.2–1)
WBC # BLD AUTO: 9 10E9/L (ref 4–11)
WBC #/AREA URNS AUTO: NORMAL /HPF
WET PREP SPEC: ABNORMAL

## 2019-07-17 PROCEDURE — 85025 COMPLETE CBC W/AUTO DIFF WBC: CPT | Performed by: NURSE PRACTITIONER

## 2019-07-17 PROCEDURE — 84443 ASSAY THYROID STIM HORMONE: CPT

## 2019-07-17 PROCEDURE — 87210 SMEAR WET MOUNT SALINE/INK: CPT | Performed by: OBSTETRICS & GYNECOLOGY

## 2019-07-17 PROCEDURE — 36415 COLL VENOUS BLD VENIPUNCTURE: CPT | Performed by: NURSE PRACTITIONER

## 2019-07-17 PROCEDURE — 80053 COMPREHEN METABOLIC PANEL: CPT | Performed by: NURSE PRACTITIONER

## 2019-07-17 PROCEDURE — 81001 URINALYSIS AUTO W/SCOPE: CPT | Performed by: NURSE PRACTITIONER

## 2019-07-17 PROCEDURE — 99214 OFFICE O/P EST MOD 30 MIN: CPT | Performed by: NURSE PRACTITIONER

## 2019-07-17 ASSESSMENT — MIFFLIN-ST. JEOR: SCORE: 1974.42

## 2019-07-17 NOTE — TELEPHONE ENCOUNTER
Spoke with ID clinic, gave detailed message    ID phone no. [phone]    Called and gave pt the message from provider regarding zyprexa    Cristin Landry RN   [facility]

## 2019-07-17 NOTE — PATIENT INSTRUCTIONS
Get massage for neck and upper back.  Do neck and back stretches.  Try ranitidine 150 mg for heartburn  A few labs and I will contact ID clinic about the fever and sweats    Contact your mental health doctor today

## 2019-07-17 NOTE — PROGRESS NOTES
"Subjective     Cathy Arroyo is a 38 year old female who presents to clinic today for the following health issues:    HPI   Concern - patient has concern for heart due to cluster of symptoms  Onset: week ago for shoulder and arm pain, last 3 days has had a temp of 100-101 and sweating a lot. Insomnia and abdominal pain    Description of symptoms:   1) Left arm pain for a week - feels burning that travels from shoulder blade to elbow and wrist; is having some neck pain, but it isn't severe.  No loss of strength.  No injury.  Is breastfeeding 10 week old  2) Heartburn - sternal pain and burning in the evenings. Nausea and bilious taste without vomiting.  TUMs does not relieve symptoms.  Regarding recent c.diff infection - finished abx, stools are starting to be more formed, but has about 3 times a day diarrhea.  Stools are very light pale in color.    3) Sweating - Profuse sweating    Intensity: moderate at night. Arm seems to hurt the most at night    Progression of Symptoms:  intermittent    Accompanying Signs & Symptoms:  Tired  Temps in past three days of up to 101  Worsening depression symptoms    Previous history of similar problem:   none    Precipitating factors:   Worsened by: no    Alleviating factors:  Improved by: none    Therapies Tried and outcome: tums for heartburn but nothing else for other symptoms    One year no smoking end of August  Infant is 10 weeks old - doing well  Bipolar - feels she is headed on depression downturn.  Has zyprexa at home and easy access to psychiatry.  Denies SI/HI.  Says heartburn could be mood symptom.    Reviewed and updated as needed this visit by Provider         Review of Systems   ROS COMP: Constitutional, HEENT, cardiovascular, pulmonary, gi and gu systems are negative, except as otherwise noted.      Objective    /84   Pulse 99   Resp 12   Ht 1.727 m (5' 7.99\")   Wt 123.6 kg (272 lb 8 oz)   SpO2 96%   Breastfeeding? Yes   BMI 41.44 kg/m  "   Body mass index is 41.44 kg/m .  Physical Exam   GENERAL: alert and mild distress  EYES: Eyes grossly normal to inspection, PERRL and conjunctivae and sclerae normal  HENT: ear canals and TM's normal, nose and mouth without ulcers or lesions  NECK: no adenopathy, no asymmetry, masses, or scars and thyroid normal to palpation  RESP: lungs clear to auscultation - no rales, rhonchi or wheezes  CV: regular rate and rhythm, normal S1 S2, no S3 or S4, no murmur, click or rub, no peripheral edema and peripheral pulses strong  ABDOMEN: soft, nontender, no hepatosplenomegaly, no masses and bowel sounds normal  MS: no gross musculoskeletal defects noted, no edema;  strength intact bilaterally, Spurling's negative, trapezius hypertrophy  SKIN: no suspicious lesions or rashes  NEURO: Normal strength and tone, mentation intact and speech normal; DTR UE and LE intact bilaterally, light touch UE intact bilaterally  PSYCH: mentation appears normal, tearful, anxious, judgement and insight intact and appearance well groomed    Diagnostic Test Results:  Labs reviewed in Epic  Results for orders placed or performed in visit on 07/17/19 (from the past 24 hour(s))   UA reflex to Microscopic   Result Value Ref Range    Color Urine Yellow     Appearance Urine Clear     Glucose Urine Negative NEG^Negative mg/dL    Bilirubin Urine Small (A) NEG^Negative    Ketones Urine Trace (A) NEG^Negative mg/dL    Specific Gravity Urine 1.025 1.003 - 1.035    Blood Urine Negative NEG^Negative    pH Urine 5.5 5.0 - 7.0 pH    Protein Albumin Urine Trace (A) NEG^Negative mg/dL    Urobilinogen Urine 0.2 0.2 - 1.0 EU/dL    Nitrite Urine Negative NEG^Negative    Leukocyte Esterase Urine Negative NEG^Negative    Source Midstream Urine    Wet prep   Result Value Ref Range    Specimen Description Midstream Urine     Wet Prep Moderate  Clue cells seen   (A)     Wet Prep No Trichomonas seen     Wet Prep No yeast seen     Wet Prep Few  WBC'S seen      Urine  Microscopic   Result Value Ref Range    WBC Urine 0 - 5 OTO5^0 - 5 /HPF    RBC Urine O - 2 OTO2^O - 2 /HPF    Squamous Epithelial /LPF Urine Few FEW^Few /LPF   CBC with platelets and differential   Result Value Ref Range    WBC 9.0 4.0 - 11.0 10e9/L    RBC Count 5.04 3.8 - 5.2 10e12/L    Hemoglobin 13.8 11.7 - 15.7 g/dL    Hematocrit 41.9 35.0 - 47.0 %    MCV 83 78 - 100 fl    MCH 27.4 26.5 - 33.0 pg    MCHC 32.9 31.5 - 36.5 g/dL    RDW 14.9 10.0 - 15.0 %    Platelet Count 334 150 - 450 10e9/L    % Neutrophils 57.9 %    % Lymphocytes 33.5 %    % Monocytes 8.1 %    % Eosinophils 0.2 %    % Basophils 0.3 %    Absolute Neutrophil 5.2 1.6 - 8.3 10e9/L    Absolute Lymphocytes 3.0 0.8 - 5.3 10e9/L    Absolute Monocytes 0.7 0.0 - 1.3 10e9/L    Absolute Eosinophils 0.0 0.0 - 0.7 10e9/L    Absolute Basophils 0.0 0.0 - 0.2 10e9/L    Diff Method Automated Method      Remainder labs pending        Assessment & Plan     (M79.602) Left arm pain  (primary encounter diagnosis)  Comment:   Plan: Patient initially has concerns that sternal pain and left arm pain signify heart attack.  Discussed that she is low risk despite smoking history and that symptoms are non-exertional.  Left arm pain description c/w nerve irritation from muscle tension of neck and trapezius muscles which fits with nursing a .  Recommend massage - has friend in massage school.  Heartburn is associated with bilious taste and occurs at night and unlikely cardiac mimic.  If symptoms change and become externational, constant, she has LOC instructed to go to the ED.            (R50.9) Fever, unspecified fever cause  Comment:   Plan: CBC with platelets and differential,         Comprehensive metabolic panel, UA reflex to         Microscopic, Urine Microscopic  Unclear cause for fever - no URI symptoms or exam findings.  UA does not demonstrate UTI.  She is in early remission of c.diff infection.  Concern for recurrence of c.diff.  Reached out to ID for consult  "- awaiting call back.      (A04.72) C. difficile colitis  Comment:   Plan: CBC with platelets and differential,         Comprehensive metabolic panel, UA reflex to         Microscopic            (R12) Heartburn  Comment:   Plan: ranitidine (ZANTAC) 150 MG tablet        Can try ranitidine for heartburn for positive association of symptom with heartburn instead of heart.      (N89.8) Vaginal discharge  Comment:   Plan: Released Ob-GYN order    (E89.0) Post-surgical hypothyroidism  Comment:   Plan: Released endocrinology orders        (F31.70) Bipolar affective disorder in remission (H)  Comment:   Plan: Instructed to call psychiatry today and update me with medication changes.  Zyprexa is ok with breastfeeding.          BMI:   Estimated body mass index is 41.44 kg/m  as calculated from the following:    Height as of this encounter: 1.727 m (5' 7.99\").    Weight as of this encounter: 123.6 kg (272 lb 8 oz).   Weight management plan: Discussed healthy diet and exercise guidelines        Patient Instructions   Get massage for neck and upper back.  Do neck and back stretches.  Try ranitidine 150 mg for heartburn  A few labs and I will contact ID clinic about the fever and sweats    Contact your mental health doctor today      Return in about 3 months (around 10/17/2019).    JANNET Liu [facility]        "

## 2019-07-17 NOTE — TELEPHONE ENCOUNTER
Copied kiara message from [facility]: Could you let Cathy Arroyo know that Zyprexa is lactation category L2 - safe in breastfeeding.

## 2019-07-17 NOTE — TELEPHONE ENCOUNTER
[facility] Call Center    Phone Message    May a detailed message be left on voicemail: no    Reason for Call: Other: Patient was seen at [facility] and has been having fevers up to 101 for the last few days. Pt had also been experiencing sweating, heart burn, loose stools (which have been stable and not increasing). Pt is showing no other symptoms for upper respiratory infection, nor urinary infection, CBC with differential pending, and liver tests had mild results. Dr Constance Dasilva(goes by [facility]) wants to know if ID would want a cdiff culture or any other tests done or to see that patient? Please call [facility] back at [phone]     Action Taken: Message routed to:  [facility] ([facility]): ID

## 2019-07-18 DIAGNOSIS — N76.0 BV (BACTERIAL VAGINOSIS): Primary | ICD-10-CM

## 2019-07-18 DIAGNOSIS — B96.89 BV (BACTERIAL VAGINOSIS): Primary | ICD-10-CM

## 2019-07-18 LAB
ALBUMIN SERPL-MCNC: 3.9 G/DL (ref 3.4–5)
ALP SERPL-CCNC: 113 U/L (ref 40–150)
ALT SERPL W P-5'-P-CCNC: 76 U/L (ref 0–50)
ANION GAP SERPL CALCULATED.3IONS-SCNC: 7 MMOL/L (ref 3–14)
AST SERPL W P-5'-P-CCNC: 30 U/L (ref 0–45)
BILIRUB SERPL-MCNC: 0.5 MG/DL (ref 0.2–1.3)
BUN SERPL-MCNC: 18 MG/DL (ref 7–30)
CALCIUM SERPL-MCNC: 9.4 MG/DL (ref 8.5–10.1)
CHLORIDE SERPL-SCNC: 107 MMOL/L (ref 94–109)
CO2 SERPL-SCNC: 23 MMOL/L (ref 20–32)
CREAT SERPL-MCNC: 0.74 MG/DL (ref 0.52–1.04)
GFR SERPL CREATININE-BSD FRML MDRD: >90 ML/MIN/{1.73_M2}
GLUCOSE SERPL-MCNC: 97 MG/DL (ref 70–99)
POTASSIUM SERPL-SCNC: 4.3 MMOL/L (ref 3.4–5.3)
PROT SERPL-MCNC: 7.8 G/DL (ref 6.8–8.8)
SODIUM SERPL-SCNC: 137 MMOL/L (ref 133–144)
TSH SERPL DL<=0.005 MIU/L-ACNC: 0.87 MU/L (ref 0.4–4)

## 2019-07-18 RX ORDER — METRONIDAZOLE 7.5 MG/G
1 GEL VAGINAL DAILY
Qty: 70 G | Refills: 0 | Status: SHIPPED | OUTPATIENT
Start: 2019-07-18 | End: 2019-09-19

## 2019-07-18 ASSESSMENT — ASTHMA QUESTIONNAIRES: ACT_TOTALSCORE: 25

## 2019-07-18 NOTE — TELEPHONE ENCOUNTER
Based on the symptoms of diarrhea with fever, I would suggest stool for CDiff, enteric pathogens panel, Cryptosporidium antigen and Giardia antigen. If she still has diarrhea today, could start vancomycin PO 4 times daily, and stop if Cdiff test is negative.    Nimisha Gonzalez MD   of Medicine, Division of Infectious Diseases  [facility] [phone]

## 2019-07-18 NOTE — RESULT ENCOUNTER NOTE
Cathy,    The labs don't explain why you have a fever and were mostly normal.  I spoke with the ID clinic nurse today and you should be getting a call from them if you haven't already with labs that Dr. Gonzalez wants you to do.  One of your liver tests was mildly elevated and if ID does not think this is related to c.diff then we should follow-up on this here.  If you have any questions, please feel free to contact the clinic.    DEVEN Winston

## 2019-07-18 NOTE — TELEPHONE ENCOUNTER
Left [facility] for patient relaying the below information. Provided both writer's direct clinic number and general ID number for her to call with questions/concerns.    Julissa Avalos RN  ---------------------------------------------------------------------     Nimisha Gonzalez MD  You 3 minutes ago (2:14 PM)      I think this is okay, as long as her fever does not go above 101.5. If she has a high fever she should be seen and submit stool samples.     Thanks-   ag

## 2019-07-22 DIAGNOSIS — A04.72 C. DIFFICILE COLITIS: ICD-10-CM

## 2019-07-22 LAB
C DIFF TOX B STL QL: NEGATIVE
SPECIMEN SOURCE: NORMAL

## 2019-07-22 PROCEDURE — 87493 C DIFF AMPLIFIED PROBE: CPT | Mod: 59 | Performed by: INTERNAL MEDICINE

## 2019-07-22 PROCEDURE — 87506 IADNA-DNA/RNA PROBE TQ 6-11: CPT | Performed by: INTERNAL MEDICINE

## 2019-07-22 PROCEDURE — 87329 GIARDIA AG IA: CPT | Mod: 59 | Performed by: INTERNAL MEDICINE

## 2019-07-23 ENCOUNTER — TELEPHONE (OUTPATIENT)
Dept: INFECTIOUS DISEASES | Facility: CLINIC | Age: 39
End: 2019-07-23

## 2019-07-23 LAB
C COLI+JEJUNI+LARI FUSA STL QL NAA+PROBE: NOT DETECTED
EC STX1 GENE STL QL NAA+PROBE: NOT DETECTED
EC STX2 GENE STL QL NAA+PROBE: NOT DETECTED
ENTERIC PATHOGEN COMMENT: NORMAL
G LAMBLIA AG STL QL IA: NORMAL
NOROV GI+II ORF1-ORF2 JNC STL QL NAA+PR: NOT DETECTED
RVA NSP5 STL QL NAA+PROBE: NOT DETECTED
SALMONELLA SP RPOD STL QL NAA+PROBE: NOT DETECTED
SHIGELLA SP+EIEC IPAH STL QL NAA+PROBE: NOT DETECTED
SPECIMEN SOURCE: NORMAL
V CHOL+PARA RFBL+TRKH+TNAA STL QL NAA+PR: NOT DETECTED
Y ENTERO RECN STL QL NAA+PROBE: NOT DETECTED

## 2019-07-23 NOTE — TELEPHONE ENCOUNTER
[facility] Call Center    Phone Message    May a detailed message be left on voicemail: yes    Reason for Call: Requesting Results   Name/type of test: labs   (pt not feeling well)  Date of test: 7/22/2019   Was test done at a location other than [facility] (Please fill in the location if not [facility])?: Yes: [place]      Action Taken: Message routed to:  [facility] ID

## 2019-07-24 LAB
O+P STL CONC: NORMAL
SPECIMEN SOURCE: NORMAL

## 2019-07-24 NOTE — TELEPHONE ENCOUNTER
Patient stated she saw the results on MyChart and saw they were normal and has no concerns.    -Vandana IVORY, CMA

## 2019-07-29 ENCOUNTER — OFFICE VISIT (OUTPATIENT)
Dept: ENDOCRINOLOGY | Facility: CLINIC | Age: 39
End: 2019-07-29

## 2019-07-29 VITALS
SYSTOLIC BLOOD PRESSURE: 123 MMHG | HEART RATE: 79 BPM | DIASTOLIC BLOOD PRESSURE: 81 MMHG | BODY MASS INDEX: 41.52 KG/M2 | WEIGHT: 274 LBS | HEIGHT: 68 IN

## 2019-07-29 DIAGNOSIS — E89.0 S/P TOTAL THYROIDECTOMY: ICD-10-CM

## 2019-07-29 DIAGNOSIS — Z86.39 HISTORY OF GRAVES' DISEASE: ICD-10-CM

## 2019-07-29 RX ORDER — LEVOTHYROXINE SODIUM 150 UG/1
150 TABLET ORAL DAILY
Qty: 30 TABLET | Refills: 11 | Status: SHIPPED | OUTPATIENT
Start: 2019-07-29 | End: 2019-12-20

## 2019-07-29 ASSESSMENT — MIFFLIN-ST. JEOR: SCORE: 1981.36

## 2019-07-29 ASSESSMENT — PAIN SCALES - GENERAL: PAINLEVEL: NO PAIN (0)

## 2019-07-29 NOTE — PATIENT INSTRUCTIONS
You should continue to have yearly follow up for life.    This can be with the primary doctor , reserving follow up with us if new questions or concerns.    We do offer telephone visit    Thank you for choosing [facility] for your health care needs. Below is some information for patients who are interested in having their follow-up visit with a physician by telephone. In some cases, a telephone visit can be an effective and convenient way to manage your follow-up care. Choosing a telephone visit rather than a face to face visit for your follow-up care is a decision that you and your physician can make together to ensure it meets all of your needs.  A face to face visit is always an available option, if you choose to do so.     We want to make sure you have all of the information you need about the telephone visit option and answer all of your questions before you decide to schedule a telephone follow-up visit. If you have any questions, you may talk to a staff member or our financial counselor at [phone].    1.  General overview  Our clinic sees patients for a variety of conditions and concerns. A face to face visit with your doctor is required for any new concerns or for your initial visit. If you and your doctor decide that a follow up visit by telephone is appropriate, you may decide to opt for a telephone visit.     2.  Billing and insurance coverage  There is a charge for telephone visits, similar to the charge for an in-person visit. Your bill is based on the amount of time you and your physician are on the phone. We will bill each visit to your insurance company (just like your other medical visits), and you will be responsible for any costs not paid by your insurance company. The charge may be denied by your insurance company, in which case you will be responsible for the entire amount billed. The decision to cover the cost is determined by your insurance company. If you want to  know what your insurance company will cover, we encourage you to contact them to determine your coverage. The codes below are the codes we use when billing for telephone visits and the associated charges. This may help you work with your insurance company to determine your benefits.   Billing CPT codes for Telephone visits    80980 ($30), 17144 ($35), 98557 ($40)     3. Updating your consent form  You may get contacted by a staff member about updating your consent form. If it needs updating prior to your telephone visit, please visit the link below, print it and fill it out and return it to us at [facility], Mail Code [id number], [address] [place] [address].   [email or web address]

## 2019-07-29 NOTE — PROGRESS NOTES
Endocrinology Attending Physician Progress note    A/p  1.   Post surgical hypothyroidism. Recent TSH is at normal target on LT4 150 mcg/day.  She will continue to require yearly follow up TSH reflex for life, or sooner prn.  This may be turned back over to her PCP, reserving follow up for us prn.  Refilled LT4 150 mcg/day, # 30, 11 refills.    Graves' disease antibody (TSI) is present as of 2/18/19    Post partum, lactating mother.    Bipolar - she says she is rapid cycling now    Leatha Franco MD      Chief complaint/ HPI:   Cathy presents for follow up of  Post surgical hypothyroidism in the context of history of Graves'.  I last saw her 10/17 and she has sent multiple mychart notes since then.  She failed an appt with me 1/28/19, scheduled during pregnancy.  We had many mychart interactions during the pregnancy.     Cathy was treated with total thyroidectomy on 5/3/17.  Surgical pathology was benign.  Post op PTH was 5.   With the current pregnancy the LT4 dose has been increased from 150 to 175 mcg/day. She has had TFTS about once/month since September, 2018-4/26/19, with adjustments of LT4 for pregnancy. ON 5/6/19 she delived by c section, at 39 weeks, 2 days.  The baby was full term, healthy.   She has been on LT4 150 mcg/day since after  the birth of the baby.      The most recent labs from 7/17/19: TSH 0.87, Ca 9.4, albumin 3.9    ROS  Energy is returning  Was very sick for a long time after giving birth- had c diff/ c section  Eyes: dry; no diplopia  Cardiac; palpitations in the throat comes up but not in a month or so  Respiratory: starting to exercise - exercise tolerance not very good, sweats a lot with exercise.   GI: major heart burn and continued diarrhea (but testeed negative for c diff)  lacatting  No return of periods  Rapid cycling - but level now - in and out every day-- this is being treated with medical cannabis and talk therapy.  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     Post partum depression      Post-surgical hypothyroidism 2017     Uncomplicated asthma      Past Surgical History:   Procedure Laterality Date     BLOOD PATCH N/A 10/11/2016    Procedure: EPIDURAL BLOOD PATCH;  Surgeon: GENERIC ANESTHESIA PROVIDER;  [facility]      SECTION, TUBAL LIGATION, COMBINED N/A 2019    Procedure:  SECTION, WITH TUBAL LIGATION;  Surgeon: Kathy Rutledge MD;  [facility] L[facility]     CRANIOTOMY, EXCISE TUMOR COMPLEX, COMBINED  2014    Procedure: COMBINED CRANIOTOMY, EXCISE TUMOR COMPLEX;  Supracerabellar  Infratentorial Approach for Resection of Tumor ;  Surgeon: Kate Mckeon MD;  [facility]     THYROIDECTOMY N/A 5/3/2017    Procedure: THYROIDECTOMY;  Total Thyroidectomy;  Surgeon: Pamela Villasenor MD;  [facility]     Current Outpatient Medications   Medication Sig Dispense Refill     acetaminophen (TYLENOL) 325 MG tablet Take 650 mg by mouth every 4 hours as needed for mild pain       cetirizine (ZYRTEC) 5 MG tablet Take 5 mg by mouth every morning When not using the Allegra       Docosahexaenoic Acid (DHA NATURAL OMEGA-3) 200 MG capsule Take 200 mg by mouth every morning       fexofenadine (ALLEGRA) 60 MG tablet Take 60 mg by mouth every morning       fluticasone (FLONASE) 50 MCG/ACT nasal spray Spray 1 spray into both nostrils daily 16 g 3     ibuprofen (ADVIL/MOTRIN) 800 MG tablet Take 1 tablet (800 mg) by mouth every 6 hours as needed for other (cramping) 30 tablet 0     levothyroxine (SYNTHROID/LEVOTHROID) 150 MCG tablet Take 1 tablet (150 mcg) by mouth daily 30 tablet 11     medical cannabis (Patient's own supply) Take 1 Dose by mouth See Admin Instructions (The purpose of this order is to document  that the patient reports taking medical cannabis.  This is not a prescription, and is not used to certify that the patient has a qualifying medical condition.)    Tangerine Oral Suspension Unflavored 1-5 ml up to two times daily.  Total THC = 240 mg, Total CBD Trace       medical cannabis (Patient's own supply) Take 1 Dose by mouth See Admin Instructions (The purpose of this order is to document that the patient reports taking medical cannabis.  This is not a prescription, and is not used to certify that the patient has a qualifying medical condition.)    [place] oral suspension: take 1-3 ml by mouth two times daily  Total CBD 1200 mg. Total THC 60 mg       metroNIDAZOLE (METROGEL) 0.75 % vaginal gel Place 1 applicator (5 g) vaginally daily 70 g 0     Misc. Devices (BREAST PUMP) MISC 1 each as needed (Breast feeding) 1 each 0     montelukast (SINGULAIR) 10 MG tablet Take 1 tablet (10 mg) by mouth every morning 90 tablet 3     Prenatal Vit-Fe Fumarate-FA (PRENATAL VITAMIN PO) Take 1 tablet by mouth every morning        ranitidine (ZANTAC) 150 MG tablet Take 1 tablet (150 mg) by mouth 2 times daily 180 tablet 0     acetaminophen (TYLENOL) 325 MG tablet Take 2 tablets (650 mg) by mouth every 4 hours as needed for mild pain (Patient not taking: Reported on 6/28/2019) 100 tablet 0     ipratropium - albuterol 0.5 mg/2.5 mg/3 mL (DUONEB) 0.5-2.5 (3) MG/3ML neb solution Take 1 vial (3 mLs) by nebulization every 6 hours as needed for shortness of breath / dyspnea or wheezing (Patient not taking: Reported on 6/28/2019) 90 vial 3     senna-docusate (SENOKOT-S/PERICOLACE) 8.6-50 MG tablet Take 2 tablets by mouth 2 times daily as needed for constipation (Patient not taking: Reported on 6/28/2019) 30 tablet 0     Family History   Problem Relation Age of Onset     Thyroid Disease Paternal Aunt      Asthma Father      Mental Illness Father      Obesity Father      Asthma Maternal Grandmother      Osteoporosis Maternal Grandmother   "    Glaucoma Maternal Grandmother      Hypertension Maternal Grandmother      Macular Degeneration Maternal Grandmother      Diabetes Paternal Grandfather      Other Cancer Mother      Genitourinary Problems Mother      Bipolar Disorder Mother         unipolar depression     Depression Mother      Thyroid Disease Mother         benign tumor     Skin Cancer Mother      Cancer Mother      Bipolar Disorder Brother         unipolar depression     Asthma Brother      Social History     Tobacco Use     Smoking status: Former Smoker     Packs/day: 0.50     Years: 12.00     Pack years: 6.00     Types: Cigarettes     Start date: 2004     Last attempt to quit: 2/15/2018     Years since quittin.4     Smokeless tobacco: Never Used   Substance Use Topics     Alcohol use: No     Alcohol/week: 0.0 oz     Comment: quit 16 days ago,     Drug use: Yes     Types: Marijuana     Comment: medical marijuana     2 daughters    GENERAL  /81   Pulse 79   Ht 1.727 m (5' 8\")   Wt 124.3 kg (274 lb)   BMI 41.66 kg/m    SKIN: normal color, temperature, texture ; many tattoos  HEENT: PER,  no scleral icterus, eyelid retraction, stare, lid lag, proptosis or conjunctival injection.  .    NECK: supple.  No visible neck masses, cervical adenopathy; nearly invisible scar.  Thyroid is not palpable  LUNGS: clear to auscultation bilaterally.   CARDIAC: RRR, S1, S2 without murmurs, rubs or gallops.    BACK: normal spinal contour.    NEURO: Alert, responds appropriately to questions,  moves all extremities, DTRs 0/4, gait normal, no tremor of the outstretched hand    ENDO THYROID LABS-[facility] Latest Ref Rng & Units 2019   TSH 0.40 - 4.00 mU/L 0.87 1.96   T4 TOTAL 4.5 - 13.9 ug/dL  16.1 (H)   T4 FREE 0.76 - 1.46 ng/dL     FREE T3 2.3 - 4.2 pg/mL     TRIIODOTHYRONINE(T3) 60 - 181 ng/dL     THYROID STIM IMMUNOG <=1.3 TSI index       ENDO THYROID LABS-[facility] Latest Ref Rng & Units 3/25/2019 2019   TSH 0.40 - 4.00 mU/L 2.60 2.14 "   T4 TOTAL 4.5 - 13.9 ug/dL 17.8 (H) 16.6 (H)   T4 FREE 0.76 - 1.46 ng/dL     FREE T3 2.3 - 4.2 pg/mL     TRIIODOTHYRONINE(T3) 60 - 181 ng/dL     THYROID STIM IMMUNOG <=1.3 TSI index  1.5 (H)     [facility] Latest Ref Rng & Units 1/21/2019 12/5/2018   TSH 0.40 - 4.00 mU/L 1.62 2.38   T4 TOTAL 4.5 - 13.9 ug/dL 16.2 (H) 17.8 (H)   T4 FREE 0.76 - 1.46 ng/dL     FREE T3 2.3 - 4.2 pg/mL     TRIIODOTHYRONINE(T3) 60 - 181 ng/dL     THYROID STIM IMMUNOG <=1.3 TSI index       [facility] 11/12/2018 10/16/2018   TSH 0.40 - 4.00 mU/L 2.63 6.12 (H)   T4 TOTAL 4.5 - 13.9 ug/dL 17.1 (H) 14.9 (H)   T4 FREE 0.76 - 1.46 ng/dL     FREE T3 2.3 - 4.2 pg/mL     TRIIODOTHYRONINE(T3) 60 - 181 ng/dL     THYROID STIM IMMUNOG <=1.3 TSI index       [facility] 9/18/2018 8/31/2018   TSH 0.40 - 4.00 mU/L 2.25 0.35 (L)   T4 TOTAL 4.5 - 13.9 ug/dL 11.2 11.8   T4 FREE 0.76 - 1.46 ng/dL  1.18   FREE T3 2.3 - 4.2 pg/mL 2.0 (L)    TRIIODOTHYRONINE(T3) 60 - 181 ng/dL  124   THYROID STIM IMMUNOG <=1.3 TSI index       [facility] Units 8/26/2018 7/10/2018   TSH 0.40 - 4.00 mU/L 0.09 (L) 0.06 (L)   T4 TOTAL 4.5 - 13.9 ug/dL 13.8 13.8   T4 FREE 0.76 - 1.46 ng/dL     FREE T3 2.3 - 4.2 pg/mL     TRIIODOTHYRONINE(T3) 60 - 181 ng/dL     THYROID STIM IMMUNOG <=1.3 TSI index  2.3 (H)     [facility] Latest Ref R[facility] & Units 4/27/2018 4/23/2018   TSH 0.40 - 4.00 mU/L 2.02 1.93   T4 TOTAL 4.5 - 13.9 ug/dL     T4 FREE 0.76 - 1.46 ng/dL     FREE T3 2.3 - 4.2 pg/mL     TRIIODOTHYRONINE(T3) 60 - 181 ng/dL     THYROID STIM IMMUNOG <=1.3 TSI index       [facility] 2/8/2018 12/12/2017   TSH 0.40 - 4.00 mU/L 1.93 1.47   T4 TOTAL 4.5 - 13.9 ug/dL     T4 FREE 0.76 - 1.46 ng/dL 1.31 1.33   FREE T3 2.3 - 4.2 pg/mL     TRIIODOTHYRONINE(T3) 60 - 181 ng/dL     THYROID STIM IMMUNOG <=1.3 TSI index       [facility] Lat[facility] Ref  Rng & Units 10/16/2017 6/13/2017   TSH 0.40 - 4.00 mU/L 11.17 (H) 6.96 (H)   T4 TOTAL 4.5 - 13.9 ug/dL     T4 FREE 0.76 - 1.46 ng/dL 1.13 1.02   FREE T3 2.3 - 4.2 pg/mL     TRIIODOTHYRONINE(T3) 60 - 181 ng/dL  78   THYROID STIM IMMUNOG <=1.3 TSI index       ENDO THYROID LABS-[facility] Latest Ref Rng & Units 5/2/2017 4/30/2017   TSH 0.40 - 4.00 mU/L  0.04 (L)   T4 TOTAL 4.5 - 13.9 ug/dL     T4 FREE 0.76 - 1.46 ng/dL 0.99 1.02   FREE T3 2.3 - 4.2 pg/mL     TRIIODOTHYRONINE(T3) 60 - 181 ng/dL 124    THYROID STIM IMMUNOG <=1.3 TSI index       ENDO THYROID LABS-[facility] Latest Ref Rng & Units 4/18/2017 3/17/2017   TSH 0.40 - 4.00 mU/L 0.01 (L) <0.01 (L)   T4 TOTAL 4.5 - 13.9 ug/dL     T4 FREE 0.76 - 1.46 ng/dL 1.07 1.13   FREE T3 2.3 - 4.2 pg/mL     TRIIODOTHYRONINE(T3) 60 - 181 ng/dL 143 179   THYROID STIM IMMUNOG <=1.3 TSI index       ENDO THYROID LABS-[facility] Latest Ref Rng & Units 2/1/2017   TSH 0.40 - 4.00 mU/L 0.02 (L)   T4 TOTAL 4.5 - 13.9 ug/dL    T4 FREE 0.76 - 1.46 ng/dL 1.26   FREE T3 2.3 - 4.2 pg/mL    TRIIODOTHYRONINE(T3) 60 - 181 ng/dL 172   THYROID STIM IMMUNOG <=1.3 TSI index 5.8 (H)

## 2019-07-29 NOTE — LETTER
7/29/2019       RE: Cathy Arroyo  [address] [facility] [address]     Dear Colleague,    Thank you for referring your patient, Cathy Arroyo, to the [facility] ENDOCRINOLOGY at [facility]. Please see a copy of my visit note below.    Endocrinology Attending Physician Progress note    A/p  1.   Post surgical hypothyroidism. Recent TSH is at normal target on LT4 150 mcg/day.  She will continue to require yearly follow up TSH reflex for life, or sooner prn.  This may be turned back over to her PCP, reserving follow up for us prn.  Refilled LT4 150 mcg/day, # 30, 11 refills.    Graves' disease antibody (TSI) is present as of 2/18/19    Post partum, lactating mother.    Bipolar - she says she is rapid cycling now    Leatha Franco MD      Chief complaint/ HPI:   Cathy presents for follow up of  Post surgical hypothyroidism in the context of history of Graves'.  I last saw her 10/17 and she has sent multiple mychart notes since then.  She failed an appt with me 1/28/19, scheduled during pregnancy.  We had many mychart interactions during the pregnancy.     Cathy was treated with total thyroidectomy on 5/3/17.  Surgical pathology was benign.  Post op PTH was 5.   With the current pregnancy the LT4 dose has been increased from 150 to 175 mcg/day. She has had TFTS about once/month since September, 2018-4/26/19, with adjustments of LT4 for pregnancy. ON 5/6/19 she delived by c section, at 39 weeks, 2 days.  The baby was full term, healthy.   She has been on LT4 150 mcg/day since after  the birth of the baby.      The most recent labs from 7/17/19: TSH 0.87, Ca 9.4, albumin 3.9    ROS  Energy is returning  Was very sick for a long time after giving birth- had c diff/ c section  Eyes: dry; no diplopia  Cardiac; palpitations in the throat comes up but not in a month or so  Respiratory: starting to exercise - exercise tolerance not very good, sweats a lot with  exercise.   GI: major heart burn and continued diarrhea (but testeed negative for c diff)  lacatting  No return of periods  Rapid cycling - but level now - in and out every day-- this is being treated with medical cannabis and talk therapy.  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     Post partum depression      Post-surgical hypothyroidism 2017     Uncomplicated asthma      Past Surgical History:   Procedure Laterality Date     BLOOD PATCH N/A 10/11/2016    Procedure: EPIDURAL BLOOD PATCH;  Surgeon: GENERIC ANESTHESIA PROVIDER;  [facility]      SECTION, TUBAL LIGATION, COMBINED N/A 2019    Procedure:  SECTION, WITH TUBAL LIGATION;  Surgeon: Kathy Rutledge MD;  [facility] L[facility]     CRANIOTOMY, EXCISE TUMOR COMPLEX, COMBINED  2014    Procedure: COMBINED CRANIOTOMY, EXCISE TUMOR COMPLEX;  Supracerabellar  Infratentorial Approach for Resection of Tumor ;  Surgeon: Kate Mckeon MD;  [facility]     THYROIDECTOMY N/A 5/3/2017    Procedure: THYROIDECTOMY;  Total Thyroidectomy;  Surgeon: Pamela Villasenor MD;  [facility]     Current Outpatient Medications   Medication Sig Dispense Refill     acetaminophen (TYLENOL) 325 MG tablet Take 650 mg by mouth every 4 hours as needed for mild pain       cetirizine (ZYRTEC) 5 MG tablet Take 5 mg by mouth every morning When not using the Allegra       Docosahexaenoic Acid (DHA NATURAL OMEGA-3) 200 MG capsule Take 200 mg by mouth every morning       fexofenadine (ALLEGRA) 60 MG tablet Take 60 mg by mouth every morning       fluticasone (FLONASE) 50 MCG/ACT nasal spray Spray 1 spray into both nostrils daily 16 g 3     ibuprofen (ADVIL/MOTRIN) 800 MG tablet Take 1 tablet (800 mg) by  mouth every 6 hours as needed for other (cramping) 30 tablet 0     levothyroxine (SYNTHROID/LEVOTHROID) 150 MCG tablet Take 1 tablet (150 mcg) by mouth daily 30 tablet 11     medical cannabis (Patient's own supply) Take 1 Dose by mouth See Admin Instructions (The purpose of this order is to document that the patient reports taking medical cannabis.  This is not a prescription, and is not used to certify that the patient has a qualifying medical condition.)    [place] Oral Suspension Unflavored 1-5 ml up to two times daily.  Total THC = 240 mg, Total CBD Trace       medical cannabis (Patient's own supply) Take 1 Dose by mouth See Admin Instructions (The purpose of this order is to document that the patient reports taking medical cannabis.  This is not a prescription, and is not used to certify that the patient has a qualifying medical condition.)    [place] oral suspension: take 1-3 ml by mouth two times daily  Total CBD 1200 mg. Total THC 60 mg       metroNIDAZOLE (METROGEL) 0.75 % vaginal gel Place 1 applicator (5 g) vaginally daily 70 g 0     Misc. Devices (BREAST PUMP) MISC 1 each as needed (Breast feeding) 1 each 0     montelukast (SINGULAIR) 10 MG tablet Take 1 tablet (10 mg) by mouth every morning 90 tablet 3     Prenatal Vit-Fe Fumarate-FA (PRENATAL VITAMIN PO) Take 1 tablet by mouth every morning        ranitidine (ZANTAC) 150 MG tablet Take 1 tablet (150 mg) by mouth 2 times daily 180 tablet 0     acetaminophen (TYLENOL) 325 MG tablet Take 2 tablets (650 mg) by mouth every 4 hours as needed for mild pain (Patient not taking: Reported on 6/28/2019) 100 tablet 0     ipratropium - albuterol 0.5 mg/2.5 mg/3 mL (DUONEB) 0.5-2.5 (3) MG/3ML neb solution Take 1 vial (3 mLs) by nebulization every 6 hours as needed for shortness of breath / dyspnea or wheezing (Patient not taking: Reported on 6/28/2019) 90 vial 3     senna-docusate (SENOKOT-S/PERICOLACE) 8.6-50 MG tablet Take 2 tablets by mouth 2 times daily as  "needed for constipation (Patient not taking: Reported on 2019) 30 tablet 0     Family History   Problem Relation Age of Onset     Thyroid Disease Paternal Aunt      Asthma Father      Mental Illness Father      Obesity Father      Asthma Maternal Grandmother      Osteoporosis Maternal Grandmother      Glaucoma Maternal Grandmother      Hypertension Maternal Grandmother      Macular Degeneration Maternal Grandmother      Diabetes Paternal Grandfather      Other Cancer Mother      Genitourinary Problems Mother      Bipolar Disorder Mother         unipolar depression     Depression Mother      Thyroid Disease Mother         benign tumor     Skin Cancer Mother      Cancer Mother      Bipolar Disorder Brother         unipolar depression     Asthma Brother      Social History     Tobacco Use     Smoking status: Former Smoker     Packs/day: 0.50     Years: 12.00     Pack years: 6.00     Types: Cigarettes     Start date: 2004     Last attempt to quit: 2/15/2018     Years since quittin.4     Smokeless tobacco: Never Used   Substance Use Topics     Alcohol use: No     Alcohol/week: 0.0 oz     Comment: quit 16 days ago,     Drug use: Yes     Types: Marijuana     Comment: medical marijuana     2 daughters    GENERAL  /81   Pulse 79   Ht 1.727 m (5' 8\")   Wt 124.3 kg (274 lb)   BMI 41.66 kg/m     SKIN: normal color, temperature, texture ; many tattoos  HEENT: PER,  no scleral icterus, eyelid retraction, stare, lid lag, proptosis or conjunctival injection.  .    NECK: supple.  No visible neck masses, cervical adenopathy; nearly invisible scar.  Thyroid is not palpable  LUNGS: clear to auscultation bilaterally.   CARDIAC: RRR, S1, S2 without murmurs, rubs or gallops.    BACK: normal spinal contour.    NEURO: Alert, responds appropriately to questions,  moves all extremities, DTRs 0/4, gait normal, no tremor of the outstretched hand    ENDO THYROID LABS-[facility] Latest Ref Rng & Units 2019   TSH " 0.40 - 4.00 mU/L 0.87 1.96   T4 TOTAL 4.5 - 13.9 ug/dL  16.1 (H)   T4 FREE 0.76 - 1.46 ng/dL     FREE T3 2.3 - 4.2 pg/mL     TRIIODOTHYRONINE(T3) 60 - 181 ng/dL     THYROID STIM IMMUNOG <=1.3 TSI index       ENDO THYROID LABS-[facility] Latest Ref Rng & Units 3/25/2019 2/18/2019   TSH 0.40 - 4.00 mU/L 2.60 2.14   T4 TOTAL 4.5 - 13.9 ug/dL 17.8 (H) 16.6 (H)   T4 FREE 0.76 - 1.46 ng/dL     FREE T3 2.3 - 4.2 pg/mL     TRIIODOTHYRONINE(T3) 60 - 181 ng/dL     THYROID STIM IMMUNOG <=1.3 TSI index  1.5 (H)     ENDO THYROID LABS-[facility] Latest Ref Rng & Units 1/21/2019 12/5/2018   TSH 0.40 - 4.00 mU/L 1.62 2.38   T4 TOTAL 4.5 - 13.9 ug/dL 16.2 (H) 17.8 (H)   T4 FREE 0.76 - 1.46 ng/dL     FREE T3 2.3 - 4.2 pg/mL     TRIIODOTHYRONINE(T3) 60 - 181 ng/dL     THYROID STIM IMMUNOG <=1.3 TSI index       [facility] Latest Ref Rng & Units 11/12/2018 10/16/2018   TSH 0.40 - 4.00 mU/L 2.63 6.12 (H)   T4 TOTAL 4.5 - 13.9 ug/dL 17.1 (H) 14.9 (H)   T4 FREE 0.76 - 1.46 ng/dL     FREE T3 2.3 - 4.2 pg/mL     TRIIODOTHYRONINE(T3) 60 - 181 ng/dL     THYROID STIM IMMUNOG <=1.3 TSI index       ENDO THYROID LABS-[facility] Latest Ref Rng & Units 9/18/2018 8/31/2018   TSH 0.40 - 4.00 mU/L 2.25 0.35 (L)   T4 TOTAL 4.5 - 13.9 ug/dL 11.2 11.8   T4 FREE 0.76 - 1.46 ng/dL  1.18   FREE T3 2.3 - 4.2 pg/mL 2.0 (L)    TRIIODOTHYRONINE(T3) 60 - 181 ng/dL  124   THYROID STIM IMMUNOG <=1.3 TSI index       ENDO THYROID LABS-[facility] Latest Ref Rng & Units 8/26/2018 7/10/2018   TSH 0.40 - 4.00 mU/L 0.09 (L) 0.06 (L)   T4 TOTAL 4.5 - 13.9 ug/dL 13.8 13.8   T4 FREE 0.76 - 1.46 ng/dL     FREE T3 2.3 - 4.2 pg/mL     TRIIODOTHYRONINE(T3) 60 - 181 ng/dL     THYROID STIM IMMUNOG <=1.3 TSI index  2.3 (H)     ENDO THYROID LABS-[facility] Latest Ref Rng & Units 4/27/2018 4/23/2018   TSH 0.40 - 4.00 mU/L 2.02 1.93   T4 TOTAL 4.5 - 13.9 ug/dL     T4 FREE 0.76 - 1.46 ng/dL     FREE T3 2.3 - 4.2 pg/mL     TRIIODOTHYRONINE(T3) 60 - 181 ng/dL     THYROID STIM IMMUNOG <=1.3 TSI index       [facility] 2/8/2018 12/12/2017   TSH 0.40 - 4.00 mU/L 1.93 1.47   T4 TOTAL 4.5 - 13.9 ug/dL     T4 FREE 0.76 - 1.46 ng/dL 1.31 1.33   FREE T3 2.3 - 4.2 pg/mL     TRIIODOTHYRONINE(T3) 60 - 181 ng/dL     THYROID STIM IMMUNOG <=1.3 TSI index       [facility] Lat[facility] & Units 10/16/2017 6/13/2017   TSH 0.40 - 4.00 mU/L 11.17 (H) 6.96 (H)   T4 TOTAL 4.5 - 13.9 ug/dL     T4 FREE 0.76 - 1.46 ng/dL 1.13 1.02   FREE T3 2.3 - 4.2 pg/mL     TRIIODOTHYRONINE(T3) 60 - 181 ng/dL  78   THYROID STIM IMMUNOG <=1.3 TSI index       [facility] Ref [facility] & Units 5/2/2017 4/30/2017   TSH 0.40 - 4.00 mU/L  0.04 (L)   T4 TOTAL 4.5 - 13.9 ug/dL     T4 FREE 0.76 - 1.46 ng/dL 0.99 1.02   FREE T3 2.3 - 4.2 pg/mL     TRIIODOTHYRONINE(T3) 60 - 181 ng/dL 124    THYROID STIM IMMUNOG <=1.3 TSI index       [facility] Lat[facility] Ref R[facility] & Units 4/18/2017 3/17/2017   TSH 0.40 - 4.00 mU/L 0.01 (L) <0.01 (L)   T4 TOTAL 4.5 - 13.9 ug/dL     T4 FREE 0.76 - 1.46 ng/dL 1.07 1.13   FREE T3 2.3 - 4.2 pg/mL     TRIIODOTHYRONINE(T3) 60 - 181 ng/dL 143 179   THYROID STIM IMMUNOG <=1.3 TSI index       [facility] Units 2/1/2017   TSH 0.40 - 4.00 mU/L 0.02 (L)   T4 TOTAL 4.5 - 13.9 ug/dL    T4 FREE 0.76 - 1.46 ng/dL 1.26   FREE T3 2.3 - 4.2 pg/mL    TRIIODOTHYRONINE(T3) 60 - 181 ng/dL 172   THYROID STIM IMMUNOG <=1.3 TSI index 5.8 (H)       Again, thank you for allowing me to participate in the care of your patient.      Sincerely,    Leatha Franco MD

## 2019-09-11 ASSESSMENT — ENCOUNTER SYMPTOMS
DIZZINESS: 0
SHORTNESS OF BREATH: 0
WEAKNESS: 0
HEMATURIA: 0
CONSTIPATION: 0
PARESTHESIAS: 0
CHILLS: 0
JOINT SWELLING: 0
PALPITATIONS: 0
EYE PAIN: 0
ABDOMINAL PAIN: 1
HEMATOCHEZIA: 0
FEVER: 0
SORE THROAT: 0
COUGH: 0
DIARRHEA: 1
NAUSEA: 0
FREQUENCY: 0
NERVOUS/ANXIOUS: 0
HEARTBURN: 0
DYSURIA: 0
MYALGIAS: 1
BREAST MASS: 0
ARTHRALGIAS: 1
HEADACHES: 1

## 2019-09-12 ENCOUNTER — MYC MEDICAL ADVICE (OUTPATIENT)
Dept: FAMILY MEDICINE | Facility: CLINIC | Age: 39
End: 2019-09-12

## 2019-09-13 NOTE — TELEPHONE ENCOUNTER
mychart message to pt    Cristin Landry RN   [facility]

## 2019-09-18 ASSESSMENT — ENCOUNTER SYMPTOMS
SORE THROAT: 0
PALPITATIONS: 0
DIZZINESS: 0
CONSTIPATION: 0
HEARTBURN: 0
DIARRHEA: 1
EYE PAIN: 0
CHILLS: 0
FEVER: 0
BREAST MASS: 0
DYSURIA: 0
ABDOMINAL PAIN: 1
JOINT SWELLING: 0
HEMATOCHEZIA: 0
SHORTNESS OF BREATH: 0
HEADACHES: 1
COUGH: 0
WEAKNESS: 0
ARTHRALGIAS: 1
NERVOUS/ANXIOUS: 0
FREQUENCY: 0
HEMATURIA: 0
MYALGIAS: 1
NAUSEA: 0
PARESTHESIAS: 0

## 2019-09-18 NOTE — PATIENT INSTRUCTIONS
"Podcast called \"[employer]\" - mycology episode    Preventive Health Recommendations  Female Ages 26 - 39  Yearly exam:   See your health care provider every year in order to    Review health changes.     Discuss preventive care.      Review your medicines if you your doctor has prescribed any.    Until age 30: Get a Pap test every three years (more often if you have had an abnormal result).    After age 30: Talk to your doctor about whether you should have a Pap test with HPV screening every 5 years. Next due 2023.  You do not need a Pap test if your uterus was removed (hysterectomy) and you have not had cancer.  You should be tested each year for STDs (sexually transmitted diseases), if you're at risk.   Talk to your provider about how often to have your cholesterol checked.  If you are at risk for diabetes, you should have a diabetes test (fasting glucose).  Shots: Get a flu shot each year. Get a tetanus shot every 10 years.   Nutrition:     Eat at least 5 servings of fruits and vegetables each day.    Eat whole-grain bread, whole-wheat pasta and brown rice instead of white grains and rice.    Get adequate Calcium and Vitamin D.     Lifestyle    Exercise at least 150 minutes a week (30 minutes a day, 5 days of the week). This will help you control your weight and prevent disease.    Limit alcohol to one drink per day.    No smoking.     Wear sunscreen to prevent skin cancer.    See your dentist every six months for an exam and cleaning.    "

## 2019-09-18 NOTE — PROGRESS NOTES
SUBJECTIVE:   CC: Cathy Arroyo is an 38 year old woman who presents for preventive health visit.     Healthy Habits:     Getting at least 3 servings of Calcium per day:  Yes    Bi-annual eye exam:  Yes    Dental care twice a year:  NO    Sleep apnea or symptoms of sleep apnea:  Sleep apnea    Diet:  Regular (no restrictions)    Frequency of exercise:  1 day/week    Duration of exercise:  Less than 15 minutes    Taking medications regularly:  Yes    Medication side effects:  None    PHQ-2 Total Score: 0    Additional concerns today:  Yes    Asthma Follow-Up  Was ACT completed today?  Yes    Do you have a cough?  YES    Are you experiencing any wheezing in your chest?  YES    Do you have any shortness of breath?  No     How often are you using a short-acting (rescue) inhaler or nebulizer, such as Albuterol?  just once    How many days per week do you miss taking your asthma controller medication?  0    Please describe any recent triggers for your asthma: smoke and environmental    Have you had any Emergency Room Visits, Urgent Care Visits, or Hospital Admissions since your last office visit?  No     1) Contraception - baby is four months old, patient has stopped nursing, period restarted, had salpingectomy with c/s - no further need for contraception  2) Smoking again now 2/2 manic period - wants to stop.  Quit date planned for November 6th day after mom's death anniversary (3 years this year).  Would like to use nicotine patches and estimates 10-15 cigarettes a day and feels the 14 mg would work best, but also thinks she may wean more before her quit date and then would want 7 mg patches.  3) For bipolar restarted abilify after manic phase  - slow taper up to 10 mg and will not go to 15 mg.  Was on 15 mg when she developed akathesia.  Feels good  4) Would like to recheck vitamin D - thinks her previous dose of 5000 international unit(s) contributed to akathesia and does not want to take this dose, but  "does want to take vitamin D so wants lab results to guide dosing  5) Asthma - well controlled despite smoking.  Largely because allergies are controlled.  Not using albuterol often and no current need for daily controlled, but has used advair with success in the past.  6) Non-painful bump on left labia noted about 4 months ago.  7) Would like to recheck LFTS - last ALT was elevated  KWillow DEVEN Albright      Patient presents to clinic for annual physical. Overall, she has been doing well. She is working part-time as a  and at a coffee shop. Her children are 4-months (Thong) and 3-years-old (Jose). She stopped breastfeeding 2 months ago and started \"chain smoking\" again. She is currently smoking 10-15 cigarettes per day with plans to quit on  (the day after her mother's death anniversary). She does not smoke around the children. She would like a prescription for the nicotine patch. Her asthma has been under good control with daily montelukast. She uses her albuterol inhaler approximately one time per month.     She re-started her Abilify and daily adderall. She would like her vitamin D level re-checked, as she reports her levels have been low in the past. She would like her TSH repeated, as she would like to have our clinic manage her synthroid dose.    She is currently in a monogamous relationship. No concerns with intercourse. She reports a small \"bump\" on her left labia, The bump is not painful. She noticed the bump shortly after delivery (). She is interested in STD testing.    Kandice Arredondo, student NP      Today's PHQ-2 Score:   PHQ-2 (  [employer]) 2019   Q1: Little interest or pleasure in doing things 0   Q2: Feeling down, depressed or hopeless 0   PHQ-2 Score 0   Q1: Little interest or pleasure in doing things -   Q2: Feeling down, depressed or hopeless -   PHQ-2 Score -     Abuse: Current or Past(Physical, Sexual or Emotional)- No  Do you feel safe in your environment? " Yes    Social History     Tobacco Use     Smoking status: Former Smoker     Packs/day: 0.50     Years: 12.00     Pack years: 6.00     Types: Cigarettes     Start date: 2004     Last attempt to quit: 2/15/2018     Years since quittin.5     Smokeless tobacco: Never Used   Substance Use Topics     Alcohol use: No     Alcohol/week: 0.0 oz     Comment: quit 16 days ago,       Alcohol Use 2019   Prescreen: >3 drinks/day or >7 drinks/week? No     Reviewed orders with patient.  Reviewed health maintenance and updated orders accordingly - Yes  Lab work is in process  Labs reviewed in [facility]    Mammogram not appropriate for this patient based on age.    Pertinent mammograms are reviewed under the imaging tab.  History of abnormal Pap smear: NO - age 30-65 PAP every 5 years with negative HPV co-testing recommended  PAP / HPV Latest Ref Rng & Units 2018   PAP - NIL   HPV 16 DNA NEG:Negative Negative   HPV 18 DNA NEG:Negative Negative   OTHER HR HPV NEG:Negative Negative     Reviewed and updated as needed this visit by clinical staff  Tobacco  Allergies  Meds  Med Hx  Surg Hx  Fam Hx  Soc Hx        Reviewed and updated as needed this visit by Provider          Review of Systems   Constitutional: Negative for chills and fever.   HENT: Negative for congestion, ear pain, hearing loss and sore throat.    Eyes: Negative for pain and visual disturbance.   Respiratory: Negative for cough and shortness of breath.    Cardiovascular: Negative for chest pain, palpitations and peripheral edema.   Gastrointestinal: Positive for abdominal pain and diarrhea. Negative for constipation, heartburn, hematochezia and nausea.   Breasts:  Negative for tenderness, breast mass and discharge.   Genitourinary: Positive for vaginal bleeding. Negative for dysuria, frequency, genital sores, hematuria, pelvic pain, urgency and vaginal discharge.   Musculoskeletal: Positive for arthralgias and myalgias. Negative for joint swelling.    Skin: Negative for rash.   Neurological: Positive for headaches. Negative for dizziness, weakness and paresthesias.   Psychiatric/Behavioral: Negative for mood changes. The patient is not nervous/anxious.      Clarified, vaginal bleeding associated with new-onset of menstruation after stopping breastfeeding.  Clarified that abdominal pain and diarrhea are related to recent c.diff infection and are largely improved with minimal linger symptoms.    OBJECTIVE:   /80   Pulse 95   Temp 98.7  F (37.1  C) (Oral)   Wt 122.8 kg (270 lb 11.2 oz)   SpO2 96%   BMI 41.16 kg/m    Physical Exam  GENERAL: healthy, alert and no distress  EYES: Eyes grossly normal to inspection, PERRL and conjunctivae and sclerae normal  HENT: ear canals and TM's normal, nose and mouth without ulcers or lesions  NECK: no adenopathy, no asymmetry, masses, or scars and thyroid normal to palpation  RESP: lungs clear to auscultation - no rales, rhonchi or wheezes  BREAST: normal without masses, tenderness or nipple discharge and no palpable axillary masses or adenopathy  CV: regular rate and rhythm, normal S1 S2, no S3 or S4, no murmur, click or rub, no peripheral edema and peripheral pulses strong  ABDOMEN: soft, nontender, no hepatosplenomegaly, no masses and bowel sounds normal   (female): Small, 1mm silvery, smooth surfaced papule on left labia; not painful or warm to touch  MS: no gross musculoskeletal defects noted, no edema  SKIN: no suspicious lesions or rashes  NEURO: Normal strength and tone, mentation intact and speech normal  PSYCH: mentation appears normal, affect normal/bright  LYMPH: no cervical, supraclavicular, axillary, or inguinal adenopathy    Diagnostic Test Results:  Labs reviewed in Epic  No results found for this or any previous visit (from the past 24 hour(s)).    ASSESSMENT/PLAN:   (Z00.00) Routine general medical examination at a health care facility  (primary encounter diagnosis)  Comment:   Plan: Stable chronic  "problems, 4 months postpartum    (Z71.6) Encounter for smoking cessation counseling  Comment:   Plan: nicotine (NICODERM CQ) 14 MG/24HR 24 hr patch        She may reduce number of cigarette and, if so, contact to request 7 mg patches instead    (E89.0) Post-surgical hypothyroidism  Comment: Continue current dose of synthroid  Plan: TSH with free T4 reflex        We will plan to re-check TSH. Cathy would like to have our clinic manage her thyroid from here on out.    (E55.9) Hypovitaminosis D  Comment: Patient reports low vitamin D level in the past.   Plan: Vitamin D Deficiency        We will check vitamin D level and recommend supplement per results    (R94.5) Elevated LFTs  Comment:   Plan: Hepatic panel        Mild ALT previously - recheck    (F31.70) Bipolar affective disorder in remission (H)  Comment: Currently stable.  Plan: Continue current medications.    (Z11.3) Routine screening for STI (sexually transmitted infection)  Comment: No new partners. No pain with intercourse.   Plan: NEISSERIA GONORRHOEA PCR, CHLAMYDIA TRACHOMATIS        PCR, HIV Antigen Antibody Combo, Treponema Abs         w Reflex to RPR and Titer            (Z23) Need for prophylactic vaccination and inoculation against influenza  Comment: Interested in flu vaccine  Plan: INFLUENZA VACCINE IM > 6 MONTHS VALENT IIV4         [69390], Vaccine Administration, Initial         [[id number]]                  COUNSELING:  Reviewed preventive health counseling, as reflected in patient instructions    Estimated body mass index is 41.16 kg/m  as calculated from the following:    Height as of 7/29/19: 1.727 m (5' 8\").    Weight as of this encounter: 122.8 kg (270 lb 11.2 oz).    Weight management plan: Discussed healthy diet and exercise guidelines     reports that she quit smoking about 19 months ago. Her smoking use included cigarettes. She started smoking about 15 years ago. She has a 6.00 pack-year smoking history. She has never used smokeless " tobacco.  Tobacco Cessation Action Plan: Patient has plan to begin smoking cessation. Prescription for nicotine patch given.    Counseling Resources:  ATP IV Guidelines  Pooled Cohorts Equation Calculator  Breast Cancer Risk Calculator  FRAX Risk Assessment  ICSI Preventive Guidelines  Dietary Guidelines for Americans, 2010  USDA's MyPlate  ASA Prophylaxis  Lung CA Screening    Present and preformed physical exam with student nurse-family practice. The patient was evaluated and managed, by Kandice Arredondo RN, SNP in collaboration with JANNET Dickey, YOGI supervising clinical preceptor.    Documentation written by JANNET Dickey CNP    [facility]

## 2019-09-19 ENCOUNTER — OFFICE VISIT (OUTPATIENT)
Dept: FAMILY MEDICINE | Facility: CLINIC | Age: 39
End: 2019-09-19
Payer: COMMERCIAL

## 2019-09-19 VITALS
OXYGEN SATURATION: 96 % | WEIGHT: 270.7 LBS | DIASTOLIC BLOOD PRESSURE: 80 MMHG | TEMPERATURE: 98.7 F | HEART RATE: 95 BPM | BODY MASS INDEX: 41.16 KG/M2 | SYSTOLIC BLOOD PRESSURE: 132 MMHG

## 2019-09-19 DIAGNOSIS — J31.0 CHRONIC RHINITIS: ICD-10-CM

## 2019-09-19 DIAGNOSIS — Z00.00 ROUTINE GENERAL MEDICAL EXAMINATION AT A HEALTH CARE FACILITY: Primary | ICD-10-CM

## 2019-09-19 DIAGNOSIS — E55.9 HYPOVITAMINOSIS D: ICD-10-CM

## 2019-09-19 DIAGNOSIS — R79.89 ELEVATED LFTS: ICD-10-CM

## 2019-09-19 DIAGNOSIS — Z11.3 ROUTINE SCREENING FOR STI (SEXUALLY TRANSMITTED INFECTION): ICD-10-CM

## 2019-09-19 DIAGNOSIS — Z71.6 ENCOUNTER FOR SMOKING CESSATION COUNSELING: ICD-10-CM

## 2019-09-19 DIAGNOSIS — F31.70 BIPOLAR AFFECTIVE DISORDER IN REMISSION (H): ICD-10-CM

## 2019-09-19 DIAGNOSIS — Z23 NEED FOR PROPHYLACTIC VACCINATION AND INOCULATION AGAINST INFLUENZA: ICD-10-CM

## 2019-09-19 DIAGNOSIS — J45.40 MODERATE PERSISTENT ASTHMA WITHOUT COMPLICATION: ICD-10-CM

## 2019-09-19 DIAGNOSIS — E89.0 POST-SURGICAL HYPOTHYROIDISM: ICD-10-CM

## 2019-09-19 DIAGNOSIS — N90.7 INCLUSION CYST OF VULVA: ICD-10-CM

## 2019-09-19 LAB
ALBUMIN SERPL-MCNC: 3.7 G/DL (ref 3.4–5)
ALP SERPL-CCNC: 110 U/L (ref 40–150)
ALT SERPL W P-5'-P-CCNC: 38 U/L (ref 0–50)
AST SERPL W P-5'-P-CCNC: 16 U/L (ref 0–45)
BILIRUB DIRECT SERPL-MCNC: <0.1 MG/DL (ref 0–0.2)
BILIRUB SERPL-MCNC: 0.2 MG/DL (ref 0.2–1.3)
DEPRECATED CALCIDIOL+CALCIFEROL SERPL-MC: 30 UG/L (ref 20–75)
HIV 1+2 AB+HIV1 P24 AG SERPL QL IA: NONREACTIVE
PROT SERPL-MCNC: 7.7 G/DL (ref 6.8–8.8)
T4 FREE SERPL-MCNC: 1.12 NG/DL (ref 0.76–1.46)
TSH SERPL DL<=0.005 MIU/L-ACNC: 4.07 MU/L (ref 0.4–4)

## 2019-09-19 PROCEDURE — 80076 HEPATIC FUNCTION PANEL: CPT | Performed by: NURSE PRACTITIONER

## 2019-09-19 PROCEDURE — 84443 ASSAY THYROID STIM HORMONE: CPT | Performed by: NURSE PRACTITIONER

## 2019-09-19 PROCEDURE — 87491 CHLMYD TRACH DNA AMP PROBE: CPT | Performed by: NURSE PRACTITIONER

## 2019-09-19 PROCEDURE — 99214 OFFICE O/P EST MOD 30 MIN: CPT | Mod: 25 | Performed by: NURSE PRACTITIONER

## 2019-09-19 PROCEDURE — 87389 HIV-1 AG W/HIV-1&-2 AB AG IA: CPT | Performed by: NURSE PRACTITIONER

## 2019-09-19 PROCEDURE — 84439 ASSAY OF FREE THYROXINE: CPT | Performed by: NURSE PRACTITIONER

## 2019-09-19 PROCEDURE — 86780 TREPONEMA PALLIDUM: CPT | Performed by: NURSE PRACTITIONER

## 2019-09-19 PROCEDURE — 99395 PREV VISIT EST AGE 18-39: CPT | Performed by: NURSE PRACTITIONER

## 2019-09-19 PROCEDURE — 82306 VITAMIN D 25 HYDROXY: CPT | Performed by: NURSE PRACTITIONER

## 2019-09-19 PROCEDURE — 36415 COLL VENOUS BLD VENIPUNCTURE: CPT | Performed by: NURSE PRACTITIONER

## 2019-09-19 PROCEDURE — 87591 N.GONORRHOEAE DNA AMP PROB: CPT | Performed by: NURSE PRACTITIONER

## 2019-09-19 RX ORDER — DEXTROAMPHETAMINE SACCHARATE, AMPHETAMINE ASPARTATE MONOHYDRATE, DEXTROAMPHETAMINE SULFATE AND AMPHETAMINE SULFATE 2.5; 2.5; 2.5; 2.5 MG/1; MG/1; MG/1; MG/1
10 CAPSULE, EXTENDED RELEASE ORAL DAILY
COMMUNITY
End: 2020-02-11

## 2019-09-19 RX ORDER — NICOTINE 21 MG/24HR
1 PATCH, TRANSDERMAL 24 HOURS TRANSDERMAL EVERY 24 HOURS
Qty: 21 PATCH | Refills: 3 | Status: SHIPPED | OUTPATIENT
Start: 2019-09-19 | End: 2020-09-15

## 2019-09-19 RX ORDER — ARIPIPRAZOLE 10 MG/1
10 TABLET ORAL DAILY
COMMUNITY
End: 2023-12-12

## 2019-09-19 NOTE — Clinical Note
Unclear if flu shot was given - immunization is pending.  Can't close chart until this is cleared.  Thanks!

## 2019-09-20 ENCOUNTER — MYC MEDICAL ADVICE (OUTPATIENT)
Dept: FAMILY MEDICINE | Facility: CLINIC | Age: 39
End: 2019-09-20

## 2019-09-20 LAB
C TRACH DNA SPEC QL NAA+PROBE: NEGATIVE
N GONORRHOEA DNA SPEC QL NAA+PROBE: NEGATIVE
SPECIMEN SOURCE: NORMAL
SPECIMEN SOURCE: NORMAL
T PALLIDUM AB SER QL: NONREACTIVE

## 2019-09-20 ASSESSMENT — ASTHMA QUESTIONNAIRES: ACT_TOTALSCORE: 22

## 2019-09-23 NOTE — TELEPHONE ENCOUNTER
Route to provider dileep Wyman    Lab results 9/19/19   TSH 4.07  T4 1.12    7/17/19 TSH 0.87    Current dose of levothyroxine 150mcg    Pharm trini Landry RN   [facility]

## 2019-09-27 ENCOUNTER — HEALTH MAINTENANCE LETTER (OUTPATIENT)
Age: 39
End: 2019-09-27

## 2019-09-30 ENCOUNTER — TELEPHONE (OUTPATIENT)
Dept: FAMILY MEDICINE | Facility: CLINIC | Age: 39
End: 2019-09-30

## 2019-09-30 DIAGNOSIS — J30.2 SEASONAL ALLERGIC RHINITIS, UNSPECIFIED TRIGGER: ICD-10-CM

## 2019-09-30 DIAGNOSIS — J45.40 MODERATE PERSISTENT ASTHMA WITHOUT COMPLICATION: ICD-10-CM

## 2019-09-30 RX ORDER — FLUTICASONE PROPIONATE 50 MCG
1 SPRAY, SUSPENSION (ML) NASAL DAILY
Qty: 16 G | Refills: 3 | Status: ON HOLD | OUTPATIENT
Start: 2019-09-30 | End: 2020-09-21

## 2019-09-30 NOTE — TELEPHONE ENCOUNTER
Sent [employer] message regarding the flu shot. Waiting for response.    Jesse Dalal CMA.

## 2019-09-30 NOTE — TELEPHONE ENCOUNTER
"Requested Prescriptions   Pending Prescriptions Disp Refills     fluticasone (FLONASE) 50 MCG/ACT nasal spray 16 g 3     Sig: Spray 1 spray into both nostrils daily  Last Written Prescription Date:  05/16/2019  Last Fill Quantity: 16,  # refills: 3   Last office visit: 9/19/2019 with prescribing provider:  09/19/2019   Future Office Visit:         Inhaled Steroids Protocol Passed - 9/30/2019 10:15 AM        Passed - Patient is age 12 or older        Passed - Asthma control assessment score within normal limits in last 6 months     Please review ACT score.           Passed - Medication is active on med list        Passed - Recent (6 mo) or future (30 days) visit within the authorizing provider's specialty     Patient had office visit in the last 6 months or has a visit in the next 30 days with authorizing provider or within the authorizing provider's specialty.  See \"Patient Info\" tab in inbasket, or \"Choose Columns\" in Meds & Orders section of the refill encounter.            "

## 2019-10-01 ENCOUNTER — OFFICE VISIT (OUTPATIENT)
Dept: FAMILY MEDICINE | Facility: CLINIC | Age: 39
End: 2019-10-01
Payer: COMMERCIAL

## 2019-10-01 VITALS
BODY MASS INDEX: 40.72 KG/M2 | SYSTOLIC BLOOD PRESSURE: 132 MMHG | WEIGHT: 267.8 LBS | OXYGEN SATURATION: 98 % | DIASTOLIC BLOOD PRESSURE: 84 MMHG | HEART RATE: 85 BPM | TEMPERATURE: 98.8 F

## 2019-10-01 DIAGNOSIS — F17.200 TOBACCO USE DISORDER: ICD-10-CM

## 2019-10-01 DIAGNOSIS — J06.9 VIRAL URI WITH COUGH: Primary | ICD-10-CM

## 2019-10-01 DIAGNOSIS — J45.41 MODERATE PERSISTENT ASTHMA WITH ACUTE EXACERBATION: ICD-10-CM

## 2019-10-01 PROCEDURE — 99213 OFFICE O/P EST LOW 20 MIN: CPT | Performed by: PHYSICIAN ASSISTANT

## 2019-10-01 RX ORDER — FLUTICASONE PROPIONATE AND SALMETEROL 55; 14 UG/1; UG/1
1 POWDER, METERED RESPIRATORY (INHALATION) 2 TIMES DAILY
Status: CANCELLED | OUTPATIENT
Start: 2019-10-01

## 2019-10-01 NOTE — PATIENT INSTRUCTIONS
Start nicotine patch today  Use albuterol as needed  Start inhaled steroid today  Return to clinic for any new or worsening symptoms or go to ER Urgent care in off hours     Patient Education     Viral Upper Respiratory Illness (Adult)    You have a viral upper respiratory illness (URI), which is another term for the common cold. This illness is contagious during the first few days. It is spread through the air by coughing and sneezing. It may also be spread by direct contact (touching the sick person and then touching your own eyes, nose, or mouth). Frequent handwashing will decrease risk of spread. Most viral illnesses go away within 7 to 10 days with rest and simple home remedies. Sometimes the illness may last for several weeks. Antibiotics will not kill a virus, and they are generally not prescribed for this condition.  Home care    If symptoms are severe, rest at home for the first 2 to 3 days. When you resume activity, don't let yourself get too tired.    Don't smoke. If you need help stopping, talk with your healthcare provider.    Avoid being exposed to cigarette smoke (yours or others ).    You may use acetaminophen or ibuprofen to control pain and fever, unless another medicine was prescribed. If you have chronic liver or kidney disease, have ever had a stomach ulcer or gastrointestinal bleeding, or are taking blood-thinning medicines, talk with your healthcare provider before using these medicines. Aspirin should never be given to anyone under 18 years of age who is ill with a viral infection or fever. It may cause severe liver or brain damage.    Your appetite may be poor, so a light diet is fine. Stay well hydrated by drinking 6 to 8 glasses of fluids per day (water, soft drinks, juices, tea, or soup). Extra fluids will help loosen secretions in the nose and lungs.    Over-the-counter cold medicines will not shorten the length of time you re sick, but they may be helpful for the following symptoms:  cough, sore throat, and nasal and sinus congestion. If you take prescription medicines, ask your healthcare provider or pharmacist which over-the-counter medicines are safe to use. (Note: Don't use decongestants if you have high blood pressure.)  Follow-up care  Follow up with your healthcare provider, or as advised.  When to seek medical advice  Call your healthcare provider right away if any of these occur:    Cough with lots of colored sputum (mucus)    Severe headache; face, neck, or ear pain    Difficulty swallowing due to throat pain    Fever of 100.4 F (38 C) or higher, or as directed by your healthcare provider  Call 911  Call 911 if any of these occur:    Chest pain, shortness of breath, wheezing, or difficulty breathing    Coughing up blood    Very severe pain with swallowing, especially if it goes along with a muffled voice   Date Last Reviewed: 6/1/2018 2000-2018 [employer]. [address] [place] [address], [place] [address]. All rights reserved. This information is not intended as a substitute for professional medical care. Always follow your healthcare professional's instructions.

## 2019-10-01 NOTE — PROGRESS NOTES
Cathy Arroyo is a 38 year old female who presents to clinic today for the following health issues:    Asthma Follow-Up    Was ACT completed today?    Yes    ACT Total Scores 10/1/2019   ACT TOTAL SCORE (Goal Greater than or Equal to 20) 18   In the past 12 months, how many times did you visit the emergency room for your asthma without being admitted to the hospital? 0   In the past 12 months, how many times were you hospitalized overnight because of your asthma? 0       How many days per week do you miss taking your asthma controller medication?  0    Please describe any recent triggers for your asthma: started smoking again    Have you had any Emergency Room Visits, Urgent Care Visits, or Hospital Admissions since your last office visit?  No    RESPIRATORY SYMPTOMS      Duration: x2 days    Description  nasal congestion, rhinorrhea, facial pain/pressure, cough, wheezing, headache, fatigue/malaise, nausea and vomiting from coughing     Severity: moderate    Accompanying signs and symptoms: none    History (predisposing factors):  asthma and exposure to smoke    Precipitating or alleviating factors: None    Therapies tried and outcome:  none      -Patient states that she has experienced the same symptoms in the past  -She has been smoking cigarettes, notes that she smoked more cigarettes than usual yesterday due to stress, plans on beginning nicotine patches tomorrow  -Patient doesn't want antibiotics unless she really needs it, requesting predinose and codeine cough syrup  -States that prednisone has been effective in the past, using too much of it causes jose eduardo  -Patient has an Advair inhaler but often forgets to use it  -Uses fluticasone daily, alternates between Zyrtec and Allegra    Problem list and histories reviewed & adjusted, as indicated.  Additional history: as documented    ROS:  CONSTITUTIONAL: NEGATIVE for fever, chills, change in weight  INTEGUMENTARY/SKIN: NEGATIVE for worrisome rashes,  moles or lesions  EYES: NEGATIVE for vision changes or irritation  ENT/MOUTH: NEGATIVE for ear, mouth and throat problems, POSITIVE sinus problems  RESP: NEGATIVE for SOB, POSITIVE cough  BREAST: NEGATIVE for masses, tenderness or discharge  CV: NEGATIVE for chest pain, palpitations or peripheral edema  GI: NEGATIVE for abdominal pain, heartburn, or change in bowel habits, POSITIVE nausea/vomiting from coughing  : NEGATIVE for frequency, dysuria, or hematuria  MUSCULOSKELETAL: NEGATIVE for significant arthralgias or myalgia  NEURO: NEGATIVE for weakness, dizziness or paresthesias, POSITIVE headache  ENDOCRINE: NEGATIVE for temperature intolerance, skin/hair changes  HEME: NEGATIVE for bleeding problems  PSYCHIATRIC: NEGATIVE for changes in mood or affect    This document serves as a record of the services and decisions personally performed and made by Allyson Melendez PA-C. It was created on her behalf by Milton Hitchcock, trained medical scribe. The creation of this document is based on the provider's statements to the medical scribe.  Milton Hitchocck 11:00 AM October 1, 2019    Patient Active Problem List   Diagnosis     Pineocytoma (H)     Attention deficit disorder     Seasonal allergic rhinitis     Bipolar affective disorder in remission (H)     GBS (group B Streptococcus carrier), +RV culture, currently pregnant     Pre-eclampsia     Moderate persistent asthma without complication     Chronic rhinitis     Tobacco use disorder     Abnormal cytology     S/P total thyroidectomy     History of Graves' disease     Nonintractable migraine, unspecified migraine type     Need for Tdap vaccination     Post-surgical hypothyroidism     Abnormal results of thyroid function studies     Otitis externa     Polyhydramnios affecting pregnancy     Medical cannabis use     Labor and delivery indication for care or intervention     Indication for care in labor and delivery, antepartum     Morbid obesity (H)     Urticarial  vasculitis (H)     Dehydration     Lactating mother     Past Surgical History:   Procedure Laterality Date     BLOOD PATCH N/A 10/11/2016    Procedure: EPIDURAL BLOOD PATCH;  Surgeon: GENERIC ANESTHESIA PROVIDER;  [facility]      SECTION, TUBAL LIGATION, COMBINED N/A 2019    Procedure:  SECTION, WITH TUBAL LIGATION;  Surgeon: Kathy Rutledge MD;  [facility] L[facility]     CRANIOTOMY, EXCISE TUMOR COMPLEX, COMBINED  2014    Procedure: COMBINED CRANIOTOMY, EXCISE TUMOR COMPLEX;  Supracerabellar  Infratentorial Approach for Resection of Tumor ;  Surgeon: Kate Mckeon MD;  [facility]     THYROIDECTOMY N/A 5/3/2017    Procedure: THYROIDECTOMY;  Total Thyroidectomy;  Surgeon: Pamela Villasenor MD;  Location:  OR       Social History     Tobacco Use     Smoking status: Former Smoker     Packs/day: 0.50     Years: 12.00     Pack years: 6.00     Types: Cigarettes     Start date: 2004     Last attempt to quit: 2/15/2018     Years since quittin.6     Smokeless tobacco: Never Used   Substance Use Topics     Alcohol use: No     Alcohol/week: 0.0 standard drinks     Comment: quit 16 days ago,     Family History   Problem Relation Age of Onset     Thyroid Disease Paternal Aunt      Asthma Father      Mental Illness Father      Obesity Father      Asthma Maternal Grandmother      Osteoporosis Maternal Grandmother      Glaucoma Maternal Grandmother      Hypertension Maternal Grandmother      Macular Degeneration Maternal Grandmother      Diabetes Paternal Grandfather      Other Cancer Mother      Genitourinary Problems Mother      Bipolar Disorder Mother         unipolar depression     Depression Mother      Thyroid Disease Mother         benign tumor     Skin Cancer Mother      Cancer Mother      Bipolar Disorder Brother         unipolar depression     Asthma Brother            Labs reviewed in EPIC  Patient Active Problem List   Diagnosis     Pineocytoma (H)      Attention deficit disorder     Seasonal allergic rhinitis     Bipolar affective disorder in remission (H)     GBS (group B Streptococcus carrier), +RV culture, currently pregnant     Pre-eclampsia     Moderate persistent asthma without complication     Chronic rhinitis     Tobacco use disorder     Abnormal cytology     S/P total thyroidectomy     History of Graves' disease     Nonintractable migraine, unspecified migraine type     Need for Tdap vaccination     Post-surgical hypothyroidism     Abnormal results of thyroid function studies     Otitis externa     Polyhydramnios affecting pregnancy     Medical cannabis use     Labor and delivery indication for care or intervention     Indication for care in labor and delivery, antepartum     Morbid obesity (H)     Urticarial vasculitis (H)     Dehydration     Lactating mother     Past Surgical History:   Procedure Laterality Date     BLOOD PATCH N/A 10/11/2016    Procedure: EPIDURAL BLOOD PATCH;  Surgeon: GENERIC ANESTHESIA PROVIDER;  [facility]      SECTION, TUBAL LIGATION, COMBINED N/A 2019    Procedure:  SECTION, WITH TUBAL LIGATION;  Surgeon: Kathy Rutledge MD;  [facility]     CRANIOTOMY, EXCISE TUMOR COMPLEX, COMBINED  2014    Procedure: COMBINED CRANIOTOMY, EXCISE TUMOR COMPLEX;  Supracerabellar  Infratentorial Approach for Resection of Tumor ;  Surgeon: Kate Mckeon MD;  [facility]     THYROIDECTOMY N/A 5/3/2017    Procedure: THYROIDECTOMY;  Total Thyroidectomy;  Surgeon: Pamela Villasenor MD;  Location:  OR       Social History     Tobacco Use     Smoking status: Former Smoker     Packs/day: 0.50     Years: 12.00     Pack years: 6.00     Types: Cigarettes     Start date: 2004     Last attempt to quit: 2/15/2018     Years since quittin.6     Smokeless tobacco: Never Used   Substance Use Topics     Alcohol use: No     Alcohol/week: 0.0 standard drinks     Comment: quit 16 days ago,      Family History   Problem Relation Age of Onset     Thyroid Disease Paternal Aunt      Asthma Father      Mental Illness Father      Obesity Father      Asthma Maternal Grandmother      Osteoporosis Maternal Grandmother      Glaucoma Maternal Grandmother      Hypertension Maternal Grandmother      Macular Degeneration Maternal Grandmother      Diabetes Paternal Grandfather      Other Cancer Mother      Genitourinary Problems Mother      Bipolar Disorder Mother         unipolar depression     Depression Mother      Thyroid Disease Mother         benign tumor     Skin Cancer Mother      Cancer Mother      Bipolar Disorder Brother         unipolar depression     Asthma Brother          Current Outpatient Medications   Medication Sig Dispense Refill     amphetamine-dextroamphetamine (ADDERALL XR) 10 MG 24 hr capsule Take 10 mg by mouth daily       ARIPiprazole (ABILIFY) 10 MG tablet Take 10 mg by mouth daily       cetirizine (ZYRTEC) 5 MG tablet Take 5 mg by mouth every morning When not using the Allegra       fexofenadine (ALLEGRA) 60 MG tablet Take 60 mg by mouth every morning       fluticasone (FLONASE) 50 MCG/ACT nasal spray Spray 1 spray into both nostrils daily 16 g 3     ipratropium - albuterol 0.5 mg/2.5 mg/3 mL (DUONEB) 0.5-2.5 (3) MG/3ML neb solution Take 1 vial (3 mLs) by nebulization every 6 hours as needed for shortness of breath / dyspnea or wheezing 90 vial 3     levothyroxine (SYNTHROID/LEVOTHROID) 150 MCG tablet Take 1 tablet (150 mcg) by mouth daily 30 tablet 11     medical cannabis (Patient's own supply) Take 1 Dose by mouth See Admin Instructions (The purpose of this order is to document that the patient reports taking medical cannabis.  This is not a prescription, and is not used to certify that the patient has a qualifying medical condition.)    Tangerine Oral Suspension Unflavored 1-5 ml up to two times daily.  Total THC = 240 mg, Total CBD Trace       medical cannabis (Patient's own supply) Take  1 Dose by mouth See Admin Instructions (The purpose of this order is to document that the patient reports taking medical cannabis.  This is not a prescription, and is not used to certify that the patient has a qualifying medical condition.)    Amelia oral suspension: take 1-3 ml by mouth two times daily  Total CBD 1200 mg. Total THC 60 mg       montelukast (SINGULAIR) 10 MG tablet Take 1 tablet (10 mg) by mouth every morning 90 tablet 3     Prenatal Vit-Fe Fumarate-FA (PRENATAL VITAMIN PO) Take 1 tablet by mouth every morning        acetaminophen (TYLENOL) 325 MG tablet Take 2 tablets (650 mg) by mouth every 4 hours as needed for mild pain (Patient not taking: Reported on 6/28/2019) 100 tablet 0     Misc. Devices (BREAST PUMP) MISC 1 each as needed (Breast feeding) (Patient not taking: Reported on 9/19/2019) 1 each 0     nicotine (NICODERM CQ) 14 MG/24HR 24 hr patch Place 1 patch onto the skin every 24 hours (Patient not taking: Reported on 10/1/2019) 21 patch 3     senna-docusate (SENOKOT-S/PERICOLACE) 8.6-50 MG tablet Take 2 tablets by mouth 2 times daily as needed for constipation (Patient not taking: Reported on 6/28/2019) 30 tablet 0       OBJECTIVE:                                                    /84 (BP Location: Right arm, Patient Position: Sitting, Cuff Size: Adult Regular)   Pulse 85   Temp 98.8  F (37.1  C) (Oral)   Wt 121.5 kg (267 lb 12.8 oz)   SpO2 98%   Breastfeeding? No   BMI 40.72 kg/m   Body mass index is 40.72 kg/m .   GENERAL:: healthy, alert and no distress  EYES: Eyes grossly normal to inspection, extraocular movements - intact, and PERRL  HENT: ear canals- normal; TMs- normal; Nose- normal; Mouth- no ulcers, no lesions  NECK: no tenderness, no adenopathy, no asymmetry, no masses, no stiffness; thyroid- normal to palpation  RESP: diffuse expiratory wheezing, no rales, no rhonchi  CV: regular rates and rhythm, normal S1 S2, no S3 or S4 and no murmur, no click or rub -  MS:  extremities- no gross deformities noted, no edema  SKIN: no suspicious lesions, no rashes  NEURO: strength and tone- normal, sensory exam- grossly normal, mentation- intact, speech- normal, reflexes- symmetric  PSYCH: Alert and oriented times 3; speech- coherent , normal rate and volume; able to articulate logical thoughts, able to abstract reason, no tangential thoughts, no hallucinations or delusions, affect- normal    No results found for this or any previous visit (from the past 24 hour(s)).       ASSESSMENT/PLAN:                                                        ICD-10-CM    1. Viral URI with cough J06.9     B97.89    2. Moderate persistent asthma with acute exacerbation J45.41    3. Tobacco use disorder F17.200        Patient Instructions     Start nicotine patch today  Use albuterol as needed  Start inhaled steroid today  Return to clinic for any new or worsening symptoms or go to ER Urgent care in off hours     Patient Education     Viral Upper Respiratory Illness (Adult)    You have a viral upper respiratory illness (URI), which is another term for the common cold. This illness is contagious during the first few days. It is spread through the air by coughing and sneezing. It may also be spread by direct contact (touching the sick person and then touching your own eyes, nose, or mouth). Frequent handwashing will decrease risk of spread. Most viral illnesses go away within 7 to 10 days with rest and simple home remedies. Sometimes the illness may last for several weeks. Antibiotics will not kill a virus, and they are generally not prescribed for this condition.  Home care    If symptoms are severe, rest at home for the first 2 to 3 days. When you resume activity, don't let yourself get too tired.    Don't smoke. If you need help stopping, talk with your healthcare provider.    Avoid being exposed to cigarette smoke (yours or others ).    You may use acetaminophen or ibuprofen to control pain and fever,  unless another medicine was prescribed. If you have chronic liver or kidney disease, have ever had a stomach ulcer or gastrointestinal bleeding, or are taking blood-thinning medicines, talk with your healthcare provider before using these medicines. Aspirin should never be given to anyone under 18 years of age who is ill with a viral infection or fever. It may cause severe liver or brain damage.    Your appetite may be poor, so a light diet is fine. Stay well hydrated by drinking 6 to 8 glasses of fluids per day (water, soft drinks, juices, tea, or soup). Extra fluids will help loosen secretions in the nose and lungs.    Over-the-counter cold medicines will not shorten the length of time you re sick, but they may be helpful for the following symptoms: cough, sore throat, and nasal and sinus congestion. If you take prescription medicines, ask your healthcare provider or pharmacist which over-the-counter medicines are safe to use. (Note: Don't use decongestants if you have high blood pressure.)  Follow-up care  Follow up with your healthcare provider, or as advised.  When to seek medical advice  Call your healthcare provider right away if any of these occur:    Cough with lots of colored sputum (mucus)    Severe headache; face, neck, or ear pain    Difficulty swallowing due to throat pain    Fever of 100.4 F (38 C) or higher, or as directed by your healthcare provider  Call 911  Call 911 if any of these occur:    Chest pain, shortness of breath, wheezing, or difficulty breathing    Coughing up blood    Very severe pain with swallowing, especially if it goes along with a muffled voice   Date Last Reviewed: 6/1/2018 2000-2018 [employer]. [address] [place] [address], [place] [address]. All rights reserved. This information is not intended as a substitute for professional medical care. Always follow your healthcare professional's instructions.               Estimated body mass index is 40.72 kg/m  as  "calculated from the following:    Height as of 7/29/19: 1.727 m (5' 8\").    Weight as of this encounter: 121.5 kg (267 lb 12.8 oz).     The information in this document, created by the medical scribe for me, accurately reflects the services I personally performed and the decisions made by me. I have reviewed and approved this document for accuracy prior to leaving the patient care area.  October 1, 2019 11:00 AM    Allyson Melendez  [facility] [place]      "

## 2019-10-02 ASSESSMENT — ASTHMA QUESTIONNAIRES: ACT_TOTALSCORE: 18

## 2019-10-04 ENCOUNTER — MYC MEDICAL ADVICE (OUTPATIENT)
Dept: FAMILY MEDICINE | Facility: CLINIC | Age: 39
End: 2019-10-04

## 2019-10-04 DIAGNOSIS — J45.20 MILD INTERMITTENT ASTHMA WITHOUT COMPLICATION: ICD-10-CM

## 2019-10-04 RX ORDER — ALBUTEROL SULFATE 90 UG/1
2 AEROSOL, METERED RESPIRATORY (INHALATION) EVERY 4 HOURS PRN
Qty: 1 INHALER | Refills: 1 | Status: SHIPPED | OUTPATIENT
Start: 2019-10-04 | End: 2020-03-20

## 2019-10-04 NOTE — TELEPHONE ENCOUNTER
albuterol (PROAIR HFA/PROVENTIL HFA/VENTOLIN HFA) 108 (90 Base) MCG/ACT inhaler       Last Written Prescription Date:  01/28/2019-04/05/2019  Last Fill Quantity: 1,   # refills: 1  Last Office Visit: 10/01/2019  Future Office visit:       Routing refill request to provider for review/approval because:  Drug not active on patient's medication list

## 2019-10-04 NOTE — TELEPHONE ENCOUNTER
Zamzam    See refill request for albuterol    Med cued    Cristin Landry RN   [facility]

## 2019-10-26 ENCOUNTER — MYC MEDICAL ADVICE (OUTPATIENT)
Dept: FAMILY MEDICINE | Facility: CLINIC | Age: 39
End: 2019-10-26

## 2019-10-28 NOTE — TELEPHONE ENCOUNTER
Zamzam,    Please see patients mychart message about being able to be seen earlier on Nov. 6th, her appt is at 2 pm. Her kids are seeing you at 10:15 and 10:45. You have 10:00 am blocked and lactation consult at 11:00. Please advise.    Thanks  Dionne Ramos RN   [facility] [place][note 2]

## 2019-10-28 NOTE — TELEPHONE ENCOUNTER
If they can come at 9:45 for appt starting at 10, then I can see all 3 of them. If they want to do that, could you please send the necessary developmental screening forms for the kids' ages and ask mom to fill out and bring along and also encourage parents to do the online intake forms so that we have as much time together as possible. Thanks! Zamzam

## 2019-11-04 ENCOUNTER — MYC MEDICAL ADVICE (OUTPATIENT)
Dept: NEUROSURGERY | Facility: CLINIC | Age: 39
End: 2019-11-04

## 2019-11-04 DIAGNOSIS — D44.5 PINEOCYTOMA (H): Primary | ICD-10-CM

## 2019-11-06 ENCOUNTER — HOSPITAL ENCOUNTER (OUTPATIENT)
Dept: GENERAL RADIOLOGY | Facility: CLINIC | Age: 39
Discharge: HOME | End: 2019-11-06
Attending: NURSE PRACTITIONER | Admitting: NURSE PRACTITIONER
Payer: COMMERCIAL

## 2019-11-06 ENCOUNTER — MYC MEDICAL ADVICE (OUTPATIENT)
Dept: FAMILY MEDICINE | Facility: CLINIC | Age: 39
End: 2019-11-06

## 2019-11-06 ENCOUNTER — TELEPHONE (OUTPATIENT)
Dept: FAMILY MEDICINE | Facility: CLINIC | Age: 39
End: 2019-11-06

## 2019-11-06 ENCOUNTER — OFFICE VISIT (OUTPATIENT)
Dept: FAMILY MEDICINE | Facility: CLINIC | Age: 39
End: 2019-11-06
Payer: COMMERCIAL

## 2019-11-06 VITALS
WEIGHT: 265.5 LBS | DIASTOLIC BLOOD PRESSURE: 85 MMHG | TEMPERATURE: 97.7 F | OXYGEN SATURATION: 97 % | SYSTOLIC BLOOD PRESSURE: 129 MMHG | BODY MASS INDEX: 40.37 KG/M2 | HEART RATE: 85 BPM

## 2019-11-06 DIAGNOSIS — M79.89 RIGHT AXILLARY SWELLING: ICD-10-CM

## 2019-11-06 DIAGNOSIS — J45.41 MODERATE PERSISTENT ASTHMA WITH ACUTE EXACERBATION: ICD-10-CM

## 2019-11-06 DIAGNOSIS — Z23 NEED FOR PROPHYLACTIC VACCINATION AND INOCULATION AGAINST INFLUENZA: ICD-10-CM

## 2019-11-06 DIAGNOSIS — J45.41 MODERATE PERSISTENT ASTHMA WITH ACUTE EXACERBATION: Primary | ICD-10-CM

## 2019-11-06 PROCEDURE — 90471 IMMUNIZATION ADMIN: CPT | Performed by: NURSE PRACTITIONER

## 2019-11-06 PROCEDURE — 99214 OFFICE O/P EST MOD 30 MIN: CPT | Mod: 25 | Performed by: NURSE PRACTITIONER

## 2019-11-06 PROCEDURE — 90686 IIV4 VACC NO PRSV 0.5 ML IM: CPT | Performed by: NURSE PRACTITIONER

## 2019-11-06 PROCEDURE — 71046 X-RAY EXAM CHEST 2 VIEWS: CPT

## 2019-11-06 RX ORDER — PREDNISONE 20 MG/1
20 TABLET ORAL DAILY
Qty: 5 TABLET | Refills: 0 | Status: SHIPPED | OUTPATIENT
Start: 2019-11-06 | End: 2019-12-10

## 2019-11-06 ASSESSMENT — ASTHMA QUESTIONNAIRES
QUESTION_5 LAST FOUR WEEKS HOW WOULD YOU RATE YOUR ASTHMA CONTROL: SOMEWHAT CONTROLLED
QUESTION_1 LAST FOUR WEEKS HOW MUCH OF THE TIME DID YOUR ASTHMA KEEP YOU FROM GETTING AS MUCH DONE AT WORK, SCHOOL OR AT HOME: A LITTLE OF THE TIME
QUESTION_4 LAST FOUR WEEKS HOW OFTEN HAVE YOU USED YOUR RESCUE INHALER OR NEBULIZER MEDICATION (SUCH AS ALBUTEROL): TWO OR THREE TIMES PER WEEK
QUESTION_3 LAST FOUR WEEKS HOW OFTEN DID YOUR ASTHMA SYMPTOMS (WHEEZING, COUGHING, SHORTNESS OF BREATH, CHEST TIGHTNESS OR PAIN) WAKE YOU UP AT NIGHT OR EARLIER THAN USUAL IN THE MORNING: ONCE A WEEK
QUESTION_2 LAST FOUR WEEKS HOW OFTEN HAVE YOU HAD SHORTNESS OF BREATH: THREE TO SIX TIMES A WEEK
ACT_TOTALSCORE: 16
ACUTE_EXACERBATION_TODAY: NO

## 2019-11-06 NOTE — TELEPHONE ENCOUNTER
Reason for call:  Results   Name of test or procedure: Chest X-ray  Date of test or procedure: 11/06/2019  Location of test or procedure: [facility]    Additional comments: Patient would like a call to discuss her x-ray results    Phone number to reach patient:  Home number on file [phone] (home)    Best Time:  n/a    Can we leave a detailed message on this number?  YES

## 2019-11-06 NOTE — PATIENT INSTRUCTIONS
Start dulera (or whichever combo med we get covered)  CXR today  If not change in a week, please take prednisone  Return in a month for asthma    Schedule with diagnostic mammogram

## 2019-11-06 NOTE — TELEPHONE ENCOUNTER
Zamzam,    Please see patients my chart message. Her thyroid labs were done on 9/19/19    TSH   Date Value Ref Range Status   09/19/2019 4.07 (H) 0.40 - 4.00 mU/L Final     Thanks  Dionne Ramos RN   [facility]

## 2019-11-06 NOTE — PROGRESS NOTES
Subjective     Cathy Arroyo is a 39 year old female who presents to clinic today for the following health issues:    HPI   Asthma Follow-Up    Was ACT completed today?  YES    Cough persisting since early October and not getting better, also waking at night  Generally resistant to prednisone due to concern about provoking jose eduardo, but feels she needs prednisone at this time    Do you have a cough?  YES    Are you experiencing any wheezing in your chest?  YES    Do you have any shortness of breath?  YES     How often are you using a short-acting (rescue) inhaler or nebulizer, such as Albuterol?  A few times a week    How many days per week do you miss taking your asthma controller medication?  0    Please describe any recent triggers for your asthma: Environmental, raking yard    Have you had any Emergency Room Visits, Urgent Care Visits, or Hospital Admissions since your last office visit?  No      Mole(s)      Duration: ongoing since pregnancy    Description  Location: on both, but mainly on the right, and larger than the left    Accompanying signs and symptoms: redness, blotchy, and tender  Itching: mild           Bleeding: no           Growth: moderate           Other changes:  Swollen lymph nodes under R armpit, more painful and inflamed.    History (similar moles/moles removed): has had in first pregnancy, but the ones she has currently keep appearing    Precipitating or alleviating factors:  New exposures:  None, started new deodorant , has been effective    Therapies tried and outcome: none      Right axillary lymph node painful for three+ years and has not gone away   Mole is on the top of the right breast    Reviewed and updated as needed this visit by Provider         Review of Systems   ROS COMP: Constitutional, HEENT, cardiovascular, pulmonary, gi and gu systems are negative, except as otherwise noted.      Objective    /85   Pulse 85   Temp 97.7  F (36.5  C) (Oral)   Wt 120.4 kg (265  "lb 8 oz)   LMP 11/05/2019   SpO2 97%   BMI 40.37 kg/m    Body mass index is 40.37 kg/m .  Physical Exam   GENERAL: healthy, alert and no distress  EYES: Eyes grossly normal to inspection, PERRL and conjunctivae and sclerae normal  HENT: ear canals and TM's normal, nose and mouth without ulcers or lesions  NECK: no adenopathy, no asymmetry, masses, or scars and thyroid normal to palpation  RESP: expiratory wheezes throughout and inspiratory wheezes throughout  BREAST: normal without masses, tenderness or nipple discharge and no palpable axillary adenopathy; right axilla has swollen tissue compared to the left, the swelling has no distinct borders  CV: regular rate and rhythm, normal S1 S2, no S3 or S4, no murmur, click or rub, no peripheral edema and peripheral pulses strong  MS: no gross musculoskeletal defects noted, no edema  SKIN: no suspicious lesions or rashes  NEURO: Normal strength and tone, mentation intact and speech normal  PSYCH: mentation appears normal, affect normal/bright    Diagnostic Test Results:  Labs reviewed in Epic        Assessment & Plan     (J45.41) Moderate persistent asthma with acute exacerbation  (primary encounter diagnosis)  Comment:   Plan: mometasone-formoterol (DULERA) 200-5 MCG/ACT         inhaler, predniSONE (DELTASONE) 20 MG tablet,         XR Chest 2 Views  CXR and then prednisone.  Start combined ICS-LABA.  Return in four weeks        (M79.89) Right axillary swelling  Comment:   Plan: MA Diagnostic Digital Bilateral            (Z23) Need for prophylactic vaccination and inoculation against influenza  Comment:   Plan: INFLUENZA VACCINE IM > 6 MONTHS VALENT IIV4         [73220], Vaccine Administration, Initial         [22529]               BMI:   Estimated body mass index is 40.37 kg/m  as calculated from the following:    Height as of 7/29/19: 1.727 m (5' 8\").    Weight as of this encounter: 120.4 kg (265 lb 8 oz).   Weight management plan: Discussed healthy diet and exercise " guidelines        Patient Instructions   Start dulera (or whichever combo med we get covered)  CXR today  If not change in a week, please take prednisone  Return in a month for asthma    Schedule with diagnostic mammogram      Return in about 4 weeks (around 12/4/2019) for asthma.    JANNET Liu [facility]

## 2019-11-07 ASSESSMENT — ASTHMA QUESTIONNAIRES: ACT_TOTALSCORE: 16

## 2019-11-07 NOTE — TELEPHONE ENCOUNTER
Please see note below. Patient calling asking for xray results.    Thanks  Dionne Ramos RN   [facility]

## 2019-11-07 NOTE — RESULT ENCOUNTER NOTE
Cathy,    Chest xray is normal.  Go ahead and take the prednisone if you haven't already started.  What is the word on coverage for the Dulera?  If you have any questions, please feel free to contact the clinic.    DEVEN Winston

## 2019-11-07 NOTE — TELEPHONE ENCOUNTER
Component      Latest Ref Rng & Units 1/21/2019 2/18/2019 3/25/2019 4/26/2019   TSH      0.40 - 4.00 mU/L 1.62  2.60 1.96   T4 Total      4.5 - 13.9 ug/dL 16.2 (H) 16.6 (H) 17.8 (H) 16.1 (H)   Thyroid Stim Immunog      <=1.3 TSI index  1.5 (H)     Thyrotropin Receptor Antibody      0.00 - 1.75 IU/L  1.40     T4 Free      0.76 - 1.46 ng/dL         Component      Latest Ref Rng & Units 7/17/2019 9/19/2019   TSH      0.40 - 4.00 mU/L 0.87 4.07 (H)   T4 Total      4.5 - 13.9 ug/dL     Thyroid Stim Immunog      <=1.3 TSI index     Thyrotropin Receptor Antibody      0.00 - 1.75 IU/L     T4 Free      0.76 - 1.46 ng/dL  1.12

## 2019-11-07 NOTE — TELEPHONE ENCOUNTER
I sent a [employer] message - chest xray is normal.  Start prednisone if she hasn't already.  Return in four weeks, sooner if no improvement or worsening.  We can check thyroid at asthma recheck.  DEVEN Winston

## 2019-11-11 ENCOUNTER — ANCILLARY PROCEDURE (OUTPATIENT)
Dept: MAMMOGRAPHY | Facility: CLINIC | Age: 39
End: 2019-11-11
Payer: COMMERCIAL

## 2019-11-11 DIAGNOSIS — M79.89 RIGHT AXILLARY SWELLING: ICD-10-CM

## 2019-12-08 ENCOUNTER — ANCILLARY PROCEDURE (OUTPATIENT)
Dept: MRI IMAGING | Facility: CLINIC | Age: 39
End: 2019-12-08
Attending: NEUROLOGICAL SURGERY
Payer: COMMERCIAL

## 2019-12-08 DIAGNOSIS — D44.5 PINEOCYTOMA (H): ICD-10-CM

## 2019-12-08 RX ORDER — GADOBUTROL 604.72 MG/ML
15 INJECTION INTRAVENOUS ONCE
Status: COMPLETED | OUTPATIENT
Start: 2019-12-08 | End: 2019-12-08

## 2019-12-08 RX ADMIN — GADOBUTROL 15 ML: 604.72 INJECTION INTRAVENOUS at 10:04

## 2019-12-08 NOTE — DISCHARGE INSTRUCTIONS
MRI Contrast Discharge Instructions    The IV contrast you received today will pass out of your body in your  urine. This will happen in the next 24 hours. You will not feel this process.  Your urine will not change color.    Drink at least 4 extra glasses of water or juice today (unless your doctor  has restricted your fluids). This reduces the stress on your kidneys.  You may take your regular medicines.    If you are on dialysis: It is best to have dialysis today.    If you have a reaction: Most reactions happen right away. If you have  any new symptoms after leaving the hospital (such as hives or swelling),  call your hospital at the correct number below. Or call your family doctor.  If you have breathing distress or wheezing, call 911.    Special instructions: ***    I have read and understand the above information.    Signature:______________________________________ Date:___________    Staff:__________________________________________ Date:___________     Time:__________    [facility] Departments:    ___[place]: [phone]  ___[facility]: [phone]  ___[place]: [phone] ___[facility]: [phone]  ___[facility]: [phone]  ___[facility]: [phone]  ___[phone]  ___[facility]: [phone]  ___[phone]

## 2019-12-10 ENCOUNTER — OFFICE VISIT (OUTPATIENT)
Dept: NEUROSURGERY | Facility: CLINIC | Age: 39
End: 2019-12-10
Payer: COMMERCIAL

## 2019-12-10 VITALS — SYSTOLIC BLOOD PRESSURE: 139 MMHG | DIASTOLIC BLOOD PRESSURE: 81 MMHG | OXYGEN SATURATION: 94 % | HEART RATE: 86 BPM

## 2019-12-10 DIAGNOSIS — D44.5 PINEOCYTOMA (H): Primary | ICD-10-CM

## 2019-12-10 RX ORDER — DEXTROAMPHETAMINE SACCHARATE, AMPHETAMINE ASPARTATE MONOHYDRATE, DEXTROAMPHETAMINE SULFATE AND AMPHETAMINE SULFATE 6.25; 6.25; 6.25; 6.25 MG/1; MG/1; MG/1; MG/1
CAPSULE, EXTENDED RELEASE ORAL
Refills: 0 | COMMUNITY
Start: 2019-12-02 | End: 2020-07-23

## 2019-12-10 ASSESSMENT — PAIN SCALES - GENERAL: PAINLEVEL: MILD PAIN (3)

## 2019-12-10 NOTE — PROGRESS NOTES
Dear Ms. Dasilva:      We saw Mrs. 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 Currently, she is doing well. She has given birth to another daughter since we last saw her. Her pregnancy and delivery were uncomplicated. She has tension headaches which she attributes to stress and fatigue. The cold weather causes a burning and aching sensation around her incision.       PHYSICAL EXAMINATION:  Her gross neurological examination remains normal.      REVIEW OF STUDIES: We went over her MRI from a few days ago and compared it to previous studies with her.  There is no evidence of recurrence of her tumor. Her ventricle size is normal.  These findings are unchanged from before.      ASSESSMENT/PLAN: Given her radiographic stability, we will see her back in 2 years with another MRI.     Please do not hesitate to contact us with questions.      KATE JAMES MD       ISadie, am serving as a scribe to document services personally performed by Kate James MD, based upon my observations and the provider's statements to me. All documentation has been reviewed by the aforementioned doctor prior to being entered into the official medical record.

## 2019-12-10 NOTE — LETTER
12/10/2019       RE: Cathy Arroyo  [address] [place] [id number]         Dear Ms. Dasilva:      We saw Mrs. 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 Currently, she is doing well. She has given birth to another daughter since we last saw her. Her pregnancy and delivery were uncomplicated. She has tension headaches which she attributes to stress and fatigue. The cold weather causes a burning and aching sensation around her incision.       PHYSICAL EXAMINATION:  Her gross neurological examination remains normal.      REVIEW OF STUDIES: We went over her MRI from a few days ago and compared it to previous studies with her.  There is no evidence of recurrence of her tumor. Her ventricle size is normal.  These findings are unchanged from before.      ASSESSMENT/PLAN: Given her radiographic stability, we will see her back in 2 years with another MRI.     Please do not hesitate to contact us with questions.      KATE JAMES MD       I, Sadie Toro, am serving as a scribe to document services personally performed by Kate James MD, based upon my observations and the provider's statements to me. All documentation has been reviewed by the aforementioned doctor prior to being entered into the official medical record.

## 2019-12-10 NOTE — LETTER
12/10/2019      RE: Cathy Arroyo  [address] [place] [id number]       Dear Ms. Dasilva:      We saw Mrs. 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 Currently, she is doing well. She has given birth to another daughter since we last saw her. Her pregnancy and delivery were uncomplicated. She has tension headaches which she attributes to stress and fatigue. The cold weather causes a burning and aching sensation around her incision.       PHYSICAL EXAMINATION:  Her gross neurological examination remains normal.      REVIEW OF STUDIES: We went over her MRI from a few days ago and compared it to previous studies with her.  There is no evidence of recurrence of her tumor. Her ventricle size is normal.  These findings are unchanged from before.      ASSESSMENT/PLAN: Given her radiographic stability, we will see her back in 2 years with another MRI.     Please do not hesitate to contact us with questions.      KATE JAMES MD       I, Sadie Toro, am serving as a scribe to document services personally performed by Kate James MD, based upon my observations and the provider's statements to me. All documentation has been reviewed by the aforementioned doctor prior to being entered into the official medical record.

## 2019-12-10 NOTE — NURSING NOTE
Chief Complaint   Patient presents with     RECHECK     UMP RETURN PINEOCYTOMA       Jacqueline Ye, EMT

## 2019-12-19 ENCOUNTER — OFFICE VISIT (OUTPATIENT)
Dept: FAMILY MEDICINE | Facility: CLINIC | Age: 39
End: 2019-12-19
Payer: COMMERCIAL

## 2019-12-19 VITALS
DIASTOLIC BLOOD PRESSURE: 64 MMHG | TEMPERATURE: 98.5 F | OXYGEN SATURATION: 97 % | HEART RATE: 94 BPM | SYSTOLIC BLOOD PRESSURE: 124 MMHG | WEIGHT: 260.8 LBS | BODY MASS INDEX: 39.65 KG/M2

## 2019-12-19 DIAGNOSIS — Z86.39 HISTORY OF GRAVES' DISEASE: ICD-10-CM

## 2019-12-19 DIAGNOSIS — E89.0 S/P TOTAL THYROIDECTOMY: ICD-10-CM

## 2019-12-19 DIAGNOSIS — J45.40 MODERATE PERSISTENT ASTHMA WITHOUT COMPLICATION: ICD-10-CM

## 2019-12-19 DIAGNOSIS — E89.0 POST-SURGICAL HYPOTHYROIDISM: Primary | ICD-10-CM

## 2019-12-19 PROCEDURE — 84439 ASSAY OF FREE THYROXINE: CPT | Performed by: NURSE PRACTITIONER

## 2019-12-19 PROCEDURE — 84443 ASSAY THYROID STIM HORMONE: CPT | Performed by: NURSE PRACTITIONER

## 2019-12-19 PROCEDURE — 36415 COLL VENOUS BLD VENIPUNCTURE: CPT | Performed by: NURSE PRACTITIONER

## 2019-12-19 PROCEDURE — 99214 OFFICE O/P EST MOD 30 MIN: CPT | Performed by: NURSE PRACTITIONER

## 2019-12-19 NOTE — PROGRESS NOTES
Subjective     Cathy Arroyo is a 39 year old female who presents to clinic today for the following health issues:    HPI   Asthma Follow-Up    Was ACT completed today?    Yes    ACT Total Scores 12/19/2019   ACT TOTAL SCORE (Goal Greater than or Equal to 20) 23   In the past 12 months, how many times did you visit the emergency room for your asthma without being admitted to the hospital? 0   In the past 12 months, how many times were you hospitalized overnight because of your asthma? 0       How many days per week do you miss taking your asthma controller medication?  6    Please describe any recent triggers for your asthma: cold air    Have you had any Emergency Room Visits, Urgent Care Visits, or Hospital Admissions since your last office visit?  No    Hypothyroidism Follow-up      Since last visit, patient describes the following symptoms: weight loss of pregnancy weight 3 lbs a month, loose stools, anxiety and hair loss, has had four periods since birth and last one came four days late (has tubal), feeling hypomanic    Takes in the AM 30 minutes before eating  No changes made in Sept due to postpartum  Component      Latest Ref Rng & Units 7/17/2019 9/19/2019   TSH      0.40 - 4.00 mU/L 0.87 4.07 (H)   T4 Free      0.76 - 1.46 ng/dL  1.12         How many servings of fruits and vegetables do you eat daily?  2-3    On average, how many sweetened beverages do you drink each day (Examples: soda, juice, sweet tea, etc.  Do NOT count diet or artificially sweetened beverages)?   1 coffee with sugar    How many days per week do you miss taking your medication? 0, only dulera    Reviewed and updated as needed this visit by Provider         Review of Systems   ROS COMP: Constitutional, HEENT, cardiovascular, pulmonary, gi and gu systems are negative, except as otherwise noted.      Objective    /64   Pulse 94   Temp 98.5  F (36.9  C) (Oral)   Wt 118.3 kg (260 lb 12.8 oz)   LMP 12/07/2019 (Exact  Date)   SpO2 97%   Breastfeeding No   BMI 39.65 kg/m    Body mass index is 39.65 kg/m .  Physical Exam   GENERAL: healthy, alert and no distress  EYES: Eyes grossly normal to inspection, PERRL and conjunctivae and sclerae normal  HENT: ear canals and TM's normal, nose and mouth without ulcers or lesions  NECK: no adenopathy, no asymmetry, masses, or scars and thyroid normal to palpation  RESP: expiratory wheezes throughout  CV: regular rate and rhythm, normal S1 S2, no S3 or S4, no murmur, click or rub, no peripheral edema and peripheral pulses strong  ABDOMEN: soft, nontender, no hepatosplenomegaly, no masses and bowel sounds normal  MS: no gross musculoskeletal defects noted, no edema  SKIN: no suspicious lesions or rashes  PSYCH: mentation appears normal, affect normal/bright    Diagnostic Test Results:  Labs reviewed in Epic  pending        Assessment & Plan     (E89.0) Post-surgical hypothyroidism  (primary encounter diagnosis)  Comment:   Plan: TSH, T4 free, levothyroxine         (SYNTHROID/LEVOTHROID) 150 MCG tablet,         levothyroxine (SYNTHROID/LEVOTHROID) 175 MCG         tablet   TSH elevated.  Increase to alternating 150 and 175 mcg.  Follow-up in 6 weeks    (J45.40) Moderate persistent asthma without complication  Comment:   Plan: Stable - ACT better, still wheezing secondary to smoking    (E89.0) S/P total thyroidectomy  Comment:   Plan: levothyroxine (SYNTHROID/LEVOTHROID) 150 MCG         tablet, levothyroxine (SYNTHROID/LEVOTHROID)         175 MCG tablet            (Z86.39) History of Graves' disease  Comment:   Plan: levothyroxine (SYNTHROID/LEVOTHROID) 150 MCG         tablet, levothyroxine (SYNTHROID/LEVOTHROID)         175 MCG tablet                   Patient Instructions   Increase thyroid dose  Alternate 150 mcg and 175 mcg  M, W, F 150 mcg  T, Th, Sat, Sun 175 mcg    Return in six weeks      Return in about 6 weeks (around 1/30/2020).    JANNET Liu [facility]  [place]

## 2019-12-20 ENCOUNTER — MYC MEDICAL ADVICE (OUTPATIENT)
Dept: FAMILY MEDICINE | Facility: CLINIC | Age: 39
End: 2019-12-20

## 2019-12-20 LAB
T4 FREE SERPL-MCNC: 1.29 NG/DL (ref 0.76–1.46)
TSH SERPL DL<=0.005 MIU/L-ACNC: 10.16 MU/L (ref 0.4–4)

## 2019-12-20 RX ORDER — LEVOTHYROXINE SODIUM 175 UG/1
TABLET ORAL
Qty: 30 TABLET | Refills: 11 | Status: SHIPPED | OUTPATIENT
Start: 2019-12-20 | End: 2020-12-28

## 2019-12-20 RX ORDER — LEVOTHYROXINE SODIUM 150 UG/1
TABLET ORAL
Qty: 30 TABLET | Refills: 11 | Status: SHIPPED | OUTPATIENT
Start: 2019-12-20 | End: 2020-12-28

## 2019-12-20 ASSESSMENT — ASTHMA QUESTIONNAIRES: ACT_TOTALSCORE: 23

## 2019-12-20 NOTE — PATIENT INSTRUCTIONS
Increase thyroid dose  Alternate 150 mcg and 175 mcg  M, W, F 150 mcg  T, Th, Sat, Sun 175 mcg    Return in six weeks

## 2019-12-20 NOTE — RESULT ENCOUNTER NOTE
Cathy,    You were right - TSH is up.  Let's do 150 mcg three days of the week and 175 mcg four days of the week.  Please return in 6 weeks.  If you have any questions, please feel free to contact the clinic.    DEVEN Winston

## 2019-12-20 NOTE — TELEPHONE ENCOUNTER
My chart message sent to patient.    Dionne Ramos RN   [facility]

## 2020-01-15 ENCOUNTER — MYC MEDICAL ADVICE (OUTPATIENT)
Dept: FAMILY MEDICINE | Facility: CLINIC | Age: 40
End: 2020-01-15

## 2020-01-15 DIAGNOSIS — M25.561 ACUTE PAIN OF BOTH KNEES: ICD-10-CM

## 2020-01-15 DIAGNOSIS — M25.562 ACUTE PAIN OF BOTH KNEES: ICD-10-CM

## 2020-01-15 DIAGNOSIS — E89.0 POST-SURGICAL HYPOTHYROIDISM: Primary | ICD-10-CM

## 2020-01-15 NOTE — TELEPHONE ENCOUNTER
Zamzam    For 1st question  levothyroxine (SYNTHROID/LEVOTHROID) 150 MCG tablet, take M/W/F  levothyroxine (SYNTHROID/LEVOTHROID) 175 MCG tablet, take T/TH/Sa/Su    Lab cued    Cristin Landry RN   [facility]

## 2020-01-16 ENCOUNTER — MYC MEDICAL ADVICE (OUTPATIENT)
Dept: FAMILY MEDICINE | Facility: CLINIC | Age: 40
End: 2020-01-16

## 2020-01-16 NOTE — TELEPHONE ENCOUNTER
"Routing to Provider.     Please see patient's [employer] message regarding medications dose. Patient asks  \"am I supposed to take the 175 mcg dose on the specific days and nothing in between or take the 150 mcg dose on the days I don't take the 175 mcg dose?\"   Please advise. Alberta Zuniga RN on 1/16/2020 at 9:32 AM    "

## 2020-01-20 ENCOUNTER — MYC MEDICAL ADVICE (OUTPATIENT)
Dept: FAMILY MEDICINE | Facility: CLINIC | Age: 40
End: 2020-01-20

## 2020-01-20 ENCOUNTER — APPOINTMENT (OUTPATIENT)
Dept: CT IMAGING | Facility: CLINIC | Age: 40
End: 2020-01-20
Attending: INTERNAL MEDICINE
Payer: COMMERCIAL

## 2020-01-20 ENCOUNTER — HOSPITAL ENCOUNTER (EMERGENCY)
Facility: CLINIC | Age: 40
Discharge: HOME | End: 2020-01-20
Attending: EMERGENCY MEDICINE | Admitting: EMERGENCY MEDICINE
Payer: COMMERCIAL

## 2020-01-20 VITALS
BODY MASS INDEX: 39.4 KG/M2 | OXYGEN SATURATION: 96 % | WEIGHT: 260 LBS | RESPIRATION RATE: 18 BRPM | SYSTOLIC BLOOD PRESSURE: 129 MMHG | HEIGHT: 68 IN | TEMPERATURE: 96.3 F | HEART RATE: 77 BPM | DIASTOLIC BLOOD PRESSURE: 73 MMHG

## 2020-01-20 DIAGNOSIS — M54.50 BILATERAL LOW BACK PAIN WITHOUT SCIATICA, UNSPECIFIED CHRONICITY: ICD-10-CM

## 2020-01-20 DIAGNOSIS — M46.1 SACROILIITIS (H): ICD-10-CM

## 2020-01-20 DIAGNOSIS — M46.1 SACROILIITIS (H): Primary | ICD-10-CM

## 2020-01-20 LAB
ALBUMIN UR-MCNC: 30 MG/DL
APPEARANCE UR: ABNORMAL
BACTERIA #/AREA URNS HPF: ABNORMAL /HPF
BILIRUB UR QL STRIP: NEGATIVE
COLOR UR AUTO: YELLOW
GLUCOSE UR STRIP-MCNC: NEGATIVE MG/DL
HCG UR QL: NEGATIVE
HGB UR QL STRIP: NEGATIVE
KETONES UR STRIP-MCNC: 5 MG/DL
LEUKOCYTE ESTERASE UR QL STRIP: ABNORMAL
MUCOUS THREADS #/AREA URNS LPF: PRESENT /LPF
NITRATE UR QL: NEGATIVE
PH UR STRIP: 6 PH (ref 5–7)
RBC #/AREA URNS AUTO: <1 /HPF (ref 0–2)
SOURCE: ABNORMAL
SP GR UR STRIP: 1.03 (ref 1–1.03)
SQUAMOUS #/AREA URNS AUTO: 17 /HPF (ref 0–1)
TRANS CELLS #/AREA URNS HPF: <1 /HPF (ref 0–1)
UROBILINOGEN UR STRIP-MCNC: 2 MG/DL (ref 0–2)
WBC #/AREA URNS AUTO: 6 /HPF (ref 0–5)

## 2020-01-20 PROCEDURE — 25000132 ZZH RX MED GY IP 250 OP 250 PS 637: Performed by: EMERGENCY MEDICINE

## 2020-01-20 PROCEDURE — 25000128 H RX IP 250 OP 636: Performed by: EMERGENCY MEDICINE

## 2020-01-20 PROCEDURE — 99284 EMERGENCY DEPT VISIT MOD MDM: CPT | Mod: Z6 | Performed by: EMERGENCY MEDICINE

## 2020-01-20 PROCEDURE — 81025 URINE PREGNANCY TEST: CPT | Performed by: INTERNAL MEDICINE

## 2020-01-20 PROCEDURE — 96372 THER/PROPH/DIAG INJ SC/IM: CPT | Performed by: EMERGENCY MEDICINE

## 2020-01-20 PROCEDURE — 99284 EMERGENCY DEPT VISIT MOD MDM: CPT | Mod: 25 | Performed by: EMERGENCY MEDICINE

## 2020-01-20 PROCEDURE — 72131 CT LUMBAR SPINE W/O DYE: CPT

## 2020-01-20 PROCEDURE — 81001 URINALYSIS AUTO W/SCOPE: CPT | Performed by: INTERNAL MEDICINE

## 2020-01-20 RX ORDER — ONDANSETRON 4 MG/1
4 TABLET, ORALLY DISINTEGRATING ORAL ONCE
Status: COMPLETED | OUTPATIENT
Start: 2020-01-20 | End: 2020-01-20

## 2020-01-20 RX ORDER — HYDROCODONE BITARTRATE AND ACETAMINOPHEN 5; 325 MG/1; MG/1
1 TABLET ORAL ONCE
Status: COMPLETED | OUTPATIENT
Start: 2020-01-20 | End: 2020-01-20

## 2020-01-20 RX ORDER — KETOROLAC TROMETHAMINE 15 MG/ML
15 INJECTION, SOLUTION INTRAMUSCULAR; INTRAVENOUS ONCE
Status: COMPLETED | OUTPATIENT
Start: 2020-01-20 | End: 2020-01-20

## 2020-01-20 RX ORDER — KETOROLAC TROMETHAMINE 15 MG/ML
15 INJECTION, SOLUTION INTRAMUSCULAR; INTRAVENOUS ONCE
Status: DISCONTINUED | OUTPATIENT
Start: 2020-01-20 | End: 2020-01-20 | Stop reason: ALTCHOICE

## 2020-01-20 RX ADMIN — KETOROLAC TROMETHAMINE 15 MG: 15 INJECTION, SOLUTION INTRAMUSCULAR; INTRAVENOUS at 21:06

## 2020-01-20 RX ADMIN — HYDROCODONE BITARTRATE AND ACETAMINOPHEN 1 TABLET: 5; 325 TABLET ORAL at 19:38

## 2020-01-20 RX ADMIN — ONDANSETRON 4 MG: 4 TABLET, ORALLY DISINTEGRATING ORAL at 19:53

## 2020-01-20 ASSESSMENT — MIFFLIN-ST. JEOR: SCORE: 1912.85

## 2020-01-20 NOTE — ED AVS SNAPSHOT
[facility], [place], Emergency Department  [address] [place] [id number]  Phone:  [phone]                                    Cathy Arroyo   MRN: [id number]    Department:  [facility], Emergency Department   Date of Visit:  1/20/2020           After Visit Summary Signature Page    I have received my discharge instructions, and my questions have been answered. I have discussed any challenges I see with this plan with the nurse or doctor.    ..........................................................................................................................................  Patient/Patient Representative Signature      ..........................................................................................................................................  Patient Representative Print Name and Relationship to Patient    ..................................................               ................................................  Date                                   Time    ..........................................................................................................................................  Reviewed by Signature/Title    ...................................................              ..............................................  Date                                               Time          22EPIC Rev 08/18

## 2020-01-20 NOTE — ED TRIAGE NOTES
Presents with worsening left lower back/hip pain over the past month. Patient reports the pain has worsened to the point that she has fallen while bending twice within the past 3 hours. Patient also reports right knee pain since last Tuesday. Patient reports taking 800 mg ibuprofen prior to arrival, pain is now tolerable.

## 2020-01-20 NOTE — TELEPHONE ENCOUNTER
Zamzam,    Please see patients my chart message. Ok to change physical therapy referral/order to sciatica treatment instead of knee treatment.  Please advise. New referral cued    Thanks  Dionne Ramos RN   [facility]

## 2020-01-20 NOTE — ED NOTES
"ED Triage Provider Note  [facility]  Encounter Date: Jan 20, 2020    History:  Chief Complaint   Patient presents with     Back Pain     Cathy Arroyo is a 39 year old female who presents to the ED with low back pain. She has a history of asthma and bipolar disease. She has a history of pineal tumor followed by neurosurgery. She has some longstanding lower back pain. She has been having some pain radiating to the right knee for at least a couple of weeks. She developed intense lower back pain and left posterior pelvic pain after bending forward 2 days ago. She has no pain radiating below the hip on the left. Pain is worse with movement such as straightening up. Bending to the left, and twisting. She has pain in the right knee, but no pain on the left. She has no numbness or weakness.      Review of Systems:  Back pain    Exam:  BP (!) 154/74   Temp 99.1  F (37.3  C) (Oral)   Resp 17   Ht 1.727 m (5' 8\")   Wt 117.9 kg (260 lb)   LMP 01/03/2020   SpO2 97%   BMI 39.53 kg/m    General: No acute distress. Appears stated age.   Cardio: Regular rate, extremities well perfused  Resp: Normal work of breathing, grossly normal respiratory rate  Neuro: Alert. CN II-XII grossly intact. Grossly intact strength.   Tender L5 and left SI joint. Pain on flexion to the left, straightening and twisting. SLR negative, but pain on hip flexion and abduction on hte left. No weakness, No sensory loss.    Medical Decision Making:  Patient arriving to the ED with problem as above. A medical screening exam was performed. CT LS given history of endocrine disease with potential effect on bone integrity orders initiated from Triage. The patient is appropriate to wait in triage.      MONSTER TOMPKINS MD on 1/20/2020 at 2:53 PM     Monster Tompkins MD  01/20/20 1501    "

## 2020-01-21 NOTE — ED PROVIDER NOTES
"  History     Chief Complaint   Patient presents with     Back Pain     HPI  Cathy Arroyo is a 39 year old female who presents with low back pain. She has a history of low back pain but had a recent exacerbation after lifting her child 2 days ago. She reports severe low back pain, worse on the left. She states that standing, moving and lying flat make her pain worse. No bowel or bladder dysfunction. No other symptoms noted.     I have reviewed the Medications, Allergies, Past Medical and Surgical History, and Social History in the Epic system.    Review of Systems   Constitutional: Negative for fever.   HENT: Negative for congestion.    Eyes: Negative for redness.   Respiratory: Negative for shortness of breath.    Cardiovascular: Negative for chest pain.   Gastrointestinal: Negative for abdominal pain.   Genitourinary: Negative for difficulty urinating.   Musculoskeletal: Positive for back pain. Negative for arthralgias and neck stiffness.   Skin: Negative for color change.   Neurological: Negative for headaches.   Psychiatric/Behavioral: Negative for confusion.       Physical Exam   BP: (!) 154/74  Pulse: 77  Heart Rate: 95  Temp: 99.1  F (37.3  C)  Resp: 17  Height: 172.7 cm (5' 8\")  Weight: 117.9 kg (260 lb)  SpO2: 97 %      Physical Exam  Vitals signs and nursing note reviewed.   Constitutional:       General: She is not in acute distress.     Appearance: She is well-developed. She is not diaphoretic.   HENT:      Head: Normocephalic and atraumatic.      Mouth/Throat:      Pharynx: No oropharyngeal exudate.   Eyes:      General: No scleral icterus.        Right eye: No discharge.         Left eye: No discharge.      Pupils: Pupils are equal, round, and reactive to light.   Neck:      Musculoskeletal: Normal range of motion and neck supple.   Cardiovascular:      Rate and Rhythm: Normal rate and regular rhythm.      Heart sounds: Normal heart sounds. No murmur. No friction rub. No gallop.  "   Pulmonary:      Effort: Pulmonary effort is normal. No respiratory distress.      Breath sounds: Normal breath sounds. No wheezing.   Chest:      Chest wall: No tenderness.   Abdominal:      General: Bowel sounds are normal. There is no distension.      Palpations: Abdomen is soft.      Tenderness: There is no abdominal tenderness.   Musculoskeletal: Normal range of motion.         General: No tenderness or deformity.        Back:    Skin:     General: Skin is warm and dry.      Coloration: Skin is not pale.      Findings: No erythema or rash.   Neurological:      General: No focal deficit present.      Mental Status: She is alert and oriented to person, place, and time.      Cranial Nerves: No cranial nerve deficit.      Comments: Full strength and sensation in lower extremities. 2/4 patellar tendon reflexes bilaterally. No saddle anesthesia.          ED Course        Procedures             Critical Care time:  none             Labs Ordered and Resulted from Time of ED Arrival Up to the Time of Departure from the ED   ROUTINE UA WITH MICROSCOPIC - Abnormal; Notable for the following components:       Result Value    Ketones Urine 5 (*)     Protein Albumin Urine 30 (*)     Leukocyte Esterase Urine Trace (*)     WBC Urine 6 (*)     Bacteria Urine Few (*)     Squamous Epithelial /HPF Urine 17 (*)     Mucous Urine Present (*)     All other components within normal limits   HCG QUALITATIVE URINE            Assessments & Plan (with Medical Decision Making)   This is a 39 year old female with low back pain. This began 2 days ago after bending forward and lifting. She has a history of chronic low back pain but this is worse than her usual pain. Exam demonstrates paraspinal muscle tenderness. No signs of cauda equina on exam. UA shows no acute abnormalities. CT scan shows likely sacroiliitis and multilevel lumbar spondylosis. I discussed the case with Orthopedic surgery who recommends follow-up in clinic for possible  sacroiliac injections. Patient was given hydrocodone-acetaminophen and ketorolac in the ED. I discussed all results with patient as well as care of low back pain and recommendations for follow-up. Will discharge home with return precautions. Discussed reasons to return to the emergency department.  Patient understands and agrees with this plan.    I have reviewed the nursing notes.    I have reviewed the findings, diagnosis, plan and need for follow up with the patient.    Discharge Medication List as of 1/20/2020  9:01 PM          Final diagnoses:   Bilateral low back pain without sciatica, unspecified chronicity   Sacroiliitis (H)       1/20/2020   [facility], [place], EMERGENCY DEPARTMENT     Bipin Sykes,   01/22/20 1779

## 2020-01-21 NOTE — DISCHARGE INSTRUCTIONS
Please make an appointment to follow up with [facility] (phone: [phone]) to schedule follow-up. Return to the emergency department if symptoms continue, get worse, there are any new symptoms or any cause for concern.

## 2020-01-22 ENCOUNTER — MYC MEDICAL ADVICE (OUTPATIENT)
Dept: FAMILY MEDICINE | Facility: CLINIC | Age: 40
End: 2020-01-22

## 2020-01-22 DIAGNOSIS — M54.50 ACUTE BILATERAL LOW BACK PAIN WITHOUT SCIATICA: Primary | ICD-10-CM

## 2020-01-22 DIAGNOSIS — M46.1 SACROILIITIS (H): ICD-10-CM

## 2020-01-22 ASSESSMENT — ENCOUNTER SYMPTOMS
DIFFICULTY URINATING: 0
NECK STIFFNESS: 0
EYE REDNESS: 0
CONFUSION: 0
ARTHRALGIAS: 0
SHORTNESS OF BREATH: 0
ABDOMINAL PAIN: 0
COLOR CHANGE: 0
FEVER: 0
HEADACHES: 0
BACK PAIN: 1

## 2020-01-22 NOTE — TELEPHONE ENCOUNTER
Closing encounter. Patient sent my chart on 1/22 regarding referral changes.    Dionne Ramos RN   [facility]

## 2020-01-22 NOTE — TELEPHONE ENCOUNTER
Zamzam,    Please see my chart message from patient today. She was in ER on 1/20, and now needs referral for PT to say sacroiliac joint dysfunction.    Thanks,  Dionne Ramos RN   [facility]

## 2020-01-29 ENCOUNTER — PRE VISIT (OUTPATIENT)
Dept: ONCOLOGY | Facility: CLINIC | Age: 40
End: 2020-01-29

## 2020-01-31 ENCOUNTER — THERAPY VISIT (OUTPATIENT)
Dept: PHYSICAL THERAPY | Facility: CLINIC | Age: 40
End: 2020-01-31
Attending: NURSE PRACTITIONER
Payer: COMMERCIAL

## 2020-01-31 DIAGNOSIS — M54.50 ACUTE BILATERAL LOW BACK PAIN WITHOUT SCIATICA: ICD-10-CM

## 2020-01-31 DIAGNOSIS — M46.1 SACROILIITIS (H): ICD-10-CM

## 2020-01-31 PROCEDURE — 97110 THERAPEUTIC EXERCISES: CPT | Mod: GP | Performed by: PHYSICAL THERAPIST

## 2020-01-31 PROCEDURE — 97161 PT EVAL LOW COMPLEX 20 MIN: CPT | Mod: GP | Performed by: PHYSICAL THERAPIST

## 2020-01-31 ASSESSMENT — ACTIVITIES OF DAILY LIVING (ADL)
TWISTING/PIVOTING_ON_INVOLVED_LEG: SLIGHT DIFFICULTY
HOS_ADL_SCORE(%): 57.14
HOS_ADL_HIGHEST_POTENTIAL_SCORE: 56
GETTING_INTO_AND_OUT_OF_AN_AVERAGE_CAR: MODERATE DIFFICULTY
STANDING_FOR_15_MINUTES: MODERATE DIFFICULTY
HEAVY_WORK: MODERATE DIFFICULTY
HOW_WOULD_YOU_RATE_YOUR_CURRENT_LEVEL_OF_FUNCTION_DURING_YOUR_USUAL_ACTIVITIES_OF_DAILY_LIVING_FROM_0_TO_100_WITH_100_BEING_YOUR_LEVEL_OF_FUNCTION_PRIOR_TO_YOUR_HIP_PROBLEM_AND_0_BEING_THE_INABILITY_TO_PERFORM_ANY_OF_YOUR_USUAL_DAILY_ACTIVITIES?: 60
PUTTING_ON_SOCKS_AND_SHOES: MODERATE DIFFICULTY
LIGHT_TO_MODERATE_WORK: SLIGHT DIFFICULTY
WALKING_INITIALLY: SLIGHT DIFFICULTY
GETTING_INTO_AND_OUT_OF_A_BATHTUB: MODERATE DIFFICULTY
SITTING_FOR_15_MINUTES: MODERATE DIFFICULTY
GOING_DOWN_1_FLIGHT_OF_STAIRS: SLIGHT DIFFICULTY
STEPPING_UP_AND_DOWN_CURBS: SLIGHT DIFFICULTY
DEEP_SQUATTING: EXTREME DIFFICULTY
WALKING_APPROXIMATELY_10_MINUTES: MODERATE DIFFICULTY
HOS_ADL_ITEM_SCORE_TOTAL: 32
GOING_UP_1_FLIGHT_OF_STAIRS: SLIGHT DIFFICULTY
ROLLING_OVER_IN_BED: SLIGHT DIFFICULTY
HOS_ADL_COUNT: 14
RECREATIONAL_ACTIVITIES: UNABLE TO DO

## 2020-01-31 NOTE — OP NOTE
DATE OF PROCEDURE:  05/03/2017      PREOPERATIVE DIAGNOSIS:  Graves disease.      POSTOPERATIVE DIAGNOSIS:  Graves disease.      SURGICAL PROCEDURE PERFORMED:  Total thyroidectomy with 90 minutes of intraoperative recurrent laryngeal nerve monitoring.      SURGEON:  Pamela Villasenor MD      ASSISTANT:  Alia Kennedy MD      ANESTHESIA:  General endotracheal with nerve monitoring endotracheal tube.      COMPLICATIONS:  None.      ESTIMATED BLOOD LOSS:  Less than 5 mL.      CLINICAL INDICATIONS FOR THE PROCEDURE:  This is a 36-year-old female with known Graves disease and methimazole intolerance due to allergy.  It was recommended to proceed with a total thyroidectomy.  The surgical procedure was discussed with the patient, including but not limited to the risks of bleeding, infection, injury to the recurrent laryngeal nerve or nerves, potential permanent hypocalcemia or loss of airway.  The patient is aware of this and agreed to proceed with surgery and consent was obtained.  The site was marked.      DETAILS OF PROCEDURE:  The patient was brought to the operating room in stable condition, placed on the operating table in supine position.  After appropriate general anesthesia was obtained, the patient was prepped and draped in sterile fashion.  A timeout was then performed.  A 4-5 cm incision was made over the anterior neck following a natural skin crease.  The platysma was then divided and subplatysmal planes were then created.  The strap muscles were divided at the midline and retracted laterally.  We concentrated on resecting the right lobe of thyroid gland first.  I should note that upon entering the neck, the thyroid gland was just diffusely enlarged about 1-1/2 times size normal.  No fibrosis or hypervascularity identified.  The superior pole of the right lobe of the thyroid gland was grasped and retracted inferolaterally.  The superior thyroidal vessels were then skeletonized, surgically tied or clipped  Pt's mom came in requesting an order for Tetanus shot. 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 where necessary and then divided using a LigaSure.  We were then able to carry out our dissection inferolaterally to identify the middle thyroid vein.  This too was then skeletonized, clipped and divided.  As we rotated the gland from lateral to medial manner, identified the right superior parathyroid gland and dissected this from the undersurface of the thyroid, maintaining its viability.  The right recurrent laryngeal nerve was then identified.  We followed this from the superior to inferior manner and dissected the thyroid gland off it anteriorly.  The right inferior parathyroid gland was then identified, dissected from the undersurface of the thyroid, maintaining its viability.  The right inferior thyroidal vessels were then skeletonized, surgically tied and/or clipped and divided using a LigaSure.  We continued rotating the gland from lateral to medial manner, dissecting the thyroid gland off it anteriorly.  There was a large vein draining the isthmus that was then skeletonized, surgically tied and then divided.  This permitted actually dissection from an inferior to superior manner.  As we did so, we mobilized the thyroid gland medially initially, skeletonized, clipped and then divided the middle thyroid vein.  We rotated the gland medially, identified the left inferior parathyroid gland as well as left recurrent laryngeal nerve.  The parathyroid gland was dissected from the undersurface of the thyroid, maintaining its viability.  The left inferior thyroidal vessels were skeletonized, surgically clipped and/or tied and divided using a LigaSure.  We continued rotating the gland from lateral to medial manner, dissecting this from inferior to superior manner.  As we did so, we identified the left superior parathyroid gland, dissecting it from the undersurface of the thyroid.  The left superior thyroidal vessels were then skeletonized, surgically tied, clipped and divided using a LigaSure.  We continued dissecting  the thyroid gland from lateral to medial manner over the anterior portion of the trachea until the thyroid gland was removed in toto.  The operative bed was then irrigated.  No bleeding identified.  All 4 parathyroid glands appeared to be intact and viable at the conclusion of the case.  Both right and left recurrent laryngeal nerves were intact visibly and confirmed to be intact with nerve stimulation.  Fibrillar was then placed in the operative bed.  The strap muscles were then approximated at the midline using a 3-0 chromic interrupted suture.  The platysma was approximated using a 3-0 chromic interrupted suture.  The skin incision was approximated using a 5-0 Monocryl running subcuticular suture.  The wound was dressed with Dermabond.  The patient was then extubated and returned to the recovery room in stable condition.  I was present during the entire surgical procedure.         MERCEDES TEJADA MD             D: 2017 13:39   T: 2017 09:29   MT: nadrea      Name:     ZEINA CABAN   MRN:      [id number]        Account:        [id number]   :      1980           Procedure Date: 2017      Document: [id number]

## 2020-01-31 NOTE — PROGRESS NOTES
Physical Therapy Initial Examination/Evaluation  2019    Cathy Arroyo is a 39 year old male referred to physical therapy by Sherrill Dasilva CNP for treatment of SI joint pain with Precautions/Restrictions/MD instructions E&T    Therapist Impression:   Cathy presents to physical therapy with symptoms consistent with the above diagnosis as well as LBP.  We initiated core stability and will progress as symptoms allow.    Subjective:  Related PMH: Hx of pubic symphysis pain, B knee pain Previous Treatment: PT, advil Effect of prior treatment: fair  Chief Complaint/Functional Limitations:   Walking 10 minutes, sitting 15 min and see below in therapy evaluation codes   Sleeping: Previously was losing sleep but is improved       Answers for HPI/ROS submitted by the patient on 2020   History Reported by Patient  Reason for Visit:: Sacroiliitis  When problem began:: 2020  Where?: at home, other  How problem occurred:: Collapsed while walking.  Number scale: 3/10  Pain quality: aching  Frequency: constant  Pain is: worse during the night  Progression since onset: gradually improving  Special tests: CT scan  General health as reported by patient: good  Please check all that apply to your current or past medical history: asthma, depression, history of fractures, mental illness, migraines/headaches, overweight, thyroid problems  Surgeries: other  Other Surgery Detail: Pineal Brain tumor resection, thyroidectomy,   Medications you are currently taking: anti-depressants, thyroid medication, other  Other Meds Detail: mood stabilizer (abilify), asthma medications, medical cannabis for PTSD related anxiety  Medical allergies: adhesives  Occupation:: none  What are your primary job tasks: lifting/carrying, other  Other Tasks Detail: 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    Lumbar Spine Evaluation  Static Posture  Standing posture: Lumbar lordosis  Sitting posture: Fair     Trunk Range  of Motion  Movement Loss Major Moderate Minimal Nil Pain   Flexion   x     Extension    x    Sidebending R    x X R   Sidebending L  x   X R   Side Gliding R        Side Gliding L            Hip and Knee Strength   MMT Hip Abduction Hip Extension Hip ER Knee Flexion   Left 4+/5 4/5 5/5 na/5   Right 4+/5 4/5 5/5 na/5     Pain with Abdominal #5    Special Tests  Spring testing: Positive L4-L5  SI joint test: Negative sacral thrust, however pain present in this region with sidebending R and L    Assessment/Plan:  Patient is a 39 year old female with sacral complaints.    Patient has the following significant findings with corresponding treatment plan.                Diagnosis 1:  SI joint pain  Pain -  hot/cold therapy, manual therapy, splint/taping/bracing/orthotics, self management, education and home program  Decreased ROM/flexibility - manual therapy and therapeutic exercise  Decreased strength - therapeutic exercise and therapeutic activities  Impaired posture - neuro re-education    Therapy Evaluation Codes:   1) History comprised of:   Personal factors that impact the plan of care:      None.    Comorbidity factors that impact the plan of care are:      None.     Medications impacting care: None.  2) Examination of Body Systems comprised of:   Body structures and functions that impact the plan of care:      Lumbar spine and Sacral illiac joint.   Activity limitations that impact the plan of care are:      Sitting, Standing and Walking.  3) Clinical presentation characteristics are:   Stable/Uncomplicated.  4) Decision-Making    Moderate complexity using standardized patient assessment instrument and/or measureable assessment of functional outcome.  Cumulative Therapy Evaluation is: Low complexity.    Previous and current functional limitations:  (See Goal Flow Sheet for this information)    Short term and Long term goals: (See Goal Flow Sheet for this information)     Communication ability:  Patient appears to  be able to clearly communicate and understand verbal and written communication and follow directions correctly.  Treatment Explanation - The following has been discussed with the patient:   RX ordered/plan of care  Anticipated outcomes  Possible risks and side effects  This patient would benefit from PT intervention to resume normal activities.   Rehab potential is good.    Frequency:  2 X a month, once daily  Duration:  for 2 months  Discharge Plan:  Achieve all LTG.  Independent in home treatment program.  Reach maximal therapeutic benefit.    Please refer to the daily flowsheet for treatment today, total treatment time and time spent performing 1:1 timed codes.

## 2020-02-11 ENCOUNTER — OFFICE VISIT (OUTPATIENT)
Dept: ORTHOPEDICS | Facility: CLINIC | Age: 40
End: 2020-02-11
Attending: NURSE PRACTITIONER
Payer: COMMERCIAL

## 2020-02-11 ENCOUNTER — OFFICE VISIT (OUTPATIENT)
Dept: DERMATOLOGY | Facility: CLINIC | Age: 40
End: 2020-02-11
Payer: COMMERCIAL

## 2020-02-11 ENCOUNTER — ANCILLARY PROCEDURE (OUTPATIENT)
Dept: MRI IMAGING | Facility: CLINIC | Age: 40
End: 2020-02-11
Attending: FAMILY MEDICINE
Payer: COMMERCIAL

## 2020-02-11 VITALS
WEIGHT: 260 LBS | DIASTOLIC BLOOD PRESSURE: 65 MMHG | HEIGHT: 68 IN | BODY MASS INDEX: 39.4 KG/M2 | SYSTOLIC BLOOD PRESSURE: 125 MMHG

## 2020-02-11 DIAGNOSIS — M54.16 LUMBAR RADICULOPATHY: Primary | ICD-10-CM

## 2020-02-11 DIAGNOSIS — M54.16 LUMBAR RADICULOPATHY: ICD-10-CM

## 2020-02-11 DIAGNOSIS — L21.9 SEBORRHEIC DERMATITIS: Primary | ICD-10-CM

## 2020-02-11 PROCEDURE — 99214 OFFICE O/P EST MOD 30 MIN: CPT | Performed by: FAMILY MEDICINE

## 2020-02-11 RX ORDER — KETOCONAZOLE 20 MG/ML
SHAMPOO TOPICAL
Qty: 120 ML | Refills: 11 | Status: SHIPPED | OUTPATIENT
Start: 2020-02-11 | End: 2020-05-22

## 2020-02-11 RX ORDER — FLUOCINOLONE ACETONIDE 0.11 MG/ML
OIL TOPICAL
Qty: 118.28 ML | Refills: 3 | Status: SHIPPED | OUTPATIENT
Start: 2020-02-11 | End: 2020-05-22

## 2020-02-11 RX ORDER — KETOCONAZOLE 20 MG/G
CREAM TOPICAL
Qty: 60 G | Refills: 11 | Status: SHIPPED | OUTPATIENT
Start: 2020-02-11 | End: 2020-05-22

## 2020-02-11 ASSESSMENT — PAIN SCALES - GENERAL: PAINLEVEL: NO PAIN (0)

## 2020-02-11 ASSESSMENT — MIFFLIN-ST. JEOR: SCORE: 1912.85

## 2020-02-11 NOTE — NURSING NOTE
Dermatology Rooming Note    Cathy Arroyo's goals for this visit include:   Chief Complaint   Patient presents with     Derm Problem     Cathy is here due to a dry, flakey, pruritus scalp     Concetta Barbosa, EMT

## 2020-02-11 NOTE — LETTER
2/11/2020         RE: Cathy Arroyo  [address] [place] [id number]        Dear Colleague,    Thank you for referring your patient, Cathy Arroyo, to the [facility]. Please see a copy of my visit note below.    CHIEF COMPLAINT:  Chief Complaint   Patient presents with     Consult     low back pain, recent lumbar CT, right knee issues          HISTORY OF PRESENT ILLNESS  Ms. Arroyo is a pleasant 39 year old female who presents to clinic today with back pain.  She is seen at the request of Sherrill Dasilva.  Cathy has had a couple of episodes of low back pain in her life, both were associated with pregnancy and brought her significant pain.  This most recent episode started about 9 months ago when she was pregnant with her most recent child.  After delivery she continued to have low back pain and this is waxed and waned to some degree.  Moderate to severe, definitely worse in certain ranges of motion and the more she is on her feet.  She points to her left-sided low back, pain extends from just above her hip to down near her buttock.  She did have a couple of instances in which she was standing and her leg gave out, forcing her to fall.  She does have persistent left leg pain in addition to her low back issues.  The most recent one scared her, prompting a visit to the emergency department.  There they did a CT of her lumbar spine and discharged her from the emergency department.  Since her emergency department visit she has been in physical therapy, this is helping somewhat.      Additional history: as documented    MEDICAL HISTORY  Patient Active Problem List   Diagnosis     Pineocytoma (H)     Attention deficit disorder     Seasonal allergic rhinitis     Bipolar affective disorder in remission (H)     GBS (group B Streptococcus carrier), +RV culture, currently pregnant     Pre-eclampsia     Moderate persistent asthma without complication     Chronic rhinitis     Tobacco  use disorder     Abnormal cytology     S/P total thyroidectomy     History of Graves' disease     Nonintractable migraine, unspecified migraine type     Need for Tdap vaccination     Post-surgical hypothyroidism     Abnormal results of thyroid function studies     Otitis externa     Polyhydramnios affecting pregnancy     Medical cannabis use     Labor and delivery indication for care or intervention     Indication for care in labor and delivery, antepartum     Morbid obesity (H)     Urticarial vasculitis (H)     Dehydration     Lactating mother     Acute bilateral low back pain without sciatica     Sacroiliitis (H)       Current Outpatient Medications   Medication Sig Dispense Refill     albuterol (PROAIR HFA/PROVENTIL HFA/VENTOLIN HFA) 108 (90 Base) MCG/ACT inhaler Inhale 2 puffs into the lungs every 4 hours as needed for shortness of breath / dyspnea or wheezing 1 Inhaler 1     amphetamine-dextroamphetamine (ADDERALL XR) 10 MG 24 hr capsule Take 10 mg by mouth daily       amphetamine-dextroamphetamine (ADDERALL XR) 25 MG 24 hr capsule TK 1 C PO ONCE D IN THE MORNING  0     ARIPiprazole (ABILIFY) 10 MG tablet Take 10 mg by mouth daily       cetirizine (ZYRTEC) 5 MG tablet Take 5 mg by mouth every morning When not using the Allegra       fexofenadine (ALLEGRA) 60 MG tablet Take 60 mg by mouth every morning       fluticasone (FLONASE) 50 MCG/ACT nasal spray Spray 1 spray into both nostrils daily 16 g 3     ipratropium - albuterol 0.5 mg/2.5 mg/3 mL (DUONEB) 0.5-2.5 (3) MG/3ML neb solution Take 1 vial (3 mLs) by nebulization every 6 hours as needed for shortness of breath / dyspnea or wheezing 90 vial 3     levothyroxine (SYNTHROID/LEVOTHROID) 150 MCG tablet Take one tablet by mouth on M, W and F 30 tablet 11     levothyroxine (SYNTHROID/LEVOTHROID) 175 MCG tablet Take one tablet by mouth daily on T, Th, Sat and Sun 30 tablet 11     medical cannabis (Patient's own supply) Take 1 Dose by mouth See Admin Instructions  (The purpose of this order is to document that the patient reports taking medical cannabis.  This is not a prescription, and is not used to certify that the patient has a qualifying medical condition.)    Tangerine Oral Suspension Unflavored 1-5 ml up to two times daily.  Total THC = 240 mg, Total CBD Trace       medical cannabis (Patient's own supply) Take 1 Dose by mouth See Admin Instructions (The purpose of this order is to document that the patient reports taking medical cannabis.  This is not a prescription, and is not used to certify that the patient has a qualifying medical condition.)    [place] oral suspension: take 1-3 ml by mouth two times daily  Total CBD 1200 mg. Total THC 60 mg       mometasone-formoterol (DULERA) 200-5 MCG/ACT inhaler Inhale 2 puffs into the lungs 2 times daily 13 g 5     montelukast (SINGULAIR) 10 MG tablet Take 1 tablet (10 mg) by mouth every morning 90 tablet 3     nicotine (NICODERM CQ) 14 MG/24HR 24 hr patch Place 1 patch onto the skin every 24 hours 21 patch 3     Prenatal Vit-Fe Fumarate-FA (PRENATAL VITAMIN PO) Take 1 tablet by mouth every morning          Allergies   Allergen Reactions     Adacel [Daptacel] Swelling     Codeine Sulfate Nausea and Vomiting     Tetanus Toxoid      Pt states she had an infection at injection site.      Vicodin [Hydrocodone-Acetaminophen] Nausea and Vomiting     Methimazole Rash       Family History   Problem Relation Age of Onset     Thyroid Disease Paternal Aunt      Asthma Father      Mental Illness Father      Obesity Father      Asthma Maternal Grandmother      Osteoporosis Maternal Grandmother      Glaucoma Maternal Grandmother      Hypertension Maternal Grandmother      Macular Degeneration Maternal Grandmother      Diabetes Paternal Grandfather      Other Cancer Mother      Genitourinary Problems Mother      Bipolar Disorder Mother         unipolar depression     Depression Mother      Thyroid Disease Mother         benign tumor     Skin  "Cancer Mother      Cancer Mother      Bipolar Disorder Brother         unipolar depression     Asthma Brother        Additional medical/Social/Surgical histories reviewed in [facility] and updated as appropriate.        PHYSICAL EXAM  Vitals:    02/11/20 0921   BP: 125/65   Weight: 117.9 kg (260 lb)   Height: 1.727 m (5' 8\")     General  - normal appearance, in no obvious distress  CV  - normal peripheral perfusion  Pulm  - normal respiratory pattern, non-labored  Musculoskeletal - lumbar spine  - stance: slow to rise and sit  - inspection: normal bone and joint alignment, no obvious scoliosis  - palpation: Left-sided lumbar paravertebral spasm, mild tenderness at the gluteal area  - strength: lower extremities 5/5 in all planes  - special tests:  (-) slump test bilaterally  (+) ISAIAH on L  Neuro  - patellar and Achilles DTRs 2+ bilaterally, no sensory or motor deficit, grossly normal coordination, normal muscle tone  Skin  - no ecchymosis, erythema, warmth, or induration, no obvious rash  Psych  - interactive, appropriate, normal mood and affect             ASSESSMENT & PLAN  Ms. Arroyo is a 39 year old female who presents to clinic today with low back pain.    I reviewed her CT in the room with her:  Impression:  1. No acute fracture or subluxation.  2. Symmetric sclerosis and irregularity of the sacroiliac joints  concerning for sacroiliitis.  3. Mild multilevel lumbar spondylosis without significant spinal canal  or neural foraminal narrowing at any level.    Given her persistent leg pain I am ordering an MRI of her lumbar spine.  She can get this at her earliest convenience.  Based upon the imaging we may decide to undergo further physical therapy and could consider injections, which we discussed in some depth today.    It was a pleasure seeing Cathy today.    Marcos Dalal DO, [facility]  Primary [facility] Sports Medicine      This note was constructed using Dragon dictation software, please excuse any minor errors in " "spelling, grammar, or syntax.            [facility]  2/11/2020    Cathy Allyson Cruz's chief complaint for this visit includes:  Chief Complaint   Patient presents with     Consult     low back pain, recent lumbar CT, right knee issues      PCP: Sherrill Dasilva    Referring Provider:  Sherrill Dasilva, JANNET CNP  [address]  [place] [address]    /65   Ht 1.727 m (5' 8\")   Wt 117.9 kg (260 lb)   BMI 39.53 kg/m     Data Unavailable       Reason for visit:     What part of your body is injured / painful?  right low back    What caused the injury /pain? No inciting event     How long ago did your injury occur or pain begin? problem is longstanding    What are your most bothersome symptoms? Pain    How would you characterize your symptom?  aching    What makes your symptoms better? Rest    What makes your symptoms worse? Movement    Have you been previously seen for this problem? No    Medical History:    Any recent changes to your medical history? No    Any new medication prescribed since last visit? No    Have you had surgery on this body part before? No    Social History:    Occupation:      Handedness: Right     Review of Systems:    Do you have fever, chills, weight loss? No    Do you have any vision problems? No    Do you have any chest pain or edema? No    Do you have any shortness of breath or wheezing?  No    Do you have stomach problems? No    Do you have any numbness or focal weakness? No    Do you have diabetes? No    Do you have problems with bleeding or clotting? No    Do you have an rashes or other skin lesions? No         Again, thank you for allowing me to participate in the care of your patient.        Sincerely,        Marcos Dalal, DO    "

## 2020-02-11 NOTE — PROGRESS NOTES
"      [facility]  2/11/2020    Cathy Dukehermarvin Arroyo's chief complaint for this visit includes:  Chief Complaint   Patient presents with     Consult     low back pain, recent lumbar CT, right knee issues      PCP: Sherrill Dasilva    Referring Provider:  Sherrill Dasilva, JNANET CNP  [address]  [place] [address]    /65   Ht 1.727 m (5' 8\")   Wt 117.9 kg (260 lb)   BMI 39.53 kg/m    Data Unavailable       Reason for visit:     What part of your body is injured / painful?  right low back    What caused the injury /pain? No inciting event     How long ago did your injury occur or pain begin? problem is longstanding    What are your most bothersome symptoms? Pain    How would you characterize your symptom?  aching    What makes your symptoms better? Rest    What makes your symptoms worse? Movement    Have you been previously seen for this problem? No    Medical History:    Any recent changes to your medical history? No    Any new medication prescribed since last visit? No    Have you had surgery on this body part before? No    Social History:    Occupation:      Handedness: Right     Review of Systems:    Do you have fever, chills, weight loss? No    Do you have any vision problems? No    Do you have any chest pain or edema? No    Do you have any shortness of breath or wheezing?  No    Do you have stomach problems? No    Do you have any numbness or focal weakness? No    Do you have diabetes? No    Do you have problems with bleeding or clotting? No    Do you have an rashes or other skin lesions? No         "

## 2020-02-11 NOTE — PROGRESS NOTES
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    - taking allegra 180mg qHS  now for seasonal allergies  2. Mild folliculitis/ miliaria: BPO 5% wash   3. Likely EIC on the central upper chest between tail feathers of bird tattoo--pending excision  - currently on bactrim for inflammation    Encounter Date: Feb 11, 2020    CC:  No chief complaint on file.      History of Present Illness:  Ms. Cathy Arroyo is a 39 year old female who presents today for ***. She was last seen by Dr. Ayala on 12/19/19 where she was referred to derm surgery for EIC removal. Patient is unsure if she wants t move forward with excision which is understandable. Today***    Past Medical History:   Patient Active Problem List   Diagnosis     Pineocytoma (H)     Attention deficit disorder     Seasonal allergic rhinitis     Bipolar affective disorder in remission (H)     GBS (group B Streptococcus carrier), +RV culture, currently pregnant     Pre-eclampsia     Moderate persistent asthma without complication     Chronic rhinitis     Tobacco use disorder     Abnormal cytology     S/P total thyroidectomy     History of Graves' disease     Nonintractable migraine, unspecified migraine type     Need for Tdap vaccination     Post-surgical hypothyroidism     Abnormal results of thyroid function studies     Otitis externa     Polyhydramnios affecting pregnancy     Medical cannabis use     Labor and delivery indication for care or intervention     Indication for care in labor and delivery, antepartum     Morbid obesity (H)     Urticarial vasculitis (H)     Dehydration     Lactating mother     Acute bilateral low back pain without sciatica     Sacroiliitis (H)     Past Medical History:   Diagnosis Date     Allergic state      Anxiety      Bipolar 1 disorder (H)      Depressive disorder       Elevated cholesterol      Graves disease 2017     Obese     BMI 37.48     Pineal tumor     s/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 tumor     Post partum depression      Post-surgical hypothyroidism 2017     Uncomplicated asthma      Past Surgical History:   Procedure Laterality Date     BLOOD PATCH N/A 10/11/2016    Procedure: EPIDURAL BLOOD PATCH;  Surgeon: GENERIC ANESTHESIA PROVIDER;  Location: [facility] OR      SECTION, TUBAL LIGATION, COMBINED N/A 2019    Procedure:  SECTION, WITH TUBAL LIGATION;  Surgeon: Kathy Rutledge MD;  Location: [facility] L+D     CRANIOTOMY, EXCISE TUMOR COMPLEX, COMBINED  2014    Procedure: COMBINED CRANIOTOMY, EXCISE TUMOR COMPLEX;  Supracerabellar  Infratentorial Approach for Resection of Tumor ;  Surgeon: Kate Mckeon MD;  Location: [facility] OR     THYROIDECTOMY N/A 5/3/2017    Procedure: THYROIDECTOMY;  Total Thyroidectomy;  Surgeon: Pamela Villasenor MD;  Location:  OR       Social History:  Patient reports that she has been smoking cigarettes. She started smoking about 16 years ago. She has a 6.00 pack-year smoking history. She has never used smokeless tobacco. She reports current drug use. Drug: Marijuana. She reports that she does not drink alcohol.  ***    Family History:  Family History   Problem Relation Age of Onset     Thyroid Disease Paternal Aunt      Asthma Father      Mental Illness Father      Obesity Father      Asthma Maternal Grandmother      Osteoporosis Maternal Grandmother      Glaucoma Maternal Grandmother      Hypertension Maternal Grandmother      Macular Degeneration Maternal Grandmother      Diabetes Paternal Grandfather      Other Cancer Mother      Genitourinary Problems Mother      Bipolar Disorder Mother         unipolar depression     Depression Mother      Thyroid Disease Mother         benign tumor     Skin Cancer Mother      Cancer Mother      Bipolar Disorder Brother         unipolar depression     Asthma Brother         Medications:  Current Outpatient Medications   Medication Sig Dispense Refill     albuterol (PROAIR HFA/PROVENTIL HFA/VENTOLIN HFA) 108 (90 Base) MCG/ACT inhaler Inhale 2 puffs into the lungs every 4 hours as needed for shortness of breath / dyspnea or wheezing 1 Inhaler 1     amphetamine-dextroamphetamine (ADDERALL XR) 10 MG 24 hr capsule Take 10 mg by mouth daily       amphetamine-dextroamphetamine (ADDERALL XR) 25 MG 24 hr capsule TK 1 C PO ONCE D IN THE MORNING  0     ARIPiprazole (ABILIFY) 10 MG tablet Take 10 mg by mouth daily       cetirizine (ZYRTEC) 5 MG tablet Take 5 mg by mouth every morning When not using the Allegra       fexofenadine (ALLEGRA) 60 MG tablet Take 60 mg by mouth every morning       fluticasone (FLONASE) 50 MCG/ACT nasal spray Spray 1 spray into both nostrils daily 16 g 3     ipratropium - albuterol 0.5 mg/2.5 mg/3 mL (DUONEB) 0.5-2.5 (3) MG/3ML neb solution Take 1 vial (3 mLs) by nebulization every 6 hours as needed for shortness of breath / dyspnea or wheezing 90 vial 3     levothyroxine (SYNTHROID/LEVOTHROID) 150 MCG tablet Take one tablet by mouth on M, W and F 30 tablet 11     levothyroxine (SYNTHROID/LEVOTHROID) 175 MCG tablet Take one tablet by mouth daily on T, Th, Sat and Sun 30 tablet 11     medical cannabis (Patient's own supply) Take 1 Dose by mouth See Admin Instructions (The purpose of this order is to document that the patient reports taking medical cannabis.  This is not a prescription, and is not used to certify that the patient has a qualifying medical condition.)    [place] Oral Suspension Unflavored 1-5 ml up to two times daily.  Total THC = 240 mg, Total CBD Trace       medical cannabis (Patient's own supply) Take 1 Dose by mouth See Admin Instructions (The purpose of this order is to document that the patient reports taking medical cannabis.  This is not a prescription, and is not used to certify that the patient has a qualifying medical condition.)    [place]  oral suspension: take 1-3 ml by mouth two times daily  Total CBD 1200 mg. Total THC 60 mg       mometasone-formoterol (DULERA) 200-5 MCG/ACT inhaler Inhale 2 puffs into the lungs 2 times daily 13 g 5     montelukast (SINGULAIR) 10 MG tablet Take 1 tablet (10 mg) by mouth every morning 90 tablet 3     nicotine (NICODERM CQ) 14 MG/24HR 24 hr patch Place 1 patch onto the skin every 24 hours 21 patch 3     Prenatal Vit-Fe Fumarate-FA (PRENATAL VITAMIN PO) Take 1 tablet by mouth every morning          Allergies   Allergen Reactions     Adacel [Daptacel] Swelling     Codeine Sulfate Nausea and Vomiting     Tetanus Toxoid      Pt states she had an infection at injection site.      Vicodin [Hydrocodone-Acetaminophen] Nausea and Vomiting     Methimazole Rash       Review of Systems:  -Skin Establ Pt: The patient denies any new rash, pruritus, or lesions that are symptomatic, changing or bleeding, except as per HPI.  -Constitutional: The patient is feeling generally well.    Physical exam:  Vitals: There were no vitals taken for this visit.  GEN: This is a well developed, well-nourished female in no acute distress, in a pleasant mood.    SKIN: {Skin Exam:411629}  - Mcnamara type:   - ***  - {Skin Exam Derm:756710}  - {Skin Exam Derm:676533}  - {Skin Exam Derm:195432}  - No other lesions of concern on areas examined.       Impression/Plan;  1.     2.  EIC upper chest. Previously inflamed, now on bactrim per primary care provider. Discussed etiology of this condition and that it may have ruptured previously into the skin causing inflammation. She reports culture obtained prior to starting antibiotics was negative which would support this. Discussed benign nature of this lesion but that it certainly could become inflamed again. Discussed option of excision vs continued observation. Patient is somewhat interested in excision but would like to give it some thought.   - derm surg referral placed  - patient will decide whether to  proceed with surgery  - {rfplan:891525}    3. {Diagnosesderm:089864}  - {rfplan:962151}    4. {Diagnosesderm:273728}  - {rfplan:031280}    Follow-up in ***, earlier for new or changing lesions.       Staff Involved:    Scribe Disclosure  I, Elsy Brown, am serving as a scribe to document services personally performed by Dr. Lorraine Dozier MD, based on data collection and the provider's statements to me.     ***

## 2020-02-11 NOTE — PROGRESS NOTES
CHIEF COMPLAINT:  Chief Complaint   Patient presents with     Consult     low back pain, recent lumbar CT, right knee issues          HISTORY OF PRESENT ILLNESS  Ms. Arroyo is a pleasant 39 year old female who presents to clinic today with back pain.  She is seen at the request of Sherrill Dasilva.  Cathy has had a couple of episodes of low back pain in her life, both were associated with pregnancy and brought her significant pain.  This most recent episode started about 9 months ago when she was pregnant with her most recent child.  After delivery she continued to have low back pain and this is waxed and waned to some degree.  Moderate to severe, definitely worse in certain ranges of motion and the more she is on her feet.  She points to her left-sided low back, pain extends from just above her hip to down near her buttock.  She did have a couple of instances in which she was standing and her leg gave out, forcing her to fall.  She does have persistent left leg pain in addition to her low back issues.  The most recent one scared her, prompting a visit to the emergency department.  There they did a CT of her lumbar spine and discharged her from the emergency department.  Since her emergency department visit she has been in physical therapy, this is helping somewhat.      Additional history: as documented    MEDICAL HISTORY  Patient Active Problem List   Diagnosis     Pineocytoma (H)     Attention deficit disorder     Seasonal allergic rhinitis     Bipolar affective disorder in remission (H)     GBS (group B Streptococcus carrier), +RV culture, currently pregnant     Pre-eclampsia     Moderate persistent asthma without complication     Chronic rhinitis     Tobacco use disorder     Abnormal cytology     S/P total thyroidectomy     History of Graves' disease     Nonintractable migraine, unspecified migraine type     Need for Tdap vaccination     Post-surgical hypothyroidism     Abnormal results of thyroid function  studies     Otitis externa     Polyhydramnios affecting pregnancy     Medical cannabis use     Labor and delivery indication for care or intervention     Indication for care in labor and delivery, antepartum     Morbid obesity (H)     Urticarial vasculitis (H)     Dehydration     Lactating mother     Acute bilateral low back pain without sciatica     Sacroiliitis (H)       Current Outpatient Medications   Medication Sig Dispense Refill     albuterol (PROAIR HFA/PROVENTIL HFA/VENTOLIN HFA) 108 (90 Base) MCG/ACT inhaler Inhale 2 puffs into the lungs every 4 hours as needed for shortness of breath / dyspnea or wheezing 1 Inhaler 1     amphetamine-dextroamphetamine (ADDERALL XR) 10 MG 24 hr capsule Take 10 mg by mouth daily       amphetamine-dextroamphetamine (ADDERALL XR) 25 MG 24 hr capsule TK 1 C PO ONCE D IN THE MORNING  0     ARIPiprazole (ABILIFY) 10 MG tablet Take 10 mg by mouth daily       cetirizine (ZYRTEC) 5 MG tablet Take 5 mg by mouth every morning When not using the Allegra       fexofenadine (ALLEGRA) 60 MG tablet Take 60 mg by mouth every morning       fluticasone (FLONASE) 50 MCG/ACT nasal spray Spray 1 spray into both nostrils daily 16 g 3     ipratropium - albuterol 0.5 mg/2.5 mg/3 mL (DUONEB) 0.5-2.5 (3) MG/3ML neb solution Take 1 vial (3 mLs) by nebulization every 6 hours as needed for shortness of breath / dyspnea or wheezing 90 vial 3     levothyroxine (SYNTHROID/LEVOTHROID) 150 MCG tablet Take one tablet by mouth on M, W and F 30 tablet 11     levothyroxine (SYNTHROID/LEVOTHROID) 175 MCG tablet Take one tablet by mouth daily on T, Th, Sat and Sun 30 tablet 11     medical cannabis (Patient's own supply) Take 1 Dose by mouth See Admin Instructions (The purpose of this order is to document that the patient reports taking medical cannabis.  This is not a prescription, and is not used to certify that the patient has a qualifying medical condition.)    [place] Oral Suspension Unflavored 1-5 ml up to  two times daily.  Total THC = 240 mg, Total CBD Trace       medical cannabis (Patient's own supply) Take 1 Dose by mouth See Admin Instructions (The purpose of this order is to document that the patient reports taking medical cannabis.  This is not a prescription, and is not used to certify that the patient has a qualifying medical condition.)    [place] oral suspension: take 1-3 ml by mouth two times daily  Total CBD 1200 mg. Total THC 60 mg       mometasone-formoterol (DULERA) 200-5 MCG/ACT inhaler Inhale 2 puffs into the lungs 2 times daily 13 g 5     montelukast (SINGULAIR) 10 MG tablet Take 1 tablet (10 mg) by mouth every morning 90 tablet 3     nicotine (NICODERM CQ) 14 MG/24HR 24 hr patch Place 1 patch onto the skin every 24 hours 21 patch 3     Prenatal Vit-Fe Fumarate-FA (PRENATAL VITAMIN PO) Take 1 tablet by mouth every morning          Allergies   Allergen Reactions     Adacel [Daptacel] Swelling     Codeine Sulfate Nausea and Vomiting     Tetanus Toxoid      Pt states she had an infection at injection site.      Vicodin [Hydrocodone-Acetaminophen] Nausea and Vomiting     Methimazole Rash       Family History   Problem Relation Age of Onset     Thyroid Disease Paternal Aunt      Asthma Father      Mental Illness Father      Obesity Father      Asthma Maternal Grandmother      Osteoporosis Maternal Grandmother      Glaucoma Maternal Grandmother      Hypertension Maternal Grandmother      Macular Degeneration Maternal Grandmother      Diabetes Paternal Grandfather      Other Cancer Mother      Genitourinary Problems Mother      Bipolar Disorder Mother         unipolar depression     Depression Mother      Thyroid Disease Mother         benign tumor     Skin Cancer Mother      Cancer Mother      Bipolar Disorder Brother         unipolar depression     Asthma Brother        Additional medical/Social/Surgical histories reviewed in [facility] and updated as appropriate.        PHYSICAL EXAM  Vitals:    02/11/20 0921  "  BP: 125/65   Weight: 117.9 kg (260 lb)   Height: 1.727 m (5' 8\")     General  - normal appearance, in no obvious distress  CV  - normal peripheral perfusion  Pulm  - normal respiratory pattern, non-labored  Musculoskeletal - lumbar spine  - stance: slow to rise and sit  - inspection: normal bone and joint alignment, no obvious scoliosis  - palpation: Left-sided lumbar paravertebral spasm, mild tenderness at the gluteal area  - strength: lower extremities 5/5 in all planes  - special tests:  (-) slump test bilaterally  (+) ISAIAH on L  Neuro  - patellar and Achilles DTRs 2+ bilaterally, no sensory or motor deficit, grossly normal coordination, normal muscle tone  Skin  - no ecchymosis, erythema, warmth, or induration, no obvious rash  Psych  - interactive, appropriate, normal mood and affect             ASSESSMENT & PLAN  Ms. Arroyo is a 39 year old female who presents to clinic today with low back pain.    I reviewed her CT in the room with her:  Impression:  1. No acute fracture or subluxation.  2. Symmetric sclerosis and irregularity of the sacroiliac joints  concerning for sacroiliitis.  3. Mild multilevel lumbar spondylosis without significant spinal canal  or neural foraminal narrowing at any level.    Given her persistent leg pain I am ordering an MRI of her lumbar spine.  She can get this at her earliest convenience.  Based upon the imaging we may decide to undergo further physical therapy and could consider injections, which we discussed in some depth today.    It was a pleasure seeing Cathy today.    Marcos Dalal DO, [facility]  Primary Care Sports Medicine      This note was constructed using Dragon dictation software, please excuse any minor errors in spelling, grammar, or syntax.    "

## 2020-02-11 NOTE — PROGRESS NOTES
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    - taking allegra 180mg qHS  now for seasonal allergies  2. Mild folliculitis/ miliaria: BPO 5% wash   3. 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  4. Seborrheic dermatitis   - Ketoconazole shampoo/cream, Dermasmoothe oil    Encounter Date: Feb 11, 2020    CC:  Chief Complaint   Patient presents with     Derm Problem     Cathy is here due to a dry, flakey, pruritus scalp       History of Present Illness:  Ms. Cathy Arroyo is a 39 year old female who presents today for itchy scalp. She was last seen by Dr. Ayala on 12/19/19 where she was referred to derm surgery for EIC removal. Today she presents for evaluation of an itchy scaling scalp. She has been noticing it for a couple of months. She has been using a charcoal scrub and a sulfate free shampoo to to wash her hair. She washes her hair once weekly. She does get flaking around her nose and eyebrows. She uses daily moisturizer. Patient is otherwise feeling well, no additional skin concerns.    Past Medical History:   Patient Active Problem List   Diagnosis     Pineocytoma (H)     Attention deficit disorder     Seasonal allergic rhinitis     Bipolar affective disorder in remission (H)     GBS (group B Streptococcus carrier), +RV culture, currently pregnant     Pre-eclampsia     Moderate persistent asthma without complication     Chronic rhinitis     Tobacco use disorder     Abnormal cytology     S/P total thyroidectomy     History of Graves' disease     Nonintractable migraine, unspecified migraine type     Need for Tdap vaccination     Post-surgical hypothyroidism     Abnormal results of thyroid function studies     Otitis externa     Polyhydramnios affecting pregnancy     Medical cannabis use     Labor and delivery  indication for care or intervention     Indication for care in labor and delivery, antepartum     Morbid obesity (H)     Urticarial vasculitis (H)     Dehydration     Lactating mother     Acute bilateral low back pain without sciatica     Sacroiliitis (H)     Past Medical History:   Diagnosis Date     Allergic state      Anxiety      Bipolar 1 disorder (H)      Depressive disorder      Elevated cholesterol      Graves disease      Obese     BMI 37.48     Pineal tumor     s/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 tumor     Post partum depression      Post-surgical hypothyroidism 2017     Uncomplicated asthma      Past Surgical History:   Procedure Laterality Date     BLOOD PATCH N/A 10/11/2016    Procedure: EPIDURAL BLOOD PATCH;  Surgeon: GENERIC ANESTHESIA PROVIDER;  Location: [facility] OR      SECTION, TUBAL LIGATION, COMBINED N/A 2019    Procedure:  SECTION, WITH TUBAL LIGATION;  Surgeon: Kathy Rutledge MD;  Location: [facility] L+D     CRANIOTOMY, EXCISE TUMOR COMPLEX, COMBINED  2014    Procedure: COMBINED CRANIOTOMY, EXCISE TUMOR COMPLEX;  Supracerabellar  Infratentorial Approach for Resection of Tumor ;  Surgeon: Kate Mckeon MD;  Location: [facility] OR     THYROIDECTOMY N/A 5/3/2017    Procedure: THYROIDECTOMY;  Total Thyroidectomy;  Surgeon: Pamela Villasenor MD;  [facility]       Social History:  Patient reports that she has been smoking cigarettes. She started smoking about 16 years ago. She has a 6.00 pack-year smoking history. She has never used smokeless tobacco. She reports current drug use. Drug: Marijuana. She reports that she does not drink alcohol.  She is a .     Family History:  Family History   Problem Relation Age of Onset     Thyroid Disease Paternal Aunt      Asthma Father      Mental Illness Father      Obesity Father      Asthma Maternal Grandmother      Osteoporosis Maternal Grandmother      Glaucoma Maternal Grandmother      Hypertension Maternal  Grandmother      Macular Degeneration Maternal Grandmother      Diabetes Paternal Grandfather      Other Cancer Mother      Genitourinary Problems Mother      Bipolar Disorder Mother         unipolar depression     Depression Mother      Thyroid Disease Mother         benign tumor     Skin Cancer Mother      Cancer Mother      Bipolar Disorder Brother         unipolar depression     Asthma Brother        Medications:  Current Outpatient Medications   Medication Sig Dispense Refill     albuterol (PROAIR HFA/PROVENTIL HFA/VENTOLIN HFA) 108 (90 Base) MCG/ACT inhaler Inhale 2 puffs into the lungs every 4 hours as needed for shortness of breath / dyspnea or wheezing 1 Inhaler 1     amphetamine-dextroamphetamine (ADDERALL XR) 25 MG 24 hr capsule TK 1 C PO ONCE D IN THE MORNING  0     ARIPiprazole (ABILIFY) 10 MG tablet Take 10 mg by mouth daily       cetirizine (ZYRTEC) 5 MG tablet Take 5 mg by mouth every morning When not using the Allegra       fexofenadine (ALLEGRA) 60 MG tablet Take 60 mg by mouth every morning       fluocinolone acetonide (DERMA SMOOTHE/FS BODY) 0.01 % external oil Apply daily as needed to scalp. 118.28 mL 3     fluticasone (FLONASE) 50 MCG/ACT nasal spray Spray 1 spray into both nostrils daily 16 g 3     ipratropium - albuterol 0.5 mg/2.5 mg/3 mL (DUONEB) 0.5-2.5 (3) MG/3ML neb solution Take 1 vial (3 mLs) by nebulization every 6 hours as needed for shortness of breath / dyspnea or wheezing 90 vial 3     ketoconazole (NIZORAL) 2 % external cream Apply to face daily. 60 g 11     ketoconazole (NIZORAL) 2 % external shampoo Massage into wet scalp, let sit 3-5 min, then rinse. Do this 3x weekly. 120 mL 11     levothyroxine (SYNTHROID/LEVOTHROID) 150 MCG tablet Take one tablet by mouth on M, W and F 30 tablet 11     levothyroxine (SYNTHROID/LEVOTHROID) 175 MCG tablet Take one tablet by mouth daily on T, Th, Sat and Sun 30 tablet 11     medical cannabis (Patient's own supply) Take 1 Dose by mouth See  Admin Instructions (The purpose of this order is to document that the patient reports taking medical cannabis.  This is not a prescription, and is not used to certify that the patient has a qualifying medical condition.)    Tangerine Oral Suspension Unflavored 1-5 ml up to two times daily.  Total THC = 240 mg, Total CBD Trace       medical cannabis (Patient's own supply) Take 1 Dose by mouth See Admin Instructions (The purpose of this order is to document that the patient reports taking medical cannabis.  This is not a prescription, and is not used to certify that the patient has a qualifying medical condition.)    Mobile oral suspension: take 1-3 ml by mouth two times daily  Total CBD 1200 mg. Total THC 60 mg       mometasone-formoterol (DULERA) 200-5 MCG/ACT inhaler Inhale 2 puffs into the lungs 2 times daily 13 g 5     montelukast (SINGULAIR) 10 MG tablet Take 1 tablet (10 mg) by mouth every morning 90 tablet 3     nicotine (NICODERM CQ) 14 MG/24HR 24 hr patch Place 1 patch onto the skin every 24 hours 21 patch 3     Prenatal Vit-Fe Fumarate-FA (PRENATAL VITAMIN PO) Take 1 tablet by mouth every morning          Allergies   Allergen Reactions     Adacel [Daptacel] Swelling     Codeine Sulfate Nausea and Vomiting     Tetanus Toxoid      Pt states she had an infection at injection site.      Vicodin [Hydrocodone-Acetaminophen] Nausea and Vomiting     Methimazole Rash       Review of Systems:  -Skin Establ Pt: The patient denies any new rash, pruritus, or lesions that are symptomatic, changing or bleeding, except as per HPI.  -Constitutional: The patient is feeling generally well.    Physical exam:  GEN: This is a well developed, well-nourished female in no acute distress, in a pleasant mood.    SKIN: Focused examination of the scalp and face was performed.  - Mcnamara type: II  - There is mild erythema of the scalp, with moderate flaky white scale.  - Eyebrows and nose clear today.  - No other lesions of concern  on areas examined.     Impression/Plan;  1. Seborrheic dermatitis. Natural history and etiology discussed. She's not sure which topical would be easier with her hair type so provided both ketoconazole shampoo/cream and Dermasmoothe oil, advised she can use either one or both.  - Start ketoconazole cream 1-2 times daily to face.  - Start ketoconazole shampoo 3 times per week. Counseled to massage into wet scalp, let sit 5 min, then rinse   and/or   - Start Dermasmoothe oil daily prn.    Follow-up in 1 year, earlier for new or changing lesions. Okay to refill meds with PCP if desired.      Staff Involved:    Scribe Disclosure  I, Elsy Brown, am serving as a scribe to document services personally performed by Dr. Lorraine Dozier MD, based on data collection and the provider's statements to me.     Provider Disclosure:   The documentation recorded by the scribe accurately reflects the services I personally performed and the decisions made by me.    Lorraine Dozier MD    Department of Dermatology  [facility]: Phone: [phone], Fax: [phone]

## 2020-02-11 NOTE — PATIENT INSTRUCTIONS
Seborrheic dermatitis  Start ketoconazole shampoo 3 times weekly. Massage into wet scalp, let sit 3-5 min, then rinse.  OR  Start dermasmoothe oil daily as needed.  AND  Ketoconazole cream daily for face.    Return in 1 year, sooner if concerns.  Okay to follow with PCP for refills if they are comfortable.

## 2020-03-05 ENCOUNTER — ANCILLARY PROCEDURE (OUTPATIENT)
Dept: GENERAL RADIOLOGY | Facility: CLINIC | Age: 40
End: 2020-03-05
Attending: FAMILY MEDICINE
Payer: COMMERCIAL

## 2020-03-05 VITALS — SYSTOLIC BLOOD PRESSURE: 125 MMHG | HEART RATE: 79 BPM | DIASTOLIC BLOOD PRESSURE: 85 MMHG | OXYGEN SATURATION: 97 %

## 2020-03-05 DIAGNOSIS — M54.16 LUMBAR RADICULOPATHY: ICD-10-CM

## 2020-03-05 PROCEDURE — 62323 NJX INTERLAMINAR LMBR/SAC: CPT | Performed by: RADIOLOGY

## 2020-03-05 RX ORDER — IOPAMIDOL 408 MG/ML
10 INJECTION, SOLUTION INTRATHECAL ONCE
Status: COMPLETED | OUTPATIENT
Start: 2020-03-05 | End: 2020-03-05

## 2020-03-05 RX ORDER — METHYLPREDNISOLONE ACETATE 80 MG/ML
80 INJECTION, SUSPENSION INTRA-ARTICULAR; INTRALESIONAL; INTRAMUSCULAR; SOFT TISSUE ONCE
Status: COMPLETED | OUTPATIENT
Start: 2020-03-05 | End: 2020-03-05

## 2020-03-05 RX ORDER — LIDOCAINE HYDROCHLORIDE 10 MG/ML
5 INJECTION, SOLUTION EPIDURAL; INFILTRATION; INTRACAUDAL; PERINEURAL ONCE
Status: COMPLETED | OUTPATIENT
Start: 2020-03-05 | End: 2020-03-05

## 2020-03-05 RX ORDER — BUPIVACAINE HYDROCHLORIDE 5 MG/ML
5 INJECTION, SOLUTION PERINEURAL ONCE
Status: COMPLETED | OUTPATIENT
Start: 2020-03-05 | End: 2020-03-05

## 2020-03-05 RX ADMIN — LIDOCAINE HYDROCHLORIDE 5 ML: 10 INJECTION, SOLUTION EPIDURAL; INFILTRATION; INTRACAUDAL; PERINEURAL at 11:16

## 2020-03-05 RX ADMIN — IOPAMIDOL 2 ML: 408 INJECTION, SOLUTION INTRATHECAL at 11:16

## 2020-03-05 RX ADMIN — METHYLPREDNISOLONE ACETATE 80 MG: 80 INJECTION, SUSPENSION INTRA-ARTICULAR; INTRALESIONAL; INTRAMUSCULAR; SOFT TISSUE at 11:17

## 2020-03-05 RX ADMIN — BUPIVACAINE HYDROCHLORIDE 2 ML: 5 INJECTION, SOLUTION PERINEURAL at 11:17

## 2020-03-05 NOTE — PROGRESS NOTES
Cathy was seen in X-ray today for a lumbar epidural injection. Patient rated pain before procedure 4/10. After procedure patient rated pain 1/10.   This pain level is acceptable to patient. Patient discharged home with .

## 2020-03-05 NOTE — PROGRESS NOTES
AFTER YOU GO HOME    ? DO relax; minimize your activity for 24 hours  ? You may resume normal activity tomorrow  ? You may remove the bandage in the evening or next morning  ? You may resume bathing the next day  ? Drink at least 4 extra glasses of fluid today if not on fluid restrictions  ? DO NOT drive or operate machinery at home or at work for at least 24 hours      VISIT THE EMERGENCY ROOM OR URGENT CARE IF:    ? There is redness or swelling at the injection site  ? There is discharge from the injection site  ? You develop a temperature of 101  F or greater      ADDITIONAL INSTRUCTIONS:     ? You may resume your Coumadin or other blood thinner at your regular dose today.  Follow up with your physician to have your INR rechecked if indicated.  ? If you gain no relief from the injection after two (2) weeks, follow-up with your provider for your options.        Contacts:    During business hours from 8 to 5 pm, you may call [phone] to reach a nurse advisor at [facility].  After hours, call [facility]  [phone].  Ask for the Radiologist on-call.  Someone is on-call 24 hrs/day.  [facility] Toll Free Number   [phone]

## 2020-03-16 PROBLEM — M46.1 SACROILIITIS (H): Status: RESOLVED | Noted: 2020-01-31 | Resolved: 2020-03-16

## 2020-03-18 ENCOUNTER — MYC MEDICAL ADVICE (OUTPATIENT)
Dept: FAMILY MEDICINE | Facility: CLINIC | Age: 40
End: 2020-03-18

## 2020-03-18 ENCOUNTER — VIRTUAL VISIT (OUTPATIENT)
Dept: FAMILY MEDICINE | Facility: OTHER | Age: 40
End: 2020-03-18

## 2020-03-18 NOTE — PROGRESS NOTES
"Date: 2020 12:10:44  Clinician: Karlene Walters  Clinician NPI: [id number]  Patient: Cathy Arroyo  Patient : 1980  Patient Address: [address] [place] [address]  Patient Phone: [phone]  Visit Protocol: URI  Patient Summary:  Cathy is a 39 year old ( : 1980 ) female who initiated a Visit for COVID-19 (Coronavirus) evaluation and screening. When asked the question \"Please sign me up to receive news, health information and promotions from [employer].\", Cathy responded \"Yes\".    Cathy states her symptoms started today.   Her symptoms consist of wheezing, a cough, nasal congestion, malaise, a headache, and rhinitis. She is experiencing mild difficulty breathing with activities but can speak normally in full sentences. Cathy also feels feverish.   Symptom details     Nasal secretions: The color of her mucus is clear.    Cough: Cathy coughs every 5-10 minutes and her cough is not more bothersome at night. Phlegm comes into her throat when she coughs. She does not believe her cough is caused by post-nasal drip. The color of the phlegm is yellow, white, and clear.     Temperature: Her current temperature is 99.6 degrees Fahrenheit.     Wheezing: Cathy has been diagnosed with asthma. The wheezing does not interfere with her normal daily activities.    Headache: She states the headache is mild (1-3 on a 10 point pain scale).      Cathy denies having sore throat, ear pain, facial pain or pressure, myalgias, chills, and teeth pain. She also denies taking antibiotic medication for the symptoms, having a sinus infection within the past year, and having recent facial or sinus surgery in the past 60 days.   Precipitating events  She has not recently been exposed to someone with influenza. Cathy has been in close contact with the following high risk individuals: children under the age of 5.   Pertinent COVID-19 (Coronavirus) information  Cathy has not traveled internationally or to the " areas where COVID-19 (Coronavirus) is widespread in the last 14 days before the start of her symptoms.   Cathy has not had a close contact with a laboratory-confirmed COVID-19 patient within 14 days of symptom onset. She also has not had a close contact with a suspected COVID-19 patient within 14 days of symptom onset.   Cathy is not a healthcare worker and does not work in a healthcare facility.   Pertinent medical history  Cathy typically gets a yeast infection when she takes antibiotics. She has used fluconazole (Diflucan) to treat previous yeast infections. 2 doses of fluconazole (Diflucan) has typically been needed for symptoms to resolve in the past.  Cathy does not need a return to work/school note.   Weight: 260 lbs   Cathy smokes or uses smokeless tobacco.   She denies pregnancy and denies breastfeeding. She has menstruated in the past month.   Additional information as reported by the patient (free text): I got a haircut on Thursday from a person whose mother was tested for covid-19 on Monday and is and awaiting their results.  I have had a productive cough for a few days but the low grade fever began today.   Weight: 260 lbs    MEDICATIONS: Tylenol Extra Strength oral, Adderall XR oral, fluticasone propionate inhalation, levothyroxine oral, Topamax oral, cholecalciferol (vitamin D3) oral, Dulera inhalation, Abilify oral, montelukast oral, ALLERGIES: Vicodin  Clinician Response:  Dear Cathy,   Based on the information you have provided, you do have symptoms that are consistent with Coronavirus (COVID-19).  The coronavirus causes mild to severe respiratory illness with the most common symptoms including fever, cough and difficulty breathing. Unfortunately, many viruses cause similar symptoms and it can be difficult to distinguish between viruses, especially in mild cases, so we are presuming that anyone with cough or fever has coronavirus at this time.  Coronavirus/COVID-19 has reached the point of  community spread in [place], meaning that we are finding the virus in people with no known exposure risk for riri the virus. Given the increasing commonness of coronavirus in the community we are no longer testing patients who are not critically ill.  If you are a health care worker, you should refer to your employee health office for instructions about returning to work.  For everyone else who has cough or fever, you should assume you are infected with coronavirus. Accordingly, you should self-quarantine for seven days from the first day your symptoms started OR 72 hours after your cough and fever completely resolve - WHICHEVER is LONGER. You should call if you find increasing shortness of breath, wheezing or sustained fever above 101.5. If you are significantly short of breath or experience chest pain you should call 911 or report to the nearest emergency department for urgent evaluation.    Isolate yourself at home.   Do Not allow any visitors  Do Not go to work or school  Do Not go to [facility],  centers, shopping, or other public places.  Do Not shake hands.  Avoid close contact with others (hugging, kissing).   Protect Others:    Cover Your Mouth and Nose with a mask, disposable tissue or wash cloth to avoid spreading germs to others.  Wash your hands and face frequently with soap and water.   Please use your albuterol inhaler to help with your cough &amp; breathing &amp; continue with your regular controller inhalers.  If you develop significant shortness of breath that prevents you from doing normal activities, please call 911 or proceed to the nearest emergency room and alert them immediately that you have been in self-isolation for possible coronavirus.   For more information about COVID19 and options for caring for yourself at home, please visit the CDC website at https://www.cdc.gov/coronavirus/2019-ncov/about/steps-when-sick.htmlFor more options for care at [facility], please  visit our website at [email or web address]     Diagnosis: Cough  Diagnosis ICD: R05

## 2020-03-20 NOTE — TELEPHONE ENCOUNTER
Called patient and left detailed message to call back.  Please call me when she calls into the clinic.  Needs at least someone to hear her breathing on the phone.  Should get in person eval when that option is up and running.  DEVEN Winston

## 2020-04-13 ENCOUNTER — TELEPHONE (OUTPATIENT)
Dept: FAMILY MEDICINE | Facility: CLINIC | Age: 40
End: 2020-04-13

## 2020-04-13 DIAGNOSIS — J45.20 MILD INTERMITTENT ASTHMA WITHOUT COMPLICATION: ICD-10-CM

## 2020-04-13 RX ORDER — ALBUTEROL SULFATE 90 UG/1
2 AEROSOL, METERED RESPIRATORY (INHALATION) EVERY 4 HOURS PRN
Qty: 3 INHALER | Refills: 3 | Status: SHIPPED | OUTPATIENT
Start: 2020-04-13 | End: 2021-07-09

## 2020-04-13 NOTE — TELEPHONE ENCOUNTER
Pharmacy states they never received the prescription for albuterol (PROAIR HFA/PROVENTIL HFA/VENTOLIN HFA) 108 (90 Base) MCG/ACT inhaler [0595] (Order [id number])  and is asking it to be resent to them.

## 2020-05-07 ENCOUNTER — TELEPHONE (OUTPATIENT)
Dept: FAMILY MEDICINE | Facility: CLINIC | Age: 40
End: 2020-05-07

## 2020-05-07 NOTE — TELEPHONE ENCOUNTER
Prior Authorization Approval    Approved for 68 tabs per fill.     Authorization Effective Date: 4/7/2020  Authorization Expiration Date: 5/7/2021  Medication: fexofenadine (ALLEGRA) 60 MG tablet  Approved Dose/Quantity:    Reference #: [id number]   Insurance Company: [employer] - Phone [phone] Fax [phone]  Expected CoPay:       CoPay Card Available:      Foundation Assistance Needed:    Which Pharmacy is filling the prescription (Not needed for infusion/clinic administered): [facility] #[id number] [address] AT [facility]  Pharmacy Notified: Yes  Patient Notified: Yes  **Instructed pharmacy to notify patient when script is ready to /ship.**

## 2020-05-07 NOTE — TELEPHONE ENCOUNTER
Prior Authorization Retail Medication Request    Medication/Dose: fexofenadine (ALLEGRA) 60 MG tablet  ICD code (if different than what is on RX):    Previously Tried and Failed:    Rationale:      Insurance Name:    Insurance ID:  [id number]  Phone: [phone]      Pharmacy Information (if different than what is on RX)  Name:  [facility]   Phone:  [phone]  Fax: [phone]

## 2020-05-07 NOTE — TELEPHONE ENCOUNTER
Central Prior Authorization Team   Phone: [phone]      PA Initiation    Medication: fexofenadine (ALLEGRA) 60 MG tablet  Insurance Company: [employer] - Phone [phone] Fax [phone]  Pharmacy Filling the Rx: [facility] #[id number] [address] AT [facility]  Filling Pharmacy Phone: [phone]  Filling Pharmacy Fax:    Start Date: 5/7/2020

## 2020-05-21 RX ORDER — TOPIRAMATE 100 MG/1
100 TABLET, FILM COATED ORAL DAILY
COMMUNITY
End: 2021-09-29

## 2020-05-21 NOTE — PROGRESS NOTES
"Cathy Arroyo is a 39 year old female who is being evaluated via a billable telephone visit.      The patient has been notified of following:     \"This telephone visit will be conducted via a call between you and your physician/provider. We have found that certain health care needs can be provided without the need for a physical exam.  This service lets us provide the care you need with a short phone conversation.  If a prescription is necessary we can send it directly to your pharmacy.  If lab work is needed we can place an order for that and you can then stop by our lab to have the test done at a later time.    Telephone visits are billed at different rates depending on your insurance coverage. During this emergency period, for some insurers they may be billed the same as an in-person visit.  Please reach out to your insurance provider with any questions.    If during the course of the call the physician/provider feels a telephone visit is not appropriate, you will not be charged for this service.\"    Patient has given verbal consent for Telephone visit?  Yes    What phone number would you like to be contacted at? [phone]    How would you like to obtain your AVS? Erica Anaya     Cathy Arroyo is a 39 year old female who presents via phone visit today for the following health issues:    HPI     Sent ACT via Caprice Koroma MA      Asthma Follow-Up    Was ACT completed today?  No      Do you have a cough?  YES    Are you experiencing any wheezing in your chest?  YES    Do you have any shortness of breath?  YES     How often are you using a short-acting (rescue) inhaler or nebulizer, such as Albuterol?  A few times a week    How many days per week do you miss taking your asthma controller medication?  0    Please describe any recent triggers for your asthma: pollens, humidity, emotions, cold air and smoking    Have you had any Emergency Room Visits, Urgent Care Visits, " "or Hospital Admissions since your last office visit?  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  Feels very serious about it.    Taking allegra and zyrtec alternating  Montelukast - helped, but still wheezing  Prednisone for 5 days ending today and was not that effective  Dulera didn't seem to help - started March 20, stopped two weeks ago because it wasn't helping    Already manic and very worried about taking more prednisone      How many servings of fruits and vegetables do you eat daily?  2-3    On average, how many sweetened beverages do you drink each day (Examples: soda, juice, sweet tea, etc.  Do NOT count diet or artificially sweetened beverages)?   1    How many days per week do you exercise enough to make your heart beat faster? 3 or less    How many minutes a day do you exercise enough to make your heart beat faster? 9 or less    How many days per week do you miss taking your medication? 0      Caprice Busby MA    I have reviewed and updated the patient's Past Medical History, Social History, Family History and Medication List    Reviewed and updated as needed this visit by Provider       Review of Systems   Constitutional, HEENT, cardiovascular, pulmonary, gi and gu systems are negative, except as otherwise noted.       Objective   Reported vitals:  There were no vitals taken for this visit.   healthy, alert and no distress  PSYCH: Alert and oriented times 3; coherent speech, normal   rate and volume, able to articulate logical thoughts, able   to abstract reason, no tangential thoughts, no hallucinations   or delusions  Her affect is normal and pleasant  RESP: No cough, no audible wheezing, able to talk in full " sentences  Remainder of exam unable to be completed due to telephone visits    Diagnostic Test Results:  Labs reviewed in Epic        Assessment/Plan:  1. Moderate persistent asthma with acute exacerbation  - budesonide-formoterol (SYMBICORT) 160-4.5 MCG/ACT Inhaler; Inhale 2 puffs into the lungs 2 times daily  Dispense: 10.2 g; Refill: 1  - ipratropium (ATROVENT HFA) 17 MCG/ACT inhaler; Inhale 2 puffs into the lungs every 6 hours  Dispense: 12.9 g; Refill: 3  - predniSONE (DELTASONE) 20 MG tablet; Take 1 tablet (20 mg) by mouth daily for 5 days  Dispense: 5 tablet; Refill: 0    2. Tobacco use disorder  - nicotine (NICODERM CQ) 21 MG/24HR 24 hr patch; Place 1 patch onto the skin every 24 hours  Dispense: 28 patch; Refill: 1  - nicotine (NICORETTE) 4 MG lozenge; Place 1 lozenge (4 mg) inside cheek as needed for smoking cessation  Dispense: 168 lozenge; Refill: 1    Return in about 4 weeks (around 6/19/2020) for Asthma Recheck and ACT.    End:  5:01 PM   Start:  4:39    Phone call duration:  21 minutes    JANNET Liu CNP

## 2020-05-22 ENCOUNTER — MYC MEDICAL ADVICE (OUTPATIENT)
Dept: FAMILY MEDICINE | Facility: CLINIC | Age: 40
End: 2020-05-22

## 2020-05-22 ENCOUNTER — VIRTUAL VISIT (OUTPATIENT)
Dept: FAMILY MEDICINE | Facility: CLINIC | Age: 40
End: 2020-05-22
Payer: COMMERCIAL

## 2020-05-22 DIAGNOSIS — F17.200 TOBACCO USE DISORDER: ICD-10-CM

## 2020-05-22 DIAGNOSIS — J45.41 MODERATE PERSISTENT ASTHMA WITH ACUTE EXACERBATION: Primary | ICD-10-CM

## 2020-05-22 PROCEDURE — 99214 OFFICE O/P EST MOD 30 MIN: CPT | Mod: TEL | Performed by: NURSE PRACTITIONER

## 2020-05-22 RX ORDER — PREDNISONE 20 MG/1
20 TABLET ORAL DAILY
Qty: 5 TABLET | Refills: 0 | Status: SHIPPED | OUTPATIENT
Start: 2020-05-22 | End: 2020-06-24

## 2020-05-22 RX ORDER — NICOTINE 21 MG/24HR
1 PATCH, TRANSDERMAL 24 HOURS TRANSDERMAL EVERY 24 HOURS
Qty: 28 PATCH | Refills: 1 | Status: SHIPPED | OUTPATIENT
Start: 2020-05-22 | End: 2020-09-15

## 2020-05-22 RX ORDER — BUDESONIDE AND FORMOTEROL FUMARATE DIHYDRATE 160; 4.5 UG/1; UG/1
2 AEROSOL RESPIRATORY (INHALATION) 2 TIMES DAILY
Qty: 10.2 G | Refills: 1 | Status: SHIPPED | OUTPATIENT
Start: 2020-05-22 | End: 2020-05-27

## 2020-05-22 RX ORDER — TOPIRAMATE 50 MG/1
100 TABLET, FILM COATED ORAL DAILY
COMMUNITY
Start: 2020-04-21 | End: 2020-07-11

## 2020-05-22 RX ORDER — TOPIRAMATE 25 MG/1
25 TABLET, FILM COATED ORAL DAILY
COMMUNITY
Start: 2020-04-07 | End: 2020-07-11

## 2020-05-26 DIAGNOSIS — J45.41 MODERATE PERSISTENT ASTHMA WITH ACUTE EXACERBATION: ICD-10-CM

## 2020-05-27 RX ORDER — BUDESONIDE AND FORMOTEROL FUMARATE DIHYDRATE 160; 4.5 UG/1; UG/1
2 AEROSOL RESPIRATORY (INHALATION) 2 TIMES DAILY
Qty: 10.2 G | Refills: 1 | Status: SHIPPED | OUTPATIENT
Start: 2020-05-27 | End: 2020-08-25

## 2020-05-27 NOTE — TELEPHONE ENCOUNTER
I ordered this on 5/22 so I don't understand why it came to me as a refill.  Can you make sure patient was able to  the medication?    DEVEN Winston

## 2020-06-01 ENCOUNTER — TELEPHONE (OUTPATIENT)
Dept: FAMILY MEDICINE | Facility: CLINIC | Age: 40
End: 2020-06-01

## 2020-06-01 NOTE — TELEPHONE ENCOUNTER
Pt tried nicotine lozenges and patch, but had a reaction to both. Please add to allergies-not sure how you want severity categorized?     Throat felt like it was closing on her after 5 minutes of taking.   She did take 50mg of benadryl which did help after 15 minutes.     Patch applied to her upper back for 3 hours and had a burning itching feeling with a rash that spread on her back and neck.  Pt states it was hives, but rash did not go away for a few days.     Unable to use oral Rx's, Wellbutrin and Chantix due to mental health Dx, causing manic phase.     She is going to try quit smoking on her own without meds or products.     London Hinds RN   [facility]

## 2020-06-01 NOTE — TELEPHONE ENCOUNTER
Pt will call alternative pharmacy to obtain inhaler that was already sent.     London Hinds RN   [facility]

## 2020-06-10 DIAGNOSIS — J45.41 MODERATE PERSISTENT ASTHMA WITH ACUTE EXACERBATION: ICD-10-CM

## 2020-06-10 RX ORDER — PREDNISONE 20 MG/1
20 TABLET ORAL DAILY
Qty: 5 TABLET | Refills: 0 | Status: CANCELLED | OUTPATIENT
Start: 2020-06-10

## 2020-06-10 NOTE — TELEPHONE ENCOUNTER
Requested Prescriptions   Pending Prescriptions Disp Refills     predniSONE (DELTASONE) 20 MG tablet 5 tablet 0     Sig: Take 1 tablet (20 mg) by mouth daily       There is no refill protocol information for this order              Last Written Prescription Date:  5/22/20  Last Fill Quantity: 5,   # refills: 0  Last Office Visit: 5/22/20  Future Office visit:       Routing refill request to provider for review/approval because:  Drug not on the [facility] or [facility] refill protocol or controlled substance

## 2020-06-12 DIAGNOSIS — J45.40 MODERATE PERSISTENT ASTHMA WITHOUT COMPLICATION: ICD-10-CM

## 2020-06-12 DIAGNOSIS — J30.2 SEASONAL ALLERGIC RHINITIS, UNSPECIFIED TRIGGER: ICD-10-CM

## 2020-06-13 RX ORDER — MONTELUKAST SODIUM 10 MG/1
10 TABLET ORAL EVERY MORNING
Qty: 90 TABLET | Refills: 3 | Status: SHIPPED | OUTPATIENT
Start: 2020-06-13 | End: 2021-06-22

## 2020-06-15 NOTE — TELEPHONE ENCOUNTER
Spoke with patient, and she was transferring all medications to a different pharmacy, so request sent on accident. Will cancel Rx request.    Dionne Ramos RN   [facility]

## 2020-06-21 ENCOUNTER — MYC MEDICAL ADVICE (OUTPATIENT)
Dept: FAMILY MEDICINE | Facility: CLINIC | Age: 40
End: 2020-06-21

## 2020-06-22 ENCOUNTER — MYC MEDICAL ADVICE (OUTPATIENT)
Dept: FAMILY MEDICINE | Facility: CLINIC | Age: 40
End: 2020-06-22

## 2020-06-24 ENCOUNTER — MYC MEDICAL ADVICE (OUTPATIENT)
Dept: ADDICTION MEDICINE | Facility: CLINIC | Age: 40
End: 2020-06-24

## 2020-06-24 NOTE — PROGRESS NOTES
"Cathy Arroyo is a 39 year old female who is being evaluated via a billable telephone visit.      The patient has been notified of following:     \"This telephone visit will be conducted via a call between you and your physician/provider. We have found that certain health care needs can be provided without the need for a physical exam.  This service lets us provide the care you need with a short phone conversation.  If a prescription is necessary we can send it directly to your pharmacy.  If lab work is needed we can place an order for that and you can then stop by our lab to have the test done at a later time.    Telephone visits are billed at different rates depending on your insurance coverage. During this emergency period, for some insurers they may be billed the same as an in-person visit.  Please reach out to your insurance provider with any questions.    If during the course of the call the physician/provider feels a telephone visit is not appropriate, you will not be charged for this service.\"    Patient has given verbal consent for Telephone visit?  Yes    What phone number would you like to be contacted at? [phone]    How would you like to obtain your AVS? Erica    Subjective     Cathy Arroyo is a 39 year old female who presents via phone visit today for the following health issues:      (MA sent a ACT through [employer] and informed patient of how to complete)       HPI  RESPIRATORY SYMPTOMS      Duration: Cough started on Saturday, productive cough, seems to be worse at night, but patient said she has also had to use an inhaler at night to help her cough     Description  None, sneezing but believes this to be happening because she was gardening all day.     Severity: moderate    Accompanying signs and symptoms: None - no fever or chills, headache, widespread myalgia, nausea or rash;         Did have sore throat for one day, chest was achy and she attributes to muscle ache " weeding, mild diarrhea    History (predisposing factors):  none    Precipitating or alleviating factors: None    Therapies tried and outcome:  none    Ran out of ADHD medication due to pharmacy changes and had bout of depression.  Lab visit for thyroid scheduled on Friday - for weight loss.  No fever, chills, night sweats, bone aches, nausea or vomiting or diarrhea in general, or rashes.  Coughed so hard she threw up two nights ago.  Also had vomiting and diarrhea with withdraw from Adderall.  Had psych lower Adderall.  Attributes her weight loss to topiramate and adderall suppressing appetite.  Has had similar weight loss on these medications in the past.  Normal CXR 11/2019      I have reviewed and updated the patient's Past Medical History, Social History, Family History and Medication List    Reviewed and updated as needed this visit by Provider         Review of Systems   Constitutional, HEENT, cardiovascular, pulmonary, gi and gu systems are negative, except as otherwise noted.       Objective   Reported vitals:  There were no vitals taken for this visit.   healthy, alert and no distress  PSYCH: Alert and oriented times 3; coherent speech, normal   rate and volume, able to articulate logical thoughts, able   to abstract reason, no tangential thoughts, no hallucinations   or delusions  Her affect is normal and pleasant  RESP: No cough, no audible wheezing, able to talk in full sentences  Remainder of exam unable to be completed due to telephone visits    Diagnostic Test Results:  Labs reviewed in Epic  covid        Assessment/Plan:  (R05) Cough  (primary encounter diagnosis)  Comment:   Plan: Symptomatic COVID-19 Virus (Coronavirus) by PCR            (R63.4) Unintentional weight loss  Comment:   Plan: Cough is most prominent feature.  Reschedule lab visit for next week so covid will be returned.  Labs will be thyroid, cbc and cmp.  Consider CXR.  Patient would like to avoid radiation eval if possible.          Return in about 1 day (around 6/26/2020) for Lab Work.    End:  11:16 AM   Start:  10:45 AM     Phone call duration:  31 minutes    JANNET Liu CNP

## 2020-06-25 ENCOUNTER — MYC MEDICAL ADVICE (OUTPATIENT)
Dept: FAMILY MEDICINE | Facility: CLINIC | Age: 40
End: 2020-06-25

## 2020-06-25 ENCOUNTER — VIRTUAL VISIT (OUTPATIENT)
Dept: FAMILY MEDICINE | Facility: CLINIC | Age: 40
End: 2020-06-25
Payer: COMMERCIAL

## 2020-06-25 DIAGNOSIS — R05.9 COUGH: Primary | ICD-10-CM

## 2020-06-25 DIAGNOSIS — R63.4 UNINTENTIONAL WEIGHT LOSS: ICD-10-CM

## 2020-06-25 DIAGNOSIS — R05.9 COUGH: ICD-10-CM

## 2020-06-25 PROCEDURE — U0003 INFECTIOUS AGENT DETECTION BY NUCLEIC ACID (DNA OR RNA); SEVERE ACUTE RESPIRATORY SYNDROME CORONAVIRUS 2 (SARS-COV-2) (CORONAVIRUS DISEASE [COVID-19]), AMPLIFIED PROBE TECHNIQUE, MAKING USE OF HIGH THROUGHPUT TECHNOLOGIES AS DESCRIBED BY CMS-2020-01-R: HCPCS | Performed by: NURSE PRACTITIONER

## 2020-06-25 PROCEDURE — 99207 ZZC NO CHARGE LOS: CPT

## 2020-06-25 PROCEDURE — 99214 OFFICE O/P EST MOD 30 MIN: CPT | Mod: TEL | Performed by: NURSE PRACTITIONER

## 2020-06-25 NOTE — LETTER
June 27, 2020        Cathy Arroyo  [address] [place] [id number]    This letter provides a written record that you were tested for COVID-19 on 6/25/20.       Your result was negative. This means that we didn t find the virus that causes COVID-19 in your sample. A test may show negative when you do actually have the virus. This can happen when the virus is in the early stages of infection, before you feel illness symptoms.    If you have symptoms   Stay home and away from others (self-isolate) until you meet ALL of the guidelines below:    You ve had no fever--and no medicine that reduces fever--for 3 full days (72 hours). And      Your other symptoms have gotten better. For example, your cough or breathing has improved. And     At least 10 days have passed since your symptoms started.    During this time:    Stay home. Don t go to work, school or anywhere else.     Stay in your own room, including for meals. Use your own bathroom if you can.    Stay away from others in your home. No hugging, kissing or shaking hands. No visitors.    Clean  high touch  surfaces often (doorknobs, counters, handles, etc.). Use a household cleaning spray or wipes. You can find a full list on the EPA website at www.epa.gov/pesticide-registration/list-n-disinfectants-use-against-sars-cov-2.    Cover your mouth and nose with a mask, tissue or washcloth to avoid spreading germs.    Wash your hands and face often with soap and water.    Going back to work  Check with your employer for any guidelines to follow for going back to work.    Employers: This document serves as formal notice that your employee tested negative for COVID-19, as of the testing date shown above.

## 2020-06-26 LAB
SARS-COV-2 RNA SPEC QL NAA+PROBE: NOT DETECTED
SPECIMEN SOURCE: NORMAL

## 2020-06-26 ASSESSMENT — ASTHMA QUESTIONNAIRES: ACT_TOTALSCORE: 15

## 2020-06-30 DIAGNOSIS — E89.0 POST-SURGICAL HYPOTHYROIDISM: ICD-10-CM

## 2020-06-30 DIAGNOSIS — R63.4 UNINTENTIONAL WEIGHT LOSS: ICD-10-CM

## 2020-06-30 LAB
ALBUMIN SERPL-MCNC: 3.8 G/DL (ref 3.4–5)
ALP SERPL-CCNC: 84 U/L (ref 40–150)
ALT SERPL W P-5'-P-CCNC: 25 U/L (ref 0–50)
ANION GAP SERPL CALCULATED.3IONS-SCNC: 7 MMOL/L (ref 3–14)
AST SERPL W P-5'-P-CCNC: 8 U/L (ref 0–45)
BASOPHILS # BLD AUTO: 0 10E9/L (ref 0–0.2)
BASOPHILS NFR BLD AUTO: 0.3 %
BILIRUB SERPL-MCNC: 0.4 MG/DL (ref 0.2–1.3)
BUN SERPL-MCNC: 11 MG/DL (ref 7–30)
CALCIUM SERPL-MCNC: 8.9 MG/DL (ref 8.5–10.1)
CHLORIDE SERPL-SCNC: 109 MMOL/L (ref 94–109)
CO2 SERPL-SCNC: 22 MMOL/L (ref 20–32)
CREAT SERPL-MCNC: 0.76 MG/DL (ref 0.52–1.04)
DIFFERENTIAL METHOD BLD: NORMAL
EOSINOPHIL # BLD AUTO: 0.2 10E9/L (ref 0–0.7)
EOSINOPHIL NFR BLD AUTO: 2.1 %
ERYTHROCYTE [DISTWIDTH] IN BLOOD BY AUTOMATED COUNT: 14.2 % (ref 10–15)
GFR SERPL CREATININE-BSD FRML MDRD: >90 ML/MIN/{1.73_M2}
GLUCOSE SERPL-MCNC: 101 MG/DL (ref 70–99)
HCT VFR BLD AUTO: 42.6 % (ref 35–47)
HGB BLD-MCNC: 14.1 G/DL (ref 11.7–15.7)
LYMPHOCYTES # BLD AUTO: 3.5 10E9/L (ref 0.8–5.3)
LYMPHOCYTES NFR BLD AUTO: 31.9 %
MCH RBC QN AUTO: 29.1 PG (ref 26.5–33)
MCHC RBC AUTO-ENTMCNC: 33.1 G/DL (ref 31.5–36.5)
MCV RBC AUTO: 88 FL (ref 78–100)
MONOCYTES # BLD AUTO: 0.8 10E9/L (ref 0–1.3)
MONOCYTES NFR BLD AUTO: 7.5 %
NEUTROPHILS # BLD AUTO: 6.4 10E9/L (ref 1.6–8.3)
NEUTROPHILS NFR BLD AUTO: 58.2 %
PLATELET # BLD AUTO: 309 10E9/L (ref 150–450)
POTASSIUM SERPL-SCNC: 4.4 MMOL/L (ref 3.4–5.3)
PROT SERPL-MCNC: 7.7 G/DL (ref 6.8–8.8)
RBC # BLD AUTO: 4.84 10E12/L (ref 3.8–5.2)
SODIUM SERPL-SCNC: 138 MMOL/L (ref 133–144)
T4 FREE SERPL-MCNC: 1.17 NG/DL (ref 0.76–1.46)
TSH SERPL DL<=0.005 MIU/L-ACNC: 4.79 MU/L (ref 0.4–4)
WBC # BLD AUTO: 11 10E9/L (ref 4–11)

## 2020-06-30 PROCEDURE — 36415 COLL VENOUS BLD VENIPUNCTURE: CPT | Performed by: NURSE PRACTITIONER

## 2020-06-30 PROCEDURE — 80050 GENERAL HEALTH PANEL: CPT | Performed by: NURSE PRACTITIONER

## 2020-06-30 PROCEDURE — 84439 ASSAY OF FREE THYROXINE: CPT | Performed by: NURSE PRACTITIONER

## 2020-07-02 NOTE — RESULT ENCOUNTER NOTE
Cathy,    The TSH was mildly elevated and the T4 is normal.  Generally if this is the case and there are no symptoms, we will defer med changes.  Since you are having symptoms, we could consider a med change, however your symptoms have been more consistent with hyperthyroid instead of hypothyroid (which is what an elevated TSH corresponds to).  I am interested in knowing if your weight is stabilizing with the mental health med changes.  If you have any questions, please feel free to contact the clinic.    DEVEN Winston

## 2020-07-11 ENCOUNTER — HOSPITAL ENCOUNTER (EMERGENCY)
Facility: CLINIC | Age: 40
Discharge: HOME | End: 2020-07-11
Attending: EMERGENCY MEDICINE | Admitting: EMERGENCY MEDICINE
Payer: COMMERCIAL

## 2020-07-11 VITALS
SYSTOLIC BLOOD PRESSURE: 157 MMHG | HEIGHT: 68 IN | OXYGEN SATURATION: 97 % | WEIGHT: 223.5 LBS | TEMPERATURE: 98.3 F | RESPIRATION RATE: 18 BRPM | HEART RATE: 91 BPM | DIASTOLIC BLOOD PRESSURE: 102 MMHG | BODY MASS INDEX: 33.87 KG/M2

## 2020-07-11 DIAGNOSIS — I10 HYPERTENSION, UNSPECIFIED TYPE: ICD-10-CM

## 2020-07-11 LAB
ALBUMIN SERPL-MCNC: 4 G/DL (ref 3.4–5)
ALP SERPL-CCNC: 83 U/L (ref 40–150)
ALT SERPL W P-5'-P-CCNC: 26 U/L (ref 0–50)
ANION GAP SERPL CALCULATED.3IONS-SCNC: 2 MMOL/L (ref 3–14)
AST SERPL W P-5'-P-CCNC: 8 U/L (ref 0–45)
BASOPHILS # BLD AUTO: 0 10E9/L (ref 0–0.2)
BASOPHILS NFR BLD AUTO: 0.4 %
BILIRUB SERPL-MCNC: 0.4 MG/DL (ref 0.2–1.3)
BUN SERPL-MCNC: 14 MG/DL (ref 7–30)
CALCIUM SERPL-MCNC: 9 MG/DL (ref 8.5–10.1)
CHLORIDE SERPL-SCNC: 112 MMOL/L (ref 94–109)
CO2 SERPL-SCNC: 24 MMOL/L (ref 20–32)
CREAT SERPL-MCNC: 0.83 MG/DL (ref 0.52–1.04)
DIFFERENTIAL METHOD BLD: NORMAL
EOSINOPHIL # BLD AUTO: 0.2 10E9/L (ref 0–0.7)
EOSINOPHIL NFR BLD AUTO: 1.7 %
ERYTHROCYTE [DISTWIDTH] IN BLOOD BY AUTOMATED COUNT: 13.6 % (ref 10–15)
GFR SERPL CREATININE-BSD FRML MDRD: 89 ML/MIN/{1.73_M2}
GLUCOSE SERPL-MCNC: 94 MG/DL (ref 70–99)
HCT VFR BLD AUTO: 43.5 % (ref 35–47)
HGB BLD-MCNC: 14.1 G/DL (ref 11.7–15.7)
IMM GRANULOCYTES # BLD: 0 10E9/L (ref 0–0.4)
IMM GRANULOCYTES NFR BLD: 0.3 %
INTERPRETATION ECG - MUSE: NORMAL
LYMPHOCYTES # BLD AUTO: 3.2 10E9/L (ref 0.8–5.3)
LYMPHOCYTES NFR BLD AUTO: 33.3 %
MCH RBC QN AUTO: 28.9 PG (ref 26.5–33)
MCHC RBC AUTO-ENTMCNC: 32.4 G/DL (ref 31.5–36.5)
MCV RBC AUTO: 89 FL (ref 78–100)
MONOCYTES # BLD AUTO: 0.5 10E9/L (ref 0–1.3)
MONOCYTES NFR BLD AUTO: 5.5 %
NEUTROPHILS # BLD AUTO: 5.7 10E9/L (ref 1.6–8.3)
NEUTROPHILS NFR BLD AUTO: 58.8 %
NRBC # BLD AUTO: 0 10*3/UL
NRBC BLD AUTO-RTO: 0 /100
PLATELET # BLD AUTO: 354 10E9/L (ref 150–450)
POTASSIUM SERPL-SCNC: 4 MMOL/L (ref 3.4–5.3)
PROT SERPL-MCNC: 7.7 G/DL (ref 6.8–8.8)
RBC # BLD AUTO: 4.88 10E12/L (ref 3.8–5.2)
SODIUM SERPL-SCNC: 139 MMOL/L (ref 133–144)
TROPONIN I SERPL-MCNC: <0.015 UG/L (ref 0–0.04)
TSH SERPL DL<=0.005 MIU/L-ACNC: 3.67 MU/L (ref 0.4–4)
WBC # BLD AUTO: 9.7 10E9/L (ref 4–11)

## 2020-07-11 PROCEDURE — 80053 COMPREHEN METABOLIC PANEL: CPT | Performed by: EMERGENCY MEDICINE

## 2020-07-11 PROCEDURE — 99284 EMERGENCY DEPT VISIT MOD MDM: CPT | Mod: 25 | Performed by: EMERGENCY MEDICINE

## 2020-07-11 PROCEDURE — 85025 COMPLETE CBC W/AUTO DIFF WBC: CPT | Performed by: EMERGENCY MEDICINE

## 2020-07-11 PROCEDURE — 93010 ELECTROCARDIOGRAM REPORT: CPT | Mod: Z6 | Performed by: EMERGENCY MEDICINE

## 2020-07-11 PROCEDURE — 99284 EMERGENCY DEPT VISIT MOD MDM: CPT | Performed by: EMERGENCY MEDICINE

## 2020-07-11 PROCEDURE — 84484 ASSAY OF TROPONIN QUANT: CPT | Performed by: EMERGENCY MEDICINE

## 2020-07-11 PROCEDURE — 93005 ELECTROCARDIOGRAM TRACING: CPT | Performed by: EMERGENCY MEDICINE

## 2020-07-11 PROCEDURE — 84443 ASSAY THYROID STIM HORMONE: CPT | Performed by: EMERGENCY MEDICINE

## 2020-07-11 ASSESSMENT — MIFFLIN-ST. JEOR: SCORE: 1747.29

## 2020-07-11 NOTE — ED PROVIDER NOTES
[place] EMERGENCY DEPARTMENT ([facility])  7/11/20  History     Chief Complaint   Patient presents with     Hypertension     The history is provided by the patient and medical records.     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  Patient states she initially began to feel off on 7/2/2020.  She states she woke up with a blood blister on her eye, which subsequently resolved.  Since 7/2 she notices she has been increasingly dizzy when she stands.  She states that she does see stars, and has had multiple presyncopal episodes where she has had to catch herself.  She states she is increasingly tired after getting adequate sleep.  She states that she checked her blood pressure today and had multiple readings between 150 and 170 systolic.  She denies any history of hypertension in the past, and states that her blood pressure is usually run around 130/70.  Patient states that she did have preeclampsia during her last pregnancy, but otherwise has normal BPs.  Patient states she does take 25 mg of Adderall, and wants to decrease this to 15 mg.  Patient is also on levothyroxine.    I have reviewed the Medications, Allergies, Past Medical and Surgical History, and Social History in the [employer] system.  PAST MEDICAL HISTORY:   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     Post partum depression      Post-surgical hypothyroidism 05/2017     Uncomplicated asthma        PAST SURGICAL HISTORY:   Past Surgical History:   Procedure  Laterality Date     BLOOD PATCH N/A 10/11/2016    Procedure: EPIDURAL BLOOD PATCH;  Surgeon: GENERIC ANESTHESIA PROVIDER;  Location: [facility] OR      SECTION, TUBAL LIGATION, COMBINED N/A 2019    Procedure:  SECTION, WITH TUBAL LIGATION;  Surgeon: Kathy Rutledge MD;  Location: [facility] L+D     CRANIOTOMY, EXCISE TUMOR COMPLEX, COMBINED  2014    Procedure: COMBINED CRANIOTOMY, EXCISE TUMOR COMPLEX;  Supracerabellar  Infratentorial Approach for Resection of Tumor ;  Surgeon: Kate Mckeon MD;  Location: [facility] OR     THYROIDECTOMY N/A 5/3/2017    Procedure: THYROIDECTOMY;  Total Thyroidectomy;  Surgeon: Pamela Villasenor MD;  Location:  OR       Past medical history, past surgical history, medications, and allergies were reviewed with the patient. Additional pertinent items: None    FAMILY HISTORY:   Family History   Problem Relation Age of Onset     Thyroid Disease Paternal Aunt      Asthma Father      Mental Illness Father      Obesity Father      Asthma Maternal Grandmother      Osteoporosis Maternal Grandmother      Glaucoma Maternal Grandmother      Hypertension Maternal Grandmother      Macular Degeneration Maternal Grandmother      Diabetes Paternal Grandfather      Other Cancer Mother      Genitourinary Problems Mother      Bipolar Disorder Mother         unipolar depression     Depression Mother      Thyroid Disease Mother         benign tumor     Skin Cancer Mother      Cancer Mother      Bipolar Disorder Brother         unipolar depression     Asthma Brother        SOCIAL HISTORY:   Social History     Tobacco Use     Smoking status: Current Every Day Smoker     Packs/day: 0.50     Years: 12.00     Pack years: 6.00     Types: Cigarettes     Start date: 2004     Last attempt to quit: 2/15/2018     Years since quittin.4     Smokeless tobacco: Never Used   Substance Use Topics     Alcohol use: No     Alcohol/week: 0.0 standard drinks     Comment: quit 16 days  ago,     Social history was reviewed with the patient. Additional pertinent items: None      Patient's Medications   New Prescriptions    No medications on file   Previous Medications    ALBUTEROL (PROAIR HFA/PROVENTIL HFA/VENTOLIN HFA) 108 (90 BASE) MCG/ACT INHALER    Inhale 2 puffs into the lungs every 4 hours as needed for shortness of breath / dyspnea or wheezing    AMPHETAMINE-DEXTROAMPHETAMINE (ADDERALL XR) 25 MG 24 HR CAPSULE    TK 1 C PO ONCE D IN THE MORNING    ARIPIPRAZOLE (ABILIFY) 10 MG TABLET    Take 10 mg by mouth daily    BUDESONIDE-FORMOTEROL (SYMBICORT) 160-4.5 MCG/ACT INHALER    Inhale 2 puffs into the lungs 2 times daily    CETIRIZINE (ZYRTEC) 5 MG TABLET    Take 2 tablets (10 mg) by mouth every morning When not using the Allegra    FEXOFENADINE (ALLEGRA) 60 MG TABLET    Take 3 tablets (180 mg) by mouth every morning    FLUTICASONE (FLONASE) 50 MCG/ACT NASAL SPRAY    Spray 1 spray into both nostrils daily    IPRATROPIUM (ATROVENT HFA) 17 MCG/ACT INHALER    Inhale 2 puffs into the lungs every 6 hours    IPRATROPIUM - ALBUTEROL 0.5 MG/2.5 MG/3 ML (DUONEB) 0.5-2.5 (3) MG/3ML NEB SOLUTION    Take 1 vial (3 mLs) by nebulization every 6 hours as needed for shortness of breath / dyspnea or wheezing    LEVOTHYROXINE (SYNTHROID/LEVOTHROID) 150 MCG TABLET    Take one tablet by mouth on M, W and F    LEVOTHYROXINE (SYNTHROID/LEVOTHROID) 175 MCG TABLET    Take one tablet by mouth daily on T, Th, Sat and Sun    MEDICAL CANNABIS (PATIENT'S OWN SUPPLY)    Take 1 Dose by mouth See Admin Instructions (The purpose of this order is to document that the patient reports taking medical cannabis.  This is not a prescription, and is not used to certify that the patient has a qualifying medical condition.)    [place]anger[place] Oral Suspension Unflavored 1-5 ml up to two times daily.  Total THC = 240 mg, Total CBD Trace    MEDICAL CANNABIS (PATIENT'S OWN SUPPLY)    Take 1 Dose by mouth See Admin Instructions (The purpose of this  "order is to document that the patient reports taking medical cannabis.  This is not a prescription, and is not used to certify that the patient has a qualifying medical condition.)    [place] oral suspension: take 1-3 ml by mouth two times daily  Total CBD 1200 mg. Total THC 60 mg    MONTELUKAST (SINGULAIR) 10 MG TABLET    Take 1 tablet (10 mg) by mouth every morning    NICOTINE (NICODERM CQ) 14 MG/24HR 24 HR PATCH    Place 1 patch onto the skin every 24 hours    NICOTINE (NICODERM CQ) 21 MG/24HR 24 HR PATCH    Place 1 patch onto the skin every 24 hours    NICOTINE (NICODERM CQ) 7 MG/24HR 24 HR PATCH    Place 1 patch onto the skin every 24 hours    NICOTINE (NICORETTE) 4 MG LOZENGE    Place 1 lozenge (4 mg) inside cheek as needed for smoking cessation    TOPIRAMATE (TOPAMAX) 100 MG TABLET    Take 100 mg by mouth daily Every evening   Modified Medications    No medications on file   Discontinued Medications    TOPIRAMATE (TOPAMAX) 25 MG TABLET    Take 25 mg by mouth daily    TOPIRAMATE (TOPAMAX) 50 MG TABLET    Take 100 mg by mouth daily          Allergies   Allergen Reactions     Adacel [Daptacel] Swelling     Codeine Sulfate Nausea and Vomiting     Nicoderm [Nicotine]      Patch and Gum, pt reported allergic reaction 6/24     Tetanus Toxoid      Pt states she had an infection at injection site.      Vicodin [Hydrocodone-Acetaminophen] Nausea and Vomiting     Methimazole Rash        Review of Systems  ROS: 14 point ROS neg other than the symptoms noted above in the HPI.    Physical Exam   BP: (!) 157/102  Pulse: 106  Temp: 98.3  F (36.8  C)  Resp: 18  Height: 172.7 cm (5' 8\")  Weight: 101.4 kg (223 lb 8 oz)(scale)  SpO2: 97 %      Physical Exam  Constitutional:       General: She is not in acute distress.     Appearance: She is well-developed. She is not diaphoretic.   HENT:      Head: Normocephalic and atraumatic.      Mouth/Throat:      Pharynx: No oropharyngeal exudate.   Eyes:      General: No scleral icterus.   "      Right eye: No discharge.         Left eye: No discharge.      Pupils: Pupils are equal, round, and reactive to light.   Neck:      Musculoskeletal: Normal range of motion and neck supple.   Cardiovascular:      Rate and Rhythm: Normal rate and regular rhythm.      Heart sounds: Normal heart sounds. No murmur. No friction rub. No gallop.    Pulmonary:      Effort: Pulmonary effort is normal. No respiratory distress.      Breath sounds: Normal breath sounds. No wheezing.   Chest:      Chest wall: No tenderness.   Abdominal:      General: Bowel sounds are normal. There is no distension.      Palpations: Abdomen is soft.      Tenderness: There is no abdominal tenderness.   Musculoskeletal: Normal range of motion.         General: No tenderness or deformity.   Skin:     General: Skin is warm and dry.      Coloration: Skin is not pale.      Findings: No erythema or rash.   Neurological:      Mental Status: She is alert and oriented to person, place, and time.      Cranial Nerves: No cranial nerve deficit.         ED Course   4:09 PM  The patient was seen and examined by Bipin Sykes DO in Room ED26.        Procedures                EKG Interpretation:      Interpreted by Bipin Sykes DO  Time reviewed:1641   Symptoms at time of EKG: None   Rhythm: Normal sinus   Rate: 76  Axis: Normal  Ectopy: None  Conduction: Normal  ST Segments/ T Waves: No acute ischemic changes  Q Waves: None  Comparison to prior: No old EKG available    Clinical Impression: normal EKG                    No results found for this or any previous visit (from the past 24 hour(s)).  Medications - No data to display          Assessments & Plan (with Medical Decision Making)   This is a 39-year-old female who presents with hypertension.  Patient states she has been feeling off for the past several days and checked her blood pressure today with multiple elevated readings.  She has no other symptoms associated with this.  Exam demonstrates no  acute abnormalities.  ECG shows no acute abnormalities.  Lab work shows no acute abnormalities. There is no evidence of end organ damage. Re-check BP was 117/77. I discussed all results with patient. Recommended follow-up with PCP and if she continues to have elevated blood pressures that she may need to start an antihypertensive in the future. Will discharge home with return precautions. Discussed reasons to return to the emergency department.  Patient understands and agrees with this plan.    I have reviewed the nursing notes.    I have reviewed the findings, diagnosis, plan and need for follow up with the patient.    New Prescriptions    No medications on file       Final diagnoses:   None   IGeraldo, am serving as a trained medical scribe to document services personally performed by Bipin Sykes DO, based on the provider's statements to me.      Bipin BARAHONA DO, was physically present and have reviewed and verified the accuracy of this note documented by Geraldo Franco.    7/11/2020   [facility], [place], EMERGENCY DEPARTMENT     Bipin Sykes DO  07/11/20 1338

## 2020-07-11 NOTE — ED TRIAGE NOTES
Patient presents ambulatory to triage with c/o hypertension. Patient reports lightheadedness/dizziness and headache, which prompted her to check her BP. BP was in the 150s at home and friend who is RN recommended patient present to ED. Patient reports hx of HTN during pregnancy only.

## 2020-07-11 NOTE — ED AVS SNAPSHOT
[facility], [place], Emergency Department  [address] [place] [id number]  Phone:  [phone]                                    Cathy Arroyo   MRN: [id number]    Department:  [facility], Emergency Department   Date of Visit:  7/11/2020           After Visit Summary Signature Page    I have received my discharge instructions, and my questions have been answered. I have discussed any challenges I see with this plan with the nurse or doctor.    ..........................................................................................................................................  Patient/Patient Representative Signature      ..........................................................................................................................................  Patient Representative Print Name and Relationship to Patient    ..................................................               ................................................  Date                                   Time    ..........................................................................................................................................  Reviewed by Signature/Title    ...................................................              ..............................................  Date                                               Time          22EPIC Rev 08/18

## 2020-07-15 ENCOUNTER — TELEPHONE (OUTPATIENT)
Dept: FAMILY MEDICINE | Facility: CLINIC | Age: 40
End: 2020-07-15

## 2020-07-15 ENCOUNTER — MYC MEDICAL ADVICE (OUTPATIENT)
Dept: FAMILY MEDICINE | Facility: CLINIC | Age: 40
End: 2020-07-15

## 2020-07-15 NOTE — TELEPHONE ENCOUNTER
Zamzam,    Please see message below and my chart message from today. Patient also attached BP readings from today. Do you want sooner appt, or make some changes to medications until appt?    Please advise    Thanks  Dionne Ramos RN   [facility]

## 2020-07-15 NOTE — TELEPHONE ENCOUNTER
BP Readings from Last 6 Encounters:   07/11/20 (!) 157/102   03/05/20 125/85   02/11/20 125/65   01/20/20 129/73   12/19/19 124/64   12/10/19 139/81     It looks like BP had dropped to 110's/70's without any medication intervention by the time she went home from the ED.  I would like the patient to check blood pressure at home and bring those results to her appt on 7/23.  If she needs, we can write an order for a cuff.  If she sees >150/100 consistently at home, I would visit with her sooner.  I know she knows this, but high blood pressure is another really good reason to stop smoking.  If there is any type of aid that I can write for her, I am happy to do it!  ARMEN Dasilva, DEVEN

## 2020-07-15 NOTE — TELEPHONE ENCOUNTER
Sent patient a my chart message in previous encounter    Dionne Ramos, BOYD   [facility]

## 2020-07-15 NOTE — TELEPHONE ENCOUNTER
Reason for call:  Other    Patient called regarding (reason for call): call back    Additional comments: pt want to know if she should come in to do a check up.  Pt just have a visit to ER due to high BP    Pt schedule 7/23 at 830am w/ Dasilva (hold in case she need to come in)    Phone number to reach patient:  Home number on file [phone] (home)    Best Time:  anything    Can we leave a detailed message on this number?  YES    Travel screening: Not Applicable

## 2020-07-22 NOTE — PROGRESS NOTES
"Subjective     Cathy Arroyo is a 39 year old female who presents to clinic today for the following health issues:    HPI       ED/UC Followup:    Facility:  [facility]  Date of visit: 07/11/2020  Reason for visit: Hypertension  Current Status: Improved     Blood pressure history  Please note pt decreased Aderrall XR from 25mg to 15mg two days ago. She thinks this will help get BP back on track.       Do you check your blood pressure regularly outside of the clinic? Yes     Are you following any particular diet? Yes Meat sometimes, mainly vegetarian, one big meal at night      Do you have a family history of hypertension?   Yes Maternal Grandmother      Asthma Follow-up and ACT score - 25  Under control  Smoking less - down to 8 per day     BP Readings from Last 6 Encounters:   07/23/20 120/80   07/11/20 (!) 157/102   03/05/20 125/85   02/11/20 125/65   01/20/20 129/73   12/19/19 124/64     Wt Readings from Last 5 Encounters:   07/11/20 101.4 kg (223 lb 8 oz)   02/11/20 117.9 kg (260 lb)   01/20/20 117.9 kg (260 lb)   12/19/19 118.3 kg (260 lb 12.8 oz)   11/06/19 120.4 kg (265 lb 8 oz)       Reviewed and updated as needed this visit by Provider       Review of Systems   Constitutional, HEENT, cardiovascular, pulmonary, gi and gu systems are negative, except as otherwise noted.      Objective    /80   Pulse 87   Temp 97.6  F (36.4  C) (Temporal)   Ht 1.727 m (5' 8\")   SpO2 97%   BMI 33.98 kg/m    Body mass index is 33.98 kg/m .  Physical Exam   GENERAL: healthy, alert and no distress  EYES: Eyes grossly normal to inspection, PERRL and conjunctivae and sclerae normal  HENT: ear canals and TM's normal, nose and mouth without ulcers or lesions  NECK: no adenopathy, no asymmetry, masses, or scars and thyroid normal to palpation  RESP: lungs clear to auscultation - no rales, rhonchi or wheezes  CV: regular rate and rhythm, normal S1 S2, no S3 or S4, no murmur, click or rub, no peripheral edema " "and peripheral pulses strong  PSYCH: mentation appears normal, tearful and anxious    Diagnostic Test Results:  Labs reviewed in Epic  none         Assessment & Plan     (R03.0) Elevated BP without diagnosis of hypertension  (primary encounter diagnosis)  Comment:   Plan: 24 Hour Blood Pressure Monitor - Adult            (Z80.3) Family history of malignant neoplasm of breast  Comment:   Plan: CANCER RISK MGMT/CANCER GENETIC COUNSELING         REFERRAL            (J45.40) Moderate persistent asthma without complication  Comment:   Plan: Stable with ACT 25!     BMI:   Estimated body mass index is 33.98 kg/m  as calculated from the following:    Height as of this encounter: 1.727 m (5' 8\").    Weight as of 7/11/20: 101.4 kg (223 lb 8 oz).   Weight management plan: Patient referred to endocrine and/or weight management specialty        Patient Instructions   Continue to monitor BP at home  If not down to 120/80, then schedule the 24 hour blood pressure cuff by calling [phone]    Increase therapy twice a day  Set a budget so you have a clear idea for yourself about a timeline to improvement in stress  Feel confident to take the kids out to a park    Schedule with cancer risk       Return in about 6 months (around 1/23/2021) for mental health check, BP Recheck, thyroidd.    JANNET Liu [facility]  "

## 2020-07-23 ENCOUNTER — OFFICE VISIT (OUTPATIENT)
Dept: FAMILY MEDICINE | Facility: CLINIC | Age: 40
End: 2020-07-23
Payer: COMMERCIAL

## 2020-07-23 VITALS
HEART RATE: 87 BPM | BODY MASS INDEX: 33.98 KG/M2 | OXYGEN SATURATION: 97 % | TEMPERATURE: 97.6 F | HEIGHT: 68 IN | DIASTOLIC BLOOD PRESSURE: 80 MMHG | SYSTOLIC BLOOD PRESSURE: 120 MMHG

## 2020-07-23 DIAGNOSIS — Z80.3 FAMILY HISTORY OF MALIGNANT NEOPLASM OF BREAST: ICD-10-CM

## 2020-07-23 DIAGNOSIS — R03.0 ELEVATED BP WITHOUT DIAGNOSIS OF HYPERTENSION: Primary | ICD-10-CM

## 2020-07-23 DIAGNOSIS — J45.40 MODERATE PERSISTENT ASTHMA WITHOUT COMPLICATION: ICD-10-CM

## 2020-07-23 PROCEDURE — 99214 OFFICE O/P EST MOD 30 MIN: CPT | Performed by: NURSE PRACTITIONER

## 2020-07-23 ASSESSMENT — PAIN SCALES - GENERAL: PAINLEVEL: NO PAIN (0)

## 2020-07-23 NOTE — TELEPHONE ENCOUNTER
Oncology/Surgical Oncology Referral Request:     Specialty Requested: Medical Oncology     Referring Provider:  Sherrill Dasilva APRN, CNP    Referring Clinic/Organization: [facility]    Records location: [facility]     Requested Provider (if specified): Not Specified

## 2020-07-23 NOTE — PATIENT INSTRUCTIONS
Continue to monitor BP at home  If not down to 120/80, then schedule the 24 hour blood pressure cuff by calling [phone]    Increase therapy twice a day  Set a budget so you have a clear idea for yourself about a timeline to improvement in stress  Feel confident to take the kids out to a park    Schedule with cancer risk

## 2020-07-24 ASSESSMENT — ASTHMA QUESTIONNAIRES: ACT_TOTALSCORE: 25

## 2020-08-18 ENCOUNTER — VIRTUAL VISIT (OUTPATIENT)
Dept: ONCOLOGY | Facility: CLINIC | Age: 40
End: 2020-08-18
Attending: NURSE PRACTITIONER
Payer: COMMERCIAL

## 2020-08-18 ENCOUNTER — PRE VISIT (OUTPATIENT)
Dept: ONCOLOGY | Facility: CLINIC | Age: 40
End: 2020-08-18

## 2020-08-18 DIAGNOSIS — Z80.52 FAMILY HISTORY OF BLADDER CANCER: ICD-10-CM

## 2020-08-18 DIAGNOSIS — Z80.3 FAMILY HISTORY OF MALIGNANT NEOPLASM OF BREAST: ICD-10-CM

## 2020-08-18 DIAGNOSIS — Z80.42 FAMILY HISTORY OF PROSTATE CANCER: ICD-10-CM

## 2020-08-18 DIAGNOSIS — Z80.0 FAMILY HISTORY OF COLON CANCER: ICD-10-CM

## 2020-08-18 DIAGNOSIS — J45.41 MODERATE PERSISTENT ASTHMA WITH ACUTE EXACERBATION: ICD-10-CM

## 2020-08-18 DIAGNOSIS — D44.5 PINEOCYTOMA (H): Primary | ICD-10-CM

## 2020-08-18 PROCEDURE — 96040 ZZH GENETIC COUNSELING, EACH 30 MINUTES: CPT | Mod: 95,ZF | Performed by: GENETIC COUNSELOR, MS

## 2020-08-18 NOTE — LETTER
Cancer Risk Management  Program Locations    [facility]  [facility]  [facility]  [facility]  [facility]  Mailing Address  Cancer Risk Management Program  [facility]  [address] [place] [address]    New patient appointments  [phone]  August 20, 2020    Cathy Arroyo  [address]  [facility] [address]      Dear Cathy,      It was a pleasure meeting with you on 8/18/2020. Here is a copy of the progress note from your recent genetic counseling visit to the Cancer Risk Management Program. If you have any additional questions, please feel free to call.    Referring Provider: JANNET Hendricks CNP    Presenting Information:   I met with Cathy Arroyo today for genetic counseling as part of the Cancer Risk Management Program to discuss her family history of cancer.  She is here today to review this history, cancer screening recommendations, and available genetic testing options.    Personal History:  Cathy is a 39 year old female. 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  Her most recent breast imaging included a mammogram and follow up targeted right breast ultrasound (11/11/2019).  She reports no history of breast biopsy.  She has her ovaries and uterus in place (surgical pathology for bilateral fallopian tubes available from 5/6/2019)    Family History: (Please see scanned pedigree for detailed family history information)    Her mother passed away at age 64 and was diagnosed with a salivary gland cancer at age 62.  She had a past history of tobacco use from her teens-early 40's    One maternal aunt was diagnosed with breast cancer, approximately in her late 30's (37?), and melanoma in situ at age 63.  She completed BRCA1/2 genetic testing which was negative, however likely was  completed 5-10+ years ago per Cathy.  Additional details were not available at this time.      Her maternal grandfather is 92 and was diagnosed with prostate cancer in his 80's.  He also reportedly has a history of bladder and colon cancer.     She reports a significant family history of cancer in her maternal great-grandmother's 9 siblings (through her grandfather), however details regarding their diagnoses were not available.    One paternal aunt was recently diagnosed with bilateral breast cancer at age 58.  Cathy reports that she had negative genetic testing.    Her paternal grandfather's sister had a history of breast cancer (post menopause).      Her maternal ethnicity is English/Moroccan. Her paternal ethnicity is [place].  There is no known Ashkenazi Restoration ancestry on either side of her family.    Discussion:    Cathy's family history of early onset breast cancer  is suggestive of a possible hereditary cancer syndrome.    We reviewed the features of sporadic, familial, and hereditary cancers. In looking at Cathy's family history, it is possible that a cancer susceptibility gene is present as several relatives across multiple generations have been diagnosed with cancer, including her maternal aunts early onset breast cancer diagnosis.  Based on her family history, Cathy meets current National Comprehensive Cancer Network (NCCN) criteria for genetic testing of high-penetrance breast and/or ovarian cancer susceptibility genes (often including BRCA1/2, TP53, PALB2, PTEN, CDH1, among others).      We discussed the natural history and genetics of Hereditary Breast and Ovarian Cancer syndrome, caused by mutations in the BRCA1/2 genes. We discussed that there are additional genes that could cause increased risk for breast cancer. As many of these genes present with overlapping features in a family and accurate cancer risk cannot always be established based upon the pedigree analysis alone, it would be reasonable  for Cathy to consider panel genetic testing to analyze multiple genes at once.    A detailed handout regarding hereditary breast cancer and the information we discussed was provided to Cathy at the end of our appointment today and can be found in the after visit summary. Topics included: inheritance pattern, cancer risks, cancer screening recommendations, and also risks, benefits and limitations of testing.    We discussed that genetic testing is typically most informative when it is first performed on a family member with a personal history of cancer.  Genetic counseling is recommended for her maternal aunt, who was diagnosed with breast cancer in her 30's, to revisit available genetic testing options.      We reviewed available genetic testing including guidelines-based breast cancer susceptibility genes, as well as expanded panel options (including genes related to breast, and other cancers).  Cathy expressed interest in learning as much information as possible, and was interested in expanded panel testing.  She elected to start with a prior authorization to determine cost and insurance coverage of the CancerNext-Expanded panel offered through [employer].     The CancerNext-Expanded panel included the following 77 genes: AIP, ALK, APC, HYUN, AXIN2, BAP1, BARD1, BLM, BMPR1A, BRCA1, BRCA2, BRIP1, CDC73, CDH1, CDK4, CDKN1B, CDKN2A, CHEK2, CTNNA1, DICER1, EGFR, EGLN1, EPCAM, FANCC, FH, FLCN, GALNT12, GREM1, HOXB13, KIF1B, KIT, LTZR1, MAX, MEN1, MET, MITF, MLH1, MSH2, MSH3, MSH6, MUTYH, NBN, NF1, NF2, NTHL1, PALB2, PDGFRA, PHOX2B, PMS2, POLD1, POLE, POT1, XPQXE9N, PTCH1, PTEN, RAD51C, RAD51D, RB1, RECQL, RET, SDHA, SDHAF2, SDHB, SDHC, SDHD, SMAD4, SMARCA4, SMARCB1, SMARCE1, STK11, SUFU, [id number], TP53, TSC1, TSC2, VHL, XRCC2.  These genes are related to increased risk for brain, breast, colon, ovarian, pancreatic, prostate, renal, uterine, and many other cancers.      We reviewed screening recommendations based  on her family history of cancer.  Based on her personal and family history, Cathy has a 23.2% lifetime risk of developing breast cancer based on the JAYSHREE(v8) model. As such, Cathy meets current National Comprehensive Cancer Network (NCCN) guidelines for high risk breast screening. This includes annual breast MRI in addition to annual mammogram.  In addition, Cathy should be receiving clinical breast exams by her physician. We discussed that Cathy could participate in our Cancer Risk Management Program in which our clinical nurse specialist provides an individual screening plan and assists with medical management. A referral was made to see JANNET Burleson, for this service.    We reviewed that the results from genetic testing may determine additional cancer screening, and options for possible risk reducing surgeries which Cathy may consider.  Many of the genes included in the CancerNext-Expanded have published management guidelines.  These recommendations will be discussed in detail when genetic testing is completed.    Plan:  1) Today Cathy elected to pursue a benefits analysis through [employer] to better understand her out of pocket cost for the CancerNext-Expanded gene panel.  2) Cathy will be contacted with the expected out of pocket cost when available to revisit testing options.  3) Screening recommendations for Cathy and her family were reviewed today.  These will be revisited should genetic testing be pursued.     Melissa Lange MS, [facility] [place]  Licensed, Certified Genetic Counselor  [facility]  Phone: [phone]

## 2020-08-18 NOTE — PROGRESS NOTES
"8/18/2020    Cathy Arroyo is a 39 year old female who is being evaluated via a billable video visit.      The patient has been notified of following:     \"This video visit will be conducted via a call between you and your physician/provider. We have found that certain health care needs can be provided without the need for an in-person physical exam.  This service lets us provide the care you need with a video conversation.  If a prescription is necessary we can send it directly to your pharmacy.  If lab work is needed we can place an order for that and you can then stop by our lab to have the test done at a later time.    Video visits are billed at different rates depending on your insurance coverage.  Please reach out to your insurance provider with any questions.    If during the course of the call the physician/provider feels a video visit is not appropriate, you will not be charged for this service.\"    Patient has given verbal consent for Video visit? Yes    Video-Visit Details    Type of service:  Video Visit    Video Start Time: 9:00AM  Video End Time: 10:00AM    Originating Location (pt. Location): Home    Distant Location (provider location):  [facility]    Platform used for Video Visit: Sukhdeep    Referring Provider: JANNET Hendricks CNP    Presenting Information:   I met with Cathy Arroyo today for genetic counseling as part of the Cancer Risk Management Program to discuss her family history of cancer.  She is here today to review this history, cancer screening recommendations, and available genetic testing options.    Personal History:  Cathy is a 39 year old female. 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  Her most recent breast imaging included a mammogram and follow up targeted right breast ultrasound (11/11/2019).  She reports no history of breast biopsy.  She has her ovaries and uterus " in place (surgical pathology for bilateral fallopian tubes available from 5/6/2019)    Family History: (Please see scanned pedigree for detailed family history information)    Her mother passed away at age 64 and was diagnosed with a salivary gland cancer at age 62.  She had a past history of tobacco use from her teens-early 40's    One maternal aunt was diagnosed with breast cancer, approximately in her late 30's (37?), and melanoma in situ at age 63.  She completed BRCA1/2 genetic testing which was negative, however likely was completed 5-10+ years ago per Cathy.  Additional details were not available at this time.      Her maternal grandfather is 92 and was diagnosed with prostate cancer in his 80's.  He also reportedly has a history of bladder and colon cancer.     She reports a significant family history of cancer in her maternal great-grandmother's 9 siblings (through her grandfather), however details regarding their diagnoses were not available.    One paternal aunt was recently diagnosed with bilateral breast cancer at age 58.  Cathy reports that she had negative genetic testing.    Her paternal grandfather's sister had a history of breast cancer (post menopause).      Her maternal ethnicity is English/Gambian. Her paternal ethnicity is [place]ian.  There is no known Ashkenazi Yazdanism ancestry on either side of her family.    Discussion:    Cathy's family history of early onset breast cancer  is suggestive of a possible hereditary cancer syndrome.    We reviewed the features of sporadic, familial, and hereditary cancers. In looking at Cathy's family history, it is possible that a cancer susceptibility gene is present as several relatives across multiple generations have been diagnosed with cancer, including her maternal aunts early onset breast cancer diagnosis.  Based on her family history, Cathy meets current National Comprehensive Cancer Network (NCCN) criteria for genetic testing of high-penetrance breast  and/or ovarian cancer susceptibility genes (often including BRCA1/2, TP53, PALB2, PTEN, CDH1, among others).      We discussed the natural history and genetics of Hereditary Breast and Ovarian Cancer syndrome, caused by mutations in the BRCA1/2 genes. We discussed that there are additional genes that could cause increased risk for breast cancer. As many of these genes present with overlapping features in a family and accurate cancer risk cannot always be established based upon the pedigree analysis alone, it would be reasonable for Cathy to consider panel genetic testing to analyze multiple genes at once.    A detailed handout regarding hereditary breast cancer and the information we discussed was provided to Cathy at the end of our appointment today and can be found in the after visit summary. Topics included: inheritance pattern, cancer risks, cancer screening recommendations, and also risks, benefits and limitations of testing.    We discussed that genetic testing is typically most informative when it is first performed on a family member with a personal history of cancer.  Genetic counseling is recommended for her maternal aunt, who was diagnosed with breast cancer in her 30's, to revisit available genetic testing options.      We reviewed available genetic testing including guidelines-based breast cancer susceptibility genes, as well as expanded panel options (including genes related to breast, and other cancers).  Cathy expressed interest in learning as much information as possible, and was interested in expanded panel testing.  She elected to start with a prior authorization to determine cost and insurance coverage of the CancerNext-Expanded panel offered through [employer].     The CancerNext-Expanded panel included the following 77 genes: AIP, ALK, APC, HYUN, AXIN2, BAP1, BARD1, BLM, BMPR1A, BRCA1, BRCA2, BRIP1, CDC73, CDH1, CDK4, CDKN1B, CDKN2A, CHEK2, CTNNA1, DICER1, EGFR, EGLN1, EPCAM, FANCC, FH,  FLCN, GALNT12, GREM1, HOXB13, KIF1B, KIT, LTZR1, MAX, MEN1, MET, MITF, MLH1, MSH2, MSH3, MSH6, MUTYH, NBN, NF1, NF2, NTHL1, PALB2, PDGFRA, PHOX2B, PMS2, POLD1, POLE, POT1, ZQVRD3K, PTCH1, PTEN, RAD51C, RAD51D, RB1, RECQL, RET, SDHA, SDHAF2, SDHB, SDHC, SDHD, SMAD4, SMARCA4, SMARCB1, SMARCE1, STK11, SUFU, [id number], TP53, TSC1, TSC2, VHL, XRCC2.  These genes are related to increased risk for brain, breast, colon, ovarian, pancreatic, prostate, renal, uterine, and many other cancers.      We reviewed screening recommendations based on her family history of cancer.  Based on her personal and family history, Cathy has a 23.2% lifetime risk of developing breast cancer based on the JAYSHREE(v8) model. As such, Cathy meets current National Comprehensive Cancer Network (NCCN) guidelines for high risk breast screening. This includes annual breast MRI in addition to annual mammogram.  In addition, Cathy should be receiving clinical breast exams by her physician. We discussed that Cathy could participate in our Cancer Risk Management Program in which our clinical nurse specialist provides an individual screening plan and assists with medical management. A referral was made to see JANNET Burleson CNS, for this service.    We reviewed that the results from genetic testing may determine additional cancer screening, and options for possible risk reducing surgeries which Cathy may consider.  Many of the genes included in the CancerNext-Expanded have published management guidelines.  These recommendations will be discussed in detail when genetic testing is completed.    Plan:  1) Today Cathy elected to pursue a benefits analysis through [employer] to better understand her out of pocket cost for the CancerNext-Expanded gene panel.  2) Cathy will be contacted with the expected out of pocket cost when available to revisit testing options.  3) Screening recommendations for Cathy and her family were reviewed today.  These will  be revisited should genetic testing be pursued.     Face to face time: 60 minutes    Melissa Lange MS, [facility] [place]  Licensed, Certified Genetic Counselor  [facility]  Phone: [phone]

## 2020-08-18 NOTE — LETTER
"    8/18/2020         RE: Cathy Arroyo  [address] [place] [id number]        Dear Colleague,    Thank you for referring your patient, Cathy Arroyo, to the [facility]. Please see a copy of my visit note below.    8/18/2020    Cathy Arroyo is a 39 year old female who is being evaluated via a billable video visit.      The patient has been notified of following:     \"This video visit will be conducted via a call between you and your physician/provider. We have found that certain health care needs can be provided without the need for an in-person physical exam.  This service lets us provide the care you need with a video conversation.  If a prescription is necessary we can send it directly to your pharmacy.  If lab work is needed we can place an order for that and you can then stop by our lab to have the test done at a later time.    Video visits are billed at different rates depending on your insurance coverage.  Please reach out to your insurance provider with any questions.    If during the course of the call the physician/provider feels a video visit is not appropriate, you will not be charged for this service.\"    Patient has given verbal consent for Video visit? Yes    Video-Visit Details    Type of service:  Video Visit    Video Start Time: 9:00AM  Video End Time: 10:00AM    Originating Location (pt. Location): Home    Distant Location (provider location):  [facility] ([facility])    Platform used for Video Visit: Clare    Referring Provider: JANNET Hendrciks CNP    Presenting Information:   I met with Cathy Arroyo today for genetic counseling as part of the Cancer Risk Management Program to discuss her family history of cancer.  She is here today to review this history, cancer screening recommendations, and available genetic testing options.    Personal History:  Cathy is a 39 year old female. She was diagnosed with " a pineocytoma in 2014, and had a thyroidectomy at age 36 due to nodules.  Her most recent breast imaging included a mammogram and follow up targeted right breast ultrasound (11/11/2019).  She reports no history of breast biopsy.  She has her ovaries and uterus in place (surgical pathology for bilateral fallopian tubes available from 5/6/2019)    Family History: (Please see scanned pedigree for detailed family history information)    Her mother passed away at age 64 and was diagnosed with a salivary gland cancer at age 62.  She had a past history of tobacco use from her teens-early 40's    One maternal aunt was diagnosed with breast cancer, approximately in her late 30's (37?), and melanoma in situ at age 63.  She completed BRCA1/2 genetic testing which was negative, however likely was completed 5-10+ years ago per Cathy.  Additional details were not available at this time.      Her maternal grandfather is 92 and was diagnosed with prostate cancer in his 80's.  He also reportedly has a history of bladder and colon cancer.     She reports a significant family history of cancer in her maternal great-grandmother's 9 siblings (through her grandfather), however details regarding their diagnoses were not available.    One paternal aunt was recently diagnosed with bilateral breast cancer at age 58.  Cathy reports that she had negative genetic testing.    Her paternal grandfather's sister had a history of breast cancer (post menopause).      Her maternal ethnicity is English/Maltese. Her paternal ethnicity is Maltese.  There is no known Ashkenazi Gnosticist ancestry on either side of her family.    Discussion:    Cathy's family history of early onset breast cancer  is suggestive of a possible hereditary cancer syndrome.    We reviewed the features of sporadic, familial, and hereditary cancers. In looking at Cathy's family history, it is possible that a cancer susceptibility gene is present as several relatives across multiple  generations have been diagnosed with cancer, including her maternal aunts early onset breast cancer diagnosis.  Based on her family history, Cathy meets current National Comprehensive Cancer Network (NCCN) criteria for genetic testing of high-penetrance breast and/or ovarian cancer susceptibility genes (often including BRCA1/2, TP53, PALB2, PTEN, CDH1, among others).      We discussed the natural history and genetics of Hereditary Breast and Ovarian Cancer syndrome, caused by mutations in the BRCA1/2 genes. We discussed that there are additional genes that could cause increased risk for breast cancer. As many of these genes present with overlapping features in a family and accurate cancer risk cannot always be established based upon the pedigree analysis alone, it would be reasonable for Cathy to consider panel genetic testing to analyze multiple genes at once.    A detailed handout regarding hereditary breast cancer and the information we discussed was provided to Cathy at the end of our appointment today and can be found in the after visit summary. Topics included: inheritance pattern, cancer risks, cancer screening recommendations, and also risks, benefits and limitations of testing.    We discussed that genetic testing is typically most informative when it is first performed on a family member with a personal history of cancer.  Genetic counseling is recommended for her maternal aunt, who was diagnosed with breast cancer in her 30's, to revisit available genetic testing options.      We reviewed available genetic testing including guidelines-based breast cancer susceptibility genes, as well as expanded panel options (including genes related to breast, and other cancers).  Cathy expressed interest in learning as much information as possible, and was interested in expanded panel testing.  She elected to start with a prior authorization to determine cost and insurance coverage of the CancerNext-Expanded panel  offered through [facility].     The CancerNext-Expanded panel included the following 77 genes: AIP, ALK, APC, HYUN, AXIN2, BAP1, BARD1, BLM, BMPR1A, BRCA1, BRCA2, BRIP1, CDC73, CDH1, CDK4, CDKN1B, CDKN2A, CHEK2, CTNNA1, DICER1, EGFR, EGLN1, EPCAM, FANCC, FH, FLCN, GALNT12, GREM1, HOXB13, KIF1B, KIT, LTZR1, MAX, MEN1, MET, MITF, MLH1, MSH2, MSH3, MSH6, MUTYH, NBN, NF1, NF2, NTHL1, PALB2, PDGFRA, PHOX2B, PMS2, POLD1, POLE, POT1, MHQRQ3P, PTCH1, PTEN, RAD51C, RAD51D, RB1, RECQL, RET, SDHA, SDHAF2, SDHB, SDHC, SDHD, SMAD4, SMARCA4, SMARCB1, SMARCE1, STK11, SUFU, JUOL450, TP53, TSC1, TSC2, VHL, XRCC2.  These genes are related to increased risk for brain, breast, colon, ovarian, pancreatic, prostate, renal, uterine, and many other cancers.      We reviewed screening recommendations based on her family history of cancer.  Based on her personal and family history, Cathy has a 23.2% lifetime risk of developing breast cancer based on the JAYSHREE(v8) model. As such, Cathy meets current National Comprehensive Cancer Network (NCCN) guidelines for high risk breast screening. This includes annual breast MRI in addition to annual mammogram.  In addition, Cathy should be receiving clinical breast exams by her physician. We discussed that Cathy could participate in our Cancer Risk Management Program in which our clinical nurse specialist provides an individual screening plan and assists with medical management. A referral was made to see JANNET Burleson CNS, for this service.    We reviewed that the results from genetic testing may determine additional cancer screening, and options for possible risk reducing surgeries which Cathy may consider.  Many of the genes included in the CancerNext-Expanded have published management guidelines.  These recommendations will be discussed in detail when genetic testing is completed.    Plan:  1) Today Cathy elected to pursue a benefits analysis through [facility] to better understand  her out of pocket cost for the CancerNext-Expanded gene panel.  2) Cathy will be contacted with the expected out of pocket cost when available to revisit testing options.  3) Screening recommendations for Cathy and her family were reviewed today.  These will be revisited should genetic testing be pursued.     Face to face time: 60 minutes    Melissa Lange MS, [facility]  Licensed, Certified Genetic Counselor  [facility]  Phone: [phone]

## 2020-08-19 NOTE — PATIENT INSTRUCTIONS
Assessing Cancer Risk  Only about 5-10% of cancers are thought to be due to an inherited cancer susceptibility gene.    These families often have:    Several people with the same or related types of cancer    Cancers diagnosed at a young age (before age 50)    Individuals with more than one primary cancer    Multiple generations of the family affected with cancer    Some people may be candidates for genetic testing of more than one gene.  For these families, genetic testing using a cancer panel may be offered.  These panels will test different genes known to increase the risk for breast, ovarian, uterine, and/or other cancers. All of the genes discussed below have published clinical management guidelines for individuals who are found to carry a mutation. The purpose of this handout is to serve as a brief summary of the genes analyzed by the panels used to inquire about hereditary breast and gynecologic cancer:  HYUN, BRCA1, BRCA2, BRIP1, CDH1, CHEK2, MLH1, MSH2, MSH6, PMS2, EPCAM, PTEN, PALB2, RAD51C, RAD51D, and TP53.  ______________________________________________________________________________  Hereditary Breast and Ovarian Cancer Syndrome   (BRCA1 and BRCA2)  A single mutation in one of the copies of BRCA1 or BRCA2 increases the risk for breast and ovarian cancer, among others.  The risk for pancreatic cancer and melanoma may also be slightly increased in some families.  The chart below shows the chance that someone with a BRCA mutation would develop cancer in his or her lifetime1,2,3,4.        A person s ethnic background is also important to consider, as individuals of Ashkenazi Muslim ancestry have a higher chance of having a BRCA gene mutation.  There are three BRCA mutations that occur more frequently in this population.    Lorenzana Syndrome   (MLH1, MSH2, MSH6, PMS2, and EPCAM)  Currently five genes are known to cause Lorenzana Syndrome: MLH1, MSH2, MSH6, PMS2, and EPCAM.  A single mutation in one of the  Lorenzana Syndrome genes increases the risk for colon, endometrial, ovarian, and stomach cancers.  Other cancers that occur less commonly in Lorenzana Syndrome include urinary tract, skin, and brain cancers.  The chart below shows the chance that a person with Lorenzana syndrome would develop cancer in his or her lifetime5.      *Cancer risk varies depending on Lorenzana syndrome gene found    Cowden Syndrome   (PTEN)  Cowden syndrome is a hereditary condition that increases the risk for breast, thyroid, endometrial, colon, and kidney cancer.  Cowden syndrome is caused by a mutation in the PTEN gene.  A single mutation in one of the copies of PTEN causes Cowden syndrome and increases cancer risk.  The chart below shows the chance that someone with a PTEN mutation would develop cancer in their lifetime6,7.  Other benign features seen in some individuals with Cowden syndrome include benign skin lesions (facial papules, keratoses, lipomas), learning disability, autism, thyroid nodules, colon polyps, and larger head size.      *One recent study found breast cancer risk to be increased to 85%    Li-Fraumeni Syndrome   (TP53)  Li-Fraumeni Syndrome (LFS) is a cancer predisposition syndrome caused by a mutation in the TP53 gene. A single mutation in one of the copies of TP53 increases the risk for multiple cancers. Individuals with LFS are at an increased risk for developing cancer at a young age. The lifetime risk for development of a LFS-associated cancer is 50% by age 30 and 90% by age 60.   Core Cancers: Sarcomas, Breast, Brain, Lung, Leukemias/Lymphomas, Adrenocortical carcinomas  Other Cancers: Gastrointestinal, Thyroid, Skin, Genitourinary    Hereditary Diffuse Gastric Cancer   (CDH1)  Currently, one gene is known to cause hereditary diffuse gastric cancer (HDGC): CDH1.  Individuals with HDGC are at increased risk for diffuse gastric cancer and lobular breast cancer. Of people diagnosed with HDGC, 30-50% have a mutation in the CDH1  gene.  This suggests there are likely other genes that may cause HDGC that have not been identified yet.      Lifetime Cancer Risks    General Population HDGC    Diffuse Gastric  <1% ~80%   Breast 12% 39-52%         Additional Genes  HYUN  HYUN is a moderate-risk breast cancer gene. Women who have a mutation in HYUN can have between a 2-4 fold increased risk for breast cancer compared to the general population8. HYUN mutations have also been associated with increased risk for pancreatic cancer, however an estimate of this cancer risk is not well understood9. Individuals who inherit two HYUN mutations have a condition called ataxia-telangiectasia (AT).  This rare autosomal recessive condition affects the nervous system and immune system, and is associated with progressive cerebellar ataxia beginning in childhood.  Individuals with ataxia-telangiectasia often have a weakened immune system and have an increased risk for childhood cancers.    PALB2  Mutations in PALB2 have been shown to increase the risk of breast cancer up to 33-58% in some families; where individuals fall within this risk range is dependent upon family [id number]. PALB2 mutations have also been associated with increased risk for pancreatic cancer, although this risk has not been quantified yet.  Individuals who inherit two PALB2 mutations--one from their mother and one from their father--have a condition called Fanconi Anemia.  This rare autosomal recessive condition is associated with short stature, developmental delay, bone marrow failure, and increased risk for childhood cancers.    CHEK2   CHEK2 is a moderate-risk breast cancer gene.  Women who have a mutation in CHEK2 have around a 2-fold increased risk for breast cancer compared to the general population, and this risk may be higher depending upon family history.11,12,13 Mutations in CHEK2 have also been shown to increase the risk of a number of other cancers, including colon and prostate, however  these cancer risks are currently not well understood.    BRIP1, RAD51C and RAD51D  Mutations in BRIP1, RAD51C, and RAD51D have been shown to increase the risk of ovarian cancer and possibly female breast cancer as well14,15 .       Lifetime Cancer Risk    General Population BRIP1 RAD51C RAD51D   Ovarian 1-2% ~5-8% ~5-9% ~7-15%           Inheritance  All of the cancer syndromes reviewed above are inherited in an autosomal dominant pattern.  This means that if a parent has a mutation, each of his or her children will have a 50% chance of inheriting that same mutation.  Therefore, each child--male or female--would have a 50% chance of being at increased risk for developing cancer.      Image obtained from Genetics Home Reference, 2013     Mutations in some genes can occur de shayy, which means that a person s mutation occurred for the first time in them and was not inherited from a parent.  Now that they have the mutation, however, it can be passed on to future generations.    Genetic Testing  Genetic testing involves a blood test and will look at the genetic information in the HYUN, BRCA1, BRCA2, BRIP1, CDH1, CHEK2, MLH1, MSH2, MSH6, PMS2, EPCAM, PTEN, PALB2, RAD51C, RAD51D, and TP53 genes for any harmful mutations that are associated with increased cancer risk.  If possible, it is recommended that the person(s) who has had cancer be tested before other family members.  That person will give us the most useful information about whether or not a specific gene is associated with the cancer in the family.    Results  There are three possible results of genetic testing:    Positive--a harmful mutation was identified in one or more of the genes    Negative--no mutation was identified in any of the genes on this panel    Variant of unknown significance--a variation in one of the genes was identified, but it is unclear how this impacts cancer risk in the family    Advantages and Disadvantages   There are advantages and  disadvantages to genetic testing.    Advantages    May clarify your cancer risk    Can help you make medical decisions    May explain the cancers in your family    May give useful information to your family members (if you share your results)    Disadvantages    Possible negative emotional impact of learning about inherited cancer risk    Uncertainty in interpreting a negative test result in some situations    Possible genetic discrimination concerns (see below)    Genetic Information Nondiscrimination Act (DONA)  DONA is a federal law that protects individuals from health insurance or employment discrimination based on a genetic test result alone.  Although rare, there are currently no legal discrimination protections in terms of life insurance, long term care, or disability insurances.  Visit the [employer] website to learn more.    Reducing Cancer Risk  All of the genes described above have nationally recognized cancer screening guidelines that would be recommended for individuals who test positive.  In addition to increased cancer screening, surgeries may be offered or recommended to reduce cancer risk.  Recommendations are based upon an individual s genetic test result as well as their personal and family history of cancer.    Questions to Think About Regarding Genetic Testing:    What effect will the test result have on me and my relationship with my family members if I have an inherited gene mutation?  If I don t have a gene mutation?    Should I share my test results, and how will my family react to this news, which may also affect them?    Are my children ready to learn new information that may one day affect their own health?    Hereditary Cancer Resources    FORCE: Facing Our Risk of Cancer Empowered facingourrisk.org   Bright Pink bebrightpink.org   Li-Fraumeni Syndrome Association lfsassociation.org   PTEN World PTENworld.com   No stomach for cancer, Inc.  nostomachforcancer.org   Stomach cancer relief network Scrnet.org   Collaborative Group of the Americas on Inherited Colorectal Cancer (CGA) cgaicc.com    Cancer Care cancercare.org   American Cancer Society (ACS) cancer.org   National Cancer [place] (NCI) cancer.gov     Please call us if you have any questions or concerns.   Cancer Risk Management Program [phone]-[facility] ([phone])  ? Brian Cárdenas, MS, [facility] [phone]  ? Sayda Duran, MS, [facility]  [phone]  ? Melissa Lange, MS, [facility]  [phone]  ? Marija Morse, MS, [facility] [phone]  ? Bhavya Catalina, MS, [facility] [phone]  ? Micky Kunz, MS, [facility]  [phone]  ? Daisy Prince, MS, [facility]  [phone]    References  1. Lois OSEI, Mando PDP, Axel S, Misbah LA, Eliseo JE, Effie JL, Nina N, Ramila H, Preethi O, Rip A, Chiini B, Radiazalia P, Mancamrynkijanett S, James DM, Paulino N, Pastor E, Manpreet H, Omar E, Emmett J, Gronjhon J, Keri B, Leathaus H, Thorlacius S, Eerola H, Nevjanettlinna H, Maykel K, Lupe OP. Average risks of breast and ovarian cancer associated with BRCA1 or BRCA2 mutations detected in case series unselected for family history: a combined analysis of 222 studies. Am J Hum Viki. 2003;72:1117-30.  2. Luis Felipe N, Kiara M, Myers G.  BRCA1 and BRCA2 Hereditary Breast and Ovarian Cancer. Gene Reviews online. 2013.  3. Ted YC, Zurdo S, Luna G, Camara S. Breast cancer risk among male BRCA1 and BRCA2 mutation carriers. J Natl Cancer Inst. 2007;99:1811-4.  4. Mert RAMIREZ, Kim I, Jaspreet J, Bigg E, Kanchan ER, Eduardo F. Risk of breast cancer in male BRCA2 carriers. J Med Viki. 2010;47:710-1.  5. National Comprehensive Cancer Network. Clinical practice guidelines in oncology, colorectal cancer screening. Available online (registration required). 2015.  6. Tee CORTEZ, Brooke J, Kelsey J, Janis LA, Edward VALLEJO, Alvino C. Lifetime cancer risks in individuals with germline PTEN mutations. Clin Cancer Res. 2012;18:400-7.  7. Pilarski R. Cowden  Syndrome: A Critical Review of the Clinical Literature. J Viki . 2009:18:13-27.  8. Cindi A, Kehinde D, Echo S, Jeanne P, Aron T, Forrest M, Janes B, Jero H, Jolly R, Kasey K, Marcos L, Mert DG, James D, Amanuel DF, Ivette MR, The Breast Cancer Susceptibility Collaboration (UK) & Bernice SEWELL. HYUN mutations that cause ataxia-telangiectasia are breast cancer susceptibility alleles. Nature Genetics. 2006;38:873-875  9. Curly N , Maegan Y, Barbie J, Kirsten L, Tim GM , Jeffrey ML, Gallinger S, Giraldo AG, Syngal S, Batsheva ML, Alisia J , Dannie R, Marek SZ, William JR, Shoaib VE, Virginie M, Vorolly B, Chauncey N, Julian RH, Suzie KW, and Benjamin AP. HYUN mutations in patients with hereditary pancreatic cancer. Cancer Discover. 2012;2:41-46  10. Lois ADEN, et al. Breast-Cancer Risk in Families with Mutations in PALB2. NEJM. 2014; 371(6):497-506.  11. CHEK2 Breast Cancer Case-Control Consortium. CHEK2*1100delC and susceptibility to breast cancer: A collaborative analysis involving 10,860 breast cancer cases and 9,065 controls from 10 studies. Am J Hum Viki, 74 (2004), pp. 3116-2073  12. John Paul T, Elaine S, Aida K, et al. Spectrum of Mutations in BRCA1, BRCA2, CHEK2, and TP53 in Families at High Risk of Breast Cancer. HENRI. 2006;295(12):5651-6213.   13. Mora C, Eric D, Jacky A, et al. Risk of breast cancer in women with a CHEK2 mutation with and without a family history of breast cancer. J Clin Oncol. 2011;29:2161-6623.  14. Josue H, Ilene E, Maurizio SJ, et al. Contribution of germline mutations in the RAD51B, RAD51C, and RAD51D genes to ovarian cancer in the population. J Clin Oncol. 2015;33(26):1203-7809. Doi:10.1200/JCO.2015.61.2408.  15. Ann T, Chapis LOJA, Gloria P, et al. Mutations in BRIP1 confer high risk of ovarian cancer. Dora Viki. 2011;43(11):0107-8534. doi:10.1038/ng.955.

## 2020-08-20 NOTE — TELEPHONE ENCOUNTER
Oncology/Surgical Oncology Referral Request:     Specialty Requested: Medical Oncology     Referring Provider: Melissa Lange MD    Referring Clinic/Organization: [facility]    Records location: [facility]     Requested Provider (if specified): Randi Johnson MD

## 2020-08-24 NOTE — TELEPHONE ENCOUNTER
Pending Prescriptions:                       Disp   Refills    budesonide-formoterol (SYMBICORT) 160-4.5*10.2 g 1            Sig: Inhale 2 puffs into the lungs 2 times daily      Routing refill request to provider for review/approval because:  Patient has order for steroid inhaler    Dionne Ramos RN   [facility]

## 2020-08-25 RX ORDER — BUDESONIDE AND FORMOTEROL FUMARATE DIHYDRATE 160; 4.5 UG/1; UG/1
2 AEROSOL RESPIRATORY (INHALATION) 2 TIMES DAILY
Qty: 10.2 G | Refills: 1 | Status: SHIPPED | OUTPATIENT
Start: 2020-08-25 | End: 2021-01-05

## 2020-09-09 DIAGNOSIS — Z11.59 ENCOUNTER FOR SCREENING FOR OTHER VIRAL DISEASES: Primary | ICD-10-CM

## 2020-09-17 DIAGNOSIS — Z11.59 ENCOUNTER FOR SCREENING FOR OTHER VIRAL DISEASES: ICD-10-CM

## 2020-09-18 ENCOUNTER — TELEPHONE (OUTPATIENT)
Dept: MEDSURG UNIT | Facility: CLINIC | Age: 40
End: 2020-09-18

## 2020-09-18 LAB
SARS-COV-2 RNA SPEC QL NAA+PROBE: NOT DETECTED
SPECIMEN SOURCE: NORMAL

## 2020-09-18 NOTE — TELEPHONE ENCOUNTER
Pre-Procedure Unconfirmed COVID Test   Covid test on 9/17/2020    Step 1 COVID Screening  The patient was screened for COVID symptoms due to the inability to confirm the patient's COVID status (positive or negative test)    Patient reports the following:  Fever/Chills? No   Cough? No   Shortness of breath? No   New loss of taste or smell? No  Sore throat? No  Muscle or body aches? No  Headaches? No  Fatigue? No  Vomiting or diarrhea? No    Patient informed to contact the ordering provider if any of the symptoms develop prior to the procedure    Step 2 Screening Results (Skip if the patient is negative for symptoms)  If the patient is positive for new or worsening symptoms, contact the ordering provider to determine if the procedure is deemed necessary    Determine if the procedure can be re-scheduled when the patient is symptom free or has a negative COVID test     Step 3 Review Visitor Policy  Patient informed of the updated visitor policy     1 visitor allowed per patient    Visitor name: none   Visitor must screen negative for COVID symptoms   Visitor must wear a mask   Waiting rooms continue to be closed to visitors    Calin Zuniga RN

## 2020-09-21 ENCOUNTER — HOSPITAL ENCOUNTER (OUTPATIENT)
Facility: CLINIC | Age: 40
Discharge: HOME | End: 2020-09-21
Admitting: PHYSICIAN ASSISTANT
Payer: COMMERCIAL

## 2020-09-21 ENCOUNTER — HOSPITAL ENCOUNTER (OUTPATIENT)
Dept: GENERAL RADIOLOGY | Facility: CLINIC | Age: 40
End: 2020-09-21
Attending: FAMILY MEDICINE
Payer: COMMERCIAL

## 2020-09-21 VITALS
SYSTOLIC BLOOD PRESSURE: 113 MMHG | BODY MASS INDEX: 33.34 KG/M2 | RESPIRATION RATE: 16 BRPM | HEART RATE: 84 BPM | HEIGHT: 68 IN | WEIGHT: 220 LBS | DIASTOLIC BLOOD PRESSURE: 79 MMHG | OXYGEN SATURATION: 98 % | TEMPERATURE: 98.8 F

## 2020-09-21 DIAGNOSIS — M54.16 LUMBAR RADICULOPATHY: ICD-10-CM

## 2020-09-21 PROCEDURE — 25500064 ZZH RX 255 OP 636: Performed by: FAMILY MEDICINE

## 2020-09-21 PROCEDURE — 27211129 XR LUMBAR EPIDURAL INJECTION INCL IMAGING

## 2020-09-21 PROCEDURE — 40000863 ZZH STATISTIC RADIOLOGY XRAY, US, CT, MAR, NM

## 2020-09-21 PROCEDURE — 25000128 H RX IP 250 OP 636: Performed by: FAMILY MEDICINE

## 2020-09-21 PROCEDURE — 25000125 ZZHC RX 250: Performed by: FAMILY MEDICINE

## 2020-09-21 RX ORDER — IOPAMIDOL 408 MG/ML
10 INJECTION, SOLUTION INTRATHECAL ONCE
Status: COMPLETED | OUTPATIENT
Start: 2020-09-21 | End: 2020-09-21

## 2020-09-21 RX ORDER — NICOTINE POLACRILEX 4 MG
15-30 LOZENGE BUCCAL
Status: DISCONTINUED | OUTPATIENT
Start: 2020-09-21 | End: 2020-09-21 | Stop reason: HOSPADM

## 2020-09-21 RX ORDER — DEXTROSE MONOHYDRATE 25 G/50ML
25-50 INJECTION, SOLUTION INTRAVENOUS
Status: DISCONTINUED | OUTPATIENT
Start: 2020-09-21 | End: 2020-09-21 | Stop reason: HOSPADM

## 2020-09-21 RX ORDER — BETAMETHASONE SODIUM PHOSPHATE AND BETAMETHASONE ACETATE 3; 3 MG/ML; MG/ML
6 INJECTION, SUSPENSION INTRA-ARTICULAR; INTRALESIONAL; INTRAMUSCULAR; SOFT TISSUE ONCE
Status: COMPLETED | OUTPATIENT
Start: 2020-09-21 | End: 2020-09-21

## 2020-09-21 RX ADMIN — LIDOCAINE HYDROCHLORIDE 7 ML: 10 INJECTION, SOLUTION EPIDURAL; INFILTRATION; INTRACAUDAL; PERINEURAL at 14:55

## 2020-09-21 RX ADMIN — BETAMETHASONE SODIUM PHOSPHATE AND BETAMETHASONE ACETATE 3 ML: 3; 3 INJECTION, SUSPENSION INTRA-ARTICULAR; INTRALESIONAL; INTRAMUSCULAR; SOFT TISSUE at 15:01

## 2020-09-21 RX ADMIN — IOPAMIDOL 1.5 ML: 408 INJECTION, SOLUTION INTRATHECAL at 14:59

## 2020-09-21 ASSESSMENT — MIFFLIN-ST. JEOR: SCORE: 1731.41

## 2020-09-21 NOTE — PROGRESS NOTES
PATIENT/VISITOR WELLNESS SCREENING    Step 1 Patient Screening    1. In the last month, have you been in contact with someone who was confirmed or suspected to have Coronavirus/COVID-19? No    2. Do you have the following symptoms?  Fever/Chills? No   Cough? No   Shortness of breath? No   New loss of taste or smell? No  Sore throat? No  Muscle or body aches? No  Headaches? No  Fatigue? No  Vomiting or diarrhea? No    Step 2 Visitor Screening    1. Name of Visitor (1 visitor per patient): No visitor present          Step 3 Refer to logic grid below for actions    NO SYMPTOM(S)    ACTIONS:  1. Standard rooming process  2. Provider to assess per normal protocol  3. Implement precautions as needed and per guidelines     POSITIVE SYMPTOM(S)  If positive for ANY of the following symptoms: fever, cough, shortness of breath, rash    ACTION:  1. Continue to have the patient wear a mask   2. Room patient as soon as possible  3. Don appropriate PPE when entering room  4. Provider evaluation

## 2020-09-21 NOTE — PROCEDURES
[facility]    Procedure: L4-L5 TLESI    Date/Time: 9/21/2020 3:13 PM  Performed by: Emeka Pagan PA-C  Authorized by: Emeka Pagan PA-C   IR Fellow Physician: Emeka Pagan PA-C    UNIVERSAL PROTOCOL   Site Marked: Yes  Prior Images Obtained and Reviewed:  Yes  Required items: Required blood products, implants, devices and special equipment available    Patient identity confirmed:  Verbally with patient  Patient was reevaluated immediately before administering moderate or deep sedation or anesthesia  Confirmation Checklist:  Patient's identity using two indicators, relevant allergies, procedure was appropriate and matched the consent or emergent situation and correct equipment/implants were available  Time out: Immediately prior to the procedure a time out was called    Universal Protocol: the Joint Commission Universal Protocol was followed    Preparation: Patient was prepped and draped in usual sterile fashion           ANESTHESIA    Local Anesthetic: Lidocaine 1% without epinephrine      SEDATION    Patient Sedated: No    See dictated procedure note for full details.  PROCEDURE   Patient Tolerance:  Patient tolerated the procedure well with no immediate complications    Length of time physician/provider present for 1:1 monitoring during sedation: 0

## 2020-09-21 NOTE — PROGRESS NOTES
Care Suites Admission Nursing Note    Patient Information  Name: Cathy Arroyo  Age: 39 year old  Reason for admission: lumbar epidural lumbar steroid injection  Care Suites arrival time: 1335    Visitor Information  Name: MICHELE  Informed of visitor restrictions: Yes  1 visitor allowed per patient   Visitor must screen negative for COVID symptoms   Visitor must wear a mask  Waiting rooms closed to visitors    Patient Admission/Assessment   Pre-procedure assessment complete: Yes  If abnormal assessment/labs, provider notified: N/A  NPO: N/A  Medications held per instructions/orders: N/A  Consent: await radiologist  If applicable, pregnancy test status: deferred  Patient oriented to room: Yes  Education/questions answered: Yes  Plan/other: Ready for procedure    Discharge Planning  Discharge name/phone number: Pavel - [phone] [phone]  Overnight post sedation caregiver: MICHELE  Discharge location: home    Juani James RN

## 2020-09-21 NOTE — PROGRESS NOTES
Care Suites Post Procedure Note    Patient Information  Name: Cathy Arroyo  Age: 39 year old    Post Procedure  Time patient returned to Care Suites: 1365  Concerns/abnormal assessment: Bandage dry  If abnormal assessment, provider notified: N/A  Plan/Other: bedrest and discharge.    Juani James RN

## 2020-09-21 NOTE — DISCHARGE INSTRUCTIONS
Steroid Injection Discharge Instructions     After you go home:      You may resume your normal diet.    Care of Puncture Site:      If you have a bandaid on your puncture site, you may remove it the next morning    You may shower tomorrow    No bath tubs, whirlpools or swimming for at least 3 days     Activity:      You may go back to normal activity in 24 hours    You should let pain be your guide as to the extent of your activities    Maintain any activity limitations as ordered by your provider    Do NOT drive a vehicle if you develop numbness in your arm or leg    Medicines:      You may resume all medications    For minor pain, you may take Acetaminophen (Tylenol) or Ibuprofen (Advil)    Pain:       You may experience increased or different pain over the next 24-48 hours    For the next 48 hrs - you may use ice packs for discomfort     Call your primary care doctor if:      You have severe pain that does not improve with pain medication    You have chills or a fever greater than 101 F (38 C)    The site is red, swollen, hot or tender    New problems with your bowel or bladder    Any questions or concerns    Other Instructions:      New numbness down your leg post injection is temporary and may last for up to 6 hours. You may need assistance with activity until your leg has normal sensation.    If you are diabetic, monitor your blood sugar closely. Contact the provider who manages your diabetes to help you control your blood sugar if needed.    For Your Information:      A steroid was injected to help decrease swelling and may help to reduce pain. It may take up to 7-10 days to obtain full results.    Some patients will get lasting relief from a single injection. Others may require up to 3 injections to get results. If you have more than one steroid injection, they should be given 2 weeks apart.    Side effects of your steroid injection are mild and will go away in 2-3 days  - Insomnia  - Heartburn  - Flushed  face  - Water retention  - Increased appetite  - Increased blood sugar      If you have questions call:        [facility] Radiology Dept @ [phone]      The provider in charge of your procedure was Dr Piña.

## 2020-10-20 ENCOUNTER — NURSE TRIAGE (OUTPATIENT)
Dept: NURSING | Facility: CLINIC | Age: 40
End: 2020-10-20

## 2020-10-20 ENCOUNTER — VIRTUAL VISIT (OUTPATIENT)
Dept: FAMILY MEDICINE | Facility: CLINIC | Age: 40
End: 2020-10-20
Payer: COMMERCIAL

## 2020-10-20 ENCOUNTER — TELEPHONE (OUTPATIENT)
Dept: ONCOLOGY | Facility: CLINIC | Age: 40
End: 2020-10-20

## 2020-10-20 DIAGNOSIS — J45.40 MODERATE PERSISTENT ASTHMA WITHOUT COMPLICATION: ICD-10-CM

## 2020-10-20 DIAGNOSIS — J30.2 SEASONAL ALLERGIC RHINITIS, UNSPECIFIED TRIGGER: ICD-10-CM

## 2020-10-20 DIAGNOSIS — J45.901 EXACERBATION OF PERSISTENT ASTHMA, UNSPECIFIED ASTHMA SEVERITY: Primary | ICD-10-CM

## 2020-10-20 DIAGNOSIS — R52 GENERALIZED BODY ACHES: ICD-10-CM

## 2020-10-20 DIAGNOSIS — R05.9 COUGH: ICD-10-CM

## 2020-10-20 PROCEDURE — 99213 OFFICE O/P EST LOW 20 MIN: CPT | Mod: 95 | Performed by: NURSE PRACTITIONER

## 2020-10-20 RX ORDER — PREDNISONE 20 MG/1
40 TABLET ORAL DAILY
Qty: 10 TABLET | Refills: 0 | Status: SHIPPED | OUTPATIENT
Start: 2020-10-20 | End: 2020-11-03

## 2020-10-20 NOTE — TELEPHONE ENCOUNTER
Pt requests appt for Covid PCR swab test.  Order already entered into chart.  Therefore now transferring to scheduling line for this purpose -> [phone].    [facility] Nurse Advisor     Additional Information    Caller requesting an appointment, triage offered and declined    Protocols used: PCP CALL - NO TRIAGE-A-

## 2020-10-20 NOTE — TELEPHONE ENCOUNTER
Fexodenadine      Last Written Prescription Date:  09/19/2020  Last Fill Quantity: 30,   # refills:   Last Office Visit: 07/23/2020  Future Office visit:    Next 5 appointments (look out 90 days)    Nov 06, 2020 10:30 AM  PHYSICAL with JANNET Nascimento CNP [facility] [address] [place] [id number] [phone]           Routing refill request to provider for review/approval because:  Medication is reported/historical

## 2020-10-20 NOTE — TELEPHONE ENCOUNTER
I called Cathy today in order to discuss the pre-authorization for genetic testing that we had submitted through [employer]. She was previously seen as part of the Cancer Risk Management Program on 8/18/2020 in which a prior authorization was submitted through [employer] for the CancerNext-Expanded panel.         Per [employer], Cathy's insurance has denied coverage of this test. Cash Price: $249.00 (patient-assistance is available).  Cathy declined genetic testing at this time, and would like to follow up with high risk breast screening.         We reviewed screening recommendations based on her family history of cancer.  Based on her personal and family history, Cathy has a 23.2% lifetime risk of developing breast cancer based on the JAYSHREE(v8) model. As such, Cathy meets current National Comprehensive Cancer Network (NCCN) guidelines for high risk breast screening. This includes annual breast MRI in addition to annual mammogram.  In addition, Cathy should be receiving clinical breast exams by her physician. We discussed that Cathy could participate in our Cancer Risk Management Program in which our clinical nurse specialist provides an individual screening plan and assists with medical management. A referral was previously made to see JANNET Burleson CNS, for this service (see clinic note 8/18/2020).  She plans to discuss screening recommendations during her upcoming primary care visit.    Melissa Lange MS, [facility]  Licensed, Certified Genetic Counselor  [facility]  Phone: [phone]

## 2020-10-20 NOTE — PROGRESS NOTES
"Cathy Arroyo is a 40 year old female who is being evaluated via a billable telephone visit.      The patient has been notified of following:     \"This telephone visit will be conducted via a call between you and your physician/provider. We have found that certain health care needs can be provided without the need for a physical exam.  This service lets us provide the care you need with a short phone conversation.  If a prescription is necessary we can send it directly to your pharmacy.  If lab work is needed we can place an order for that and you can then stop by our lab to have the test done at a later time.    Telephone visits are billed at different rates depending on your insurance coverage. During this emergency period, for some insurers they may be billed the same as an in-person visit.  Please reach out to your insurance provider with any questions.    If during the course of the call the physician/provider feels a telephone visit is not appropriate, you will not be charged for this service.\"    Patient has given verbal consent for Telephone visit?  Yes    What phone number would you like to be contacted at? [phone]    How would you like to obtain your AVS? Caprice Russ MA      Subjective     Cathy Arroyo is a 40 year old female who presents via phone visit today for the following health issues:    HPI       Concern for COVID-19  About how many days ago did these symptoms start? Last week   Is this your first visit for this illness? Yes  In the 14 days before your symptoms started, have you had close contact with someone with COVID-19 (Coronavirus)? She does not believe she has been exposed , her asthma has flared up and her breathing has been more difficult .  Do you have a fever or chills? No  Are you having new or worsening difficulty breathing? Yes   Please describe what kind of difficulty you are having breathing:Moderate dyspnea (short of breath with " ADLs/walking across room, quick recovery/comfortable with rest)  Do you have new or worsening cough? Yes, it's a dry cough. Sometimes productive  Have you had any new or unexplained body aches? YES    Have you experienced any of the following NEW symptoms?    Headache: YES started yesterday     Sore throat: No    Loss of taste or smell: No    Chest pain: No    Diarrhea: No    Rash: No  What treatments have you tried? Nothing, just using her inhaler and her allergy medications  Who do you live with? Her daughter and her    Are you, or a household member, a healthcare worker or a ? No  Do you live in a nursing home, group home, or shelter? No  Do you have a way to get food/medications if quarantined? Yes, I have a friend or family member who can help me.  Is bipolar so too much prednisone sends her into a manic phase, has family support and knows sometimes just needs 20 mg for few days                   Review of Systems   Constitutional, HEENT, cardiovascular, pulmonary, GI, , musculoskeletal, neuro, skin, endocrine and psych systems are negative, except as otherwise noted.       Objective          Vitals:  No vitals were obtained today due to virtual visit.    healthy, alert and no distress  PSYCH: Alert and oriented times 3; coherent speech, normal   rate and volume, able to articulate logical thoughts, able   to abstract reason, no tangential thoughts, no hallucinations   or delusions  Her affect is normal  RESP: No cough, no audible wheezing, able to talk in full sentences  Remainder of exam unable to be completed due to telephone visits            Assessment/Plan:    Assessment & Plan       ICD-10-CM    1. Exacerbation of persistent asthma, unspecified asthma severity  J45.901 predniSONE (DELTASONE) 20 MG tablet   2. Cough  R05 Symptomatic COVID-19 Virus (Coronavirus) by PCR   3. Generalized body aches  R52 Symptomatic COVID-19 Virus (Coronavirus) by PCR    will test for COVID, pt with  allergies in fall and asthma but also had HA and body aches fatigue starting yesterday  Discussed symptoms management, when to seek care, and quarantine info, see patient instructions      Tobacco Cessation:   reports that she has been smoking cigarettes. She started smoking about 16 years ago. She has a 6.00 pack-year smoking history. She has never used smokeless tobacco.  Tobacco Cessation Action Plan: Self help information given to patient         See Patient Instructions    No follow-ups on file.    JANNET Alfaro CNP  [facility] [place]    Phone call duration:  12 minutes

## 2020-10-20 NOTE — PATIENT INSTRUCTIONS
Regardless of if you have been tested or not for possible COVID19, with any sick symptoms we recommend the following (instructions can change daily so note online resources for current information):    Patient who have symptoms (cough, fever, or shortness of breath), need to isolate for 10 days from when symptoms started and 24 hours after fever resolves (without fever reducing medications) AND improvement of respiratory symptoms.     Isolate yourself at home (in own room/own bathroom if possible)  Do Not allow any visitors  Do Not go to work or school  Do Not go to [employer],  centers, shopping, or other public places.  Do Not shake hands.  Avoid close and intimate contact with others (hugging, kissing).  Follow CDC recommendations for household cleaning of frequently touched services.      After the initial 7 days, continue to isolate yourself from household members as much as possible. To continue decrease the risk of community spread and exposure, you and any members of your household should limit activities in public for 14 days after starting home isolation.      You can reference the following CDC link for helpful home isolation/care tips:  https://www.cdc.gov/coronavirus/2019-ncov/downloads/10Things.pdf     Protect Others:  Cover Your Mouth and Nose with a mask, disposable tissue or wash cloth to avoid spreading germs to others.  Wash your hands and face frequently with soap and water     Call Back If: Breathing difficulty develops or you become worse.     For more information about COVID19 and options for caring for yourself at home, please visit the CDC website at https://www.cdc.gov/coronavirus/2019-ncov/about/steps-when-sick.html  For more options for care at [facility], please visit our website at [email or web address]      For symptoms management with upper respiratory infections here are my favorite recommendations:  Drink plenty of extra fluids, honey soothes  the cough (honey and lemon in tea is great), and cold fluids help keep swelling down.      Gargle salt water a few times a day, some studies show viruses cannot tolerate salt--Chicken noodle soup, and good nutrition in small amounts. Vitamin C and Zinc can help boost your immune system and can be taken short term while you fight this off. Extra fluids and rest also help your body fight off viruses.     Do saline washes with neti pot or saline nasal spray a few times throughout the day if congestion or post nasal drip.      Over the counter decongestants you can try:   Oxymetazolone nasal spray  (Afrin or generic is fine):  two sprays twice daily for 3-4 days then stop to avoid rebound congestion.        I would also recommend to thin mucous:  Guaifenesin regularly until you are better     For pain and discomfort:  Tylenol as needed (max of 3000 mg a day total)      Hope you feel better soon!

## 2020-10-21 RX ORDER — FEXOFENADINE HCL 60 MG/1
180 TABLET, FILM COATED ORAL EVERY MORNING
Qty: 90 TABLET | Refills: 3 | Status: SHIPPED | OUTPATIENT
Start: 2020-10-21 | End: 2020-11-10

## 2020-10-22 DIAGNOSIS — R05.9 COUGH: ICD-10-CM

## 2020-10-22 DIAGNOSIS — R52 GENERALIZED BODY ACHES: ICD-10-CM

## 2020-10-22 PROCEDURE — U0003 INFECTIOUS AGENT DETECTION BY NUCLEIC ACID (DNA OR RNA); SEVERE ACUTE RESPIRATORY SYNDROME CORONAVIRUS 2 (SARS-COV-2) (CORONAVIRUS DISEASE [COVID-19]), AMPLIFIED PROBE TECHNIQUE, MAKING USE OF HIGH THROUGHPUT TECHNOLOGIES AS DESCRIBED BY CMS-2020-01-R: HCPCS | Performed by: NURSE PRACTITIONER

## 2020-10-23 LAB
SARS-COV-2 RNA SPEC QL NAA+PROBE: NOT DETECTED
SPECIMEN SOURCE: NORMAL

## 2020-10-26 ENCOUNTER — VIRTUAL VISIT (OUTPATIENT)
Dept: FAMILY MEDICINE | Facility: CLINIC | Age: 40
End: 2020-10-26
Payer: COMMERCIAL

## 2020-10-26 DIAGNOSIS — Z20.822 ENCOUNTER FOR LABORATORY TESTING FOR COVID-19 VIRUS: Primary | ICD-10-CM

## 2020-10-26 DIAGNOSIS — J45.41 MODERATE PERSISTENT ASTHMA WITH ACUTE EXACERBATION: ICD-10-CM

## 2020-10-26 PROCEDURE — 99213 OFFICE O/P EST LOW 20 MIN: CPT | Mod: 95 | Performed by: PHYSICIAN ASSISTANT

## 2020-10-26 NOTE — PATIENT INSTRUCTIONS
Add symbicort back  Get covid test next week  Return to clinic for any new or worsening symptoms or go to ER Urgent care in off hours

## 2020-10-26 NOTE — PROGRESS NOTES
"Cathy Arroyo is a 40 year old female who is being evaluated via a billable telephone visit.      The patient has been notified of following:     \"This telephone visit will be conducted via a call between you and your physician/provider. We have found that certain health care needs can be provided without the need for a physical exam.  This service lets us provide the care you need with a short phone conversation.  If a prescription is necessary we can send it directly to your pharmacy.  If lab work is needed we can place an order for that and you can then stop by our lab to have the test done at a later time.    Telephone visits are billed at different rates depending on your insurance coverage. During this emergency period, for some insurers they may be billed the same as an in-person visit.  Please reach out to your insurance provider with any questions.    If during the course of the call the physician/provider feels a telephone visit is not appropriate, you will not be charged for this service.\"    Patient has given verbal consent for Telephone visit?  Yes    What phone number would you like to be contacted at? [phone]    How would you like to obtain your AVS? Erica Anaya     Cathy Arroyo is a 40 year old female who presents via phone visit today for the following health issues:    HPI       Concern for COVID-19  About how many days ago did these symptoms start? 10/20/2020  Is this your first visit for this illness? No   How would you describe your symptoms since your last visit? My symptoms have stayed the same  In the 14 days before your symptoms started, have you had close contact with someone with COVID-19 (Coronavirus)? Yes, I have been in contact with someone who has COVID-19/Coronavirus (confirmed by lab test).  Do you have a fever or chills? No  Are you having new or worsening difficulty breathing? Yes   Please describe what kind of difficulty you are having " "breathing:Mild dyspnea (decreased exercise tolerance, able to do ADLs without difficulty)  Do you have new or worsening cough? Yes, I am coughing up mucus.  Have you had any new or unexplained body aches? YES    Have you experienced any of the following NEW symptoms?    Headache: No    Sore throat: No    Loss of taste or smell: No    Chest pain: YES    Diarrhea: YES    Rash: No  What treatments have you tried? Prednisone  Who do you live with?  and 2 daughters  Are you, or a household member, a healthcare worker or a ? No  Do you live in a nursing home, group home, or shelter? No  Do you have a way to get food/medications if quarantined? Yes, I have a friend or family member who can help me.          Reports she feels the same as last week. Was given prednisone for an asthma flare  She can't take more than 20 mg daily because it causes an asthma flare.   Her left lung \"hurts\" which started 2 days ago  Denies body aches, fatigue, fever. Still with frequent headaches and fatigued  Taking Atrovent HFA instead of Symbicort currently  Stopped taking her Symbicort because she thought it was the same thing as atrovent    Her  was exposed to someone with COVID on 10/23/20. The original coworker who tested positive went home on 10/19/20 but the other coworkers who were exposed to him kept working. One coworker started getting symptoms over the weekend.   He does not have symptoms but had a test on 10/24/20 which tested negative.             Review of Systems   CONSTITUTIONAL: NEGATIVE for fever, chills, change in weight  INTEGUMENTARY/SKIN: NEGATIVE for worrisome rashes, moles or lesions  EYES: NEGATIVE for vision changes or irritation  CV: NEGATIVE for chest pain, palpitations or peripheral edema  : NEGATIVE for frequency, dysuria, or hematuria  MUSCULOSKELETAL: NEGATIVE for significant arthralgias or myalgia  NEURO: NEGATIVE for weakness, dizziness or paresthesias  ENDOCRINE: NEGATIVE for " temperature intolerance, skin/hair changes  HEME: NEGATIVE for bleeding problems  PSYCHIATRIC: NEGATIVE for changes in mood or affect       Objective          Vitals:  No vitals were obtained today due to virtual visit.    healthy, alert and no distress  PSYCH: Alert and oriented times 3; coherent speech, normal   rate and volume, able to articulate logical thoughts, able   to abstract reason, no tangential thoughts, no hallucinations   or delusions  Her affect is normal  RESP: No cough, no audible wheezing, able to talk in full sentences  Remainder of exam unable to be completed due to telephone visits            Assessment/Plan:      ICD-10-CM    1. Encounter for laboratory testing for COVID-19 virus  Z20.828 Asymptomatic COVID-19 Virus (Coronavirus) by PCR   2. Moderate persistent asthma with acute exacerbation  J45.41      Patient Instructions   Add symbicort back  Get covid test next week  Return to clinic for any new or worsening symptoms or go to ER Urgent care in off hours          Phone call duration:  12 minutes

## 2020-10-28 NOTE — TELEPHONE ENCOUNTER
I called Cathy today in order to discuss the pre-authorization for genetic testing that we had submitted through [employer]. She was previously seen as part of the Cancer Risk Management Program on 8/18/2020 in which a prior authorization was submitted through [employer] for the CancerNext-Expanded panel.        Per [employer], Cathy's insurance has denied coverage of this test. Cash Price: $249.00 (patient-assistance is available).  Cathy declined genetic testing at this time, and would like to follow up with high risk breast screening.        We reviewed screening recommendations based on her family history of cancer.  Based on her personal and family history, Cathy has a 23.2% lifetime risk of developing breast cancer based on the JAYSHREE(v8) model. As such, Cathy meets current National Comprehensive Cancer Network (NCCN) guidelines for high risk breast screening. This includes annual breast MRI in addition to annual mammogram.  In addition, Cathy should be receiving clinical breast exams by her physician. We discussed that Cathy could participate in our Cancer Risk Management Program in which our clinical nurse specialist provides an individual screening plan and assists with medical management. A referral was previously made to see JANNET Burleson CNS, for this service (see clinic note 8/18/2020).  She plans to discuss screening recommendations during her upcoming primary care visit.    Melissa aLnge MS, [facility]  Licensed, Certified Genetic Counselor  [facility]  Phone: [phone]

## 2020-10-30 DIAGNOSIS — Z20.822 ENCOUNTER FOR LABORATORY TESTING FOR COVID-19 VIRUS: ICD-10-CM

## 2020-10-30 PROCEDURE — U0003 INFECTIOUS AGENT DETECTION BY NUCLEIC ACID (DNA OR RNA); SEVERE ACUTE RESPIRATORY SYNDROME CORONAVIRUS 2 (SARS-COV-2) (CORONAVIRUS DISEASE [COVID-19]), AMPLIFIED PROBE TECHNIQUE, MAKING USE OF HIGH THROUGHPUT TECHNOLOGIES AS DESCRIBED BY CMS-2020-01-R: HCPCS | Performed by: PHYSICIAN ASSISTANT

## 2020-11-02 ENCOUNTER — NURSE TRIAGE (OUTPATIENT)
Dept: NURSING | Facility: CLINIC | Age: 40
End: 2020-11-02

## 2020-11-02 LAB
SARS-COV-2 RNA SPEC QL NAA+PROBE: ABNORMAL
SPECIMEN SOURCE: ABNORMAL

## 2020-11-02 NOTE — TELEPHONE ENCOUNTER
Pt is calling in to get the message that was left by her clinic about her Covid results. Positive results were given to patient, and isolation was discussed. Pt verbalized understanding.    Aj Sanchez RN on 11/2/2020 at 2:54 PM

## 2020-11-02 NOTE — TELEPHONE ENCOUNTER
RECORDS STATUS - BREAST    RECORDS REQUESTED FROM: EPIC   DATE REQUESTED: 1/29/2020    NOTES DETAILS STATUS   OFFICE NOTE from referring provider Complete Melissa Lange GC   OFFICE NOTE from medical oncologist Complete Virtual Visit with Melissa Lange on 8/18/2020    OFFICE NOTE from surgeon     OFFICE NOTE from radiation oncologist     OPERATIVE REPORT N/A    MEDICATION LIST Complete [facility]   LABS     PATHOLOGY REPORTS  (Tissue diagnosis, Stage, ER/NE percentage positive and intensity of staining, HER2 IHC, FISH, and all biopsies from breast and any distant metastasis)                 N/A    GENONOMIC TESTING     TYPE:   (Next Generation Sequencing, including Foundation One testing, and Oncotype score)     IMAGING (NEED IMAGES & REPORT)     CT SCANS     MRI Complete MRI Brain 2/11/2020   MAMMO Complete MA 11/11/2019    Xray Chest Complete 11/6/2019    ULTRASOUND Complete US Axillary Right 11/11/2019    PET     BONE SCAN     BRAIN MRI

## 2020-11-03 ENCOUNTER — VIRTUAL VISIT (OUTPATIENT)
Dept: FAMILY MEDICINE | Facility: CLINIC | Age: 40
End: 2020-11-03
Payer: COMMERCIAL

## 2020-11-03 DIAGNOSIS — J45.41 MODERATE PERSISTENT ASTHMA WITH ACUTE EXACERBATION: ICD-10-CM

## 2020-11-03 DIAGNOSIS — U07.1 2019 NOVEL CORONAVIRUS DISEASE (COVID-19): Primary | ICD-10-CM

## 2020-11-03 PROCEDURE — 99214 OFFICE O/P EST MOD 30 MIN: CPT | Mod: 95 | Performed by: NURSE PRACTITIONER

## 2020-11-03 NOTE — PROGRESS NOTES
"Cathy Arroyo is a 40 year old female who is being evaluated via a billable telephone visit.      The patient has been notified of following:     \"This telephone visit will be conducted via a call between you and your physician/provider. We have found that certain health care needs can be provided without the need for a physical exam.  This service lets us provide the care you need with a short phone conversation.  If a prescription is necessary we can send it directly to your pharmacy.  If lab work is needed we can place an order for that and you can then stop by our lab to have the test done at a later time.    Telephone visits are billed at different rates depending on your insurance coverage. During this emergency period, for some insurers they may be billed the same as an in-person visit.  Please reach out to your insurance provider with any questions.    If during the course of the call the physician/provider feels a telephone visit is not appropriate, you will not be charged for this service.\"    Patient has given verbal consent for Telephone visit?  Yes    What phone number would you like to be contacted at? [phone]    How would you like to obtain your AVS? Erica Anaya     Cathy Arroyo is a 40 year old female who presents via phone visit today for the following health issues:    HPI     Asthma Follow-Up    Was ACT completed today?    Yes    ACT Total Scores 11/3/2020   ACT TOTAL SCORE (Goal Greater than or Equal to 20) 6   In the past 12 months, how many times did you visit the emergency room for your asthma without being admitted to the hospital? 0   In the past 12 months, how many times were you hospitalized overnight because of your asthma? 0         How many days per week do you miss taking your asthma controller medication?  0    Please describe any recent triggers for your asthma: upper respiratory infections, pollens, exercise or sports, emotions and cold " air    Have you had any Emergency Room Visits, Urgent Care Visits, or Hospital Admissions since your last office visit?  No      How many servings of fruits and vegetables do you eat daily?  2-3    On average, how many sweetened beverages do you drink each day (Examples: soda, juice, sweet tea, etc.  Do NOT count diet or artificially sweetened beverages)?   1    How many days per week do you exercise enough to make your heart beat faster? 3 or less    How many minutes a day do you exercise enough to make your heart beat faster? 9 or less    How many days per week do you miss taking your medication? 0      Concern for COVID-19  About how many days ago did these symptoms start? 10/19/2020 (? Asthma only)  Is this your first visit for this illness? No   How would you describe your symptoms since your last visit? My symptoms have worsened  In the 14 days before your symptoms started, have you had close contact with someone with COVID-19 (Coronavirus)? Yes, I have been in contact with someone who has COVID-19/Coronavirus (confirmed by lab test).  Positive test resulted yesterday - tested on Friday.  Exposed by  via work (he was negative).  Started to have asthma symptoms on Oct 19th.  Do you have a fever or chills? No  Are you having new or worsening difficulty breathing? Yes   Please describe what kind of difficulty you are having breathing:Mild dyspnea (decreased exercise tolerance, able to do ADLs without difficulty) still struggling to breathe, like asthma exacerbation never went away  Do you have new or worsening cough? Yes, it's a dry cough.   Have you had any new or unexplained body aches? YES    Have you experienced any of the following NEW symptoms?    Headache: YES    Sore throat: YES    Loss of taste or smell: YES-seems different    Chest pain: YES    Diarrhea: YES    Rash: No  What treatments have you tried? Took prednisone Nov 19th (5 days)-no relief, albuterol inhaler  Who do you live with?   and 2 daughters  Are you, or a household member, a healthcare worker or a ? No  Do you live in a nursing home, group home, or shelter? No  Do you have a way to get food/medications if quarantined? Yes, I have a friend or family member who can help me.  Taking symbicort and atrovent and albuterol - albuterol is no longer helping  Able to keep food down          I have reviewed and updated the patient's Past Medical History, Social History, Family History and Medication List      Review of Systems   Constitutional, HEENT, cardiovascular, pulmonary, gi and gu systems are negative, except as otherwise noted.       Objective          Vitals:  No vitals were obtained today due to virtual visit.    alert, no distress and mild distress  PSYCH: Alert and oriented times 3; coherent speech, normal   rate and volume, able to articulate logical thoughts, able   to abstract reason, no tangential thoughts, no hallucinations   or delusions  Her affect is pleasant and anxious  RESP: No cough, no audible wheezing, able to talk in full sentences  Remainder of exam unable to be completed due to telephone visits          Assessment/Plan:    (U07.1) 2019 novel coronavirus disease (COVID-19)  (primary encounter diagnosis)  Comment:   Plan: Continue inhalers.  Get oximeter and report mesaurements      (J45.41) Moderate persistent asthma with acute exacerbation  Comment:   Plan: maximum treatment right now.  Consider ED or dexamethasone if O2 is low or symptoms worsen        End:  .6:45 PM   Start: 6:21 PM    Phone call duration:  24 minutes    DEVEN Winston

## 2020-11-04 ASSESSMENT — ASTHMA QUESTIONNAIRES: ACT_TOTALSCORE: 6

## 2020-11-04 NOTE — PATIENT INSTRUCTIONS
Order oximeter   Can take ibuprofen 600 mg every hours - alternate with tylenol 500 mg  Keep fluids  Hot showers    Jamil's return to work date - because he was negative - needs to be 14 days from the last day he was exposed to you when you were contagious (10 days of contagious) - Nov 14th  He should have another test because he is symptomatic    Saliva testing:  [email or web address]

## 2020-11-06 ENCOUNTER — MYC MEDICAL ADVICE (OUTPATIENT)
Dept: FAMILY MEDICINE | Facility: CLINIC | Age: 40
End: 2020-11-06

## 2020-11-10 ENCOUNTER — VIRTUAL VISIT (OUTPATIENT)
Dept: FAMILY MEDICINE | Facility: CLINIC | Age: 40
End: 2020-11-10
Payer: COMMERCIAL

## 2020-11-10 DIAGNOSIS — J30.2 SEASONAL ALLERGIC RHINITIS, UNSPECIFIED TRIGGER: ICD-10-CM

## 2020-11-10 DIAGNOSIS — U07.1 INFECTION DUE TO 2019 NOVEL CORONAVIRUS: Primary | ICD-10-CM

## 2020-11-10 DIAGNOSIS — J45.40 MODERATE PERSISTENT ASTHMA WITHOUT COMPLICATION: ICD-10-CM

## 2020-11-10 DIAGNOSIS — L50.9 HIVES: ICD-10-CM

## 2020-11-10 PROCEDURE — 99214 OFFICE O/P EST MOD 30 MIN: CPT | Mod: 95 | Performed by: NURSE PRACTITIONER

## 2020-11-10 RX ORDER — FAMOTIDINE 40 MG/1
40 TABLET, FILM COATED ORAL DAILY
Qty: 90 TABLET | Refills: 0 | Status: SHIPPED | OUTPATIENT
Start: 2020-11-10 | End: 2021-01-05

## 2020-11-10 RX ORDER — FEXOFENADINE HCL 60 MG/1
180 TABLET, FILM COATED ORAL EVERY MORNING
Qty: 90 TABLET | Refills: 3 | Status: SHIPPED | OUTPATIENT
Start: 2020-11-10 | End: 2021-07-09

## 2020-11-10 RX ORDER — CETIRIZINE HYDROCHLORIDE 10 MG/1
10 TABLET ORAL DAILY
Qty: 90 TABLET | Refills: 3 | Status: SHIPPED | OUTPATIENT
Start: 2020-11-10 | End: 2021-07-09

## 2020-11-10 NOTE — PROGRESS NOTES
"Cathy Arroyo is a 40 year old female who is being evaluated via a billable telephone visit.      The patient has been notified of following:     \"This telephone visit will be conducted via a call between you and your physician/provider. We have found that certain health care needs can be provided without the need for a physical exam.  This service lets us provide the care you need with a short phone conversation.  If a prescription is necessary we can send it directly to your pharmacy.  If lab work is needed we can place an order for that and you can then stop by our lab to have the test done at a later time.    Telephone visits are billed at different rates depending on your insurance coverage. During this emergency period, for some insurers they may be billed the same as an in-person visit.  Please reach out to your insurance provider with any questions.    If during the course of the call the physician/provider feels a telephone visit is not appropriate, you will not be charged for this service.\"    Patient has given verbal consent for Telephone visit?  Yes    What phone number would you like to be contacted at? [phone]    How would you like to obtain your AVS? Erica Anaya     Cathy Arroyo is a 40 year old female who presents via phone visit today for the following health issues:    HPI     COVID follow-up  Symptoms started 8 days ago  How are you feeling today? Better - more energy and able to do some simple chores  Oxygen has gotten to 99% which is the highest in 8 days  Wants to clarify  In the past 24 hours have you had shortness of breath when speaking, walking, or climbing stairs? My breathing issues have improved  Do you have a cough? Yes, I have a cough but it's not worse  When is the last time you had a fever greater than 100? Yesterday was 100.1 degrees F - hadn't had one before  Are you having any other symptoms? nausea and diarrhea off and on, vomited once; able " to eat; sensation of someone pinching her thighs; erupting in hives in random parts of the body   Do you have any other stressors you would like to discuss with your provider? No   Mental health has been stable - complete absence of depression or jose eduardo symptoms    Allegra wasn't approved.  Taking zyrtec 10 mg daily.  Did stop taking Atrovent and is just taking Symbicort    I have reviewed and updated the patient's Past Medical History, Social History, Family History and Medication List      Review of Systems   Constitutional, HEENT, cardiovascular, pulmonary, gi and gu systems are negative, except as otherwise noted.       Objective          Vitals:  No vitals were obtained today due to virtual visit.    healthy, alert and no distress  PSYCH: Alert and oriented times 3; coherent speech, normal   rate and volume, able to articulate logical thoughts, able   to abstract reason, no tangential thoughts, no hallucinations   or delusions  Her affect is normal and pleasant  RESP: No cough, no audible wheezing, able to talk in full sentences  Remainder of exam unable to be completed due to telephone visits            Assessment/Plan:    Assessment & Plan     (U07.1) Infection due to 2019 novel coronavirus  (primary encounter diagnosis)  Comment:   Plan: Improving symptoms    (L50.9) Hives  Comment:   Plan: famotidine (PEPCID) 40 MG tablet            (J30.2) Seasonal allergic rhinitis, unspecified trigger  Comment:   Plan: cetirizine (ZYRTEC) 10 MG tablet, fexofenadine         (ALLEGRA) 60 MG tablet            (J45.40) Moderate persistent asthma without complication  Comment:   Plan: fexofenadine (ALLEGRA) 60 MG tablet                    Patient Instructions   First day possible out of isolation/quarantine would be Friday, Nov 13th UNLESS you have a temp of 99.8 or higher in the 24 hours previously OR symptoms worsened.  Either of those things pushes end of isolation out at least 24 hours.    For pepcid/famotidine 40 mg for  hives and heartburn and continue zyrtec    Go to the ER if you experience  - swollen, red legs  - sudden shortness of breath or pain in the chest  - sudden cognitive changes, weakness, or vision changes        Return in about 23 days (around 12/3/2020) for Physical Exam.    JANENT Liu CNP [facility] [place]    End:  4:17 PM   Start:  3:55 PM     Phone call duration:  22 minutes

## 2020-11-10 NOTE — PATIENT INSTRUCTIONS
First day possible out of isolation/quarantine would be Friday, Nov 13th UNLESS you have a temp of 99.8 or higher in the 24 hours previously OR symptoms worsened.  Either of those things pushes end of isolation out at least 24 hours.    For pepcid/famotidine 40 mg for hives and heartburn and continue zyrtec    Go to the ER if you experience  - swollen, red legs  - sudden shortness of breath or pain in the chest  - sudden cognitive changes, weakness, or vision changes

## 2020-11-11 ENCOUNTER — NURSE TRIAGE (OUTPATIENT)
Dept: NURSING | Facility: CLINIC | Age: 40
End: 2020-11-11

## 2020-11-11 ENCOUNTER — HOSPITAL ENCOUNTER (EMERGENCY)
Facility: CLINIC | Age: 40
Discharge: HOME | End: 2020-11-11
Attending: EMERGENCY MEDICINE | Admitting: EMERGENCY MEDICINE
Payer: COMMERCIAL

## 2020-11-11 VITALS
HEIGHT: 68 IN | RESPIRATION RATE: 16 BRPM | WEIGHT: 224.1 LBS | BODY MASS INDEX: 33.96 KG/M2 | HEART RATE: 82 BPM | TEMPERATURE: 98.4 F | SYSTOLIC BLOOD PRESSURE: 141 MMHG | DIASTOLIC BLOOD PRESSURE: 91 MMHG | OXYGEN SATURATION: 98 %

## 2020-11-11 DIAGNOSIS — R59.9 REACTIVE LYMPHADENOPATHY: ICD-10-CM

## 2020-11-11 PROCEDURE — 99282 EMERGENCY DEPT VISIT SF MDM: CPT | Performed by: EMERGENCY MEDICINE

## 2020-11-11 PROCEDURE — 99283 EMERGENCY DEPT VISIT LOW MDM: CPT | Performed by: EMERGENCY MEDICINE

## 2020-11-11 ASSESSMENT — ENCOUNTER SYMPTOMS: HEADACHES: 1

## 2020-11-11 ASSESSMENT — MIFFLIN-ST. JEOR: SCORE: 1745.01

## 2020-11-11 NOTE — ED AVS SNAPSHOT
[facility]  [address] [address]  Phone: [phone]                                    Cathy Arroyo   MRN: [id number]    Department: [facility]   Date of Visit: 11/11/2020           After Visit Summary Signature Page    I have received my discharge instructions, and my questions have been answered. I have discussed any challenges I see with this plan with the nurse or doctor.    ..........................................................................................................................................  Patient/Patient Representative Signature      ..........................................................................................................................................  Patient Representative Print Name and Relationship to Patient    ..................................................               ................................................  Date                                   Time    ..........................................................................................................................................  Reviewed by Signature/Title    ...................................................              ..............................................  Date                                               Time          22EPIC Rev 08/18

## 2020-11-12 NOTE — ED PROVIDER NOTES
[facility] EMERGENCY DEPARTMENT ([facility])  2020  ED 9    History     Chief Complaint   Patient presents with     Headache     The history is provided by the patient and medical records.     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  Patient noticed this lump on her right arm today, it is located just above the elbow. She was having some paresthesias in her arm as well. She called clinic and was told to come to the Emergency Department for evaluation in case it was a blood clot and so presents for evaluation. She did test positive for COVID-19 on 10/30/2020.   She has had daily headaches since coming down with COVID-19 and these headache are unrelenting despite taking medications. She otherwise has no other complaints.     PAST MEDICAL HISTORY:   Past Medical History:   Diagnosis Date     Allergic state      Anxiety      Bipolar 1 disorder (H)      Depressive disorder      Elevated cholesterol      Graves disease      Obese     BMI 37.48     Pineal tumor     s/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 tumor     Post partum depression      Post-surgical hypothyroidism 2017     Uncomplicated asthma        PAST SURGICAL HISTORY:   Past Surgical History:   Procedure Laterality Date     BLOOD PATCH N/A 10/11/2016    Procedure: EPIDURAL BLOOD PATCH;  Surgeon: GENERIC ANESTHESIA PROVIDER;  Location: [facility] OR      SECTION, TUBAL LIGATION, COMBINED N/A 2019    Procedure:  SECTION, WITH TUBAL LIGATION;  Surgeon: Kathy Rutledge MD;  Location: [facility] L+D     CRANIOTOMY, EXCISE TUMOR COMPLEX, COMBINED  2014    Procedure: COMBINED CRANIOTOMY, EXCISE TUMOR COMPLEX;  Supracerabellar  Infratentorial Approach for Resection of Tumor ;  Surgeon: Kate Mckeon MD;  Location: [facility] OR     THYROIDECTOMY N/A 5/3/2017     Procedure: THYROIDECTOMY;  Total Thyroidectomy;  Surgeon: Pamela Villasenor MD;  Location: [facility] OR       Past medical history, past surgical history, medications, and allergies were reviewed with the patient. Additional pertinent items: None    FAMILY HISTORY:   Family History   Problem Relation Age of Onset     Thyroid Disease Paternal Aunt      Breast Cancer Paternal Aunt      Asthma Father      Mental Illness Father      Obesity Father      Asthma Maternal Grandmother      Osteoporosis Maternal Grandmother      Glaucoma Maternal Grandmother      Hypertension Maternal Grandmother      Macular Degeneration Maternal Grandmother      Diabetes Paternal Grandfather      Other Cancer Mother      Genitourinary Problems Mother      Bipolar Disorder Mother         unipolar depression     Depression Mother      Thyroid Disease Mother         benign tumor     Skin Cancer Mother      Cancer Mother      Bipolar Disorder Brother         unipolar depression     Asthma Brother        SOCIAL HISTORY:   Social History     Tobacco Use     Smoking status: Current Every Day Smoker     Packs/day: 0.50     Years: 12.00     Pack years: 6.00     Types: Cigarettes     Start date: 2004     Last attempt to quit: 2/15/2018     Years since quittin.7     Smokeless tobacco: Never Used   Substance Use Topics     Alcohol use: Yes     Alcohol/week: 0.0 standard drinks     Comment: occasional     Social history was reviewed with the patient. Additional pertinent items: None      Patient's Medications   New Prescriptions    No medications on file   Previous Medications    ALBUTEROL (PROAIR HFA/PROVENTIL HFA/VENTOLIN HFA) 108 (90 BASE) MCG/ACT INHALER    Inhale 2 puffs into the lungs every 4 hours as needed for shortness of breath / dyspnea or wheezing    ARIPIPRAZOLE (ABILIFY) 10 MG TABLET    Take 10 mg by mouth daily    BUDESONIDE-FORMOTEROL (SYMBICORT) 160-4.5 MCG/ACT INHALER    Inhale 2 puffs into the lungs 2 times daily    CETIRIZINE  (ZYRTEC) 10 MG TABLET    Take 1 tablet (10 mg) by mouth daily    CETIRIZINE (ZYRTEC) 5 MG TABLET    Take 2 tablets (10 mg) by mouth every morning When not using the Allegra    FAMOTIDINE (PEPCID) 40 MG TABLET    Take 1 tablet (40 mg) by mouth daily    FEXOFENADINE (ALLEGRA) 60 MG TABLET    Take 3 tablets (180 mg) by mouth every morning    IPRATROPIUM (ATROVENT HFA) 17 MCG/ACT INHALER    Inhale 2 puffs into the lungs every 6 hours    IPRATROPIUM - ALBUTEROL 0.5 MG/2.5 MG/3 ML (DUONEB) 0.5-2.5 (3) MG/3ML NEB SOLUTION    Take 1 vial (3 mLs) by nebulization every 6 hours as needed for shortness of breath / dyspnea or wheezing    LEVOTHYROXINE (SYNTHROID/LEVOTHROID) 150 MCG TABLET    Take one tablet by mouth on M, W and F    LEVOTHYROXINE (SYNTHROID/LEVOTHROID) 175 MCG TABLET    Take one tablet by mouth daily on T, Th, Sat and Sun    MEDICAL CANNABIS (PATIENT'S OWN SUPPLY)    Take 1 Dose by mouth See Admin Instructions (The purpose of this order is to document that the patient reports taking medical cannabis.  This is not a prescription, and is not used to certify that the patient has a qualifying medical condition.)    [place] Oral Suspension Unflavored 1-5 ml up to two times daily.  Total THC = 240 mg, Total CBD Trace    MEDICAL CANNABIS (PATIENT'S OWN SUPPLY)    Take 1 Dose by mouth See Admin Instructions (The purpose of this order is to document that the patient reports taking medical cannabis.  This is not a prescription, and is not used to certify that the patient has a qualifying medical condition.)    [place] oral suspension: take 1-3 ml by mouth two times daily  Total CBD 1200 mg. Total THC 60 mg    MONTELUKAST (SINGULAIR) 10 MG TABLET    Take 1 tablet (10 mg) by mouth every morning    TOPIRAMATE (TOPAMAX) 100 MG TABLET    Take 100 mg by mouth daily Every evening   Modified Medications    No medications on file   Discontinued Medications    No medications on file          Allergies   Allergen Reactions      "Adacel [Daptacel] Swelling     Codeine Sulfate Nausea and Vomiting     Nicoderm [Nicotine]      Patch and Gum, pt reported allergic reaction 6/24     Tetanus Toxoid      Pt states she had an infection at injection site.      Vicodin [Hydrocodone-Acetaminophen] Nausea and Vomiting     Methimazole Rash        Review of Systems   Musculoskeletal:        Lump on right arm   Neurological: Positive for headaches.     A complete review of systems was performed with pertinent positives and negatives noted in the HPI, and all other systems negative.    Physical Exam   BP: (!) 141/91  Pulse: 82  Temp: 98.4  F (36.9  C)  Resp: 16  Height: 172.7 cm (5' 8\")  Weight: 101.7 kg (224 lb 1.6 oz)  SpO2: 98 %      Physical Exam  Constitutional:       General: She is not in acute distress.     Appearance: She is well-developed. She is not diaphoretic.      Comments: Comfortably resting, lying in bed, NAD, nondiaphoretic, lucid, fully conversant, no  respiratory distress, alert and oriented.     HENT:      Head: Normocephalic and atraumatic.   Eyes:      General: No scleral icterus.  Neck:      Musculoskeletal: Normal range of motion and neck supple.   Cardiovascular:      Rate and Rhythm: Normal rate.   Pulmonary:      Effort: Pulmonary effort is normal.   Musculoskeletal:        Arms:    Skin:     General: Skin is warm and dry.      Coloration: Skin is not pale.      Findings: No erythema or rash.   Neurological:      Mental Status: She is alert and oriented to person, place, and time.      Comments: Neurologically intact         ED Course        Procedures                           No results found for this or any previous visit (from the past 24 hour(s)).  Medications - No data to display          Assessments & Plan (with Medical Decision Making)   This is a 40-year-old female patient presenting to the emergency room with a history of Covid who is was recently diagnosed and has been had having headaches ever since.  She is presenting " to the emergency room today with a bump on her right upper arm.  She stated that started today and slightly uncomfortable and has some sensory changes just over top and slightly distal to the area.  On physical exam she is in no obvious distress.  She states that she has been taking headache medication with no significant significant provement of her headache but she knows its associated with Covid.  Her arm has a 1 cm size reactive lymph node over top of the right lateral epicondyle.  There is no overlying cellulitis.  I believe that this is a simple reactive lymph node causing some mild cutaneous nerve compression.  All else is otherwise unremarkable on her exam.  We did utilize an ultrasound to review the area and there is an obvious reactive lymph node visualized.  This was discussed with the patient and she is in agreement at this time to continue taking Motrin and Tylenol and follow-up with a primary care physician as necessary.  Pt was discharged home/self-care.  PT was provided written discharge instructions. Additionally verbal instructions were given and discussed with patient.  PT was asked to return to the ED immediately for any new or concerning symptoms.  Pt was in agreement, endorsed understanding, and questions were answered.    I have reviewed the nursing notes.    I have reviewed the findings, diagnosis, plan and need for follow up with the patient.    New Prescriptions    No medications on file       Final diagnoses:   Reactive lymphadenopathy       11/11/2020   [facility] EMERGENCY DEPARTMENT     Sergei Giles MD  11/11/20 [id number]

## 2020-11-12 NOTE — TELEPHONE ENCOUNTER
Pt was seen in the ED, she is doing OK today, it continues to be inflamed today, warm to the touch, but no pain/discomfort,  she will call for further if worsens or if fever    Cristin Landry RN   [facility]

## 2020-11-12 NOTE — TELEPHONE ENCOUNTER
"Patient calling as she is recovering from COVID-19, is feeling better for the most part.  PCP advised her to monitor for any blood clots.  Patient stated she felt silly for calling and felt she was being overly cautious.    She was laying on her bed and all of a sudden her right arm started feelign weird, like it was falling asleep or something and it now feels very \"heavy\" up at her shoulder.  She felt on her arm to see if there was anything wrong and just above her elbow on her lower bicept, she found a 1/2 dollar sized tender mass.    Her  and her report that the area of the mass feels warmer than her other arm, but not really hot.  The fingers on her right hand are noticeably cooler than the fingers on her left hand.    She is unable to tell if any red streaks or redness, as area of arm is covered in tatoos, so can't see the color changes.      Advised patient that she should be seen, due to neuro changes in arm and mass.  As discussing with patient, she mentioned that she is starting to get a very bad headache all of a sudden.  Advised patient that she needs to have her  drive her to the ER right away, as she was wanting to drive herself initally.    Patient agreed with this plan and will proceed to the ER.    COVID 19 Nurse Triage Plan/Patient Instructions    Please be aware that novel coronavirus (COVID-19) may be circulating in the community. If you develop symptoms such as fever, cough, or SOB or if you have concerns about the presence of another infection including coronavirus (COVID-19), please contact your health care provider or visit [email or web address].     Disposition/Instructions    ED Visit recommended. Follow protocol based instructions.     Bring Your Own Device:  Please also bring your smart device(s) (smart phones, tablets, laptops) and their charging cables for your personal use and to communicate with your care team during your visit.    Thank you for taking steps to prevent the " spread of this virus.  o Limit your contact with others.  o Wear a simple mask to cover your cough.  o Wash your hands well and often.    Resources    [facility]: About COVID-19: [email or web address]    CDC: What to Do If You're Sick: www.cdc.gov/coronavirus/2019-ncov/about/steps-when-sick.html    CDC: Ending Home Isolation: www.cdc.gov/coronavirus/2019-ncov/hcp/disposition-in-home-patients.html     CDC: Caring for Someone: www.cdc.gov/coronavirus/2019-ncov/if-you-are-sick/care-for-someone.html     [facility]: Interim Guidance for Hospital Discharge to Home: [email or web address]/[email or web address]    [facility] clinical trials (COVID-19 research studies): [email or web address]     Below are the COVID-19 hotlines at the [place] Department of [facility] ([facility]). Interpreters are available.   o For health questions: Call 876-356-3098 or 1-251.277.5276 (7 a.m. to 7 p.m.)  o For questions about schools and childcare: Call 513-275-1084 or 1-482.159.1753 (7 a.m. to 7 p.m.)                     Christine Bradshaw RN on 11/11/2020 at 6:45 PM                          Reason for Disposition    Headache  (and neurologic deficit)    Additional Information    Negative: Sounds like a life-threatening emergency to the triager    Negative: Small growth, spot, bump, or pigmented area of skin (e.g., moles, skin tags, wart, melanoma, skin cancer)    Negative: Inguinal hernia previously diagnosed by a physician    Negative: Followed a skin injury    Negative: Follows an insect bite    Negative: Swelling of lymph node suspected    Negative: Swelling of vaccination site    Negative: Swelling of tongue    Negative: Swelling of lip    Negative: Swelling of eye    Negative: Swelling of entire face    Negative: Swelling of scrotum    Negative: Swelling of labia    Negative: Swelling of surgical incision    Negative: Swelling of ankle joint    Negative: Swelling of elbow  joint    Negative: Swelling of knee joint    Negative: Swelling with a skin rash    Negative: Patient sounds very sick or weak to the triager    Negative: SEVERE pain (e.g., excruciating)    Negative: [1] Swelling is painful to touch AND [2] fever    Negative: [1] Swelling is red AND [2] fever    Negative: [1] Swelling is red AND [2] size > 2 inches (5.0 cm) (Exception: itchy area of skin)    Negative: [1] Swelling of groin (inguinal area) AND [2] painful    [1] Swelling is painful to touch AND [2] no fever    Negative: [1] SEVERE weakness (i.e., unable to walk or barely able to walk, requires support) AND [2] new onset or worsening    Negative: [1] Weakness (i.e., paralysis, loss of muscle strength) of the face, arm / hand, or leg / foot on one side of the body AND [2] sudden onset AND [3] present now    Negative: [1] Numbness (i.e., loss of sensation) of the face, arm / hand, or leg / foot on one side of the body AND [2] sudden onset AND [3] present now    Negative: [1] Loss of speech or garbled speech AND [2] sudden onset AND [3] present now    Negative: Difficult to awaken or acting confused (e.g., disoriented, slurred speech)    Negative: Sounds like a life-threatening emergency to the triager    Negative: Confusion, disorientation, or hallucinations is main symptom    Negative: Neck pain is main symptom (and having weakness, numbness, or tingling in arm / hand because of neck pain)    Negative: Back pain is main symptom (and having weakness, numbness, or tingling in leg because of back pain)    Negative: Hand pain is main symptom (and having mild weakness, numbness, or tingling in hand related to hand pain)    Negative: Dizziness is main symptom    Negative: Vision loss or change is main symptom    Negative: Followed a head injury within last 3 days    Negative: Followed a neck injury within last 3 days    Negative: [1] Tingling in both hands and/or feet AND [2] breathing faster than normal AND [3] feels  similar to prior panic attack or hyperventilation episode    Negative: Weakness in both sides of the body or weakness all over    Protocols used: NEUROLOGIC DEFICIT-A-AH, SKIN LUMP OR LOCALIZED SWELLING-A-AH

## 2020-11-12 NOTE — ED TRIAGE NOTES
Pt presents to ED with c/o feeling lump in R arm and headache. Pt tested positive for COVID on 10/30. Today noticed lump in R arm and then developed severe headache. Headache no longer severe, but still present. No redness or swelling in arm. Reports some numbness in R hand, no other neuro deficits.

## 2020-11-13 ENCOUNTER — VIRTUAL VISIT (OUTPATIENT)
Dept: FAMILY MEDICINE | Facility: CLINIC | Age: 40
End: 2020-11-13
Payer: COMMERCIAL

## 2020-11-13 ENCOUNTER — MYC MEDICAL ADVICE (OUTPATIENT)
Dept: FAMILY MEDICINE | Facility: CLINIC | Age: 40
End: 2020-11-13

## 2020-11-13 DIAGNOSIS — U07.1 COVID-19 VIRUS INFECTION: ICD-10-CM

## 2020-11-13 DIAGNOSIS — R59.9 REACTIVE LYMPHADENOPATHY: Primary | ICD-10-CM

## 2020-11-13 PROCEDURE — 99213 OFFICE O/P EST LOW 20 MIN: CPT | Mod: 95 | Performed by: NURSE PRACTITIONER

## 2020-11-13 NOTE — PROGRESS NOTES
"Cathy Arroyo is a 40 year old female who is being evaluated via a billable video visit.      The patient has been notified of following:     \"This video visit will be conducted via a call between you and your physician/provider. We have found that certain health care needs can be provided without the need for an in-person physical exam.  This service lets us provide the care you need with a video conversation.  If a prescription is necessary we can send it directly to your pharmacy.  If lab work is needed we can place an order for that and you can then stop by our lab to have the test done at a later time.    Video visits are billed at different rates depending on your insurance coverage.  Please reach out to your insurance provider with any questions.    If during the course of the call the physician/provider feels a video visit is not appropriate, you will not be charged for this service.\"    Patient has given verbal consent for Video visit? Yes  How would you like to obtain your AVS? MyChart  If you are dropped from the video visit, the video invite should be resent to: Text to cell phone: [phone]  Will anyone else be joining your video visit? No    Subjective     Cathy Arroyo is a 40 year old female who presents today via video visit for the following health issues:    HPI     ED/UC Followup:    Facility:  [facility] Emergency Department  Date of visit: 11/11/2020   Reason for visit: Reactive lymphadenopathy, Headache  Current Status: stable, would possibly like a biopsy on lump on right arm     Feeling a lot better and covid seems to have resolved.  Having normal asthma, but O2 is at 98% and temperature is normal.  Headache off and on, but not terrible.  No diarrhea.      Worried about a lymph node on her right arm near end of bicep.  It is firm, doesn't move, non-tender.  Subcutaneous swelling around it that is 4x3 cm and the firm lump is about the size of her pinkie. " "    Notes that she has a similar \"bb\" type lump on the left side of throat for the past five years that has not had evaluation.      Video Start Time: 4:34 PM    I have reviewed and updated the patient's Past Medical History, Social History, Family History and Medication List      Review of Systems   Constitutional, HEENT, cardiovascular, pulmonary, gi and gu systems are negative, except as otherwise noted.      Objective           Vitals:  No vitals were obtained today due to virtual visit.    Physical Exam     GENERAL: Healthy, alert and no distress  EYES: Eyes grossly normal to inspection.  No discharge or erythema, or obvious scleral/conjunctival abnormalities.  RESP: No audible wheeze, cough, or visible cyanosis.  No visible retractions or increased work of breathing.    SKIN: Visible skin clear. No significant rash, abnormal pigmentation or lesions.  NEURO: Cranial nerves grossly intact.  Mentation and speech appropriate for age.  PSYCH: Mentation appears normal, affect normal/bright, judgement and insight intact, normal speech and appearance well-groomed.          Assessment & Plan     (R59.9) Reactive lymphadenopathy  (primary encounter diagnosis)  Comment:   Plan:  Right upper arm and possibly also left anterior cervical.  Reassured that this is common with illness and it should resolve.  Patient has appt Dec 3rd when I can palpate neck and arm and we can recheck.       (U07.1) COVID-19 virus infection  Comment:   Plan: Resolving well            There are no Patient Instructions on file for this visit.    Return in about 20 days (around 12/3/2020) for Physical Exam; check neck.    JANNET Liu CNP  [facility]      Video-Visit Details    Type of service:  Video Visit    Video End Time:4:56 PM    Originating Location (pt. Location): Home    Distant Location (provider location):  [facility]     Platform " used for Video Visit: [place]

## 2020-11-13 NOTE — TELEPHONE ENCOUNTER
Spoke with patient and scheduled her for 4:40 with Zamzam Ramos RN   [facility] [place]

## 2020-11-13 NOTE — TELEPHONE ENCOUNTER
Zamzam,    See my chart message. Ok to fit her in this afternoon in one of your chart review spots? Please advise    Thanks  Dionne Ramos RN   [facility]

## 2020-11-16 ENCOUNTER — HOSPITAL ENCOUNTER (EMERGENCY)
Facility: CLINIC | Age: 40
Discharge: HOME | End: 2020-11-16
Admitting: FAMILY MEDICINE
Payer: COMMERCIAL

## 2020-11-16 ENCOUNTER — NURSE TRIAGE (OUTPATIENT)
Dept: FAMILY MEDICINE | Facility: CLINIC | Age: 40
End: 2020-11-16

## 2020-11-16 VITALS
SYSTOLIC BLOOD PRESSURE: 139 MMHG | OXYGEN SATURATION: 98 % | WEIGHT: 218.1 LBS | TEMPERATURE: 98.2 F | DIASTOLIC BLOOD PRESSURE: 93 MMHG | BODY MASS INDEX: 33.05 KG/M2 | HEART RATE: 83 BPM | RESPIRATION RATE: 16 BRPM | HEIGHT: 68 IN

## 2020-11-16 LAB
ANION GAP SERPL CALCULATED.3IONS-SCNC: 4 MMOL/L (ref 3–14)
BUN SERPL-MCNC: 11 MG/DL (ref 7–30)
CALCIUM SERPL-MCNC: 9.7 MG/DL (ref 8.5–10.1)
CHLORIDE SERPL-SCNC: 106 MMOL/L (ref 94–109)
CO2 SERPL-SCNC: 28 MMOL/L (ref 20–32)
CREAT SERPL-MCNC: 0.8 MG/DL (ref 0.52–1.04)
GFR SERPL CREATININE-BSD FRML MDRD: >90 ML/MIN/{1.73_M2}
GLUCOSE SERPL-MCNC: 90 MG/DL (ref 70–99)
POTASSIUM SERPL-SCNC: 3.7 MMOL/L (ref 3.4–5.3)
SODIUM SERPL-SCNC: 137 MMOL/L (ref 133–144)

## 2020-11-16 PROCEDURE — 250N000013 HC RX MED GY IP 250 OP 250 PS 637: Performed by: FAMILY MEDICINE

## 2020-11-16 PROCEDURE — 80048 BASIC METABOLIC PNL TOTAL CA: CPT | Performed by: FAMILY MEDICINE

## 2020-11-16 PROCEDURE — 999N000104 HC STATISTIC NO CHARGE

## 2020-11-16 PROCEDURE — 250N000011 HC RX IP 250 OP 636: Performed by: FAMILY MEDICINE

## 2020-11-16 RX ORDER — ONDANSETRON 4 MG/1
4 TABLET, ORALLY DISINTEGRATING ORAL
Status: COMPLETED | OUTPATIENT
Start: 2020-11-16 | End: 2020-11-16

## 2020-11-16 RX ORDER — ACETAMINOPHEN 325 MG/1
975 TABLET ORAL ONCE
Status: COMPLETED | OUTPATIENT
Start: 2020-11-16 | End: 2020-11-16

## 2020-11-16 RX ADMIN — ONDANSETRON 4 MG: 4 TABLET, ORALLY DISINTEGRATING ORAL at 16:25

## 2020-11-16 RX ADMIN — ACETAMINOPHEN 975 MG: 325 TABLET, FILM COATED ORAL at 16:23

## 2020-11-16 ASSESSMENT — MIFFLIN-ST. JEOR: SCORE: 1717.8

## 2020-11-16 NOTE — TELEPHONE ENCOUNTER
"Patient having high BP and dizziness. Sent to ER    Additional Information    Negative: Pregnant > 20 weeks or postpartum (< 6 weeks after delivery) and new hand or face swelling    Negative: Pregnant > 20 weeks and BP > 140/90    Systolic BP >= 160 OR Diastolic >= 100, and any cardiac or neurologic symptoms (e.g., chest pain, difficulty breathing, unsteady gait, blurred vision)    Answer Assessment - Initial Assessment Questions  1. BLOOD PRESSURE: \"What is the blood pressure?\" \"Did you take at least two measurements 5 minutes apart?\"      180/129 pulse 92, 197/128 pulse 95, 203/138 pulse 85  183/125 pulse 98  No in succession  2. ONSET: \"When did you take your blood pressure?\"      Over the last 20 minutes,   3. HOW: \"How did you obtain the blood pressure?\" (e.g., visiting nurse, automatic home BP monitor)      Home BP monitor  4. HISTORY: \"Do you have a history of high blood pressure?\"      Yes   5. MEDICATIONS: \"Are you taking any medications for blood pressure?\" \"Have you missed any doses recently?\"      No  6. OTHER SYMPTOMS: \"Do you have any symptoms?\" (e.g., headache, chest pain, blurred vision, difficulty breathing, weakness)      Headache, dizziness    Protocols used: HIGH BLOOD PRESSURE-A-OH    Dionne Ramos RN   [facility]   "

## 2020-11-16 NOTE — ED TRIAGE NOTES
Pt presents with headache, dizziness and elevated BP 200s systolic that started about an hour ago.

## 2020-11-16 NOTE — ED PROVIDER NOTES
[place] EMERGENCY DEPARTMENT ([facility])  2020  History   No chief complaint on file.    HPI  Cathy Arroyo is a 40 year old female with a PMH of urticarial vasculitis, morbid obesity, pineocytoma S/P resection, preeclampsia, bipolar 1 disorder, medical cannabis use, h/o Graves' disease S/P total thyroidectomy, and uncomplicated asthma who presents the ED today complaining of high blood pressure and dizziness. ***    Patient recently tested positive for COVID-19 on 10/30/2020, and has been having daily headaches since.    Patient was recently seen here in the ED on 2020 for reactive lymphadenopathy.  She had a 1 cm bump on her right upper arm over the top of her right lateral epicondyle.  She was in agreement to continue taking Motrin and Tylenol as well as follow-up with PCP as necessary.  Patient was discharged home.    I have reviewed the Medications, Allergies, Past Medical and Surgical History, and Social History in the Epic system.  PAST MEDICAL HISTORY:   Past Medical History:   Diagnosis Date     Allergic state      Anxiety      Bipolar 1 disorder (H)      Depressive disorder      Elevated cholesterol      Graves disease      Obese     BMI 37.48     Pine[place] tumor     s/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 tumor     Post partum depression      Post-surgical hypothyroidism 2017     Uncomplicated asthma        PAST SURGICAL HISTORY:   Past Surgical History:   Procedure Laterality Date     BLOOD PATCH N/A 10/11/2016    Procedure: EPIDURAL BLOOD PATCH;  Surgeon: GENERIC ANESTHESIA PROVIDER;  Location: [facility] OR      SECTION, TUBAL LIGATION, COMBINED N/A 2019    Procedure:  SECTION, WITH TUBAL LIGATION;  Surgeon: Kathy Rutledge MD;  Location: [facility] L+D     CRANIOTOMY, EXCISE TUMOR COMPLEX, COMBINED  2014    Procedure: COMBINED CRANIOTOMY, EXCISE TUMOR COMPLEX;  Supracerabellar  Infratentorial Approach for Resection of Tumor ;  Surgeon: Linda  "Kate Kim MD;  [facility]: [facility] OR     THYROIDECTOMY N/A 5/3/2017    Procedure: THYROIDECTOMY;  Total Thyroidectomy;  Surgeon: Pamela Villasenor MD;  Location:  OR       Past medical history, past surgical history, medications, and allergies were reviewed with the patient. Additional pertinent items: {NONE:650739::\"None\"}    FAMILY HISTORY:   Family History   Problem Relation Age of Onset     Thyroid Disease Paternal Aunt      Breast Cancer Paternal Aunt      Asthma Father      Mental Illness Father      Obesity Father      Asthma Maternal Grandmother      Osteoporosis Maternal Grandmother      Glaucoma Maternal Grandmother      Hypertension Maternal Grandmother      Macular Degeneration Maternal Grandmother      Diabetes Paternal Grandfather      Other Cancer Mother      Genitourinary Problems Mother      Bipolar Disorder Mother         unipolar depression     Depression Mother      Thyroid Disease Mother         benign tumor     Skin Cancer Mother      Cancer Mother      Bipolar Disorder Brother         unipolar depression     Asthma Brother        SOCIAL HISTORY:   Social History     Tobacco Use     Smoking status: Current Every Day Smoker     Packs/day: 0.50     Years: 12.00     Pack years: 6.00     Types: Cigarettes     Start date: 2004     Last attempt to quit: 2/15/2018     Years since quittin.7     Smokeless tobacco: Never Used   Substance Use Topics     Alcohol use: Yes     Alcohol/week: 0.0 standard drinks     Comment: occasional     Social history was reviewed with the patient. Additional pertinent items: {NONE:203720::\"None\"}      Patient's Medications   New Prescriptions    No medications on file   Previous Medications    ALBUTEROL (PROAIR HFA/PROVENTIL HFA/VENTOLIN HFA) 108 (90 BASE) MCG/ACT INHALER    Inhale 2 puffs into the lungs every 4 hours as needed for shortness of breath / dyspnea or wheezing    ARIPIPRAZOLE (ABILIFY) 10 MG TABLET    Take 10 mg by mouth daily    " BUDESONIDE-FORMOTEROL (SYMBICORT) 160-4.5 MCG/ACT INHALER    Inhale 2 puffs into the lungs 2 times daily    CETIRIZINE (ZYRTEC) 10 MG TABLET    Take 1 tablet (10 mg) by mouth daily    CETIRIZINE (ZYRTEC) 5 MG TABLET    Take 2 tablets (10 mg) by mouth every morning When not using the Allegra    FAMOTIDINE (PEPCID) 40 MG TABLET    Take 1 tablet (40 mg) by mouth daily    FEXOFENADINE (ALLEGRA) 60 MG TABLET    Take 3 tablets (180 mg) by mouth every morning    IPRATROPIUM (ATROVENT HFA) 17 MCG/ACT INHALER    Inhale 2 puffs into the lungs every 6 hours    IPRATROPIUM - ALBUTEROL 0.5 MG/2.5 MG/3 ML (DUONEB) 0.5-2.5 (3) MG/3ML NEB SOLUTION    Take 1 vial (3 mLs) by nebulization every 6 hours as needed for shortness of breath / dyspnea or wheezing    LEVOTHYROXINE (SYNTHROID/LEVOTHROID) 150 MCG TABLET    Take one tablet by mouth on M, W and F    LEVOTHYROXINE (SYNTHROID/LEVOTHROID) 175 MCG TABLET    Take one tablet by mouth daily on T, Th, Sat and Sun    MEDICAL CANNABIS (PATIENT'S OWN SUPPLY)    Take 1 Dose by mouth See Admin Instructions (The purpose of this order is to document that the patient reports taking medical cannabis.  This is not a prescription, and is not used to certify that the patient has a qualifying medical condition.)    Tangerine Oral Suspension Unflavored 1-5 ml up to two times daily.  Total THC = 240 mg, Total CBD Trace    MEDICAL CANNABIS (PATIENT'S OWN SUPPLY)    Take 1 Dose by mouth See Admin Instructions (The purpose of this order is to document that the patient reports taking medical cannabis.  This is not a prescription, and is not used to certify that the patient has a qualifying medical condition.)    [place] oral suspension: take 1-3 ml by mouth two times daily  Total CBD 1200 mg. Total THC 60 mg    MONTELUKAST (SINGULAIR) 10 MG TABLET    Take 1 tablet (10 mg) by mouth every morning    TOPIRAMATE (TOPAMAX) 100 MG TABLET    Take 100 mg by mouth daily Every evening   Modified Medications    No  "medications on file   Discontinued Medications    No medications on file          Allergies   Allergen Reactions     Adacel [Daptacel] Swelling     Codeine Sulfate Nausea and Vomiting     Nicoderm [Nicotine]      Patch and Gum, pt reported allergic reaction 6/24     Tetanus Toxoid      Pt states she had an infection at injection site.      Vicodin [Hydrocodone-Acetaminophen] Nausea and Vomiting     Methimazole Rash        Review of Systems  {Complete vs limited ROS:121335::\"A complete review of systems was performed with pertinent positives and negatives noted in the HPI, and all other systems negative.\"}    Physical Exam          Physical Exam    ED Course        Procedures        {EKG done?:452306::\" \"}    {ed addendum:112468::\" \"}  {trauma activation or Fall?:493464::\" \"}  {Sepsis/Septic Shock/Stemi/Stroke:929481::\" \"}       No results found for this or any previous visit (from the past 24 hour(s)).  Medications - No data to display          Assessments & Plan (with Medical Decision Making)   ***    I have reviewed the nursing notes.    I have reviewed the findings, diagnosis, plan and need for follow up with the patient.    New Prescriptions    No medications on file       Final diagnoses:   None       11/16/2020   [facility] EMERGENCY DEPARTMENT  "

## 2020-11-17 ENCOUNTER — MYC MEDICAL ADVICE (OUTPATIENT)
Dept: FAMILY MEDICINE | Facility: CLINIC | Age: 40
End: 2020-11-17

## 2020-11-17 NOTE — TELEPHONE ENCOUNTER
BP Readings from Last 6 Encounters:   11/16/20 (!) 139/93   11/11/20 (!) 141/91   09/21/20 113/79   07/23/20 120/80   07/11/20 (!) 157/102   03/05/20 125/85

## 2020-11-17 NOTE — TELEPHONE ENCOUNTER
Can you please schedule patient for a physical and blood pressure check this Friday using the front half of my lactation spot and then blocking 11:45 am as catch-up and leaving 11:30 open as same day?  Thanks!  DEVEN Winston

## 2020-11-17 NOTE — TELEPHONE ENCOUNTER
Spoke with patient and got her scheduled Friday at 11 per provider request    Dionne Ramos RN   [facility]

## 2020-11-17 NOTE — TELEPHONE ENCOUNTER
Zamzam,    See patients my chart message. I did triage her yesterday for high BP's and sent her to ER. Do you want a follow up with her sooner than 12/3? She sent BP readings this morning. They are still really high. The ER notes are not done, so no BP results in there.    Thanks  Dionne Ramos RN   [facility] [place]

## 2020-11-20 ENCOUNTER — OFFICE VISIT (OUTPATIENT)
Dept: FAMILY MEDICINE | Facility: CLINIC | Age: 40
End: 2020-11-20
Payer: COMMERCIAL

## 2020-11-20 VITALS
WEIGHT: 218 LBS | BODY MASS INDEX: 33.04 KG/M2 | HEART RATE: 84 BPM | HEIGHT: 68 IN | SYSTOLIC BLOOD PRESSURE: 126 MMHG | TEMPERATURE: 99.1 F | OXYGEN SATURATION: 97 % | DIASTOLIC BLOOD PRESSURE: 80 MMHG | RESPIRATION RATE: 16 BRPM

## 2020-11-20 DIAGNOSIS — J45.40 MODERATE PERSISTENT ASTHMA WITHOUT COMPLICATION: ICD-10-CM

## 2020-11-20 DIAGNOSIS — E89.0 POST-SURGICAL HYPOTHYROIDISM: ICD-10-CM

## 2020-11-20 DIAGNOSIS — Z23 NEED FOR PNEUMOCOCCAL VACCINATION: ICD-10-CM

## 2020-11-20 DIAGNOSIS — F31.70 BIPOLAR AFFECTIVE DISORDER IN REMISSION (H): ICD-10-CM

## 2020-11-20 DIAGNOSIS — D44.5 PINEOCYTOMA (H): ICD-10-CM

## 2020-11-20 DIAGNOSIS — E66.01 MORBID OBESITY (H): ICD-10-CM

## 2020-11-20 DIAGNOSIS — Z00.00 ROUTINE GENERAL MEDICAL EXAMINATION AT A HEALTH CARE FACILITY: Primary | ICD-10-CM

## 2020-11-20 DIAGNOSIS — R59.1 LYMPHADENOPATHY: ICD-10-CM

## 2020-11-20 DIAGNOSIS — Z12.4 SCREENING FOR CERVICAL CANCER: ICD-10-CM

## 2020-11-20 LAB
BASOPHILS # BLD AUTO: 0 10E9/L (ref 0–0.2)
BASOPHILS NFR BLD AUTO: 0.3 %
DIFFERENTIAL METHOD BLD: NORMAL
EOSINOPHIL # BLD AUTO: 0.1 10E9/L (ref 0–0.7)
EOSINOPHIL NFR BLD AUTO: 1.5 %
ERYTHROCYTE [DISTWIDTH] IN BLOOD BY AUTOMATED COUNT: 13.8 % (ref 10–15)
HCT VFR BLD AUTO: 41.5 % (ref 35–47)
HGB BLD-MCNC: 13.4 G/DL (ref 11.7–15.7)
LYMPHOCYTES # BLD AUTO: 4 10E9/L (ref 0.8–5.3)
LYMPHOCYTES NFR BLD AUTO: 43.3 %
MCH RBC QN AUTO: 28.9 PG (ref 26.5–33)
MCHC RBC AUTO-ENTMCNC: 32.3 G/DL (ref 31.5–36.5)
MCV RBC AUTO: 90 FL (ref 78–100)
MONOCYTES # BLD AUTO: 0.6 10E9/L (ref 0–1.3)
MONOCYTES NFR BLD AUTO: 6.5 %
NEUTROPHILS # BLD AUTO: 4.5 10E9/L (ref 1.6–8.3)
NEUTROPHILS NFR BLD AUTO: 48.4 %
PLATELET # BLD AUTO: 368 10E9/L (ref 150–450)
RBC # BLD AUTO: 4.63 10E12/L (ref 3.8–5.2)
WBC # BLD AUTO: 9.3 10E9/L (ref 4–11)

## 2020-11-20 PROCEDURE — 90732 PPSV23 VACC 2 YRS+ SUBQ/IM: CPT | Performed by: NURSE PRACTITIONER

## 2020-11-20 PROCEDURE — 99395 PREV VISIT EST AGE 18-39: CPT | Mod: 25 | Performed by: NURSE PRACTITIONER

## 2020-11-20 PROCEDURE — 85025 COMPLETE CBC W/AUTO DIFF WBC: CPT | Performed by: NURSE PRACTITIONER

## 2020-11-20 PROCEDURE — 90686 IIV4 VACC NO PRSV 0.5 ML IM: CPT | Performed by: NURSE PRACTITIONER

## 2020-11-20 PROCEDURE — 90471 IMMUNIZATION ADMIN: CPT | Performed by: NURSE PRACTITIONER

## 2020-11-20 PROCEDURE — 99213 OFFICE O/P EST LOW 20 MIN: CPT | Mod: 25 | Performed by: NURSE PRACTITIONER

## 2020-11-20 PROCEDURE — 36415 COLL VENOUS BLD VENIPUNCTURE: CPT | Performed by: NURSE PRACTITIONER

## 2020-11-20 PROCEDURE — 90472 IMMUNIZATION ADMIN EACH ADD: CPT | Performed by: NURSE PRACTITIONER

## 2020-11-20 RX ORDER — MULTIPLE VITAMINS W/ MINERALS TAB 9MG-400MCG
1 TAB ORAL DAILY
COMMUNITY
End: 2024-02-21

## 2020-11-20 ASSESSMENT — ENCOUNTER SYMPTOMS
DIARRHEA: 1
SORE THROAT: 0
ABDOMINAL PAIN: 0
NERVOUS/ANXIOUS: 1
FEVER: 0
BREAST MASS: 0
HEMATURIA: 0
DYSURIA: 0
WEAKNESS: 0
FREQUENCY: 0
CHILLS: 0
ARTHRALGIAS: 0
HEARTBURN: 0
SHORTNESS OF BREATH: 0
COUGH: 0
JOINT SWELLING: 0
PALPITATIONS: 0
PARESTHESIAS: 0
HEMATOCHEZIA: 0
MYALGIAS: 0
HEADACHES: 1
CONSTIPATION: 0
EYE PAIN: 0
DIZZINESS: 1
NAUSEA: 0

## 2020-11-20 ASSESSMENT — MIFFLIN-ST. JEOR: SCORE: 1717.21

## 2020-11-20 NOTE — PATIENT INSTRUCTIONS
Thyroid labs and cholesterol in [facility] same day [facility] appt - come fasting    Preventive Health Recommendations  Female Ages 26 - 39  Yearly exam:   See your health care provider every year in order to    Review health changes.     Discuss preventive care.      Review your medicines if you your doctor has prescribed any.    Until age 30: Get a Pap test every three years (more often if you have had an abnormal result).    After age 30: Talk to your doctor about whether you should have a Pap test every 3 years or have a Pap test with HPV screening every 5 years.   You do not need a Pap test if your uterus was removed (hysterectomy) and you have not had cancer.  You should be tested each year for STDs (sexually transmitted diseases), if you're at risk.   Talk to your provider about how often to have your cholesterol checked.  If you are at risk for diabetes, you should have a diabetes test (fasting glucose).  Shots: Get a flu shot each year. Get a tetanus shot every 10 years.   Nutrition:     Eat at least 5 servings of fruits and vegetables each day.    Eat whole-grain bread, whole-wheat pasta and brown rice instead of white grains and rice.    Get adequate Calcium and Vitamin D.     Lifestyle    Exercise at least 150 minutes a week (30 minutes a day, 5 days of the week). This will help you control your weight and prevent disease.    Limit alcohol to one drink per day.    No smoking.     Wear sunscreen to prevent skin cancer.    See your dentist every six months for an exam and cleaning.

## 2020-11-20 NOTE — Clinical Note
Mau Torres,  I am happy to order and manage Cathy's breast cancer screening.  I just am not sure of the actual Epic orders (what type of MRI is recommended and how to order a 4D mammo).  Do you happen to know those Epic orders?    Thanks so much for seeing my awesome patient,  Zamzam

## 2020-11-20 NOTE — PROGRESS NOTES
SUBJECTIVE:   CC: Cathy Arroyo is an 40 year old woman who presents for preventive health visit.       Patient has been advised of split billing requirements and indicates understanding: Yes  Healthy Habits:  Answers for HPI/ROS submitted by the patient on 11/20/2020   Annual Exam:  Frequency of exercise:: None  Getting at least 3 servings of Calcium per day:: Yes  Diet:: Regular (no restrictions)  Taking medications regularly:: Yes  Medication side effects:: None  Bi-annual eye exam:: Yes  Dental care twice a year:: NO  Sleep apnea or symptoms of sleep apnea:: Sleep apnea  abdominal pain: No  Blood in stool: No  Blood in urine: No - but urine is dark (yellow-orange, not red or brown), attributes to possible dehydration  chest pain: No  chills: No  congestion: No  constipation: No  cough: No  diarrhea: Yes  dizziness: Yes  ear pain: No  eye pain: No  nervous/anxious: Yes  fever: No  frequency: No  genital sores: No  headaches: Yes  hearing loss: No  heartburn: No  arthralgias: No  joint swelling: No  peripheral edema: No  mood changes: No  myalgias: No  nausea: No  dysuria: No  palpitations: No  Skin sensation changes: No  sore throat: No  urgency: No  rash: No  shortness of breath: No  visual disturbance: No  weakness: No  pelvic pain: No  vaginal bleeding: No  vaginal discharge: No  tenderness: No  breast mass: No  breast discharge: No  Additional concerns today:: No    Daily steroid inhaler has been helping.  A little bit of a wheeze daily, but breathing much better,  Down to 6 cigarettes today via mindfulness.    Recent high blood pressures at home - 139/93 in the hospital 11/16/20  128/69 at home.  Only PMH HTN is maternal grandmother    Wt Readings from Last 5 Encounters:   11/20/20 98.9 kg (218 lb)   11/16/20 98.9 kg (218 lb 1.6 oz)   11/11/20 101.7 kg (224 lb 1.6 oz)   09/21/20 99.8 kg (220 lb)   07/11/20 101.4 kg (223 lb 8 oz)     Cancer genetics and breast cancer screening - alternating 4D  mammo and breast MRI every 6 months.  Insurance will not cover advanced genetic screening    Lymph node left jawline for the past five years and right arm for the past 1.5 weeks.  Had imaging of the arm, but not of the one in the neck.  Neither are tender.       Thyroid   Component      Latest Ref Rng & Units 2020   TSH      0.40 - 4.00 mU/L 4.79 (H) 3.67   T4 Free      0.76 - 1.46 ng/dL 1.17      Last Vit D - 2019 - ; taking vit D supplement 2000 international unit(s) daily and MVI with some vit D    Today's PHQ-2 Score:   PHQ-2 (  [employer]) 2020   Q1: Little interest or pleasure in doing things 0 0   Q2: Feeling down, depressed or hopeless 0 0   PHQ-2 Score 0 0   Q1: Little interest or pleasure in doing things Not at all -   Q2: Feeling down, depressed or hopeless Not at all -   PHQ-2 Score 0 -       Abuse: Current or Past(Physical, Sexual or Emotional)- No  Do you feel safe in your environment? Yes        Social History     Tobacco Use     Smoking status: Current Every Day Smoker     Packs/day: 0.50     Years: 12.00     Pack years: 6.00     Types: Cigarettes     Start date: 2004     Last attempt to quit: 2/15/2018     Years since quittin.7     Smokeless tobacco: Never Used   Substance Use Topics     Alcohol use: Yes     Alcohol/week: 0.0 standard drinks     Comment: occasional     If you drink alcohol do you typically have >3 drinks per day or >7 drinks per week? No                     Reviewed orders with patient.  Reviewed health maintenance and updated orders accordingly - Yes  Lab work is in process  Labs reviewed in [facility]    Alternate mammogram schedule due to breast cancer history - need orders for 4D mammogram and MRI    Pertinent mammograms are reviewed under the imaging tab.  History of abnormal Pap smear: NO - age 30-65 PAP every 5 years with negative HPV co-testing recommended  PAP / HPV Latest Ref Rng & Units 2018   PAP - NIL   HPV 16 DNA NEG:Negative  "Negative   HPV 18 DNA NEG:Negative Negative   OTHER HR HPV NEG:Negative Negative     Reviewed and updated as needed this visit by clinical staff  Tobacco  Allergies  Meds  Problems  Med Hx  Surg Hx  Fam Hx          Reviewed and updated as needed this visit by Provider     Problems                ROS:  As above    OBJECTIVE:   /80   Pulse 84   Temp 99.1  F (37.3  C) (Temporal)   Resp 16   Ht 1.727 m (5' 7.99\")   Wt 98.9 kg (218 lb)   LMP 11/10/2020   SpO2 97%   BMI 33.15 kg/m    EXAM:  GENERAL: healthy, alert and no distress  EYES: Eyes grossly normal to inspection, PERRL and conjunctivae and sclerae normal  HENT: ear canals and TM's normal, nose and mouth deferred due to covid  NECK: no cervical adenopathy, no asymmetry, masses, or scars and thyroid normal to palpation  RESP: lungs clear to auscultation - no rales, rhonchi or wheezes  BREAST: normal without masses, tenderness or nipple discharge and no palpable axillary masses or adenopathy  CV: regular rate and rhythm, normal S1 S2, no S3 or S4, no murmur, click or rub, no peripheral edema and peripheral pulses strong  ABDOMEN: soft, nontender, no hepatosplenomegaly, no masses and bowel sounds normal  MS: no gross musculoskeletal defects noted, no edema  SKIN: no suspicious lesions or rashes  NEURO: Normal strength and tone, mentation intact and speech normal  PSYCH: mentation appears normal, affect normal/bright  LYMPH: no cervical, supraclavicular, axillary, or inguinal adenopathy; pea-sized, shotty, mobile lymph node right arm; pea-sized nodule left cheek just superior to jaw    Diagnostic Test Results:  Labs reviewed in Epic  none     ASSESSMENT/PLAN:   (J45.40) Moderate persistent asthma without complication  (primary encounter diagnosis)  Comment:   Plan: PPSV23, IM/SUBQ (2+ YRS) - [id number]        Lungs clear and asthma well controlled with current regimen    (Z00.00) Routine general medical examination at [facility] " "facility  Comment:   Plan:     (R59.1) Lymphadenopathy  Comment:   Plan: CBC with platelets and differential, US Head         Neck Soft Tissue  1 - monitor lymph node on the right arm  2 - unclear if the nodule in the cheek is lymph node.  It is not submandibular or preauricular.  Given long time presence, unlikely to be concerning.  US to characterize    (Z12.4) Screening for cervical cancer  Comment:   Plan: not due    (F31.70) Bipolar affective disorder in remission (H)  Comment:   Plan: Stable, followed by pscyhiatry      (E66.01) Morbid obesity (H)  Comment:   Plan: Lipid panel reflex to direct LDL Fasting            (D44.5) Pineocytoma (H)  Comment:   Plan: Followed by neurosurgery.  Last seen in 2019 with instructions to follow-up in two years.    (E89.0) Post-surgical hypothyroidism  Comment:   Plan: **TSH with free T4 reflex FUTURE anytime        TSH slightly high with normal T4 in June followed by normal TSH in July.  Will do thyroid labs at the same time as fasting lipids to consolidate.  Will be bringing in daughter for WCC in Dec and can do labs then    (Z23) Need for pneumococcal vaccination  Comment:   Plan: PPSV23, IM/SUBQ (2+ YRS) - [id number]                          Patient has been advised of split billing requirements and indicates understanding: Yes  COUNSELING:   Reviewed preventive health counseling, as reflected in patient instructions    Estimated body mass index is 33.15 kg/m  as calculated from the following:    Height as of this encounter: 1.727 m (5' 7.99\").    Weight as of this encounter: 98.9 kg (218 lb).    Weight management plan: Discussed healthy diet and exercise guidelines    She reports that she has been smoking cigarettes. She started smoking about 16 years ago. She has a 6.00 pack-year smoking history. She has never used smokeless tobacco.  Tobacco Cessation Action Plan:   Self help information given to patient      Counseling Resources:  ATP IV Guidelines  Pooled Cohorts " Equation Calculator  Breast Cancer Risk Calculator  BRCA-Related Cancer Risk Assessment: FHS-7 Tool  FRAX Risk Assessment  ICSI Preventive Guidelines  Dietary Guidelines for Americans, 2010  USDA's MyPlate  ASA Prophylaxis  Lung CA Screening    JANNET Liu CNP  [facility] [place]

## 2020-11-21 ASSESSMENT — ASTHMA QUESTIONNAIRES: ACT_TOTALSCORE: 9

## 2020-11-25 NOTE — RESULT ENCOUNTER NOTE
Cathy,    The CBC was totally normal and reassuring.  Have a good thanksgiving with your family. If you have any questions, please feel free to contact the clinic.    DEVEN Winston

## 2020-11-27 ENCOUNTER — HOSPITAL ENCOUNTER (OUTPATIENT)
Dept: ULTRASOUND IMAGING | Facility: CLINIC | Age: 40
Discharge: HOME | End: 2020-11-27
Attending: NURSE PRACTITIONER | Admitting: NURSE PRACTITIONER
Payer: COMMERCIAL

## 2020-11-27 DIAGNOSIS — R59.1 LYMPHADENOPATHY: ICD-10-CM

## 2020-11-27 PROCEDURE — 76536 US EXAM OF HEAD AND NECK: CPT

## 2020-11-30 ENCOUNTER — TELEPHONE (OUTPATIENT)
Dept: FAMILY MEDICINE | Facility: CLINIC | Age: 40
End: 2020-11-30

## 2020-11-30 NOTE — TELEPHONE ENCOUNTER
Spoke with patient and relayed message per provider    Dionne Ramos RN   [facility]

## 2020-11-30 NOTE — TELEPHONE ENCOUNTER
Reason for call:  Other   Patient called regarding (reason for call): call back  Additional comments:     Patient called in looking for US results. Informed patient MS was not in today but that I would leave her a message so that she could call the patient back with the results when she got in this week.     Phone number to reach patient:  Cell number on file:    Telephone Information:   Mobile [phone]       Best Time:  any    Can we leave a detailed message on this number?  YES    Travel screening: Not Applicable

## 2020-11-30 NOTE — TELEPHONE ENCOUNTER
The bump is a calcification which doesn't mean a whole lot, but is reassuring.  It is near the parotid gland.  They saw no lymph node enlargement or involvement.  I suggest she give it a name and monitor for any growth.  ARMEN Dasilva, DEVEN

## 2020-12-28 DIAGNOSIS — E89.0 POST-SURGICAL HYPOTHYROIDISM: ICD-10-CM

## 2020-12-28 DIAGNOSIS — E89.0 S/P TOTAL THYROIDECTOMY: ICD-10-CM

## 2020-12-28 DIAGNOSIS — Z86.39 HISTORY OF GRAVES' DISEASE: ICD-10-CM

## 2020-12-28 RX ORDER — LEVOTHYROXINE SODIUM 175 UG/1
TABLET ORAL
Qty: 90 TABLET | Refills: 2 | Status: SHIPPED | OUTPATIENT
Start: 2020-12-28 | End: 2021-12-17

## 2020-12-28 RX ORDER — LEVOTHYROXINE SODIUM 150 UG/1
TABLET ORAL
Qty: 90 TABLET | Refills: 2 | Status: SHIPPED | OUTPATIENT
Start: 2020-12-28 | End: 2021-12-17

## 2020-12-29 NOTE — TELEPHONE ENCOUNTER
"Requested Prescriptions   Pending Prescriptions Disp Refills     levothyroxine (SYNTHROID/LEVOTHROID) 175 MCG tablet 30 tablet 11     Sig: Take one tablet by mouth daily on T, Th, Sat and Sun       Thyroid Protocol Passed - 12/28/2020  3:06 PM        Passed - Patient is 12 years or older        Passed - Recent (12 mo) or future (30 days) visit within the authorizing provider's specialty     Patient has had an office visit with the authorizing provider or a provider within the authorizing providers department within the previous 12 mos or has a future within next 30 days. See \"Patient Info\" tab in inbasket, or \"Choose Columns\" in Meds & Orders section of the refill encounter.              Passed - Medication is active on med list        Passed - Normal TSH on file in past 12 months     Recent Labs   Lab Test 07/11/20  1620   TSH 3.67              Passed - No active pregnancy on record     If patient is pregnant or has had a positive pregnancy test, please check TSH.          Passed - No positive pregnancy test in past 12 months     If patient is pregnant or has had a positive pregnancy test, please check TSH.             levothyroxine (SYNTHROID/LEVOTHROID) 150 MCG tablet 30 tablet 11     Sig: Take one tablet by mouth on M, W and F       Thyroid Protocol Passed - 12/28/2020  3:06 PM        Passed - Patient is 12 years or older        Passed - Recent (12 mo) or future (30 days) visit within the authorizing provider's specialty     Patient has had an office visit with the authorizing provider or a provider within the authorizing providers department within the previous 12 mos or has a future within next 30 days. See \"Patient Info\" tab in inbasket, or \"Choose Columns\" in Meds & Orders section of the refill encounter.              Passed - Medication is active on med list        Passed - Normal TSH on file in past 12 months     Recent Labs   Lab Test 07/11/20  1620   TSH 3.67              Passed - No active pregnancy on " record     If patient is pregnant or has had a positive pregnancy test, please check TSH.          Passed - No positive pregnancy test in past 12 months     If patient is pregnant or has had a positive pregnancy test, please check TSH.             Signed Prescriptions:                        Disp   Refills    levothyroxine (SYNTHROID/LEVOTHROID) 175 M*90 tab*2        Sig: Take one tablet by mouth daily on T, Th, Sat and Sun  Authorizing Provider: LINO BAXTER  Ordering User: DANA ROJAS    levothyroxine (SYNTHROID/LEVOTHROID) 150 M*90 tab*2        Sig: Take one tablet by mouth on M, W and F  Authorizing Provider: LINO BAXTER  Ordering User: DANA ROJAS

## 2021-01-04 ENCOUNTER — NURSE TRIAGE (OUTPATIENT)
Dept: FAMILY MEDICINE | Facility: CLINIC | Age: 41
End: 2021-01-04

## 2021-01-04 DIAGNOSIS — E89.0 POST-SURGICAL HYPOTHYROIDISM: ICD-10-CM

## 2021-01-04 DIAGNOSIS — E66.01 MORBID OBESITY (H): ICD-10-CM

## 2021-01-04 LAB
CHOLEST SERPL-MCNC: 185 MG/DL
HDLC SERPL-MCNC: 51 MG/DL
LDLC SERPL CALC-MCNC: 113 MG/DL
NONHDLC SERPL-MCNC: 134 MG/DL
TRIGL SERPL-MCNC: 105 MG/DL
TSH SERPL DL<=0.005 MIU/L-ACNC: 2.1 MU/L (ref 0.4–4)

## 2021-01-04 PROCEDURE — 80061 LIPID PANEL: CPT | Performed by: NURSE PRACTITIONER

## 2021-01-04 PROCEDURE — 36415 COLL VENOUS BLD VENIPUNCTURE: CPT | Performed by: NURSE PRACTITIONER

## 2021-01-04 PROCEDURE — 84443 ASSAY THYROID STIM HORMONE: CPT | Performed by: NURSE PRACTITIONER

## 2021-01-04 NOTE — TELEPHONE ENCOUNTER
Reason for call:  Patient reporting a symptom    Symptom or request: Swollen Lymphnode in Arm    Duration (how long have symptoms been present): 2 months    Have you been treated for this before? Yes - saw Sherrill Dasilva on 11/20/20    Additional comments: Patient was told by Sherrill Dasilva to keep an eye on the lymph node over the next two months. Patient stated she has been and it still is very large. She asked what Sherrill Dasilva would like to do next. She wasn't sure if she needed a biopsy, or if there's anything else she recommends. Please call or Mychart message patient.     Phone Number patient can be reached at:  Home number on file [phone] (home)    Best Time:  Anytime    Can we leave a detailed message on this number:  YES    Call taken on 1/4/2021 at 9:50 AM by Sun Amin

## 2021-01-05 ENCOUNTER — VIRTUAL VISIT (OUTPATIENT)
Dept: FAMILY MEDICINE | Facility: CLINIC | Age: 41
End: 2021-01-05
Payer: COMMERCIAL

## 2021-01-05 DIAGNOSIS — Z12.39 BREAST CANCER SCREENING, HIGH RISK PATIENT: ICD-10-CM

## 2021-01-05 DIAGNOSIS — R59.0 LYMPHADENOPATHY, EPITROCHLEAR: Primary | ICD-10-CM

## 2021-01-05 DIAGNOSIS — J45.41 MODERATE PERSISTENT ASTHMA WITH ACUTE EXACERBATION: ICD-10-CM

## 2021-01-05 PROCEDURE — 99215 OFFICE O/P EST HI 40 MIN: CPT | Mod: 95 | Performed by: NURSE PRACTITIONER

## 2021-01-05 RX ORDER — BUDESONIDE AND FORMOTEROL FUMARATE DIHYDRATE 160; 4.5 UG/1; UG/1
2 AEROSOL RESPIRATORY (INHALATION) 2 TIMES DAILY
Qty: 10.2 G | Refills: 1 | Status: SHIPPED | OUTPATIENT
Start: 2021-01-05 | End: 2021-07-09

## 2021-01-05 RX ORDER — DEXTROAMPHETAMINE SACCHARATE, AMPHETAMINE ASPARTATE MONOHYDRATE, DEXTROAMPHETAMINE SULFATE AND AMPHETAMINE SULFATE 3.75; 3.75; 3.75; 3.75 MG/1; MG/1; MG/1; MG/1
15 CAPSULE, EXTENDED RELEASE ORAL DAILY
COMMUNITY
End: 2022-06-01

## 2021-01-05 ASSESSMENT — ASTHMA QUESTIONNAIRES
QUESTION_1 LAST FOUR WEEKS HOW MUCH OF THE TIME DID YOUR ASTHMA KEEP YOU FROM GETTING AS MUCH DONE AT WORK, SCHOOL OR AT HOME: NONE OF THE TIME
QUESTION_2 LAST FOUR WEEKS HOW OFTEN HAVE YOU HAD SHORTNESS OF BREATH: NOT AT ALL
ACT_TOTALSCORE: 24
QUESTION_4 LAST FOUR WEEKS HOW OFTEN HAVE YOU USED YOUR RESCUE INHALER OR NEBULIZER MEDICATION (SUCH AS ALBUTEROL): NOT AT ALL
QUESTION_5 LAST FOUR WEEKS HOW WOULD YOU RATE YOUR ASTHMA CONTROL: WELL CONTROLLED
QUESTION_3 LAST FOUR WEEKS HOW OFTEN DID YOUR ASTHMA SYMPTOMS (WHEEZING, COUGHING, SHORTNESS OF BREATH, CHEST TIGHTNESS OR PAIN) WAKE YOU UP AT NIGHT OR EARLIER THAN USUAL IN THE MORNING: NOT AT ALL

## 2021-01-05 NOTE — PATIENT INSTRUCTIONS
Breast cancer screening: schedule breast MRI - [phone]  In six months, we will have you do a screening mammogram    Schedule lymph node US at the same number - get the ultrasound before the general surgery consult  Schedule with general surgery

## 2021-01-05 NOTE — TELEPHONE ENCOUNTER
"  Additional Information    Negative: Node is in the neck and can't swallow fluids    Negative: Fever > 103 F (39.4 C)    Negative: Lump or swelling in groin and pulsating (like heartbeat)    Negative: Single large node and size > 1 inch (2.5 cm)    Negative: Overlying skin is red    Negative: Node is in the neck and causes difficulty breathing    Negative: Patient sounds very sick or weak to the triager    Negative: Sore throat is the main symptom and has swollen node in the neck that is < 1 inch (2.5 cm) in size    Negative: Sounds like a life-threatening emergency to the triager    Negative: Rapid increase in size of node over several hours    Negative: Tender node in the groin and has a sore, scratch, cut, or painful red area on that leg    Negative: Tender node in the armpit and has a sore, scratch, cut, or painful red area on that arm    Negative: Tender node in the neck and also has a sore throat with minimal/no runny nose or cough    Negative: Fever present > 3 days (72 hours)    Negative: Large nodes at multiple locations    Negative: Very tender to the touch but no fever    Negative: Patient wants to be seen    Large node present > 2 weeks    Answer Assessment - Initial Assessment Questions  1. LOCATION: \"Where is the swollen node located?\" \"Is the matching node on the other side of the body also swollen?\"         Right arm - upper anterior antecubital space - does have a tattoo on this space    2. SIZE: \"How big is the node?\" (Inches or centimeters) (or compare to common objects such as pea, bean, marble, golf ball)     2 cm x 2 cm estimate        3. ONSET: \"When did the swelling start?\"         10 weeks -     4. NECK NODES: \"Is there a sore throat, runny nose or other symptoms of a cold?\"         Denies these symptoms    5. GROIN OR ARMPIT NODES: \"Is there a sore, scratch, cut or painful red area on that arm or leg?\"         Denies    6. FEVER: \"Do you have a fever?\" If so, ask: \"What is it, how was it " "measured, and when did it start?\"         No    7. CAUSE: \"What do you think is causing the swollen lymph nodes?\"      No    8. OTHER SYMPTOMS: \"Do you have any other symptoms?\"    Feels like its harder,     Denies red streaks, pain, drainage,         Feels other parts of arm have \"harder tissue\" compared to other arm    9. PREGNANCY: \"Is there any chance you are pregnant?\" \"When was your last menstrual period?\"      *No Answer*    Protocols used: LYMPH NODES - [id number]    "

## 2021-01-05 NOTE — PROGRESS NOTES
Cathy Arroyo is a 40 year old female who is being evaluated via a billable video visit.      How would you like to obtain your AVS? MyChart  If the video visit is dropped, the invitation should be resent by: Text to cell phone: [phone]   Will anyone else be joining your video visit? No      Video Start Time: 3:53 PM  Assessment & Plan     (R59.0) Lymphadenopathy, epitrochlear  (primary encounter diagnosis)  Comment:   Plan: US Bx Arm/Elbow Soft Tissue Superfical, GENERAL        SURG ADULT REFERRAL            (Z12.39) Breast cancer screening, high risk patient  Comment:   Plan: MR Breast Bilateral w/o & w Contrast            (J45.41) Moderate persistent asthma with acute exacerbation  Comment:   Plan: budesonide-formoterol (SYMBICORT) 160-4.5         MCG/ACT Inhaler                Review of prior external note(s) from - Epic  Review of the result(s) of each unique test - Ultrasound of cheek      48 minutes spent on the date of the encounter doing chart review, history and exam, documentation and further activities as noted above           Patient Instructions   Breast cancer screening: schedule breast MRI - [phone]  In six months, we will have you do a screening mammogram    Schedule lymph node US at the same number - get the ultrasound before the general surgery consult  Schedule with general surgery      Return in about 6 months (around 7/5/2021) for Asthma Recheck and ACT.    JANNET Liu CNP  M [facility]    Subjective     Cathy Arroyo is a 40 year old female who presents to clinic today for the following health issues  accompanied by herself:    HPI       Concern - swollen lymph node   Patient first noticed the lump in her right near-bicep around November 8.  She had a bedside ultrasound at the ED on 11/11 and the bump was thought to be a reactive lymph node.  The lump feels a little harder than previously with no size change.  Since an office  "visit 11/20/20 on she feels the texture of the muscle around the swelling is firmer.  Neither the arm nor the lump are painful, the arm is not swollen, no changes in function of fingers, hand, wrist or arm.  The areas of swellings are under tattoo so color change is difficult to tell, but doesn't appear redness. She is feeling entirely well otherwise without any symptoms of illness.    PMH includes asthma that has been well controlled (when remembering controller inhaler), stable post-surgical hypothyroidism secondary to Graves, and stable pineocytoma.  Patient had covid just immediately prior to noting the swelling.    She had an additional area of swelling in the left jawline for greater than a year.  An ultrasound was done and showed the following:   ULTRASOUND HEAD AND NECK SOFT TISSUE  11/27/2020 9:16 AM     HISTORY: Left side \"BB-size\" lump - just superior to jaw - possible  parotid gland adenopathy - not submandibular. Lymphadenopathy.     COMPARISON: None.     FINDINGS:  Tiny calcification in the region of concern next to the  left parotid gland which is nonspecific. This may be a tiny area of  fat necrosis, but ultimately is indeterminate. There is no evidence  for cervical adenopathy bilaterally by size or morphology.                                                                      IMPRESSION:    Tiny nonspecific subcutaneous calcification noted in  the left cheek.    I have reviewed and updated the patient's Past Medical History, Social History, Family History and Medication List      Review of Systems   Constitutional, HEENT, cardiovascular, pulmonary, gi systems are negative, except as otherwise noted.      Objective           Vitals:  No vitals were obtained today due to virtual visit.    Physical Exam   GENERAL: Healthy, alert and no distress  EYES: Eyes grossly normal to inspection.  No discharge or erythema, or obvious scleral/conjunctival abnormalities.  RESP: No audible wheeze, cough, or " visible cyanosis.  No visible retractions or increased work of breathing.    SKIN: Visible skin clear. No significant rash, abnormal pigmentation or lesions.  NEURO: Cranial nerves grossly intact.  Mentation and speech appropriate for age.  PSYCH: Mentation appears normal, affect normal/bright, judgement and insight intact, normal speech and appearance well-groomed.    Ultrasound pending    ----- Ambulatory Services Attestations for Billing on Time -----        Video-Visit Details    Type of service:  Video Visit    Video End Time:4:32 PM    Originating Location (pt. Location): Home    Distant Location (provider location):  [facility]     Platform used for Video Visit: [employer]

## 2021-01-06 ENCOUNTER — MYC MEDICAL ADVICE (OUTPATIENT)
Dept: FAMILY MEDICINE | Facility: CLINIC | Age: 41
End: 2021-01-06

## 2021-01-06 DIAGNOSIS — R59.0 LYMPHADENOPATHY, EPITROCHLEAR: Primary | ICD-10-CM

## 2021-01-06 DIAGNOSIS — J45.41 MODERATE PERSISTENT ASTHMA WITH ACUTE EXACERBATION: ICD-10-CM

## 2021-01-06 RX ORDER — BUDESONIDE AND FORMOTEROL FUMARATE DIHYDRATE 160; 4.5 UG/1; UG/1
2 AEROSOL RESPIRATORY (INHALATION) 2 TIMES DAILY
Qty: 0.1 G | Refills: 0 | OUTPATIENT
Start: 2021-01-06

## 2021-01-06 ASSESSMENT — ASTHMA QUESTIONNAIRES: ACT_TOTALSCORE: 24

## 2021-01-06 NOTE — TELEPHONE ENCOUNTER
Zamzam    See pt message    Not sure which one to cue up  Tried to get radiology tech to help set up the correct one, but to no avail     Cristin Landry RN   [facility]      Clinical Info for the Interpreting Provider  right lymphnode swelling since early Nov 2020, noted on bedside US at ED on 11/11 - right upper arm over lateral epicondyle

## 2021-01-06 NOTE — TELEPHONE ENCOUNTER
Patient called to clarify US orders. Radiology stated they do not do biopsy.  Called and spoke to radiology. They need order changed to US right upper extremity. In in comments indicate which location and dx.

## 2021-01-06 NOTE — TELEPHONE ENCOUNTER
"Requested Prescriptions   Pending Prescriptions Disp Refills     budesonide-formoterol (SYMBICORT) 160-4.5 MCG/ACT Inhaler 10.2 g 1     Sig: Inhale 2 puffs into the lungs 2 times daily       Long-Acting Beta Agonist Inhalers Protocol  Failed - 1/6/2021 10:23 AM        Failed - Order for Serevent, Striverdi, or Foradil and pt has steroid inhaler        Passed - Patient is age 12 or older        Passed - Asthma control assessment score within normal limits in last 6 months     Please review ACT score.           Passed - Medication is active on med list        Passed - Recent (6 mo) or future (30 days) visit within the authorizing provider's specialty     Patient had office visit in the last 6 months or has a visit in the next 30 days with authorizing provider or within the authorizing provider's specialty.  See \"Patient Info\" tab in inbasket, or \"Choose Columns\" in Meds & Orders section of the refill encounter.           Inhaled Steroids Protocol Passed - 1/6/2021 10:23 AM        Passed - Patient is age 12 or older        Passed - Asthma control assessment score within normal limits in last 6 months     Please review ACT score.           Passed - Medication is active on med list        Passed - Recent (6 mo) or future (30 days) visit within the authorizing provider's specialty     Patient had office visit in the last 6 months or has a visit in the next 30 days with authorizing provider or within the authorizing provider's specialty.  See \"Patient Info\" tab in inbasket, or \"Choose Columns\" in Meds & Orders section of the refill encounter.               Refills sent 1/5/21    Refused Prescriptions:                       Disp   Refills    budesonide-formoterol (SYMBICORT) 160-4.5 *0.1 g  0        Sig: Inhale 2 puffs into the lungs 2 times daily  Refused By: DANA ROJAS  Reason for Refusal: Duplicate            "

## 2021-01-07 NOTE — TELEPHONE ENCOUNTER
I think this is the correct one.  Would you mind checking before patient tries to schedule again?  Thanks! DEVEN Winston

## 2021-01-08 NOTE — TELEPHONE ENCOUNTER
REFERRAL INFORMATION:    Referring Provider:  JANNET Hendricks CNP    Referring Clinic:  [facility]     Reason for Visit/Diagnosis: Lipoma on Right Arm        FUTURE VISIT INFORMATION:    Appointment Date: 1/21/2021    Appointment Time: 3:30 PM      NOTES RECORD STATUS  DETAILS   OFFICE NOTE from Referring Provider Internal 1/5/2021, 11/20/2020 Office visit with JANNET Hendricks CNP      OFFICE NOTE from Other Specialists Internal 11/11/2020  Nurse Triage    HOSPITAL DISCHARGE SUMMARY/ ED VISITS  N/A    OPERATIVE REPORT N/A    ENDOSCOPY (EGD)  N/A    PERTINENT LABS Internal    PATHOLOGY REPORTS (RELATED) N/A    IMAGING (CT, MRI, US, XR)  N/A

## 2021-01-11 ENCOUNTER — ANCILLARY PROCEDURE (OUTPATIENT)
Dept: ULTRASOUND IMAGING | Facility: CLINIC | Age: 41
End: 2021-01-11
Attending: NURSE PRACTITIONER
Payer: COMMERCIAL

## 2021-01-11 DIAGNOSIS — R59.0 LYMPHADENOPATHY, EPITROCHLEAR: ICD-10-CM

## 2021-01-11 PROCEDURE — 76882 US LMTD JT/FCL EVL NVASC XTR: CPT | Mod: RT | Performed by: RADIOLOGY

## 2021-01-12 ENCOUNTER — MYC MEDICAL ADVICE (OUTPATIENT)
Dept: FAMILY MEDICINE | Facility: CLINIC | Age: 41
End: 2021-01-12

## 2021-01-13 ENCOUNTER — TELEPHONE (OUTPATIENT)
Dept: FAMILY MEDICINE | Facility: CLINIC | Age: 41
End: 2021-01-13

## 2021-01-13 NOTE — TELEPHONE ENCOUNTER
Monroe given emotional support. Continued to update her on patient care and and plan. Discharge instructions provided on today's procedure. Verbalized understanding. Diana instructed on how to get to ED to be with patient. Routing to on-call CNM per JRO. Can you please call patient? Thanks.   Beatriz Johnson RN-BSN

## 2021-01-13 NOTE — TELEPHONE ENCOUNTER
Zamzam,    See message below.    Thanks  Dionne Ramos RN   [facility]

## 2021-01-13 NOTE — TELEPHONE ENCOUNTER
Reason for Call:  Request for results:    Name of test or procedure: ultra sound    Date of test of procedure: 1-    Location of the test or procedure: [facility]    OK to leave the result message on voice mail or with a family member? YES    Phone number Patient can be reached at:  Home number on file [phone] (home)    Additional comments: patient is calling for her results of her ultrasound    Call taken on 1/13/2021 at 2:17 PM by Ada Pollard

## 2021-01-14 ASSESSMENT — ENCOUNTER SYMPTOMS
TINGLING: 0
COUGH DISTURBING SLEEP: 0
WEIGHT LOSS: 1
NIGHT SWEATS: 0
DIZZINESS: 0
POLYDIPSIA: 0
NAUSEA: 1
HEARTBURN: 1
BACK PAIN: 1
ABDOMINAL PAIN: 0
BOWEL INCONTINENCE: 0
CONSTIPATION: 0
NUMBNESS: 0
DEPRESSION: 1
DECREASED CONCENTRATION: 1
WEIGHT GAIN: 0
DIARRHEA: 1
SWOLLEN GLANDS: 1
SHORTNESS OF BREATH: 1
BLOOD IN STOOL: 0
DYSPNEA ON EXERTION: 1
RECTAL PAIN: 0
SPUTUM PRODUCTION: 0
MUSCLE CRAMPS: 0
MUSCLE WEAKNESS: 0
INCREASED ENERGY: 1
COUGH: 1
POSTURAL DYSPNEA: 0
BRUISES/BLEEDS EASILY: 1
MEMORY LOSS: 0
TREMORS: 0
NERVOUS/ANXIOUS: 1
WEAKNESS: 0
JOINT SWELLING: 0
NECK PAIN: 0
DECREASED APPETITE: 1
HALLUCINATIONS: 0
FEVER: 0
CHILLS: 1
DISTURBANCES IN COORDINATION: 0
PANIC: 0
POLYPHAGIA: 0
MYALGIAS: 0
HEADACHES: 1
SNORES LOUDLY: 1
BLOATING: 1
JAUNDICE: 0
HEMOPTYSIS: 0
WHEEZING: 1
ALTERED TEMPERATURE REGULATION: 1
VOMITING: 0
SPEECH CHANGE: 0
PARALYSIS: 0
ARTHRALGIAS: 1
STIFFNESS: 0
INSOMNIA: 0
SEIZURES: 0
LOSS OF CONSCIOUSNESS: 0
FATIGUE: 1

## 2021-01-14 NOTE — TELEPHONE ENCOUNTER
I replied to a MyChart message from patient yesterday confirming that I still want her to see general surgery for an opinion on the ultrasound.  I can only read what the radiologist said and not add much to the reading.  Patient sounds to have understood that radiologist noted it could be lipoma or, less likely, lymph node.  ARMEN Dasilva, GARRICKP

## 2021-01-18 ENCOUNTER — HOSPITAL ENCOUNTER (OUTPATIENT)
Dept: MRI IMAGING | Facility: CLINIC | Age: 41
Discharge: HOME | End: 2021-01-18
Attending: NURSE PRACTITIONER | Admitting: NURSE PRACTITIONER
Payer: COMMERCIAL

## 2021-01-18 DIAGNOSIS — Z12.39 BREAST CANCER SCREENING, HIGH RISK PATIENT: ICD-10-CM

## 2021-01-18 PROCEDURE — A9585 GADOBUTROL INJECTION: HCPCS | Performed by: NURSE PRACTITIONER

## 2021-01-18 PROCEDURE — 255N000002 HC RX 255 OP 636: Performed by: NURSE PRACTITIONER

## 2021-01-18 PROCEDURE — 77049 MRI BREAST C-+ W/CAD BI: CPT

## 2021-01-18 RX ORDER — GADOBUTROL 604.72 MG/ML
10 INJECTION INTRAVENOUS ONCE
Status: COMPLETED | OUTPATIENT
Start: 2021-01-18 | End: 2021-01-18

## 2021-01-18 RX ADMIN — GADOBUTROL 10 ML: 604.72 INJECTION INTRAVENOUS at 08:36

## 2021-01-19 ENCOUNTER — MYC MEDICAL ADVICE (OUTPATIENT)
Dept: NEUROSURGERY | Facility: CLINIC | Age: 41
End: 2021-01-19

## 2021-01-19 ENCOUNTER — TELEPHONE (OUTPATIENT)
Dept: MAMMOGRAPHY | Facility: CLINIC | Age: 41
End: 2021-01-19

## 2021-01-19 NOTE — RESULT ENCOUNTER NOTE
Cathy,    I think the breast center tells you, but just in case, your MRI was normal.  If you have any questions, please feel free to contact the clinic.    EDVEN Wintson

## 2021-01-19 NOTE — TELEPHONE ENCOUNTER
After review by Breast Center Radiologist, Dr. Faraz Yi, Ms. Arroyo was called and given her 1/18/2021 Breast MRI results and recommended Follow up (see below).    Exam: Breast MRI, with and without Contrast, and with color encoding      History/Indication: High-risk breast cancer screening.     Comparison: No prior comparison MRI available.     Technique: Precontrast gradient recall axial nonfat sat T1.  Precontrast gradient recall axial fat-sat T1. Precontrast STIR axial  fat sat. Postcontrast gradient recall axial fat-sat T1 with dynamic  postcontrast acquisitions at 2, 4 and 6 minutes with subtractions.  Delayed high-resolution gradient recall sagittal fat-sat T1. MIP of  subtractions, axial coronal and sagittal. Color encoding of post  contrast dynamic acquisitions. IV contrast: 10 mL gadavist     Breast composition: Scattered fibroglandular tissue     Background parenchymal enhancement: Mild     Findings: No concerning areas of enhancement in either breast.      No lymphadenopathy.                                                                      Impression: BI-RADS CATEGORY: 1 -  NEGATIVE.     Recommendation: Continued high risk breast cancer screening.     FARAZ YI MD

## 2021-01-21 ENCOUNTER — PRE VISIT (OUTPATIENT)
Dept: SURGERY | Facility: CLINIC | Age: 41
End: 2021-01-21

## 2021-01-22 ENCOUNTER — OFFICE VISIT (OUTPATIENT)
Dept: SURGERY | Facility: CLINIC | Age: 41
End: 2021-01-22
Payer: COMMERCIAL

## 2021-01-22 VITALS
BODY MASS INDEX: 34.42 KG/M2 | OXYGEN SATURATION: 97 % | SYSTOLIC BLOOD PRESSURE: 126 MMHG | HEART RATE: 74 BPM | WEIGHT: 227.1 LBS | DIASTOLIC BLOOD PRESSURE: 74 MMHG | HEIGHT: 68 IN

## 2021-01-22 DIAGNOSIS — D17.30 LIPOMA OF SKIN AND SUBCUTANEOUS TISSUE: Primary | ICD-10-CM

## 2021-01-22 PROCEDURE — 99203 OFFICE O/P NEW LOW 30 MIN: CPT | Performed by: SURGERY

## 2021-01-22 ASSESSMENT — ENCOUNTER SYMPTOMS
DECREASED LIBIDO: 0
COUGH DISTURBING SLEEP: 0
HEMOPTYSIS: 0
DEPRESSION: 1
NAUSEA: 1
PANIC: 0
PALPITATIONS: 0
WHEEZING: 1
LEG PAIN: 0
PARALYSIS: 0
MEMORY LOSS: 0
SHORTNESS OF BREATH: 1
JOINT SWELLING: 0
TASTE DISTURBANCE: 0
ALTERED TEMPERATURE REGULATION: 1
HEARTBURN: 1
MUSCLE CRAMPS: 0
LOSS OF CONSCIOUSNESS: 0
TROUBLE SWALLOWING: 0
ARTHRALGIAS: 1
DYSURIA: 0
SNORES LOUDLY: 1
NECK MASS: 0
BREAST MASS: 0
ORTHOPNEA: 0
MYALGIAS: 0
DIARRHEA: 1
SINUS CONGESTION: 0
SYNCOPE: 0
NIGHT SWEATS: 0
RECTAL PAIN: 0
TACHYCARDIA: 0
DYSPNEA ON EXERTION: 1
VOMITING: 0
EXERCISE INTOLERANCE: 0
HEADACHES: 1
SPUTUM PRODUCTION: 0
ABDOMINAL PAIN: 0
EYE IRRITATION: 0
HYPERTENSION: 0
CLAUDICATION: 0
COUGH: 1
DIFFICULTY URINATING: 0
JAUNDICE: 0
CONSTIPATION: 0
HEMATURIA: 0
POLYDIPSIA: 0
INCREASED ENERGY: 1
SORE THROAT: 0
FEVER: 0
EYE PAIN: 0
NECK PAIN: 0
WEIGHT LOSS: 1
EYE REDNESS: 0
BREAST PAIN: 0
DOUBLE VISION: 0
NERVOUS/ANXIOUS: 1
MUSCLE WEAKNESS: 0
HYPOTENSION: 0
NUMBNESS: 0
DECREASED CONCENTRATION: 1
SWOLLEN GLANDS: 1
INSOMNIA: 0
HALLUCINATIONS: 0
POOR WOUND HEALING: 0
HOT FLASHES: 0
HOARSE VOICE: 0
DIZZINESS: 0
FATIGUE: 1
BOWEL INCONTINENCE: 0
BACK PAIN: 1
POLYPHAGIA: 0
SMELL DISTURBANCE: 0
POSTURAL DYSPNEA: 0
WEIGHT GAIN: 0
DECREASED APPETITE: 1
STIFFNESS: 0
BRUISES/BLEEDS EASILY: 1
NAIL CHANGES: 0
BLOOD IN STOOL: 0
LIGHT-HEADEDNESS: 0
SPEECH CHANGE: 0
CHILLS: 1
SKIN CHANGES: 0
TINGLING: 0
EYE WATERING: 0
BLOATING: 1
SINUS PAIN: 0
LEG SWELLING: 0
SLEEP DISTURBANCES DUE TO BREATHING: 0
WEAKNESS: 0
DISTURBANCES IN COORDINATION: 0
TREMORS: 0
SEIZURES: 0
FLANK PAIN: 0

## 2021-01-22 ASSESSMENT — MIFFLIN-ST. JEOR: SCORE: 1758.62

## 2021-01-22 ASSESSMENT — PAIN SCALES - GENERAL: PAINLEVEL: NO PAIN (0)

## 2021-01-22 NOTE — NURSING NOTE
"Chief Complaint   Patient presents with     Consult     Lipoma on the right arm       Vitals:    01/22/21 0754   BP: 126/74   BP Location: Left arm   Pulse: 74   SpO2: 97%   Weight: 103 kg (227 lb 1.6 oz)   Height: 1.727 m (5' 8\")       Body mass index is 34.53 kg/m .                            MERCEDES ROACH, EMT  "

## 2021-01-22 NOTE — PROGRESS NOTES
New General Surgery Consultation Note        Cathy Arroyo  [id number]  1980  January 22, 2021     Requesting Provider: Sherrill Dasilva     Dear Dr Dasilva, Sherrill CRUZ,     I had the pleasure of seeing your patient, Catyh Arroyo is a 40 year old female who presents to clinic today for the following health issues:       CHIEF COMPLAINT:  Chief Complaint Reviewed With Patient 1/14/2021   I am here today to be seen for: lypoma in right arm       Assessment & Plan   Problem List Items Addressed This Visit     None             Lab Results   Component Value Date    WBC 9.3 11/20/2020     Lab Results   Component Value Date    RBC 4.63 11/20/2020     Lab Results   Component Value Date    HGB 13.4 11/20/2020     Lab Results   Component Value Date    HCT 41.5 11/20/2020     No components found for: MCT  Lab Results   Component Value Date    MCV 90 11/20/2020     Lab Results   Component Value Date    MCH 28.9 11/20/2020     Lab Results   Component Value Date    MCHC 32.3 11/20/2020     Lab Results   Component Value Date    RDW 13.8 11/20/2020     Lab Results   Component Value Date     11/20/2020     Last Comprehensive Metabolic Panel:  Sodium   Date Value Ref Range Status   11/16/2020 137 133 - 144 mmol/L Final     Potassium   Date Value Ref Range Status   11/16/2020 3.7 3.4 - 5.3 mmol/L Final     Chloride   Date Value Ref Range Status   11/16/2020 106 94 - 109 mmol/L Final     Carbon Dioxide   Date Value Ref Range Status   11/16/2020 28 20 - 32 mmol/L Final     Anion Gap   Date Value Ref Range Status   11/16/2020 4 3 - 14 mmol/L Final     Glucose   Date Value Ref Range Status   11/16/2020 90 70 - 99 mg/dL Final     Urea Nitrogen   Date Value Ref Range Status   11/16/2020 11 7 - 30 mg/dL Final     Creatinine   Date Value Ref Range Status   11/16/2020 0.80 0.52 - 1.04 mg/dL Final     GFR Estimate   Date Value Ref Range Status   11/16/2020 >90 >60 mL/min/[1.73_m2] Final     Comment:     Non   PALLIATIVE MEDICINE ADDENDUM    Wife Jenniffer Wei decided to make the patient DNR. Continue current level of care ie. vasopressors, antibiotics, and vent support. PATTI placed in the paper chart. "American GFR Calc  Starting 12/18/2018, serum creatinine based estimated GFR (eGFR) will be   calculated using the Chronic Kidney Disease Epidemiology Collaboration   (CKD-EPI) equation.       Calcium   Date Value Ref Range Status   11/16/2020 9.7 8.5 - 10.1 mg/dL Final     INR/Prothrombin Time  Liver Function Studies -   Recent Labs   Lab Test 07/11/20  1620   PROTTOTAL 7.7   ALBUMIN 4.0   BILITOTAL 0.4   ALKPHOS 83   AST 8   ALT 26       20 minutes spent on the date of the encounter doing chart review, history and exam, documentation and further activities as noted above      HISTORY OF PRESENT ILLNESS:  Pt is a healthy 40F with a \"lump\" above antecubital fossa, in subcu below a tattoo. Imaging (US) suggests US or LN. No associated symptoms with the lump, no systemic symptoms. Non-vascualr lesion, pt denies recent growth. All evidence suggests lipoma (including physical exam); may be node.     LOCATION:  RUE, above antecubital fossa in subcu    SEVERITY:   Severity of Present Illness Reviewed With Patient 1/14/2021   How would you describe your symptoms? N/A   Does your current concern alter your level of activities? No           Review of Systems     Constitutional:  Positive for chills, weight loss, fatigue, decreased appetite, recent stressors and increased energy. Negative for fever, weight gain, night sweats, height loss, post-operative complications, incisional pain, hallucinations, hyperactivity and confused.   HENT:  Negative for ear pain, hearing loss, tinnitus, nosebleeds, trouble swallowing, hoarse voice, mouth sores, sore throat, ear discharge, tooth pain, gum tenderness, taste disturbance, smell disturbance, hearing aid, bleeding gums, dry mouth, sinus pain, sinus congestion and neck mass.    Eyes:  Negative for double vision, pain, redness, eye pain, decreased vision, eye watering, eye bulging, eye dryness, flashing lights, spots, floaters, strabismus, tunnel vision, jaundice and eye irritation. "   Respiratory:   Positive for cough, shortness of breath, wheezing, snores loudly and dyspnea on exertion. Negative for hemoptysis, sputum production, sleep disturbances due to breathing, cough disturbing sleep and postural dyspnea.    Cardiovascular:  Positive for dyspnea on exertion. Negative for chest pain, palpitations, orthopnea, claudication, leg swelling, fingers/toes turn blue, hypertension, hypotension, syncope, history of heart murmur, chest pain on exertion, chest pain at rest, pacemaker, few scattered varicosities, leg pain, sleep disturbances due to breathing, tachycardia, light-headedness, exercise intolerance and edema.   Gastrointestinal:  Positive for heartburn, nausea, diarrhea and bloating. Negative for vomiting, abdominal pain, constipation, blood in stool, melena, rectal pain, bowel incontinence, jaundice, coffee ground emesis and change in stool.   Genitourinary:  Negative for bladder incontinence, dysuria, urgency, hematuria, flank pain, vaginal discharge, difficulty urinating, genital sores, dyspareunia, decreased libido, nocturia, voiding less frequently, arousal difficulty, abnormal vaginal bleeding, excessive menstruation, menstrual changes, hot flashes, vaginal dryness and postmenopausal bleeding.   Musculoskeletal:  Positive for back pain and arthralgias. Negative for myalgias, joint swelling, stiffness, muscle cramps, neck pain, bone pain, muscle weakness and fracture.   Skin:  Negative for nail changes, itching, poor wound healing, rash, hair changes, skin changes, acne, warts, poor wound healing, scarring, flaky skin, Raynaud's phenomenon, sensitivity to sunlight and skin thickening.   Neurological:  Positive for headaches. Negative for dizziness, tingling, tremors, speech change, seizures, loss of consciousness, weakness, light-headedness, numbness, disturbances in coordination, memory loss, difficulty walking and paralysis.   Endo/Heme:  Positive for swollen glands and  bruises/bleeds easily. Negative for anemia.   Psychiatric/Behavioral:  Positive for depression, decreased concentration and mood swings. Negative for hallucinations, memory loss and panic attacks.    Breast:  Negative for breast discharge, breast mass, breast pain and nipple retraction.   Endocrine:  Positive for altered temperature regulation.Negative for polyphagia, polydipsia, unwanted hair growth and change in facial hair.      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     Post partum depression      Post-surgical hypothyroidism 2017     Uncomplicated asthma         PAST SURGICAL HISTORY:  Past Surgical History:   Procedure Laterality Date     BLOOD PATCH N/A 10/11/2016    Procedure: EPIDURAL BLOOD PATCH;  Surgeon: GENERIC ANESTHESIA PROVIDER;  Location: [facility] OR      SECTION, TUBAL LIGATION, COMBINED N/A 2019    Procedure:  SECTION, WITH TUBAL LIGATION;  Surgeon: Kathy Rutledge MD;  Location: [facility] L+D     CRANIOTOMY, EXCISE TUMOR COMPLEX, COMBINED  2014    Procedure: COMBINED CRANIOTOMY, EXCISE TUMOR COMPLEX;  Supracerabellar  Infratentorial Approach for Resection of Tumor ;  Surgeon: Kate Mckeon MD;  Location: [facility] OR     THYROIDECTOMY N/A 5/3/2017    Procedure: THYROIDECTOMY;  Total Thyroidectomy;  Surgeon: Pamela Villasenor MD;  Location:  OR        MEDICATIONS:  Current Outpatient Medications   Medication     albuterol (PROAIR HFA/PROVENTIL HFA/VENTOLIN HFA) 108 (90 Base) MCG/ACT inhaler     amphetamine-dextroamphetamine (ADDERALL XR) 15 MG 24 hr capsule     ARIPiprazole (ABILIFY) 10 MG tablet     budesonide-formoterol (SYMBICORT) 160-4.5 MCG/ACT Inhaler     cetirizine (ZYRTEC) 10 MG tablet     fexofenadine (ALLEGRA) 60 MG  tablet     ipratropium - albuterol 0.5 mg/2.5 mg/3 mL (DUONEB) 0.5-2.5 (3) MG/3ML neb solution     levothyroxine (SYNTHROID/LEVOTHROID) 150 MCG tablet     levothyroxine (SYNTHROID/LEVOTHROID) 175 MCG tablet     medical cannabis (Patient's own supply)     medical cannabis (Patient's own supply)     montelukast (SINGULAIR) 10 MG tablet     multivitamin w/minerals (MULTI-VITAMIN) tablet     topiramate (TOPAMAX) 100 MG tablet     No current facility-administered medications for this visit.         ALLERGIES:  Allergies   Allergen Reactions     Adacel [Daptacel] Swelling     Codeine Sulfate Nausea and Vomiting     Nicoderm [Nicotine]      Patch and Gum, pt reported allergic reaction      Tetanus Toxoid      Pt states she had an infection at injection site.      Vicodin [Hydrocodone-Acetaminophen] Nausea and Vomiting     Methimazole Rash        SOCIAL HISTORY:  Social History     Socioeconomic History     Marital status:      Spouse name: None     Number of children: None     Years of education: None     Highest education level: None   Occupational History     None   Social Needs     Financial resource strain: None     Food insecurity     Worry: None     Inability: None     Transportation needs     Medical: None     Non-medical: None   Tobacco Use     Smoking status: Current Every Day Smoker     Packs/day: 0.50     Years: 12.00     Pack years: 6.00     Types: Cigarettes     Start date: 2004     Last attempt to quit: 2/15/2018     Years since quittin.9     Smokeless tobacco: Never Used   Substance and Sexual Activity     Alcohol use: Yes     Alcohol/week: 0.0 standard drinks     Comment: occasional     Drug use: Yes     Types: Marijuana     Comment: medical marijuana     Sexual activity: Not Currently     Partners: Male     Birth control/protection: None   Lifestyle     Physical activity     Days per week: None     Minutes per session: None     Stress: None   Relationships     Social connections      Talks on phone: None     Gets together: None     Attends Jain service: None     Active member of club or organization: None     Attends meetings of clubs or organizations: None     Relationship status: None     Intimate partner violence     Fear of current or ex partner: None     Emotionally abused: None     Physically abused: None     Forced sexual activity: None   Other Topics Concern     Parent/sibling w/ CABG, MI or angioplasty before 65F 55M? No   Social History Narrative    Caffeine intake/servings daily - 1    Calcium intake/servings daily - 3    Exercise 5 times weekly - describe ; encouraged walking daily    Sunscreen used - Yes    Seatbelts used - Yes    Guns stored in the home - No    Self Breast Exam - Yes    Pap test up to date -  No    Eye exam up to date -  No    Dental exam up to date -  No    DEXA scan up to date -  No    Flex Sig/Colonoscopy up to date -  No    Mammography up to date -  No    Immunizations reviewed and up to date - Yes    Abuse: Current or Past (Physical, Sexual or Emotional) - No    Do you feel safe in your environment - Yes    Do you cope well with stress - Yes    Do you suffer from insomnia - No               FAMILY HISTORY:  Family History   Problem Relation Age of Onset     Thyroid Disease Paternal Aunt      Breast Cancer Paternal Aunt      Asthma Father      Mental Illness Father      Obesity Father      Asthma Maternal Grandmother      Osteoporosis Maternal Grandmother      Glaucoma Maternal Grandmother      Hypertension Maternal Grandmother      Macular Degeneration Maternal Grandmother      Diabetes Paternal Grandfather      Other Cancer Mother      Genitourinary Problems Mother      Bipolar Disorder Mother         unipolar depression     Depression Mother      Thyroid Disease Mother         benign tumor     Skin Cancer Mother      Cancer Mother      Bipolar Disorder Brother         unipolar depression     Asthma Brother          PHYSICAL EXAM:  Objective    /74  "(BP Location: Left arm, Patient Position: Left side, Cuff Size: Adult Large)   Pulse 74   Ht 1.727 m (5' 8\")   Wt 103 kg (227 lb 1.6 oz)   SpO2 97%   BMI 34.53 kg/m    /74 (BP Location: Left arm, Patient Position: Left side, Cuff Size: Adult Large)   Pulse 74   Ht 1.727 m (5' 8\")   Wt 103 kg (227 lb 1.6 oz)   SpO2 97%   BMI 34.53 kg/m    Body mass index is 34.53 kg/m .  Physical Exam  Musculoskeletal:         General: No edema.        aaox3  Small palp RUE lesion, mobile, soft (consistent with lipoma; also as shown by imaging)          DISCUSSION OF RISKS:  Discussed the likely benign nature of lesion. Discussed that she should re-seek attention if any new growth, pain, redness or swelling associated with this.     Sincerely,     Stephan Alexis MD    "

## 2021-01-22 NOTE — LETTER
1/22/2021       RE: Cathy Arroyo  [address] [facility] [address]     Dear Colleague,    Thank you for referring your patient, Cathy Arroyo, to the [facility]AL SUR[facility] [place] at [facility]. Please see a copy of my visit note below.    New General Surgery Consultation Note        Cathy Arroyo  [id number]  1980  January 22, 2021     Requesting Provider: Sherrill Dasilva     Dear Dr Dasilva, Sherrill CRUZ,     I had the pleasure of seeing your patient, Cathy Arroyo is a 40 year old female who presents to clinic today for the following health issues:       CHIEF COMPLAINT:  Chief Complaint Reviewed With Patient 1/14/2021   I am here today to be seen for: lypoma in right arm       Assessment & Plan   Problem List Items Addressed This Visit     None             Lab Results   Component Value Date    WBC 9.3 11/20/2020     Lab Results   Component Value Date    RBC 4.63 11/20/2020     Lab Results   Component Value Date    HGB 13.4 11/20/2020     Lab Results   Component Value Date    HCT 41.5 11/20/2020     No components found for: MCT  Lab Results   Component Value Date    MCV 90 11/20/2020     Lab Results   Component Value Date    MCH 28.9 11/20/2020     Lab Results   Component Value Date    MCHC 32.3 11/20/2020     Lab Results   Component Value Date    RDW 13.8 11/20/2020     Lab Results   Component Value Date     11/20/2020     Last Comprehensive Metabolic Panel:  Sodium   Date Value Ref Range Status   11/16/2020 137 133 - 144 mmol/L Final     Potassium   Date Value Ref Range Status   11/16/2020 3.7 3.4 - 5.3 mmol/L Final     Chloride   Date Value Ref Range Status   11/16/2020 106 94 - 109 mmol/L Final     Carbon Dioxide   Date Value Ref Range Status   11/16/2020 28 20 - 32 mmol/L Final     Anion Gap   Date Value Ref Range Status   11/16/2020 4 3 - 14 mmol/L Final     Glucose   Date Value Ref Range Status  "  11/16/2020 90 70 - 99 mg/dL Final     Urea Nitrogen   Date Value Ref Range Status   11/16/2020 11 7 - 30 mg/dL Final     Creatinine   Date Value Ref Range Status   11/16/2020 0.80 0.52 - 1.04 mg/dL Final     GFR Estimate   Date Value Ref Range Status   11/16/2020 >90 >60 mL/min/[1.73_m2] Final     Comment:     Non  GFR Calc  Starting 12/18/2018, serum creatinine based estimated GFR (eGFR) will be   calculated using the Chronic Kidney Disease Epidemiology Collaboration   (CKD-EPI) equation.       Calcium   Date Value Ref Range Status   11/16/2020 9.7 8.5 - 10.1 mg/dL Final     INR/Prothrombin Time  Liver Function Studies -   Recent Labs   Lab Test 07/11/20  1620   PROTTOTAL 7.7   ALBUMIN 4.0   BILITOTAL 0.4   ALKPHOS 83   AST 8   ALT 26       20 minutes spent on the date of the encounter doing chart review, history and exam, documentation and further activities as noted above      HISTORY OF PRESENT ILLNESS:  Pt is a healthy 40F with a \"lump\" above antecubital fossa, in subcu below a tattoo. Imaging (US) suggests US or LN. No associated symptoms with the lump, no systemic symptoms. Non-vascualr lesion, pt denies recent growth. All evidence suggests lipoma (including physical exam); may be node.     LOCATION:  RUE, above antecubital fossa in subcu    SEVERITY:   Severity of Present Illness Reviewed With Patient 1/14/2021   How would you describe your symptoms? N/A   Does your current concern alter your level of activities? No           Review of Systems     Constitutional:  Positive for chills, weight loss, fatigue, decreased appetite, recent stressors and increased energy. Negative for fever, weight gain, night sweats, height loss, post-operative complications, incisional pain, hallucinations, hyperactivity and confused.   HENT:  Negative for ear pain, hearing loss, tinnitus, nosebleeds, trouble swallowing, hoarse voice, mouth sores, sore throat, ear discharge, tooth pain, gum tenderness, taste " disturbance, smell disturbance, hearing aid, bleeding gums, dry mouth, sinus pain, sinus congestion and neck mass.    Eyes:  Negative for double vision, pain, redness, eye pain, decreased vision, eye watering, eye bulging, eye dryness, flashing lights, spots, floaters, strabismus, tunnel vision, jaundice and eye irritation.   Respiratory:   Positive for cough, shortness of breath, wheezing, snores loudly and dyspnea on exertion. Negative for hemoptysis, sputum production, sleep disturbances due to breathing, cough disturbing sleep and postural dyspnea.    Cardiovascular:  Positive for dyspnea on exertion. Negative for chest pain, palpitations, orthopnea, claudication, leg swelling, fingers/toes turn blue, hypertension, hypotension, syncope, history of heart murmur, chest pain on exertion, chest pain at rest, pacemaker, few scattered varicosities, leg pain, sleep disturbances due to breathing, tachycardia, light-headedness, exercise intolerance and edema.   Gastrointestinal:  Positive for heartburn, nausea, diarrhea and bloating. Negative for vomiting, abdominal pain, constipation, blood in stool, melena, rectal pain, bowel incontinence, jaundice, coffee ground emesis and change in stool.   Genitourinary:  Negative for bladder incontinence, dysuria, urgency, hematuria, flank pain, vaginal discharge, difficulty urinating, genital sores, dyspareunia, decreased libido, nocturia, voiding less frequently, arousal difficulty, abnormal vaginal bleeding, excessive menstruation, menstrual changes, hot flashes, vaginal dryness and postmenopausal bleeding.   Musculoskeletal:  Positive for back pain and arthralgias. Negative for myalgias, joint swelling, stiffness, muscle cramps, neck pain, bone pain, muscle weakness and fracture.   Skin:  Negative for nail changes, itching, poor wound healing, rash, hair changes, skin changes, acne, warts, poor wound healing, scarring, flaky skin, Raynaud's phenomenon, sensitivity to sunlight  and skin thickening.   Neurological:  Positive for headaches. Negative for dizziness, tingling, tremors, speech change, seizures, loss of consciousness, weakness, light-headedness, numbness, disturbances in coordination, memory loss, difficulty walking and paralysis.   Endo/Heme:  Positive for swollen glands and bruises/bleeds easily. Negative for anemia.   Psychiatric/Behavioral:  Positive for depression, decreased concentration and mood swings. Negative for hallucinations, memory loss and panic attacks.    Breast:  Negative for breast discharge, breast mass, breast pain and nipple retraction.   Endocrine:  Positive for altered temperature regulation.Negative for polyphagia, polydipsia, unwanted hair growth and change in facial hair.      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     Post partum depression      Post-surgical hypothyroidism 2017     Uncomplicated asthma         PAST SURGICAL HISTORY:  Past Surgical History:   Procedure Laterality Date     BLOOD PATCH N/A 10/11/2016    Procedure: EPIDURAL BLOOD PATCH;  Surgeon: GENERIC ANESTHESIA PROVIDER;  Location: [facility] OR      SECTION, TUBAL LIGATION, COMBINED N/A 2019    Procedure:  SECTION, WITH TUBAL LIGATION;  Surgeon: Kathy Rutledge MD;  Location: [facility] L+D     CRANIOTOMY, EXCISE TUMOR COMPLEX, COMBINED  2014    Procedure: COMBINED CRANIOTOMY, EXCISE TUMOR COMPLEX;  Supracerabellar  Infratentorial Approach for Resection of Tumor ;  Surgeon: Kate Mckeon MD;  Location: [facility] OR     THYROIDECTOMY N/A 5/3/2017    Procedure: THYROIDECTOMY;  Total Thyroidectomy;  Surgeon: Pamela Villasenor MD;  Location:  OR        MEDICATIONS:  Current Outpatient Medications   Medication      albuterol (PROAIR HFA/PROVENTIL HFA/VENTOLIN HFA) 108 (90 Base) MCG/ACT inhaler     amphetamine-dextroamphetamine (ADDERALL XR) 15 MG 24 hr capsule     ARIPiprazole (ABILIFY) 10 MG tablet     budesonide-formoterol (SYMBICORT) 160-4.5 MCG/ACT Inhaler     cetirizine (ZYRTEC) 10 MG tablet     fexofenadine (ALLEGRA) 60 MG tablet     ipratropium - albuterol 0.5 mg/2.5 mg/3 mL (DUONEB) 0.5-2.5 (3) MG/3ML neb solution     levothyroxine (SYNTHROID/LEVOTHROID) 150 MCG tablet     levothyroxine (SYNTHROID/LEVOTHROID) 175 MCG tablet     medical cannabis (Patient's own supply)     medical cannabis (Patient's own supply)     montelukast (SINGULAIR) 10 MG tablet     multivitamin w/minerals (MULTI-VITAMIN) tablet     topiramate (TOPAMAX) 100 MG tablet     No current facility-administered medications for this visit.         ALLERGIES:  Allergies   Allergen Reactions     Adacel [Daptacel] Swelling     Codeine Sulfate Nausea and Vomiting     Nicoderm [Nicotine]      Patch and Gum, pt reported allergic reaction      Tetanus Toxoid      Pt states she had an infection at injection site.      Vicodin [Hydrocodone-Acetaminophen] Nausea and Vomiting     Methimazole Rash        SOCIAL HISTORY:  Social History     Socioeconomic History     Marital status:      Spouse name: None     Number of children: None     Years of education: None     Highest education level: None   Occupational History     None   Social Needs     Financial resource strain: None     Food insecurity     Worry: None     Inability: None     Transportation needs     Medical: None     Non-medical: None   Tobacco Use     Smoking status: Current Every Day Smoker     Packs/day: 0.50     Years: 12.00     Pack years: 6.00     Types: Cigarettes     Start date: 2004     Last attempt to quit: 2/15/2018     Years since quittin.9     Smokeless tobacco: Never Used   Substance and Sexual Activity     Alcohol use: Yes     Alcohol/week: 0.0 standard drinks     Comment:  occasional     Drug use: Yes     Types: Marijuana     Comment: medical marijuana     Sexual activity: Not Currently     Partners: Male     Birth control/protection: None   Lifestyle     Physical activity     Days per week: None     Minutes per session: None     Stress: None   Relationships     Social connections     Talks on phone: None     Gets together: None     Attends Islam service: None     Active member of club or organization: None     Attends meetings of clubs or organizations: None     Relationship status: None     Intimate partner violence     Fear of current or ex partner: None     Emotionally abused: None     Physically abused: None     Forced sexual activity: None   Other Topics Concern     Parent/sibling w/ CABG, MI or angioplasty before 65F 55M? No   Social History Narrative    Caffeine intake/servings daily - 1    Calcium intake/servings daily - 3    Exercise 5 times weekly - describe ; encouraged walking daily    Sunscreen used - Yes    Seatbelts used - Yes    Guns stored in the home - No    Self Breast Exam - Yes    Pap test up to date -  No    Eye exam up to date -  No    Dental exam up to date -  No    DEXA scan up to date -  No    Flex Sig/Colonoscopy up to date -  No    Mammography up to date -  No    Immunizations reviewed and up to date - Yes    Abuse: Current or Past (Physical, Sexual or Emotional) - No    Do you feel safe in your environment - Yes    Do you cope well with stress - Yes    Do you suffer from insomnia - No               FAMILY HISTORY:  Family History   Problem Relation Age of Onset     Thyroid Disease Paternal Aunt      Breast Cancer Paternal Aunt      Asthma Father      Mental Illness Father      Obesity Father      Asthma Maternal Grandmother      Osteoporosis Maternal Grandmother      Glaucoma Maternal Grandmother      Hypertension Maternal Grandmother      Macular Degeneration Maternal Grandmother      Diabetes Paternal Grandfather      Other Cancer Mother       "Genitourinary Problems Mother      Bipolar Disorder Mother         unipolar depression     Depression Mother      Thyroid Disease Mother         benign tumor     Skin Cancer Mother      Cancer Mother      Bipolar Disorder Brother         unipolar depression     Asthma Brother          PHYSICAL EXAM:  Objective    /74 (BP Location: Left arm, Patient Position: Left side, Cuff Size: Adult Large)   Pulse 74   Ht 1.727 m (5' 8\")   Wt 103 kg (227 lb 1.6 oz)   SpO2 97%   BMI 34.53 kg/m    /74 (BP Location: Left arm, Patient Position: Left side, Cuff Size: Adult Large)   Pulse 74   Ht 1.727 m (5' 8\")   Wt 103 kg (227 lb 1.6 oz)   SpO2 97%   BMI 34.53 kg/m    Body mass index is 34.53 kg/m .  Physical Exam  Musculoskeletal:         General: No edema.        aaox3  Small palp RUE lesion, mobile, soft (consistent with lipoma; also as shown by imaging)          DISCUSSION OF RISKS:  Discussed the likely benign nature of lesion. Discussed that she should re-seek attention if any new growth, pain, redness or swelling associated with this.     Sincerely,     Stephan Alexis MD      "

## 2021-01-27 ENCOUNTER — MYC MEDICAL ADVICE (OUTPATIENT)
Dept: FAMILY MEDICINE | Facility: CLINIC | Age: 41
End: 2021-01-27

## 2021-01-27 DIAGNOSIS — U07.1 2019 NOVEL CORONAVIRUS DISEASE (COVID-19): Primary | ICD-10-CM

## 2021-01-27 NOTE — TELEPHONE ENCOUNTER
Order request:  COVID-19 Antibody     Reason:  Writer notes 10/30/20 - Positive COVID-19 results    Triaged

## 2021-01-27 NOTE — TELEPHONE ENCOUNTER
Routing to provider - Brown - please review and advise as appropriate    Patient reports no symptoms at this time

## 2021-01-28 ENCOUNTER — NURSE TRIAGE (OUTPATIENT)
Dept: NURSING | Facility: CLINIC | Age: 41
End: 2021-01-28

## 2021-01-28 DIAGNOSIS — Z11.59 SCREENING FOR VIRAL DISEASE: Primary | ICD-10-CM

## 2021-01-28 NOTE — TELEPHONE ENCOUNTER
"Patient is calling requesting COVID serologic antibody testing.  NOTE: Serologic testing is a blood test for 'antibodies' which are made at 10-14 days after you have had symptoms of COVID or were exposed and had an asymptomatic infection.  This does NOT test you for 'active' infection or tell you if you are contagious.    Are you a healthcare worker? no  Do you currently have a cough, fever, body aches, shortness of breath, or difficulty breathing?  No  Did you previously have cough, fever, body aches, shortness of breath, or difficulty breathing that have now resolved? Has had previous covid symptoms.   Symptoms began 100 days ago.  Symptoms started > 14 days ago. Lab order placed per SARS-CoV-2 Serology test Standing Order using indication \"Previously symptomatic >14d since onset, currently asymptomatic\" and diagnosis code \"Screening for viral disease\" (Z11.59)      The patient was informed: \"Testing is limited each day and it may take time for testing to be available to everyone who has called. You will receive a call within 48-72 hours to schedule the serology testing. Please confirm the best number to reach you is [phone]. If you have any questions about scheduling, call [phone].\"       Maame Harvey RN  [facility]  Triage Nurse Advisor      "

## 2021-01-29 ENCOUNTER — PRE VISIT (OUTPATIENT)
Dept: ONCOLOGY | Facility: CLINIC | Age: 41
End: 2021-01-29

## 2021-02-01 DIAGNOSIS — U07.1 2019 NOVEL CORONAVIRUS DISEASE (COVID-19): ICD-10-CM

## 2021-02-01 PROCEDURE — 86769 SARS-COV-2 COVID-19 ANTIBODY: CPT | Performed by: NURSE PRACTITIONER

## 2021-02-01 PROCEDURE — 36415 COLL VENOUS BLD VENIPUNCTURE: CPT | Performed by: NURSE PRACTITIONER

## 2021-02-02 LAB
SARS-COV-2 AB PNL SERPL IA: NEGATIVE
SARS-COV-2 IGG SERPL IA-ACNC: NORMAL

## 2021-02-03 NOTE — RESULT ENCOUNTER NOTE
Cathy,    The antibody screening says no antibodies.  Honestly, I haven't found the antibody test to be very helpful.  My current favorite mask, [id number], is the Respokare N95 that you can get through [employer].   If you have any questions, please feel free to contact the clinic.    DEVEN Winston

## 2021-03-04 PROBLEM — Z23 NEED FOR TDAP VACCINATION: Status: RESOLVED | Noted: 2018-09-18 | Resolved: 2021-03-04

## 2021-03-04 NOTE — PROGRESS NOTES
Cathy is a 40 year old who is being evaluated via a billable video visit.      How would you like to obtain your AVS? MyChart  If the video visit is dropped, the invitation should be resent by: Text to cell phone: [phone]  Will anyone else be joining your video visit? No       XANDER Nur      Video Start Time: 1048  Video-Visit Details    Type of service:  Video Visit    Video End Time:1115    Originating Location (pt. Location): Home    Distant Location (provider location):  [facility]     Platform used for Video Visit: [facility]    Oncology Risk Management Consultation:  Date on this visit: 3/5/2021    Cathy Arroyo  is referred by Melissa Lange, Licensed Genetic Counselor,  for an oncology risk management consultation. She requires high risk screening and surveillance to reduce her risk of cancer secondary to  a family history of breast cancer in her paternal aunt at 58, maternal aunt at 37, and paternal great aunt. She is considered to at high risk for breast cancer and has a 23.2% lifetime risk for breast cancer by the JAYSHREE  model.     Primary Physician: JANNET Nascimento-DEJAH    History Of Present Illness:  Ms. Arroyo is a very pleasant, healthy 40 year old female who presents with a family history of breast cancer.    Pertinent history:  Hx of breastfeeding, first child for 3 months and second child for 6 months.  Diagnosed with a pineocytoma in 2014  S/p total thyroidectomy at age 36 due to nodules.    Breast density: scattered fibroglandular densities  Ovaries and uterus in place (surgical pathology for bilateral fallopian tubes available from 5/6/2019)  LMP: a few weeks ago. States her menstrual cycles are regular, about every 30 days, but much heavier than they were before she had children.   No history of breast biopsy.  No history of hyperplasia, atypia or malignancy.      Genetic testing:  None to date    Pertinent screening history:  6/5/2017- Baseline  diagnostic tomosynthesis mammogram and right axillary US for palpable lump, both BiRads2  2019- Diagnostic tomosynthesis mammogram and right axillary US for Right axillary constant pain for 2 years, both BiRads2  2021- Breast MRI, BiRads1    At this visit, she denies new fatigue, breast pain, asymmetry, lumps, masses, thickening, nipple discharge and skin changes in her breasts.    Past Medical/Surgical History:  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     Post partum depression      Post-surgical hypothyroidism 2017     Uncomplicated asthma      Past Surgical History:   Procedure Laterality Date     BLOOD PATCH N/A 10/11/2016    Procedure: EPIDURAL BLOOD PATCH;  Surgeon: GENERIC ANESTHESIA PROVIDER;  Location: [facility] OR      SECTION, TUBAL LIGATION, COMBINED N/A 2019    Procedure:  SECTION, WITH TUBAL LIGATION;  Surgeon: Kathy Rutledge MD;  Location: [facility] L+D     CRANIOTOMY, EXCISE TUMOR COMPLEX, COMBINED  2014    Procedure: COMBINED CRANIOTOMY, EXCISE TUMOR COMPLEX;  Supracerabellar  Infratentorial Approach for Resection of Tumor ;  Surgeon: Kate Mckeon MD;  Location: [facility] OR     THYROIDECTOMY N/A 5/3/2017    Procedure: THYROIDECTOMY;  Total Thyroidectomy;  Surgeon: Pamela Villasenor MD;  Location: [facility] OR       Allergies:  Allergies as of 2021 - Reviewed 2021   Allergen Reaction Noted     Adacel [daptacel] Swelling 2019     Codeine sulfate Nausea and Vomiting 10/03/2012     Nicoderm [nicotine]  2020     Tetanus toxoid  10/03/2012     Vicodin [hydrocodone-acetaminophen] Nausea and Vomiting 10/03/2012     Methimazole Rash 2017       Current Medications:  Current Outpatient Medications   Medication Sig Dispense  Refill     albuterol (PROAIR HFA/PROVENTIL HFA/VENTOLIN HFA) 108 (90 Base) MCG/ACT inhaler Inhale 2 puffs into the lungs every 4 hours as needed for shortness of breath / dyspnea or wheezing 3 Inhaler 3     amphetamine-dextroamphetamine (ADDERALL XR) 15 MG 24 hr capsule Take 15 mg by mouth daily       amphetamine-dextroamphetamine (ADDERALL XR) 20 MG 24 hr capsule Take 20 mg by mouth daily       ARIPiprazole (ABILIFY) 10 MG tablet Take 10 mg by mouth daily       ATROVENT HFA 17 MCG/ACT inhaler Inhale 2 puffs into the lungs every 6 hours       budesonide-formoterol (SYMBICORT) 160-4.5 MCG/ACT Inhaler Inhale 2 puffs into the lungs 2 times daily 10.2 g 1     cetirizine (ZYRTEC) 10 MG tablet Take 1 tablet (10 mg) by mouth daily 90 tablet 3     cetirizine (ZYRTEC) 5 MG tablet Take 5 mg by mouth as needed       famotidine (PEPCID) 40 MG tablet Take 40 mg by mouth daily       fexofenadine (ALLEGRA) 180 MG tablet Take 180 mg by mouth every morning       fexofenadine (ALLEGRA) 60 MG tablet Take 3 tablets (180 mg) by mouth every morning 90 tablet 3     gabapentin (NEURONTIN) 100 MG capsule Take 1 capsule (100 mg) by mouth 3 times daily 90 capsule 1     ipratropium - albuterol 0.5 mg/2.5 mg/3 mL (DUONEB) 0.5-2.5 (3) MG/3ML neb solution Take 1 vial (3 mLs) by nebulization every 6 hours as needed for shortness of breath / dyspnea or wheezing 90 vial 3     levothyroxine (SYNTHROID/LEVOTHROID) 150 MCG tablet Take one tablet by mouth on M, W and F 90 tablet 2     levothyroxine (SYNTHROID/LEVOTHROID) 175 MCG tablet Take one tablet by mouth daily on T, Th, Sat and Sun 90 tablet 2     medical cannabis (Patient's own supply) Take 1 Dose by mouth See Admin Instructions (The purpose of this order is to document that the patient reports taking medical cannabis.  This is not a prescription, and is not used to certify that the patient has a qualifying medical condition.)    Tangerine Oral Suspension Unflavored 1-5 ml up to two times  daily.  Total THC = 240 mg, Total CBD Trace       medical cannabis (Patient's own supply) Take 1 Dose by mouth See Admin Instructions (The purpose of this order is to document that the patient reports taking medical cannabis.  This is not a prescription, and is not used to certify that the patient has a qualifying medical condition.)    [place] oral suspension: take 1-3 ml by mouth two times daily  Total CBD 1200 mg. Total THC 60 mg       montelukast (SINGULAIR) 10 MG tablet Take 1 tablet (10 mg) by mouth every morning 90 tablet 3     multivitamin w/minerals (MULTI-VITAMIN) tablet Take 1 tablet by mouth daily       topiramate (TOPAMAX) 100 MG tablet Take 100 mg by mouth daily Every evening       vitamin D3 (CHOLECALCIFEROL) 50 mcg (2000 units) tablet Take 1 tablet by mouth daily          Family History:  Family History   Problem Relation Age of Onset     Thyroid Disease Paternal Aunt      Breast Cancer Paternal Aunt 58        bilateral; negative testing     Asthma Father      Mental Illness Father      Obesity Father      Asthma Maternal Grandmother      Osteoporosis Maternal Grandmother      Glaucoma Maternal Grandmother      Hypertension Maternal Grandmother      Macular Degeneration Maternal Grandmother      Diabetes Paternal Grandfather      Other Cancer Mother 62        salivary gland cancer; hx of smoking;  at 64     Genitourinary Problems Mother      Bipolar Disorder Mother         unipolar depression     Depression Mother      Thyroid Disease Mother         benign tumor     Skin Cancer Mother      Bipolar Disorder Brother         unipolar depression     Asthma Brother      Prostate Cancer Maternal Grandfather 69        no history of smoking     Bladder Cancer Maternal Grandfather 71     Colon Cancer Maternal Grandfather 70     Breast Cancer Maternal Aunt 37        negative BRCA1/2 testing     Melanoma Maternal Aunt 64     Breast Cancer Other         paternal great aunt       Social History:  Social  History     Socioeconomic History     Marital status:      Spouse name: Not on file     Number of children: Not on file     Years of education: Not on file     Highest education level: Not on file   Occupational History     Not on file   Social Needs     Financial resource strain: Not on file     Food insecurity     Worry: Not on file     Inability: Not on file     Transportation needs     Medical: Not on file     Non-medical: Not on file   Tobacco Use     Smoking status: Current Every Day Smoker     Packs/day: 0.50     Years: 12.00     Pack years: 6.00     Types: Cigarettes     Start date: 1/21/2004     Last attempt to quit: 2/15/2018     Years since quitting: 3.0     Smokeless tobacco: Never Used   Substance and Sexual Activity     Alcohol use: Yes     Alcohol/week: 0.0 standard drinks     Comment: occasional     Drug use: Yes     Types: Marijuana     Comment: medical marijuana     Sexual activity: Not Currently     Partners: Male     Birth control/protection: None   Lifestyle     Physical activity     Days per week: Not on file     Minutes per session: Not on file     Stress: Not on file   Relationships     Social connections     Talks on phone: Not on file     Gets together: Not on file     Attends Voodoo service: Not on file     Active member of club or organization: Not on file     Attends meetings of clubs or organizations: Not on file     Relationship status: Not on file     Intimate partner violence     Fear of current or ex partner: Not on file     Emotionally abused: Not on file     Physically abused: Not on file     Forced sexual activity: Not on file   Other Topics Concern     Parent/sibling w/ CABG, MI or angioplasty before 65F 55M? No   Social History Narrative    Caffeine intake/servings daily - 1    Calcium intake/servings daily - 3    Exercise 5 times weekly - describe ; encouraged walking daily    Sunscreen used - Yes    Seatbelts used - Yes    Guns stored in the home - No    Self Breast  Exam - Yes    Pap test up to date -  No    Eye exam up to date -  No    Dental exam up to date -  No    DEXA scan up to date -  No    Flex Sig/Colonoscopy up to date -  No    Mammography up to date -  No    Immunizations reviewed and up to date - Yes    Abuse: Current or Past (Physical, Sexual or Emotional) - No    Do you feel safe in your environment - Yes    Do you cope well with stress - Yes    Do you suffer from insomnia - No               Review of Systems:  GENERAL: No change in weight, sleep or appetite.  Normal energy.  No fever or chills  EYES: Negative for vision changes or eye problems  ENT: No problems with ears, nose or throat.  No difficulty swallowing.  RESP: No coughing, wheezing or shortness of breath  CV: No chest pains or palpitations  GI: No nausea, vomiting,  heartburn, abdominal pain, constipation or change in bowel habits. Reports history of loose stools  : No urinary frequency or dysuria, bladder or kidney problems  MUSCULOSKELETAL: Reports history of lower back pain. Taking medical cannibals and gabapentin for this.  NEUROLOGIC: No headaches, numbness, tingling, weakness, problems with balance or coordination  PSYCHIATRIC: No problems with anxiety, depression or mental health  HEME/IMMUNE/ALLERGY: No history of bleeding or clotting problems or anemia.  No allergies or immune system problems  ENDOCRINE: No history of thyroid disease, diabetes or other endocrine disorders  SKIN: No rashes,worrisome lesions or skin problems    Physical Exam:  There were no vitals taken for this visit.  GENERAL: Healthy, alert and no distress  EYES: Eyes grossly normal to inspection.  No discharge or erythema, or obvious scleral/conjunctival abnormalities.  RESP: No audible wheeze, cough, or visible cyanosis.  No visible retractions or increased work of breathing.    SKIN: Visible skin clear. No significant rash, abnormal pigmentation or lesions.  NEURO: Cranial nerves grossly intact.  Mentation and speech  appropriate for age.  PSYCH: Mentation appears normal, affect normal/bright, judgement and insight intact, normal speech and appearance well-groomed.    Laboratory/Imaging Studies  No results found for any visits on 03/05/21.    ASSESSMENT  She has started her breast screening with  Sherrill Dasilva, her primary care provider with whom she has an established relationship. She will continue her screenings with Sherrill, using the screening plan.    Cathy expressed concern that she has a history of MAHESH and had a colposcopy in 2010.  Her last wet prep and STD testing was in July 2019, so she is likely due for another PAP.  US Preventative Task Force recommends a Pap test every 3 years and  HPV testing every 5 years or Co-testing (Pap test and HPV testing) every 5 years. She can discuss these options with her primary care provider considering her history.     We discussed stopping smoking as a means of prevention, as it is linked to atherosclerosis, heart disease as well as multiple types of cancer. She states she does not need support for this, that she needs to simply make a plan and do it.  She is setting a quit date of Easter this year (April 4).    We discussed signs and symptoms of breast problems to be watchful for in between surveillance visits.  She verbalized understanding of signs and symptoms to address with her health care providers in between visits, including lumps, thickening, swelling, tenderness, nipple discharge or changes in skin of breasts.    We also discussed following  a healthy lifestyle plan recommended by both NCCN and the American Cancer Society that can reduce the risk of breast cancer:  1 Limit alcohol consumption to less than 1 drink per day (1 drink=5 oz.wine, 12 oz. Beer or 1.5 oz. 80-proof liquor).  2. Exercise per American Cancer Society guidelines of at least 150 minutes of moderate-intensity activity or 75 minutes of vigorous activity each week. (Or a combination of both.) Exercise should  be spread  out over the week. Regular recreational exercise has been shown to reduce the risk of cancer by 30%.   3. Maintain a healthy weight with a Body Mass Index between 19-24.9. A postmenopausal BMI of 30.7 has been shown to have increased the risk for breast cancer by 37% in some studies.  4. Do not use tobacco products and limit exposure to passive smoke.  5. Breastfeed if possible. Research shows that 16+ months of breastfeeding, over a lifetime, can reduce a woman's risk of breast cancer by up to 27%.       Individualized Surveillance Plan for women  With 20% or greater lifetime risk of breast cancer   Per NCCN Breast Cancer Screening and Diagnosis Guidelines Version 1.2020   Recommended screening Test or procedure Last done Next Scheduled    Clinical encounter Clinical exam every 6-12 months.   Refer to genetic counseling if not already done.  Consider risk reduction strategies.   Exam deferred   Due July 2021 with her primary care provider      However, some family histories with breast cancers at a very young age, may warrant screening starting earlier.    *May begin at age 40 if breast cancers in the family occur at later ages.    Annual mammogram beginning 10 years younger than the earliest breast cancer in the family but not prior to age 30.    Recommend annual breast MRI to begin 10 years younger than the earliest breast cancer in the family but not prior to age 25.    Breast MRIs are preferably done on day 7-15 of the menstrual cycle in premenopausal women. 1/18/2021- Breast MRI, BiRads1 Exam in July with tomosynthesis mammogram after visit    Next breast MRI in January 2022 (1/19 or later)   Breast screening for patients at high risk due to thoracic radiation between the ages of 10-30   Annual clinical exam beginning 8 years after radiation therapy.    Annual screening mammogram beginning at age 30 or 8 years after radiation therapy    Annual breast MRI, beginning at age 25 or 8 years after  radiation therapy.       Women who have a lifetime risk of >20% based on history of LCIS or ADH/ALH Annual screening mammogram beginning at age of LCIS or ADH/ALH but not prior to age 30.    Consider annual MRI to begin at age of diagnosis of LCIS or ADH/ALH but not prior to age 25.    Consider risk reducing strategies.      Recommend risk reducing strategies for women with 1.7% 5 year risk of breast cancer.       I spent a total of 32 minutes on the day of the visit. Please see the note for further information on patient assessment and treatment.    JANNET Bolivar-CNS, OCN, AGN-BC  Clinical Nurse Specialist  Cancer Risk Management Program  [facility] [address]  [place] [address]    phone:  [phone]  Pager: [phone]  fax: [phone]    CC  JANNET Nascimento-NP

## 2021-03-05 ENCOUNTER — OFFICE VISIT (OUTPATIENT)
Dept: ORTHOPEDICS | Facility: CLINIC | Age: 41
End: 2021-03-05
Payer: COMMERCIAL

## 2021-03-05 ENCOUNTER — VIRTUAL VISIT (OUTPATIENT)
Dept: ONCOLOGY | Facility: CLINIC | Age: 41
End: 2021-03-05
Attending: GENETIC COUNSELOR, MS
Payer: COMMERCIAL

## 2021-03-05 DIAGNOSIS — M54.16 LUMBAR RADICULOPATHY: Primary | ICD-10-CM

## 2021-03-05 DIAGNOSIS — Z91.89 AT HIGH RISK FOR BREAST CANCER: Primary | ICD-10-CM

## 2021-03-05 PROCEDURE — 999N001193 HC VIDEO/TELEPHONE VISIT; NO CHARGE

## 2021-03-05 PROCEDURE — 99214 OFFICE O/P EST MOD 30 MIN: CPT | Mod: 95 | Performed by: CLINICAL NURSE SPECIALIST

## 2021-03-05 PROCEDURE — 99214 OFFICE O/P EST MOD 30 MIN: CPT | Performed by: FAMILY MEDICINE

## 2021-03-05 RX ORDER — DEXTROAMPHETAMINE SACCHARATE, AMPHETAMINE ASPARTATE MONOHYDRATE, DEXTROAMPHETAMINE SULFATE AND AMPHETAMINE SULFATE 5; 5; 5; 5 MG/1; MG/1; MG/1; MG/1
20 CAPSULE, EXTENDED RELEASE ORAL DAILY
COMMUNITY
Start: 2021-01-29 | End: 2023-09-26

## 2021-03-05 RX ORDER — CETIRIZINE HYDROCHLORIDE 5 MG/1
5 TABLET ORAL PRN
COMMUNITY
Start: 2020-10-20 | End: 2021-07-06

## 2021-03-05 RX ORDER — IPRATROPIUM BROMIDE 17 UG/1
2 AEROSOL, METERED RESPIRATORY (INHALATION) EVERY 6 HOURS
COMMUNITY
Start: 2020-08-10 | End: 2021-11-22

## 2021-03-05 RX ORDER — FAMOTIDINE 40 MG/1
40 TABLET, FILM COATED ORAL DAILY
COMMUNITY
Start: 2020-11-10 | End: 2021-04-02

## 2021-03-05 RX ORDER — GABAPENTIN 100 MG/1
100 CAPSULE ORAL 3 TIMES DAILY
Qty: 90 CAPSULE | Refills: 1 | Status: SHIPPED | OUTPATIENT
Start: 2021-03-05 | End: 2021-03-05

## 2021-03-05 RX ORDER — CHOLECALCIFEROL (VITAMIN D3) 50 MCG
1 TABLET ORAL DAILY
COMMUNITY
Start: 2021-03-02 | End: 2023-09-26

## 2021-03-05 RX ORDER — FEXOFENADINE HCL 180 MG/1
180 TABLET ORAL EVERY MORNING
COMMUNITY
Start: 2021-03-02 | End: 2022-11-15

## 2021-03-05 RX ORDER — GABAPENTIN 100 MG/1
100 CAPSULE ORAL 3 TIMES DAILY
Qty: 90 CAPSULE | Refills: 1 | Status: SHIPPED | OUTPATIENT
Start: 2021-03-05 | End: 2022-05-04

## 2021-03-05 NOTE — LETTER
3/5/2021         RE: Cathy Arroyo  [address] [place] [id number]        Dear Colleague,    Thank you for referring your patient, Cathy Arroyo, to the [facility] [place]. Please see a copy of my visit note below.    HISTORY OF PRESENT ILLNESS  Ms. Arroyo is a pleasant 40 year old female following up with back pain with radicular features.  Cathy last saw me over a year ago.  I referred her for a lumbar injection which she had done with a right interlaminar approach at L4-5.  This was in March of last year.  About 6 months later for a repeat injection, which did help her quite a bit.  Unfortunately these injections are fairly traumatizing to her.  She has experienced a return of her pain and would like to discuss alternative treatment options.     PHYSICAL EXAM  General  - normal appearance, in no obvious distress  HEENT  - conjunctivae not injected, moist mucous membranes  CV  - normal peripheral perfusion  Pulm  - normal respiratory pattern, non-labored  Musculoskeletal - lumbar spine  - stance: slow to rise and sit  - inspection: normal bone and joint alignment, no obvious scoliosis  - palpation: bilateral lumbar paravertebral spasm  - ROM: pain relief with flexion past 30 deg, extension  - strength: lower extremities 5/5 in all planes  Neuro  - patellar and Achilles DTRs 2+ bilaterally, no sensory or motor deficit, grossly normal coordination, normal muscle tone  Skin  - no ecchymosis, erythema, warmth, or induration, no obvious rash  Psych  - interactive, appropriate, normal mood and affect        ASSESSMENT & PLAN  Ms. Arroyo is a 40 year old female following up with back pain with radicular features.    I reviewed her prior MR in the room with her. Morgan and I had a comprehensive discussion centering around nonsurgical options for her back pain.  We revisited medication based therapies, physical therapies, and different injection based therapies.  We  also discussed nonoperative modalities that could be offered through a pain clinic.    Through shared decision-making we are going to try gabapentin, she is going to start with 100 mg and titrate up to 3 times daily.    Cathy is going to let me know how she is doing in 2 weeks.  If necessary we can titrate up on her medication.  She is also going to inform her psychiatrist of her new medication, in case this alters her mood.    It was a pleasure seeing Cathy.        Marcos Dalal DO, CAQSM      This note was constructed using Dragon dictation software, please excuse any minor errors in spelling, grammar, or syntax.        [facility] FOLLOW-UP VISIT 3/5/2021    Cathy Arroyo's chief complaint for this visit includes:  Chief Complaint   Patient presents with     RECHECK     low back pain, last injection was 9/2020 which helped, her pain has returned, she would like to disucss other options besides MANE      PCP: Sherrill Dasilva        Interval History:     Follow up reason: low back pain     Pain: Worsening    Function: Worsening    Medical History:    Any recent changes to your medical history? No    Any new medication prescribed since last visit? No    Review of Systems:    Do you have fever, chills, weight loss? No    Do you have any vision problems? No    Do you have any chest pain or edema? No    Do you have any shortness of breath or wheezing?  No    Do you have stomach problems? No    Do you have any urinary track issues? No    Do you have any numbness or focal weakness? No    Do you have diabetes? No    Do you have problems with bleeding or clotting? No    Do you have an rashes or other skin lesions? No          Again, thank you for allowing me to participate in the care of your patient.        Sincerely,        Marcos Dalal DO

## 2021-03-05 NOTE — PROGRESS NOTES
HISTORY OF PRESENT ILLNESS  Ms. Arroyo is a pleasant 40 year old female following up with back pain with radicular features.  Cathy last saw me over a year ago.  I referred her for a lumbar injection which she had done with a right interlaminar approach at L4-5.  This was in March of last year.  About 6 months later for a repeat injection, which did help her quite a bit.  Unfortunately these injections are fairly traumatizing to her.  She has experienced a return of her pain and would like to discuss alternative treatment options.     PHYSICAL EXAM  General  - normal appearance, in no obvious distress  HEENT  - conjunctivae not injected, moist mucous membranes  CV  - normal peripheral perfusion  Pulm  - normal respiratory pattern, non-labored  Musculoskeletal - lumbar spine  - stance: slow to rise and sit  - inspection: normal bone and joint alignment, no obvious scoliosis  - palpation: bilateral lumbar paravertebral spasm  - ROM: pain relief with flexion past 30 deg, extension  - strength: lower extremities 5/5 in all planes  Neuro  - patellar and Achilles DTRs 2+ bilaterally, no sensory or motor deficit, grossly normal coordination, normal muscle tone  Skin  - no ecchymosis, erythema, warmth, or induration, no obvious rash  Psych  - interactive, appropriate, normal mood and affect        ASSESSMENT & PLAN  Ms. Arroyo is a 40 year old female following up with back pain with radicular features.    I reviewed her prior MR in the room with her.    Cathy and I had a comprehensive discussion centering around nonsurgical options for her back pain.  We revisited medication based therapies, physical therapies, and different injection based therapies.  We also discussed nonoperative modalities that could be offered through a pain clinic.    Through shared decision-making we are going to try gabapentin, she is going to start with 100 mg and titrate up to 3 times daily.    Cathy is going to let me know how she is doing  in 2 weeks.  If necessary we can titrate up on her medication.  She is also going to inform her psychiatrist of her new medication, in case this alters her mood.    It was a pleasure seeing Cathy.        Marcos Dalal DO, CAQSM      This note was constructed using Dragon dictation software, please excuse any minor errors in spelling, grammar, or syntax.        [facility] FOLLOW-UP VISIT 3/5/2021    Cathy MAYNOR Cruz's chief complaint for this visit includes:  Chief Complaint   Patient presents with     RECHECK     low back pain, last injection was 9/2020 which helped, her pain has returned, she would like to disucss other options besides MANE      PCP: Sherrill Dasilva        Interval History:     Follow up reason: low back pain     Pain: Worsening    Function: Worsening    Medical History:    Any recent changes to your medical history? No    Any new medication prescribed since last visit? No    Review of Systems:    Do you have fever, chills, weight loss? No    Do you have any vision problems? No    Do you have any chest pain or edema? No    Do you have any shortness of breath or wheezing?  No    Do you have stomach problems? No    Do you have any urinary track issues? No    Do you have any numbness or focal weakness? No    Do you have diabetes? No    Do you have problems with bleeding or clotting? No    Do you have an rashes or other skin lesions? No

## 2021-03-05 NOTE — LETTER
3/5/2021         RE: Cathy Arroyo  [address] [place] [id number]        Dear Colleague,    Thank you for referring your patient, Cathy Arroyo, to the [facility]. Please see a copy of my visit note below.    Cathy is a 40 year old who is being evaluated via a billable video visit.      How would you like to obtain your AVS? MyChart  If the video visit is dropped, the invitation should be resent by: Text to cell phone: [phone]  Will anyone else be joining your video visit? No       XANDER Nur      Video Start Time: 1048  Video-Visit Details    Type of service:  Video Visit    Video End Time:1115    Originating Location (pt. Location): Home    Distant Location (provider location):  [facility]     Platform used for Video Visit: [id number]    Oncology Risk Management Consultation:  Date on this visit: 3/5/2021    Cathy Arroyo  is referred by Melissa Lange, Licensed Genetic Counselor,  for an oncology risk management consultation. She requires high risk screening and surveillance to reduce her risk of cancer secondary to  a family history of breast cancer in her paternal aunt at 58, maternal aunt at 37, and paternal great aunt. She is considered to at high risk for breast cancer and has a 23.2% lifetime risk for breast cancer by the JAYSHREE  model.     Primary Physician: JAUN Nascimento    History Of Present Illness:  Ms. Arroyo is a very pleasant, healthy 40 year old female who presents with a family history of breast cancer.    Pertinent history:  Hx of breastfeeding, first child for 3 months and second child for 6 months.  Diagnosed with a pineocytoma in 2014  S/p total thyroidectomy at age 36 due to nodules.    Breast density: scattered fibroglandular densities  Ovaries and uterus in place (surgical pathology for bilateral fallopian tubes available from 5/6/2019)  LMP: a few weeks ago. States her menstrual cycles are  regular, about every 30 days, but much heavier than they were before she had children.   No history of breast biopsy.  No history of hyperplasia, atypia or malignancy.      Genetic testing:  None to date    Pertinent screening history:  2017- Baseline diagnostic tomosynthesis mammogram and right axillary US for palpable lump, both BiRads2  2019- Diagnostic tomosynthesis mammogram and right axillary US for Right axillary constant pain for 2 years, both BiRads2  2021- Breast MRI, BiRads1    At this visit, she denies new fatigue, breast pain, asymmetry, lumps, masses, thickening, nipple discharge and skin changes in her breasts.    Past Medical/Surgical History:  Past Medical History:   Diagnosis Date     Allergic state      Anxiety      Bipolar 1 disorder (H)      Depressive disorder      Elevated cholesterol      Graves disease      Obese     BMI 37.48     Pineal tumor     s/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 tumor     Post partum depression      Post-surgical hypothyroidism 2017     Uncomplicated asthma      Past Surgical History:   Procedure Laterality Date     BLOOD PATCH N/A 10/11/2016    Procedure: EPIDURAL BLOOD PATCH;  Surgeon: GENERIC ANESTHESIA PROVIDER;  Location: [facility] OR      SECTION, TUBAL LIGATION, COMBINED N/A 2019    Procedure:  SECTION, WITH TUBAL LIGATION;  Surgeon: Kathy Rutledge MD;  Location: [facility] L+D     CRANIOTOMY, EXCISE TUMOR COMPLEX, COMBINED  2014    Procedure: COMBINED CRANIOTOMY, EXCISE TUMOR COMPLEX;  Supracerabellar  Infratentorial Approach for Resection of Tumor ;  Surgeon: Kate Mckeon MD;  Location: [facility] OR     THYROIDECTOMY N/A 5/3/2017    Procedure: THYROIDECTOMY;  Total Thyroidectomy;  Surgeon: Pamela Villasenor MD;  Location: [facility] OR       Allergies:  Allergies as of 2021 - Reviewed 2021   Allergen Reaction Noted     Adacel [daptacel] Swelling 2019     Codeine sulfate Nausea and Vomiting 10/03/2012      Nicoderm [nicotine]  06/24/2020     Tetanus toxoid  10/03/2012     Vicodin [hydrocodone-acetaminophen] Nausea and Vomiting 10/03/2012     Methimazole Rash 05/18/2017       Current Medications:  Current Outpatient Medications   Medication Sig Dispense Refill     albuterol (PROAIR HFA/PROVENTIL HFA/VENTOLIN HFA) 108 (90 Base) MCG/ACT inhaler Inhale 2 puffs into the lungs every 4 hours as needed for shortness of breath / dyspnea or wheezing 3 Inhaler 3     amphetamine-dextroamphetamine (ADDERALL XR) 15 MG 24 hr capsule Take 15 mg by mouth daily       amphetamine-dextroamphetamine (ADDERALL XR) 20 MG 24 hr capsule Take 20 mg by mouth daily       ARIPiprazole (ABILIFY) 10 MG tablet Take 10 mg by mouth daily       ATROVENT HFA 17 MCG/ACT inhaler Inhale 2 puffs into the lungs every 6 hours       budesonide-formoterol (SYMBICORT) 160-4.5 MCG/ACT Inhaler Inhale 2 puffs into the lungs 2 times daily 10.2 g 1     cetirizine (ZYRTEC) 10 MG tablet Take 1 tablet (10 mg) by mouth daily 90 tablet 3     cetirizine (ZYRTEC) 5 MG tablet Take 5 mg by mouth as needed       famotidine (PEPCID) 40 MG tablet Take 40 mg by mouth daily       fexofenadine (ALLEGRA) 180 MG tablet Take 180 mg by mouth every morning       fexofenadine (ALLEGRA) 60 MG tablet Take 3 tablets (180 mg) by mouth every morning 90 tablet 3     gabapentin (NEURONTIN) 100 MG capsule Take 1 capsule (100 mg) by mouth 3 times daily 90 capsule 1     ipratropium - albuterol 0.5 mg/2.5 mg/3 mL (DUONEB) 0.5-2.5 (3) MG/3ML neb solution Take 1 vial (3 mLs) by nebulization every 6 hours as needed for shortness of breath / dyspnea or wheezing 90 vial 3     levothyroxine (SYNTHROID/LEVOTHROID) 150 MCG tablet Take one tablet by mouth on M, W and F 90 tablet 2     levothyroxine (SYNTHROID/LEVOTHROID) 175 MCG tablet Take one tablet by mouth daily on T, Th, Sat and Sun 90 tablet 2     medical cannabis (Patient's own supply) Take 1 Dose by mouth See Admin Instructions (The purpose of this  order is to document that the patient reports taking medical cannabis.  This is not a prescription, and is not used to certify that the patient has a qualifying medical condition.)    Tangerine Oral Suspension Unflavored 1-5 ml up to two times daily.  Total THC = 240 mg, Total CBD Trace       medical cannabis (Patient's own supply) Take 1 Dose by mouth See Admin Instructions (The purpose of this order is to document that the patient reports taking medical cannabis.  This is not a prescription, and is not used to certify that the patient has a qualifying medical condition.)    [place] oral suspension: take 1-3 ml by mouth two times daily  Total CBD 1200 mg. Total THC 60 mg       montelukast (SINGULAIR) 10 MG tablet Take 1 tablet (10 mg) by mouth every morning 90 tablet 3     multivitamin w/minerals (MULTI-VITAMIN) tablet Take 1 tablet by mouth daily       topiramate (TOPAMAX) 100 MG tablet Take 100 mg by mouth daily Every evening       vitamin D3 (CHOLECALCIFEROL) 50 mcg (2000 units) tablet Take 1 tablet by mouth daily          Family History:  Family History   Problem Relation Age of Onset     Thyroid Disease Paternal Aunt      Breast Cancer Paternal Aunt 58        bilateral; negative testing     Asthma Father      Mental Illness Father      Obesity Father      Asthma Maternal Grandmother      Osteoporosis Maternal Grandmother      Glaucoma Maternal Grandmother      Hypertension Maternal Grandmother      Macular Degeneration Maternal Grandmother      Diabetes Paternal Grandfather      Other Cancer Mother 62        salivary gland cancer; hx of smoking;  at 64     Genitourinary Problems Mother      Bipolar Disorder Mother         unipolar depression     Depression Mother      Thyroid Disease Mother         benign tumor     Skin Cancer Mother      Bipolar Disorder Brother         unipolar depression     Asthma Brother      Prostate Cancer Maternal Grandfather 69        no history of smoking     Bladder Cancer  Maternal Grandfather 71     Colon Cancer Maternal Grandfather 70     Breast Cancer Maternal Aunt 37        negative BRCA1/2 testing     Melanoma Maternal Aunt 64     Breast Cancer Other         paternal great aunt       Social History:  Social History     Socioeconomic History     Marital status:      Spouse name: Not on file     Number of children: Not on file     Years of education: Not on file     Highest education level: Not on file   Occupational History     Not on file   Social Needs     Financial resource strain: Not on file     Food insecurity     Worry: Not on file     Inability: Not on file     Transportation needs     Medical: Not on file     Non-medical: Not on file   Tobacco Use     Smoking status: Current Every Day Smoker     Packs/day: 0.50     Years: 12.00     Pack years: 6.00     Types: Cigarettes     Start date: 1/21/2004     Last attempt to quit: 2/15/2018     Years since quitting: 3.0     Smokeless tobacco: Never Used   Substance and Sexual Activity     Alcohol use: Yes     Alcohol/week: 0.0 standard drinks     Comment: occasional     Drug use: Yes     Types: Marijuana     Comment: medical marijuana     Sexual activity: Not Currently     Partners: Male     Birth control/protection: None   Lifestyle     Physical activity     Days per week: Not on file     Minutes per session: Not on file     Stress: Not on file   Relationships     Social connections     Talks on phone: Not on file     Gets together: Not on file     Attends [employer] service: Not on file     Active member of club or organization: Not on file     Attends meetings of clubs or organizations: Not on file     Relationship status: Not on file     Intimate partner violence     Fear of current or ex partner: Not on file     Emotionally abused: Not on file     Physically abused: Not on file     Forced sexual activity: Not on file   Other Topics Concern     Parent/sibling w/ CABG, MI or angioplasty before 65F 55M? No   Social History  Narrative    Caffeine intake/servings daily - 1    Calcium intake/servings daily - 3    Exercise 5 times weekly - describe ; encouraged walking daily    Sunscreen used - Yes    Seatbelts used - Yes    Guns stored in the home - No    Self Breast Exam - Yes    Pap test up to date -  No    Eye exam up to date -  No    Dental exam up to date -  No    DEXA scan up to date -  No    Flex Sig/Colonoscopy up to date -  No    Mammography up to date -  No    Immunizations reviewed and up to date - Yes    Abuse: Current or Past (Physical, Sexual or Emotional) - No    Do you feel safe in your environment - Yes    Do you cope well with stress - Yes    Do you suffer from insomnia - No               Review of Systems:  GENERAL: No change in weight, sleep or appetite.  Normal energy.  No fever or chills  EYES: Negative for vision changes or eye problems  ENT: No problems with ears, nose or throat.  No difficulty swallowing.  RESP: No coughing, wheezing or shortness of breath  CV: No chest pains or palpitations  GI: No nausea, vomiting,  heartburn, abdominal pain, constipation or change in bowel habits. Reports history of loose stools  : No urinary frequency or dysuria, bladder or kidney problems  MUSCULOSKELETAL: Reports history of lower back pain. Taking medical cannibals and gabapentin for this.  NEUROLOGIC: No headaches, numbness, tingling, weakness, problems with balance or coordination  PSYCHIATRIC: No problems with anxiety, depression or mental health  HEME/IMMUNE/ALLERGY: No history of bleeding or clotting problems or anemia.  No allergies or immune system problems  ENDOCRINE: No history of thyroid disease, diabetes or other endocrine disorders  SKIN: No rashes,worrisome lesions or skin problems    Physical Exam:  There were no vitals taken for this visit.  GENERAL: Healthy, alert and no distress  EYES: Eyes grossly normal to inspection.  No discharge or erythema, or obvious scleral/conjunctival abnormalities.  RESP: No  audible wheeze, cough, or visible cyanosis.  No visible retractions or increased work of breathing.    SKIN: Visible skin clear. No significant rash, abnormal pigmentation or lesions.  NEURO: Cranial nerves grossly intact.  Mentation and speech appropriate for age.  PSYCH: Mentation appears normal, affect normal/bright, judgement and insight intact, normal speech and appearance well-groomed.    Laboratory/Imaging Studies  No results found for any visits on 03/05/21.    ASSESSMENT  She has started her breast screening with  Sherrill Dasilva, her primary care provider with whom she has an established relationship. She will continue her screenings with Sherrill, using the screening plan.    Cathy expressed concern that she has a history of MAHESH and had a colposcopy in 2010.  Her last wet prep and STD testing was in July 2019, so she is likely due for another PAP.  US Preventative Task Force recommends a Pap test every 3 years and  HPV testing every 5 years or Co-testing (Pap test and HPV testing) every 5 years. She can discuss these options with her primary care provider considering her history.     We discussed stopping smoking as a means of prevention, as it is linked to atherosclerosis, heart disease as well as multiple types of cancer. She states she does not need support for this, that she needs to simply make a plan and do it.  She is setting a quit date of Easter this year (April 4).    We discussed signs and symptoms of breast problems to be watchful for in between surveillance visits.  She verbalized understanding of signs and symptoms to address with her health care providers in between visits, including lumps, thickening, swelling, tenderness, nipple discharge or changes in skin of breasts.    We also discussed following  a healthy lifestyle plan recommended by both NCCN and the American Cancer Society that can reduce the risk of breast cancer:  1 Limit alcohol consumption to less than 1 drink per day (1 drink=5  oz.wine, 12 oz. Beer or 1.5 oz. 80-proof liquor).  2. Exercise per American Cancer Society guidelines of at least 150 minutes of moderate-intensity activity or 75 minutes of vigorous activity each week. (Or a combination of both.) Exercise should be spread  out over the week. Regular recreational exercise has been shown to reduce the risk of cancer by 30%.   3. Maintain a healthy weight with a Body Mass Index between 19-24.9. A postmenopausal BMI of 30.7 has been shown to have increased the risk for breast cancer by 37% in some studies.  4. Do not use tobacco products and limit exposure to passive smoke.  5. Breastfeed if possible. Research shows that 16+ months of breastfeeding, over a lifetime, can reduce a woman's risk of breast cancer by up to 27%.       Individualized Surveillance Plan for women  With 20% or greater lifetime risk of breast cancer   Per NCCN Breast Cancer Screening and Diagnosis Guidelines Version 1.2020   Recommended screening Test or procedure Last done Next Scheduled    Clinical encounter Clinical exam every 6-12 months.   Refer to genetic counseling if not already done.  Consider risk reduction strategies.   Exam deferred   Due July 2021 with her primary care provider      However, some family histories with breast cancers at a very young age, may warrant screening starting earlier.    *May begin at age 40 if breast cancers in the family occur at later ages.    Annual mammogram beginning 10 years younger than the earliest breast cancer in the family but not prior to age 30.    Recommend annual breast MRI to begin 10 years younger than the earliest breast cancer in the family but not prior to age 25.    Breast MRIs are preferably done on day 7-15 of the menstrual cycle in premenopausal women. 1/18/2021- Breast MRI, BiRads1 Exam in July with tomosynthesis mammogram after visit    Next breast MRI in January 2022 (1/19 or later)   Breast screening for patients at high risk due to thoracic  radiation between the ages of 10-30   Annual clinical exam beginning 8 years after radiation therapy.    Annual screening mammogram beginning at age 30 or 8 years after radiation therapy    Annual breast MRI, beginning at age 25 or 8 years after radiation therapy.       Women who have a lifetime risk of >20% based on history of LCIS or ADH/ALH Annual screening mammogram beginning at age of LCIS or ADH/ALH but not prior to age 30.    Consider annual MRI to begin at age of diagnosis of LCIS or ADH/ALH but not prior to age 25.    Consider risk reducing strategies.      Recommend risk reducing strategies for women with 1.7% 5 year risk of breast cancer.       I spent a total of 32 minutes on the day of the visit. Please see the note for further information on patient assessment and treatment.    JANNET Bolivar-CNS, OCN, AGN-BC  Clinical Nurse Specialist  Cancer Risk Management Program  [facility] [address]  [place] [address]    phone:  [phone]  Pager: [phone]  fax: [phone]    CC  JANNET Nascimento-NP

## 2021-03-10 ENCOUNTER — NURSE TRIAGE (OUTPATIENT)
Dept: FAMILY MEDICINE | Facility: CLINIC | Age: 41
End: 2021-03-10

## 2021-03-10 NOTE — TELEPHONE ENCOUNTER
Pt does not have her pulse ox but will do a BP and HR  139/78, 93 pulse  She will get her pulse ox back from her friend, she will send a reading  What she feels is like something is pushing on her rib  She requested an appointment to be seen this week    Cristin Landry RN   [facility]

## 2021-03-10 NOTE — TELEPHONE ENCOUNTER
Patient calls, started to notice a pain yesterday afternoon underneath her left rib cage. Pain occurs when she bending forward or sitting in certain positions, pushing against her ribs, coughing or deep breathing.     She has been weaning off cigarettes - trying to quit smoking. Started Gabapentin on Sunday. No other health changes or injuries. Patient was scheduled for appointment with Sherrill Dasilva on 3/18/2020. This RN is unsure if she should be waiting this long for appointment. Will route to PCP to review for timing of appointment.     Next 5 appointments (look out 90 days)    Mar 18, 2021  3:45 PM  SHORT with JANNET Nascimento CNP  [facility] ([facility]  ) [address]   [address]  [facility] [address] [phone]           Additional Information    Negative: Severe difficulty breathing (e.g., struggling for each breath, speaks in single words)    Negative: Passed out (i.e., fainted, collapsed and was not responding)    Negative: Chest pain lasting longer than 5 minutes and ANY of the following:* Over 50 years old* Over 30 years old and at least one cardiac risk factor (i.e., high blood pressure, diabetes, high cholesterol, obesity, smoker or strong family history of heart disease)* Pain is crushing, pressure-like, or heavy * Took nitroglycerin and chest pain was not relieved* History of heart disease (i.e., angina, heart attack, bypass surgery, angioplasty, CHF)    Negative: Visible sweat on face or sweat dripping down face    Negative: Sounds like a life-threatening emergency to the triager    Negative: Followed an injury to chest    Negative: SEVERE chest pain    Negative: Pain also present in shoulder(s) or arm(s) or jaw    Negative: Difficulty breathing    Negative: Cocaine use within last 3 days    Negative: History of prior 'blood clot' in leg or lungs (i.e., deep vein thrombosis, pulmonary embolism)    Negative: Recent illness requiring  "prolonged bed rest (i.e., immobilization)    Negative: Hip or leg fracture in past 2 months (e.g, or had cast on leg or ankle)    Negative: Major surgery in the past month    Negative: Recent long-distance travel with prolonged time in car, bus, plane, or train (i.e., within past 2 weeks; 6 or more hours duration)    Negative: Heart beating irregularly or very rapidly    Negative: Chest pain lasting longer than 5 minutes    Negative: Intermittent chest pain and pain has been increasing in severity or frequency    Negative: Dizziness or lightheadedness    Negative: Coughing up blood    Negative: Patient sounds very sick or weak to the triager    Patient wants to be seen    Negative: Fever > 100.5 F (38.1 C)    Negative: Intermittent chest pains persist > 3 days    Negative: All other patients with chest pain    Answer Assessment - Initial Assessment Questions  1. LOCATION: \"Where does it hurt?\"        Under left rib cage  2. RADIATION: \"Does the pain go anywhere else?\" (e.g., into neck, jaw, arms, back)      no  3. ONSET: \"When did the chest pain begin?\" (Minutes, hours or days)       yesterday  4. PATTERN \"Does the pain come and go, or has it been constant since it started?\"  \"Does it get worse with exertion?\"       Intermittent, occurs with specific movements  5. DURATION: \"How long does it last\" (e.g., seconds, minutes, hours)      Brief, goes away once she stops moving  6. SEVERITY: \"How bad is the pain?\"  (e.g., Scale 1-10; mild, moderate, or severe)     - MILD (1-3): doesn't interfere with normal activities      - MODERATE (4-7): interferes with normal activities or awakens from sleep     - SEVERE (8-10): excruciating pain, unable to do any normal activities        2  7. CARDIAC RISK FACTORS: \"Do you have any history of heart problems or risk factors for heart disease?\" (e.g., prior heart attack, angina; high blood pressure, diabetes, being overweight, high cholesterol, smoking, or strong family history of " "heart disease)      no  8. PULMONARY RISK FACTORS: \"Do you have any history of lung disease?\"  (e.g., blood clots in lung, asthma, emphysema, birth control pills)      asthma  9. CAUSE: \"What do you think is causing the chest pain?\"      Unsure, no known injury  10. OTHER SYMPTOMS: \"Do you have any other symptoms?\" (e.g., dizziness, nausea, vomiting, sweating, fever, difficulty breathing, cough)        no  11. PREGNANCY: \"Is there any chance you are pregnant?\" \"When was your last menstrual period?\"        n/a    Protocols used: CHEST PAIN-A-OH      "

## 2021-03-10 NOTE — TELEPHONE ENCOUNTER
Could you ask patient to report at home BP, HR and sats (I believe she has this equipment)?  She may need to be seen this week by another provider.  DEVEN Winston

## 2021-03-12 ENCOUNTER — OFFICE VISIT (OUTPATIENT)
Dept: FAMILY MEDICINE | Facility: CLINIC | Age: 41
End: 2021-03-12
Payer: COMMERCIAL

## 2021-03-12 VITALS
DIASTOLIC BLOOD PRESSURE: 85 MMHG | BODY MASS INDEX: 33.15 KG/M2 | OXYGEN SATURATION: 97 % | TEMPERATURE: 99.8 F | HEART RATE: 91 BPM | WEIGHT: 218 LBS | SYSTOLIC BLOOD PRESSURE: 124 MMHG

## 2021-03-12 DIAGNOSIS — F17.200 TOBACCO DEPENDENCE SYNDROME: ICD-10-CM

## 2021-03-12 DIAGNOSIS — R07.81 ANTERIOR PLEURITIC PAIN: Primary | ICD-10-CM

## 2021-03-12 DIAGNOSIS — J45.40 MODERATE PERSISTENT ASTHMA WITHOUT COMPLICATION: ICD-10-CM

## 2021-03-12 PROCEDURE — 99214 OFFICE O/P EST MOD 30 MIN: CPT | Performed by: FAMILY MEDICINE

## 2021-03-12 NOTE — PROGRESS NOTES
Assessment & Plan     Anterior pleuritic pain  Resolving   rest, try to s\top smoking    Moderate persistent asthma without complication  Use controller daily  Follow up in 1 month.                Regular exercise  See Patient Instructions    No follow-ups on file.    Jarrod Escalante MD  [facility] is a 40 year old who presents for the following health issues     HPI       Musculoskeletal problem/pain  Onset/Duration:3 days ago  Description  Location: left side under rib cage  Joint Swelling: YES  Redness: no  Pain: YES  Warmth: no  Intensity:  mild  Progression of Symptoms:  Improving , pain pretty much gone today   no new symptoms   Accompanying signs and symptoms:   Fevers: no  Numbness/tingling/weakness: YES, not in area of pain  History  Trauma to the area: no  Recent illness:  no  Previous similar problem: no  Previous evaluation:  no  Precipitating or alleviating factors:  Aggravating factors include: sleeping and bending over  Therapies tried and outcome: rest/inactivity    Asthma Follow-Up    Was ACT completed today?  No      Do you have a cough?  YES    Are you experiencing any wheezing in your chest?  YES    Do you have any shortness of breath?  No     How often are you using a short-acting (rescue) inhaler or nebulizer, such as Albuterol?  Daily    How many days per week do you miss taking your asthma controller medication?  2    Please describe any recent triggers for your asthma: smoke           Review of Systems   Constitutional, HEENT, cardiovascular, pulmonary, gi and gu systems are negative, except as otherwise noted.      Objective    /85   Pulse 91   Temp 99.8  F (37.7  C) (Temporal)   Wt 98.9 kg (218 lb)   SpO2 97%   BMI 33.15 kg/m    Body mass index is 33.15 kg/m .  Physical Exam   GENERAL: alert and fatigued  NECK: no adenopathy, no asymmetry, masses, or scars and thyroid normal to palpation  RESP: no rales , no rhonchi and  expiratory wheezes bibasilar  CV: regular rate and rhythm, normal S1 S2, no S3 or S4, no murmur, click or rub, no peripheral edema and peripheral pulses strong  ABDOMEN: soft, nontender, no hepatosplenomegaly, no masses and bowel sounds normal  MS: normal muscle tone and tenderness to palpation .lloweer rib margin, is reproducible  SKIN: no suspicious lesions or rashes  NEURO: Normal strength and tone, mentation intact and speech normal

## 2021-03-20 ENCOUNTER — MYC MEDICAL ADVICE (OUTPATIENT)
Dept: FAMILY MEDICINE | Facility: CLINIC | Age: 41
End: 2021-03-20

## 2021-03-21 ENCOUNTER — NURSE TRIAGE (OUTPATIENT)
Dept: NURSING | Facility: CLINIC | Age: 41
End: 2021-03-21

## 2021-03-22 NOTE — TELEPHONE ENCOUNTER
Got a tattoo yesterday - forgot that she has a sensitivity to adhesive. Had tegaderm bandage on tattoo - removed the bandage this morning. Thinks she is having an allergic reaction.     LUE - inside elbow crease running down to forearm and up into biceps. Area where the tegaderm is warm and swollen. No difficulty breathing - T97.4 forehead.     Per protocol advised homecare - continue to monitor - call back with changes / concerns.    Kavita Guevara RN on 3/21/2021 at 8:57 PM    Additional Information    [1] Hives AND [2] no bee sting, prescription drug or food allergy    Negative: [1] Life-threatening reaction (anaphylaxis) in the past to similar substance AND [2] < 2 hours since exposure    Negative: Unresponsive, passed out or very weak    Negative: Difficulty breathing or wheezing now    Negative: [1] Hoarseness or cough now AND [2] rapid onset    Negative: Difficulty swallowing, drooling or slurred speech now (Exception: Drooling alone present before reaction, not worse and no difficulty swallowing)    Negative: [1] Anaphylaxis suspected AND [2] more symptoms than hives    Negative: Sounds like a life-threatening emergency to the triager    Negative: Taking any prescription MEDICINE now or within last 3 days   (Exceptions: localized hives OR taking prescription antihistamine or other allergy or asthma medicines, eyedrops, eardrops, nosedrops, creams or ointments)    Negative: Food allergy to specific food previously diagnosed by HCP or allergist    Negative: Food allergy suspected, but never diagnosed by HCP    Negative: [1] Bee sting AND [2] within last 24 hours    Negative: Blood-colored, dark red or purple rash    Negative: Doesn't match the SYMPTOMS of hives    Negative: [1] Widespread hives AND [2] onset < 2 hours of exposure to high-risk allergen (e.g., nuts, fish, shellfish, eggs) AND [3] no serious symptoms AND [4] no serious allergic reaction in the past    Negative: [1] Caller worried about serious  reaction AND [2] triage nurse can't reassure    Negative: Child sounds very sick or weak to the triager    Negative: Vomiting OR abdominal pain (more than mild)    Negative: Bloody crusts on lips or ulcers in mouth    Negative: [1] Fever AND [2] widespread hives    Negative: Joint swelling    Negative: [1] On q 6 hours Benadryl for > 24 hours AND [2] MODERATE - SEVERE hives persist (itching interferes with normal activities)    Negative: [1] Taking oral steroids for over 24 hours AND [2] hives have become worse    Negative: [1] Reaction to food suspected AND [2] diagnosis never confirmed by a physician    Negative: Non-prescription (OTC) medicine is suspected as causing the hives    Negative: [1] Age < 1 year AND [2] widespread hives AND [3] cause unknown    Negative: Hives persist > 1 week    Negative: [1] Hives have occurred AND [2] 3 or more times AND [3] the cause was not found    Localized hives    Protocols used: ALLERGIC REACTIONS - GUIDELINE [id number], [id number]

## 2021-04-02 ENCOUNTER — OFFICE VISIT (OUTPATIENT)
Dept: OPTOMETRY | Facility: CLINIC | Age: 41
End: 2021-04-02
Payer: COMMERCIAL

## 2021-04-02 DIAGNOSIS — H52.13 MYOPIA OF BOTH EYES: Primary | ICD-10-CM

## 2021-04-02 PROCEDURE — 92015 DETERMINE REFRACTIVE STATE: CPT | Performed by: OPTOMETRIST

## 2021-04-02 PROCEDURE — 92310 CONTACT LENS FITTING OU: CPT | Performed by: OPTOMETRIST

## 2021-04-02 PROCEDURE — 92004 COMPRE OPH EXAM NEW PT 1/>: CPT | Performed by: OPTOMETRIST

## 2021-04-02 ASSESSMENT — REFRACTION_CURRENTRX
OD_BRAND: ALCON AIR OPTIX AQUA BC 8.6, D 14.2
OD_SPHERE: -5.00
OS_SPHERE: -5.00
OS_BRAND: ALCON AIR OPTIX PLUS HYDRAGLYDE BC 8.6, D 14.2
OD_BRAND: ALCON AIR OPTIX PLUS HYDRAGLYDE BC 8.6, D 14.2
OS_SPHERE: -5.00
OS_BRAND: ALCON AIR OPTIX AQUA BC 8.6, D 14.2
OD_SPHERE: -5.00

## 2021-04-02 ASSESSMENT — EXTERNAL EXAM - LEFT EYE: OS_EXAM: NORMAL

## 2021-04-02 ASSESSMENT — VISUAL ACUITY
OS_CC: 20/20-1
OD_CC: 20/20-1
OD_CC: 20/20
METHOD: SNELLEN - LINEAR
OS_CC: 20/20
CORRECTION_TYPE: CONTACTS

## 2021-04-02 ASSESSMENT — REFRACTION_MANIFEST
OS_CYLINDER: SPHERE
OD_CYLINDER: SPHERE
OD_SPHERE: -5.50
OS_SPHERE: -5.25

## 2021-04-02 ASSESSMENT — CUP TO DISC RATIO
OS_RATIO: 0.15
OD_RATIO: 0.15

## 2021-04-02 ASSESSMENT — REFRACTION_WEARINGRX
OS_CYLINDER: SPHERE
OD_CYLINDER: SPHERE
OD_SPHERE: -5.25
OS_SPHERE: -5.50
SPECS_TYPE: SVL

## 2021-04-02 ASSESSMENT — CONF VISUAL FIELD
OS_NORMAL: 1
OD_NORMAL: 1

## 2021-04-02 ASSESSMENT — EXTERNAL EXAM - RIGHT EYE: OD_EXAM: NORMAL

## 2021-04-02 ASSESSMENT — TONOMETRY
OS_IOP_MMHG: 21
IOP_METHOD: APPLANATION
OD_IOP_MMHG: 20

## 2021-04-02 ASSESSMENT — SLIT LAMP EXAM - LIDS: COMMENTS: NORMAL

## 2021-04-02 NOTE — LETTER
4/2/2021         RE: Cathy Arroyo  [address] [facility] [address]        Dear Colleague,    Thank you for referring your patient, Cathy Arroyo, to the [facility]. Please see a copy of my visit note below.    Chief Complaint   Patient presents with     Annual Eye Exam     More contacts fit fee ok $75     Accompanied by self  Previous contact lens wearer? Yes: air optix aqua, patient wants a contact lens she can sleep in sometimes  Comfort of contact lenses :good  Satisfied with current lenses: Yes        Last Eye Exam: 11-  Dilated Previously: Yes    What are you currently using to see?  glasses and contacts    Distance Vision Acuity: Satisfied with vision    Near Vision Acuity: Satisfied with vision while reading  with glasses or contacts    Eye Comfort: good  Do you use eye drops? : No  Occupation or Hobbies:        Alessandra Chavez Optometric Assistant, [employer]     Medical, surgical and family histories reviewed and updated 4/2/2021.     Thyroid under control  Elevated cholesterol    OBJECTIVE: See Ophthalmology exam    ASSESSMENT:    ICD-10-CM    1. Myopia of both eyes  H52.13       PLAN:   Discussed artificial tears  / rewetting drops   Discourage EXTENDED WEAR   Discussed smoking cessation as only controllable risk factor for macular degen    Nury Bales OD                       Again, thank you for allowing me to participate in the care of your patient.        Sincerely,        Nury Bales, OD

## 2021-04-02 NOTE — PATIENT INSTRUCTIONS
You may use rewetting drops such as blink or refresh contacts over lenses   and artificial tears when lenses are out    There are over the counter drops that work well and may be used up to 4 x daily when lenses are out if your eyes are dry. ( systane , refresh, thera tears) If you need more than 4 drops daily, use a preservative free product which come in individual vials and may be used for 24 hours until finished and discarded.     If you are in not in dailies, consider clear care solution if still having problems and reduce wear time to 10-12 hours   Clear care will also deep clean/ and disinfect lenses better but must sit in the case that includes a disc for 6 hours to neutralize the peroxide solution before wear.   Make sure to read product directions , this can not go directly in the eye or be used as a rinse for your lenses.

## 2021-04-02 NOTE — PROGRESS NOTES
Chief Complaint   Patient presents with     Annual Eye Exam     More contacts fit fee ok $75     Accompanied by self  Previous contact lens wearer? Yes: air optix aqua, patient wants a contact lens she can sleep in sometimes  Comfort of contact lenses :good  Satisfied with current lenses: Yes        Last Eye Exam: 11-  Dilated Previously: Yes    What are you currently using to see?  glasses and contacts    Distance Vision Acuity: Satisfied with vision    Near Vision Acuity: Satisfied with vision while reading  with glasses or contacts    Eye Comfort: good  Do you use eye drops? : No  Occupation or Hobbies:        Alessandra Chavez Optometric Assistant, [employer]     Medical, surgical and family histories reviewed and updated 4/2/2021.     Thyroid under control  Elevated cholesterol    OBJECTIVE: See Ophthalmology exam    ASSESSMENT:    ICD-10-CM    1. Myopia of both eyes  H52.13       PLAN:   Discussed artificial tears  / rewetting drops   Discourage EXTENDED WEAR   Discussed smoking cessation as only controllable risk factor for macular degen    Nury Bales OD

## 2021-04-12 ENCOUNTER — APPOINTMENT (OUTPATIENT)
Dept: OPTOMETRY | Facility: CLINIC | Age: 41
End: 2021-04-12
Payer: COMMERCIAL

## 2021-04-12 PROCEDURE — 92340 FIT SPECTACLES MONOFOCAL: CPT | Performed by: OPTOMETRIST

## 2021-05-04 ENCOUNTER — TELEPHONE (OUTPATIENT)
Dept: NEUROSURGERY | Facility: CLINIC | Age: 41
End: 2021-05-04

## 2021-05-04 NOTE — TELEPHONE ENCOUNTER
[facility] Call Center    Phone Message    May a detailed message be left on voicemail: yes     Reason for Call: Other: Patient calling to schedule a 2 year follow up with Dr. Mckeon in December. Patient states that she knows Dr. Mckeon books out far and would like to get appointment scheduled.     Patient is asking to leave details for how long before appointment should she book MRI.     Please advise and call patient back at your earliest convenience to discuss scheduling options     Action Taken: Other: [facility]     Travel Screening: Not Applicable

## 2021-05-19 DIAGNOSIS — Z11.59 ENCOUNTER FOR SCREENING FOR OTHER VIRAL DISEASES: ICD-10-CM

## 2021-05-21 ENCOUNTER — TELEPHONE (OUTPATIENT)
Dept: MEDSURG UNIT | Facility: CLINIC | Age: 41
End: 2021-05-21

## 2021-05-21 DIAGNOSIS — Z11.59 ENCOUNTER FOR SCREENING FOR OTHER VIRAL DISEASES: ICD-10-CM

## 2021-05-21 LAB
LABORATORY COMMENT REPORT: NORMAL
SARS-COV-2 RNA RESP QL NAA+PROBE: NEGATIVE
SARS-COV-2 RNA RESP QL NAA+PROBE: NORMAL
SPECIMEN SOURCE: NORMAL
SPECIMEN SOURCE: NORMAL

## 2021-05-21 PROCEDURE — U0005 INFEC AGEN DETEC AMPLI PROBE: HCPCS | Performed by: FAMILY MEDICINE

## 2021-05-21 PROCEDURE — U0003 INFECTIOUS AGENT DETECTION BY NUCLEIC ACID (DNA OR RNA); SEVERE ACUTE RESPIRATORY SYNDROME CORONAVIRUS 2 (SARS-COV-2) (CORONAVIRUS DISEASE [COVID-19]), AMPLIFIED PROBE TECHNIQUE, MAKING USE OF HIGH THROUGHPUT TECHNOLOGIES AS DESCRIBED BY CMS-2020-01-R: HCPCS | Performed by: FAMILY MEDICINE

## 2021-05-21 NOTE — TELEPHONE ENCOUNTER
Pre-Procedure COVID Test Results Pending    Results Reviewed  The patient has a pending COVID test result.  The patient had a COVID teston 5/21/21 @ 0900.     No COVID pre-call needed. RN to verify test results prior to procedure.     WOLF Arenas RN

## 2021-05-24 ENCOUNTER — HOSPITAL ENCOUNTER (OUTPATIENT)
Facility: CLINIC | Age: 41
Discharge: HOME | End: 2021-05-24
Admitting: RADIOLOGY
Payer: COMMERCIAL

## 2021-05-24 ENCOUNTER — HOSPITAL ENCOUNTER (OUTPATIENT)
Dept: GENERAL RADIOLOGY | Facility: CLINIC | Age: 41
End: 2021-05-24
Attending: FAMILY MEDICINE
Payer: COMMERCIAL

## 2021-05-24 VITALS
TEMPERATURE: 98.9 F | OXYGEN SATURATION: 96 % | SYSTOLIC BLOOD PRESSURE: 128 MMHG | HEART RATE: 79 BPM | DIASTOLIC BLOOD PRESSURE: 74 MMHG | RESPIRATION RATE: 12 BRPM

## 2021-05-24 VITALS — SYSTOLIC BLOOD PRESSURE: 107 MMHG | HEART RATE: 81 BPM | OXYGEN SATURATION: 98 % | DIASTOLIC BLOOD PRESSURE: 70 MMHG

## 2021-05-24 DIAGNOSIS — M54.16 LUMBAR RADICULOPATHY: ICD-10-CM

## 2021-05-24 PROCEDURE — 250N000011 HC RX IP 250 OP 636: Performed by: PHYSICIAN ASSISTANT

## 2021-05-24 PROCEDURE — 255N000002 HC RX 255 OP 636: Performed by: PHYSICIAN ASSISTANT

## 2021-05-24 PROCEDURE — 999N000154 HC STATISTIC RADIOLOGY XRAY, US, CT, MAR, NM

## 2021-05-24 PROCEDURE — 62323 NJX INTERLAMINAR LMBR/SAC: CPT

## 2021-05-24 PROCEDURE — 250N000009 HC RX 250: Performed by: PHYSICIAN ASSISTANT

## 2021-05-24 RX ORDER — DEXTROSE MONOHYDRATE 25 G/50ML
25-50 INJECTION, SOLUTION INTRAVENOUS
Status: DISCONTINUED | OUTPATIENT
Start: 2021-05-24 | End: 2021-05-25 | Stop reason: HOSPADM

## 2021-05-24 RX ORDER — IOPAMIDOL 408 MG/ML
10 INJECTION, SOLUTION INTRATHECAL ONCE
Status: COMPLETED | OUTPATIENT
Start: 2021-05-24 | End: 2021-05-24

## 2021-05-24 RX ORDER — LIDOCAINE HYDROCHLORIDE 10 MG/ML
30 INJECTION, SOLUTION EPIDURAL; INFILTRATION; INTRACAUDAL; PERINEURAL ONCE
Status: COMPLETED | OUTPATIENT
Start: 2021-05-24 | End: 2021-05-24

## 2021-05-24 RX ORDER — NICOTINE POLACRILEX 4 MG
15-30 LOZENGE BUCCAL
Status: DISCONTINUED | OUTPATIENT
Start: 2021-05-24 | End: 2021-05-25 | Stop reason: HOSPADM

## 2021-05-24 RX ORDER — METHYLPREDNISOLONE ACETATE 80 MG/ML
80 INJECTION, SUSPENSION INTRA-ARTICULAR; INTRALESIONAL; INTRAMUSCULAR; SOFT TISSUE ONCE
Status: COMPLETED | OUTPATIENT
Start: 2021-05-24 | End: 2021-05-24

## 2021-05-24 RX ORDER — BETAMETHASONE SODIUM PHOSPHATE AND BETAMETHASONE ACETATE 3; 3 MG/ML; MG/ML
5 INJECTION, SUSPENSION INTRA-ARTICULAR; INTRALESIONAL; INTRAMUSCULAR; SOFT TISSUE ONCE
Status: DISCONTINUED | OUTPATIENT
Start: 2021-05-24 | End: 2021-05-24 | Stop reason: CLARIF

## 2021-05-24 RX ADMIN — IOPAMIDOL 0.5 ML: 408 INJECTION, SOLUTION INTRATHECAL at 11:47

## 2021-05-24 RX ADMIN — METHYLPREDNISOLONE ACETATE 80 MG: 80 INJECTION, SUSPENSION INTRA-ARTICULAR; INTRALESIONAL; INTRAMUSCULAR; SOFT TISSUE at 11:47

## 2021-05-24 RX ADMIN — LIDOCAINE HYDROCHLORIDE 7 ML: 10 INJECTION, SOLUTION EPIDURAL; INFILTRATION; INTRACAUDAL; PERINEURAL at 11:42

## 2021-05-24 NOTE — DISCHARGE INSTRUCTIONS

## 2021-05-24 NOTE — PROGRESS NOTES
Care Suites Post Procedure Note    Patient Information  Name: Cathy Arroyo  Age: 40 year old    Post Procedure  Time patient returned to Care Suites: 1205  Concerns/abnormal assessment: none. Lumbar site D/I. VSS. States pain is much improved. 2/10. Declined food or drink.  If abnormal assessment, provider notified: N/A  Plan/Other: Discharge instructions done and states understanding. Discharge to lobby to meet ride.    Mita Saul RN

## 2021-05-24 NOTE — PROGRESS NOTES
Care Suites Discharge Nursing Note    Patient Information  Name: Cathy Arroyo  Age: 40 year old    Discharge Education:  Discharge instructions reviewed: Yes  Additional education/resources provided: na  Patient/patient representative verbalizes understanding: Yes  Patient discharging on new medications: No  Medication education completed: N/A    Discharge Plans:   Discharge location: home  Discharge ride contacted: Yes  Approximate discharge time: 1220    Discharge Criteria:  Discharge criteria met and vital signs stable: Yes    Patient Belongs:  Patient belongings returned to patient: Yes    Mita Saul RN

## 2021-05-27 NOTE — PROGRESS NOTES
"Please see \"Imaging\" tab under Chart Review for full report.  This ultrasound was performed in the [facility], and may be located under Care Everywhere.    Fariba Hoover MD  Maternal Fetal Medicine    "

## 2021-06-10 ENCOUNTER — VIRTUAL VISIT (OUTPATIENT)
Dept: FAMILY MEDICINE | Facility: CLINIC | Age: 41
End: 2021-06-10
Payer: COMMERCIAL

## 2021-06-10 DIAGNOSIS — B96.89 BACTERIAL VAGINOSIS: ICD-10-CM

## 2021-06-10 DIAGNOSIS — R31.0 GROSS HEMATURIA: Primary | ICD-10-CM

## 2021-06-10 DIAGNOSIS — N76.0 BACTERIAL VAGINOSIS: ICD-10-CM

## 2021-06-10 DIAGNOSIS — R31.0 GROSS HEMATURIA: ICD-10-CM

## 2021-06-10 LAB
ALBUMIN UR-MCNC: NEGATIVE MG/DL
APPEARANCE UR: CLEAR
BILIRUB UR QL STRIP: NEGATIVE
COLOR UR AUTO: YELLOW
GLUCOSE UR STRIP-MCNC: NEGATIVE MG/DL
HGB UR QL STRIP: ABNORMAL
KETONES UR STRIP-MCNC: NEGATIVE MG/DL
LEUKOCYTE ESTERASE UR QL STRIP: NEGATIVE
NITRATE UR QL: NEGATIVE
PH UR STRIP: 5.5 PH (ref 5–7)
RBC #/AREA URNS AUTO: ABNORMAL /HPF
SOURCE: ABNORMAL
SP GR UR STRIP: 1.01 (ref 1–1.03)
SPECIMEN SOURCE: ABNORMAL
UROBILINOGEN UR STRIP-ACNC: 0.2 EU/DL (ref 0.2–1)
WBC #/AREA URNS AUTO: ABNORMAL /HPF
WET PREP SPEC: ABNORMAL

## 2021-06-10 PROCEDURE — 99213 OFFICE O/P EST LOW 20 MIN: CPT | Mod: 95 | Performed by: FAMILY MEDICINE

## 2021-06-10 PROCEDURE — 87210 SMEAR WET MOUNT SALINE/INK: CPT | Performed by: FAMILY MEDICINE

## 2021-06-10 PROCEDURE — 81001 URINALYSIS AUTO W/SCOPE: CPT | Performed by: FAMILY MEDICINE

## 2021-06-10 RX ORDER — METRONIDAZOLE 7.5 MG/G
1 GEL VAGINAL AT BEDTIME
Qty: 25 G | Refills: 0 | Status: SHIPPED | OUTPATIENT
Start: 2021-06-10 | End: 2021-06-15

## 2021-06-10 NOTE — PROGRESS NOTES
Cathy is a 40 year old who is being evaluated via a billable telephone visit.      What phone number would you like to be contacted at? [phone]  How would you like to obtain your AVS? MyChart    Assessment & Plan     Gross hematuria  Symptoms not clearly consistent with UTI, also having some vaginal discomfort.  No fevers or systemic symptoms is reassuring.  She will come to lab later today for UA and wet prep.  Treatment pending these results.  Discussed that if fever, nausea, vomiting, or flank pain then needs to call.    - UA with Microscopic reflex to Culture; Future  - Wet prep; Future    Vonda Ramos DO  [facility]    Subjective   Cathy is a 40 year old who presents for the following health issues:     HPI     Genitourinary - Female  Onset/Duration: Last night  Description:   Painful urination (Dysuria): no           Frequency: YES (incomplete emptying)  Blood in urine (Hematuria): YES (droplet in toilet, blood when wiping)  Delay in urine (Hesitency): no  Intensity: moderate  Progression of Symptoms:  same  Accompanying Signs & Symptoms:  Fever/chills: no- checked temp at home - 98.1   Flank pain: no (chronic low back pain, some more lateral pain than usual)  Nausea and vomiting: no  Vaginal symptoms: discomfort   Abdominal/Pelvic Pain: YES  History:   History of frequent UTI s: YES - last one a few years ago  History of kidney stones: had a kidney infection years ago  Sexually Active: YES  Possibility of pregnancy: No (LMP 5/21)  Precipitating or alleviating factors: None  Therapies tried and outcome: Increase fluid intake     Also feeling tired for few days.      Review of Systems   Constitutional, GI, , and musculoskeletal systems are negative, except as otherwise noted.      Objective         Vitals:  No vitals were obtained today due to virtual visit.    Physical Exam     PSYCH: Alert and oriented times 3; coherent speech, normal rate and volume, able to  articulate logical thoughts, able to abstract reason, no tangential thoughts, no hallucinations   or delusions.  Her affect is normal  RESP: No cough, no audible wheezing, able to talk in full sentences  Remainder of exam unable to be completed due to telephone visits      Denies abdominal tenderness on palpation of her own abdomen.      Phone call duration: 10 minutes

## 2021-06-10 NOTE — PROGRESS NOTES
UA is unremarkable for infection and no RBCs seen on microscopic.  Wet prep with clue cells.  Will treat for BV, patient prefers vaginal suppository vs oral.  She will contact clinic for new/worsening or persistent symptoms.    Vonda Ramos, DO on 6/10/2021 at 3:37 PM

## 2021-06-15 ENCOUNTER — TELEPHONE (OUTPATIENT)
Dept: FAMILY MEDICINE | Facility: CLINIC | Age: 41
End: 2021-06-15

## 2021-06-15 NOTE — TELEPHONE ENCOUNTER
Wait until after period and see if symptoms have resolve, which they do sometimes after a bleed.  ARMEN Dasilva, GARRICKP

## 2021-06-15 NOTE — TELEPHONE ENCOUNTER
Dr Ramos    Pt started gel flagy 2 days ago, she just started with her period today, does she continue using the med for BV or wait    Cristin Landry RN   [facility]

## 2021-06-17 ENCOUNTER — NURSE TRIAGE (OUTPATIENT)
Dept: FAMILY MEDICINE | Facility: CLINIC | Age: 41
End: 2021-06-17

## 2021-06-17 ENCOUNTER — MYC MEDICAL ADVICE (OUTPATIENT)
Dept: FAMILY MEDICINE | Facility: CLINIC | Age: 41
End: 2021-06-17

## 2021-06-17 ENCOUNTER — OFFICE VISIT (OUTPATIENT)
Dept: URGENT CARE | Facility: URGENT CARE | Age: 41
End: 2021-06-17
Payer: COMMERCIAL

## 2021-06-17 VITALS
TEMPERATURE: 99.2 F | WEIGHT: 218.2 LBS | RESPIRATION RATE: 16 BRPM | SYSTOLIC BLOOD PRESSURE: 135 MMHG | DIASTOLIC BLOOD PRESSURE: 84 MMHG | BODY MASS INDEX: 33.18 KG/M2 | OXYGEN SATURATION: 96 % | HEART RATE: 104 BPM

## 2021-06-17 DIAGNOSIS — B37.31 YEAST INFECTION OF THE VAGINA: Primary | ICD-10-CM

## 2021-06-17 DIAGNOSIS — R82.90 NONSPECIFIC FINDING ON EXAMINATION OF URINE: ICD-10-CM

## 2021-06-17 DIAGNOSIS — N15.9 KIDNEY INFECTION: Primary | ICD-10-CM

## 2021-06-17 LAB
ALBUMIN UR-MCNC: 30 MG/DL
APPEARANCE UR: CLEAR
BACTERIA #/AREA URNS HPF: ABNORMAL /HPF
BILIRUB UR QL STRIP: NEGATIVE
COLOR UR AUTO: YELLOW
GLUCOSE UR STRIP-MCNC: NEGATIVE MG/DL
HGB UR QL STRIP: ABNORMAL
KETONES UR STRIP-MCNC: NEGATIVE MG/DL
LEUKOCYTE ESTERASE UR QL STRIP: ABNORMAL
MUCOUS THREADS #/AREA URNS LPF: PRESENT /LPF
NITRATE UR QL: NEGATIVE
NON-SQ EPI CELLS #/AREA URNS LPF: ABNORMAL /LPF
PH UR STRIP: 6 PH (ref 5–7)
RBC #/AREA URNS AUTO: ABNORMAL /HPF
SOURCE: ABNORMAL
SP GR UR STRIP: 1.02 (ref 1–1.03)
UROBILINOGEN UR STRIP-ACNC: 0.2 EU/DL (ref 0.2–1)
WBC #/AREA URNS AUTO: ABNORMAL /HPF

## 2021-06-17 PROCEDURE — 99214 OFFICE O/P EST MOD 30 MIN: CPT | Performed by: NURSE PRACTITIONER

## 2021-06-17 PROCEDURE — 87186 SC STD MICRODIL/AGAR DIL: CPT | Performed by: NURSE PRACTITIONER

## 2021-06-17 PROCEDURE — 87088 URINE BACTERIA CULTURE: CPT | Performed by: NURSE PRACTITIONER

## 2021-06-17 PROCEDURE — 87086 URINE CULTURE/COLONY COUNT: CPT | Performed by: NURSE PRACTITIONER

## 2021-06-17 PROCEDURE — 81001 URINALYSIS AUTO W/SCOPE: CPT | Performed by: NURSE PRACTITIONER

## 2021-06-17 RX ORDER — CIPROFLOXACIN 500 MG/1
500 TABLET, FILM COATED ORAL 2 TIMES DAILY
Qty: 14 TABLET | Refills: 0 | Status: SHIPPED | OUTPATIENT
Start: 2021-06-17 | End: 2021-06-24

## 2021-06-17 ASSESSMENT — PAIN SCALES - GENERAL: PAINLEVEL: SEVERE PAIN (6)

## 2021-06-17 NOTE — PROGRESS NOTES
SUBJECTIVE:   Cathy Arroyo is a 40 year old female who  presents today for a possible uti or kidney infection.   Pt had visit with Dr. Ramos on 6/10 for hematuria and vaginal discomfort - labs positive for BV, negative for UTI. Started vaginal metronidazole for BV, started menstruating on 6/15 and had to stop treatment for a few days. Pt reports that yesterday she started experiencing flank pain and the hematuria has gotten worse. Pt reports that last night the flank pain was 6/10 and she was very nauseaous as a result. Reports that flank pain is bilateral, but is worst on the R side.   Pt reports that yesterday and this morning she had bright red blood in her urine.   Continues to have flank pain today, but pt reports it is improved from last night, rates it 3/10.     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     Post partum depression      Post-surgical hypothyroidism 05/2017     Uncomplicated asthma      Current Outpatient Medications   Medication Sig Dispense Refill     albuterol (PROAIR HFA/PROVENTIL HFA/VENTOLIN HFA) 108 (90 Base) MCG/ACT inhaler Inhale 2 puffs into the lungs every 4 hours as needed for shortness of breath / dyspnea or wheezing 3 Inhaler 3     amphetamine-dextroamphetamine (ADDERALL XR) 15 MG 24 hr capsule Take 15 mg by mouth daily       ARIPiprazole (ABILIFY) 10 MG tablet Take 10 mg by mouth daily       budesonide-formoterol (SYMBICORT) 160-4.5 MCG/ACT Inhaler Inhale 2 puffs into the lungs 2 times daily 10.2 g 1     cetirizine (ZYRTEC) 10 MG tablet Take 1 tablet (10 mg) by mouth daily 90 tablet 3     fexofenadine (ALLEGRA) 180 MG tablet Take 180 mg by mouth every morning       fexofenadine (ALLEGRA) 60 MG tablet Take 3 tablets (180 mg) by mouth every morning 90 tablet 3      gabapentin (NEURONTIN) 100 MG capsule Take 1 capsule (100 mg) by mouth 3 times daily 90 capsule 1     gabapentin (NEURONTIN) 300 MG capsule Take 1 capsule (300 mg) by mouth 2 times daily 60 capsule 3     levothyroxine (SYNTHROID/LEVOTHROID) 150 MCG tablet Take one tablet by mouth on M, W and F 90 tablet 2     levothyroxine (SYNTHROID/LEVOTHROID) 175 MCG tablet Take one tablet by mouth daily on T, Th, Sat and Sun 90 tablet 2     medical cannabis (Patient's own supply) Take 1 Dose by mouth See Admin Instructions (The purpose of this order is to document that the patient reports taking medical cannabis.  This is not a prescription, and is not used to certify that the patient has a qualifying medical condition.)    [place] Oral Suspension Unflavored 1-5 ml up to two times daily.  Total THC = 240 mg, Total CBD Trace       montelukast (SINGULAIR) 10 MG tablet Take 1 tablet (10 mg) by mouth every morning 90 tablet 3     multivitamin w/minerals (MULTI-VITAMIN) tablet Take 1 tablet by mouth daily       topiramate (TOPAMAX) 100 MG tablet Take 100 mg by mouth daily Every evening       amphetamine-dextroamphetamine (ADDERALL XR) 20 MG 24 hr capsule Take 20 mg by mouth daily       ATROVENT HFA 17 MCG/ACT inhaler Inhale 2 puffs into the lungs every 6 hours       cetirizine (ZYRTEC) 5 MG tablet Take 5 mg by mouth as needed       ipratropium - albuterol 0.5 mg/2.5 mg/3 mL (DUONEB) 0.5-2.5 (3) MG/3ML neb solution Take 1 vial (3 mLs) by nebulization every 6 hours as needed for shortness of breath / dyspnea or wheezing (Patient not taking: Reported on 6/17/2021) 90 vial 3     medical cannabis (Patient's own supply) Take 1 Dose by mouth See Admin Instructions (The purpose of this order is to document that the patient reports taking medical cannabis.  This is not a prescription, and is not used to certify that the patient has a qualifying medical condition.)    [place] oral suspension: take 1-3 ml by mouth two times daily  Total CBD  1200 mg. Total THC 60 mg       vitamin D3 (CHOLECALCIFEROL) 50 mcg (2000 units) tablet Take 1 tablet by mouth daily       Social History     Tobacco Use     Smoking status: Current Every Day Smoker     Packs/day: 0.50     Years: 12.00     Pack years: 6.00     Types: Cigarettes     Start date: 1/21/2004     Last attempt to quit: 2/15/2018     Years since quitting: 3.3     Smokeless tobacco: Never Used   Substance Use Topics     Alcohol use: Yes     Alcohol/week: 0.0 standard drinks     Comment: occasional       ROS:   Review of systems negative except as stated above.    OBJECTIVE:  /84   Pulse 104   Temp 99.2  F (37.3  C) (Tympanic)   Resp 16   Wt 99 kg (218 lb 3.2 oz)   SpO2 96%   BMI 33.18 kg/m    GENERAL APPEARANCE: alert and no distress  RESP: lungs clear to auscultation - no rales, rhonchi or wheezes  CV: regular rates and rhythm, normal S1 S2, no murmur noted  ABDOMEN:  soft, nontender, no HSM or masses and bowel sounds normal  BACK: Bilateral CVA tenderness  SKIN: no suspicious lesions or rashes    ASSESSMENT:   (N15.9) Kidney infection  (primary encounter diagnosis)  Plan:  ciprofloxacin (CIPRO) 500 MG tablet BID X 7 days  Does not present ill enough to send to ER for pyelo she is currently afebrile hydrate managing pain.  Will treat, knows to go to ER if worsening sx      (R82.90) Nonspecific finding on examination of urine  Plan: Urine Culture Aerobic Bacterial      Drink plenty of fluids.  Follow up with primary care physician if not improving    JANNET Bledsoe CNP

## 2021-06-17 NOTE — TELEPHONE ENCOUNTER
"Spoke with pt regarding her symptoms. Pt had visit with Dr. Ramos on 6/10 for hematuria and vaginal discomfort - labs positive for BV, negative for UTI. Started vaginal metronidazole for BV, started menstruating on 6/15 and had to stop treatment for a few days. Pt reports that yesterday she started experiencing flank pain and the hematuria has gotten worse. Pt reports that last night the flank pain was 6/10 and she was very nauseaous as a result. Reports that flank pain is bilateral, but is worst on the R side. Pt reports that yesterday and this morning she had bright red blood in her urine. Continues to have flank pain today, but pt reports it is improved from last night, rates it 3/10. No clinic visits available at [place] today, advised pt to head to  per [facility] protocol. Pt verbailzed understanding, will head to  when it opens at 10am.    Aliya Peñaloza RN  [facility]      Reason for Disposition    Side (flank) or back pain present    Additional Information    Negative: Shock suspected (e.g., cold/pale/clammy skin, too weak to stand, low BP, rapid pulse)    Negative: Sounds like a life-threatening emergency to the triager    Negative: Urinary catheter, questions about    Negative: Recent back or abdominal injury    Negative: Recent genital injury    Negative: Unable to urinate (or only a few drops) > 4 hours and bladder feels very full (e.g., palpable bladder or strong urge to urinate)    Negative: Passing pure blood or large blood clots (i.e., larger than a dime or grape)    Negative: Fever > 100.4 F (38.0 C)    Negative: Patient sounds very sick or weak to the triager    Negative: Known sickle cell disease    Negative: Taking Coumadin (warfarin) or other strong blood thinner, or known bleeding disorder (e.g., thrombocytopenia)    Answer Assessment - Initial Assessment Questions  1. COLOR of URINE: \"Describe the color of the urine.\"  (e.g., tea-colored, pink, red, blood clots, bloody)      " "Red  2. ONSET: \"When did the bleeding start?\"       Has been going on since pt saw Dr. Ramos on 6/10, but has gotten worse  3. EPISODES: \"How many times has there been blood in the urine?\" or \"How many times today?\"      Happens every time pt urinates, once so far today  4. PAIN with URINATION: \"Is there any pain with passing your urine?\" If so, ask: \"How bad is the pain?\"  (Scale 1-10; or mild, moderate, severe)     - MILD - complains slightly about urination hurting     - MODERATE - interferes with normal activities       - SEVERE - excruciating, unwilling or unable to urinate because of the pain       No  5. FEVER: \"Do you have a fever?\" If so, ask: \"What is your temperature, how was it measured, and when did it start?\"      No  6. ASSOCIATED SYMPTOMS: \"Are you passing urine more frequently than usual?\"      Yes  7. OTHER SYMPTOMS: \"Do you have any other symptoms?\" (e.g., back/flank pain, abdominal pain, vomiting)      Flank pain, 3/10 right now but was 6/10 last night. Has had some lower abdominal pain, but not right now. Feels more tired than usual  8. PREGNANCY: \"Is there any chance you are pregnant?\" \"When was your last menstrual period?\"      LMP 6/15    Protocols used: URINE - BLOOD IN-A-OH      "

## 2021-06-18 RX ORDER — FLUCONAZOLE 150 MG/1
150 TABLET ORAL ONCE
Qty: 1 TABLET | Refills: 0 | Status: SHIPPED | OUTPATIENT
Start: 2021-06-18 | End: 2021-06-18

## 2021-06-18 NOTE — TELEPHONE ENCOUNTER
ACT Total Scores 11/3/2020 11/20/2020 1/5/2021   ACT TOTAL SCORE (Goal Greater than or Equal to 20) 6 9 24   In the past 12 months, how many times did you visit the emergency room for your asthma without being admitted to the hospital? 0 0 0   In the past 12 months, how many times were you hospitalized overnight because of your asthma? 0 0 0     ACT will be due 7/5 - please keep on action log and collect before 7/5.  DEVEN Winston

## 2021-06-19 LAB
BACTERIA SPEC CULT: ABNORMAL
Lab: ABNORMAL
SPECIMEN SOURCE: ABNORMAL

## 2021-06-20 ENCOUNTER — MYC MEDICAL ADVICE (OUTPATIENT)
Dept: FAMILY MEDICINE | Facility: CLINIC | Age: 41
End: 2021-06-20

## 2021-06-21 ENCOUNTER — MYC MEDICAL ADVICE (OUTPATIENT)
Dept: FAMILY MEDICINE | Facility: CLINIC | Age: 41
End: 2021-06-21

## 2021-06-21 DIAGNOSIS — J30.2 SEASONAL ALLERGIC RHINITIS, UNSPECIFIED TRIGGER: ICD-10-CM

## 2021-06-21 DIAGNOSIS — J45.40 MODERATE PERSISTENT ASTHMA WITHOUT COMPLICATION: ICD-10-CM

## 2021-06-22 RX ORDER — MONTELUKAST SODIUM 10 MG/1
10 TABLET ORAL EVERY MORNING
Qty: 90 TABLET | Refills: 3 | Status: SHIPPED | OUTPATIENT
Start: 2021-06-22 | End: 2022-06-16

## 2021-06-22 NOTE — TELEPHONE ENCOUNTER
Routing refill request to provider for review/approval because:  Refill request for singular    Cristin Landry RN   [facility]            
Statement Selected

## 2021-07-05 ENCOUNTER — TELEPHONE (OUTPATIENT)
Dept: NEUROSURGERY | Facility: CLINIC | Age: 41
End: 2021-07-05

## 2021-07-05 NOTE — TELEPHONE ENCOUNTER
Health Call Center    Phone Message    May a detailed message be left on voicemail: yes     Reason for Call: Symptoms or Concerns     If patient has red-flag symptoms, warm transfer to triage line    Current symptom or concern: Severe Headaches for 6 mos, pt has not been able to get rid of these headaches, they are located in the back of her head    Symptoms have been present for:  6 month(s)    Has patient previously been seen for this? Not sure    By : Dr. Mckeon    Date: n/a    Are there any new or worsening symptoms? Yes: pt reporting that she is currently scheduled for: MRI of the Brain in Nov 2021 and a provider appt with Dr. Mckeon in Dec 2021.  Pt is asking IF these appts should be moved up so that she is checked fairly soon? Please call this pt. Thank you.      Action Taken: Message routed to:  [facility] ([facility]    Travel Screening: Not Applicable                                                                       Patient has ETT 8 @ 25 lip, difficult intubation. AC 22/450/+8/70%. Arterial line placed, bronchoscopy done to clear airway.   Matthew Moseley, RRT

## 2021-07-06 ENCOUNTER — VIRTUAL VISIT (OUTPATIENT)
Dept: FAMILY MEDICINE | Facility: CLINIC | Age: 41
End: 2021-07-06
Payer: COMMERCIAL

## 2021-07-06 ENCOUNTER — MYC MEDICAL ADVICE (OUTPATIENT)
Dept: FAMILY MEDICINE | Facility: CLINIC | Age: 41
End: 2021-07-06

## 2021-07-06 DIAGNOSIS — R11.0 NAUSEA: Primary | ICD-10-CM

## 2021-07-06 DIAGNOSIS — Z32.01 PREGNANCY TEST POSITIVE: ICD-10-CM

## 2021-07-06 PROCEDURE — 99213 OFFICE O/P EST LOW 20 MIN: CPT | Mod: 95 | Performed by: NURSE PRACTITIONER

## 2021-07-06 NOTE — TELEPHONE ENCOUNTER
Spoke with Cathy.  Patient states having headaches for the past 6 months.  Headaches back of head, left side.  Headache dull headache in back comes intermittently, headache in left temporal area stretching  between eyes.   The headaches do not awaken at night.  Headaches constant, a 2-3 most of time.  Headaches is a daily occurrence.    Reschedule with Hannah IRLEY with MRI Brain with and without contrast prior soon due to headaches.    Patient voices understanding, scheduling will call to reschedule.    Note to scheduling completed.

## 2021-07-06 NOTE — PATIENT INSTRUCTIONS
Nausea  Increase abnormal nausea this past week, patient usually has no issues with nausea.  Took a pregnancy test at home and maintenance showed a positive, has also had a negative pregnancy test.  Patient has had a tubal ligation approximately 2 years ago.  Patient also has a history of brain tumor.  Discussed occasionally tumors can elevate hCG levels, patient is working on moving up her brain MRI scheduled for November.  Patient's last menstrual period was shorter than normal, expecting next menses in the next week.  Will have patient schedule a lab only appointment for a blood pregnancy test, will call patient results and any further recommendations.  - HCG Quantitative Pregnancy, Blood ([id number]); Future    Pregnancy test positive  See above.  - HCG Quantitative Pregnancy, Blood ([id number]); Future

## 2021-07-06 NOTE — PROGRESS NOTES
"Cathy is a 40 year old who is being evaluated via a billable telephone visit.      What phone number would you like to be contacted at? [phone]  How would you like to obtain your AVS? Elizabetht    4:23 PM - 4:33 PM     Assessment & Plan     Nausea  Increase abnormal nausea this past week, patient usually has no issues with nausea.  Took a pregnancy test at home and maintenance showed a positive, has also had a negative pregnancy test.  Patient has had a tubal ligation approximately 2 years ago.  Patient also has a history of brain tumor.  Discussed occasionally tumors can elevate hCG levels, patient is working on moving up her brain MRI scheduled for November.  Patient's last menstrual period was shorter than normal, expecting next menses in the next week.  Will have patient schedule a lab only appointment for a blood pregnancy test, will call patient results and any further recommendations.  - HCG Quantitative Pregnancy, Blood ([id number]); Future    Pregnancy test positive  See above.  - HCG Quantitative Pregnancy, Blood ([id number]); Future             BMI:   Estimated body mass index is 33.18 kg/m  as calculated from the following:    Height as of 1/22/21: 1.727 m (5' 8\").    Weight as of 6/17/21: 99 kg (218 lb 3.2 oz).       See Patient Instructions    Return in about 1 week (around 7/13/2021), or if symptoms worsen or fail to improve.    Gabby Hsu, JANNET CNP  [facility]    Subjective   Cathy is a 40 year old who presents for the following health issues     HPI     Concern - nausea  Onset: since yesterday  Description: nausea - bad enough to have her test   Intensity: moderate  Progression of Symptoms:  same  Accompanying Signs & Symptoms: 0  Previous history of similar problem: only time that nauseated in past was when she was pregnant  Had tubal ligation a couple of years ago after her last delivery  Precipitating factors:        Worsened by: 0  Alleviating factors:        Improved " by: 0  Therapies tried and outcome:  none   Patient took a test at home for pregnancy yesterday and it was positive. She took another one today that showed negative.   Patient's last menstrual period was 06/15/2021.  Period was not as long - was only 4 days long, usually 6 days       Review of Systems   Constitutional, HEENT, cardiovascular, pulmonary, gi and gu systems are negative, except as otherwise noted.      Objective           Vitals:  No vitals were obtained today due to virtual visit.    Physical Exam   healthy, alert and no distress  PSYCH: Alert and oriented times 3; coherent speech, normal   rate and volume, able to articulate logical thoughts, able   to abstract reason, no tangential thoughts, no hallucinations   or delusions  Her affect is normal  RESP: No cough, no audible wheezing, able to talk in full sentences  Remainder of exam unable to be completed due to telephone visits    Diagnostic Test Results:  Pending HCG            Phone call duration: 10 minutes

## 2021-07-07 DIAGNOSIS — Z32.01 PREGNANCY TEST POSITIVE: ICD-10-CM

## 2021-07-07 DIAGNOSIS — R11.0 NAUSEA: ICD-10-CM

## 2021-07-07 LAB — B-HCG SERPL-ACNC: <1 IU/L (ref 0–5)

## 2021-07-07 PROCEDURE — 36415 COLL VENOUS BLD VENIPUNCTURE: CPT | Performed by: NURSE PRACTITIONER

## 2021-07-07 PROCEDURE — 84702 CHORIONIC GONADOTROPIN TEST: CPT | Performed by: NURSE PRACTITIONER

## 2021-07-09 ENCOUNTER — OFFICE VISIT (OUTPATIENT)
Dept: FAMILY MEDICINE | Facility: CLINIC | Age: 41
End: 2021-07-09
Payer: COMMERCIAL

## 2021-07-09 VITALS
WEIGHT: 217 LBS | DIASTOLIC BLOOD PRESSURE: 70 MMHG | OXYGEN SATURATION: 96 % | TEMPERATURE: 97.5 F | HEART RATE: 94 BPM | BODY MASS INDEX: 32.99 KG/M2 | SYSTOLIC BLOOD PRESSURE: 122 MMHG

## 2021-07-09 DIAGNOSIS — J30.2 SEASONAL ALLERGIC RHINITIS, UNSPECIFIED TRIGGER: ICD-10-CM

## 2021-07-09 DIAGNOSIS — F31.70 BIPOLAR AFFECTIVE DISORDER IN REMISSION (H): ICD-10-CM

## 2021-07-09 DIAGNOSIS — J45.20 MILD INTERMITTENT ASTHMA WITHOUT COMPLICATION: ICD-10-CM

## 2021-07-09 DIAGNOSIS — E66.01 MORBID OBESITY (H): ICD-10-CM

## 2021-07-09 DIAGNOSIS — R53.82 CHRONIC FATIGUE: ICD-10-CM

## 2021-07-09 DIAGNOSIS — J45.41 MODERATE PERSISTENT ASTHMA WITH ACUTE EXACERBATION: ICD-10-CM

## 2021-07-09 DIAGNOSIS — G44.209 TENSION HEADACHE: Primary | ICD-10-CM

## 2021-07-09 LAB
ERYTHROCYTE [DISTWIDTH] IN BLOOD BY AUTOMATED COUNT: 14.4 % (ref 10–15)
HBA1C MFR BLD: 5.3 % (ref 0–5.6)
HCT VFR BLD AUTO: 40.4 % (ref 35–47)
HGB BLD-MCNC: 13.4 G/DL (ref 11.7–15.7)
MCH RBC QN AUTO: 29.4 PG (ref 26.5–33)
MCHC RBC AUTO-ENTMCNC: 33.2 G/DL (ref 31.5–36.5)
MCV RBC AUTO: 89 FL (ref 78–100)
PLATELET # BLD AUTO: 298 10E9/L (ref 150–450)
RBC # BLD AUTO: 4.56 10E12/L (ref 3.8–5.2)
WBC # BLD AUTO: 9.6 10E9/L (ref 4–11)

## 2021-07-09 PROCEDURE — 84443 ASSAY THYROID STIM HORMONE: CPT | Performed by: NURSE PRACTITIONER

## 2021-07-09 PROCEDURE — 85027 COMPLETE CBC AUTOMATED: CPT | Performed by: NURSE PRACTITIONER

## 2021-07-09 PROCEDURE — 80048 BASIC METABOLIC PNL TOTAL CA: CPT | Performed by: NURSE PRACTITIONER

## 2021-07-09 PROCEDURE — 99214 OFFICE O/P EST MOD 30 MIN: CPT | Performed by: NURSE PRACTITIONER

## 2021-07-09 PROCEDURE — 36415 COLL VENOUS BLD VENIPUNCTURE: CPT | Performed by: NURSE PRACTITIONER

## 2021-07-09 PROCEDURE — 83036 HEMOGLOBIN GLYCOSYLATED A1C: CPT | Performed by: NURSE PRACTITIONER

## 2021-07-09 RX ORDER — CETIRIZINE HYDROCHLORIDE 10 MG/1
10 TABLET ORAL DAILY
Qty: 90 TABLET | Refills: 3 | Status: SHIPPED | OUTPATIENT
Start: 2021-07-09 | End: 2022-11-15

## 2021-07-09 RX ORDER — BUDESONIDE AND FORMOTEROL FUMARATE DIHYDRATE 160; 4.5 UG/1; UG/1
2 AEROSOL RESPIRATORY (INHALATION) 2 TIMES DAILY
Qty: 10.2 G | Refills: 1 | Status: SHIPPED | OUTPATIENT
Start: 2021-07-09 | End: 2021-12-14

## 2021-07-09 RX ORDER — ALBUTEROL SULFATE 90 UG/1
2 AEROSOL, METERED RESPIRATORY (INHALATION) EVERY 4 HOURS PRN
Qty: 18 G | Refills: 1 | Status: SHIPPED | OUTPATIENT
Start: 2021-07-09 | End: 2021-09-22

## 2021-07-09 RX ORDER — PROPRANOLOL HYDROCHLORIDE 20 MG/1
20 TABLET ORAL 2 TIMES DAILY
Qty: 60 TABLET | Refills: 0 | Status: SHIPPED | OUTPATIENT
Start: 2021-07-09 | End: 2021-09-29

## 2021-07-09 NOTE — PATIENT INSTRUCTIONS
[facility] [phone]  - Blood work today.   - Start propanolol 20 mg twice per day for headaches and anxiety.   - Call clinic with any questions or concerns.

## 2021-07-09 NOTE — PROGRESS NOTES
"    Assessment & Plan     Mild intermittent asthma without complication  Inadequately controlled. Patient has not been using her Symbicort as prescribed. Encouraged compliance and then checking back in if no improvement.   - albuterol (PROAIR HFA/PROVENTIL HFA/VENTOLIN HFA) 108 (90 Base) MCG/ACT inhaler; Inhale 2 puffs into the lungs every 4 hours as needed for shortness of breath / dyspnea or wheezing    Seasonal allergic rhinitis, unspecified trigger  Stable, needs refills.   - cetirizine (ZYRTEC) 10 MG tablet; Take 1 tablet (10 mg) by mouth daily    Tension headache  Start propanolol for the headaches and follow-up with neurosurgery as recommended.   - propranolol (INDERAL) 20 MG tablet; Take 1 tablet (20 mg) by mouth 2 times daily    Chronic fatigue  Differentials include iron deficiency anemia, fatigue vs other etiologies. Continue to monitor for now.   - **CBC with platelets FUTURE anytime; Future  - **TSH with free T4 reflex FUTURE anytime; Future  - **A1C FUTURE anytime; Future  - **Basic metabolic panel FUTURE anytime; Future    Moderate persistent asthma with acute exacerbation  Needs to restart using her Symbicort daily.   - budesonide-formoterol (SYMBICORT) 160-4.5 MCG/ACT Inhaler; Inhale 2 puffs into the lungs 2 times daily    Bipolar affective disorder in remission (H)  Stable with her current medications.     Morbid obesity (H)  Body mass index is 32.99 kg/m . Weight has been stable. Continue with healthy eating and increased activity.   Wt Readings from Last 4 Encounters:   07/10/21 98.4 kg (217 lb)   07/09/21 98.4 kg (217 lb)   06/17/21 99 kg (218 lb 3.2 oz)   03/12/21 98.9 kg (218 lb)       29 minutes spent on the date of the encounter doing chart review, history and exam, documentation and further activities per the note       BMI:   Estimated body mass index is 32.99 kg/m  as calculated from the following:    Height as of 1/22/21: 1.727 m (5' 8\").    Weight as of this encounter: 98.4 kg (217 lb). "     See Patient Instructions    Return for Routine Visit.    JANNET Daniels CNP  [facility] is a 40 year old who presents for the following health issues:    HPI     Concern - Headaches and Nauses  Onset: Headaches started to worsen, has been having headaches for 6 months. No headaches in her history prior to the surgery.  Description: Temple to frontal headaches and also isolated to left posterior occipital.   Intensity: moderate  Progression of Symptoms:  Stable for about 6 months.   Accompanying Signs & Symptoms: light sensitivity, with nausea, noises make it worse.   Previous history of similar problem: tumor removal 7 years ago for benign tumor.   Precipitating factors:        Worsened by: light and noise.   Alleviating factors:        Improved by: cannabis; tylenol and advil not helpful.   Therapies tried and outcome: cannabis.       Fatigue: Increased fatigue is a new feeling this week, no fever or chills. Mone 15 th for last menstrual cycle; 26 day cycles. Denies any heavy bleeding.   Has a 4.5 and 2 yr old at home and also working as a .   Sleep: good   Appetite: not as hungry; forgetting to eat.   Exercise: none, anxiety pacing.         Review of Systems   Constitutional, HEENT, cardiovascular, pulmonary, gi and gu systems are negative, except as otherwise noted.      Objective    /70   Pulse 94   Temp 97.5  F (36.4  C) (Temporal)   Wt 98.4 kg (217 lb)   LMP 06/15/2021   SpO2 96%   BMI 32.99 kg/m    Body mass index is 32.99 kg/m .  Physical Exam   GENERAL: healthy, alert and no distress  EYES: Eyes grossly normal to inspection, PERRL and conjunctivae and sclerae normal  NECK: no adenopathy, no asymmetry, masses, or scars and thyroid normal to palpation  RESP: lungs clear to auscultation - no rales, rhonchi or wheezes  CV: regular rate and rhythm, normal S1 S2, no S3 or S4, no murmur, click or rub, no peripheral edema and  peripheral pulses strong  ABDOMEN: soft, nontender, no hepatosplenomegaly, no masses and bowel sounds normal  MS: no gross musculoskeletal defects noted, no edema  NEURO: Normal strength and tone, mentation intact and speech normal    Results for orders placed or performed in visit on 07/09/21   **CBC with platelets FUTURE anytime     Status: None   Result Value Ref Range    WBC 9.6 4.0 - 11.0 10e9/L    RBC Count 4.56 3.8 - 5.2 10e12/L    Hemoglobin 13.4 11.7 - 15.7 g/dL    Hematocrit 40.4 35.0 - 47.0 %    MCV 89 78 - 100 fl    MCH 29.4 26.5 - 33.0 pg    MCHC 33.2 31.5 - 36.5 g/dL    RDW 14.4 10.0 - 15.0 %    Platelet Count 298 150 - 450 10e9/L   **TSH with free T4 reflex FUTURE anytime     Status: None   Result Value Ref Range    TSH 3.59 0.40 - 4.00 mU/L   **A1C FUTURE anytime     Status: None   Result Value Ref Range    Hemoglobin A1C 5.3 0 - 5.6 %   **Basic metabolic panel FUTURE anytime     Status: Abnormal   Result Value Ref Range    Sodium 139 133 - 144 mmol/L    Potassium 4.5 3.4 - 5.3 mmol/L    Chloride 111 (H) 94 - 109 mmol/L    Carbon Dioxide 29 20 - 32 mmol/L    Anion Gap <1 (L) 3 - 14 mmol/L    Glucose 89 70 - 99 mg/dL    Urea Nitrogen 9 7 - 30 mg/dL    Creatinine 0.84 0.52 - 1.04 mg/dL    GFR Estimate 88 >60 mL/min/[1.73_m2]    GFR Estimate If Black >90 >60 mL/min/[1.73_m2]    Calcium 8.9 8.5 - 10.1 mg/dL

## 2021-07-10 ENCOUNTER — NURSE TRIAGE (OUTPATIENT)
Dept: NURSING | Facility: CLINIC | Age: 41
End: 2021-07-10

## 2021-07-10 ENCOUNTER — HOSPITAL ENCOUNTER (EMERGENCY)
Facility: CLINIC | Age: 41
Discharge: HOME | End: 2021-07-10
Attending: EMERGENCY MEDICINE | Admitting: EMERGENCY MEDICINE
Payer: COMMERCIAL

## 2021-07-10 VITALS
OXYGEN SATURATION: 99 % | DIASTOLIC BLOOD PRESSURE: 70 MMHG | HEART RATE: 90 BPM | RESPIRATION RATE: 18 BRPM | WEIGHT: 217 LBS | TEMPERATURE: 98.7 F | SYSTOLIC BLOOD PRESSURE: 139 MMHG | BODY MASS INDEX: 32.99 KG/M2

## 2021-07-10 DIAGNOSIS — G43.109 MIGRAINE WITH AURA AND WITHOUT STATUS MIGRAINOSUS, NOT INTRACTABLE: ICD-10-CM

## 2021-07-10 LAB
ANION GAP SERPL CALCULATED.3IONS-SCNC: <1 MMOL/L (ref 3–14)
ANION GAP SERPL CALCULATED.3IONS-SCNC: <1 MMOL/L (ref 3–14)
BASOPHILS # BLD AUTO: 0.1 10E9/L (ref 0–0.2)
BASOPHILS NFR BLD AUTO: 0.5 %
BUN SERPL-MCNC: 9 MG/DL (ref 7–30)
BUN SERPL-MCNC: 9 MG/DL (ref 7–30)
CALCIUM SERPL-MCNC: 8.9 MG/DL (ref 8.5–10.1)
CALCIUM SERPL-MCNC: 9 MG/DL (ref 8.5–10.1)
CHLORIDE SERPL-SCNC: 110 MMOL/L (ref 94–109)
CHLORIDE SERPL-SCNC: 111 MMOL/L (ref 94–109)
CO2 SERPL-SCNC: 28 MMOL/L (ref 20–32)
CO2 SERPL-SCNC: 29 MMOL/L (ref 20–32)
CREAT SERPL-MCNC: 0.79 MG/DL (ref 0.52–1.04)
CREAT SERPL-MCNC: 0.84 MG/DL (ref 0.52–1.04)
DIFFERENTIAL METHOD BLD: NORMAL
EOSINOPHIL # BLD AUTO: 0.2 10E9/L (ref 0–0.7)
EOSINOPHIL NFR BLD AUTO: 2.5 %
ERYTHROCYTE [DISTWIDTH] IN BLOOD BY AUTOMATED COUNT: 13.9 % (ref 10–15)
GFR SERPL CREATININE-BSD FRML MDRD: 88 ML/MIN/{1.73_M2}
GFR SERPL CREATININE-BSD FRML MDRD: >90 ML/MIN/{1.73_M2}
GLUCOSE SERPL-MCNC: 89 MG/DL (ref 70–99)
GLUCOSE SERPL-MCNC: 97 MG/DL (ref 70–99)
HCT VFR BLD AUTO: 42.9 % (ref 35–47)
HGB BLD-MCNC: 13.8 G/DL (ref 11.7–15.7)
IMM GRANULOCYTES # BLD: 0 10E9/L (ref 0–0.4)
IMM GRANULOCYTES NFR BLD: 0.3 %
LYMPHOCYTES # BLD AUTO: 3.6 10E9/L (ref 0.8–5.3)
LYMPHOCYTES NFR BLD AUTO: 38.9 %
MCH RBC QN AUTO: 29.1 PG (ref 26.5–33)
MCHC RBC AUTO-ENTMCNC: 32.2 G/DL (ref 31.5–36.5)
MCV RBC AUTO: 90 FL (ref 78–100)
MONOCYTES # BLD AUTO: 0.6 10E9/L (ref 0–1.3)
MONOCYTES NFR BLD AUTO: 6.7 %
NEUTROPHILS # BLD AUTO: 4.7 10E9/L (ref 1.6–8.3)
NEUTROPHILS NFR BLD AUTO: 51.1 %
NRBC # BLD AUTO: 0 10*3/UL
NRBC BLD AUTO-RTO: 0 /100
PLATELET # BLD AUTO: 334 10E9/L (ref 150–450)
POTASSIUM SERPL-SCNC: 3.7 MMOL/L (ref 3.4–5.3)
POTASSIUM SERPL-SCNC: 4.5 MMOL/L (ref 3.4–5.3)
RBC # BLD AUTO: 4.75 10E12/L (ref 3.8–5.2)
SODIUM SERPL-SCNC: 138 MMOL/L (ref 133–144)
SODIUM SERPL-SCNC: 139 MMOL/L (ref 133–144)
TSH SERPL DL<=0.005 MIU/L-ACNC: 3.59 MU/L (ref 0.4–4)
WBC # BLD AUTO: 9.3 10E9/L (ref 4–11)

## 2021-07-10 PROCEDURE — 96361 HYDRATE IV INFUSION ADD-ON: CPT | Performed by: EMERGENCY MEDICINE

## 2021-07-10 PROCEDURE — 96374 THER/PROPH/DIAG INJ IV PUSH: CPT | Performed by: EMERGENCY MEDICINE

## 2021-07-10 PROCEDURE — 96375 TX/PRO/DX INJ NEW DRUG ADDON: CPT | Performed by: EMERGENCY MEDICINE

## 2021-07-10 PROCEDURE — 85025 COMPLETE CBC W/AUTO DIFF WBC: CPT | Performed by: EMERGENCY MEDICINE

## 2021-07-10 PROCEDURE — 99284 EMERGENCY DEPT VISIT MOD MDM: CPT | Performed by: EMERGENCY MEDICINE

## 2021-07-10 PROCEDURE — 80048 BASIC METABOLIC PNL TOTAL CA: CPT | Performed by: EMERGENCY MEDICINE

## 2021-07-10 PROCEDURE — 99284 EMERGENCY DEPT VISIT MOD MDM: CPT | Mod: 25 | Performed by: EMERGENCY MEDICINE

## 2021-07-10 PROCEDURE — 250N000011 HC RX IP 250 OP 636: Performed by: EMERGENCY MEDICINE

## 2021-07-10 PROCEDURE — 258N000003 HC RX IP 258 OP 636: Performed by: EMERGENCY MEDICINE

## 2021-07-10 RX ORDER — DEXAMETHASONE SODIUM PHOSPHATE 10 MG/ML
10 INJECTION, SOLUTION INTRAMUSCULAR; INTRAVENOUS ONCE
Status: COMPLETED | OUTPATIENT
Start: 2021-07-10 | End: 2021-07-10

## 2021-07-10 RX ORDER — DIPHENHYDRAMINE HYDROCHLORIDE 50 MG/ML
25 INJECTION INTRAMUSCULAR; INTRAVENOUS ONCE
Status: COMPLETED | OUTPATIENT
Start: 2021-07-10 | End: 2021-07-10

## 2021-07-10 RX ORDER — ONDANSETRON 2 MG/ML
4 INJECTION INTRAMUSCULAR; INTRAVENOUS ONCE
Status: COMPLETED | OUTPATIENT
Start: 2021-07-10 | End: 2021-07-10

## 2021-07-10 RX ADMIN — DIPHENHYDRAMINE HYDROCHLORIDE 25 MG: 50 INJECTION INTRAMUSCULAR; INTRAVENOUS at 18:55

## 2021-07-10 RX ADMIN — ONDANSETRON 4 MG: 2 INJECTION INTRAMUSCULAR; INTRAVENOUS at 18:54

## 2021-07-10 RX ADMIN — SODIUM CHLORIDE 1000 ML: 9 INJECTION, SOLUTION INTRAVENOUS at 18:53

## 2021-07-10 RX ADMIN — DEXAMETHASONE SODIUM PHOSPHATE 10 MG: 10 INJECTION, SOLUTION INTRAMUSCULAR; INTRAVENOUS at 18:55

## 2021-07-10 NOTE — TELEPHONE ENCOUNTER
Pt received a Beam. message with her test results from yesterday, she specifically questions the Anion Gap, states she is even more concerned that she may have a tumor now.      She did vomit after a coughing spell but this was not related to her nausea feeling she states.  She otherwise denies new or worsening symptoms.      12:12PM:  Smart Web used to page on-call provider Tea Rowan to call this RN.   12:15PM:  Provider called, advised no change in care plan at this time, proceed with MRI next week.  Call back with any new or worsening s/s.   12:16PM:  RN called pt back and gave her the information.  Patient verbalized understanding and had no further questions.      COVID 19 Nurse Triage Plan/Patient Instructions    Please be aware that novel coronavirus (COVID-19) may be circulating in the community. If you develop symptoms such as fever, cough, or SOB or if you have concerns about the presence of another infection including coronavirus (COVID-19), please contact your health care provider or visit [email or web address].     Disposition/Instructions    Home care recommended. Follow home care protocol based instructions.    Thank you for taking steps to prevent the spread of this virus.  o Limit your contact with others.  o Wear a simple mask to cover your cough.  o Wash your hands well and often.    Resources    [facility]: About COVID-19: [email or web address]    CDC: What to Do If You're Sick: www.cdc.gov/coronavirus/2019-ncov/about/steps-when-sick.html    CDC: Ending Home Isolation: www.cdc.gov/coronavirus/2019-ncov/hcp/disposition-in-home-patients.html     CDC: Caring for Someone: www.cdc.gov/coronavirus/2019-ncov/if-you-are-sick/care-for-someone.html     [facility]: Interim Guidance for Hospital Discharge to Home: www.health.[facility].[place]/diseases/coronavirus/hcp/hospdischarge.pdf    [facility] clinical trials (COVID-19 research studies):  clinicalaffairs.[facility]-clinical-trials     Below are the COVID-19 hotlines at the [place] Department of [facility] ([facility]). Interpreters are available.   o For health questions: Call 377-650-8740 or 1-824.450.9403 (7 a.m. to 7 p.m.)  o For questions about schools and childcare: Call 115-662-1698 or 1-442.174.2159 (7 a.m. to 7 p.m.)             Marlene Cardona RN/[place]          Reason for Disposition    [1] Caller requesting NON-URGENT health information AND [2] PCP's office is the best resource    Additional Information    Negative: RN needs further essential information from caller in order to complete triage    Negative: Requesting regular office appointment    Protocols used: INFORMATION ONLY CALL - NO TRIAGE-A-

## 2021-07-10 NOTE — ED PROVIDER NOTES
[place] EMERGENCY DEPARTMENT ([facility])  7/10/21  History     Chief Complaint   Patient presents with     Headache     HPI  Cathy Arroyo is a 40 year old female with a history notable for pineal tumor s/p craniotomy (2014), s/p thyroidectomy () and bipolar 1 disorder who presents to the ED for evaluation of headache, nausea and vomiting.  She describes her headache is bitemporal.  She started with mild pain a few days ago, but reports that today has become more like her typical migraine.  She has nausea with associated vomiting.  She also has photophobia.  She also describes the pain in the back of her head.  Nothing is made the pain better.  She has had no fever.  She has no trauma.  She denies chest pain or shortness of breath.  She has no abdominal pain.  She denies any bowel or bladder symptoms.    I have reviewed the Medications, Allergies, Past Medical and Surgical History, and Social History in the [employer] system.  PAST MEDICAL HISTORY:   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     Post partum depression      Post-surgical hypothyroidism 2017     Uncomplicated asthma        PAST SURGICAL HISTORY:   Past Surgical History:   Procedure Laterality Date     BLOOD PATCH N/A 10/11/2016    Procedure: EPIDURAL BLOOD PATCH;  Surgeon: GENERIC ANESTHESIA PROVIDER;  Location: [facility] OR      SECTION, TUBAL LIGATION, COMBINED N/A 2019    Procedure:  SECTION, WITH TUBAL LIGATION;  Surgeon: Kathy Rutledge MD;  Location: [facility] L+D     CRANIOTOMY, EXCISE TUMOR COMPLEX, COMBINED  2014    Procedure: COMBINED CRANIOTOMY, EXCISE TUMOR COMPLEX;  Supracerabellar  Infratentorial Approach for Resection of Tumor ;  Surgeon: Kate Mckeon MD;  Location:   OR     THYROIDECTOMY N/A 5/3/2017    Procedure: THYROIDECTOMY;  Total Thyroidectomy;  Surgeon: Pamela Villasenor MD;  [facility] OR       Past medical history, past surgical history, medications, and allergies were reviewed with the patient. Additional pertinent items: None    FAMILY HISTORY:   Family History   Problem Relation Age of Onset     Thyroid Disease Paternal Aunt      Breast Cancer Paternal Aunt 58        bilateral; negative testing     Cancer Paternal Aunt      Asthma Father      Mental Illness Father      Obesity Father      Asthma Maternal Grandmother      Osteoporosis Maternal Grandmother      Glaucoma Maternal Grandmother      Hypertension Maternal Grandmother      Macular Degeneration Maternal Grandmother      Diabetes Paternal Grandfather      Other Cancer Mother 62        salivary gland cancer; hx of smoking;  at 64     Genitourinary Problems Mother      Bipolar Disorder Mother         unipolar depression     Depression Mother      Thyroid Disease Mother         benign tumor     Skin Cancer Mother      Cancer Mother      Bipolar Disorder Brother         unipolar depression     Asthma Brother      Prostate Cancer Maternal Grandfather 69        no history of smoking     Bladder Cancer Maternal Grandfather 71     Colon Cancer Maternal Grandfather 70     Breast Cancer Maternal Aunt 37        negative BRCA1/2 testing     Melanoma Maternal Aunt 64     Breast Cancer Other         paternal great aunt       SOCIAL HISTORY:   Social History     Tobacco Use     Smoking status: Current Every Day Smoker     Packs/day: 0.50     Years: 12.00     Pack years: 6.00     Types: Cigarettes     Start date: 2004     Last attempt to quit: 2/15/2018     Years since quitting: 3.4     Smokeless tobacco: Never Used   Substance Use Topics     Alcohol use: Yes     Alcohol/week: 0.0 standard drinks     Comment: occasional     Social history was reviewed with the patient. Additional pertinent items:  None      Discharge Medication List as of 7/10/2021  7:38 PM      CONTINUE these medications which have NOT CHANGED    Details   albuterol (PROAIR HFA/PROVENTIL HFA/VENTOLIN HFA) 108 (90 Base) MCG/ACT inhaler Inhale 2 puffs into the lungs every 4 hours as needed for shortness of breath / dyspnea or wheezing, Disp-18 g, R-1, E-PrescribePharmacy may dispense brand covered by insurance (Proair, or proventil or ventolin or generic albuterol inhaler)      amphetamine-dextroamphetamine (ADDERALL XR) 15 MG 24 hr capsule Take 15 mg by mouth daily, Historical      amphetamine-dextroamphetamine (ADDERALL XR) 20 MG 24 hr capsule Take 20 mg by mouth daily, Historical      ARIPiprazole (ABILIFY) 10 MG tablet Take 10 mg by mouth daily, Historical      ATROVENT HFA 17 MCG/ACT inhaler Inhale 2 puffs into the lungs every 6 hours, CHER, Historical      budesonide-formoterol (SYMBICORT) 160-4.5 MCG/ACT Inhaler Inhale 2 puffs into the lungs 2 times daily, Disp-10.2 g, R-1, E-Prescribe      cetirizine (ZYRTEC) 10 MG tablet Take 1 tablet (10 mg) by mouth daily, Disp-90 tablet, R-3, E-Prescribe      fexofenadine (ALLEGRA) 180 MG tablet Take 180 mg by mouth every morning, Historical      !! gabapentin (NEURONTIN) 100 MG capsule Take 1 capsule (100 mg) by mouth 3 times daily, Disp-90 capsule, R-1, E-Prescribe      !! gabapentin (NEURONTIN) 300 MG capsule Take 1 capsule (300 mg) by mouth 2 times daily, Disp-60 capsule, R-3, E-Prescribe      ipratropium - albuterol 0.5 mg/2.5 mg/3 mL (DUONEB) 0.5-2.5 (3) MG/3ML neb solution Take 1 vial (3 mLs) by nebulization every 6 hours as needed for shortness of breath / dyspnea or wheezing, Disp-90 vial, R-3, E-Prescribe      !! levothyroxine (SYNTHROID/LEVOTHROID) 150 MCG tablet Take one tablet by mouth on M, W and F, Disp-90 tablet, R-2, E-Prescribe      !! levothyroxine (SYNTHROID/LEVOTHROID) 175 MCG tablet Take one tablet by mouth daily on T, Th, Sat and Sun, Disp-90 tablet, R-2, E-Prescribe      !!  medical cannabis (Patient's own supply) Take 1 Dose by mouth See Admin Instructions (The purpose of this order is to document that the patient reports taking medical cannabis.  This is not a prescription, and is not used to certify that the patient has a qualifying medical condition.)    Tange rine Oral Suspension Unflavored 1-5 ml up to two times daily.  Total THC = 240 mg, Total CBD Trace, Historical      !! medical cannabis (Patient's own supply) Take 1 Dose by mouth See Admin Instructions (The purpose of this order is to document that the patient reports taking medical cannabis.  This is not a prescription, and is not used to certify that the patient has a qualifying medical condition.)    Cobal t oral suspension: take 1-3 ml by mouth two times daily  Total CBD 1200 mg. Total THC 60 mg, Historical      montelukast (SINGULAIR) 10 MG tablet Take 1 tablet (10 mg) by mouth every morning, Disp-90 tablet, R-3, E-Prescribe      multivitamin w/minerals (MULTI-VITAMIN) tablet Take 1 tablet by mouth daily, Historical      propranolol (INDERAL) 20 MG tablet Take 1 tablet (20 mg) by mouth 2 times daily, Disp-60 tablet, R-0, E-Prescribe      topiramate (TOPAMAX) 100 MG tablet Take 100 mg by mouth daily Every evening, Historical      vitamin D3 (CHOLECALCIFEROL) 50 mcg (2000 units) tablet Take 1 tablet by mouth daily, Historical       !! - Potential duplicate medications found. Please discuss with provider.             Allergies   Allergen Reactions     Adacel [Daptacel] Swelling     Adhesive Tape Hives     Codeine Sulfate Nausea and Vomiting     Nicoderm [Nicotine]      Patch and Gum, pt reported allergic reaction 6/24     Tegaderm Transparent Dressing (Informational Only) Hives     Tetanus Toxoid      Pt states she had an infection at injection site.      Vicodin [Hydrocodone-Acetaminophen] Nausea and Vomiting     Methimazole Rash        Review of Systems  A complete review of systems was performed with pertinent positives  and negatives noted in the HPI, and all other systems negative.    Physical Exam   BP: 139/70  Pulse: 90  Temp: 98.7  F (37.1  C)  Resp: 18  Weight: 98.4 kg (217 lb)  SpO2: 99 %      Physical Exam  Constitutional:       General: She is not in acute distress.     Appearance: She is well-developed. She is ill-appearing (Uncomfortable from headache).   HENT:      Head: Normocephalic and atraumatic.   Eyes:      Conjunctiva/sclera: Conjunctivae normal.      Pupils: Pupils are equal, round, and reactive to light.   Neck:      Thyroid: No thyromegaly.      Trachea: No tracheal deviation.   Cardiovascular:      Rate and Rhythm: Normal rate and regular rhythm.      Heart sounds: Normal heart sounds. No murmur heard.     Pulmonary:      Effort: Pulmonary effort is normal. No respiratory distress.      Breath sounds: Normal breath sounds. No wheezing.   Chest:      Chest wall: No tenderness.   Abdominal:      General: There is no distension.      Palpations: Abdomen is soft.      Tenderness: There is no abdominal tenderness.   Musculoskeletal:         General: No tenderness.      Cervical back: Normal range of motion and neck supple.   Skin:     General: Skin is warm.      Findings: No rash.   Neurological:      Mental Status: She is alert and oriented to person, place, and time.      Sensory: No sensory deficit.   Psychiatric:         Behavior: Behavior normal.         ED Course     Results for orders placed or performed during the hospital encounter of 07/10/21 (from the past 48 hour(s))   CBC with platelets differential   Result Value Ref Range    WBC 9.3 4.0 - 11.0 10e9/L    RBC Count 4.75 3.8 - 5.2 10e12/L    Hemoglobin 13.8 11.7 - 15.7 g/dL    Hematocrit 42.9 35.0 - 47.0 %    MCV 90 78 - 100 fl    MCH 29.1 26.5 - 33.0 pg    MCHC 32.2 31.5 - 36.5 g/dL    RDW 13.9 10.0 - 15.0 %    Platelet Count 334 150 - 450 10e9/L    Diff Method Automated Method     % Neutrophils 51.1 %    % Lymphocytes 38.9 %    % Monocytes 6.7 %    %  Eosinophils 2.5 %    % Basophils 0.5 %    % Immature Granulocytes 0.3 %    Nucleated RBCs 0 0 /100    Absolute Neutrophil 4.7 1.6 - 8.3 10e9/L    Absolute Lymphocytes 3.6 0.8 - 5.3 10e9/L    Absolute Monocytes 0.6 0.0 - 1.3 10e9/L    Absolute Eosinophils 0.2 0.0 - 0.7 10e9/L    Absolute Basophils 0.1 0.0 - 0.2 10e9/L    Abs Immature Granulocytes 0.0 0 - 0.4 10e9/L    Absolute Nucleated RBC 0.0    Basic metabolic panel   Result Value Ref Range    Sodium 138 133 - 144 mmol/L    Potassium 3.7 3.4 - 5.3 mmol/L    Chloride 110 (H) 94 - 109 mmol/L    Carbon Dioxide 28 20 - 32 mmol/L    Anion Gap <1 (L) 3 - 14 mmol/L    Glucose 97 70 - 99 mg/dL    Urea Nitrogen 9 7 - 30 mg/dL    Creatinine 0.79 0.52 - 1.04 mg/dL    GFR Estimate >90 >60 mL/min/[1.73_m2]    GFR Estimate If Black >90 >60 mL/min/[1.73_m2]    Calcium 9.0 8.5 - 10.1 mg/dL          Procedures       No results found for this or any previous visit (from the past 24 hour(s)).  Medications   0.9% sodium chloride BOLUS (0 mLs Intravenous Stopped 7/10/21 1937)   ondansetron (ZOFRAN) injection 4 mg (4 mg Intravenous Given 7/10/21 1854)   diphenhydrAMINE (BENADRYL) injection 25 mg (25 mg Intravenous Given 7/10/21 1855)   dexamethasone PF (DECADRON) injection 10 mg (10 mg Intravenous Given 7/10/21 1855)             Assessments & Plan (with Medical Decision Making)   Patient is a 40-year-old female with a history of pineal tumor in 2014 status post craniotomy.  She has had a headache for the past few days, but now has become much more like her typical migraine over the past few hours.  She has nausea with vomiting associate with photophobia.  She describes a bitemporal pain with pain in the back of her neck which is typical for her migraine type headaches.  Patient has an MRI scheduled on 7/16/2021.    There is been no trauma.  She has no fever.    On arrival, patient had normal vital signs.  She appeared uncomfortable from her headache.  An IV was established and  medications were provided for symptomatic support.  Bloods were sent to the laboratory.    Patient's white count was 9.3 with a hemoglobin of 13.8.  Her basic metabolic profile was unremarkable.  Patient was concerned about her anion gap of less than 1.  I reviewed past labs and she tends to run a low anion gap.  She had an anion gap of two 1 year ago.  I would be more concerned about an elevated anion gap.  There is nothing to suggest that this is a new finding.  We will not do advanced imaging at this time as her symptoms have improved as I would expect with being given medications.  She does have her MRI scheduled in a few days.  Patient will follow up with her primary care provider.        I have reviewed the nursing notes.    I have reviewed the findings, diagnosis, plan and need for follow up with the patient.    Discharge Medication List as of 7/10/2021  7:38 PM          Final diagnoses:   Migraine with aura and without status migrainosus, not intractable     7/10/2021   [facility] EMERGENCY DEPARTMENT     Marcos Ureña MD  07/12/21 1817

## 2021-07-10 NOTE — TELEPHONE ENCOUNTER
Clinic Action Needed: Yes, please contact patient about abnormal lab results.     Routed to: BOYD Cardona RN/FNA

## 2021-07-10 NOTE — ED TRIAGE NOTES
"Arrives ambulatory to triage c/o migraine, accompanied by nausea and vomiting. First noticed sx of \"just a headache\" yesterday, progressively worsening. Having some word-finding difficulty. BEFAST negative. A&Ox4 aside from states feeling a bit foggy.  "

## 2021-07-10 NOTE — TELEPHONE ENCOUNTER
Patient calls with concerns regarding headache, nausea and shortness of breath. Patient spoke with a RN earlier regarding lab work that was done, patient is scheduled for a MRI on Friday. Patient currently rates headache at a 3 and says that nausea has worsened. She has not vomited, did once last night. Patient also feels that her shortness of breath has worsened. She does have asthma, has a albuterol inhaler and uses while on the phone. Patient is talking fine, does report being short of breath at rest and is wheezing.     Patient is advised on evaluation in the next 24 hours per protocol. Patient verbalizes understanding and will go to  or ED at this time. She denies further questions or concerns.    Nina Dougherty RN  [facility]      Reason for Disposition    [1] MILD asthma attack (e.g., no SOB at rest, mild SOB with walking, speaks normally in sentences, mild wheezing) AND [2]  persists > 24 hours on appropriate treatment    [1] MODERATE headache (e.g., interferes with normal activities) AND [2] present > 24 hours AND [3] unexplained  (Exceptions: analgesics not tried, typical migraine, or headache part of viral illness)    Additional Information    Negative: Severe difficulty breathing (e.g., struggling for each breath, speak in single words, pulse > 120)    Negative: Bluish (or gray) lips or face now    Negative: Difficult to awaken or acting confused (e.g., disoriented, slurred speech)    Negative: Passed out (i.e., lost consciousness, collapsed and was not responding)    Negative: Wheezing started suddenly after medicine, an allergic food, or bee sting    Negative: Sounds like a life-threatening emergency to the triager    Negative: [1] SEVERE asthma attack (e.g., very SOB at rest, speaks in single words, loud wheezes) AND [2] not resolved after 2 nebulizer or inhaler treatments    Negative: Peak flow rate less than 50% of baseline level (RED zone)    Negative: [1] Severe wheezing  "or coughing AND [2] doesn't have neb or inhaler available    Negative: Chest pain    Negative: [1] Hospitalized before with asthma AND [2] feels the same now    Negative: [1] Influenza prevalent in community (or household) AND [2] flu symptoms (e.g., cough WITH fever, etc) AND [3] onset < 48 hours ago    Negative: [1] Wheezing or coughing AND [2] hasn't used neb or inhaler twice AND [3] it's available    Negative: [1] Continuous (nonstop) coughing AND [2] keeps from working or sleeping AND [3] not improved after 2 nebulizer or inhaler treatments given 20 minutes apart    Negative: [1] Peak flow rate 50-80% of baseline level (YELLOW zone) AND [2] not resolved after 2 nebulizer or inhaler treatments given 20 minutes apart    Negative: Asthma medicine (nebulizer or inhaler) is needed more frequently than q 4 hours to keep you comfortable    Negative: Fever > 103 F (39.4 C)    Negative: [1] Fever > 101 F (38.3 C) AND [2] age > 60    Negative: Patient sounds very sick or weak to the triager    Negative: [1] MODERATE asthma attack (e.g., SOB at rest, speaks in phrases, audible wheezes) AND [2] not resolved after 2 nebulizer or inhaler treatments given 20 minutes apart    Negative: [1] SEVERE headache (e.g., excruciating) AND [2] not improved after 2 hours of pain medicine    Negative: [1] Vomiting AND [2] 2 or more times (Exception: similar to previous migraines)    Negative: Fever > 104 F (40 C)    Negative: [1] SEVERE headache (e.g., excruciating) AND [2] \"worst headache\" of life    Negative: [1] SEVERE headache AND [2] sudden-onset (i.e., reaching maximum intensity within seconds)    Negative: [1] SEVERE headache AND [2] fever    Negative: Loss of vision or double vision (Exception: same as prior migraines)    Negative: [1] Fever > 100.0 F (37.8 C) AND [2] diabetes mellitus or weak immune system (e.g., HIV positive, cancer chemo, splenectomy, organ transplant, chronic steroids)    Negative: Patient sounds very sick or " weak to the triager    Negative: Unable to walk, or can only walk with assistance (e.g., requires support)    Negative: Stiff neck (can't touch chin to chest)    Negative: Severe pain in one eye    Negative: [1] Other family members (or roommates) with headaches AND [2] possibility of carbon monoxide exposure    Negative: Followed a head injury    Negative: Pregnant    Negative: Postpartum (from 0 to 6 weeks after delivery)    Negative: Traumatic Brain Injury (TBI) is suspected    Negative: Difficult to awaken or acting confused (e.g., disoriented, slurred speech)    Negative: [1] Weakness of the face, arm or leg on one side of the body AND [2] new onset    Negative: [1] Numbness of the face, arm or leg on one side of the body AND [2] new onset    Negative: [1] Loss of speech or garbled speech AND [2] new onset    Negative: Passed out (i.e., lost consciousness, collapsed and was not responding)    Negative: Sounds like a life-threatening emergency to the triager    Protocols used: ASTHMA ATTACK-A-AH, HEADACHE-A-AH    COVID 19 Nurse Triage Plan/Patient Instructions    Please be aware that novel coronavirus (COVID-19) may be circulating in the community. If you develop symptoms such as fever, cough, or SOB or if you have concerns about the presence of another infection including coronavirus (COVID-19), please contact your health care provider or visit [email or web address].     Disposition/Instructions    In-Person Visit with provider recommended. Reference Visit Selection Guide.    Thank you for taking steps to prevent the spread of this virus.  o Limit your contact with others.  o Wear a simple mask to cover your cough.  o Wash your hands well and often.    Resources    [facility]: About COVID-19: [email or web address]    CDC: What to Do If You're Sick: www.cdc.gov/coronavirus/2019-ncov/about/steps-when-sick.html    CDC: Ending Home Isolation:  www.cdc.gov/coronavirus/2019-ncov/hcp/disposition-in-home-patients.html     CDC: Caring for Someone: www.cdc.gov/coronavirus/2019-ncov/if-you-are-sick/care-for-someone.html     [facility]: Interim Guidance for Hospital Discharge to Home: [email or web address]/hcp/[email or web address]    [facility] clinical trials (COVID-19 research studies): [email or web address]     Below are the COVID-19 hotlines at the [place] Department of Health ([facility]). Interpreters are available.   o For health questions: Call 636-673-3659 or 1-266.939.3323 (7 a.m. to 7 p.m.)  o For questions about schools and childcare: Call 193-505-2669 or 1-142.909.9947 (7 a.m. to 7 p.m.)

## 2021-07-11 NOTE — DISCHARGE INSTRUCTIONS
Take the medications that were prescribed to you yesterday  Cold packs or warm packs to the back of the head may be helpful  Get your MRI on Friday as scheduled  Return if increasing pain, fevers, or vomiting.

## 2021-07-12 ENCOUNTER — MYC MEDICAL ADVICE (OUTPATIENT)
Dept: FAMILY MEDICINE | Facility: CLINIC | Age: 41
End: 2021-07-12

## 2021-07-12 NOTE — TELEPHONE ENCOUNTER
See nurse triage encounter from 7/10 - no further action needed.    Aliya Peñaloza, RN  [facility]

## 2021-07-12 NOTE — TELEPHONE ENCOUNTER
Routing to provider - [facility] - please review and advise as appropriate    Writer notes patient called triage again on 7/10 and then presented to the emergency department with note incomplete    Do you want ED F/U at this time or triage to discuss anything with regard to lab results/current symptoms?

## 2021-07-12 NOTE — TELEPHONE ENCOUNTER
See nurse triage encounter from 7/10 - no further action needed.    Aliya Peñaloza, RN  [facility]

## 2021-07-12 NOTE — TELEPHONE ENCOUNTER
Low anion gaps like hers are generally not concerning.    Please call patient to let her know.  High anion gaps are generally more concerning.  It also appears she has some type of appointment for imaging in the near future.    Prashant Warren MD

## 2021-07-13 NOTE — TELEPHONE ENCOUNTER
Pt had virtual visit on 7/6, OV 7/9. ED 7/10.       London Hinds RN   [facility]

## 2021-07-15 NOTE — TELEPHONE ENCOUNTER
asthma control test received from patient via [employer]. Patient was seen this month by Emy POWER-CNP  Score is below 20 but had a recent appointment

## 2021-07-16 ENCOUNTER — HOSPITAL ENCOUNTER (OUTPATIENT)
Dept: MRI IMAGING | Facility: CLINIC | Age: 41
Discharge: HOME | End: 2021-07-16
Attending: NEUROLOGICAL SURGERY | Admitting: NEUROLOGICAL SURGERY
Payer: COMMERCIAL

## 2021-07-16 DIAGNOSIS — D44.5 PINEOCYTOMA (H): ICD-10-CM

## 2021-07-16 PROCEDURE — 255N000002 HC RX 255 OP 636: Performed by: NEUROLOGICAL SURGERY

## 2021-07-16 PROCEDURE — A9585 GADOBUTROL INJECTION: HCPCS | Performed by: NEUROLOGICAL SURGERY

## 2021-07-16 PROCEDURE — 70553 MRI BRAIN STEM W/O & W/DYE: CPT

## 2021-07-16 RX ORDER — GADOBUTROL 604.72 MG/ML
9 INJECTION INTRAVENOUS ONCE
Status: COMPLETED | OUTPATIENT
Start: 2021-07-16 | End: 2021-07-16

## 2021-07-16 RX ADMIN — GADOBUTROL 9 ML: 604.72 INJECTION INTRAVENOUS at 14:25

## 2021-07-16 ASSESSMENT — ASTHMA QUESTIONNAIRES: ACT_TOTALSCORE: 19

## 2021-07-26 ENCOUNTER — VIRTUAL VISIT (OUTPATIENT)
Dept: NEUROSURGERY | Facility: CLINIC | Age: 41
End: 2021-07-26
Payer: COMMERCIAL

## 2021-07-26 DIAGNOSIS — R51.9 CHRONIC DAILY HEADACHE: ICD-10-CM

## 2021-07-26 DIAGNOSIS — D44.5 PINEOCYTOMA (H): Primary | ICD-10-CM

## 2021-07-26 PROCEDURE — 99214 OFFICE O/P EST MOD 30 MIN: CPT | Mod: 95 | Performed by: PHYSICIAN ASSISTANT

## 2021-07-26 NOTE — LETTER
"2021       RE: Cathy Arroyo  [address] [facility] [address]     Dear Colleague,    Thank you for referring your patient, Cathy Arroyo, to the [facility] NEUROSURGERY CLINIC [place] at [facility]. Please see a copy of my visit note below.    [facility]  Department of Neurosurgery  Center for Skull Base and Pituitary Surgery    Name: Cathy Arroyo  MRN: [id number]  Age: 40 year old  : 1980  2021      Chief Complaint:   Pineocytoma s/p craniotomy for resection 2014, follow up visit    History of Present Illness:   Cathy Arroyo is a 40 year old female with a history of migraine headaches, ADD, asthma, tobacco use disorder, and hypothyroidism s/p thyroidectomy who is seen today for a follow up visit. She underwent a supracerebellar infratentorial craniotomy for resection of her pineal gland tumor in  with Dr. Mckeon. She's been doing quite well since then though does continue to struggle with almost daily headaches. She reports that the headaches had been increased in intensity recently and she actually presented to the ED with these concerns recently. She does classify these as migraines. She is on Topamax prescribed by her Psychiatrist for mood, though seems to have helped with her headaches as well. She describes the recent head pain as \"a band across the eyes into the temples\". They wax and wane in intensity but are fairly constant. She otherwise feels well and denies nausea, vomiting, falls, gait instability, confusion, paresthesias or weakness.     Of note, she is concerned about her consistently low anion gap and has plans to follow up with her PCP for further discussion.     Review of Systems:   Pertinent items are noted in HPI or as in patient entered ROS below, remainder of complete ROS is negative.     Physical Exam limited by video visit:  Neuro: The patient is fully oriented. Speech is " normal. Facial nerve function is normal appearing by video.    Imaging:  We reviewed her MRI from 7/16/2021 which shows no evidence of recurrent pineal tumor. No other intracranial mass or abnormality. Normal ventricle size.      Assessment:  1. 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 Daily headaches    Plan:  1. We discussed the reassuring findings on her recent MRI. We'll plan to follow up in 2 years with repeat scan, sooner with new concerns. She was encouraged to call with any questions or concerns in the meantime  2. Referral to Neurology for headache consult placed today.        Hannah Reddy PA-C  Department of Neurosurgery      Again, thank you for allowing me to participate in the care of your patient.      Sincerely,    Hannah Reddy PA-C

## 2021-07-26 NOTE — PATIENT INSTRUCTIONS
Follow up in 2 years, with MRI prior to office visit. Please call us to schedule!  I placed a referral for Neurology consult for your headaches. Let me know if you have trouble scheduling.

## 2021-07-26 NOTE — PROGRESS NOTES
"Cathy is a 40 year old who is being evaluated via a billable video visit.      How would you like to obtain your AVS? MyChart  If the video visit is dropped, the invitation should be resent by: Send to e-mail at: [email or web address]  Will anyone else be joining your video visit? No      Video Start Time: 12:58p  Video-Visit Details    Type of service:  Video Visit    Video End Time:1:07p    Originating Location (pt. Location): Home    Distant Location (provider location):  [facility] NEUROSURGERY CLINIC [place]     Platform used for Video Visit: Well       [facility]  Department of Neurosurgery  Center for Skull Base and Pituitary Surgery    Name: Cathy Arroyo  MRN: [id number]  Age: 40 year old  : 1980  2021      Chief Complaint:   Pineocytoma s/p craniotomy for resection 2014, follow up visit    History of Present Illness:   Cathy Arroyo is a 40 year old female with a history of migraine headaches, ADD, asthma, tobacco use disorder, and hypothyroidism s/p thyroidectomy who is seen today for a follow up visit. She underwent a supracerebellar infratentorial craniotomy for resection of her pineal gland tumor in  with Dr. Mckeon. She's been doing quite well since then though does continue to struggle with almost daily headaches. She reports that the headaches had been increased in intensity recently and she actually presented to the ED with these concerns recently. She does classify these as migraines. She is on Topamax prescribed by her Psychiatrist for mood, though seems to have helped with her headaches as well. She describes the recent head pain as \"a band across the eyes into the temples\". They wax and wane in intensity but are fairly constant. She otherwise feels well and denies nausea, vomiting, falls, gait instability, confusion, paresthesias or weakness.     Of note, she is concerned about her consistently low anion gap and has plans to follow up " with her PCP for further discussion.     Review of Systems:   Pertinent items are noted in HPI or as in patient entered ROS below, remainder of complete ROS is negative.     Physical Exam limited by video visit:  Neuro: The patient is fully oriented. Speech is normal. Facial nerve function is normal appearing by video.    Imaging:  We reviewed her MRI from 7/16/2021 which shows no evidence of recurrent pineal tumor. No other intracranial mass or abnormality. Normal ventricle size.      Assessment:  1. 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 Daily headaches    Plan:  1. We discussed the reassuring findings on her recent MRI. We'll plan to follow up in 2 years with repeat scan, sooner with new concerns. She was encouraged to call with any questions or concerns in the meantime  2. Referral to Neurology for headache consult placed today.        Hannah Reddy PA-C  Department of Neurosurgery

## 2021-08-15 NOTE — TELEPHONE ENCOUNTER
FUTURE VISIT INFORMATION      FUTURE VISIT INFORMATION:    Date: 9/9/2021    Time: 730am    Location: [facility] [place]  REFERRAL INFORMATION:    Referring provider:  Hannah Reddy PA-C     Referring providers clinic:  [facility] [place] Neuro[facility]     Reason for visit/diagnosis  Headaches     RECORDS REQUESTED FROM:       Clinic name Comments Records Status Imaging Status   Internal MARISA Reddy-7/26/2021    ED Visit-7/10/2021    REMINGTON Burgos-7/9/2021    MR Brain-7/16/2021, 12/8/2019    US Head/Neck-11/27/2020 Epic PACS

## 2021-09-09 ENCOUNTER — VIRTUAL VISIT (OUTPATIENT)
Dept: NEUROLOGY | Facility: CLINIC | Age: 41
End: 2021-09-09
Attending: PHYSICIAN ASSISTANT
Payer: COMMERCIAL

## 2021-09-09 ENCOUNTER — PRE VISIT (OUTPATIENT)
Dept: NEUROLOGY | Facility: CLINIC | Age: 41
End: 2021-09-09

## 2021-09-09 DIAGNOSIS — G43.009 MIGRAINE WITHOUT AURA AND WITHOUT STATUS MIGRAINOSUS, NOT INTRACTABLE: Primary | ICD-10-CM

## 2021-09-09 DIAGNOSIS — R51.9 CHRONIC DAILY HEADACHE: ICD-10-CM

## 2021-09-09 PROCEDURE — 99204 OFFICE O/P NEW MOD 45 MIN: CPT | Mod: 95 | Performed by: PSYCHIATRY & NEUROLOGY

## 2021-09-09 RX ORDER — RIZATRIPTAN BENZOATE 10 MG/1
10 TABLET, ORALLY DISINTEGRATING ORAL
Qty: 18 TABLET | Refills: 3 | Status: SHIPPED | OUTPATIENT
Start: 2021-09-09 | End: 2022-05-11

## 2021-09-09 RX ORDER — ONDANSETRON 4 MG/1
4 TABLET, ORALLY DISINTEGRATING ORAL EVERY 8 HOURS PRN
Qty: 20 TABLET | Refills: 3 | Status: SHIPPED | OUTPATIENT
Start: 2021-09-09 | End: 2022-12-20

## 2021-09-09 NOTE — LETTER
9/9/2021       RE: Cathy Arroyo  [address] [facility] [address]     Dear Colleague,    Thank you for referring your patient, Cathy Arroyo, to the [facility] NEUROLOGY CLINIC [place] at [facility]. Please see a copy of my visit note below.    MIGRAINE DISABILITY ASSESSMENT (MIDAS)    On how many days in the last 3 months did you miss work or school because of your headaches?  1    How many days in the last 3 months was your productivity at work or school reduced by half or more because of your headaches? (Do not include days you counted in question 1 where you missed work or school.)  1    On how many days in the last 3 months did you not do household work (such as housework, home repairs and maintenance, shopping, caring for children and relatives) because of your headaches?  3    How many days in the last 3 months was your productivity in household work reduced by half or more because of your headaches? (Do not include days you counted in question 3 where you did not do household work).  3    On how many days in the last 3 months did you miss family, social, or lesiure activities because of your headaches?  0    MIDAS Total Score:     On how many days in the last 3 months did you have a headache? (If a headache lasted more than 1 day, count each day.)   3    On a scale of 0 - 10, on average how painful were these headaches (where 0 = no pain at all, and 10 = pain as bad as it can be.)  3    [facility]   Clinics and Surgery Center  Virtual Neurology Consult     Cathy Arroyo MRN# [id number]   YOB: 1980 Age: 40 year old     Requesting provider: OSVALDO Osborne         Assessment and Recommendations:     Cathy Arroyo is a 40 year old female who presents for further evaluation of headache.    Her headache presentation meets criteria for episodic migraine without aura.  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  Her virtual neurologic examination is intact today, and recent imaging does not show recurrence or residual tumor to explain headaches.    She also describes recent difficulty breathing. She is able to speak in complete sentences with me today and complete all parts of the physical examination, but describes shortness of breath as an issue for the last 1 to 2 weeks.  I encouraged her to be seen by her primary care doctor or go to urgent care for further evaluation.  I also suggested that she get Covid tested.      I recommend a symptomatic treatment strategy, focusing on treatments for acute treatment of migraine.  We discussed the following:  -For acute treatment of moderate to severe headache, I recommend rizatriptan melt 10 mg taken at the onset of headache, with a repeat dose in 2 hours if needed.  This should not exceed more than 9 days/month to avoid medication overuse.  -For headache related nausea, I recommend ondansetron oral dissolvable tablet 4 mg as needed, not to exceed more than 9 days/month to avoid medication side effects.  -The addition of an NSAID to the above could be tried, if rizatriptan alone is not adequate.    Preventative treatment was briefly discussed.  She would prefer to hold off on this for now.    I will plan to see her back in 3 months to monitor her progress.    I spent 52 minutes on patient care and documentation.    Julissa Sears MD  Neurology            Chief Complaint:     Chief Complaint   Patient presents with     Neurology New Video Visit     Referred per Hannah Reddy PA-C, headaches           History is obtained from the patient and medical record.  Patient was seen via a virtual visit in their home due to the Covid 19 global pandemic.      Cathy Arroyo is a 40 year old female who has been living with headaches since brain surgery about 7 years ago. They have been  "getting worse over the past year, prompting her to request an updated MRI brain.    About two months ago, she had a severe headache. They improved after this until about two days ago. They occurred about once per month and last several days. She estimates she has 2-3 days per month with headache.    Pain occurs over her temples and across her forehead, as well as the base of her skull. It is a dull pain that is severe.    She has associated photophobia, phonophobia, nausea, vomiting. She can also get dizzy.    She denies typical aura. She denies a positional component to her pain, but prefers to lay down. Worse with routine physical activity. She denies fevers, unilateral autonomic features.     Triggers include stress, weather changes.     Tylenol, NSAIDs is not helpful. She tried Imitrex in the past on one occasion, which made her feel \"stoned\" and didn't help.    She previously took topiramate, which wasn't helpful for headache. She takes propranolol 20 mg BID (not taking due to potential side effect of dizziness) and gabapentin 300 mg AM (back and head pain). She has taken Depakote in the past for mood.     She has been having difficulty breathing for the last week. She has restarted her steroid inhaler.      She underwent a supracerebellar infratentorial craniotomy for resection of a pineal gland tumor in 2014 with Dr. Mckeon of Neurosurgery. She is currently followed every two years with repeat imaging, most recently in July 2021.               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     Post partum depression      Post-surgical hypothyroidism 05/2017     Uncomplicated asthma               Past Surgical History:     Past Surgical History:   Procedure Laterality Date     " BLOOD PATCH N/A 10/11/2016    Procedure: EPIDURAL BLOOD PATCH;  Surgeon: GENERIC ANESTHESIA PROVIDER;  Location: [facility] OR      SECTION, TUBAL LIGATION, COMBINED N/A 2019    Procedure:  SECTION, WITH TUBAL LIGATION;  Surgeon: Kathy Rutledge MD;  Location: [facility] L+D     CRANIOTOMY, EXCISE TUMOR COMPLEX, COMBINED  2014    Procedure: COMBINED CRANIOTOMY, EXCISE TUMOR COMPLEX;  Supracerabellar  Infratentorial Approach for Resection of Tumor ;  Surgeon: Kate Mckeon MD;  Location: [facility] OR     THYROIDECTOMY N/A 5/3/2017    Procedure: THYROIDECTOMY;  Total Thyroidectomy;  Surgeon: Pamela Villasenor MD;  Location: [facility] OR             Social History:   She is  with two children. She currently works three days a week as a . Her headaches can affect her ability to work.    Social History     Socioeconomic History     Marital status:      Spouse name: Not on file     Number of children: Not on file     Years of education: Not on file     Highest education level: Not on file   Occupational History     Not on file   Tobacco Use     Smoking status: Current Every Day Smoker     Packs/day: 0.50     Years: 12.00     Pack years: 6.00     Types: Cigarettes     Start date: 2004     Last attempt to quit: 2/15/2018     Years since quitting: 3.5     Smokeless tobacco: Never Used   Substance and Sexual Activity     Alcohol use: Yes     Alcohol/week: 0.0 standard drinks     Comment: occasional     Drug use: Yes     Types: Marijuana     Comment: medical marijuana     Sexual activity: Not Currently     Partners: Male     Birth control/protection: None   Other Topics Concern     Parent/sibling w/ CABG, MI or angioplasty before 65F 55M? No   Social History Narrative    Caffeine intake/servings daily - 1    Calcium intake/servings daily - 3    Exercise 5 times weekly - describe ; encouraged walking daily    Sunscreen used - Yes    Seatbelts used - Yes    Guns stored in the  home - No    Self Breast Exam - Yes    Pap test up to date -  No    Eye exam up to date -  No    Dental exam up to date -  No    DEXA scan up to date -  No    Flex Sig/Colonoscopy up to date -  No    Mammography up to date -  No    Immunizations reviewed and up to date - Yes    Abuse: Current or Past (Physical, Sexual or Emotional) - No    Do you feel safe in your environment - Yes    Do you cope well with stress - Yes    Do you suffer from insomnia - No             Social Determinants of Health     Financial Resource Strain:      Difficulty of Paying Living Expenses:    Food Insecurity:      Worried About Running Out of Food in the Last Year:      Ran Out of Food in the Last Year:    Transportation Needs:      Lack of Transportation (Medical):      Lack of Transportation (Non-Medical):    Physical Activity:      Days of Exercise per Week:      Minutes of Exercise per Session:    Stress:      Feeling of Stress :    Social Connections:      Frequency of Communication with Friends and Family:      Frequency of Social Gatherings with Friends and Family:      Attends Church Services:      Active Member of Clubs or Organizations:      Attends Club or Organization Meetings:      Marital Status:    Intimate Partner Violence:      Fear of Current or Ex-Partner:      Emotionally Abused:      Physically Abused:      Sexually Abused:              Family History:   There is a family history of headaches in her mother, brother (migraine).  Family History   Problem Relation Age of Onset     Thyroid Disease Paternal Aunt      Breast Cancer Paternal Aunt 58        bilateral; negative testing     Cancer Paternal Aunt      Asthma Father      Mental Illness Father      Obesity Father      Asthma Maternal Grandmother      Osteoporosis Maternal Grandmother      Glaucoma Maternal Grandmother      Hypertension Maternal Grandmother      Macular Degeneration Maternal Grandmother      Diabetes Paternal Grandfather      Other Cancer Mother  62        salivary gland cancer; hx of smoking;  at 64     Genitourinary Problems Mother      Bipolar Disorder Mother         unipolar depression     Depression Mother      Thyroid Disease Mother         benign tumor     Skin Cancer Mother      Cancer Mother      Bipolar Disorder Brother         unipolar depression     Asthma Brother      Prostate Cancer Maternal Grandfather 69        no history of smoking     Bladder Cancer Maternal Grandfather 71     Colon Cancer Maternal Grandfather 70     Breast Cancer Maternal Aunt 37        negative BRCA1/2 testing     Melanoma Maternal Aunt 64     Breast Cancer Other         paternal great aunt             Allergies:      Allergies   Allergen Reactions     Adacel [Daptacel] Swelling     Adhesive Tape Hives     Codeine Sulfate Nausea and Vomiting     Nicoderm [Nicotine]      Patch and Gum, pt reported allergic reaction      Tegaderm Transparent Dressing (Informational Only) Hives     Tetanus Toxoid      Pt states she had an infection at injection site.      Vicodin [Hydrocodone-Acetaminophen] Nausea and Vomiting     Methimazole Rash             Medications:     Current Outpatient Medications:      albuterol (PROAIR HFA/PROVENTIL HFA/VENTOLIN HFA) 108 (90 Base) MCG/ACT inhaler, Inhale 2 puffs into the lungs every 4 hours as needed for shortness of breath / dyspnea or wheezing, Disp: 18 g, Rfl: 1     amphetamine-dextroamphetamine (ADDERALL XR) 15 MG 24 hr capsule, Take 15 mg by mouth daily, Disp: , Rfl:      amphetamine-dextroamphetamine (ADDERALL XR) 20 MG 24 hr capsule, Take 20 mg by mouth daily, Disp: , Rfl:      ARIPiprazole (ABILIFY) 10 MG tablet, Take 10 mg by mouth daily, Disp: , Rfl:      ATROVENT HFA 17 MCG/ACT inhaler, Inhale 2 puffs into the lungs every 6 hours, Disp: , Rfl:      budesonide-formoterol (SYMBICORT) 160-4.5 MCG/ACT Inhaler, Inhale 2 puffs into the lungs 2 times daily, Disp: 10.2 g, Rfl: 1     cetirizine (ZYRTEC) 10 MG tablet, Take 1 tablet (10  mg) by mouth daily, Disp: 90 tablet, Rfl: 3     fexofenadine (ALLEGRA) 180 MG tablet, Take 180 mg by mouth every morning, Disp: , Rfl:      gabapentin (NEURONTIN) 100 MG capsule, Take 1 capsule (100 mg) by mouth 3 times daily, Disp: 90 capsule, Rfl: 1     gabapentin (NEURONTIN) 300 MG capsule, Take 1 capsule (300 mg) by mouth 2 times daily, Disp: 60 capsule, Rfl: 3     ipratropium - albuterol 0.5 mg/2.5 mg/3 mL (DUONEB) 0.5-2.5 (3) MG/3ML neb solution, Take 1 vial (3 mLs) by nebulization every 6 hours as needed for shortness of breath / dyspnea or wheezing, Disp: 90 vial, Rfl: 3     levothyroxine (SYNTHROID/LEVOTHROID) 150 MCG tablet, Take one tablet by mouth on M, W and F, Disp: 90 tablet, Rfl: 2     levothyroxine (SYNTHROID/LEVOTHROID) 175 MCG tablet, Take one tablet by mouth daily on T, Th, Sat and Sun, Disp: 90 tablet, Rfl: 2     medical cannabis (Patient's own supply), Take 1 Dose by mouth See Admin Instructions (The purpose of this order is to document that the patient reports taking medical cannabis.  This is not a prescription, and is not used to certify that the patient has a qualifying medical condition.)  [place] Oral Suspension Unflavored 1-5 ml up to two times daily. Total THC = 240 mg, Total CBD Trace, Disp: , Rfl:      medical cannabis (Patient's own supply), Take 1 Dose by mouth See Admin Instructions (The purpose of this order is to document that the patient reports taking medical cannabis.  This is not a prescription, and is not used to certify that the patient has a qualifying medical condition.)  [place] oral suspension: take 1-3 ml by mouth two times daily Total CBD 1200 mg. Total THC 60 mg, Disp: , Rfl:      montelukast (SINGULAIR) 10 MG tablet, Take 1 tablet (10 mg) by mouth every morning, Disp: 90 tablet, Rfl: 3     multivitamin w/minerals (MULTI-VITAMIN) tablet, Take 1 tablet by mouth daily, Disp: , Rfl:      propranolol (INDERAL) 20 MG tablet, Take 1 tablet (20 mg) by mouth 2 times daily,  Disp: 60 tablet, Rfl: 0     topiramate (TOPAMAX) 100 MG tablet, Take 100 mg by mouth daily Every evening, Disp: , Rfl:      vitamin D3 (CHOLECALCIFEROL) 50 mcg (2000 units) tablet, Take 1 tablet by mouth daily, Disp: , Rfl:           Physical Exam:   There were no vitals taken for this visit.   Physical Exam:   General: NAD  Neurologic:   Mental Status Exam: Alert, awake and oriented to situation. No dysarthria. Speech of normal fluency.   Cranial Nerves: Pupils equal, EOMs intact, no nystagmus, facial movements symmetric, hearing intact to conversation, tongue midline and fully mobile. No tongue atrophy or fasciculations.    Motor: No drift in upper extremities. Able to stand from a seated position without use of arms. No tremors or abnormal movements noted.   Coordination: With arms outstretched, able to touch nose using index finger accurately bilaterally.  Normal finger tapping bilaterally.     Gait: Normal stance and casual gait.    Neck: Normal range of motion with lateral head movements and neck flexion.  Eyes: No conjunctival injection, no scleral icterus.           Data:     MRI brain (7/16/2021):  Normal brain MRI. No evidence for residual or recurrent  pineocytoma.          Again, thank you for allowing me to participate in the care of your patient.      Sincerely,    Julissa Sears MD

## 2021-09-09 NOTE — NURSING NOTE
Patient states poss due to stress related,also noticed over the last year she states she has notice it is around when the weather pressure was falling and poss due to asthma.       Patient verified meds and allergies are correct via patients echeck in.    Jeanne Pulido, Virtual Facilitator

## 2021-09-09 NOTE — PROGRESS NOTES
Cathy is a 40 year old who is being evaluated via a billable video visit.      How would you like to obtain your AVS? MyChart  If the video visit is dropped, the invitation should be resent by: Text to cell phone: [phone]  Will anyone else be joining your video visit? No      Video Start Time: 7:31am  Video-Visit Details    Type of service:  Video Visit    Video End Time:8:04am    Originating Location (pt. Location): Home    Distant Location (provider location):  [facility] [place]     Platform used for Video Visit: Clare     MIGRAINE DISABILITY ASSESSMENT (MIDAS)    On how many days in the last 3 months did you miss work or school because of your headaches?  1    How many days in the last 3 months was your productivity at work or school reduced by half or more because of your headaches? (Do not include days you counted in question 1 where you missed work or school.)  1    On how many days in the last 3 months did you not do household work (such as housework, home repairs and maintenance, shopping, caring for children and relatives) because of your headaches?  3    How many days in the last 3 months was your productivity in household work reduced by half or more because of your headaches? (Do not include days you counted in question 3 where you did not do household work).  3    On how many days in the last 3 months did you miss family, social, or lesiure activities because of your headaches?  0    MIDAS Total Score:     On how many days in the last 3 months did you have a headache? (If a headache lasted more than 1 day, count each day.)   3    On a scale of 0 - 10, on average how painful were these headaches (where 0 = no pain at all, and 10 = pain as bad as it can be.)  3    [facility]  Virtual Neurology Consult     Cathy Arroyo MRN# [id number]   YOB: 1980 Age: 40 year old     Requesting provider: Hannah  OSVALDO Reddy         Assessment and Recommendations:     Cathy Arroyo is a 40 year old female who presents for further evaluation of headache.    Her headache presentation meets criteria for episodic migraine without aura.  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  Her virtual neurologic examination is intact today, and recent imaging does not show recurrence or residual tumor to explain headaches.    She also describes recent difficulty breathing. She is able to speak in complete sentences with me today and complete all parts of the physical examination, but describes shortness of breath as an issue for the last 1 to 2 weeks.  I encouraged her to be seen by her primary care doctor or go to urgent care for further evaluation.  I also suggested that she get Covid tested.      I recommend a symptomatic treatment strategy, focusing on treatments for acute treatment of migraine.  We discussed the following:  -For acute treatment of moderate to severe headache, I recommend rizatriptan melt 10 mg taken at the onset of headache, with a repeat dose in 2 hours if needed.  This should not exceed more than 9 days/month to avoid medication overuse.  -For headache related nausea, I recommend ondansetron oral dissolvable tablet 4 mg as needed, not to exceed more than 9 days/month to avoid medication side effects.  -The addition of an NSAID to the above could be tried, if rizatriptan alone is not adequate.    Preventative treatment was briefly discussed.  She would prefer to hold off on this for now.    I will plan to see her back in 3 months to monitor her progress.    I spent 52 minutes on patient care and documentation.    Julissa Sears MD  Neurology            Chief Complaint:     Chief Complaint   Patient presents with     Neurology New Video Visit     Referred per Hannah Reddy PA-C, headaches           History is obtained from the patient  "and medical record.  Patient was seen via a virtual visit in their home due to the Covid 19 global pandemic.      Cathy Arroyo is a 40 year old female who has been living with headaches since brain surgery about 7 years ago. They have been getting worse over the past year, prompting her to request an updated MRI brain.    About two months ago, she had a severe headache. They improved after this until about two days ago. They occurred about once per month and last several days. She estimates she has 2-3 days per month with headache.    Pain occurs over her temples and across her forehead, as well as the base of her skull. It is a dull pain that is severe.    She has associated photophobia, phonophobia, nausea, vomiting. She can also get dizzy.    She denies typical aura. She denies a positional component to her pain, but prefers to lay down. Worse with routine physical activity. She denies fevers, unilateral autonomic features.     Triggers include stress, weather changes.     Tylenol, NSAIDs is not helpful. She tried Imitrex in the past on one occasion, which made her feel \"stoned\" and didn't help.    She previously took topiramate, which wasn't helpful for headache. She takes propranolol 20 mg BID (not taking due to potential side effect of dizziness) and gabapentin 300 mg AM (back and head pain). She has taken Depakote in the past for mood.     She has been having difficulty breathing for the last week. She has restarted her steroid inhaler.      She underwent a supracerebellar infratentorial craniotomy for resection of a pineal gland tumor in 2014 with Dr. Mckeon of Neurosurgery. She is currently followed every two years with repeat imaging, most recently in July 2021.               Past Medical History:     Past Medical History:   Diagnosis Date     Allergic state      Anxiety      Bipolar 1 disorder (H)      Depressive disorder      Elevated cholesterol      Graves disease 2017     Obese     BMI 37.48     " Pineal tumor     s/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 tumor     Post partum depression      Post-surgical hypothyroidism 2017     Uncomplicated asthma               Past Surgical History:     Past Surgical History:   Procedure Laterality Date     BLOOD PATCH N/A 10/11/2016    Procedure: EPIDURAL BLOOD PATCH;  Surgeon: GENERIC ANESTHESIA PROVIDER;  Location: [facility] OR      SECTION, TUBAL LIGATION, COMBINED N/A 2019    Procedure:  SECTION, WITH TUBAL LIGATION;  Surgeon: Kathy Rutledge MD;  Location: [facility] L+D     CRANIOTOMY, EXCISE TUMOR COMPLEX, COMBINED  2014    Procedure: COMBINED CRANIOTOMY, EXCISE TUMOR COMPLEX;  Supracerabellar  Infratentorial Approach for Resection of Tumor ;  Surgeon: Kate Mckeon MD;  Location: [facility] OR     THYROIDECTOMY N/A 5/3/2017    Procedure: THYROIDECTOMY;  Total Thyroidectomy;  Surgeon: Pamela Villasenor MD;  Location:  OR             Social History:   She is  with two children. She currently works three days a week as a . Her headaches can affect her ability to work.    Social History     Socioeconomic History     Marital status:      Spouse name: Not on file     Number of children: Not on file     Years of education: Not on file     Highest education level: Not on file   Occupational History     Not on file   Tobacco Use     Smoking status: Current Every Day Smoker     Packs/day: 0.50     Years: 12.00     Pack years: 6.00     Types: Cigarettes     Start date: 2004     Last attempt to quit: 2/15/2018     Years since quitting: 3.5     Smokeless tobacco: Never Used   Substance and Sexual Activity     Alcohol use: Yes     Alcohol/week: 0.0 standard drinks     Comment: occasional     Drug use: Yes     Types: Marijuana     Comment: medical marijuana     Sexual activity: Not Currently     Partners: Male     Birth control/protection: None   Other Topics Concern     Parent/sibling w/ CABG, MI or angioplasty before 65F  55M? No   Social History Narrative    Caffeine intake/servings daily - 1    Calcium intake/servings daily - 3    Exercise 5 times weekly - describe ; encouraged walking daily    Sunscreen used - Yes    Seatbelts used - Yes    Guns stored in the home - No    Self Breast Exam - Yes    Pap test up to date -  No    Eye exam up to date -  No    Dental exam up to date -  No    DEXA scan up to date -  No    Flex Sig/Colonoscopy up to date -  No    Mammography up to date -  No    Immunizations reviewed and up to date - Yes    Abuse: Current or Past (Physical, Sexual or Emotional) - No    Do you feel safe in your environment - Yes    Do you cope well with stress - Yes    Do you suffer from insomnia - No             Social Determinants of Health     Financial Resource Strain:      Difficulty of Paying Living Expenses:    Food Insecurity:      Worried About Running Out of Food in the Last Year:      Ran Out of Food in the Last Year:    Transportation Needs:      Lack of Transportation (Medical):      Lack of Transportation (Non-Medical):    Physical Activity:      Days of Exercise per Week:      Minutes of Exercise per Session:    Stress:      Feeling of Stress :    Social Connections:      Frequency of Communication with Friends and Family:      Frequency of Social Gatherings with Friends and Family:      Attends Yazdanism Services:      Active Member of Clubs or Organizations:      Attends Club or Organization Meetings:      Marital Status:    Intimate Partner Violence:      Fear of Current or Ex-Partner:      Emotionally Abused:      Physically Abused:      Sexually Abused:              Family History:   There is a family history of headaches in her mother, brother (migraine).  Family History   Problem Relation Age of Onset     Thyroid Disease Paternal Aunt      Breast Cancer Paternal Aunt 58        bilateral; negative testing     Cancer Paternal Aunt      Asthma Father      Mental Illness Father      Obesity Father       Asthma Maternal Grandmother      Osteoporosis Maternal Grandmother      Glaucoma Maternal Grandmother      Hypertension Maternal Grandmother      Macular Degeneration Maternal Grandmother      Diabetes Paternal Grandfather      Other Cancer Mother 62        salivary gland cancer; hx of smoking;  at 64     Genitourinary Problems Mother      Bipolar Disorder Mother         unipolar depression     Depression Mother      Thyroid Disease Mother         benign tumor     Skin Cancer Mother      Cancer Mother      Bipolar Disorder Brother         unipolar depression     Asthma Brother      Prostate Cancer Maternal Grandfather 69        no history of smoking     Bladder Cancer Maternal Grandfather 71     Colon Cancer Maternal Grandfather 70     Breast Cancer Maternal Aunt 37        negative BRCA1/2 testing     Melanoma Maternal Aunt 64     Breast Cancer Other         paternal great aunt             Allergies:      Allergies   Allergen Reactions     Adacel [Daptacel] Swelling     Adhesive Tape Hives     Codeine Sulfate Nausea and Vomiting     Nicoderm [Nicotine]      Patch and Gum, pt reported allergic reaction      Tegaderm Transparent Dressing (Informational Only) Hives     Tetanus Toxoid      Pt states she had an infection at injection site.      Vicodin [Hydrocodone-Acetaminophen] Nausea and Vomiting     Methimazole Rash             Medications:     Current Outpatient Medications:      albuterol (PROAIR HFA/PROVENTIL HFA/VENTOLIN HFA) 108 (90 Base) MCG/ACT inhaler, Inhale 2 puffs into the lungs every 4 hours as needed for shortness of breath / dyspnea or wheezing, Disp: 18 g, Rfl: 1     amphetamine-dextroamphetamine (ADDERALL XR) 15 MG 24 hr capsule, Take 15 mg by mouth daily, Disp: , Rfl:      amphetamine-dextroamphetamine (ADDERALL XR) 20 MG 24 hr capsule, Take 20 mg by mouth daily, Disp: , Rfl:      ARIPiprazole (ABILIFY) 10 MG tablet, Take 10 mg by mouth daily, Disp: , Rfl:      ATROVENT HFA 17 MCG/ACT  inhaler, Inhale 2 puffs into the lungs every 6 hours, Disp: , Rfl:      budesonide-formoterol (SYMBICORT) 160-4.5 MCG/ACT Inhaler, Inhale 2 puffs into the lungs 2 times daily, Disp: 10.2 g, Rfl: 1     cetirizine (ZYRTEC) 10 MG tablet, Take 1 tablet (10 mg) by mouth daily, Disp: 90 tablet, Rfl: 3     fexofenadine (ALLEGRA) 180 MG tablet, Take 180 mg by mouth every morning, Disp: , Rfl:      gabapentin (NEURONTIN) 100 MG capsule, Take 1 capsule (100 mg) by mouth 3 times daily, Disp: 90 capsule, Rfl: 1     gabapentin (NEURONTIN) 300 MG capsule, Take 1 capsule (300 mg) by mouth 2 times daily, Disp: 60 capsule, Rfl: 3     ipratropium - albuterol 0.5 mg/2.5 mg/3 mL (DUONEB) 0.5-2.5 (3) MG/3ML neb solution, Take 1 vial (3 mLs) by nebulization every 6 hours as needed for shortness of breath / dyspnea or wheezing, Disp: 90 vial, Rfl: 3     levothyroxine (SYNTHROID/LEVOTHROID) 150 MCG tablet, Take one tablet by mouth on M, W and F, Disp: 90 tablet, Rfl: 2     levothyroxine (SYNTHROID/LEVOTHROID) 175 MCG tablet, Take one tablet by mouth daily on T, Th, Sat and Sun, Disp: 90 tablet, Rfl: 2     medical cannabis (Patient's own supply), Take 1 Dose by mouth See Admin Instructions (The purpose of this order is to document that the patient reports taking medical cannabis.  This is not a prescription, and is not used to certify that the patient has a qualifying medical condition.)  Tangerine Oral Suspension Unflavored 1-5 ml up to two times daily. Total THC = 240 mg, Total CBD Trace, Disp: , Rfl:      medical cannabis (Patient's own supply), Take 1 Dose by mouth See Admin Instructions (The purpose of this order is to document that the patient reports taking medical cannabis.  This is not a prescription, and is not used to certify that the patient has a qualifying medical condition.)  Ethel oral suspension: take 1-3 ml by mouth two times daily Total CBD 1200 mg. Total THC 60 mg, Disp: , Rfl:      montelukast (SINGULAIR) 10 MG tablet,  Take 1 tablet (10 mg) by mouth every morning, Disp: 90 tablet, Rfl: 3     multivitamin w/minerals (MULTI-VITAMIN) tablet, Take 1 tablet by mouth daily, Disp: , Rfl:      propranolol (INDERAL) 20 MG tablet, Take 1 tablet (20 mg) by mouth 2 times daily, Disp: 60 tablet, Rfl: 0     topiramate (TOPAMAX) 100 MG tablet, Take 100 mg by mouth daily Every evening, Disp: , Rfl:      vitamin D3 (CHOLECALCIFEROL) 50 mcg (2000 units) tablet, Take 1 tablet by mouth daily, Disp: , Rfl:           Physical Exam:   There were no vitals taken for this visit.   Physical Exam:   General: NAD  Neurologic:   Mental Status Exam: Alert, awake and oriented to situation. No dysarthria. Speech of normal fluency.   Cranial Nerves: Pupils equal, EOMs intact, no nystagmus, facial movements symmetric, hearing intact to conversation, tongue midline and fully mobile. No tongue atrophy or fasciculations.    Motor: No drift in upper extremities. Able to stand from a seated position without use of arms. No tremors or abnormal movements noted.   Coordination: With arms outstretched, able to touch nose using index finger accurately bilaterally.  Normal finger tapping bilaterally.     Gait: Normal stance and casual gait.    Neck: Normal range of motion with lateral head movements and neck flexion.  Eyes: No conjunctival injection, no scleral icterus.           Data:     MRI brain (7/16/2021):  Normal brain MRI. No evidence for residual or recurrent  pineocytoma.

## 2021-09-22 ENCOUNTER — MYC MEDICAL ADVICE (OUTPATIENT)
Dept: NEUROSURGERY | Facility: CLINIC | Age: 41
End: 2021-09-22

## 2021-09-22 DIAGNOSIS — J45.20 MILD INTERMITTENT ASTHMA WITHOUT COMPLICATION: ICD-10-CM

## 2021-09-22 RX ORDER — ALBUTEROL SULFATE 90 UG/1
2 AEROSOL, METERED RESPIRATORY (INHALATION) EVERY 4 HOURS PRN
Qty: 18 G | Refills: 1 | Status: SHIPPED | OUTPATIENT
Start: 2021-09-22 | End: 2021-12-02

## 2021-09-22 NOTE — TELEPHONE ENCOUNTER
Please call patient and advise she needs to stop propranolol since this will exacerbate her asthma. Will need to find a different treatment for her headaches. thanks

## 2021-09-22 NOTE — TELEPHONE ENCOUNTER
"Requested Prescriptions   Pending Prescriptions Disp Refills     albuterol (PROAIR HFA/PROVENTIL HFA/VENTOLIN HFA) 108 (90 Base) MCG/ACT inhaler 18 g 1     Sig: Inhale 2 puffs into the lungs every 4 hours as needed for shortness of breath / dyspnea or wheezing       Asthma Maintenance Inhalers - Anticholinergics Failed - 9/22/2021  2:59 PM        Failed - Asthma control assessment score within normal limits in last 6 months     Please review ACT score.           Passed - Patient is age 12 years or older        Passed - Medication is active on med list        Passed - Recent (6 mo) or future (30 days) visit within the authorizing provider's specialty     Patient had office visit in the last 6 months or has a visit in the next 30 days with authorizing provider or within the authorizing provider's specialty.  See \"Patient Info\" tab in inbasket, or \"Choose Columns\" in Meds & Orders section of the refill encounter.           Short-Acting Beta Agonist Inhalers Protocol  Failed - 9/22/2021  2:59 PM        Failed - Asthma control assessment score within normal limits in last 6 months     Please review ACT score.     ACT Total Scores 11/20/2020 1/5/2021 7/15/2021   ACT TOTAL SCORE (Goal Greater than or Equal to 20) 9 24 19   In the past 12 months, how many times did you visit the emergency room for your asthma without being admitted to the hospital? 0 0 0   In the past 12 months, how many times were you hospitalized overnight because of your asthma? 0 0 0               Passed - Patient is age 12 or older        Passed - Medication is active on med list        Passed - Recent (6 mo) or future (30 days) visit within the authorizing provider's specialty     Patient had office visit in the last 6 months or has a visit in the next 30 days with authorizing provider or within the authorizing provider's specialty.  See \"Patient Info\" tab in inbasket, or \"Choose Columns\" in Meds & Orders section of the refill encounter.           "     Routing refill request to provider for review/approval because:  ACT<20

## 2021-09-22 NOTE — TELEPHONE ENCOUNTER
Reason for Call:  Medication or medication refill:    Do you use a [facility]?  Name of the pharmacy and phone number for the current request:    [id number] - [facility] [address] AT [address] (Pharmacy) [phone]         Name of the medication requested: albuterol (PROAIR HFA/PROVENTIL HFA/VENTOLIN HFA) 108 (90 Base) MCG/ACT inhaler    Other request: NA    Can we leave a detailed message on this number? YES    Phone number patient can be reached at: Cell number on file:    Telephone Information:   Mobile [phone]       Best Time: ANY    Call taken on 9/22/2021 at 2:59 PM by Patrizia Jordan MA

## 2021-09-24 DIAGNOSIS — M54.16 LUMBAR RADICULOPATHY: ICD-10-CM

## 2021-09-27 RX ORDER — GABAPENTIN 300 MG/1
300 CAPSULE ORAL 2 TIMES DAILY
Qty: 60 CAPSULE | Refills: 3 | Status: SHIPPED | OUTPATIENT
Start: 2021-09-27 | End: 2022-05-04

## 2021-09-29 ENCOUNTER — OFFICE VISIT (OUTPATIENT)
Dept: FAMILY MEDICINE | Facility: CLINIC | Age: 41
End: 2021-09-29
Payer: COMMERCIAL

## 2021-09-29 VITALS
RESPIRATION RATE: 16 BRPM | HEART RATE: 86 BPM | WEIGHT: 223.31 LBS | DIASTOLIC BLOOD PRESSURE: 76 MMHG | OXYGEN SATURATION: 97 % | BODY MASS INDEX: 33.84 KG/M2 | TEMPERATURE: 98.1 F | SYSTOLIC BLOOD PRESSURE: 122 MMHG | HEIGHT: 68 IN

## 2021-09-29 DIAGNOSIS — Z23 NEED FOR PROPHYLACTIC VACCINATION AND INOCULATION AGAINST INFLUENZA: ICD-10-CM

## 2021-09-29 DIAGNOSIS — R89.9 ABNORMAL LABORATORY TEST RESULT: Primary | ICD-10-CM

## 2021-09-29 PROCEDURE — 99215 OFFICE O/P EST HI 40 MIN: CPT | Mod: 25 | Performed by: NURSE PRACTITIONER

## 2021-09-29 PROCEDURE — 90686 IIV4 VACC NO PRSV 0.5 ML IM: CPT | Performed by: NURSE PRACTITIONER

## 2021-09-29 PROCEDURE — 90471 IMMUNIZATION ADMIN: CPT | Performed by: NURSE PRACTITIONER

## 2021-09-29 RX ORDER — DIAZEPAM 5 MG
5 TABLET ORAL PRN
COMMUNITY
Start: 2021-09-27 | End: 2024-05-25

## 2021-09-29 ASSESSMENT — MIFFLIN-ST. JEOR: SCORE: 1741.31

## 2021-09-29 NOTE — PROGRESS NOTES
Assessment & Plan     Abnormal laboratory test result  Reviewed labs with the patient  Labs mildly irregular and in context of no significant symptoms  Suspect related to asthma control.  Encouraged more consistent use of inhaler    Need for prophylactic vaccination and inoculation against influenza    - INFLUENZA VACCINE IM > 6 MONTHS VALENT IIV4 (AFLURIA/FLUZONE)    Independent interpretation of a test performed by another physician/other qualified health care professional (not separately reported) - anion gap, cmp, tsh  55 minutes spent on the date of the encounter doing chart review, history and exam, documentation and further activities per the note       No follow-ups on file.    Sherrill Dasilva, JANNET CNP  [facility] is a 40 year old who presents for the following health issues    HPI     Patient has a concern about low anion gap noted in several past draws.  Chloride is also elevated in the same instances.  PMH includes pineocytoma, Graves, asthma, bipolar previously treated with lithium, and nicotine depdence.    Asthma has been terrible for 3-4 weeks.  Forgetting to take controller inhaler.  Interested in hypnosis.    Diarrhea all the time - normal baseline  No blood or mucous.  Two times constipated in the past month which is unusual.  Loose, not watery.  Once a day.      Component      Latest Ref Rng & Units 7/17/2019 6/30/2020 7/11/2020 11/16/2020   Chloride      94 - 109 mmol/L 107 109 112 (H) 106   Carbon Dioxide      20 - 32 mmol/L 23 22 24 28   Anion Gap      3 - 14 mmol/L 7 7 2 (L) 4     Component      Latest Ref Rng & Units 7/9/2021 7/10/2021   Chloride      94 - 109 mmol/L 111 (H) 110 (H)   Carbon Dioxide      20 - 32 mmol/L 29 28   Anion Gap      3 - 14 mmol/L <1 (L) <1 (L)         Dehydration [3, 4]    Conditions that can cause fluid loss, such as fever or diarrhea [4, 5]    Too much stress, which causes high cortisol levels (adrenal  "hyperfunction) [6]    Parathyroid hyperfunction (hyperparathyroidism) [7, 4, 8]    Kidney disease [3, 9]    Metabolic acidosis [3, 4]    Hyperventilation causing respiratory alkalosis [3, 4]    Diabetes insipidus (a rare condition where the body produces a large amount of urine and one often feels thirsty) [4]    Bromide intoxication [4]    Drugs such as aspirin [10]      Review of Systems   Constitutional, HEENT, cardiovascular, pulmonary, gi and gu systems are negative, except as otherwise noted.      Objective    /76   Pulse 86   Temp 98.1  F (36.7  C) (Temporal)   Resp 16   Ht 1.727 m (5' 7.99\")   Wt 101.3 kg (223 lb 5 oz)   LMP 09/26/2021 (Approximate)   SpO2 97%   BMI 33.96 kg/m    Body mass index is 33.96 kg/m .  Physical Exam   GENERAL: healthy, alert and no distress  NECK: no adenopathy, no asymmetry, masses, or scars and thyroid normal to palpation  RESP: lungs clear to auscultation - no rales, rhonchi or wheezes  CV: regular rate and rhythm, normal S1 S2, no S3 or S4, no murmur, click or rub, no peripheral edema and peripheral pulses strong  NEURO: Normal strength and tone, mentation intact and speech normal  PSYCH: mentation appears normal, affect normal/bright    No results found for any visits on 09/29/21.          "

## 2021-10-04 NOTE — PROGRESS NOTES
Cathy is a 40 year old who is being evaluated via a billable telephone visit.      What phone number would you like to be contacted at? [phone]  How would you like to obtain your AVS? MyChart    Assessment & Plan     Cough  - Symptomatic COVID-19 Virus (Coronavirus) by PCR Nose; Future    Covid testing is advised.  This was scheduled to be collected later today.    20 minutes spent on the date of the encounter doing chart review, patient visit and documentation       Return in about 2 weeks (around 10/19/2021) for Recheck with primary care provider if not improving.    Nicole Pittman PA-C  [facility]   Cathy is a 40 year old who presents for the following health issues   HPI       Concern for COVID-19  About how many days ago did these symptoms start? today  Is this your first visit for this illness? Yes  In the 14 days before your symptoms started, have you had close contact with someone with COVID-19 (Coronavirus)? No  Do you have a fever or chills? No  Are you having new or worsening difficulty breathing? Yes   Please describe what kind of difficulty you are having breathing:Moderate dyspnea (short of breath with ADLs, shortness of breath that limits the ability to walk up one flight of stairs without needing to rest)  Do you have new or worsening cough? Yes, it's a dry cough.   Have you had any new or unexplained body aches? No    Have you experienced any of the following NEW symptoms?    Headache: YES    Sore throat: YES    Loss of taste or smell: No    Chest pain: No    Diarrhea: No    Rash: No  What treatments have you tried? nothing  Who do you live with?  and 2 kids  Are you, or a household member, a healthcare worker or a ? No  Do you live in a nursing home, group home, or shelter? No  Do you have a way to get food/medications if quarantined? Yes, I have a friend or family member who can help me.          Cathy presents via telephone for  evaluation of symptoms that could be related to infection with COVID-19 virus.  Symptoms first began today.  She has had a dry cough and some shortness of breath.  She does have an associated headache and sore throat as well.  Her  and 2 daughters have similar symptoms.    Review of Systems   ROS negative except as noted above      Objective           Vitals:  No vitals were obtained today due to virtual visit.    Physical Exam   healthy, alert and no distress  PSYCH: Alert and oriented times 3; coherent speech, normal   rate and volume, able to articulate logical thoughts, able   to abstract reason, no tangential thoughts, no hallucinations   or delusions  Her affect is normal and pleasant  RESP: No cough, no audible wheezing, able to talk in full sentences  Remainder of exam unable to be completed due to telephone visits    No results found for any visits on 10/05/21.          Phone call duration: 10 minutes

## 2021-10-05 ENCOUNTER — VIRTUAL VISIT (OUTPATIENT)
Dept: FAMILY MEDICINE | Facility: CLINIC | Age: 41
End: 2021-10-05
Payer: COMMERCIAL

## 2021-10-05 ENCOUNTER — LAB (OUTPATIENT)
Dept: URGENT CARE | Facility: URGENT CARE | Age: 41
End: 2021-10-05
Payer: COMMERCIAL

## 2021-10-05 DIAGNOSIS — R05.9 COUGH: ICD-10-CM

## 2021-10-05 DIAGNOSIS — R05.9 COUGH: Primary | ICD-10-CM

## 2021-10-05 PROCEDURE — 99213 OFFICE O/P EST LOW 20 MIN: CPT | Mod: 95 | Performed by: PHYSICIAN ASSISTANT

## 2021-10-05 PROCEDURE — U0003 INFECTIOUS AGENT DETECTION BY NUCLEIC ACID (DNA OR RNA); SEVERE ACUTE RESPIRATORY SYNDROME CORONAVIRUS 2 (SARS-COV-2) (CORONAVIRUS DISEASE [COVID-19]), AMPLIFIED PROBE TECHNIQUE, MAKING USE OF HIGH THROUGHPUT TECHNOLOGIES AS DESCRIBED BY CMS-2020-01-R: HCPCS

## 2021-10-05 PROCEDURE — U0005 INFEC AGEN DETEC AMPLI PROBE: HCPCS

## 2021-10-05 NOTE — PATIENT INSTRUCTIONS
Quarantine is for those who have had a close contact to someone who is COVID positive. A close contact is defined as being within 6 feet for 15 minutes or longer. We recommend you stay home and away from others for 14 days to monitor for symptoms. If you develop symptoms, enter isolation guidelines and be tested for COVID-19. ** If you are living with someone who is COVID positive and sharing the same living space, you should quarantine beginning now but the 14 day timeline begins after that person's isolation ends.    Isolation is for those who have symptoms or test positive for COVID-19. You should remain home and away from others for 10 days after your symptoms start. You may return to activities after 10 days as long as you have been fever free for 24 hours and have had an improvement in your symptoms. If you are tested and your test comes back negative, you may return to activities 24 hours after you have been fever free without medication.    Isolation and Quarantine Guidelines:  Centers for Disease Control (CDC) https://www.cdc.gov/coronavirus/2019-ncov/your-health/quarantine-isolation.html  [place] Department of Health ([facility]) https://www.health.[facility].[place]./diseases/coronavirus/quarguide.pdf    Your symptoms show that you may have coronavirus (COVID-19). This illness can cause fever, cough and trouble  breathing. Many people get a mild case and get better on their own. Some people can get very sick.    What should I do?  1. We would like to test you for this virus.     2. When it s time for your COVID test:    Stay at least 6 feet away from others. (If someone will drive you to your test, stay in the backseat, as  far away from the  as you can.)    Cover your mouth and nose with a mask, tissue or washcloth.    Go straight to the testing site. Don't make any stops on the way there or back.    3. Starting now: Stay home and away from others (self-isolate) until:    You've had no fever--and no medicine  that reduces fever--for 3 full days (72 hours). And     Your other symptoms have gotten better. For example, your cough or breathing has improved. And     At least 10 days have passed since your symptoms started.    4. During this time, don t leave the house except for testing or medical care.    Stay in your own room, even for meals. Use your own bathroom if you can.    Stay away from others in your home. No hugging, kissing or shaking hands. No visitors.    Don t go to work, school or anywhere else.    Clean  high touch  surfaces often (doorknobs, counters, handles, etc.). Use a household cleaning  spray or wipes. You ll find a full list of  on the EPA website: www.epa.gov/pesticideregistration/  list-n-disinfectants-use-against-sars-cov-2.    Cover your mouth and nose with a mask, tissue or washcloth to avoid spreading germs.    Wash your hands and face often. Use soap and water.    Caregivers in these groups are at risk for severe illness due to COVID-19:  o People 65 years and older  o People who live in a nursing home or long-term care facility  o People with chronic disease (lung, heart, cancer, diabetes, kidney, liver, immunologic)  o People who have a weakened immune system, including those who:    Are in cancer treatment    Take medicine that weakens the immune system, such as corticosteroids    Had a bone marrow or organ transplant    Have an immune deficiency    Have poorly controlled HIV or AIDS    Are obese (body mass index of 40 or higher)    Smoke regularly  o Caregivers should wear gloves while washing dishes, handling laundry and cleaning  bedrooms and bathrooms.  o Use caution when washing and drying laundry: Don t shake dirty laundry, and use the  warmest water setting that you can.  o For more tips, go to www.cdc.gov/coronavirus/2019-ncov/downloads/10Things.pdf.    How can I take care of myself?  1. Get lots of rest. Drink extra fluids (unless a doctor has told you not to).  2. Take  Tylenol (acetaminophen) for fever or pain. If you have liver or kidney problems, ask your family  doctor if it's okay to take Tylenol.  Adults can take either:    650 mg (two 325 mg pills) every 4 to 6 hours, or     1,000 mg (two 500 mg pills) every 8 hours as needed.    Note: Don't take more than 3,000 mg in one day. Acetaminophen is found in many medicines  (both prescribed and over-the-counter medicines). Read all labels to be sure you don t take too  much.  For children, check the Tylenol bottle for the right dose. The dose is based on the child's age or weight.  3. If you have other health problems (like cancer, heart failure, an organ transplant or severe kidney  disease): Call your specialty clinic if you don't feel better in the next 2 days.  4. Know when to call 911. Emergency warning signs include:    Trouble breathing or shortness of breath    Pain or pressure in the chest that doesn t go away    Feeling confused like you haven t felt before, or not being able to wake up    Bluish-colored lips or face    Where can I get more information?    [facility] - About COVID-19: [email or web address]    CDC - What to Do If You re Sick: www.cdc.gov/coronavirus/2019-ncov/about/steps-when-sick.html    CDC - Ending Home Isolation: www.cdc.gov/coronavirus/2019-ncov/hcp/disposition-in-homepatients.  html    CDC - Caring for Someone: www.cdc.gov/coronavirus/2019-ncov/if-you-are-sick/care-for-someone.html    [facility] - Interim Guidance for Hospital Discharge to Home:  www.health.[facility].[place]/diseases/coronavirus/hcp/hospdischarge.pdf    [facility] clinical trials (COVID-19 research studies): clinicalaffairs.[facility]-clinicaltrials    Below are the COVID-19 hotlines at the [place] Department of Health ([facility]). Interpreters are  available.  o For health questions: Call 962-883-4806 or 1-274.412.1934 (7 a.m. to 7 p.m.)  o For questions about schools and childcare: Call 912-821-3775 or  [phone] (7 a.m. to 7 p.m.)

## 2021-10-06 LAB — SARS-COV-2 RNA RESP QL NAA+PROBE: NEGATIVE

## 2021-10-11 ENCOUNTER — VIRTUAL VISIT (OUTPATIENT)
Dept: FAMILY MEDICINE | Facility: CLINIC | Age: 41
End: 2021-10-11
Payer: COMMERCIAL

## 2021-10-11 DIAGNOSIS — N76.0 ACUTE VAGINITIS: ICD-10-CM

## 2021-10-11 DIAGNOSIS — N30.00 ACUTE CYSTITIS WITHOUT HEMATURIA: Primary | ICD-10-CM

## 2021-10-11 PROCEDURE — 99213 OFFICE O/P EST LOW 20 MIN: CPT | Mod: 95 | Performed by: INTERNAL MEDICINE

## 2021-10-11 RX ORDER — FLUCONAZOLE 150 MG/1
150 TABLET ORAL
Qty: 2 TABLET | Refills: 2 | Status: SHIPPED | OUTPATIENT
Start: 2021-10-11 | End: 2021-11-22

## 2021-10-11 RX ORDER — METRONIDAZOLE 7.5 MG/G
1 GEL VAGINAL DAILY
Qty: 70 G | Refills: 2 | Status: SHIPPED | OUTPATIENT
Start: 2021-10-11 | End: 2021-11-22

## 2021-10-11 RX ORDER — SULFAMETHOXAZOLE/TRIMETHOPRIM 800-160 MG
1 TABLET ORAL 2 TIMES DAILY
Qty: 10 TABLET | Refills: 2 | Status: SHIPPED | OUTPATIENT
Start: 2021-10-11 | End: 2021-11-22

## 2021-10-11 NOTE — PROGRESS NOTES
Cathy is a 41 year old who is being evaluated via a billable telephone visit.      What phone number would you like to be contacted at? [phone]  How would you like to obtain your AVS? Elizabethrangel Morgan is a 41 year old who presents for the following health issues     Urinary () - Female  Duration of complaint: one day   Description:   Dysuria (painful urination): no   Hematuria (blood in urine): no   Frequency: YES. Every 5 - 10 minutes last night.  Are you urinating at night : YES  Hesitancy (delay in urine): no   Retention (unable to empty): no   Incontinence: no   Vaginal discharge: YES, but no odor  Nausea and/or vomiting: no   Abdominal pain: no   Progression of Symptoms:  improving  Accompanying Signs & Symptoms:  Fever: no   Back/Flank pain: no   History:   History of frequent UTI's: YES  History of kidney stones: no   Sexually active: YES  Precipitating factors:   unknown  Alleviating factors:  Patient tried previous Rx for Metrogel and symptoms are improving.  Therapies Tried and outcome: As above. Patient is requesting Metrogel, antibiotics for possible UTI and Fluconazole after finishing antibiotics.    ASSESSMENT/PLAN  Acute cystitis without hematuria  - sulfamethoxazole-trimethoprim (BACTRIM DS) 800-160 MG tablet; Take 1 tablet by mouth 2 times daily    Acute vaginitis  - metroNIDAZOLE (METROGEL) 0.75 % vaginal gel; Place 1 applicator (5 g) vaginally daily  - fluconazole (DIFLUCAN) 150 MG tablet; Take 1 tablet (150 mg) by mouth every 3 days    Phone call duration: 8 minutes  Start: 8:25 AM  End: 8:33 AM      28 minutes spent on the date of the encounter doing chart review, review of outside records, patient visit and documentation

## 2021-10-14 ENCOUNTER — OFFICE VISIT (OUTPATIENT)
Dept: URGENT CARE | Facility: URGENT CARE | Age: 41
End: 2021-10-14
Payer: COMMERCIAL

## 2021-10-14 ENCOUNTER — MYC MEDICAL ADVICE (OUTPATIENT)
Dept: FAMILY MEDICINE | Facility: CLINIC | Age: 41
End: 2021-10-14

## 2021-10-14 VITALS
OXYGEN SATURATION: 96 % | TEMPERATURE: 98 F | BODY MASS INDEX: 33.76 KG/M2 | SYSTOLIC BLOOD PRESSURE: 127 MMHG | HEART RATE: 80 BPM | DIASTOLIC BLOOD PRESSURE: 85 MMHG | WEIGHT: 222 LBS | RESPIRATION RATE: 16 BRPM

## 2021-10-14 DIAGNOSIS — T36.95XA ANTIBIOTIC-INDUCED YEAST INFECTION: ICD-10-CM

## 2021-10-14 DIAGNOSIS — B37.9 ANTIBIOTIC-INDUCED YEAST INFECTION: ICD-10-CM

## 2021-10-14 DIAGNOSIS — N61.0 CELLULITIS OF BREAST: Primary | ICD-10-CM

## 2021-10-14 PROCEDURE — 99213 OFFICE O/P EST LOW 20 MIN: CPT | Performed by: NURSE PRACTITIONER

## 2021-10-14 RX ORDER — FLUCONAZOLE 150 MG/1
150 TABLET ORAL ONCE
Qty: 2 TABLET | Refills: 0 | Status: SHIPPED | OUTPATIENT
Start: 2021-10-14 | End: 2021-10-14

## 2021-10-14 RX ORDER — CLINDAMYCIN HCL 300 MG
300 CAPSULE ORAL 3 TIMES DAILY
Qty: 30 CAPSULE | Refills: 0 | Status: SHIPPED | OUTPATIENT
Start: 2021-10-14 | End: 2021-10-24

## 2021-10-14 ASSESSMENT — ENCOUNTER SYMPTOMS
COUGH: 0
HEADACHES: 0
SHORTNESS OF BREATH: 0
FEVER: 0
RHINORRHEA: 0
DIARRHEA: 0
VOMITING: 0
NAUSEA: 0
CHILLS: 0
SORE THROAT: 0

## 2021-10-14 NOTE — TELEPHONE ENCOUNTER
"Patient called following up on breast symptoms requesting US and then wants to follow up with provider after    Breast symptoms - concern for infection  Right Breast - lump above nipple in areola - size: about the size of nipple - top of eraser head - redness, swelling, warmth around entire areola - denies discharge from swollen area or nipple  Not lactating - last  two years ago - 'feels like an abscess or pimple or inflamed duct\"  Painful - rest 3/10 and with touch 5/10  Temperature 97.6  Left breast - warm similar temperature as right, \"more pink than usual\" - denies Lumps    We tried to find appointment at several clinics with no success - I discussed skin problems benefit from in person observation as well - she is agreeable to urgent care at [facility]    "

## 2021-10-14 NOTE — PROGRESS NOTES
SUBJECTIVE:   Cathy Arroyo is a 41 year old female presenting with a chief complaint of   Chief Complaint   Patient presents with     Breast Problem     noticed an abscess in right side breast today        She is an established patient of [facility].    SUBJECTIVE:  Cathy Arroyo is presenting to urgent care with complaint of painful swelling on the right breast.  She noted the pain and swelling near the nipple when she woke up today.  She has not used any medications.  She denies any other symptoms.  He has never experienced this problem before.    Review of Systems   Constitutional: Negative for chills and fever.   HENT: Negative for congestion, ear pain, rhinorrhea and sore throat.    Respiratory: Negative for cough and shortness of breath.    Gastrointestinal: Negative for diarrhea, nausea and vomiting.   Skin:        Pain on right breast   Neurological: Negative for headaches.   All other systems reviewed and are negative.      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     Post partum depression      Post-surgical hypothyroidism 2017     Uncomplicated asthma      Family History   Problem Relation Age of Onset     Thyroid Disease Paternal Aunt      Breast Cancer Paternal Aunt 58        bilateral; negative testing     Cancer Paternal Aunt      Asthma Father      Mental Illness Father      Obesity Father      Asthma Maternal Grandmother      Osteoporosis Maternal Grandmother      Glaucoma Maternal Grandmother      Hypertension Maternal Grandmother      Macular Degeneration Maternal Grandmother      Diabetes Paternal Grandfather      Other Cancer Mother 62        salivary gland cancer; hx of smoking;  at 64     Genitourinary Problems Mother      Bipolar Disorder Mother         unipolar  depression     Depression Mother      Thyroid Disease Mother         benign tumor     Skin Cancer Mother      Cancer Mother      Bipolar Disorder Brother         unipolar depression     Asthma Brother      Prostate Cancer Maternal Grandfather 69        no history of smoking     Bladder Cancer Maternal Grandfather 71     Colon Cancer Maternal Grandfather 70     Breast Cancer Maternal Aunt 37        negative BRCA1/2 testing     Melanoma Maternal Aunt 64     Breast Cancer Other         paternal great aunt     Current Outpatient Medications   Medication Sig Dispense Refill     clindamycin (CLEOCIN) 300 MG capsule Take 1 capsule (300 mg) by mouth 3 times daily for 10 days 30 capsule 0     fluconazole (DIFLUCAN) 150 MG tablet Take 1 tablet (150 mg) by mouth once for 1 dose To take the next after 3 days for yeast infection 2 tablet 0     albuterol (PROAIR HFA/PROVENTIL HFA/VENTOLIN HFA) 108 (90 Base) MCG/ACT inhaler Inhale 2 puffs into the lungs every 4 hours as needed for shortness of breath / dyspnea or wheezing (Patient not taking: Reported on 10/5/2021) 18 g 1     amphetamine-dextroamphetamine (ADDERALL XR) 15 MG 24 hr capsule Take 15 mg by mouth daily       amphetamine-dextroamphetamine (ADDERALL XR) 20 MG 24 hr capsule Take 20 mg by mouth daily       ARIPiprazole (ABILIFY) 10 MG tablet Take 10 mg by mouth daily       ATROVENT HFA 17 MCG/ACT inhaler Inhale 2 puffs into the lungs every 6 hours       budesonide-formoterol (SYMBICORT) 160-4.5 MCG/ACT Inhaler Inhale 2 puffs into the lungs 2 times daily 10.2 g 1     cetirizine (ZYRTEC) 10 MG tablet Take 1 tablet (10 mg) by mouth daily 90 tablet 3     diazepam (VALIUM) 5 MG tablet Take 5 mg by mouth as needed (Patient not taking: Reported on 10/5/2021)       fexofenadine (ALLEGRA) 180 MG tablet Take 180 mg by mouth every morning       fluconazole (DIFLUCAN) 150 MG tablet Take 1 tablet (150 mg) by mouth every 3 days (Patient not taking: Reported on 10/14/2021) 2 tablet 2      gabapentin (NEURONTIN) 100 MG capsule Take 1 capsule (100 mg) by mouth 3 times daily 90 capsule 1     gabapentin (NEURONTIN) 300 MG capsule Take 1 capsule (300 mg) by mouth 2 times daily 60 capsule 3     ipratropium - albuterol 0.5 mg/2.5 mg/3 mL (DUONEB) 0.5-2.5 (3) MG/3ML neb solution Take 1 vial (3 mLs) by nebulization every 6 hours as needed for shortness of breath / dyspnea or wheezing 90 vial 3     levothyroxine (SYNTHROID/LEVOTHROID) 150 MCG tablet Take one tablet by mouth on M, W and F 90 tablet 2     levothyroxine (SYNTHROID/LEVOTHROID) 175 MCG tablet Take one tablet by mouth daily on T, Th, Sat and Sun 90 tablet 2     medical cannabis (Patient's own supply) Take 1 Dose by mouth See Admin Instructions (The purpose of this order is to document that the patient reports taking medical cannabis.  This is not a prescription, and is not used to certify that the patient has a qualifying medical condition.)    Tangerine Oral Suspension Unflavored 1-5 ml up to two times daily.  Total THC = 240 mg, Total CBD Trace       medical cannabis (Patient's own supply) Take 1 Dose by mouth See Admin Instructions (The purpose of this order is to document that the patient reports taking medical cannabis.  This is not a prescription, and is not used to certify that the patient has a qualifying medical condition.)    Wheeler oral suspension: take 1-3 ml by mouth two times daily  Total CBD 1200 mg. Total THC 60 mg       metroNIDAZOLE (METROGEL) 0.75 % vaginal gel Place 1 applicator (5 g) vaginally daily 70 g 2     montelukast (SINGULAIR) 10 MG tablet Take 1 tablet (10 mg) by mouth every morning 90 tablet 3     multivitamin w/minerals (MULTI-VITAMIN) tablet Take 1 tablet by mouth daily       ondansetron (ZOFRAN-ODT) 4 MG ODT tab Take 1 tablet (4 mg) by mouth every 8 hours as needed for nausea (Patient not taking: Reported on 10/5/2021) 20 tablet 3     rizatriptan (MAXALT-MLT) 10 MG ODT Take 1 tablet (10 mg) by mouth at onset of  headache for migraine (repeat in 2 hours if needed) May repeat in 2 hours. Max 3 tablets/24 hours. (Patient not taking: Reported on 10/5/2021) 18 tablet 3     sulfamethoxazole-trimethoprim (BACTRIM DS) 800-160 MG tablet Take 1 tablet by mouth 2 times daily (Patient not taking: Reported on 10/14/2021) 10 tablet 2     vitamin D3 (CHOLECALCIFEROL) 50 mcg (2000 units) tablet Take 1 tablet by mouth daily       Social History     Tobacco Use     Smoking status: Current Every Day Smoker     Packs/day: 0.50     Years: 12.00     Pack years: 6.00     Types: Cigarettes     Start date: 1/21/2004     Last attempt to quit: 2/15/2018     Years since quitting: 3.6     Smokeless tobacco: Never Used   Substance Use Topics     Alcohol use: Yes     Alcohol/week: 0.0 standard drinks     Comment: occasional       OBJECTIVE  /85 (BP Location: Left arm, Patient Position: Sitting, Cuff Size: Adult Large)   Pulse 80   Temp 98  F (36.7  C) (Tympanic)   Resp 16   Wt 100.7 kg (222 lb)   LMP 09/26/2021 (Approximate)   SpO2 96%   BMI 33.76 kg/m      Physical Exam  Vitals and nursing note reviewed.   Constitutional:       General: She is not in acute distress.     Appearance: She is well-developed. She is not diaphoretic.   Pulmonary:      Effort: Pulmonary effort is normal. No respiratory distress.      Breath sounds: Normal breath sounds.   Musculoskeletal:      Cervical back: Normal range of motion and neck supple.   Lymphadenopathy:      Cervical: No cervical adenopathy.   Skin:     General: Skin is warm and dry.      Comments: Indurated area close of the nipple of right breat with tenderness. No open skin. There is surrounding erythema and swelling.  No discharge on the nipple   Neurological:      General: No focal deficit present.      Mental Status: She is alert.      Cranial Nerves: No cranial nerve deficit.   Psychiatric:         Mood and Affect: Mood normal.         Labs:  No results found for this or any previous visit  (from the past 24 hour(s)).      ASSESSMENT:      ICD-10-CM    1. Cellulitis of breast  N61.0 clindamycin (CLEOCIN) 300 MG capsule   2. Antibiotic-induced yeast infection  B37.9 fluconazole (DIFLUCAN) 150 MG tablet    T36.95XA       PLAN:  Patient educational/instructional material provided including reasons for follow-up   Side effects of antibiotics discussed.  Plan of care as above  Worrisome symptoms are discussed and instructions to follow-up with the PCP.  All questions are answered and patient verbalized understanding and agrees with this plan.  Miya Bourne  [facility]  Family Nurse Practitoner        Patient Instructions     Patient Education     Cellulitis  Cellulitis is an infection of the deep layers of skin. A break in the skin, such as a cut or scratch, can let bacteria under the skin. If the bacteria get to deep layers of the skin, it can be serious. If not treated, cellulitis can get into the bloodstream and lymph nodes. The infection can then spread throughout the body. This causes serious illness.   Cellulitis causes the affected skin to become red, swollen, warm, and sore. The reddened areas have a visible border. An open sore may leak fluid (pus). You may have a fever, chills, and pain.   Cellulitis is treated with antibiotics taken for 7 to 10 days. An open sore may be cleaned and covered with cool wet gauze. Symptoms should get better 1 to 2 days after treatment is started. Make sure to take all the antibiotics for the full number of days until they are gone. Keep taking the medicine even if your symptoms go away.   Home care  Follow these tips:    Limit the use of the part of your body with cellulitis.     If the infection is on your leg, keep your leg raised while sitting. This helps reduce swelling.    Take all of the antibiotic medicine exactly as directed until it is gone. Don't miss any doses, especially during the first 7 days. Don t stop taking the medicine when your symptoms get  better.    Keep the affected area clean and dry.    Wash your hands with soap and clean, running water before and after touching your skin. Anyone else who touches your skin should also wash his or her hands. Don't share towels.  Follow-up care  Follow up with your healthcare provider, or as advised. If your infection doesn't go away on the first antibiotic, your healthcare provider will prescribe a different one.   When to seek medical advice  Call your healthcare provider right away if any of these occur:    Red areas that spread    Swelling or pain that gets worse    Fluid leaking from the skin (pus)    Fever higher of 100.4  F (38.0  C) or higher after 2 days on antibiotics  [employer] last reviewed this educational content on 8/1/2019 2000-2021 [employer]. All rights reserved. This information is not intended as a substitute for professional medical care. Always follow your healthcare professional's instructions.

## 2021-10-14 NOTE — PATIENT INSTRUCTIONS
Patient Education     Cellulitis  Cellulitis is an infection of the deep layers of skin. A break in the skin, such as a cut or scratch, can let bacteria under the skin. If the bacteria get to deep layers of the skin, it can be serious. If not treated, cellulitis can get into the bloodstream and lymph nodes. The infection can then spread throughout the body. This causes serious illness.   Cellulitis causes the affected skin to become red, swollen, warm, and sore. The reddened areas have a visible border. An open sore may leak fluid (pus). You may have a fever, chills, and pain.   Cellulitis is treated with antibiotics taken for 7 to 10 days. An open sore may be cleaned and covered with cool wet gauze. Symptoms should get better 1 to 2 days after treatment is started. Make sure to take all the antibiotics for the full number of days until they are gone. Keep taking the medicine even if your symptoms go away.   Home care  Follow these tips:    Limit the use of the part of your body with cellulitis.     If the infection is on your leg, keep your leg raised while sitting. This helps reduce swelling.    Take all of the antibiotic medicine exactly as directed until it is gone. Don't miss any doses, especially during the first 7 days. Don t stop taking the medicine when your symptoms get better.    Keep the affected area clean and dry.    Wash your hands with soap and clean, running water before and after touching your skin. Anyone else who touches your skin should also wash his or her hands. Don't share towels.  Follow-up care  Follow up with your healthcare provider, or as advised. If your infection doesn't go away on the first antibiotic, your healthcare provider will prescribe a different one.   When to seek medical advice  Call your healthcare provider right away if any of these occur:    Red areas that spread    Swelling or pain that gets worse    Fluid leaking from the skin (pus)    Fever higher of 100.4  F (38.0   C) or higher after 2 days on antibiotics  Herbert last reviewed this educational content on 8/1/2019 2000-2021 [employer]. All rights reserved. This information is not intended as a substitute for professional medical care. Always follow your healthcare professional's instructions.

## 2021-11-01 ENCOUNTER — MYC MEDICAL ADVICE (OUTPATIENT)
Dept: FAMILY MEDICINE | Facility: CLINIC | Age: 41
End: 2021-11-01

## 2021-11-01 NOTE — TELEPHONE ENCOUNTER
Pt notified immunization record has been updated    Cristin Landry RN   [facility]

## 2021-11-22 ENCOUNTER — VIRTUAL VISIT (OUTPATIENT)
Dept: FAMILY MEDICINE | Facility: CLINIC | Age: 41
End: 2021-11-22
Payer: COMMERCIAL

## 2021-11-22 DIAGNOSIS — B02.9 HERPES ZOSTER WITHOUT COMPLICATION: Primary | ICD-10-CM

## 2021-11-22 PROCEDURE — 99213 OFFICE O/P EST LOW 20 MIN: CPT | Mod: 95 | Performed by: INTERNAL MEDICINE

## 2021-11-22 RX ORDER — VALACYCLOVIR HYDROCHLORIDE 1 G/1
1000 TABLET, FILM COATED ORAL 3 TIMES DAILY
Qty: 21 TABLET | Refills: 0 | Status: SHIPPED | OUTPATIENT
Start: 2021-11-22 | End: 2021-12-15

## 2021-11-22 NOTE — PROGRESS NOTES
Cathy is a 41 year old who is being evaluated via a billable video visit.      How would you like to obtain your AVS? Zane  If the video visit is dropped, the invitation should be resent by: Text to cell phone: ZANE  Will anyone else be joining your video visit? No    Video Start Time: 1409    Assessment & Plan     Herpes zoster without complication  I think this is pretty classic shingles with the limitations of video exam.   I recommended starting valacyclovir as soon as possible (try to get two doses in today).  Contagion issues discussed.  Can use over the counter 1% hydrocortisone cream for itching symptoms, and over the counter NSAIDs as directed with food for pain.  Discussed side effects and risks of valtrex.   - valACYclovir (VALTREX) 1000 mg tablet; Take 1 tablet (1,000 mg) by mouth 3 times daily for 7 days      20 minutes spent on the date of the encounter doing chart review, history and exam, documentation and further activities per the note           No follow-ups on file.    William Matamoros MD  [facility]    Héctor Morgan is a 41 year old who presents for the following health issues     HPI       Chief Complaint   Patient presents with     Derm Problem     Rash on right side, abdomen area     Itchy, burning rash on abdomen for 2 days  No new soaps, detergents or medications    Review of Systems         Objective           Vitals:  No vitals were obtained today due to virtual visit.    Physical Exam   GENERAL: Healthy, alert and no distress  EYES: Eyes grossly normal to inspection.  No discharge or erythema, or obvious scleral/conjunctival abnormalities.  RESP: No audible wheeze, cough, or visible cyanosis.  No visible retractions or increased work of breathing.    SKIN: Vesicular rash in dermatomal distribution on right abdomen, does not cross midline  NEURO: Cranial nerves grossly intact.  Mentation and speech appropriate for age.  PSYCH: Mentation appears normal,  affect normal/bright, judgement and insight intact, normal speech and appearance well-groomed.                Video-Visit Details    Type of service:  Video Visit    Video End Time:2:25 PM    Originating Location (pt. Location): Home    Distant Location (provider location):  [facility]     Platform used for Video Visit: Broadcast[employer]

## 2021-12-02 ENCOUNTER — MYC MEDICAL ADVICE (OUTPATIENT)
Dept: FAMILY MEDICINE | Facility: CLINIC | Age: 41
End: 2021-12-02
Payer: COMMERCIAL

## 2021-12-02 DIAGNOSIS — J45.20 MILD INTERMITTENT ASTHMA WITHOUT COMPLICATION: ICD-10-CM

## 2021-12-02 RX ORDER — ALBUTEROL SULFATE 90 UG/1
AEROSOL, METERED RESPIRATORY (INHALATION)
Qty: 18 G | Refills: 1 | Status: SHIPPED | OUTPATIENT
Start: 2021-12-02 | End: 2023-12-12

## 2021-12-02 NOTE — TELEPHONE ENCOUNTER
"Requested Prescriptions   Pending Prescriptions Disp Refills     VENTOLIN  (90 Base) MCG/ACT inhaler [Pharmacy Med Name: VENTOLIN HFA INH W/DOS CTR [id number]] 18 g 1     Sig: INHALE 2 PUFFS INTO THE LUNGS EVERY 4 HOURS AS NEEDED FOR SHORTNESS OF BREATH OR DIFFICULT BREATHING OR WHEEZING       Asthma Maintenance Inhalers - Anticholinergics Failed - 12/2/2021 12:01 PM        Failed - Asthma control assessment score within normal limits in last 6 months     Please review ACT score.           Passed - Patient is age 12 years or older        Passed - Medication is active on med list        Passed - Recent (6 mo) or future (30 days) visit within the authorizing provider's specialty     Patient had office visit in the last 6 months or has a visit in the next 30 days with authorizing provider or within the authorizing provider's specialty.  See \"Patient Info\" tab in inbasket, or \"Choose Columns\" in Meds & Orders section of the refill encounter.           Short-Acting Beta Agonist Inhalers Protocol  Failed - 12/2/2021 12:01 PM        Failed - Asthma control assessment score within normal limits in last 6 months     Please review ACT score.           Passed - Patient is age 12 or older        Passed - Medication is active on med list        Passed - Recent (6 mo) or future (30 days) visit within the authorizing provider's specialty     Patient had office visit in the last 6 months or has a visit in the next 30 days with authorizing provider or within the authorizing provider's specialty.  See \"Patient Info\" tab in inbasket, or \"Choose Columns\" in Meds & Orders section of the refill encounter.               ACT sent via [employer].     Thanks,  BOYD June  [facility]     "

## 2021-12-13 ENCOUNTER — MYC MEDICAL ADVICE (OUTPATIENT)
Dept: FAMILY MEDICINE | Facility: CLINIC | Age: 41
End: 2021-12-13
Payer: COMMERCIAL

## 2021-12-13 NOTE — PROGRESS NOTES
SUBJECTIVE:   CC: Cathy Arroyo is an 41 year old woman who presents for preventive health visit.     Patient has been advised of split billing requirements and indicates understanding: Yes  HPI     Asthma- Poorly controlled right now. Using ventolin inhaler 3x/wk. Uses Symbicort 1x/wk. Taking zyrtec and montelukast daily. Does not wake up at night due to asthma. Has had no ED visits this year due to asthma.   ACT Total Scores 1/5/2021 7/15/2021 12/14/2021   ACT TOTAL SCORE (Goal Greater than or Equal to 20) 24 19 17   In the past 12 months, how many times did you visit the emergency room for your asthma without being admitted to the hospital? 0 0 0   In the past 12 months, how many times were you hospitalized overnight because of your asthma? 0 0 0         Bipolar- Managed with psychiatrist. Feels mental health is poor right now, going into hypomania phase, but working closely with psychiatrist to manage medications.     Smoking- Smokes 10 cigarettes/day. Intolerance to smoking cessation products. Patch gave her hives, lozenges caused her throat to swell. Has tried Chantix and Wellbutrin in the past which did not help, and she is not interested in taking due to her bipolar disorder. Only times she has been able to quit successfully was during her pregnancies. Saving up for hypnosis.     Answers for HPI/ROS submitted by the patient on 12/10/2021  Frequency of exercise:: None  Getting at least 3 servings of Calcium per day:: NO  Diet:: Regular (no restrictions)  Taking medications regularly:: Yes  Medication side effects:: None  Bi-annual eye exam:: Yes  Dental care twice a year:: NO  Sleep apnea or symptoms of sleep apnea:: Daytime drowsiness, Sleep apnea  Additional concerns today:: Yes    Today's PHQ-2 Score:   PHQ-2 ( 1999 [employer]) 12/10/2021   Q1: Little interest or pleasure in doing things 0   Q2: Feeling down, depressed or hopeless 0   PHQ-2 Score 0   PHQ-2 Total Score (12-17 Years)- Positive if 3 or more  points; Administer PHQ-A if positive -   Q1: Little interest or pleasure in doing things Not at all   Q2: Feeling down, depressed or hopeless Not at all   PHQ-2 Score 0       Abuse: Current or Past (Physical, Sexual or Emotional) - No  Do you feel safe in your environment? Yes    Have you ever done Advance Care Planning? (For example, a Health Directive, POLST, or a discussion with a medical provider or your loved ones about your wishes): No, advance care planning information given to patient to review.  Patient declined advance care planning discussion at this time.    Social History     Tobacco Use     Smoking status: Current Every Day Smoker     Packs/day: 0.50     Years: 14.00     Pack years: 7.00     Types: Cigarettes     Start date: 1/21/2004     Last attempt to quit: 8/29/2018     Years since quitting: 3.2     Smokeless tobacco: Never Used     Tobacco comment: Quit during both pregnancies and during breastfeeding and started again after stopping breastfeeding   Substance Use Topics     Alcohol use: Yes     Alcohol/week: 0.0 standard drinks     Comment: maybe four drinks in a month     If you drink alcohol do you typically have >3 drinks per day or >7 drinks per week? No    \No flowsheet data found.    Reviewed orders with patient.  Reviewed health maintenance and updated orders accordingly - Yes  Lab work is in process    Breast Cancer Screening:  Any new diagnosis of family breast, ovarian, or bowel cancer? No    FHS-7: No flowsheet data found.  click delete button to remove this line now  Mammogram Screening - Alternate mammogram schedule due to breast cancer history  Pertinent mammograms are reviewed under the imaging tab.    History of abnormal Pap smear: NO - age 30-65 PAP every 5 years with negative HPV co-testing recommended  PAP / HPV Latest Ref Rng & Units 2/8/2018   PAP (Historical) - NIL   HPV16 NEG:Negative Negative   HPV18 NEG:Negative Negative   HRHPV NEG:Negative Negative     Reviewed and  updated as needed this visit by clinical staff   Allergies              Reviewed and updated as needed this visit by Provider   Allergies               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     Post partum depression      Post-surgical hypothyroidism 05/2017     Uncomplicated asthma         Review of Systems  GENERAL HEALTH SYMPTOMS Yes   SKIN SYMPTOMS Yes   HEAD, EARS, NOSE AND THROAT SYMPTOMS No   EYE SYMPTOMS No   HEART SYMPTOMS No   LUNG SYMPTOMS No   INTESTINAL SYMPTOMS Yes   URINARY SYMPTOMS No   GYNECOLOGIC SYMPTOMS No   BREAST SYMPTOMS No   SKELETAL SYMPTOMS Yes   BLOOD SYMPTOMS No   NERVOUS SYSTEM SYMPTOMS No   MENTAL HEALTH SYMPTOMS No   Fever    Loss of appetite    Weight loss    Weight gain    Fatigue    Night sweats    Chills    Increased stress Yes   Excessive hunger    Excessive thirst    Feeling hot or cold when others believe the temperature is normal    Loss of height    Post-operative complications    Surgical site pain    Hallucinations    Change in or Loss of Energy    Hyperactivity    Confusion    Changes in hair    Changes in moles/birth marks    Itching Yes   Rashes Yes - shingles a few weeks ago   Changes in nails    Acne    Hair in places you don't want it    Change in facial hair    Warts    Non-healing sores    Scarring    Flaking of skin    Color changes of hands/feet in cold     Sun sensitivity    Skin thickening    Heart burn or indigestion Yes   Nausea    Vomiting    Abdominal pain    Bloating    Constipation    Diarrhea    Blood in stool    Black stools    Rectal or Anal pain    Fecal incontinence    Yellowing of skin or eyes    Vomit with blood    Change in stools    Back pain Yes   Muscle aches Yes   Neck pain Yes   Swollen joints    Joint pain Yes   Bone pain   "  Muscle cramps    Muscle weakness    Joint stiffness Yes   Bone fracture      OBJECTIVE:   /76   Pulse 80   Temp 97.2  F (36.2  C)   Ht 1.727 m (5' 8\")   Wt 103.4 kg (228 lb)   SpO2 100%   BMI 34.67 kg/m    Physical Exam  GENERAL: healthy, alert and no distress  EYES: Eyes grossly normal to inspection, PERRL and conjunctivae and sclerae normal  HENT: ear canals and TM's normal, nose and mouth without ulcers or lesions  NECK: no adenopathy, no asymmetry, masses, or scars and thyroid normal to palpation  RESP: mild bibasilar inspiratory wheezes; no rales, rhonchi   BREAST: normal without masses, tenderness or nipple discharge and no palpable axillary masses or adenopathy  CV: regular rate and rhythm, normal S1 S2, no S3 or S4, no murmur, click or rub, no peripheral edema and peripheral pulses strong  ABDOMEN: soft, nontender, no hepatosplenomegaly, no masses and bowel sounds normal  MS: no gross musculoskeletal defects noted, no edema  SKIN: no suspicious lesions or rashes. Shingles lesions crusting on right lower abdomen.   NEURO: Normal strength and tone, mentation intact and speech normal  PSYCH: mentation appears normal, affect normal/bright    Diagnostic Test Results:  Labs reviewed in Epic    ASSESSMENT/PLAN:   (Z00.00) Routine general medical examination at a health care facility  (primary encounter diagnosis)  Comment:   Plan:     (J45.41) Moderate persistent asthma with acute exacerbation  Comment: Poorly controlled right now. Taking Symbicort 1x/wk, ventolin 3x/wk, montelukast and zyrtec daily. Is struggling to quit smoking, smoking 10 cigarettes/day. Intolerant of smoking cessation products.   Plan: budesonide-formoterol (SYMBICORT) 160-4.5         MCG/ACT Inhaler     Discussed that the symbicort should provide immediate rescue relief with the formoterol component and then also gives the ICS component.  Patient will reach for this when feeling need for rescue       (J45.20) Mild intermittent " "asthma without complication  Comment:   Plan: albuterol (PROAIR HFA/PROVENTIL HFA/VENTOLIN         HFA) 108 (90 Base) MCG/ACT inhaler, Adult         Allergy/Asthma Referral     Current exacerbation - generally defers prednisone due to provocation of jose eduardo and is in hypomanic state and relatively mild exacerbation and good airflow.  No prednisone at this time.    (E89.0) Postoperative hypothyroidism  Comment: TSH within normal limits 7/21.   Plan: TSH with free T4 reflex            (F31.70) Bipolar affective disorder in remission (H)  Comment:   Plan: Followed by psychiatry.  Has clear medication plan for hypomania, depressive episode and manic episode    (Z12.39) Breast cancer screening, high risk patient  Comment:   Plan: MA Screen Bilateral w/Zain            (Z12.4) Screening for cervical cancer  Comment:   Plan: Pap screen with HPV - recommended age 30 - 65         years            (R89.9) Abnormal laboratory test  Comment:   Plan: Basic metabolic panel  (Ca, Cl, CO2, Creat,         Gluc, K, Na, BUN)     Follow-up anion gap      Patient has been advised of split billing requirements and indicates understanding: Yes  COUNSELING:  Reviewed preventive health counseling, as reflected in patient instructions    Estimated body mass index is 34.67 kg/m  as calculated from the following:    Height as of this encounter: 1.727 m (5' 8\").    Weight as of this encounter: 103.4 kg (228 lb).    Weight management plan: Discussed healthy diet and exercise guidelines    She reports that she has been smoking cigarettes. She started smoking about 17 years ago. She has a 7.00 pack-year smoking history. She has never used smokeless tobacco.  Tobacco Cessation Action Plan:   Self help information given to patient      Counseling Resources:  ATP IV Guidelines  Pooled Cohorts Equation Calculator  Breast Cancer Risk Calculator  BRCA-Related Cancer Risk Assessment: FHS-7 Tool  FRAX Risk Assessment  ICSI Preventive Guidelines  Dietary " Guidelines for Americans, 2010  USDA's MyPlate  ASA Prophylaxis  Lung CA Screening    Sherrill Dasilva, JANNET CALIX  [facility]

## 2021-12-14 ENCOUNTER — OFFICE VISIT (OUTPATIENT)
Dept: FAMILY MEDICINE | Facility: CLINIC | Age: 41
End: 2021-12-14
Payer: COMMERCIAL

## 2021-12-14 VITALS
TEMPERATURE: 97.2 F | BODY MASS INDEX: 34.56 KG/M2 | HEART RATE: 80 BPM | OXYGEN SATURATION: 100 % | SYSTOLIC BLOOD PRESSURE: 117 MMHG | WEIGHT: 228 LBS | HEIGHT: 68 IN | DIASTOLIC BLOOD PRESSURE: 76 MMHG

## 2021-12-14 DIAGNOSIS — R89.9 ABNORMAL LABORATORY TEST: ICD-10-CM

## 2021-12-14 DIAGNOSIS — E89.0 POSTOPERATIVE HYPOTHYROIDISM: ICD-10-CM

## 2021-12-14 DIAGNOSIS — Z12.4 SCREENING FOR CERVICAL CANCER: ICD-10-CM

## 2021-12-14 DIAGNOSIS — Z12.39 BREAST CANCER SCREENING, HIGH RISK PATIENT: ICD-10-CM

## 2021-12-14 DIAGNOSIS — J45.41 MODERATE PERSISTENT ASTHMA WITH ACUTE EXACERBATION: ICD-10-CM

## 2021-12-14 DIAGNOSIS — F31.70 BIPOLAR AFFECTIVE DISORDER IN REMISSION (H): ICD-10-CM

## 2021-12-14 DIAGNOSIS — J45.20 MILD INTERMITTENT ASTHMA WITHOUT COMPLICATION: ICD-10-CM

## 2021-12-14 DIAGNOSIS — Z00.00 ROUTINE GENERAL MEDICAL EXAMINATION AT A HEALTH CARE FACILITY: Primary | ICD-10-CM

## 2021-12-14 DIAGNOSIS — J45.20 MILD INTERMITTENT ASTHMA: Primary | ICD-10-CM

## 2021-12-14 PROCEDURE — 36415 COLL VENOUS BLD VENIPUNCTURE: CPT | Performed by: NURSE PRACTITIONER

## 2021-12-14 PROCEDURE — G0145 SCR C/V CYTO,THINLAYER,RESCR: HCPCS | Performed by: NURSE PRACTITIONER

## 2021-12-14 PROCEDURE — 87624 HPV HI-RISK TYP POOLED RSLT: CPT | Performed by: NURSE PRACTITIONER

## 2021-12-14 PROCEDURE — 99395 PREV VISIT EST AGE 18-39: CPT | Performed by: NURSE PRACTITIONER

## 2021-12-14 PROCEDURE — 80048 BASIC METABOLIC PNL TOTAL CA: CPT | Performed by: NURSE PRACTITIONER

## 2021-12-14 PROCEDURE — 99214 OFFICE O/P EST MOD 30 MIN: CPT | Mod: 25 | Performed by: NURSE PRACTITIONER

## 2021-12-14 PROCEDURE — 84443 ASSAY THYROID STIM HORMONE: CPT | Performed by: NURSE PRACTITIONER

## 2021-12-14 PROCEDURE — 84439 ASSAY OF FREE THYROXINE: CPT | Performed by: NURSE PRACTITIONER

## 2021-12-14 RX ORDER — ALBUTEROL SULFATE 90 UG/1
2 AEROSOL, METERED RESPIRATORY (INHALATION) EVERY 6 HOURS
Qty: 18 G | Refills: 3 | Status: SHIPPED | OUTPATIENT
Start: 2021-12-14 | End: 2022-11-15

## 2021-12-14 RX ORDER — ALBUTEROL SULFATE 90 UG/1
AEROSOL, METERED RESPIRATORY (INHALATION)
Qty: 18 G | Refills: 1 | Status: CANCELLED | OUTPATIENT
Start: 2021-12-14

## 2021-12-14 RX ORDER — BUDESONIDE AND FORMOTEROL FUMARATE DIHYDRATE 160; 4.5 UG/1; UG/1
2 AEROSOL RESPIRATORY (INHALATION) 2 TIMES DAILY
Qty: 10.2 G | Refills: 1 | Status: SHIPPED | OUTPATIENT
Start: 2021-12-14 | End: 2022-03-07

## 2021-12-14 RX ORDER — IPRATROPIUM BROMIDE AND ALBUTEROL SULFATE 2.5; .5 MG/3ML; MG/3ML
1 SOLUTION RESPIRATORY (INHALATION) EVERY 6 HOURS PRN
Qty: 90 ML | Refills: 1 | Status: SHIPPED | OUTPATIENT
Start: 2021-12-14 | End: 2022-11-15

## 2021-12-14 ASSESSMENT — MIFFLIN-ST. JEOR: SCORE: 1757.7

## 2021-12-15 ENCOUNTER — TELEPHONE (OUTPATIENT)
Dept: PULMONOLOGY | Facility: CLINIC | Age: 41
End: 2021-12-15
Payer: COMMERCIAL

## 2021-12-15 ENCOUNTER — MYC MEDICAL ADVICE (OUTPATIENT)
Dept: FAMILY MEDICINE | Facility: CLINIC | Age: 41
End: 2021-12-15
Payer: COMMERCIAL

## 2021-12-15 LAB
ANION GAP SERPL CALCULATED.3IONS-SCNC: 2 MMOL/L (ref 3–14)
BUN SERPL-MCNC: 12 MG/DL (ref 7–30)
CALCIUM SERPL-MCNC: 8.5 MG/DL (ref 8.5–10.1)
CHLORIDE BLD-SCNC: 109 MMOL/L (ref 94–109)
CO2 SERPL-SCNC: 26 MMOL/L (ref 20–32)
CREAT SERPL-MCNC: 0.74 MG/DL (ref 0.52–1.04)
GFR SERPL CREATININE-BSD FRML MDRD: >90 ML/MIN/1.73M2
GLUCOSE BLD-MCNC: 102 MG/DL (ref 70–99)
POTASSIUM BLD-SCNC: 5 MMOL/L (ref 3.4–5.3)
SODIUM SERPL-SCNC: 137 MMOL/L (ref 133–144)
T4 FREE SERPL-MCNC: 1.2 NG/DL (ref 0.76–1.46)
TSH SERPL DL<=0.005 MIU/L-ACNC: 5.61 MU/L (ref 0.4–4)

## 2021-12-15 ASSESSMENT — ASTHMA QUESTIONNAIRES: ACT_TOTALSCORE: 17

## 2021-12-15 NOTE — TELEPHONE ENCOUNTER
Spoke with patient regarding scheduling a cxr for before appt with dr askwe on 3/7. testing was scheduled and details confirmed with patient who voiced understanding.

## 2021-12-16 ENCOUNTER — MYC MEDICAL ADVICE (OUTPATIENT)
Dept: FAMILY MEDICINE | Facility: CLINIC | Age: 41
End: 2021-12-16
Payer: COMMERCIAL

## 2021-12-16 DIAGNOSIS — Z86.39 HISTORY OF GRAVES' DISEASE: ICD-10-CM

## 2021-12-16 DIAGNOSIS — E89.0 S/P TOTAL THYROIDECTOMY: ICD-10-CM

## 2021-12-16 DIAGNOSIS — E89.0 POST-SURGICAL HYPOTHYROIDISM: ICD-10-CM

## 2021-12-17 RX ORDER — LEVOTHYROXINE SODIUM 175 UG/1
TABLET ORAL
Qty: 90 TABLET | Refills: 2 | Status: SHIPPED | OUTPATIENT
Start: 2021-12-17 | End: 2022-02-16

## 2021-12-17 RX ORDER — LEVOTHYROXINE SODIUM 150 UG/1
TABLET ORAL
Qty: 90 TABLET | Refills: 2 | Status: SHIPPED | OUTPATIENT
Start: 2021-12-17 | End: 2022-02-16

## 2021-12-20 LAB
BKR LAB AP GYN ADEQUACY: NORMAL
BKR LAB AP GYN INTERPRETATION: NORMAL
BKR LAB AP HPV REFLEX: NORMAL
BKR LAB AP LMP: NORMAL
BKR LAB AP PREVIOUS ABNORMAL: NORMAL
PATH REPORT.COMMENTS IMP SPEC: NORMAL
PATH REPORT.COMMENTS IMP SPEC: NORMAL
PATH REPORT.RELEVANT HX SPEC: NORMAL

## 2021-12-21 LAB
HUMAN PAPILLOMA VIRUS 16 DNA: NEGATIVE
HUMAN PAPILLOMA VIRUS 18 DNA: NEGATIVE
HUMAN PAPILLOMA VIRUS FINAL DIAGNOSIS: NORMAL
HUMAN PAPILLOMA VIRUS OTHER HR: NEGATIVE

## 2022-01-05 ENCOUNTER — ANCILLARY PROCEDURE (OUTPATIENT)
Dept: MAMMOGRAPHY | Facility: CLINIC | Age: 42
End: 2022-01-05
Attending: NURSE PRACTITIONER
Payer: COMMERCIAL

## 2022-01-05 DIAGNOSIS — Z12.39 BREAST CANCER SCREENING, HIGH RISK PATIENT: ICD-10-CM

## 2022-01-05 PROCEDURE — 77067 SCR MAMMO BI INCL CAD: CPT | Mod: GC | Performed by: RADIOLOGY

## 2022-01-05 PROCEDURE — 77063 BREAST TOMOSYNTHESIS BI: CPT | Mod: GC | Performed by: RADIOLOGY

## 2022-01-06 ENCOUNTER — MYC MEDICAL ADVICE (OUTPATIENT)
Dept: FAMILY MEDICINE | Facility: CLINIC | Age: 42
End: 2022-01-06
Payer: COMMERCIAL

## 2022-01-06 DIAGNOSIS — E89.0 POST-SURGICAL HYPOTHYROIDISM: Primary | ICD-10-CM

## 2022-01-12 ENCOUNTER — ANCILLARY PROCEDURE (OUTPATIENT)
Dept: GENERAL RADIOLOGY | Facility: CLINIC | Age: 42
End: 2022-01-12
Attending: FAMILY MEDICINE
Payer: COMMERCIAL

## 2022-01-12 ENCOUNTER — OFFICE VISIT (OUTPATIENT)
Dept: ORTHOPEDICS | Facility: CLINIC | Age: 42
End: 2022-01-12
Payer: COMMERCIAL

## 2022-01-12 VITALS — BODY MASS INDEX: 34.56 KG/M2 | WEIGHT: 228 LBS | HEIGHT: 68 IN

## 2022-01-12 DIAGNOSIS — M54.2 NECK PAIN: ICD-10-CM

## 2022-01-12 DIAGNOSIS — M54.16 LUMBAR RADICULOPATHY: Primary | ICD-10-CM

## 2022-01-12 DIAGNOSIS — M54.9 MID BACK PAIN: ICD-10-CM

## 2022-01-12 PROCEDURE — 72040 X-RAY EXAM NECK SPINE 2-3 VW: CPT | Performed by: STUDENT IN AN ORGANIZED HEALTH CARE EDUCATION/TRAINING PROGRAM

## 2022-01-12 PROCEDURE — 72072 X-RAY EXAM THORAC SPINE 3VWS: CPT | Performed by: STUDENT IN AN ORGANIZED HEALTH CARE EDUCATION/TRAINING PROGRAM

## 2022-01-12 PROCEDURE — 99215 OFFICE O/P EST HI 40 MIN: CPT | Performed by: FAMILY MEDICINE

## 2022-01-12 RX ORDER — CYCLOBENZAPRINE HCL 10 MG
10 TABLET ORAL 3 TIMES DAILY PRN
Qty: 30 TABLET | Refills: 3 | Status: SHIPPED | OUTPATIENT
Start: 2022-01-12 | End: 2022-05-04

## 2022-01-12 ASSESSMENT — MIFFLIN-ST. JEOR: SCORE: 1757.7

## 2022-01-12 NOTE — LETTER
"  1/12/2022      RE: Cathy Arroyo  [address] [facility] [address]       ESTABLISHED PATIENT FOLLOW-UP:  Consult (Back pain)       HISTORY OF PRESENT ILLNESS  Ms. Arroyo is a pleasant 41 year old year old female who presents to clinic today for follow-up of low back pain.    Date of injury: Chronic  Date last seen: 3/5/2021  Following Therapeutic Plan: Yes  Pain: Worsening  Function: Unchanged  Interval History: The majority of her pain is in the low back and hips, worse on the right side. She also has some upper back pain. No numbness and tingling. 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 Yoga seems to help the pain. She is interested in discussing further imaging and PT.     Gloria is also here to revisit her chronic neck and back pain.  She has symptoms in her neck and mid back that are longstanding, the symptoms are fairly constant.  She denies any significant radiation down her arms or the wraps around her torso.  However, symptoms are not improving with physical therapy, gabapentin, and other conservative methods.    Additional medical/Social/Surgical histories reviewed in [facility] and updated as appropriate.    REVIEW OF SYSTEMS (1/12/2022)  CONSTITUTIONAL: Denies fever and weight loss  GASTROINTESTINAL: Denies abdominal pain, nausea, vomiting  MUSCULOSKELETAL: See HPI  SKIN: Denies any recent rash or lesion  NEUROLOGICAL: Denies numbness or focal weakness     PHYSICAL EXAM  Ht 1.727 m (5' 8\")   Wt 103.4 kg (228 lb)   BMI 34.67 kg/m      General  - normal appearance, in no obvious distress  HEENT  - conjunctivae not injected, moist mucous membranes  CV  - normal peripheral perfusion  Pulm  - normal respiratory pattern, non-labored  Musculoskeletal - cervical, thoracic, lumbar spine  - inspection: normal bone and joint alignment, no obvious kyphosis  - palpation: mildly tender paraspinals throughout  - ROM: pain with rotation and sidebending of " low and mid back   - strength: lower extremities 5/5 in all planes  Neuro  - C5-7 DTRs 2+ bilaterally, no sensory or motor deficit, grossly normal coordination, normal muscle tone  Skin  - no ecchymosis, erythema, warmth, or induration, no obvious rash  Psych  - interactive, appropriate, normal mood and affect         ASSESSMENT & PLAN  Ms. Arroyo is a 41 year old year old female who presents to clinic today with acute on chronic low back pain.  She also has ongoing cervical spine and thoracic spine pain.    I ordered and independently reviewed x-rays of her cervical and thoracic spine, these show no obvious acute issues.    I am ordering MR imaging of her cervical and thoracic spine given the chronicity of her pain.    With regards to her chief complaint I am also repeating a lumbar MR.  I am prescribing her Flexeril, as this has helped her in the past.  She is going to continue gabapentin.  If her symptoms are persistent despite further conservative therapy I would likely refer her for a repeat L4 5 injection.    It was a pleasure seeing Cathy.    Marcos Dalal DO, CAQSM  Primary Care Sports Medicine

## 2022-01-12 NOTE — PROGRESS NOTES
"ESTABLISHED PATIENT FOLLOW-UP:  Consult (Back pain)       HISTORY OF PRESENT ILLNESS  Ms. Arroyo is a pleasant 41 year old year old female who presents to clinic today for follow-up of low back pain.    Date of injury: Chronic  Date last seen: 3/5/2021  Following Therapeutic Plan: Yes  Pain: Worsening  Function: Unchanged  Interval History: The majority of her pain is in the low back and hips, worse on the right side. She also has some upper back pain. No numbness and tingling. 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 Yoga seems to help the pain. She is interested in discussing further imaging and PT.     Gloria is also here to revisit her chronic neck and back pain.  She has symptoms in her neck and mid back that are longstanding, the symptoms are fairly constant.  She denies any significant radiation down her arms or the wraps around her torso.  However, symptoms are not improving with physical therapy, gabapentin, and other conservative methods.    Additional medical/Social/Surgical histories reviewed in [facility] and updated as appropriate.    REVIEW OF SYSTEMS (1/12/2022)  CONSTITUTIONAL: Denies fever and weight loss  GASTROINTESTINAL: Denies abdominal pain, nausea, vomiting  MUSCULOSKELETAL: See HPI  SKIN: Denies any recent rash or lesion  NEUROLOGICAL: Denies numbness or focal weakness     PHYSICAL EXAM  Ht 1.727 m (5' 8\")   Wt 103.4 kg (228 lb)   BMI 34.67 kg/m      General  - normal appearance, in no obvious distress  HEENT  - conjunctivae not injected, moist mucous membranes  CV  - normal peripheral perfusion  Pulm  - normal respiratory pattern, non-labored  Musculoskeletal - cervical, thoracic, lumbar spine  - inspection: normal bone and joint alignment, no obvious kyphosis  - palpation: mildly tender paraspinals throughout  - ROM: pain with rotation and sidebending of low and mid back   - strength: lower extremities 5/5 in all planes  Neuro  - C5-7 " DTRs 2+ bilaterally, no sensory or motor deficit, grossly normal coordination, normal muscle tone  Skin  - no ecchymosis, erythema, warmth, or induration, no obvious rash  Psych  - interactive, appropriate, normal mood and affect         ASSESSMENT & PLAN  Ms. Arroyo is a 41 year old year old female who presents to clinic today with acute on chronic low back pain.  She also has ongoing cervical spine and thoracic spine pain.    I ordered and independently reviewed x-rays of her cervical and thoracic spine, these show no obvious acute issues.    I am ordering MR imaging of her cervical and thoracic spine given the chronicity of her pain.    With regards to her chief complaint I am also repeating a lumbar MR.  I am prescribing her Flexeril, as this has helped her in the past.  She is going to continue gabapentin.  If her symptoms are persistent despite further conservative therapy I would likely refer her for a repeat L4 5 injection.    It was a pleasure seeing Cathy.    Marcos Dalal DO, [facility]  Primary Care Sports Medicine

## 2022-01-20 NOTE — TELEPHONE ENCOUNTER
RECORDS RECEIVED FROM:    DATE RECEIVED: 3.7.22   NOTES STATUS DETAILS   OFFICE NOTE from referring provider Internal 12.14.21 WILL Dasilva [place]   OFFICE NOTE from other specialist N/A    DISCHARGE SUMMARY from hospital N/A    DISCHARGE REPORT from the ER N/A    MEDICATION LIST Internal    IMAGING  (NEED IMAGES AND REPORTS)     CT SCAN N/A    CHEST XRAY (CXR) N/A    TESTS     PULMONARY FUNCTION TESTING (PFT) N/A

## 2022-01-28 ENCOUNTER — ANCILLARY PROCEDURE (OUTPATIENT)
Dept: MRI IMAGING | Facility: CLINIC | Age: 42
End: 2022-01-28
Attending: FAMILY MEDICINE
Payer: COMMERCIAL

## 2022-01-28 DIAGNOSIS — M54.16 LUMBAR RADICULOPATHY: ICD-10-CM

## 2022-01-28 DIAGNOSIS — M54.2 NECK PAIN: ICD-10-CM

## 2022-01-28 DIAGNOSIS — M54.9 MID BACK PAIN: ICD-10-CM

## 2022-01-28 PROCEDURE — 72148 MRI LUMBAR SPINE W/O DYE: CPT | Mod: GC | Performed by: RADIOLOGY

## 2022-01-28 PROCEDURE — 72146 MRI CHEST SPINE W/O DYE: CPT | Mod: GC | Performed by: RADIOLOGY

## 2022-01-28 PROCEDURE — 72141 MRI NECK SPINE W/O DYE: CPT | Mod: GC | Performed by: RADIOLOGY

## 2022-02-01 ENCOUNTER — OFFICE VISIT (OUTPATIENT)
Dept: ORTHOPEDICS | Facility: CLINIC | Age: 42
End: 2022-02-01
Payer: COMMERCIAL

## 2022-02-01 DIAGNOSIS — M54.16 LUMBAR RADICULOPATHY: Primary | ICD-10-CM

## 2022-02-01 DIAGNOSIS — M54.2 NECK PAIN: ICD-10-CM

## 2022-02-01 DIAGNOSIS — M54.9 MID BACK PAIN: ICD-10-CM

## 2022-02-01 PROCEDURE — 99214 OFFICE O/P EST MOD 30 MIN: CPT | Performed by: FAMILY MEDICINE

## 2022-02-01 NOTE — LETTER
2/1/2022      RE: Cathy Arroyo  [address] [place] [id number]       HISTORY OF PRESENT ILLNESS  Ms. Arroyo is a pleasant 41 year old female following up with pain in her neck, mid back, and low back.  Cathy is here to review her MR imaging.  Unfortunately she feels no better than she did when she last saw me.     PHYSICAL EXAM  Physical Exam  Constitutional:       Appearance: Normal appearance.   Eyes:      Conjunctiva/sclera: Conjunctivae normal.   Pulmonary:      Effort: Pulmonary effort is normal.   Skin:     General: Skin is warm and dry.   Neurological:      Mental Status: She is alert.   Psychiatric:         Mood and Affect: Mood normal.         Thought Content: Thought content normal.           ASSESSMENT & PLAN  Ms. Arroyo is a 41 year old female following up with pain in her neck, mid back, and low back.    I reviewed her MR imaging in the room with her.  Her cervical MR shows no abnormalities.  Her thoracic MRI shows a disc bulge at T8 that is encroaching the T8 nerve root on the right.  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  Her lumbar spine MRI shows no change compared to her previous MRI, although this does show multiple levels of mild foraminal stenosis.    Cathy has many good questions centering around these findings.  A lot of these questions center around her cyst at the T4 level.    We did discuss that this is more than likely to be a benign cystic structure, although we did discuss that there is some unknown with regards to the growth pattern.  We did discuss that it may be reasonable to follow-up in the next 6 to 12 months with repeat imaging.  Cathy is wanting to talk to a thoracic surgeon regarding this, I do think this is reasonable to get her questions answered.  I am referring her to our partners to be seen at her earliest convenience.    With regards to her mid back and low back pain I am referring her to physical therapy, I  do think she will do well with focused treatment.    I am also replacing Flexeril Zanaflex.    Cathy can follow-up for this and other issues in the future, as needed.    It was a pleasure seeing Cathy.        Marcos Dalal DO, CAQSM      This note was constructed using Dragon dictation software, please excuse any minor errors in spelling, grammar, or syntax.        Marcos Dalal DO

## 2022-02-01 NOTE — PROGRESS NOTES
HISTORY OF PRESENT ILLNESS  Ms. Arroyo is a pleasant 41 year old female following up with pain in her neck, mid back, and low back.  Cathy is here to review her MR imaging.  Unfortunately she feels no better than she did when she last saw me.     PHYSICAL EXAM  Physical Exam  Constitutional:       Appearance: Normal appearance.   Eyes:      Conjunctiva/sclera: Conjunctivae normal.   Pulmonary:      Effort: Pulmonary effort is normal.   Skin:     General: Skin is warm and dry.   Neurological:      Mental Status: She is alert.   Psychiatric:         Mood and Affect: Mood normal.         Thought Content: Thought content normal.           ASSESSMENT & PLAN  Ms. Arroyo is a 41 year old female following up with pain in her neck, mid back, and low back.    I reviewed her MR imaging in the room with her.  Her cervical MR shows no abnormalities.  Her thoracic MRI shows a disc bulge at T8 that is encroaching the T8 nerve root on the right.  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  Her lumbar spine MRI shows no change compared to her previous MRI, although this does show multiple levels of mild foraminal stenosis.    Cathy has many good questions centering around these findings.  A lot of these questions center around her cyst at the T4 level.    We did discuss that this is more than likely to be a benign cystic structure, although we did discuss that there is some unknown with regards to the growth pattern.  We did discuss that it may be reasonable to follow-up in the next 6 to 12 months with repeat imaging.  Cathy is wanting to talk to a thoracic surgeon regarding this, I do think this is reasonable to get her questions answered.  I am referring her to our partners to be seen at her earliest convenience.    With regards to her mid back and low back pain I am referring her to physical therapy, I do think she will do well with focused treatment.    I am also replacing Flexeril  Genie.    Cathy can follow-up for this and other issues in the future, as needed.    It was a pleasure seeing Cathy.        Marcos Dalal DO, CAQSM      This note was constructed using Dragon dictation software, please excuse any minor errors in spelling, grammar, or syntax.

## 2022-02-03 DIAGNOSIS — Q34.1 MEDIASTINAL CYST: Primary | ICD-10-CM

## 2022-02-04 NOTE — TELEPHONE ENCOUNTER
RECORDS STATUS - ALL OTHER DIAGNOSIS      RECORDS RECEIVED FROM: [facility]   DATE RECEIVED: 2/4   NOTES STATUS DETAILS   OFFICE NOTE from referring provider Marcos Barrios DO [facility]: 2/1/22   OFFICE NOTE from medical oncologist     DISCHARGE SUMMARY from hospital     DISCHARGE REPORT from the ER     OPERATIVE REPORT     MEDICATION LIST [facility] 2/1/22   CLINICAL TRIAL TREATMENTS TO DATE     LABS     PATHOLOGY REPORTS     ANYTHING RELATED TO DIAGNOSIS Epic 12/14/21   GENONOMIC TESTING     TYPE:     IMAGING (NEED IMAGES & REPORT)     CT SCANS Scheduled @  2/10/22: [facility]   MRI PACS [facility]   MAMMO     ULTRASOUND     PET

## 2022-02-09 NOTE — PROGRESS NOTES
Cathy is a 41 year old who is being evaluated via a billable video visit.      How would you like to obtain your AVS? MyChart  If the video visit is dropped, the invitation should be resent by: [phone] via text.   Will anyone else be joining your video visit? Poppy      Nancy Blank    Video Start Time: 11:45AM  Video-Visit Details    Type of service:  Video Visit    Video End Time: 12:15PM    Originating Location (pt. Location): Home    Distant Location (provider location):  [facility]     Platform used for Video Visit: [facility] OFFICE [facility]ISIT       I saw Ms. Arroyo at Dr. Dalal s request in consultation for the evaluation and treatment of a mediastinal cyst.     HPI  Cathy Arroyo is a 41 year old year-old female with chronic back, hip, and shoulder pain for which she recently underwent MRI that showed medistinal cystic structure adjacent to the T4 vertebral body in addition to bulging disc at T8 encroaching on T8 nerve root.   Her back and shoulder pain are relatively unchanged since last seen by sports medicine 2/1/22. She denies any musculoskeletal weakness, does endorse occasional numbness/tingling in her hands bilateral, which resolves with changes in position. She has noticed increasing fatigue over the past few months, which she thinks is likely attributable to depressive episode associated with her Bipolar Disorder. She has been experiencing mild SOB and dry cough, which have slightly improved since she quit smoking cigarettes on 1/25/22 (has been vaping ~100 puffs/day since that time). Additionally, she did have mildly symptomatic Covid infection 3 weeks ago (vaccinated, J&J + Moderna booster), which did not require hospitalization. Aside from briefly symptomatic Covid, denies recent fever/chills, sweats, muscle aches. Reports weight loss of approx 100 lb over the past 2 years with no lifestyle changes.    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     Post partum depression      Post-surgical hypothyroidism 05/2017     Uncomplicated asthma         ETOH 2-3 drinks/week  TOB- quit smoking 1/25/22, vaping daily (estimates 100 puffs/day)  Smokes marijuana daily   Denies other drug use    Physical examination  BMI 34.6  Seen via video visit  Gen: NAD, appears comfortable  HEENT: normocephalic, atraumatic, sclera non-icteric  Resp: breathing non-labored on RA  MSK: able to lift toddler child onto lap with no apparent issue or deficit  Psych: calm, pleasant, cooperative, asks appropriate questions    From a personal perspective, she lives at home with 2 young daughters      IMPRESSION   41 year old year-old female with paraspinal cystic lesion    PLAN  I spent a total of 45 minutes with Ms. Arroyo , more than 50% of which were spent in counseling, coordination of care, and face-to-face time. I reviewed the plan as follows:  We dicussed that her symptoms were not related to the small cyst and also that the cyst itself was quite small to warrant any immediate surgical management  However, I will discuss with our team to further define the origin of this lesion.  We will plan for a rpt scan in 3 months- she will use this time to come off the vaping so that we can plan surgery if needed after her next scan    They had all their questions answered and were in agreement with the plan.  I appreciate the opportunity to participate in the care of your patient and will keep you updated.  Sincerely,

## 2022-02-10 ENCOUNTER — PRE VISIT (OUTPATIENT)
Dept: SURGERY | Facility: CLINIC | Age: 42
End: 2022-02-10

## 2022-02-10 ENCOUNTER — LAB (OUTPATIENT)
Dept: LAB | Facility: CLINIC | Age: 42
End: 2022-02-10
Attending: NURSE PRACTITIONER
Payer: COMMERCIAL

## 2022-02-10 ENCOUNTER — ANCILLARY PROCEDURE (OUTPATIENT)
Dept: CT IMAGING | Facility: CLINIC | Age: 42
End: 2022-02-10
Attending: CLINICAL NURSE SPECIALIST
Payer: COMMERCIAL

## 2022-02-10 ENCOUNTER — VIRTUAL VISIT (OUTPATIENT)
Dept: SURGERY | Facility: CLINIC | Age: 42
End: 2022-02-10
Attending: FAMILY MEDICINE
Payer: COMMERCIAL

## 2022-02-10 DIAGNOSIS — M54.9 MID BACK PAIN: ICD-10-CM

## 2022-02-10 DIAGNOSIS — Q34.1 MEDIASTINAL CYST: ICD-10-CM

## 2022-02-10 DIAGNOSIS — E89.0 POST-SURGICAL HYPOTHYROIDISM: ICD-10-CM

## 2022-02-10 LAB
T4 FREE SERPL-MCNC: 1.14 NG/DL (ref 0.76–1.46)
TSH SERPL DL<=0.005 MIU/L-ACNC: 4.42 MU/L (ref 0.4–4)

## 2022-02-10 PROCEDURE — 84443 ASSAY THYROID STIM HORMONE: CPT | Performed by: PATHOLOGY

## 2022-02-10 PROCEDURE — 99204 OFFICE O/P NEW MOD 45 MIN: CPT | Mod: 95 | Performed by: STUDENT IN AN ORGANIZED HEALTH CARE EDUCATION/TRAINING PROGRAM

## 2022-02-10 PROCEDURE — G0463 HOSPITAL OUTPT CLINIC VISIT: HCPCS | Mod: PN,RTG | Performed by: STUDENT IN AN ORGANIZED HEALTH CARE EDUCATION/TRAINING PROGRAM

## 2022-02-10 PROCEDURE — 71260 CT THORAX DX C+: CPT | Mod: GC | Performed by: RADIOLOGY

## 2022-02-10 PROCEDURE — 84439 ASSAY OF FREE THYROXINE: CPT | Performed by: PATHOLOGY

## 2022-02-10 PROCEDURE — 36415 COLL VENOUS BLD VENIPUNCTURE: CPT | Performed by: PATHOLOGY

## 2022-02-10 RX ORDER — IOPAMIDOL 755 MG/ML
111 INJECTION, SOLUTION INTRAVASCULAR ONCE
Status: COMPLETED | OUTPATIENT
Start: 2022-02-10 | End: 2022-02-10

## 2022-02-10 RX ADMIN — IOPAMIDOL 111 ML: 755 INJECTION, SOLUTION INTRAVASCULAR at 08:43

## 2022-02-10 NOTE — LETTER
2/10/2022     RE: Cathy Arroyo  [address] [place] [id number]    Dear Colleague,    Thank you for referring your patient, Cathy Arroyo, to the [facility]. Please see a copy of my visit note below.    Cathy is a 41 year old who is being evaluated via a billable video visit.      How would you like to obtain your AVS? MyChart  If the video visit is dropped, the invitation should be resent by: [phone] via text.   Will anyone else be joining your video visit? Poppy Barrjohny Blank    Video Start Time: 11:45AM  Video-Visit Details    Type of service:  Video Visit    Video End Time: 12:15PM    Originating Location (pt. Location): Home    Distant Location (provider location):  [facility]     Platform used for Video Visit: [facility] - NEW PATIENT OFFICE VISIT       I saw Ms. Arroyo at Dr. Dalal s request in consultation for the evaluation and treatment of a mediastinal cyst.     HPI  Cathy Arroyo is a 41 year old year-old female with chronic back, hip, and shoulder pain for which she recently underwent MRI that showed medistinal cystic structure adjacent to the T4 vertebral body in addition to bulging disc at T8 encroaching on T8 nerve root.   Her back and shoulder pain are relatively unchanged since last seen by sports medicine 2/1/22. She denies any musculoskeletal weakness, does endorse occasional numbness/tingling in her hands bilateral, which resolves with changes in position. She has noticed increasing fatigue over the past few months, which she thinks is likely attributable to depressive episode associated with her Bipolar Disorder. She has been experiencing mild SOB and dry cough, which have slightly improved since she quit smoking cigarettes on 1/25/22 (has been vaping ~100 puffs/day since that time). Additionally, she did have mildly symptomatic Covid infection 3 weeks ago (vaccinated, J&J + Moderna booster),  which did not require hospitalization. Aside from briefly symptomatic Covid, denies recent fever/chills, sweats, muscle aches. Reports weight loss of approx 100 lb over the past 2 years with no lifestyle changes.    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     Post partum depression      Post-surgical hypothyroidism 05/2017     Uncomplicated asthma         ETOH 2-3 drinks/week  TOB- quit smoking 1/25/22, vaping daily (estimates 100 puffs/day)  Smokes marijuana daily   Denies other drug use    Physical examination  BMI 34.6  Seen via video visit  Gen: NAD, appears comfortable  HEENT: normocephalic, atraumatic, sclera non-icteric  Resp: breathing non-labored on RA  MSK: able to lift toddler child onto lap with no apparent issue or deficit  Psych: calm, pleasant, cooperative, asks appropriate questions    From a personal perspective, she lives at home with 2 young daughters      IMPRESSION   41 year old year-old female with paraspinal cystic lesion    PLAN  I spent a total of 45 minutes with Ms. Arroyo , more than 50% of which were spent in counseling, coordination of care, and face-to-face time. I reviewed the plan as follows:  We dicussed that her symptoms were not related to the small cyst and also that the cyst itself was quite small to warrant any immediate surgical management  However, I will discuss with our team to further define the origin of this lesion.  We will plan for a rpt scan in 3 months- she will use this time to come off the vaping so that we can plan surgery if needed after her next scan  They had all their  questions answered and were in agreement with the plan.  I appreciate the opportunity to participate in the care of your patient and will keep you updated.  Again, thank you for allowing me to participate in the care of your patient.      Sincerely,    Vesna Gurrola MD

## 2022-02-11 ENCOUNTER — MYC MEDICAL ADVICE (OUTPATIENT)
Dept: FAMILY MEDICINE | Facility: CLINIC | Age: 42
End: 2022-02-11
Payer: COMMERCIAL

## 2022-02-14 NOTE — LETTER
Problem: Anxiety  Goal: Anxiety is at a manageable level with interventions  Outcome: Outcome Met, Complete Goal  Goal: Anxiety is decreased  Outcome: Outcome Met, Complete Goal  Goal: Verbalizes understanding of anxiety symptoms and management  Description: Document on Patient Education Activity   Outcome: Outcome Met, Complete Goal     Problem: Breathing Pattern Ineffective  Goal: Air exchange is effective, demonstrated by Sp02 sat of greater then or = 92% (or as ordered)  Outcome: Outcome Met, Complete Goal  Goal: Respiratory pattern is quiet and regular without report of SOB  Outcome: Outcome Met, Complete Goal  Goal: Breathing pattern demonstrates minimal apnea during sleep with appropriate use of airway pressure support devices  Outcome: Outcome Met, Complete Goal  Goal: Verbalizes/demonstrates effective breathing management strategies  Description: Document education using the patient education activity.   Outcome: Outcome Met, Complete Goal     Problem: At Risk for Falls  Goal: # Patient does not fall  Outcome: Outcome Met, Complete Goal  Goal: # Takes action to control fall-related risks  Outcome: Outcome Met, Complete Goal  Goal: # Verbalizes understanding of fall risk/precautions  Description: Document education using the patient education activity  Outcome: Outcome Met, Complete Goal     Problem: At Risk for Injury Due to Fall  Goal: # Patient does not fall  Outcome: Outcome Met, Complete Goal  Goal: # Takes action to control condition specific risks  Outcome: Outcome Met, Complete Goal  Goal: # Verbalizes understanding of fall-related injury personal risks  Description: Document education using the patient education activity  Outcome: Outcome Met, Complete Goal      2018      RE: Cathy Arroyo  [address] [facility] [address]       See dictated note.      Service Date: 2018         RE:  Cathy Arroyo.       Dear Ms. Dasilva:      We saw Mrs. 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  She was recently in our emergency department with headaches, but this was apparently due to changes in her bipolar medication.  She is currently feeling well and has no complaints.      PHYSICAL EXAMINATION:  Her gross neurological examination remains normal.      REVIEW OF STUDIES:  We went over her MRI from April with her.  This looked quite good with no recurrence of her tumor.  Her ventricle size was normal.        ASSESSMENT/PLAN:  Given her stability for nearly 5 years, we will extend the duration of followup to 2 years.  We will see her back in 2 years with another MRI.      Please do not hesitate to contact us with questions.        D: 2018   T: 2018   MT: LG      Name:     CATHY ARROYO   MRN:      [id number]        Account:      [id number]   :      1980           Service Date: 2018      Document: [id number]         Kate Mckeon MD    CC:  JANNET Hendricks CNP   [address] [place] [address], [address]   [place]  [address]

## 2022-02-16 DIAGNOSIS — Z86.39 HISTORY OF GRAVES' DISEASE: ICD-10-CM

## 2022-02-16 DIAGNOSIS — E89.0 POST-SURGICAL HYPOTHYROIDISM: ICD-10-CM

## 2022-02-16 DIAGNOSIS — E89.0 S/P TOTAL THYROIDECTOMY: ICD-10-CM

## 2022-02-16 RX ORDER — LEVOTHYROXINE SODIUM 175 UG/1
175 TABLET ORAL DAILY
Qty: 90 TABLET | Refills: 2 | COMMUNITY
Start: 2022-02-16 | End: 2022-11-15

## 2022-02-16 NOTE — TELEPHONE ENCOUNTER
Routed to POD    Please see pt Mychart message and labs resulted  Pt is on levothyroxine 150mcg, mom/wed, 175mcg all other days    Cristin Landry RN   [facility]

## 2022-02-17 PROBLEM — Z12.39 BREAST CANCER SCREENING, HIGH RISK PATIENT: Status: ACTIVE | Noted: 2021-03-22

## 2022-02-22 ENCOUNTER — PATIENT OUTREACH (OUTPATIENT)
Dept: SURGERY | Facility: CLINIC | Age: 42
End: 2022-02-22
Payer: COMMERCIAL

## 2022-02-22 NOTE — TELEPHONE ENCOUNTER
Writer received voicemail message from patient. Patient calling to see if Dr Gurrola had a chance to review her case with radiology.  Verbalizes that she's been very anxious.  [facility] message sent to patient explaining that Dr Galileo will be discussing her case at out upcoming multidisciplinary conference on Friday. Writer reached out to patient to inform and reassure her that writer will be in contact with patient after our multidisciplinary conference on Friday.  Patient verbalized that she had received the [facility] messages from [place], and was much appreciative of writer's call.

## 2022-02-25 ENCOUNTER — PATIENT OUTREACH (OUTPATIENT)
Dept: SURGERY | Facility: CLINIC | Age: 42
End: 2022-02-25
Payer: COMMERCIAL

## 2022-02-25 DIAGNOSIS — Q34.1 MEDIASTINAL CYST: Primary | ICD-10-CM

## 2022-02-25 NOTE — TELEPHONE ENCOUNTER
"Patient called inquiring about the recommendations from this morning's multidisciplinary [facility] conference. \"I'm very anxious to hear what they recommend\".  Writer informed patient that the team's consensus is to follow-up in 6 months with a Chest MRI.  If the mediastinal cyst appears to be stable in the 6 month MRI, then have a follow-up MRI in 1 year.  Patient verbalized that she was relieved to hear this and appreciated the update.  "

## 2022-03-02 ENCOUNTER — THERAPY VISIT (OUTPATIENT)
Dept: PHYSICAL THERAPY | Facility: CLINIC | Age: 42
End: 2022-03-02
Attending: FAMILY MEDICINE
Payer: COMMERCIAL

## 2022-03-02 DIAGNOSIS — M54.2 NECK PAIN: ICD-10-CM

## 2022-03-02 DIAGNOSIS — M54.9 MID BACK PAIN: ICD-10-CM

## 2022-03-02 DIAGNOSIS — M54.50 ACUTE BILATERAL LOW BACK PAIN WITHOUT SCIATICA: Primary | ICD-10-CM

## 2022-03-02 PROCEDURE — 97530 THERAPEUTIC ACTIVITIES: CPT | Mod: GP | Performed by: PHYSICAL THERAPIST

## 2022-03-02 PROCEDURE — 97161 PT EVAL LOW COMPLEX 20 MIN: CPT | Mod: GP | Performed by: PHYSICAL THERAPIST

## 2022-03-02 NOTE — PROGRESS NOTES
Physical Therapy Initial Examination/Evaluation  March 2, 2022    Cathy Arroyo is a 41 year old year old female referred to physical therapy by Marcos Dalal DO for treatment of Neck, thoracic, lower back with Precautions/Restrictions/MD instructions E&T    Therapist Impression:   Cathy presents with symptoms consistent withy internal derangement/disc pathology.  We initiated extension directional preference for both lumbar and cervical spine, posture education, and will progress to core strengthening as symptom allow.    NEXT: thoracic extension, bridging    Subjective:  Related PMH: 2008  Previous Treatment: see below Effect of prior treatment: fair  Chief Complaint/Functional Limitations:   Sitting for 30 min and see below in therapy evaluation codes   Pain Location: middle, low back both sides and neck, can radiate down into B leg Frequency: Constant Described as: sharp and burning Alleviated by: Muscle relaxer, Gabapentin, change positions, cannabis, advil Progression of Symptoms: Gradually getting worse. Time of day when pain is worse: All times of the day/constant  Sleeping: Interrupted due to current issue   Current HEP/exercises: NA      Answers for HPI/ROS submitted by the patient on 2/27/2022  Reason for Visit:: Physical Therapy  When problem began:: 3/8/2008  How problem occurred:: Bulging discs in back  Number scale: 3/10  General health as reported by patient: fair  Please check all that apply to your current or past medical history: asthma, depression, mental illness, migraines/headaches, numbness/tingling, overweight, osteoarthritis, pain at night/rest, smoking, thyroid problems  Medical allergies: adhesives  Surgeries: other  Other Surgery Detail: Resection of pineocytoma, blood patch, thyroidectomy, cesarian section  Medications you are currently taking: muscle relaxants, thyroid medication, other  Other Meds Detail: medication for ADHD and Bipolar  Occupation::   What are your  primary job tasks: prolonged standing, repetitive tasks    Lumbar Spine Evaluation  Static Posture  Sitting posture: pain with sitting, had to stand during session    Dynamic Movement Screen  Double limb squat observations: Good technique/no significant findings  Single limb squat observations: Not assessed  Gait: Not assessed    Hip Joint Screen Negative    Cervical spine ROM WNL    Trunk Range of Motion  Movement Loss Major Moderate Minimal Nil Pain   Flexion   x  x   Extension    x    Sidebending R    x    Sidebending L    x    Side Gliding R        Side Gliding L        Thoracic Rotation R        Thoracic Rotation L          No pain with prone press ups    Flexibility Left Right   Quadriceps none/WNL none/WNL   Hamstrings 54 54   Figure 4 NA NA          Hip and Knee Strength   MMT Left Right   Hip Abduction na/5 na/5   Hip Extension 5-/5 5/5   Hip ER na/5 na/5          Special Tests  Spring testing: Positive L2-L4 but feels good per pt  Neural tension: Negative  SI joint test: Negative        Assessment/Plan:  Patient is a 41 year old female with cervical, thoracic and lumbar complaints.    Patient has the following significant findings with corresponding treatment plan.                Diagnosis 1:  Neck, thoracic, lower back  Pain -  hot/cold therapy, manual therapy, splint/taping/bracing/orthotics, self management, education and home program  Decreased ROM/flexibility - manual therapy and therapeutic exercise  Decreased strength - therapeutic exercise and therapeutic activities  Impaired muscle performance - neuro re-education  Impaired posture - neuro re-education    Therapy Evaluation Codes:   Cumulative Therapy Evaluation is: Low complexity.    Previous and current functional limitations:  (See Goal Flow Sheet for this information)    Short term and Long term goals: (See Goal Flow Sheet for this information)     Communication ability:  Patient appears to be able to clearly communicate and understand verbal  and written communication and follow directions correctly.  Treatment Explanation - The following has been discussed with the patient:   RX ordered/plan of care  Anticipated outcomes  Possible risks and side effects  This patient would benefit from PT intervention to resume normal activities.   Rehab potential is good.    Frequency:  2 X a month, once daily  Duration:  for 3 months  Discharge Plan:  Achieve all LTG.  Independent in home treatment program.  Reach maximal therapeutic benefit.    Please refer to the daily flowsheet for treatment today, total treatment time and time spent performing 1:1 timed codes.

## 2022-03-06 ASSESSMENT — ENCOUNTER SYMPTOMS
INCREASED ENERGY: 0
FATIGUE: 1
DIZZINESS: 0
TINGLING: 1
HALLUCINATIONS: 0
SPEECH CHANGE: 0
SEIZURES: 0
JOINT SWELLING: 0
TREMORS: 0
CHILLS: 1
PARALYSIS: 0
LOSS OF CONSCIOUSNESS: 0
POLYPHAGIA: 0
MYALGIAS: 1
MEMORY LOSS: 0
WHEEZING: 1
HEADACHES: 0
POLYDIPSIA: 0
HEMOPTYSIS: 0
COUGH DISTURBING SLEEP: 0
POSTURAL DYSPNEA: 0
SNORES LOUDLY: 1
MUSCLE WEAKNESS: 0
BACK PAIN: 1
ARTHRALGIAS: 1
ALTERED TEMPERATURE REGULATION: 1
COUGH: 0
WEAKNESS: 0
NIGHT SWEATS: 0
SHORTNESS OF BREATH: 0
FEVER: 0
DECREASED APPETITE: 1
SPUTUM PRODUCTION: 1
NECK PAIN: 1
DYSPNEA ON EXERTION: 1
WEIGHT LOSS: 0
NUMBNESS: 1
DISTURBANCES IN COORDINATION: 0
MUSCLE CRAMPS: 0
WEIGHT GAIN: 0
STIFFNESS: 1

## 2022-03-07 ENCOUNTER — OFFICE VISIT (OUTPATIENT)
Dept: PULMONOLOGY | Facility: CLINIC | Age: 42
End: 2022-03-07
Attending: NURSE PRACTITIONER
Payer: COMMERCIAL

## 2022-03-07 ENCOUNTER — TELEPHONE (OUTPATIENT)
Dept: PULMONOLOGY | Facility: CLINIC | Age: 42
End: 2022-03-07

## 2022-03-07 ENCOUNTER — PRE VISIT (OUTPATIENT)
Dept: PULMONOLOGY | Facility: CLINIC | Age: 42
End: 2022-03-07

## 2022-03-07 VITALS
BODY MASS INDEX: 35.9 KG/M2 | WEIGHT: 236.9 LBS | SYSTOLIC BLOOD PRESSURE: 142 MMHG | HEART RATE: 95 BPM | DIASTOLIC BLOOD PRESSURE: 88 MMHG | HEIGHT: 68 IN

## 2022-03-07 DIAGNOSIS — F17.200 NICOTINE DEPENDENCE, UNCOMPLICATED, UNSPECIFIED NICOTINE PRODUCT TYPE: Primary | ICD-10-CM

## 2022-03-07 DIAGNOSIS — J45.20 MILD INTERMITTENT ASTHMA: ICD-10-CM

## 2022-03-07 DIAGNOSIS — J45.41 MODERATE PERSISTENT ASTHMA WITH ACUTE EXACERBATION: ICD-10-CM

## 2022-03-07 DIAGNOSIS — J45.20 MILD INTERMITTENT ASTHMA WITHOUT COMPLICATION: ICD-10-CM

## 2022-03-07 PROCEDURE — 94060 EVALUATION OF WHEEZING: CPT | Performed by: INTERNAL MEDICINE

## 2022-03-07 PROCEDURE — 94726 PLETHYSMOGRAPHY LUNG VOLUMES: CPT | Performed by: INTERNAL MEDICINE

## 2022-03-07 PROCEDURE — 99205 OFFICE O/P NEW HI 60 MIN: CPT | Mod: 25 | Performed by: INTERNAL MEDICINE

## 2022-03-07 PROCEDURE — G0463 HOSPITAL OUTPT CLINIC VISIT: HCPCS

## 2022-03-07 PROCEDURE — 94729 DIFFUSING CAPACITY: CPT | Performed by: INTERNAL MEDICINE

## 2022-03-07 RX ORDER — FLUTICASONE PROPIONATE AND SALMETEROL XINAFOATE 230; 21 UG/1; UG/1
2 AEROSOL, METERED RESPIRATORY (INHALATION) 2 TIMES DAILY
Qty: 12 G | Refills: 11 | Status: SHIPPED | OUTPATIENT
Start: 2022-03-07 | End: 2022-11-15

## 2022-03-07 ASSESSMENT — ENCOUNTER SYMPTOMS
SEIZURES: 0
SHORTNESS OF BREATH: 0
CHILLS: 1
LOSS OF CONSCIOUSNESS: 0
SPUTUM PRODUCTION: 1
WEAKNESS: 0
WHEEZING: 1
HEADACHES: 0
HEMOPTYSIS: 0
COUGH: 0
DIZZINESS: 0
POLYDIPSIA: 0
BACK PAIN: 1
MEMORY LOSS: 0
MYALGIAS: 1
TINGLING: 1
SPEECH CHANGE: 0
HALLUCINATIONS: 0
WEIGHT LOSS: 0
TREMORS: 0
NECK PAIN: 1
FEVER: 0

## 2022-03-07 ASSESSMENT — PAIN SCALES - GENERAL: PAINLEVEL: MILD PAIN (3)

## 2022-03-07 NOTE — PROGRESS NOTES
Pulmonary Clinic  New Patient Evaluation    Name: Cathy Arroyo MRN: [id number]     Age: 41 year old   YOB: 1980             HPI:   CC: Asthma, tobacco abuse    Cathy Arroyo is a 41 year old female with H/O environmental allergies, anxiety, bipolar type I, obesity, asthma, and tobacco use who presents to establish care for smoking cessation and asthma treatment.    She reports being diagnosed with asthma as a child including allergy testing at approximately age 9 when she is found to react to many seasonal allergens as well as cats, dogs, lizards, and dust.  She has had difficulty with her breathing throughout her life.  Over the past few years she has had particular trouble with dyspnea, coughing, and wheezing.  She has been prescribed Symbicort and albuterol but has difficulty remembering to take her Symbicort consistently.  When she does remember, her symptoms are fairly well controlled, however the longest time she is consistently taking it is approximately 2 weeks by her estimate.  Her biggest triggers for respiratory saturations are smoking and exercise.  She does note that pretreating with her albuterol prior to exercise reduces her symptoms.  She historically has required prednisone 1-3 times per year, but due to her bipolar disorder is only able to tolerate 20 mg a day for a maximum of 5 days due to jose eduardo.    She smoked her first cigarette at age 20, was a very light smoker until approximately age 24 when she began smoking more heavily.  By approximately age 27 she is smoking 1 pack/day which she continued for a number of years before cutting back to approximately half pack a day.  She does note that she was able to completely quit smoking during her pregnancies and during breast-feeding, but fairly quickly resumed afterward.  She is currently smoking between a quarter and 1/2 pack/day.  She has tried to quit smoking in the past, but has difficulty because she is allergic to  the lozenges and the adhesive on the patches.  She cannot use Chantix due to her bipolar disorder.  She has read numerous smoking cessation folks which have been helpful.  She transitioned to a vaporizer which she used for approximately 24 days during which time she was not smoking cigarettes.  During that time she notes that her breathing improved significantly as did her breath-holding ability, physical endurance, and singing ability.  However, due to health concerns of using the vaporizer she stopped and resumed smoking cigarettes, and all of her symptoms worsen again after resuming cigarette use.      Exposure History:   Tobacco: Socially 20, a few a day but increased until 1ppd around age 27. Was able to quit during pregnancy and breastfeeding.  Other inhaled substances (Vaping, hookah. Marijuana): Vaped for 24d while quitting. Medical Marijuana daily.  Occupation: Hair stylest. [employer]  Pets: Lizards, cats, dog  Allergies: Allergy testing as a child, positive to cats and dogs.  Hobbies: Acrylic paintings sprayed with clear coat which is sometimes irritating  Travel: [facility]  Home: House, rents. Forced, air, dirty ducts. [place] foundation, basement always damp, suspects mold. Household carpet.            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     Post partum depression      Post-surgical hypothyroidism 2017     Uncomplicated asthma              Past Surgical History:      Past Surgical History:   Procedure Laterality Date     BIOPSY  29 y/o colpos, and in the last 2 years for thyroid    Colposcopy, thyroid nodule biopsy twice     BLOOD PATCH N/A 10/11/2016    Procedure: EPIDURAL BLOOD PATCH;  Surgeon: GENERIC ANESTHESIA PROVIDER;  Location: [facility] OR      SECTION,  TUBAL LIGATION, COMBINED N/A 2019    Procedure:  SECTION, WITH TUBAL LIGATION;  Surgeon: Kathy Rutledge MD;  Location: [facility] L+D     CRANIOTOMY, EXCISE TUMOR COMPLEX, COMBINED  2014    Procedure: COMBINED CRANIOTOMY, EXCISE TUMOR COMPLEX;  Supracerabellar  Infratentorial Approach for Resection of Tumor ;  Surgeon: Kate Mckeon MD;  Location: [facility] OR     THYROIDECTOMY N/A 5/3/2017    Procedure: THYROIDECTOMY;  Total Thyroidectomy;  Surgeon: Pamela Villasenor MD;  Location: [facility] OR             Social History:     Social History     Socioeconomic History     Marital status:      Spouse name: Not on file     Number of children: Not on file     Years of education: Not on file     Highest education level: Not on file   Occupational History     Not on file   Tobacco Use     Smoking status: Former Smoker     Packs/day: 0.50     Years: 14.00     Pack years: 7.00     Types: Cigarettes     Start date: 2004     Quit date: 2022     Years since quittin.1     Smokeless tobacco: Never Used   Vaping Use     Vaping Use: Never used   Substance and Sexual Activity     Alcohol use: Yes     Alcohol/week: 0.0 standard drinks     Comment: maybe four drinks in a month     Drug use: Yes     Types: Marijuana     Comment: medical marijuana     Sexual activity: Yes     Partners: Male     Birth control/protection: Female Surgical   Other Topics Concern     Parent/sibling w/ CABG, MI or angioplasty before 65F 55M? No   Social History Narrative    Caffeine intake/servings daily - 1    Calcium intake/servings daily - 3    Exercise 5 times weekly - describe ; encouraged walking daily    Sunscreen used - Yes    Seatbelts used - Yes    Guns stored in the home - No    Self Breast Exam - Yes    Pap test up to date -  No    Eye exam up to date -  No    Dental exam up to date -  No    DEXA scan up to date -  No    Flex Sig/Colonoscopy up to date -  No    Mammography up to date -  No     Immunizations reviewed and up to date - Yes    Abuse: Current or Past (Physical, Sexual or Emotional) - No    Do you feel safe in your environment - Yes    Do you cope well with stress - Yes    Do you suffer from insomnia - No             Social Determinants of Health     Financial Resource Strain: Not on file   Food Insecurity: Not on file   Transportation Needs: Not on file   Physical Activity: Not on file   Stress: Not on file   Social Connections: Not on file   Intimate Partner Violence: Not on file   Housing Stability: Not on file            Family History:     Family History   Problem Relation Age of Onset     Thyroid Disease Paternal Aunt      Breast Cancer Paternal Aunt 58        bilateral; negative testing     Cancer Paternal Aunt      Asthma Father      Mental Illness Father         Bipolar 2     Obesity Father      Asthma Maternal Grandmother      Osteoporosis Maternal Grandmother      Glaucoma Maternal Grandmother      Hypertension Maternal Grandmother      Macular Degeneration Maternal Grandmother      Diabetes Paternal Grandfather      Other Cancer Mother 62        salivary gland cancer     Genitourinary Problems Mother      Bipolar Disorder Mother         unipolar depression     Depression Mother      Thyroid Disease Mother         benign tumor     Skin Cancer Mother      Cancer Mother      Bipolar Disorder Brother         unipolar depression     Asthma Brother      Depression Brother      Prostate Cancer Maternal Grandfather 69        no history of smoking     Bladder Cancer Maternal Grandfather 71     Colon Cancer Maternal Grandfather 70     Breast Cancer Maternal Aunt 37        negative BRCA1/2 testing     Melanoma Maternal Aunt 64     Breast Cancer Other         paternal great aunt             Immunizations:     Immunization History   Administered Date(s) Administered     COVID-19,PF,Moses 04/05/2021     COVID-19,PF,Moderna 10/31/2021     DTaP, Unspecified 02/12/2014, 02/15/2019     HPV  Quadrivalent 04/29/2008, 09/28/2009, 10/28/2009, 12/28/2009     HepB-Adult 01/06/2010, 09/10/2010     Influenza (H1N1) 12/28/2009     Influenza (IIV3) PF 12/10/1998, 11/01/2000, 11/21/2002, 10/29/2004, 11/18/2005, 10/30/2006, 12/13/2007, 11/23/2010, 01/28/2013     Influenza Vaccine IM > 6 months Valent IIV4 (Alfuria,Fluzone) 01/17/2014, 11/04/2014, 11/23/2015, 10/15/2016, 10/16/2018, 11/06/2019, 11/20/2020, 09/29/2021     MMR 09/16/1993, 05/16/2019     Pneumococcal 23 valent 11/20/2020     TDAP Vaccine (Adacel) 02/15/2019     Td (Adult), Adsorbed 01/06/2004             Allergies:     Allergies   Allergen Reactions     Adacel [Daptacel] Swelling     Adhesive Tape Hives     Ciprofloxacin      Causes visual hallucinations.     Codeine Sulfate Nausea and Vomiting     Nicoderm [Nicotine]      Patch and Gum, pt reported allergic reaction 6/24     Tegaderm Transparent Dressing (Informational Only) Hives     Tetanus Toxoid      Pt states she had an infection at injection site.      Vicodin [Hydrocodone-Acetaminophen] Nausea and Vomiting     Methimazole Rash             Medications:   albuterol (PROAIR HFA/PROVENTIL HFA/VENTOLIN HFA) 108 (90 Base) MCG/ACT inhaler, Inhale 2 puffs into the lungs every 6 hours  amphetamine-dextroamphetamine (ADDERALL XR) 15 MG 24 hr capsule, Take 15 mg by mouth daily  amphetamine-dextroamphetamine (ADDERALL XR) 20 MG 24 hr capsule, Take 20 mg by mouth daily  ARIPiprazole (ABILIFY) 10 MG tablet, Take 10 mg by mouth daily  budesonide-formoterol (SYMBICORT) 160-4.5 MCG/ACT Inhaler, Inhale 2 puffs into the lungs 2 times daily  cetirizine (ZYRTEC) 10 MG tablet, Take 1 tablet (10 mg) by mouth daily  cyclobenzaprine (FLEXERIL) 10 MG tablet, Take 1 tablet (10 mg) by mouth 3 times daily as needed for muscle spasms  diazepam (VALIUM) 5 MG tablet, Take 5 mg by mouth as needed  fexofenadine (ALLEGRA) 180 MG tablet, Take 180 mg by mouth every morning  gabapentin (NEURONTIN) 100 MG capsule, Take 1 capsule  (100 mg) by mouth 3 times daily  gabapentin (NEURONTIN) 300 MG capsule, Take 1 capsule (300 mg) by mouth 2 times daily  ipratropium - albuterol 0.5 mg/2.5 mg/3 mL (DUONEB) 0.5-2.5 (3) MG/3ML neb solution, Take 1 vial (3 mLs) by nebulization every 6 hours as needed for shortness of breath / dyspnea or wheezing  levothyroxine (SYNTHROID/LEVOTHROID) 175 MCG tablet, Take 1 tablet (175 mcg) by mouth daily  medical cannabis (Patient's own supply), Take 1 Dose by mouth See Admin Instructions (The purpose of this order is to document that the patient reports taking medical cannabis.  This is not a prescription, and is not used to certify that the patient has a qualifying medical condition.)    Tangerine Oral Suspension Unflavored 1-5 ml up to two times daily.  Total THC = 240 mg, Total CBD Trace  medical cannabis (Patient's own supply), Take 1 Dose by mouth See Admin Instructions (The purpose of this order is to document that the patient reports taking medical cannabis.  This is not a prescription, and is not used to certify that the patient has a qualifying medical condition.)    Patriot oral suspension: take 1-3 ml by mouth two times daily  Total CBD 1200 mg. Total THC 60 mg  montelukast (SINGULAIR) 10 MG tablet, Take 1 tablet (10 mg) by mouth every morning  multivitamin w/minerals (MULTI-VITAMIN) tablet, Take 1 tablet by mouth daily  ondansetron (ZOFRAN-ODT) 4 MG ODT tab, Take 1 tablet (4 mg) by mouth every 8 hours as needed for nausea  rizatriptan (MAXALT-MLT) 10 MG ODT, Take 1 tablet (10 mg) by mouth at onset of headache for migraine (repeat in 2 hours if needed) May repeat in 2 hours. Max 3 tablets/24 hours.  tiZANidine (ZANAFLEX) 4 MG tablet, Take 1 tablet (4 mg) by mouth nightly as needed for muscle spasms  VENTOLIN  (90 Base) MCG/ACT inhaler, INHALE 2 PUFFS INTO THE LUNGS EVERY 4 HOURS AS NEEDED FOR SHORTNESS OF BREATH OR DIFFICULT BREATHING OR WHEEZING  vitamin D3 (CHOLECALCIFEROL) 50 mcg (2000 units)  "tablet, Take 1 tablet by mouth daily    No current facility-administered medications on file prior to visit.           Review of Systems:     Review of Systems   Constitutional: Positive for chills. Negative for fever and weight loss.   Respiratory: Positive for sputum production and wheezing. Negative for cough, hemoptysis and shortness of breath.    Musculoskeletal: Positive for back pain, myalgias and neck pain.   Neurological: Positive for tingling. Negative for dizziness, tremors, speech change, seizures, loss of consciousness, weakness and headaches.   Endo/Heme/Allergies: Negative for polydipsia.   Psychiatric/Behavioral: Negative for hallucinations and memory loss.              Exam:   BP (!) 142/88 (BP Location: Left arm, Patient Position: Chair, Cuff Size: Adult Large)   Pulse 95   Ht 1.727 m (5' 8\")   Wt 107.5 kg (236 lb 14.4 oz)   BMI 36.02 kg/m      Physical Exam  Vitals and nursing note reviewed.   Constitutional:       Appearance: She is obese.   HENT:      Head: Normocephalic and atraumatic.   Eyes:      Extraocular Movements: Extraocular movements intact.      Conjunctiva/sclera: Conjunctivae normal.      Pupils: Pupils are equal, round, and reactive to light.   Cardiovascular:      Rate and Rhythm: Normal rate and regular rhythm.      Heart sounds: No murmur heard.  Pulmonary:      Effort: Pulmonary effort is normal.      Breath sounds: Wheezing present.      Comments: Expiratory wheezes throughout   Musculoskeletal:      Right lower leg: No edema.      Left lower leg: No edema.   Neurological:      Mental Status: She is alert.       Fingernails without clubbing         Data:     PFT: 3/7/2022             All studies listed here were independently reviewed and interpreted by me today.          Assessment and Plan:     ## Asthma  ## Allergies  ## Tobacco abuse  ## Marijuana use  Her asthma is currently poorly controlled with persistent wheezing, coughing, and dyspnea.  She recognizes that the " symptoms improve when she abstains from cigarettes and consistently uses her maintenance inhaler, however she has trouble remembering to use her inhaler and maintaining abstinence from smoking.  At this point it appears that she has the appropriate medications ordered.  We discussed at length the importance of smoking cessation and various options for this.  Ultimately, she opted to try nicotine inhalers.  We also discussed strategies for consistent use of her maintenance inhaler.  She has tried such things as keeping next to her bed or keeping a next-door toothbrush, but feels that she gets tunnel vision and does not see it.  I advised that she set an alarm on her phone to remind her to take it, and to carry it with her so that she does not have to go find it when it is time to use it.  She has been prescribed medical marijuana which she smokes on a daily basis.  Advised her to transition this to the oral route to reduce noxious stimuli to her lungs.  She had allergy testing as a child and reports known allergies to dogs, cats, and lizards as well as multiple environmental allergies and she symptomatically notices worsening in her breathing when she is near her animals.  At this time we will focus on consistent medication use and smoking cessation, however if these measures fail to provide relief, may need to add more allergy specific therapy or have her evaluated by an allergist consideration of allergy shots.  -Continue maintenance inhaler (Symbicort changed to Advair as this seems to be preferred by her formulary when refilling Symbicort today)  -Continue albuterol as needed including premedication for exercise  -Greater than 10 minutes spent on smoking cessation counseling  -Begin using nicotine inhalers  -Transition medical marijuana from smoking to oral route            Return to clinic: 3 months    I personally spent 65 minutes on the date of the encounter doing chart review, history and exam, documentation  and further activities per the note.    Scott Egan M.D.  Pulmonary & Critical Care  Pager: Click Here to page      The above note was dictated using voice recognition software and may include typographical errors. Please contact the author for any clarifications.

## 2022-03-07 NOTE — PATIENT INSTRUCTIONS
"  Nicotine Inhaler    Dosing:    Weeks 1-12 Use 6-16 cartridges per day. Maximum of 16 cartridges per day.   Weeks 12-24 Gradually reduce the number of cartridges per day.     How to use the nicotine Inhaler:    Remove the mouthpiece from the plastic wrap and push the top and bottom pieces together. Turn them to line up the markings and pull the top and bottom apart.     Insert one cartridge into the inhaler. Push hard until it pops into place.    Line up the markings again and push the top and bottom back together.    Turn the pieces so the markings do not line up and the inhaler is \"locked\".    Inhale deeply into back of throat or puff in short breaths.    You will soon learn to be able to tell when no nicotine is left in the cartridge. Once you feel you are no longer getting any nicotine, change the cartridge.    Take off the top of the mouthpiece and throw away the cartridge.    Especially at the beginning, use the inhaler whenever you would normally smoke a cigarette.    Some tips:    Do not smoke while you are using the nicotine inhaler.    Each cartridge will last for 20 minutes of puffing.    Puff on the inhaler until your craving resolves. If used for less than 20 minutes, leave the cartridge in the inhaler and save for your next use.     Try different schedules to see what works best for you--try puffing for a full 20 minutes, or try puffing for 5 minutes at a time. The cartridge will last for FOUR 5 minute sessions.    Keep inhaler and cartridges out of reach of children.    Store the inhaler at room temperature.    Clean the mouthpiece regularly with soap and water.     As your body becomes less dependent on nicotine, you will need to use less cartridges.    Follow up with your health care professional about any questions you have and to get help with tapering you dose of the nicotine inhaler.    Side Effects:  Some people experience mild irritation of the mouth and throat in the first few days. These " should resolve with time. If you experience any other troublesome or unusual side effects, call your health care professional.

## 2022-03-07 NOTE — TELEPHONE ENCOUNTER
Central Prior Authorization Team   Phone: [phone]      PA Initiation    Medication: nicotine (NICOTROL) 10 MG inhaler  Insurance Company: [employer] - Phone [phone] Fax [phone]  Pharmacy Filling the Rx: [id number] [address] AT [facility] [address]  Filling Pharmacy Phone: [phone]  Filling Pharmacy Fax:    Start Date: 3/7/2022

## 2022-03-07 NOTE — LETTER
3/7/2022     RE: Cathy Arroyo  [address] [place] [id number]    Dear Colleague,    Thank you for referring your patient, Cathy Arroyo, to the [facility] [place]. Please see a copy of my visit note below.              Pulmonary Clinic  New Patient Evaluation    Name: Cathy Arroyo MRN: [id number]     Age: 41 year old   YOB: 1980             HPI:   CC: Asthma, tobacco abuse    Cathy Arroyo is a 41 year old female with H/O environmental allergies, anxiety, bipolar type I, obesity, asthma, and tobacco use who presents to establish care for smoking cessation and asthma treatment.    She reports being diagnosed with asthma as a child including allergy testing at approximately age 9 when she is found to react to many seasonal allergens as well as cats, dogs, lizards, and dust.  She has had difficulty with her breathing throughout her life.  Over the past few years she has had particular trouble with dyspnea, coughing, and wheezing.  She has been prescribed Symbicort and albuterol but has difficulty remembering to take her Symbicort consistently.  When she does remember, her symptoms are fairly well controlled, however the longest time she is consistently taking it is approximately 2 weeks by her estimate.  Her biggest triggers for respiratory saturations are smoking and exercise.  She does note that pretreating with her albuterol prior to exercise reduces her symptoms.  She historically has required prednisone 1-3 times per year, but due to her bipolar disorder is only able to tolerate 20 mg a day for a maximum of 5 days due to jose eduardo.    She smoked her first cigarette at age 20, was a very light smoker until approximately age 24 when she began smoking more heavily.  By approximately age 27 she is smoking 1 pack/day which she continued for a number of years before cutting back to approximately half pack a day.  She does note that  she was able to completely quit smoking during her pregnancies and during breast-feeding, but fairly quickly resumed afterward.  She is currently smoking between a quarter and 1/2 pack/day.  She has tried to quit smoking in the past, but has difficulty because she is allergic to the lozenges and the adhesive on the patches.  She cannot use Chantix due to her bipolar disorder.  She has read numerous smoking cessation folks which have been helpful.  She transitioned to a vaporizer which she used for approximately 24 days during which time she was not smoking cigarettes.  During that time she notes that her breathing improved significantly as did her breath-holding ability, physical endurance, and singing ability.  However, due to health concerns of using the vaporizer she stopped and resumed smoking cigarettes, and all of her symptoms worsen again after resuming cigarette use.      Exposure History:   Tobacco: Socially 20, a few a day but increased until 1ppd around age 27. Was able to quit during pregnancy and breastfeeding.  Other inhaled substances (Vaping, hookah. Marijuana): Vaped for 24d while quitting. Medical Marijuana daily.  Occupation: Hair stylest. [employer]  Pets: Lizards, cats, dog  Allergies: Allergy testing as a child, positive to cats and dogs.  Hobbies: Acrylic paintings sprayed with clear coat which is sometimes irritating  Travel: Nope  Home: House, rents. Forced, air, dirty ducts. [place] foundation, basement always damp, suspects mold. Household carpet.            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     Post partum depression      Post-surgical hypothyroidism 05/2017     Uncomplicated asthma              Past Surgical History:      Past Surgical History:    Procedure Laterality Date     BIOPSY  29 y/o colpos, and in the last 2 years for thyroid    Colposcopy, thyroid nodule biopsy twice     BLOOD PATCH N/A 10/11/2016    Procedure: EPIDURAL BLOOD PATCH;  Surgeon: GENERIC ANESTHESIA PROVIDER;  Location: [facility] OR      SECTION, TUBAL LIGATION, COMBINED N/A 2019    Procedure:  SECTION, WITH TUBAL LIGATION;  Surgeon: Kathy Rutledge MD;  Location: [facility] L+D     CRANIOTOMY, EXCISE TUMOR COMPLEX, COMBINED  2014    Procedure: COMBINED CRANIOTOMY, EXCISE TUMOR COMPLEX;  Supracerabellar  Infratentorial Approach for Resection of Tumor ;  Surgeon: Kate Mckeon MD;  Location: [facility] OR     THYROIDECTOMY N/A 5/3/2017    Procedure: THYROIDECTOMY;  Total Thyroidectomy;  Surgeon: Pamela Villasenor MD;  Location: [facility] OR             Social History:     Social History     Socioeconomic History     Marital status:      Spouse name: Not on file     Number of children: Not on file     Years of education: Not on file     Highest education level: Not on file   Occupational History     Not on file   Tobacco Use     Smoking status: Former Smoker     Packs/day: 0.50     Years: 14.00     Pack years: 7.00     Types: Cigarettes     Start date: 2004     Quit date: 2022     Years since quittin.1     Smokeless tobacco: Never Used   Vaping Use     Vaping Use: Never used   Substance and Sexual Activity     Alcohol use: Yes     Alcohol/week: 0.0 standard drinks     Comment: maybe four drinks in a month     Drug use: Yes     Types: Marijuana     Comment: medical marijuana     Sexual activity: Yes     Partners: Male     Birth control/protection: Female Surgical   Other Topics Concern     Parent/sibling w/ CABG, MI or angioplasty before 65F 55M? No   Social History Narrative    Caffeine intake/servings daily - 1    Calcium intake/servings daily - 3    Exercise 5 times weekly - describe ; encouraged walking daily    Sunscreen used - Yes     Seatbelts used - Yes    Guns stored in the home - No    Self Breast Exam - Yes    Pap test up to date -  No    Eye exam up to date -  No    Dental exam up to date -  No    DEXA scan up to date -  No    Flex Sig/Colonoscopy up to date -  No    Mammography up to date -  No    Immunizations reviewed and up to date - Yes    Abuse: Current or Past (Physical, Sexual or Emotional) - No    Do you feel safe in your environment - Yes    Do you cope well with stress - Yes    Do you suffer from insomnia - No             Social Determinants of Health     Financial Resource Strain: Not on file   Food Insecurity: Not on file   Transportation Needs: Not on file   Physical Activity: Not on file   Stress: Not on file   Social Connections: Not on file   Intimate Partner Violence: Not on file   Housing Stability: Not on file            Family History:     Family History   Problem Relation Age of Onset     Thyroid Disease Paternal Aunt      Breast Cancer Paternal Aunt 58        bilateral; negative testing     Cancer Paternal Aunt      Asthma Father      Mental Illness Father         Bipolar 2     Obesity Father      Asthma Maternal Grandmother      Osteoporosis Maternal Grandmother      Glaucoma Maternal Grandmother      Hypertension Maternal Grandmother      Macular Degeneration Maternal Grandmother      Diabetes Paternal Grandfather      Other Cancer Mother 62        salivary gland cancer     Genitourinary Problems Mother      Bipolar Disorder Mother         unipolar depression     Depression Mother      Thyroid Disease Mother         benign tumor     Skin Cancer Mother      Cancer Mother      Bipolar Disorder Brother         unipolar depression     Asthma Brother      Depression Brother      Prostate Cancer Maternal Grandfather 69        no history of smoking     Bladder Cancer Maternal Grandfather 71     Colon Cancer Maternal Grandfather 70     Breast Cancer Maternal Aunt 37        negative BRCA1/2 testing     Melanoma Maternal Aunt  64     Breast Cancer Other         paternal great aunt             Immunizations:     Immunization History   Administered Date(s) Administered     COVID-19,PF,[facility] 04/05/2021     COVID-19,PF,Moderna 10/31/2021     DTaP, Unspecified 02/12/2014, 02/15/2019     HPV Quadrivalent 04/29/2008, 09/28/2009, 10/28/2009, 12/28/2009     HepB-Adult 01/06/2010, 09/10/2010     Influenza (H1N1) 12/28/2009     Influenza (IIV3) PF 12/10/1998, 11/01/2000, 11/21/2002, 10/29/2004, 11/18/2005, 10/30/2006, 12/13/2007, 11/23/2010, 01/28/2013     Influenza Vaccine IM > 6 months Valent IIV4 (Alfuria,Fluzone) 01/17/2014, 11/04/2014, 11/23/2015, 10/15/2016, 10/16/2018, 11/06/2019, 11/20/2020, 09/29/2021     MMR 09/16/1993, 05/16/2019     Pneumococcal 23 valent 11/20/2020     TDAP Vaccine (Adacel) 02/15/2019     Td (Adult), Adsorbed 01/06/2004             Allergies:     Allergies   Allergen Reactions     Adacel [Daptacel] Swelling     Adhesive Tape Hives     Ciprofloxacin      Causes visual hallucinations.     Codeine Sulfate Nausea and Vomiting     Nicoderm [Nicotine]      Patch and Gum, pt reported allergic reaction 6/24     Tegaderm Transparent Dressing (Informational Only) Hives     Tetanus Toxoid      Pt states she had an infection at injection site.      Vicodin [Hydrocodone-Acetaminophen] Nausea and Vomiting     Methimazole Rash             Medications:   albuterol (PROAIR HFA/PROVENTIL HFA/VENTOLIN HFA) 108 (90 Base) MCG/ACT inhaler, Inhale 2 puffs into the lungs every 6 hours  amphetamine-dextroamphetamine (ADDERALL XR) 15 MG 24 hr capsule, Take 15 mg by mouth daily  amphetamine-dextroamphetamine (ADDERALL XR) 20 MG 24 hr capsule, Take 20 mg by mouth daily  ARIPiprazole (ABILIFY) 10 MG tablet, Take 10 mg by mouth daily  budesonide-formoterol (SYMBICORT) 160-4.5 MCG/ACT Inhaler, Inhale 2 puffs into the lungs 2 times daily  cetirizine (ZYRTEC) 10 MG tablet, Take 1 tablet (10 mg) by mouth daily  cyclobenzaprine (FLEXERIL) 10 MG tablet,  Take 1 tablet (10 mg) by mouth 3 times daily as needed for muscle spasms  diazepam (VALIUM) 5 MG tablet, Take 5 mg by mouth as needed  fexofenadine (ALLEGRA) 180 MG tablet, Take 180 mg by mouth every morning  gabapentin (NEURONTIN) 100 MG capsule, Take 1 capsule (100 mg) by mouth 3 times daily  gabapentin (NEURONTIN) 300 MG capsule, Take 1 capsule (300 mg) by mouth 2 times daily  ipratropium - albuterol 0.5 mg/2.5 mg/3 mL (DUONEB) 0.5-2.5 (3) MG/3ML neb solution, Take 1 vial (3 mLs) by nebulization every 6 hours as needed for shortness of breath / dyspnea or wheezing  levothyroxine (SYNTHROID/LEVOTHROID) 175 MCG tablet, Take 1 tablet (175 mcg) by mouth daily  medical cannabis (Patient's own supply), Take 1 Dose by mouth See Admin Instructions (The purpose of this order is to document that the patient reports taking medical cannabis.  This is not a prescription, and is not used to certify that the patient has a qualifying medical condition.)    Tangerine Oral Suspension Unflavored 1-5 ml up to two times daily.  Total THC = 240 mg, Total CBD Trace  medical cannabis (Patient's own supply), Take 1 Dose by mouth See Admin Instructions (The purpose of this order is to document that the patient reports taking medical cannabis.  This is not a prescription, and is not used to certify that the patient has a qualifying medical condition.)    [place] oral suspension: take 1-3 ml by mouth two times daily  Total CBD 1200 mg. Total THC 60 mg  montelukast (SINGULAIR) 10 MG tablet, Take 1 tablet (10 mg) by mouth every morning  multivitamin w/minerals (MULTI-VITAMIN) tablet, Take 1 tablet by mouth daily  ondansetron (ZOFRAN-ODT) 4 MG ODT tab, Take 1 tablet (4 mg) by mouth every 8 hours as needed for nausea  rizatriptan (MAXALT-MLT) 10 MG ODT, Take 1 tablet (10 mg) by mouth at onset of headache for migraine (repeat in 2 hours if needed) May repeat in 2 hours. Max 3 tablets/24 hours.  tiZANidine (ZANAFLEX) 4 MG tablet, Take 1 tablet (4  "mg) by mouth nightly as needed for muscle spasms  VENTOLIN  (90 Base) MCG/ACT inhaler, INHALE 2 PUFFS INTO THE LUNGS EVERY 4 HOURS AS NEEDED FOR SHORTNESS OF BREATH OR DIFFICULT BREATHING OR WHEEZING  vitamin D3 (CHOLECALCIFEROL) 50 mcg (2000 units) tablet, Take 1 tablet by mouth daily    No current facility-administered medications on file prior to visit.           Review of Systems:     Review of Systems   Constitutional: Positive for chills. Negative for fever and weight loss.   Respiratory: Positive for sputum production and wheezing. Negative for cough, hemoptysis and shortness of breath.    Musculoskeletal: Positive for back pain, myalgias and neck pain.   Neurological: Positive for tingling. Negative for dizziness, tremors, speech change, seizures, loss of consciousness, weakness and headaches.   Endo/Heme/Allergies: Negative for polydipsia.   Psychiatric/Behavioral: Negative for hallucinations and memory loss.              Exam:   BP (!) 142/88 (BP Location: Left arm, Patient Position: Chair, Cuff Size: Adult Large)   Pulse 95   Ht 1.727 m (5' 8\")   Wt 107.5 kg (236 lb 14.4 oz)   BMI 36.02 kg/m      Physical Exam  Vitals and nursing note reviewed.   Constitutional:       Appearance: She is obese.   HENT:      Head: Normocephalic and atraumatic.   Eyes:      Extraocular Movements: Extraocular movements intact.      Conjunctiva/sclera: Conjunctivae normal.      Pupils: Pupils are equal, round, and reactive to light.   Cardiovascular:      Rate and Rhythm: Normal rate and regular rhythm.      Heart sounds: No murmur heard.  Pulmonary:      Effort: Pulmonary effort is normal.      Breath sounds: Wheezing present.      Comments: Expiratory wheezes throughout   Musculoskeletal:      Right lower leg: No edema.      Left lower leg: No edema.   Neurological:      Mental Status: She is alert.       Fingernails without clubbing         Data:     PFT: 3/7/2022             All studies listed here were " independently reviewed and interpreted by me today.          Assessment and Plan:     ## Asthma  ## Allergies  ## Tobacco abuse  ## Marijuana use  Her asthma is currently poorly controlled with persistent wheezing, coughing, and dyspnea.  She recognizes that the symptoms improve when she abstains from cigarettes and consistently uses her maintenance inhaler, however she has trouble remembering to use her inhaler and maintaining abstinence from smoking.  At this point it appears that she has the appropriate medications ordered.  We discussed at length the importance of smoking cessation and various options for this.  Ultimately, she opted to try nicotine inhalers.  We also discussed strategies for consistent use of her maintenance inhaler.  She has tried such things as keeping next to her bed or keeping a next-door toothbrush, but feels that she gets tunnel vision and does not see it.  I advised that she set an alarm on her phone to remind her to take it, and to carry it with her so that she does not have to go find it when it is time to use it.  She has been prescribed medical marijuana which she smokes on a daily basis.  Advised her to transition this to the oral route to reduce noxious stimuli to her lungs.  She had allergy testing as a child and reports known allergies to dogs, cats, and lizards as well as multiple environmental allergies and she symptomatically notices worsening in her breathing when she is near her animals.  At this time we will focus on consistent medication use and smoking cessation, however if these measures fail to provide relief, may need to add more allergy specific therapy or have her evaluated by an allergist consideration of allergy shots.  -Continue maintenance inhaler (Symbicort changed to Advair as this seems to be preferred by her formulary when refilling Symbicort today)  -Continue albuterol as needed including premedication for exercise  -Greater than 10 minutes spent on smoking  cessation counseling  -Begin using nicotine inhalers  -Transition medical marijuana from smoking to oral route            Return to clinic: 3 months    I personally spent 65 minutes on the date of the encounter doing chart review, history and exam, documentation and further activities per the note.    Scott Egan M.D.  Pulmonary & Critical Care  Pager: Click Here to page    The above note was dictated using voice recognition software and may include typographical errors. Please contact the author for any clarifications.

## 2022-03-07 NOTE — TELEPHONE ENCOUNTER
Prior Authorization Retail Medication Request    Medication/Dose: Nicotrol oral inh  ICD code (if different than what is on RX):      Previously Tried and Failed:  Lozenges - allergic, patches - allergic, Chantix - can't take d/t bipolar dx    Rationale: see above    Insurance Name:    Insurance ID:        Pharmacy Information (if different than what is on RX)  Name:    Phone:

## 2022-03-08 LAB
DLCOUNC-%PRED-PRE: 151 %
DLCOUNC-PRE: 37.86 ML/MIN/MMHG
DLCOUNC-PRED: 25.03 ML/MIN/MMHG
ERV-%PRED-PRE: 123 %
ERV-PRE: 0.99 L
ERV-PRED: 0.8 L
EXPTIME-PRE: 8.81 SEC
FEF2575-%PRED-POST: 92 %
FEF2575-%PRED-PRE: 66 %
FEF2575-POST: 3.26 L/SEC
FEF2575-PRE: 2.34 L/SEC
FEF2575-PRED: 3.51 L/SEC
FEFMAX-%PRED-PRE: 74 %
FEFMAX-PRE: 5.64 L/SEC
FEFMAX-PRED: 7.62 L/SEC
FEV1-%PRED-PRE: 94 %
FEV1-PRE: 3.28 L
FEV1FEV6-PRE: 69 %
FEV1FEV6-PRED: 84 %
FEV1FVC-PRE: 69 %
FEV1FVC-PRED: 82 %
FEV1SVC-PRE: 68 %
FEV1SVC-PRED: 82 %
FIFMAX-PRE: 4.16 L/SEC
FRCPLETH-%PRED-PRE: 127 %
FRCPLETH-PRE: 3.7 L
FRCPLETH-PRED: 2.91 L
FVC-%PRED-PRE: 112 %
FVC-PRE: 4.78 L
FVC-PRED: 4.24 L
IC-%PRED-PRE: 111 %
IC-PRE: 3.82 L
IC-PRED: 3.42 L
RVPLETH-%PRED-PRE: 154 %
RVPLETH-PRE: 2.71 L
RVPLETH-PRED: 1.75 L
TLCPLETH-%PRED-PRE: 134 %
TLCPLETH-PRE: 7.52 L
TLCPLETH-PRED: 5.61 L
VA-%PRED-PRE: 114 %
VA-PRE: 6.64 L
VC-%PRED-PRE: 113 %
VC-PRE: 4.81 L
VC-PRED: 4.22 L

## 2022-03-08 NOTE — TELEPHONE ENCOUNTER
Prior Authorization Approval    Authorization Effective Date: 2/5/2022  Authorization Expiration Date: 3/7/2023  Medication: nicotine (NICOTROL) 10 MG inhaler- APPROVED  Approved Dose/Quantity:   Reference #:     Insurance Company: [employer] - Phone [phone] Fax [phone]  Expected CoPay:     $0  CoPay Card Available:      Foundation Assistance Needed:    Which Pharmacy is filling the prescription (Not needed for infusion/clinic administered): [id number] - [place] [address] [facility] [address]  Pharmacy Notified: Yes-Pharmacy need to order medication and will be in next day. Patient will get a text message when ready.   Patient Notified: No

## 2022-03-09 NOTE — PROGRESS NOTES
[facility]    OUTPATIENT Physical Therapy ORTHOPEDIC EVALUATION  PLAN OF TREATMENT FOR OUTPATIENT REHABILITATION  (COMPLETE FOR INITIAL CLAIMS ONLY)  Patient's Last Name, First Name, M.I.  YOB: 1980  Cathy Arroyo    Provider s Name:  [facility]   Medical Record No.  [id number]   Start of Care Date:  03/02/22   Onset Date:   02/01/22   Type:     _X__PT   ___OT Medical Diagnosis:    Encounter Diagnoses   Name Primary?     Neck pain      Mid back pain      Acute bilateral low back pain without sciatica Yes        Treatment Diagnosis:  Neck, thoracic, lower back        Goals:     03/02/22 0500   Body Part   Goals listed below are for Neck, thoracic, lower back   Goal #1   Goal #1 sitting   Previous Functional Level No restrictions   Current Functional Level Minutes patient can sit   Performance level 30 3/10 pain   STG Target Performance Minutes patient will be able to sit   Performance level 30 0/10 pain   Rationale for personal hygiene;to allow rest from standing;for community transportation;for job requirements in their work place   Due date 03/23/22   LTG Target Performance Hours patient will be able to sit   Performance Level 1   Rationale for personal hygiene;to allow rest from standing;for community transportation;for job requirements in their work place   Due date 04/27/22       Therapy Frequency:  2 x month   Predicted Duration of Therapy Intervention:  3 months    Jared Constantino, PT                 I CERTIFY THE NEED FOR THESE SERVICES FURNISHED UNDER        THIS PLAN OF TREATMENT AND WHILE UNDER MY CARE     (Physician attestation of this document indicates review and certification of the therapy plan).                       Certification Date From:  03/02/22   Certification Date To:  05/30/22    Referring Provider:  Marcos Dalal    Initial  Assessment        See Epic Evaluation SOC Date: 03/02/22

## 2022-03-09 NOTE — TELEPHONE ENCOUNTER
Notified pt that PA for Nicotrol has be approved. Discussed on-line resources for smoking cessation. Pt has call back number for questions or concerns.

## 2022-03-23 ENCOUNTER — THERAPY VISIT (OUTPATIENT)
Dept: PHYSICAL THERAPY | Facility: CLINIC | Age: 42
End: 2022-03-23
Payer: COMMERCIAL

## 2022-03-23 DIAGNOSIS — M54.50 ACUTE BILATERAL LOW BACK PAIN WITHOUT SCIATICA: ICD-10-CM

## 2022-03-23 DIAGNOSIS — M54.2 NECK PAIN: ICD-10-CM

## 2022-03-23 DIAGNOSIS — M54.9 MID BACK PAIN: ICD-10-CM

## 2022-03-23 PROCEDURE — 97530 THERAPEUTIC ACTIVITIES: CPT | Mod: GP | Performed by: PHYSICAL THERAPIST

## 2022-03-23 PROCEDURE — 97110 THERAPEUTIC EXERCISES: CPT | Mod: GP | Performed by: PHYSICAL THERAPIST

## 2022-03-23 NOTE — PROGRESS NOTES
Diagnosis: Neck, thoracic, lower back   Precautions/Restrictions/MD instructions/Other pertinent hx none    Therapist Impression:   Continue with current POC.  Assessed lifting and hair cutting mechanics    GOALS: Hair cutting    NEXT: strengthening    PTRX: none    Subjective:  Reports improvement with the current exercises.  Work is going well.  Making ergonomic changes.  Fell 2 steps and sprained L ankle.  Happened yesterday.  A little worse today.      Objective:  Ankle Range of Motion  Ankle AROM Dorsiflexion Plantarflexion Inversion Eversion WBing DF (cm)   Left wnl wnl pain wnl na   Right na na na na na   **WBing DF- distance between wall and great toe where pt can touch knee to wall and keep heel in contact with floor    Ankle Strength  Ankle MMT Dorsiflexion Plantarflexion Inversion Eversion   Left 5/5 na/5 5/5 5/5   Right na/5 na/5 na/5 na/5     Special Tests:  Ligament testing: positive anterior drawer and talar tilt    Palpation  Left: Mild tenderness to palpation at ATFL, CFL, posterior to lateral malleolus

## 2022-04-27 ENCOUNTER — THERAPY VISIT (OUTPATIENT)
Dept: PHYSICAL THERAPY | Facility: CLINIC | Age: 42
End: 2022-04-27
Payer: COMMERCIAL

## 2022-04-27 DIAGNOSIS — M54.50 ACUTE BILATERAL LOW BACK PAIN WITHOUT SCIATICA: ICD-10-CM

## 2022-04-27 DIAGNOSIS — M54.2 NECK PAIN: ICD-10-CM

## 2022-04-27 DIAGNOSIS — M54.9 MID BACK PAIN: Primary | ICD-10-CM

## 2022-04-27 PROCEDURE — 97530 THERAPEUTIC ACTIVITIES: CPT | Mod: GP | Performed by: PHYSICAL THERAPIST

## 2022-04-27 PROCEDURE — 97110 THERAPEUTIC EXERCISES: CPT | Mod: GP | Performed by: PHYSICAL THERAPIST

## 2022-04-27 NOTE — PROGRESS NOTES
PROGRESS REPORT    Cathy has been in therapy from March 2, 2022 to Apr 27, 2022 for treatment of LBP.    Diagnosis: Neck, thoracic, lower back   Precautions/Restrictions/MD instructions/Other pertinent hx none    Therapist Impression:   Minimal change in Oswestry.  Pt reports 50-60% improvement.  Continue to progress extension directional preference.  Discussed posture and diaphragmatic breathing today with incorporating into core strengthening.    GOALS: Hair cutting    NEXT: strengthening    PTRX: none    Subjective:  Reports better.  Reports being at 50-60% overall.  Neck pain has resolved.  Back pain is still present.  Worse is with sitting.  Reports avoiding walking around.  Pain is much better for her ankle    Objective:  Trunk Range of Motion  Movement Loss Major Moderate Minimal Nil Pain   Flexion   x  X ( no pain after 10 prone press ups   Extension    x    Sidebending R    x    Sidebending L    x    Side Gliding R        Side Gliding L        Thoracic Rotation R        Thoracic Rotation L          Mild lumbar lordosis    ASSESSMENT/PLAN  Updated problem list and treatment plan: The primary encounter diagnosis was Mid back pain. Diagnoses of Neck pain and Acute bilateral low back pain without sciatica were also pertinent to this visit. Pain - HEP  Decreased ROM/flexibility - HEP  Decreased function - HEP  Decreased strength - HEP  Impaired posture - HEP  STG/LTGs have been met or progress has been made towards goals:  Yes (See Goal flow sheet completed today.)  Assessment of Progress: The patient's condition is improving.  Self Management Plans:  Patient has been instructed in a home treatment program.  Patient  has been instructed in self management of symptoms.  I have re-evaluated this patient and find that the nature, scope, duration and intensity of the therapy is appropriate for the medical condition of the patient.  Cathy continues to require the following intervention to meet STG and LTG's:   PT    Recommendations:  This patient would benefit from continued therapy.     Frequency:  1 X a month, once daily  Duration:  for 2 months              Please refer to the daily flowsheet for treatment today, total treatment time and time spent performing 1:1 timed codes.

## 2022-05-04 ENCOUNTER — HOSPITAL ENCOUNTER (EMERGENCY)
Facility: CLINIC | Age: 42
Discharge: HOME | End: 2022-05-04
Attending: EMERGENCY MEDICINE | Admitting: EMERGENCY MEDICINE
Payer: COMMERCIAL

## 2022-05-04 ENCOUNTER — TELEPHONE (OUTPATIENT)
Dept: FAMILY MEDICINE | Facility: CLINIC | Age: 42
End: 2022-05-04
Payer: COMMERCIAL

## 2022-05-04 ENCOUNTER — APPOINTMENT (OUTPATIENT)
Dept: MRI IMAGING | Facility: CLINIC | Age: 42
End: 2022-05-04
Attending: EMERGENCY MEDICINE
Payer: COMMERCIAL

## 2022-05-04 VITALS — TEMPERATURE: 98.1 F | HEIGHT: 68 IN | BODY MASS INDEX: 36.07 KG/M2 | WEIGHT: 238 LBS

## 2022-05-04 DIAGNOSIS — R29.810 FACIAL DROOP: ICD-10-CM

## 2022-05-04 LAB
ANION GAP SERPL CALCULATED.3IONS-SCNC: 5 MMOL/L (ref 3–14)
BASOPHILS # BLD AUTO: 0 10E3/UL (ref 0–0.2)
BASOPHILS NFR BLD AUTO: 1 %
BUN SERPL-MCNC: 8 MG/DL (ref 7–30)
CALCIUM SERPL-MCNC: 9 MG/DL (ref 8.5–10.1)
CHLORIDE BLD-SCNC: 110 MMOL/L (ref 94–109)
CO2 SERPL-SCNC: 24 MMOL/L (ref 20–32)
CREAT SERPL-MCNC: 0.72 MG/DL (ref 0.52–1.04)
EOSINOPHIL # BLD AUTO: 0.2 10E3/UL (ref 0–0.7)
EOSINOPHIL NFR BLD AUTO: 3 %
ERYTHROCYTE [DISTWIDTH] IN BLOOD BY AUTOMATED COUNT: 13.3 % (ref 10–15)
GFR SERPL CREATININE-BSD FRML MDRD: >90 ML/MIN/1.73M2
GLUCOSE BLD-MCNC: 99 MG/DL (ref 70–99)
GLUCOSE BLDC GLUCOMTR-MCNC: 101 MG/DL (ref 70–99)
HCT VFR BLD AUTO: 40.4 % (ref 35–47)
HGB BLD-MCNC: 13.3 G/DL (ref 11.7–15.7)
IMM GRANULOCYTES # BLD: 0 10E3/UL
IMM GRANULOCYTES NFR BLD: 0 %
LYMPHOCYTES # BLD AUTO: 3.5 10E3/UL (ref 0.8–5.3)
LYMPHOCYTES NFR BLD AUTO: 40 %
MCH RBC QN AUTO: 29.2 PG (ref 26.5–33)
MCHC RBC AUTO-ENTMCNC: 32.9 G/DL (ref 31.5–36.5)
MCV RBC AUTO: 89 FL (ref 78–100)
MONOCYTES # BLD AUTO: 0.5 10E3/UL (ref 0–1.3)
MONOCYTES NFR BLD AUTO: 6 %
NEUTROPHILS # BLD AUTO: 4.4 10E3/UL (ref 1.6–8.3)
NEUTROPHILS NFR BLD AUTO: 50 %
NRBC # BLD AUTO: 0 10E3/UL
NRBC BLD AUTO-RTO: 0 /100
PLATELET # BLD AUTO: 326 10E3/UL (ref 150–450)
POTASSIUM BLD-SCNC: 3.8 MMOL/L (ref 3.4–5.3)
RBC # BLD AUTO: 4.56 10E6/UL (ref 3.8–5.2)
SODIUM SERPL-SCNC: 139 MMOL/L (ref 133–144)
WBC # BLD AUTO: 8.7 10E3/UL (ref 4–11)

## 2022-05-04 PROCEDURE — A9585 GADOBUTROL INJECTION: HCPCS | Performed by: EMERGENCY MEDICINE

## 2022-05-04 PROCEDURE — 85025 COMPLETE CBC W/AUTO DIFF WBC: CPT | Performed by: EMERGENCY MEDICINE

## 2022-05-04 PROCEDURE — 255N000002 HC RX 255 OP 636: Performed by: EMERGENCY MEDICINE

## 2022-05-04 PROCEDURE — 80048 BASIC METABOLIC PNL TOTAL CA: CPT | Performed by: EMERGENCY MEDICINE

## 2022-05-04 PROCEDURE — 99285 EMERGENCY DEPT VISIT HI MDM: CPT | Mod: 25 | Performed by: EMERGENCY MEDICINE

## 2022-05-04 PROCEDURE — 36415 COLL VENOUS BLD VENIPUNCTURE: CPT | Performed by: EMERGENCY MEDICINE

## 2022-05-04 PROCEDURE — 70544 MR ANGIOGRAPHY HEAD W/O DYE: CPT

## 2022-05-04 PROCEDURE — 99284 EMERGENCY DEPT VISIT MOD MDM: CPT | Performed by: EMERGENCY MEDICINE

## 2022-05-04 PROCEDURE — 70553 MRI BRAIN STEM W/O & W/DYE: CPT

## 2022-05-04 PROCEDURE — 70549 MR ANGIOGRAPH NECK W/O&W/DYE: CPT

## 2022-05-04 RX ORDER — PREDNISONE 10 MG/1
10 TABLET ORAL DAILY
Qty: 5 TABLET | Refills: 0 | Status: SHIPPED | OUTPATIENT
Start: 2022-05-04 | End: 2022-05-04 | Stop reason: SINTOL

## 2022-05-04 RX ORDER — GADOBUTROL 604.72 MG/ML
10 INJECTION INTRAVENOUS ONCE
Status: COMPLETED | OUTPATIENT
Start: 2022-05-04 | End: 2022-05-04

## 2022-05-04 RX ADMIN — GADOBUTROL 10 ML: 604.72 INJECTION INTRAVENOUS at 17:11

## 2022-05-04 ASSESSMENT — ENCOUNTER SYMPTOMS: NUMBNESS: 1

## 2022-05-04 NOTE — TELEPHONE ENCOUNTER
POD/Zamzam,    Patient has appointment with dentist and received novocain since this time has been having numbness in both eyes. Numbness continuing to spread to right side of face and this is most concerning symptom. Dentist stated to wait in their urgent care or follow up with a provider.     Please advise.  Call back [phone], ok detailed vm.     Thanks,  BOYD June  [facility]

## 2022-05-04 NOTE — ED TRIAGE NOTES
Pt had a dental appt this AM and took a valium which she takes for anxiety prior to her appt. States she got two anesthetic injections for her dental procedure around 12 PM.  Reports onset of numbness that extended up her face after injection and then L sided facial droop and then issues with closing her L eye.        Triage Assessment     Row Name 05/04/22 1530       Triage Assessment (Adult)    Airway WDL WDL       Respiratory WDL    Respiratory WDL WDL       Skin Circulation/Temperature WDL    Skin Circulation/Temperature WDL WDL       Cardiac WDL    Cardiac WDL WDL       Peripheral/Neurovascular WDL    Peripheral Neurovascular WDL WDL       Cognitive/Neuro/Behavioral WDL    Cognitive/Neuro/Behavioral WDL WDL       [place] Coma Scale    Best Eye Response 4-->(E4) spontaneous    Best Motor Response 6-->(M6) obeys commands    Best Verbal Response 5-->(V5) oriented    [place] Coma Scale Score 15

## 2022-05-04 NOTE — ED PROVIDER NOTES
ED Provider Note  [facility]      History     Chief Complaint   Patient presents with     Numbness     Numbness with facial droop.     HPI  Cathy Arroyo is a 41 year old female who presents with right-sided facial numbness and weakness after she had a dental block earlier today for cavity filling.  Patient states that she was numbed up in 2 different places in her mouth.  Initially she got an injection near the anterior maxillary teeth.  She states about 20 minutes later she got an injection in the posterior right mandibular area which I suspect was likely an inferior alveolar nerve block.  Patient states that she had some numbness in her face while she was at the dentist, however, while driving home, she noticed weakness, specifically with the upper lip on the right side of her face.  She is also feeling numbness up around her right eye and right side of her forehead.  Patient otherwise denies any recent illness, no fever, cough, chest pain, shortness of breath.  She denies any numbness or weakness in her extremities.  She does report a headache, but states that that could be from the anxiousness that she has developed since noticing this facial droop.  She denies any difficulty opening or closing her eyes.  Patient has history of hypercholesterolemia and has a smoking history.  Denies hypertension or diabetes.  Reports history of resection of a pineal gland tumor several years ago.  States that she typically gets an MRI of the brain every 2 years due to this.    Past Medical History  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     Post partum depression      Post-surgical hypothyroidism 05/2017     Uncomplicated asthma      Past Surgical History:   Procedure Laterality Date      BIOPSY  31 y/o colpos, and in the last 2 years for thyroid    Colposcopy, thyroid nodule biopsy twice     BLOOD PATCH N/A 10/11/2016    Procedure: EPIDURAL BLOOD PATCH;  Surgeon: GENERIC ANESTHESIA PROVIDER;  [facility]      SECTION, TUBAL LIGATION, COMBINED N/A 2019    Procedure:  SECTION, WITH TUBAL LIGATION;  Surgeon: Kathy Rutledge MD;  [facility] L+D     CRANIOTOMY, EXCISE TUMOR COMPLEX, COMBINED  2014    Procedure: COMBINED CRANIOTOMY, EXCISE TUMOR COMPLEX;  Supracerabellar  Infratentorial Approach for Resection of Tumor ;  Surgeon: Kate Mckeon MD;  [facility] OR     THYROIDECTOMY N/A 5/3/2017    Procedure: THYROIDECTOMY;  Total Thyroidectomy;  Surgeon: Pamela Villasenor MD;  [facility]     diazepam (VALIUM) 5 MG tablet  multivitamin w/minerals (THERA-VIT-M) tablet  predniSONE (DELTASONE) 10 MG tablet  tiZANidine (ZANAFLEX) 4 MG tablet  vitamin D3 (CHOLECALCIFEROL) 50 mcg (2000 units) tablet  albuterol (PROAIR HFA/PROVENTIL HFA/VENTOLIN HFA) 108 (90 Base) MCG/ACT inhaler  amphetamine-dextroamphetamine (ADDERALL XR) 15 MG 24 hr capsule  amphetamine-dextroamphetamine (ADDERALL XR) 20 MG 24 hr capsule  ARIPiprazole (ABILIFY) 10 MG tablet  cetirizine (ZYRTEC) 10 MG tablet  fexofenadine (ALLEGRA) 180 MG tablet  fluticasone-salmeterol (ADVAIR HFA) 230-21 MCG/ACT inhaler  ipratropium - albuterol 0.5 mg/2.5 mg/3 mL (DUONEB) 0.5-2.5 (3) MG/3ML neb solution  levothyroxine (SYNTHROID/LEVOTHROID) 175 MCG tablet  medical cannabis (Patient's own supply)  medical cannabis (Patient's own supply)  montelukast (SINGULAIR) 10 MG tablet  nicotine (NICOTROL) 10 MG inhaler  ondansetron (ZOFRAN-ODT) 4 MG ODT tab  rizatriptan (MAXALT-MLT) 10 MG ODT  VENTOLIN  (90 Base) MCG/ACT inhaler      Allergies   Allergen Reactions     Adacel [Daptacel] Swelling     Adhesive Tape Hives     Ciprofloxacin      Causes visual hallucinations.     Codeine Sulfate Nausea  and Vomiting     Nicoderm [Nicotine]      Patch and Gum, pt reported allergic reaction      Tegaderm Transparent Dressing (Informational Only) Hives     Tetanus Toxoid      Pt states she had an infection at injection site.      Vicodin [Hydrocodone-Acetaminophen] Nausea and Vomiting     Methimazole Rash     Family History  Family History   Problem Relation Age of Onset     Thyroid Disease Paternal Aunt      Breast Cancer Paternal Aunt 58        bilateral; negative testing     Cancer Paternal Aunt      Asthma Father      Mental Illness Father         Bipolar 2     Obesity Father      Asthma Maternal Grandmother      Osteoporosis Maternal Grandmother      Glaucoma Maternal Grandmother      Hypertension Maternal Grandmother      Macular Degeneration Maternal Grandmother      Diabetes Paternal Grandfather      Other Cancer Mother 62        salivary gland cancer     Genitourinary Problems Mother      Bipolar Disorder Mother         unipolar depression     Depression Mother      Thyroid Disease Mother         benign tumor     Skin Cancer Mother      Cancer Mother      Bipolar Disorder Brother         unipolar depression     Asthma Brother      Depression Brother      Prostate Cancer Maternal Grandfather 69        no history of smoking     Bladder Cancer Maternal Grandfather 71     Colon Cancer Maternal Grandfather 70     Breast Cancer Maternal Aunt 37        negative BRCA1/2 testing     Melanoma Maternal Aunt 64     Breast Cancer Other         paternal great aunt     Social History   Social History     Tobacco Use     Smoking status: Former Smoker     Packs/day: 0.50     Years: 14.00     Pack years: 7.00     Types: Cigarettes     Start date: 2004     Quit date: 2022     Years since quittin.2     Smokeless tobacco: Never Used   Vaping Use     Vaping Use: Never used   Substance Use Topics     Alcohol use: Yes     Alcohol/week: 0.0 standard drinks     Comment: maybe four drinks in a month     Drug use: Yes  "    Types: Marijuana     Comment: medical marijuana      Past medical history, past surgical history, medications, allergies, family history, and social history were reviewed with the patient. No additional pertinent items.       Review of Systems  A complete review of systems was performed with pertinent positives and negatives noted in the HPI, and all other systems negative.    Physical Exam   BP: (!) (P) 135/98  Pulse: (P) 103  Temp: 98.1  F (36.7  C)  Resp: (P) 16  Height: 172.7 cm (5' 8\")  Weight: 108 kg (238 lb)  SpO2: (P) 98 %  Physical Exam    GEN:  Well developed, no acute distress  HEENT:  PERRL, EOMI, Mucous membranes are moist.  There is flattening of the right nasolabial fold.  Cardio:  RRR, no murmur, radial pulses equal bilaterally  PULM:  Lungs clear, good air movement, no wheezes, rales   Abd:  Soft, normal bowel sounds, no focal tenderness  Musculoskeletal:  normal range of motion, no lower extremity swelling or calf tenderness  Neuro:  Alert and oriented X3, Follows commands, patient has decreased sensation on the right side of her face involving the forehead and maxillary and mandibular areas of her face.  There is loss of the right nasolabial fold and weakness with raising the lips on the right side of the face and with smile on the right side of the face.  When holding air in her cheeks, there is facial asymmetry as well.  finger to nose is normal, sensation and strength is normal throughout all 4 extremities, patellar reflexes are normal, no pronator drift   Skin:  Warm, dry    ED Course      Procedures     Patient was discussed with neurology and the neurologist found some case reports where patients have developed facial nerve palsy after dental block intended for sensory nerves only.  In these previous cases, the patient gained motor function back, but it has took weeks for motor function to return.  He did suggest offering the patient a course of prednisone to see if this may help resolve " the motor paralysis sooner.  Patient has history of bipolar disorder so does not want any higher dose of 10 mg/day for 5 days.  She states that if she takes a higher dose, she could become manic.    Given the patient's history of high cholesterol with smoking history as well as resection of pineal gland tumor, decision was made to do MRI, stroke protocol as the patient is concerned about her stroke risk.  Given the timing of the facial weakness coinciding with the dental nerve block, highly suspect it is a consequence of the dental block.    Labs were reviewed by me and results are shown below.     Results for orders placed or performed during the hospital encounter of 05/04/22   Glucose by meter     Status: Abnormal   Result Value Ref Range    GLUCOSE BY METER POCT 101 (H) 70 - 99 mg/dL   Basic metabolic panel     Status: Abnormal   Result Value Ref Range    Sodium 139 133 - 144 mmol/L    Potassium 3.8 3.4 - 5.3 mmol/L    Chloride 110 (H) 94 - 109 mmol/L    Carbon Dioxide (CO2) 24 20 - 32 mmol/L    Anion Gap 5 3 - 14 mmol/L    Urea Nitrogen 8 7 - 30 mg/dL    Creatinine 0.72 0.52 - 1.04 mg/dL    Calcium 9.0 8.5 - 10.1 mg/dL    Glucose 99 70 - 99 mg/dL    GFR Estimate >90 >60 mL/min/1.73m2   CBC with platelets and differential     Status: None   Result Value Ref Range    WBC Count 8.7 4.0 - 11.0 10e3/uL    RBC Count 4.56 3.80 - 5.20 10e6/uL    Hemoglobin 13.3 11.7 - 15.7 g/dL    Hematocrit 40.4 35.0 - 47.0 %    MCV 89 78 - 100 fL    MCH 29.2 26.5 - 33.0 pg    MCHC 32.9 31.5 - 36.5 g/dL    RDW 13.3 10.0 - 15.0 %    Platelet Count 326 150 - 450 10e3/uL    % Neutrophils 50 %    % Lymphocytes 40 %    % Monocytes 6 %    % Eosinophils 3 %    % Basophils 1 %    % Immature Granulocytes 0 %    NRBCs per 100 WBC 0 <1 /100    Absolute Neutrophils 4.4 1.6 - 8.3 10e3/uL    Absolute Lymphocytes 3.5 0.8 - 5.3 10e3/uL    Absolute Monocytes 0.5 0.0 - 1.3 10e3/uL    Absolute Eosinophils 0.2 0.0 - 0.7 10e3/uL    Absolute Basophils 0.0  0.0 - 0.2 10e3/uL    Absolute Immature Granulocytes 0.0 <=0.4 10e3/uL    Absolute NRBCs 0.0 10e3/uL   CBC with platelets differential     Status: None    Narrative    The following orders were created for panel order CBC with platelets differential.  Procedure                               Abnormality         Status                     ---------                               -----------         ------                     CBC with platelets and d...[[id number]]                      Final result                 Please view results for these tests on the individual orders.     Medications   gadobutrol (GADAVIST) injection 10 mL (10 mLs Intravenous Given 5/4/22 1711)        Assessments & Plan (with Medical Decision Making)   Patient presents with right-sided facial droop and numbness after dental procedure and dental block.  No other neurologic deficits other than on her face.  I doubt that this is acute stroke or MS given the timing of it occurring right after the dental block.  Patient, however, is quite concerned because of her history of smoking and high cholesterol as well as history of resection of a pineal tumor.  At the time of shift change, MRI of the brain, stroke protocol is pending.  Anticipate discharge if this MRI is negative.  Patient will be given a prescription for prednisone 10 mg/day for 5 days.  We will do a lower dose because of her history of bipolar disorder.  Clearly, patient will need stroke consult if there are any signs of stroke on MRI or neurology/neurosurgery consult if there are other acute findings.    I have reviewed the nursing notes. I have reviewed the findings, diagnosis, plan and need for follow up with the patient.    New Prescriptions    PREDNISONE (DELTASONE) 10 MG TABLET    Take 1 tablet (10 mg) by mouth daily for 5 days       Final diagnoses:   Facial droop - after dental block       --  Rain Sherman  [facility] EMERGENCY DEPARTMENT  5/4/2022     Lane  Rain Goodwin MD  05/04/22 3380

## 2022-05-04 NOTE — TELEPHONE ENCOUNTER
Spoke with pt, per pt feels her symptom is a motor symptom. I advise her to go to the dental UC or ER for evaluation is she feels her symptom is more motor related than sensory. Pt agreeable.     Thanks,  BOYD June  [facility]

## 2022-05-04 NOTE — ED PROVIDER NOTES
[facility] EMERGENCY DEPARTMENT ([facility])  22  History     Chief Complaint   Patient presents with     Numbness     Numbness with facial droop.     The history is provided by the patient.   Rick Arroyo is a 41 year old female who has a significant medical history of bipolar disorder, anxiety, tobacco use, Hx of pineal tumor, amongst others, who presents to the Emergency Department with c/o onset of left-sided facial droop and numbness extending up her face after anesthetic injection at dental clinic today.***    ***    Past Medical History  Past Medical History:   Diagnosis Date     Allergic state      Anxiety      Bipolar 1 disorder (H)      Depressive disorder      Elevated cholesterol      Graves disease      Obese     BMI 37.48     Pineal tumor     s/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 tumor     Post partum depression      Post-surgical hypothyroidism 2017     Uncomplicated asthma      Past Surgical History:   Procedure Laterality Date     BIOPSY  31 y/o colpos, and in the last 2 years for thyroid    Colposcopy, thyroid nodule biopsy twice     BLOOD PATCH N/A 10/11/2016    Procedure: EPIDURAL BLOOD PATCH;  Surgeon: GENERIC ANESTHESIA PROVIDER;  [facility] OR      SECTION, TUBAL LIGATION, COMBINED N/A 2019    Procedure:  SECTION, WITH TUBAL LIGATION;  Surgeon: Kathy Rutledge MD;  [facility] L+D     CRANIOTOMY, EXCISE TUMOR COMPLEX, COMBINED  2014    Procedure: COMBINED CRANIOTOMY, EXCISE TUMOR COMPLEX;  Supracerabellar  Infratentorial Approach for Resection of Tumor ;  Surgeon: Kate Mckeon MD;  [facility] OR     THYROIDECTOMY N/A 5/3/2017    Procedure: THYROIDECTOMY;  Total Thyroidectomy;  Surgeon: Pamela Villasenor MD;  [facility]     diazepam (VALIUM) 5 MG tablet  multivitamin w/minerals (THERA-VIT-M) tablet  tiZANidine (ZANAFLEX) 4 MG tablet  vitamin D3 (CHOLECALCIFEROL) 50 mcg (2000 units) tablet  albuterol  (PROAIR HFA/PROVENTIL HFA/VENTOLIN HFA) 108 (90 Base) MCG/ACT inhaler  amphetamine-dextroamphetamine (ADDERALL XR) 15 MG 24 hr capsule  amphetamine-dextroamphetamine (ADDERALL XR) 20 MG 24 hr capsule  ARIPiprazole (ABILIFY) 10 MG tablet  cetirizine (ZYRTEC) 10 MG tablet  fexofenadine (ALLEGRA) 180 MG tablet  fluticasone-salmeterol (ADVAIR HFA) 230-21 MCG/ACT inhaler  ipratropium - albuterol 0.5 mg/2.5 mg/3 mL (DUONEB) 0.5-2.5 (3) MG/3ML neb solution  levothyroxine (SYNTHROID/LEVOTHROID) 175 MCG tablet  medical cannabis (Patient's own supply)  medical cannabis (Patient's own supply)  montelukast (SINGULAIR) 10 MG tablet  nicotine (NICOTROL) 10 MG inhaler  ondansetron (ZOFRAN-ODT) 4 MG ODT tab  rizatriptan (MAXALT-MLT) 10 MG ODT  VENTOLIN  (90 Base) MCG/ACT inhaler      Allergies   Allergen Reactions     Adacel [Daptacel] Swelling     Adhesive Tape Hives     Ciprofloxacin      Causes visual hallucinations.     Codeine Sulfate Nausea and Vomiting     Nicoderm [Nicotine]      Patch and Gum, pt reported allergic reaction 6/24     Tegaderm Transparent Dressing (Informational Only) Hives     Tetanus Toxoid      Pt states she had an infection at injection site.      Vicodin [Hydrocodone-Acetaminophen] Nausea and Vomiting     Methimazole Rash     Family History  Family History   Problem Relation Age of Onset     Thyroid Disease Paternal Aunt      Breast Cancer Paternal Aunt 58        bilateral; negative testing     Cancer Paternal Aunt      Asthma Father      Mental Illness Father         Bipolar 2     Obesity Father      Asthma Maternal Grandmother      Osteoporosis Maternal Grandmother      Glaucoma Maternal Grandmother      Hypertension Maternal Grandmother      Macular Degeneration Maternal Grandmother      Diabetes Paternal Grandfather      Other Cancer Mother 62        salivary gland cancer     Genitourinary Problems Mother      Bipolar Disorder Mother         unipolar depression     Depression Mother       "Thyroid Disease Mother         benign tumor     Skin Cancer Mother      Cancer Mother      Bipolar Disorder Brother         unipolar depression     Asthma Brother      Depression Brother      Prostate Cancer Maternal Grandfather 69        no history of smoking     Bladder Cancer Maternal Grandfather 71     Colon Cancer Maternal Grandfather 70     Breast Cancer Maternal Aunt 37        negative BRCA1/2 testing     Melanoma Maternal Aunt 64     Breast Cancer Other         paternal great aunt     Social History   Social History     Tobacco Use     Smoking status: Former Smoker     Packs/day: 0.50     Years: 14.00     Pack years: 7.00     Types: Cigarettes     Start date: 2004     Quit date: 2022     Years since quittin.2     Smokeless tobacco: Never Used   Vaping Use     Vaping Use: Never used   Substance Use Topics     Alcohol use: Yes     Alcohol/week: 0.0 standard drinks     Comment: maybe four drinks in a month     Drug use: Yes     Types: Marijuana     Comment: medical marijuana      Past medical history, past surgical history, medications, allergies, family history, and social history were reviewed with the patient. No additional pertinent items.     I have reviewed the Medications, Allergies, Past Medical and Surgical History, and Social History in the Epic system.    Review of Systems   Neurological: Positive for numbness.     A complete review of systems was performed with pertinent positives and negatives noted in the HPI, and all other systems negative.    Physical Exam   BP: (!) (P) 135/98  Pulse: (P) 103  Temp: 98.1  F (36.7  C)  Resp: (P) 16  Height: 172.7 cm (5' 8\")  Weight: 108 kg (238 lb)  SpO2: (P) 98 %      Physical Exam    ED Course     At 4:19 PM the patient was seen and examined by Rain Sherman MD in Room ED05.        Procedures         {EKG done?:626053::\" \"}  {ed addendum:271481::\" \"}  {ED Course Selections:700470}     Results for orders placed or performed during the " hospital encounter of 05/04/22 (from the past 24 hour(s))   Glucose by meter   Result Value Ref Range    GLUCOSE BY METER POCT 101 (H) 70 - 99 mg/dL   CBC with platelets differential    Narrative    The following orders were created for panel order CBC with platelets differential.  Procedure                               Abnormality         Status                     ---------                               -----------         ------                     CBC with platelets and d...[[id number]]                      In process                   Please view results for these tests on the individual orders.     Medications - No data to display          Assessments & Plan (with Medical Decision Making)   Cathy Arroyo is a 41 year old female***    I have reviewed the nursing notes.    I have reviewed the findings, diagnosis, plan and need for follow up with the patient.    New Prescriptions    No medications on file       Final diagnoses:   None       I, William Dobbs am serving as a trained medical scribe to document services personally performed by Rain Sherman, based on the provider's statements to me.      I, Rain Sherman, was physically present and have reviewed and verified the accuracy of this note documented by William Dobbs.     Rain Sherman MD  5/4/2022   [facility] EMERGENCY DEPARTMENT

## 2022-05-04 NOTE — TELEPHONE ENCOUNTER
I would suspect she had a nerve block and could develop numbness anywhere in the distribution of the nerve or nerves.  Lidocaine usually wears off after 2-4 hours and if symptoms are only sensory, seems it would be ok to observe for that time.  If she has any motor symptoms like facial weakness, slurred speech or difficulty swallowing I think she should be seen in urgent care.    Prashant Warren MD

## 2022-05-04 NOTE — DISCHARGE INSTRUCTIONS
Please return the emergency department if you have any numbness or weakness in your arms, hands, legs, feet, if you have fever, worsening weakness in your face, any other concerns.    I would also recommend you call your dentist to update him or her on the situation.

## 2022-05-04 NOTE — ED NOTES
I took signout on the patient from Dr. Sherman.  This is a 41 year old female with R sided facial droop and numbness after a dental procedure. Patient was signed out to me pending MRI results with plan for likely discharge. MRI shows no acute abnormalities.  On reevaluation patient has considerable improvement of her symptoms, they have almost completely resolved.  Patient is requesting to not take the planned course of prednisone as she has a history of bipolar and this has caused manic episodes in the past.  I discussed with patient this is reasonable as her symptoms have almost completely resolved. Will discharge home with return precautions. Discussed reasons to return to the emergency department.  Patient understands and agrees with this plan.      Bipin Sykes, DO  05/04/22 1951

## 2022-05-05 ENCOUNTER — PATIENT OUTREACH (OUTPATIENT)
Dept: CARE COORDINATION | Facility: CLINIC | Age: 42
End: 2022-05-05
Payer: COMMERCIAL

## 2022-05-05 DIAGNOSIS — Z71.89 OTHER SPECIFIED COUNSELING: ICD-10-CM

## 2022-05-05 NOTE — PROGRESS NOTES
Clinic Care Coordination Contact  [facility]/Voicemail    Clinical Data: Care Coordinator Outreach  Outreach attempted x 1. Left message on patient's voicemail with call back information and requested return call.     Plan:Care Coordinator will try to reach patient again in 1-2 business days.    SAUL Wan  [phone]  [facility]

## 2022-05-06 NOTE — PROGRESS NOTES
"Clinic Care Coordination Contact  [facility]: Post-Discharge Note  SITUATION                                                      Admission:    Admission Date: 05/04/22   Reason for Admission: Numbness  Discharge:   Discharge Date: 05/04/22  Discharge Diagnosis: Facial droop, Numbness    BACKGROUND                                                      Per hospital discharge summary and inpatient provider notes:    Cathy Arroyo is a 41 year old female who presents with right-sided facial numbness and weakness after she had a dental block earlier today for cavity filling.  Patient states that she was numbed up in 2 different places in her mouth.  Initially she got an injection near the anterior maxillary teeth.  She states about 20 minutes later she got an injection in the posterior right mandibular area which I suspect was likely an inferior alveolar nerve block.  Patient states that she had some numbness in her face while she was at the dentist, however, while driving home, she noticed weakness, specifically with the upper lip on the right side of her face.  She is also feeling numbness up around her right eye and right side of her forehead.  Patient otherwise denies any recent illness, no fever, cough, chest pain, shortness of breath.  She denies any numbness or weakness in her extremities.  She does report a headache, but states that that could be from the anxiousness that she has developed since noticing this facial droop.  She denies any difficulty opening or closing her eyes.  Patient has history of hypercholesterolemia and has a smoking history.  Denies hypertension or diabetes.  Reports history of resection of a pineal gland tumor several years ago.  States that she typically gets an MRI of the brain every 2 years due to this.     ASSESSMENT      Enrollment  Primary Care Care Coordination Status: Declined    Discharge Assessment  How are you doing now that you are home?: \"Everything is back to " "normal. I'm good I think it was just a reaction from the anesthsia.\"  How are your symptoms? (Red Flag symptoms escalate to triage hotline per guidelines): Improved  Do you feel your condition is stable enough to be safe at home until your provider visit?: Yes  Does the patient have their discharge instructions? : Yes  Does the patient have questions regarding their discharge instructions? : No  Were you started on any new medications or were there changes to any of your previous medications? : Yes  Does the patient have all of their medications?: Yes  Do you have questions regarding any of your medications? : No  Do you have all of your needed medical supplies or equipment (DME)?  (i.e. oxygen tank, CPAP, cane, etc.): Yes  Discharge follow-up appointment scheduled within 14 calendar days? : No (Per AVS, patient will follow-up as needed or for worsening symptoms. Patient did contact their dentist regarding chief complaint.)  Is patient agreeable to assistance with scheduling? : Yes    Post-op ([facility] Only)  If the patient had a surgery or procedure, do they have any questions for a nurse?: No    PLAN                                                      Outpatient Plan:      Please return the emergency department if you have any numbness or  weakness in your arms, hands, legs, feet, if you have fever, worsening  weakness in your face, any other concerns.    I would also recommend you call your dentist to update him or her on  the situation.    Future Appointments   Date Time Provider Department Center   5/11/2022  8:00 AM Julissa Sears MD [facility]   6/1/2022  9:00 AM Scott Egan MD [facility]   6/1/2022 11:50 AM Jared Constantino, PT [facility]   7/27/2022  9:00 AM Jared Constantino, PT [facility]         For any urgent concerns, please contact our 24 hour nurse triage line: [phone])         SAUL Wan  [phone]  [facility]" [place]

## 2022-05-10 ASSESSMENT — ENCOUNTER SYMPTOMS
WEAKNESS: 0
NUMBNESS: 1
DECREASED LIBIDO: 0
NECK PAIN: 1
TINGLING: 1
DISTURBANCES IN COORDINATION: 0
PANIC: 0
MYALGIAS: 1
INSOMNIA: 0
SEIZURES: 0
LOSS OF CONSCIOUSNESS: 0
ARTHRALGIAS: 1
PARALYSIS: 0
DIZZINESS: 0
SPEECH CHANGE: 0
TREMORS: 0
NERVOUS/ANXIOUS: 1
HOT FLASHES: 0
MEMORY LOSS: 0
DEPRESSION: 1
BACK PAIN: 1
HEADACHES: 1
DECREASED CONCENTRATION: 1

## 2022-05-10 ASSESSMENT — MIGRAINE DISABILITY ASSESSMENT (MIDAS)
HOW OFTEN WERE SOCIAL ACTIVITIES MISSED DUE TO HEADACHES: 2
HOW MANY DAYS DID YOU NOT DO HOUSEWORK BECAUSE OF HEADACHES: 2
HOW MANY DAYS WAS HOUSEWORK PRODUCTIVITY CUT IN HALF DUE TO HEADACHES: 2
ON A SCALE FROM 0-10 ON AVERAGE HOW PAINFUL WERE HEADACHES: 6
TOTAL SCORE: 8
HOW MANY DAYS DID YOU MISS WORK OR SCHOOL BECAUSE OF HEADACHES: 2
HOW MANY DAYS IN THE PAST 3 MONTHS HAVE YOU HAD A HEADACHE: 2
HOW MANY DAYS WAS YOUR PRODUCTIVITY CUT IN HALF BECAUSE OF HEADACHES: 0

## 2022-05-10 ASSESSMENT — HEADACHE IMPACT TEST (HIT 6)
HOW OFTEN HAVE YOU FELT FED UP OR IRRITATED BECAUSE OF YOUR HEADACHES: RARELY
WHEN YOU HAVE HEADACHES HOW OFTEN IS THE PAIN SEVERE: SOMETIMES
HOW OFTEN DO HEADACHES LIMIT YOUR DAILY ACTIVITIES: RARELY
WHEN YOU HAVE A HEADACHE HOW OFTEN DO YOU WISH YOU COULD LIE DOWN: ALWAYS
HOW OFTEN DID HEADACHS LIMIT CONCENTRATION ON WORK OR DAILY ACTIVITY: RARELY
HOW OFTEN HAVE YOU FELT TOO TIRED TO WORK BECAUSE OF YOUR HEADACHES: RARELY
HIT6 TOTAL SCORE: 55

## 2022-05-11 ENCOUNTER — VIRTUAL VISIT (OUTPATIENT)
Dept: NEUROLOGY | Facility: CLINIC | Age: 42
End: 2022-05-11
Payer: COMMERCIAL

## 2022-05-11 ENCOUNTER — OFFICE VISIT (OUTPATIENT)
Dept: URGENT CARE | Facility: URGENT CARE | Age: 42
End: 2022-05-11

## 2022-05-11 VITALS
BODY MASS INDEX: 36.19 KG/M2 | OXYGEN SATURATION: 96 % | DIASTOLIC BLOOD PRESSURE: 80 MMHG | SYSTOLIC BLOOD PRESSURE: 130 MMHG | TEMPERATURE: 98.6 F | WEIGHT: 238 LBS | HEART RATE: 87 BPM

## 2022-05-11 DIAGNOSIS — J30.81 ALLERGIC RHINITIS DUE TO ANIMALS: ICD-10-CM

## 2022-05-11 DIAGNOSIS — G43.009 MIGRAINE WITHOUT AURA AND WITHOUT STATUS MIGRAINOSUS, NOT INTRACTABLE: ICD-10-CM

## 2022-05-11 DIAGNOSIS — G44.209 TENSION HEADACHE: Primary | ICD-10-CM

## 2022-05-11 DIAGNOSIS — J45.901 ASTHMA WITH ACUTE EXACERBATION, UNSPECIFIED ASTHMA SEVERITY, UNSPECIFIED WHETHER PERSISTENT: ICD-10-CM

## 2022-05-11 LAB
DEPRECATED S PYO AG THROAT QL EIA: NEGATIVE
FLUAV AG SPEC QL IA: NEGATIVE
FLUBV AG SPEC QL IA: NEGATIVE
GROUP A STREP BY PCR: NOT DETECTED
SARS-COV-2 RNA RESP QL NAA+PROBE: NEGATIVE

## 2022-05-11 PROCEDURE — 99214 OFFICE O/P EST MOD 30 MIN: CPT | Mod: 95 | Performed by: PSYCHIATRY & NEUROLOGY

## 2022-05-11 PROCEDURE — U0003 INFECTIOUS AGENT DETECTION BY NUCLEIC ACID (DNA OR RNA); SEVERE ACUTE RESPIRATORY SYNDROME CORONAVIRUS 2 (SARS-COV-2) (CORONAVIRUS DISEASE [COVID-19]), AMPLIFIED PROBE TECHNIQUE, MAKING USE OF HIGH THROUGHPUT TECHNOLOGIES AS DESCRIBED BY CMS-2020-01-R: HCPCS | Performed by: PHYSICIAN ASSISTANT

## 2022-05-11 PROCEDURE — 87804 INFLUENZA ASSAY W/OPTIC: CPT | Performed by: PHYSICIAN ASSISTANT

## 2022-05-11 PROCEDURE — U0005 INFEC AGEN DETEC AMPLI PROBE: HCPCS | Performed by: PHYSICIAN ASSISTANT

## 2022-05-11 PROCEDURE — 87651 STREP A DNA AMP PROBE: CPT | Performed by: PHYSICIAN ASSISTANT

## 2022-05-11 PROCEDURE — 99214 OFFICE O/P EST MOD 30 MIN: CPT | Mod: CS | Performed by: PHYSICIAN ASSISTANT

## 2022-05-11 RX ORDER — FLUTICASONE PROPIONATE 50 MCG
1 SPRAY, SUSPENSION (ML) NASAL DAILY
Qty: 18.2 ML | Refills: 0 | Status: SHIPPED | OUTPATIENT
Start: 2022-05-11 | End: 2023-09-26

## 2022-05-11 RX ORDER — PROCHLORPERAZINE MALEATE 10 MG
10 TABLET ORAL EVERY 6 HOURS PRN
Qty: 10 TABLET | Refills: 3 | Status: SHIPPED | OUTPATIENT
Start: 2022-05-11 | End: 2024-05-25

## 2022-05-11 RX ORDER — FEXOFENADINE HCL 180 MG/1
180 TABLET ORAL DAILY
Qty: 30 TABLET | Refills: 1 | Status: SHIPPED | OUTPATIENT
Start: 2022-05-11 | End: 2022-09-28

## 2022-05-11 RX ORDER — RIZATRIPTAN BENZOATE 10 MG/1
10 TABLET, ORALLY DISINTEGRATING ORAL
Qty: 18 TABLET | Refills: 3 | Status: SHIPPED | OUTPATIENT
Start: 2022-05-11 | End: 2022-11-09

## 2022-05-11 NOTE — LETTER
5/11/2022       RE: Cathy Arroyo  [address]  [place] [address]     Dear Colleague,    Thank you for referring your patient, Cathy Arroyo, to the [facility] [place] at [facility]. Please see a copy of my visit note below.    [facility]  Neurology Progress Note    Subjective:    Ms. Arroyo returns for follow-up of episodic migraine.  I last saw her in September 2021.  She sent to the message since, reporting that she had not had a single migraine attack, and desired to move her appointment out further.    Today, she reports she has had two migraine attacks since our last visit. She took rizatriptan for these. Rizatriptan at onset with ondansetron was helpful. She also took ibuprofen with this, as the ondansetron caused mild headache.    Triggers include stress and weather. These have improved.    Objective:    Vitals: There were no vitals taken for this visit.  General: Cooperative, NAD  Neurologic:  Mental Status: Fully alert, attentive and oriented. Speech clear and fluent.   Cranial Nerves: Facial movements symmetric.   Motor: No abnormal movements.      Assessment/Plan:   Cathy Arroyo is a 41-year-old woman who returns for follow-up of episodic migraine without aura.   There is a family history of migraine, and headache started after a neurosurgery to remove a pineal tumor.  Her virtual neurologic examination and imaging have not shown recurrence or residual tumor to explain headaches.     I recommend a symptomatic treatment strategy, focusing on treatments for acute treatment of migraine.  We discussed the following:  -For acute treatment of moderate to severe headache, I recommend rizatriptan melt 10 mg taken at the onset of headache, with a repeat dose in 2 hours if needed.  This should not exceed more than 9 days/month to avoid medication overuse.  -For  headache related nausea, I recommend prochlorperazine 10 mg as needed, not to exceed more than 9 days/month to avoid medication side effects.  This can be taken with or without diphenhydramine.  -The addition of an NSAID to the above could be tried, if rizatriptan alone is not adequate.     Preventative treatment could be considered in the future, if her migraine attack frequency increases.  This is not currently warranted.    I will plan to see her back in 6 months to monitor her progress.      Julissa Sears MD  Neurology

## 2022-05-11 NOTE — PROGRESS NOTES
(G44.209) Tension headache  (primary encounter diagnosis)  Comment: possibly secondary to virus versus allergies  Plan: Symptomatic; Unknown COVID-19 Virus         (Coronavirus) by PCR Nose, Influenza A/B         antigen, Streptococcus A Rapid Screen w/Reflex         to PCR - Clinic Collect            (J30.81) Allergic rhinitis due to animals  Comment: currently on cetirizine and singulair, out of Allegra  Plan: Influenza A/B antigen, Streptococcus A Rapid         Screen w/Reflex to PCR - Clinic Collect        Allegra 180mg daily    (J45.901) Asthma with acute exacerbation, unspecified asthma severity, unspecified whether persistent  Comment:   Plan: albuterol inhaler as needed.      Covid test pending, please follow CDC covid isolation guidelines.        31 minutes spent on the date of the encounter doing chart review, interpretation of tests, patient visit and documentation       Differential Diagnosis:  URI Adult/Peds:  Allergic rhinitis, Asthma, Asthma exacerbation, Viral syndrome and Covid          SUBJECTIVE:   Cathy Arroyo is a 41 year old female developed a headache and fever of 101 after her video appointment with neurology today.      Has not taken anything for the temperature and it was normal in clinic.  Denies any cough, does have some runny nose with allergies.  Usually takes zyrtec and allegra, alternating daily.  Currently out of allegra.        Cathy was seen by her neurologist virtually today as well. Note from neurology:    Cathy Arroyo is a 41-year-old woman who returns for follow-up of episodic migraine without aura.   There is a family history of migraine, and headache started after a neurosurgery to remove a pineal tumor.  Her virtual neurologic examination and imaging have not shown recurrence or residual tumor to explain headaches.     I recommend a symptomatic treatment strategy, focusing on treatments for acute treatment of migraine.  We discussed the following:  -For acute  treatment of moderate to severe headache, I recommend rizatriptan melt 10 mg taken at the onset of headache, with a repeat dose in 2 hours if needed.  This should not exceed more than 9 days/month to avoid medication overuse.  -For headache related nausea, I recommend prochlorperazine 10 mg as needed, not to exceed more than 9 days/month to avoid medication side effects.  This can be taken with or without diphenhydramine.  -The addition of an NSAID to the above could be tried, if rizatriptan alone is not adequate.     Preventative treatment could be considered in the future, if her migraine attack frequency increases.  This is not currently warranted.     I will plan to see her back in 6 months to monitor her progress.     Julissa Sears MD  Neurology        Immunization History   Administered Date(s) Administered     COVID-19,PF,Moses 04/05/2021     COVID-19,PF,Moderna 10/31/2021     DTaP, Unspecified 02/12/2014, 02/15/2019     HPV Quadrivalent 04/29/2008, 09/28/2009, 10/28/2009, 12/28/2009     HepB-Adult 01/06/2010, 09/10/2010     Influenza (H1N1) 12/28/2009     Influenza (IIV3) PF 12/10/1998, 11/01/2000, 11/21/2002, 10/29/2004, 11/18/2005, 10/30/2006, 12/13/2007, 11/23/2010, 01/28/2013     Influenza Vaccine IM > 6 months Valent IIV4 (Alfuria,Fluzone) 01/17/2014, 11/04/2014, 11/23/2015, 10/15/2016, 10/16/2018, 11/06/2019, 11/20/2020, 09/29/2021     MMR 09/16/1993, 05/16/2019     Pneumococcal 23 valent 11/20/2020     TDAP Vaccine (Adacel) 02/15/2019     Td (Adult), Adsorbed 01/06/2004           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     Post partum depression      Post-surgical hypothyroidism 05/2017     Uncomplicated asthma          Current Outpatient Medications   Medication Sig Dispense Refill      Multiple Vitamins-Iron (DAILY-FABIOLA/IRON/BETA-CAROTENE) TABS TAKE 1 TABLET BY MOUTH DAILY. (Patient not taking: Reported on 10/19/2020) 30 tablet 7     Social History     Tobacco Use     Smoking status: Never Smoker     Smokeless tobacco: Never Used   Substance Use Topics     Alcohol use: Not on file     Family History   Problem Relation Age of Onset     Diabetes Mother      Diabetes Father          ROS:    10 point ROS of systems including Constitutional, Eyes, Respiratory, Cardiovascular, Gastroenterology, Genitourinary, Integumentary, Muscularskeletal, Psychiatric ,neurological were all negative except for pertinent positives noted in my HPI       OBJECTIVE:  /80 (BP Location: Right arm, Patient Position: Sitting, Cuff Size: Adult Regular)   Pulse 87   Temp 98.6  F (37  C) (Tympanic)   Wt 108 kg (238 lb)   SpO2 96%   BMI 36.19 kg/m    Physical Exam:  GENERAL APPEARANCE: healthy, alert and no distress  EYES: EOMI,  PERRL, conjunctiva clear  HENT: ear canals and TM's normal.  Nose with boggy blue turbinates and mouth without ulcers, erythema or lesions  NECK: supple, nontender, no lymphadenopathy  RESP: scattered wheezes   CV: regular rates and rhythm, normal S1 S2, no murmur noted  NEURO: Normal strength and tone, sensory exam grossly normal,  normal speech and mentation  SKIN: no suspicious lesions or rashes

## 2022-05-11 NOTE — PROGRESS NOTES
Cathy is a 41 year old who is being evaluated via a billable video visit.      How would you like to obtain your AVS? MyChart  If the video visit is dropped, the invitation should be resent by: Text to cell phone: [phone]  Will anyone else be joining your video visit? No      Video Start Time: 8:00 AM  Video-Visit Details    Type of service:  Video Visit    Video End Time:8:14 AM    Originating Location (pt. Location): Home    Distant Location (provider location):  [facility] [place]     Platform used for Video Visit: Clare     [facility]  Neurology Progress Note    Subjective:    Ms. Arroyo returns for follow-up of episodic migraine.  I last saw her in September 2021.  She sent to the message since, reporting that she had not had a single migraine attack, and desired to move her appointment out further.    Today, she reports she has had two migraine attacks since our last visit. She took rizatriptan for these. Rizatriptan at onset with ondansetron was helpful. She also took ibuprofen with this, as the ondansetron caused mild headache.    Triggers include stress and weather. These have improved.    Objective:    Vitals: There were no vitals taken for this visit.  General: Cooperative, NAD  Neurologic:  Mental Status: Fully alert, attentive and oriented. Speech clear and fluent.   Cranial Nerves: Facial movements symmetric.   Motor: No abnormal movements.      Assessment/Plan:   Cathy Arroyo is a 41-year-old woman who returns for follow-up of episodic migraine without aura.   There is a family history of migraine, and headache started after a neurosurgery to remove a pineal tumor.  Her virtual neurologic examination and imaging have not shown recurrence or residual tumor to explain headaches.     I recommend a symptomatic treatment strategy, focusing on treatments for acute treatment of migraine.  We discussed the  following:  -For acute treatment of moderate to severe headache, I recommend rizatriptan melt 10 mg taken at the onset of headache, with a repeat dose in 2 hours if needed.  This should not exceed more than 9 days/month to avoid medication overuse.  -For headache related nausea, I recommend prochlorperazine 10 mg as needed, not to exceed more than 9 days/month to avoid medication side effects.  This can be taken with or without diphenhydramine.  -The addition of an NSAID to the above could be tried, if rizatriptan alone is not adequate.     Preventative treatment could be considered in the future, if her migraine attack frequency increases.  This is not currently warranted.    I will plan to see her back in 6 months to monitor her progress.    Julissa Sears MD  Neurology

## 2022-05-11 NOTE — NURSING NOTE
Chief Complaint   Patient presents with     Video Visit     Follow up on Migraines/ Medications

## 2022-05-11 NOTE — PATIENT INSTRUCTIONS
(G44.209) Tension headache  (primary encounter diagnosis)  Comment: possibly secondary to virus versus allergies  Plan: Symptomatic; Unknown COVID-19 Virus         (Coronavirus) by PCR Nose, Influenza A/B         antigen, Streptococcus A Rapid Screen w/Reflex         to PCR - Clinic Collect            (J30.81) Allergic rhinitis due to animals  Comment: currently on cetirizine and singulair, out of Allegra  Plan: Influenza A/B antigen, Streptococcus A Rapid         Screen w/Reflex to PCR - Clinic Collect        Allegra 180mg daily    (J45.901) Asthma with acute exacerbation, unspecified asthma severity, unspecified whether persistent  Comment:   Plan: albuterol inhaler as needed.      Covid test pending, please follow CDC covid isolation guidelines.

## 2022-05-20 ENCOUNTER — MYC MEDICAL ADVICE (OUTPATIENT)
Dept: FAMILY MEDICINE | Facility: CLINIC | Age: 42
End: 2022-05-20
Payer: COMMERCIAL

## 2022-05-20 DIAGNOSIS — Z12.39 BREAST CANCER SCREENING, HIGH RISK PATIENT: Primary | ICD-10-CM

## 2022-06-13 ENCOUNTER — MYC MEDICAL ADVICE (OUTPATIENT)
Dept: FAMILY MEDICINE | Facility: CLINIC | Age: 42
End: 2022-06-13
Payer: COMMERCIAL

## 2022-06-13 ENCOUNTER — TELEPHONE (OUTPATIENT)
Dept: FAMILY MEDICINE | Facility: CLINIC | Age: 42
End: 2022-06-13
Payer: COMMERCIAL

## 2022-06-13 NOTE — TELEPHONE ENCOUNTER
Patient calling d/t painful cycles  Nipple sensitivity started a couple days ago   Tried to express fluid  Had green breast discharge - unsure how long for   Has tubes tied - knows she's not pregnant - has checked pregnancy test too   Temp 98.1 - normal 97.6  Oral temp 99.4  Advised UC if new symptoms or if temp getting higher   Pt prefers to go to UC now for breast symptoms then keep upcoming appt for menstrual s/s  Jill MCGUIRE RN

## 2022-06-14 ENCOUNTER — HOSPITAL ENCOUNTER (EMERGENCY)
Facility: CLINIC | Age: 42
Discharge: LEFT BEFORE DISCHARGE | End: 2022-06-14
Payer: COMMERCIAL

## 2022-06-14 VITALS
HEIGHT: 68 IN | OXYGEN SATURATION: 97 % | SYSTOLIC BLOOD PRESSURE: 114 MMHG | WEIGHT: 236 LBS | RESPIRATION RATE: 14 BRPM | TEMPERATURE: 98.5 F | HEART RATE: 79 BPM | DIASTOLIC BLOOD PRESSURE: 74 MMHG | BODY MASS INDEX: 35.77 KG/M2

## 2022-06-14 NOTE — ED TRIAGE NOTES
Severe lower back pain, has green discharge from nipples. N/v/d x5 days, denies any vomiting. Hives and itching for the past 4 days     Triage Assessment     Row Name 06/14/22 1403       Triage Assessment (Adult)    Airway WDL WDL       Respiratory WDL    Respiratory WDL WDL       Skin Circulation/Temperature WDL    Skin Circulation/Temperature WDL WDL       Cardiac WDL    Cardiac WDL WDL       Peripheral/Neurovascular WDL    Peripheral Neurovascular WDL WDL       Cognitive/Neuro/Behavioral WDL    Cognitive/Neuro/Behavioral WDL WDL       [place] Coma Scale    Best Eye Response 4-->(E4) spontaneous    Best Motor Response 6-->(M6) obeys commands    Best Verbal Response 5-->(V5) oriented    [place] Coma Scale Score 15

## 2022-06-14 NOTE — TELEPHONE ENCOUNTER
Zamzam,     Please see pt Mychart message regarding current symptoms. Called pt back today - left a message advising her to seek further medical attention at UC or ED prior to her appointment with you 6/21/22    Bhavya Moreno RN  [facility]

## 2022-06-15 NOTE — TELEPHONE ENCOUNTER
Zamzam,    Please see messages below.    Pt asking for labs HCG, CBC, BMP and UA to have done today. She has apt scheduled with you 6/21.    She has a lengthy list of symptoms in original [facility] message.    Was told to go to ER yesterday and went but came home after 2 hr wait.    Today calling requesting labs, wants to rule out ectopic pregnancy and kidney infection also with lab request per pt.    Please advise.  Thanks,  Laina Randall RN

## 2022-06-20 NOTE — PROGRESS NOTES
Assessment & Plan     Nipple discharge  Unclear if we should proceed with breast MRI/  Have reached out to Randi Martin, but may need breast center provider input  - CBC with platelets and differential; Future  - Prolactin; Future  - Breast Provider Referral    Bipolar affective disorder, currently manic mixed, moderate (H)  Seems to be in manic phase.  Cathy is aware of her mood changes and they are consistent with jose eduardo, though she feels it is a mixed phase. Typically can resolve with temporarily not taking adderall.  This seems more significant to me than I recall for patient in a couple of years. She does have Zyprexa rescue option and I advise she take this and contact her psychiatrist for further instructions.  Reports no SI/HI and feels safe.    Hives  - CBC with platelets and differential; Future    Breast cancer screening, high risk patient  Screening MRI scheduled for 8 days from now    Postoperative hypothyroidism  - TSH with free T4 reflex; Future  - Parathyroid Hormone Intact; Future    Tubal ligation status  Very unlikely to be pregnant    Review of prior external note(s) from - endocrinology, cancer risk managemetn  Ordering of each unique test  51 minutes spent on the date of the encounter doing chart review, history and exam, documentation and further activities per the note      Patient Instructions   I strongly recommend that Jamil look into loved one support with ILDA  I would also strongly recommend that you and Jamil attend counseling together      Return in about 1 week (around 6/28/2022) for breask cancer testing or consult.    JANNET Liu [facility]    Subjective   Cathy is a 41 year old accompanied by her self., presenting for the following health issues:  Breast Discharge (& painful periods/Rash covering body)    High stress  Had outburst last night with  which is unusual  Concern for possible rapid cycling  Plan for possible jose eduardo phase first  line is to drop Adderall and Cathy did this morning    Having a number of physical symptoms    1) Nipple discharge - started about a week ago when Cathy first noticed breast fullness and sensitivity. At that point she expressed right nipple discharge as this was something that also happened previously in pregnancy. Also had spontaneous left clear nipple discharge.  The right side discharge is cloudy and greenish-yellowish.  Took multiple negative UPT. Period started three days after first noting the discharge.  Has stopped expressing nipple so unsure if nipple discharge continues.  Felt like harder tissue at the underneath of breasts bilaterally.  No focal mass.  Had low grade temp of 99.6 in days leading to period and then returned to normal.  No night sweats, unintentional weight loss (in fact, gaining).  Period was a couple days later than normal and much lighter than typical.  Has seen cancer risk management and designated high risk breast cancer with more intensive screening schedule.  Due for breast MRI next week    2) Rash - started about 6 days ago.  Noting red lesion on the torso.  Minimal lesions on arms and legs with exception of single lesion on left arm and right knee.  The rash is itchy and painful.  They have fluid filled center. The itching stops after scratching. Continues to have new lesions appearing.      3) Other symptoms - hair loss, weight gain    4) Mood changes - very quick to anger which is unusual for Cathy, feels she has no filter or threshold.  Has rescue medication - zyprexa, but needs to have plans for this as she is not able to wake up and needs  to be available for kids.         History of Present Illness       Back Pain:  She presents for follow up of back pain. Patient's back pain is a new problem.    Original cause of back pain: not sure  First noticed back pain: yesterday  Patient feels back pain: constantlyLocation of back pain:  Right lower back, left lower back,  right middle of back and left middle of back  Description of back pain: sharp  Back pain spreads: right buttocks and left buttocks    Since patient first noticed back pain, pain is: gradually worsening  Does back pain interfere with her job:  Yes  On a scale of 1-10 (10 being the worst), patient describes pain as:  7  What makes back pain worse: standing  Acupuncture: not tried  Acetaminophen: not tried  Activity or exercise: not helpful  Chiropractor:  Not tried  Cold: not tried  Heat: not tried  Massage: not tried  Muscle relaxants: not tried  NSAIDS: not tried  Opioids: not tried  Physical Therapy: not helpful  Rest: not helpful  Steroid Injection: not tried  Stretching: not helpful  Surgery: not tried  TENS unit: not tried  Topical pain relievers: not tried  Other healthcare providers patient is seeing for back pain: Physical Therapist    Reason for visit:  Lower back pain, lower abdominal cramping, headache, nausea, vomiting, diarrhea, low grade fever, itching/hives, nipple discharge  Symptom onset:  3-7 days ago  Symptoms include:  Lower abdominal cramps, low back pain, nausea &vomiting, diarrhea, headache, low grade fever  Symptom intensity:  Moderate  Symptom progression:  Worsening  Had these symptoms before:  No  What makes it worse:  Sitting -low back pain gets worse  What makes it better:  Standing or lying down    She eats 0-1 servings of fruits and vegetables daily.She consumes 1 sweetened beverage(s) daily.She exercises with enough effort to increase her heart rate 9 or less minutes per day.  She exercises with enough effort to increase her heart rate 3 or less days per week.   She is taking medications regularly.     Pt now reporting back pain improving. Back pain currently 3/10.    Review of Systems   Constitutional, HEENT, cardiovascular, pulmonary, gi and gu systems are negative, except as otherwise noted.      Objective    /86   Pulse 84   Temp 97.6  F (36.4  C)   Resp 14   Ht 1.725 m  "(5' 7.91\")   Wt 108.4 kg (239 lb)   LMP 06/16/2022   SpO2 98%   BMI 36.43 kg/m    Body mass index is 36.43 kg/m .  Physical Exam   GENERAL: healthy, alert and mild distress  NECK: no adenopathy, no asymmetry, masses, or scars and thyroid normal to palpation  RESP: lungs clear to auscultation - no rales, rhonchi or wheezes  BREAST: normal without masses, tenderness or nipple discharge and no palpable axillary masses or adenopathy  BREAST: no masses ortenderness , no palpable axillary masses or adenopathy and nipple discharge right appears clear, left not expressed  CV: regular rate and rhythm, normal S1 S2, no S3 or S4, no murmur, click or rub, no peripheral edema and peripheral pulses strong  SKIN: scattered erythematous macules with slight raised centered - throughout back, abdomen, left axilla, right knee  PSYCH: mentation appears normal, tearful, anxious and judgement and insight intact  LYMPH: no cervical, supraclavicular, axillary, or inguinal adenopathy              .  ..  "

## 2022-06-21 ENCOUNTER — OFFICE VISIT (OUTPATIENT)
Dept: FAMILY MEDICINE | Facility: CLINIC | Age: 42
End: 2022-06-21

## 2022-06-21 ENCOUNTER — LAB (OUTPATIENT)
Dept: LAB | Facility: CLINIC | Age: 42
End: 2022-06-21
Payer: COMMERCIAL

## 2022-06-21 VITALS
DIASTOLIC BLOOD PRESSURE: 86 MMHG | BODY MASS INDEX: 36.22 KG/M2 | SYSTOLIC BLOOD PRESSURE: 124 MMHG | HEART RATE: 84 BPM | HEIGHT: 68 IN | OXYGEN SATURATION: 98 % | RESPIRATION RATE: 14 BRPM | TEMPERATURE: 97.6 F | WEIGHT: 239 LBS

## 2022-06-21 DIAGNOSIS — F31.12 BIPOLAR AFFECTIVE DISORDER, CURRENTLY MANIC, MODERATE (H): ICD-10-CM

## 2022-06-21 DIAGNOSIS — Z98.51 TUBAL LIGATION STATUS: ICD-10-CM

## 2022-06-21 DIAGNOSIS — N64.52 NIPPLE DISCHARGE: Primary | ICD-10-CM

## 2022-06-21 DIAGNOSIS — E89.0 POSTOPERATIVE HYPOTHYROIDISM: ICD-10-CM

## 2022-06-21 DIAGNOSIS — Z12.39 BREAST CANCER SCREENING, HIGH RISK PATIENT: ICD-10-CM

## 2022-06-21 DIAGNOSIS — L50.9 HIVES: ICD-10-CM

## 2022-06-21 DIAGNOSIS — N64.52 NIPPLE DISCHARGE: ICD-10-CM

## 2022-06-21 LAB
BASOPHILS # BLD AUTO: 0 10E3/UL (ref 0–0.2)
BASOPHILS NFR BLD AUTO: 1 %
EOSINOPHIL # BLD AUTO: 0.3 10E3/UL (ref 0–0.7)
EOSINOPHIL NFR BLD AUTO: 4 %
ERYTHROCYTE [DISTWIDTH] IN BLOOD BY AUTOMATED COUNT: 14 % (ref 10–15)
HCT VFR BLD AUTO: 39.3 % (ref 35–47)
HGB BLD-MCNC: 13 G/DL (ref 11.7–15.7)
LYMPHOCYTES # BLD AUTO: 2.5 10E3/UL (ref 0.8–5.3)
LYMPHOCYTES NFR BLD AUTO: 32 %
MCH RBC QN AUTO: 29.5 PG (ref 26.5–33)
MCHC RBC AUTO-ENTMCNC: 33.1 G/DL (ref 31.5–36.5)
MCV RBC AUTO: 89 FL (ref 78–100)
MONOCYTES # BLD AUTO: 0.6 10E3/UL (ref 0–1.3)
MONOCYTES NFR BLD AUTO: 8 %
NEUTROPHILS # BLD AUTO: 4.4 10E3/UL (ref 1.6–8.3)
NEUTROPHILS NFR BLD AUTO: 56 %
PLATELET # BLD AUTO: 277 10E3/UL (ref 150–450)
PROLACTIN SERPL 3RD IS-MCNC: 7 NG/ML (ref 5–23)
PTH-INTACT SERPL-MCNC: 25 PG/ML (ref 15–65)
RBC # BLD AUTO: 4.4 10E6/UL (ref 3.8–5.2)
WBC # BLD AUTO: 7.8 10E3/UL (ref 4–11)

## 2022-06-21 PROCEDURE — 83970 ASSAY OF PARATHORMONE: CPT

## 2022-06-21 PROCEDURE — 36415 COLL VENOUS BLD VENIPUNCTURE: CPT

## 2022-06-21 PROCEDURE — 84443 ASSAY THYROID STIM HORMONE: CPT

## 2022-06-21 PROCEDURE — 84146 ASSAY OF PROLACTIN: CPT

## 2022-06-21 PROCEDURE — 85025 COMPLETE CBC W/AUTO DIFF WBC: CPT

## 2022-06-21 PROCEDURE — 99215 OFFICE O/P EST HI 40 MIN: CPT | Performed by: NURSE PRACTITIONER

## 2022-06-21 ASSESSMENT — PAIN SCALES - GENERAL: PAINLEVEL: MILD PAIN (3)

## 2022-06-21 NOTE — PATIENT INSTRUCTIONS
I strongly recommend that Jamil look into loved one support with ILDA  I would also strongly recommend that you and Jamil attend counseling together

## 2022-06-21 NOTE — Clinical Note
Randi,  Wondering if you have thoughts.  Cathy has a high risk breast cancer screening schedule. Her last 3D mammogram was about 6 months ago. She is scheduled for her breast MRI next week. For the past week she has had cloudy, greenish-yellow nipple discharge from the right and clear discharge from the left. UPT negative. Is it sufficient to have the breast MRI in this context?  Or should we have additional or alternate imaging? Thanks! Zamzam

## 2022-06-22 LAB — TSH SERPL DL<=0.005 MIU/L-ACNC: 3.07 MU/L (ref 0.4–4)

## 2022-06-24 ENCOUNTER — PATIENT OUTREACH (OUTPATIENT)
Dept: ONCOLOGY | Facility: CLINIC | Age: 42
End: 2022-06-24

## 2022-06-24 NOTE — PROGRESS NOTES
"Telephoned and spoke with patient after receiving in-basket message patient had called New Patient Scheduling with new symptoms.  Cathy states she was doing breast self exam yesterday and found a \"something weird\" in her left breast.  Then today she noticed a hard nodule the size of a pea in the medial upper quadrant of her right breast today.  She is currently scheduled for breast MRI on 7/8 and Miriam Lora appointment on 7/14 with bilateral diagnostic mammograms and ultrasounds following.  Writer offered to bring her in with one of our very qualified gyn onc nurse practitioners if she would like to be seen sooner but she feels she can wait for her currently scheduled appointment with Miriam Lora on 7/14. Mirna Walters RN    "

## 2022-07-05 ENCOUNTER — ANCILLARY PROCEDURE (OUTPATIENT)
Dept: MRI IMAGING | Facility: CLINIC | Age: 42
End: 2022-07-05
Attending: NURSE PRACTITIONER
Payer: COMMERCIAL

## 2022-07-05 DIAGNOSIS — Z12.39 BREAST CANCER SCREENING, HIGH RISK PATIENT: ICD-10-CM

## 2022-07-05 PROCEDURE — A9585 GADOBUTROL INJECTION: HCPCS | Performed by: STUDENT IN AN ORGANIZED HEALTH CARE EDUCATION/TRAINING PROGRAM

## 2022-07-05 PROCEDURE — 77049 MRI BREAST C-+ W/CAD BI: CPT | Mod: GC | Performed by: STUDENT IN AN ORGANIZED HEALTH CARE EDUCATION/TRAINING PROGRAM

## 2022-07-05 RX ORDER — GADOBUTROL 604.72 MG/ML
10 INJECTION INTRAVENOUS ONCE
Status: COMPLETED | OUTPATIENT
Start: 2022-07-05 | End: 2022-07-05

## 2022-07-05 RX ADMIN — GADOBUTROL 10 ML: 604.72 INJECTION INTRAVENOUS at 13:58

## 2022-07-05 NOTE — DISCHARGE INSTRUCTIONS
MRI Contrast Discharge Instructions    The IV contrast you received today will pass out of your body in your  urine. This will happen in the next 24 hours. You will not feel this process.  Your urine will not change color.    Drink at least 4 extra glasses of water or juice today (unless your doctor  has restricted your fluids). This reduces the stress on your kidneys.  You may take your regular medicines.    If you are on dialysis: It is best to have dialysis today.    If you have a reaction: Most reactions happen right away. If you have  any new symptoms after leaving the hospital (such as hives or swelling),  call your hospital at the correct number below. Or call your family doctor.  If you have breathing distress or wheezing, call 911.    Special instructions: ***    I have read and understand the above information.    Signature:______________________________________ Date:___________    Staff:__________________________________________ Date:___________     Time:__________    [place] [facility] Departments:    ___[place]: [phone]  ___[facility]: [phone]  ___[place]: [phone] ___[facility]: [phone]  ___[facility]: [phone]  ___[facility]: [phone]  ___[phone]  ___[facility]: [phone]  ___[phone]

## 2022-07-12 NOTE — PROGRESS NOTES
NEW CONSULTATION   2022     Cathy Arroyo is a 41 year old woman who presents with bilateral nipple discharge. She was referred by Dr. Dasilva.    HPI:    History of resection of pineal gland for pineocytoma.     About a month ago she develop bilateral breast fullness before starting her period. Her period at that time was 4 days late. She squeezed her right nipple and noted multiductal green/yellow nipple discharge. She check the left nipple and there was multiductal clear/milky discharge. She following this she had 1 episode of spontaneous left clear nipple discharge otherwise the discharge has been only with expression. She checked the rest of her breast tissue by self exam and palpated a right breast lump at 3:00 and a left breast lump at 8:00. She reports she had trunk and lower extremity hive at the time of developing nipple discharge that have since resolved. She denies any new medications, supplements, dietary changes or other new exposures. She had labs 22 that demonstrated a normal prolactin and normal TSH. She had a breast MRI for high risk screening 22 that was normal. Her maternal aunt maternal great aunt, and paternal aunt had breast cancer (see details in family history).     BREAST-SPECIFIC HISTORY:    Previous breast imaging: Yes   - 17 b/l dmammo and right axillary ultrasound for lump prominent fat and normal lymph nodes BI-RADS 2  - 19 b/l Dmammo and right axillary ultrasound for pain: accessory breast tissue BI-RADS 2  - 22 Smammo BI_RADS 1  - 21 breast MRI for high risk screening BI-RADS 1  - 22 breast MRI for high risk screening BI-RADS 1    Prior breast biopsies/surgeries: No    Prior history of breast cancer or DCIS: No  Prior radiation history: No  Self breast exams: No  Breast density: scattered fibroglandular densities    GYN HISTORY:  . Age at 1st pregnancy: 35. Breastfeeding history: Yes.   Age at menarche: 17  Menopausal: premenopausal     Contraception: female surgical   Menopausal hormone replacement therapy: No   Increased risk with more than 3-5 years with combination estrogen/progesterone    RISK ASSESSMENT: < 3% 5 year risk and > 20% lifetime risk   Bridget: 0.6% 5 year risk   JAYSHREE/Tyrer-Cuzick: 27.9% lifetime risk  Moshe: 23.0% lifetime risk    FAMILY HISTORY:  Breast ca: Yes   - maternal aunt, 37, BRCA negative. Also had melanoma  - paternal aunt, 58, BRCA negative   - paternal great aunt  Ovarian ca: No  Pancreatic ca: No  Prostate: Yes  - maternal grandfather  Gastric ca: No  Melanoma: Yes   - maternal aunt, also had breast cancer  Colon ca: Yes   - maternal grandfather  Other cancer: Yes   - maternal grandfather with bladder cancer, 71  Other genetic, testing, syndromes, or clotting disorders: no  - maternal aunt negative for BRCA gene mutation      PAST MEDICAL HISTORY  Past Medical History:   Diagnosis Date     Allergic state      Anxiety      Bipolar 1 disorder (H)      Depressive disorder      Elevated cholesterol      Graves disease      Obese     BMI 37.48     Pineal tumor     s/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 tumor     Post partum depression      Post-surgical hypothyroidism 2017     Uncomplicated asthma      PAST SURGICAL HISTORY   Past Surgical History:   Procedure Laterality Date     BIOPSY  31 y/o colpos, and in the last 2 years for thyroid    Colposcopy, thyroid nodule biopsy twice     BLOOD PATCH N/A 10/11/2016    Procedure: EPIDURAL BLOOD PATCH;  Surgeon: GENERIC ANESTHESIA PROVIDER;  Location: [facility] OR      SECTION, TUBAL LIGATION, COMBINED N/A 2019    Procedure:  SECTION, WITH TUBAL LIGATION;  Surgeon: Kathy Rutledge MD;  Location: [facility] L+D     CRANIOTOMY, EXCISE TUMOR COMPLEX, COMBINED  2014    Procedure: COMBINED CRANIOTOMY, EXCISE TUMOR COMPLEX;  Supracerabellar  Infratentorial Approach for Resection of Tumor ;  Surgeon: Kate Mckeon MD;  Location: [facility] OR     THYROIDECTOMY N/A  5/3/2017    Procedure: THYROIDECTOMY;  Total Thyroidectomy;  Surgeon: Pamela Villasenor MD;  Location:  OR     MEDICATIONS  Current Outpatient Medications   Medication Sig Dispense Refill     albuterol (PROAIR HFA/PROVENTIL HFA/VENTOLIN HFA) 108 (90 Base) MCG/ACT inhaler Inhale 2 puffs into the lungs every 6 hours 18 g 3     amphetamine-dextroamphetamine (ADDERALL XR) 20 MG 24 hr capsule Take 20 mg by mouth daily       ARIPiprazole (ABILIFY) 10 MG tablet Take 10 mg by mouth daily       cetirizine (ZYRTEC) 10 MG tablet Take 1 tablet (10 mg) by mouth daily 90 tablet 3     diazepam (VALIUM) 5 MG tablet Take 5 mg by mouth as needed       fexofenadine (ALLEGRA) 180 MG tablet Take 1 tablet (180 mg) by mouth daily 30 tablet 1     fexofenadine (ALLEGRA) 180 MG tablet Take 180 mg by mouth every morning       fluticasone-salmeterol (ADVAIR HFA) 230-21 MCG/ACT inhaler Inhale 2 puffs into the lungs 2 times daily 12 g 11     levothyroxine (SYNTHROID/LEVOTHROID) 175 MCG tablet Take 1 tablet (175 mcg) by mouth daily 90 tablet 2     medical cannabis (Patient's own supply) Take 1 Dose by mouth See Admin Instructions (The purpose of this order is to document that the patient reports taking medical cannabis.  This is not a prescription, and is not used to certify that the patient has a qualifying medical condition.)    Tangerine Oral Suspension Unflavored 1-5 ml up to two times daily.  Total THC = 240 mg, Total CBD Trace       medical cannabis (Patient's own supply) Take 1 Dose by mouth See Admin Instructions (The purpose of this order is to document that the patient reports taking medical cannabis.  This is not a prescription, and is not used to certify that the patient has a qualifying medical condition.)    [place] oral suspension: take 1-3 ml by mouth two times daily  Total CBD 1200 mg. Total THC 60 mg       montelukast (SINGULAIR) 10 MG tablet Take 1 tablet (10 mg) by mouth every morning 90 tablet 0     multivitamin  w/minerals (THERA-VIT-M) tablet Take 1 tablet by mouth daily       ondansetron (ZOFRAN-ODT) 4 MG ODT tab Take 1 tablet (4 mg) by mouth every 8 hours as needed for nausea 20 tablet 3     prochlorperazine (COMPAZINE) 10 MG tablet Take 1 tablet (10 mg) by mouth every 6 hours as needed for nausea or vomiting 10 tablet 3     rizatriptan (MAXALT-MLT) 10 MG ODT Take 1 tablet (10 mg) by mouth at onset of headache for migraine (repeat in 2 hours if needed) May repeat in 2 hours. Max 3 tablets/24 hours. 18 tablet 3     VENTOLIN  (90 Base) MCG/ACT inhaler INHALE 2 PUFFS INTO THE LUNGS EVERY 4 HOURS AS NEEDED FOR SHORTNESS OF BREATH OR DIFFICULT BREATHING OR WHEEZING 18 g 1     fluticasone (FLONASE) 50 MCG/ACT nasal spray Spray 1 spray into both nostrils daily (Patient not taking: Reported on 7/14/2022) 18.2 mL 0     ipratropium - albuterol 0.5 mg/2.5 mg/3 mL (DUONEB) 0.5-2.5 (3) MG/3ML neb solution Take 1 vial (3 mLs) by nebulization every 6 hours as needed for shortness of breath / dyspnea or wheezing 90 mL 1     nicotine (NICOTROL) 10 MG inhaler Inhale 1 Cartridge into the lungs daily as needed for smoking cessation Puff into mouth and throat until cravings subside. (Patient not taking: No sig reported) 168 each 0     tiZANidine (ZANAFLEX) 4 MG tablet Take 1 tablet (4 mg) by mouth nightly as needed for muscle spasms (Patient not taking: Reported on 7/14/2022) 60 tablet 1     vitamin D3 (CHOLECALCIFEROL) 50 mcg (2000 units) tablet Take 1 tablet by mouth daily (Patient not taking: Reported on 7/14/2022)       ALLERGIES  Allergies   Allergen Reactions     Adacel [Daptacel] Swelling     Adhesive Tape Hives     Ciprofloxacin      Causes visual hallucinations.     Codeine Sulfate Nausea and Vomiting     Nicoderm [Nicotine]      Patch and Gum, pt reported allergic reaction 6/24     Tegaderm Transparent Dressing (Informational Only) Hives     Tetanus Toxoid      Pt states she had an infection at injection site.      Vicodin  "[Hydrocodone-Acetaminophen] Nausea and Vomiting     Methimazole Rash      SOCIAL HISTORY:  Smokes: Yes  EtOH: Yes  Exercise: active job as a   Works as a     ROS:  Change in vision No  Headaches: no  Respiratory: No shortness of breath, dyspnea on exertion, cough, or hemoptysis   Cardiovascular: negative   Gastrointestinal: negative Abdominal pain: no  Breast: bilateral nipple discharge and lumps  Musculoskeletal: negative Joint pain No Back pain: no  Psychiatric: negative  Hematologic/Lymphatic/Immunologic: negative  Endocrine: negative    EXAM  /83 (BP Location: Right arm, Patient Position: Sitting, Cuff Size: Adult Large)   Pulse 84   Temp 97.5  F (36.4  C) (Tympanic)   Resp 18   Ht 1.728 m (5' 8.03\")   Wt 107.5 kg (236 lb 14.4 oz)   LMP 07/10/2022   BMI 35.99 kg/m     PHYSICAL EXAM  Respiratory: breathing non labored.   Breasts: Examination was done in both the upright and supine positions.  Breasts are symmetrical . No dominant fixed or suspicious masses noted. No skin or nipple changes. No nipple discharge. Bilateral nonspontaneous multi ductal nipple discharge of milk/green fluid. Patient palpated right breast mass 3:00 at sternal boarders, feels like breast tissue. Patient palpated left breast mass 8:00 5 cm from the nipple, feels like normal breast tissue.   No clavicular, cervical, or axillary lymphadenopathy.     INVESTIGATIONS:    7/14/22 bilateral diagnostic mammogram and bilateral breast ultrasound: no concerning findings per Radiology, final report pending.     ASSESSMENT/PLAN:    Cathy Arroyo is a 41 year old woman with bilateral nonspontaneous multiductal nipple discharge. Breast MRI, breast exam, mammogram, and ultrasound without concerning findings. Prolactin is normal. Nipple discharge is likely physiological. She does meet NCCN guidelines for high risk screening.     1) Nipple discharge, likely physiological.   Discussed nipple discharge is common. Benign " nipple discharge is usually bilateral, multiductal, and occurs with breast manipulation. Conversely, the risk of cancer is higher when the discharge is spontaneous, bloody, clear, unilateral, uniductal, associated with a breast mass, and/or occurs in a woman over 40 years of age.   - Educated to stop compression of the breast and report any spontaneous discharge.     2) Family history of breast cancer  - Continue high risk screening with Randi Martin.     Miriam Lora PA-C    30 minutes spent on the date of the encounter doing chart review, review of test results, interpretation of tests, patient visit and documentation.

## 2022-07-14 ENCOUNTER — OFFICE VISIT (OUTPATIENT)
Dept: SURGERY | Facility: CLINIC | Age: 42
End: 2022-07-14
Attending: NURSE PRACTITIONER
Payer: COMMERCIAL

## 2022-07-14 ENCOUNTER — ANCILLARY PROCEDURE (OUTPATIENT)
Dept: MAMMOGRAPHY | Facility: CLINIC | Age: 42
End: 2022-07-14
Attending: PHYSICIAN ASSISTANT
Payer: COMMERCIAL

## 2022-07-14 VITALS
HEART RATE: 84 BPM | TEMPERATURE: 97.5 F | HEIGHT: 68 IN | DIASTOLIC BLOOD PRESSURE: 83 MMHG | RESPIRATION RATE: 18 BRPM | SYSTOLIC BLOOD PRESSURE: 128 MMHG | BODY MASS INDEX: 35.9 KG/M2 | WEIGHT: 236.9 LBS

## 2022-07-14 DIAGNOSIS — N64.52 NIPPLE DISCHARGE: ICD-10-CM

## 2022-07-14 DIAGNOSIS — N64.52 NIPPLE DISCHARGE: Primary | ICD-10-CM

## 2022-07-14 PROCEDURE — G0463 HOSPITAL OUTPT CLINIC VISIT: HCPCS

## 2022-07-14 PROCEDURE — 99214 OFFICE O/P EST MOD 30 MIN: CPT | Performed by: PHYSICIAN ASSISTANT

## 2022-07-14 PROCEDURE — G0279 TOMOSYNTHESIS, MAMMO: HCPCS | Performed by: STUDENT IN AN ORGANIZED HEALTH CARE EDUCATION/TRAINING PROGRAM

## 2022-07-14 PROCEDURE — 77066 DX MAMMO INCL CAD BI: CPT | Performed by: STUDENT IN AN ORGANIZED HEALTH CARE EDUCATION/TRAINING PROGRAM

## 2022-07-14 ASSESSMENT — PAIN SCALES - GENERAL: PAINLEVEL: MILD PAIN (3)

## 2022-07-14 NOTE — LETTER
7/14/2022         RE: Cathy Arroyo  [address]  [facility] [address]        Dear Colleague,    Thank you for referring your patient, Cathy Arroyo, to [facility]. Please see a copy of my visit note below.    NEW CONSULTATION   Jul 14, 2022     Cathy Arroyo is a 41 year old woman who presents with bilateral nipple discharge. She was referred by Dr. Dasilva.    HPI:    History of resection of pineal gland for pineocytoma.     About a month ago she develop bilateral breast fullness before starting her period. Her period at that time was 4 days late. She squeezed her right nipple and noted multiductal green/yellow nipple discharge. She check the left nipple and there was multiductal clear/milky discharge. She following this she had 1 episode of spontaneous left clear nipple discharge otherwise the discharge has been only with expression. She checked the rest of her breast tissue by self exam and palpated a right breast lump at 3:00 and a left breast lump at 8:00. She reports she had trunk and lower extremity hive at the time of developing nipple discharge that have since resolved. She denies any new medications, supplements, dietary changes or other new exposures. She had labs 6/12/22 that demonstrated a normal prolactin and normal TSH. She had a breast MRI for high risk screening 7/5/22 that was normal. Her maternal aunt maternal great aunt, and paternal aunt had breast cancer (see details in family history).     BREAST-SPECIFIC HISTORY:    Previous breast imaging: Yes   - 6/5/17 b/l dmammo and right axillary ultrasound for lump prominent fat and normal lymph nodes BI-RADS 2  - 11/11/19 b/l Dmammo and right axillary ultrasound for pain: accessory breast tissue BI-RADS 2  - 1/5/22 Smammo BI_RADS 1  - 1/18/21 breast MRI for high risk screening BI-RADS 1  - 7/5/22 breast MRI for high risk screening BI-RADS 1    Prior breast biopsies/surgeries: No    Prior history of  breast cancer or DCIS: No  Prior radiation history: No  Self breast exams: No  Breast density: scattered fibroglandular densities    GYN HISTORY:  . Age at 1st pregnancy: 35. Breastfeeding history: Yes.   Age at menarche: 17  Menopausal: premenopausal    Contraception: female surgical   Menopausal hormone replacement therapy: No   Increased risk with more than 3-5 years with combination estrogen/progesterone    RISK ASSESSMENT: < 3% 5 year risk and > 20% lifetime risk   Bridget: 0.6% 5 year risk   JAYSHREE/Tyrer-Cuzick: 27.9% lifetime risk  Moshe: 23.0% lifetime risk    FAMILY HISTORY:  Breast ca: Yes   - maternal aunt, 37, BRCA negative. Also had melanoma  - paternal aunt, 58, BRCA negative   - paternal great aunt  Ovarian ca: No  Pancreatic ca: No  Prostate: Yes  - maternal grandfather  Gastric ca: No  Melanoma: Yes   - maternal aunt, also had breast cancer  Colon ca: Yes   - maternal grandfather  Other cancer: Yes   - maternal grandfather with bladder cancer, 71  Other genetic, testing, syndromes, or clotting disorders: no  - maternal aunt negative for BRCA gene mutation      PAST MEDICAL HISTORY  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     Post partum depression      Post-surgical hypothyroidism 2017     Uncomplicated asthma      PAST SURGICAL HISTORY   Past Surgical History:   Procedure Laterality Date     BIOPSY  29 y/o colpos, and in the last 2 years for thyroid    Colposcopy, thyroid nodule biopsy twice     BLOOD PATCH N/A 10/11/2016    Procedure: EPIDURAL BLOOD PATCH;  Surgeon: GENERIC ANESTHESIA PROVIDER;  Location: [facility] OR      SECTION, TUBAL LIGATION, COMBINED N/A 2019    Procedure:  SECTION, WITH TUBAL LIGATION;  Surgeon: Kathy Rutledge MD;  Location: [facility] L[facility]D      CRANIOTOMY, EXCISE TUMOR COMPLEX, COMBINED  2/19/2014    Procedure: COMBINED CRANIOTOMY, EXCISE TUMOR COMPLEX;  Supracerabellar  Infratentorial Approach for Resection of Tumor ;  Surgeon: Kate Mckeon MD;  Location: [facility] OR     THYROIDECTOMY N/A 5/3/2017    Procedure: THYROIDECTOMY;  Total Thyroidectomy;  Surgeon: Pamela Villasenor MD;  [facility]     MEDICATIONS  Current Outpatient Medications   Medication Sig Dispense Refill     albuterol (PROAIR HFA/PROVENTIL HFA/VENTOLIN HFA) 108 (90 Base) MCG/ACT inhaler Inhale 2 puffs into the lungs every 6 hours 18 g 3     amphetamine-dextroamphetamine (ADDERALL XR) 20 MG 24 hr capsule Take 20 mg by mouth daily       ARIPiprazole (ABILIFY) 10 MG tablet Take 10 mg by mouth daily       cetirizine (ZYRTEC) 10 MG tablet Take 1 tablet (10 mg) by mouth daily 90 tablet 3     diazepam (VALIUM) 5 MG tablet Take 5 mg by mouth as needed       fexofenadine (ALLEGRA) 180 MG tablet Take 1 tablet (180 mg) by mouth daily 30 tablet 1     fexofenadine (ALLEGRA) 180 MG tablet Take 180 mg by mouth every morning       fluticasone-salmeterol (ADVAIR HFA) 230-21 MCG/ACT inhaler Inhale 2 puffs into the lungs 2 times daily 12 g 11     levothyroxine (SYNTHROID/LEVOTHROID) 175 MCG tablet Take 1 tablet (175 mcg) by mouth daily 90 tablet 2     medical cannabis (Patient's own supply) Take 1 Dose by mouth See Admin Instructions (The purpose of this order is to document that the patient reports taking medical cannabis.  This is not a prescription, and is not used to certify that the patient has a qualifying medical condition.)    [place] Oral Suspension Unflavored 1-5 ml up to two times daily.  Total THC = 240 mg, Total CBD Trace       medical cannabis (Patient's own supply) Take 1 Dose by mouth See Admin Instructions (The purpose of this order is to document that the patient reports taking medical cannabis.  This is not a prescription, and is not used to certify that the patient  has a qualifying medical condition.)    [place] oral suspension: take 1-3 ml by mouth two times daily  Total CBD 1200 mg. Total THC 60 mg       montelukast (SINGULAIR) 10 MG tablet Take 1 tablet (10 mg) by mouth every morning 90 tablet 0     multivitamin w/minerals (THERA-VIT-M) tablet Take 1 tablet by mouth daily       ondansetron (ZOFRAN-ODT) 4 MG ODT tab Take 1 tablet (4 mg) by mouth every 8 hours as needed for nausea 20 tablet 3     prochlorperazine (COMPAZINE) 10 MG tablet Take 1 tablet (10 mg) by mouth every 6 hours as needed for nausea or vomiting 10 tablet 3     rizatriptan (MAXALT-MLT) 10 MG ODT Take 1 tablet (10 mg) by mouth at onset of headache for migraine (repeat in 2 hours if needed) May repeat in 2 hours. Max 3 tablets/24 hours. 18 tablet 3     VENTOLIN  (90 Base) MCG/ACT inhaler INHALE 2 PUFFS INTO THE LUNGS EVERY 4 HOURS AS NEEDED FOR SHORTNESS OF BREATH OR DIFFICULT BREATHING OR WHEEZING 18 g 1     fluticasone (FLONASE) 50 MCG/ACT nasal spray Spray 1 spray into both nostrils daily (Patient not taking: Reported on 7/14/2022) 18.2 mL 0     ipratropium - albuterol 0.5 mg/2.5 mg/3 mL (DUONEB) 0.5-2.5 (3) MG/3ML neb solution Take 1 vial (3 mLs) by nebulization every 6 hours as needed for shortness of breath / dyspnea or wheezing 90 mL 1     nicotine (NICOTROL) 10 MG inhaler Inhale 1 Cartridge into the lungs daily as needed for smoking cessation Puff into mouth and throat until cravings subside. (Patient not taking: No sig reported) 168 each 0     tiZANidine (ZANAFLEX) 4 MG tablet Take 1 tablet (4 mg) by mouth nightly as needed for muscle spasms (Patient not taking: Reported on 7/14/2022) 60 tablet 1     vitamin D3 (CHOLECALCIFEROL) 50 mcg (2000 units) tablet Take 1 tablet by mouth daily (Patient not taking: Reported on 7/14/2022)       ALLERGIES  Allergies   Allergen Reactions     Adacel [Daptacel] Swelling     Adhesive Tape Hives     Ciprofloxacin      Causes visual hallucinations.     Codeine  "Sulfate Nausea and Vomiting     Nicoderm [Nicotine]      Patch and Gum, pt reported allergic reaction 6/24     Tegaderm Transparent Dressing (Informational Only) Hives     Tetanus Toxoid      Pt states she had an infection at injection site.      Vicodin [Hydrocodone-Acetaminophen] Nausea and Vomiting     Methimazole Rash      SOCIAL HISTORY:  Smokes: Yes  EtOH: Yes  Exercise: active job as a   Works as a     ROS:  Change in vision No  Headaches: no  Respiratory: No shortness of breath, dyspnea on exertion, cough, or hemoptysis   Cardiovascular: negative   Gastrointestinal: negative Abdominal pain: no  Breast: bilateral nipple discharge and lumps  Musculoskeletal: negative Joint pain No Back pain: no  Psychiatric: negative  Hematologic/Lymphatic/Immunologic: negative  Endocrine: negative    EXAM  /83 (BP Location: Right arm, Patient Position: Sitting, Cuff Size: Adult Large)   Pulse 84   Temp 97.5  F (36.4  C) (Tympanic)   Resp 18   Ht 1.728 m (5' 8.03\")   Wt 107.5 kg (236 lb 14.4 oz)   LMP 07/10/2022   BMI 35.99 kg/m     PHYSICAL EXAM  Respiratory: breathing non labored.   Breasts: Examination was done in both the upright and supine positions.  Breasts are symmetrical . No dominant fixed or suspicious masses noted. No skin or nipple changes. No nipple discharge. Bilateral nonspontaneous multi ductal nipple discharge of milk/green fluid. Patient palpated right breast mass 3:00 at sternal boarders, feels like breast tissue. Patient palpated left breast mass 8:00 5 cm from the nipple, feels like normal breast tissue.   No clavicular, cervical, or axillary lymphadenopathy.     INVESTIGATIONS:    7/14/22 bilateral diagnostic mammogram and bilateral breast ultrasound: no concerning findings per Radiology, final report pending.     ASSESSMENT/PLAN:    Cathy Arroyo is a 41 year old woman with bilateral nonspontaneous multiductal nipple discharge. Breast MRI, breast exam, mammogram, " and ultrasound without concerning findings. Prolactin is normal. Nipple discharge is likely physiological. She does meet NCCN guidelines for high risk screening.     1) Nipple discharge, likely physiological.   Discussed nipple discharge is common. Benign nipple discharge is usually bilateral, multiductal, and occurs with breast manipulation. Conversely, the risk of cancer is higher when the discharge is spontaneous, bloody, clear, unilateral, uniductal, associated with a breast mass, and/or occurs in a woman over 40 years of age.   - Educated to stop compression of the breast and report any spontaneous discharge.     2) Family history of breast cancer  - Continue high risk screening with Randi Martin.       30 minutes spent on the date of the encounter doing chart review, review of test results, interpretation of tests, patient visit and documentation.         Again, thank you for allowing me to participate in the care of your patient.      Sincerely,    Miriam Lora PA-C

## 2022-07-14 NOTE — NURSING NOTE
"Oncology Rooming Note    July 14, 2022 7:06 AM   Cathy Arroyo is a 41 year old female who presents for:    Chief Complaint   Patient presents with     Oncology Clinic Visit     Patient with nipple discharge here for provider visit     Initial Vitals: /83 (BP Location: Right arm, Patient Position: Sitting, Cuff Size: Adult Large)   Pulse 84   Temp 97.5  F (36.4  C) (Tympanic)   Resp 18   Ht 1.728 m (5' 8.03\")   Wt 107.5 kg (236 lb 14.4 oz)   LMP 07/10/2022   BMI 35.99 kg/m   Estimated body mass index is 35.99 kg/m  as calculated from the following:    Height as of this encounter: 1.728 m (5' 8.03\").    Weight as of this encounter: 107.5 kg (236 lb 14.4 oz). Body surface area is 2.27 meters squared.  Mild Pain (3) Comment: Data Unavailable   Patient's last menstrual period was 07/10/2022.  Allergies reviewed: Yes  Medications reviewed: Yes    Medications: Medication refills not needed today.  Pharmacy name entered into [employer]:    [facility] #[id number] - [place], [place] - [address] [facility]  [facility] #[id number] - [place], [place] - [address] AT [address]  [facility] #[id number] - [place], [place] - [address] AT [facility] & [address] [place] [address]    Clinical concerns:   Sofia Nye CMA              "

## 2022-07-14 NOTE — PATIENT INSTRUCTIONS
Cathy Arroyo is a 41 year old woman with bilateral nonspontaneous multiductal nipple discharge. Breast MRI, breast exam, mammogram, and ultrasound without concerning findings. Prolactin is normal. Nipple discharge is likely physiological. She does meet NCCN guidelines for high risk screening.     1) Nipple discharge, likely physiological.   Discussed nipple discharge is common. Benign nipple discharge is usually bilateral, multiductal, and occurs with breast manipulation. Conversely, the risk of cancer is higher when the discharge is spontaneous, bloody, clear, unilateral, uniductal, associated with a breast mass, and/or occurs in a woman over 40 years of age.   - Educated to stop compression of the breast and report any spontaneous discharge.     2) Family history of breast cancer  - Continue high risk screening with Randi Martin.

## 2022-07-20 ENCOUNTER — TELEPHONE (OUTPATIENT)
Dept: FAMILY MEDICINE | Facility: CLINIC | Age: 42
End: 2022-07-20
Payer: COMMERCIAL

## 2022-07-20 DIAGNOSIS — N64.52 NIPPLE DISCHARGE: Primary | ICD-10-CM

## 2022-07-20 DIAGNOSIS — Z12.39 BREAST CANCER SCREENING, HIGH RISK PATIENT: ICD-10-CM

## 2022-07-20 PROCEDURE — 99207: CPT | Performed by: NURSE PRACTITIONER

## 2022-07-20 NOTE — TELEPHONE ENCOUNTER
Reviewed imaging and follow-up appt with MARY Lora. It appears that galactogram is not recommended at this time.  DEVEN Winston

## 2022-07-21 NOTE — NURSING NOTE
Chief Complaint   Patient presents with     RECHECK     follow up     Suresh Huston LPN     HISTORY OF PRESENT ILLNESS:    39 y.o. female   presents for evaluation of AUB and irregular menses    No LMP recorded. Periods are every 14-90 days, lasting for 3-7 days. She changes a tampon q 1 hours x2-3 days  Bleeding is associated with clots, nausea  Previous management: pt states she has been evaluated before but no one could find a reason. Previously on the patch which regulated her cycles, but they were still heavy  Contraception: Vasectomy. The patient is  sexually active. Past Medical History:   Past Medical History:   Diagnosis Date    Allergic rhinitis     Cerumen impaction     Depression     she was on Zoloft and Wellbutrin and Effexor in the past    Diarrhea     for colonoscopy 19     Headache     Irritable bowel syndrome     Obesity     PTSD (post-traumatic stress disorder)     Wilma Talbot    Sleep apnea     wears CPAP    Vitamin D deficiency                                              OB History    Para Term  AB Living   1 1           SAB IAB Ectopic Molar Multiple Live Births                    # Outcome Date GA Lbr Bentley/2nd Weight Sex Delivery Anes PTL Lv   1 Para      CS-LVertical            Past Surgical History:   Past Surgical History:   Procedure Laterality Date     SECTION          COLONOSCOPY  2017    Dr. Patrick Adhikari ENDOSCOPY  2016    with bx. WISDOM TOOTH EXTRACTION          Allergies: Patient has no known allergies. Medications:   Current Outpatient Medications   Medication Sig Dispense Refill    Armodafinil (NUVIGIL) 150 MG TABS tablet Take 1 tablet by mouth in the morning for 30 days. 30 tablet 0    vilazodone HCl (VIIBRYD) 20 MG TABS Take 1 tablet by mouth daily 30 tablet 5    vitamin D3 (CHOLECALCIFEROL) 125 MCG (5000 UT) TABS tablet Take 1 tablet by mouth daily. 30 tablet 0    dicyclomine (BENTYL) 10 MG capsule Take 1 capsule by mouth four times daily before meals and nightly. 120 capsule 0    losartan-hydroCHLOROthiazide (HYZAAR) 100-25 MG per tablet Take 1 tablet by mouth daily. 30 tablet 0    metroNIDAZOLE (METROCREAM) 0.75 % cream APPLY TOPICALLY TWICE A DAY      UREA 20 INTENSIVE HYDRATING 20 % cream APPLY EXTERNALLY ONCE DAILY AS NEEDED      ondansetron (ZOFRAN) 4 MG tablet Take 1 tablet by mouth daily as needed for Nausea or Vomiting 30 tablet 0    GNP VITAMIN D SUPER STRENGTH 125 MCG (5000 UT) TABS tablet       cetirizine (ZYRTEC) 10 MG tablet Take 1 tablet by mouth daily 30 tablet 5    Pediatric Multivitamins-Iron (FLINTSTONES COMPLETE) 18 MG CHEW Daily   90 tablet 1     No current facility-administered medications for this visit. Social History:   Social History     Tobacco Use    Smoking status: Never    Smokeless tobacco: Never   Substance Use Topics    Alcohol use: No     Alcohol/week: 0.0 standard drinks        Family History:   Family History   Problem Relation Age of Onset    High Blood Pressure Mother     High Blood Pressure Father     No Known Problems Sister     Breast Cancer Maternal Grandmother 61    Heart Attack Maternal Grandmother     Ovarian Cancer Maternal Grandmother 62    Heart Attack Maternal Grandfather     Heart Attack Paternal Grandmother     Heart Attack Paternal Grandfather     Diabetes Paternal Grandfather        REVIEW OF SYSTEMS:    Constitutional: negative  HEENT: negative  Breast: negative  Respiratory: negative  Cardiovascular: negative  Gastrointestinal: negative  Genitourinary: As per HPI  Integument: negative  Neurological: negative  Endocrine: negative          PHYSICAL EXAM  /75 (Position: Sitting)   Pulse 85   Wt 268 lb 9.6 oz (121.8 kg)   BMI 48.34 kg/m²       General appearance: alert, cooperative and in no acute distress.   Head: NCAT   Abdomen: soft, non-tender; bowel sounds normal; no masses,  no organomegaly  Psych: No acute distress, mood and affect appropriate    Pelvic Exam:   EXTERNAL GENITALIA: normal external genitalia, no external lesions  VAGINA: normal rugae, no discharge   CERVIX: normal appearing, no lesions, no bleeding  UTERUS: uterus is normal size, shape, consistency and nontender   ADNEXA: normal adnexa in size, nontender and no masses. RECTUM: no hemorrhoids or rectal masses. ASSESSMENT :      Diagnosis Orders   1. Abnormal uterine bleeding (AUB)  Glucose, Random    Luteinizing Hormone    Follicle Stimulating Hormone    CBC    Prolactin    TSH    Testosterone, free, total    Insulin, total    DHEA-Sulfate    HCG Qualitative, Serum    Estradiol    US NON OB TRANSVAGINAL      2. Screening mammogram for breast cancer  [facility] DIGITAL SCREEN W OR WO CAD BILATERAL      3. Family history of breast cancer in female  Miscellaneous Sendout      4. Family history of ovarian cancer  Miscellaneous Sendout      5. Screening for cervical cancer  PAP SMEAR           PLAN:    No follow-ups on file. No orders of the defined types were placed in this encounter. Orders Placed This Encounter   Procedures    [facility] DIGITAL SCREEN W OR WO CAD BILATERAL     Further imaging can be completed per [address] protocol     Standing Status:   Future     Standing Expiration Date:   9/21/2023     Order Specific Question:   Reason for exam:     Answer:   screening mammogram    US NON OB TRANSVAGINAL     Standing Status:   Future     Standing Expiration Date:   7/21/2023    Miscellaneous Sendout     Standing Status:   Future     Standing Expiration Date:   7/21/2023     Order Specific Question:   Specify Req.  Test (1 Test/Order)     Answer:   BRCA screening    Glucose, Random     Standing Status:   Future     Standing Expiration Date:   7/21/2023    Luteinizing Hormone     Standing Status:   Future     Standing Expiration Date:   2/09/1230    Follicle Stimulating Hormone     Standing Status:   Future     Standing Expiration Date:   7/21/2023    CBC     Standing Status:   Future     Standing Expiration Date:   7/21/2023 Prolactin     Standing Status:   Future     Standing Expiration Date:   7/21/2023    TSH     Standing Status:   Future     Standing Expiration Date:   7/21/2023    Testosterone, free, total     Standing Status:   Future     Standing Expiration Date:   7/21/2023    Insulin, total     Standing Status:   Future     Standing Expiration Date:   7/21/2023    DHEA-Sulfate     Standing Status:   Future     Standing Expiration Date:   7/21/2023    HCG Qualitative, Serum     Standing Status:   Future     Standing Expiration Date:   7/21/2023    Estradiol     Standing Status:   Future     Standing Expiration Date:   7/21/2023    PAP SMEAR     Order Specific Question:   Collection Type     Answer: Thin Prep     Order Specific Question:   Prior Abnormal Pap Test     Answer:   No     Order Specific Question:   Screening or Diagnostic     Answer:   Screening     Order Specific Question:   Additional STD Testing     Answer:   GC and Chlamydia     Order Specific Question:   Diagnosis Code for Additional STD Testing     Answer:   Screening for Chlamydial disease (Z11.8)     Order Specific Question:   HPV Requested?      Answer:   HPV Co-Test     Order Specific Question:   High Risk Patient     Answer:   N/A         Electronically signed by Gibran Hayward DO on 7/21/22

## 2022-07-26 NOTE — TELEPHONE ENCOUNTER
DIAGNOSIS: bilat hip pain /self ref/ [facility] / new issue returning pt    APPOINTMENT DATE: 8.16.22   NOTES STATUS DETAILS   MEDICATION LIST Internal    MRI Internal 1.28.22 lumbar spine  2.11.20 lumabr spine   CT SCAN Internal 1.20.20 lumbar spine   XRAYS (IMAGES & REPORTS) Internal 12.8.14 pelvis, lumbar spine

## 2022-07-27 ENCOUNTER — THERAPY VISIT (OUTPATIENT)
Dept: PHYSICAL THERAPY | Facility: CLINIC | Age: 42
End: 2022-07-27
Payer: COMMERCIAL

## 2022-07-27 DIAGNOSIS — M54.2 NECK PAIN: ICD-10-CM

## 2022-07-27 DIAGNOSIS — M54.9 MID BACK PAIN: Primary | ICD-10-CM

## 2022-07-27 DIAGNOSIS — M54.50 ACUTE BILATERAL LOW BACK PAIN WITHOUT SCIATICA: ICD-10-CM

## 2022-07-27 PROCEDURE — 97530 THERAPEUTIC ACTIVITIES: CPT | Mod: GP | Performed by: PHYSICAL THERAPIST

## 2022-07-27 PROCEDURE — 97110 THERAPEUTIC EXERCISES: CPT | Mod: GP | Performed by: PHYSICAL THERAPIST

## 2022-07-27 NOTE — PROGRESS NOTES
PROGRESS REPORT    Cathy has been in therapy from March 2, 2022 to July 27, 2022 for treatment of LBP.    Diagnosis: Neck, thoracic, lower back   Precautions/Restrictions/MD instructions/Other pertinent hx none    Therapist Impression:   Minimal change in Oswestry as new hip pain is contributing to this.  Reports back is doing well, but mostly limited by hips.  Pain could be referred from back and/or piriformis on the R.  Initiated unilateral press ups performed B as this resolved both sides and cautiously introduced piriformis stretching and glute strengthening    GOALS: Hair cutting    NEXT: strengthening    PTRX: none    Subjective:  Neck is doing well.  Lateral hips are most bothersome.  R hip more bothersome than L.  LBP is doing well.      Objective:  Trunk Range of Motion  Movement Loss Major Moderate Minimal Nil Pain   Flexion   x  X ( no pain after 10 prone press ups   Extension    x    Sidebending R    x    Sidebending L    x    Side Gliding R    x x R   Side Gliding L    x xxR   Thoracic Rotation R        Thoracic Rotation L          Improvement in side glide pain after unilateral press ups  Negative hip exam except for piriformis stress test on R    ASSESSMENT/PLAN  Updated problem list and treatment plan: The primary encounter diagnosis was Mid back pain. Diagnoses of Neck pain and Acute bilateral low back pain without sciatica were also pertinent to this visit. Pain - HEP  Decreased ROM/flexibility - HEP  Decreased function - HEP  Decreased strength - HEP  Impaired posture - HEP  STG/LTGs have been met or progress has been made towards goals:  Yes (See Goal flow sheet completed today.)  Assessment of Progress: The patient's condition is improving.  Self Management Plans:  Patient has been instructed in a home treatment program.  Patient  has been instructed in self management of symptoms.  I have re-evaluated this patient and find that the nature, scope, duration and intensity of the therapy is  appropriate for the medical condition of the patient.  Cathy continues to require the following intervention to meet STG and LTG's:  PT    Recommendations:  This patient would benefit from continued therapy.     Frequency:  1 X a month, once daily  Duration:  for 2 months              Please refer to the daily flowsheet for treatment today, total treatment time and time spent performing 1:1 timed codes.

## 2022-07-27 NOTE — PROGRESS NOTES
[facility]    OUTPATIENT Physical Therapy ORTHOPEDIC EVALUATION  PLAN OF TREATMENT FOR OUTPATIENT REHABILITATION  (COMPLETE FOR INITIAL CLAIMS ONLY)  Patient's Last Name, First Name, M.I.  YOB: 1980  Cathy Arroyo    Provider s Name:  NANC[facility]   Medical Record No.  [id number]   Start of Care Date:  03/02/22   Onset Date:   02/01/22   Type:     _X__PT   ___OT Medical Diagnosis:    Encounter Diagnoses   Name Primary?    Mid back pain Yes    Neck pain     Acute bilateral low back pain without sciatica         Treatment Diagnosis:  Neck, thoracic, lower back        Goals:     07/27/22 0500   Body Part   Goals listed below are for Neck, thoracic, lower back   Goal #1   Goal #1 sitting   Previous Functional Level No restrictions   Current Functional Level Minutes patient can sit   Performance level 1   STG Target Performance Minutes patient will be able to sit   Performance level 30 0/10 pain   Rationale for personal hygiene;to allow rest from standing;for community transportation;for job requirements in their work place   Due date 05/18/22   Date Goal Met 07/27/22   If goal not met, Why? see above   LTG Target Performance Hours patient will be able to sit   Performance Level 1   Rationale for personal hygiene;to allow rest from standing;for community transportation;for job requirements in their work place   Due date 06/08/22   Date Goal Met 07/27/22   Goal #2   Goal #2 stairs   Previous Functional Level No restrictions   Current Functional Level Ascend/descend stairs;in a normal reciprocal pattern   Performance Level 4/10 pain   STG Target Performance Ascend stairs   Performance Level 0/10 pain   Rationale to enter/leave the house safely;to reach upper level of home safely;for safe community access to buildings   Due Date 08/17/22   LTG Target Performance Descend  stairs;in a normal reciprocal pattern   Performance Level 0/10 pain   Rationale to enter/leave the house safely;to reach upper level of home safely;for safe community access to buildings   Due Date 09/21/22       Therapy Frequency:  1 month  Predicted Duration of Therapy Intervention:  2 months    Jared Constantino, PT                 I CERTIFY THE NEED FOR THESE SERVICES FURNISHED UNDER        THIS PLAN OF TREATMENT AND WHILE UNDER MY CARE     (Physician attestation of this document indicates review and certification of the therapy plan).                     Certification Date From:  05/31/22   Certification Date To:  08/29/22    Referring Provider:  Marcos Dalal    Initial Assessment        See Epic Evaluation SOC Date: 03/02/22

## 2022-07-29 ASSESSMENT — ENCOUNTER SYMPTOMS
NECK PAIN: 1
WHEEZING: 1
ARTHRALGIAS: 1
BACK PAIN: 1
MYALGIAS: 1
SKIN CHANGES: 1

## 2022-08-02 ENCOUNTER — DOCUMENTATION ONLY (OUTPATIENT)
Dept: SURGERY | Facility: CLINIC | Age: 42
End: 2022-08-02

## 2022-08-02 DIAGNOSIS — Q34.1 MEDIASTINAL CYST: Primary | ICD-10-CM

## 2022-08-02 NOTE — PROGRESS NOTES
Radiology Resident, Bipin Johnson, reached out to Thoracic Surgery regarding Chest MRI that was scheduled for 8/3/2022. Radiology recommending a T-spine MRI instead of Chest MRI, to visualize the lesion in question better with better resolution. Reviewed recommendations with JANNET Lin. T-spine MRI with and without contrast order entered. Bipin updated that order was changed and will follow through on radiology end.

## 2022-08-03 ENCOUNTER — ANCILLARY PROCEDURE (OUTPATIENT)
Dept: MRI IMAGING | Facility: CLINIC | Age: 42
End: 2022-08-03
Attending: CLINICAL NURSE SPECIALIST
Payer: COMMERCIAL

## 2022-08-03 ENCOUNTER — OFFICE VISIT (OUTPATIENT)
Dept: PULMONOLOGY | Facility: CLINIC | Age: 42
End: 2022-08-03
Attending: INTERNAL MEDICINE
Payer: COMMERCIAL

## 2022-08-03 VITALS — OXYGEN SATURATION: 97 % | SYSTOLIC BLOOD PRESSURE: 128 MMHG | HEART RATE: 78 BPM | DIASTOLIC BLOOD PRESSURE: 83 MMHG

## 2022-08-03 DIAGNOSIS — F17.200 NICOTINE DEPENDENCE, UNCOMPLICATED, UNSPECIFIED NICOTINE PRODUCT TYPE: Primary | ICD-10-CM

## 2022-08-03 DIAGNOSIS — Q34.1 MEDIASTINAL CYST: ICD-10-CM

## 2022-08-03 PROCEDURE — G0463 HOSPITAL OUTPT CLINIC VISIT: HCPCS

## 2022-08-03 PROCEDURE — A9585 GADOBUTROL INJECTION: HCPCS | Performed by: RADIOLOGY

## 2022-08-03 PROCEDURE — 99213 OFFICE O/P EST LOW 20 MIN: CPT | Performed by: INTERNAL MEDICINE

## 2022-08-03 PROCEDURE — 72157 MRI CHEST SPINE W/O & W/DYE: CPT | Performed by: RADIOLOGY

## 2022-08-03 RX ORDER — GADOBUTROL 604.72 MG/ML
10 INJECTION INTRAVENOUS ONCE
Status: COMPLETED | OUTPATIENT
Start: 2022-08-03 | End: 2022-08-03

## 2022-08-03 RX ADMIN — GADOBUTROL 10 ML: 604.72 INJECTION INTRAVENOUS at 11:03

## 2022-08-03 ASSESSMENT — ENCOUNTER SYMPTOMS
WHEEZING: 1
NECK PAIN: 1
BACK PAIN: 1
MYALGIAS: 1

## 2022-08-03 NOTE — LETTER
8/3/2022          RE: Cathy Arroyo  [address]  [place] [address]        Dear Colleague,    Thank you for referring your patient, Cathy Arroyo, to the [facility] [place]. Please see a copy of my visit note below.    Pulmonology Clinic Follow up Visit       Cathy Arroyo MRN# [id number]   YOB: 1980 Age: 41 year old     Date of Visit: 8/3/2022    Reason for Visit: Follow-up asthma and tobacco use          Assessment and Plan:     Cathy Arroyo is a 41 year old female with H/O environmental allergies, anxiety, bipolar type I, obesity, asthma, and tobacco use who presents to establish care for smoking cessation and asthma treatment.      ## Asthma  ## Environmental allergies    Poorly controlled with persistent cough, wheezing, and dyspnea.  She recognizes that her symptoms improve when she abstains from cigarettes and consistently uses her maintenance inhaler, however she has trouble with these things.  Following her last visit she was consistently using her Advair for some time, however she recently moved and excellently packed her inhaler and forgot to resume taking it.  We again discussed mechanisms to help remind her to use it such as keeping the inhaler in a visible place like the kitchen counter or next-door toothbrush.  I also advised her to try setting an alarm on her phone as a reminder.  Eosinophils have been normal in the past, she has not had an IgE checked.  -Continue Advair  -Continue as needed albuterol      ## Tobacco abuse  She started smoking socially at age 20, then increased to a pack a day at age 27.  She was able to quit during her pregnancy, but resumed afterwards.  She has been able to cut down to half a pack a day but has not been able to completely quit.  She tried Chantix but developed jose eduardo due to her underlying bipolar disorder, has tried nicotine patches but reacted to the adhesive, tried  nicotine lozenges but had an anaphylactic type reaction.  Tried nicotine inhalers but this causes significant coughing.  She is interested in smoking cessation stating that she wants to be alive and healthy for her children, and also that she would like to begin singing again.  She is going to pursue hypnosis.        Return to clinic: 6 months      I personally spent 27 minutes on the date of the encounter doing chart review, history and exam, documentation and further activities per the note.      Scott Egan M.D.  Pulmonary & Critical Care  Pager: Click Here to page          History of Present Illness / Interval History:     Cathy Arroyo is a 41 year old female with H/O environmental allergies, anxiety, bipolar type I, obesity, asthma, and tobacco use who presents to establish care for smoking cessation and asthma treatment.    Last seen on 3/7/2022 when she established care.    She feels that her breathing is a bit worse than previous, but admits that she continues smoking and has not been using her inhaler.  She is trying to quit smoking, she used the inhalers for short time, however they caused significant coughing.  She is unable to use other forms of nicotine replacement due to a rash from adhesive when using nicotine patches, anaphylactic type reaction with nicotine lozenges, and jose eduardo when using Chantix.  At this point she is saving her money to try hypnosis for smoking cessation.  She notes that she is dedicated to smoking cessation.  She wants to be around for her children, and also would like to start taking voice lessons but needs to quit smoking first.    For short time after our last visit she was consistent with her Advair and felt that her breathing was much improved, however she recently moved and ended up packing her inhaler and then forgot to restart using it.  After discussion today she says she will start using it again.  She also reports that she has difficulty remembering to do  things if not right in front of her.                  Review of Systems:     Review of Systems   Respiratory: Positive for wheezing.    Musculoskeletal: Positive for back pain, myalgias and neck pain.            Physical Examination:     /83   Pulse 78   LMP 07/10/2022   SpO2 97%         Physical Exam  Vitals and nursing note reviewed.   Constitutional:       Appearance: She is obese.   Cardiovascular:      Rate and Rhythm: Normal rate and regular rhythm.      Heart sounds: No murmur heard.  Pulmonary:      Effort: Pulmonary effort is normal.      Breath sounds: Wheezing and rhonchi present. No rales.   Musculoskeletal:         General: Normal range of motion.      Right lower leg: No edema.      Left lower leg: No edema.   Skin:     General: Skin is warm and dry.   Neurological:      General: No focal deficit present.      Mental Status: She is alert and oriented to person, place, and time.       Fingernails without clubbing        Data:     No pertinent new data since last seen        Medications:     Current Outpatient Medications   Medication     albuterol (PROAIR HFA/PROVENTIL HFA/VENTOLIN HFA) 108 (90 Base) MCG/ACT inhaler     amphetamine-dextroamphetamine (ADDERALL XR) 20 MG 24 hr capsule     ARIPiprazole (ABILIFY) 10 MG tablet     cetirizine (ZYRTEC) 10 MG tablet     diazepam (VALIUM) 5 MG tablet     fexofenadine (ALLEGRA) 180 MG tablet     fexofenadine (ALLEGRA) 180 MG tablet     fluticasone (FLONASE) 50 MCG/ACT nasal spray     fluticasone-salmeterol (ADVAIR HFA) 230-21 MCG/ACT inhaler     ipratropium - albuterol 0.5 mg/2.5 mg/3 mL (DUONEB) 0.5-2.5 (3) MG/3ML neb solution     levothyroxine (SYNTHROID/LEVOTHROID) 175 MCG tablet     medical cannabis (Patient's own supply)     medical cannabis (Patient's own supply)     montelukast (SINGULAIR) 10 MG tablet     multivitamin w/minerals (THERA-VIT-M) tablet     nicotine (NICOTROL) 10 MG inhaler     ondansetron (ZOFRAN-ODT) 4 MG ODT tab      prochlorperazine (COMPAZINE) 10 MG tablet     rizatriptan (MAXALT-MLT) 10 MG ODT     tiZANidine (ZANAFLEX) 4 MG tablet     VENTOLIN  (90 Base) MCG/ACT inhaler     vitamin D3 (CHOLECALCIFEROL) 50 mcg (2000 units) tablet     No current facility-administered medications for this visit.       The above note was dictated using voice recognition software and may include typographical errors. Please contact the author for any clarifications.      Again, thank you for allowing me to participate in the care of your patient.        Sincerely,        Scott Egan MD

## 2022-08-03 NOTE — DISCHARGE INSTRUCTIONS
MRI Contrast Discharge Instructions    The IV contrast you received today will pass out of your body in your  urine. This will happen in the next 24 hours. You will not feel this process.  Your urine will not change color.    Drink at least 4 extra glasses of water or juice today (unless your doctor  has restricted your fluids). This reduces the stress on your kidneys.  You may take your regular medicines.    If you are on dialysis: It is best to have dialysis today.    If you have a reaction: Most reactions happen right away. If you have  any new symptoms after leaving the hospital (such as hives or swelling),  call your hospital at the correct number below. Or call your family doctor.  If you have breathing distress or wheezing, call 911.    Special instructions: ***    I have read and understand the above information.    Signature:______________________________________ Date:___________    Staff:__________________________________________ Date:___________     Time:__________    [facility] Departments:    ___[place]: [phone]  ___[facility]: [phone]  ___[place]: [phone] ___[facility]: [phone]  ___[facility]: [phone]  ___[facility]: [phone]  ___[phone]  ___[facility]: [phone]  ___[phone]

## 2022-08-03 NOTE — PROGRESS NOTES
Pulmonology Clinic Follow up Visit       Cathy Arroyo MRN# [id number]   YOB: 1980 Age: 41 year old     Date of Visit: 8/3/2022    Reason for Visit: Follow-up asthma and tobacco use          Assessment and Plan:     Cathy Arroyo is a 41 year old female with H/O environmental allergies, anxiety, bipolar type I, obesity, asthma, and tobacco use who presents to establish care for smoking cessation and asthma treatment.      ## Asthma  ## Environmental allergies    Poorly controlled with persistent cough, wheezing, and dyspnea.  She recognizes that her symptoms improve when she abstains from cigarettes and consistently uses her maintenance inhaler, however she has trouble with these things.  Following her last visit she was consistently using her Advair for some time, however she recently moved and excellently packed her inhaler and forgot to resume taking it.  We again discussed mechanisms to help remind her to use it such as keeping the inhaler in a visible place like the kitchen counter or next-door toothbrush.  I also advised her to try setting an alarm on her phone as a reminder.  Eosinophils have been normal in the past, she has not had an IgE checked.  -Continue Advair  -Continue as needed albuterol      ## Tobacco abuse  She started smoking socially at age 20, then increased to a pack a day at age 27.  She was able to quit during her pregnancy, but resumed afterwards.  She has been able to cut down to half a pack a day but has not been able to completely quit.  She tried Chantix but developed jose eduardo due to her underlying bipolar disorder, has tried nicotine patches but reacted to the adhesive, tried nicotine lozenges but had an anaphylactic type reaction.  Tried nicotine inhalers but this causes significant coughing.  She is interested in smoking cessation stating that she wants to be alive and healthy for her children, and also that she would like to begin singing again.  She is going to  pursue hypnosis.        Return to clinic: 6 months      I personally spent 27 minutes on the date of the encounter doing chart review, history and exam, documentation and further activities per the note.      Scott Egan M.D.  Pulmonary & Critical Care  Pager: Click Here to page          History of Present Illness / Interval History:     Cathy Arroyo is a 41 year old female with H/O environmental allergies, anxiety, bipolar type I, obesity, asthma, and tobacco use who presents to establish care for smoking cessation and asthma treatment.    Last seen on 3/7/2022 when she established care.    She feels that her breathing is a bit worse than previous, but admits that she continues smoking and has not been using her inhaler.  She is trying to quit smoking, she used the inhalers for short time, however they caused significant coughing.  She is unable to use other forms of nicotine replacement due to a rash from adhesive when using nicotine patches, anaphylactic type reaction with nicotine lozenges, and jose eduardo when using Chantix.  At this point she is saving her money to try hypnosis for smoking cessation.  She notes that she is dedicated to smoking cessation.  She wants to be around for her children, and also would like to start taking voice lessons but needs to quit smoking first.    For short time after our last visit she was consistent with her Advair and felt that her breathing was much improved, however she recently moved and ended up packing her inhaler and then forgot to restart using it.  After discussion today she says she will start using it again.  She also reports that she has difficulty remembering to do things if not right in front of her.                  Review of Systems:     Review of Systems   Respiratory: Positive for wheezing.    Musculoskeletal: Positive for back pain, myalgias and neck pain.            Physical Examination:     /83   Pulse 78   LMP 07/10/2022   SpO2 97%          Physical Exam  Vitals and nursing note reviewed.   Constitutional:       Appearance: She is obese.   Cardiovascular:      Rate and Rhythm: Normal rate and regular rhythm.      Heart sounds: No murmur heard.  Pulmonary:      Effort: Pulmonary effort is normal.      Breath sounds: Wheezing and rhonchi present. No rales.   Musculoskeletal:         General: Normal range of motion.      Right lower leg: No edema.      Left lower leg: No edema.   Skin:     General: Skin is warm and dry.   Neurological:      General: No focal deficit present.      Mental Status: She is alert and oriented to person, place, and time.       Fingernails without clubbing        Data:     No pertinent new data since last seen        Medications:     Current Outpatient Medications   Medication     albuterol (PROAIR HFA/PROVENTIL HFA/VENTOLIN HFA) 108 (90 Base) MCG/ACT inhaler     amphetamine-dextroamphetamine (ADDERALL XR) 20 MG 24 hr capsule     ARIPiprazole (ABILIFY) 10 MG tablet     cetirizine (ZYRTEC) 10 MG tablet     diazepam (VALIUM) 5 MG tablet     fexofenadine (ALLEGRA) 180 MG tablet     fexofenadine (ALLEGRA) 180 MG tablet     fluticasone (FLONASE) 50 MCG/ACT nasal spray     fluticasone-salmeterol (ADVAIR HFA) 230-21 MCG/ACT inhaler     ipratropium - albuterol 0.5 mg/2.5 mg/3 mL (DUONEB) 0.5-2.5 (3) MG/3ML neb solution     levothyroxine (SYNTHROID/LEVOTHROID) 175 MCG tablet     medical cannabis (Patient's own supply)     medical cannabis (Patient's own supply)     montelukast (SINGULAIR) 10 MG tablet     multivitamin w/minerals (THERA-VIT-M) tablet     nicotine (NICOTROL) 10 MG inhaler     ondansetron (ZOFRAN-ODT) 4 MG ODT tab     prochlorperazine (COMPAZINE) 10 MG tablet     rizatriptan (MAXALT-MLT) 10 MG ODT     tiZANidine (ZANAFLEX) 4 MG tablet     VENTOLIN  (90 Base) MCG/ACT inhaler     vitamin D3 (CHOLECALCIFEROL) 50 mcg (2000 units) tablet     No current facility-administered medications for this visit.              The above note was dictated using voice recognition software and may include typographical errors. Please contact the author for any clarifications.

## 2022-08-03 NOTE — NURSING NOTE
Chief Complaint   Patient presents with     Follow Up     3mo f/u     Vitals were taken and medications were reconciled.     XANDER Olmos       normal...

## 2022-08-04 DIAGNOSIS — R93.89 ABNORMAL MRI: Primary | ICD-10-CM

## 2022-08-05 ENCOUNTER — TELEPHONE (OUTPATIENT)
Dept: NEUROSURGERY | Facility: CLINIC | Age: 42
End: 2022-08-05

## 2022-08-09 NOTE — TELEPHONE ENCOUNTER
Called patient and shared that a neurosurgeon was reviewing her images and we would get back to her to schedule.    Catalina Ashley

## 2022-08-10 DIAGNOSIS — M25.559 HIP PAIN: Primary | ICD-10-CM

## 2022-08-16 ENCOUNTER — PRE VISIT (OUTPATIENT)
Dept: ORTHOPEDICS | Facility: CLINIC | Age: 42
End: 2022-08-16

## 2022-08-20 ENCOUNTER — MYC MEDICAL ADVICE (OUTPATIENT)
Dept: FAMILY MEDICINE | Facility: CLINIC | Age: 42
End: 2022-08-20

## 2022-08-22 NOTE — TELEPHONE ENCOUNTER
Zamzam    See pt Mycjo[employer] message, I added it to her appointment note    Cristin Landry RN   [facility]

## 2022-08-25 DIAGNOSIS — M25.559 HIP PAIN: Primary | ICD-10-CM

## 2022-08-30 ENCOUNTER — OFFICE VISIT (OUTPATIENT)
Dept: ORTHOPEDICS | Facility: CLINIC | Age: 42
End: 2022-08-30
Payer: COMMERCIAL

## 2022-08-30 VITALS — WEIGHT: 236 LBS | BODY MASS INDEX: 35.77 KG/M2 | HEIGHT: 68 IN

## 2022-08-30 DIAGNOSIS — M76.01 GLUTEAL TENDINITIS OF BOTH BUTTOCKS: Primary | ICD-10-CM

## 2022-08-30 DIAGNOSIS — M76.02 GLUTEAL TENDINITIS OF BOTH BUTTOCKS: Primary | ICD-10-CM

## 2022-08-30 PROCEDURE — 99214 OFFICE O/P EST MOD 30 MIN: CPT | Performed by: FAMILY MEDICINE

## 2022-08-30 NOTE — PROGRESS NOTES
"HISTORY OF PRESENT ILLNESS  Ms. Arroyo is a pleasant 41 year old female here with bilateral hip pain.  Cathy reports bilateral hip pain for months, typically the right is worse than the left. Denies any injury. Pain has worsened since 3 months ago, she recently moved and now has stairs. She's also been increasing her physical activity by walking. The pain is located over the buttocks area. Pain is worse with walking, stairs, prolonged sitting and standing. Feels like she's had sciatica symptoms, unsure which leg or if it was bilaterally. She uses cannabis for her pain. She has been to physical therapy for bilateral hips, 2-3 weeks ago and has another appointment tomorrow. She's had several injections into her lumbar spine, but never her bilateral hips.      PHYSICAL EXAM  Vitals:    08/30/22 1313   Weight: 107 kg (236 lb)   Height: 1.725 m (5' 7.91\")     General  - normal appearance, in no obvious distress  Musculoskeletal - right and left hip  - stance: normal gait without limp  - inspection: no swelling or effusion, normal bone and joint alignment, no obvious deformity  - palpation: tender over the greater trochanter, gluteal musculature bilaterally  - ROM: pain with active abduction, normal and painless flexion, extension, IR, ER  - strength: 5/5 in all planes  Neuro  - no sensory or motor deficit, grossly normal coordination, normal muscle tone          ASSESSMENT & PLAN  Ms. Arroyo is a 41 year old female here with pain.    I ordered & independently reviewed x-rays of her hips, these show no obvious acute or chronic issues.    Her symptoms are consistent with gluteal tendinopathy, which is consistent with her recent increase in activity and relatively sedentary lifestyle prior to this.    We did discuss that physical therapy will very likely help with her symptoms, she is very interested in this.    If she does not find any relief whatsoever with PT over the coming 4 to 6 weeks I would likely recommend MRI " imaging.    It was a pleasure seeing Cathy.        Marcos Dalal DO, ZO      This note was constructed using Dragon dictation software, please excuse any minor errors in spelling, grammar, or syntax.

## 2022-08-30 NOTE — LETTER
"  8/30/2022      RE: Cathy Arroyo  [address] [facility] [address]     Dear Colleague,    Thank you for referring your patient, Cathy Arroyo, to the [facility] [place]. Please see a copy of my visit note below.    HISTORY OF PRESENT ILLNESS  Ms. Arroyo is a pleasant 41 year old female here with bilateral hip pain.  Cathy reports bilateral hip pain for months, typically the right is worse than the left. Denies any injury. Pain has worsened since 3 months ago, she recently moved and now has stairs. She's also been increasing her physical activity by walking. The pain is located over the buttocks area. Pain is worse with walking, stairs, prolonged sitting and standing. Feels like she's had sciatica symptoms, unsure which leg or if it was bilaterally. She uses cannabis for her pain. She has been to physical therapy for bilateral hips, 2-3 weeks ago and has another appointment tomorrow. She's had several injections into her lumbar spine, but never her bilateral hips.      PHYSICAL EXAM  Vitals:    08/30/22 1313   Weight: 107 kg (236 lb)   Height: 1.725 m (5' 7.91\")     General  - normal appearance, in no obvious distress  Musculoskeletal - right and left hip  - stance: normal gait without limp  - inspection: no swelling or effusion, normal bone and joint alignment, no obvious deformity  - palpation: tender over the greater trochanter, gluteal musculature bilaterally  - ROM: pain with active abduction, normal and painless flexion, extension, IR, ER  - strength: 5/5 in all planes  Neuro  - no sensory or motor deficit, grossly normal coordination, normal muscle tone          ASSESSMENT & PLAN  Ms. Arroyo is a 41 year old female here with pain.    I ordered & independently reviewed x-rays of her hips, these show no obvious acute or chronic issues.    Her symptoms are consistent with gluteal tendinopathy, which is consistent with her recent increase in activity and " relatively sedentary lifestyle prior to this.    We did discuss that physical therapy will very likely help with her symptoms, she is very interested in this.    If she does not find any relief whatsoever with PT over the coming 4 to 6 weeks I would likely recommend MRI imaging.    It was a pleasure seeing Cathy.        Marcos Dalal DO, [facility]

## 2022-08-31 ENCOUNTER — THERAPY VISIT (OUTPATIENT)
Dept: PHYSICAL THERAPY | Facility: CLINIC | Age: 42
End: 2022-08-31
Payer: COMMERCIAL

## 2022-08-31 DIAGNOSIS — M54.2 NECK PAIN: ICD-10-CM

## 2022-08-31 DIAGNOSIS — M54.9 MID BACK PAIN: Primary | ICD-10-CM

## 2022-08-31 DIAGNOSIS — M54.50 ACUTE BILATERAL LOW BACK PAIN WITHOUT SCIATICA: ICD-10-CM

## 2022-08-31 PROCEDURE — 97110 THERAPEUTIC EXERCISES: CPT | Mod: GP | Performed by: PHYSICAL THERAPIST

## 2022-08-31 PROCEDURE — 97530 THERAPEUTIC ACTIVITIES: CPT | Mod: GP | Performed by: PHYSICAL THERAPIST

## 2022-08-31 NOTE — PROGRESS NOTES
Diagnosis: Neck, thoracic, lower back   Precautions/Restrictions/MD instructions/Other pertinent hx none    Therapist Impression:   Cathy presents with L shoulder pain after waking up with not being able to move it today and B glute pain.  L glute demonstrates greater strength impairments compared to R which may be contributing to her pain.  Initiated strengthening to address this    GOALS: Hair cutting    NEXT: strengthening    PTRX: none    Subjective:  Woke up with shoulder pain today and inability to lift arm without pain.  Pain is in upper glutes.  Usually 3/10 pain.    Objective:  Hip and Knee Strength   MMT Left Right   Hip Abduction 5/5 4+/5   Hip Extension 5-/5 4+/5   Hip ER 5/5 5-/5          Able to lift arm up OH but painful from  deg and less pain once up [place]

## 2022-09-12 ENCOUNTER — MYC MEDICAL ADVICE (OUTPATIENT)
Dept: FAMILY MEDICINE | Facility: CLINIC | Age: 42
End: 2022-09-12

## 2022-09-12 DIAGNOSIS — J45.40 MODERATE PERSISTENT ASTHMA WITHOUT COMPLICATION: ICD-10-CM

## 2022-09-12 DIAGNOSIS — J30.2 SEASONAL ALLERGIC RHINITIS, UNSPECIFIED TRIGGER: ICD-10-CM

## 2022-09-12 NOTE — TELEPHONE ENCOUNTER
Reason for Call:  Medication or medication refill:    Do you use a [facility]?  Name of the pharmacy and phone number for the current request:  [id number] [address] AT [address] (Pharmacy)    Name of the medication requested: montelukast (SINGULAIR) 10 MG tablet    Other request: N/A    Can we leave a detailed message on this number? YES    Phone number patient can be reached at: Cell number on file:    Telephone Information:   Mobile [phone]       Best Time: ANY    Call taken on 9/12/2022 at 1:09 PM by Patrizia Jordan MA

## 2022-09-16 ENCOUNTER — TELEPHONE (OUTPATIENT)
Dept: FAMILY MEDICINE | Facility: CLINIC | Age: 42
End: 2022-09-16

## 2022-09-16 DIAGNOSIS — J45.40 MODERATE PERSISTENT ASTHMA WITHOUT COMPLICATION: ICD-10-CM

## 2022-09-16 DIAGNOSIS — J30.81 ALLERGIC RHINITIS DUE TO ANIMALS: ICD-10-CM

## 2022-09-16 DIAGNOSIS — J30.2 SEASONAL ALLERGIC RHINITIS, UNSPECIFIED TRIGGER: ICD-10-CM

## 2022-09-16 RX ORDER — FEXOFENADINE HCL 180 MG/1
180 TABLET ORAL DAILY
Qty: 90 TABLET | Refills: 0 | Status: CANCELLED | OUTPATIENT
Start: 2022-09-16

## 2022-09-16 RX ORDER — CETIRIZINE HYDROCHLORIDE 10 MG/1
10 TABLET ORAL DAILY
Qty: 90 TABLET | Refills: 0 | Status: CANCELLED | OUTPATIENT
Start: 2022-09-16

## 2022-09-16 RX ORDER — MONTELUKAST SODIUM 10 MG/1
10 TABLET ORAL EVERY MORNING
Qty: 90 TABLET | Refills: 0 | Status: CANCELLED | OUTPATIENT
Start: 2022-09-16

## 2022-09-16 NOTE — TELEPHONE ENCOUNTER
Routing refill request to provider for review/approval because:  ACT not at 20    Cristin Landry RN   [facility]

## 2022-09-16 NOTE — TELEPHONE ENCOUNTER
Reason for Call:  Other - Prescription    Detailed comments: Patient is out of montelukast, zyrtec, and allegra.    Phone Number Patient can be reached at: Cell number on file:    Telephone Information:   Mobile [phone]       Best Time: Any    Can we leave a detailed message on this number? YES    Call taken on 9/16/2022 at 9:59 AM by CAMMY CRISOSTOMO

## 2022-09-19 RX ORDER — MONTELUKAST SODIUM 10 MG/1
10 TABLET ORAL EVERY MORNING
Qty: 90 TABLET | Refills: 0 | Status: SHIPPED | OUTPATIENT
Start: 2022-09-19 | End: 2022-11-15

## 2022-09-19 NOTE — TELEPHONE ENCOUNTER
Refill request sent to PCP and POD, see other encounter    Cristin Landry RN   [facility]

## 2022-09-25 ENCOUNTER — HOSPITAL ENCOUNTER (EMERGENCY)
Facility: CLINIC | Age: 42
Discharge: HOME | End: 2022-09-25
Attending: EMERGENCY MEDICINE | Admitting: EMERGENCY MEDICINE
Payer: COMMERCIAL

## 2022-09-25 ENCOUNTER — NURSE TRIAGE (OUTPATIENT)
Dept: NURSING | Facility: CLINIC | Age: 42
End: 2022-09-25

## 2022-09-25 ENCOUNTER — APPOINTMENT (OUTPATIENT)
Dept: GENERAL RADIOLOGY | Facility: CLINIC | Age: 42
End: 2022-09-25
Attending: EMERGENCY MEDICINE
Payer: COMMERCIAL

## 2022-09-25 VITALS
HEART RATE: 85 BPM | RESPIRATION RATE: 18 BRPM | SYSTOLIC BLOOD PRESSURE: 125 MMHG | DIASTOLIC BLOOD PRESSURE: 83 MMHG | TEMPERATURE: 98.4 F | BODY MASS INDEX: 35.88 KG/M2 | OXYGEN SATURATION: 97 % | HEIGHT: 68 IN

## 2022-09-25 DIAGNOSIS — J45.901 EXACERBATION OF ASTHMA, UNSPECIFIED ASTHMA SEVERITY, UNSPECIFIED WHETHER PERSISTENT: ICD-10-CM

## 2022-09-25 DIAGNOSIS — Z20.822 LAB TEST NEGATIVE FOR COVID-19 VIRUS: ICD-10-CM

## 2022-09-25 LAB
ANION GAP SERPL CALCULATED.3IONS-SCNC: 9 MMOL/L (ref 7–15)
BASOPHILS # BLD AUTO: 0 10E3/UL (ref 0–0.2)
BASOPHILS NFR BLD AUTO: 0 %
BUN SERPL-MCNC: 13.7 MG/DL (ref 6–20)
CALCIUM SERPL-MCNC: 9.3 MG/DL (ref 8.6–10)
CHLORIDE SERPL-SCNC: 105 MMOL/L (ref 98–107)
CREAT SERPL-MCNC: 0.67 MG/DL (ref 0.51–0.95)
D DIMER PPP FEU-MCNC: 0.49 UG/ML FEU (ref 0–0.5)
DEPRECATED HCO3 PLAS-SCNC: 24 MMOL/L (ref 22–29)
EOSINOPHIL # BLD AUTO: 0.2 10E3/UL (ref 0–0.7)
EOSINOPHIL NFR BLD AUTO: 2 %
ERYTHROCYTE [DISTWIDTH] IN BLOOD BY AUTOMATED COUNT: 13.4 % (ref 10–15)
FLUAV RNA SPEC QL NAA+PROBE: NEGATIVE
FLUBV RNA RESP QL NAA+PROBE: NEGATIVE
GFR SERPL CREATININE-BSD FRML MDRD: >90 ML/MIN/1.73M2
GLUCOSE SERPL-MCNC: 99 MG/DL (ref 70–99)
HCG SERPL QL: NEGATIVE
HCO3 BLDV-SCNC: 27 MMOL/L (ref 21–28)
HCT VFR BLD AUTO: 40.6 % (ref 35–47)
HGB BLD-MCNC: 13.5 G/DL (ref 11.7–15.7)
IMM GRANULOCYTES # BLD: 0 10E3/UL
IMM GRANULOCYTES NFR BLD: 0 %
LACTATE BLD-SCNC: 0.8 MMOL/L
LYMPHOCYTES # BLD AUTO: 2.8 10E3/UL (ref 0.8–5.3)
LYMPHOCYTES NFR BLD AUTO: 28 %
MCH RBC QN AUTO: 28.8 PG (ref 26.5–33)
MCHC RBC AUTO-ENTMCNC: 33.3 G/DL (ref 31.5–36.5)
MCV RBC AUTO: 87 FL (ref 78–100)
MONOCYTES # BLD AUTO: 0.6 10E3/UL (ref 0–1.3)
MONOCYTES NFR BLD AUTO: 6 %
NEUTROPHILS # BLD AUTO: 6.2 10E3/UL (ref 1.6–8.3)
NEUTROPHILS NFR BLD AUTO: 64 %
NRBC # BLD AUTO: 0 10E3/UL
NRBC BLD AUTO-RTO: 0 /100
PCO2 BLDV: 42 MM HG (ref 40–50)
PH BLDV: 7.42 [PH] (ref 7.32–7.43)
PLATELET # BLD AUTO: 339 10E3/UL (ref 150–450)
PO2 BLDV: 28 MM HG (ref 25–47)
POTASSIUM SERPL-SCNC: 3.7 MMOL/L (ref 3.4–5.3)
RBC # BLD AUTO: 4.68 10E6/UL (ref 3.8–5.2)
RSV RNA SPEC NAA+PROBE: NEGATIVE
SAO2 % BLDV: 54 % (ref 94–100)
SARS-COV-2 RNA RESP QL NAA+PROBE: NEGATIVE
SODIUM SERPL-SCNC: 138 MMOL/L (ref 136–145)
TROPONIN T SERPL HS-MCNC: <6 NG/L
TSH SERPL DL<=0.005 MIU/L-ACNC: 2.12 UIU/ML (ref 0.3–4.2)
WBC # BLD AUTO: 9.8 10E3/UL (ref 4–11)

## 2022-09-25 PROCEDURE — 85379 FIBRIN DEGRADATION QUANT: CPT | Performed by: EMERGENCY MEDICINE

## 2022-09-25 PROCEDURE — 71046 X-RAY EXAM CHEST 2 VIEWS: CPT | Mod: 26 | Performed by: RADIOLOGY

## 2022-09-25 PROCEDURE — 99285 EMERGENCY DEPT VISIT HI MDM: CPT | Mod: CS | Performed by: EMERGENCY MEDICINE

## 2022-09-25 PROCEDURE — 80048 BASIC METABOLIC PNL TOTAL CA: CPT | Performed by: EMERGENCY MEDICINE

## 2022-09-25 PROCEDURE — 36415 COLL VENOUS BLD VENIPUNCTURE: CPT | Performed by: EMERGENCY MEDICINE

## 2022-09-25 PROCEDURE — 84443 ASSAY THYROID STIM HORMONE: CPT | Performed by: EMERGENCY MEDICINE

## 2022-09-25 PROCEDURE — 85025 COMPLETE CBC W/AUTO DIFF WBC: CPT | Performed by: EMERGENCY MEDICINE

## 2022-09-25 PROCEDURE — 250N000013 HC RX MED GY IP 250 OP 250 PS 637: Performed by: EMERGENCY MEDICINE

## 2022-09-25 PROCEDURE — 84484 ASSAY OF TROPONIN QUANT: CPT | Performed by: EMERGENCY MEDICINE

## 2022-09-25 PROCEDURE — 82803 BLOOD GASES ANY COMBINATION: CPT

## 2022-09-25 PROCEDURE — 99285 EMERGENCY DEPT VISIT HI MDM: CPT | Mod: CS,25 | Performed by: EMERGENCY MEDICINE

## 2022-09-25 PROCEDURE — 71046 X-RAY EXAM CHEST 2 VIEWS: CPT

## 2022-09-25 PROCEDURE — 93005 ELECTROCARDIOGRAM TRACING: CPT | Performed by: EMERGENCY MEDICINE

## 2022-09-25 PROCEDURE — C9803 HOPD COVID-19 SPEC COLLECT: HCPCS | Performed by: EMERGENCY MEDICINE

## 2022-09-25 PROCEDURE — 93010 ELECTROCARDIOGRAM REPORT: CPT | Performed by: EMERGENCY MEDICINE

## 2022-09-25 PROCEDURE — 84703 CHORIONIC GONADOTROPIN ASSAY: CPT | Performed by: EMERGENCY MEDICINE

## 2022-09-25 PROCEDURE — 87637 SARSCOV2&INF A&B&RSV AMP PRB: CPT | Mod: 59 | Performed by: EMERGENCY MEDICINE

## 2022-09-25 PROCEDURE — 87637 SARSCOV2&INF A&B&RSV AMP PRB: CPT | Performed by: EMERGENCY MEDICINE

## 2022-09-25 RX ORDER — PREDNISONE 20 MG/1
20 TABLET ORAL DAILY
Qty: 5 TABLET | Refills: 0 | Status: SHIPPED | OUTPATIENT
Start: 2022-09-25 | End: 2022-09-30

## 2022-09-25 RX ORDER — ALBUTEROL SULFATE 90 UG/1
6 AEROSOL, METERED RESPIRATORY (INHALATION) ONCE
Status: COMPLETED | OUTPATIENT
Start: 2022-09-25 | End: 2022-09-25

## 2022-09-25 RX ORDER — IPRATROPIUM BROMIDE AND ALBUTEROL SULFATE 2.5; .5 MG/3ML; MG/3ML
3 SOLUTION RESPIRATORY (INHALATION) ONCE
Status: DISCONTINUED | OUTPATIENT
Start: 2022-09-25 | End: 2022-09-25

## 2022-09-25 RX ADMIN — ALBUTEROL SULFATE 6 PUFF: 90 AEROSOL, METERED RESPIRATORY (INHALATION) at 17:58

## 2022-09-25 ASSESSMENT — ACTIVITIES OF DAILY LIVING (ADL): ADLS_ACUITY_SCORE: 35

## 2022-09-25 NOTE — ED TRIAGE NOTES
Pt ambulatory to triage with complaints of sob unrelieved with inhalers. Was supposed to start prednisone but hasn't yet. Alert and oriented in triage. 100% on room air     Triage Assessment     Row Name 09/25/22 0084       Triage Assessment (Adult)    Airway WDL WDL       Respiratory WDL    Respiratory WDL X       Skin Circulation/Temperature WDL    Skin Circulation/Temperature WDL WDL       Cardiac WDL    Cardiac WDL WDL       Peripheral/Neurovascular WDL    Peripheral Neurovascular WDL WDL       Cognitive/Neuro/Behavioral WDL    Cognitive/Neuro/Behavioral WDL WDL

## 2022-09-25 NOTE — TELEPHONE ENCOUNTER
"Clinic Action Needed:Yes, per on call provider message routed to Pulmonology requesting to follow up with patient on 9/26/22 to see if she is improving.    Situation:   Patient reporting \"asthma symptoms\" starting in past 2 weeks.      Background:   History of asthma. Typically on steroids 2-3 times a year.  Patient unable to tolerated steroid pack due to bipolar. Takes Prednisone 20 mg x 5 days.     Assessment:     Symptoms starting 2 weeks ago with \"barky cough.\"  Patient is using Albuterol every 4 hours. Not responding neb. Using steroid inhaler. \"Nothing seems to be getting to lungs.\"  Wheezing \"worse at night.\"  \"Hearing crackles.\"   Shortness of breath with exertion.   O2 sats 95%. Reporting O2 sats yesterday 93% 1 hour after neb treatment.   COVID 19 negative. Patient reporting she is unable to tolerate steroid dose pack due to Bipolar. Reporting in past she has taken Prednisone 20 mg tabs 1 a day x 5 days.  O2 sats after neb 93% 1 hour    Patient stating she does have Prednisone 20 mg (5 tablets) at home which is the typical dose. Questioning starting this medication.    Protocol Recommended Disposition:   See PCP Within 24 Hours     Recommendation:   Please advise if ok starting Prednisone.     Paged to provider  1138 a.m. paged Dr Zuleima Bales through [employer] to call Aliya at ..    Dr Bales returned page advised patient is ok to start Prednisone and monitor symptoms. Advised to be seen in Urgent Care if symptoms increased or patient developed a fever.   Advised to follow up with Pulmonology 9/26/22. Requesting nurse route message to Pulmonology.    Placed call to patient at  voice mail identified as Cathy. Detailed message left per patient request to start Prednisone, be seen in Urgent Care if fever develops or symptoms worsen. Advised to contact Pulmonology 9/26/22.              Reason for Disposition    [1] MILD asthma attack (e.g., no SOB at rest, mild SOB " with walking, speaks normally in sentences, mild wheezing) AND [2] lasting > 24 hours on prescribed treatment    Additional Information    Negative: SEVERE asthma attack (e.g., struggling for each breath, speaks in single words, loud wheezes, pulse >120)  (RED  Zone)    Negative: Bluish (or gray) lips or face now    Negative: Difficult to awaken or acting confused (e.g., disoriented, slurred speech)    Negative: Passed out (i.e., lost consciousness, collapsed and was not responding)    Negative: Wheezing started suddenly after medicine, an allergic food, or bee sting    Negative: Sounds like a life-threatening emergency to the triager    Negative: [1] MODERATE asthma attack (e.g., SOB at rest, speaks in phrases, audible wheezes) AND [2] doesn't have neb or inhaler available    Negative: Peak flow rate less than 50% of baseline level  (RED  Zone)    Negative: Oxygen level (e.g., pulse oximetry) 90 percent or lower    Negative: [1] Hospitalized before with asthma AND [2] feels the same now    Negative: Chest pain    Negative: [1] MODERATE asthma attack (e.g., SOB at rest, speaks in phrases, audible wheezes) AND [2] not resolved after 2 or 3 inhaler or nebulizer treatments given 20 minutes apart    Negative: [1] Peak flow rate 50-79% of baseline level (YELLOW zone) AND [2] still present after 2 or 3 inhaler or nebulizer treatments given 20 minutes apart    Negative: Quick-relief asthma medicine (e.g., albuterol /salbutamol, levalbuterol by inhaler or nebulizer) is needed more frequently than every 4 hours to keep you comfortable    Negative: Fever > 103 F (39.4 C)    Negative: Patient sounds very sick or weak to the triager    Negative: [1] Continuous (nonstop) coughing AND [2] keeps from working or sleeping AND [3] not improved after 2 or 3 inhaler or nebulizer treatments given 20 minutes apart    Negative: [1] Fever > 101 F (38.3 C) AND [2] age > 60 years    Negative: [1] Wheezing or coughing AND [2] hasn't used neb  or inhaler (up to 3 treatments given 20 minutes apart) AND [3] it's available    Negative: [1] COVID-19 diagnosed or suspected (e.g., COVID-19 present in community, known COVID-19 exposure, positive test) AND [2] COVID symptoms (e.g., cough, fever)    Negative: [1] Influenza diagnosed or suspected (e.g., flu present in the community, known flu exposure) AND [2] FLU symptoms (e.g., cough with fever)    Negative: Oxygen level (e.g., pulse oximetry) 91 to 94 percent    Protocols used: ASTHMA ATTACK-A-AH

## 2022-09-25 NOTE — ED PROVIDER NOTES
History     Chief Complaint   Patient presents with     Shortness of Breath     HPI  Cathy Arroyo is a 41 year old female with a past medical history of allergic rhinitis, bipolar disorder, ADD, pineocytoma, asthma tobacco use, migraine who presents to the emergency department with a chief complaint of shortness of breath.  Started 2 weeks ago.  Associated with cough.  The patient has been using her home inhalers with some improvement.  However her symptoms have worsened despite this treatment.  She reports cough is barky in nature and productive.  No hemoptysis.  The patient denies associated fevers or chest pain.  No leg pain or swelling.  The patient denies any history of PE/DVT.  No known sick contacts.  The patient is vaccinated against COVID-19 infection and did take a home COVID test which was negative.  The patient is not currently on any steroids.  Patient states that she has a history of asthma flares that occur around this time of year, but they are not typically this severe.  The patient does take Zyrtec and Allegra daily.    I have reviewed the Medications, Allergies, Past Medical and Surgical History, and Social History in the [employer] system.    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     Post partum depression      Post-surgical hypothyroidism 2017     Uncomplicated asthma      Past Surgical History:   Procedure Laterality Date     BIOPSY  29 y/o colpos, and in the last 2 years for thyroid    Colposcopy, thyroid nodule biopsy twice     BLOOD PATCH N/A 10/11/2016    Procedure: EPIDURAL BLOOD PATCH;  Surgeon: GENERIC ANESTHESIA PROVIDER;  Location: [facility] OR      SECTION, TUBAL LIGATION, COMBINED N/A 2019    Procedure:  SECTION, WITH TUBAL LIGATION;  Surgeon: Aamir  Kathy Rodrigez MD;  Location: [facility] L+D     CRANIOTOMY, EXCISE TUMOR COMPLEX, COMBINED  2/19/2014    Procedure: COMBINED CRANIOTOMY, EXCISE TUMOR COMPLEX;  Supracerabellar  Infratentorial Approach for Resection of Tumor ;  Surgeon: Kate Mckeon MD;  Location: [facility] OR     THYROIDECTOMY N/A 5/3/2017    Procedure: THYROIDECTOMY;  Total Thyroidectomy;  Surgeon: Pamela Villasenor MD;  Location:  OR     No current facility-administered medications for this encounter.     Current Outpatient Medications   Medication     albuterol (PROAIR HFA/PROVENTIL HFA/VENTOLIN HFA) 108 (90 Base) MCG/ACT inhaler     amphetamine-dextroamphetamine (ADDERALL XR) 20 MG 24 hr capsule     ARIPiprazole (ABILIFY) 10 MG tablet     cetirizine (ZYRTEC) 10 MG tablet     diazepam (VALIUM) 5 MG tablet     fexofenadine (ALLEGRA) 180 MG tablet     fexofenadine (ALLEGRA) 180 MG tablet     fluticasone (FLONASE) 50 MCG/ACT nasal spray     fluticasone-salmeterol (ADVAIR HFA) 230-21 MCG/ACT inhaler     ipratropium - albuterol 0.5 mg/2.5 mg/3 mL (DUONEB) 0.5-2.5 (3) MG/3ML neb solution     levothyroxine (SYNTHROID/LEVOTHROID) 175 MCG tablet     medical cannabis (Patient's own supply)     medical cannabis (Patient's own supply)     montelukast (SINGULAIR) 10 MG tablet     multivitamin w/minerals (THERA-VIT-M) tablet     nicotine (NICOTROL) 10 MG inhaler     ondansetron (ZOFRAN-ODT) 4 MG ODT tab     prochlorperazine (COMPAZINE) 10 MG tablet     rizatriptan (MAXALT-MLT) 10 MG ODT     tiZANidine (ZANAFLEX) 4 MG tablet     VENTOLIN  (90 Base) MCG/ACT inhaler     vitamin D3 (CHOLECALCIFEROL) 50 mcg (2000 units) tablet     Allergies   Allergen Reactions     Adacel [Daptacel] Swelling     Adhesive Tape Hives     Ciprofloxacin      Causes visual hallucinations.     Codeine Sulfate Nausea and Vomiting     Nicoderm [Nicotine]      Patch and Gum, pt reported allergic reaction 6/24     Tegaderm Transparent Dressing (Informational Only) Hives      Tetanus Toxoid      Pt states she had an infection at injection site.      Vicodin [Hydrocodone-Acetaminophen] Nausea and Vomiting     Methimazole Rash     Past medical history, past surgical history, medications, and allergies were reviewed with the patient. Additional pertinent items: None    Social History     Socioeconomic History     Marital status:      Spouse name: Not on file     Number of children: Not on file     Years of education: Not on file     Highest education level: Not on file   Occupational History     Not on file   Tobacco Use     Smoking status: Current Every Day Smoker     Packs/day: 1.00     Years: 14.00     Pack years: 14.00     Types: Cigarettes     Start date: 2004     Last attempt to quit: 2022     Years since quittin.6     Smokeless tobacco: Never Used   Vaping Use     Vaping Use: Never used   Substance and Sexual Activity     Alcohol use: Yes     Alcohol/week: 4.0 standard drinks     Types: 4 Drinks containing 0.5 oz of alcohol per week     Drug use: Yes     Types: Marijuana     Comment: medical marijuana     Sexual activity: Yes     Partners: Male     Birth control/protection: Female Surgical   Other Topics Concern     Parent/sibling w/ CABG, MI or angioplasty before 65F 55M? No   Social History Narrative    Caffeine intake/servings daily - 1    Calcium intake/servings daily - 3    Exercise 5 times weekly - describe ; encouraged walking daily    Sunscreen used - Yes    Seatbelts used - Yes    Guns stored in the home - No    Self Breast Exam - Yes    Pap test up to date -  No    Eye exam up to date -  No    Dental exam up to date -  No    DEXA scan up to date -  No    Flex Sig/Colonoscopy up to date -  No    Mammography up to date -  No    Immunizations reviewed and up to date - Yes    Abuse: Current or Past (Physical, Sexual or Emotional) - No    Do you feel safe in your environment - Yes    Do you cope well with stress - Yes    Do you suffer from insomnia - No      "        Social Determinants of Health     Financial Resource Strain: Not on file   Food Insecurity: Not on file   Transportation Needs: Not on file   Physical Activity: Not on file   Stress: Not on file   Social Connections: Not on file   Intimate Partner Violence: Not on file   Housing Stability: Not on file     Social history was reviewed with the patient. Additional pertinent items: None    Review of Systems  General: No fevers or chills  Skin: No rash or diaphoresis  Eyes: No eye redness or discharge  Ears/Nose/Throat: No rhinorrhea or nasal congestion  Respiratory: See HPI  Cardiovascular: No chest pain or palpitations  Gastrointestinal: No nausea, vomiting, or diarrhea  Genitourinary: No urinary frequency, hematuria, or dysuria  Musculoskeletal: No arthralgias or myalgias  Neurologic: No numbness or weakness  Hematologic/Lymphatic/Immunologic: No leg swelling, no easy bruising/bleeding  Endocrine: No polyuria/polydypsia    A complete review of systems was performed with pertinent positives and negatives noted in the HPI, and all other systems negative.    Physical Exam   BP: (!) 147/89  Pulse: 90  Temp: 98.4  F (36.9  C)  Resp: 18  Height: 172.7 cm (5' 8\")  SpO2: 96 %      General: Well nourished, well developed, NAD  HEENT: EOMI, anicteric. NCAT, MMM  Neck: no jugular venous distension, supple, nl ROM  Cardiac: Regular rate and rhythm. No murmurs, rubs, or gallops. Normal S1, S2.  Intact peripheral pulses  Pulm: Coarse breath sounds and wheezes bilaterally, no stridor  Skin: Warm and dry to the touch.  No rash  Extremities: No LE edema, no cyanosis, w/w/p  Neuro: A&Ox3, no gross focal deficits    ED Course        Procedures            EKG Interpretation:      Interpreted by Za Saucedo MD  Time reviewed: 1905  Symptoms at time of EKG: Shortness of breath  Rhythm: normal sinus   Rate: normal, 84 bpm  Axis: normal  Ectopy: none  Conduction: normal  ST Segments/ T Waves: No ST-T wave changes  Q Waves: " none  Comparison to prior: Unchanged from 7/11/20    Clinical Impression: normal EKG                        Labs Ordered and Resulted from Time of ED Arrival to Time of ED Departure   ISTAT GASES LACTATE VENOUS POCT - Abnormal       Result Value    Lactic Acid POCT 0.8      Bicarbonate Venous POCT 27      O2 Sat, Venous POCT 54 (*)     pCO2V Venous POCT 42      pH Venous POCT 7.42      pO2 Venous POCT 28     D DIMER QUANTITATIVE - Normal    D-Dimer Quantitative 0.49     BASIC METABOLIC PANEL - Normal    Sodium 138      Potassium 3.7      Chloride 105      Carbon Dioxide (CO2) 24      Anion Gap 9      Urea Nitrogen 13.7      Creatinine 0.67      Calcium 9.3      Glucose 99      GFR Estimate >90     TROPONIN T, HIGH SENSITIVITY - Normal    Troponin T, High Sensitivity <6     INFLUENZA A/B & SARS-COV2 PCR MULTIPLEX - Normal    Influenza A PCR Negative      Influenza B PCR Negative      RSV PCR Negative      SARS CoV2 PCR Negative     TSH WITH FREE T4 REFLEX - Normal    TSH 2.12     HCG QUALITATIVE PREGNANCY - Normal    hCG Serum Qualitative Negative     CBC WITH PLATELETS AND DIFFERENTIAL    WBC Count 9.8      RBC Count 4.68      Hemoglobin 13.5      Hematocrit 40.6      MCV 87      MCH 28.8      MCHC 33.3      RDW 13.4      Platelet Count 339      % Neutrophils 64      % Lymphocytes 28      % Monocytes 6      % Eosinophils 2      % Basophils 0      % Immature Granulocytes 0      NRBCs per 100 WBC 0      Absolute Neutrophils 6.2      Absolute Lymphocytes 2.8      Absolute Monocytes 0.6      Absolute Eosinophils 0.2      Absolute Basophils 0.0      Absolute Immature Granulocytes 0.0      Absolute NRBCs 0.0     LACTIC ACID WHOLE BLOOD            Results for orders placed or performed during the hospital encounter of 09/25/22 (from the past 24 hour(s))   EKG 12-lead, tracing only   Result Value Ref Range    Systolic Blood Pressure  mmHg    Diastolic Blood Pressure  mmHg    Ventricular Rate 84 BPM    Atrial Rate 84 BPM     MT Interval 168 ms    QRS Duration 94 ms     ms    QTc 432 ms    P Axis 53 degrees    R AXIS 71 degrees    T Axis 32 degrees    Interpretation ECG       Sinus rhythm  Nonspecific ST abnormality  Abnormal ECG     CBC with platelets differential    Narrative    The following orders were created for panel order CBC with platelets differential.  Procedure                               Abnormality         Status                     ---------                               -----------         ------                     CBC with platelets and d...[[id number]]                      Final result                 Please view results for these tests on the individual orders.   D dimer quantitative   Result Value Ref Range    D-Dimer Quantitative 0.49 0.00 - 0.50 ug/mL FEU    Narrative    This D-dimer assay is intended for use in conjunction with a clinical pretest probability assessment model to exclude pulmonary embolism (PE) and deep venous thrombosis (DVT) in outpatients suspected of PE or DVT. The cut-off value is 0.50 ug/mL FEU.   Basic metabolic panel   Result Value Ref Range    Sodium 138 136 - 145 mmol/L    Potassium 3.7 3.4 - 5.3 mmol/L    Chloride 105 98 - 107 mmol/L    Carbon Dioxide (CO2) 24 22 - 29 mmol/L    Anion Gap 9 7 - 15 mmol/L    Urea Nitrogen 13.7 6.0 - 20.0 mg/dL    Creatinine 0.67 0.51 - 0.95 mg/dL    Calcium 9.3 8.6 - 10.0 mg/dL    Glucose 99 70 - 99 mg/dL    GFR Estimate >90 >60 mL/min/1.73m2   Troponin T, High Sensitivity   Result Value Ref Range    Troponin T, High Sensitivity <6 <=14 ng/L   Symptomatic; Unknown Influenza A/B & SARS-CoV2 (COVID-19) Virus PCR Multiplex Nasopharyngeal    Specimen: Nasopharyngeal; Swab   Result Value Ref Range    Influenza A PCR Negative Negative    Influenza B PCR Negative Negative    RSV PCR Negative Negative    SARS CoV2 PCR Negative Negative    Narrative    Testing was performed using the Xpert Xpress CoV2/Flu/RSV Assay on the [employer] Instrument. This  test should be ordered for the detection of SARS-CoV-2 and influenza viruses in individuals who meet clinical and/or epidemiological criteria. Test performance is unknown in asymptomatic patients. This test is for in vitro diagnostic use under the FDA EUA for laboratories certified under CLIA to perform high or moderate complexity testing. This test has not been FDA cleared or approved. A negative result does not rule out the presence of PCR inhibitors in the specimen or target RNA in concentration below the limit of detection for the assay. If only one viral target is positive but coinfection with multiple targets is suspected, the sample should be re-tested with another FDA cleared, approved, or authorized test, if coinfection would change clinical management. This test was validated by the [facility]. These laboratories are certified under the Clinical  Laboratory Improvement Amendments of 1988 (CLIA-88) as qualified to perform high complexity laboratory testing.   TSH with free T4 reflex   Result Value Ref Range    TSH 2.12 0.30 - 4.20 uIU/mL   HCG qualitative Blood   Result Value Ref Range    hCG Serum Qualitative Negative Negative   CBC with platelets and differential   Result Value Ref Range    WBC Count 9.8 4.0 - 11.0 10e3/uL    RBC Count 4.68 3.80 - 5.20 10e6/uL    Hemoglobin 13.5 11.7 - 15.7 g/dL    Hematocrit 40.6 35.0 - 47.0 %    MCV 87 78 - 100 fL    MCH 28.8 26.5 - 33.0 pg    MCHC 33.3 31.5 - 36.5 g/dL    RDW 13.4 10.0 - 15.0 %    Platelet Count 339 150 - 450 10e3/uL    % Neutrophils 64 %    % Lymphocytes 28 %    % Monocytes 6 %    % Eosinophils 2 %    % Basophils 0 %    % Immature Granulocytes 0 %    NRBCs per 100 WBC 0 <1 /100    Absolute Neutrophils 6.2 1.6 - 8.3 10e3/uL    Absolute Lymphocytes 2.8 0.8 - 5.3 10e3/uL    Absolute Monocytes 0.6 0.0 - 1.3 10e3/uL    Absolute Eosinophils 0.2 0.0 - 0.7 10e3/uL    Absolute Basophils 0.0 0.0 - 0.2 10e3/uL    Absolute Immature  Granulocytes 0.0 <=0.4 10e3/uL    Absolute NRBCs 0.0 10e3/uL   iStat Gases (lactate) venous, POCT   Result Value Ref Range    Lactic Acid POCT 0.8 <=2.0 mmol/L    Bicarbonate Venous POCT 27 21 - 28 mmol/L    O2 Sat, Venous POCT 54 (L) 94 - 100 %    pCO2V Venous POCT 42 40 - 50 mm Hg    pH Venous POCT 7.42 7.32 - 7.43    pO2 Venous POCT 28 25 - 47 mm Hg   XR Chest 2 Views    Narrative    EXAM: XR CHEST 2 VIEWS  9/25/2022 6:29 PM      HISTORY: dyspnea    COMPARISON: CT chest dated 2/10/2022 and plain film 11/6/2019.    FINDINGS: Two views of the chest.Midline trachea. Cardiomediastinal  silhouette and pulmonary vasculature are within normal limits. No  focal consolidative opacity, pleural effusion, or pneumothorax.      Impression    IMPRESSION:  No acute airspace disease.    I have personally reviewed the examination and initial interpretation  and I agree with the findings.    REYNALDO ANDERSON MD         SYSTEM ID:  [id number]     *Note: Due to a large number of results and/or encounters for the requested time period, some results have not been displayed. A complete set of results can be found in Results Review.       Labs, vital signs, and imaging studies were reviewed by me.    Medications   albuterol (PROVENTIL HFA/VENTOLIN HFA) inhaler (6 puffs Inhalation Given 9/25/22 1758)       Assessments & Plan (with Medical Decision Making)   Cathy Arroyo is a 41 year old female who presents to the emergency department with shortness of breath and cough. Ddx includes asthma exacerbation, allergic reaction, pneumonia, bronchitis, influenza, covid-19 infection, other viral syndrome.  Labs, chest x-ray, EKG ordered to further evaluate the patient.  Albuterol was given for symptomatic relief in the emergency department.     EKG shows normal sinus rhythm.    Laboratory work-up is remarkable for white blood cell count normal at 9.80.      Chest x-ray shows no acute disease    I have reviewed the nursing notes.    I have  reviewed the findings, diagnosis, plan and need for follow up with the patient.      Patient feels that the albuterol given in the ER improved her symptoms.  She is still wheezing, but would prefer to go home and use her nebulizer machine there.    Patient to be discharged home. Advised to follow up with PCP within 1 week. To return to ER immediately with any new/worsening symptoms. Plan of care discussed with patient who expresses understanding and agrees with plan of care.    Pt provided with Rx for prednisone 20 mg daily for 5 days.  Patient states that higher dose does not typically give her symptoms of jose eduardo due to her bipolar disorder    New Prescriptions    PREDNISONE (DELTASONE) 20 MG TABLET    Take 1 tablet (20 mg) by mouth daily for 5 days Take two tablets (= 40mg) each day for 5 (five) days       Final diagnoses:   Exacerbation of asthma, unspecified asthma severity, unspecified whether persistent     Za Saucedo MD  9/25/2022   [facility] EMERGENCY DEPARTMENT     Za Saucedo MD  09/25/22 1951

## 2022-09-25 NOTE — ED PROVIDER NOTES
ED Triage Provider Note  [facility]  Encounter Date: Sep 25, 2022    History:  No chief complaint on file.    Cathy Arroyo is a 41 year old female with a history of asthma, bipolar 1 disorder, Graves' disease who presents to the ED with shortness of breath.  She reports shortness of breath and cough initially started 2 weeks ago.  She has been using home inhalers with some improvement however shortness of breath has been getting worse.  She reports a barky and productive cough.  Denies hemoptysis.  Denies any fevers.  Denies chest pain.  No pain or swelling in the lower extremities.  No history of PE or DVT.  She denies any recent ill contacts.  She is vaccinated against COVID and took a COVID test at home which was negative.  She has not yet started any steroids.  She reports typically having asthma flairs around this time of year but usually not this bad.    Review of Systems:  Review Of Systems  Ears/Nose/Throat: nasal congestion  Respiratory: Shortness of breath, cough  Cardiovascular: negative  Gastrointestinal: negative      Exam:  There were no vitals taken for this visit.  General: No acute distress. Appears stated age.   Cardio: Regular rate, extremities well perfused  Resp: Speaking in full sentences but intermittently breathless, bilateral wheezing  Neuro: Alert. CN II-XII grossly intact. Grossly intact strength.     Medical Decision Making:  Patient arriving to the ED with problem as above. A medical screening exam was performed. CXR, labs, ECG, albuterol orders initiated from Triage. The patient is appropriate to wait in triage.       Kandice Perales MD on 9/25/2022 at 5:15 PM            Kandice Perales MD  09/25/22 3231

## 2022-09-26 ENCOUNTER — NURSE TRIAGE (OUTPATIENT)
Dept: NURSING | Facility: CLINIC | Age: 42
End: 2022-09-26

## 2022-09-26 LAB
ATRIAL RATE - MUSE: 84 BPM
DIASTOLIC BLOOD PRESSURE - MUSE: NORMAL MMHG
INTERPRETATION ECG - MUSE: NORMAL
P AXIS - MUSE: 53 DEGREES
PR INTERVAL - MUSE: 168 MS
QRS DURATION - MUSE: 94 MS
QT - MUSE: 366 MS
QTC - MUSE: 432 MS
R AXIS - MUSE: 71 DEGREES
SYSTOLIC BLOOD PRESSURE - MUSE: NORMAL MMHG
T AXIS - MUSE: 32 DEGREES
VENTRICULAR RATE- MUSE: 84 BPM

## 2022-09-26 NOTE — DISCHARGE INSTRUCTIONS
TODAY'S VISIT:  You were seen today for shortness of breath   -   - If you had any labs or imaging/radiology tests performed today, you should also discuss these tests with your usual provider.     FOLLOW-UP:  Please make an appointment to follow up with:  - Your Primary Care Provider. If you do not have a PCP, please call the Primary Care Center (phone: [phone] for an appointment    - Have your provider review the results from today's visit with you again to make sure no further follow-up or additional testing is needed based on those results.     PRESCRIPTIONS / MEDICATIONS:  - prednisone  - continue albuterol as needed    RETURN TO THE EMERGENCY DEPARTMENT  Return to the Emergency Department at any time for any new or worsening symptoms or any concerns.

## 2022-09-26 NOTE — TELEPHONE ENCOUNTER
Relayed msg to patient regarding ER findings    Patient feeling much better, on second dose of prednisone though wondering if should take full prescribed dose from ER - worried about jose eduardo if takes too many days    Script: 5 days of 20MG and then 5 days of 40MG    Please advise - call or mychart back ok    Bhavya Moreno RN  [facility]

## 2022-09-26 NOTE — TELEPHONE ENCOUNTER
Spoke with patient and she is improved on Prednisone 20 mg daily for 5 days. SOB is lessened. Provided patient with clinic number to call with status update.

## 2022-09-26 NOTE — TELEPHONE ENCOUNTER
Nurse Triage SBAR    Is this a 2nd Level Triage? No    Situation:   Spoke with 41 yr old Cathy who has question/concerns about test results from 9/25/22 ED visit.    ER last night.  Test result in this morning.  ECG abnormal but no reason.  Questions.     Background:   Patient was evaluated at [facility] ED 9/25/22 for exacerbation of asthma.    Pt reviewed ECG result on line and is concerned about the reports indicating an abnormal result.  Nonspecific ST abnormality. Patient is also concerned about a blood O2 Sat (54).    Assessment:  Patient is stable with questions about her test results.  Protocol Recommended Disposition:   Call PCP within 24 hours  Recommendation:   Will consult with PCP per patient request for additional explanation of test results.  Advised patient that impression from ED notes indicates results were normal and EKG unchanged from July 2020.    Routed to provider    Does the patient meet one of the following criteria for ADS visit consideration? 16+ years old, with an [facility] PCP     TIP  Providers, please consider if this condition is appropriate for management at one of our Acute and Diagnostic Services sites.     If patient is a good candidate, please use dotphrase <dot>triageresponse and select Refer to ADS to document.    Margarita España RN  [facility]      Reason for Disposition    Caller requesting lab results  (Exception: Routine or non-urgent lab result.)     Questions about EKG and lab results.    Additional Information    Negative: Lab calling with strep throat test results and triager can call in prescription    Negative: Lab calling with urinalysis test results and triager can call in prescription    Negative: Medication questions    Negative: Medication renewal and refill questions    Negative: Pre-operative or pre-procedural questions    Negative: ED call to PCP (i.e., primary care provider; doctor, NP, or PA)    Negative: Doctor (or NP/PA) call to PCP    Negative: Call  about patient who is currently hospitalized    Negative: Lab or radiology calling with CRITICAL test results    Negative: [1] Follow-up call from patient regarding patient's clinical status AND [2] information urgent    Negative: [1] Caller requests to speak ONLY to PCP AND [2] URGENT question    Negative: [1] Caller requests to speak to PCP now AND [2] won't tell us reason for call  (Exception: If 10 pm to 6 am, caller must first discuss reason for the call.)    Negative: Notification of hospital admission    Negative: Notification of death    Protocols used: PCP CALL - NO TRIAGE-A-

## 2022-09-26 NOTE — TELEPHONE ENCOUNTER
If symptoms have improved, then no need to ADS. Her EKG was normal and if she was having an asthma exacerbation when she was in the ED, then the venous blood gas would be expected. Has breathing improved since the ED?  DEVEN Winston

## 2022-09-26 NOTE — TELEPHONE ENCOUNTER
Zamzam-     Please see Triage notes and advise    O2 Sat, Venous POCT 54 (L) 94 - 100 %       EKG Interpretation:       Interpreted by Za Saucedo MD  Time reviewed: 1905  Symptoms at time of EKG: Shortness of breath  Rhythm: normal sinus   Rate: normal, 84 bpm  Axis: normal  Ectopy: none  Conduction: normal  ST Segments/ T Waves: No ST-T wave changes  Q Waves: none  Comparison to prior: Unchanged from 7/11/20     Clinical Impression: normal EKG      Thank you.   Bhavya Moreno RN  [facility]

## 2022-09-28 DIAGNOSIS — J30.81 ALLERGIC RHINITIS DUE TO ANIMALS: ICD-10-CM

## 2022-09-28 RX ORDER — FEXOFENADINE HCL 180 MG/1
180 TABLET ORAL DAILY
Qty: 30 TABLET | Refills: 1 | Status: SHIPPED | OUTPATIENT
Start: 2022-09-28 | End: 2022-11-15

## 2022-09-28 NOTE — TELEPHONE ENCOUNTER
"Requested Prescriptions   Pending Prescriptions Disp Refills     fexofenadine (ALLEGRA) 180 MG tablet 30 tablet 1     Sig: Take 1 tablet (180 mg) by mouth daily       Antihistamines Protocol Passed - 9/28/2022  2:20 PM        Passed - Patient is 3-64 years of age     Apply weight-based dosing for peds patients age 3 - 12 years of age.    Forward request to provider for patients under the age of 3 or over the age of 64.          Passed - Recent (12 mo) or future (30 days) visit within the authorizing provider's specialty     Patient has had an office visit with the authorizing provider or a provider within the authorizing providers department within the previous 12 mos or has a future within next 30 days. See \"Patient Info\" tab in inbasket, or \"Choose Columns\" in Meds & Orders section of the refill encounter.              Passed - Medication is active on med list           Refilled per protocol.  Jen VALENZUELA RN    "

## 2022-10-03 ENCOUNTER — VIRTUAL VISIT (OUTPATIENT)
Dept: FAMILY MEDICINE | Facility: CLINIC | Age: 42
End: 2022-10-03
Payer: COMMERCIAL

## 2022-10-03 DIAGNOSIS — T36.95XA ANTIBIOTIC-INDUCED YEAST INFECTION: ICD-10-CM

## 2022-10-03 DIAGNOSIS — D44.5 PINEOCYTOMA (H): ICD-10-CM

## 2022-10-03 DIAGNOSIS — Z72.0 TOBACCO USE: ICD-10-CM

## 2022-10-03 DIAGNOSIS — E66.01 MORBID OBESITY (H): ICD-10-CM

## 2022-10-03 DIAGNOSIS — J45.41 MODERATE PERSISTENT ASTHMATIC BRONCHITIS WITH ACUTE EXACERBATION: Primary | ICD-10-CM

## 2022-10-03 DIAGNOSIS — B37.9 ANTIBIOTIC-INDUCED YEAST INFECTION: ICD-10-CM

## 2022-10-03 PROCEDURE — 99214 OFFICE O/P EST MOD 30 MIN: CPT | Mod: 95 | Performed by: FAMILY MEDICINE

## 2022-10-03 RX ORDER — DOXYCYCLINE HYCLATE 100 MG
100 TABLET ORAL 2 TIMES DAILY
Qty: 14 TABLET | Refills: 0 | Status: SHIPPED | OUTPATIENT
Start: 2022-10-03 | End: 2022-10-10

## 2022-10-03 RX ORDER — FLUCONAZOLE 150 MG/1
150 TABLET ORAL ONCE
Qty: 1 TABLET | Refills: 0 | Status: SHIPPED | OUTPATIENT
Start: 2022-10-03 | End: 2022-10-03

## 2022-10-03 NOTE — PROGRESS NOTES
"Cathy is a 41 year old who is being evaluated via a billable telephone visit.      What phone number would you like to be contacted at? [phone]  How would you like to obtain your AVS? MyChart    Assessment & Plan     Moderate persistent asthmatic bronchitis with acute exacerbation  - doxycycline hyclate (VIBRA-TABS) 100 MG tablet  Dispense: 14 tablet; Refill: 0  -Continue to complete the prednisone dose, Advair inhaler daily and an albuterol inhaler as needed.    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  Stable  -Following with neurology.    Morbid obesity (H)  -Weight management plan: Patient was referred to their PCP to discuss a diet and exercise plan.        BMI:   Estimated body mass index is 35.88 kg/m  as calculated from the following:    Height as of 9/25/22: 1.727 m (5' 8\").    Weight as of 8/30/22: 107 kg (236 lb).   Weight management plan: Patient was referred to their PCP to discuss a diet and exercise plan.        Return in about 1 week (around 10/10/2022) for Follow up, Face to Face Visit.    Nadeen Millard MD  [facility] CAROLE Morgan is a 41 year old, presenting for the following health issues:  Hospital F/U    New patient to me. She was unable to get in to see her PCP.     Mentioned to patient that this ER follow up visit would have been more appropriate as an in person visit- states that she was unable to get in to see anyone in person.    HPI     ED/UC Followup:    Facility:  [facility] Emergency Department  Date of visit: 9/25/22   Reason for visit: Exacerbation of asthma, unspecified asthma severity, unspecified whether persistent  Current Status: steroid treatment completed yesterday, she is not feeling relief with her SOB despite utilizing inhalers and neb treatments. Reporting " green/ yellow mucus and is congested.  She is also concerned about not being tapered off prednisone. But is doesn't want to keep taking because she does have bipolar disorder and her mood has been affected by the prednisone since taking it. .       ER records reviewed-    41-year-old chronic smoker with a known history of asthma currently on Advair inhaler and albuterol inhaler as needed presented to the ER with 2-weeks of shortness of breath and a productive cough.  Denied having any hemoptysis fever chills or any chest pain.  COVID-negative.    Work-up done revealed a normal EKG, negative troponin, negative influenza RSV and COVID.   Chest x-ray revealed no acute airspace disease.    She was thought to have an acute asthma exacerbation and discharged home on prednisone and an albuterol inhaler.    Today she reports a worsening cough that is more productive of a yellowish-greenish sputum with persistent shortness of breath despite using her current inhalers.  Denies having any fever or chills.   States that her home POx has been > 90% on room air.       States that if an antibiotic is prescribed today she will need an antiviral medication as she is prone to getting yeast infections from taking antibiotics.      Review of Systems   Constitutional, HEENT, cardiovascular, pulmonary, gi and gu systems are negative, except as otherwise noted.      Objective           Vitals:  No vitals were obtained today due to virtual visit.    Physical Exam   PSYCH: Alert and oriented times 3; coherent speech, normal   rate and volume, able to articulate logical thoughts, able   to abstract reason, no tangential thoughts, no hallucinations   or delusions  Her affect is anxious  RESP: No cough, no audible wheezing, able to talk in full sentences  Remainder of exam unable to be completed due to telephone visits    DATA  Recent ER labs, CXR and EKG reviewed.        Phone call duration: 20 minutes

## 2022-10-09 ENCOUNTER — HEALTH MAINTENANCE LETTER (OUTPATIENT)
Age: 42
End: 2022-10-09

## 2022-10-26 ENCOUNTER — TELEPHONE (OUTPATIENT)
Dept: PULMONOLOGY | Facility: CLINIC | Age: 42
End: 2022-10-26

## 2022-11-07 ENCOUNTER — TELEPHONE (OUTPATIENT)
Dept: FAMILY MEDICINE | Facility: CLINIC | Age: 42
End: 2022-11-07

## 2022-11-08 ASSESSMENT — MIGRAINE DISABILITY ASSESSMENT (MIDAS)
HOW OFTEN WERE SOCIAL ACTIVITIES MISSED DUE TO HEADACHES: 1
HOW MANY DAYS DID YOU NOT DO HOUSEWORK BECAUSE OF HEADACHES: 1
TOTAL SCORE: 2
ON A SCALE FROM 0-10 ON AVERAGE HOW PAINFUL WERE HEADACHES: 6
HOW MANY DAYS DID YOU MISS WORK OR SCHOOL BECAUSE OF HEADACHES: 0
HOW MANY DAYS WAS HOUSEWORK PRODUCTIVITY CUT IN HALF DUE TO HEADACHES: 0
HOW MANY DAYS IN THE PAST 3 MONTHS HAVE YOU HAD A HEADACHE: 1
HOW MANY DAYS WAS YOUR PRODUCTIVITY CUT IN HALF BECAUSE OF HEADACHES: 0

## 2022-11-08 ASSESSMENT — HEADACHE IMPACT TEST (HIT 6)
HIT6 TOTAL SCORE: 55
HOW OFTEN DID HEADACHS LIMIT CONCENTRATION ON WORK OR DAILY ACTIVITY: RARELY
HOW OFTEN DO HEADACHES LIMIT YOUR DAILY ACTIVITIES: SOMETIMES
WHEN YOU HAVE HEADACHES HOW OFTEN IS THE PAIN SEVERE: SOMETIMES
HOW OFTEN HAVE YOU FELT FED UP OR IRRITATED BECAUSE OF YOUR HEADACHES: RARELY
WHEN YOU HAVE A HEADACHE HOW OFTEN DO YOU WISH YOU COULD LIE DOWN: ALWAYS
HOW OFTEN HAVE YOU FELT TOO TIRED TO WORK BECAUSE OF YOUR HEADACHES: NEVER

## 2022-11-09 ENCOUNTER — VIRTUAL VISIT (OUTPATIENT)
Dept: NEUROLOGY | Facility: CLINIC | Age: 42
End: 2022-11-09
Payer: COMMERCIAL

## 2022-11-09 DIAGNOSIS — G43.009 MIGRAINE WITHOUT AURA AND WITHOUT STATUS MIGRAINOSUS, NOT INTRACTABLE: ICD-10-CM

## 2022-11-09 PROCEDURE — 99213 OFFICE O/P EST LOW 20 MIN: CPT | Mod: 95 | Performed by: PSYCHIATRY & NEUROLOGY

## 2022-11-09 RX ORDER — RIZATRIPTAN BENZOATE 10 MG/1
10 TABLET, ORALLY DISINTEGRATING ORAL
Qty: 18 TABLET | Refills: 11 | Status: SHIPPED | OUTPATIENT
Start: 2022-11-09 | End: 2024-02-21

## 2022-11-09 NOTE — LETTER
"11/9/2022       RE: Cathy Arroyo  [address]  [place] [facility] [address]     Dear Colleague,    Thank you for referring your patient, Cathy Arroyo, to the [facility] [place] [facility]. Please see a copy of my visit note below.    [facility]  Neurology Progress Note    Subjective:    Ms. Arroyo returns for follow-up of episodic migraine.  I last saw her in May 2022.  She sent to the message since, reporting that she had not had a single migraine attack, and desired to move her appointment out further.    She has a mild \"low-level\" headache most days (~30/30) which she describes as her \"anxiety or pressure headache.\" These are constant and dull and do not require any intervention nor impede her daily activities.    Since her last visit, she reports only 1 severe migraine in the past 3 months which was relieved by Rizatriptan. Her typical migraines are accompanied by nausea, photophobia and irritability which helps to distinguish them from her milder headaches.    Objective:    Vitals: LMP 09/25/2022 (Exact Date)   General: Cooperative, NAD  Neurologic:  Mental Status: Fully alert, attentive and oriented. Speech clear and fluent.   Cranial Nerves: Facial movements symmetric.   Motor: No abnormal movements.      Assessment/Plan:   Cathy Arroyo is a 41-year-old woman who returns for follow-up of episodic migraine without aura.  There is a family history of migraine, and headache started after a neurosurgery to remove a pineal tumor.  Her migraines are well-controlled requiring only 1 use of Sumatriptan over 3 months.     I recommend continuing symptomatic treatment strategy for acute treatment of migraine.  We discussed the following:  -For acute treatment of moderate to severe headache,  Continue rizatriptan melt 10 mg taken at the onset of headache, with a repeat dose in " 2 hours if needed.  This should not exceed more than 9 days/month to avoid medication overuse.  -For headache related nausea, continue prochlorperazine 10 mg as needed, not to exceed more than 9 days/month to avoid medication side effects.  This can be taken with or without diphenhydramine.     Preventative treatment could be considered in the future, if her migraine attack frequency increases.  This is not currently warranted.    Follow-up with Headache clinic as needed. May see PCP for future refills of current medications.    This patient was discussed with the attending faculty, Dr. Orion Lubin, DO  Neurology Resident PGY2  11/09/2022 8:11 AM    Physician Attestation   I, Julissa Sears MD, saw this patient and agree with the findings and plan of care as documented in the note.      Migraine attacks are adequately treated with rizatriptan as needed.  I provided a years worth of refills for her.  She does not desire to treat daily headache, as this is mild and not affecting her functioning.    We discussed transitioning care back to primary care, and I am happy to see her as needed in the future.    Cathy is a 42 year old who is being evaluated via a billable video visit.      How would you like to obtain your AVS? MyChart  If the video visit is dropped, the invitation should be resent by: Text to cell phone: [phone]  Will anyone else be joining your video visit? No        Again, thank you for allowing me to participate in the care of your patient.      Sincerely,    Julissa Sears MD

## 2022-11-09 NOTE — PROGRESS NOTES
Cathy is a 42 year old who is being evaluated via a billable video visit.      How would you like to obtain your AVS? MyChart  If the video visit is dropped, the invitation should be resent by: Text to cell phone: [phone]  Will anyone else be joining your video visit? No        Video-Visit Details    Video Start Time: 8:30am    Type of service:  Video Visit    Video End Time:8:39am    Originating Location (pt. Location): Home    Distant Location (provider location):  Off-site    Platform used for Video Visit: Clare

## 2022-11-09 NOTE — NURSING NOTE
Chief Complaint   Patient presents with     Video Visit     6 month follow up- Headaches and Migraines

## 2022-11-09 NOTE — TELEPHONE ENCOUNTER
Central Prior Authorization Team   Phone: [phone]      PRIOR AUTHORIZATION DENIED    Medication: fexofenadine (ALLEGRA) 180 MG tablet--DENIED    Denial Date: 11/9/2022    Denial Rational:  FULLY EXCLUDED FROM COVERAGE, NO PA OR APPEAL IS AVAILABLE.  PREFERRED DRUGS ARE CETIRIZINE, LORATADINE

## 2022-11-09 NOTE — TELEPHONE ENCOUNTER
Central Prior Authorization Team   Phone: [phone]      PA Initiation    Medication: fexofenadine (ALLEGRA) 180 MG tablet--INITIATED  Insurance Company: [employer] - Phone [phone] Fax [phone]  Pharmacy Filling the Rx: [id number] [place] [id number] AT [facility] & [address] [place] [address]  Filling Pharmacy Phone: [phone]  Filling Pharmacy Fax:    Start Date: 11/9/2022

## 2022-11-09 NOTE — PROGRESS NOTES
"[facility]  Neurology Progress Note    Subjective:    Ms. Arroyo returns for follow-up of episodic migraine.  I last saw her in May 2022.  She sent to the message since, reporting that she had not had a single migraine attack, and desired to move her appointment out further.    She has a mild \"low-level\" headache most days (~30/30) which she describes as her \"anxiety or pressure headache.\" These are constant and dull and do not require any intervention nor impede her daily activities.    Since her last visit, she reports only 1 severe migraine in the past 3 months which was relieved by Rizatriptan. Her typical migraines are accompanied by nausea, photophobia and irritability which helps to distinguish them from her milder headaches.    Objective:    Vitals: LMP 09/25/2022 (Exact Date)   General: Cooperative, NAD  Neurologic:  Mental Status: Fully alert, attentive and oriented. Speech clear and fluent.   Cranial Nerves: Facial movements symmetric.   Motor: No abnormal movements.      Assessment/Plan:   Cathy Arroyo is a 41-year-old woman who returns for follow-up of episodic migraine without aura.  There is a family history of migraine, and headache started after a neurosurgery to remove a pineal tumor.  Her migraines are well-controlled requiring only 1 use of Sumatriptan over 3 months.     I recommend continuing symptomatic treatment strategy for acute treatment of migraine.  We discussed the following:  -For acute treatment of moderate to severe headache,  Continue rizatriptan melt 10 mg taken at the onset of headache, with a repeat dose in 2 hours if needed.  This should not exceed more than 9 days/month to avoid medication overuse.  -For headache related nausea, continue prochlorperazine 10 mg as needed, not to exceed more than 9 days/month to avoid medication side effects.  This can be taken with or without diphenhydramine.     Preventative " treatment could be considered in the future, if her migraine attack frequency increases.  This is not currently warranted.    Follow-up with Headache clinic as needed. May see PCP for future refills of current medications.    This patient was discussed with the attending faculty, Dr. Orion Lubin, DO  Neurology Resident PGY2  11/09/2022 8:11 AM    Physician Attestation   I, Jluissa Sears MD, saw this patient and agree with the findings and plan of care as documented in the note.      Migraine attacks are adequately treated with rizatriptan as needed.  I provided a years worth of refills for her.  She does not desire to treat daily headache, as this is mild and not affecting her functioning.    We discussed transitioning care back to primary care, and I am happy to see her as needed in the future.    Julissa Sears MD  Neurology

## 2022-11-12 ENCOUNTER — OFFICE VISIT (OUTPATIENT)
Dept: FAMILY MEDICINE | Facility: CLINIC | Age: 42
End: 2022-11-12
Payer: COMMERCIAL

## 2022-11-12 VITALS
HEART RATE: 88 BPM | OXYGEN SATURATION: 97 % | WEIGHT: 233.3 LBS | SYSTOLIC BLOOD PRESSURE: 125 MMHG | TEMPERATURE: 98.4 F | DIASTOLIC BLOOD PRESSURE: 81 MMHG | BODY MASS INDEX: 35.47 KG/M2 | RESPIRATION RATE: 22 BRPM

## 2022-11-12 DIAGNOSIS — H66.003 ACUTE SUPPURATIVE OTITIS MEDIA OF BOTH EARS WITHOUT SPONTANEOUS RUPTURE OF TYMPANIC MEMBRANES, RECURRENCE NOT SPECIFIED: Primary | ICD-10-CM

## 2022-11-12 PROCEDURE — 99213 OFFICE O/P EST LOW 20 MIN: CPT | Performed by: FAMILY MEDICINE

## 2022-11-12 RX ORDER — FLUCONAZOLE 150 MG/1
150 TABLET ORAL ONCE
Qty: 2 TABLET | Refills: 0 | Status: SHIPPED | OUTPATIENT
Start: 2022-11-12 | End: 2022-11-12

## 2022-11-12 RX ORDER — CEFDINIR 300 MG/1
300 CAPSULE ORAL 2 TIMES DAILY
Qty: 20 CAPSULE | Refills: 0 | Status: SHIPPED | OUTPATIENT
Start: 2022-11-12 | End: 2022-11-22

## 2022-11-12 ASSESSMENT — ENCOUNTER SYMPTOMS
SHORTNESS OF BREATH: 0
FEVER: 0
CHILLS: 0
BRUISES/BLEEDS EASILY: 0

## 2022-11-12 NOTE — PROGRESS NOTES
"  Assessment & Plan     Acute suppurative otitis media of both ears without spontaneous rupture of tympanic membranes, recurrence not specified  She has had an underlying viral illness it seems to be improving but both ears are red and swollen and are causing her pain she has a history of an ear infection that 3 years ago led to a hospitalization.  However at that time she was pregnant and was suffering Graves' disease and some other issues.  She does have a history of vaginal yeast infections after antibiotic use so will keep that in consideration to and gave her Diflucan  - cefdinir (OMNICEF) 300 MG capsule; Take 1 capsule (300 mg) by mouth 2 times daily for 10 days  - fluconazole (DIFLUCAN) 150 MG tablet; Take 1 tablet (150 mg) by mouth once for 1 dose May repeat in 3 days             Nicotine/Tobacco Cessation:  She reports that she has been smoking cigarettes and vaping device. She started smoking about 18 years ago. She has a 14.00 pack-year smoking history. She has never used smokeless tobacco.  Nicotine/Tobacco Cessation Plan:             No follow-ups on file.    Donald Dash MD  [facility] is a 42 year old, presenting for the following health issues:  Cough (X last night. Some congestion. pt states \"whole family sick Sunday\" and sinus pressure. Throat pain; painful to swallow. No chills or body aches. Ibuprofen taken 4-5 AM today) and Ear Problem (Possibly both ears aching pain x last night. Drainage/fluid; swabbed ears with qtips noticed fluid. Constant low level headache.)      42-year-old who is here because of generally not feeling well but is more specifically her ears have started to hurt for the last 24 hours.  Her whole family has been sick her daughter is being treated for pneumonia her  for sinusitis.  She did have testing that was negative for COVID RSV and influenza.  She was improving until her ears began to hurt significantly.  She is " not having fever or chills.  No nausea or vomiting             Review of Systems   Constitutional: Negative for chills and fever.   HENT: Positive for ear pain. Negative for mouth sores and nosebleeds.    Respiratory: Negative for shortness of breath.    Cardiovascular: Negative for peripheral edema.   Hematological: Does not bruise/bleed easily.            Objective    /81 (BP Location: Right arm, Patient Position: Sitting, Cuff Size: Adult Large)   Pulse 88   Temp 98.4  F (36.9  C) (Oral)   Resp 22   Wt 105.8 kg (233 lb 4.8 oz)   LMP 10/20/2022 (Exact Date)   SpO2 97%   BMI 35.47 kg/m    Body mass index is 35.47 kg/m .  Physical Exam  Vitals and nursing note reviewed.   Constitutional:       Appearance: Normal appearance.   HENT:      Ears:      Comments: Both tympanic membranes are red and swollen on the right it is diffuse on the left is the superior half  Eyes:      Pupils: Pupils are equal, round, and reactive to light.   Cardiovascular:      Rate and Rhythm: Normal rate.   Pulmonary:      Effort: Pulmonary effort is normal.      Breath sounds: Normal breath sounds.   Skin:     General: Skin is warm.      Findings: No rash.   Neurological:      Mental Status: She is alert.   Psychiatric:         Mood and Affect: Mood normal.         Behavior: Behavior normal.         Thought Content: Thought content normal.         Judgment: Judgment normal.

## 2022-11-15 ENCOUNTER — OFFICE VISIT (OUTPATIENT)
Dept: FAMILY MEDICINE | Facility: CLINIC | Age: 42
End: 2022-11-15
Payer: COMMERCIAL

## 2022-11-15 VITALS
HEART RATE: 88 BPM | DIASTOLIC BLOOD PRESSURE: 78 MMHG | TEMPERATURE: 98.7 F | BODY MASS INDEX: 36.07 KG/M2 | WEIGHT: 238 LBS | OXYGEN SATURATION: 96 % | RESPIRATION RATE: 16 BRPM | SYSTOLIC BLOOD PRESSURE: 130 MMHG | HEIGHT: 68 IN

## 2022-11-15 DIAGNOSIS — J45.41 MODERATE PERSISTENT ASTHMA WITH ACUTE EXACERBATION: ICD-10-CM

## 2022-11-15 DIAGNOSIS — H66.93 BILATERAL ACUTE OTITIS MEDIA: ICD-10-CM

## 2022-11-15 DIAGNOSIS — Z86.39 HISTORY OF GRAVES' DISEASE: ICD-10-CM

## 2022-11-15 DIAGNOSIS — J06.9 VIRAL URI WITH COUGH: Primary | ICD-10-CM

## 2022-11-15 DIAGNOSIS — J45.40 MODERATE PERSISTENT ASTHMA WITHOUT COMPLICATION: ICD-10-CM

## 2022-11-15 DIAGNOSIS — E89.0 S/P TOTAL THYROIDECTOMY: ICD-10-CM

## 2022-11-15 DIAGNOSIS — E89.0 POST-SURGICAL HYPOTHYROIDISM: ICD-10-CM

## 2022-11-15 DIAGNOSIS — J45.20 MILD INTERMITTENT ASTHMA WITHOUT COMPLICATION: ICD-10-CM

## 2022-11-15 DIAGNOSIS — J30.2 SEASONAL ALLERGIC RHINITIS, UNSPECIFIED TRIGGER: ICD-10-CM

## 2022-11-15 PROCEDURE — 90471 IMMUNIZATION ADMIN: CPT | Performed by: NURSE PRACTITIONER

## 2022-11-15 PROCEDURE — 91312 COVID-19,PF,PFIZER BOOSTER BIVALENT: CPT | Performed by: NURSE PRACTITIONER

## 2022-11-15 PROCEDURE — 0124A COVID-19,PF,PFIZER BOOSTER BIVALENT: CPT | Performed by: NURSE PRACTITIONER

## 2022-11-15 PROCEDURE — 99214 OFFICE O/P EST MOD 30 MIN: CPT | Mod: 25 | Performed by: NURSE PRACTITIONER

## 2022-11-15 PROCEDURE — 90686 IIV4 VACC NO PRSV 0.5 ML IM: CPT | Performed by: NURSE PRACTITIONER

## 2022-11-15 RX ORDER — IPRATROPIUM BROMIDE AND ALBUTEROL SULFATE 2.5; .5 MG/3ML; MG/3ML
1 SOLUTION RESPIRATORY (INHALATION) EVERY 6 HOURS PRN
Qty: 90 ML | Refills: 1 | Status: SHIPPED | OUTPATIENT
Start: 2022-11-15 | End: 2024-02-21

## 2022-11-15 RX ORDER — LEVOTHYROXINE SODIUM 175 UG/1
175 TABLET ORAL DAILY
Qty: 90 TABLET | Refills: 2 | Status: SHIPPED | OUTPATIENT
Start: 2022-11-15 | End: 2023-08-29

## 2022-11-15 RX ORDER — CETIRIZINE HYDROCHLORIDE 10 MG/1
10 TABLET ORAL DAILY
Qty: 90 TABLET | Refills: 3 | Status: SHIPPED | OUTPATIENT
Start: 2022-11-15 | End: 2023-12-12

## 2022-11-15 RX ORDER — FLUCONAZOLE 150 MG/1
TABLET ORAL
COMMUNITY
Start: 2022-11-12 | End: 2022-12-20

## 2022-11-15 RX ORDER — ALBUTEROL SULFATE 90 UG/1
2 AEROSOL, METERED RESPIRATORY (INHALATION) EVERY 6 HOURS
Qty: 18 G | Refills: 3 | Status: SHIPPED | OUTPATIENT
Start: 2022-11-15 | End: 2023-03-14

## 2022-11-15 RX ORDER — FLUTICASONE PROPIONATE AND SALMETEROL XINAFOATE 230; 21 UG/1; UG/1
2 AEROSOL, METERED RESPIRATORY (INHALATION) 2 TIMES DAILY
Qty: 12 G | Refills: 11 | Status: SHIPPED | OUTPATIENT
Start: 2022-11-15 | End: 2023-03-14

## 2022-11-15 RX ORDER — MONTELUKAST SODIUM 10 MG/1
10 TABLET ORAL EVERY MORNING
Qty: 90 TABLET | Refills: 3 | Status: SHIPPED | OUTPATIENT
Start: 2022-11-15 | End: 2023-06-27

## 2022-11-15 ASSESSMENT — ASTHMA QUESTIONNAIRES
ACT_TOTALSCORE: 20
QUESTION_4 LAST FOUR WEEKS HOW OFTEN HAVE YOU USED YOUR RESCUE INHALER OR NEBULIZER MEDICATION (SUCH AS ALBUTEROL): TWO OR THREE TIMES PER WEEK
QUESTION_5 LAST FOUR WEEKS HOW WOULD YOU RATE YOUR ASTHMA CONTROL: SOMEWHAT CONTROLLED
QUESTION_1 LAST FOUR WEEKS HOW MUCH OF THE TIME DID YOUR ASTHMA KEEP YOU FROM GETTING AS MUCH DONE AT WORK, SCHOOL OR AT HOME: NONE OF THE TIME
QUESTION_3 LAST FOUR WEEKS HOW OFTEN DID YOUR ASTHMA SYMPTOMS (WHEEZING, COUGHING, SHORTNESS OF BREATH, CHEST TIGHTNESS OR PAIN) WAKE YOU UP AT NIGHT OR EARLIER THAN USUAL IN THE MORNING: NOT AT ALL
ACT_TOTALSCORE: 20
QUESTION_2 LAST FOUR WEEKS HOW OFTEN HAVE YOU HAD SHORTNESS OF BREATH: ONCE OR TWICE A WEEK
EMERGENCY_ROOM_LAST_YEAR_TOTAL: ONE

## 2022-11-15 ASSESSMENT — PAIN SCALES - GENERAL: PAINLEVEL: MODERATE PAIN (4)

## 2022-11-15 NOTE — PROGRESS NOTES
Assessment & Plan     Viral URI with cough  Negative for covid, family negative for influenza and RSV    Bilateral acute otitis media  Appears to be resolving    Moderate persistent asthma with acute exacerbation  Wishing to avoid prednisone due to effects on jose eduardo. O2 sats stable. Hasn't been taking advair consistently so will restart this. Has an appt in about a month so we will recheck then. Sooner if worsening or unresolving symptoms  - fluticasone-salmeterol (ADVAIR HFA) 230-21 MCG/ACT inhaler; Inhale 2 puffs into the lungs 2 times daily  - ipratropium - albuterol 0.5 mg/2.5 mg/3 mL (DUONEB) 0.5-2.5 (3) MG/3ML neb solution; Take 1 vial (3 mLs) by nebulization every 6 hours as needed for shortness of breath / dyspnea or wheezing    Moderate persistent asthma without complication  Refilled  - montelukast (SINGULAIR) 10 MG tablet; Take 1 tablet (10 mg) by mouth every morning    Seasonal allergic rhinitis, unspecified trigger  - montelukast (SINGULAIR) 10 MG tablet; Take 1 tablet (10 mg) by mouth every morning  - cetirizine (ZYRTEC) 10 MG tablet; Take 1 tablet (10 mg) by mouth daily  Fexofenadine is completely excluded from plan. Will need to get that OTC. Was recommended by allergist for when allergies are the wrost to double this with the ceterizine taken at baseline.    Mild intermittent asthma without complication  Refill  - albuterol (PROAIR HFA/PROVENTIL HFA/VENTOLIN HFA) 108 (90 Base) MCG/ACT inhaler; Inhale 2 puffs into the lungs every 6 hours    S/P total thyroidectomy  Refilled  - levothyroxine (SYNTHROID/LEVOTHROID) 175 MCG tablet; Take 1 tablet (175 mcg) by mouth daily    History of Graves' disease  Refilled - appt 12/20  - levothyroxine (SYNTHROID/LEVOTHROID) 175 MCG tablet; Take 1 tablet (175 mcg) by mouth daily    Post-surgical hypothyroidism  - levothyroxine (SYNTHROID/LEVOTHROID) 175 MCG tablet; Take 1 tablet (175 mcg) by mouth daily    Ordering of each unique test  Prescription drug  "management      Return in about 5 weeks (around 12/20/2022) for Routine Visit.    JANNET Liu CNP  [facility] is a 42 year old, presenting for the following health issues:  Otalgia      History of Present Illness       Reason for visit:  Ear infection Bilateral      Work up with ear ache and sore throat Friday night. Went to urgent care on Saturday and got antibiotics for ear infection, but in more pain today than at that time. Hasn't had a fever or chills. Has cough. Has had seven doses out of twenty of cefdinir    Family is sick with viral URI - daughter had pneumonia.    ED visit at the end of September with asthma exacerbation. Prescribed prednisone 20 mg for 5 days. At follow-up visit the provider instructed to continue prednisone. Did have jose eduardo symptoms with the prednisone course and wishes to avoid steroids today.    Review of Systems   Constitutional, HEENT, cardiovascular, pulmonary, gi and gu systems are negative, except as otherwise noted.      Objective    /78   Pulse 88   Temp 98.7  F (37.1  C) (Temporal)   Resp 16   Ht 1.727 m (5' 8\")   Wt 108 kg (238 lb)   LMP 10/20/2022 (Exact Date)   SpO2 96%   BMI 36.19 kg/m    Body mass index is 36.19 kg/m .  Physical Exam   GENERAL: alert and no distress  EYES: Eyes grossly normal to inspection, PERRL and conjunctivae and sclerae normal  HENT: normal cephalic/atraumatic, both ears: clear effusion, nose and mouth without ulcers or lesions, nasal mucosa edematous , oropharynx clear and oral mucous membranes moist  NECK: no adenopathy, no asymmetry, masses, or scars and thyroid normal to palpation  RESP: expiratory wheezes throughout and inspiratory wheezes throughout  CV: regular rate and rhythm, normal S1 S2, no S3 or S4, no murmur, click or rub, no peripheral edema and peripheral pulses strong  PSYCH: mentation appears normal, affect normal/bright            "

## 2022-11-17 ENCOUNTER — MYC MEDICAL ADVICE (OUTPATIENT)
Dept: FAMILY MEDICINE | Facility: CLINIC | Age: 42
End: 2022-11-17

## 2022-11-17 DIAGNOSIS — H10.33 ACUTE CONJUNCTIVITIS OF BOTH EYES, UNSPECIFIED ACUTE CONJUNCTIVITIS TYPE: Primary | ICD-10-CM

## 2022-11-18 RX ORDER — POLYMYXIN B SULFATE AND TRIMETHOPRIM 1; 10000 MG/ML; [USP'U]/ML
1-2 SOLUTION OPHTHALMIC EVERY 4 HOURS
Qty: 10 ML | Refills: 0 | Status: SHIPPED | OUTPATIENT
Start: 2022-11-18 | End: 2022-12-20

## 2022-12-19 ASSESSMENT — ASTHMA QUESTIONNAIRES
EMERGENCY_ROOM_LAST_YEAR_TOTAL: ONE
QUESTION_3 LAST FOUR WEEKS HOW OFTEN DID YOUR ASTHMA SYMPTOMS (WHEEZING, COUGHING, SHORTNESS OF BREATH, CHEST TIGHTNESS OR PAIN) WAKE YOU UP AT NIGHT OR EARLIER THAN USUAL IN THE MORNING: NOT AT ALL
QUESTION_1 LAST FOUR WEEKS HOW MUCH OF THE TIME DID YOUR ASTHMA KEEP YOU FROM GETTING AS MUCH DONE AT WORK, SCHOOL OR AT HOME: NONE OF THE TIME
ACT_TOTALSCORE: 22
QUESTION_5 LAST FOUR WEEKS HOW WOULD YOU RATE YOUR ASTHMA CONTROL: WELL CONTROLLED
ACT_TOTALSCORE: 22
QUESTION_4 LAST FOUR WEEKS HOW OFTEN HAVE YOU USED YOUR RESCUE INHALER OR NEBULIZER MEDICATION (SUCH AS ALBUTEROL): ONCE A WEEK OR LESS
QUESTION_2 LAST FOUR WEEKS HOW OFTEN HAVE YOU HAD SHORTNESS OF BREATH: ONCE OR TWICE A WEEK

## 2022-12-19 ASSESSMENT — ENCOUNTER SYMPTOMS
NAUSEA: 0
DYSURIA: 0
EYE PAIN: 0
ARTHRALGIAS: 1
FEVER: 0
HEMATURIA: 0
JOINT SWELLING: 0
WEAKNESS: 0
MYALGIAS: 1
PARESTHESIAS: 0
ABDOMINAL PAIN: 0
FREQUENCY: 0
SHORTNESS OF BREATH: 0
HEARTBURN: 0
COUGH: 0
HEADACHES: 0
SORE THROAT: 0
BREAST MASS: 0
DIARRHEA: 0
DIZZINESS: 0
CONSTIPATION: 0
PALPITATIONS: 0
HEMATOCHEZIA: 0
NERVOUS/ANXIOUS: 0
CHILLS: 0

## 2022-12-20 ENCOUNTER — OFFICE VISIT (OUTPATIENT)
Dept: FAMILY MEDICINE | Facility: CLINIC | Age: 42
End: 2022-12-20
Payer: COMMERCIAL

## 2022-12-20 VITALS
OXYGEN SATURATION: 99 % | BODY MASS INDEX: 37.43 KG/M2 | HEART RATE: 80 BPM | DIASTOLIC BLOOD PRESSURE: 78 MMHG | WEIGHT: 238.5 LBS | TEMPERATURE: 98.1 F | HEIGHT: 67 IN | RESPIRATION RATE: 14 BRPM | SYSTOLIC BLOOD PRESSURE: 128 MMHG

## 2022-12-20 DIAGNOSIS — E89.0 POST-SURGICAL HYPOTHYROIDISM: ICD-10-CM

## 2022-12-20 DIAGNOSIS — Z92.29 HISTORY OF HEPATITIS B VACCINATION: ICD-10-CM

## 2022-12-20 DIAGNOSIS — J45.40 MODERATE PERSISTENT ASTHMA WITHOUT COMPLICATION: ICD-10-CM

## 2022-12-20 DIAGNOSIS — Z00.00 ROUTINE GENERAL MEDICAL EXAMINATION AT A HEALTH CARE FACILITY: Primary | ICD-10-CM

## 2022-12-20 DIAGNOSIS — G43.009 NONINTRACTABLE MIGRAINE, UNSPECIFIED MIGRAINE TYPE: ICD-10-CM

## 2022-12-20 DIAGNOSIS — Z12.39 BREAST CANCER SCREENING, HIGH RISK PATIENT: ICD-10-CM

## 2022-12-20 PROCEDURE — 99396 PREV VISIT EST AGE 40-64: CPT | Mod: 25 | Performed by: NURSE PRACTITIONER

## 2022-12-20 PROCEDURE — 90471 IMMUNIZATION ADMIN: CPT | Performed by: NURSE PRACTITIONER

## 2022-12-20 PROCEDURE — 86706 HEP B SURFACE ANTIBODY: CPT | Performed by: NURSE PRACTITIONER

## 2022-12-20 PROCEDURE — 99214 OFFICE O/P EST MOD 30 MIN: CPT | Mod: 25 | Performed by: NURSE PRACTITIONER

## 2022-12-20 PROCEDURE — 90677 PCV20 VACCINE IM: CPT | Performed by: NURSE PRACTITIONER

## 2022-12-20 PROCEDURE — 36415 COLL VENOUS BLD VENIPUNCTURE: CPT | Performed by: NURSE PRACTITIONER

## 2022-12-20 ASSESSMENT — ENCOUNTER SYMPTOMS
HEMATOCHEZIA: 0
HEARTBURN: 0
JOINT SWELLING: 0
PALPITATIONS: 0
DIARRHEA: 0
MYALGIAS: 1
WEAKNESS: 0
HEADACHES: 0
NAUSEA: 0
SORE THROAT: 0
PARESTHESIAS: 0
ARTHRALGIAS: 1
DYSURIA: 0
FREQUENCY: 0
NERVOUS/ANXIOUS: 0
CONSTIPATION: 0
HEMATURIA: 0
EYE PAIN: 0
COUGH: 0
BREAST MASS: 0
ABDOMINAL PAIN: 0
CHILLS: 0
DIZZINESS: 0
FEVER: 0
SHORTNESS OF BREATH: 0

## 2022-12-20 ASSESSMENT — PAIN SCALES - GENERAL: PAINLEVEL: MILD PAIN (3)

## 2022-12-20 NOTE — Clinical Note
Cathy was recommended testing in 2020, but opted against due to costs. Feels it is doable now and wondering if we can go ahead with that testing or should she scheduled a visit with you first?

## 2022-12-20 NOTE — PROGRESS NOTES
SUBJECTIVE:   CC: Cathy is an 42 year old who presents for preventive health visit.   Patient has been advised of split billing requirements and indicates understanding: Yes     Healthy Habits:     Getting at least 3 servings of Calcium per day:  NO    Bi-annual eye exam:  Yes    Dental care twice a year:  NO    Sleep apnea or symptoms of sleep apnea:  None    Diet:  Regular (no restrictions)    Frequency of exercise:  2-3 days/week    Duration of exercise:  15-30 minutes    Taking medications regularly:  Yes    Medication side effects:  None    PHQ-2 Total Score: 0    Additional concerns today:  No    Migraines - Pt states Neurologist requesting PCP take over care due to improved symptoms. Migraines are well covered by rescue medication rizatriptan and prochlorperazine. Having about one migraine a month. Usually stress related.    Today's PHQ-2 Score:   PHQ-2 (  [employer]) 2022   Q1: Little interest or pleasure in doing things 0   Q2: Feeling down, depressed or hopeless 0   PHQ-2 Score 0   PHQ-2 Total Score (12-17 Years)- Positive if 3 or more points; Administer PHQ-A if positive -   Q1: Little interest or pleasure in doing things Not at all   Q2: Feeling down, depressed or hopeless Not at all   PHQ-2 Score 0       Social History     Tobacco Use     Smoking status: Every Day     Packs/day: 1.00     Years: 14.00     Pack years: 14.00     Types: Cigarettes     Start date: 2004     Last attempt to quit: 2022     Years since quittin.9     Passive exposure: Past     Smokeless tobacco: Never     Tobacco comments:     Pt states slowly cutting down. Pt does use medical marijuana flower and edibles.   Substance Use Topics     Alcohol use: Yes     Alcohol/week: 4.0 standard drinks     Types: 4 Drinks containing 0.5 oz of alcohol per week     If you drink alcohol do you typically have >3 drinks per day or >7 drinks per week? No    No flowsheet data found.    Reviewed orders with patient.  Reviewed  health maintenance and updated orders accordingly - Yes  Lab work is in process  Labs reviewed in EPIC    Breast Cancer Screening:    FHS-7:   Breast CA Risk Assessment (FHS-7) 1/5/2022   Did any of your first-degree relatives have breast or ovarian cancer? No   Did any of your relatives have bilateral breast cancer? No   Did any man in your family have breast cancer? No   Did any woman in your family have breast and ovarian cancer? Yes - P aunt (BRCA negative), M aunt (pre-menopausal, BRCA negative); no ovarian cancer   Did any woman in your family have breast cancer before age 50 y? Yes   Do you have 2 or more relatives with breast and/or ovarian cancer? Yes   Do you have 2 or more relatives with breast and/or bowel cancer? Yes     Saw  in the past and opted against testing due to costs.    Mammogram Screening - Alternate mammogram schedule due to family breast cancer history  Pertinent mammograms are reviewed under the imaging tab.    History of abnormal Pap smear: NO - age 30-65 PAP every 5 years with negative HPV co-testing recommended  PAP / HPV Latest Ref Rng & Units 12/14/2021 2/8/2018   PAP   Negative for Intraepithelial Lesion or Malignancy (NILM) -   PAP (Historical) - - NIL   HPV16 Negative Negative Negative   HPV18 Negative Negative Negative   HRHPV Negative Negative Negative     Reviewed and updated as needed this visit by clinical staff   Tobacco  Allergies  Meds   Med Hx  Surg Hx           Reviewed and updated as needed this visit by Provider       Med Hx  Surg Hx              Review of Systems   Constitutional: Negative for chills and fever.   HENT: Negative for congestion, ear pain, hearing loss and sore throat.    Eyes: Negative for pain and visual disturbance.   Respiratory: Negative for cough and shortness of breath.    Cardiovascular: Negative for chest pain, palpitations and peripheral edema.   Gastrointestinal: Negative for abdominal pain, constipation, diarrhea, heartburn,  "hematochezia and nausea.   Breasts:  Negative for tenderness, breast mass and discharge.   Genitourinary: Negative for dysuria, frequency, genital sores, hematuria, pelvic pain, urgency, vaginal bleeding and vaginal discharge.   Musculoskeletal: Positive for arthralgias and myalgias. Negative for joint swelling.   Skin: Negative for rash.   Neurological: Negative for dizziness, weakness, headaches and paresthesias.   Psychiatric/Behavioral: Negative for mood changes. The patient is not nervous/anxious.         OBJECTIVE:   /78   Pulse 80   Temp 98.1  F (36.7  C) (Temporal)   Resp 14   Ht 1.7 m (5' 6.93\")   Wt 108.2 kg (238 lb 8 oz)   LMP 12/09/2022 (Exact Date)   SpO2 99%   BMI 37.43 kg/m    Physical Exam  GENERAL: healthy, alert and no distress  EYES: Eyes grossly normal to inspection, PERRL and conjunctivae and sclerae normal  HENT: ear canals and TM's normal, nose and mouth without ulcers or lesions  NECK: no adenopathy, no asymmetry, masses, or scars and thyroid normal to palpation  RESP: lungs clear to auscultation - no rales, rhonchi or wheezes  BREAST: normal without masses, tenderness or nipple discharge and no palpable axillary masses or adenopathy  CV: regular rate and rhythm, normal S1 S2, no S3 or S4, no murmur, click or rub, no peripheral edema and peripheral pulses strong  ABDOMEN: soft, nontender, no hepatosplenomegaly, no masses and bowel sounds normal  MS: no gross musculoskeletal defects noted, no edema  SKIN: no suspicious lesions or rashes  NEURO: Normal strength and tone, mentation intact and speech normal  PSYCH: mentation appears normal, affect normal/bright  LYMPH: no cervical, supraclavicular adenopathy    Diagnostic Test Results:  Labs reviewed in Epic  pending    ASSESSMENT/PLAN:   (Z00.00) Routine general medical examination at a health care facility  (primary encounter diagnosis)  Comment:   Plan: Recent illness in December. Wants to cut back smoking    (Z12.39) Breast " cancer screening, high risk patient  Comment:   Plan: MA Screen Bilateral w/Zain        Due for 3D mammogram    (Z92.29) History of hepatitis B vaccination  Comment:   Plan: Hepatitis B Surface Antibody        If negative, initiate Hep B vaccine series. Doesn't need an order, can just schedule a MA visit    (G43.009) Nonintractable migraine, unspecified migraine type  Comment:   Plan: Stable and I am ok to take this over from neurology. Refill rizatriptan and prochlorperazine as needed through next year. If needing 4+ rescue med per month, schedule appt    (J45.40) Moderate persistent asthma without complication  Comment:   Plan: Discussed regular use of the inhaler and developing strategies to remember  Medications include fluticasone-salmeterol ICS, montelukast and cetirizine for controllers and albuterol for rescue    (E89.0) Post-surgical hypothyroidism  Comment:   Plan: Euthyroid with normal TSH 9/2022    Patient has been advised of split billing requirements and indicates understanding: Yes      COUNSELING:  Reviewed preventive health counseling, as reflected in patient instructions        She reports that she has been smoking cigarettes. She started smoking about 18 years ago. She has a 14.00 pack-year smoking history. She has been exposed to tobacco smoke. She has never used smokeless tobacco.  Nicotine/Tobacco Cessation Plan:   Self help information given to patient      JANNET Liu CNP [facility]

## 2022-12-21 LAB
HBV SURFACE AB SERPL IA-ACNC: 383.89 M[IU]/ML
HBV SURFACE AB SERPL IA-ACNC: REACTIVE M[IU]/ML

## 2022-12-30 ENCOUNTER — TELEPHONE (OUTPATIENT)
Dept: PULMONOLOGY | Facility: CLINIC | Age: 42
End: 2022-12-30

## 2022-12-30 NOTE — TELEPHONE ENCOUNTER
Left Voicemail (1st Attempt) for the patient to call back and schedule the following:    Appointment type: RTN  Provider: Jignesh  Return date: Next available  Specialty phone number: [phone]  Additional appointment(s) needed: NA  Additonal Notes: reschedule needed.

## 2023-01-09 ENCOUNTER — TELEPHONE (OUTPATIENT)
Dept: FAMILY MEDICINE | Facility: CLINIC | Age: 43
End: 2023-01-09
Payer: COMMERCIAL

## 2023-01-09 DIAGNOSIS — Z12.39 BREAST CANCER SCREENING, HIGH RISK PATIENT: Primary | ICD-10-CM

## 2023-01-09 DIAGNOSIS — Z80.9 FAMILY HISTORY OF CANCER: ICD-10-CM

## 2023-01-09 NOTE — TELEPHONE ENCOUNTER
I believe this is supposed to be the genetic counseling and so I pulled up and signed an order for that. If there is a different order, please let me know.  DEVEN Winston

## 2023-01-09 NOTE — PROGRESS NOTES
Telephoned and left voice message for patient requesting call back to clarify genetic counseling referral placed by Sherrill Dasilva APRN CNP today.  Patient met with Melissa Lange [facility] in 2020.  Insurance would not cover genetic testing and out of pocket costs were discussed. Patient declined to test at that time.    She was referred to [facility] and met with her once once on 3/5/21 and did not return.     DOES SHE WANT TO PURSUE GENETIC TESTING NOW?  SINCE <3 YEARS SHE WOULD BE A RETURN EITHER TO [facility] OR [facility]?

## 2023-01-09 NOTE — TELEPHONE ENCOUNTER
[facility] calling stating that need a new referral for the reason that she needs to be seen. Thanks      Referral has been Td up if you would like to sign. Thanks    Constance Lynn RN  [facility]

## 2023-01-09 NOTE — TELEPHONE ENCOUNTER
LVM requesting call back to inform order signed    Bhavya VALENZUELA RN  [facility]

## 2023-01-16 ENCOUNTER — MYC MEDICAL ADVICE (OUTPATIENT)
Dept: FAMILY MEDICINE | Facility: CLINIC | Age: 43
End: 2023-01-16

## 2023-01-16 ENCOUNTER — HOSPITAL ENCOUNTER (EMERGENCY)
Facility: CLINIC | Age: 43
Discharge: HOME | End: 2023-01-16
Attending: EMERGENCY MEDICINE | Admitting: EMERGENCY MEDICINE
Payer: COMMERCIAL

## 2023-01-16 VITALS
SYSTOLIC BLOOD PRESSURE: 138 MMHG | DIASTOLIC BLOOD PRESSURE: 86 MMHG | RESPIRATION RATE: 16 BRPM | WEIGHT: 220 LBS | OXYGEN SATURATION: 98 % | HEART RATE: 75 BPM | TEMPERATURE: 98 F | BODY MASS INDEX: 33.34 KG/M2 | HEIGHT: 68 IN

## 2023-01-16 DIAGNOSIS — M54.50 ACUTE BILATERAL LOW BACK PAIN WITHOUT SCIATICA: ICD-10-CM

## 2023-01-16 LAB
ALBUMIN SERPL BCG-MCNC: 4.2 G/DL (ref 3.5–5.2)
ALBUMIN UR-MCNC: NEGATIVE MG/DL
ALP SERPL-CCNC: 85 U/L (ref 35–104)
ALT SERPL W P-5'-P-CCNC: 15 U/L (ref 10–35)
ANION GAP SERPL CALCULATED.3IONS-SCNC: 12 MMOL/L (ref 7–15)
APPEARANCE UR: CLEAR
AST SERPL W P-5'-P-CCNC: 15 U/L (ref 10–35)
BACTERIA #/AREA URNS HPF: ABNORMAL /HPF
BASOPHILS # BLD AUTO: 0 10E3/UL (ref 0–0.2)
BASOPHILS NFR BLD AUTO: 0 %
BILIRUB SERPL-MCNC: 0.2 MG/DL
BILIRUB UR QL STRIP: NEGATIVE
BUN SERPL-MCNC: 10.5 MG/DL (ref 6–20)
CALCIUM SERPL-MCNC: 9 MG/DL (ref 8.6–10)
CHLORIDE SERPL-SCNC: 106 MMOL/L (ref 98–107)
COLOR UR AUTO: ABNORMAL
CREAT SERPL-MCNC: 0.62 MG/DL (ref 0.51–0.95)
DEPRECATED HCO3 PLAS-SCNC: 20 MMOL/L (ref 22–29)
EOSINOPHIL # BLD AUTO: 0.2 10E3/UL (ref 0–0.7)
EOSINOPHIL NFR BLD AUTO: 3 %
ERYTHROCYTE [DISTWIDTH] IN BLOOD BY AUTOMATED COUNT: 13.2 % (ref 10–15)
GFR SERPL CREATININE-BSD FRML MDRD: >90 ML/MIN/1.73M2
GLUCOSE SERPL-MCNC: 107 MG/DL (ref 70–99)
GLUCOSE UR STRIP-MCNC: NEGATIVE MG/DL
HCG UR QL: NEGATIVE
HCT VFR BLD AUTO: 42.3 % (ref 35–47)
HGB BLD-MCNC: 13.1 G/DL (ref 11.7–15.7)
HGB UR QL STRIP: NEGATIVE
HOLD SPECIMEN: NORMAL
HOLD SPECIMEN: NORMAL
IMM GRANULOCYTES # BLD: 0 10E3/UL
IMM GRANULOCYTES NFR BLD: 0 %
KETONES UR STRIP-MCNC: NEGATIVE MG/DL
LEUKOCYTE ESTERASE UR QL STRIP: NEGATIVE
LYMPHOCYTES # BLD AUTO: 2.7 10E3/UL (ref 0.8–5.3)
LYMPHOCYTES NFR BLD AUTO: 37 %
MCH RBC QN AUTO: 28.2 PG (ref 26.5–33)
MCHC RBC AUTO-ENTMCNC: 31 G/DL (ref 31.5–36.5)
MCV RBC AUTO: 91 FL (ref 78–100)
MONOCYTES # BLD AUTO: 0.5 10E3/UL (ref 0–1.3)
MONOCYTES NFR BLD AUTO: 8 %
MUCOUS THREADS #/AREA URNS LPF: PRESENT /LPF
NEUTROPHILS # BLD AUTO: 3.8 10E3/UL (ref 1.6–8.3)
NEUTROPHILS NFR BLD AUTO: 52 %
NITRATE UR QL: NEGATIVE
NRBC # BLD AUTO: 0 10E3/UL
NRBC BLD AUTO-RTO: 0 /100
PH UR STRIP: 5.5 [PH] (ref 5–7)
PLATELET # BLD AUTO: 334 10E3/UL (ref 150–450)
POTASSIUM SERPL-SCNC: 4.3 MMOL/L (ref 3.4–5.3)
PROT SERPL-MCNC: 6.9 G/DL (ref 6.4–8.3)
RBC # BLD AUTO: 4.65 10E6/UL (ref 3.8–5.2)
RBC URINE: <1 /HPF
SODIUM SERPL-SCNC: 138 MMOL/L (ref 136–145)
SP GR UR STRIP: 1.01 (ref 1–1.03)
SQUAMOUS EPITHELIAL: 4 /HPF
UROBILINOGEN UR STRIP-MCNC: NORMAL MG/DL
WBC # BLD AUTO: 7.2 10E3/UL (ref 4–11)
WBC URINE: 1 /HPF

## 2023-01-16 PROCEDURE — 250N000011 HC RX IP 250 OP 636: Performed by: EMERGENCY MEDICINE

## 2023-01-16 PROCEDURE — 99284 EMERGENCY DEPT VISIT MOD MDM: CPT | Performed by: EMERGENCY MEDICINE

## 2023-01-16 PROCEDURE — 81025 URINE PREGNANCY TEST: CPT | Performed by: EMERGENCY MEDICINE

## 2023-01-16 PROCEDURE — 250N000013 HC RX MED GY IP 250 OP 250 PS 637: Performed by: EMERGENCY MEDICINE

## 2023-01-16 PROCEDURE — 81001 URINALYSIS AUTO W/SCOPE: CPT | Performed by: EMERGENCY MEDICINE

## 2023-01-16 PROCEDURE — 36415 COLL VENOUS BLD VENIPUNCTURE: CPT | Performed by: EMERGENCY MEDICINE

## 2023-01-16 PROCEDURE — 85025 COMPLETE CBC W/AUTO DIFF WBC: CPT | Performed by: EMERGENCY MEDICINE

## 2023-01-16 PROCEDURE — 80053 COMPREHEN METABOLIC PANEL: CPT | Performed by: EMERGENCY MEDICINE

## 2023-01-16 RX ORDER — LORAZEPAM 0.5 MG/1
1 TABLET ORAL ONCE
Status: COMPLETED | OUTPATIENT
Start: 2023-01-16 | End: 2023-01-16

## 2023-01-16 RX ORDER — IBUPROFEN 200 MG
600 TABLET ORAL 3 TIMES DAILY
Qty: 63 TABLET | Refills: 0 | Status: SHIPPED | OUTPATIENT
Start: 2023-01-16 | End: 2023-01-23

## 2023-01-16 RX ORDER — ONDANSETRON 8 MG/1
8 TABLET, ORALLY DISINTEGRATING ORAL ONCE
Status: COMPLETED | OUTPATIENT
Start: 2023-01-16 | End: 2023-01-16

## 2023-01-16 RX ORDER — OXYCODONE HYDROCHLORIDE 5 MG/1
5 TABLET ORAL ONCE
Status: COMPLETED | OUTPATIENT
Start: 2023-01-16 | End: 2023-01-16

## 2023-01-16 RX ORDER — OXYCODONE HYDROCHLORIDE 5 MG/1
5 TABLET ORAL EVERY 6 HOURS PRN
Qty: 12 TABLET | Refills: 0 | Status: SHIPPED | OUTPATIENT
Start: 2023-01-16 | End: 2023-01-19

## 2023-01-16 RX ORDER — METHOCARBAMOL 500 MG/1
1000 TABLET, FILM COATED ORAL 3 TIMES DAILY
Qty: 30 TABLET | Refills: 0 | Status: SHIPPED | OUTPATIENT
Start: 2023-01-16 | End: 2023-01-21

## 2023-01-16 RX ADMIN — ONDANSETRON 8 MG: 8 TABLET, ORALLY DISINTEGRATING ORAL at 12:26

## 2023-01-16 RX ADMIN — OXYCODONE HYDROCHLORIDE 5 MG: 5 TABLET ORAL at 12:22

## 2023-01-16 RX ADMIN — LORAZEPAM 1 MG: 0.5 TABLET ORAL at 12:22

## 2023-01-16 ASSESSMENT — ACTIVITIES OF DAILY LIVING (ADL): ADLS_ACUITY_SCORE: 35

## 2023-01-16 NOTE — LETTER
January 16, 2023      To Whom It May Concern:      Cathy Arroyo was seen in our Emergency Department today, 01/16/23.  I expect her condition to improve over the next week.  She may return to work/school on 1/19/2023 but then will be unable to lift more than 20 pounds for 1 week.    Sincerely,        Jose Francisco Anne MD, MD

## 2023-01-16 NOTE — DISCHARGE INSTRUCTIONS
Home to rest.    No lifting more than 20 pounds for 1 week    Use heat to your back for comfort    Fill your prescriptions and take as directed    Hold your tizanidine while on Robaxin.    Please make an appointment to follow up with Your Primary Care Provider in 1 week for recheck and consideration of physical therapy.

## 2023-01-16 NOTE — TELEPHONE ENCOUNTER
Zamzam    Spoke to pt, she is already on her way to the ED, she stated she is in so much pain and feels ED is the best option for her    Cristinsamson Landry RN   [facility]

## 2023-01-16 NOTE — ED TRIAGE NOTES
Pt presents with mid/lower back pain that started yesterday afternoon that is progressively worsening. Pt having urinary frequency, normal for pt.     Triage Assessment     Row Name 01/16/23 1219       Triage Assessment (Adult)    Airway WDL WDL       Respiratory WDL    Respiratory WDL WDL       Skin Circulation/Temperature WDL    Skin Circulation/Temperature WDL WDL       Cardiac WDL    Cardiac WDL WDL       Peripheral/Neurovascular WDL    Peripheral Neurovascular WDL WDL       Cognitive/Neuro/Behavioral WDL    Cognitive/Neuro/Behavioral WDL WDL

## 2023-01-16 NOTE — TELEPHONE ENCOUNTER
I could do a video visit with the patient at noon and send her to [facility] if she felt she could wait for that. Actually I would likely be able to scheduled her in that noon spot, but see her by 11:30.  DEVEN Winston

## 2023-01-16 NOTE — ED PROVIDER NOTES
[facility] ([facility])  2023     History     Chief Complaint   Patient presents with     Flank Pain     HPI  Cathy Arroyo is a 42 year old female  with a history of some chronic back pain for which she gets injections occasionally but has never had surgery.  Patient has a history of bipolar disorder and anxiety as well as uncomplicated asthma.  The patient states that over the past 3 days she has had an increased exacerbation of her back pain across her lower back that is nonradicular in nature but is into her flanks bilaterally.  Patient states she does have some urgency but no true dysuria and is wondering if this is a bladder infection.  Patient denies any fevers, denies any vomiting and is ambulatory but states she is having difficulty getting around and states that this is unlike her normal back pain.  She presents here to the ER for evaluation.    This part of the medical record was transcribed by Ina Mendez, Medical Scribe, from a dictation done by Jose Francisco Anne MD.     Past Medical History  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     Post partum depression      Post-surgical hypothyroidism 2017     Uncomplicated asthma      Past Surgical History:   Procedure Laterality Date     BIOPSY  31 y/o colpos, and in the last 2 years for thyroid    Colposcopy, thyroid nodule biopsy twice     BLOOD PATCH N/A 10/11/2016    Procedure: EPIDURAL BLOOD PATCH;  Surgeon: GENERIC ANESTHESIA PROVIDER;  Location: [facility] OR      SECTION, TUBAL LIGATION, COMBINED N/A 2019    Procedure:  SECTION, WITH TUBAL LIGATION;  Surgeon: Kathy Rutledge MD;  Location: [facility] L+D     CRANIOTOMY, EXCISE TUMOR COMPLEX, COMBINED  2014    Procedure:  COMBINED CRANIOTOMY, EXCISE TUMOR COMPLEX;  Supracerabellar  Infratentorial Approach for Resection of Tumor ;  Surgeon: Kate Mckeon MD;  [facility]: [facility] OR     THYROIDECTOMY N/A 5/3/2017    Procedure: THYROIDECTOMY;  Total Thyroidectomy;  Surgeon: Pamela Villasenor MD;  [facility]     albuterol (PROAIR HFA/PROVENTIL HFA/VENTOLIN HFA) 108 (90 Base) MCG/ACT inhaler  amphetamine-dextroamphetamine (ADDERALL XR) 20 MG 24 hr capsule  ARIPiprazole (ABILIFY) 10 MG tablet  cetirizine (ZYRTEC) 10 MG tablet  diazepam (VALIUM) 5 MG tablet  fluticasone (FLONASE) 50 MCG/ACT nasal spray  fluticasone-salmeterol (ADVAIR HFA) 230-21 MCG/ACT inhaler  ipratropium - albuterol 0.5 mg/2.5 mg/3 mL (DUONEB) 0.5-2.5 (3) MG/3ML neb solution  levothyroxine (SYNTHROID/LEVOTHROID) 175 MCG tablet  medical cannabis (Patient's own supply)  medical cannabis (Patient's own supply)  montelukast (SINGULAIR) 10 MG tablet  multivitamin w/minerals (THERA-VIT-M) tablet  prochlorperazine (COMPAZINE) 10 MG tablet  rizatriptan (MAXALT-MLT) 10 MG ODT  tiZANidine (ZANAFLEX) 4 MG tablet  VENTOLIN  (90 Base) MCG/ACT inhaler  vitamin D3 (CHOLECALCIFEROL) 50 mcg (2000 units) tablet      Allergies   Allergen Reactions     Adacel [Daptacel] Swelling     Adhesive Tape Hives     Ciprofloxacin      Causes visual hallucinations.     Codeine Sulfate Nausea and Vomiting     Nicoderm [Nicotine]      Patch and Gum, pt reported allergic reaction 6/24     Tegaderm Transparent Dressing (Informational Only) Hives     Tetanus Toxoid      Pt states she had an infection at injection site.      Vicodin [Hydrocodone-Acetaminophen] Nausea and Vomiting     Methimazole Rash     Family History  Family History   Problem Relation Age of Onset     Other Cancer Mother 62        salivary gland cancer     Genitourinary Problems Mother      Bipolar Disorder Mother         unipolar depression     Depression Mother      Thyroid Disease Mother         benign tumor      Melanoma Mother      Asthma Father      Mental Illness Father         Bipolar 2     Obesity Father      Bipolar Disorder Brother         unipolar depression     Asthma Brother      Depression Brother      Asthma Maternal Grandmother      Osteoporosis Maternal Grandmother      Glaucoma Maternal Grandmother      Hypertension Maternal Grandmother      Macular Degeneration Maternal Grandmother      Prostate Cancer Maternal Grandfather 69        no history of smoking     Bladder Cancer Maternal Grandfather 71     Colon Cancer Maternal Grandfather 70     Diabetes Paternal Grandfather      Breast Cancer Maternal Aunt 37        negative BRCA1/2 testing     Melanoma Maternal Aunt 64     Thyroid Disease Paternal Aunt      Breast Cancer Paternal Aunt 58        bilateral; negative testing     Cancer Paternal Aunt      Breast Cancer Other         paternal great aunt     Social History   Social History     Tobacco Use     Smoking status: Every Day     Packs/day: 1.00     Years: 14.00     Pack years: 14.00     Types: Cigarettes     Start date: 2004     Last attempt to quit: 2022     Years since quittin.9     Passive exposure: Past     Smokeless tobacco: Never     Tobacco comments:     Pt states slowly cutting down. Pt does use medical marijuana flower and edibles.   Vaping Use     Vaping Use: Some days     Substances: Nicotine, Flavoring     Devices: Disposable   Substance Use Topics     Alcohol use: Yes     Alcohol/week: 4.0 standard drinks     Types: 4 Drinks containing 0.5 oz of alcohol per week     Drug use: Yes     Types: Marijuana     Comment: medical marijuana      Past medical history, past surgical history, medications, allergies, family history, and social history were reviewed with the patient. No additional pertinent items.      A medically appropriate review of systems was performed with pertinent positives and negatives noted in the HPI, and all other systems negative.    Physical Exam   BP: (!)  "165/98  Pulse: 87  Temp: 98.5  F (36.9  C)  Resp: 16  Height: 172.7 cm (5' 8\")  Weight: 99.8 kg (220 lb)  SpO2: 95 %  Physical Exam  Vitals and nursing note reviewed.   Constitutional:       Appearance: She is not ill-appearing.      Comments: Ambulatory without difficulty   HENT:      Head: Atraumatic.   Eyes:      Extraocular Movements: Extraocular movements intact.      Pupils: Pupils are equal, round, and reactive to light.   Musculoskeletal:      Cervical back: Neck supple.      Comments: There is tenderness of the back in the upper lumbar and bilateral paraspinous muscles   Neurological:      General: No focal deficit present.      Mental Status: She is alert and oriented to person, place, and time.   Psychiatric:         Mood and Affect: Mood normal.         ED Course, Procedures, & Data      Procedures        Results for orders placed or performed during the hospital encounter of 01/16/23   UA with Microscopic reflex to Culture     Status: Abnormal    Specimen: Urine, Midstream   Result Value Ref Range    Color Urine Light Yellow Colorless, Straw, Light Yellow, Yellow    Appearance Urine Clear Clear    Glucose Urine Negative Negative mg/dL    Bilirubin Urine Negative Negative    Ketones Urine Negative Negative mg/dL    Specific Gravity Urine 1.009 1.003 - 1.035    Blood Urine Negative Negative    pH Urine 5.5 5.0 - 7.0    Protein Albumin Urine Negative Negative mg/dL    Urobilinogen Urine Normal Normal, 2.0 mg/dL    Nitrite Urine Negative Negative    Leukocyte Esterase Urine Negative Negative    Bacteria Urine Many (A) None Seen /HPF    Mucus Urine Present (A) None Seen /LPF    RBC Urine <1 <=2 /HPF    WBC Urine 1 <=5 /HPF    Squamous Epithelials Urine 4 (H) <=1 /HPF    Narrative    Urine Culture not indicated   Comprehensive metabolic panel     Status: Abnormal   Result Value Ref Range    Sodium 138 136 - 145 mmol/L    Potassium 4.3 3.4 - 5.3 mmol/L    Chloride 106 98 - 107 mmol/L    Carbon Dioxide (CO2) 20 " (L) 22 - 29 mmol/L    Anion Gap 12 7 - 15 mmol/L    Urea Nitrogen 10.5 6.0 - 20.0 mg/dL    Creatinine 0.62 0.51 - 0.95 mg/dL    Calcium 9.0 8.6 - 10.0 mg/dL    Glucose 107 (H) 70 - 99 mg/dL    Alkaline Phosphatase 85 35 - 104 U/L    AST 15 10 - 35 U/L    ALT 15 10 - 35 U/L    Protein Total 6.9 6.4 - 8.3 g/dL    Albumin 4.2 3.5 - 5.2 g/dL    Bilirubin Total 0.2 <=1.2 mg/dL    GFR Estimate >90 >60 mL/min/1.73m2   CBC with platelets and differential     Status: Abnormal   Result Value Ref Range    WBC Count 7.2 4.0 - 11.0 10e3/uL    RBC Count 4.65 3.80 - 5.20 10e6/uL    Hemoglobin 13.1 11.7 - 15.7 g/dL    Hematocrit 42.3 35.0 - 47.0 %    MCV 91 78 - 100 fL    MCH 28.2 26.5 - 33.0 pg    MCHC 31.0 (L) 31.5 - 36.5 g/dL    RDW 13.2 10.0 - 15.0 %    Platelet Count 334 150 - 450 10e3/uL    % Neutrophils 52 %    % Lymphocytes 37 %    % Monocytes 8 %    % Eosinophils 3 %    % Basophils 0 %    % Immature Granulocytes 0 %    NRBCs per 100 WBC 0 <1 /100    Absolute Neutrophils 3.8 1.6 - 8.3 10e3/uL    Absolute Lymphocytes 2.7 0.8 - 5.3 10e3/uL    Absolute Monocytes 0.5 0.0 - 1.3 10e3/uL    Absolute Eosinophils 0.2 0.0 - 0.7 10e3/uL    Absolute Basophils 0.0 0.0 - 0.2 10e3/uL    Absolute Immature Granulocytes 0.0 <=0.4 10e3/uL    Absolute NRBCs 0.0 10e3/uL   [place] Draw     Status: None    Narrative    The following orders were created for panel order [place] Draw.  Procedure                               Abnormality         Status                     ---------                               -----------         ------                     Extra Blue Top Tube[[id number]]                              Final result               Extra Red Top Tube[[id number]]                               Final result                 Please view results for these tests on the individual orders.   Extra Blue Top Tube     Status: None   Result Value Ref Range    Hold Specimen [facility]    Extra Red Top Tube     Status: None   Result Value Ref Range    Hold  Specimen [facility]    CBC with platelets differential     Status: Abnormal    Narrative    The following orders were created for panel order CBC with platelets differential.  Procedure                               Abnormality         Status                     ---------                               -----------         ------                     CBC with platelets and d...[[id number]]  Abnormal            Final result                 Please view results for these tests on the individual orders.     Medications   oxyCODONE (ROXICODONE) tablet 5 mg (5 mg Oral Given 1/16/23 1222)   LORazepam (ATIVAN) tablet 1 mg (1 mg Oral Given 1/16/23 1222)   ondansetron (ZOFRAN ODT) ODT tab 8 mg (8 mg Oral Given 1/16/23 1226)       Medical Decision Making  The patient presented with a problem that is a chronic illness mild to moderate exacerbation, progression, or side effect of treatment.    The patient's evaluation involved:  ordering and review of 3+ test(s) (See above)   Plus review of the patient's previous MRI results    The patient's management involved prescription drug management.      Assessment & Plan      I have reviewed the nursing notes.    Patient at this time will be sent home.  There is no evidence of UTI and the patient has some back pain issues which seem to have been exacerbated at this point.  Patient will be placed on the medications below and is to follow-up with her primary care physician in 1 week for recheck and consideration of physical therapy.    I have reviewed the findings, diagnosis, plan and need for follow up with the patient.    New Prescriptions    IBUPROFEN (ADVIL/MOTRIN) 200 MG TABLET    Take 3 tablets (600 mg) by mouth 3 times daily for 7 days    METHOCARBAMOL (ROBAXIN) 500 MG TABLET    Take 2 tablets (1,000 mg) by mouth 3 times daily for 5 days    OXYCODONE (ROXICODONE) 5 MG TABLET    Take 1 tablet (5 mg) by mouth every 6 hours as needed for pain       Final diagnoses:   Acute bilateral low back  pain without sciatica     Routine discharge instructions were given for this diagnosis    Patient is to be on less than 20 pound lifting restrictions for 1 week and was given a work note to that effect.    I, Ina Mendez, am serving as a trained medical scribe to document services personally performed by Jose Francisco Anne MD, based on the provider's statements to me.   IJose Francisco MD, was physically present and have reviewed and verified the accuracy of this note documented by Ina Mendez.    Jose Francisco Anne MD  [facility] EMERGENCY DEPARTMENT  1/16/2023     Jose Francisco Anne MD  01/16/23 3662

## 2023-01-18 ENCOUNTER — NURSE TRIAGE (OUTPATIENT)
Dept: FAMILY MEDICINE | Facility: CLINIC | Age: 43
End: 2023-01-18
Payer: COMMERCIAL

## 2023-01-18 ENCOUNTER — MYC MEDICAL ADVICE (OUTPATIENT)
Dept: ORTHOPEDICS | Facility: CLINIC | Age: 43
End: 2023-01-18
Payer: COMMERCIAL

## 2023-01-18 NOTE — TELEPHONE ENCOUNTER
1/18 Called and spoke to patient, informed her we currently don't have an openings but has been added to wait list at [facility] [place].     Melanie mosqueda Procedure   Orthopedics, Podiatry, Sports Medicine, Ent ,Eye , Audiology, Adult Endocrine & Diabetes, Nutrition & Medication Therapy Management Specialties   [facility]

## 2023-01-18 NOTE — TELEPHONE ENCOUNTER
Provider Response to 2nd Level Triage Request    I have reviewed the RN documentation. My recommendation is:  Face To Face Visit. next few days     Seeing the patient in the next few days would be advisable for proximity to complaint.    If the bleeding is bright red and mostly on the toilet paper or in the toilet water (not mixed in with stool), then it is likely coming from a hemorrhoid or fissure.  These are less concerning causes of bleeding.    Prashant Warren MD

## 2023-01-18 NOTE — TELEPHONE ENCOUNTER
Attempted to call pt to help schedule an appointment. Pt did not answer. A detailed message was left requesting pt to call back and we could assist with scheduling an appointment.    Constance Lynn RN  [facility]

## 2023-01-18 NOTE — TELEPHONE ENCOUNTER
Patient calling to report she is having some mild rectal bleeding - a few drops or streaks when she goes to the bathroom and wipes.  Patient is very concerned about the bleeding but RN reassured her that if there is not a large amount of blood in her toilet - it could be something less concerning.      Told patient that if she notices a large amount of blood in the toilet or if she passes out/gets extremely lightheaded she should go to the ED.    Per protocol, patient should be seen in office either today or within 3 days depending on the amount of blood in her stool.     Please advise.     Reason for Disposition    MILD rectal bleeding (more than just a few drops or streaks)    Additional Information    Negative: Passed out (i.e., fainted, collapsed and was not responding)    Negative: Shock suspected (e.g., cold/pale/clammy skin, too weak to stand, low BP, rapid pulse)    Negative: Vomiting red blood or black (coffee ground) material    Negative: Sounds like a life-threatening emergency to the triager    Negative: Diarrhea is main symptom    Negative: Rectal symptoms    Negative: SEVERE rectal bleeding (large blood clots; constant or on and off bleeding)    Negative: SEVERE dizziness (e.g., unable to stand, requires support to walk, feels like passing out now)    Negative: MODERATE rectal bleeding (small blood clots, passing blood without stool, or toilet water turns red) more than once a day    Negative: Bloody, black, or tarry bowel movements  (Exception: Chronic-unchanged black-grey bowel movements and is taking iron pills or Pepto-Bismol.)    Negative: High-risk adult (e.g., prior surgery on aorta, abdominal aortic aneurysm)    Negative: Rectal foreign body (inserted or swallowed)    Negative: SEVERE abdominal pain (e.g., excruciating)    Negative: Constant abdominal pain lasting > 2 hours    Negative: Pale skin (pallor) of new-onset or worsening    Negative: Patient sounds very sick or weak to the triager     "Negative: MODERATE rectal bleeding (small blood clots, passing blood without stool, or toilet water turns red)    Negative: Taking Coumadin (warfarin) or other strong blood thinner, or known bleeding disorder (e.g., thrombocytopenia)    Negative: Colonoscopy in past 72 hours    Negative: Known cirrhosis of the liver (or history of liver failure or ascites)    Negative: Patient wants to be seen    Answer Assessment - Initial Assessment Questions  1. APPEARANCE of BLOOD: \"What color is it?\" \"Is it passed separately, on the surface of the stool, or mixed in with the stool?\"       Bright red - drops in the stool and then when she wiped there was blood on the stool.   2. AMOUNT: \"How much blood was passed?\"       Patient thought it was from her heavy flow period.   3. FREQUENCY: \"How many times has blood been passed with the stools?\"       2 times today.   4. ONSET: \"When was the blood first seen in the stools?\" (Days or weeks)       Today.   5. DIARRHEA: \"Is there also some diarrhea?\" If Yes, ask: \"How many diarrhea stools in the past 24 hours?\"       First stool was hard, second one felt regular. Pain with first stool. No diarrhea.   6. CONSTIPATION: \"Do you have constipation?\" If Yes, ask: \"How bad is it?\"      Yes - taking laxatives, last BM Sunday.   7. RECURRENT SYMPTOMS: \"Have you had blood in your stools before?\" If Yes, ask: \"When was the last time?\" and \"What happened that time?\"       No   8. BLOOD THINNERS: \"Do you take any blood thinners?\" (e.g., Coumadin/warfarin, Pradaxa/dabigatran, aspirin)      No  9. OTHER SYMPTOMS: \"Do you have any other symptoms?\"  (e.g., abdomen pain, vomiting, dizziness, fever)      Dizzy - not sure if this is related to pain killers. Is having some abdominal pain but she thinks it is related to being constipated. No vomiting, no fever.   10. PREGNANCY: \"Is there any chance you are pregnant?\" \"When was your last menstrual period?\"        No - did a pregnancy test in ER and it was " negative.    Protocols used: RECTAL BLEEDING-A-[place]    Ya Munoz RN  [facility]

## 2023-01-18 NOTE — TELEPHONE ENCOUNTER
Attempted to call patient to help schedule an appointment - LMOM requesting a call back.    Ya Munoz RN on 1/18/2023 at 1:21 PM

## 2023-01-21 ENCOUNTER — OFFICE VISIT (OUTPATIENT)
Dept: URGENT CARE | Facility: URGENT CARE | Age: 43
End: 2023-01-21
Payer: COMMERCIAL

## 2023-01-21 VITALS
DIASTOLIC BLOOD PRESSURE: 83 MMHG | OXYGEN SATURATION: 99 % | WEIGHT: 220 LBS | SYSTOLIC BLOOD PRESSURE: 127 MMHG | TEMPERATURE: 98.1 F | HEART RATE: 91 BPM | BODY MASS INDEX: 33.45 KG/M2

## 2023-01-21 DIAGNOSIS — H66.91 RIGHT ACUTE OTITIS MEDIA: Primary | ICD-10-CM

## 2023-01-21 PROCEDURE — 99213 OFFICE O/P EST LOW 20 MIN: CPT | Performed by: STUDENT IN AN ORGANIZED HEALTH CARE EDUCATION/TRAINING PROGRAM

## 2023-01-21 RX ORDER — FLUCONAZOLE 150 MG/1
150 TABLET ORAL ONCE
Qty: 2 TABLET | Refills: 0 | Status: SHIPPED | OUTPATIENT
Start: 2023-01-21 | End: 2023-01-21

## 2023-01-21 ASSESSMENT — ENCOUNTER SYMPTOMS
SORE THROAT: 0
COUGH: 0

## 2023-01-23 ENCOUNTER — MYC MEDICAL ADVICE (OUTPATIENT)
Dept: FAMILY MEDICINE | Facility: CLINIC | Age: 43
End: 2023-01-23
Payer: COMMERCIAL

## 2023-01-23 DIAGNOSIS — Z86.69 HISTORY OF RECURRENT EAR INFECTION: Primary | ICD-10-CM

## 2023-01-23 NOTE — TELEPHONE ENCOUNTER
Pt sent the following My chart message:    Jamaal Wyman!  Can you please send in a referral to the ENT Department at the [facility] [place] for my recurring ear infections?  The fax number is [phone].  Thank you!  I feel like its weird that I got a double ear infection in November and just got diagnosed with a right ear infection this weekend in urgent care and wanted to be seen at the [facility] but they require a referral at that location.  The clinics that do not don't have openings until April or May.  Thank you!    ENT referral is Td up if you would like to review and sign. Thanks    Constance Lynn RN  [facility]

## 2023-01-25 ENCOUNTER — OFFICE VISIT (OUTPATIENT)
Dept: ORTHOPEDICS | Facility: CLINIC | Age: 43
End: 2023-01-25
Payer: COMMERCIAL

## 2023-01-25 DIAGNOSIS — M54.2 NECK PAIN: ICD-10-CM

## 2023-01-25 DIAGNOSIS — M54.50 ACUTE BILATERAL LOW BACK PAIN WITHOUT SCIATICA: Primary | ICD-10-CM

## 2023-01-25 DIAGNOSIS — M54.16 LUMBAR RADICULOPATHY: ICD-10-CM

## 2023-01-25 DIAGNOSIS — M54.9 MID BACK PAIN: ICD-10-CM

## 2023-01-25 PROCEDURE — 99214 OFFICE O/P EST MOD 30 MIN: CPT | Performed by: FAMILY MEDICINE

## 2023-01-25 RX ORDER — METHOCARBAMOL 500 MG/1
500 TABLET, FILM COATED ORAL 3 TIMES DAILY PRN
Qty: 60 TABLET | Refills: 1 | Status: SHIPPED | OUTPATIENT
Start: 2023-01-25 | End: 2024-05-25

## 2023-01-25 NOTE — TELEPHONE ENCOUNTER
FUTURE VISIT INFORMATION      FUTURE VISIT INFORMATION:    Date: 5/2/23    Time: 10:00am    Location: [facility]  REFERRAL INFORMATION:    Referring provider:  Sherrill Dasilva APRN CNP    Referring providers clinic:  [facility]    Reason for visit/diagnosis   New per pt, History of recurrent ear infection [Z86.69], had a double infection in Nov 2022, has one in her R ear w/draining right noiw, ref-recs in epic    RECORDS REQUESTED FROM:       Clinic name Comments Records Status Imaging Status   Health system MyChart advice/referral 1/23/23   Ov 11/12/22- Donald Dash MD Counts include 234 beds at the [facility] visit 1/21/23 EPIC    Imaging MR brain 3/14/23  MR Brain 5/4/22  MR Brain 7/16/21  MR Brain done 12/8/19 [facility] PAC

## 2023-01-25 NOTE — LETTER
1/25/2023      RE: Cathy Arroyo  [address]  [place] [address]     Dear Colleague,    Thank you for referring your patient, Cathy Arroyo, to the [facility]. Please see a copy of my visit note below.    ASSESSMENT & PLAN  Cathy is a 42 year old woman following up with low back pain.    I am happy that Cathy is doing better.  I am refilling her robaxin, as this has been helping her.    She is going to seek help from her chiropractor, which I do think is reasonable.  She is also going to continue physical therapy.    If she continues to do well we can follow-up as needed for this and other issues.    Marcos Dalal, DO  [facility]    SUBJECTIVE- Interim History January 25, 2023    Chief Complaint   Patient presents with     Spine - Follow Up       Cathy Arroyo is a 42 year old female who is seen in f/u up for lumbar spine. She was last seen on 2/1/22 and was referred to spine to discuss surgical intervention for her T-spine. She endorses right sided low back pain.  Patient reports that her back pain started to increase about 1 week ago, she reports no specific mechanism of injury. She was unable to do activities of daily living due to her back pain. She was seen in the ED and prescribed muscle relaxer and oxycodone. She also did see her chiropractor but they did not adjust her lumbar spine. She has been doing heat and massage for treatment currently.     REVIEW OF SYSTEMS:  Review of Systems      OBJECTIVE:    General  - normal appearance, in no obvious distress    Musculoskeletal - lumbar spine  - stance: slow to rise and sit  - inspection: normal bone and joint alignment, no obvious scoliosis  - palpation: bilateral lumbar paravertebral spasm  - ROM: pain with bilateral rotation, flexion past 30 deg, extension  - strength: lower extremities 5/5 in all planes  - special tests:  (-) slump test bilaterally  Neuro  -  patellar and Achilles DTRs 2+ bilaterally, no sensory or motor deficit, grossly normal coordination, normal muscle tone          Again, thank you for allowing me to participate in the care of your patient.      Sincerely,    Marcos Dalal, DO

## 2023-01-25 NOTE — PROGRESS NOTES
ASSESSMENT & PLAN  Cathy is a 42 year old woman following up with low back pain.    I am happy that Cathy is doing better.  I am refilling her robaxin, as this has been helping her.    She is going to seek help from her chiropractor, which I do think is reasonable.  She is also going to continue physical therapy.    If she continues to do well we can follow-up as needed for this and other issues.    Marcos Dalal [facility]- Interim History January 25, 2023    Chief Complaint   Patient presents with     Spine - Follow Up       Cathy Arroyo is a 42 year old female who is seen in f/u up for lumbar spine. She was last seen on 2/1/22 and was referred to spine to discuss surgical intervention for her T-spine. She endorses right sided low back pain.  Patient reports that her back pain started to increase about 1 week ago, she reports no specific mechanism of injury. She was unable to do activities of daily living due to her back pain. She was seen in the ED and prescribed muscle relaxer and oxycodone. She also did see her chiropractor but they did not adjust her lumbar spine. She has been doing heat and massage for treatment currently.     REVIEW OF SYSTEMS:  Review of Systems      OBJECTIVE:    General  - normal appearance, in no obvious distress    Musculoskeletal - lumbar spine  - stance: slow to rise and sit  - inspection: normal bone and joint alignment, no obvious scoliosis  - palpation: bilateral lumbar paravertebral spasm  - ROM: pain with bilateral rotation, flexion past 30 deg, extension  - strength: lower extremities 5/5 in all planes  - special tests:  (-) slump test bilaterally  Neuro  - patellar and Achilles DTRs 2+ bilaterally, no sensory or motor deficit, grossly normal coordination, normal muscle tone

## 2023-02-03 ENCOUNTER — TELEPHONE (OUTPATIENT)
Dept: NEUROSURGERY | Facility: CLINIC | Age: 43
End: 2023-02-03
Payer: COMMERCIAL

## 2023-02-03 DIAGNOSIS — D44.5 PINEOCYTOMA (H): Primary | ICD-10-CM

## 2023-02-03 NOTE — TELEPHONE ENCOUNTER
[facility] Call Center    Phone Message    May a detailed message be left on voicemail: yes     Reason for Call: Other: Patient is calling to request MRI orders to be placed so she can schedule an MRI prior to her follow up appt with Dr. Brown. Please call patient back to let her know when the orders have been placed.     Action Taken: Message routed to:  [facility] ([facility]    Travel Screening: Not Applicable

## 2023-02-07 DIAGNOSIS — Z12.39 BREAST CANCER SCREENING, HIGH RISK PATIENT: Primary | ICD-10-CM

## 2023-02-07 NOTE — TELEPHONE ENCOUNTER
LOV 7/2021, Plan:  Plan:  1. We discussed the reassuring findings on her recent MRI. We'll plan to follow up in 2 years with repeat scan, sooner with new concerns.    MRI Brain Order entered with and without contrast.  Call sent to Dipika to schedule with Dr Mckeon next available.

## 2023-02-11 ENCOUNTER — OFFICE VISIT (OUTPATIENT)
Dept: URGENT CARE | Facility: URGENT CARE | Age: 43
End: 2023-02-11
Payer: COMMERCIAL

## 2023-02-11 VITALS
RESPIRATION RATE: 12 BRPM | TEMPERATURE: 99.1 F | WEIGHT: 220 LBS | HEART RATE: 94 BPM | BODY MASS INDEX: 33.45 KG/M2 | SYSTOLIC BLOOD PRESSURE: 113 MMHG | DIASTOLIC BLOOD PRESSURE: 65 MMHG | OXYGEN SATURATION: 97 %

## 2023-02-11 DIAGNOSIS — R07.0 THROAT PAIN: ICD-10-CM

## 2023-02-11 DIAGNOSIS — T36.95XA ANTIBIOTIC-INDUCED YEAST INFECTION: ICD-10-CM

## 2023-02-11 DIAGNOSIS — J45.40 MODERATE PERSISTENT ASTHMA WITHOUT COMPLICATION: ICD-10-CM

## 2023-02-11 DIAGNOSIS — J02.0 ACUTE STREPTOCOCCAL PHARYNGITIS: Primary | ICD-10-CM

## 2023-02-11 DIAGNOSIS — B37.9 ANTIBIOTIC-INDUCED YEAST INFECTION: ICD-10-CM

## 2023-02-11 LAB — DEPRECATED S PYO AG THROAT QL EIA: POSITIVE

## 2023-02-11 PROCEDURE — 99214 OFFICE O/P EST MOD 30 MIN: CPT | Performed by: PHYSICIAN ASSISTANT

## 2023-02-11 PROCEDURE — 87880 STREP A ASSAY W/OPTIC: CPT

## 2023-02-11 RX ORDER — DEXTROAMPHETAMINE SACCHARATE, AMPHETAMINE ASPARTATE MONOHYDRATE, DEXTROAMPHETAMINE SULFATE AND AMPHETAMINE SULFATE 7.5; 7.5; 7.5; 7.5 MG/1; MG/1; MG/1; MG/1
30 CAPSULE, EXTENDED RELEASE ORAL DAILY
COMMUNITY
Start: 2023-01-29 | End: 2024-09-20

## 2023-02-11 RX ORDER — DEXTROAMPHETAMINE SACCHARATE, AMPHETAMINE ASPARTATE MONOHYDRATE, DEXTROAMPHETAMINE SULFATE AND AMPHETAMINE SULFATE 3.75; 3.75; 3.75; 3.75 MG/1; MG/1; MG/1; MG/1
15 CAPSULE, EXTENDED RELEASE ORAL DAILY
COMMUNITY
Start: 2022-11-16 | End: 2023-09-26

## 2023-02-11 RX ORDER — FLUCONAZOLE 150 MG/1
150 TABLET ORAL ONCE
Qty: 2 TABLET | Refills: 0 | Status: SHIPPED | OUTPATIENT
Start: 2023-02-11 | End: 2023-02-11

## 2023-02-11 RX ORDER — PENICILLIN V POTASSIUM 500 MG/1
500 TABLET, FILM COATED ORAL 2 TIMES DAILY
Qty: 20 TABLET | Refills: 0 | Status: SHIPPED | OUTPATIENT
Start: 2023-02-11 | End: 2023-02-21

## 2023-02-11 RX ORDER — DEXTROAMPHETAMINE SACCHARATE, AMPHETAMINE ASPARTATE, DEXTROAMPHETAMINE SULFATE AND AMPHETAMINE SULFATE 2.5; 2.5; 2.5; 2.5 MG/1; MG/1; MG/1; MG/1
1 TABLET ORAL
COMMUNITY
Start: 2022-10-24 | End: 2024-05-25

## 2023-02-11 ASSESSMENT — ENCOUNTER SYMPTOMS
COUGH: 1
SORE THROAT: 1
TROUBLE SWALLOWING: 1
CHILLS: 0
FEVER: 0
HEADACHES: 0

## 2023-02-11 NOTE — PATIENT INSTRUCTIONS
Strep (+)  Take antibiotics as indicate in the AVS  Throw away your old toothbrush after taking the antibiotics for 24 hours  Supportive care (rest, adequate fluid intake, avoidance of respiratory irritants, soft diet)   Take acetaminophen or ibuprofen as needed for pain control and fever  Penicillin is better taken on an empty stomach, one hour before meals or two hour after meals    Asthma  Start Duoneb today for wheezing  Start Advair AM and PM as in the action plan  Albuterol as needed

## 2023-02-11 NOTE — PROGRESS NOTES
Assessment & Plan        1. Acute streptococcal pharyngitis  -Positive strep test  - Penicillin V (VEETID) 500 MG tablet; Take 1 tablet (500 mg) by mouth 2 times daily for 10 days  Dispense: 20 tablet; Refill: 0    2. Moderate persistent asthma without complication  -Patient wheezing in all lung fields   -Patient advised to start Advair 2 puffs ( AM and PM). She will do a Duo Neb treatment to help with the acute symptoms of wheezing. She will use Albuterol [id number] as needed.     3. Throat pain    - Streptococcus A Rapid Screen w/Reflex to PCR - Clinic Collect    4. Antibiotic-induced yeast infection  -Patient has history of yeast infections in the past when she is on antibiotic treatment. She will take one dose of Diflucan on day one of antibiotics and the last dose after completion of antibiotics.   - fluconazole (DIFLUCAN) 150 MG tablet; Take 1 tablet (150 mg) by mouth once for 1 dose. Take 1 tablet today and then 1 tablet after completion of antibiotics  Dispense: 2 tablet; Refill: 0    Patient Instructions   Strep (+)  Take antibiotics as indicate in the AVS  Throw away your old toothbrush after taking the antibiotics for 24 hours  Supportive care (rest, adequate fluid intake, avoidance of respiratory irritants, soft diet)   Take acetaminophen or ibuprofen as needed for pain control and fever  Penicillin is better taken on an empty stomach, one hour before meals or two hour after meals    Asthma  Start Duoneb today for wheezing  Start Advair AM and PM as in the action plan  Albuterol as needed             Return if symptoms worsen or fail to improve, for 3- 5 days.    At the end of the encounter, I discussed results, diagnosis, medications. Discussed red flags for immediate return to clinic/ER, as well as indications for follow up if no improvement. Patient understood and agreed to plan. Patient was stable for discharge.    Héctor Morgan is a 42 year old female who presents to clinic today with for the  following health issues:  Chief Complaint   Patient presents with     Urgent Care     Throat Pain     Throat pain x2days, pain radiates from throat to both ears, swollen neck, redness in throat, no fever, mild cough, history of asthma, no body aches, no chills, negative Covid test this morning 23     Patient reports sore throat for 2 days. She notes trouble swallowing and cough. She has a history of Asthma. Her Asthma action plan includes Advair ( 2 puffs AM and PM) and Albuterol as needed. She has not been taking Advair for 3 days. She took left over narcotics from her prescribed for back pain which helped. Denies fever and chills, GI symptoms.           Review of Systems   Constitutional: Negative for chills and fever.   HENT: Positive for sore throat and trouble swallowing. Negative for congestion.    Respiratory: Positive for cough.    Neurological: Negative for headaches.   All other systems reviewed and are negative.      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  2019: Morbid obesity (H)  2019: Indication for care in labor and delivery, antepartum  2019: Labor and delivery indication for care or intervention  2019:  contractions  2019: Medical cannabis use  2019: Polyhydramnios affecting pregnancy  2019: Encounter for triage in pregnant patient  2019: Labor and delivery, indication for care  2019-: Otitis externa  2019-: Abnormal results of thyroid function studies  2019-: Pain in symphysis pubis during pregnancy  2019: Post-surgical hypothyroidism  2018-10: Supervision of other normal pregnancy, antepartum  -: Need for Tdap vaccination  2018:  Nonintractable migraine, unspecified migraine type  2018: Moderate persistent asthma with acute exacerbation  2017-10: History of Graves' disease  2017: Hypocalcemia  2017: S/P total thyroidectomy  2017: Abnormal cytology  2017: Mild persistent asthma with acute exacerbation  2017: Moderate persistent asthma without complication  2017: Chronic rhinitis  2017: Tobacco use disorder  2016-10: Pre-eclampsia  2016-10: Labor and delivery, indication for care  2016: GBS (group B Streptococcus carrier), +RV culture, currently   pregnant  2016: Encounter for triage in pregnant patient  2016: Amniotic fluid leaking  2016: Amniotic fluid leaking  2016: Bipolar affective disorder in remission (H)  2016: Pregnancy, normal first  2016: Ultrasound scan to confirm fetal viability with history of   miscarriage  2016: Need for Tdap vaccination  2016: Anti-TPO antibodies present  2016: Family planning  2016: Thyroid nodule  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)  2013: Exercise-induced asthma  2013: Seasonal allergic rhinitis  2012: Tobacco dependence syndrome  2011-10: ASCUS with positive high risk HPV cervical  2011: Attention deficit disorder  2010: Bipolar affective disorder (H)  2010: Cannabis abuse      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     Post partum depression      Post-surgical hypothyroidism 2017     Uncomplicated asthma        Social History     Tobacco Use     Smoking status: Every Day     Packs/day: 1.00     Years: 14.00     Pack years: 14.00     Types: Cigarettes, Vaping Device     Start date: 2004     Last attempt to quit: 2022     Years since quittin.0     Passive exposure: Past     Smokeless tobacco: Never     Tobacco  comments:     Pt states slowly cutting down. Pt does use medical marijuana flower and edibles., patient states she smokes 3 cigs per day   Substance Use Topics     Alcohol use: Yes     Alcohol/week: 4.0 standard drinks     Types: 4 Drinks containing 0.5 oz of alcohol per week           Objective    /65   Pulse 94   Temp 99.1  F (37.3  C) (Oral)   Resp 12   Wt 99.8 kg (220 lb)   LMP 01/29/2023 (Exact Date)   SpO2 97%   BMI 33.45 kg/m    Physical Exam  Constitutional:       Appearance: Normal appearance.   HENT:      Head: Normocephalic.      Right Ear: Tympanic membrane normal.      Left Ear: Tympanic membrane normal.      Nose: Congestion present.      Mouth/Throat:      Mouth: Mucous membranes are moist.      Pharynx: Uvula midline. Pharyngeal swelling and posterior oropharyngeal erythema present.      Tonsils: 3+ on the right. 3+ on the left.   Eyes:      Conjunctiva/sclera: Conjunctivae normal.      Pupils: Pupils are equal, round, and reactive to light.   Cardiovascular:      Rate and Rhythm: Normal rate and regular rhythm.      Heart sounds: Normal heart sounds.   Pulmonary:      Effort: Pulmonary effort is normal.      Breath sounds: Wheezing (in all lung fields) present.   Musculoskeletal:      Cervical back: Normal range of motion and neck supple.   Lymphadenopathy:      Head:      Right side of head: No submental, submandibular, tonsillar, preauricular or posterior auricular adenopathy.      Left side of head: No submental, submandibular, tonsillar, preauricular or posterior auricular adenopathy.   Skin:     General: Skin is warm and dry.   Neurological:      Mental Status: She is alert and oriented to person, place, and time.   Psychiatric:         Mood and Affect: Mood normal.         Behavior: Behavior normal.              Lindsay Wick PA-C

## 2023-02-11 NOTE — LETTER
February 11, 2023      Cathy Arroyo  [address] [address]        To Whom It May Concern:    Cathy Arroyo  was seen on 02/11/23  .  Please excuse her until 02/13/2023 due to illness.        Sincerely,        Lindsay Wick PA-C

## 2023-02-13 ENCOUNTER — TELEPHONE (OUTPATIENT)
Dept: NEUROSURGERY | Facility: CLINIC | Age: 43
End: 2023-02-13
Payer: COMMERCIAL

## 2023-02-14 ENCOUNTER — ANCILLARY PROCEDURE (OUTPATIENT)
Dept: MAMMOGRAPHY | Facility: CLINIC | Age: 43
End: 2023-02-14
Attending: NURSE PRACTITIONER
Payer: COMMERCIAL

## 2023-02-14 DIAGNOSIS — Z12.39 BREAST CANCER SCREENING, HIGH RISK PATIENT: ICD-10-CM

## 2023-02-14 DIAGNOSIS — N64.52 NIPPLE DISCHARGE: ICD-10-CM

## 2023-02-14 PROCEDURE — 76642 ULTRASOUND BREAST LIMITED: CPT | Mod: LT | Performed by: RADIOLOGY

## 2023-02-14 PROCEDURE — G0279 TOMOSYNTHESIS, MAMMO: HCPCS | Performed by: RADIOLOGY

## 2023-02-14 PROCEDURE — 77066 DX MAMMO INCL CAD BI: CPT | Performed by: RADIOLOGY

## 2023-02-15 DIAGNOSIS — Z12.39 BREAST CANCER SCREENING, HIGH RISK PATIENT: ICD-10-CM

## 2023-02-15 DIAGNOSIS — N64.52 NIPPLE DISCHARGE: ICD-10-CM

## 2023-02-15 DIAGNOSIS — Z91.89 AT HIGH RISK FOR BREAST CANCER: Primary | ICD-10-CM

## 2023-02-15 DIAGNOSIS — Z80.3 FAMILY HISTORY OF BREAST CANCER: ICD-10-CM

## 2023-03-11 ENCOUNTER — NURSE TRIAGE (OUTPATIENT)
Dept: NURSING | Facility: CLINIC | Age: 43
End: 2023-03-11
Payer: COMMERCIAL

## 2023-03-12 NOTE — TELEPHONE ENCOUNTER
C/o pain in L posterior calf when standing r bending for past 2 wks. Tonight she is having pain when walking and more severe pain when squatting. Denies SOB or chest pain. No swelling or redness of leg. No discoloration or temp difference. Denies injury. Triaged to see w/i 4 hr or PCP triage. Paged on-call Dr Marcelo @9:03pm. Rec'd call back @9:06pm. Dr Marcelo states she does not think pt needs to go to ED tonight. If pain is constant see UC tomorrow otherwise see provider in clinic Mon 3/13. Disc'd on-call's advice w/ pt. Pt voiced understanding and agreement.       Reason for Disposition    [1] Thigh or calf pain AND [2] only 1 side AND [3] present > 1 hour (Exception: chronic unchanged pain)    Additional Information    Negative: Looks like a broken bone or dislocated joint (e.g., crooked or deformed)    Negative: Sounds like a life-threatening emergency to the triager    Negative: Followed a leg injury    Negative: Leg swelling is main symptom    Negative: Back pain radiating (shooting) into leg(s)    Negative: Knee pain is main symptom    Negative: Ankle pain is main symptom    Negative: Pregnant    Negative: Postpartum (from 0 to 6 weeks after delivery)    Negative: Chest pain    Negative: Difficulty breathing    Negative: Entire foot is cool or blue in comparison to other side    Negative: Unable to walk    Negative: [1] Red area or streak AND [2] fever    Negative: [1] Swollen joint AND [2] fever    Negative: [1] Cast on leg or ankle AND [2] now increased pain    Negative: Patient sounds very sick or weak to the triager    Negative: [1] SEVERE pain (e.g., excruciating, unable to do any normal activities) AND [2] not improved after 2 hours of pain medicine    Protocols used: LEG PAIN-A-AH

## 2023-03-13 ASSESSMENT — ENCOUNTER SYMPTOMS
MUSCLE CRAMPS: 1
ARTHRALGIAS: 1
WHEEZING: 0
SHORTNESS OF BREATH: 0
MUSCLE WEAKNESS: 0
SPUTUM PRODUCTION: 0
SNORES LOUDLY: 1
DYSPNEA ON EXERTION: 1
POSTURAL DYSPNEA: 0
COUGH DISTURBING SLEEP: 0
NECK PAIN: 0
JOINT SWELLING: 0
COUGH: 0
HEMOPTYSIS: 0
STIFFNESS: 1
BACK PAIN: 1
MYALGIAS: 1

## 2023-03-14 ENCOUNTER — ANCILLARY PROCEDURE (OUTPATIENT)
Dept: MRI IMAGING | Facility: CLINIC | Age: 43
End: 2023-03-14
Attending: NEUROLOGICAL SURGERY
Payer: COMMERCIAL

## 2023-03-14 ENCOUNTER — OFFICE VISIT (OUTPATIENT)
Dept: PULMONOLOGY | Facility: CLINIC | Age: 43
End: 2023-03-14
Attending: INTERNAL MEDICINE
Payer: COMMERCIAL

## 2023-03-14 VITALS — OXYGEN SATURATION: 98 % | HEART RATE: 90 BPM | DIASTOLIC BLOOD PRESSURE: 81 MMHG | SYSTOLIC BLOOD PRESSURE: 130 MMHG

## 2023-03-14 DIAGNOSIS — J45.41 MODERATE PERSISTENT ASTHMA WITH ACUTE EXACERBATION: ICD-10-CM

## 2023-03-14 DIAGNOSIS — J45.20 MILD INTERMITTENT ASTHMA WITHOUT COMPLICATION: ICD-10-CM

## 2023-03-14 DIAGNOSIS — D44.5 PINEOCYTOMA (H): ICD-10-CM

## 2023-03-14 PROCEDURE — 70553 MRI BRAIN STEM W/O & W/DYE: CPT | Mod: GC | Performed by: RADIOLOGY

## 2023-03-14 PROCEDURE — A9585 GADOBUTROL INJECTION: HCPCS | Performed by: RADIOLOGY

## 2023-03-14 PROCEDURE — G0463 HOSPITAL OUTPT CLINIC VISIT: HCPCS | Performed by: INTERNAL MEDICINE

## 2023-03-14 PROCEDURE — 99213 OFFICE O/P EST LOW 20 MIN: CPT | Mod: GC | Performed by: INTERNAL MEDICINE

## 2023-03-14 RX ORDER — GADOBUTROL 604.72 MG/ML
10 INJECTION INTRAVENOUS ONCE
Status: COMPLETED | OUTPATIENT
Start: 2023-03-14 | End: 2023-03-14

## 2023-03-14 RX ORDER — ALBUTEROL SULFATE 90 UG/1
2 AEROSOL, METERED RESPIRATORY (INHALATION) EVERY 6 HOURS
Qty: 18 G | Refills: 3 | Status: SHIPPED | OUTPATIENT
Start: 2023-03-14 | End: 2023-12-12

## 2023-03-14 RX ORDER — FLUTICASONE PROPIONATE AND SALMETEROL XINAFOATE 230; 21 UG/1; UG/1
2 AEROSOL, METERED RESPIRATORY (INHALATION) 2 TIMES DAILY
Qty: 12 G | Refills: 11 | Status: SHIPPED | OUTPATIENT
Start: 2023-03-14 | End: 2023-12-12

## 2023-03-14 RX ADMIN — GADOBUTROL 10 ML: 604.72 INJECTION INTRAVENOUS at 17:47

## 2023-03-14 ASSESSMENT — ENCOUNTER SYMPTOMS
CHILLS: 0
PALPITATIONS: 0
HEMOPTYSIS: 0
NECK PAIN: 1
MYALGIAS: 1
BACK PAIN: 1
WHEEZING: 0
SHORTNESS OF BREATH: 0
SPUTUM PRODUCTION: 0
FEVER: 0
COUGH: 0

## 2023-03-14 NOTE — PROGRESS NOTES
Pulmonology Clinic Follow up Visit       Cathy Arroyo MRN# [id number]   YOB: 1980 Age: 42 year old     Date of Visit: 3/14/2023    Reason for Visit: Follow-up asthma and tobacco use          Assessment and Plan:     Cathy Arroyo is a 41 year old female with H/O environmental allergies, anxiety, bipolar type I, obesity, asthma, and tobacco use who presenting for follow-up assessment of asthma and smoking dependence.    #Asthma  #Environmental allergies  Significantly improved dyspnea with daily Advair use and decreased smoking. Routine use remains challenging for her, although improved compliance over last 2 weeks. Eosinophils have been normal in the past, she has not had an IgE checked. For additional control of dyspnea, will initially recommend increasing dosing to twice daily.   - Continue Advair daily, increase to twice daily for additional control   - Continue as needed albuterol    #Tobacco abuse  Reduction in daily cigarette use, with recent addition of vaping as substitute. Discussed likelihood that vaping and e-cigarettes are ultimately less harmful to health than tobacco cigarettes, but still harmful to lungs. Will continue to use inhaled nicotine in attempt to wean off cigarettes. She may still consider hypnosis.   - Applauded efforts thus far, encouraged continued work towards complete smoking cessation    Return to clinic: 6 months    Jose Christensen DO  PGY3 Occupational Medicine resident (visiting)  This patient was seen with supervising staff physician Dr. Egan.      Physician Attestation  I, Scott Egan MD, saw this patient with the fellow/resident and agree with their findings and plan of care as documented in the note.       I personally reviewed vital signs, medications, relevant images, and labs    Scott Egan M.D.  Pulmonary & Critical Care  Pager: Click Here to page        I personally spent 24 minutes on the date of the encounter doing chart  review, history and exam, documentation and further activities per the note.              History of Present Illness / Interval History:     Cathy Arroyo is a 41 year old female with H/O environmental allergies, anxiety, bipolar type I, obesity, asthma, and tobacco use who presents for follow-up management of asthma and smoking cessation.     Last seen on 8/3/2022.    Since last visit she has managed to decrease her daily cigarette use to about 3 cigarettes daily on days she works. On days off, she smokes significantly more than this but does not keep track. On days she works, she has replaced cigarettes with vaping as of 2 months ago, which she does throughout the day.    Over the last 2 weeks, she has taken Advair once daily. She attributes increased compliance to implementing tools to help remind her, like placing inhaler in multiple areas throughout the house. Prior to that, she had not been taking it. No albuterol use since starting Advair once daily. Prior to this, she had been using albuterol inhaler about once a week.     She no longer feels short of breath after one flight of stairs, but will for longer climbs. She no longer experiences shortness of breath after petting animals. She has occasional bouts of dyspnea/wheezing that are triggered by emotion. No night time awakenings due to cough or shortness of breath.    Has occasional cough after smoking cannabis. Otherwise no productive cough.     She is still trying to quit smoking. She is unable to use other forms of nicotine replacement due to a rash from adhesive when using nicotine patches, anaphylactic type reaction with nicotine lozenges, and jose eduardo when using Chantix.           Review of Systems:     Review of Systems   Constitutional: Negative for chills and fever.   Respiratory: Negative for cough, hemoptysis, sputum production, shortness of breath and wheezing.    Cardiovascular: Negative for chest pain and palpitations.   Musculoskeletal:  Positive for back pain, myalgias and neck pain.     Answers for HPI/ROS submitted by the patient on 3/13/2023  General Symptoms: No  Skin Symptoms: No  HENT Symptoms: No  EYE SYMPTOMS: No  HEART SYMPTOMS: No  LUNG SYMPTOMS: Yes  INTESTINAL SYMPTOMS: No  URINARY SYMPTOMS: No  GYNECOLOGIC SYMPTOMS: No  BREAST SYMPTOMS: No  SKELETAL SYMPTOMS: Yes  BLOOD SYMPTOMS: No  NERVOUS SYSTEM SYMPTOMS: No  MENTAL HEALTH SYMPTOMS: No  Snoring: Yes  Difficulty breathing on exertion: Yes  Nighttime Cough: No  Difficulty breathing when lying flat: No  Swollen joints: No  Joint pain: Yes  Bone pain: No  Muscle cramps: Yes  Muscle weakness: No  Joint stiffness: Yes  Bone fracture: No         Physical Examination:     /81   Pulse 90   LMP 01/29/2023 (Exact Date)   SpO2 98%   Physical Exam  Vitals and nursing note reviewed.   Constitutional:       Appearance: Normal appearance.   HENT:      Head: Normocephalic.      Mouth/Throat:      Mouth: Mucous membranes are moist.   Cardiovascular:      Rate and Rhythm: Normal rate and regular rhythm.      Heart sounds: No murmur heard.  Pulmonary:      Effort: Pulmonary effort is normal.      Breath sounds: Normal breath sounds. No wheezing, rhonchi or rales.   Musculoskeletal:         General: Normal range of motion.      Right lower leg: No edema.      Left lower leg: No edema.   Skin:     General: Skin is warm and dry.   Neurological:      General: No focal deficit present.      Mental Status: She is alert and oriented to person, place, and time.       Fingernails without clubbing        Data:     PFTs (3/7/2022):          Medications:     Current Outpatient Medications   Medication     ADDERALL XR 25 MG 24 hr capsule     albuterol (PROAIR HFA/PROVENTIL HFA/VENTOLIN HFA) 108 (90 Base) MCG/ACT inhaler     amphetamine-dextroamphetamine (ADDERALL XR) 15 MG 24 hr capsule     amphetamine-dextroamphetamine (ADDERALL XR) 20 MG 24 hr capsule     amphetamine-dextroamphetamine (ADDERALL) 10 MG  tablet     ARIPiprazole (ABILIFY) 10 MG tablet     cetirizine (ZYRTEC) 10 MG tablet     diazepam (VALIUM) 5 MG tablet     fluticasone (FLONASE) 50 MCG/ACT nasal spray     fluticasone-salmeterol (ADVAIR HFA) 230-21 MCG/ACT inhaler     ipratropium - albuterol 0.5 mg/2.5 mg/3 mL (DUONEB) 0.5-2.5 (3) MG/3ML neb solution     levothyroxine (SYNTHROID/LEVOTHROID) 175 MCG tablet     medical cannabis (Patient's own supply)     medical cannabis (Patient's own supply)     methocarbamol (ROBAXIN) 500 MG tablet     montelukast (SINGULAIR) 10 MG tablet     multivitamin w/minerals (THERA-VIT-M) tablet     prochlorperazine (COMPAZINE) 10 MG tablet     rizatriptan (MAXALT-MLT) 10 MG ODT     tiZANidine (ZANAFLEX) 4 MG tablet     VENTOLIN  (90 Base) MCG/ACT inhaler     vitamin D3 (CHOLECALCIFEROL) 50 mcg (2000 units) tablet     No current facility-administered medications for this visit.       The above note was dictated using voice recognition software and may include typographical errors. Please contact the author for any clarifications.

## 2023-03-14 NOTE — LETTER
3/14/2023         RE: Cathy Arroyo  [address]  [facility] [id number]        Dear Colleague,    Thank you for referring your patient, Cathy Arroyo, to the [facility] [place]. Please see a copy of my visit note below.    Pulmonology Clinic Follow up Visit       Cathy Arroyo MRN# [id number]   YOB: 1980 Age: 42 year old     Date of Visit: 3/14/2023    Reason for Visit: Follow-up asthma and tobacco use          Assessment and Plan:     Cathy Arroyo is a 41 year old female with H/O environmental allergies, anxiety, bipolar type I, obesity, asthma, and tobacco use who presenting for follow-up assessment of asthma and smoking dependence.    #Asthma  #Environmental allergies  Significantly improved dyspnea with daily Advair use and decreased smoking. Routine use remains challenging for her, although improved compliance over last 2 weeks. Eosinophils have been normal in the past, she has not had an IgE checked. For additional control of dyspnea, will initially recommend increasing dosing to twice daily.   - Continue Advair daily, increase to twice daily for additional control   - Continue as needed albuterol    #Tobacco abuse  Reduction in daily cigarette use, with recent addition of vaping as substitute. Discussed likelihood that vaping and e-cigarettes are ultimately less harmful to health than tobacco cigarettes, but still harmful to lungs. Will continue to use inhaled nicotine in attempt to wean off cigarettes. She may still consider hypnosis.   - Applauded efforts thus far, encouraged continued work towards complete smoking cessation    Return to clinic: 6 months    Jose Christensen DO  PGY3 Occupational Medicine resident (visiting)  This patient was seen with supervising staff physician Dr. Egan.      Physician Attestation  I, Scott Egan MD, saw this patient with the fellow/resident and agree with their  findings and plan of care as documented in the note.       I personally reviewed vital signs, medications, relevant images, and labs    Scott Egan M.D.  Pulmonary & Critical Care  Pager: Click Here to page        I personally spent 24 minutes on the date of the encounter doing chart review, history and exam, documentation and further activities per the note.              History of Present Illness / Interval History:     Cathy Arroyo is a 41 year old female with H/O environmental allergies, anxiety, bipolar type I, obesity, asthma, and tobacco use who presents for follow-up management of asthma and smoking cessation.     Last seen on 8/3/2022.    Since last visit she has managed to decrease her daily cigarette use to about 3 cigarettes daily on days she works. On days off, she smokes significantly more than this but does not keep track. On days she works, she has replaced cigarettes with vaping as of 2 months ago, which she does throughout the day.    Over the last 2 weeks, she has taken Advair once daily. She attributes increased compliance to implementing tools to help remind her, like placing inhaler in multiple areas throughout the house. Prior to that, she had not been taking it. No albuterol use since starting Advair once daily. Prior to this, she had been using albuterol inhaler about once a week.     She no longer feels short of breath after one flight of stairs, but will for longer climbs. She no longer experiences shortness of breath after petting animals. She has occasional bouts of dyspnea/wheezing that are triggered by emotion. No night time awakenings due to cough or shortness of breath.    Has occasional cough after smoking cannabis. Otherwise no productive cough.     She is still trying to quit smoking. She is unable to use other forms of nicotine replacement due to a rash from adhesive when using nicotine patches, anaphylactic type reaction with nicotine lozenges, and jose eduardo when using  Chantix.           Review of Systems:     Review of Systems   Constitutional: Negative for chills and fever.   Respiratory: Negative for cough, hemoptysis, sputum production, shortness of breath and wheezing.    Cardiovascular: Negative for chest pain and palpitations.   Musculoskeletal: Positive for back pain, myalgias and neck pain.     Answers for HPI/ROS submitted by the patient on 3/13/2023  General Symptoms: No  Skin Symptoms: No  HENT Symptoms: No  EYE SYMPTOMS: No  HEART SYMPTOMS: No  LUNG SYMPTOMS: Yes  INTESTINAL SYMPTOMS: No  URINARY SYMPTOMS: No  GYNECOLOGIC SYMPTOMS: No  BREAST SYMPTOMS: No  SKELETAL SYMPTOMS: Yes  BLOOD SYMPTOMS: No  NERVOUS SYSTEM SYMPTOMS: No  MENTAL HEALTH SYMPTOMS: No  Snoring: Yes  Difficulty breathing on exertion: Yes  Nighttime Cough: No  Difficulty breathing when lying flat: No  Swollen joints: No  Joint pain: Yes  Bone pain: No  Muscle cramps: Yes  Muscle weakness: No  Joint stiffness: Yes  Bone fracture: No         Physical Examination:     /81   Pulse 90   LMP 01/29/2023 (Exact Date)   SpO2 98%   Physical Exam  Vitals and nursing note reviewed.   Constitutional:       Appearance: Normal appearance.   HENT:      Head: Normocephalic.      Mouth/Throat:      Mouth: Mucous membranes are moist.   Cardiovascular:      Rate and Rhythm: Normal rate and regular rhythm.      Heart sounds: No murmur heard.  Pulmonary:      Effort: Pulmonary effort is normal.      Breath sounds: Normal breath sounds. No wheezing, rhonchi or rales.   Musculoskeletal:         General: Normal range of motion.      Right lower leg: No edema.      Left lower leg: No edema.   Skin:     General: Skin is warm and dry.   Neurological:      General: No focal deficit present.      Mental Status: She is alert and oriented to person, place, and time.       Fingernails without clubbing        Data:     PFTs (3/7/2022):          Medications:     Current Outpatient Medications   Medication     ADDERALL XR 25  MG 24 hr capsule     albuterol (PROAIR HFA/PROVENTIL HFA/VENTOLIN HFA) 108 (90 Base) MCG/ACT inhaler     amphetamine-dextroamphetamine (ADDERALL XR) 15 MG 24 hr capsule     amphetamine-dextroamphetamine (ADDERALL XR) 20 MG 24 hr capsule     amphetamine-dextroamphetamine (ADDERALL) 10 MG tablet     ARIPiprazole (ABILIFY) 10 MG tablet     cetirizine (ZYRTEC) 10 MG tablet     diazepam (VALIUM) 5 MG tablet     fluticasone (FLONASE) 50 MCG/ACT nasal spray     fluticasone-salmeterol (ADVAIR HFA) 230-21 MCG/ACT inhaler     ipratropium - albuterol 0.5 mg/2.5 mg/3 mL (DUONEB) 0.5-2.5 (3) MG/3ML neb solution     levothyroxine (SYNTHROID/LEVOTHROID) 175 MCG tablet     medical cannabis (Patient's own supply)     medical cannabis (Patient's own supply)     methocarbamol (ROBAXIN) 500 MG tablet     montelukast (SINGULAIR) 10 MG tablet     multivitamin w/minerals (THERA-VIT-M) tablet     prochlorperazine (COMPAZINE) 10 MG tablet     rizatriptan (MAXALT-MLT) 10 MG ODT     tiZANidine (ZANAFLEX) 4 MG tablet     VENTOLIN  (90 Base) MCG/ACT inhaler     vitamin D3 (CHOLECALCIFEROL) 50 mcg (2000 units) tablet     No current facility-administered medications for this visit.       The above note was dictated using voice recognition software and may include typographical errors. Please contact the author for any clarifications.      Again, thank you for allowing me to participate in the care of your patient.        Sincerely,        Scott Egan MD

## 2023-03-14 NOTE — NURSING NOTE
Chief Complaint   Patient presents with     Follow Up     6mo f/u     Vitals were taken and medications were reconciled.     XANDER Olmos

## 2023-03-14 NOTE — PROGRESS NOTES
Pulmonology Clinic Follow up Visit       Cathy Arroyo MRN# [id number]   YOB: 1980 Age: 42 year old     Date of Visit: 3/14/2023    Reason for Visit: ***          Assessment and Plan:         ## Health maintenance ***    Return to clinic: ***    I personally spent *** minutes on the date of the encounter doing chart review, history and exam, documentation and further activities per the note.      Scott Egan M.D.  Pulmonary & Critical Care  Pager: Click Here to page          History of Present Illness / Interval History:     Cathy Arroyo is a 42 year old female with H/O ***    Last seen:***          Review of Systems:     ROS         Physical Examination:     /81   Pulse 90   LMP 01/29/2023 (Exact Date)   SpO2 98%     Physical Exam  Fingernails without*** clubbing        Data:     PFT: ***    CXR:       All studies listed here were independently reviewed and interpreted by me today. ***        Medications:     Current Outpatient Medications   Medication     ADDERALL XR 25 MG 24 hr capsule     albuterol (PROAIR HFA/PROVENTIL HFA/VENTOLIN HFA) 108 (90 Base) MCG/ACT inhaler     amphetamine-dextroamphetamine (ADDERALL XR) 15 MG 24 hr capsule     amphetamine-dextroamphetamine (ADDERALL XR) 20 MG 24 hr capsule     amphetamine-dextroamphetamine (ADDERALL) 10 MG tablet     ARIPiprazole (ABILIFY) 10 MG tablet     cetirizine (ZYRTEC) 10 MG tablet     diazepam (VALIUM) 5 MG tablet     fluticasone (FLONASE) 50 MCG/ACT nasal spray     fluticasone-salmeterol (ADVAIR HFA) 230-21 MCG/ACT inhaler     ipratropium - albuterol 0.5 mg/2.5 mg/3 mL (DUONEB) 0.5-2.5 (3) MG/3ML neb solution     levothyroxine (SYNTHROID/LEVOTHROID) 175 MCG tablet     medical cannabis (Patient's own supply)     medical cannabis (Patient's own supply)     methocarbamol (ROBAXIN) 500 MG tablet     montelukast (SINGULAIR) 10 MG tablet     multivitamin w/minerals (THERA-VIT-M) tablet     prochlorperazine (COMPAZINE) 10 MG  tablet     rizatriptan (MAXALT-MLT) 10 MG ODT     tiZANidine (ZANAFLEX) 4 MG tablet     VENTOLIN  (90 Base) MCG/ACT inhaler     vitamin D3 (CHOLECALCIFEROL) 50 mcg (2000 units) tablet     No current facility-administered medications for this visit.             The above note was dictated using voice recognition software and may include typographical errors. Please contact the author for any clarifications.

## 2023-03-30 ENCOUNTER — TELEPHONE (OUTPATIENT)
Dept: FAMILY MEDICINE | Facility: CLINIC | Age: 43
End: 2023-03-30
Payer: COMMERCIAL

## 2023-03-31 ENCOUNTER — OFFICE VISIT (OUTPATIENT)
Dept: FAMILY MEDICINE | Facility: CLINIC | Age: 43
End: 2023-03-31
Payer: COMMERCIAL

## 2023-03-31 VITALS
WEIGHT: 244 LBS | SYSTOLIC BLOOD PRESSURE: 119 MMHG | OXYGEN SATURATION: 96 % | HEIGHT: 67 IN | BODY MASS INDEX: 38.3 KG/M2 | RESPIRATION RATE: 15 BRPM | DIASTOLIC BLOOD PRESSURE: 76 MMHG | HEART RATE: 95 BPM | TEMPERATURE: 97.2 F

## 2023-03-31 DIAGNOSIS — M79.662 PAIN OF LEFT CALF: Primary | ICD-10-CM

## 2023-03-31 PROCEDURE — 99214 OFFICE O/P EST MOD 30 MIN: CPT | Performed by: FAMILY MEDICINE

## 2023-03-31 NOTE — TELEPHONE ENCOUNTER
DIAGNOSIS: Right leg - behind the knee pain    APPOINTMENT DATE: 4.18.23   NOTES STATUS DETAILS   OFFICE NOTE from other specialist Internal 5.13.15 Agnieszka Veloz, RAVEN     Tranferred recs:  5.22.15 -[facility]  5.19.15 - [facility]  5.15.15 - [facility]  5.13.15 - [facility]   MEDICATION LIST Internal    MRI In process    XRAYS (IMAGES & REPORTS) In process      Action March 31, 2023 9:29 AM    Action Taken Faxed request recs & images to [facility] and [facility].    4.3.23 10:15 AM  - pt name didn't match [facility] so has to refax to [facility]. Pt name at [facility]:  ZEINA NICHOLS

## 2023-03-31 NOTE — PATIENT INSTRUCTIONS
Please call [facility] at [phone] to schedule your left calf    Locations:   [facility], [address], [place] [id number]

## 2023-03-31 NOTE — PROGRESS NOTES
Assessment & Plan     (M79.662) Pain of left calf  (primary encounter diagnosis)  Comment: New, previously undiagnosed problem with uncertain prognosis and additional work-up planned.   Suspect cyst vs lipoma given mobility and localized area, but will obtain US to rule out superficial clot.  No evidence of DVT on exam today.  Plan: US Lower Extremity Venous Duplex Left        Apply heat, consider taking daily aspirin, may continue usual activities.    Aalyiah Das MD  [facility] [place]    Héctor Morgan is a 42 year old, presenting for the following health issues:  No chief complaint on file.  No flowsheet data found.  History of Present Illness       Reason for visit:  Right Calf Pain  Symptom onset:  1-2 weeks ago  Symptoms include:  Constant pain in right calf, hurts most when crouching or squatting down.  Climbing stairs is painful.  Trouble getting up from a sitting position on the floor.  The pain is in both calves but far more in the right.  Symptom intensity:  Moderate  Symptom progression:  Worsening  Had these symptoms before:  No  What makes it worse:  Squatting or getting up from sitting on floor, standing for long periods of time  What makes it better:  Lying down is the most comfotrable position    She eats 0-1 servings of fruits and vegetables daily.She consumes 1 sweetened beverage(s) daily.She exercises with enough effort to increase her heart rate 9 or less minutes per day.  She exercises with enough effort to increase her heart rate 3 or less days per week.   She is taking medications regularly.      Constitutional, HEENT, cardiovascular, pulmonary, GI, , musculoskeletal, neuro, skin, endocrine and psych systems are negative, except as otherwise noted.      Objective    LMP 01/29/2023 (Exact Date)   There is no height or weight on file to calculate BMI.  Physical Exam   GENERAL: healthy, alert and no distress  RESP: lungs clear to auscultation - no rales,  rhonchi or wheezes  CV: regular rate and rhythm, normal S1 S2, no S3 or S4, no murmur, click or rub, no peripheral edema and peripheral pulses strong  MS: no edema and no redness noted. Posterior left upper calf has small, firm 2 cm subcutanous nodule, mildly tender to palpation, with no overlying skin changes, no induration noted.  NEURO: Normal strength and tone, mentation intact and speech normal

## 2023-04-02 ENCOUNTER — APPOINTMENT (OUTPATIENT)
Dept: ULTRASOUND IMAGING | Facility: CLINIC | Age: 43
End: 2023-04-02
Attending: STUDENT IN AN ORGANIZED HEALTH CARE EDUCATION/TRAINING PROGRAM
Payer: COMMERCIAL

## 2023-04-02 ENCOUNTER — TRANSFERRED RECORDS (OUTPATIENT)
Dept: HEALTH INFORMATION MANAGEMENT | Facility: CLINIC | Age: 43
End: 2023-04-02

## 2023-04-02 ENCOUNTER — HOSPITAL ENCOUNTER (EMERGENCY)
Facility: CLINIC | Age: 43
Discharge: HOME | End: 2023-04-02
Attending: STUDENT IN AN ORGANIZED HEALTH CARE EDUCATION/TRAINING PROGRAM | Admitting: STUDENT IN AN ORGANIZED HEALTH CARE EDUCATION/TRAINING PROGRAM
Payer: COMMERCIAL

## 2023-04-02 VITALS
DIASTOLIC BLOOD PRESSURE: 78 MMHG | RESPIRATION RATE: 16 BRPM | SYSTOLIC BLOOD PRESSURE: 123 MMHG | HEIGHT: 67 IN | OXYGEN SATURATION: 99 % | WEIGHT: 244 LBS | HEART RATE: 85 BPM | TEMPERATURE: 98.2 F | BODY MASS INDEX: 38.3 KG/M2

## 2023-04-02 DIAGNOSIS — M79.662 PAIN OF LEFT LOWER LEG: ICD-10-CM

## 2023-04-02 PROCEDURE — 93971 EXTREMITY STUDY: CPT | Mod: 26 | Performed by: RADIOLOGY

## 2023-04-02 PROCEDURE — 93971 EXTREMITY STUDY: CPT | Mod: LT

## 2023-04-02 PROCEDURE — 99284 EMERGENCY DEPT VISIT MOD MDM: CPT | Mod: 25 | Performed by: STUDENT IN AN ORGANIZED HEALTH CARE EDUCATION/TRAINING PROGRAM

## 2023-04-02 PROCEDURE — 250N000013 HC RX MED GY IP 250 OP 250 PS 637: Performed by: STUDENT IN AN ORGANIZED HEALTH CARE EDUCATION/TRAINING PROGRAM

## 2023-04-02 PROCEDURE — 99284 EMERGENCY DEPT VISIT MOD MDM: CPT | Performed by: STUDENT IN AN ORGANIZED HEALTH CARE EDUCATION/TRAINING PROGRAM

## 2023-04-02 RX ORDER — IBUPROFEN 600 MG/1
600 TABLET, FILM COATED ORAL ONCE
Status: COMPLETED | OUTPATIENT
Start: 2023-04-02 | End: 2023-04-02

## 2023-04-02 RX ORDER — ACETAMINOPHEN 325 MG/1
975 TABLET ORAL ONCE
Status: COMPLETED | OUTPATIENT
Start: 2023-04-02 | End: 2023-04-02

## 2023-04-02 RX ADMIN — IBUPROFEN 600 MG: 600 TABLET, FILM COATED ORAL at 17:04

## 2023-04-02 RX ADMIN — ACETAMINOPHEN 975 MG: 325 TABLET ORAL at 17:04

## 2023-04-02 ASSESSMENT — ACTIVITIES OF DAILY LIVING (ADL): ADLS_ACUITY_SCORE: 33

## 2023-04-02 NOTE — DISCHARGE INSTRUCTIONS
You were seen in the ED today for pain in your left leg.  Do an ultrasound here that does not show any signs of any blood clots.  This is good news, and generally means that there is not any blood clot, however if you do have worsening swelling or worsening pain, it would be reasonable to get a repeat ultrasound in 1 week to make sure that this has not changed.    Otherwise, this can certainly be due to sciatic back pain.    Continue to monitor for signs of worsening pain, changes in the numbness or weakness of your leg, or any other concerning symptoms.    Follow-up with your regular doctor for ongoing management of this.  Instructions from your doctor today:  Emergency Department testing is focused on the potential causes of your symptoms that are the most dangerous possibilities, and cannot cover every possibility. Based on the evaluation, it was deemed sufficiently safe to discharge and continue management through the clinics. Thus, follow-up is very important to assess for improvement/worsening, potential further testing, and potential treatment adjustments. If you were given opioid pain medications or other medications that can make you drowsy while in the ED, you should not drive for at least several hours and not until you feel completely back to normal.     Please make an appointment to follow up with:  - Your Primary Care Provider in 5-7 days as needed.  - If you do not have a primary care provider, you can be seen in follow-up and establish care by calling any of the clinics below:     - [facility] (phone: [phone])     - Primary Care / [facility] (phone: [phone])   - Have your clinic provider review the results from today's visit with you again, including any potential follow-up or additional testing that may be needed based on the results. Occasionally, incidental findings are found on later review by radiologists that may need follow-up.     Return to the  Emergency Department immediately if you have worsening severe leg pain, fevers associated with your leg pain, difficulty breathing and chest pain, or any other concerning symptoms, or any other urgent or potentially life-threatening concerns.

## 2023-04-02 NOTE — ED TRIAGE NOTES
From home for L calf pain x 2 weeks  Has seen a PCP, plan was for US on Tuesday but pain is now severe.  No hx of blood clots  Was previously on birth control, smoker  Denies CP/SOB  Taking asa 325 daily     Triage Assessment     Row Name 04/02/23 1617       Triage Assessment (Adult)    Airway WDL WDL       Respiratory WDL    Respiratory WDL WDL       Skin Circulation/Temperature WDL    Skin Circulation/Temperature WDL WDL       Cardiac WDL    Cardiac WDL WDL       Cognitive/Neuro/Behavioral WDL    Cognitive/Neuro/Behavioral WDL WDL

## 2023-04-02 NOTE — ED PROVIDER NOTES
[place] EMERGENCY DEPARTMENT ([facility])    23       ED PROVIDER NOTE        History     Chief Complaint   Patient presents with     Leg Pain     HPI  Cathy Arroyo is a 42 year old female with PMH significant for chronic back pain, asthma, bipolar disorder and anxiety who presents to the ED for evaluation of gradually worsening left leg pain that started about 2 weeks ago.  Notes that she initially noted some pain, but not any swelling, and cannot think of anything she did injure the leg.    Over the last 3 or 4 days, the pain is gotten increasingly severe, and has began to radiate from the back of her left calf up into her knee, and into the back of her upper leg.  No chest pain, shortness of breath.  No swelling of her extremity.  No skin changes, does note some generalized weakness in her left leg, but believes it is related to the pain.    Past Medical History  Past Medical History:   Diagnosis Date     Allergic state      Anxiety      Bipolar 1 disorder (H)      Depressive disorder      Elevated cholesterol      Graves disease      Obese     BMI 37.48     [place] tumor     s/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 tumor     Post partum depression      Post-surgical hypothyroidism 2017     Uncomplicated asthma      Past Surgical History:   Procedure Laterality Date     BIOPSY  29 y/o colpos, and in the last 2 years for thyroid    Colposcopy, thyroid nodule biopsy twice     BLOOD PATCH N/A 10/11/2016    Procedure: EPIDURAL BLOOD PATCH;  Surgeon: GENERIC ANESTHESIA PROVIDER;  Location: [facility] OR      SECTION, TUBAL LIGATION, COMBINED N/A 2019    Procedure:  SECTION, WITH TUBAL LIGATION;  Surgeon: Kathy Rutledge MD;  Location: [facility] L+D     CRANIOTOMY, EXCISE TUMOR COMPLEX, COMBINED  2014    Procedure: COMBINED CRANIOTOMY, EXCISE TUMOR COMPLEX;  Supracerabellar  Infratentorial Approach for Resection of Tumor ;  Surgeon: Kate Mckeon MD;  Location: [facility] OR      THYROIDECTOMY N/A 5/3/2017    Procedure: THYROIDECTOMY;  Total Thyroidectomy;  Surgeon: Pamela Villasenor MD;  [facility]     ADDERALL XR 25 MG 24 hr capsule  albuterol (PROAIR HFA/PROVENTIL HFA/VENTOLIN HFA) 108 (90 Base) MCG/ACT inhaler  amphetamine-dextroamphetamine (ADDERALL XR) 15 MG 24 hr capsule  amphetamine-dextroamphetamine (ADDERALL XR) 20 MG 24 hr capsule  amphetamine-dextroamphetamine (ADDERALL) 10 MG tablet  ARIPiprazole (ABILIFY) 10 MG tablet  cetirizine (ZYRTEC) 10 MG tablet  diazepam (VALIUM) 5 MG tablet  fluticasone (FLONASE) 50 MCG/ACT nasal spray  fluticasone-salmeterol (ADVAIR HFA) 230-21 MCG/ACT inhaler  ipratropium - albuterol 0.5 mg/2.5 mg/3 mL (DUONEB) 0.5-2.5 (3) MG/3ML neb solution  levothyroxine (SYNTHROID/LEVOTHROID) 175 MCG tablet  medical cannabis (Patient's own supply)  medical cannabis (Patient's own supply)  methocarbamol (ROBAXIN) 500 MG tablet  montelukast (SINGULAIR) 10 MG tablet  multivitamin w/minerals (THERA-VIT-M) tablet  prochlorperazine (COMPAZINE) 10 MG tablet  rizatriptan (MAXALT-MLT) 10 MG ODT  tiZANidine (ZANAFLEX) 4 MG tablet  VENTOLIN  (90 Base) MCG/ACT inhaler  vitamin D3 (CHOLECALCIFEROL) 50 mcg (2000 units) tablet      Allergies   Allergen Reactions     Adacel [Daptacel] Swelling     Adhesive Tape Hives     Ciprofloxacin      Causes visual hallucinations.     Codeine Sulfate Nausea and Vomiting     Nicoderm [Nicotine]      Patch and Gum, pt reported allergic reaction 6/24     Tegaderm Transparent Dressing (Informational Only) Hives     Tetanus Toxoid      Pt states she had an infection at injection site.      Vicodin [Hydrocodone-Acetaminophen] Nausea and Vomiting     Methimazole Rash     Family History  Family History   Problem Relation Age of Onset     Other Cancer Mother 62        salivary gland cancer     Genitourinary Problems Mother      Bipolar Disorder Mother         unipolar depression     Depression Mother      Thyroid Disease Mother          benign tumor     Melanoma Mother      Asthma Father      Mental Illness Father         Bipolar 2     Obesity Father      Bipolar Disorder Brother         unipolar depression     Asthma Brother      Depression Brother      Asthma Maternal Grandmother      Osteoporosis Maternal Grandmother      Glaucoma Maternal Grandmother      Hypertension Maternal Grandmother      Macular Degeneration Maternal Grandmother      Prostate Cancer Maternal Grandfather 69        no history of smoking     Bladder Cancer Maternal Grandfather 71     Colon Cancer Maternal Grandfather 70     Diabetes Paternal Grandfather      Breast Cancer Maternal Aunt 37        negative BRCA1/2 testing     Melanoma Maternal Aunt 64     Thyroid Disease Paternal Aunt      Breast Cancer Paternal Aunt 58        bilateral; negative testing     Cancer Paternal Aunt      Breast Cancer Other         paternal great aunt     Social History   Social History     Tobacco Use     Smoking status: Every Day     Packs/day: 1.00     Years: 14.00     Pack years: 14.00     Types: Cigarettes, Vaping Device     Start date: 2004     Last attempt to quit: 2022     Years since quittin.1     Passive exposure: Past     Smokeless tobacco: Never     Tobacco comments:     Pt states slowly cutting down. Pt does use medical marijuana flower and edibles., patient states she smokes 3 cigs per day   Vaping Use     Vaping status: Some Days     Substances: Nicotine, Flavoring     Devices: Disposable     Passive vaping exposure: Yes   Substance Use Topics     Alcohol use: Yes     Alcohol/week: 4.0 standard drinks of alcohol     Types: 4 Drinks containing 0.5 oz of alcohol per week     Drug use: Yes     Types: Marijuana     Comment: medical marijuana         A medically appropriate review of systems was performed with pertinent positives and negatives noted in the HPI, and all other systems negative.    Physical Exam   BP: (!) 165/96  Pulse: 100  Temp: 98.2  F (36.8  " C)  Resp: 16  Height: 170.2 cm (5' 7\")  Weight: 110.7 kg (244 lb)  SpO2: 99 %  Physical Exam  GEN: Well appearing, non toxic, cooperative  HEENT: normocephalic and atraumatic, PERRLA, EOMI  CV: well-perfused, normal skin color for ethnicity  PULM: breathing comfortably, in no respiratory distress  ABD: nondistended  EXT: Full range of motion.  No edema.  Tenderness to palpation of left posterior knee and upper leg; equal bilateral calf diameter  NEURO: awake, conversant, grossly normal bilateral upper and lower extremity strength & ROM   SKIN: No rashes, ecchymosis, or lacerations  PSYCH: Calm and cooperative, interactive      ED Course, Procedures, & Data     ED Course as of 04/02/23 1737   Sun Apr 02, 2023   1736 US negative for DVT. Feeling better. Likely MSK vs sciatica. Will have her follow-up with PCP     Procedures           No results found for any visits on 04/02/23.  Medications   acetaminophen (TYLENOL) tablet 975 mg (975 mg Oral $Given 4/2/23 1704)   ibuprofen (ADVIL/MOTRIN) tablet 600 mg (600 mg Oral $Given 4/2/23 1704)     Labs Ordered and Resulted from Time of ED Arrival to Time of ED Departure - No data to display  US Lower Extremity Venous Duplex Left   Preliminary Result   RESIDENT PRELIMINARY INTERPRETATION   IMPRESSION:.   No deep vein thrombosis in the left lower extremity.             Critical care was not performed.     Medical Decision Making  The patient's presentation was of low complexity (an acute and uncomplicated illness or injury).    The patient's evaluation involved:  review of external note(s) from 3+ sources (see separate area of note for details)  ordering and/or review of 3+ test(s) in this encounter (see separate area of note for details)  review of 3+ test result(s) ordered prior to this encounter (see separate area of note for details)    The patient's management necessitated moderate risk (prescription drug management including medications given in the ED).      Assessment & " Face Mask Plan    42-year-old female with a history of chronic back pain, and bipolar presents emergency department due to 2 weeks of left lower extremity pain is severe and now moving up her leg with otherwise relatively benign exam.    We will plan to obtain an ultrasound to evaluate for evidence of DVT.  However, also consistent with sciatic pain as well, although no evidence of any focal neurological deficits at this time with low suspicion of acute spinal cord compression.     ED Course as of 04/02/23 1737   Sun Apr 02, 2023 1736 US negative for DVT. Feeling better. Likely MSK vs sciatica. Will have her follow-up with PCP         I have reviewed the nursing notes. I have reviewed the findings, diagnosis, plan and need for follow up with the patient.    New Prescriptions    No medications on file       Final diagnoses:   Pain of left lower leg       Blank Hicks MD  [facility] EMERGENCY DEPARTMENT  4/2/2023     Blank Hicks MD  04/02/23 1737

## 2023-04-06 ENCOUNTER — TELEPHONE (OUTPATIENT)
Dept: BEHAVIORAL HEALTH | Facility: CLINIC | Age: 43
End: 2023-04-06
Payer: COMMERCIAL

## 2023-04-07 NOTE — TELEPHONE ENCOUNTER
[facility] received ED alert for patient and reviewed chart to determine eligibility for Behavioral Health Home services. CC called patient and spoke with her about the program. She reported that she is set up with her follow up appointments from the ED alert. CC explained they are mental health based, but still offer services in case she's interested in care coordination support. Patient reported she's grateful this type of service is offered, but she doesn't personally need it at this time. Patient is open to [facility] sending the program information in case she's interested in the future.     FUNMILAYO Diallo  Behavioral Health Home ([facility])   [facility]  [phone]

## 2023-04-12 ENCOUNTER — TELEPHONE (OUTPATIENT)
Dept: ONCOLOGY | Facility: CLINIC | Age: 43
End: 2023-04-12

## 2023-04-12 ENCOUNTER — VIRTUAL VISIT (OUTPATIENT)
Dept: ONCOLOGY | Facility: CLINIC | Age: 43
End: 2023-04-12
Attending: NURSE PRACTITIONER
Payer: COMMERCIAL

## 2023-04-12 DIAGNOSIS — Z80.0 FAMILY HISTORY OF COLON CANCER: ICD-10-CM

## 2023-04-12 DIAGNOSIS — Z80.3 FAMILY HISTORY OF MALIGNANT NEOPLASM OF BREAST: Primary | ICD-10-CM

## 2023-04-12 DIAGNOSIS — D44.5 PINEOCYTOMA (H): ICD-10-CM

## 2023-04-12 DIAGNOSIS — Z80.0 FAMILY HISTORY OF ORAL CANCER: ICD-10-CM

## 2023-04-12 DIAGNOSIS — Z12.39 BREAST CANCER SCREENING, HIGH RISK PATIENT: ICD-10-CM

## 2023-04-12 DIAGNOSIS — H92.11 OTORRHEA, RIGHT: Primary | ICD-10-CM

## 2023-04-12 DIAGNOSIS — Z80.9 FAMILY HISTORY OF CANCER: ICD-10-CM

## 2023-04-12 PROCEDURE — 999N000069 HC STATISTIC GENETIC COUNSELING, < 16 MIN: Mod: GT,95 | Performed by: GENETIC COUNSELOR, MS

## 2023-04-12 NOTE — TELEPHONE ENCOUNTER
Left Voicemail (1st Attempt) for the patient to call back and schedule the following:    Appointment type: Lab test for Genetic Counseling  Provider: Melissa Lange GC  Return date: /18/23 (per pt)  Specialty phone number: [phone]  Additonal Notes: Pt has other appts on this date and would like this scheduled at the [facility] lab

## 2023-04-12 NOTE — PATIENT INSTRUCTIONS
Assessing Cancer Risk  Only about 5-10% of cancers are thought to be due to an inherited cancer susceptibility gene.    These families often have:  Several people with the same or related types of cancer  Cancers diagnosed at a young age (before age 50)  Individuals with more than one primary cancer  Multiple generations of the family affected with cancer    Genetic Testing  Genetic testing involves a simple blood test and will look at the genetic information in select genes for any harmful mutations that are associated with increased cancer risk.  If possible, it is recommended that the person(s) who has had cancer be tested before other family members.  That person will give us the most useful information about whether or not a specific gene is associated with the cancer in the family.     Results  There are three possible results of genetic testing:  Positive--a harmful mutation was identified  Negative--no mutation was identified  Variant of unknown significance--a variation in one of the genes was identified, but it is unclear how this impacts cancer risk in the family    Advantages and Disadvantages  There are advantages and disadvantages to genetic testing of these genes.    Advantages  May clarify your cancer risk  Can help you make medical decisions  May explain the cancers in your family  May give useful information to your family members (if you share your results)    Disadvantages  Possible negative emotional impact of learning about inherited cancer risk  Uncertainty in interpreting a negative test result in some situations  Possible genetic discrimination concerns (see below)    Inheritance   Mutations in most cancer risk genes are inherited in an autosomal dominant pattern.  This means that if a parent has a mutation, each of his or her children will have a 50% chance of inheriting that same mutation.  Therefore, each child--male or female--would have a 50% chance of being at increased  risk for developing cancer.                                              Image obtained from Genetics Home Reference, 2013     Genetic Information Nondiscrimination Act (DONA)  DONA is a federal law that protects individuals from health insurance or employment discrimination based on a genetic test result alone.  Although rare, there are currently no legal protections in terms of life insurance, long term care, or disability insurances.  Visit the National Human Genome Research [place] at Genome.gov/18967749 to learn more.    Reducing Cancer Risk  If a harmful mutation is found in a cancer risk gene, there may be certain screens or preventative surgeries that can be offered. This information will be discussed after genetic testing is completed. If no mutations are found on genetic testing, screening is then recommended based on personal and/or family history of cancer.     Questions to Think About Regarding Genetic Testing  What effect will the test result have on me and my relationship with my family members if I have an inherited gene mutation?  If I don t have a gene mutation?  Should I share my test results, and how will my family react to this news, which may also affect them?  Are my children ready to learn new information that may one day affect their own health?    Resources  American Cancer Society (ACS) cancer.org   National Cancer [place] (NCI) cancer.gov     Please call us if you have any questions or concerns.   Cancer Risk Management Program [phone] ([phone])  Brian Cárdenas, MS [facility]  [phone]  Nata Da Silva, MS, [facility] [phone]  Sayda Duran, MS, [facility]  [phone]  Melissa Lange, MS, [facility]  [phone]  Daisy Prince, MS, [facility]  [phone]  Marija Morse, MS, [facility] [phone]  Bhavya Arnold, MS, [facility] [phone]

## 2023-04-12 NOTE — LETTER
4/12/2023         RE: Cathy Arroyo  [address]  [place] [address]        Dear Colleague,    Thank you for referring your patient, Cathy Arroyo, to the [facility] CANCER [facility]. Please see a copy of my visit note below.    4/12/2023    Referring Provider: JANNET Dickey CNP    Presenting Information:   I met with Cathy Arroyo today for genetic counseling at the Cancer Risk Management Program (virtual visit) to discuss her family history of cancer and revisit cancer screening and testing options.  She was previously seen for genetic counseling 8/8/2020 (please see clinic note for a detailed out line of our discussion).  No genetic testing was completed at that time, and high risk breast screening was recommended due to her family history (including annual MRI and mammogram imaging).      Personal History:  Cathy is a 42 year old female. 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  She is being followed for high risk breast screening, including breast MRI (7/5/2022) and annual mammogram (2/14/2023).  She has an appointment with Miriam Lora PA-C 8/16/2023.  She has her ovaries and uterus in place (surgical pathology for bilateral fallopian tubes available from 5/6/2019).      Family History: (Please see scanned pedigree for detailed family history information)  Her mother passed away at age 64 and was diagnosed with a salivary gland cancer at age 62.  She had a past history of tobacco use from her teens-early 40's  One maternal aunt was diagnosed with breast cancer, approximately in her late 30's (37?), and melanoma in situ at age 63.  She completed BRCA1/2 genetic testing which was negative, however likely was completed 5-10+ years ago per Cathy.  Additional details were not available at this time.    Her maternal grandfather is 92 and was diagnosed with prostate cancer in his 80's.  He also reportedly had a  history of bladder and colon cancer.   She reports a significant family history of cancer in her maternal great-grandmother's 9 siblings (through her grandfather), however details regarding their diagnoses were not available.  One paternal aunt was recently diagnosed with bilateral breast cancer at age 58.  Cathy reports that she had negative genetic testing.  Her father has had colon polyps  Her paternal grandfather's sister had a history of breast cancer (post menopause).    Her maternal ethnicity is English/[place]. Her paternal ethnicity is [place].     Discussion:  Cathy's family history of early onset breast cancer may be suggestive of a possible hereditary cancer syndrome.  Please see genetic counseling clinic note from 8/18/2020 for details regarding our previous discussion.  Cathy expressed interest in proceeding with genetic testing today.    We reviewed the features of sporadic, familial, and hereditary cancers. Based on her family history, Cathy meets current National Comprehensive Cancer Network (NCCN) criteria for genetic testing of high-penetrance breast and/or ovarian cancer susceptibility genes (often including BRCA1/2, TP53, PALB2, PTEN, CDH1, among others).    A detailed handout regarding hereditary cancer and the information we discussed can be found in the after visit summary. Topics included: inheritance pattern, cancer risks, cancer screening recommendations, and also risks, benefits and limitations of testing.  Cathy expressed interest in learning as much information as possible, and was interested in expanded panel testing.  Verbal consent was obtained, and BRCA1/2 analysis with reflex to the 77 gene CancerNext-Expanded panel will be sent to [facility].  Test turn around time: 3-4 weeks.  The results from genetic testing may help to guide additional cancer screening and medical management recommendations for Cathy and her family.  These recommendations will be discussed in detail once  testing is completed.     Plan:  1) Today Cathy elected to proceed with BRCA1/2 testing and the 77 gene CancerNext-Expanded panel through [facility].  2) This information should be available in 4 weeks.  3) Cathy will return to clinic to discuss the results.    Time spent (video visit): 15 minutes    Melissa Lange MS, CGC  Licensed, Certified Genetic Counselor  [facility]  Phone: [phone]      Genetic Testing Next Steps:    An order for genetic testing will be placed by your genetic counselor, and a blood draw will be coordinated through an [facility] clinic  You will be contacted by [facility] if the estimated out of pocket cost exceeds $100.  This price depends on your insurance benefits (including coverage and remaining deductible).   Once notified, please contact the testing laboratory to confirm your out of pocket cost.  Additional pricing and payment options may be available.  Results from your genetic test will be available in approximately 2-4 weeks, though may take longer depending on insurance.  A follow up appointment will be scheduled with your genetic counselor to discuss these results and medical management options.  Your genetic test results may not appear in [facility]; please contact your genetic counselor if you would prefer to obtain an electronic copy prior to your appointment.

## 2023-04-12 NOTE — PROGRESS NOTES
4/12/2023    Referring Provider: JANNET Dickey CNP    Presenting Information:   I met with Cathy Arroyo today for genetic counseling at the [facility] (virtual visit) to discuss her family history of cancer and revisit cancer screening and testing options.  She was previously seen for genetic counseling 8/8/2020 (please see clinic note for a detailed out line of our discussion).  No genetic testing was completed at that time, and high risk breast screening was recommended due to her family history (including annual MRI and mammogram imaging).      Personal History:  Cathy is a 42 year old female. 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  She is being followed for high risk breast screening, including breast MRI (7/5/2022) and annual mammogram (2/14/2023).  She has an appointment with Miriam Lora PA-C 8/16/2023.  She has her ovaries and uterus in place (surgical pathology for bilateral fallopian tubes available from 5/6/2019).      Family History: (Please see scanned pedigree for detailed family history information)  ? Her mother passed away at age 64 and was diagnosed with a salivary gland cancer at age 62.  She had a past history of tobacco use from her teens-early 40's  ? One maternal aunt was diagnosed with breast cancer, approximately in her late 30's (37?), and melanoma in situ at age 63.  She completed BRCA1/2 genetic testing which was negative, however likely was completed 5-10+ years ago per Cathy.  Additional details were not available at this time.    ? Her maternal grandfather is 92 and was diagnosed with prostate cancer in his 80's.  He also reportedly had a history of bladder and colon cancer.   ? She reports a significant family history of cancer in her maternal great-grandmother's 9 siblings (through her grandfather), however details regarding their diagnoses were not available.  ? One paternal aunt was  recently diagnosed with bilateral breast cancer at age 58.  Cathy reports that she had negative genetic testing.  ? Her father has had colon polyps  ? Her paternal grandfather's sister had a history of breast cancer (post menopause).    ? Her maternal ethnicity is English/Austrian. Her paternal ethnicity is [place].     Discussion:    Cathy's family history of early onset breast cancer may be suggestive of a possible hereditary cancer syndrome.  Please see genetic counseling clinic note from 8/18/2020 for details regarding our previous discussion.  Cathy expressed interest in proceeding with genetic testing today.      We reviewed the features of sporadic, familial, and hereditary cancers. Based on her family history, Cathy meets current National Comprehensive Cancer Network (NCCN) criteria for genetic testing of high-penetrance breast and/or ovarian cancer susceptibility genes (often including BRCA1/2, TP53, PALB2, PTEN, CDH1, among others).      A detailed handout regarding hereditary cancer and the information we discussed can be found in the after visit summary. Topics included: inheritance pattern, cancer risks, cancer screening recommendations, and also risks, benefits and limitations of testing.    Cathy expressed interest in learning as much information as possible, and was interested in expanded panel testing.  Verbal consent was obtained, and BRCA1/2 analysis with reflex to the 77 gene CancerNext-Expanded panel will be sent to [facility].  Test turn around time: 3-4 weeks.    The results from genetic testing may help to guide additional cancer screening and medical management recommendations for Cathy and her family.  These recommendations will be discussed in detail once testing is completed.     Plan:  1) Today Cathy elected to proceed with BRCA1/2 testing and the 77 gene CancerNext-Expanded panel through [facility].  2) This information should be available in 4 weeks.  3) Cathy will return to  clinic to discuss the results.    Time spent (video visit): 15 minutes    Melissa Lange MS, [facility] [place]  Licensed, Certified Genetic Counselor  [facility]  Phone: [phone]      Genetic Testing Next Steps:    1. An order for genetic testing will be placed by your genetic counselor, and a blood draw will be coordinated through an [facility] clinic  2. You will be contacted by [email or web address] if the estimated out of pocket cost exceeds $100.  This price depends on your insurance benefits (including coverage and remaining deductible).   Once notified, please contact the testing laboratory to confirm your out of pocket cost.  Additional pricing and payment options may be available.  3. Results from your genetic test will be available in approximately 2-4 weeks, though may take longer depending on insurance.  A follow up appointment will be scheduled with your genetic counselor to discuss these results and medical management options.  Your genetic test results may not appear in [facility]; please contact your genetic counselor if you would prefer to obtain an electronic copy prior to your appointment.

## 2023-04-18 ENCOUNTER — PRE VISIT (OUTPATIENT)
Dept: ORTHOPEDICS | Facility: CLINIC | Age: 43
End: 2023-04-18

## 2023-04-18 ENCOUNTER — LAB (OUTPATIENT)
Dept: LAB | Facility: CLINIC | Age: 43
End: 2023-04-18
Payer: COMMERCIAL

## 2023-04-18 ENCOUNTER — OFFICE VISIT (OUTPATIENT)
Dept: NEUROSURGERY | Facility: CLINIC | Age: 43
End: 2023-04-18
Payer: COMMERCIAL

## 2023-04-18 VITALS
DIASTOLIC BLOOD PRESSURE: 83 MMHG | HEART RATE: 77 BPM | WEIGHT: 247.8 LBS | OXYGEN SATURATION: 99 % | SYSTOLIC BLOOD PRESSURE: 126 MMHG | BODY MASS INDEX: 38.81 KG/M2

## 2023-04-18 DIAGNOSIS — Q34.1 MEDIASTINAL CYST: Primary | ICD-10-CM

## 2023-04-18 DIAGNOSIS — Z80.0 FAMILY HISTORY OF ORAL CANCER: ICD-10-CM

## 2023-04-18 DIAGNOSIS — Z80.3 FAMILY HISTORY OF MALIGNANT NEOPLASM OF BREAST: ICD-10-CM

## 2023-04-18 DIAGNOSIS — Z79.899 NEED FOR PROPHYLACTIC CHEMOTHERAPY: Primary | ICD-10-CM

## 2023-04-18 DIAGNOSIS — Z79.899 NEED FOR PROPHYLACTIC CHEMOTHERAPY: ICD-10-CM

## 2023-04-18 DIAGNOSIS — D35.4 BENIGN NEOPLASM OF PINEAL GLAND (H): Primary | ICD-10-CM

## 2023-04-18 DIAGNOSIS — D49.7 PINEAL GLAND, TUMOR: ICD-10-CM

## 2023-04-18 DIAGNOSIS — D44.5 PINEOCYTOMA (H): ICD-10-CM

## 2023-04-18 DIAGNOSIS — Z80.9 FAMILY HISTORY OF CANCER: ICD-10-CM

## 2023-04-18 DIAGNOSIS — Z80.0 FAMILY HISTORY OF COLON CANCER: ICD-10-CM

## 2023-04-18 LAB
ALBUMIN SERPL BCG-MCNC: 4.1 G/DL (ref 3.5–5.2)
ALP SERPL-CCNC: 79 U/L (ref 35–104)
ALT SERPL W P-5'-P-CCNC: 14 U/L (ref 10–35)
ANION GAP SERPL CALCULATED.3IONS-SCNC: 7 MMOL/L (ref 7–15)
AST SERPL W P-5'-P-CCNC: 14 U/L (ref 10–35)
BILIRUB DIRECT SERPL-MCNC: <0.2 MG/DL (ref 0–0.3)
BILIRUB SERPL-MCNC: 0.4 MG/DL
BUN SERPL-MCNC: 8.5 MG/DL (ref 6–20)
CALCIUM SERPL-MCNC: 9.1 MG/DL (ref 8.6–10)
CHLORIDE SERPL-SCNC: 105 MMOL/L (ref 98–107)
CHOLEST SERPL-MCNC: 179 MG/DL
CREAT SERPL-MCNC: 0.67 MG/DL (ref 0.51–0.95)
DEPRECATED HCO3 PLAS-SCNC: 27 MMOL/L (ref 22–29)
ERYTHROCYTE [DISTWIDTH] IN BLOOD BY AUTOMATED COUNT: 13.2 % (ref 10–15)
GFR SERPL CREATININE-BSD FRML MDRD: >90 ML/MIN/1.73M2
GLUCOSE SERPL-MCNC: 96 MG/DL (ref 70–99)
HBA1C MFR BLD: 5.4 %
HCT VFR BLD AUTO: 39.3 % (ref 35–47)
HDLC SERPL-MCNC: 54 MG/DL
HGB BLD-MCNC: 12.9 G/DL (ref 11.7–15.7)
LDLC SERPL CALC-MCNC: 104 MG/DL
MCH RBC QN AUTO: 28.6 PG (ref 26.5–33)
MCHC RBC AUTO-ENTMCNC: 32.8 G/DL (ref 31.5–36.5)
MCV RBC AUTO: 87 FL (ref 78–100)
NONHDLC SERPL-MCNC: 125 MG/DL
PLATELET # BLD AUTO: 304 10E3/UL (ref 150–450)
POTASSIUM SERPL-SCNC: 4.3 MMOL/L (ref 3.4–5.3)
PROLACTIN SERPL 3RD IS-MCNC: 6 NG/ML (ref 5–23)
PROT SERPL-MCNC: 6.8 G/DL (ref 6.4–8.3)
RBC # BLD AUTO: 4.51 10E6/UL (ref 3.8–5.2)
SODIUM SERPL-SCNC: 139 MMOL/L (ref 136–145)
T3 SERPL-MCNC: 125 NG/DL (ref 85–202)
T4 FREE SERPL-MCNC: 1.9 NG/DL (ref 0.9–1.7)
TRIGL SERPL-MCNC: 105 MG/DL
TSH SERPL DL<=0.005 MIU/L-ACNC: 0.54 UIU/ML (ref 0.3–4.2)
VIT B12 SERPL-MCNC: 312 PG/ML (ref 232–1245)
WBC # BLD AUTO: 7.8 10E3/UL (ref 4–11)

## 2023-04-18 PROCEDURE — 83036 HEMOGLOBIN GLYCOSYLATED A1C: CPT | Mod: 90 | Performed by: PATHOLOGY

## 2023-04-18 PROCEDURE — 80053 COMPREHEN METABOLIC PANEL: CPT | Performed by: PATHOLOGY

## 2023-04-18 PROCEDURE — 99213 OFFICE O/P EST LOW 20 MIN: CPT | Performed by: NEUROLOGICAL SURGERY

## 2023-04-18 PROCEDURE — 82607 VITAMIN B-12: CPT | Mod: 90 | Performed by: PATHOLOGY

## 2023-04-18 PROCEDURE — 84146 ASSAY OF PROLACTIN: CPT | Mod: 90 | Performed by: PATHOLOGY

## 2023-04-18 PROCEDURE — 99000 SPECIMEN HANDLING OFFICE-LAB: CPT | Performed by: PATHOLOGY

## 2023-04-18 PROCEDURE — 80061 LIPID PANEL: CPT | Performed by: PATHOLOGY

## 2023-04-18 PROCEDURE — 84443 ASSAY THYROID STIM HORMONE: CPT | Performed by: PATHOLOGY

## 2023-04-18 PROCEDURE — 82248 BILIRUBIN DIRECT: CPT | Performed by: PATHOLOGY

## 2023-04-18 PROCEDURE — 85027 COMPLETE CBC AUTOMATED: CPT | Performed by: PATHOLOGY

## 2023-04-18 PROCEDURE — 36415 COLL VENOUS BLD VENIPUNCTURE: CPT | Performed by: PATHOLOGY

## 2023-04-18 PROCEDURE — 84480 ASSAY TRIIODOTHYRONINE (T3): CPT | Mod: 90 | Performed by: PATHOLOGY

## 2023-04-18 PROCEDURE — 84439 ASSAY OF FREE THYROXINE: CPT | Performed by: PATHOLOGY

## 2023-04-18 PROCEDURE — 82306 VITAMIN D 25 HYDROXY: CPT | Mod: 90 | Performed by: PATHOLOGY

## 2023-04-18 ASSESSMENT — PAIN SCALES - GENERAL: PAINLEVEL: MILD PAIN (3)

## 2023-04-18 NOTE — LETTER
4/18/2023      RE: Cathy Arroyo  [address]  [facility] [address]       See dictated note. Visit 20 minutes.  MD Kate Aguayo MD

## 2023-04-18 NOTE — LETTER
4/18/2023       RE: Cathy Arroyo  [address]  [facility] [place] [address]         Dear Colleague,    Thank you for referring your patient, Cathy Arroyo, to the [facility]. Please see a copy of my visit note below.    See dictated note. Visit 20 minutes.        Again, thank you for allowing me to participate in the care of your patient.      Sincerely,    Kate Mckeon MD

## 2023-04-19 LAB — DEPRECATED CALCIDIOL+CALCIFEROL SERPL-MC: 31 UG/L (ref 20–75)

## 2023-04-20 NOTE — PROGRESS NOTES
Service Date: 2023    Sherrill Dasilva, APRN, CNP   [facility]  [address], [address] [place] [address]    RE:  Cathy Arroyo  MRN:  [id number]  :  1980    Dear Ms. Khanmons:    We saw Ms. Arroyo back in [facility] on 2023 for long-term followup of a pineal gland tumor resection.  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  She has done well over the years.  She still has migraine headaches, for which she takes Maxalt as needed.  She has occasional discomfort in the occiput, and she describes some odd sensations at the craniotomy site.  These are all tolerable and unchanged.    EXAMINATION:  She appears anxious, but otherwise in good health.  Her blood pressure is 126/83.  Heart rate is 77.  She is obese.  Weight is 112.4 kg with BMI of 38.81.    Her gross neurological examination remains normal.  Specifically, extraocular movements are full.  There is no ptosis.  Pupils are equal and reactive.  Speech, language and phonation are normal.  She moves all extremities with good strength and coordination.  She has some irregularities over the craniotomy site, which are unchanged.    REVIEW OF STUDIES:  We went over her MRI of the brain from 2023.  This shows no recurrence of the pineocytoma.  Ventricle size is normal.    IMPRESSION/PLAN:  Given the long-term stability of the resected pineal tumor, we will repeat an MRI in about 3 years and follow up with her at that time.  We discussed the normal remodeling of craniotomy bone flaps and the expected changes in contour.  She is not having any discomfort from the craniotomy fixation hardware.  We asked her to contact us if she develops any new discomfort in this area.  Please do not hesitate to contact us with questions.    Sincerely,    Kate Mckeon MD        D: 2023   T: 2023   MT: rogelio    Name:      ZEINA CABAN  MRN:      [id number]        Account:      [id number]   :      1980           Service Date: 2023       Document: [id number]

## 2023-04-21 PROBLEM — M54.2 NECK PAIN: Status: RESOLVED | Noted: 2022-03-02 | Resolved: 2023-04-21

## 2023-04-21 PROBLEM — M54.9 MID BACK PAIN: Status: RESOLVED | Noted: 2022-03-02 | Resolved: 2023-04-21

## 2023-04-21 PROBLEM — M54.50 ACUTE BILATERAL LOW BACK PAIN WITHOUT SCIATICA: Status: RESOLVED | Noted: 2020-01-31 | Resolved: 2023-04-21

## 2023-04-28 LAB — SCANNED LAB RESULT: NORMAL

## 2023-05-02 ENCOUNTER — OFFICE VISIT (OUTPATIENT)
Dept: OTOLARYNGOLOGY | Facility: CLINIC | Age: 43
End: 2023-05-02
Attending: NURSE PRACTITIONER
Payer: COMMERCIAL

## 2023-05-02 ENCOUNTER — PRE VISIT (OUTPATIENT)
Dept: OTOLARYNGOLOGY | Facility: CLINIC | Age: 43
End: 2023-05-02

## 2023-05-02 ENCOUNTER — OFFICE VISIT (OUTPATIENT)
Dept: AUDIOLOGY | Facility: CLINIC | Age: 43
End: 2023-05-02
Payer: COMMERCIAL

## 2023-05-02 DIAGNOSIS — H93.8X3 EAR PRESSURE, BILATERAL: Primary | ICD-10-CM

## 2023-05-02 DIAGNOSIS — H93.8X3 EAR POPPING, BILATERAL: Primary | ICD-10-CM

## 2023-05-02 DIAGNOSIS — H74.93 MASTOID DISORDER, BILATERAL: ICD-10-CM

## 2023-05-02 DIAGNOSIS — H93.13 TINNITUS OF BOTH EARS: ICD-10-CM

## 2023-05-02 DIAGNOSIS — Z86.69 HISTORY OF RECURRENT EAR INFECTION: ICD-10-CM

## 2023-05-02 DIAGNOSIS — H61.23 EXCESSIVE CERUMEN IN BOTH EAR CANALS: ICD-10-CM

## 2023-05-02 PROCEDURE — 92557 COMPREHENSIVE HEARING TEST: CPT | Performed by: AUDIOLOGIST

## 2023-05-02 PROCEDURE — 92504 EAR MICROSCOPY EXAMINATION: CPT | Performed by: OTOLARYNGOLOGY

## 2023-05-02 PROCEDURE — 92550 TYMPANOMETRY & REFLEX THRESH: CPT | Performed by: AUDIOLOGIST

## 2023-05-02 PROCEDURE — 99203 OFFICE O/P NEW LOW 30 MIN: CPT | Mod: 25 | Performed by: OTOLARYNGOLOGY

## 2023-05-02 ASSESSMENT — PAIN SCALES - GENERAL: PAINLEVEL: MILD PAIN (3)

## 2023-05-02 NOTE — LETTER
5/2/2023       RE: Cathy Arroyo  [address]  [facility] [address]     Dear Colleague,    Thank you for referring your patient, Cathy Arroyo, to the [facility] [place] [facility]. Please see a copy of my visit note below.    Dear Sherrill Freed:    I had the pleasure of meeting Cathy Arroyo in consultation today at the [facility] at your request.    CHIEF COMPLAINT: Ears    HISTORY OF PRESENT ILLNESS: Patient is a 42-year-old in today for consultation on her ears and bilateral ear pressure referred from her family physician.  She had a lot of ear infections as a child and had 1 last November.  With the November infection, she had pain and drainage in both ears.  She was given oral antibiotic.  She again had a lot of infections as a child but really has not had issues as an adult other than this recent infection.  Still has some pressure in the ears and she is just concerned.  She had some pineal gland issues and had surgery for that in the recent past.  MRI scan and follow-up of that showed some mastoid clouding in both ears, previous MRI scan also showed clouding relatively unchanged.  She was referred in just for assessment of this.  She again is done well as an adult.  She feels her hearing is equal and symmetrical.  Occasional tinnitus but not problematic.  She denies any dizziness.  There is no dysphagia, hoarseness, facial paresthesias.    ALLERGIES:    Allergies   Allergen Reactions    Adacel [Tetanus-Diphth-Acell Pertussis] Swelling    Adhesive Tape Hives    Ciprofloxacin      Causes visual hallucinations.    Codeine Sulfate Nausea and Vomiting    Nicoderm [Nicotine]      Patch and Gum, pt reported allergic reaction 6/24    Tegaderm Transparent Dressing (Informational Only) Hives    Tetanus Toxoid      Pt states she had an infection at injection site.     Vicodin  [Hydrocodone-Acetaminophen] Nausea and Vomiting    Methimazole Rash       HABITS: Social History    Substance and Sexual Activity      Alcohol use: Yes        Alcohol/week: 4.0 standard drinks of alcohol        Types: 4 Drinks containing 0.5 oz of alcohol per week     History   Smoking Status    Light Smoker    Packs/day: 0.00    Years: 14.00    Types: Cigarettes    Start date: 1/21/2004    Last attempt to quit: 1/25/2022   Smokeless Tobacco    Never         PAST MEDICAL HISTORY: Please see today's intake form (for the remainder of the PMH) which I reviewed and signed.  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    Post partum depression     Post-surgical hypothyroidism 05/2017    Problem, psychiatric 2001 diagnosed Bipolar    Recurrent otitis media chronic ear infections in childhood, recently had a double ear infection followed by another ear infection soon after    Sleep apnea 2019    had a sleep study when pregnant with my second child    Uncomplicated asthma        FAMILY HISTORY/SOCIAL HISTORY:   Family History   Problem Relation Age of Onset    Other Cancer Mother 62        salivary gland cancer    Genitourinary Problems Mother     Bipolar Disorder Mother         unipolar depression    Depression Mother     Thyroid Disease Mother         benign tumor    Melanoma Mother     Cancer Mother         salivary gland cancer    Migraines Mother     Asthma Father     Mental Illness Father         Bipolar 2    Obesity Father     Bipolar Disorder Brother         unipolar depression    Asthma Brother     Depression Brother     Asthma Maternal Grandmother     Osteoporosis Maternal Grandmother      Glaucoma Maternal Grandmother     Hypertension Maternal Grandmother     Macular Degeneration Maternal Grandmother     Prostate Cancer Maternal Grandfather 69        no history of smoking    Bladder Cancer Maternal Grandfather 71    Colon Cancer Maternal Grandfather 70    Diabetes Paternal Grandfather     Breast Cancer Maternal Aunt 37        negative BRCA1/2 testing    Melanoma Maternal Aunt 64    Thyroid Disease Paternal Aunt     Breast Cancer Paternal Aunt 58        bilateral; negative testing    Cancer Paternal Aunt     Breast Cancer Other         paternal great aunt      Social History     Socioeconomic History    Marital status:      Spouse name: Not on file    Number of children: Not on file    Years of education: Not on file    Highest education level: Not on file   Occupational History    Not on file   Tobacco Use    Smoking status: Light Smoker     Packs/day: 0.00     Years: 14.00     Pack years: 0.00     Types: Cigarettes     Start date: 2004     Last attempt to quit: 2022     Years since quittin.2     Passive exposure: Past    Smokeless tobacco: Never    Tobacco comments:     Stopped smoking for both pregnamcies and breastfeeding of both children   Vaping Use    Vaping status: Some Days     Substances: Nicotine, Flavoring     Devices: Disposable     Passive vaping exposure: Yes   Substance and Sexual Activity    Alcohol use: Yes     Alcohol/week: 4.0 standard drinks of alcohol     Types: 4 Drinks containing 0.5 oz of alcohol per week    Drug use: Not Currently     Types: Marijuana, Psilocybin     Comment: medical marijuana    Sexual activity: Yes     Partners: Male     Birth control/protection: Female Surgical, None   Other Topics Concern    Parent/sibling w/ CABG, MI or angioplasty before 65F 55M? No   Social History Narrative    Caffeine intake/servings daily - 1    Calcium intake/servings daily - 3    Exercise 5 times weekly - describe ; encouraged walking daily    Sunscreen used -  Yes    Seatbelts used - Yes    Guns stored in the home - No    Self Breast Exam - Yes    Pap test up to date -  No    Eye exam up to date -  No    Dental exam up to date -  No    DEXA scan up to date -  No    Flex Sig/Colonoscopy up to date -  No    Mammography up to date -  No    Immunizations reviewed and up to date - Yes    Abuse: Current or Past (Physical, Sexual or Emotional) - No    Do you feel safe in your environment - Yes    Do you cope well with stress - Yes    Do you suffer from insomnia - No             Social Determinants of Health     Financial Resource Strain: Not on file   Food Insecurity: Not on file   Transportation Needs: Not on file   Physical Activity: Not on file   Stress: Not on file   Social Connections: Not on file   Intimate Partner Violence: Not on file   Housing Stability: Not on file       REVIEW OF SYSTEMS: Patient Supplied Answers to Review of Systems      4/25/2023    12:42 PM   [facility] ROS   Musculoskeletal Sore or stiff joints    Back pain            The remainder of the 10 point ROS is negative    PHYSICIAL EXAMINATION:  Constitutional: The patient was well-groomed and in no acute distress.   Skin: Warm and pink.  Psychiatric: The patient's affect was calm, cooperative, and appropriate.   Respiratory: Breathing comfortably without stridor or exertion of accessory muscles.  Eyes: Pupils were equal and reactive. Extraocular movement intact.   Head: Normocephalic and atraumatic. No lesions or scars.  Ears: Patient placed under the microscope for microscopic evaluation and cleaning of cerumen which was obscuring full visualization and complete assessment of both TMs. Under high power magnification, the right ear was examined and cleaned of cerumen using curet, alligator forceps, and suction.  After cleaning, TM is fully visualized and has normal position with normal middle ear aeration. The left ear was then cleaned and inspected using microscope, instruments and similar techniques.  After cleaning of cerumen, the TM has normal position with normal aeration to middle ear.  Nose: Sinuses were nontender. Anterior rhinoscopy revealed midline septum and absence of purulence or polyps.  Oral Cavity: Normal tongue, floor of mouth, buccal mucosa, and palate. No lesions or masses on inspection or palpation. No abnormal lymph tissue in the oropharynx.   Neck: The parotid is soft without masses. Supple with normal laryngeal and tracheal landmarks.   Lymphatic: There is no palpable lymphadenopathy or other masses in the neck.   Neurologic: Alert and oriented x 3. Cranial nerves III-XI within normal limits. Voice quality normal.  Cerebellar Function Tests:  Grossly normal    Audiogram: Audiogram performed shows normal hearing in both ears through all frequencies.  She has 92 and 100% discrimination levels.  Normal type A tympanograms bilaterally.  The forks are normal.      IMPRESSION AND PLAN:   Bilateral mastoid fluid: Bilateral mastoid changes noted on MRI scan for other issues.  Discussed with her her normal exam today and lack of any symptoms, I certainly would just monitor.  I suspect this is scarring from all her childhood infections and again recommend just monitor for now.  She will return with any new symptoms or problems.  Bilateral ear pressure: Recent ear infections, her only as an adult may be lingering, also cerumen today.  Again discussed her normal findings and recommend just monitor, no treatment needed.  Excessive cerumen: Cleaned today instruments and microscope as above.  No further treatment needed, monitor.    Thank you very much for the opportunity to participate in the care of your patient.    Rick L Nissen MD

## 2023-05-02 NOTE — PATIENT INSTRUCTIONS
1. You were seen in the ENT Clinic today by Dr. Nissen.  If you have any questions or concerns after your appointment, please call   - Option 1: ENT Clinic: [phone]   - Option 2: Jimena (Dr. Nissen's Nurse): [phone]                   Yaz (Dr. Nissen's Nurse): [phone]      2.   Plan to return to clinic as needed      How to Contact Us:  Send a eTapestry message to your provider. Our team will respond to you via eTapestry. Occasionally, we will need to call you to get further information.  For urgent matters (Monday-Friday), call the ENT Clinic: [phone] and speak with a call center team member - they will route your call appropriately.   If you'd like to speak directly with a nurse, please find our contact information below. We do our best to check voicemail frequently throughout the day, and will work to call you back within 1-2 days. For urgent matters, please use the general clinic phone numbers listed above.       Jimena MACEDO LPN  [facility] - Otolaryngology

## 2023-05-02 NOTE — PROGRESS NOTES
AUDIOLOGY REPORT     SUMMARY: Audiology visit completed. See audiogram for results.       RECOMMENDATIONS: Follow-up with ENT.     Xi Lincoln CCC-A  Licensed Audiologist   [place] #[id number]

## 2023-05-02 NOTE — PROGRESS NOTES
Dear Sherrill Freed:    I had the pleasure of meeting Cathy Arroyo in consultation today at the [facility] at your request.    CHIEF COMPLAINT: Ears    HISTORY OF PRESENT ILLNESS: Patient is a 42-year-old in today for consultation on her ears and bilateral ear pressure referred from her family physician.  She had a lot of ear infections as a child and had 1 last November.  With the November infection, she had pain and drainage in both ears.  She was given oral antibiotic.  She again had a lot of infections as a child but really has not had issues as an adult other than this recent infection.  Still has some pressure in the ears and she is just concerned.  She had some pineal gland issues and had surgery for that in the recent past.  MRI scan and follow-up of that showed some mastoid clouding in both ears, previous MRI scan also showed clouding relatively unchanged.  She was referred in just for assessment of this.  She again is done well as an adult.  She feels her hearing is equal and symmetrical.  Occasional tinnitus but not problematic.  She denies any dizziness.  There is no dysphagia, hoarseness, facial paresthesias.    ALLERGIES:    Allergies   Allergen Reactions     Adacel [Tetanus-Diphth-Acell Pertussis] Swelling     Adhesive Tape Hives     Ciprofloxacin      Causes visual hallucinations.     Codeine Sulfate Nausea and Vomiting     Nicoderm [Nicotine]      Patch and Gum, pt reported allergic reaction 6/24     Tegaderm Transparent Dressing (Informational Only) Hives     Tetanus Toxoid      Pt states she had an infection at injection site.      Vicodin [Hydrocodone-Acetaminophen] Nausea and Vomiting     Methimazole Rash       HABITS: Social History    Substance and Sexual Activity      Alcohol use: Yes        Alcohol/week: 4.0 standard drinks of alcohol        Types: 4 Drinks containing 0.5 oz of alcohol per week     History   Smoking Status     Light Smoker      Packs/day: 0.00     Years: 14.00     Types: Cigarettes     Start date: 1/21/2004     Last attempt to quit: 1/25/2022   Smokeless Tobacco     Never         PAST MEDICAL HISTORY: Please see today's intake form (for the remainder of the PMH) which I reviewed and signed.  Past Medical History:   Diagnosis Date     Allergic rhinitis 1990     Allergic state      Anxiety      Bipolar 1 disorder (H)      Depressive disorder      Elevated cholesterol      Graves disease 2017     Hearing problem      Migraines 2/2019    have suffered from migraines since having brain surgery     Obese     BMI 37.48     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     Post partum depression      Post-surgical hypothyroidism 05/2017     Problem, psychiatric 2001 diagnosed Bipolar     Recurrent otitis media chronic ear infections in childhood, recently had a double ear infection followed by another ear infection soon after     Sleep apnea 2019    had a sleep study when pregnant with my second child     Uncomplicated asthma        FAMILY HISTORY/SOCIAL HISTORY:   Family History   Problem Relation Age of Onset     Other Cancer Mother 62        salivary gland cancer     Genitourinary Problems Mother      Bipolar Disorder Mother         unipolar depression     Depression Mother      Thyroid Disease Mother         benign tumor     Melanoma Mother      Cancer Mother         salivary gland cancer     Migraines Mother      Asthma Father      Mental Illness Father         Bipolar 2     Obesity Father      Bipolar Disorder Brother         unipolar depression     Asthma Brother      Depression Brother      Asthma Maternal Grandmother      Osteoporosis Maternal Grandmother      Glaucoma Maternal Grandmother      Hypertension Maternal Grandmother      Macular Degeneration Maternal Grandmother      Prostate Cancer Maternal Grandfather 69        no history of smoking     Bladder Cancer Maternal Grandfather 71     Colon Cancer Maternal Grandfather 70      Diabetes Paternal Grandfather      Breast Cancer Maternal Aunt 37        negative BRCA1/2 testing     Melanoma Maternal Aunt 64     Thyroid Disease Paternal Aunt      Breast Cancer Paternal Aunt 58        bilateral; negative testing     Cancer Paternal Aunt      Breast Cancer Other         paternal great aunt      Social History     Socioeconomic History     Marital status:      Spouse name: Not on file     Number of children: Not on file     Years of education: Not on file     Highest education level: Not on file   Occupational History     Not on file   Tobacco Use     Smoking status: Light Smoker     Packs/day: 0.00     Years: 14.00     Pack years: 0.00     Types: Cigarettes     Start date: 2004     Last attempt to quit: 2022     Years since quittin.2     Passive exposure: Past     Smokeless tobacco: Never     Tobacco comments:     Stopped smoking for both pregnamcies and breastfeeding of both children   Vaping Use     Vaping status: Some Days     Substances: Nicotine, Flavoring     Devices: Disposable     Passive vaping exposure: Yes   Substance and Sexual Activity     Alcohol use: Yes     Alcohol/week: 4.0 standard drinks of alcohol     Types: 4 Drinks containing 0.5 oz of alcohol per week     Drug use: Not Currently     Types: Marijuana, Psilocybin     Comment: medical marijuana     Sexual activity: Yes     Partners: Male     Birth control/protection: Female Surgical, None   Other Topics Concern     Parent/sibling w/ CABG, MI or angioplasty before 65F 55M? No   Social History Narrative    Caffeine intake/servings daily - 1    Calcium intake/servings daily - 3    Exercise 5 times weekly - describe ; encouraged walking daily    Sunscreen used - Yes    Seatbelts used - Yes    Guns stored in the home - No    Self Breast Exam - Yes    Pap test up to date -  No    Eye exam up to date -  No    Dental exam up to date -  No    DEXA scan up to date -  No    Flex Sig/Colonoscopy up to date -  No     Mammography up to date -  No    Immunizations reviewed and up to date - Yes    Abuse: Current or Past (Physical, Sexual or Emotional) - No    Do you feel safe in your environment - Yes    Do you cope well with stress - Yes    Do you suffer from insomnia - No             Social Determinants of Health     Financial Resource Strain: Not on file   Food Insecurity: Not on file   Transportation Needs: Not on file   Physical Activity: Not on file   Stress: Not on file   Social Connections: Not on file   Intimate Partner Violence: Not on file   Housing Stability: Not on file       REVIEW OF SYSTEMS: Patient Supplied Answers to Review of Systems      4/25/2023    12:42 PM   [facility] ROS   Musculoskeletal Sore or stiff joints    Back pain            The remainder of the 10 point ROS is negative    PHYSICIAL EXAMINATION:  Constitutional: The patient was well-groomed and in no acute distress.   Skin: Warm and pink.  Psychiatric: The patient's affect was calm, cooperative, and appropriate.   Respiratory: Breathing comfortably without stridor or exertion of accessory muscles.  Eyes: Pupils were equal and reactive. Extraocular movement intact.   Head: Normocephalic and atraumatic. No lesions or scars.  Ears: Patient placed under the microscope for microscopic evaluation and cleaning of cerumen which was obscuring full visualization and complete assessment of both TMs. Under high power magnification, the right ear was examined and cleaned of cerumen using curet, alligator forceps, and suction.  After cleaning, TM is fully visualized and has normal position with normal middle ear aeration. The left ear was then cleaned and inspected using microscope, instruments and similar techniques. After cleaning of cerumen, the TM has normal position with normal aeration to middle ear.  Nose: Sinuses were nontender. Anterior rhinoscopy revealed midline septum and absence of purulence or polyps.  Oral Cavity: Normal tongue, floor of mouth, buccal  mucosa, and palate. No lesions or masses on inspection or palpation. No abnormal lymph tissue in the oropharynx.   Neck: The parotid is soft without masses. Supple with normal laryngeal and tracheal landmarks.   Lymphatic: There is no palpable lymphadenopathy or other masses in the neck.   Neurologic: Alert and oriented x 3. Cranial nerves III-XI within normal limits. Voice quality normal.  Cerebellar Function Tests:  Grossly normal    Audiogram: Audiogram performed shows normal hearing in both ears through all frequencies.  She has 92 and 100% discrimination levels.  Normal type A tympanograms bilaterally.  The forks are normal.      IMPRESSION AND PLAN:   1. Bilateral mastoid fluid: Bilateral mastoid changes noted on MRI scan for other issues.  Discussed with her her normal exam today and lack of any symptoms, I certainly would just monitor.  I suspect this is scarring from all her childhood infections and again recommend just monitor for now.  She will return with any new symptoms or problems.  2. Bilateral ear pressure: Recent ear infections, her only as an adult may be lingering, also cerumen today.  Again discussed her normal findings and recommend just monitor, no treatment needed.  3. Excessive cerumen: Cleaned today instruments and microscope as above.  No further treatment needed, monitor.    Thank you very much for the opportunity to participate in the care of your patient.    Rick L Nissen MD

## 2023-05-02 NOTE — NURSING NOTE
Chief Complaint   Patient presents with     Consult     Recurrent ear infection       Suresh Huston LPN

## 2023-05-04 NOTE — TELEPHONE ENCOUNTER
DIAGNOSIS: Left leg - calf pain, center. radiates to thigh / no imaging / self referral / [facility]   APPOINTMENT DATE: 5/9/23   NOTES STATUS DETAILS   OFFICE NOTE from other specialist Internal 1/31/2020 Jared Steele, PT   MEDICATION LIST Internal    US SCANS  Internal Internal:PACS   US Lower ext: 4/2/23   XRAYS (IMAGES & REPORTS) Internal Internal: PACS  XR Lumbar: 12/8/14

## 2023-05-06 ENCOUNTER — E-VISIT (OUTPATIENT)
Dept: FAMILY MEDICINE | Facility: CLINIC | Age: 43
End: 2023-05-06

## 2023-05-06 DIAGNOSIS — Z53.9 ERRONEOUS ENCOUNTER--DISREGARD: Primary | ICD-10-CM

## 2023-05-08 NOTE — TELEPHONE ENCOUNTER
Spoke to pt this morning and she stated she is feeling so much better. Felt very bad last night but woke up totally different and feeling great.   Appreciated the call to check in    Bhavya VALENZUELA RN  [facility]

## 2023-05-08 NOTE — TELEPHONE ENCOUNTER
Triage - can you please call this patient. E-visit was submitted over the weekend and this doesn't sound appropriate for e-visit because patient is stating that she cannot breathe at rest. Can you see how her symptoms are today? She would be best to be seen in person, but that would probably be another provider as I am not in clinic today. If she declines that, I did have a cancellation and have a video visit availability at 9:40 am.  DEVEN Winston     Provider E-Visit time total (minutes): 0

## 2023-05-09 ENCOUNTER — PRE VISIT (OUTPATIENT)
Dept: ORTHOPEDICS | Facility: CLINIC | Age: 43
End: 2023-05-09

## 2023-05-12 ENCOUNTER — VIRTUAL VISIT (OUTPATIENT)
Dept: ONCOLOGY | Facility: CLINIC | Age: 43
End: 2023-05-12
Attending: GENETIC COUNSELOR, MS
Payer: COMMERCIAL

## 2023-05-12 DIAGNOSIS — Z80.0 FAMILY HISTORY OF COLON CANCER: ICD-10-CM

## 2023-05-12 DIAGNOSIS — Z80.9 FAMILY HISTORY OF CANCER: ICD-10-CM

## 2023-05-12 DIAGNOSIS — D44.5 PINEOCYTOMA (H): Primary | ICD-10-CM

## 2023-05-12 DIAGNOSIS — Z80.3 FAMILY HISTORY OF MALIGNANT NEOPLASM OF BREAST: ICD-10-CM

## 2023-05-12 PROCEDURE — 999N000069 HC STATISTIC GENETIC COUNSELING, < 16 MIN: Mod: TEL,95 | Performed by: GENETIC COUNSELOR, MS

## 2023-05-12 NOTE — LETTER
"    5/12/2023         RE: Cathy Arroyo  [address]  [facility] [address]        Dear Colleague,    Thank you for referring your patient, Cathy Arroyo, to the [facility]. Please see a copy of my visit note below.    Virtual Visit Details    Type of service:  Telephone Visit   Phone call duration: 10 minutes     5/12/2023    Referring Provider: JANNET Dickey CNP    Presenting Information:  Cathy Arroyo returned to the Cancer Risk Management Program (virtual visit) to discuss her genetic testing results.     Genetic Testing Result: NEGATIVE  No pathogenic mutations, variants of unknown significance, or gross deletions or duplications were detected.  Genes Analyzed (77 total): AIP, ALK, APC, HYUN, BAP1, BARD1, BLM, BMPR1A, BRCA1, BRCA2, BRIP1, CDC73, CDH1, CDK4, CDKN1B, CDKN2A, CHEK2, DICER1, FANCC, FH, FLCN, GALNT12, KIF1B, LZTR1, MAX, MEN1, MET, MLH1, MSH2, MSH6, MUTYH, NBN, NF1, NF2, NTHL1, PALB2, PHOX2B, PMS2, POT1, NFZMC1A, PTCH1, PTEN, RAD51C, RAD51D, RB1, RECQL, RET, SDHA, SDHAF2, SDHB, SDHC, SDHD, SMAD4, SMARCA4, SMARCB1, SMARCE1, STK11, SUFU, IBMM573, TP53, TSC1, TSC2, VHL and XRCC2 (sequencing and deletion/duplication); AXIN2, CTNNA1, EGFR, EGLN1, HOXB13, KIT, MITF, MSH3, PDGFRA, POLD1 and POLE (sequencing only); EPCAM and GREM1 (deletion/duplication only).    A copy of the test report can be found in the Laboratory tab, dated 4/18/2023, and named \"LABORATORY MISCELLANEOUS ORDER\". The report is scanned in as a linked document.     Interpretation:  We discussed several different interpretations of this negative test result.    One explanation may be that there is a different gene or combination of genes and environment that are associated with the cancers in this family.  Another explanation may be a cancer susceptibility gene may be present in Cathy's family which she did not inherit.  There is also a small possibility that there is a mutation in one of " these genes, and the testing laboratory could not find it with their current testing methods.       Screening:  Based on this negative test result, it is important for Cathy and her relatives to refer back to the family history for appropriate cancer screening.  Cathy is being followed for high risk breast screening, including breast MRI (7/5/2022) and annual mammogram (2/14/2023).  She has an appointment with Miriam Lora PA-C 8/16/2023.  Other population cancer screening options, such as those recommended by the American Cancer Society and the National Comprehensive Cancer Network (NCCN), are also appropriate for Cathy and her family. These screening recommendations may change if there are changes to Cathy's personal and/or family history of cancer. Final screening recommendations should be made by each individual's primary care provider.    Summary:  We do not have an explanation for Cathy's personal history of pineocytoma or family history of cancer. While no genetic changes were identified, Cathy may still be at risk for certain cancers due to family history, environmental factors, or other genetic causes not identified by this test.  Because of that, it is important that she continue with cancer screening based on her personal and family history as discussed above.    Genetic testing is rapidly advancing, and new cancer susceptibility genes will most likely be identified in the future. Therefore, I encouraged Cathy to contact me annually or if there are changes in her personal or family history. This may change how we assess her cancer risk, screening, and the testing we would offer.    Plan:  1.  I provided Cathy with her test results today and a handout summarizing negative genetic test results (see after visit summary).  2. She plans to follow-up with her managing providers.  3. She should contact me periodically, or if her personal or family history of cancer changes, and she would like to know  if there are additional screening or testing recommendations at that time.    If Cathy has any further questions, I encouraged her to contact me at [phone].    Melissa Lange MS, [facility]  Licensed, Certified Genetic Counselor  [facility]  Phone: [phone]

## 2023-05-12 NOTE — PROGRESS NOTES
"Virtual Visit Details    Type of service:  Telephone Visit   Phone call duration: 10 minutes     5/12/2023    Referring Provider: JANNET Dickey CNP    Presenting Information:  Cathy Arroyo returned to the Cancer Risk Management Program (virtual visit) to discuss her genetic testing results.     Genetic Testing Result: NEGATIVE  No pathogenic mutations, variants of unknown significance, or gross deletions or duplications were detected.  Genes Analyzed (77 total): AIP, ALK, APC, HYUN, BAP1, BARD1, BLM, BMPR1A, BRCA1, BRCA2, BRIP1, CDC73, CDH1, CDK4, CDKN1B, CDKN2A, CHEK2, DICER1, FANCC, FH, FLCN, GALNT12, KIF1B, LZTR1, MAX, MEN1, MET, MLH1, MSH2, MSH6, MUTYH, NBN, NF1, NF2, NTHL1, PALB2, PHOX2B, PMS2, POT1, WNFUW8N, PTCH1, PTEN, RAD51C, RAD51D, RB1, RECQL, RET, SDHA, SDHAF2, SDHB, SDHC, SDHD, SMAD4, SMARCA4, SMARCB1, SMARCE1, STK11, SUFU, [id number], TP53, TSC1, TSC2, VHL and XRCC2 (sequencing and deletion/duplication); AXIN2, CTNNA1, EGFR, EGLN1, HOXB13, KIT, MITF, MSH3, PDGFRA, POLD1 and POLE (sequencing only); EPCAM and GREM1 (deletion/duplication only).    A copy of the test report can be found in the Laboratory tab, dated 4/18/2023, and named \"LABORATORY MISCELLANEOUS ORDER\". The report is scanned in as a linked document.     Interpretation:  We discussed several different interpretations of this negative test result.    1. One explanation may be that there is a different gene or combination of genes and environment that are associated with the cancers in this family.  2. Another explanation may be a cancer susceptibility gene may be present in Cathy's family which she did not inherit.  3. There is also a small possibility that there is a mutation in one of these genes, and the testing laboratory could not find it with their current testing methods.       Screening:  Based on this negative test result, it is important for Cathy and her relatives to refer back to the family history for appropriate " cancer screening.  Cathy is being followed for high risk breast screening, including breast MRI (7/5/2022) and annual mammogram (2/14/2023).  She has an appointment with Miriam Lora PA-C 8/16/2023.  Other population cancer screening options, such as those recommended by the American Cancer Society and the National Comprehensive Cancer Network (NCCN), are also appropriate for Cathy and her family. These screening recommendations may change if there are changes to Cathy's personal and/or family history of cancer. Final screening recommendations should be made by each individual's primary care provider.    Summary:  We do not have an explanation for Cathy's personal history of pineocytoma or family history of cancer. While no genetic changes were identified, Cathy may still be at risk for certain cancers due to family history, environmental factors, or other genetic causes not identified by this test.  Because of that, it is important that she continue with cancer screening based on her personal and family history as discussed above.    Genetic testing is rapidly advancing, and new cancer susceptibility genes will most likely be identified in the future. Therefore, I encouraged Cathy to contact me annually or if there are changes in her personal or family history. This may change how we assess her cancer risk, screening, and the testing we would offer.    Plan:  1.  I provided Cathy with her test results today and a handout summarizing negative genetic test results (see after visit summary).  2. She plans to follow-up with her managing providers.  3. She should contact me periodically, or if her personal or family history of cancer changes, and she would like to know if there are additional screening or testing recommendations at that time.    If Cathy has any further questions, I encouraged her to contact me at [phone].    Melissa Lange MS, [facility]  Licensed, Certified Genetic Counselor  [facility]  [place]  Phone: [phone]

## 2023-05-12 NOTE — NURSING NOTE
Is the patient currently in the state of [place]? YES    Visit mode:TELEPHONE    If the visit is dropped, the patient can be reconnected by: TELEPHONE VISIT: Phone number: [phone]    Will anyone else be joining the visit? NO      How would you like to obtain your AVS? MyChart    Are changes needed to the allergy or medication list? NO    Reason for visit: Telephone      Kami RACHEL

## 2023-05-18 NOTE — PATIENT INSTRUCTIONS
Negative Genetic Test Result    Genetic Testing  Genetic testing involved a blood or saliva test which looked at the genetic information in select genes for variants associated with cancer risk.  This testing may have included analysis of a single gene due to a known variant in the family, multiple genes most associated with the cancers in a family, or an expanded panel of genes related to many types of cancers.     Results  There are several possible genetic test results, including:   Positive--a harmful mutation (also known as a  pathogenic  or  likely pathogenic  variant) was identified in a gene associated with increased cancer risk.  These risks, as well as medical management options, depend on the specific genetic variant identified.    Negative--no variants were identified in the genes analyzed   Variant of unknown significance--a variant was identified in one or more genes, though it is currently unclear how this impacts cancer risk in the family.  Genetic testing labs are working to collect evidence about these uncertain variants and may provide updates in the future.    What Does a Negative Genetic Test Result Mean?  A negative genetic test results means that no genetic changes (variants) were detected in the genes tested. While no inherited risk factors for cancer were identified, you and your family may be at risk for certain cancers due to family history, environmental factors, or other genetic causes not identified by this test.  It is also possible that your family may still be at risk of carrying a genetic risk factor which you did not inherit. Your genetic counselor can help interpret the result for you and your relatives.      It is important to note which genes were included in your test. A list of these genes can be found on your test result.    Screening Recommendations  A combination of personal and family history factors may inform cancer risk and medical management recommendations.   Population cancer screening options, such as those recommended by the American Cancer Society and the National Comprehensive Cancer Network (NCCN) are appropriate for many families at average risk for cancer.  However, earlier and/or more frequent screening may be recommended based on personal factors (lifestyle, exposures, medications, screening results), family history of cancer, and sometimes genetic factors.  These cancer risk management options should be discussed in more detail with an individual's medical providers.      Please call us if you have any questions or concerns.   Cancer Risk Management Program (Appointments: [phone])  Brian Cárdenas, MS [facility]  [phone]  Nata Da Silva, MS, [facility] [phone]  Sayda Duran, MS, [facility]  [phone]  Melissa Lange, MS, [facility]  [phone]  Daisy Prince, MS, [facility]  [phone]  Marija Morse, MS, [facility] [phone]  Bhavya Arnold, MS, [facility] [phone]

## 2023-05-23 ENCOUNTER — ANCILLARY PROCEDURE (OUTPATIENT)
Dept: MRI IMAGING | Facility: CLINIC | Age: 43
End: 2023-05-23
Attending: CLINICAL NURSE SPECIALIST
Payer: COMMERCIAL

## 2023-05-23 DIAGNOSIS — Q34.1 MEDIASTINAL CYST: ICD-10-CM

## 2023-05-23 PROCEDURE — A9585 GADOBUTROL INJECTION: HCPCS | Performed by: RADIOLOGY

## 2023-05-23 PROCEDURE — 72157 MRI CHEST SPINE W/O & W/DYE: CPT | Performed by: RADIOLOGY

## 2023-05-23 RX ORDER — GADOBUTROL 604.72 MG/ML
15 INJECTION INTRAVENOUS ONCE
Status: COMPLETED | OUTPATIENT
Start: 2023-05-23 | End: 2023-05-23

## 2023-05-23 RX ADMIN — GADOBUTROL 11 ML: 604.72 INJECTION INTRAVENOUS at 16:39

## 2023-05-30 ENCOUNTER — OFFICE VISIT (OUTPATIENT)
Dept: FAMILY MEDICINE | Facility: CLINIC | Age: 43
End: 2023-05-30
Payer: COMMERCIAL

## 2023-05-30 VITALS
OXYGEN SATURATION: 98 % | DIASTOLIC BLOOD PRESSURE: 78 MMHG | WEIGHT: 253 LBS | TEMPERATURE: 99 F | BODY MASS INDEX: 39.71 KG/M2 | HEART RATE: 83 BPM | RESPIRATION RATE: 16 BRPM | HEIGHT: 67 IN | SYSTOLIC BLOOD PRESSURE: 132 MMHG

## 2023-05-30 DIAGNOSIS — E66.1 CLASS 2 DRUG-INDUCED OBESITY WITH BODY MASS INDEX (BMI) OF 39.0 TO 39.9 IN ADULT, UNSPECIFIED WHETHER SERIOUS COMORBIDITY PRESENT: ICD-10-CM

## 2023-05-30 DIAGNOSIS — E66.812 CLASS 2 DRUG-INDUCED OBESITY WITH BODY MASS INDEX (BMI) OF 39.0 TO 39.9 IN ADULT, UNSPECIFIED WHETHER SERIOUS COMORBIDITY PRESENT: ICD-10-CM

## 2023-05-30 DIAGNOSIS — R05.3 PERSISTENT COUGH FOR 3 WEEKS OR LONGER: ICD-10-CM

## 2023-05-30 DIAGNOSIS — J45.41 MODERATE PERSISTENT ASTHMA WITH ACUTE EXACERBATION: Primary | ICD-10-CM

## 2023-05-30 DIAGNOSIS — N39.41 URGE INCONTINENCE OF URINE: ICD-10-CM

## 2023-05-30 DIAGNOSIS — M54.50 LOW BACK PAIN POTENTIALLY ASSOCIATED WITH RADICULOPATHY: ICD-10-CM

## 2023-05-30 DIAGNOSIS — E66.01 MORBID OBESITY (H): ICD-10-CM

## 2023-05-30 LAB
ALBUMIN UR-MCNC: NEGATIVE MG/DL
APPEARANCE UR: CLEAR
BILIRUB UR QL STRIP: NEGATIVE
COLOR UR AUTO: YELLOW
GLUCOSE UR STRIP-MCNC: NEGATIVE MG/DL
HGB UR QL STRIP: NEGATIVE
KETONES UR STRIP-MCNC: NEGATIVE MG/DL
LEUKOCYTE ESTERASE UR QL STRIP: NEGATIVE
NITRATE UR QL: NEGATIVE
PH UR STRIP: 5.5 [PH] (ref 5–7)
SP GR UR STRIP: 1.01 (ref 1–1.03)
UROBILINOGEN UR STRIP-ACNC: 0.2 E.U./DL

## 2023-05-30 PROCEDURE — 81003 URINALYSIS AUTO W/O SCOPE: CPT | Performed by: NURSE PRACTITIONER

## 2023-05-30 PROCEDURE — 99215 OFFICE O/P EST HI 40 MIN: CPT | Performed by: NURSE PRACTITIONER

## 2023-05-30 ASSESSMENT — PAIN SCALES - GENERAL: PAINLEVEL: MODERATE PAIN (4)

## 2023-05-30 NOTE — PATIENT INSTRUCTIONS
Call [phone] to schedule chest xray and lumbar MRI  Schedule with pulmonology - repeat PFTs and discuss if a monoclonal antibody med is appropriate

## 2023-05-30 NOTE — PROGRESS NOTES
Assessment & Plan     Moderate persistent asthma with acute exacerbation  Concern for extended cough and wheezing throughout  Consider prednisone  Has significant difficulty remembering inhaler and has for lifetime. Is there an alternative treatment option? Discuss monoclonal antibodies with pulmonology? Unclear if she would qualify, but clearly the inhalers are not working if they cannot be remembered.  - XR Chest 2 Views; Future    Morbid obesity (H)  Weight gain started while on abilify and postpartum. Difficulty with weight loss efforts including exercise due to back and leg pain. Discussed medication suggested by psychiatry, but patient has history of fatigue on olanzapine. I am also unsure about coverage for that medication as it is newer as well. Phentermine and bupropion are both not options. Topiramate could be considered, but typically has caused brain fog for Cathy. We will try for coverage of semaglutide. Discussed use and both common and rare side effects. History of Graves, but not medullary thyroid cancer. If covered, would follow-up in two months after finishing one month on 0.5 mg dosing.  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 39.9 in adult, unspecified whether serious comorbidity present    - Semaglutide-Weight Management (WEGOVY) 0.25 MG/0.5ML pen; Inject 0.25 mg Subcutaneous once a week  - semaglutide (OZEMPIC) 2 MG/3ML pen; Inject 0.5 mg Subcutaneous every 7 days    Persistent cough for 3 weeks or longer    - XR Chest 2 Views; Future    Low back pain potentially associated with  "radiculopathy  Has sports med appt next week  Concerned about new urinary symptoms and mild radicular symptoms noted on exam today  - MR Lumbar Spine w/o Contrast; Future    Urge incontinence of urine  UA normal  - MR Lumbar Spine w/o Contrast; Future  - UA Macroscopic with reflex to Microscopic and Culture; Future  - UA Macroscopic with reflex to Microscopic and Culture    Ordering of each unique test  Prescription drug management  59 minutes spent by me on the date of the encounter doing chart review, history and exam, documentation and further activities per the note       BMI:   Estimated body mass index is 39.63 kg/m  as calculated from the following:    Height as of this encounter: 1.702 m (5' 7\").    Weight as of this encounter: 114.8 kg (253 lb).   Weight management plan: medication management    Patient Instructions   Call [phone] to schedule chest xray and lumbar MRI  Schedule with pulmonology - repeat PFTs and discuss if a monoclonal antibody med is appropriate      JANNET Liu [facility]    Héctor Morgan is a 42 year old, presenting for the following health issues:  RECHECK (Asthma), Weight Management (Low back and bilateral hip/leg pain), and Hormone Screening (Heavy/close cycles)        5/30/2023    12:49 PM   Additional Questions   Roomed by Opal   Accompanied by Self     History of Present Illness       Reason for visit:  Asthma checkup, talk about possibly getting on a weight loss drug to help with my chronic back pain    She eats 0-1 servings of fruits and vegetables daily.She consumes 1 sweetened beverage(s) daily.She exercises with enough effort to increase her heart rate 9 or less minutes per day.  She exercises with enough effort to increase her heart rate 3 or less days per week.   She is taking medications regularly.     Low back pain and leg pain - years of low back pain. Started having leg pain in April and now having bilateral leg pain. " Leg pain in the back of the upper and lower leg and back of the knee. Having difficulty walking, feeling unstable. Numbness and tingling in legs occurring frequently when seated and/or not moving. Has noticed difficulty holding urine when bladder is full. No continuous leakage. No fecal incontinence. No numbness in the groin. No fevers.   Thoracic spine MRI 5/23/23 - bulging discs - one improved and another worsened.    Cough - hasn't resolved from URI one month ago. Has cough, wheezing, SOB, and low O2. No dyspnea. Allergies have been bad - taking cetirizine and fexofenadine daily (per allergist) plus montelukast. Not taking the fluticasone nasal spray. Taking Advair on average 2 days per week diligently. Forgets otherwise. Monitoring O2 sats at home and normally 95, but has seen 92-93. Has had covid, most recently Feb 2022 and first time was Oct 2020. Does have pulmonologist and last visit 3/2023.    Weight - wondering about weight loss medications. Feels she needs to lose weight and that exercise is not accessible due to back pain. Started gaining weight on abilify after having children. Has tried diet changes including fewer calories, more often, three meals a day, low impact exercise. Specifically wondering about olanzapine-samidorphan discussed with psychiatry, which would be ok with current psychiatry medications. Cathy is concerned about this combination because in the past olanzapine has caused extreme sleepiness even at low doses. Has taken topiramate in the past and does lose weight, but get brain fog. Does have surgical sterility. Phentermine is not an option with Cathy taking Adderall currently. Bupropion is not an option because this causes manic episodes for her. Thyroid was removed for Graves, not medullary thyroid cancer.     Wt Readings from Last 5 Encounters:   05/30/23 114.8 kg (253 lb)   04/18/23 112.4 kg (247 lb 12.8 oz)   04/02/23 110.7 kg (244 lb)   03/31/23 110.7 kg (244 lb)   02/11/23  "99.8 kg (220 lb)       Review of Systems   Constitutional, HEENT, cardiovascular, pulmonary, gi and gu systems are negative, except as otherwise noted.      Objective    /78   Pulse 83   Temp 99  F (37.2  C) (Temporal)   Resp 16   Ht 1.702 m (5' 7\")   Wt 114.8 kg (253 lb)   LMP 05/12/2023 (Exact Date)   SpO2 98%   BMI 39.63 kg/m    Body mass index is 39.63 kg/m .  Physical Exam   GENERAL: healthy, alert and no distress  HENT: normal cephalic/atraumatic, both ears: clear effusion, nose and mouth without ulcers or lesions, nasal mucosa edematous , oropharynx clear and oral mucous membranes moist  NECK: no adenopathy, no asymmetry, masses, or scars and thyroid normal to palpation  RESP: expiratory wheezes throughout and inspiratory wheezes throughout  CV: regular rate and rhythm, normal S1 S2, no S3 or S4, no murmur, click or rub, no peripheral edema and peripheral pulses strong  Comprehensive back pain exam:  Tenderness of SI joint bilaterally, Pain limits the following motions: rotation and lateral bending bilaterally, flexion and extension, Lower extremity strength functional, but slightly less strength left compared to right for quadriceps and hamstring strength  , Lower extremity reflexes within normal limits bilaterally and Light touch diminished on  left dorsum of foot (or Toes 1-3), indicating possible L5 nerve involvement and Light touch diminished on  left lateral malleolus (or Toes 4 and 5), indicating possible S1 nerve involvement    CXR pending  Lumbar MRI pending              "

## 2023-05-31 ENCOUNTER — TELEPHONE (OUTPATIENT)
Dept: FAMILY MEDICINE | Facility: CLINIC | Age: 43
End: 2023-05-31

## 2023-05-31 NOTE — TELEPHONE ENCOUNTER
PRIOR AUTHORIZATION DENIED    Medication: SEMAGLUTIDE(0.25 OR 0.5MG/DOS) 2 MG/3ML SC SOPN  Insurance Company: [employer] - Phone [phone] Fax [phone]  Denial Date: 5/30/2023  Denial Rational:     Appeal Information:     Patient Notified: No

## 2023-05-31 NOTE — TELEPHONE ENCOUNTER
PRIOR AUTHORIZATION DENIED    Medication: SEMAGLUTIDE-WEIGHT MANAGEMENT 0.25 MG/0.5ML SC SOAJ  Insurance Company: [employer] - Phone [phone] Fax [phone]  Denial Date: 5/30/2023  Denial Rational:    Appeal Information:     Patient Notified: No

## 2023-06-02 NOTE — TELEPHONE ENCOUNTER
Medication Appeal Initiation    We have initiated an appeal for the requested medication:  Medication: SEMAGLUTIDE-WEIGHT MANAGEMENT 0.25 MG/0.5ML SC SOAJ  Appeal Start Date:  6/2/2023  Insurance Company: [employer] - Phone [phone] Fax [phone]  Insurance Phone:   Insurance Fax: [phone]  Comments:

## 2023-06-05 NOTE — RESULT ENCOUNTER NOTE
Cathy,    The urine was completely normal so the urgency is definitely not related to anything like a UTI. It may be something that pelvic floor physical therapy would help with, but I want to make sure your spine is ok first. If you have any questions, please feel free to contact the clinic.    ARMEN Dasilva, DEVEN

## 2023-06-06 ENCOUNTER — OFFICE VISIT (OUTPATIENT)
Dept: ORTHOPEDICS | Facility: CLINIC | Age: 43
End: 2023-06-06
Payer: COMMERCIAL

## 2023-06-06 ENCOUNTER — HOSPITAL ENCOUNTER (OUTPATIENT)
Dept: GENERAL RADIOLOGY | Facility: CLINIC | Age: 43
Discharge: HOME | End: 2023-06-06
Attending: NURSE PRACTITIONER
Payer: COMMERCIAL

## 2023-06-06 ENCOUNTER — HOSPITAL ENCOUNTER (OUTPATIENT)
Dept: MRI IMAGING | Facility: CLINIC | Age: 43
Discharge: HOME | End: 2023-06-06
Attending: NURSE PRACTITIONER
Payer: COMMERCIAL

## 2023-06-06 ENCOUNTER — ANCILLARY PROCEDURE (OUTPATIENT)
Dept: GENERAL RADIOLOGY | Facility: CLINIC | Age: 43
End: 2023-06-06
Attending: FAMILY MEDICINE
Payer: COMMERCIAL

## 2023-06-06 DIAGNOSIS — G89.29 CHRONIC BILATERAL LOW BACK PAIN, UNSPECIFIED WHETHER SCIATICA PRESENT: ICD-10-CM

## 2023-06-06 DIAGNOSIS — M25.561 BILATERAL CHRONIC KNEE PAIN: Primary | ICD-10-CM

## 2023-06-06 DIAGNOSIS — M54.16 LUMBAR RADICULOPATHY: ICD-10-CM

## 2023-06-06 DIAGNOSIS — G89.29 BILATERAL CHRONIC KNEE PAIN: ICD-10-CM

## 2023-06-06 DIAGNOSIS — G89.29 BILATERAL CHRONIC KNEE PAIN: Primary | ICD-10-CM

## 2023-06-06 DIAGNOSIS — M25.562 BILATERAL CHRONIC KNEE PAIN: ICD-10-CM

## 2023-06-06 DIAGNOSIS — R05.3 PERSISTENT COUGH FOR 3 WEEKS OR LONGER: ICD-10-CM

## 2023-06-06 DIAGNOSIS — M54.50 CHRONIC BILATERAL LOW BACK PAIN, UNSPECIFIED WHETHER SCIATICA PRESENT: ICD-10-CM

## 2023-06-06 DIAGNOSIS — M25.561 BILATERAL CHRONIC KNEE PAIN: ICD-10-CM

## 2023-06-06 DIAGNOSIS — J45.41 MODERATE PERSISTENT ASTHMA WITH ACUTE EXACERBATION: ICD-10-CM

## 2023-06-06 DIAGNOSIS — M25.562 BILATERAL CHRONIC KNEE PAIN: Primary | ICD-10-CM

## 2023-06-06 DIAGNOSIS — N39.41 URGE INCONTINENCE OF URINE: ICD-10-CM

## 2023-06-06 DIAGNOSIS — M54.50 LOW BACK PAIN POTENTIALLY ASSOCIATED WITH RADICULOPATHY: ICD-10-CM

## 2023-06-06 PROCEDURE — 73564 X-RAY EXAM KNEE 4 OR MORE: CPT | Mod: GC | Performed by: RADIOLOGY

## 2023-06-06 PROCEDURE — 71046 X-RAY EXAM CHEST 2 VIEWS: CPT

## 2023-06-06 PROCEDURE — 99214 OFFICE O/P EST MOD 30 MIN: CPT | Performed by: FAMILY MEDICINE

## 2023-06-06 PROCEDURE — 72148 MRI LUMBAR SPINE W/O DYE: CPT

## 2023-06-06 NOTE — PROGRESS NOTES
CHIEF COMPLAINT:  Pain of the Left Leg       HISTORY OF PRESENT ILLNESS  Ms. Arroyo is a pleasant 42 year old female who presents to clinic today with bilateral posterior lower leg pain radiating from her low back to her calves.  She reports inability to squat due to pain. She reports numbness and tingling, weakness, and bladder incontinence occasionally as well. Her symptoms have been progressing for the last 3 months. She has been doing taking ibuprofen, canibus, yoga and Kegel exercises for treatment.          Additional history: as documented    MEDICAL HISTORY  Patient Active Problem List   Diagnosis     Pineocytoma (H)     Attention deficit disorder     Seasonal allergic rhinitis     Bipolar affective disorder in remission (H)     GBS (group B Streptococcus carrier), +RV culture, currently pregnant     Pre-eclampsia     Moderate persistent asthma without complication     Chronic rhinitis     Tobacco use disorder     Abnormal cytology     S/P total thyroidectomy     History of Graves' disease     Nonintractable migraine, unspecified migraine type     Post-surgical hypothyroidism     Abnormal results of thyroid function studies     Otitis externa     Polyhydramnios affecting pregnancy     Medical cannabis use     Labor and delivery indication for care or intervention     Indication for care in labor and delivery, antepartum     Morbid obesity (H)     Urticarial vasculitis     Dehydration     Breast cancer screening, high risk patient     Bipolar affective disorder, currently manic mixed, moderate (H)       Current Outpatient Medications   Medication Sig Dispense Refill     ADDERALL XR 25 MG 24 hr capsule Take by mouth daily       albuterol (PROAIR HFA/PROVENTIL HFA/VENTOLIN HFA) 108 (90 Base) MCG/ACT inhaler Inhale 2 puffs into the lungs every 6 hours 18 g 3     amphetamine-dextroamphetamine (ADDERALL XR) 15 MG 24 hr capsule Take 15 mg by mouth daily       amphetamine-dextroamphetamine (ADDERALL XR) 20 MG 24 hr  capsule Take 20 mg by mouth daily       amphetamine-dextroamphetamine (ADDERALL) 10 MG tablet Take 1 tablet by mouth daily at 2 pm       ARIPiprazole (ABILIFY) 10 MG tablet Take 10 mg by mouth daily       cetirizine (ZYRTEC) 10 MG tablet Take 1 tablet (10 mg) by mouth daily 90 tablet 3     diazepam (VALIUM) 5 MG tablet Take 5 mg by mouth as needed       fluticasone (FLONASE) 50 MCG/ACT nasal spray Spray 1 spray into both nostrils daily 18.2 mL 0     fluticasone-salmeterol (ADVAIR HFA) 230-21 MCG/ACT inhaler Inhale 2 puffs into the lungs 2 times daily 12 g 11     ipratropium - albuterol 0.5 mg/2.5 mg/3 mL (DUONEB) 0.5-2.5 (3) MG/3ML neb solution Take 1 vial (3 mLs) by nebulization every 6 hours as needed for shortness of breath / dyspnea or wheezing 90 mL 1     levothyroxine (SYNTHROID/LEVOTHROID) 175 MCG tablet Take 1 tablet (175 mcg) by mouth daily 90 tablet 2     medical cannabis (Patient's own supply) Take 1 Dose by mouth See Admin Instructions (The purpose of this order is to document that the patient reports taking medical cannabis.  This is not a prescription, and is not used to certify that the patient has a qualifying medical condition.)    Tangerine Oral Suspension Unflavored 1-5 ml up to two times daily.  Total THC = 240 mg, Total CBD Trace       medical cannabis (Patient's own supply) Take 1 Dose by mouth See Admin Instructions (The purpose of this order is to document that the patient reports taking medical cannabis.  This is not a prescription, and is not used to certify that the patient has a qualifying medical condition.)    [place] oral suspension: take 1-3 ml by mouth two times daily  Total CBD 1200 mg. Total THC 60 mg       methocarbamol (ROBAXIN) 500 MG tablet Take 1 tablet (500 mg) by mouth 3 times daily as needed for muscle spasms 60 tablet 1     montelukast (SINGULAIR) 10 MG tablet Take 1 tablet (10 mg) by mouth every morning 90 tablet 3     multivitamin w/minerals (THERA-VIT-M) tablet Take 1  tablet by mouth daily       prochlorperazine (COMPAZINE) 10 MG tablet Take 1 tablet (10 mg) by mouth every 6 hours as needed for nausea or vomiting 10 tablet 3     rizatriptan (MAXALT-MLT) 10 MG ODT Take 1 tablet (10 mg) by mouth at onset of headache for migraine (repeat in 2 hours if needed) May repeat in 2 hours. Max 3 tablets/24 hours. 18 tablet 11     [START ON 6/27/2023] semaglutide (OZEMPIC) 2 MG/3ML pen Inject 0.5 mg Subcutaneous every 7 days 3 mL 0     Semaglutide-Weight Management (WEGOVY) 0.25 MG/0.5ML pen Inject 0.25 mg Subcutaneous once a week 2 mL 0     tiZANidine (ZANAFLEX) 4 MG tablet Take 1 tablet (4 mg) by mouth nightly as needed for muscle spasms 60 tablet 1     VENTOLIN  (90 Base) MCG/ACT inhaler INHALE 2 PUFFS INTO THE LUNGS EVERY 4 HOURS AS NEEDED FOR SHORTNESS OF BREATH OR DIFFICULT BREATHING OR WHEEZING 18 g 1     vitamin D3 (CHOLECALCIFEROL) 50 mcg (2000 units) tablet Take 1 tablet by mouth daily         Allergies   Allergen Reactions     Adacel [Tetanus-Diphth-Acell Pertussis] Swelling     Adhesive Tape Hives     Ciprofloxacin      Causes visual hallucinations.     Codeine Sulfate Nausea and Vomiting     Nicoderm [Nicotine]      Patch and Gum, pt reported allergic reaction 6/24     Tegaderm Transparent Dressing (Informational Only) Hives     Tetanus Toxoid      Pt states she had an infection at injection site.      Vicodin [Hydrocodone-Acetaminophen] Nausea and Vomiting     Methimazole Rash       Family History   Problem Relation Age of Onset     Other Cancer Mother 62        salivary gland cancer     Genitourinary Problems Mother      Bipolar Disorder Mother         unipolar depression     Depression Mother      Thyroid Disease Mother         benign tumor     Melanoma Mother      Cancer Mother         salivary gland cancer     Migraines Mother      Asthma Father      Mental Illness Father         Bipolar 2     Obesity Father      Bipolar Disorder Brother         unipolar depression      Asthma Brother      Depression Brother      Asthma Maternal Grandmother      Osteoporosis Maternal Grandmother      Glaucoma Maternal Grandmother      Hypertension Maternal Grandmother      Macular Degeneration Maternal Grandmother      Prostate Cancer Maternal Grandfather 69        no history of smoking     Bladder Cancer Maternal Grandfather 71     Colon Cancer Maternal Grandfather 70     Diabetes Paternal Grandfather      Breast Cancer Maternal Aunt 37        negative BRCA1/2 testing     Melanoma Maternal Aunt 64     Thyroid Disease Paternal Aunt      Breast Cancer Paternal Aunt 58        bilateral; negative testing     Cancer Paternal Aunt      Breast Cancer Other         paternal great aunt       Additional medical/Social/Surgical histories reviewed in [facility] and updated as appropriate.          PHYSICAL EXAM  General  - normal appearance, in no obvious distress  Musculoskeletal - right and left knee, low back   - stance: normal gait without limp  - inspection: trace effusion bilaterally  - palpation: generalized tenderness  - ROM: 135 degrees flexion, -5 degrees extension  - strength: 5/5 in flexion, 5/5 in extension  - special tests:  (-) Lachman  (-) Altagracia  (-) varus at 0 and 30 degrees flexion  (-) valgus at 0 and 30 degrees flexion  (+) straight leg raise bilaterally    Neuro  - no sensory or motor deficit, grossly normal coordination, normal muscle tone  Skin  - no ecchymosis, erythema, warmth, or induration, no obvious rash             ASSESSMENT & PLAN  Ms. Arroyo is a 42 year old female who presents to clinic today with bilateral leg pain and low back pain.    I ordered and independently reviewed xrays of her knee, these show no obvious acute or chronic issues.    With regards to her lumbar spine I reviewed her MR images in the room with her, although I will wait for formal radiology read and get in touch with her.  It does seem that her pain is likely secondary to her low back issues,  furthermore this is causing severe axial low back pain.  I am referring her for an injection, she can have this done at her earliest convenience.  I am also referring her again for physical therapy, as well as for consultation with PMNR to discuss the benefits of RFA and if she may be a candidate.    Thank you for allowing me to participate in Cathy's care.    Marcos Dalal DO, [facility]  Primary Care Sports Medicine       This note was constructed using Dragon dictation software, please excuse any minor errors in spelling, grammar, or syntax.

## 2023-06-06 NOTE — TELEPHONE ENCOUNTER
MEDICATION APPEAL APPROVED    Medication: SEMAGLUTIDE-WEIGHT MANAGEMENT 0.25 MG/0.5ML SC SOAJ  Authorization Effective Date:  05/05/2023  Authorization Expiration Date:  12/05/2023  Approved Dose/Quantity:   Reference #:     Appeal Insurance Company: [employer] - Phone [phone] Fax [phone]  Expected CoPay:       CoPay Card Available:    Financial Assistance Needed:   Which Pharmacy is filling the prescription: [id number] - [address] AT [address] [place] [address]

## 2023-06-06 NOTE — LETTER
6/6/2023      RE: Cathy Arroyo  [address]  [facility] [address]     Dear Colleague,    Thank you for referring your patient, Cathy Arroyo, to the [facility] [place]. Please see a copy of my visit note below.    CHIEF COMPLAINT:  Pain of the Left Leg       HISTORY OF PRESENT ILLNESS  Ms. Arroyo is a pleasant 42 year old female who presents to clinic today with bilateral posterior lower leg pain radiating from her low back to her calves.  She reports inability to squat due to pain. She reports numbness and tingling, weakness, and bladder incontinence occasionally as well. Her symptoms have been progressing for the last 3 months. She has been doing taking ibuprofen, canibus, yoga and Kegel exercises for treatment.          Additional history: as documented    MEDICAL HISTORY  Patient Active Problem List   Diagnosis    Pineocytoma (H)    Attention deficit disorder    Seasonal allergic rhinitis    Bipolar affective disorder in remission (H)    GBS (group B Streptococcus carrier), +RV culture, currently pregnant    Pre-eclampsia    Moderate persistent asthma without complication    Chronic rhinitis    Tobacco use disorder    Abnormal cytology    S/P total thyroidectomy    History of Graves' disease    Nonintractable migraine, unspecified migraine type    Post-surgical hypothyroidism    Abnormal results of thyroid function studies    Otitis externa    Polyhydramnios affecting pregnancy    Medical cannabis use    Labor and delivery indication for care or intervention    Indication for care in labor and delivery, antepartum    Morbid obesity (H)    Urticarial vasculitis    Dehydration    Breast cancer screening, high risk patient    Bipolar affective disorder, currently manic mixed, moderate (H)       Current Outpatient Medications   Medication Sig Dispense Refill    ADDERALL XR 25 MG 24 hr capsule Take by mouth daily      albuterol (PROAIR HFA/PROVENTIL HFA/VENTOLIN HFA)  108 (90 Base) MCG/ACT inhaler Inhale 2 puffs into the lungs every 6 hours 18 g 3    amphetamine-dextroamphetamine (ADDERALL XR) 15 MG 24 hr capsule Take 15 mg by mouth daily      amphetamine-dextroamphetamine (ADDERALL XR) 20 MG 24 hr capsule Take 20 mg by mouth daily      amphetamine-dextroamphetamine (ADDERALL) 10 MG tablet Take 1 tablet by mouth daily at 2 pm      ARIPiprazole (ABILIFY) 10 MG tablet Take 10 mg by mouth daily      cetirizine (ZYRTEC) 10 MG tablet Take 1 tablet (10 mg) by mouth daily 90 tablet 3    diazepam (VALIUM) 5 MG tablet Take 5 mg by mouth as needed      fluticasone (FLONASE) 50 MCG/ACT nasal spray Spray 1 spray into both nostrils daily 18.2 mL 0    fluticasone-salmeterol (ADVAIR HFA) 230-21 MCG/ACT inhaler Inhale 2 puffs into the lungs 2 times daily 12 g 11    ipratropium - albuterol 0.5 mg/2.5 mg/3 mL (DUONEB) 0.5-2.5 (3) MG/3ML neb solution Take 1 vial (3 mLs) by nebulization every 6 hours as needed for shortness of breath / dyspnea or wheezing 90 mL 1    levothyroxine (SYNTHROID/LEVOTHROID) 175 MCG tablet Take 1 tablet (175 mcg) by mouth daily 90 tablet 2    medical cannabis (Patient's own supply) Take 1 Dose by mouth See Admin Instructions (The purpose of this order is to document that the patient reports taking medical cannabis.  This is not a prescription, and is not used to certify that the patient has a qualifying medical condition.)    Tangerine Oral Suspension Unflavored 1-5 ml up to two times daily.  Total THC = 240 mg, Total CBD Trace      medical cannabis (Patient's own supply) Take 1 Dose by mouth See Admin Instructions (The purpose of this order is to document that the patient reports taking medical cannabis.  This is not a prescription, and is not used to certify that the patient has a qualifying medical condition.)    Holcomb oral suspension: take 1-3 ml by mouth two times daily  Total CBD 1200 mg. Total THC 60 mg      methocarbamol (ROBAXIN) 500 MG tablet Take 1 tablet (500  mg) by mouth 3 times daily as needed for muscle spasms 60 tablet 1    montelukast (SINGULAIR) 10 MG tablet Take 1 tablet (10 mg) by mouth every morning 90 tablet 3    multivitamin w/minerals (THERA-VIT-M) tablet Take 1 tablet by mouth daily      prochlorperazine (COMPAZINE) 10 MG tablet Take 1 tablet (10 mg) by mouth every 6 hours as needed for nausea or vomiting 10 tablet 3    rizatriptan (MAXALT-MLT) 10 MG ODT Take 1 tablet (10 mg) by mouth at onset of headache for migraine (repeat in 2 hours if needed) May repeat in 2 hours. Max 3 tablets/24 hours. 18 tablet 11    [START ON 6/27/2023] semaglutide (OZEMPIC) 2 MG/3ML pen Inject 0.5 mg Subcutaneous every 7 days 3 mL 0    Semaglutide-Weight Management (WEGOVY) 0.25 MG/0.5ML pen Inject 0.25 mg Subcutaneous once a week 2 mL 0    tiZANidine (ZANAFLEX) 4 MG tablet Take 1 tablet (4 mg) by mouth nightly as needed for muscle spasms 60 tablet 1    VENTOLIN  (90 Base) MCG/ACT inhaler INHALE 2 PUFFS INTO THE LUNGS EVERY 4 HOURS AS NEEDED FOR SHORTNESS OF BREATH OR DIFFICULT BREATHING OR WHEEZING 18 g 1    vitamin D3 (CHOLECALCIFEROL) 50 mcg (2000 units) tablet Take 1 tablet by mouth daily         Allergies   Allergen Reactions    Adacel [Tetanus-Diphth-Acell Pertussis] Swelling    Adhesive Tape Hives    Ciprofloxacin      Causes visual hallucinations.    Codeine Sulfate Nausea and Vomiting    Nicoderm [Nicotine]      Patch and Gum, pt reported allergic reaction 6/24    Tegaderm Transparent Dressing (Informational Only) Hives    Tetanus Toxoid      Pt states she had an infection at injection site.     Vicodin [Hydrocodone-Acetaminophen] Nausea and Vomiting    Methimazole Rash       Family History   Problem Relation Age of Onset    Other Cancer Mother 62        salivary gland cancer    Genitourinary Problems Mother     Bipolar Disorder Mother         unipolar depression    Depression Mother     Thyroid Disease Mother         benign tumor    Melanoma Mother     Cancer  Mother         salivary gland cancer    Migraines Mother     Asthma Father     Mental Illness Father         Bipolar 2    Obesity Father     Bipolar Disorder Brother         unipolar depression    Asthma Brother     Depression Brother     Asthma Maternal Grandmother     Osteoporosis Maternal Grandmother     Glaucoma Maternal Grandmother     Hypertension Maternal Grandmother     Macular Degeneration Maternal Grandmother     Prostate Cancer Maternal Grandfather 69        no history of smoking    Bladder Cancer Maternal Grandfather 71    Colon Cancer Maternal Grandfather 70    Diabetes Paternal Grandfather     Breast Cancer Maternal Aunt 37        negative BRCA1/2 testing    Melanoma Maternal Aunt 64    Thyroid Disease Paternal Aunt     Breast Cancer Paternal Aunt 58        bilateral; negative testing    Cancer Paternal Aunt     Breast Cancer Other         paternal great aunt       Additional medical/[facility] and updated as appropriate.          PHYSICAL EXAM  General  - normal appearance, in no obvious distress  Musculoskeletal - right and left knee, low back   - stance: normal gait without limp  - inspection: trace effusion bilaterally  - palpation: generalized tenderness  - ROM: 135 degrees flexion, -5 degrees extension  - strength: 5/5 in flexion, 5/5 in extension  - special tests:  (-) Lachman  (-) Altagracia  (-) varus at 0 and 30 degrees flexion  (-) valgus at 0 and 30 degrees flexion  (+) straight leg raise bilaterally    Neuro  - no sensory or motor deficit, grossly normal coordination, normal muscle tone  Skin  - no ecchymosis, erythema, warmth, or induration, no obvious rash             ASSESSMENT & PLAN  Ms. Arroyo is a 42 year old female who presents to clinic today with bilateral leg pain and low back pain.    I ordered and independently reviewed xrays of her knee, these show no obvious acute or chronic issues.    With regards to her lumbar spine I reviewed her MR images  in the room with her, although I will wait for formal radiology read and get in touch with her.  It does seem that her pain is likely secondary to her low back issues, furthermore this is causing severe axial low back pain.  I am referring her for an injection, she can have this done at her earliest convenience.  I am also referring her again for physical therapy, as well as for consultation with PMNR to discuss the benefits of RFA and if she may be a candidate.    Thank you for allowing me to participate in Cathy's care.    Marcos Dalal DO, [facility]  Primary Care Sports Medicine       This note was constructed using Dragon dictation software, please excuse any minor errors in spelling, grammar, or syntax.      Again, thank you for allowing me to participate in the care of your patient.      Sincerely,    Marcos Dalal DO

## 2023-06-09 ENCOUNTER — MYC MEDICAL ADVICE (OUTPATIENT)
Dept: FAMILY MEDICINE | Facility: CLINIC | Age: 43
End: 2023-06-09

## 2023-06-13 DIAGNOSIS — R93.89 ABNORMAL MRI: Primary | ICD-10-CM

## 2023-06-13 DIAGNOSIS — Q34.1 MEDIASTINAL CYST: ICD-10-CM

## 2023-06-14 ENCOUNTER — TELEPHONE (OUTPATIENT)
Dept: FAMILY MEDICINE | Facility: CLINIC | Age: 43
End: 2023-06-14

## 2023-06-14 NOTE — TELEPHONE ENCOUNTER
Tl ipckering moved pt's appointment to 6/27 at 4:40 instead of 6/23 due to previous patients taking slot.    Solo sent to pt to relay message.    Bipin Ferrer RN   [facility]
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
## 2023-06-25 ASSESSMENT — ASTHMA QUESTIONNAIRES: ACT_TOTALSCORE: 18

## 2023-06-27 ENCOUNTER — OFFICE VISIT (OUTPATIENT)
Dept: FAMILY MEDICINE | Facility: CLINIC | Age: 43
End: 2023-06-27
Payer: COMMERCIAL

## 2023-06-27 VITALS
TEMPERATURE: 98.3 F | HEIGHT: 68 IN | RESPIRATION RATE: 15 BRPM | BODY MASS INDEX: 37.81 KG/M2 | WEIGHT: 249.5 LBS | SYSTOLIC BLOOD PRESSURE: 122 MMHG | DIASTOLIC BLOOD PRESSURE: 87 MMHG | OXYGEN SATURATION: 98 % | HEART RATE: 99 BPM

## 2023-06-27 DIAGNOSIS — D22.9 ATYPICAL MOLE: Primary | ICD-10-CM

## 2023-06-27 DIAGNOSIS — J30.2 SEASONAL ALLERGIC RHINITIS, UNSPECIFIED TRIGGER: ICD-10-CM

## 2023-06-27 DIAGNOSIS — J45.40 MODERATE PERSISTENT ASTHMA WITHOUT COMPLICATION: ICD-10-CM

## 2023-06-27 PROCEDURE — 11104 PUNCH BX SKIN SINGLE LESION: CPT | Performed by: NURSE PRACTITIONER

## 2023-06-27 PROCEDURE — 88341 IMHCHEM/IMCYTCHM EA ADD ANTB: CPT | Performed by: DERMATOLOGY

## 2023-06-27 PROCEDURE — 99213 OFFICE O/P EST LOW 20 MIN: CPT | Mod: 25 | Performed by: NURSE PRACTITIONER

## 2023-06-27 PROCEDURE — 88305 TISSUE EXAM BY PATHOLOGIST: CPT | Performed by: DERMATOLOGY

## 2023-06-27 PROCEDURE — 88342 IMHCHEM/IMCYTCHM 1ST ANTB: CPT | Performed by: DERMATOLOGY

## 2023-06-27 RX ORDER — MONTELUKAST SODIUM 10 MG/1
10 TABLET ORAL EVERY MORNING
Qty: 90 TABLET | Refills: 3 | Status: SHIPPED | OUTPATIENT
Start: 2023-06-27 | End: 2023-12-12

## 2023-06-27 ASSESSMENT — PAIN SCALES - GENERAL: PAINLEVEL: MILD PAIN (3)

## 2023-06-27 NOTE — PROGRESS NOTES
"  Assessment & Plan     Moderate persistent asthma without complication  Pt reports worse asthma dt poor air quality. Reports sx at baseline, controlled with current meds. Encouraged smoking cessation.  - montelukast (SINGULAIR) 10 MG tablet  Dispense: 90 tablet; Refill: 3    Seasonal allergic rhinitis, unspecified trigger  Encouraged smoking cessation   - montelukast (SINGULAIR) 10 MG tablet  Dispense: 90 tablet; Refill: 3    Atypical mole  <1cm, irregular borders, dark red. Different from pt's other moles and pt reports had changed over time. Proceeded with punch biopsy, performed with cauterization, pt tolerated well. Sent to lab. Consider derm follow-up if abnormal.       Ordering of each unique test  Prescription drug management      Sherrill Dasilva, JANNET CNP  [facility]    Héctor Morgan is a 42 year old, presenting for the following health issues:  Lesion Removal (mole)    Pt is concerned about current more as skin CA runs in the family. Unsure of type but notes is the \"less dangerous one.\" Noticed by partner one month ago. Mole is on back. Pt concerned about this mole as she notes the change in color is new as she would have noticed that before.     Also reports single episode of sharp chest pain last night that lasted five minutes. No associated sx such as nausea, pain radiation to arm. Pt denies palpitations or CP on exertion. Does have hx of heartburn and had large meal before this event.           6/27/2023     4:31 PM   Additional Questions   Roomed by Meghana Maddox     History of Present Illness       Reason for visit:  Removal of suspect mole    She eats 0-1 servings of fruits and vegetables daily.She consumes 1 sweetened beverage(s) daily.She exercises with enough effort to increase her heart rate 9 or less minutes per day.  She exercises with enough effort to increase her heart rate 3 or less days per week.   She is taking medications regularly.     Would like a hormone " "panel done, periods coming closer together concerned about it being perimenopause  Got prolactin and thyroid labs and would like to discuss    Review of Systems   CONSTITUTIONAL: NEGATIVE for fever, chills, change in weight  ENT/MOUTH: NEGATIVE for ear, mouth and throat problems  RESP: NEGATIVE for significant cough; some SOB dt asthma  CV: NEGATIVE for chest pain, palpitations or peripheral edema      Objective    /87   Pulse 99   Temp 98.3  F (36.8  C) (Temporal)   Resp 15   Ht 1.724 m (5' 7.87\")   Wt 113.2 kg (249 lb 8 oz)   LMP 06/04/2023 (Exact Date)   SpO2 98%   BMI 38.08 kg/m    Body mass index is 38.08 kg/m .  Physical Exam   GENERAL: healthy, alert and no distress  NECK: no adenopathy, no asymmetry, masses, or scars and thyroid normal to palpation  RESP: Inspiratory and expiratory wheezing present throughout lungs  CV: regular rate and rhythm, normal S1 S2, no S3 or S4, no murmur, click or rub, no peripheral edema and peripheral pulses strong  ABDOMEN: soft, nontender, no hepatosplenomegaly, no masses and bowel sounds normal  MS: no gross musculoskeletal defects noted, no edema  SKIN: no suspicious lesions or rashes and tiny dark red lesion on right side of back    Punch Biopsy Procedure:   Informed consent for punch biopsy obtained.  Cleansed with alcohol wipe.  Lidocaine with epinepherine injected subcutaneously around the lesion until numbness achieved.  A full thickness punch biopsy was obtained with a 4 mm punch biopsy tool and placed immediately into formulin.  Bleeding controlled with gauze and hemostasis achieved with hyfurcator.  Bacitracin over cauterized base and covered with bandage. Patient tolerated procedure well without complications. Standard post-procedure care was explained and return precautions given.      Results for orders placed or performed in visit on 06/27/23   Surgical Pathology Exam     Status: None   Result Value Ref Range    Case Report       Surgical Pathology " "Report                         Case: [id number]                                  Authorizing Provider:  Sherrill Dasilva APRN CNP  Collected:           06/27/2023 06:18 PM          Ordering Location:     [facility]   Received:            06/28/2023 08:19 AM                                 [place]                                                                    Pathologist:           Donald James MD                                                         Specimen:    Back, Upper                                                                                Final Diagnosis       A. 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  Re-excision is recommended to ensure complete removal.      Clinical Information       The patient is a 42 year old female.       Gross Description       A(A). Back, Upper, :  The specimen is received in formalin, labeled with the patient's name, medical record number and other identifying information designated \"upper back\". It consists of a 0.2 cm tan-gray skin fragment with no grossly identifiable resection margin.  Entirely submitted 1 cassette.  (Pipap Bales, Biopsy Tech)      Microscopic Description       The specimen exhibits a junctional melanocytic proliferation which is mostly single celled and only occasionally nested, with moderate and rarely severe cytologic atypia and architectural disorder but cells largely confined to the sides and bases of rete ridges (without high-level pagetoid scatter or junctional confluence, as highlighted by Melan-A immunostain), above papillary dermal fibrosis and a mild superficial perivascular lymphocytic infiltrate with melanophages.  PRAME immunostain is diffusely negative, supporting the above interpretation as atypical nevus and not melanoma in situ.      Performing Labs       The technical component of this testing " was completed at [facility]      Case Images                 Present and preformed physical exam with student nurse-family practice. The patient was evaluated and managed, by Lilliam Gonzáles RN, [facility] in collaboration with JANNET Dickey, YOGI supervising clinical preceptor.    Documentation written by JANNET Dickey CNP

## 2023-07-03 ENCOUNTER — ANCILLARY PROCEDURE (OUTPATIENT)
Dept: MRI IMAGING | Facility: CLINIC | Age: 43
End: 2023-07-03
Attending: FAMILY MEDICINE
Payer: COMMERCIAL

## 2023-07-03 DIAGNOSIS — G89.29 BILATERAL CHRONIC KNEE PAIN: ICD-10-CM

## 2023-07-03 DIAGNOSIS — M25.562 BILATERAL CHRONIC KNEE PAIN: ICD-10-CM

## 2023-07-03 DIAGNOSIS — M25.561 BILATERAL CHRONIC KNEE PAIN: ICD-10-CM

## 2023-07-03 PROCEDURE — 73721 MRI JNT OF LWR EXTRE W/O DYE: CPT | Mod: LT

## 2023-07-06 ENCOUNTER — TELEPHONE (OUTPATIENT)
Dept: FAMILY MEDICINE | Facility: CLINIC | Age: 43
End: 2023-07-06

## 2023-07-06 ENCOUNTER — MYC MEDICAL ADVICE (OUTPATIENT)
Dept: FAMILY MEDICINE | Facility: CLINIC | Age: 43
End: 2023-07-06

## 2023-07-06 DIAGNOSIS — D23.5 DYSPLASTIC NEVUS OF TRUNK: Primary | ICD-10-CM

## 2023-07-06 LAB
PATH REPORT.COMMENTS IMP SPEC: NORMAL
PATH REPORT.FINAL DX SPEC: NORMAL
PATH REPORT.GROSS SPEC: NORMAL
PATH REPORT.MICROSCOPIC SPEC OTHER STN: NORMAL
PATH REPORT.RELEVANT HX SPEC: NORMAL
PHOTO IMAGE: NORMAL

## 2023-07-06 NOTE — TELEPHONE ENCOUNTER
I just sent her a [employer] results message about this. I don't think she has had a chance to read it yet and maybe called before she knew that?  You could let her know that I sent a [employer] message.  DEVEN Winston

## 2023-07-06 NOTE — TELEPHONE ENCOUNTER
It shows that pt has now read the My chart message. Thanks    Constance Lynn RN  [facility]

## 2023-07-06 NOTE — RESULT ENCOUNTER NOTE
"Cathy,    The pathology returned on the mole and it was a type called \"junctional dysplastic nevus.\" This means that it is has a high potential for becoming cancer, but is not currently cancer. It is very good that we removed it and removed it now without waiting at all.     What concerns me is that the pathologist noted that the atypical features go up to the edge of what was removed. What we want to see when the mole is this type is that we get additional normal skin surrounding so that we know all of the abnormal cells have been removed.     I believe that one of the doctors in the clinic might do this type of removal, but I'd need to check with him to see if that is the case. Otherwise, I will need to refer to dermatology. It should be faster to get in when I can tell them that we have a diagnosis and what we need to do. If you have a preference for dermatology, I can go ahead and do that. Otherwise, I will ask my coworker if he can do that type of removal.    ARMEN Dasilva, DVEEN     "

## 2023-07-06 NOTE — TELEPHONE ENCOUNTER
Pt got a biopsy done and she would like to know providers thoughts. She is concerned regarding what the results that she was able to read mean? Thanks    Constance Lynn RN  [facility]

## 2023-07-07 ENCOUNTER — TELEPHONE (OUTPATIENT)
Dept: DERMATOLOGY | Facility: CLINIC | Age: 43
End: 2023-07-07

## 2023-07-07 NOTE — TELEPHONE ENCOUNTER
This encounter is being sent to inform the clinic that this patient has a referral from Sherrill Dasilva APRN CNP in  PRIMARY CARE  for the diagnoses of mole check, Dysplastic nevus of trunk, known skin cancer, bx showed junction dsyplastic nevus with severe dysplasia *without* clear borders on removal, needs wider excision   and has requested that this patient be seen within 1-2 weeks.  Based on the availability of our provider(s), we are unable to accommodate this request.      Were all sites offered this patient?  Yes -Pt is willing to go anywhere      Does scheduling algorithm request to schedule next available?  Patient has been scheduled for the first available opening with Agata Knapp on 11/09/23.  We have informed the patient that the clinic will review their referral and reach out if a sooner appointment is medically necessary.

## 2023-07-11 ENCOUNTER — OFFICE VISIT (OUTPATIENT)
Dept: DERMATOLOGY | Facility: CLINIC | Age: 43
End: 2023-07-11
Payer: COMMERCIAL

## 2023-07-11 DIAGNOSIS — B07.9 VERRUCA: Primary | ICD-10-CM

## 2023-07-11 DIAGNOSIS — L72.0 MILIA: ICD-10-CM

## 2023-07-11 DIAGNOSIS — D22.9 MULTIPLE BENIGN NEVI: ICD-10-CM

## 2023-07-11 DIAGNOSIS — D23.5 DYSPLASTIC NEVUS OF TRUNK: ICD-10-CM

## 2023-07-11 DIAGNOSIS — L72.9 CYST OF SKIN: ICD-10-CM

## 2023-07-11 PROCEDURE — 99203 OFFICE O/P NEW LOW 30 MIN: CPT | Mod: 25 | Performed by: DERMATOLOGY

## 2023-07-11 PROCEDURE — 17110 DESTRUCTION B9 LES UP TO 14: CPT | Mod: GC | Performed by: DERMATOLOGY

## 2023-07-11 ASSESSMENT — PAIN SCALES - GENERAL: PAINLEVEL: NO PAIN (0)

## 2023-07-11 NOTE — PATIENT INSTRUCTIONS
Expect a call from the derm surg . If you have not heard back over the next week or so, send us a [employer] message.     Patient Education     Checking for Skin Cancer  You can find cancer early by checking your skin each month. There are 3 kinds of skin cancer. They are melanoma, basal cell carcinoma, and squamous cell carcinoma. Doing monthly skin checks is the best way to find new marks or skin changes. Follow the instructions below for checking your skin.   The ABCDEs of checking moles for melanoma   Check your moles or growths for signs of melanoma using ABCDE:   Asymmetry: the sides of the mole or growth don t match  Border: the edges are ragged, notched, or blurred  Color: the color within the mole or growth varies  Diameter: the mole or growth is larger than 6 mm (size of a pencil eraser)  Evolving: the size, shape, or color of the mole or growth is changing (evolving is not shown in the images below)    Checking for other types of skin cancer  Basal cell carcinoma or squamous cell carcinoma have symptoms such as:     A spot or mole that looks different from all other marks on your skin  Changes in how an area feels, such as itching, tenderness, or pain  Changes in the skin's surface, such as oozing, bleeding, or scaliness  A sore that does not heal  New swelling or redness beyond the border of a mole    Who s at risk?  Anyone can get skin cancer. But you are at greater risk if you have:   Fair skin, light-colored hair, or light-colored eyes  Many moles or abnormal moles on your skin  A history of sunburns from sunlight or tanning beds  A family history of skin cancer  A history of exposure to radiation or chemicals  A weakened immune system  If you have had skin cancer in the past, you are at risk for recurring skin cancer.   How to check your skin  Do your monthly skin checkups in front of a full-length mirror. Check all parts of your body, including your:   Head (ears, face, neck, and scalp)  Torso  (front, back, and sides)  Arms (tops, undersides, upper, and lower armpits)  Hands (palms, backs, and fingers, including under the nails)  Buttocks and genitals  Legs (front, back, and sides)  Feet (tops, soles, toes, including under the nails, and between toes)  If you have a lot of moles, take digital photos of them each month. Make sure to take photos both up close and from a distance. These can help you see if any moles change over time.   Most skin changes are not cancer. But if you see any changes in your skin, call your doctor right away. Only he or she can diagnose a problem. If you have skin cancer, seeing your doctor can be the first step toward getting the treatment that could save your life.   [employer] last reviewed this educational content on 4/1/2019 2000-2020 [employer]. [address] [place] [address]. All rights reserved. This information is not intended as a substitute for professional medical care. Always follow your healthcare professional's instructions.       When should I call my doctor?  If you are worsening or not improving, please, contact us or seek urgent care as noted below.     Who should I call with questions (adults)?  [facility] (adult and pediatric): [phone]  [facility] (adult): [phone]  For urgent needs outside of business hours call the [facility] at [phone] and ask for the dermatology resident on call to be paged  If this is a medical emergency and you are unable to reach an ER, Call [phone]    Who should I call with questions (pediatric)?  [facility]- Pediatric Dermatology  Dr. Desiree Calvillo, Dr. Gopal Dawkins, Dr. Ruth Cartwright, Gabbi Marie, PA, Dr. Nancie Calabrese, Dr. Pamela Cannon & Dr. Marcos Morgan  Non-urgent nurse triage line; [phone]- Celena and Zoey [facility]IX Care Coordinators   Rosalia (/Complex )  [phone]    If you need a prescription refill, please contact your pharmacy. Refills are approved or denied by our Physicians during normal business hours, Monday through Fridays  Per office policy, refills will not be granted if you have not been seen within the past year (or sooner depending on your child's condition)    Scheduling Information:  Pediatric Appointment Scheduling and Call Center [phone]  Radiology Scheduling- [phone]  Sedation Unit Scheduling- [phone]  [place] Scheduling- General [phone]; Pediatric Dermatology [phone]  Main  Services: [phone]  Austrian: [phone]  Palestinian: [phone]  Hmong/Italian/Michelet: [phone]  Preadmission Nursing Department Fax Number: [phone] (Fax all pre-operative paperwork to this number)    For urgent matters arising during evenings, weekends, or holidays that cannot wait for normal business hours please call [phone] and ask for the dermatology resident on call to be paged.

## 2023-07-11 NOTE — LETTER
7/11/2023       RE: Cathy Arroyo  [address]  [facility] [address]     Dear Colleague,    Thank you for referring your patient, Cathy Arroyo, to the [facility] [place] at [facility]. Please see a copy of my visit note below.    [facility] Dermatology Note  Encounter Date: Jul 11, 2023  Office Visit     Dermatology Problem List:  # Severe DN  - right upper back, s/p outside bx 6/27/23, pending WLE, derm surg referral placed 07/11/23   # Cyst, left mandible  - pending excision  # Verruca, hands.  - Cryo, sal acid    Soc hx: .  ____________________________________________    Assessment & Plan:     # Severe DN, R upper back, recently bx by PCP, plan for excision with derm surg  - derm surg referral  - ABCDE of melanoma handout provided  - sunprotection advised  *reviewed path report    # VV, hands  - LN2 today  - advised sal acid 40% qHS    # Milia, left upper arm in setting of recent tattoo  - Noted, photo today    # Cyst, left mandible  - derm surg referral placed    # Multiple benign nevi.   - Monitor for ABCDEs of melanoma   - Continue sun protection - recommend SPF 30 or higher with frequent application   - Return sooner if noticing changing or symptomatic lesions    Procedures Performed:   - Cryotherapy procedure note, location(s): hands. After verbal consent and discussion of risks and benefits including, but not limited to, dyspigmentation/scar, blister, and pain, 2 lesion(s) was(were) treated with 1-2 mm freeze border for 1-2 cycles with liquid nitrogen. Post cryotherapy instructions were provided.    Follow-up: 4-6 weeks for warts or next available, prn for new or changing lesions    Staff and Resident:    Bipin Miller MD  PGY-4 Dermatology  Pager: [phone]    Staff Physician Comments:   I saw and evaluated the patient with the resident and I agree with the assessment and plan.  I was present  for the entire minor procedure and examination.    Lorraine Dozier MD    Department of Dermatology  [facility]: Phone: [phone], Fax: [phone]  7/18/2023       ____________________________________________    CC: Skin Check (Patient reports mole of concern that may need further re-excision. The patient reports milia on their left arm that is of concern. The patient also reports warts on their hands that were previously treated but have not improved. )    HPI:  Ms. Cathy Arroyo is a(n) 42 year old female who presents today as a new patient for skin check    - severe DN bx by PCP upper back, needs excision  - milia on left arm after tattoo  - notes small cyst on left mandible    Personal hx of skin cancer: no  Family history of melanoma: possible in mother, unsure exact skin cancer  Wears sunscreen consistently: yes tries to  No lesions that are bleeding, growing, or tender.   - Patient is otherwise feeling well, without additional skin concerns.    Labs Reviewed:  N/A    Physical Exam:  Vitals: LMP 06/04/2023 (Exact Date)   SKIN: Total skin excluding the undergarment areas was performed. The exam included the head/face, neck, both arms, chest, back, abdomen, both legs, digits and/or nails.   - biopsy site of known DN on right upper back noted  - few dome shaped bright red papules on the trunk  - yellow pin point papules on the left upper arm within tattoo  - subcutaneous papulonodule left jawline  - Light to dark brown symmetric macules and papules with normal pigment networks on dermoscopy on trunk  - No other lesions of concern on areas examined.     Medications:  Current Outpatient Medications   Medication    ADDERALL XR 25 MG 24 hr capsule    albuterol (PROAIR HFA/PROVENTIL HFA/VENTOLIN HFA) 108 (90 Base) MCG/ACT inhaler    amphetamine-dextroamphetamine (ADDERALL XR) 15 MG 24 hr capsule     amphetamine-dextroamphetamine (ADDERALL XR) 20 MG 24 hr capsule    amphetamine-dextroamphetamine (ADDERALL) 10 MG tablet    ARIPiprazole (ABILIFY) 10 MG tablet    cetirizine (ZYRTEC) 10 MG tablet    diazepam (VALIUM) 5 MG tablet    fluticasone (FLONASE) 50 MCG/ACT nasal spray    fluticasone-salmeterol (ADVAIR HFA) 230-21 MCG/ACT inhaler    ipratropium - albuterol 0.5 mg/2.5 mg/3 mL (DUONEB) 0.5-2.5 (3) MG/3ML neb solution    levothyroxine (SYNTHROID/LEVOTHROID) 175 MCG tablet    medical cannabis (Patient's own supply)    medical cannabis (Patient's own supply)    methocarbamol (ROBAXIN) 500 MG tablet    montelukast (SINGULAIR) 10 MG tablet    multivitamin w/minerals (THERA-VIT-M) tablet    prochlorperazine (COMPAZINE) 10 MG tablet    rizatriptan (MAXALT-MLT) 10 MG ODT    semaglutide (OZEMPIC) 2 MG/3ML pen    Semaglutide-Weight Management (WEGOVY) 0.25 MG/0.5ML pen    tiZANidine (ZANAFLEX) 4 MG tablet    VENTOLIN  (90 Base) MCG/ACT inhaler    vitamin D3 (CHOLECALCIFEROL) 50 mcg (2000 units) tablet     No current facility-administered medications for this visit.      Past Medical History:   Patient Active Problem List   Diagnosis    Pineocytoma (H)    Attention deficit disorder    Seasonal allergic rhinitis    Bipolar affective disorder in remission (H)    GBS (group B Streptococcus carrier), +RV culture, currently pregnant    Pre-eclampsia    Moderate persistent asthma without complication    Chronic rhinitis    Tobacco use disorder    Abnormal cytology    S/P total thyroidectomy    History of Graves' disease    Nonintractable migraine, unspecified migraine type    Post-surgical hypothyroidism    Abnormal results of thyroid function studies    Otitis externa    Polyhydramnios affecting pregnancy    Medical cannabis use    Labor and delivery indication for care or intervention    Indication for care in labor and delivery, antepartum    Morbid obesity (H)    Urticarial vasculitis    Dehydration    Breast cancer  screening, high risk patient    Bipolar affective disorder, currently manic mixed, moderate (H)     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    Post partum depression     Post-surgical hypothyroidism 05/2017    Problem, psychiatric 2001 diagnosed Bipolar    Recurrent otitis media chronic ear infections in childhood, recently had a double ear infection followed by another ear infection soon after    Sleep apnea 2019    had a sleep study when pregnant with my second child    Uncomplicated asthma        CC Sherrill Dasilva, APRN CNP  [address]  [place] [address] on close of this encounter.

## 2023-07-11 NOTE — NURSING NOTE
Dermatology Rooming Note    Cathy Arroyo's goals for this visit include:   Chief Complaint   Patient presents with     Skin Check     Patient reports mole of concern that may need further re-excision. The patient reports milia on their left arm that is of concern. The patient also reports warts on their hands that were previously treated but have not improved.      Sammie Vieira LPN

## 2023-07-11 NOTE — PROGRESS NOTES
[facility] Dermatology Note  Encounter Date: Jul 11, 2023  Office Visit     Dermatology Problem List:  # Severe DN  - right upper back, s/p outside bx 6/27/23, pending WLE, derm surg referral placed 07/11/23   # Cyst, left mandible  - pending excision  # Verruca, hands.  - Cryo, sal acid    Soc hx: .  ____________________________________________    Assessment & Plan:     # Severe DN, R upper back, recently bx by PCP, plan for excision with derm surg  - derm surg referral  - ABCDE of melanoma handout provided  - sunprotection advised  *reviewed path report    # VV, hands  - LN2 today  - advised sal acid 40% qHS    # Milia, left upper arm in setting of recent tattoo  - Noted, photo today    # Cyst, left mandible  - derm surg referral placed    # Multiple benign nevi.   - Monitor for ABCDEs of melanoma   - Continue sun protection - recommend SPF 30 or higher with frequent application   - Return sooner if noticing changing or symptomatic lesions    Procedures Performed:   - Cryotherapy procedure note, location(s): hands. After verbal consent and discussion of risks and benefits including, but not limited to, dyspigmentation/scar, blister, and pain, 2 lesion(s) was(were) treated with 1-2 mm freeze border for 1-2 cycles with liquid nitrogen. Post cryotherapy instructions were provided.    Follow-up: 4-6 weeks for warts or next available, prn for new or changing lesions    Staff and Resident:    Bipin Miller MD  PGY-4 Dermatology  Pager: [phone]    Staff Physician Comments:   I saw and evaluated the patient with the resident and I agree with the assessment and plan.  I was present for the entire minor procedure and examination.    Lorraine Dozier MD    Department of Dermatology  [facility]: Phone: [phone], Fax: [phone]  7/18/2023       ____________________________________________    CC:  Skin Check (Patient reports mole of concern that may need further re-excision. The patient reports milia on their left arm that is of concern. The patient also reports warts on their hands that were previously treated but have not improved. )    HPI:  Ms. Cathy Arroyo is a(n) 42 year old female who presents today as a new patient for skin check    - severe DN bx by PCP upper back, needs excision  - milia on left arm after tattoo  - notes small cyst on left mandible    Personal hx of skin cancer: no  Family history of melanoma: possible in mother, unsure exact skin cancer  Wears sunscreen consistently: yes tries to  No lesions that are bleeding, growing, or tender.   - Patient is otherwise feeling well, without additional skin concerns.    Labs Reviewed:  N/A    Physical Exam:  Vitals: LMP 06/04/2023 (Exact Date)   SKIN: Total skin excluding the undergarment areas was performed. The exam included the head/face, neck, both arms, chest, back, abdomen, both legs, digits and/or nails.   - biopsy site of known DN on right upper back noted  - few dome shaped bright red papules on the trunk  - yellow pin point papules on the left upper arm within tattoo  - subcutaneous papulonodule left jawline  - Light to dark brown symmetric macules and papules with normal pigment networks on dermoscopy on trunk  - No other lesions of concern on areas examined.     Medications:  Current Outpatient Medications   Medication     ADDERALL XR 25 MG 24 hr capsule     albuterol (PROAIR HFA/PROVENTIL HFA/VENTOLIN HFA) 108 (90 Base) MCG/ACT inhaler     amphetamine-dextroamphetamine (ADDERALL XR) 15 MG 24 hr capsule     amphetamine-dextroamphetamine (ADDERALL XR) 20 MG 24 hr capsule     amphetamine-dextroamphetamine (ADDERALL) 10 MG tablet     ARIPiprazole (ABILIFY) 10 MG tablet     cetirizine (ZYRTEC) 10 MG tablet     diazepam (VALIUM) 5 MG tablet     fluticasone (FLONASE) 50 MCG/ACT nasal spray     fluticasone-salmeterol (ADVAIR HFA) 230-21  MCG/ACT inhaler     ipratropium - albuterol 0.5 mg/2.5 mg/3 mL (DUONEB) 0.5-2.5 (3) MG/3ML neb solution     levothyroxine (SYNTHROID/LEVOTHROID) 175 MCG tablet     medical cannabis (Patient's own supply)     medical cannabis (Patient's own supply)     methocarbamol (ROBAXIN) 500 MG tablet     montelukast (SINGULAIR) 10 MG tablet     multivitamin w/minerals (THERA-VIT-M) tablet     prochlorperazine (COMPAZINE) 10 MG tablet     rizatriptan (MAXALT-MLT) 10 MG ODT     semaglutide (OZEMPIC) 2 MG/3ML pen     Semaglutide-Weight Management (WEGOVY) 0.25 MG/0.5ML pen     tiZANidine (ZANAFLEX) 4 MG tablet     VENTOLIN  (90 Base) MCG/ACT inhaler     vitamin D3 (CHOLECALCIFEROL) 50 mcg (2000 units) tablet     No current facility-administered medications for this visit.      Past Medical History:   Patient Active Problem List   Diagnosis     Pineocytoma (H)     Attention deficit disorder     Seasonal allergic rhinitis     Bipolar affective disorder in remission (H)     GBS (group B Streptococcus carrier), +RV culture, currently pregnant     Pre-eclampsia     Moderate persistent asthma without complication     Chronic rhinitis     Tobacco use disorder     Abnormal cytology     S/P total thyroidectomy     History of Graves' disease     Nonintractable migraine, unspecified migraine type     Post-surgical hypothyroidism     Abnormal results of thyroid function studies     Otitis externa     Polyhydramnios affecting pregnancy     Medical cannabis use     Labor and delivery indication for care or intervention     Indication for care in labor and delivery, antepartum     Morbid obesity (H)     Urticarial vasculitis     Dehydration     Breast cancer screening, high risk patient     Bipolar affective disorder, currently manic mixed, moderate (H)     Past Medical History:   Diagnosis Date     Allergic rhinitis 1990     Allergic state      Anxiety      Bipolar 1 disorder (H)      Depressive disorder      Elevated cholesterol       Graves disease 2017     Hearing problem      Migraines 2/2019    have suffered from migraines since having brain surgery     Obese     BMI 37.48     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     Post partum depression      Post-surgical hypothyroidism 05/2017     Problem, psychiatric 2001 diagnosed Bipolar     Recurrent otitis media chronic ear infections in childhood, recently had a double ear infection followed by another ear infection soon after     Sleep apnea 2019    had a sleep study when pregnant with my second child     Uncomplicated asthma        CC Sherrill Dasilva, APRN CNP  [address]  [place] [address] on close of this encounter.

## 2023-07-12 ENCOUNTER — OFFICE VISIT (OUTPATIENT)
Dept: OPTOMETRY | Facility: CLINIC | Age: 43
End: 2023-07-12
Payer: COMMERCIAL

## 2023-07-12 DIAGNOSIS — H52.13 MYOPIA OF BOTH EYES: Primary | ICD-10-CM

## 2023-07-12 PROCEDURE — 92310 CONTACT LENS FITTING OU: CPT | Performed by: OPTOMETRIST

## 2023-07-12 PROCEDURE — 92014 COMPRE OPH EXAM EST PT 1/>: CPT | Performed by: OPTOMETRIST

## 2023-07-12 PROCEDURE — 92015 DETERMINE REFRACTIVE STATE: CPT | Performed by: OPTOMETRIST

## 2023-07-12 ASSESSMENT — REFRACTION_CURRENTRX
OD_SPHERE: -5.00
OS_BRAND: ALCON AIR OPTIX PLUS HYDRAGLYDE BC 8.6, D 14.2
OS_SPHERE: -5.00
OD_BRAND: ALCON AIR OPTIX PLUS HYDRAGLYDE BC 8.6, D 14.2

## 2023-07-12 ASSESSMENT — CONF VISUAL FIELD
OS_SUPERIOR_NASAL_RESTRICTION: 0
OD_NORMAL: 1
OD_INFERIOR_NASAL_RESTRICTION: 0
OS_SUPERIOR_TEMPORAL_RESTRICTION: 0
OS_INFERIOR_NASAL_RESTRICTION: 0
OS_INFERIOR_TEMPORAL_RESTRICTION: 0
OD_SUPERIOR_TEMPORAL_RESTRICTION: 0
OD_INFERIOR_TEMPORAL_RESTRICTION: 0
OD_SUPERIOR_NASAL_RESTRICTION: 0
METHOD: COUNTING FINGERS
OS_NORMAL: 1

## 2023-07-12 ASSESSMENT — TONOMETRY
OS_IOP_MMHG: 18
IOP_METHOD: APPLANATION
OD_IOP_MMHG: 18

## 2023-07-12 ASSESSMENT — VISUAL ACUITY
OD_CC: 20/20
OS_CC: 20/20
METHOD: SNELLEN - LINEAR
OD_CC: 20/20
OS_CC: 20/20
CORRECTION_TYPE: CONTACTS

## 2023-07-12 ASSESSMENT — SLIT LAMP EXAM - LIDS
COMMENTS: NORMAL
COMMENTS: NORMAL

## 2023-07-12 ASSESSMENT — REFRACTION_MANIFEST
OD_SPHERE: -5.50
OD_CYLINDER: SPHERE
OS_CYLINDER: SPHERE
OS_SPHERE: -5.50
OD_ADD: +0.50

## 2023-07-12 ASSESSMENT — CUP TO DISC RATIO
OS_RATIO: 0.15
OD_RATIO: 0.15

## 2023-07-12 ASSESSMENT — EXTERNAL EXAM - RIGHT EYE: OD_EXAM: NORMAL

## 2023-07-12 ASSESSMENT — EXTERNAL EXAM - LEFT EYE: OS_EXAM: NORMAL

## 2023-07-12 NOTE — LETTER
7/12/2023         RE: Cathy Arroyo  [address]  [facility] [address]        Dear Colleague,    Thank you for referring your patient, C[facility]royo[facility] to [facility]. Please see a copy of my visit note below.    Chief Complaint   Patient presents with     Annual Eye Exam        Last Eye Exam: 04/2021  Dilated Previously: Yes, side effects of dilation explained today    What are you currently using to see?  glasses and contacts - would like a contact lens eval today   Takes out lenses a couple times a week        Distance Vision Acuity: Satisfied with vision    Near Vision Acuity: Satisfied with vision while reading and using computer with glasses and contacts     Eye Comfort: mattery in the morning   Do you use eye drops? : No      Sammie Daniel - Optometric Assistant           Medical, surgical and family histories reviewed and updated 7/12/2023.       OBJECTIVE: See Ophthalmology exam    ASSESSMENT:    ICD-10-CM    1. Myopia of both eyes  H52.13 CONTACT LENS FITTING,BILAT w/ signed waiver     REFRACTION     EYE EXAM (SIMPLE-NONBILLABLE)          PLAN:   Refit total 30 call w/ any problems w. Trials  biotrue rewetting     Nury Bales OD         Again, thank you for allowing me to participate in the care of your patient.        Sincerely,        Nury Bales, OD

## 2023-07-12 NOTE — PROGRESS NOTES
Chief Complaint   Patient presents with     Annual Eye Exam        Last Eye Exam: 04/2021  Dilated Previously: Yes, side effects of dilation explained today    What are you currently using to see?  glasses and contacts - would like a contact lens eval today   Takes out lenses a couple times a week        Distance Vision Acuity: Satisfied with vision    Near Vision Acuity: Satisfied with vision while reading and using computer with glasses and contacts     Eye Comfort: mattery in the morning   Do you use eye drops? : No      Sammie Daniel - Optometric Assistant           Medical, surgical and family histories reviewed and updated 7/12/2023.       OBJECTIVE: See Ophthalmology exam    ASSESSMENT:    ICD-10-CM    1. Myopia of both eyes  H52.13 CONTACT LENS FITTING,BILAT w/ signed waiver     REFRACTION     EYE EXAM (SIMPLE-NONBILLABLE)          PLAN:   Refit total 30 call w/ any problems w. Trials  biotrue rewetting     Nury Bales OD

## 2023-07-13 DIAGNOSIS — E66.812 CLASS 2 DRUG-INDUCED OBESITY WITH BODY MASS INDEX (BMI) OF 39.0 TO 39.9 IN ADULT, UNSPECIFIED WHETHER SERIOUS COMORBIDITY PRESENT: ICD-10-CM

## 2023-07-13 DIAGNOSIS — E66.01 MORBID OBESITY (H): ICD-10-CM

## 2023-07-13 DIAGNOSIS — E66.1 CLASS 2 DRUG-INDUCED OBESITY WITH BODY MASS INDEX (BMI) OF 39.0 TO 39.9 IN ADULT, UNSPECIFIED WHETHER SERIOUS COMORBIDITY PRESENT: ICD-10-CM

## 2023-07-13 NOTE — TELEPHONE ENCOUNTER
Pt calling stating she is having inventory issues getting wegovy and needs a refill, so would like it sent to the [facility] pharmacy as they have inventory.    Thanks!  Bipin Ferrer RN   [facility]

## 2023-07-13 NOTE — TELEPHONE ENCOUNTER
Requested Prescriptions   Pending Prescriptions Disp Refills     Semaglutide-Weight Management (WEGOVY) 0.25 MG/0.5ML pen 2 mL 0     Sig: Inject 0.25 mg Subcutaneous once a week       There is no refill protocol information for this order        Last OV on 6/2/23.    Thanks!  Bipin Ferrer RN   [facility]

## 2023-07-17 ENCOUNTER — TELEPHONE (OUTPATIENT)
Dept: DERMATOLOGY | Facility: CLINIC | Age: 43
End: 2023-07-17

## 2023-07-17 NOTE — TELEPHONE ENCOUNTER
Left patient a voicemail to schedule a consult  for severe DN upper back, cyst left mandibular jawline with Dr. Badillo. Provided my direct phone number.    Patito Alexander on 7/17/2023 at 8:32 AM

## 2023-07-24 ASSESSMENT — ANXIETY QUESTIONNAIRES
3. WORRYING TOO MUCH ABOUT DIFFERENT THINGS: NOT AT ALL
7. FEELING AFRAID AS IF SOMETHING AWFUL MIGHT HAPPEN: NOT AT ALL
GAD7 TOTAL SCORE: 3
5. BEING SO RESTLESS THAT IT IS HARD TO SIT STILL: NOT AT ALL
IF YOU CHECKED OFF ANY PROBLEMS ON THIS QUESTIONNAIRE, HOW DIFFICULT HAVE THESE PROBLEMS MADE IT FOR YOU TO DO YOUR WORK, TAKE CARE OF THINGS AT HOME, OR GET ALONG WITH OTHER PEOPLE: NOT DIFFICULT AT ALL
2. NOT BEING ABLE TO STOP OR CONTROL WORRYING: NOT AT ALL
GAD7 TOTAL SCORE: 3
1. FEELING NERVOUS, ANXIOUS, OR ON EDGE: NOT AT ALL
4. TROUBLE RELAXING: NEARLY EVERY DAY
6. BECOMING EASILY ANNOYED OR IRRITABLE: NOT AT ALL

## 2023-07-24 ASSESSMENT — PAIN SCALES - PAIN ENJOYMENT GENERAL ACTIVITY SCALE (PEG)
AVG_PAIN_PASTWEEK: 3
INTERFERED_GENERAL_ACTIVITY: 4
INTERFERED_ENJOYMENT_LIFE: 1
PEG_TOTALSCORE: 2.67

## 2023-07-24 NOTE — TELEPHONE ENCOUNTER
Called patient to schedule surgery with Dr. Badillo    Date of Surgery: 09/26    Surgery type: excision    Consult scheduled: No    Has patient had mohs with us before? Not Applicable    Additional comments: Pt is only available on Mondays and Tuesdays       Patito Alexander on 7/24/2023 at 10:38 AM

## 2023-07-28 ENCOUNTER — OFFICE VISIT (OUTPATIENT)
Dept: ANESTHESIOLOGY | Facility: CLINIC | Age: 43
End: 2023-07-28
Attending: FAMILY MEDICINE
Payer: COMMERCIAL

## 2023-07-28 VITALS
OXYGEN SATURATION: 95 % | SYSTOLIC BLOOD PRESSURE: 126 MMHG | DIASTOLIC BLOOD PRESSURE: 86 MMHG | HEART RATE: 83 BPM | HEIGHT: 68 IN | WEIGHT: 249 LBS | BODY MASS INDEX: 37.74 KG/M2

## 2023-07-28 DIAGNOSIS — M54.2 NECK PAIN: ICD-10-CM

## 2023-07-28 DIAGNOSIS — M54.41 CHRONIC MIDLINE LOW BACK PAIN WITH BILATERAL SCIATICA: Primary | ICD-10-CM

## 2023-07-28 DIAGNOSIS — G89.29 CHRONIC MIDLINE LOW BACK PAIN WITH BILATERAL SCIATICA: Primary | ICD-10-CM

## 2023-07-28 DIAGNOSIS — M54.42 CHRONIC MIDLINE LOW BACK PAIN WITH BILATERAL SCIATICA: Primary | ICD-10-CM

## 2023-07-28 DIAGNOSIS — M79.18 MYOFASCIAL PAIN: ICD-10-CM

## 2023-07-28 PROCEDURE — 99205 OFFICE O/P NEW HI 60 MIN: CPT

## 2023-07-28 ASSESSMENT — PAIN SCALES - GENERAL: PAINLEVEL: MILD PAIN (3)
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
## 2023-07-28 NOTE — PATIENT INSTRUCTIONS
Physical Therapy:  YES   She is already scheduled to start PT on 7/31/2023.  She has worked with Jared Constantino PT in the past.  Based on today's exam findings, I suspect a prominent myofascial component and will message Jared regarding targeted stretching in sacroiliac area.    Pain Psychologist to address relaxation, behavioral change, coping style, and other factors important to improvement.  NO    Diagnostic Studies:  Lumbar MRI from 6/6/23 reviewed - demonstrated multilevel degenerative changes, no high grade stenoses.     Medication Management:   Continue medical cannabis and methocarbamol.     Potential procedures:   Deferred - current pain symptoms seem most consistent with myofascial origin. Suspect possible SI joint mediated component, mixed results with provacative tests x 4. Facet loading negative.      Other Orders/Referrals:   TENS unit - Trial use for 20-30 minute session. Start with lowest settings and increase intensity as tolerated. May use on painful areas multiple times throughout the day.   Contact medical supply store listed on order to obtain device. May take to pain PT if you need help getting started with using device.     Follow up with JANNET Hammond CNP as needed.
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
## 2023-07-28 NOTE — Clinical Note
Mau Coleman,  I saw Cathy today in the pain clinic for evaluation of low back pain.  The referring provider had suggested possible lumbar MBB to RFA; however, on exam today, facet loading was negative and questionable provocative SI tests.  I suspect a significant myofascial component, particularly in SI area, and just want to give you heads up.  She is really looking forward to getting started with PT again.  Bhavya Escobar, DNP, APRN, AGNP-C [facility]

## 2023-07-28 NOTE — NURSING NOTE
Patient presents with:  Consult: Tanna Mid-Lower Back,thigh Pain       Mild Pain (3)         What medications are you using for pain? Methocarbamol,Medical Cannabis    (New patients only) Have you been seen by another pain clinic/ provider? no    (Return Patients only) What refills are you needing today? No    Expectation risks and benefits of a Nerve Ablastion

## 2023-07-31 ENCOUNTER — MYC REFILL (OUTPATIENT)
Dept: FAMILY MEDICINE | Facility: CLINIC | Age: 43
End: 2023-07-31

## 2023-07-31 ENCOUNTER — THERAPY VISIT (OUTPATIENT)
Dept: PHYSICAL THERAPY | Facility: CLINIC | Age: 43
End: 2023-07-31
Attending: FAMILY MEDICINE
Payer: COMMERCIAL

## 2023-07-31 DIAGNOSIS — G89.29 BILATERAL CHRONIC KNEE PAIN: ICD-10-CM

## 2023-07-31 DIAGNOSIS — M54.16 LUMBAR RADICULOPATHY: ICD-10-CM

## 2023-07-31 DIAGNOSIS — M25.561 BILATERAL CHRONIC KNEE PAIN: ICD-10-CM

## 2023-07-31 DIAGNOSIS — E66.812 CLASS 2 DRUG-INDUCED OBESITY WITH BODY MASS INDEX (BMI) OF 39.0 TO 39.9 IN ADULT, UNSPECIFIED WHETHER SERIOUS COMORBIDITY PRESENT: ICD-10-CM

## 2023-07-31 DIAGNOSIS — E66.01 MORBID OBESITY (H): ICD-10-CM

## 2023-07-31 DIAGNOSIS — M25.562 BILATERAL CHRONIC KNEE PAIN: ICD-10-CM

## 2023-07-31 DIAGNOSIS — E66.1 CLASS 2 DRUG-INDUCED OBESITY WITH BODY MASS INDEX (BMI) OF 39.0 TO 39.9 IN ADULT, UNSPECIFIED WHETHER SERIOUS COMORBIDITY PRESENT: ICD-10-CM

## 2023-07-31 PROCEDURE — 97110 THERAPEUTIC EXERCISES: CPT | Mod: GP | Performed by: PHYSICAL THERAPIST

## 2023-07-31 PROCEDURE — 97161 PT EVAL LOW COMPLEX 20 MIN: CPT | Mod: GP | Performed by: PHYSICAL THERAPIST

## 2023-07-31 NOTE — PROGRESS NOTES
PHYSICAL THERAPY EVALUATION  Type of Visit: Evaluation    See electronic medical record for Abuse and Falls Screening details.    Subjective       Presenting condition or subjective complaint: Wanting to review back excercises but more concerned about resolving bilateral knee pain and would like to add some knee stretching and calf/thigh muscle excercises for support  Date of onset: 23    Relevant medical history: Arthritis; Asthma; Depression; Mental Illness; Migraines or headaches; Overweight; Severe headaches; Smoking; Thyroid problems   Dates & types of surgery: brain surgery , thyroid removal ,      Prior diagnostic imaging/testing results: MRI; X-ray     Prior therapy history for the same diagnosis, illness or injury: No      Prior Level of Function  Transfers: Independent  Ambulation: Independent  ADL: Independent  IADL: Work    Living Environment  Social support: With a significant other or spouse   Type of home: House; 2-story; Basement   Stairs to enter the home: Yes 10 Is there a railing: Yes   Ramp: No   Stairs inside the home: Yes   Is there a railing: Yes   Help at home: None  Equipment owned:       Employment: Yes   Hobbies/Interests: Film, reading, going for walks, artwork (painting & drawing)    Patient goals for therapy: I would like to squat.  I cannot squat down because the knee pain is too intense    Pain assessment: Pain is L knee posterior knee.  Pain in back in 3/10 for the most part. Going down stairs is most bothersome.     Objective   Static Posture    Dynamic Movement Screen  Single leg stance observations: Eyes open no significant findings   Double limb squat observations: Anterior knee translation  Single limb squat observations: Anterior knee translation  Gait: Not assessed    Range of Motion  Lumbar Spine ROM WNL except pain with flexion      Knee ROM Extension Flexion   Left wnl wnl   Right wnl wnl      Flexibility Left Right   Quadriceps NA NA    Hamstrings moderate moderate   Ankle NA NA   Figure 4 NA NA     Hip and Knee Strength  Hip drop noted with SL bridge    Knee MMT Quadriceps set Straight Leg Raise   Left Good Able   Right Good Able     Palpation  Left: No tenderness to palpation no TTP along posterior knee  Right: Not assessed,      Assessment & Plan   CLINICAL IMPRESSIONS  Medical Diagnosis: Low back pain and knee pain    Treatment Diagnosis: Low back pain and knee pain   Impression/Assessment: Patient is a 42 year old female with Low back pain and knee pain  complaints.  The following significant findings have been identified: Pain, Decreased ROM/flexibility, Decreased joint mobility, and Decreased strength. These impairments interfere with their ability to perform self care tasks, work tasks, and household chores as compared to previous level of function.     Clinical Decision Making (Complexity):  Clinical Presentation: Stable/Uncomplicated  Clinical Presentation Rationale: based on medical and personal factors listed in PT evaluation  Clinical Decision Making (Complexity): Low complexity    PLAN OF CARE  Treatment Interventions:  Interventions: Manual Therapy, Neuromuscular Re-education, Therapeutic Activity, Therapeutic Exercise, Self-Care/Home Management    Long Term Goals     PT Goal 1  Goal Identifier: Stairs  Goal Description: Pain with descending stairs  Rationale: to maximize safety and independence with performance of ADLs and functional tasks;to maximize safety and independence within the home;to maximize safety and independence within the community  Goal Progress: Pain present  Target Date: 10/28/23      Frequency of Treatment: 2 x month  Duration of Treatment: 3 months    Recommended Referrals to Other Professionals:   Education Assessment:        Risks and benefits of evaluation/treatment have been explained.   Patient/Family/caregiver agrees with Plan of Care.     Evaluation Time:     PT Eval, Low Complexity Minutes (97395):  10       Signing Clinician: Jared Constantino, PT      [facility]                                                                                   OUTPATIENT PHYSICAL THERAPY      PLAN OF TREATMENT FOR OUTPATIENT REHABILITATION   Patient's Last Name, First Name, Cathy Callahan YOB: 1980   Provider's Name   [facility]   Medical Record No.  [id number]     Onset Date: 04/02/23  Start of Care Date: 07/31/23     Medical Diagnosis:  Low back pain and knee pain      PT Treatment Diagnosis:  Low back pain and knee pain Plan of Treatment  Frequency/Duration: 2 x month/ 3 months    Certification date from 07/31/23 to 10/28/23         See note for plan of treatment details and functional goals     Jared Constantino PT                         I CERTIFY THE NEED FOR THESE SERVICES FURNISHED UNDER        THIS PLAN OF TREATMENT AND WHILE UNDER MY CARE     (Physician attestation of this document indicates review and certification of the therapy plan).                Referring Provider:  Marcos Dalal      Initial Assessment  See Epic Evaluation- Start of Care Date: 07/31/23

## 2023-08-14 NOTE — PROGRESS NOTES
FOLLOW UP   Aug 16, 2023     Cathy Arroyo is a 42 year old woman who presents with bilateral nipple discharge. She was referred by Dr. Dasilva.    HPI:    History of resection of pineal gland for pineocytoma.     She was seen in 2022 for cyclical bilateral breast fullness and non-spontaneous bilateral multiductal nipple discharge. She had 1 episode of left nipple spontaneous clear nipple discharge. She also had a right breast lump at 3:00 and a left breast lump at 8:00. Normal prolactin and TSH. She had normal breast imaging at that time. She had a bilateral diagnostic mammogram and left breast ultrasound for non-spontaneous left nipple discharge 2022 that was normal.     Family history of maternal aunt, maternal great aunt, and paternal aunt with breast cancer. She had previously follow for high risk screening with [facility]. She had negative genetic testing in .     She had a breast MRI today. She denies any breast mass, skin change, nipple inversion or nipple discharge.     BREAST-SPECIFIC HISTORY:    Previous breast imaging: Yes   - 19 b/l dmammo and right axillary ultrasound for lump prominent fat and normal lymph nodes BI-RADS 2  - 19 b/l Dmammo and right axillary ultrasound for pain: accessory breast tissue BI-RADS 2  - 22 Smammo BI_RADS 1  - 21 breast MRI for high risk screening BI-RADS 1  - 22 breast MRI for high risk screening BI-RADS 1  - 22 bilateral diagnostic mammogram and bilateral breast ultrasound: benign cysts BI-RADS 2  - 23 b/l Dmammo and left breast ultrasound for non-spontaneous nipple discharge: benign cysts BI-RADS 2    Prior breast biopsies/surgeries: No    Prior history of breast cancer or DCIS: No  Prior radiation history: No  Self breast exams: No  Breast density: scattered fibroglandular densities    GYN HISTORY:  . Age at 1st pregnancy: 35. Breastfeeding history: Yes.   Age at menarche: 17  Menopausal: premenopausal     Contraception: female surgical   Menopausal hormone replacement therapy: No   Increased risk with more than 3-5 years with combination estrogen/progesterone    RISK ASSESSMENT: < 3% 5 year risk and > 20% lifetime risk   Bridget: 0.7% 5 year risk   JAYSHREE/Hugher-Felixzick: 21.5% lifetime risk    FAMILY HISTORY:  Breast ca: Yes   - maternal aunt, 37, BRCA negative. Also had melanoma  - paternal aunt, 58, BRCA negative   - paternal great aunt  Ovarian ca: No  Pancreatic ca: No  Prostate: Yes  - maternal grandfather  Gastric ca: No  Melanoma: Yes   - maternal aunt, also had breast cancer  Colon ca: Yes   - maternal grandfather  Other cancer: Yes   - maternal grandfather with bladder cancer, 71  - maternal cousin with esophageal cancer  - maternal aunt with kidney cancer.   Other genetic, testing, syndromes, or clotting disorders: no  - maternal aunt negative for BRCA gene mutation      PAST MEDICAL HISTORY  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    Post partum depression     Post-surgical hypothyroidism 05/2017    Problem, psychiatric 2001 diagnosed Bipolar    Recurrent otitis media chronic ear infections in childhood, recently had a double ear infection followed by another ear infection soon after    Sleep apnea 2019    had a sleep study when pregnant with my second child    Uncomplicated asthma      PAST SURGICAL HISTORY   Past Surgical History:   Procedure Laterality Date    BIOPSY  31 y/o colpos, and in the last 2 years for thyroid    Colposcopy, thyroid nodule biopsy twice    BLOOD PATCH N/A 10/11/2016    Procedure: EPIDURAL BLOOD PATCH;  Surgeon: GENERIC ANESTHESIA PROVIDER;  Location:  OR      SECTION, TUBAL LIGATION, COMBINED N/A 2019    Procedure:  SECTION, WITH TUBAL LIGATION;  Surgeon: Kathy Rutledge MD;  Location: [facility] L[facility]D    CRANIOTOMY, EXCISE TUMOR COMPLEX, COMBINED  2014    Procedure: COMBINED CRANIOTOMY, EXCISE TUMOR COMPLEX;  Supracerabellar  Infratentorial Approach for Resection of Tumor ;  Surgeon: Kate Mckeon MD;  Location: [facility] OR    ENT SURGERY  2017    thyroidectomy    GYN SURGERY      colposcopy    THYROIDECTOMY N/A 2017    Procedure: THYROIDECTOMY;  Total Thyroidectomy;  Surgeon: Pamela Villasenor MD;  Location:  OR     MEDICATIONS  Current Outpatient Medications   Medication Sig Dispense Refill    ADDERALL XR 25 MG 24 hr capsule Take by mouth daily      albuterol (PROAIR HFA/PROVENTIL HFA/VENTOLIN HFA) 108 (90 Base) MCG/ACT inhaler Inhale 2 puffs into the lungs every 6 hours 18 g 3    amphetamine-dextroamphetamine (ADDERALL XR) 15 MG 24 hr capsule Take 15 mg by mouth daily      amphetamine-dextroamphetamine (ADDERALL XR) 20 MG 24 hr capsule Take 20 mg by mouth daily      amphetamine-dextroamphetamine (ADDERALL) 10 MG tablet Take 1 tablet by mouth daily at 2 pm      ARIPiprazole (ABILIFY) 10 MG tablet Take 10 mg by mouth daily      cetirizine (ZYRTEC) 10 MG tablet Take 1 tablet (10 mg) by mouth daily 90 tablet 3    diazepam (VALIUM) 5 MG tablet Take 5 mg by mouth as needed      fluticasone (FLONASE) 50 MCG/ACT nasal spray Spray 1 spray into both nostrils daily 18.2 mL 0    fluticasone-salmeterol (ADVAIR HFA) 230-21 MCG/ACT inhaler Inhale 2 puffs into the lungs 2 times daily 12 g 11    ipratropium - albuterol 0.5 mg/2.5 mg/3 mL (DUONEB) 0.5-2.5 (3) MG/3ML neb solution Take 1 vial (3 mLs) by nebulization every 6 hours as needed for shortness of breath / dyspnea or wheezing 90 mL 1    levothyroxine (SYNTHROID/LEVOTHROID) 175 MCG tablet Take 1 tablet (175 mcg) by mouth daily 90 tablet 2    medical cannabis (Patient's own supply)  Take 1 Dose by mouth See Admin Instructions (The purpose of this order is to document that the patient reports taking medical cannabis.  This is not a prescription, and is not used to certify that the patient has a qualifying medical condition.)    [place] Oral Suspension Unflavored 1-5 ml up to two times daily.  Total THC = 240 mg, Total CBD Trace      medical cannabis (Patient's own supply) Take 1 Dose by mouth See Admin Instructions (The purpose of this order is to document that the patient reports taking medical cannabis.  This is not a prescription, and is not used to certify that the patient has a qualifying medical condition.)    [place] oral suspension: take 1-3 ml by mouth two times daily  Total CBD 1200 mg. Total THC 60 mg (Patient not taking: Reported on 7/28/2023)      methocarbamol (ROBAXIN) 500 MG tablet Take 1 tablet (500 mg) by mouth 3 times daily as needed for muscle spasms 60 tablet 1    montelukast (SINGULAIR) 10 MG tablet Take 1 tablet (10 mg) by mouth every morning 90 tablet 3    multivitamin w/minerals (THERA-VIT-M) tablet Take 1 tablet by mouth daily      prochlorperazine (COMPAZINE) 10 MG tablet Take 1 tablet (10 mg) by mouth every 6 hours as needed for nausea or vomiting 10 tablet 3    rizatriptan (MAXALT-MLT) 10 MG ODT Take 1 tablet (10 mg) by mouth at onset of headache for migraine (repeat in 2 hours if needed) May repeat in 2 hours. Max 3 tablets/24 hours. 18 tablet 11    semaglutide (OZEMPIC) 2 MG/3ML pen Inject 0.5 mg Subcutaneous every 7 days 3 mL 0    Semaglutide-Weight Management (WEGOVY) 0.25 MG/0.5ML pen Inject 0.25 mg Subcutaneous once a week 2 mL 0    Semaglutide-Weight Management (WEGOVY) 0.5 MG/0.5ML pen Inject 0.5 mg Subcutaneous once a week 2 mL 0    Semaglutide-Weight Management (WEGOVY) 1 MG/0.5ML pen Inject 1 mg Subcutaneous once a week 2 mL 0    tiZANidine (ZANAFLEX) 4 MG tablet Take 1 tablet (4 mg) by mouth nightly as needed for muscle spasms 60 tablet 1    VENTOLIN HFA  108 (90 Base) MCG/ACT inhaler INHALE 2 PUFFS INTO THE LUNGS EVERY 4 HOURS AS NEEDED FOR SHORTNESS OF BREATH OR DIFFICULT BREATHING OR WHEEZING 18 g 1    vitamin D3 (CHOLECALCIFEROL) 50 mcg (2000 units) tablet Take 1 tablet by mouth daily       ALLERGIES  Allergies   Allergen Reactions    Adacel [Tetanus-Diphth-Acell Pertussis] Swelling    Adhesive Tape Hives    Ciprofloxacin      Causes visual hallucinations.    Codeine Sulfate Nausea and Vomiting    Nicoderm [Nicotine]      Patch and Gum, pt reported allergic reaction 6/24    Tegaderm Transparent Dressing (Informational Only) Hives    Tetanus Toxoid      Pt states she had an infection at injection site.     Vicodin [Hydrocodone-Acetaminophen] Nausea and Vomiting    Methimazole Rash      SOCIAL HISTORY:  Smokes: Yes  EtOH: Yes,   Exercise: active job as a   Works as a  6 days per week. She has  4 year old and 7 year old. She is going through a separation which is hard for her. She lives in [place]     ROS:  Change in vision No  Headaches: no  Respiratory: No shortness of breath, dyspnea on exertion, cough, or hemoptysis   Cardiovascular: negative   Gastrointestinal: negative Abdominal pain: no  Breast: bilateral nipple discharge and lumps  Musculoskeletal: negative Joint pain No Back pain: no  Psychiatric: negative  Hematologic/Lymphatic/Immunologic: negative  Endocrine: negative    EXAM  /81 (BP Location: Right arm, Patient Position: Sitting, Cuff Size: Adult Regular)   Pulse 84   Temp 99.1  F (37.3  C) (Oral)   Resp 16   Wt 113.5 kg (250 lb 4.8 oz)   LMP 07/27/2023 (Exact Date)   SpO2 97%   BMI 38.20 kg/m     PHYSICAL EXAM  Respiratory: breathing non labored.   Breasts: Examination was done in both the upright and supine positions.  Breasts are symmetrical . No dominant fixed or suspicious masses noted. No skin or nipple changes. No nipple discharge.   Patient reported pain in right lower inner breast.   No clavicular,  cervical, or axillary lymphadenopathy.     INVESTIGATIONS:    8/16/23 breast MRI: results pending.     ASSESSMENT/PLAN:    Cathy Arroyo is a 42 year old woman with family history of breast cancer. She meets NCCN guidelines for high risk screening.     1) Family history of breast cancer  She meets NCCN guidelines for high risk screening based on family history with lifetime risk for breast cancer of >20%. Screening recommendations based on NCCN guidelines.  Clinical encounter every 6-12 months. Annual mammogram with kristin alternating with annual breast MRI.  Counseling was provided with available strategies including lifestyle modifications and risk reducing intervention.   - Be familiar with your breast and promptly report any changes to your health care provider.  - Screening mammogram with clinic visit due: 2/15/24  - Breast MRI with clinic visit due: 8/17/24    2) Lifestyle Modifications were provided.   - Maintain your best healthy weight. Higher body fat and adult weight gain is associated with increased risk for breast cancer. This increase in risk has been attributed to increase in circulating endogenous estrogen levels from fat tissue.   - Any alcohol intake increases the risk for breast cancer. If you choose to drink alcohol limit alcohol consumption to less than 1 drink (1 ounce of liquor, 6 ounces of wine, or 8 ounces of beer) per day or less than 3 drinks per week.  - Be active daily and void being sedentary.   - Vitamin D may decrease the risk of developing breast cancer.     Miriam Lora PA-C    20 minutes spent on the date of the encounter doing chart review, review of test results, interpretation of tests, patient visit and documentation.

## 2023-08-16 ENCOUNTER — ANCILLARY PROCEDURE (OUTPATIENT)
Dept: MRI IMAGING | Facility: CLINIC | Age: 43
End: 2023-08-16
Attending: PHYSICIAN ASSISTANT
Payer: COMMERCIAL

## 2023-08-16 ENCOUNTER — ONCOLOGY VISIT (OUTPATIENT)
Dept: SURGERY | Facility: CLINIC | Age: 43
End: 2023-08-16
Attending: PHYSICIAN ASSISTANT
Payer: COMMERCIAL

## 2023-08-16 VITALS
TEMPERATURE: 99.1 F | WEIGHT: 250.3 LBS | OXYGEN SATURATION: 97 % | RESPIRATION RATE: 16 BRPM | DIASTOLIC BLOOD PRESSURE: 81 MMHG | HEART RATE: 84 BPM | SYSTOLIC BLOOD PRESSURE: 116 MMHG | BODY MASS INDEX: 38.2 KG/M2

## 2023-08-16 DIAGNOSIS — Z12.39 BREAST CANCER SCREENING, HIGH RISK PATIENT: ICD-10-CM

## 2023-08-16 DIAGNOSIS — Z80.3 FAMILY HISTORY OF BREAST CANCER: ICD-10-CM

## 2023-08-16 DIAGNOSIS — Z91.89 AT HIGH RISK FOR BREAST CANCER: ICD-10-CM

## 2023-08-16 DIAGNOSIS — N64.52 NIPPLE DISCHARGE: ICD-10-CM

## 2023-08-16 DIAGNOSIS — Z91.89 AT HIGH RISK FOR BREAST CANCER: Primary | ICD-10-CM

## 2023-08-16 PROCEDURE — 77049 MRI BREAST C-+ W/CAD BI: CPT | Performed by: STUDENT IN AN ORGANIZED HEALTH CARE EDUCATION/TRAINING PROGRAM

## 2023-08-16 PROCEDURE — 99213 OFFICE O/P EST LOW 20 MIN: CPT | Performed by: PHYSICIAN ASSISTANT

## 2023-08-16 PROCEDURE — A9585 GADOBUTROL INJECTION: HCPCS | Mod: JZ | Performed by: STUDENT IN AN ORGANIZED HEALTH CARE EDUCATION/TRAINING PROGRAM

## 2023-08-16 PROCEDURE — G0463 HOSPITAL OUTPT CLINIC VISIT: HCPCS | Performed by: PHYSICIAN ASSISTANT

## 2023-08-16 RX ORDER — GADOBUTROL 604.72 MG/ML
15 INJECTION INTRAVENOUS ONCE
Status: COMPLETED | OUTPATIENT
Start: 2023-08-16 | End: 2023-08-16

## 2023-08-16 RX ORDER — SEMAGLUTIDE 0.5 MG/.5ML
0.5 INJECTION, SOLUTION SUBCUTANEOUS WEEKLY
COMMUNITY
End: 2023-09-12

## 2023-08-16 RX ADMIN — GADOBUTROL 11.5 ML: 604.72 INJECTION INTRAVENOUS at 12:53

## 2023-08-16 ASSESSMENT — PAIN SCALES - GENERAL: PAINLEVEL: MILD PAIN (3)

## 2023-08-16 NOTE — DISCHARGE INSTRUCTIONS
MRI Contrast Discharge Instructions    The IV contrast you received today will pass out of your body in your  urine. This will happen in the next 24 hours. You will not feel this process.  Your urine will not change color.    Drink at least 4 extra glasses of water or juice today (unless your doctor  has restricted your fluids). This reduces the stress on your kidneys.  You may take your regular medicines.    If you are on dialysis: It is best to have dialysis today.    If you have a reaction: Most reactions happen right away. If you have  any new symptoms after leaving the hospital (such as hives or swelling),  call your hospital at the correct number below. Or call your family doctor.  If you have breathing distress or wheezing, call 911.    Special instructions: ***    I have read and understand the above information.    Signature:______________________________________ Date:___________    Staff:__________________________________________ Date:___________     Time:__________    [facility] Departments:    ___[place]: [phone]  ___[facility]: [phone]  ___[place]: [phone] ___[facility]: [phone]  ___[facility]: [phone]  ___[facility]: [phone]  ___[phone]  ___[facility]: [phone]  ___[phone]

## 2023-08-16 NOTE — NURSING NOTE
"Oncology Rooming Note    August 16, 2023 1:34 PM   Cathy Arroyo is a 42 year old female who presents for:    Chief Complaint   Patient presents with    Oncology Clinic Visit     Nipple discharge; family hx breast cancer; pineocytoma     Initial Vitals: /81 (BP Location: Right arm, Patient Position: Sitting, Cuff Size: Adult Regular)   Pulse 84   Temp 99.1  F (37.3  C) (Oral)   Resp 16   Wt 113.5 kg (250 lb 4.8 oz)   LMP 07/27/2023 (Exact Date)   SpO2 97%   BMI 38.20 kg/m   Estimated body mass index is 38.2 kg/m  as calculated from the following:    Height as of 7/28/23: 1.724 m (5' 7.87\").    Weight as of this encounter: 113.5 kg (250 lb 4.8 oz). Body surface area is 2.33 meters squared.  Mild Pain (3) Comment: Data Unavailable   Patient's last menstrual period was 07/27/2023 (exact date).  Allergies reviewed: Yes  Medications reviewed: Yes    Medications: Medication refills not needed today.  Pharmacy name entered into [employer]:    [id number] - [place], [place] - [address] [id number] - [place], [place] - [address] [facility] [address] [place] [address] [facility] #[id number] - [place], [place] - [address] AT [address] [place] [address] & [facility] - [place], [place] - [address]    Clinical concerns: Pt is bleeding through their gauze.      Jerald Clayton              "

## 2023-08-16 NOTE — LETTER
8/16/2023         RE: Cathy Arroyo  [address]  [facility] [address]        Dear Colleague,    Thank you for referring your patient, Cathy Arroyo, to the [facility]. Please see a copy of my visit note below.    FOLLOW UP   Aug 16, 2023     Cathy Arroyo is a 42 year old woman who presents with bilateral nipple discharge. She was referred by Dr. Dasilva.    HPI:    History of resection of pineal gland for pineocytoma.     She was seen in 7/2022 for cyclical bilateral breast fullness and non-spontaneous bilateral multiductal nipple discharge. She had 1 episode of left nipple spontaneous clear nipple discharge. She also had a right breast lump at 3:00 and a left breast lump at 8:00. Normal prolactin and TSH. She had normal breast imaging at that time. She had a bilateral diagnostic mammogram and left breast ultrasound for non-spontaneous left nipple discharge 2/14/2022 that was normal.     Family history of maternal aunt, maternal great aunt, and paternal aunt with breast cancer. She had previously follow for high risk screening with Randi Martin. She had negative genetic testing in 2023.     She had a breast MRI today. She denies any breast mass, skin change, nipple inversion or nipple discharge.     BREAST-SPECIFIC HISTORY:    Previous breast imaging: Yes   - 6/5/17 b/l dmammo and right axillary ultrasound for lump prominent fat and normal lymph nodes BI-RADS 2  - 11/11/19 b/l Dmammo and right axillary ultrasound for pain: accessory breast tissue BI-RADS 2  - 1/5/22 Smammo BI_RADS 1  - 1/18/21 breast MRI for high risk screening BI-RADS 1  - 7/5/22 breast MRI for high risk screening BI-RADS 1  - 7/14/22 bilateral diagnostic mammogram and bilateral breast ultrasound: benign cysts BI-RADS 2  - 2/14/23 b/l Dmammo and left breast ultrasound for non-spontaneous nipple discharge: benign cysts BI-RADS 2    Prior breast biopsies/surgeries: No    Prior history of breast cancer  or DCIS: No  Prior radiation history: No  Self breast exams: No  Breast density: scattered fibroglandular densities    GYN HISTORY:  . Age at 1st pregnancy: 35. Breastfeeding history: Yes.   Age at menarche: 17  Menopausal: premenopausal    Contraception: female surgical   Menopausal hormone replacement therapy: No   Increased risk with more than 3-5 years with combination estrogen/progesterone    RISK ASSESSMENT: < 3% 5 year risk and > 20% lifetime risk   Bridget: 0.7% 5 year risk   JAYSHREE/Tyrer-Cuzick: 21.5% lifetime risk    FAMILY HISTORY:  Breast ca: Yes   - maternal aunt, 37, BRCA negative. Also had melanoma  - paternal aunt, 58, BRCA negative   - paternal great aunt  Ovarian ca: No  Pancreatic ca: No  Prostate: Yes  - maternal grandfather  Gastric ca: No  Melanoma: Yes   - maternal aunt, also had breast cancer  Colon ca: Yes   - maternal grandfather  Other cancer: Yes   - maternal grandfather with bladder cancer, 71  - maternal cousin with esophageal cancer  - maternal aunt with kidney cancer.   Other genetic, testing, syndromes, or clotting disorders: no  - maternal aunt negative for BRCA gene mutation      PAST MEDICAL HISTORY  Past Medical History:   Diagnosis Date    Allergic rhinitis     Allergic state     Anxiety     Bipolar 1 disorder (H)     Depressive disorder     Elevated cholesterol     Graves disease 2017    Hearing problem     Migraines 2019    have suffered from migraines since having brain surgery    Obese     BMI 37.48    Pineal tumor     s/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 tumor    Post partum depression     Post-surgical hypothyroidism 2017    Problem, psychiatric 2001 diagnosed Bipolar    Recurrent otitis media chronic ear infections in childhood, recently had a double ear infection followed by another ear infection soon after    Sleep apnea 2019    had a sleep study when pregnant with my second child    Uncomplicated asthma      PAST SURGICAL HISTORY   Past Surgical History:   Procedure  Laterality Date    BIOPSY  29 y/o colpos, and in the last 2 years for thyroid    Colposcopy, thyroid nodule biopsy twice    BLOOD PATCH N/A 10/11/2016    Procedure: EPIDURAL BLOOD PATCH;  Surgeon: GENERIC ANESTHESIA PROVIDER;  Location: [facility] OR     SECTION, TUBAL LIGATION, COMBINED N/A 2019    Procedure:  SECTION, WITH TUBAL LIGATION;  Surgeon: Kathy Rutldege MD;  Location: [facility] L+D    CRANIOTOMY, EXCISE TUMOR COMPLEX, COMBINED  2014    Procedure: COMBINED CRANIOTOMY, EXCISE TUMOR COMPLEX;  Supracerabellar  Infratentorial Approach for Resection of Tumor ;  Surgeon: Kate Mckeon MD;  Location: [facility] OR    ENT SURGERY  2017    thyroidectomy    GYN SURGERY      colposcopy    THYROIDECTOMY N/A 2017    Procedure: THYROIDECTOMY;  Total Thyroidectomy;  Surgeon: Pamela Villasenor MD;  Location: [facility] OR     MEDICATIONS  Current Outpatient Medications   Medication Sig Dispense Refill    ADDERALL XR 25 MG 24 hr capsule Take by mouth daily      albuterol (PROAIR HFA/PROVENTIL HFA/VENTOLIN HFA) 108 (90 Base) MCG/ACT inhaler Inhale 2 puffs into the lungs every 6 hours 18 g 3    amphetamine-dextroamphetamine (ADDERALL XR) 15 MG 24 hr capsule Take 15 mg by mouth daily      amphetamine-dextroamphetamine (ADDERALL XR) 20 MG 24 hr capsule Take 20 mg by mouth daily      amphetamine-dextroamphetamine (ADDERALL) 10 MG tablet Take 1 tablet by mouth daily at 2 pm      ARIPiprazole (ABILIFY) 10 MG tablet Take 10 mg by mouth daily      cetirizine (ZYRTEC) 10 MG tablet Take 1 tablet (10 mg) by mouth daily 90 tablet 3    diazepam (VALIUM) 5 MG tablet Take 5 mg by mouth as needed      fluticasone (FLONASE) 50 MCG/ACT nasal spray Spray 1 spray into both nostrils daily 18.2 mL 0    fluticasone-salmeterol (ADVAIR HFA) 230-21 MCG/ACT inhaler Inhale 2 puffs into the lungs 2 times daily 12 g 11    ipratropium - albuterol 0.5 mg/2.5 mg/3 mL (DUONEB) 0.5-2.5 (3) MG/3ML neb solution Take 1 vial (3  mLs) by nebulization every 6 hours as needed for shortness of breath / dyspnea or wheezing 90 mL 1    levothyroxine (SYNTHROID/LEVOTHROID) 175 MCG tablet Take 1 tablet (175 mcg) by mouth daily 90 tablet 2    medical cannabis (Patient's own supply) Take 1 Dose by mouth See Admin Instructions (The purpose of this order is to document that the patient reports taking medical cannabis.  This is not a prescription, and is not used to certify that the patient has a qualifying medical condition.)    [place] Oral Suspension Unflavored 1-5 ml up to two times daily.  Total THC = 240 mg, Total CBD Trace      medical cannabis (Patient's own supply) Take 1 Dose by mouth See Admin Instructions (The purpose of this order is to document that the patient reports taking medical cannabis.  This is not a prescription, and is not used to certify that the patient has a qualifying medical condition.)    [place] oral suspension: take 1-3 ml by mouth two times daily  Total CBD 1200 mg. Total THC 60 mg (Patient not taking: Reported on 7/28/2023)      methocarbamol (ROBAXIN) 500 MG tablet Take 1 tablet (500 mg) by mouth 3 times daily as needed for muscle spasms 60 tablet 1    montelukast (SINGULAIR) 10 MG tablet Take 1 tablet (10 mg) by mouth every morning 90 tablet 3    multivitamin w/minerals (THERA-VIT-M) tablet Take 1 tablet by mouth daily      prochlorperazine (COMPAZINE) 10 MG tablet Take 1 tablet (10 mg) by mouth every 6 hours as needed for nausea or vomiting 10 tablet 3    rizatriptan (MAXALT-MLT) 10 MG ODT Take 1 tablet (10 mg) by mouth at onset of headache for migraine (repeat in 2 hours if needed) May repeat in 2 hours. Max 3 tablets/24 hours. 18 tablet 11    semaglutide (OZEMPIC) 2 MG/3ML pen Inject 0.5 mg Subcutaneous every 7 days 3 mL 0    Semaglutide-Weight Management (WEGOVY) 0.25 MG/0.5ML pen Inject 0.25 mg Subcutaneous once a week 2 mL 0    Semaglutide-Weight Management (WEGOVY) 0.5 MG/0.5ML pen Inject 0.5 mg Subcutaneous  once a week 2 mL 0    Semaglutide-Weight Management (WEGOVY) 1 MG/0.5ML pen Inject 1 mg Subcutaneous once a week 2 mL 0    tiZANidine (ZANAFLEX) 4 MG tablet Take 1 tablet (4 mg) by mouth nightly as needed for muscle spasms 60 tablet 1    VENTOLIN  (90 Base) MCG/ACT inhaler INHALE 2 PUFFS INTO THE LUNGS EVERY 4 HOURS AS NEEDED FOR SHORTNESS OF BREATH OR DIFFICULT BREATHING OR WHEEZING 18 g 1    vitamin D3 (CHOLECALCIFEROL) 50 mcg (2000 units) tablet Take 1 tablet by mouth daily       ALLERGIES  Allergies   Allergen Reactions    Adacel [Tetanus-Diphth-Acell Pertussis] Swelling    Adhesive Tape Hives    Ciprofloxacin      Causes visual hallucinations.    Codeine Sulfate Nausea and Vomiting    Nicoderm [Nicotine]      Patch and Gum, pt reported allergic reaction 6/24    Tegaderm Transparent Dressing (Informational Only) Hives    Tetanus Toxoid      Pt states she had an infection at injection site.     Vicodin [Hydrocodone-Acetaminophen] Nausea and Vomiting    Methimazole Rash      SOCIAL HISTORY:  Smokes: Yes  EtOH: Yes,   Exercise: active job as a   Works as a     ROS:  Change in vision No  Headaches: no  Respiratory: No shortness of breath, dyspnea on exertion, cough, or hemoptysis   Cardiovascular: negative   Gastrointestinal: negative Abdominal pain: no  Breast: bilateral nipple discharge and lumps  Musculoskeletal: negative Joint pain No Back pain: no  Psychiatric: negative  Hematologic/Lymphatic/Immunologic: negative  Endocrine: negative    EXAM  /81 (BP Location: Right arm, Patient Position: Sitting, Cuff Size: Adult Regular)   Pulse 84   Temp 99.1  F (37.3  C) (Oral)   Resp 16   Wt 113.5 kg (250 lb 4.8 oz)   LMP 07/27/2023 (Exact Date)   SpO2 97%   BMI 38.20 kg/m     PHYSICAL EXAM  Respiratory: breathing non labored.   Breasts: Examination was done in both the upright and supine positions.  Breasts are symmetrical . No dominant fixed or suspicious masses noted. No skin  or nipple changes. No nipple discharge.   Patient reported pain in right lower inner breast.   No clavicular, cervical, or axillary lymphadenopathy.     INVESTIGATIONS:    8/16/23 breast MRI: results pending.     ASSESSMENT/PLAN:    Cathy Arroyo is a 42 year old woman with family history of breast cancer. She meets NCCN guidelines for high risk screening.     1) Family history of breast cancer  She meets NCCN guidelines for high risk screening based on family history with lifetime risk for breast cancer of >20%. Screening recommendations based on NCCN guidelines.  Clinical encounter every 6-12 months. Annual mammogram with kristin alternating with annual breast MRI.  Counseling was provided with available strategies including lifestyle modifications and risk reducing intervention.   - Be familiar with your breast and promptly report any changes to your health care provider.  - Screening mammogram with clinic visit due: 2/15/24  - Breast MRI with clinic visit due: 8/17/24    2) Lifestyle Modifications were provided.   - Maintain your best healthy weight. Higher body fat and adult weight gain is associated with increased risk for breast cancer. This increase in risk has been attributed to increase in circulating endogenous estrogen levels from fat tissue.   - Any alcohol intake increases the risk for breast cancer. If you choose to drink alcohol limit alcohol consumption to less than 1 drink (1 ounce of liquor, 6 ounces of wine, or 8 ounces of beer) per day or less than 3 drinks per week.  - Be active daily and void being sedentary.   - Vitamin D may decrease the risk of developing breast cancer.     Miriam Lora PA-C    20 minutes spent on the date of the encounter doing chart review, review of test results, interpretation of tests, patient visit and documentation.

## 2023-08-16 NOTE — PATIENT INSTRUCTIONS
Cathy Arroyo is a 42 year old woman with family history of breast cancer. She meets NCCN guidelines for high risk screening.     1) Family history of breast cancer  She meets NCCN guidelines for high risk screening based on family history with lifetime risk for breast cancer of >20%. Screening recommendations based on NCCN guidelines.  Clinical encounter every 6-12 months. Annual mammogram with kristin alternating with annual breast MRI.  Counseling was provided with available strategies including lifestyle modifications and risk reducing intervention.   - Be familiar with your breast and promptly report any changes to your health care provider.  - Screening mammogram with clinic visit due: 2/15/24  - Breast MRI with clinic visit due: 8/17/24    2) Lifestyle Modifications were provided.   - Maintain your best healthy weight. Higher body fat and adult weight gain is associated with increased risk for breast cancer. This increase in risk has been attributed to increase in circulating endogenous estrogen levels from fat tissue.   - Any alcohol intake increases the risk for breast cancer. If you choose to drink alcohol limit alcohol consumption to less than 1 drink (1 ounce of liquor, 6 ounces of wine, or 8 ounces of beer) per day or less than 3 drinks per week.  - Be active daily and void being sedentary.   - Vitamin D may decrease the risk of developing breast cancer.

## 2023-08-23 ENCOUNTER — APPOINTMENT (OUTPATIENT)
Dept: OPTOMETRY | Facility: CLINIC | Age: 43
End: 2023-08-23
Payer: COMMERCIAL

## 2023-08-23 PROCEDURE — 92340 FIT SPECTACLES MONOFOCAL: CPT | Performed by: OPTOMETRIST

## 2023-08-29 DIAGNOSIS — E89.0 POST-SURGICAL HYPOTHYROIDISM: ICD-10-CM

## 2023-08-29 DIAGNOSIS — Z86.39 HISTORY OF GRAVES' DISEASE: ICD-10-CM

## 2023-08-29 DIAGNOSIS — E89.0 S/P TOTAL THYROIDECTOMY: ICD-10-CM

## 2023-08-29 RX ORDER — LEVOTHYROXINE SODIUM 175 UG/1
175 TABLET ORAL DAILY
Qty: 90 TABLET | Refills: 2 | Status: SHIPPED | OUTPATIENT
Start: 2023-08-29 | End: 2024-05-06

## 2023-08-29 NOTE — TELEPHONE ENCOUNTER
Pending Prescriptions:                       Disp   Refills    levothyroxine (SYNTHROID/LEVOTHROID) 175 *90 tab*2            Sig: Take 1 tablet (175 mcg) by mouth daily

## 2023-08-29 NOTE — TELEPHONE ENCOUNTER
"Requested Prescriptions   Pending Prescriptions Disp Refills    levothyroxine (SYNTHROID/LEVOTHROID) 175 MCG tablet 90 tablet 2     Sig: Take 1 tablet (175 mcg) by mouth daily       Thyroid Protocol Passed - 8/29/2023 10:47 AM        Passed - Patient is 12 years or older        Passed - Recent (12 mo) or future (30 days) visit within the authorizing provider's specialty     Patient has had an office visit with the authorizing provider or a provider within the authorizing providers department within the previous 12 mos or has a future within next 30 days. See \"Patient Info\" tab in inbasket, or \"Choose Columns\" in Meds & Orders section of the refill encounter.              Passed - Medication is active on med list        Passed - Normal TSH on file in past 12 months     Recent Labs   Lab Test 04/18/23  1442   TSH 0.54              Passed - No active pregnancy on record     If patient is pregnant or has had a positive pregnancy test, please check TSH.          Passed - No positive pregnancy test in past 12 months     If patient is pregnant or has had a positive pregnancy test, please check TSH.              Prescription approved per [facility] Refill Protocol.     Pt was last seen on 06/27/23    Constance Lynn RN  [facility]   "

## 2023-09-05 ENCOUNTER — TELEPHONE (OUTPATIENT)
Dept: PULMONOLOGY | Facility: CLINIC | Age: 43
End: 2023-09-05

## 2023-09-05 NOTE — TELEPHONE ENCOUNTER
Patient Contacted     Appointment type: RTA  Provider: JUAN DIEGO  Return date: 3/5/24  Specialty phone number: [phone]  Additional appointment(s) needed: NA  Additonal Notes: NA

## 2023-09-12 ENCOUNTER — IMMUNIZATION (OUTPATIENT)
Dept: FAMILY MEDICINE | Facility: CLINIC | Age: 43
End: 2023-09-12
Payer: COMMERCIAL

## 2023-09-12 ENCOUNTER — OFFICE VISIT (OUTPATIENT)
Dept: DERMATOLOGY | Facility: CLINIC | Age: 43
End: 2023-09-12
Payer: COMMERCIAL

## 2023-09-12 DIAGNOSIS — Z23 ENCOUNTER FOR IMMUNIZATION: Primary | ICD-10-CM

## 2023-09-12 DIAGNOSIS — R21 RASH: ICD-10-CM

## 2023-09-12 DIAGNOSIS — B07.9 VERRUCA: Primary | ICD-10-CM

## 2023-09-12 PROCEDURE — 90471 IMMUNIZATION ADMIN: CPT

## 2023-09-12 PROCEDURE — 99207 PR NO CHARGE NURSE ONLY: CPT

## 2023-09-12 PROCEDURE — 17110 DESTRUCTION B9 LES UP TO 14: CPT | Performed by: DERMATOLOGY

## 2023-09-12 PROCEDURE — 99213 OFFICE O/P EST LOW 20 MIN: CPT | Mod: 25 | Performed by: DERMATOLOGY

## 2023-09-12 PROCEDURE — 90686 IIV4 VACC NO PRSV 0.5 ML IM: CPT

## 2023-09-12 ASSESSMENT — PAIN SCALES - GENERAL: PAINLEVEL: NO PAIN (0)

## 2023-09-12 NOTE — LETTER
9/12/2023       RE: Cathy Arroyo  [address]  [facility] [address]     Dear Colleague,    Thank you for referring your patient, Cathy Arroyo, to the [facility] [place] [facility]. Please see a copy of my visit note below.    [facility] Dermatology Note  Encounter Date: Sep 12, 2023  Office Visit     Dermatology Problem List:  # Severe DN  - right upper back, s/p outside bx 6/27/23, excision scheduled for 9/26/23   # Cyst, left mandible  - excision scheduled for 9/26/23  # Verruca, hands.  - Cryo 7/11/23, 9/12/23,   - sal acid recommended, patient hasn't tried yet 9/12/23     Soc hx: .    ____________________________________________    Assessment & Plan:    # Verruca vulgaris, hands. Active, previous lesions improving post cryo, two new lesions on L hand.  - LN2 x5 lesions today (see procedure note below).  - Advised sal acid 40% at bedtime.  - Recommended follow-up LN2 in October to which patient was agreeable to.    # Pruritic papules/excoriations - reports stress-induced hives. Unclear etiology. No urticarial lesions present today, one pink papule right medial calf otherwise secondary changes.  - Offered topical steroid to which patient declined citing condition is well-managed by acupuncture.  - Patient was concerned about relationship to recent wegovy dose increase, but we think this is less unlikely given she had been tolerating the medication well previously.  - Antihistamines make ADHD worse  - Notify if worsening    Procedures Performed:   See below    Follow-up: Scheduled for 10/18/2023    Staff and Medical Student:     Fannie Falcon  3rd Year Medical Student    Staff Physician:  I was present with the medical student who participated in the service and in the documentation of the note. I have verified the history and personally performed the physical exam and medical decision making.  "I agree with the assessment and plan of care as documented in the note.     The procedure(s) was(were) performed by myself.  - Cryotherapy procedure note, location(s): R hand (posterior 3rd finger), L hand (lateral index finger, distal index finger, left palm) . After verbal consent and discussion of risks and benefits including, but not limited to, dyspigmentation/scar, blister, and pain, 5 lesion(s) was(were) treated with 1-2 mm freeze border for 1-2 cycles with liquid nitrogen. Post cryotherapy instructions were provided.  - Procedure(s) performed by faculty.     Lorraine Dozier MD    Department of Dermatology  [facility]: Phone: [phone], Fax: [phone]  9/12/2023    ____________________________________________    CC: Derm Problem (Cryo warts on hands. )    HPI:  Ms. Cathy Arroyo is a(n) 42 year old female who presents today as a return patient for repeat cryo to warts and new concern of \"hives\". Last seen by Dr. Dozier 7/11/23 where cryotherapy was performed.    Since last visit in July, patient endorses a 50% reduction in warts on fingers bilaterally, but no changes in palmar warts. Has noticed new warts on left index finger and thumb. Says she has not tried salicylic acid since last visit.     Also mentions recent \"stress-induced hives\" on all four extremities and eyelid that she attributes to ongoing relationship stressors. Endorses symptoms (itchiness) are worst at night. Chamomile spray has not helped much. Denies taking meds per oral due to previous interactions with ADHD (e.g. jittery). Says acupuncture two weeks ago helped a lot. Patient doesn't want treatments today (just confirmation these lesions are stress-induced). Wonders if recent increase in wegovy dosage has anything to do with outbreak.    FH: mother had \"a couple\" skin cancers (unsure what types).    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 Shares there are ongoing relationship conflicts with partner, but Cathy endorses feeling safe and secure in her current living situation.    Patient is otherwise feeling well, without additional skin concerns.    Labs: None reviewed.    Physical Exam:  Vitals: LMP 07/27/2023 (Exact Date)   SKIN: Focused examination of all four distal extremities was performed.  - Hands: flat-topped, skin-colored papules on fingers bilaterally and L palmar aspect.  - few excoriated papules on arms/legs and one pink papule on right medial calf  - No other lesions of concern on areas examined.     Medications:  Current Outpatient Medications   Medication    ADDERALL XR 25 MG 24 hr capsule    albuterol (PROAIR HFA/PROVENTIL HFA/VENTOLIN HFA) 108 (90 Base) MCG/ACT inhaler    amphetamine-dextroamphetamine (ADDERALL XR) 15 MG 24 hr capsule    amphetamine-dextroamphetamine (ADDERALL XR) 20 MG 24 hr capsule    amphetamine-dextroamphetamine (ADDERALL) 10 MG tablet    ARIPiprazole (ABILIFY) 10 MG tablet    cetirizine (ZYRTEC) 10 MG tablet    diazepam (VALIUM) 5 MG tablet    fluticasone (FLONASE) 50 MCG/ACT nasal spray    fluticasone-salmeterol (ADVAIR HFA) 230-21 MCG/ACT inhaler    ipratropium - albuterol 0.5 mg/2.5 mg/3 mL (DUONEB) 0.5-2.5 (3) MG/3ML neb solution    levothyroxine (SYNTHROID/LEVOTHROID) 175 MCG tablet    medical cannabis (Patient's own supply)    medical cannabis (Patient's own supply)    methocarbamol (ROBAXIN) 500 MG tablet    montelukast (SINGULAIR) 10 MG tablet    multivitamin w/minerals (THERA-VIT-M) tablet    prochlorperazine (COMPAZINE) 10 MG tablet    rizatriptan (MAXALT-MLT) 10 MG ODT    semaglutide (OZEMPIC) 2 MG/3ML pen    Semaglutide-Weight Management (WEGOVY) 1 MG/0.5ML pen    tiZANidine (ZANAFLEX) 4 MG tablet    VENTOLIN  (90 Base) MCG/ACT inhaler    vitamin D3 (CHOLECALCIFEROL) 50 mcg (2000 units) tablet     No current  facility-administered medications for this visit.      Past Medical History:   Patient Active Problem List   Diagnosis    Pineocytoma (H)    Attention deficit disorder    Seasonal allergic rhinitis    Bipolar affective disorder in remission (H)    GBS (group B Streptococcus carrier), +RV culture, currently pregnant    Pre-eclampsia    Moderate persistent asthma without complication    Chronic rhinitis    Tobacco use disorder    Abnormal cytology    S/P total thyroidectomy    History of Graves' disease    Nonintractable migraine, unspecified migraine type    Post-surgical hypothyroidism    Abnormal results of thyroid function studies    Otitis externa    Polyhydramnios affecting pregnancy    Medical cannabis use    Labor and delivery indication for care or intervention    Indication for care in labor and delivery, antepartum    Morbid obesity (H)    Urticarial vasculitis    Dehydration    Breast cancer screening, high risk patient    Bipolar affective disorder, currently manic mixed, moderate (H)    Bilateral chronic knee pain    Lumbar radiculopathy     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    Post partum depression     Post-surgical hypothyroidism 05/2017    Problem, psychiatric 2001 diagnosed Bipolar    Recurrent otitis media chronic ear infections in childhood, recently had a double ear infection followed by another ear infection soon after    Sleep apnea 2019    had a sleep study when pregnant with my second child    Uncomplicated asthma         CC No referring provider defined for this encounter. on close of this encounter.

## 2023-09-12 NOTE — NURSING NOTE
Dermatology Rooming Note    Cathy Arroyo's goals for this visit include:   Chief Complaint   Patient presents with    Derm Problem     Cryo warts on hands.      Ya Melendez RN

## 2023-09-12 NOTE — PATIENT INSTRUCTIONS
If the stress-induced hives ever get to a point where acupuncture doesn't seem to get the job done, please reach out to us and we can prescribe a topical steroid for you.  We recommend trying salicylic acid 40% for the warts.  Our  will reach out to you for a follow-up in October since the cryotherapy seems to be working!      Cryotherapy    What is it?  Use of a very cold liquid, such as liquid nitrogen, to freeze and destroy abnormal skin cells that need to be removed    What should I expect?  Tenderness and redness  A small blister that might grow and fill with dark purple blood. There may be crusting.  More than one treatment may be needed if the lesions do not go away.    How do I care for the treated area?  Gently wash the area with your hands when bathing.  Use a thin layer of Vaseline to help with healing. You may use a Band-Aid.   The area should heal within 7-10 days and may leave behind a pink or lighter color.   Do not use an antibiotic or Neosporin ointment.   You may take acetaminophen (Tylenol) for pain.     Call your doctor if you have:  Severe pain  Signs of infection (warmth, redness, cloudy yellow drainage, and or a bad smell)  Questions or concerns    Who should I call with questions?      [facility]: [phone]      [facility]: [phone]      For urgent needs outside of business hours call the [facility] at [phone] and ask for the dermatology resident on call

## 2023-09-12 NOTE — PROGRESS NOTES
[facility] Dermatology Note  Encounter Date: Sep 12, 2023  Office Visit     Dermatology Problem List:  # Severe DN  - right upper back, s/p outside bx 6/27/23, excision scheduled for 9/26/23   # Cyst, left mandible  - excision scheduled for 9/26/23  # Verruca, hands.  - Cryo 7/11/23, 9/12/23,   - sal acid recommended, patient hasn't tried yet 9/12/23     Soc hx: .    ____________________________________________    Assessment & Plan:    # Verruca vulgaris, hands. Active, previous lesions improving post cryo, two new lesions on L hand.  - LN2 x5 lesions today (see procedure note below).  - Advised sal acid 40% at bedtime.  - Recommended follow-up LN2 in October to which patient was agreeable to.    # Pruritic papules/excoriations - reports stress-induced hives. Unclear etiology. No urticarial lesions present today, one pink papule right medial calf otherwise secondary changes.  - Offered topical steroid to which patient declined citing condition is well-managed by acupuncture.  - Patient was concerned about relationship to recent wegovy dose increase, but we think this is less unlikely given she had been tolerating the medication well previously.  - Antihistamines make ADHD worse  - Notify if worsening    Procedures Performed:   See below    Follow-up: Scheduled for 10/18/2023    Staff and Medical Student:     Fannie Falcon  3rd Year Medical Student    Staff Physician:  I was present with the medical student who participated in the service and in the documentation of the note. I have verified the history and personally performed the physical exam and medical decision making. I agree with the assessment and plan of care as documented in the note.     The procedure(s) was(were) performed by myself.  - Cryotherapy procedure note, location(s): R hand (posterior 3rd finger), L hand (lateral index finger, distal index finger, left palm) . After verbal consent and discussion of risks and  "benefits including, but not limited to, dyspigmentation/scar, blister, and pain, 5 lesion(s) was(were) treated with 1-2 mm freeze border for 1-2 cycles with liquid nitrogen. Post cryotherapy instructions were provided.  - Procedure(s) performed by faculty.     Lorraine Dozier MD    Department of Dermatology  [facility]: Phone: [phone], Fax: [phone]  9/12/2023    ____________________________________________    CC: Derm Problem (Cryo warts on hands. )    HPI:  Ms. Cathy Arroyo is a(n) 42 year old female who presents today as a return patient for repeat cryo to warts and new concern of \"hives\". Last seen by Dr. Dozier 7/11/23 where cryotherapy was performed.    Since last visit in July, patient endorses a 50% reduction in warts on fingers bilaterally, but no changes in palmar warts. Has noticed new warts on left index finger and thumb. Says she has not tried salicylic acid since last visit.     Also mentions recent \"stress-induced hives\" on all four extremities and eyelid that she attributes to ongoing relationship stressors. Endorses symptoms (itchiness) are worst at night. Chamomile spray has not helped much. Denies taking meds per oral due to previous interactions with ADHD (e.g. jittery). Says acupuncture two weeks ago helped a lot. Patient doesn't want treatments today (just confirmation these lesions are stress-induced). Wonders if recent increase in wegovy dosage has anything to do with outbreak.    FH: mother had \"a couple\" skin cancers (unsure what types).    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 Shares there are ongoing relationship conflicts with partner, but Cathy endorses feeling safe and secure in her current living situation.    Patient is otherwise feeling well, without additional skin concerns.    Labs: None " reviewed.    Physical Exam:  Vitals: LMP 07/27/2023 (Exact Date)   SKIN: Focused examination of all four distal extremities was performed.  - Hands: flat-topped, skin-colored papules on fingers bilaterally and L palmar aspect.  - few excoriated papules on arms/legs and one pink papule on right medial calf  - No other lesions of concern on areas examined.     Medications:  Current Outpatient Medications   Medication    ADDERALL XR 25 MG 24 hr capsule    albuterol (PROAIR HFA/PROVENTIL HFA/VENTOLIN HFA) 108 (90 Base) MCG/ACT inhaler    amphetamine-dextroamphetamine (ADDERALL XR) 15 MG 24 hr capsule    amphetamine-dextroamphetamine (ADDERALL XR) 20 MG 24 hr capsule    amphetamine-dextroamphetamine (ADDERALL) 10 MG tablet    ARIPiprazole (ABILIFY) 10 MG tablet    cetirizine (ZYRTEC) 10 MG tablet    diazepam (VALIUM) 5 MG tablet    fluticasone (FLONASE) 50 MCG/ACT nasal spray    fluticasone-salmeterol (ADVAIR HFA) 230-21 MCG/ACT inhaler    ipratropium - albuterol 0.5 mg/2.5 mg/3 mL (DUONEB) 0.5-2.5 (3) MG/3ML neb solution    levothyroxine (SYNTHROID/LEVOTHROID) 175 MCG tablet    medical cannabis (Patient's own supply)    medical cannabis (Patient's own supply)    methocarbamol (ROBAXIN) 500 MG tablet    montelukast (SINGULAIR) 10 MG tablet    multivitamin w/minerals (THERA-VIT-M) tablet    prochlorperazine (COMPAZINE) 10 MG tablet    rizatriptan (MAXALT-MLT) 10 MG ODT    semaglutide (OZEMPIC) 2 MG/3ML pen    Semaglutide-Weight Management (WEGOVY) 1 MG/0.5ML pen    tiZANidine (ZANAFLEX) 4 MG tablet    VENTOLIN  (90 Base) MCG/ACT inhaler    vitamin D3 (CHOLECALCIFEROL) 50 mcg (2000 units) tablet     No current facility-administered medications for this visit.      Past Medical History:   Patient Active Problem List   Diagnosis    Pineocytoma (H)    Attention deficit disorder    Seasonal allergic rhinitis    Bipolar affective disorder in remission (H)    GBS (group B Streptococcus carrier), +RV culture, currently  pregnant    Pre-eclampsia    Moderate persistent asthma without complication    Chronic rhinitis    Tobacco use disorder    Abnormal cytology    S/P total thyroidectomy    History of Graves' disease    Nonintractable migraine, unspecified migraine type    Post-surgical hypothyroidism    Abnormal results of thyroid function studies    Otitis externa    Polyhydramnios affecting pregnancy    Medical cannabis use    Labor and delivery indication for care or intervention    Indication for care in labor and delivery, antepartum    Morbid obesity (H)    Urticarial vasculitis    Dehydration    Breast cancer screening, high risk patient    Bipolar affective disorder, currently manic mixed, moderate (H)    Bilateral chronic knee pain    Lumbar radiculopathy     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    Post partum depression     Post-surgical hypothyroidism 05/2017    Problem, psychiatric 2001 diagnosed Bipolar    Recurrent otitis media chronic ear infections in childhood, recently had a double ear infection followed by another ear infection soon after    Sleep apnea 2019    had a sleep study when pregnant with my second child    Uncomplicated asthma         CC No referring provider defined for this encounter. on close of this encounter.

## 2023-09-12 NOTE — PROGRESS NOTES
Prior to immunization administration, verified patients identity using patient s name and date of birth. Please see Immunization Activity for additional information.     Screening Questionnaire for Adult Immunization    Are you sick today?   No   Do you have allergies to medications, food, a vaccine component or latex?   No   Have you ever had a serious reaction after receiving a vaccination?   No   Do you have a long-term health problem with heart, lung, kidney, or metabolic disease (e.g., diabetes), asthma, a blood disorder, no spleen, complement component deficiency, a cochlear implant, or a spinal fluid leak?  Are you on long-term aspirin therapy?   Yes   Do you have cancer, leukemia, HIV/AIDS, or any other immune system problem?   No   Do you have a parent, brother, or sister with an immune system problem?   No   In the past 3 months, have you taken medications that affect  your immune system, such as prednisone, other steroids, or anticancer drugs; drugs for the treatment of rheumatoid arthritis, Crohn s disease, or psoriasis; or have you had radiation treatments?   No   Have you had a seizure, or a brain or other nervous system problem?   No   During the past year, have you received a transfusion of blood or blood    products, or been given immune (gamma) globulin or antiviral drug?   No   For women: Are you pregnant or is there a chance you could become       pregnant during the next month?   No   Have you received any vaccinations in the past 4 weeks?   No     Immunization questionnaire was positive for at least one answer.  Notified .    I have reviewed the following standing orders:   This patient is due and qualifies for the Influenza vaccine.    Click here for Influenza Vaccine Standing Order    I have reviewed the vaccines inclusion and exclusion criteria; No concerns regarding eligibility.     Patient instructed to remain in clinic for 15 minutes afterwards, and to report any adverse reactions.      Screening performed by Parisa Griffin MA on 9/12/2023 at 10:04 AM.

## 2023-09-18 ASSESSMENT — ASTHMA QUESTIONNAIRES: ACT_TOTALSCORE: 24

## 2023-09-19 ENCOUNTER — OFFICE VISIT (OUTPATIENT)
Dept: FAMILY MEDICINE | Facility: CLINIC | Age: 43
End: 2023-09-19
Payer: COMMERCIAL

## 2023-09-19 VITALS
DIASTOLIC BLOOD PRESSURE: 75 MMHG | WEIGHT: 243.2 LBS | OXYGEN SATURATION: 98 % | RESPIRATION RATE: 15 BRPM | SYSTOLIC BLOOD PRESSURE: 112 MMHG | HEART RATE: 91 BPM | TEMPERATURE: 98.2 F | HEIGHT: 67 IN | BODY MASS INDEX: 38.17 KG/M2

## 2023-09-19 DIAGNOSIS — E89.0 POST-SURGICAL HYPOTHYROIDISM: ICD-10-CM

## 2023-09-19 DIAGNOSIS — E66.01 MORBID OBESITY (H): Primary | ICD-10-CM

## 2023-09-19 DIAGNOSIS — E66.812 CLASS 2 DRUG-INDUCED OBESITY WITH BODY MASS INDEX (BMI) OF 39.0 TO 39.9 IN ADULT, UNSPECIFIED WHETHER SERIOUS COMORBIDITY PRESENT: ICD-10-CM

## 2023-09-19 DIAGNOSIS — E66.1 CLASS 2 DRUG-INDUCED OBESITY WITH BODY MASS INDEX (BMI) OF 39.0 TO 39.9 IN ADULT, UNSPECIFIED WHETHER SERIOUS COMORBIDITY PRESENT: ICD-10-CM

## 2023-09-19 PROCEDURE — 99213 OFFICE O/P EST LOW 20 MIN: CPT | Performed by: NURSE PRACTITIONER

## 2023-09-19 ASSESSMENT — PAIN SCALES - GENERAL: PAINLEVEL: MILD PAIN (3)

## 2023-09-19 NOTE — PROGRESS NOTES
Assessment & Plan     Morbid obesity (H)  Symptomatic with semaglutide 0.25 and 0.5 mg doses. Symptoms easing and now on 1 mg dose. I would like to move to 1.7 mg dose more slowly and not provoke worsening of symptoms. Is having weight loss now at 1 mg dose. One additional month at 1 mg, then move up to 1.7 mg dose. Caveat is if not available, would be ok to trial 1.7 mg. Doses written for both. Follow-up in December at [facility].  Discussed hives are not from the semaglutide - timing of presence did not fit with administration.  - Semaglutide-Weight Management (WEGOVY) 1 MG/0.5ML pen; Inject 1 mg Subcutaneous once a week  - Semaglutide-Weight Management (WEGOVY) 1.7 MG/0.75ML pen; Inject 1.7 mg Subcutaneous once a week    BMI 39.0-39.9,adult  As above  - Semaglutide-Weight Management (WEGOVY) 1 MG/0.5ML pen; Inject 1 mg Subcutaneous once a week  - Semaglutide-Weight Management (WEGOVY) 1.7 MG/0.75ML pen; Inject 1.7 mg Subcutaneous once a week    Class 2 drug-induced obesity with body mass index (BMI) of 39.0 to 39.9 in adult, unspecified whether serious comorbidity present  As above  - Semaglutide-Weight Management (WEGOVY) 1 MG/0.5ML pen; Inject 1 mg Subcutaneous once a week  - Semaglutide-Weight Management (WEGOVY) 1.7 MG/0.75ML pen; Inject 1.7 mg Subcutaneous once a week    Post-surgical hypothyroidism  Recheck - last check was in April  - TSH with free T4 reflex; Future    Ordering of each unique test  Prescription drug management       Patient Instructions   Let's stay at the Wegovy 1 mg for one more month before increasing to 1.7 mg    JANNET Liu CNP  [facility]    Héctor Morgan is a 42 year old, presenting for the following health issues:  Recheck Medication      9/19/2023    11:17 AM   Additional Questions   Roomed by Jaja Collins   Accompanied by N/A       History of Present Illness       Reason for visit:  Wanting to talk about Wegovy and some possible side  "effects I may be experiencing.    She eats 0-1 servings of fruits and vegetables daily.She consumes 2 sweetened beverage(s) daily.She exercises with enough effort to increase her heart rate 9 or less minutes per day.  She exercises with enough effort to increase her heart rate 3 or less days per week.   She is taking medications regularly.     Hives - developed hives about a month ago and it did coincide with discussing separation with , but wanted to make sure it was not the semaglutide. The presence was more correlated with heightened stress and did not presented proximal to injection.    Vomiting - frequently waking up vomiting if eating later than 7 pm. Has vomited with brushing teeth a couple times. Did notice less vomiting now that on 1 mg weekly. Vomit looks like food she's eaten. Vomiting with any alcohol. Also having heartburn with every meal. Had diarrhea only during 0.25-0.5 mg dosing, but now having solid stools. Not having abdominal pain.    Semaglutide dose escalation - started 0.25 mg weekly for four doses, 0.5 mg weekly for four doses, today was the second dose of 1 mg. Symptoms started immediately with 0.25 mg. Has lost 7 lbs. which has been all on the 1 mg dose.        12/19/2022     4:58 PM 6/25/2023     9:36 AM 9/18/2023     7:40 PM   ACT Total Scores   ACT TOTAL SCORE (Goal Greater than or Equal to 20) 22 18 24   In the past 12 months, how many times did you visit the emergency room for your asthma without being admitted to the hospital? 1 1 0   In the past 12 months, how many times were you hospitalized overnight because of your asthma? 0 0 0         Review of Systems     ROS:5 point ROS including CONST, HEENT, Respiratory, CV, and GI other than that noted in the HPI,  is negative         Objective    /75 (BP Location: Right arm, Patient Position: Sitting, Cuff Size: Adult Large)   Pulse 91   Temp 98.2  F (36.8  C) (Temporal)   Resp 15   Ht 1.702 m (5' 7.01\")   Wt 110.3 kg " (243 lb 3.2 oz)   LMP 09/16/2023 (Exact Date)   SpO2 98%   BMI 38.08 kg/m    Body mass index is 38.08 kg/m .  Physical Exam   GENERAL: healthy, alert and no distress  PSYCH: mentation appears normal, affect normal/bright, tearful at times

## 2023-09-25 ENCOUNTER — TELEPHONE (OUTPATIENT)
Dept: DERMATOLOGY | Facility: CLINIC | Age: 43
End: 2023-09-25

## 2023-09-25 ENCOUNTER — MYC MEDICAL ADVICE (OUTPATIENT)
Dept: DERMATOLOGY | Facility: CLINIC | Age: 43
End: 2023-09-25

## 2023-09-25 NOTE — TELEPHONE ENCOUNTER
"Received page from call center for this patient with the subject line \"returning call\". I attempted to call patient back with no response. I left a HIPPA compliant voicemail.     If patient is returning non-urgent call, please advise her to call back during normal business hours. If patient has an urgent concern, please re-page the resident on call.    LAUREEN PAVON MD (PGY-3)  Dermatology Resident       "

## 2023-09-25 NOTE — TELEPHONE ENCOUNTER
[facility] Call Center    Phone Message    May a detailed message be left on voicemail: yes     Reason for Call: Pt returning call about procedure tomorrow. Please call [phone]. Thanks!     Action Taken: Other: DERM    Travel Screening: Not Applicable

## 2023-09-26 ENCOUNTER — OFFICE VISIT (OUTPATIENT)
Dept: DERMATOLOGY | Facility: CLINIC | Age: 43
End: 2023-09-26
Attending: DERMATOLOGY
Payer: COMMERCIAL

## 2023-09-26 VITALS — SYSTOLIC BLOOD PRESSURE: 114 MMHG | HEART RATE: 90 BPM | DIASTOLIC BLOOD PRESSURE: 77 MMHG

## 2023-09-26 DIAGNOSIS — D23.5 DYSPLASTIC NEVUS OF TRUNK: ICD-10-CM

## 2023-09-26 DIAGNOSIS — L72.0 EPIDERMAL CYST: ICD-10-CM

## 2023-09-26 PROCEDURE — 12051 INTMD RPR FACE/MM 2.5 CM/<: CPT | Mod: GC | Performed by: DERMATOLOGY

## 2023-09-26 PROCEDURE — 11402 EXC TR-EXT B9+MARG 1.1-2 CM: CPT | Mod: GC | Performed by: DERMATOLOGY

## 2023-09-26 PROCEDURE — 11441 EXC FACE-MM B9+MARG 0.6-1 CM: CPT | Mod: GC | Performed by: DERMATOLOGY

## 2023-09-26 PROCEDURE — 12032 INTMD RPR S/A/T/EXT 2.6-7.5: CPT | Mod: GC | Performed by: DERMATOLOGY

## 2023-09-26 ASSESSMENT — PAIN SCALES - GENERAL: PAINLEVEL: NO PAIN (0)

## 2023-09-26 NOTE — LETTER
9/26/2023       RE: Cathy Arroyo  [address]  [facility] [facility] [place] [address]     Dear Colleague,    Thank you for referring your patient, Cathy Arroyo, to the [facility]MAT[facility] [place] [facility]. Please see a copy of my visit note below.    [facility] Dermatologic Surgery Excision Note       Dermatology Problem List:  # Severe DN  - right upper back, s/p excision 9/26/23   # Cyst, left mandible  - excision 9/26/23  # Verruca, hands.  - Cryo 7/11/23, 9/12/23,   - sal acid recommended, patient hasn't tried yet 9/12/23     Soc hx: .  ___________________________________________    Case #: 1  Date of Service:  Sep 26, 2023  Surgery: Wide local excision  Staff Surgeon: Maury Badillo DO  Resident Surgeon: Nata García MD  Nurse: Blank Warren LPN; Zamzam Amanda RN    Tumor Type: Epidermoid cyst (clinically diagnosed)  Location: L Angle of the Mandible    Skin Lesion Size:  0.7 cm x 0.6 cm  Margin: 0 mm  Excision size: 0.7 cm x 0.6 cm  Level of Defect: Fat  Repair Type: Intermediate linear  Repair Size: 1.4 cm  Suture Material: 5-0 Monocryl; 5-0 fast absorbing gut    INDICATIONS: This patient presented with a 0.7cm cyst of the left angle of the mandible. Excision was indicated. We discussed the principles of treatment and most likely complications including scarring, bleeding, infection, incomplete excision, wound dehiscence, pain, nerve damage, and recurrence. Informed consent was obtained and the patient underwent the procedure as follows:    PROCEDURE: The patient was taken to the operative suite. Time-out was performed.  The treatment area was anesthetized with 1% lidocaine and epinephrine (1:100,000). The area was prepped with Chlorhexidine and rinsed with sterile saline and draped with sterile towels. The lesion was delineated and excised down to subcutaneous fat in a elliptical manner. Hemostasis  was obtained by electrocoagulation.     REPAIR: An intermediate layered linear closure was selected as the procedure which would maximally preserve both function and cosmesis.    After the excision of the tumor, the area was undermined. Hemostasis was obtained with hyfrecation electrocoagulation.  Closure was oriented so that the wound was in the patient's natural skin tension lines. The deep subcutaneous and dermal layers were then closed with 5-0 monocryl buried vertical mattress sutures. The epidermis was then carefully approximated along the length of the wound using 5-0 Fast Absorbing Gut simple running sutures.     Estimated blood loss was less than 10 ml for all surgical sites. A sterile pressure dressing was applied and wound care instructions, with a written handout, were given. The patient was discharged from the [facility] alert and ambulatory.    Follow-up in PRN      Case #: 2  Date of Service:  Sep 26, 2023  Surgery: Wide local excision  Staff Surgeon: Maury Badillo DO  Resident Surgeon: Nata García MD  Nurse: Blank Warren LPN; Zamzam Amanda RN    Tumor Type: Dysplastic Nevus  Location: Upper Back  Derm-Path Accession #: [id number]    Skin Lesion Size:  0.8 cm x 0.7 cm  Margin: 5 mm  Excision size: 1.8 cm x 1.7 cm  Level of Defect: Fascia  Repair Type: Intermediate linear  Repair Size: 4.0 cm  Suture Material:  4-0 Monocryl    INDICATIONS:  The patient was scheduled for wide local excision of the skin lesion documented above. We discussed the principles of treatment and most likely complications including scarring, bleeding, infection, incomplete excision, wound dehiscence, pain, nerve damage, and recurrence. Informed consent was obtained and the patient underwent the procedure as follows:    PROCEDURE:  With the patient in a prone position, the lesion was outlined with the margin documented above.  The lesion and the necessary margin (excised diameter) was measured and documented  as above.  An ellipse was designed around the lesion to conform to relaxed skin tension lines in an effort to minimize scarring and deformity.  The lesion and surrounding skin were prepped with chlorhexidine, draped, and anesthetized with lidocaine with epinephrine. For the upper backUsing a #15 blade, the skin was excised along premarked lines.  The level of the defect is documented as above.  Wound margins were undermined to limit functional deformity/impairment of adjacent structures. Bleeding vessels were controlled with electrocoagulation.  The dermis and deep subcutaneous tissue were closed with buried vertical mattress sutures using 4-0 Monocryl.  Epidermal approximation was meticulously refined with 4-0 Monocryl subcuticular sutures, resulting in a linear closure with little to no wound tension.  Blood loss was estimated to be less than 5 mL.  The area was coated with petrolatum and covered with a non-adherent dressing followed by gauze and tape. For the left angle of the mandible, 5-0 Monocryl was used with buried vertical mattress sutures. Epidermal approximation was meticulously refined with 5-0 fast absorbing gut, resulting in a linear closure with little to no wound tension.     Postoperative instructions were reviewed per protocol.  The patient left alert and fully oriented.    Follow-up for suture removal: Not applicable as only dissolving sutures used    Maury Badillo DO was immediately available for the entire surgery and was physicially present for the key portions of the procedure.    Nata García MD  Dermatology Resident, PGY-3  [facility]  Pager: [phone]    Staff Physician Comments:   I saw and evaluated the patient with the resident and I agree with the assessment and plan. I was present for the entire procedure and examination.    Maury Badillo DO    Department of Dermatology  [facility]: Phone:  [phone], Fax:[phone]  [facility]: Phone: [phone], Fax: [phone]

## 2023-09-26 NOTE — PROGRESS NOTES
[facility] Dermatologic Surgery Excision Note       Dermatology Problem List:  # Severe DN  - right upper back, s/p excision 9/26/23   # Cyst, left mandible  - excision 9/26/23  # Verruca, hands.  - Cryo 7/11/23, 9/12/23,   - sal acid recommended, patient hasn't tried yet 9/12/23     Soc hx: .  ___________________________________________    Case #: 1  Date of Service:  Sep 26, 2023  Surgery: Wide local excision  Staff Surgeon: Maury Badillo DO  Resident Surgeon: Nata García MD  Nurse: Blank Warren LPN; Zamzam Amanda RN    Tumor Type: Epidermoid cyst (clinically diagnosed)  Location: L Angle of the Mandible    Skin Lesion Size:  0.7 cm x 0.6 cm  Margin: 0 mm  Excision size: 0.7 cm x 0.6 cm  Level of Defect: Fat  Repair Type: Intermediate linear  Repair Size: 1.4 cm  Suture Material: 5-0 Monocryl; 5-0 fast absorbing gut    INDICATIONS: This patient presented with a 0.7cm cyst of the left angle of the mandible. Excision was indicated. We discussed the principles of treatment and most likely complications including scarring, bleeding, infection, incomplete excision, wound dehiscence, pain, nerve damage, and recurrence. Informed consent was obtained and the patient underwent the procedure as follows:    PROCEDURE: The patient was taken to the operative suite. Time-out was performed.  The treatment area was anesthetized with 1% lidocaine and epinephrine (1:100,000). The area was prepped with Chlorhexidine and rinsed with sterile saline and draped with sterile towels. The lesion was delineated and excised down to subcutaneous fat in a elliptical manner. Hemostasis was obtained by electrocoagulation.     REPAIR: An intermediate layered linear closure was selected as the procedure which would maximally preserve both function and cosmesis.    After the excision of the tumor, the area was undermined. Hemostasis was obtained with hyfrecation electrocoagulation.  Closure was oriented so that  the wound was in the patient's natural skin tension lines. The deep subcutaneous and dermal layers were then closed with 5-0 monocryl buried vertical mattress sutures. The epidermis was then carefully approximated along the length of the wound using 5-0 Fast Absorbing Gut simple running sutures.     Estimated blood loss was less than 10 ml for all surgical sites. A sterile pressure dressing was applied and wound care instructions, with a written handout, were given. The patient was discharged from the [facility] alert and ambulatory.    Follow-up in PRN      Case #: 2  Date of Service:  Sep 26, 2023  Surgery: Wide local excision  Staff Surgeon: Maury Badillo DO  Resident Surgeon: Nata García MD  Nurse: Blank Warren LPN; Zamzam Amanda RN    Tumor Type: Dysplastic Nevus  Location: Upper Back  Derm-Path Accession #: [id number]    Skin Lesion Size:  0.8 cm x 0.7 cm  Margin: 5 mm  Excision size: 1.8 cm x 1.7 cm  Level of Defect: Fascia  Repair Type: Intermediate linear  Repair Size: 4.0 cm  Suture Material:  4-0 Monocryl    INDICATIONS:  The patient was scheduled for wide local excision of the skin lesion documented above. We discussed the principles of treatment and most likely complications including scarring, bleeding, infection, incomplete excision, wound dehiscence, pain, nerve damage, and recurrence. Informed consent was obtained and the patient underwent the procedure as follows:    PROCEDURE:  With the patient in a prone position, the lesion was outlined with the margin documented above.  The lesion and the necessary margin (excised diameter) was measured and documented as above.  An ellipse was designed around the lesion to conform to relaxed skin tension lines in an effort to minimize scarring and deformity.  The lesion and surrounding skin were prepped with chlorhexidine, draped, and anesthetized with lidocaine with epinephrine. For the upper backUsing a #15 blade, the skin was excised  along premarked lines.  The level of the defect is documented as above.  Wound margins were undermined to limit functional deformity/impairment of adjacent structures. Bleeding vessels were controlled with electrocoagulation.  The dermis and deep subcutaneous tissue were closed with buried vertical mattress sutures using 4-0 Monocryl.  Epidermal approximation was meticulously refined with 4-0 Monocryl subcuticular sutures, resulting in a linear closure with little to no wound tension.  Blood loss was estimated to be less than 5 mL.  The area was coated with petrolatum and covered with a non-adherent dressing followed by gauze and tape. For the left angle of the mandible, 5-0 Monocryl was used with buried vertical mattress sutures. Epidermal approximation was meticulously refined with 5-0 fast absorbing gut, resulting in a linear closure with little to no wound tension.     Postoperative instructions were reviewed per protocol.  The patient left alert and fully oriented.    Follow-up for suture removal: Not applicable as only dissolving sutures used    Maury Badillo DO was immediately available for the entire surgery and was physicially present for the key portions of the procedure.    Nata García MD  Dermatology Resident, PGY-3  [facility]  Pager: [phone]    Staff Physician Comments:   I saw and evaluated the patient with the resident and I agree with the assessment and plan. I was present for the entire procedure and examination.    Maury Badillo DO    Department of Dermatology  [facility]: Phone: [phone], Fax:[phone]  [facility]: Phone: [phone], Fax: [phone]

## 2023-09-26 NOTE — NURSING NOTE
Chief Complaint   Patient presents with    excision      Patient is here today for an excision on upper back and left mandibular jaw line.      Blank CORLEY CMA

## 2023-09-26 NOTE — PATIENT INSTRUCTIONS

## 2023-09-27 PROCEDURE — 88305 TISSUE EXAM BY PATHOLOGIST: CPT | Mod: TC | Performed by: DERMATOLOGY

## 2023-09-27 PROCEDURE — 88305 TISSUE EXAM BY PATHOLOGIST: CPT | Mod: 26 | Performed by: DERMATOLOGY

## 2023-09-28 LAB
PATH REPORT.COMMENTS IMP SPEC: NORMAL
PATH REPORT.COMMENTS IMP SPEC: NORMAL
PATH REPORT.FINAL DX SPEC: NORMAL
PATH REPORT.GROSS SPEC: NORMAL
PATH REPORT.MICROSCOPIC SPEC OTHER STN: NORMAL
PATH REPORT.RELEVANT HX SPEC: NORMAL

## 2023-10-02 ENCOUNTER — MYC MEDICAL ADVICE (OUTPATIENT)
Dept: FAMILY MEDICINE | Facility: CLINIC | Age: 43
End: 2023-10-02

## 2023-10-02 NOTE — TELEPHONE ENCOUNTER
See pt's My chart message. Unable to see when she should get the one that is currently ordered drawn. Thanks    Constance Lynn RN  [facility]

## 2023-10-18 ENCOUNTER — OFFICE VISIT (OUTPATIENT)
Dept: DERMATOLOGY | Facility: CLINIC | Age: 43
End: 2023-10-18
Payer: COMMERCIAL

## 2023-10-18 ENCOUNTER — LAB (OUTPATIENT)
Dept: LAB | Facility: CLINIC | Age: 43
End: 2023-10-18
Payer: COMMERCIAL

## 2023-10-18 DIAGNOSIS — B07.9 VERRUCA VULGARIS: Primary | ICD-10-CM

## 2023-10-18 DIAGNOSIS — L82.1 SEBORRHEIC KERATOSIS: ICD-10-CM

## 2023-10-18 DIAGNOSIS — E89.0 POST-SURGICAL HYPOTHYROIDISM: ICD-10-CM

## 2023-10-18 LAB — TSH SERPL DL<=0.005 MIU/L-ACNC: 1.57 UIU/ML (ref 0.3–4.2)

## 2023-10-18 PROCEDURE — 84443 ASSAY THYROID STIM HORMONE: CPT | Performed by: PATHOLOGY

## 2023-10-18 PROCEDURE — 36415 COLL VENOUS BLD VENIPUNCTURE: CPT | Performed by: PATHOLOGY

## 2023-10-18 PROCEDURE — 17110 DESTRUCTION B9 LES UP TO 14: CPT | Performed by: DERMATOLOGY

## 2023-10-18 PROCEDURE — 99212 OFFICE O/P EST SF 10 MIN: CPT | Mod: 25 | Performed by: DERMATOLOGY

## 2023-10-18 ASSESSMENT — PAIN SCALES - GENERAL: PAINLEVEL: NO PAIN (0)

## 2023-10-18 NOTE — LETTER
10/18/2023       RE: Cathy Arroyo  [address]  [place] [address]     Dear Colleague,    Thank you for referring your patient, Cathy Arroyo, to the [facility] [place] at [facility]. Please see a copy of my visit note below.    [facility] Dermatology Note  Encounter Date: Oct 18, 2023  Office Visit     Dermatology Problem List:  1. Severe DN  - right upper back, s/p excision 9/26/23   2. Cyst, left mandible  - excision 9/26/23  3. Verruca, hands.  - Cryo 7/11/23, 9/12/23, 10/18/23  - sal acid recommended, patient hasn't tried yet 9/12/23     Soc hx: .    ____________________________________________    Assessment & Plan:    # Seborrheic keratosis, non irritated, vertex scalp.   - Benign, reassurance    # Verruca vulgaris, hands.  - Cryotherapy performed in clinic today, see procedure note below.  - Recommended sal acid 40% at bedtime.      Procedures Performed:   - Cryotherapy procedure note, location(s): hands, bilaterally. After verbal consent and discussion of risks and benefits including, but not limited to, dyspigmentation/scar, blister, and pain, 5 lesion(s) was(were) treated with 1-2 mm freeze border for 1-2 cycles with liquid nitrogen. Post cryotherapy instructions were provided.    Follow-up: 4-6 weeks, sooner if concerns.     Staff and Scribe:     I, Alicia Hillman, am serving as a scribe to document services personally performed by Dr. Lorraine Dozier, based on data collection and the provider's statements to me.     Provider Disclosure:   The documentation recorded by the scribe accurately reflects the services I personally performed and the decisions made by me.    Lorraine Dozier MD    Department of Dermatology  [facility]: Phone: [phone], Fax: [phone]  10/23/2023      ____________________________________________    CC: Derm Problem (Cryo warts on hands. Spot on scalp to check. )    HPI:  Ms. Cathy Arroyo is a(n) 43 year old female who presents today as a return patient for warts.    Patient reports freezing went well last time. She feels wart is very deep in her skin. Sometimes pruritic.    Patient is otherwise feeling well, without additional skin concerns.    Labs Reviewed:  N/A    Physical Exam:  Vitals: LMP 09/16/2023 (Exact Date)   SKIN: Focused examination of hands was performed.  - There is a waxy stuck on tan to brown papule on the vertex scalp.  - Flat-topped, skin-colored papules on fingers bilaterally and L palmar aspect.  - No other lesions of concern on areas examined.     Medications:  Current Outpatient Medications   Medication    ADDERALL XR 25 MG 24 hr capsule    albuterol (PROAIR HFA/PROVENTIL HFA/VENTOLIN HFA) 108 (90 Base) MCG/ACT inhaler    amphetamine-dextroamphetamine (ADDERALL) 10 MG tablet    ARIPiprazole (ABILIFY) 10 MG tablet    cetirizine (ZYRTEC) 10 MG tablet    diazepam (VALIUM) 5 MG tablet    fluticasone-salmeterol (ADVAIR HFA) 230-21 MCG/ACT inhaler    ipratropium - albuterol 0.5 mg/2.5 mg/3 mL (DUONEB) 0.5-2.5 (3) MG/3ML neb solution    levothyroxine (SYNTHROID/LEVOTHROID) 175 MCG tablet    medical cannabis (Patient's own supply)    medical cannabis (Patient's own supply)    methocarbamol (ROBAXIN) 500 MG tablet    montelukast (SINGULAIR) 10 MG tablet    multivitamin w/minerals (THERA-VIT-M) tablet    prochlorperazine (COMPAZINE) 10 MG tablet    rizatriptan (MAXALT-MLT) 10 MG ODT    salicylic acid (MEDIPLAST) 40 % miscellaneous    Salicylic Acid 40 % STCK    Semaglutide-Weight Management (WEGOVY) 1 MG/0.5ML pen    Semaglutide-Weight Management (WEGOVY) 1.7 MG/0.75ML pen    VENTOLIN  (90 Base) MCG/ACT inhaler     No current facility-administered medications for this visit.      Past Medical History:   Patient Active Problem List    Diagnosis    Pineocytoma (H)    Attention deficit disorder    Seasonal allergic rhinitis    Bipolar affective disorder in remission (H24)    GBS (group B Streptococcus carrier), +RV culture, currently pregnant    Pre-eclampsia    Moderate persistent asthma without complication    Chronic rhinitis    Tobacco use disorder    Abnormal cytology    S/P total thyroidectomy    History of Graves' disease    Nonintractable migraine, unspecified migraine type    Post-surgical hypothyroidism    Abnormal results of thyroid function studies    Otitis externa    Polyhydramnios affecting pregnancy    Medical cannabis use    Labor and delivery indication for care or intervention    Indication for care in labor and delivery, antepartum    Morbid obesity (H)    Urticarial vasculitis    Dehydration    Breast cancer screening, high risk patient    Bipolar affective disorder, currently manic mixed, moderate (H)    Bilateral chronic knee pain    Lumbar radiculopathy     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    Post partum depression     Post-surgical hypothyroidism 05/2017    Problem, psychiatric 2001 diagnosed Bipolar    Recurrent otitis media chronic ear infections in childhood, recently had a double ear infection followed by another ear infection soon after    Sleep apnea 2019    had a sleep study when pregnant with my second child    Uncomplicated asthma         CC No referring provider defined for this encounter. on close of this encounter.

## 2023-10-18 NOTE — NURSING NOTE
Dermatology Rooming Note    Cathy Arroyo's goals for this visit include:   Chief Complaint   Patient presents with    Derm Problem     Cryo warts on hands. Spot on scalp to check.      Ya Melendez RN

## 2023-10-18 NOTE — PROGRESS NOTES
[facility] Dermatology Note  Encounter Date: Oct 18, 2023  Office Visit     Dermatology Problem List:  1. Severe DN  - right upper back, s/p excision 9/26/23   2. Cyst, left mandible  - excision 9/26/23  3. Verruca, hands.  - Cryo 7/11/23, 9/12/23, 10/18/23  - sal acid recommended, patient hasn't tried yet 9/12/23     Soc hx: .    ____________________________________________    Assessment & Plan:    # Seborrheic keratosis, non irritated, vertex scalp.   - Benign, reassurance    # Verruca vulgaris, hands.  - Cryotherapy performed in clinic today, see procedure note below.  - Recommended sal acid 40% at bedtime.      Procedures Performed:   - Cryotherapy procedure note, location(s): hands, bilaterally. After verbal consent and discussion of risks and benefits including, but not limited to, dyspigmentation/scar, blister, and pain, 5 lesion(s) was(were) treated with 1-2 mm freeze border for 1-2 cycles with liquid nitrogen. Post cryotherapy instructions were provided.    Follow-up: 4-6 weeks, sooner if concerns.     Staff and Scribe:     I, Alicia Hillman, am serving as a scribe to document services personally performed by Dr. Lorraine Dozier, based on data collection and the provider's statements to me.     Provider Disclosure:   The documentation recorded by the scribe accurately reflects the services I personally performed and the decisions made by me.    Lorraine Dozier MD    Department of Dermatology  [facility]: Phone: [phone], Fax: [phone]  10/23/2023     ____________________________________________    CC: Derm Problem (Cryo warts on hands. Spot on scalp to check. )    HPI:  Ms. Cathy Arroyo is a(n) 43 year old female who presents today as a return patient for warts.    Patient reports freezing went well last time. She feels wart is very deep in her skin. Sometimes  pruritic.    Patient is otherwise feeling well, without additional skin concerns.    Labs Reviewed:  N/A    Physical Exam:  Vitals: LMP 09/16/2023 (Exact Date)   SKIN: Focused examination of hands was performed.  - There is a waxy stuck on tan to brown papule on the vertex scalp.  - Flat-topped, skin-colored papules on fingers bilaterally and L palmar aspect.  - No other lesions of concern on areas examined.     Medications:  Current Outpatient Medications   Medication    ADDERALL XR 25 MG 24 hr capsule    albuterol (PROAIR HFA/PROVENTIL HFA/VENTOLIN HFA) 108 (90 Base) MCG/ACT inhaler    amphetamine-dextroamphetamine (ADDERALL) 10 MG tablet    ARIPiprazole (ABILIFY) 10 MG tablet    cetirizine (ZYRTEC) 10 MG tablet    diazepam (VALIUM) 5 MG tablet    fluticasone-salmeterol (ADVAIR HFA) 230-21 MCG/ACT inhaler    ipratropium - albuterol 0.5 mg/2.5 mg/3 mL (DUONEB) 0.5-2.5 (3) MG/3ML neb solution    levothyroxine (SYNTHROID/LEVOTHROID) 175 MCG tablet    medical cannabis (Patient's own supply)    medical cannabis (Patient's own supply)    methocarbamol (ROBAXIN) 500 MG tablet    montelukast (SINGULAIR) 10 MG tablet    multivitamin w/minerals (THERA-VIT-M) tablet    prochlorperazine (COMPAZINE) 10 MG tablet    rizatriptan (MAXALT-MLT) 10 MG ODT    salicylic acid (MEDIPLAST) 40 % miscellaneous    Salicylic Acid 40 % STCK    Semaglutide-Weight Management (WEGOVY) 1 MG/0.5ML pen    Semaglutide-Weight Management (WEGOVY) 1.7 MG/0.75ML pen    VENTOLIN  (90 Base) MCG/ACT inhaler     No current facility-administered medications for this visit.      Past Medical History:   Patient Active Problem List   Diagnosis    Pineocytoma (H)    Attention deficit disorder    Seasonal allergic rhinitis    Bipolar affective disorder in remission (H24)    GBS (group B Streptococcus carrier), +RV culture, currently pregnant    Pre-eclampsia    Moderate persistent asthma without complication    Chronic rhinitis    Tobacco use disorder     Abnormal cytology    S/P total thyroidectomy    History of Graves' disease    Nonintractable migraine, unspecified migraine type    Post-surgical hypothyroidism    Abnormal results of thyroid function studies    Otitis externa    Polyhydramnios affecting pregnancy    Medical cannabis use    Labor and delivery indication for care or intervention    Indication for care in labor and delivery, antepartum    Morbid obesity (H)    Urticarial vasculitis    Dehydration    Breast cancer screening, high risk patient    Bipolar affective disorder, currently manic mixed, moderate (H)    Bilateral chronic knee pain    Lumbar radiculopathy     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    Post partum depression     Post-surgical hypothyroidism 05/2017    Problem, psychiatric 2001 diagnosed Bipolar    Recurrent otitis media chronic ear infections in childhood, recently had a double ear infection followed by another ear infection soon after    Sleep apnea 2019    had a sleep study when pregnant with my second child    Uncomplicated asthma         CC No referring provider defined for this encounter. on close of this encounter.

## 2023-10-24 NOTE — PATIENT INSTRUCTIONS
Cryotherapy    What is it?  Use of a very cold liquid, such as liquid nitrogen, to freeze and destroy abnormal skin cells that need to be removed    What should I expect?  Tenderness and redness  A small blister that might grow and fill with dark purple blood. There may be crusting.  More than one treatment may be needed if the lesions do not go away.    How do I care for the treated area?  Gently wash the area with your hands when bathing.  Use a thin layer of Vaseline to help with healing. You may use a Band-Aid.   The area should heal within 7-10 days and may leave behind a pink or lighter color.   Do not use an antibiotic or Neosporin ointment.   You may take acetaminophen (Tylenol) for pain.     Call your doctor if you have:  Severe pain  Signs of infection (warmth, redness, cloudy yellow drainage, and or a bad smell)  Questions or concerns    Who should I call with questions?      [facility]: [phone]      [facility]: [phone]      For urgent needs outside of business hours call the [facility] at [phone] and ask for the dermatology resident on call

## 2023-10-24 NOTE — RESULT ENCOUNTER NOTE
Cathy,    Your labs were all normal.  If you have any questions, please feel free to contact the clinic.    DEVEN Winston

## 2023-11-20 ENCOUNTER — THERAPY VISIT (OUTPATIENT)
Dept: PHYSICAL THERAPY | Facility: CLINIC | Age: 43
End: 2023-11-20
Payer: COMMERCIAL

## 2023-11-20 ENCOUNTER — MYC REFILL (OUTPATIENT)
Dept: FAMILY MEDICINE | Facility: CLINIC | Age: 43
End: 2023-11-20

## 2023-11-20 ENCOUNTER — IMMUNIZATION (OUTPATIENT)
Dept: FAMILY MEDICINE | Facility: CLINIC | Age: 43
End: 2023-11-20
Payer: COMMERCIAL

## 2023-11-20 DIAGNOSIS — G89.29 BILATERAL CHRONIC KNEE PAIN: Primary | ICD-10-CM

## 2023-11-20 DIAGNOSIS — M54.16 LUMBAR RADICULOPATHY: ICD-10-CM

## 2023-11-20 DIAGNOSIS — E66.01 MORBID OBESITY (H): ICD-10-CM

## 2023-11-20 DIAGNOSIS — E66.1 CLASS 2 DRUG-INDUCED OBESITY WITH BODY MASS INDEX (BMI) OF 39.0 TO 39.9 IN ADULT, UNSPECIFIED WHETHER SERIOUS COMORBIDITY PRESENT: ICD-10-CM

## 2023-11-20 DIAGNOSIS — Z23 ENCOUNTER FOR IMMUNIZATION: Primary | ICD-10-CM

## 2023-11-20 DIAGNOSIS — M25.562 BILATERAL CHRONIC KNEE PAIN: Primary | ICD-10-CM

## 2023-11-20 DIAGNOSIS — E66.812 CLASS 2 DRUG-INDUCED OBESITY WITH BODY MASS INDEX (BMI) OF 39.0 TO 39.9 IN ADULT, UNSPECIFIED WHETHER SERIOUS COMORBIDITY PRESENT: ICD-10-CM

## 2023-11-20 DIAGNOSIS — M25.561 BILATERAL CHRONIC KNEE PAIN: Primary | ICD-10-CM

## 2023-11-20 PROCEDURE — 97140 MANUAL THERAPY 1/> REGIONS: CPT | Mod: GP | Performed by: PHYSICAL THERAPIST

## 2023-11-20 PROCEDURE — 97530 THERAPEUTIC ACTIVITIES: CPT | Mod: GP | Performed by: PHYSICAL THERAPIST

## 2023-11-20 PROCEDURE — 99207 PR NO CHARGE NURSE ONLY: CPT

## 2023-11-20 PROCEDURE — 90480 ADMN SARSCOV2 VAC 1/ONLY CMP: CPT

## 2023-11-20 PROCEDURE — 97110 THERAPEUTIC EXERCISES: CPT | Mod: GP | Performed by: PHYSICAL THERAPIST

## 2023-11-20 PROCEDURE — 91320 SARSCV2 VAC 30MCG TRS-SUC IM: CPT

## 2023-11-20 NOTE — PROGRESS NOTES
[facility]                                                                                   OUTPATIENT PHYSICAL THERAPY    PLAN OF TREATMENT FOR OUTPATIENT REHABILITATION   Patient's Last Name, First Name, Cathy Callahan YOB: 1980   Provider's Name   [facility]   Medical Record No.  [id number]     Onset Date: 04/02/23  Start of Care Date: 07/31/23     Medical Diagnosis:  Low back pain and knee pain      PT Treatment Diagnosis:  Low back pain and knee pain Plan of Treatment  Frequency/Duration: 2 x month/ 2 months    Certification date from 10/29/23 to 12/20/23         See note for plan of treatment details and functional goals     Jared Constantino, PT                         I CERTIFY THE NEED FOR THESE SERVICES FURNISHED UNDER        THIS PLAN OF TREATMENT AND WHILE UNDER MY CARE     (Physician attestation of this document indicates review and certification of the therapy plan).              Referring Provider:  Marcos Dalal    Initial Assessment  See Epic Evaluation- Start of Care Date: 07/31/23    Therapist Impression:   Cathy returns to therapy after some time away overall feeling better.  Pain is up into her shoulder blades.  Pain is postural related and volume related to the amount of work she is doing.  We discussed some exercises to self manage and TPR for pain relief    GOALS: working    NEXT: NA    PTRX: online    Subjective:  Feeling a little better.  Using CBD balm when having pain or some magnesium balm and not having to take Tylenol.  Has lost weight which has been helpful.  Starting to have pain more between shoulder blades. Bothers after a day of work.    Objective:  Flat thoracic spine  Dowager's hump noted  Full AROM of UEs

## 2023-11-20 NOTE — PROGRESS NOTES
Prior to immunization administration, verified patients identity using patient s name and date of birth. Please see Immunization Activity for additional information.     Screening Questionnaire for Adult Immunization    Are you sick today?   No   Do you have allergies to medications, food, a vaccine component or latex?   No   Have you ever had a serious reaction after receiving a vaccination?   No   Do you have a long-term health problem with heart, lung, kidney, or metabolic disease (e.g., diabetes), asthma, a blood disorder, no spleen, complement component deficiency, a cochlear implant, or a spinal fluid leak?  Are you on long-term aspirin therapy?   No   Do you have cancer, leukemia, HIV/AIDS, or any other immune system problem?   No   Do you have a parent, brother, or sister with an immune system problem?   No   In the past 3 months, have you taken medications that affect  your immune system, such as prednisone, other steroids, or anticancer drugs; drugs for the treatment of rheumatoid arthritis, Crohn s disease, or psoriasis; or have you had radiation treatments?   No   Have you had a seizure, or a brain or other nervous system problem?   No   During the past year, have you received a transfusion of blood or blood    products, or been given immune (gamma) globulin or antiviral drug?   No   For women: Are you pregnant or is there a chance you could become       pregnant during the next month?   No   Have you received any vaccinations in the past 4 weeks?   No     Immunization questionnaire answers were all negative.    I have reviewed the following standing orders:   This patient is due and qualifies for the Covid-19 vaccine.     Click here for COVID-19 Standing Order    I have reviewed the vaccines inclusion and exclusion criteria; No concerns regarding eligibility.     Patient instructed to remain in clinic for 15 minutes afterwards, and to report any adverse reactions.     Screening performed by Justin  Brittni, RN on 11/20/2023 at 3:11 PM.

## 2023-12-11 ENCOUNTER — TELEPHONE (OUTPATIENT)
Dept: OPTOMETRY | Facility: CLINIC | Age: 43
End: 2023-12-11

## 2023-12-11 ASSESSMENT — ENCOUNTER SYMPTOMS
COUGH: 0
EYE PAIN: 0
DIZZINESS: 0
ARTHRALGIAS: 1
CONSTIPATION: 0
SORE THROAT: 0
ABDOMINAL PAIN: 0
HEMATOCHEZIA: 0
FREQUENCY: 0
JOINT SWELLING: 0
BREAST MASS: 0
DIARRHEA: 0
HEARTBURN: 1
HEADACHES: 0
PALPITATIONS: 0
CHILLS: 0
WEAKNESS: 0
DYSURIA: 0
SHORTNESS OF BREATH: 0
NAUSEA: 1
FEVER: 0
NERVOUS/ANXIOUS: 0
MYALGIAS: 1
PARESTHESIAS: 0
HEMATURIA: 0

## 2023-12-11 NOTE — TELEPHONE ENCOUNTER
Left message for patient. Patient was fit for new contacts at her appointment in 2023. There is a final contact lens prescription with the expiration date in . Contact prescription is NOT

## 2023-12-11 NOTE — TELEPHONE ENCOUNTER
[facility]    Phone Message    May a detailed message be left on voicemail: yes     Reason for Call: Other: Pt is requesting a call from  states her optical show is saying her contact lens are  but Mychart states differently. Please reach out to pt regarding her concerns     Action Taken: Message routed to:  [facility] ([facility] eye    Travel Screening: Not Applicable

## 2023-12-12 ENCOUNTER — OFFICE VISIT (OUTPATIENT)
Dept: FAMILY MEDICINE | Facility: CLINIC | Age: 43
End: 2023-12-12
Payer: COMMERCIAL

## 2023-12-12 VITALS
SYSTOLIC BLOOD PRESSURE: 117 MMHG | RESPIRATION RATE: 9 BRPM | DIASTOLIC BLOOD PRESSURE: 76 MMHG | HEIGHT: 67 IN | TEMPERATURE: 97.4 F | BODY MASS INDEX: 37.75 KG/M2 | HEART RATE: 88 BPM | OXYGEN SATURATION: 98 % | WEIGHT: 240.5 LBS

## 2023-12-12 DIAGNOSIS — J45.41 MODERATE PERSISTENT ASTHMA WITH ACUTE EXACERBATION: ICD-10-CM

## 2023-12-12 DIAGNOSIS — Z00.00 ROUTINE GENERAL MEDICAL EXAMINATION AT A HEALTH CARE FACILITY: Primary | ICD-10-CM

## 2023-12-12 DIAGNOSIS — F31.70 BIPOLAR AFFECTIVE DISORDER IN REMISSION (H): ICD-10-CM

## 2023-12-12 DIAGNOSIS — Z87.42 HISTORY OF ABNORMAL CERVICAL PAP SMEAR: ICD-10-CM

## 2023-12-12 DIAGNOSIS — F31.12 BIPOLAR AFFECTIVE DISORDER, CURRENTLY MANIC, MODERATE (H): ICD-10-CM

## 2023-12-12 DIAGNOSIS — J45.40 MODERATE PERSISTENT ASTHMA WITHOUT COMPLICATION: ICD-10-CM

## 2023-12-12 DIAGNOSIS — E66.01 MORBID OBESITY (H): ICD-10-CM

## 2023-12-12 DIAGNOSIS — J30.2 SEASONAL ALLERGIC RHINITIS, UNSPECIFIED TRIGGER: ICD-10-CM

## 2023-12-12 DIAGNOSIS — Z12.4 SCREENING FOR MALIGNANT NEOPLASM OF CERVIX: ICD-10-CM

## 2023-12-12 DIAGNOSIS — J45.20 MILD INTERMITTENT ASTHMA WITHOUT COMPLICATION: ICD-10-CM

## 2023-12-12 PROCEDURE — 87624 HPV HI-RISK TYP POOLED RSLT: CPT | Performed by: NURSE PRACTITIONER

## 2023-12-12 PROCEDURE — G0145 SCR C/V CYTO,THINLAYER,RESCR: HCPCS | Performed by: NURSE PRACTITIONER

## 2023-12-12 PROCEDURE — 99396 PREV VISIT EST AGE 40-64: CPT | Performed by: NURSE PRACTITIONER

## 2023-12-12 PROCEDURE — 99214 OFFICE O/P EST MOD 30 MIN: CPT | Mod: 25 | Performed by: NURSE PRACTITIONER

## 2023-12-12 RX ORDER — ALBUTEROL SULFATE 90 UG/1
2 AEROSOL, METERED RESPIRATORY (INHALATION) EVERY 6 HOURS
Qty: 18 G | Refills: 3 | Status: SHIPPED | OUTPATIENT
Start: 2023-12-12 | End: 2024-04-16

## 2023-12-12 RX ORDER — MONTELUKAST SODIUM 10 MG/1
10 TABLET ORAL EVERY MORNING
Qty: 90 TABLET | Refills: 3 | Status: SHIPPED | OUTPATIENT
Start: 2023-12-12 | End: 2024-04-16

## 2023-12-12 RX ORDER — FLUTICASONE PROPIONATE AND SALMETEROL 250; 50 UG/1; UG/1
1 POWDER RESPIRATORY (INHALATION) 2 TIMES DAILY
Qty: 14 EACH | Refills: 11 | Status: SHIPPED | OUTPATIENT
Start: 2023-12-12 | End: 2024-09-03

## 2023-12-12 RX ORDER — ARIPIPRAZOLE 10 MG/1
5 TABLET ORAL DAILY
COMMUNITY
Start: 2023-12-12

## 2023-12-12 RX ORDER — CETIRIZINE HYDROCHLORIDE 10 MG/1
10 TABLET ORAL DAILY
Qty: 90 TABLET | Refills: 3 | Status: SHIPPED | OUTPATIENT
Start: 2023-12-12 | End: 2024-05-06

## 2023-12-12 ASSESSMENT — ENCOUNTER SYMPTOMS
WEAKNESS: 0
ARTHRALGIAS: 1
HEMATOCHEZIA: 0
BREAST MASS: 0
FEVER: 0
NERVOUS/ANXIOUS: 0
HEADACHES: 0
PARESTHESIAS: 0
CHILLS: 0
DIARRHEA: 0
COUGH: 0
JOINT SWELLING: 0
DIZZINESS: 0
HEARTBURN: 1
SORE THROAT: 0
FREQUENCY: 0
DYSURIA: 0
MYALGIAS: 1
HEMATURIA: 0
SHORTNESS OF BREATH: 0
CONSTIPATION: 0
NAUSEA: 1
PALPITATIONS: 0
EYE PAIN: 0
ABDOMINAL PAIN: 0

## 2023-12-12 ASSESSMENT — PAIN SCALES - GENERAL: PAINLEVEL: MILD PAIN (3)

## 2023-12-12 ASSESSMENT — ASTHMA QUESTIONNAIRES: ACT_TOTALSCORE: 22

## 2023-12-12 NOTE — PROGRESS NOTES
SUBJECTIVE:   Cathy is a 43 year old, presenting for the following:  Physical        2023    10:24 AM   Additional Questions   Roomed by Meghana Maddox       Healthy Habits:     Getting at least 3 servings of Calcium per day:  Yes    Bi-annual eye exam:  Yes    Dental care twice a year:  NO    Sleep apnea or symptoms of sleep apnea:  Excessive snoring    Diet:  Regular (no restrictions)    Frequency of exercise:  None    Taking medications regularly:  Yes    Medication side effects:  Other    Additional concerns today:  No    HM -    Cancer screenings - pap UTD thru , see breast cancer screening for high risk below   Chronic conditions screenings - ACT (5,4,5,4,4)   Immunizations - eligible for HPV      Mental health - followed by psychiatry every two months, went down to 5 mg on abilify. Feels like Adderall is no longer doing anything. Seeing therapist.     Weight - started semaglutide (as Wegovy) 4.5 months ago. Has taken first two doses of semaglutide 2.4 mg. Has lost 10 lbs (4%). Curbing addictive behaviors, not just overeating. Has noticed increased mood stability. Quit vaping and maintaining 5 cigarettes per day and reduced drinking. Gets sick if eats too much volume, greasy food, has alcohol. Has been vomiting less as she learns what triggers that. No longer having diarrhea.     Wt Readings from Last 5 Encounters:   23 109.1 kg (240 lb 8 oz)   23 110.3 kg (243 lb 3.2 oz)   23 113.5 kg (250 lb 4.8 oz)   23 112.9 kg (249 lb)   23 113.2 kg (249 lb 8 oz)     Social History     Tobacco Use    Smoking status: Every Day     Packs/day: 0.25     Years: 14.00     Additional pack years: 0.00     Total pack years: 3.50     Types: Cigarettes     Start date: 2004     Last attempt to quit: 2022     Years since quittin.8     Passive exposure: Past    Smokeless tobacco: Never    Tobacco comments:     Stopped smoking for both pregnamcies and breastfeeding of both  children     Working on quitting smoking (6/27/23)   Substance Use Topics    Alcohol use: Yes     Alcohol/week: 4.0 standard drinks of alcohol     Types: 4 Drinks containing 0.5 oz of alcohol per week             12/11/2023     9:15 AM   Alcohol Use   Prescreen: >3 drinks/day or >7 drinks/week? No          No data to display              Reviewed orders with patient.  Reviewed health maintenance and updated orders accordingly - Yes  Lab work is in process  Labs reviewed in EPIC    Breast Cancer Screening:    FHS-7:       1/5/2022    11:47 AM 2/7/2023     1:10 PM 2/14/2023     2:43 PM   Breast CA Risk Assessment (FHS-7)   Did any of your first-degree relatives have breast or ovarian cancer? No No No   Did any of your relatives have bilateral breast cancer? No No No   Did any man in your family have breast cancer? No No No   Did any woman in your family have breast and ovarian cancer? Yes No No   Did any woman in your family have breast cancer before age 50 y? Yes No No   Do you have 2 or more relatives with breast and/or ovarian cancer? Yes Yes Yes   Do you have 2 or more relatives with breast and/or bowel cancer? Yes No No       Mammogram Screening - Alternate mammogram schedule due to high risk screening - saw genetics 2021   Appt with Hayley Lora - manages her screenings  Pertinent mammograms are reviewed under the imaging tab.    History of abnormal Pap smear: YES - updated in Problem List and Health Maintenance accordingly  [facility] records  11/2010  LSIL+HPV     12/2010   [place] MAHESH 1    10/2011 ASCUS +HPV - no colp  6/2013 NIL  1/2015 NIL -HPV  2/2018 NIL -HPV  12/2021 NIL -HPV        Latest Ref Rng & Units 12/14/2021    11:58 AM 2/8/2018    12:26 PM 2/8/2018    11:13 AM   PAP / HPV   PAP  Negative for Intraepithelial Lesion or Malignancy (NILM)      PAP (Historical)    NIL    HPV 16 DNA Negative Negative  Negative     HPV 18 DNA Negative Negative  Negative     Other HR HPV Negative Negative  Negative    "    Reviewed and updated as needed this visit by clinical staff   Tobacco  Allergies  Meds              Reviewed and updated as needed this visit by Provider                   Review of Systems   Constitutional:  Negative for chills and fever.   HENT:  Negative for congestion, ear pain, hearing loss and sore throat.    Eyes:  Negative for pain and visual disturbance.   Respiratory:  Negative for cough and shortness of breath.    Cardiovascular:  Negative for chest pain, palpitations and peripheral edema.   Gastrointestinal:  Positive for heartburn and nausea. Negative for abdominal pain, constipation, diarrhea and hematochezia.   Breasts:  Negative for tenderness, breast mass and discharge.   Genitourinary:  Negative for dysuria, frequency, genital sores, hematuria, pelvic pain, urgency, vaginal bleeding and vaginal discharge.   Musculoskeletal:  Positive for arthralgias and myalgias. Negative for joint swelling.   Skin:  Negative for rash.   Neurological:  Negative for dizziness, weakness, headaches and paresthesias.   Psychiatric/Behavioral:  Negative for mood changes. The patient is not nervous/anxious.         OBJECTIVE:   /76   Pulse 88   Temp 97.4  F (36.3  C) (Temporal)   Resp (!) 9   Ht 1.699 m (5' 6.89\")   Wt 109.1 kg (240 lb 8 oz)   LMP 12/02/2023 (Exact Date)   SpO2 98%   BMI 37.79 kg/m    Physical Exam  GENERAL: healthy, alert and no distress  EYES: Eyes grossly normal to inspection, PERRL and conjunctivae and sclerae normal  HENT: ear canals and TM's normal, nose and mouth without ulcers or lesions  NECK: no adenopathy, no asymmetry, masses, or scars and thyroid normal to palpation  RESP: lungs clear to auscultation - no rales, rhonchi or wheezes  CV: regular rate and rhythm, normal S1 S2, no S3 or S4, no murmur, click or rub, no peripheral edema and peripheral pulses strong  ABDOMEN: soft, nontender, no hepatosplenomegaly, no masses and bowel sounds normal   (female): normal female " external genitalia, normal urethral meatus, vaginal mucosa pink, moist, well rugated, and normal cervix/adnexa/uterus without masses or discharge  MS: no gross musculoskeletal defects noted, no edema  SKIN: no suspicious lesions or rashes  NEURO: Normal strength and tone, mentation intact and speech normal  PSYCH: mentation appears normal, affect normal/bright    Diagnostic Test Results:  Labs reviewed in Epic  Results for orders placed or performed in visit on 12/12/23   HPV High Risk Types DNA Cervical     Status: None   Result Value Ref Range    Other HR HPV Negative Negative    HPV16 DNA Negative Negative    HPV18 DNA Negative Negative    FINAL DIAGNOSIS       This patient's sample is negative for HPV DNA.        This test was developed and its performance characteristics determined by the [facility]. It has not been cleared or approved by the FDA. The laboratory is regulated under CLIA as qualified to perform high-complexity testing. This test is used for clinical purposes. It should not be regarded as investigational or for research.    METHODOLOGY: The Roche Narciso 4800 system uses automated extraction, simultaneous amplification of HPV (L1 region) and beta-globin, followed by real time detection of fluorescent labeled HPV and beta globin using specific oligonucleotide probes. The test specifically identifies types HPV 16 DNA and HPV 18 DNA while concurrently detecting the rest of the high risk types (31, 33, 35, 39, 45, 51, 52, 56, 58, 59, 66 or 68).    COMMENTS: This test is not intended for use as a screening device for woman under age 30 with normal cervical cytology. Results should be correlated with cytologic and histologic findings. Close clinical followup is recommended.       Pap screen with HPV - recommended age 30 - 65 years     Status: None   Result Value Ref Range    Interpretation        Negative for Intraepithelial Lesion or  Malignancy (NILM)    Comment         Papanicolaou Test Limitations:  Cervical cytology is a screening test with limited sensitivity, and regular screening is critical for cancer prevention.  Pap tests are primarily effective for the diagnosis/prevention of squamous cell carcinoma, not adenocarcinoma or other cancers.        Specimen Adequacy       Satisfactory for evaluation, endocervical/transformation zone component present    Clinical Information       none      LMP/Menopause Date       12/2/2023      Reflex Testing Yes regardless of result     Previous Abnormal?       No[MAHESH in 2010      Performing Labs       The technical component of this testing was completed at [facility]         ASSESSMENT/PLAN:   (Z00.00) Routine general medical examination at a health care facility  (primary encounter diagnosis)  Comment:   Plan:     (F31.12) Bipolar affective disorder, currently manic, moderate (H)  Comment:   Plan: Stable and followed by psychiatry. Most recent change was a decrease in abilify down to 5 mg. Feels Adderall isn't working as well anymore. Discussed there is some basis for the semaglutide interfering with adderall efficacy and to bring up with psychiatry    (J45.41) Moderate persistent asthma with acute exacerbation  Comment:   Plan: fluticasone-salmeterol (ADVAIR DISKUS) 250-50         MCG/ACT inhaler     Stable - primarily needs to stop smoking.    (E66.01) Morbid obesity (H)  Comment:   Plan: Has lost modest amount of weight on semaglutide. Started on 2.4 mg dose two weeks ago. Side effects minimal and/or resolved. Feels it is helping with mood and cigarette discontinuation as well.     (J45.40) Moderate persistent asthma without complication  Comment:   Plan: montelukast (SINGULAIR) 10 MG tablet        Refill - allergies control necessary for asthma control    (F31.70) Bipolar affective disorder in remission (H24)  Comment:   Plan: As  "above    (Z87.42) History of abnormal cervical Pap smear  Comment:   Plan: Noted - MAHESH 1 2015    (Z12.4) Screening for malignant neoplasm of cervix  Comment:   Plan: Pap screen with HPV - recommended age 30 - 65         years, HPV Hold (Lab Only), HPV High Risk Types        DNA Cervical            (J30.2) Seasonal allergic rhinitis, unspecified trigger  Comment:   Plan: montelukast (SINGULAIR) 10 MG tablet,         cetirizine (ZYRTEC) 10 MG tablet            (J45.20) Mild intermittent asthma without complication  Comment:   Plan: albuterol (PROAIR HFA/PROVENTIL HFA/VENTOLIN         HFA) 108 (90 Base) MCG/ACT inhaler            Patient has been advised of split billing requirements and indicates understanding: Yes      COUNSELING:  Reviewed preventive health counseling, as reflected in patient instructions      BMI:   Estimated body mass index is 37.79 kg/m  as calculated from the following:    Height as of this encounter: 1.699 m (5' 6.89\").    Weight as of this encounter: 109.1 kg (240 lb 8 oz).   Weight management plan: semaglutide      She reports that she has been smoking cigarettes. She started smoking about 19 years ago. She has a 3.50 pack-year smoking history. She has been exposed to tobacco smoke. She has never used smokeless tobacco.  Nicotine/Tobacco Cessation Plan:   Self help information given to patient          JANNET Liu CNP  [facility]  "

## 2023-12-13 ENCOUNTER — OFFICE VISIT (OUTPATIENT)
Dept: DERMATOLOGY | Facility: CLINIC | Age: 43
End: 2023-12-13
Payer: COMMERCIAL

## 2023-12-13 DIAGNOSIS — B07.9 VERRUCA VULGARIS: Primary | ICD-10-CM

## 2023-12-13 DIAGNOSIS — L90.5 SCAR: ICD-10-CM

## 2023-12-13 PROCEDURE — 99212 OFFICE O/P EST SF 10 MIN: CPT | Mod: 25 | Performed by: DERMATOLOGY

## 2023-12-13 PROCEDURE — 17110 DESTRUCTION B9 LES UP TO 14: CPT | Performed by: DERMATOLOGY

## 2023-12-13 ASSESSMENT — PAIN SCALES - GENERAL: PAINLEVEL: NO PAIN (0)

## 2023-12-13 NOTE — PROGRESS NOTES
[facility] Dermatology Note  Encounter Date: Dec 13, 2023  Office Visit     Dermatology Problem List:  1. Severe DN  - right upper back, s/p excision 9/26/23   2. Cyst, left mandible  - excision 9/26/23  3. Verruca, hands.  - Cryo 7/11/23, 9/12/23, 10/18/23  - sal acid recommended, patient hasn't tried yet 12/13/23     Soc hx:     ____________________________________________    Assessment & Plan:    # Verruca vulgaris, hands. Wart on right hand appears resolved on exam  - Cryotherapy performed in clinic today, see procedure note below.  - Recommended sal acid 40% at bedtime.    # Scar, right upper back, at site of severely DN excision.   - Offered cosmetic derm referral.  - Patient will try Arnica.    Procedures Performed:   - Cryotherapy procedure note, location(s): see above. After verbal consent and discussion of risks and benefits including, but not limited to, dyspigmentation/scar, blister, and pain, 3  lesion(s) was(were) treated with 1-2 mm freeze border for 1-2 cycles with liquid nitrogen. Post cryotherapy instructions were provided.    Follow-up: 4-6 weeks, sooner if concerns.     Staff and Scribe:     Scribe Disclosure:   I, ENRIQUE MARTINEZ, am serving as a scribe; to document services personally performed by Lorraine Dozier MD -based on data collection and the provider's statements to me.    Provider Disclosure:   The documentation recorded by the scribe accurately reflects the services I personally performed and the decisions made by me.    Lorraine Dozier MD    Department of Dermatology  [facility]: Phone: [phone], Fax: [phone]  12/18/2023     ____________________________________________    CC: Derm Problem (Here today for cryo of warts on hands. )    HPI:  Ms. Cathy Arroyo is a(n) 43 year old female who presents today as a return patient for  warts.      Patient is otherwise feeling well, without additional skin concerns.    Labs Reviewed:  N/A    Physical Exam:  Vitals: LMP 12/02/2023 (Exact Date)   SKIN: Focused examination of the hands was performed.  - Flat-topped, skin-colored papules on fingers bilaterally and L palmar aspect.  - Verruca on right hand appears resolved.  - Well healed scar on right upper back with overlying telangiectasias.  - No other lesions of concern on areas examined.     Medications:  Current Outpatient Medications   Medication    ADDERALL XR 25 MG 24 hr capsule    albuterol (PROAIR HFA/PROVENTIL HFA/VENTOLIN HFA) 108 (90 Base) MCG/ACT inhaler    amphetamine-dextroamphetamine (ADDERALL) 10 MG tablet    ARIPiprazole (ABILIFY) 10 MG tablet    cetirizine (ZYRTEC) 10 MG tablet    diazepam (VALIUM) 5 MG tablet    fluticasone-salmeterol (ADVAIR DISKUS) 250-50 MCG/ACT inhaler    ipratropium - albuterol 0.5 mg/2.5 mg/3 mL (DUONEB) 0.5-2.5 (3) MG/3ML neb solution    levothyroxine (SYNTHROID/LEVOTHROID) 175 MCG tablet    medical cannabis (Patient's own supply)    medical cannabis (Patient's own supply)    methocarbamol (ROBAXIN) 500 MG tablet    montelukast (SINGULAIR) 10 MG tablet    multivitamin w/minerals (THERA-VIT-M) tablet    prochlorperazine (COMPAZINE) 10 MG tablet    rizatriptan (MAXALT-MLT) 10 MG ODT    Salicylic Acid 40 % STCK    Semaglutide-Weight Management (WEGOVY) 2.4 MG/0.75ML pen     No current facility-administered medications for this visit.      Past Medical History:   Patient Active Problem List   Diagnosis    Pineocytoma (H)    Attention deficit disorder    Seasonal allergic rhinitis    Bipolar affective disorder in remission (H24)    GBS (group B Streptococcus carrier), +RV culture, currently pregnant    Pre-eclampsia    Moderate persistent asthma without complication    Chronic rhinitis    Tobacco use disorder    Abnormal cytology    S/P total thyroidectomy    History of Graves' disease    Nonintractable  migraine, unspecified migraine type    Post-surgical hypothyroidism    Abnormal results of thyroid function studies    Otitis externa    Polyhydramnios affecting pregnancy    Medical cannabis use    Labor and delivery indication for care or intervention    Indication for care in labor and delivery, antepartum    Morbid obesity (H)    Urticarial vasculitis    Dehydration    Breast cancer screening, high risk patient    Bipolar affective disorder, currently manic mixed, moderate (H)    Bilateral chronic knee pain    Lumbar radiculopathy     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    Post partum depression     Post-surgical hypothyroidism 05/2017    Problem, psychiatric 2001 diagnosed Bipolar    Recurrent otitis media chronic ear infections in childhood, recently had a double ear infection followed by another ear infection soon after    Sleep apnea 2019    had a sleep study when pregnant with my second child    Uncomplicated asthma         CC No referring provider defined for this encounter. on close of this encounter.

## 2023-12-13 NOTE — NURSING NOTE
Dermatology Rooming Note    Cathy Arroyo's goals for this visit include:   Chief Complaint   Patient presents with    Derm Problem     Here today for cryo of warts on hands.      Ya Melendez RN

## 2023-12-13 NOTE — LETTER
12/13/2023       RE: Cathy Arroyo  [address]  [place] [address]     Dear Colleague,    Thank you for referring your patient, Cathy Arroyo, to the [facility] [place] [facility]. Please see a copy of my visit note below.    [facility] Dermatology Note  Encounter Date: Dec 13, 2023  Office Visit     Dermatology Problem List:  1. Severe DN  - right upper back, s/p excision 9/26/23   2. Cyst, left mandible  - excision 9/26/23  3. Verruca, hands.  - Cryo 7/11/23, 9/12/23, 10/18/23  - sal acid recommended, patient hasn't tried yet 12/13/23     Soc hx:     ____________________________________________    Assessment & Plan:    # Verruca vulgaris, hands. Wart on right hand appears resolved on exam  - Cryotherapy performed in clinic today, see procedure note below.  - Recommended sal acid 40% at bedtime.    # Scar, right upper back, at site of severely DN excision.   - Offered cosmetic derm referral.  - Patient will try Arnica.    Procedures Performed:   - Cryotherapy procedure note, location(s): see above. After verbal consent and discussion of risks and benefits including, but not limited to, dyspigmentation/scar, blister, and pain, 3  lesion(s) was(were) treated with 1-2 mm freeze border for 1-2 cycles with liquid nitrogen. Post cryotherapy instructions were provided.    Follow-up: 4-6 weeks, sooner if concerns.     Staff and Scribe:     Scribe Disclosure:   I, ENRIQUE MARTINEZ, am serving as a scribe; to document services personally performed by Lorraine Dozier MD -based on data collection and the provider's statements to me.    Provider Disclosure:   The documentation recorded by the scribe accurately reflects the services I personally performed and the decisions made by me.    Lorraine Dozier MD    Department of Dermatology  [facility]  [facility]: Phone: [phone], Fax: [phone]  12/18/2023     ____________________________________________    CC: Derm Problem (Here today for cryo of warts on hands. )    HPI:  Ms. Cathy Arroyo is a(n) 43 year old female who presents today as a return patient for warts.      Patient is otherwise feeling well, without additional skin concerns.    Labs Reviewed:  N/A    Physical Exam:  Vitals: LMP 12/02/2023 (Exact Date)   SKIN: Focused examination of the hands was performed.  - Flat-topped, skin-colored papules on fingers bilaterally and L palmar aspect.  - Verruca on right hand appears resolved.  - Well healed scar on right upper back with overlying telangiectasias.  - No other lesions of concern on areas examined.     Medications:  Current Outpatient Medications   Medication    ADDERALL XR 25 MG 24 hr capsule    albuterol (PROAIR HFA/PROVENTIL HFA/VENTOLIN HFA) 108 (90 Base) MCG/ACT inhaler    amphetamine-dextroamphetamine (ADDERALL) 10 MG tablet    ARIPiprazole (ABILIFY) 10 MG tablet    cetirizine (ZYRTEC) 10 MG tablet    diazepam (VALIUM) 5 MG tablet    fluticasone-salmeterol (ADVAIR DISKUS) 250-50 MCG/ACT inhaler    ipratropium - albuterol 0.5 mg/2.5 mg/3 mL (DUONEB) 0.5-2.5 (3) MG/3ML neb solution    levothyroxine (SYNTHROID/LEVOTHROID) 175 MCG tablet    medical cannabis (Patient's own supply)    medical cannabis (Patient's own supply)    methocarbamol (ROBAXIN) 500 MG tablet    montelukast (SINGULAIR) 10 MG tablet    multivitamin w/minerals (THERA-VIT-M) tablet    prochlorperazine (COMPAZINE) 10 MG tablet    rizatriptan (MAXALT-MLT) 10 MG ODT    Salicylic Acid 40 % STCK    Semaglutide-Weight Management (WEGOVY) 2.4 MG/0.75ML pen     No current facility-administered medications for this visit.      Past Medical History:   Patient Active Problem List   Diagnosis    Pineocytoma (H)    Attention deficit disorder    Seasonal allergic rhinitis    Bipolar affective  disorder in remission (H24)    GBS (group B Streptococcus carrier), +RV culture, currently pregnant    Pre-eclampsia    Moderate persistent asthma without complication    Chronic rhinitis    Tobacco use disorder    Abnormal cytology    S/P total thyroidectomy    History of Graves' disease    Nonintractable migraine, unspecified migraine type    Post-surgical hypothyroidism    Abnormal results of thyroid function studies    Otitis externa    Polyhydramnios affecting pregnancy    Medical cannabis use    Labor and delivery indication for care or intervention    Indication for care in labor and delivery, antepartum    Morbid obesity (H)    Urticarial vasculitis    Dehydration    Breast cancer screening, high risk patient    Bipolar affective disorder, currently manic mixed, moderate (H)    Bilateral chronic knee pain    Lumbar radiculopathy     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    Post partum depression     Post-surgical hypothyroidism 05/2017    Problem, psychiatric 2001 diagnosed Bipolar    Recurrent otitis media chronic ear infections in childhood, recently had a double ear infection followed by another ear infection soon after    Sleep apnea 2019    had a sleep study when pregnant with my second child    Uncomplicated asthma         CC No referring provider defined for this encounter. on close of this encounter.

## 2023-12-13 NOTE — PATIENT INSTRUCTIONS
Cryotherapy    What is it?  Use of a very cold liquid, such as liquid nitrogen, to freeze and destroy abnormal skin cells that need to be removed    What should I expect?  Tenderness and redness  A small blister that might grow and fill with dark purple blood. There may be crusting.  More than one treatment may be needed if the lesions do not go away.    How do I care for the treated area?  Gently wash the area with your hands when bathing.  Use a thin layer of Vaseline to help with healing. You may use a Band-Aid.   The area should heal within 7-10 days and may leave behind a pink or lighter color.   Do not use an antibiotic or Neosporin ointment.   You may take acetaminophen (Tylenol) for pain.     Call your doctor if you have:  Severe pain  Signs of infection (warmth, redness, cloudy yellow drainage, and or a bad smell)  Questions or concerns    Who should I call with questions?      [facility]: [phone]      [facility]: [phone]      For urgent needs outside of business hours call the [facility] at [phone] and ask for the dermatology resident on call

## 2023-12-27 ENCOUNTER — HOSPITAL ENCOUNTER (EMERGENCY)
Facility: CLINIC | Age: 43
End: 2023-12-27
Payer: COMMERCIAL

## 2023-12-29 ENCOUNTER — MYC MEDICAL ADVICE (OUTPATIENT)
Dept: FAMILY MEDICINE | Facility: CLINIC | Age: 43
End: 2023-12-29

## 2023-12-29 ENCOUNTER — TELEPHONE (OUTPATIENT)
Dept: FAMILY MEDICINE | Facility: CLINIC | Age: 43
End: 2023-12-29

## 2023-12-29 DIAGNOSIS — E66.01 MORBID OBESITY (H): ICD-10-CM

## 2023-12-29 DIAGNOSIS — E66.812 CLASS 2 DRUG-INDUCED OBESITY WITH BODY MASS INDEX (BMI) OF 39.0 TO 39.9 IN ADULT, UNSPECIFIED WHETHER SERIOUS COMORBIDITY PRESENT: ICD-10-CM

## 2023-12-29 DIAGNOSIS — E66.1 CLASS 2 DRUG-INDUCED OBESITY WITH BODY MASS INDEX (BMI) OF 39.0 TO 39.9 IN ADULT, UNSPECIFIED WHETHER SERIOUS COMORBIDITY PRESENT: ICD-10-CM

## 2023-12-29 NOTE — TELEPHONE ENCOUNTER
Prior Authorization Retail Medication Request    Medication/Dose: Wegovy 2.4  Diagnosis and ICD code (if different than what is on RX):    New/renewal/insurance change PA/secondary ins. PA:  Previously Tried and Failed:    Rationale:      Insurance   Primary: [facility]  Insurance ID:  [id number]    Secondary (if applicable):  Insurance ID:      Pharmacy Information (if different than what is on RX)  Name:  [facility]  Phone:  [phone]  Fax:[phone]      Robin,     The following rx requires a PA. The previous PA  . Please contact [facility] with any updates.    Thanks,   Melissa Hernandez, PhT  [facility] Float Department

## 2023-12-29 NOTE — TELEPHONE ENCOUNTER
Prior Authorization Retail Medication Request    Medication/Dose: Wegovy 2.4  Diagnosis and ICD code (if different than what is on RX):    New/renewal/insurance change PA/secondary ins. PA:  Previously Tried and Failed:    Rationale:      Insurance   Primary: [employer]  Insurance ID:  [id number]

## 2024-01-03 NOTE — TELEPHONE ENCOUNTER
Central Prior Authorization Team - Phone: [phone]     PA Initiation    Medication: WEGOVY 2.4 MG/0.75ML SC SOAJ  Insurance Company: [employer] Non-Specialty PA's - Phone [phone] Fax [phone]  Pharmacy Filling the Rx: [facility] - [place], [place] - [address]  Filling Pharmacy Phone: [phone]  Filling Pharmacy Fax:    Start Date: 1/3/2024

## 2024-01-08 NOTE — TELEPHONE ENCOUNTER
Central Prior Authorization Team - Phone: [phone]     PA Initiation- have sent to new plan has new PBM Dajuan.    Medication: WEGOVY 2.4 MG/0.75ML SC SOAJ  Insurance Company: [employer] Non-Specialty PA's - Phone [phone] Fax [phone]  Pharmacy Filling the Rx: [facility] - [place], [place] - [address] [place] [address]  Filling Pharmacy Phone: [phone]  Filling Pharmacy Fax:    Start Date: 1/3/2024

## 2024-01-08 NOTE — TELEPHONE ENCOUNTER
Central Prior Authorization Team - Phone: [phone]     PRIOR AUTHORIZATION DENIED    Medication: WEGOVY 2.4 MG/0.75ML SC SOAJ  Insurance Company: [employer] Non-Specialty PA's - Phone [phone] Fax [phone]  Denial Date: 1/8/2024    Denial Reason(s):           Appeal Information: If the provider would like to appeal, please provide a letter of medical necessity and route back to the team. Otherwise you can close the encounter. Thank you, Central PA Team    Patient Notified: NO  Unfortunately, we cannot call the patient with denials because we do not know what next steps the MD will take nor can we give medical advice, please notify the patient of what they are to expect for the continuation of their therapy from the provider.

## 2024-01-08 NOTE — TELEPHONE ENCOUNTER
Prior Authorization Follow Up    Received message from plan patient inactive. Looked up patient new insurance has new PBM.  Will resent to new PBM Dajuan.

## 2024-01-09 ENCOUNTER — MYC MEDICAL ADVICE (OUTPATIENT)
Dept: FAMILY MEDICINE | Facility: CLINIC | Age: 44
End: 2024-01-09
Payer: COMMERCIAL

## 2024-01-16 NOTE — TELEPHONE ENCOUNTER
Central Prior Authorization Team - Phone: [phone]     Medication Appeal Initiation    Medication: WEGOVY 2.4 MG/0.75ML SC SOAJ  Appeal Start Date:  1/16/2024  Insurance Company: [employer]  Insurance Phone:   Insurance Fax:   Comments:   LMN SENT VIA FAX

## 2024-01-22 ENCOUNTER — TELEPHONE (OUTPATIENT)
Dept: PULMONOLOGY | Facility: CLINIC | Age: 44
End: 2024-01-22
Payer: COMMERCIAL

## 2024-01-22 NOTE — TELEPHONE ENCOUNTER
Patient Contacted for the patient to call back and schedule the following:    Appointment type: RTA  Provider: JUAN DIEGO  Return date: 04/2024  Specialty phone number: [phone]  Additional appointment(s) needed: NA  Additonal Notes: NA

## 2024-02-02 ENCOUNTER — MYC MEDICAL ADVICE (OUTPATIENT)
Dept: FAMILY MEDICINE | Facility: CLINIC | Age: 44
End: 2024-02-02
Payer: COMMERCIAL

## 2024-02-06 ENCOUNTER — VIRTUAL VISIT (OUTPATIENT)
Dept: FAMILY MEDICINE | Facility: CLINIC | Age: 44
End: 2024-02-06
Payer: COMMERCIAL

## 2024-02-06 DIAGNOSIS — R73.01 IMPAIRED FASTING GLUCOSE: ICD-10-CM

## 2024-02-06 DIAGNOSIS — Z13.220 LIPID SCREENING: ICD-10-CM

## 2024-02-06 DIAGNOSIS — R19.7 DIARRHEA, UNSPECIFIED TYPE: Primary | ICD-10-CM

## 2024-02-06 PROCEDURE — 99443 PR PHYSICIAN TELEPHONE EVALUATION 21-30 MIN: CPT | Mod: 93 | Performed by: NURSE PRACTITIONER

## 2024-02-06 NOTE — PATIENT INSTRUCTIONS
Start psyllium husk and start with 1 Tbs a day mix with 8 oz water  Increase to 1 Tbs twice a day after a week  If diarrhea doesn't resolve within the month, we need to check labs and stool tests    Get fasting labs drawn at lab only visit  Fasting 10-12 hours no calories  Meds are ok  Don't take supplements

## 2024-02-06 NOTE — PROGRESS NOTES
Cathy is a 43 year old who is being evaluated via a billable telephone visit.      What phone number would you like to be contacted at? [phone]  How would you like to obtain your AVS? Erica  Distant Location (provider location):  On-site    End:  10:54 AM   Start:  10:25 AM       Assessment & Plan     Diarrhea, unspecified type  Reaction to GLP-1 and coming off  I think fiber will help get back to regular - recommend psyllium husk  Check some basic labs to rule out other underlying cause. Patient to be fasting to rule out diabetes  - Comprehensive metabolic panel (BMP + Alb, Alk Phos, ALT, AST, Total. Bili, TP); Future  - CBC with platelets and differential; Future  - TSH with free T4 reflex; Future    Lipid screening  - Lipid panel reflex to direct LDL Fasting; Future    Ordering of each unique test          Patient Instructions   Start psyllium husk and start with 1 Tbs a day mix with 8 oz water  Increase to 1 Tbs twice a day after a week  If diarrhea doesn't resolve within the month, we need to check labs and stool tests    Get fasting labs drawn at lab only visit  Fasting 10-12 hours no calories  Meds are ok  Don't take supplements    Subjective   Cathy is a 43 year old, presenting for the following health issues:  Weight Problem    HPI     Covid positive recently - only one test and hasn't had symptoms so wondering if it was a false positive  Tested when eyes were watering a lot and asthma had been worse    Got approved for Wegovy, but has been off for a month so not sure if she wants to go back on because she did have a lot of difficulty with the taper up and vomiting. Had vomiting and diarrhea (yellow, watery, mucousy) and fecal urgency with coming off the medication. Getting better, but still having loose stools. Felt that the medication had the most help with mood and addiction (greatly reduced smoking and alcohol) than with weight. Does feel like she learned how to eat and is no longer binging,  even after being off the medication. Has maintained weight loss. If she wants to do weight loss in the future, she would prefer to restart topiramate.    Lab Results   Component Value Date    A1C 5.4 04/18/2023    A1C 5.3 07/09/2021    A1C 5.3 12/13/2018     Getting migraines about once a month and headaches daily when dehydrated.       Review of Systems  Constitutional, HEENT, cardiovascular, pulmonary, gi and gu systems are negative, except as otherwise noted.      Objective         Vitals:  No vitals were obtained today due to virtual visit.    Physical Exam   General: Alert and no distress //Respiratory: No audible wheeze, cough, or shortness of breath // Psychiatric:  Appropriate affect, tone, and pace of words          Phone call duration: 29 minutes  Signed Electronically by: JANNET Liu CNP

## 2024-02-07 ENCOUNTER — LAB (OUTPATIENT)
Dept: LAB | Facility: CLINIC | Age: 44
End: 2024-02-07
Payer: COMMERCIAL

## 2024-02-07 DIAGNOSIS — R73.01 IMPAIRED FASTING GLUCOSE: ICD-10-CM

## 2024-02-07 DIAGNOSIS — R19.7 DIARRHEA, UNSPECIFIED TYPE: ICD-10-CM

## 2024-02-07 DIAGNOSIS — Z13.220 LIPID SCREENING: ICD-10-CM

## 2024-02-07 LAB
BASOPHILS # BLD AUTO: 0 10E3/UL (ref 0–0.2)
BASOPHILS NFR BLD AUTO: 0 %
EOSINOPHIL # BLD AUTO: 0.3 10E3/UL (ref 0–0.7)
EOSINOPHIL NFR BLD AUTO: 4 %
ERYTHROCYTE [DISTWIDTH] IN BLOOD BY AUTOMATED COUNT: 13.4 % (ref 10–15)
HCT VFR BLD AUTO: 43 % (ref 35–47)
HGB BLD-MCNC: 13.7 G/DL (ref 11.7–15.7)
IMM GRANULOCYTES # BLD: 0 10E3/UL
IMM GRANULOCYTES NFR BLD: 0 %
LYMPHOCYTES # BLD AUTO: 2.7 10E3/UL (ref 0.8–5.3)
LYMPHOCYTES NFR BLD AUTO: 32 %
MCH RBC QN AUTO: 28.4 PG (ref 26.5–33)
MCHC RBC AUTO-ENTMCNC: 31.9 G/DL (ref 31.5–36.5)
MCV RBC AUTO: 89 FL (ref 78–100)
MONOCYTES # BLD AUTO: 0.5 10E3/UL (ref 0–1.3)
MONOCYTES NFR BLD AUTO: 6 %
NEUTROPHILS # BLD AUTO: 5 10E3/UL (ref 1.6–8.3)
NEUTROPHILS NFR BLD AUTO: 58 %
PLATELET # BLD AUTO: 340 10E3/UL (ref 150–450)
RBC # BLD AUTO: 4.82 10E6/UL (ref 3.8–5.2)
WBC # BLD AUTO: 8.6 10E3/UL (ref 4–11)

## 2024-02-07 PROCEDURE — 82172 ASSAY OF APOLIPOPROTEIN: CPT | Mod: 90

## 2024-02-07 PROCEDURE — 84439 ASSAY OF FREE THYROXINE: CPT

## 2024-02-07 PROCEDURE — 36415 COLL VENOUS BLD VENIPUNCTURE: CPT

## 2024-02-07 PROCEDURE — 84443 ASSAY THYROID STIM HORMONE: CPT

## 2024-02-07 PROCEDURE — 99000 SPECIMEN HANDLING OFFICE-LAB: CPT

## 2024-02-07 PROCEDURE — 83036 HEMOGLOBIN GLYCOSYLATED A1C: CPT

## 2024-02-07 PROCEDURE — 80053 COMPREHEN METABOLIC PANEL: CPT

## 2024-02-07 PROCEDURE — 80061 LIPID PANEL: CPT

## 2024-02-07 PROCEDURE — 85025 COMPLETE CBC W/AUTO DIFF WBC: CPT

## 2024-02-08 ENCOUNTER — MYC MEDICAL ADVICE (OUTPATIENT)
Dept: FAMILY MEDICINE | Facility: CLINIC | Age: 44
End: 2024-02-08
Payer: COMMERCIAL

## 2024-02-08 LAB
ALBUMIN SERPL BCG-MCNC: 4.1 G/DL (ref 3.5–5.2)
ALP SERPL-CCNC: 84 U/L (ref 40–150)
ALT SERPL W P-5'-P-CCNC: 38 U/L (ref 0–50)
ANION GAP SERPL CALCULATED.3IONS-SCNC: 10 MMOL/L (ref 7–15)
AST SERPL W P-5'-P-CCNC: 27 U/L (ref 0–45)
BILIRUB SERPL-MCNC: 0.3 MG/DL
BUN SERPL-MCNC: 8.5 MG/DL (ref 6–20)
CALCIUM SERPL-MCNC: 9 MG/DL (ref 8.6–10)
CHLORIDE SERPL-SCNC: 107 MMOL/L (ref 98–107)
CHOLEST SERPL-MCNC: 190 MG/DL
CREAT SERPL-MCNC: 0.7 MG/DL (ref 0.51–0.95)
DEPRECATED HCO3 PLAS-SCNC: 23 MMOL/L (ref 22–29)
EGFRCR SERPLBLD CKD-EPI 2021: >90 ML/MIN/1.73M2
FASTING STATUS PATIENT QL REPORTED: YES
GLUCOSE SERPL-MCNC: 109 MG/DL (ref 70–99)
HDLC SERPL-MCNC: 53 MG/DL
LDLC SERPL CALC-MCNC: 114 MG/DL
NONHDLC SERPL-MCNC: 137 MG/DL
POTASSIUM SERPL-SCNC: 4.6 MMOL/L (ref 3.4–5.3)
PROT SERPL-MCNC: 7 G/DL (ref 6.4–8.3)
SODIUM SERPL-SCNC: 140 MMOL/L (ref 135–145)
T4 FREE SERPL-MCNC: 1.52 NG/DL (ref 0.9–1.7)
TRIGL SERPL-MCNC: 114 MG/DL
TSH SERPL DL<=0.005 MIU/L-ACNC: 4.34 UIU/ML (ref 0.3–4.2)

## 2024-02-09 LAB — HBA1C MFR BLD: 5.3 % (ref 0–5.6)

## 2024-02-09 NOTE — RESULT ENCOUNTER NOTE
Cathy,    Your hemoglobin A1C is completely normal - not even in the pre-diabetes range.  If you have any questions, please feel free to contact the clinic.    DEVEN Winston

## 2024-02-09 NOTE — RESULT ENCOUNTER NOTE
Cathy,    The labs are not frightening, but there are some things to follow-up on. I added a hemoglobin A1C that estimates the glucoses over the past three months. The single glucose is not diagnostic for diabetes.     The cholesterol overall is ok. For your age and without hypertension or diabetes, I want the LDL under 130 and it is. The triglycerides also look well under goal and you get some heart protection from the HDL being at 53. I added on a lab to further clarify cardiovascular risk called apolipoprotein B.    Your kidney and liver labs all look great. The CBC is normal. This is reassuring regarding the diarrhea as well as overall health.    The TSH is mildly elevated, but the T4 is normal. I recommend we recheck this in 6 weeks. If still elevated, we will look at that in the context of what symptoms you have at that time.      If you have any questions, please feel free to contact the clinic.    DEVEN Winston

## 2024-02-11 LAB — APO B100 SERPL-MCNC: 88 MG/DL

## 2024-02-12 NOTE — RESULT ENCOUNTER NOTE
Cathy,    I had run a follow-up test that helps tell us more about cardiovascular risk and it is normal, which is reassuring that your cholesterol is not something we need to treat with medicine.  If you have any questions, please feel free to contact the clinic.    ARMEN Dasilva, DEVEN

## 2024-02-15 NOTE — DISCHARGE SUMMARY
[facility] Discharge Summary    Cathy Arroyo MRN# [id number]   Age: 38 year old YOB: 1980     Date of Admission:  2019  Date of Discharge:  2019  Admitting Physician:  Kathy Rutledge MD  Discharge Physician:  Aliya Brooks MD    Admit Dx:   - Intrauterine pregnancy at 39w2d  - Gestational HTN  - Anxiety, using medical marijuana  - Postsurgical hypothyroidism  - H/o preeclampsia  - Obesity, BMI 46  - Transverse fetal lie  - Desires permanent sterilization    Discharge Dx:  - Same as above, s/p primary low transverse  section and tubal ligation    Procedures:  - Primary low transverse  section with double layer uterine closure via Pfannenstiel incision  - Distal salpingectomy  - Spinal analgesia    Admit HPI:  38 year old   presented at 39w2d for induction of labor due to gestational hypertension. Fetus was noted to be in transverse lie. She declined cephalic version.  Please see her admit H&P for full details of her PMH, PSH, Meds, Allergies and exam on admit.    Operative Course:  Surgery was uncomplicated. QBL from the delivery was 893mL. Please see her  Section Operative Note for full details regarding her delivery.    Operative Findings: Viable bazzi female in oblique lie, Apgars 8/9, weight 9 pounds 2 ounces    Postoperative Course:  Her postoperative course was uncomplicated. Her blood pressure was normotensive to low mild range. Social work was consulted given significant anxiety and medical marijuana use in pregnancy, had no concerns as she had good support for outpatient care. Her synthroid was reduced to her prepregnancy dose of 150mcg on POD#1. On POD#3, she was meeting all of her postpartum goals and deemed stable for discharge. She was voiding without difficulty, tolerating a regular diet without nausea and vomiting, her pain was well controlled on oral pain medicines and her lochia was  appropriate. Her hemoglobin prior to delivery was 12.1 and after delivery was 10.2. Her Rh status was pos and Rhogam was notnot indicated.    Discharge Medications:     Review of your medicines      START taking      Dose / Directions   ibuprofen 800 MG tablet  Commonly known as:  ADVIL/MOTRIN  Used for:  S/P  section      Dose:  800 mg  Take 1 tablet (800 mg) by mouth every 6 hours as needed for other (cramping)  Quantity:  30 tablet  Refills:  0     oxyCODONE 5 MG tablet  Commonly known as:  ROXICODONE  Used for:  S/P  section      Dose:  5-10 mg  Take 1-2 tablets (5-10 mg) by mouth every 4 hours as needed for moderate to severe pain  Quantity:  30 tablet  Refills:  0     senna-docusate 8.6-50 MG tablet  Commonly known as:  SENOKOT-S/PERICOLACE  Used for:  S/P  section      Dose:  2 tablet  Take 2 tablets by mouth 2 times daily as needed for constipation  Quantity:  30 tablet  Refills:  0        CONTINUE these medicines which may have CHANGED, or have new prescriptions. If we are uncertain of the size of tablets/capsules you have at home, strength may be listed as something that might have changed.      Dose / Directions   * acetaminophen 325 MG tablet  Commonly known as:  TYLENOL  This may have changed:  Another medication with the same name was added. Make sure you understand how and when to take each.      Dose:  650 mg  Take 650 mg by mouth every 4 hours as needed for mild pain  Refills:  0     * acetaminophen 325 MG tablet  Commonly known as:  TYLENOL  This may have changed:  You were already taking a medication with the same name, and this prescription was added. Make sure you understand how and when to take each.  Used for:  S/P  section      Dose:  650 mg  Take 2 tablets (650 mg) by mouth every 4 hours as needed for mild pain  Quantity:  100 tablet  Refills:  0     levothyroxine 150 MCG tablet  Commonly known as:  SYNTHROID/LEVOTHROID  This may have changed:      medication  strength    how much to take  Used for:  S/P total thyroidectomy, History of Graves' disease      Dose:  150 mcg  Take 1 tablet (150 mcg) by mouth daily  Quantity:  60 tablet  Refills:  0         * This list has 2 medication(s) that are the same as other medications prescribed for you. Read the directions carefully, and ask your doctor or other care provider to review them with you.            CONTINUE these medicines which have NOT CHANGED      Dose / Directions   cetirizine 5 MG tablet  Commonly known as:  zyrTEC      Dose:  5 mg  Take 5 mg by mouth every morning When not using the Allegra  Refills:  0     DHA NATURAL OMEGA-3 200 MG capsule  Generic drug:  Docosahexaenoic Acid      Dose:  200 mg  Take 200 mg by mouth every morning  Refills:  0     fexofenadine 60 MG tablet  Commonly known as:  ALLEGRA      Dose:  60 mg  Take 60 mg by mouth every morning  Refills:  0     ipratropium - albuterol 0.5 mg/2.5 mg/3 mL 0.5-2.5 (3) MG/3ML neb solution  Commonly known as:  DUONEB  Used for:  Mild persistent asthma with acute exacerbation      Dose:  1 vial  Take 1 vial (3 mLs) by nebulization every 6 hours as needed for shortness of breath / dyspnea or wheezing  Quantity:  90 vial  Refills:  3     * medical cannabis (Patient's own supply)      Dose:  1 Dose  Take 1 Dose by mouth See Admin Instructions (The purpose of this order is to document that the patient reports taking medical cannabis.  This is not a prescription, and is not used to certify that the patient has a qualifying medical condition.)    Tangerine Oral Suspension Unflavored 1-5 ml up to two times daily.  Total THC = 240 mg, Total CBD Trace  Refills:  0     * medical cannabis (Patient's own supply)      Dose:  1 Dose  Take 1 Dose by mouth See Admin Instructions (The purpose of this order is to document that the patient reports taking medical cannabis.  This is not a prescription, and is not used to certify that the patient has a qualifying medical  condition.)    Cobalt oral suspension: take 1-3 ml by mouth two times daily  Total CBD 1200 mg. Total THC 60 mg  Refills:  0     montelukast 10 MG tablet  Commonly known as:  SINGULAIR      Dose:  10 mg  Take 10 mg by mouth every morning  Refills:  0     PRENATAL VITAMIN PO      Dose:  1 tablet  Take 1 tablet by mouth every morning  Refills:  0         * This list has 2 medication(s) that are the same as other medications prescribed for you. Read the directions carefully, and ask your doctor or other care provider to review them with you.            STOP taking    aspirin 81 MG EC tablet        fluconazole 150 MG tablet  Commonly known as:  DIFLUCAN        ondansetron 4 MG tablet  Commonly known as:  ZOFRAN              Where to get your medicines      These medications were sent to [facility] [place], [place] - [address]  [address] [facility] [address], [facility] [address]    Phone:  [phone]     acetaminophen 325 MG tablet    ibuprofen 800 MG tablet    levothyroxine 150 MCG tablet    senna-docusate 8.6-50 MG tablet     Some of these will need a paper prescription and others can be bought over the counter. Ask your nurse if you have questions.    Bring a paper prescription for each of these medications    oxyCODONE 5 MG tablet           Discharge/Disposition:  Cathy Arroyo was discharged to home in stable condition with the following instructions/medications:  1) Call for temperature > 100.4, bright red vaginal bleeding >1 pad an hour x 2 hours, foul smelling vaginal discharge, pain not controlled by usual oral pain meds, persistent nausea and vomiting not controlled on medications, drainage or redness from incision site  2) She received distal salpingectomy for contraception.  3) For feeding she decided to breastfeed.  4) She was instructed to follow-up with her primary OB for a BP, incision and mood check in 1 week and in 6 weeks for a routine postpartum visit.  5) Discharge  activity:  No heavy lifting >15 lbs or strenuous activity for 6 weeks, pelvic rest for 6 weeks, no driving or operating machinery while on narcotics.    Lizy Manrique MD  OB/Gyn PGY-2  5/9/2019      Physician Attestation   I, Aliya Brooks, saw and evaluated this patient prior to discharge.  I discussed the patient with the resident/fellow and agree with plan of care as documented in the note.      I personally reviewed vital signs, medications, labs and exam.    I personally spent 20 minutes on discharge activities.    Aliya Brooks MD  Date of Service (when I saw the patient): 05/09/19       What Type Of Note Output Would You Prefer (Optional)?: Standard Output How Severe Is Your Rash?: moderate Is This A New Presentation, Or A Follow-Up?: Rash

## 2024-02-21 ENCOUNTER — OFFICE VISIT (OUTPATIENT)
Dept: FAMILY MEDICINE | Facility: CLINIC | Age: 44
End: 2024-02-21
Payer: COMMERCIAL

## 2024-02-21 VITALS
RESPIRATION RATE: 16 BRPM | BODY MASS INDEX: 37.35 KG/M2 | OXYGEN SATURATION: 99 % | SYSTOLIC BLOOD PRESSURE: 124 MMHG | TEMPERATURE: 97.7 F | WEIGHT: 238 LBS | HEART RATE: 96 BPM | DIASTOLIC BLOOD PRESSURE: 83 MMHG | HEIGHT: 67 IN

## 2024-02-21 DIAGNOSIS — L72.9 SUBCUTANEOUS CYST: Primary | ICD-10-CM

## 2024-02-21 DIAGNOSIS — Z86.39 HISTORY OF GRAVES' DISEASE: ICD-10-CM

## 2024-02-21 DIAGNOSIS — F31.70 BIPOLAR AFFECTIVE DISORDER IN REMISSION (H): ICD-10-CM

## 2024-02-21 DIAGNOSIS — J45.41 MODERATE PERSISTENT ASTHMA WITH ACUTE EXACERBATION: ICD-10-CM

## 2024-02-21 DIAGNOSIS — E89.0 POST-SURGICAL HYPOTHYROIDISM: ICD-10-CM

## 2024-02-21 DIAGNOSIS — G43.009 MIGRAINE WITHOUT AURA AND WITHOUT STATUS MIGRAINOSUS, NOT INTRACTABLE: ICD-10-CM

## 2024-02-21 DIAGNOSIS — F31.12 BIPOLAR AFFECTIVE DISORDER, CURRENTLY MANIC, MODERATE (H): ICD-10-CM

## 2024-02-21 PROCEDURE — 99214 OFFICE O/P EST MOD 30 MIN: CPT | Performed by: NURSE PRACTITIONER

## 2024-02-21 RX ORDER — TOPIRAMATE 50 MG/1
50 TABLET, FILM COATED ORAL DAILY
COMMUNITY
Start: 2024-02-21 | End: 2024-05-06

## 2024-02-21 RX ORDER — RIZATRIPTAN BENZOATE 10 MG/1
10 TABLET, ORALLY DISINTEGRATING ORAL
Qty: 18 TABLET | Refills: 11 | Status: SHIPPED | OUTPATIENT
Start: 2024-02-21 | End: 2024-05-06

## 2024-02-21 RX ORDER — IPRATROPIUM BROMIDE AND ALBUTEROL SULFATE 2.5; .5 MG/3ML; MG/3ML
1 SOLUTION RESPIRATORY (INHALATION) EVERY 6 HOURS PRN
Qty: 90 ML | Refills: 1 | Status: SHIPPED | OUTPATIENT
Start: 2024-02-21 | End: 2024-04-16

## 2024-02-21 ASSESSMENT — PAIN SCALES - GENERAL: PAINLEVEL: NO PAIN (0)

## 2024-02-21 NOTE — PROGRESS NOTES
Assessment & Plan     Subcutaneous cyst  Resolving. Non-fluctant today and I&D would not likely be helpful. Warm compresses. If recurrent - advise I&D same or next day. Would want to break up loculations with I&D rather than simple I&D    History of Graves' disease  Last TSH elevated - recheck 6 weeks from last check - labs ordered  - TSH with free T4 reflex; Future    Post-surgical hypothyroidism  As above  - TSH with free T4 reflex; Future    Bipolar affective disorder, currently manic mixed, moderate (H)  Noted new medication in med list  - topiramate (TOPAMAX) 50 MG tablet; Take 1 tablet (50 mg) by mouth daily    Bipolar affective disorder in remission (H24)  - topiramate (TOPAMAX) 50 MG tablet; Take 1 tablet (50 mg) by mouth daily    Moderate persistent asthma with acute exacerbation  Refilled duoneb   Asthma not well right now with recent covid - declined prednisone related to effects on bipolar. O2 stable and not having audible wheezing  - ipratropium - albuterol 0.5 mg/2.5 mg/3 mL (DUONEB) 0.5-2.5 (3) MG/3ML neb solution; Take 1 vial (3 mLs) by nebulization every 6 hours as needed for shortness of breath or wheezing    Migraine without aura and without status migrainosus, not intractable  Refilled  - rizatriptan (MAXALT-MLT) 10 MG ODT; Take 1 tablet (10 mg) by mouth at onset of headache for migraine (repeat in 2 hours if needed) May repeat in 2 hours. Max 3 tablets/24 hours.    Prescription drug management      There are no Patient Instructions on file for this visit.    Héctor Morgan is a 43 year old, presenting for the following health issues:  Conjunctivitis and Ingrown Hair      2/21/2024     7:23 AM   Additional Questions   Roomed by Jaja POLLARD     Conjunctivitis    History of Present Illness       Reason for visit:  Ingrown hair    She eats 0-1 servings of fruits and vegetables daily.She consumes 1 sweetened beverage(s) daily.She exercises with enough effort to increase her heart rate 9 or less  "minutes per day.  She exercises with enough effort to increase her heart rate 3 or less days per week.   She is taking medications regularly.     Has swollen, red and significantly painful area on the pelvic area. This spot is recurrent over couple of years. No other area of these sores. Tried to have  pop it, but it was too painful. Today, it seems to have reduced, remaining painful now only to touch and itchy.        6/25/2023     9:36 AM 9/18/2023     7:40 PM 12/12/2023    11:55 AM   ACT Total Scores   ACT TOTAL SCORE (Goal Greater than or Equal to 20) 18 24 22   In the past 12 months, how many times did you visit the emergency room for your asthma without being admitted to the hospital? 1 0 0   In the past 12 months, how many times were you hospitalized overnight because of your asthma? 0 0 0     Allergies are getting worse and had covid three weeks ago.    Psychiatry restarted topiramate - has stopped previously related to concerns about weight loss and brain tumor, but hadn't had side effects. Looking for similar mood stabilization as the GLP1a provided.     Wt Readings from Last 5 Encounters:   02/21/24 108 kg (238 lb)   12/12/23 109.1 kg (240 lb 8 oz)   09/19/23 110.3 kg (243 lb 3.2 oz)   08/16/23 113.5 kg (250 lb 4.8 oz)   07/28/23 112.9 kg (249 lb)         Review of Systems    ROS:5 point ROS including CONST, HEENT, Respiratory, CV, and GI other than that noted in the HPI,  is negative         Objective    /83 (BP Location: Right arm, Patient Position: Sitting, Cuff Size: Adult Large)   Pulse 96   Temp 97.7  F (36.5  C) (Temporal)   Resp 16   Ht 1.713 m (5' 7.44\")   Wt 108 kg (238 lb)   LMP 02/16/2024   SpO2 99%   BMI 36.79 kg/m    Body mass index is 36.79 kg/m .  Physical Exam   GENERAL: alert and no distress  HENT: ear canals and TM's normal, nose and mouth without ulcers or lesions  NECK: no adenopathy, no asymmetry, masses, or scars  RESP: expiratory wheezes throughout  CV: regular " rate and rhythm, normal S1 S2, no S3 or S4, no murmur, click or rub, no peripheral edema   SKIN: no suspicious lesions or rashes and left mid mons pubis has 1 cm, non-tender deep red, firm, induration with surrounding skin excoriation  PSYCH: mentation appears normal, affect normal/bright            Signed Electronically by: JANNET Liu CNP

## 2024-02-26 NOTE — PROGRESS NOTES
FOLLOW UP   2024     Cathy Arroyo is a 43 year old woman who presents with family history of breast cancer.     HPI:    History of resection of pineal gland for pineocytoma.     She was seen in  for cyclical bilateral breast fullness and non-spontaneous bilateral multiductal nipple discharge. She had 1 episode of left nipple spontaneous clear nipple discharge. She also had a right breast lump at 3:00 and a left breast lump at 8:00. Normal prolactin and TSH. She had normal breast imaging at that time. She had a bilateral diagnostic mammogram and left breast ultrasound for non-spontaneous left nipple discharge 2022 that was normal.     Family history of maternal aunt, maternal great aunt, and paternal aunt with breast cancer. She had previously follow for high risk screening with Randi Martin. She had negative genetic testing in .     Today she denies any breast mass, skin change, nipple inversion or nipple discharge. She had a screening mammogram today. Chronic intermittent right breast pain.     BREAST-SPECIFIC HISTORY:    Previous breast imaging: Yes   - 17 b/l dmammo and right axillary ultrasound for lump prominent fat and normal lymph nodes BI-RADS 2  - 19 b/l Dmammo and right axillary ultrasound for pain: accessory breast tissue BI-RADS 2  - 22 Smammo BI_RADS 1  - 21 breast MRI for high risk screening BI-RADS 1  - 22 breast MRI for high risk screening BI-RADS 1  - 22 bilateral diagnostic mammogram and bilateral breast ultrasound: benign cysts BI-RADS 2  - 23 b/l Dmammo and left breast ultrasound for non-spontaneous nipple discharge: benign cysts BI-RADS 2  23 breast MRI BI-RADS 1  - 24 Smammo BI-RADS 1    Prior breast biopsies/surgeries: No    Prior history of breast cancer or DCIS: No  Prior radiation history: No  Self breast exams: No  Breast density: scattered fibroglandular densities    GYN HISTORY:  . Age at 1st pregnancy: 35.  Breastfeeding history: Yes.   Age at menarche: 17  Menopausal: premenopausal    Contraception: female surgical   Menopausal hormone replacement therapy: No   Increased risk with more than 3-5 years with combination estrogen/progesterone    RISK ASSESSMENT: < 3% 5 year risk and > 20% lifetime risk   Bridget: 0.8% 5 year risk   JAYSHREE/Breana-Victorianock: 21.7% lifetime risk    FAMILY HISTORY:  Breast ca: Yes   - maternal aunt, 37, BRCA negative. Also had melanoma (1 aunt)  - paternal aunt, 58, BRCA negative (2 aunts)  - paternal great aunt  Ovarian ca: No  Pancreatic ca: No  Prostate: Yes  - maternal grandfather  Gastric ca: No  Melanoma: Yes   - maternal aunt, also had breast cancer  Colon ca: Yes   - maternal grandfather  Other cancer: Yes   - maternal grandfather with bladder cancer, 71  - maternal cousin with esophageal cancer  - maternal aunt with kidney cancer.   Other genetic, testing, syndromes, or clotting disorders: no  - maternal aunt negative for BRCA gene mutation      PAST MEDICAL HISTORY  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    Post partum depression     Post-surgical hypothyroidism 05/2017    Problem, psychiatric 2001 diagnosed Bipolar    Recurrent otitis media chronic ear infections in childhood, recently had a double ear infection followed by another ear infection soon after    Sleep apnea 2019    had a sleep study when pregnant with my second child    Uncomplicated asthma      PAST SURGICAL HISTORY   Past Surgical History:   Procedure Laterality Date    BIOPSY  29 y/o colpos, and in the last 2 years for thyroid    Colposcopy, thyroid nodule biopsy twice    BLOOD PATCH N/A  10/11/2016    Procedure: EPIDURAL BLOOD PATCH;  Surgeon: GENERIC ANESTHESIA PROVIDER;  Location: [facility] OR     SECTION, TUBAL LIGATION, COMBINED N/A 2019    Procedure:  SECTION, WITH TUBAL LIGATION;  Surgeon: Kathy Rutledge MD;  Location: [facility] L+D    CRANIOTOMY, EXCISE TUMOR COMPLEX, COMBINED  2014    Procedure: COMBINED CRANIOTOMY, EXCISE TUMOR COMPLEX;  Supracerabellar  Infratentorial Approach for Resection of Tumor ;  Surgeon: Kate Mckeon MD;  [facility]    ENT SURGERY  2017    thyroidectomy    GYN SURGERY      colposcopy    THYROIDECTOMY N/A 2017    Procedure: THYROIDECTOMY;  Total Thyroidectomy;  Surgeon: Pamela Villasenor MD;  Location: [facility]     MEDICATIONS  Current Outpatient Medications   Medication Sig Dispense Refill    ADDERALL XR 25 MG 24 hr capsule Take by mouth daily      albuterol (PROAIR HFA/PROVENTIL HFA/VENTOLIN HFA) 108 (90 Base) MCG/ACT inhaler Inhale 2 puffs into the lungs every 6 hours 18 g 3    amphetamine-dextroamphetamine (ADDERALL) 10 MG tablet Take 1 tablet by mouth daily at 2 pm      ARIPiprazole (ABILIFY) 10 MG tablet Take 0.5 tablets (5 mg) by mouth daily      cetirizine (ZYRTEC) 10 MG tablet Take 1 tablet (10 mg) by mouth daily 90 tablet 3    diazepam (VALIUM) 5 MG tablet Take 5 mg by mouth as needed      fluticasone-salmeterol (ADVAIR DISKUS) 250-50 MCG/ACT inhaler Inhale 1 puff into the lungs 2 times daily 14 each 11    ipratropium - albuterol 0.5 mg/2.5 mg/3 mL (DUONEB) 0.5-2.5 (3) MG/3ML neb solution Take 1 vial (3 mLs) by nebulization every 6 hours as needed for shortness of breath or wheezing 90 mL 1    levothyroxine (SYNTHROID/LEVOTHROID) 175 MCG tablet Take 1 tablet (175 mcg) by mouth daily 90 tablet 2    medical cannabis (Patient's own supply) Take 1 Dose by mouth See Admin Instructions (The purpose of this order is to document that the patient reports taking medical cannabis.  This is not a prescription, and is  not used to certify that the patient has a qualifying medical condition.)    [place] Oral Suspension Unflavored 1-5 ml up to two times daily.  Total THC = 240 mg, Total CBD Trace      medical cannabis (Patient's own supply) Take 1 Dose by mouth See Admin Instructions (The purpose of this order is to document that the patient reports taking medical cannabis.  This is not a prescription, and is not used to certify that the patient has a qualifying medical condition.)    [place] oral suspension: take 1-3 ml by mouth two times daily  Total CBD 1200 mg. Total THC 60 mg      methocarbamol (ROBAXIN) 500 MG tablet Take 1 tablet (500 mg) by mouth 3 times daily as needed for muscle spasms 60 tablet 1    montelukast (SINGULAIR) 10 MG tablet Take 1 tablet (10 mg) by mouth every morning 90 tablet 3    prochlorperazine (COMPAZINE) 10 MG tablet Take 1 tablet (10 mg) by mouth every 6 hours as needed for nausea or vomiting 10 tablet 3    rizatriptan (MAXALT-MLT) 10 MG ODT Take 1 tablet (10 mg) by mouth at onset of headache for migraine (repeat in 2 hours if needed) May repeat in 2 hours. Max 3 tablets/24 hours. 18 tablet 11    topiramate (TOPAMAX) 50 MG tablet Take 1 tablet (50 mg) by mouth daily       ALLERGIES  Allergies   Allergen Reactions    Adacel [Tetanus-Diphth-Acell Pertussis] Swelling    Adhesive Tape Hives    Ciprofloxacin      Causes visual hallucinations.    Codeine Sulfate Nausea and Vomiting    Nicoderm [Nicotine]      Patch and Gum, pt reported allergic reaction 6/24    Tegaderm Transparent Dressing (Informational Only) Hives    Tetanus Toxoid      Pt states she had an infection at injection site.     Vicodin [Hydrocodone-Acetaminophen] Nausea and Vomiting    Methimazole Rash      SOCIAL HISTORY:  Smokes: Yes  EtOH: Yes, 2 per week  Exercise: active job as a   Works as a  6 days per week. She has  4 year old and 7 year old. She is going through a separation which is hard for her. She lives in  [place]     ROS:  Change in vision No  Headaches: no  Respiratory: No shortness of breath, dyspnea on exertion, cough, or hemoptysis   Cardiovascular: negative   Gastrointestinal: negative Abdominal pain: no  Breast: bilateral nipple discharge and lumps  Musculoskeletal: negative Joint pain No Back pain: no  Psychiatric: negative  Hematologic/Lymphatic/Immunologic: negative  Endocrine: negative    EXAM  /86 (BP Location: Right arm, Patient Position: Sitting, Cuff Size: Adult Large)   Pulse 79   Temp 97.9  F (36.6  C) (Oral)   Resp 16   Wt 111 kg (244 lb 12.8 oz)   LMP 02/16/2024   SpO2 98%   BMI 37.84 kg/m     PHYSICAL EXAM  Respiratory: breathing non labored.   Breasts: Examination was done in both the upright and supine positions.  Breasts are symmetrical . No dominant fixed or suspicious masses noted. No skin or nipple changes. No nipple discharge.   No clavicular, cervical, or axillary lymphadenopathy.     INVESTIGATIONS:    2/28/24 screening mammogram: BI-RADS 1    ASSESSMENT/PLAN:    Cathy Arroyo is a 43 year old woman with family history of breast cancer. She meets NCCN guidelines for high risk screening.     1) Family history of breast cancer  She meets NCCN guidelines for high risk screening based on family history with lifetime risk for breast cancer of >20%. Screening recommendations based on NCCN guidelines.  Clinical encounter every 6-12 months. Annual mammogram with kristin alternating with annual breast MRI.  Counseling was provided with available strategies including lifestyle modifications and risk reducing intervention.   - Be familiar with your breast and promptly report any changes to your health care provider.  - Breast MRI with clinic visit due: 8/17/24  - Screening mammogram with clinic visit due: 2/28/25    2) Lifestyle Modifications were provided.   - Maintain your best healthy weight. Higher body fat and adult weight gain is associated with increased risk for breast cancer.  This increase in risk has been attributed to increase in circulating endogenous estrogen levels from fat tissue.   - Any alcohol intake increases the risk for breast cancer. If you choose to drink alcohol limit alcohol consumption to less than 1 drink (1 ounce of liquor, 6 ounces of wine, or 8 ounces of beer) per day or less than 3 drinks per week.  - Be active daily and void being sedentary.   - Vitamin D may decrease the risk of developing breast cancer.     Miriam Lora PA-C    20 minutes spent on the date of the encounter doing chart review, review of test results, interpretation of tests, patient visit and documentation.

## 2024-02-27 ENCOUNTER — MYC MEDICAL ADVICE (OUTPATIENT)
Dept: FAMILY MEDICINE | Facility: CLINIC | Age: 44
End: 2024-02-27

## 2024-02-27 ENCOUNTER — VIRTUAL VISIT (OUTPATIENT)
Dept: FAMILY MEDICINE | Facility: CLINIC | Age: 44
End: 2024-02-27
Payer: COMMERCIAL

## 2024-02-27 DIAGNOSIS — B30.9 VIRAL CONJUNCTIVITIS, BOTH EYES: Primary | ICD-10-CM

## 2024-02-27 DIAGNOSIS — H11.31 SUBCONJUNCTIVAL HEMORRHAGE OF RIGHT EYE: ICD-10-CM

## 2024-02-27 PROCEDURE — 99213 OFFICE O/P EST LOW 20 MIN: CPT | Mod: 95 | Performed by: STUDENT IN AN ORGANIZED HEALTH CARE EDUCATION/TRAINING PROGRAM

## 2024-02-27 RX ORDER — ERYTHROMYCIN 5 MG/G
0.5 OINTMENT OPHTHALMIC AT BEDTIME
Qty: 1 G | Refills: 0 | Status: SHIPPED | OUTPATIENT
Start: 2024-02-27 | End: 2024-03-27

## 2024-02-27 NOTE — PATIENT INSTRUCTIONS
Thank you for choosing us for your care. I have placed an order for a prescription so that you can start treatment. View your full visit summary for details by clicking on the link below. Your pharmacist will able to address any questions you may have about the medication.     If you re not feeling better within 2-3 days, please schedule an appointment.  You can schedule an appointment right here in [facility], or call [phone]  If the visit is for the same symptoms as your eVisit, we ll refund the cost of your eVisit if seen within seven days.    Pinkeye: Care Instructions  Overview     Pinkeye is redness and swelling of the eye surface and the conjunctiva (the lining of the eyelid and the covering of the white part of the eye). Pinkeye is also called conjunctivitis. Pinkeye is often caused by infection with bacteria or a virus. Dry air, allergies, smoke, and chemicals are other common causes.  Pinkeye often gets better on its own in 7 to 10 days. Antibiotics only help if the pinkeye is caused by bacteria. Pinkeye caused by infection spreads easily. If an allergy or chemical is causing pinkeye, it will not go away unless you can avoid whatever is causing it.  Follow-up care is a key part of your treatment and safety. Be sure to make and go to all appointments, and call your doctor if you are having problems. It's also a good idea to know your test results and keep a list of the medicines you take.  How can you care for yourself at home?  Wash your hands often. Always wash them before and after you treat pinkeye or touch your eyes or face.  Use moist cotton or a clean, wet cloth to remove crust. Wipe from the inside corner of the eye to the outside. Use a clean part of the cloth for each wipe.  Put cold or warm wet cloths on your eye a few times a day if the eye hurts.  Do not wear contact lenses or eye makeup until the pinkeye is gone. Throw away any eye makeup you were using when you got pinkeye. Clean your  "contacts and storage case. If you wear disposable contacts, use a new pair when your eye has cleared and it is safe to wear contacts again.  If the doctor gave you antibiotic ointment or eyedrops, use them as directed. Use the medicine for as long as instructed, even if your eye starts looking better soon. Keep the bottle tip clean, and do not let it touch the eye area.  To put in eyedrops or ointment:  Tilt your head back, and pull your lower eyelid down with one finger.  Drop or squirt the medicine inside the lower lid.  Close your eye for 30 to 60 seconds to let the drops or ointment move around.  Do not touch the ointment or dropper tip to your eyelashes or any other surface.  Do not share towels, pillows, or washcloths while you have pinkeye.  When should you call for help?   Call your doctor now or seek immediate medical care if:    You have pain in your eye, not just irritation on the surface.     You have a change in vision or loss of vision.     You have an increase in discharge from the eye.     Your eye has not started to improve or begins to get worse within 48 hours after you start using antibiotics.     Pinkeye lasts longer than 7 days.   Watch closely for changes in your health, and be sure to contact your doctor if you have any problems.  Where can you learn more?  Go to https://www.[employer].net/patiented  Enter Y392 in the search box to learn more about \"Pinkeye: Care Instructions.\"  Current as of: June 6, 2023               Content Version: 13.8    8427-8456 [employer].   Care instructions adapted under license by your healthcare professional. If you have questions about a medical condition or this instruction, always ask your healthcare professional. [employer] disclaims any warranty or liability for your use of this information.       Taking Care of Pinkeye at Home (01:30)  Your health professional recommends that you watch this short online health video.  Learn ways " to ease the discomfort of pinkeye and keep the infection from spreading.  Purpose:  Describes basic home care for pinkeye to ease discomfort and prevent the spread of the infection.  Goal:  User will learn home treatment for pinkeye.     How to watch the video    Scan the QR code   OR Visit the website    https://[email or web address].Zayo/[email or web address]   Current as of: June 6, 2023               Content Version: 13.8    3518-6049 [employer].   Care instructions adapted under license by your healthcare professional. If you have questions about a medical condition or this instruction, always ask your healthcare professional. [employer] disclaims any warranty or liability for your use of this information.

## 2024-02-28 ENCOUNTER — ONCOLOGY VISIT (OUTPATIENT)
Dept: SURGERY | Facility: CLINIC | Age: 44
End: 2024-02-28
Payer: COMMERCIAL

## 2024-02-28 ENCOUNTER — ANCILLARY PROCEDURE (OUTPATIENT)
Dept: MAMMOGRAPHY | Facility: CLINIC | Age: 44
End: 2024-02-28
Attending: PHYSICIAN ASSISTANT
Payer: COMMERCIAL

## 2024-02-28 ENCOUNTER — OFFICE VISIT (OUTPATIENT)
Dept: DERMATOLOGY | Facility: CLINIC | Age: 44
End: 2024-02-28
Payer: COMMERCIAL

## 2024-02-28 VITALS
RESPIRATION RATE: 16 BRPM | SYSTOLIC BLOOD PRESSURE: 133 MMHG | WEIGHT: 244.8 LBS | TEMPERATURE: 97.9 F | HEART RATE: 79 BPM | BODY MASS INDEX: 37.84 KG/M2 | OXYGEN SATURATION: 98 % | DIASTOLIC BLOOD PRESSURE: 86 MMHG

## 2024-02-28 DIAGNOSIS — B07.9 VERRUCA VULGARIS: Primary | ICD-10-CM

## 2024-02-28 DIAGNOSIS — Z12.31 VISIT FOR SCREENING MAMMOGRAM: ICD-10-CM

## 2024-02-28 DIAGNOSIS — L91.0 HYPERTROPHIC SCAR: ICD-10-CM

## 2024-02-28 DIAGNOSIS — Z91.89 AT HIGH RISK FOR BREAST CANCER: Primary | ICD-10-CM

## 2024-02-28 PROCEDURE — 11900 INJECT SKIN LESIONS </W 7: CPT | Mod: XS | Performed by: DERMATOLOGY

## 2024-02-28 PROCEDURE — G0463 HOSPITAL OUTPT CLINIC VISIT: HCPCS | Performed by: PHYSICIAN ASSISTANT

## 2024-02-28 PROCEDURE — 99213 OFFICE O/P EST LOW 20 MIN: CPT | Performed by: PHYSICIAN ASSISTANT

## 2024-02-28 PROCEDURE — 77063 BREAST TOMOSYNTHESIS BI: CPT | Performed by: STUDENT IN AN ORGANIZED HEALTH CARE EDUCATION/TRAINING PROGRAM

## 2024-02-28 PROCEDURE — 17110 DESTRUCTION B9 LES UP TO 14: CPT | Performed by: DERMATOLOGY

## 2024-02-28 PROCEDURE — 77067 SCR MAMMO BI INCL CAD: CPT | Performed by: STUDENT IN AN ORGANIZED HEALTH CARE EDUCATION/TRAINING PROGRAM

## 2024-02-28 ASSESSMENT — PAIN SCALES - GENERAL
PAINLEVEL: NO PAIN (0)
PAINLEVEL: MILD PAIN (2)

## 2024-02-28 NOTE — PROGRESS NOTES
[facility] Dermatology Note  Encounter Date: Feb 28, 2024  Office Visit     Dermatology Problem List:  1. Severe DN  - right upper back, s/p excision 9/26/23   2. Cyst, left mandible  - excision 9/26/23  3. Verruca, hands.  - Cryo 7/11/23, 9/12/23, 10/18/23, 12/13/23, 02/28/24   - sal acid recommended, patient hasn't tried yet 12/13/23  4. Hypertrophic scar, R upper back  - S/p ILK 02/28/24     Soc hx:   ____________________________________    Assessment & Plan:    # Hypertrophic scar, R upper back, at site of prior excision  - ILK performed today. See procedure note below.    # Verruca vulgaris, hands.   Improving on cryo. Discussed candida but not needing at this time.  - Cryotherapy performed today. See procedure note below.  - Recommended sal acid 40% at bedtime.    Procedures Performed:   - Cryotherapy procedure note, location(s): see above. After verbal consent and discussion of risks and benefits including, but not limited to, dyspigmentation/scar, blister, and pain, 6 lesion(s) was(were) treated with 1-2 mm freeze border for 1-2 cycles with liquid nitrogen. Post cryotherapy instructions were provided.    - Intra-lesional triamcinolone procedure note. After verbal consent and review of risk of pain and skin thinning/atrophy, positioning and cleansing with isopropyl alcohol, 0.3 total mL of triamcinolone 10 mg/mL was injected into 3 lesion(s) on the r upper back. The patient tolerated the procedure well and left the dermatology clinic in good condition.    Follow-up: 4-6 weeks, sooner if concerns.     Staff and Scribe:     Scribe Disclosure:   I, ENRIQUE MARTINEZ, am serving as a scribe; to document services personally performed by Lorraine Dozier MD -based on data collection and the provider's statements to me.    Provider Disclosure:   The documentation recorded by the scribe accurately reflects the services I personally performed and the decisions made by me.    Lorraine  MD Jacoby    Department of Dermatology  [facility]: Phone: [phone], Fax: [phone]  3/5/2024     ____________________________________________    CC: Derm Problem (LN to warts on hands. )    HPI:  Ms. Cathy Arroyo is a(n) 43 year old female who presents today as a return patient for warts.    Had sent a message about spot in groin in feb that was flared up. The patient reports spot on her groin has calmed down.    Regarding her warts, the patient feels one wart has completely resolved except for some minor scarring.    Patient is otherwise feeling well, without additional skin concerns.    Labs Reviewed:  N/A    Physical Exam:  Vitals: LMP 02/16/2024   SKIN: Focused examination of the bilateral hands was performed.  - Hypertrophic pink plaque on R upper back  - There are flat topped papules on both hands..   - No other lesions of concern on areas examined.     Medications:  Current Outpatient Medications   Medication    ADDERALL XR 25 MG 24 hr capsule    albuterol (PROAIR HFA/PROVENTIL HFA/VENTOLIN HFA) 108 (90 Base) MCG/ACT inhaler    amphetamine-dextroamphetamine (ADDERALL) 10 MG tablet    ARIPiprazole (ABILIFY) 10 MG tablet    cetirizine (ZYRTEC) 10 MG tablet    diazepam (VALIUM) 5 MG tablet    erythromycin (ROMYCIN) 5 MG/GM ophthalmic ointment    fluticasone-salmeterol (ADVAIR DISKUS) 250-50 MCG/ACT inhaler    ipratropium - albuterol 0.5 mg/2.5 mg/3 mL (DUONEB) 0.5-2.5 (3) MG/3ML neb solution    levothyroxine (SYNTHROID/LEVOTHROID) 175 MCG tablet    medical cannabis (Patient's own supply)    medical cannabis (Patient's own supply)    methocarbamol (ROBAXIN) 500 MG tablet    montelukast (SINGULAIR) 10 MG tablet    prochlorperazine (COMPAZINE) 10 MG tablet    rizatriptan (MAXALT-MLT) 10 MG ODT    topiramate (TOPAMAX) 50 MG tablet     No current facility-administered medications for this visit.      Past  Medical History:   Patient Active Problem List   Diagnosis    Pineocytoma (H)    Attention deficit disorder    Seasonal allergic rhinitis    Bipolar affective disorder in remission (H24)    GBS (group B Streptococcus carrier), +RV culture, currently pregnant    Pre-eclampsia    Moderate persistent asthma without complication    Chronic rhinitis    Tobacco use disorder    Abnormal cytology    S/P total thyroidectomy    History of Graves' disease    Nonintractable migraine, unspecified migraine type    Post-surgical hypothyroidism    Abnormal results of thyroid function studies    Otitis externa    Polyhydramnios affecting pregnancy    Medical cannabis use    Labor and delivery indication for care or intervention    Indication for care in labor and delivery, antepartum    Morbid obesity (H)    Urticarial vasculitis    Dehydration    Breast cancer screening, high risk patient    Bipolar affective disorder, currently manic mixed, moderate (H)    Bilateral chronic knee pain    Lumbar radiculopathy     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    Post partum depression     Post-surgical hypothyroidism 05/2017    Problem, psychiatric 2001 diagnosed Bipolar    Recurrent otitis media chronic ear infections in childhood, recently had a double ear infection followed by another ear infection soon after    Sleep apnea 2019    had a sleep study when pregnant with my second child    Uncomplicated asthma         CC No referring provider defined for this encounter. on close of this encounter.

## 2024-02-28 NOTE — NURSING NOTE
"Oncology Rooming Note    February 28, 2024 11:51 AM   Cathy Arroyo is a 43 year old female who presents for:    Chief Complaint   Patient presents with    Oncology Clinic Visit     Breast cancer     Initial Vitals: /86 (BP Location: Right arm, Patient Position: Sitting, Cuff Size: Adult Large)   Pulse 79   Temp 97.9  F (36.6  C) (Oral)   Resp 16   Wt 111 kg (244 lb 12.8 oz)   LMP 02/16/2024   SpO2 98%   BMI 37.84 kg/m   Estimated body mass index is 37.84 kg/m  as calculated from the following:    Height as of 2/21/24: 1.713 m (5' 7.44\").    Weight as of this encounter: 111 kg (244 lb 12.8 oz). Body surface area is 2.3 meters squared.  Mild Pain (2) Comment: Data Unavailable   Patient's last menstrual period was 02/16/2024.  Allergies reviewed: Yes  Medications reviewed: Yes    Medications: Medication refills not needed today.  Pharmacy name entered into Social[employer]:    [id number] - [place], [place] - [address] [facility] [place]note 2]  [id number] - [place], [place] - [address] [facility] & [address] [place] [address] [facility] #[id number] - [place], [place] - [address] AT [address] [place] [address] & [facility] - [place], [place] - [address] [facility] [address]    Frailty Screening:   Is the patient here for a new oncology consult visit in cancer care? 2. No      Clinical concerns: See pain note; breast tenderness; cysts      Lisette Yusuf, EMT  2/28/2024              "

## 2024-02-28 NOTE — PATIENT INSTRUCTIONS
Cryotherapy    What is it?  Use of a very cold liquid, such as liquid nitrogen, to freeze and destroy abnormal skin cells that need to be removed    What should I expect?  Tenderness and redness  A small blister that might grow and fill with dark purple blood. There may be crusting.  More than one treatment may be needed if the lesions do not go away.    How do I care for the treated area?  Gently wash the area with your hands when bathing.  Use a thin layer of Vaseline to help with healing. You may use a Band-Aid.   The area should heal within 7-10 days and may leave behind a pink or lighter color.   Do not use an antibiotic or Neosporin ointment.   You may take acetaminophen (Tylenol) for pain.     Call your doctor if you have:  Severe pain  Signs of infection (warmth, redness, cloudy yellow drainage, and or a bad smell)  Questions or concerns    Who should I call with questions?      [facility]: [phone]      [facility]: [phone]      For urgent needs outside of business hours call the [facility] at [phone] and ask for the dermatology resident on call

## 2024-02-28 NOTE — LETTER
2/28/2024         RE: Cathy Arroyo  [address]  [facility] [address]        Dear Colleague,    Thank you for referring your patient, Cathy Arroyo, to the [facility]. Please see a copy of my visit note below.    FOLLOW UP   Feb 28, 2024     Cathy Arroyo is a 43 year old woman who presents with family history of breast cancer.     HPI:    History of resection of pineal gland for pineocytoma.     She was seen in 2022 for cyclical bilateral breast fullness and non-spontaneous bilateral multiductal nipple discharge. She had 1 episode of left nipple spontaneous clear nipple discharge. She also had a right breast lump at 3:00 and a left breast lump at 8:00. Normal prolactin and TSH. She had normal breast imaging at that time. She had a bilateral diagnostic mammogram and left breast ultrasound for non-spontaneous left nipple discharge 2/14/2022 that was normal.     Family history of maternal aunt, maternal great aunt, and paternal aunt with breast cancer. She had previously follow for high risk screening with Randi Martin. She had negative genetic testing in 2023.     Today she denies any breast mass, skin change, nipple inversion or nipple discharge. She had a screening mammogram today. Chronic intermittent right breast pain.     BREAST-SPECIFIC HISTORY:    Previous breast imaging: Yes   - 6/5/17 b/l dmammo and right axillary ultrasound for lump prominent fat and normal lymph nodes BI-RADS 2  - 11/11/19 b/l Dmammo and right axillary ultrasound for pain: accessory breast tissue BI-RADS 2  - 1/5/22 Smammo BI_RADS 1  - 1/18/21 breast MRI for high risk screening BI-RADS 1  - 7/5/22 breast MRI for high risk screening BI-RADS 1  - 7/14/22 bilateral diagnostic mammogram and bilateral breast ultrasound: benign cysts BI-RADS 2  - 2/14/23 b/l Dmammo and left breast ultrasound for non-spontaneous nipple discharge: benign cysts BI-RADS 2  8/16/23 breast MRI BI-RADS 1  - 2/28/24  Smammo BI-RADS 1    Prior breast biopsies/surgeries: No    Prior history of breast cancer or DCIS: No  Prior radiation history: No  Self breast exams: No  Breast density: scattered fibroglandular densities    GYN HISTORY:  . Age at 1st pregnancy: 35. Breastfeeding history: Yes.   Age at menarche: 17  Menopausal: premenopausal    Contraception: female surgical   Menopausal hormone replacement therapy: No   Increased risk with more than 3-5 years with combination estrogen/progesterone    RISK ASSESSMENT: < 3% 5 year risk and > 20% lifetime risk   Bridget: 0.8% 5 year risk   JAYSHREE/Tyrer-Cuzick: 21.7% lifetime risk    FAMILY HISTORY:  Breast ca: Yes   - maternal aunt, 37, BRCA negative. Also had melanoma (1 aunt)  - paternal aunt, 58, BRCA negative (2 aunts)  - paternal great aunt  Ovarian ca: No  Pancreatic ca: No  Prostate: Yes  - maternal grandfather  Gastric ca: No  Melanoma: Yes   - maternal aunt, also had breast cancer  Colon ca: Yes   - maternal grandfather  Other cancer: Yes   - maternal grandfather with bladder cancer, 71  - maternal cousin with esophageal cancer  - maternal aunt with kidney cancer.   Other genetic, testing, syndromes, or clotting disorders: no  - maternal aunt negative for BRCA gene mutation      PAST MEDICAL HISTORY  Past Medical History:   Diagnosis Date    Allergic rhinitis     Allergic state     Anxiety     Bipolar 1 disorder (H)     Depressive disorder     Elevated cholesterol     Graves disease     Hearing problem     Migraines 2019    have suffered from migraines since having brain surgery    Obese     BMI 37.48    Pineal tumor     s/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 tumor    Post partum depression     Post-surgical hypothyroidism 2017    Problem, psychiatric 2001 diagnosed Bipolar    Recurrent otitis media chronic ear infections in childhood, recently had a double ear infection followed by another ear infection soon after    Sleep apnea     had a sleep study when pregnant  with my second child    Uncomplicated asthma      PAST SURGICAL HISTORY   Past Surgical History:   Procedure Laterality Date    BIOPSY  31 y/o colpos, and in the last 2 years for thyroid    Colposcopy, thyroid nodule biopsy twice    BLOOD PATCH N/A 10/11/2016    Procedure: EPIDURAL BLOOD PATCH;  Surgeon: GENERIC ANESTHESIA PROVIDER;  Location: [facility] OR     SECTION, TUBAL LIGATION, COMBINED N/A 2019    Procedure:  SECTION, WITH TUBAL LIGATION;  Surgeon: Kathy Rutledge MD;  Location: [facility] L+D    CRANIOTOMY, EXCISE TUMOR COMPLEX, COMBINED  2014    Procedure: COMBINED CRANIOTOMY, EXCISE TUMOR COMPLEX;  Supracerabellar  Infratentorial Approach for Resection of Tumor ;  Surgeon: Kate Mckeon MD;  [facility]: [facility]    ENT SURGERY  2017    thyroidectomy    GYN SURGERY      colposcopy    THYROIDECTOMY N/A 2017    Procedure: THYROIDECTOMY;  Total Thyroidectomy;  Surgeon: Pamela Villasenor MD;  Location:  OR     MEDICATIONS  Current Outpatient Medications   Medication Sig Dispense Refill    ADDERALL XR 25 MG 24 hr capsule Take by mouth daily      albuterol (PROAIR HFA/PROVENTIL HFA/VENTOLIN HFA) 108 (90 Base) MCG/ACT inhaler Inhale 2 puffs into the lungs every 6 hours 18 g 3    amphetamine-dextroamphetamine (ADDERALL) 10 MG tablet Take 1 tablet by mouth daily at 2 pm      ARIPiprazole (ABILIFY) 10 MG tablet Take 0.5 tablets (5 mg) by mouth daily      cetirizine (ZYRTEC) 10 MG tablet Take 1 tablet (10 mg) by mouth daily 90 tablet 3    diazepam (VALIUM) 5 MG tablet Take 5 mg by mouth as needed      fluticasone-salmeterol (ADVAIR DISKUS) 250-50 MCG/ACT inhaler Inhale 1 puff into the lungs 2 times daily 14 each 11    ipratropium - albuterol 0.5 mg/2.5 mg/3 mL (DUONEB) 0.5-2.5 (3) MG/3ML neb solution Take 1 vial (3 mLs) by nebulization every 6 hours as needed for shortness of breath or wheezing 90 mL 1    levothyroxine (SYNTHROID/LEVOTHROID) 175 MCG tablet Take 1 tablet  (175 mcg) by mouth daily 90 tablet 2    medical cannabis (Patient's own supply) Take 1 Dose by mouth See Admin Instructions (The purpose of this order is to document that the patient reports taking medical cannabis.  This is not a prescription, and is not used to certify that the patient has a qualifying medical condition.)    Tangerine Oral Suspension Unflavored 1-5 ml up to two times daily.  Total THC = 240 mg, Total CBD Trace      medical cannabis (Patient's own supply) Take 1 Dose by mouth See Admin Instructions (The purpose of this order is to document that the patient reports taking medical cannabis.  This is not a prescription, and is not used to certify that the patient has a qualifying medical condition.)    Greenleaf oral suspension: take 1-3 ml by mouth two times daily  Total CBD 1200 mg. Total THC 60 mg      methocarbamol (ROBAXIN) 500 MG tablet Take 1 tablet (500 mg) by mouth 3 times daily as needed for muscle spasms 60 tablet 1    montelukast (SINGULAIR) 10 MG tablet Take 1 tablet (10 mg) by mouth every morning 90 tablet 3    prochlorperazine (COMPAZINE) 10 MG tablet Take 1 tablet (10 mg) by mouth every 6 hours as needed for nausea or vomiting 10 tablet 3    rizatriptan (MAXALT-MLT) 10 MG ODT Take 1 tablet (10 mg) by mouth at onset of headache for migraine (repeat in 2 hours if needed) May repeat in 2 hours. Max 3 tablets/24 hours. 18 tablet 11    topiramate (TOPAMAX) 50 MG tablet Take 1 tablet (50 mg) by mouth daily       ALLERGIES  Allergies   Allergen Reactions    Adacel [Tetanus-Diphth-Acell Pertussis] Swelling    Adhesive Tape Hives    Ciprofloxacin      Causes visual hallucinations.    Codeine Sulfate Nausea and Vomiting    Nicoderm [Nicotine]      Patch and Gum, pt reported allergic reaction 6/24    Tegaderm Transparent Dressing (Informational Only) Hives    Tetanus Toxoid      Pt states she had an infection at injection site.     Vicodin [Hydrocodone-Acetaminophen] Nausea and Vomiting     Methimazole Rash      SOCIAL HISTORY:  Smokes: Yes  EtOH: Yes, 2 per week  Exercise: active job as a   Works as a  6 days per week. She has  4 year old and 7 year old. She is going through a separation which is hard for her. She lives in [place]     ROS:  Change in vision No  Headaches: no  Respiratory: No shortness of breath, dyspnea on exertion, cough, or hemoptysis   Cardiovascular: negative   Gastrointestinal: negative Abdominal pain: no  Breast: bilateral nipple discharge and lumps  Musculoskeletal: negative Joint pain No Back pain: no  Psychiatric: negative  Hematologic/Lymphatic/Immunologic: negative  Endocrine: negative    EXAM  /86 (BP Location: Right arm, Patient Position: Sitting, Cuff Size: Adult Large)   Pulse 79   Temp 97.9  F (36.6  C) (Oral)   Resp 16   Wt 111 kg (244 lb 12.8 oz)   LMP 02/16/2024   SpO2 98%   BMI 37.84 kg/m     PHYSICAL EXAM  Respiratory: breathing non labored.   Breasts: Examination was done in both the upright and supine positions.  Breasts are symmetrical . No dominant fixed or suspicious masses noted. No skin or nipple changes. No nipple discharge.   No clavicular, cervical, or axillary lymphadenopathy.     INVESTIGATIONS:    2/28/24 screening mammogram: BI-RADS 1    ASSESSMENT/PLAN:    Cathy Arroyo is a 43 year old woman with family history of breast cancer. She meets NCCN guidelines for high risk screening.     1) Family history of breast cancer  She meets NCCN guidelines for high risk screening based on family history with lifetime risk for breast cancer of >20%. Screening recommendations based on NCCN guidelines.  Clinical encounter every 6-12 months. Annual mammogram with kristin alternating with annual breast MRI.  Counseling was provided with available strategies including lifestyle modifications and risk reducing intervention.   - Be familiar with your breast and promptly report any changes to your health care provider.  - Breast MRI  with clinic visit due: 8/17/24  - Screening mammogram with clinic visit due: 2/28/25    2) Lifestyle Modifications were provided.   - Maintain your best healthy weight. Higher body fat and adult weight gain is associated with increased risk for breast cancer. This increase in risk has been attributed to increase in circulating endogenous estrogen levels from fat tissue.   - Any alcohol intake increases the risk for breast cancer. If you choose to drink alcohol limit alcohol consumption to less than 1 drink (1 ounce of liquor, 6 ounces of wine, or 8 ounces of beer) per day or less than 3 drinks per week.  - Be active daily and void being sedentary.   - Vitamin D may decrease the risk of developing breast cancer.     Miriam Lora PA-C    20 minutes spent on the date of the encounter doing chart review, review of test results, interpretation of tests, patient visit and documentation.

## 2024-02-28 NOTE — NURSING NOTE
Dermatology Rooming Note    Cathy Arroyo's goals for this visit include:   Chief Complaint   Patient presents with    Derm Problem     LN to warts on hands.      Ya Melendez RN

## 2024-02-28 NOTE — PATIENT INSTRUCTIONS
Cathy Arroyo is a 43 year old woman with family history of breast cancer. She meets NCCN guidelines for high risk screening.     1) Family history of breast cancer  She meets NCCN guidelines for high risk screening based on family history with lifetime risk for breast cancer of >20%. Screening recommendations based on NCCN guidelines.  Clinical encounter every 6-12 months. Annual mammogram with kristin alternating with annual breast MRI.  Counseling was provided with available strategies including lifestyle modifications and risk reducing intervention.   - Be familiar with your breast and promptly report any changes to your health care provider.  - Breast MRI with clinic visit due: 8/17/24  - Screening mammogram with clinic visit due: 2/28/25    2) Lifestyle Modifications were provided.   - Maintain your best healthy weight. Higher body fat and adult weight gain is associated with increased risk for breast cancer. This increase in risk has been attributed to increase in circulating endogenous estrogen levels from fat tissue.   - Any alcohol intake increases the risk for breast cancer. If you choose to drink alcohol limit alcohol consumption to less than 1 drink (1 ounce of liquor, 6 ounces of wine, or 8 ounces of beer) per day or less than 3 drinks per week.  - Be active daily and void being sedentary.   - Vitamin D may decrease the risk of developing breast cancer.

## 2024-02-28 NOTE — LETTER
2/28/2024       RE: Cathy Arroyo  [address]  [facility] [address]     Dear Colleague,    Thank you for referring your patient, Cathy Arroyo, to the [facility] [place] [facility]. Please see a copy of my visit note below.    [facility] Dermatology Note  Encounter Date: Feb 28, 2024  Office Visit     Dermatology Problem List:  1. Severe DN  - right upper back, s/p excision 9/26/23   2. Cyst, left mandible  - excision 9/26/23  3. Verruca, hands.  - Cryo 7/11/23, 9/12/23, 10/18/23, 12/13/23, 02/28/24   - sal acid recommended, patient hasn't tried yet 12/13/23  4. Hypertrophic scar, R upper back  - S/p ILK 02/28/24     Soc hx:   ____________________________________    Assessment & Plan:    # Hypertrophic scar, R upper back, at site of prior excision  - ILK performed today. See procedure note below.    # Verruca vulgaris, hands.   Improving on cryo. Discussed candida but not needing at this time.  - Cryotherapy performed today. See procedure note below.  - Recommended sal acid 40% at bedtime.    Procedures Performed:   - Cryotherapy procedure note, location(s): see above. After verbal consent and discussion of risks and benefits including, but not limited to, dyspigmentation/scar, blister, and pain, 6 lesion(s) was(were) treated with 1-2 mm freeze border for 1-2 cycles with liquid nitrogen. Post cryotherapy instructions were provided.    - Intra-lesional triamcinolone procedure note. After verbal consent and review of risk of pain and skin thinning/atrophy, positioning and cleansing with isopropyl alcohol, 0.3 total mL of triamcinolone 10 mg/mL was injected into 3 lesion(s) on the r upper back. The patient tolerated the procedure well and left the dermatology clinic in good condition.    Follow-up: 4-6 weeks, sooner if concerns.     Staff and Scribe:     Scribe Disclosure:   ENRIQUE BARAHONA,  am serving as a scribe; to document services personally performed by Lorraine Dozier MD -based on data collection and the provider's statements to me.    Provider Disclosure:   The documentation recorded by the scribe accurately reflects the services I personally performed and the decisions made by me.    Lorraine Dozier MD    Department of Dermatology  [facility]: Phone: [phone], Fax: [phone]  3/5/2024     ____________________________________________    CC: Derm Problem (LN to warts on hands. )    HPI:  Ms. Cathy Arroyo is a(n) 43 year old female who presents today as a return patient for warts.    Had sent a message about spot in groin in feb that was flared up. The patient reports spot on her groin has calmed down.    Regarding her warts, the patient feels one wart has completely resolved except for some minor scarring.    Patient is otherwise feeling well, without additional skin concerns.    Labs Reviewed:  N/A    Physical Exam:  Vitals: LMP 02/16/2024   SKIN: Focused examination of the bilateral hands was performed.  - Hypertrophic pink plaque on R upper back  - There are flat topped papules on both hands..   - No other lesions of concern on areas examined.     Medications:  Current Outpatient Medications   Medication    ADDERALL XR 25 MG 24 hr capsule    albuterol (PROAIR HFA/PROVENTIL HFA/VENTOLIN HFA) 108 (90 Base) MCG/ACT inhaler    amphetamine-dextroamphetamine (ADDERALL) 10 MG tablet    ARIPiprazole (ABILIFY) 10 MG tablet    cetirizine (ZYRTEC) 10 MG tablet    diazepam (VALIUM) 5 MG tablet    erythromycin (ROMYCIN) 5 MG/GM ophthalmic ointment    fluticasone-salmeterol (ADVAIR DISKUS) 250-50 MCG/ACT inhaler    ipratropium - albuterol 0.5 mg/2.5 mg/3 mL (DUONEB) 0.5-2.5 (3) MG/3ML neb solution    levothyroxine (SYNTHROID/LEVOTHROID) 175 MCG tablet    medical cannabis (Patient's own supply)     medical cannabis (Patient's own supply)    methocarbamol (ROBAXIN) 500 MG tablet    montelukast (SINGULAIR) 10 MG tablet    prochlorperazine (COMPAZINE) 10 MG tablet    rizatriptan (MAXALT-MLT) 10 MG ODT    topiramate (TOPAMAX) 50 MG tablet     No current facility-administered medications for this visit.      Past Medical History:   Patient Active Problem List   Diagnosis    Pineocytoma (H)    Attention deficit disorder    Seasonal allergic rhinitis    Bipolar affective disorder in remission (H24)    GBS (group B Streptococcus carrier), +RV culture, currently pregnant    Pre-eclampsia    Moderate persistent asthma without complication    Chronic rhinitis    Tobacco use disorder    Abnormal cytology    S/P total thyroidectomy    History of Graves' disease    Nonintractable migraine, unspecified migraine type    Post-surgical hypothyroidism    Abnormal results of thyroid function studies    Otitis externa    Polyhydramnios affecting pregnancy    Medical cannabis use    Labor and delivery indication for care or intervention    Indication for care in labor and delivery, antepartum    Morbid obesity (H)    Urticarial vasculitis    Dehydration    Breast cancer screening, high risk patient    Bipolar affective disorder, currently manic mixed, moderate (H)    Bilateral chronic knee pain    Lumbar radiculopathy     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    Post partum depression     Post-surgical hypothyroidism 05/2017    Problem, psychiatric 2001 diagnosed Bipolar    Recurrent otitis media chronic ear infections in childhood, recently had a double ear infection followed by another ear infection  soon after    Sleep apnea 2019    had a sleep study when pregnant with my second child    Uncomplicated asthma         CC No referring provider defined for this encounter. on close of this encounter.

## 2024-02-28 NOTE — NURSING NOTE
Drug Administration Record    Prior to injection, verified patient identity using patient's name and date of birth.  Due to injection administration, patient instructed to remain in clinic for 15 minutes  afterwards, and to report any adverse reaction to me immediately.    Drug Name: triamcinolone acetonide(kenalog)  Dose: 1mL of triamcinolone 10mg/mL, 10mg dose  Route administered: ID  NDC #: [id number]  Amount of waste(mL):4ml  Reason for waste: Multi dose vial    LOT #: [id number]  SITE: see provider note  : [facility]  EXPIRATION DATE: 12/2025

## 2024-03-04 ENCOUNTER — LAB (OUTPATIENT)
Dept: LAB | Facility: CLINIC | Age: 44
End: 2024-03-04
Payer: COMMERCIAL

## 2024-03-04 DIAGNOSIS — E89.0 POST-SURGICAL HYPOTHYROIDISM: ICD-10-CM

## 2024-03-04 DIAGNOSIS — Z86.39 HISTORY OF GRAVES' DISEASE: ICD-10-CM

## 2024-03-04 LAB — TSH SERPL DL<=0.005 MIU/L-ACNC: 4 UIU/ML (ref 0.3–4.2)

## 2024-03-04 PROCEDURE — 84443 ASSAY THYROID STIM HORMONE: CPT

## 2024-03-04 PROCEDURE — 36415 COLL VENOUS BLD VENIPUNCTURE: CPT

## 2024-03-05 ENCOUNTER — OFFICE VISIT (OUTPATIENT)
Dept: FAMILY MEDICINE | Facility: CLINIC | Age: 44
End: 2024-03-05
Payer: COMMERCIAL

## 2024-03-05 VITALS
DIASTOLIC BLOOD PRESSURE: 76 MMHG | HEART RATE: 85 BPM | RESPIRATION RATE: 20 BRPM | HEIGHT: 68 IN | BODY MASS INDEX: 36.53 KG/M2 | WEIGHT: 241 LBS | TEMPERATURE: 98.1 F | OXYGEN SATURATION: 100 % | SYSTOLIC BLOOD PRESSURE: 126 MMHG

## 2024-03-05 DIAGNOSIS — R10.2 PELVIC PAIN IN FEMALE: ICD-10-CM

## 2024-03-05 DIAGNOSIS — G47.30 SLEEP APNEA, UNSPECIFIED TYPE: Primary | ICD-10-CM

## 2024-03-05 DIAGNOSIS — J45.40 MODERATE PERSISTENT ASTHMA WITHOUT COMPLICATION: ICD-10-CM

## 2024-03-05 LAB
ALBUMIN UR-MCNC: NEGATIVE MG/DL
APPEARANCE UR: CLEAR
BILIRUB UR QL STRIP: NEGATIVE
CLUE CELLS: NORMAL
COLOR UR AUTO: YELLOW
GLUCOSE UR STRIP-MCNC: NEGATIVE MG/DL
HGB UR QL STRIP: NEGATIVE
KETONES UR STRIP-MCNC: NEGATIVE MG/DL
LEUKOCYTE ESTERASE UR QL STRIP: NEGATIVE
NITRATE UR QL: NEGATIVE
PH UR STRIP: 5.5 [PH] (ref 5–7)
SP GR UR STRIP: >=1.03 (ref 1–1.03)
TRICHOMONAS, WET PREP: NORMAL
UROBILINOGEN UR STRIP-ACNC: 0.2 E.U./DL
WBC'S/HIGH POWER FIELD, WET PREP: NORMAL
YEAST, WET PREP: NORMAL

## 2024-03-05 PROCEDURE — 81003 URINALYSIS AUTO W/O SCOPE: CPT | Performed by: NURSE PRACTITIONER

## 2024-03-05 PROCEDURE — 87210 SMEAR WET MOUNT SALINE/INK: CPT | Performed by: NURSE PRACTITIONER

## 2024-03-05 PROCEDURE — 87491 CHLMYD TRACH DNA AMP PROBE: CPT | Performed by: NURSE PRACTITIONER

## 2024-03-05 PROCEDURE — 87591 N.GONORRHOEAE DNA AMP PROB: CPT | Performed by: NURSE PRACTITIONER

## 2024-03-05 PROCEDURE — 99214 OFFICE O/P EST MOD 30 MIN: CPT | Performed by: NURSE PRACTITIONER

## 2024-03-05 ASSESSMENT — PAIN SCALES - GENERAL: PAINLEVEL: MILD PAIN (3)

## 2024-03-05 NOTE — PROGRESS NOTES
Assessment & Plan     Sleep apnea, unspecified type  Known MELINA, Interested in mouthpiece  - Adult Sleep Eval & Management Referral    Pelvic pain in female  Plan to collect wet prep, UA, and STI screen. Pelvic US ordered. If these are negative, consider constipation as differential.   - Wet prep - lab collect  - NEISSERIA GONORRHOEA PCR  - CHLAMYDIA TRACHOMATIS PCR  - UA Macroscopic with reflex to Microscopic and Culture - Lab Collect  - US Pelvic Complete with Transvaginal  - Physical Therapy  Referral  - Wet prep - lab collect  - NEISSERIA GONORRHOEA PCR  - CHLAMYDIA TRACHOMATIS PCR  - UA Macroscopic with reflex to Microscopic and Culture - Lab Collect    Moderate persistent asthma without complication  Wheezing and rhonchi on exam, pt states she does not remember for controller inhalers. Reviewed importance of medication use and red flags requiring emergent treatment.       Ordering of each unique test  Prescription drug management      Subjective   Cathy is a 43 year old, presenting for the following health issues:  Follow Up      3/5/2024     2:14 PM   Additional Questions   Roomed by Brigette NIX     Pt presents to be seen for the following concerns:    Sleep apnea- was diagnosed during pregnancy with child: struggles with chronic fatigue. Previous diagnosis of sleep apnea in 2018.. Does not want to use mask, interested in mouthpiece.   Pelvic pain: seems to be occurring with ovulation last week. Pain feels deep, intense cramping, lasts up to a couple hours more pain than normal. No abnormal bleeding, menstrual cycles occurring closer together than normal- every 23 days. Is having some discharge that she is noticing more, no vaginal itching/burning. No pain with sex. No pain with urination. No changes in urinary frequency. Has had hx of tubal ligation. Hx breast cancer in maternal aunt and paternal aunt. Pt has had genetic testing, this was normal.     Having anxiety since starting topiramate.  "    Ingrown hair has resolved.     History of Present Illness       Reason for visit:  Ingrown hair    She eats 0-1 servings of fruits and vegetables daily.She consumes 1 sweetened beverage(s) daily.She exercises with enough effort to increase her heart rate 9 or less minutes per day.  She exercises with enough effort to increase her heart rate 3 or less days per week.   She is taking medications regularly.             Objective    /76 (BP Location: Right arm, Patient Position: Sitting, Cuff Size: Adult Large)   Pulse 85   Temp 98.1  F (36.7  C) (Temporal)   Resp 20   Ht 1.715 m (5' 7.5\")   Wt 109.3 kg (241 lb)   LMP 02/16/2024 (Exact Date)   SpO2 100%   BMI 37.19 kg/m    Body mass index is 37.19 kg/m .  Physical Exam   GENERAL: alert and no distress  NECK: no adenopathy, no asymmetry, masses, or scars  RESP: rhonchi R lower posterior and expiratory wheezes R upper posterior and L upper anterior  CV: regular rate and rhythm, normal S1 S2, no S3 or S4, no murmur, click or rub, no peripheral edema  ABDOMEN: tenderness RLQ and LLQ and bowel sounds normal  MS: no gross musculoskeletal defects noted, no edema  PSYCH: mentation appears normal, affect normal/bright, and tearful        Exam and note completed by DNP student Rosalinda Burleson with oversight from JANNET Liu CNP        Signed Electronically by: JANNET Liu CNP    "

## 2024-03-05 NOTE — PATIENT INSTRUCTIONS
Sleep study referral will call you to schedule appointment    You will get a call to schedule the pelvic ultrasound    If all labs and the ultrasound are normal, then schedule with physical therapy

## 2024-03-06 ENCOUNTER — HOSPITAL ENCOUNTER (OUTPATIENT)
Dept: ULTRASOUND IMAGING | Facility: CLINIC | Age: 44
Discharge: HOME | End: 2024-03-06
Attending: NURSE PRACTITIONER | Admitting: NURSE PRACTITIONER
Payer: COMMERCIAL

## 2024-03-06 DIAGNOSIS — R10.2 PELVIC PAIN IN FEMALE: ICD-10-CM

## 2024-03-06 PROCEDURE — 76830 TRANSVAGINAL US NON-OB: CPT | Mod: 26 | Performed by: RADIOLOGY

## 2024-03-06 PROCEDURE — 76856 US EXAM PELVIC COMPLETE: CPT | Mod: 26 | Performed by: RADIOLOGY

## 2024-03-06 PROCEDURE — 76856 US EXAM PELVIC COMPLETE: CPT

## 2024-03-06 NOTE — RESULT ENCOUNTER NOTE
Cathy,    The urine and wet prep were both normal. If you have any questions, please feel free to contact the clinic.    DEVEN Winston

## 2024-03-06 NOTE — RESULT ENCOUNTER NOTE
Cathy,    The pelvic ultrasound was also normal - so reassuring! If you have any questions, please feel free to contact the clinic.    DEVEN Winston

## 2024-03-06 NOTE — RESULT ENCOUNTER NOTE
Cathy,    Thyroid is normal also.  If you have any questions, please feel free to contact the clinic.    DEVEN Winston

## 2024-03-07 LAB
C TRACH DNA SPEC QL NAA+PROBE: NEGATIVE
N GONORRHOEA DNA SPEC QL NAA+PROBE: NEGATIVE

## 2024-03-07 NOTE — RESULT ENCOUNTER NOTE
Cathy,    Gonorrhea and chlamydia were negative also, as we though they would be. But always good to know!  If you have any questions, please feel free to contact the clinic.    DEVEN Winston

## 2024-03-26 NOTE — PROGRESS NOTES
[facility] Dermatology Note  Encounter Date: Mar 27, 2024  Office Visit     Dermatology Problem List:  1. Severe DN  - right upper back, s/p excision 9/26/23   2. Cyst, left mandible  - excision 9/26/23  3. Verruca, hands.  - Cryo 7/11/23, 9/12/23, 10/18/23, 12/13/23, 02/28/24   - sal acid recommended, patient hasn't tried yet 12/13/23  4. Hypertrophic scar, R upper back  - S/p ILK 02/28/24     Soc hx:     ____________________________________________    Assessment & Plan:      # Verruca vulgaris, hands.   Improving on cryo. Discussed candida but not needing at this time.   - Cryotherapy performed today. See procedure note below.  - Recommended sal acid 40% at bedtime.    Procedures Performed:   - Cryotherapy procedure note, location(s): : see above . After verbal consent and discussion of risks and benefits including, but not limited to, dyspigmentation/scar, blister, and pain, 3 lesion(s) was(were) treated with 1-2 mm freeze border for 3 cycles with liquid nitrogen. Post cryotherapy instructions were provided.      Follow-up: 4-6 weeks, sooner if concerns.      Staff and Scribe:     Aguilar Soares MD      The patient was seen and staffed with Dr. Jacoby MD      Scribe Disclosure:   I, RICK HERNANDEZ, am serving as a scribe; to document services personally performed by Lorraine Dozier MD-based on data collection and the provider's statements to me.    Staff Physician Comments:   I saw and evaluated the patient with the resident and I agree with the assessment and plan.  I was present for the entire minor procedure and examination.    Lorraine Dozier MD    Department of Dermatology  [facility]: Phone: [phone], Fax: [phone]  4/2/2024       ____________________________________________    CC: Derm Problem (Patient report some improvement since their last visit. The patient would  like additional cryotherapy. )    HPI:  Ms. Cathy Arroyo is a(n) 43 year old female who presents today as a return patient for warts. She reports 4 total warts #3 along her left hand and #1 along right middle finger. She endorse improvement to her right middle finger wart but notes no changes to the warts of the left hand.     Patient is otherwise feeling well, without additional skin concerns.    Labs Reviewed:  N/A    Physical Exam:  Vitals: LMP 02/16/2024 (Exact Date)   SKIN: Focused examination of the bilateral hands was performed.  - There are flat topped papules on both hands.  - No other lesions of concern on areas examined.      Medications:  Current Outpatient Medications   Medication    ADDERALL XR 25 MG 24 hr capsule    albuterol (PROAIR HFA/PROVENTIL HFA/VENTOLIN HFA) 108 (90 Base) MCG/ACT inhaler    amphetamine-dextroamphetamine (ADDERALL) 10 MG tablet    ARIPiprazole (ABILIFY) 10 MG tablet    cetirizine (ZYRTEC) 10 MG tablet    diazepam (VALIUM) 5 MG tablet    fluticasone-salmeterol (ADVAIR DISKUS) 250-50 MCG/ACT inhaler    ipratropium - albuterol 0.5 mg/2.5 mg/3 mL (DUONEB) 0.5-2.5 (3) MG/3ML neb solution    levothyroxine (SYNTHROID/LEVOTHROID) 175 MCG tablet    medical cannabis (Patient's own supply)    medical cannabis (Patient's own supply)    methocarbamol (ROBAXIN) 500 MG tablet    montelukast (SINGULAIR) 10 MG tablet    prochlorperazine (COMPAZINE) 10 MG tablet    rizatriptan (MAXALT-MLT) 10 MG ODT    topiramate (TOPAMAX) 50 MG tablet     Current Facility-Administered Medications   Medication    triamcinolone acetonide (KENALOG-10) injection 3 mg      Past Medical History:   Patient Active Problem List   Diagnosis    Pineocytoma (H)    Attention deficit disorder    Seasonal allergic rhinitis    Bipolar affective disorder in remission (H24)    GBS (group B Streptococcus carrier), +RV culture, currently pregnant    Pre-eclampsia    Moderate persistent asthma without complication    Chronic  rhinitis    Tobacco use disorder    Abnormal cytology    S/P total thyroidectomy    History of Graves' disease    Nonintractable migraine, unspecified migraine type    Post-surgical hypothyroidism    Abnormal results of thyroid function studies    Otitis externa    Polyhydramnios affecting pregnancy    Medical cannabis use    Labor and delivery indication for care or intervention    Indication for care in labor and delivery, antepartum    Morbid obesity (H)    Urticarial vasculitis    Dehydration    Breast cancer screening, high risk patient    Bipolar affective disorder, currently manic mixed, moderate (H)    Bilateral chronic knee pain    Lumbar radiculopathy     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    Post partum depression     Post-surgical hypothyroidism 05/2017    Problem, psychiatric 2001 diagnosed Bipolar    Recurrent otitis media chronic ear infections in childhood, recently had a double ear infection followed by another ear infection soon after    Sleep apnea 2019    had a sleep study when pregnant with my second child    Uncomplicated asthma         CC No referring provider defined for this encounter. on close of this encounter.

## 2024-03-27 ENCOUNTER — OFFICE VISIT (OUTPATIENT)
Dept: DERMATOLOGY | Facility: CLINIC | Age: 44
End: 2024-03-27
Payer: COMMERCIAL

## 2024-03-27 DIAGNOSIS — B07.9 VERRUCA: Primary | ICD-10-CM

## 2024-03-27 PROCEDURE — 17110 DESTRUCTION B9 LES UP TO 14: CPT | Performed by: DERMATOLOGY

## 2024-03-27 ASSESSMENT — PAIN SCALES - GENERAL: PAINLEVEL: NO PAIN (1)

## 2024-03-27 NOTE — LETTER
3/27/2024       RE: Cathy Arroyo  [id number]  [place] [facility] [id number]     Dear Colleague,    Thank you for referring your patient, Cathy Arroyo, to the [facility] DERMATOLOGY [facility] [place] at [facility]. Please see a copy of my visit note below.    [facility] Dermatology Note  Encounter Date: Mar 27, 2024  Office Visit     Dermatology Problem List:  1. Severe DN  - right upper back, s/p excision 9/26/23   2. Cyst, left mandible  - excision 9/26/23  3. Verruca, hands.  - Cryo 7/11/23, 9/12/23, 10/18/23, 12/13/23, 02/28/24   - sal acid recommended, patient hasn't tried yet 12/13/23  4. Hypertrophic scar, R upper back  - S/p ILK 02/28/24     Soc hx:     ____________________________________________    Assessment & Plan:      # Verruca vulgaris, hands.   Improving on cryo. Discussed candida but not needing at this time.   - Cryotherapy performed today. See procedure note below.  - Recommended sal acid 40% at bedtime.    Procedures Performed:   - Cryotherapy procedure note, location(s): : see above . After verbal consent and discussion of risks and benefits including, but not limited to, dyspigmentation/scar, blister, and pain, 3 lesion(s) was(were) treated with 1-2 mm freeze border for 3 cycles with liquid nitrogen. Post cryotherapy instructions were provided.      Follow-up: 4-6 weeks, sooner if concerns.      Staff and Scribe:     Aguilar Soares MD      The patient was seen and staffed with Dr. Jacoby MD      Scribe Disclosure:   I, RICK HERNANDEZ, am serving as a scribe; to document services personally performed by Lorraine Dozier MD-based on data collection and the provider's statements to me.    Staff Physician Comments:   I saw and evaluated the patient with the resident and I agree with the assessment and plan.  I was present for the entire minor procedure and examination.    Lorraine Dozier MD  Assistant    Department of Dermatology  [facility]: Phone: [phone], Fax: [phone]  4/2/2024       ____________________________________________    CC: Derm Problem (Patient report some improvement since their last visit. The patient would like additional cryotherapy. )    HPI:  Ms. Cathy Arroyo is a(n) 43 year old female who presents today as a return patient for warts. She reports 4 total warts #3 along her left hand and #1 along right middle finger. She endorse improvement to her right middle finger wart but notes no changes to the warts of the left hand.     Patient is otherwise feeling well, without additional skin concerns.    Labs Reviewed:  N/A    Physical Exam:  Vitals: LMP 02/16/2024 (Exact Date)   SKIN: Focused examination of the bilateral hands was performed.  - There are flat topped papules on both hands.  - No other lesions of concern on areas examined.      Medications:  Current Outpatient Medications   Medication    ADDERALL XR 25 MG 24 hr capsule    albuterol (PROAIR HFA/PROVENTIL HFA/VENTOLIN HFA) 108 (90 Base) MCG/ACT inhaler    amphetamine-dextroamphetamine (ADDERALL) 10 MG tablet    ARIPiprazole (ABILIFY) 10 MG tablet    cetirizine (ZYRTEC) 10 MG tablet    diazepam (VALIUM) 5 MG tablet    fluticasone-salmeterol (ADVAIR DISKUS) 250-50 MCG/ACT inhaler    ipratropium - albuterol 0.5 mg/2.5 mg/3 mL (DUONEB) 0.5-2.5 (3) MG/3ML neb solution    levothyroxine (SYNTHROID/LEVOTHROID) 175 MCG tablet    medical cannabis (Patient's own supply)    medical cannabis (Patient's own supply)    methocarbamol (ROBAXIN) 500 MG tablet    montelukast (SINGULAIR) 10 MG tablet    prochlorperazine (COMPAZINE) 10 MG tablet    rizatriptan (MAXALT-MLT) 10 MG ODT    topiramate (TOPAMAX) 50 MG tablet     Current Facility-Administered Medications   Medication    triamcinolone acetonide (KENALOG-10) injection 3 mg      Past Medical History:    Patient Active Problem List   Diagnosis    Pineocytoma (H)    Attention deficit disorder    Seasonal allergic rhinitis    Bipolar affective disorder in remission (H24)    GBS (group B Streptococcus carrier), +RV culture, currently pregnant    Pre-eclampsia    Moderate persistent asthma without complication    Chronic rhinitis    Tobacco use disorder    Abnormal cytology    S/P total thyroidectomy    History of Graves' disease    Nonintractable migraine, unspecified migraine type    Post-surgical hypothyroidism    Abnormal results of thyroid function studies    Otitis externa    Polyhydramnios affecting pregnancy    Medical cannabis use    Labor and delivery indication for care or intervention    Indication for care in labor and delivery, antepartum    Morbid obesity (H)    Urticarial vasculitis    Dehydration    Breast cancer screening, high risk patient    Bipolar affective disorder, currently manic mixed, moderate (H)    Bilateral chronic knee pain    Lumbar radiculopathy     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    Post partum depression     Post-surgical hypothyroidism 05/2017    Problem, psychiatric 2001 diagnosed Bipolar    Recurrent otitis media chronic ear infections in childhood, recently had a double ear infection followed by another ear infection soon after    Sleep apnea 2019    had a sleep study when pregnant with my second child    Uncomplicated asthma         CC No referring provider defined for this encounter. on close of this encounter.

## 2024-03-27 NOTE — NURSING NOTE
Chief Complaint   Patient presents with    Derm Problem     Patient report some improvement since their last visit. The patient would like additional cryotherapy.      Sammie Vieira LPN

## 2024-04-01 ENCOUNTER — HOSPITAL ENCOUNTER (OUTPATIENT)
Dept: MRI IMAGING | Facility: CLINIC | Age: 44
Discharge: HOME | End: 2024-04-01
Attending: CLINICAL NURSE SPECIALIST | Admitting: CLINICAL NURSE SPECIALIST
Payer: COMMERCIAL

## 2024-04-01 DIAGNOSIS — Q34.1 MEDIASTINAL CYST: ICD-10-CM

## 2024-04-01 DIAGNOSIS — R93.89 ABNORMAL MRI: ICD-10-CM

## 2024-04-01 PROCEDURE — 255N000002 HC RX 255 OP 636: Performed by: CLINICAL NURSE SPECIALIST

## 2024-04-01 PROCEDURE — A9585 GADOBUTROL INJECTION: HCPCS | Performed by: CLINICAL NURSE SPECIALIST

## 2024-04-01 PROCEDURE — 72157 MRI CHEST SPINE W/O & W/DYE: CPT

## 2024-04-01 RX ORDER — GADOBUTROL 604.72 MG/ML
11 INJECTION INTRAVENOUS ONCE
Status: COMPLETED | OUTPATIENT
Start: 2024-04-01 | End: 2024-04-01

## 2024-04-01 RX ADMIN — GADOBUTROL 11 ML: 604.72 INJECTION INTRAVENOUS at 11:42

## 2024-04-03 ENCOUNTER — MYC MEDICAL ADVICE (OUTPATIENT)
Dept: PULMONOLOGY | Facility: CLINIC | Age: 44
End: 2024-04-03
Payer: COMMERCIAL

## 2024-04-04 DIAGNOSIS — J45.41 MODERATE PERSISTENT ASTHMA WITH ACUTE EXACERBATION: Primary | ICD-10-CM

## 2024-04-05 ENCOUNTER — PATIENT OUTREACH (OUTPATIENT)
Dept: SURGERY | Facility: CLINIC | Age: 44
End: 2024-04-05
Payer: COMMERCIAL

## 2024-04-05 DIAGNOSIS — Q34.1 MEDIASTINAL CYST: Primary | ICD-10-CM

## 2024-04-05 NOTE — PROGRESS NOTES
Spoke with pt to follow up on normal results from spinal MRI done 4/1/24. Discussed that per JANNET Tilley that results were normal and pt should plan on repeating spine MRI one year from now. Pt in agreement with plan and stated she has the phone number for imaging dept, and will call to schedule.    Opal Meraz, RN BSN  Thoracic Surgery RN Care Coordinator  [phone]

## 2024-04-09 ASSESSMENT — ASTHMA QUESTIONNAIRES
QUESTION_4 LAST FOUR WEEKS HOW OFTEN HAVE YOU USED YOUR RESCUE INHALER OR NEBULIZER MEDICATION (SUCH AS ALBUTEROL): THREE OR MORE TIMES PER DAY
ACT_TOTALSCORE: 11
QUESTION_3 LAST FOUR WEEKS HOW OFTEN DID YOUR ASTHMA SYMPTOMS (WHEEZING, COUGHING, SHORTNESS OF BREATH, CHEST TIGHTNESS OR PAIN) WAKE YOU UP AT NIGHT OR EARLIER THAN USUAL IN THE MORNING: ONCE OR TWICE
ACT_TOTALSCORE: 11
QUESTION_2 LAST FOUR WEEKS HOW OFTEN HAVE YOU HAD SHORTNESS OF BREATH: MORE THAN ONCE A DAY
QUESTION_1 LAST FOUR WEEKS HOW MUCH OF THE TIME DID YOUR ASTHMA KEEP YOU FROM GETTING AS MUCH DONE AT WORK, SCHOOL OR AT HOME: A LITTLE OF THE TIME
QUESTION_5 LAST FOUR WEEKS HOW WOULD YOU RATE YOUR ASTHMA CONTROL: NOT CONTROLLED AT ALL

## 2024-04-10 NOTE — TELEPHONE ENCOUNTER
Followed up on [employer] message. - influenza in mid march. Feeling better, but increase asthma symptoms since then. Feeling better after using Advair consistently for a week, but still wheezing and has yellow nasal drainage. She is getting new tubing today to be able to do Duonebs. Dr. Egan will decide if he want imaging at appointment next week. Writer sent message to Dr Egan to see if he wants to treat symptoms in the meantime.

## 2024-04-16 ENCOUNTER — OFFICE VISIT (OUTPATIENT)
Dept: PULMONOLOGY | Facility: CLINIC | Age: 44
End: 2024-04-16
Attending: INTERNAL MEDICINE
Payer: COMMERCIAL

## 2024-04-16 ENCOUNTER — TELEPHONE (OUTPATIENT)
Dept: PULMONOLOGY | Facility: CLINIC | Age: 44
End: 2024-04-16

## 2024-04-16 VITALS — SYSTOLIC BLOOD PRESSURE: 123 MMHG | HEART RATE: 82 BPM | OXYGEN SATURATION: 97 % | DIASTOLIC BLOOD PRESSURE: 82 MMHG

## 2024-04-16 DIAGNOSIS — J30.2 SEASONAL ALLERGIC RHINITIS, UNSPECIFIED TRIGGER: ICD-10-CM

## 2024-04-16 DIAGNOSIS — F17.200 NICOTINE DEPENDENCE, UNCOMPLICATED, UNSPECIFIED NICOTINE PRODUCT TYPE: ICD-10-CM

## 2024-04-16 DIAGNOSIS — J45.41 MODERATE PERSISTENT ASTHMA WITH ACUTE EXACERBATION: Primary | ICD-10-CM

## 2024-04-16 DIAGNOSIS — J45.40 MODERATE PERSISTENT ASTHMA WITHOUT COMPLICATION: ICD-10-CM

## 2024-04-16 DIAGNOSIS — J45.20 MILD INTERMITTENT ASTHMA WITHOUT COMPLICATION: ICD-10-CM

## 2024-04-16 LAB
BACTERIA SPT CULT: NORMAL
GRAM STAIN RESULT: NORMAL

## 2024-04-16 PROCEDURE — 99214 OFFICE O/P EST MOD 30 MIN: CPT | Mod: 25 | Performed by: INTERNAL MEDICINE

## 2024-04-16 PROCEDURE — 87205 SMEAR GRAM STAIN: CPT | Performed by: INTERNAL MEDICINE

## 2024-04-16 PROCEDURE — 99406 BEHAV CHNG SMOKING 3-10 MIN: CPT | Performed by: INTERNAL MEDICINE

## 2024-04-16 PROCEDURE — G0463 HOSPITAL OUTPT CLINIC VISIT: HCPCS | Mod: 25 | Performed by: INTERNAL MEDICINE

## 2024-04-16 PROCEDURE — 87070 CULTURE OTHR SPECIMN AEROBIC: CPT | Performed by: INTERNAL MEDICINE

## 2024-04-16 RX ORDER — ALBUTEROL SULFATE AND BUDESONIDE 90; 80 UG/1; UG/1
2 AEROSOL, METERED RESPIRATORY (INHALATION) EVERY 4 HOURS PRN
Qty: 10.7 G | Refills: 11 | Status: SHIPPED | OUTPATIENT
Start: 2024-04-16 | End: 2024-05-01

## 2024-04-16 RX ORDER — ALBUTEROL SULFATE 90 UG/1
2 AEROSOL, METERED RESPIRATORY (INHALATION) EVERY 6 HOURS
Qty: 18 G | Refills: 3 | Status: SHIPPED | OUTPATIENT
Start: 2024-04-16 | End: 2024-06-11

## 2024-04-16 RX ORDER — MONTELUKAST SODIUM 10 MG/1
10 TABLET ORAL EVERY MORNING
Qty: 90 TABLET | Refills: 3 | Status: SHIPPED | OUTPATIENT
Start: 2024-04-16 | End: 2024-05-06

## 2024-04-16 RX ORDER — IPRATROPIUM BROMIDE AND ALBUTEROL SULFATE 2.5; .5 MG/3ML; MG/3ML
1 SOLUTION RESPIRATORY (INHALATION) EVERY 6 HOURS PRN
Qty: 90 ML | Refills: 1 | Status: SHIPPED | OUTPATIENT
Start: 2024-04-16 | End: 2024-05-25

## 2024-04-16 ASSESSMENT — ENCOUNTER SYMPTOMS
WEIGHT LOSS: 0
COUGH: 1
WHEEZING: 1
SPUTUM PRODUCTION: 1
CHILLS: 0
FEVER: 0
SHORTNESS OF BREATH: 1
HEMOPTYSIS: 0

## 2024-04-16 ASSESSMENT — PAIN SCALES - GENERAL: PAINLEVEL: MODERATE PAIN (5)

## 2024-04-16 NOTE — PROGRESS NOTES
Pulmonology Clinic Follow up Visit       Cathy Arroyo MRN# [id number]   YOB: 1980 Age: 43 year old     Date of Visit: 4/16/2024    Reason for Visit: Asthma exacerbation          Assessment and Plan:     Cathy Arroyo is a 41 year old female with H/O environmental allergies, anxiety, bipolar type I, obesity, asthma, and tobacco use who presenting for follow-up assessment of asthma and smoking dependence. Last seen in clinic on 3/14/23     #Asthma with acute exacerbation  #Environmental allergies  Significantly improved dyspnea with daily Advair use and decreased smoking. Routine use remains challenging for her, although improved compliance over last 2 weeks. Eosinophils have been normal in the past, she has not had an IgE checked. For additional control of dyspnea, will initially recommend increasing dosing to twice daily.   - Continue Advair daily, increase to twice daily for additional control   - Continue as needed albuterol     #Tobacco abuse  Reduction in daily cigarette use, with recent addition of vaping as substitute. Discussed likelihood that vaping and e-cigarettes are ultimately less harmful to health than tobacco cigarettes, but still harmful to lungs. Will continue to use inhaled nicotine in attempt to wean off cigarettes. She may still consider hypnosis.   - Applauded efforts thus far, encouraged continued work towards complete smoking cessation    Return to clinic: 3 months    I personally spent *** minutes on the date of the encounter doing chart review, history and exam, documentation and further activities per the note.      Scott Egan M.D.  Pulmonary & Critical Care  Pager: Click Here to page          History of Present Illness / Interval History:     Cathy Arroyo is a 43 year old female with H/O ***    Last seen: ***          Review of Systems:     ROS         Physical Examination:     LMP 02/16/2024 (Exact Date)     Physical Exam  Fingernails without***  clubbing        Data:     PFT: ***    CXR:       All studies listed here were independently reviewed and interpreted by me today. ***        Medications:     Current Outpatient Medications   Medication Sig Dispense Refill    ADDERALL XR 25 MG 24 hr capsule Take by mouth daily      albuterol (PROAIR HFA/PROVENTIL HFA/VENTOLIN HFA) 108 (90 Base) MCG/ACT inhaler Inhale 2 puffs into the lungs every 6 hours 18 g 3    amphetamine-dextroamphetamine (ADDERALL) 10 MG tablet Take 1 tablet by mouth daily at 2 pm      ARIPiprazole (ABILIFY) 10 MG tablet Take 0.5 tablets (5 mg) by mouth daily      cetirizine (ZYRTEC) 10 MG tablet Take 1 tablet (10 mg) by mouth daily 90 tablet 3    diazepam (VALIUM) 5 MG tablet Take 5 mg by mouth as needed      fluticasone-salmeterol (ADVAIR DISKUS) 250-50 MCG/ACT inhaler Inhale 1 puff into the lungs 2 times daily 14 each 11    ipratropium - albuterol 0.5 mg/2.5 mg/3 mL (DUONEB) 0.5-2.5 (3) MG/3ML neb solution Take 1 vial (3 mLs) by nebulization every 6 hours as needed for shortness of breath or wheezing 90 mL 1    levothyroxine (SYNTHROID/LEVOTHROID) 175 MCG tablet Take 1 tablet (175 mcg) by mouth daily 90 tablet 2    medical cannabis (Patient's own supply) Take 1 Dose by mouth See Admin Instructions (The purpose of this order is to document that the patient reports taking medical cannabis.  This is not a prescription, and is not used to certify that the patient has a qualifying medical condition.)    [place] Oral Suspension Unflavored 1-5 ml up to two times daily.  Total THC = 240 mg, Total CBD Trace      medical cannabis (Patient's own supply) Take 1 Dose by mouth See Admin Instructions (The purpose of this order is to document that the patient reports taking medical cannabis.  This is not a prescription, and is not used to certify that the patient has a qualifying medical condition.)    [place] oral suspension: take 1-3 ml by mouth two times daily  Total CBD 1200 mg. Total THC 60 mg       methocarbamol (ROBAXIN) 500 MG tablet Take 1 tablet (500 mg) by mouth 3 times daily as needed for muscle spasms 60 tablet 1    montelukast (SINGULAIR) 10 MG tablet Take 1 tablet (10 mg) by mouth every morning 90 tablet 3    prochlorperazine (COMPAZINE) 10 MG tablet Take 1 tablet (10 mg) by mouth every 6 hours as needed for nausea or vomiting 10 tablet 3    rizatriptan (MAXALT-MLT) 10 MG ODT Take 1 tablet (10 mg) by mouth at onset of headache for migraine (repeat in 2 hours if needed) May repeat in 2 hours. Max 3 tablets/24 hours. 18 tablet 11    topiramate (TOPAMAX) 50 MG tablet Take 1 tablet (50 mg) by mouth daily       Current Facility-Administered Medications   Medication Dose Route Frequency Provider Last Rate Last Admin    triamcinolone acetonide (KENALOG-10) injection 3 mg  0.3 mL Intra-Lesional Once Lorraine Dozier MD                 The above note was dictated using voice recognition software and may include typographical errors. Please contact the author for any clarifications.

## 2024-04-16 NOTE — PROGRESS NOTES
Pulmonology Clinic Follow up Visit       Cathy Arroyo MRN# [id number]   YOB: 1980 Age: 43 year old     Date of Visit: 4/16/2024    Reason for Visit: Follow-up asthma          Assessment and Plan:       #Asthma with acute exacerbation  #Environmental allergies  Significantly improved dyspnea with daily Advair use and decreased smoking, though since she was last seen she had not been using her Advair consistently and increased her smoking.  She has been experiencing an exacerbation with productive cough, and worsening wheezing and dyspnea since mid March.  This somewhat improved with improved Advair compliance but her systems persist.  We discussed prednisone, however she is reluctant to try this due to mental health concerns and history of jose eduardo when using prednisone.  We also discussed antibiotics, but she prefers to wait until the sputum culture is complete before taking any antibiotics.  We discussed chest imaging, however she said she would only take antibiotics if she has bacteria in her sputum, so imaging will not change her management.     - Continue Advair 2 puffs twice daily  -Continue Singulair   - Continue as needed albuterol and DuoNebs  -Begin airsupra.  If this is covered  -Sputum culture      -If sputum culture was positive, will start appropriate antibiotics.  If appropriate would favor azithromycin for its anti-inflammatory properties given that she is unable to take prednisone.  She has a history of vaginal yeast infections when treated with antibiotics so we will prescribe prophylactic Diflucan to be taken in the middle of her antibiotic course and at the end.       #Tobacco abuse  She continues smoking 5 to 10 cigarettes/day, usually 10.  I again stressed the importance of smoking cessation.  She had allergic reaction to nicotine patches and reports anaphylaxis from nicotine lozenges as well as jose eduardo when trying Chantix so our options are quite limited.  She does not qualify for  "lung cancer screening at this time   - Applauded efforts thus far, encouraged continued work towards complete smoking cessation  -5 minutes spent on smoking cessation counseling today        Return to clinic: 3 months        Scott Egan M.D.  Pulmonary & Critical Care  Pager: Click Here to page          History of Present Illness / Interval History:     Cathy Arroyo is a 43 year old female with H/O environmental allergies, anxiety, bipolar type I, obesity, asthma, and tobacco use who presenting for follow-up assessment of asthma and smoking dependence.    Last seen: 3/2/24    She notes that her breathing was pretty good until mid March when it became \"pretty bad\" with productive coughing, prominent wheezing, and worsening dyspnea.  She also notes sinus congestion.  About 2 weeks after the onset of the symptoms she developed sharp pleuritic left lower chest pain.  Prior to this she had not been using her Advair consistently, she is now using it 2 puffs twice daily with notable improvement, but still feels quite ill.  Notably, she has not had fevers, rigors, night sweats, or malaise.    She continues smoking, usually 10 cigarettes/day, though sometimes as few as 5.    She continues using her Singulair and needs her albuterol 2-4 times per day.        Review of Systems:     Review of Systems   Constitutional:  Negative for chills, fever, malaise/fatigue and weight loss.   Respiratory:  Positive for cough, sputum production, shortness of breath and wheezing. Negative for hemoptysis.    Cardiovascular:  Positive for chest pain.            Physical Examination:     /82   Pulse 82   LMP 02/16/2024 (Exact Date)   SpO2 97%     Physical Exam  Vitals and nursing note reviewed.   Constitutional:       Appearance: Normal appearance. She is obese.   Cardiovascular:      Rate and Rhythm: Normal rate and regular rhythm.   Pulmonary:      Effort: Pulmonary effort is normal.      Comments: Prominent wheezes " throughout with rhonchi in the bases bilaterally  Neurological:      Mental Status: She is alert.       Fingernails without clubbing        Data:     No pertinent results since last seen        Medications:     Current Outpatient Medications   Medication Sig Dispense Refill    ADDERALL XR 25 MG 24 hr capsule Take by mouth daily      albuterol (PROAIR HFA/PROVENTIL HFA/VENTOLIN HFA) 108 (90 Base) MCG/ACT inhaler Inhale 2 puffs into the lungs every 6 hours 18 g 3    amphetamine-dextroamphetamine (ADDERALL) 10 MG tablet Take 1 tablet by mouth daily at 2 pm      ARIPiprazole (ABILIFY) 10 MG tablet Take 0.5 tablets (5 mg) by mouth daily      cetirizine (ZYRTEC) 10 MG tablet Take 1 tablet (10 mg) by mouth daily 90 tablet 3    diazepam (VALIUM) 5 MG tablet Take 5 mg by mouth as needed      fluticasone-salmeterol (ADVAIR DISKUS) 250-50 MCG/ACT inhaler Inhale 1 puff into the lungs 2 times daily 14 each 11    ipratropium - albuterol 0.5 mg/2.5 mg/3 mL (DUONEB) 0.5-2.5 (3) MG/3ML neb solution Take 1 vial (3 mLs) by nebulization every 6 hours as needed for shortness of breath or wheezing 90 mL 1    levothyroxine (SYNTHROID/LEVOTHROID) 175 MCG tablet Take 1 tablet (175 mcg) by mouth daily 90 tablet 2    medical cannabis (Patient's own supply) Take 1 Dose by mouth See Admin Instructions (The purpose of this order is to document that the patient reports taking medical cannabis.  This is not a prescription, and is not used to certify that the patient has a qualifying medical condition.)    [place] Oral Suspension Unflavored 1-5 ml up to two times daily.  Total THC = 240 mg, Total CBD Trace      medical cannabis (Patient's own supply) Take 1 Dose by mouth See Admin Instructions (The purpose of this order is to document that the patient reports taking medical cannabis.  This is not a prescription, and is not used to certify that the patient has a qualifying medical condition.)    [place] oral suspension: take 1-3 ml by mouth two  times daily  Total CBD 1200 mg. Total THC 60 mg      methocarbamol (ROBAXIN) 500 MG tablet Take 1 tablet (500 mg) by mouth 3 times daily as needed for muscle spasms 60 tablet 1    montelukast (SINGULAIR) 10 MG tablet Take 1 tablet (10 mg) by mouth every morning 90 tablet 3    prochlorperazine (COMPAZINE) 10 MG tablet Take 1 tablet (10 mg) by mouth every 6 hours as needed for nausea or vomiting 10 tablet 3    rizatriptan (MAXALT-MLT) 10 MG ODT Take 1 tablet (10 mg) by mouth at onset of headache for migraine (repeat in 2 hours if needed) May repeat in 2 hours. Max 3 tablets/24 hours. 18 tablet 11    topiramate (TOPAMAX) 50 MG tablet Take 1 tablet (50 mg) by mouth daily       Current Facility-Administered Medications   Medication Dose Route Frequency Provider Last Rate Last Admin    triamcinolone acetonide (KENALOG-10) injection 3 mg  0.3 mL Intra-Lesional Once Lorraine Dozier MD                 The above note was dictated using voice recognition software and may include typographical errors. Please contact the author for any clarifications.

## 2024-04-16 NOTE — LETTER
4/16/2024         RE: Cathy Arroyo  [address]  [facility] [address]        Dear Colleague,    Thank you for referring your patient, Cathy Arroyo, to the [facility] [place]. Please see a copy of my visit note below.    Pulmonology Clinic Follow up Visit       Cathy Arroyo MRN# [id number]   YOB: 1980 Age: 43 year old     Date of Visit: 4/16/2024    Reason for Visit: Follow-up asthma          Assessment and Plan:       #Asthma with acute exacerbation  #Environmental allergies  Significantly improved dyspnea with daily Advair use and decreased smoking, though since she was last seen she had not been using her Advair consistently and increased her smoking.  She has been experiencing an exacerbation with productive cough, and worsening wheezing and dyspnea since mid March.  This somewhat improved with improved Advair compliance but her systems persist.  We discussed prednisone, however she is reluctant to try this due to mental health concerns and history of jose eduardo when using prednisone.  We also discussed antibiotics, but she prefers to wait until the sputum culture is complete before taking any antibiotics.  We discussed chest imaging, however she said she would only take antibiotics if she has bacteria in her sputum, so imaging will not change her management.     - Continue Advair 2 puffs twice daily  -Continue Singulair   - Continue as needed albuterol and DuoNebs  -Begin airsupra.  If this is covered  -Sputum culture      -If sputum culture was positive, will start appropriate antibiotics.  If appropriate would favor azithromycin for its anti-inflammatory properties given that she is unable to take prednisone.  She has a history of vaginal yeast infections when treated with antibiotics so we will prescribe prophylactic Diflucan to be taken in the middle of her antibiotic course and at the end.       #Tobacco abuse  She continues  "smoking 5 to 10 cigarettes/day, usually 10.  I again stressed the importance of smoking cessation.  She had allergic reaction to nicotine patches and reports anaphylaxis from nicotine lozenges as well as jose eduardo when trying Chantix so our options are quite limited.  She does not qualify for lung cancer screening at this time   - Applauded efforts thus far, encouraged continued work towards complete smoking cessation  -5 minutes spent on smoking cessation counseling today        Return to clinic: 3 months        Scott Egan M.D.  Pulmonary & Critical Care  Pager: Click Here to page          History of Present Illness / Interval History:     Cathy Arroyo is a 43 year old female with H/O environmental allergies, anxiety, bipolar type I, obesity, asthma, and tobacco use who presenting for follow-up assessment of asthma and smoking dependence.    Last seen: 3/2/24    She notes that her breathing was pretty good until mid March when it became \"pretty bad\" with productive coughing, prominent wheezing, and worsening dyspnea.  She also notes sinus congestion.  About 2 weeks after the onset of the symptoms she developed sharp pleuritic left lower chest pain.  Prior to this she had not been using her Advair consistently, she is now using it 2 puffs twice daily with notable improvement, but still feels quite ill.  Notably, she has not had fevers, rigors, night sweats, or malaise.    She continues smoking, usually 10 cigarettes/day, though sometimes as few as 5.    She continues using her Singulair and needs her albuterol 2-4 times per day.        Review of Systems:     Review of Systems   Constitutional:  Negative for chills, fever, malaise/fatigue and weight loss.   Respiratory:  Positive for cough, sputum production, shortness of breath and wheezing. Negative for hemoptysis.    Cardiovascular:  Positive for chest pain.            Physical Examination:     /82   Pulse 82   LMP 02/16/2024 (Exact Date)   SpO2 " 97%     Physical Exam  Vitals and nursing note reviewed.   Constitutional:       Appearance: Normal appearance. She is obese.   Cardiovascular:      Rate and Rhythm: Normal rate and regular rhythm.   Pulmonary:      Effort: Pulmonary effort is normal.      Comments: Prominent wheezes throughout with rhonchi in the bases bilaterally  Neurological:      Mental Status: She is alert.       Fingernails without clubbing        Data:     No pertinent results since last seen        Medications:     Current Outpatient Medications   Medication Sig Dispense Refill     ADDERALL XR 25 MG 24 hr capsule Take by mouth daily       albuterol (PROAIR HFA/PROVENTIL HFA/VENTOLIN HFA) 108 (90 Base) MCG/ACT inhaler Inhale 2 puffs into the lungs every 6 hours 18 g 3     amphetamine-dextroamphetamine (ADDERALL) 10 MG tablet Take 1 tablet by mouth daily at 2 pm       ARIPiprazole (ABILIFY) 10 MG tablet Take 0.5 tablets (5 mg) by mouth daily       cetirizine (ZYRTEC) 10 MG tablet Take 1 tablet (10 mg) by mouth daily 90 tablet 3     diazepam (VALIUM) 5 MG tablet Take 5 mg by mouth as needed       fluticasone-salmeterol (ADVAIR DISKUS) 250-50 MCG/ACT inhaler Inhale 1 puff into the lungs 2 times daily 14 each 11     ipratropium - albuterol 0.5 mg/2.5 mg/3 mL (DUONEB) 0.5-2.5 (3) MG/3ML neb solution Take 1 vial (3 mLs) by nebulization every 6 hours as needed for shortness of breath or wheezing 90 mL 1     levothyroxine (SYNTHROID/LEVOTHROID) 175 MCG tablet Take 1 tablet (175 mcg) by mouth daily 90 tablet 2     medical cannabis (Patient's own supply) Take 1 Dose by mouth See Admin Instructions (The purpose of this order is to document that the patient reports taking medical cannabis.  This is not a prescription, and is not used to certify that the patient has a qualifying medical condition.)    [place] Oral Suspension Unflavored 1-5 ml up to two times daily.  Total THC = 240 mg, Total CBD Trace       medical cannabis (Patient's own supply) Take  1 Dose by mouth See Admin Instructions (The purpose of this order is to document that the patient reports taking medical cannabis.  This is not a prescription, and is not used to certify that the patient has a qualifying medical condition.)    [place] oral suspension: take 1-3 ml by mouth two times daily  Total CBD 1200 mg. Total THC 60 mg       methocarbamol (ROBAXIN) 500 MG tablet Take 1 tablet (500 mg) by mouth 3 times daily as needed for muscle spasms 60 tablet 1     montelukast (SINGULAIR) 10 MG tablet Take 1 tablet (10 mg) by mouth every morning 90 tablet 3     prochlorperazine (COMPAZINE) 10 MG tablet Take 1 tablet (10 mg) by mouth every 6 hours as needed for nausea or vomiting 10 tablet 3     rizatriptan (MAXALT-MLT) 10 MG ODT Take 1 tablet (10 mg) by mouth at onset of headache for migraine (repeat in 2 hours if needed) May repeat in 2 hours. Max 3 tablets/24 hours. 18 tablet 11     topiramate (TOPAMAX) 50 MG tablet Take 1 tablet (50 mg) by mouth daily       Current Facility-Administered Medications   Medication Dose Route Frequency Provider Last Rate Last Admin     triamcinolone acetonide (KENALOG-10) injection 3 mg  0.3 mL Intra-Lesional Once Lorraine Dozier MD                 The above note was dictated using voice recognition software and may include typographical errors. Please contact the author for any clarifications.        Again, thank you for allowing me to participate in the care of your patient.        Sincerely,        Scott Egan MD

## 2024-04-16 NOTE — PATIENT INSTRUCTIONS
Try the airsupra inhaler 2 puffs every 4 hours as needed instead of albuterol.    Go to the website below to register for a savings card.  [email or web address]

## 2024-04-17 ENCOUNTER — MYC MEDICAL ADVICE (OUTPATIENT)
Dept: PULMONOLOGY | Facility: CLINIC | Age: 44
End: 2024-04-17
Payer: COMMERCIAL

## 2024-04-17 DIAGNOSIS — B37.31 CANDIDIASIS OF VULVA AND VAGINA: ICD-10-CM

## 2024-04-17 DIAGNOSIS — J45.41 MODERATE PERSISTENT ASTHMA WITH ACUTE EXACERBATION: ICD-10-CM

## 2024-04-17 DIAGNOSIS — J45.41 MODERATE PERSISTENT ASTHMA WITH ACUTE EXACERBATION: Primary | ICD-10-CM

## 2024-04-17 RX ORDER — FLUCONAZOLE 200 MG/1
TABLET ORAL
Qty: 2 TABLET | Refills: 0 | Status: SHIPPED | OUTPATIENT
Start: 2024-04-17 | End: 2024-05-01

## 2024-04-17 RX ORDER — AZITHROMYCIN 250 MG/1
TABLET, FILM COATED ORAL
Qty: 6 TABLET | Refills: 0 | Status: SHIPPED | OUTPATIENT
Start: 2024-04-17 | End: 2024-04-17

## 2024-04-17 RX ORDER — AZITHROMYCIN 250 MG/1
TABLET, FILM COATED ORAL
Qty: 6 TABLET | Refills: 0 | Status: SHIPPED | OUTPATIENT
Start: 2024-04-17 | End: 2024-04-22

## 2024-04-17 RX ORDER — FLUCONAZOLE 200 MG/1
TABLET ORAL
Qty: 2 TABLET | Refills: 0 | Status: SHIPPED | OUTPATIENT
Start: 2024-04-17 | End: 2024-04-17

## 2024-04-22 ENCOUNTER — TELEPHONE (OUTPATIENT)
Dept: PULMONOLOGY | Facility: CLINIC | Age: 44
End: 2024-04-22
Payer: COMMERCIAL

## 2024-04-22 NOTE — TELEPHONE ENCOUNTER
Left Voicemail (1st Attempt) for the patient to call back and schedule the following:    Appointment type: RTA  Provider: JUAN DIEGO  Return date: 08/2024  Specialty phone number: [phone]  Additional appointment(s) needed: N/A  Additonal Notes: N/A

## 2024-04-29 ENCOUNTER — MYC MEDICAL ADVICE (OUTPATIENT)
Dept: FAMILY MEDICINE | Facility: CLINIC | Age: 44
End: 2024-04-29
Payer: COMMERCIAL

## 2024-04-29 NOTE — TELEPHONE ENCOUNTER
Central Prior Authorization Team   Phone: [phone]    PA Initiation    Medication: Airsupra 90-80 mcg not covered  Insurance Company: [employer] - Phone [phone] Fax [phone]  Pharmacy Filling the Rx: [facility] - [place], [place] - [address]  Filling Pharmacy Phone: [phone]  Filling Pharmacy Fax:    Start Date: 4/29/2024

## 2024-05-01 ENCOUNTER — OFFICE VISIT (OUTPATIENT)
Dept: DERMATOLOGY | Facility: CLINIC | Age: 44
End: 2024-05-01
Payer: COMMERCIAL

## 2024-05-01 DIAGNOSIS — B07.9 VERRUCA: Primary | ICD-10-CM

## 2024-05-01 DIAGNOSIS — Z87.2 HISTORY OF SEBORRHEIC KERATOSIS: ICD-10-CM

## 2024-05-01 DIAGNOSIS — D22.9 BENIGN NEVUS: ICD-10-CM

## 2024-05-01 DIAGNOSIS — L91.0 HYPERTROPHIC SCAR: ICD-10-CM

## 2024-05-01 PROCEDURE — 17110 DESTRUCTION B9 LES UP TO 14: CPT | Mod: GC | Performed by: DERMATOLOGY

## 2024-05-01 PROCEDURE — 99212 OFFICE O/P EST SF 10 MIN: CPT | Mod: 25 | Performed by: DERMATOLOGY

## 2024-05-01 PROCEDURE — 11900 INJECT SKIN LESIONS </W 7: CPT | Mod: XS | Performed by: DERMATOLOGY

## 2024-05-01 ASSESSMENT — PAIN SCALES - GENERAL: PAINLEVEL: NO PAIN (0)

## 2024-05-01 NOTE — TELEPHONE ENCOUNTER
Prior Authorization Approval    Authorization Effective Date: 4/29/2024  Authorization Expiration Date: 4/29/2025  Medication: Airsupra 90-80 mcg not covered-PA APPROVED   Approved Dose/Quantity:   Reference #:     Insurance Company: [employer] - Phone [phone] Fax [phone]  Expected CoPay:       CoPay Card Available:      Foundation Assistance Needed:    Which Pharmacy is filling the prescription (Not needed for infusion/clinic administered): [facility] - [place], [place] - [address] [place] [address]  Pharmacy Notified:  Yes  Patient Notified:  No

## 2024-05-01 NOTE — PROGRESS NOTES
[facility] Dermatology Note  Encounter Date: May 1, 2024  Office Visit     Dermatology Problem List:  1. Severe DN  - right upper back, s/p excision 9/26/23   2. Cyst, left mandible  - excision 9/26/23  3. Verruca, hands.  - Cryo 7/11/23, 9/12/23, 10/18/23, 12/13/23, 02/28/24   - sal acid recommended, patient hasn't tried yet 12/13/23  4. Hypertrophic scar, R upper back  - S/p ILK 02/28/24, 5/1/24     Soc hx:     ____________________________________________    Assessment & Plan:    # Hypertrophic scar, R upper back   Explained nature of disease and treatment options. Main concern is itching. Prior good response to ILK previously.  - ILK injection, see procedure note    # Verruca vulgaris  Counseled on natural history and viral etiology of this condition. Responding well to LN2 with one lingering but improving lesion on the L index finger. Briefly discussed other options such as candida, but she is happy with LN2.  - Cryotherapy performed, see procedure note    # Benign intradermal nevus, central vertex scalp  # Hx of probable seborrheic keratosis of the scalp (unable to locate on exam today, MyChart photos 7/2023  Benign etiology reviewed, reassurance provided.    # H/o severe DN.  - continue annual FBSE    Procedures Performed:   - Cryotherapy procedure note, location(s): L index finger. After verbal consent and discussion of risks and benefits including, but not limited to, dyspigmentation/scar, blister, and pain, 1 lesion(s) was(were) treated with 1-2 mm freeze border for 1-2 cycles with liquid nitrogen. Post cryotherapy instructions were provided.  - Intra-lesional triamcinolone procedure note. After verbal consent and review of risk of pain and skin thinning/atrophy, positioning and cleansing with isopropyl alcohol, 0.35 total mL of triamcinolone 10 mg/mL was injected into 2 lesion(s) on the R upper back. The patient tolerated the procedure well and left the dermatology clinic in  good condition.    Follow-up: Follow up next month as scheduled. OK to cancel if improved.     Staff and Scribe and Resident:     Scribe Disclosure:   I, RICK HERNANDEZ, am serving as a scribe; to document services personally performed by Lorraine Dozier MD-based on data collection and the provider's statements to me.    Nicole Mccrary MD  Dermatology Resident PGY4    Staff Physician Comments:   I saw and evaluated the patient with the resident and I agree with the assessment and plan.  I was present for the entire minor procedure and examination.    Lorraine Dozier MD    Department of Dermatology  [facility]: Phone: [phone], Fax: [phone]  5/7/2024     ____________________________________________    CC: Derm Problem (Patient presents for additional cryotherapy treatment to their hands. The patient reports painful scar on their back that was previously treated with steroid injections that they would like to have treated. )    HPI:  Ms. Cathy Arroyo is a(n) 43 year old female who presents today as a return patient for warts and a symptomatic scar. Last seen a little over a month ago at which time warts on the hands were treated with LN2. Since this visit, things have improved significantly. She reminds us that the 2 warts on the L palm have been there since she was 10 years old. The one on the R hand is resolved. Still has a symptomatic wart on the L index finger, but it did improve with LN2. She also has a scar on the R upper back which has been treated with ILK before with improvement. It is itchy again so she hopes for more ILK. Would also like a scalp bump evaluated.     Patient is otherwise feeling well, without additional skin concerns.    Labs Reviewed:  N/A    Physical Exam:  Vitals: LMP 02/16/2024 (Exact Date)   SKIN: Focused examination of the below areas was performed.  - Verrucous papule on L  index finger  - x2 small ?verrucous papules on the L palm  - Hypertrophic scar on R upper back, relatively soft with some firmer areas on the periphery  - No other lesions of concern on areas examined.     Medications:  Current Outpatient Medications   Medication Sig Dispense Refill    ADDERALL XR 25 MG 24 hr capsule Take by mouth daily      albuterol (PROAIR HFA/PROVENTIL HFA/VENTOLIN HFA) 108 (90 Base) MCG/ACT inhaler Inhale 2 puffs into the lungs every 6 hours 18 g 3    amphetamine-dextroamphetamine (ADDERALL) 10 MG tablet Take 1 tablet by mouth daily at 2 pm      ARIPiprazole (ABILIFY) 10 MG tablet Take 0.5 tablets (5 mg) by mouth daily      cetirizine (ZYRTEC) 10 MG tablet Take 1 tablet (10 mg) by mouth daily 90 tablet 3    diazepam (VALIUM) 5 MG tablet Take 5 mg by mouth as needed      fluticasone-salmeterol (ADVAIR DISKUS) 250-50 MCG/ACT inhaler Inhale 1 puff into the lungs 2 times daily 14 each 11    ipratropium - albuterol 0.5 mg/2.5 mg/3 mL (DUONEB) 0.5-2.5 (3) MG/3ML neb solution Take 1 vial (3 mLs) by nebulization every 6 hours as needed for shortness of breath or wheezing 90 mL 1    levothyroxine (SYNTHROID/LEVOTHROID) 175 MCG tablet Take 1 tablet (175 mcg) by mouth daily 90 tablet 2    medical cannabis (Patient's own supply) Take 1 Dose by mouth See Admin Instructions (The purpose of this order is to document that the patient reports taking medical cannabis.  This is not a prescription, and is not used to certify that the patient has a qualifying medical condition.)    [place]anger[place] Oral Suspension Unflavored 1-5 ml up to two times daily.  Total THC = 240 mg, Total CBD Trace      medical cannabis (Patient's own supply) Take 1 Dose by mouth See Admin Instructions (The purpose of this order is to document that the patient reports taking medical cannabis.  This is not a prescription, and is not used to certify that the patient has a qualifying medical condition.)    [place] oral suspension: take 1-3 ml by  mouth two times daily  Total CBD 1200 mg. Total THC 60 mg      methocarbamol (ROBAXIN) 500 MG tablet Take 1 tablet (500 mg) by mouth 3 times daily as needed for muscle spasms 60 tablet 1    montelukast (SINGULAIR) 10 MG tablet Take 1 tablet (10 mg) by mouth every morning 90 tablet 3    prochlorperazine (COMPAZINE) 10 MG tablet Take 1 tablet (10 mg) by mouth every 6 hours as needed for nausea or vomiting 10 tablet 3    rizatriptan (MAXALT-MLT) 10 MG ODT Take 1 tablet (10 mg) by mouth at onset of headache for migraine (repeat in 2 hours if needed) May repeat in 2 hours. Max 3 tablets/24 hours. 18 tablet 11    topiramate (TOPAMAX) 50 MG tablet Take 1 tablet (50 mg) by mouth daily       Current Facility-Administered Medications   Medication Dose Route Frequency Provider Last Rate Last Admin    triamcinolone acetonide (KENALOG-10) injection 3 mg  0.3 mL Intra-Lesional Once Lorraine Dozier MD          Past Medical History:   Patient Active Problem List   Diagnosis    Pineocytoma (H)    Attention deficit disorder    Seasonal allergic rhinitis    Bipolar affective disorder in remission (H24)    GBS (group B Streptococcus carrier), +RV culture, currently pregnant    Pre-eclampsia    Moderate persistent asthma without complication    Chronic rhinitis    Tobacco use disorder    Abnormal cytology    S/P total thyroidectomy    History of Graves' disease    Nonintractable migraine, unspecified migraine type    Post-surgical hypothyroidism    Abnormal results of thyroid function studies    Otitis externa    Polyhydramnios affecting pregnancy    Medical cannabis use    Labor and delivery indication for care or intervention    Indication for care in labor and delivery, antepartum    Morbid obesity (H)    Urticarial vasculitis    Dehydration    Breast cancer screening, high risk patient    Bipolar affective disorder, currently manic mixed, moderate (H)    Bilateral chronic knee pain    Lumbar radiculopathy     Past Medical  History:   Diagnosis Date    Allergic rhinitis 1990    Allergic state     Anxiety     Bipolar 1 disorder (H)     Depressive disorder     Elevated cholesterol     Graves disease 2017    Hearing problem     Migraines 2/2019    have suffered from migraines since having brain surgery    Obese     BMI 37.48    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    Post partum depression     Post-surgical hypothyroidism 05/2017    Problem, psychiatric 2001 diagnosed Bipolar    Recurrent otitis media chronic ear infections in childhood, recently had a double ear infection followed by another ear infection soon after    Sleep apnea 2019    had a sleep study when pregnant with my second child    Uncomplicated asthma         CC Referred Self, MD  No address on file on close of this encounter.

## 2024-05-01 NOTE — NURSING NOTE
Chief Complaint   Patient presents with    Derm Problem     Patient presents for additional cryotherapy treatment to their hands. The patient reports painful scar on their back that was previously treated with steroid injections that they would like to have treated.      Sammie Vieira LPN

## 2024-05-01 NOTE — NURSING NOTE
Drug Administration Record    Prior to injection, verified patient identity using patient's name and date of birth.  Due to injection administration, patient instructed to remain in clinic for 15 minutes  afterwards, and to report any adverse reaction to me immediately.    Drug Name: triamcinolone acetonide(kenalog)  Dose: 0.5 mL  Route administered: ID  NDC #: 0620-0686-74  Amount of waste(mL): 4.5 mL  Reason for waste: Single use vial    LOT #: [id number]  SITE: See Chart   : [employer]  EXPIRATION DATE: 01/2026

## 2024-05-01 NOTE — LETTER
5/1/2024       RE: Cathy Arroyo  [address]  [facility] [address]     Dear Colleague,    Thank you for referring your patient, Cathy Arroyo, to the [facility] [place] at [facility]. Please see a copy of my visit note below.    [facility] Dermatology Note  Encounter Date: May 1, 2024  Office Visit     Dermatology Problem List:  1. Severe DN  - right upper back, s/p excision 9/26/23   2. Cyst, left mandible  - excision 9/26/23  3. Verruca, hands.  - Cryo 7/11/23, 9/12/23, 10/18/23, 12/13/23, 02/28/24   - sal acid recommended, patient hasn't tried yet 12/13/23  4. Hypertrophic scar, R upper back  - S/p ILK 02/28/24, 5/1/24     Soc hx:     ____________________________________________    Assessment & Plan:    # Hypertrophic scar, R upper back   Explained nature of disease and treatment options. Main concern is itching. Prior good response to ILK previously.  - ILK injection, see procedure note    # Verruca vulgaris  Counseled on natural history and viral etiology of this condition. Responding well to LN2 with one lingering but improving lesion on the L index finger. Briefly discussed other options such as candida, but she is happy with LN2.  - Cryotherapy performed, see procedure note    # Benign intradermal nevus, central vertex scalp  # Hx of probable seborrheic keratosis of the scalp (unable to locate on exam today, MyChart photos 7/2023  Benign etiology reviewed, reassurance provided.    # H/o severe DN.  - continue annual FBSE    Procedures Performed:   - Cryotherapy procedure note, location(s): L index finger. After verbal consent and discussion of risks and benefits including, but not limited to, dyspigmentation/scar, blister, and pain, 1 lesion(s) was(were) treated with 1-2 mm freeze border for 1-2 cycles with liquid nitrogen. Post cryotherapy instructions were provided.  - Intra-lesional  triamcinolone procedure note. After verbal consent and review of risk of pain and skin thinning/atrophy, positioning and cleansing with isopropyl alcohol, 0.35 total mL of triamcinolone 10 mg/mL was injected into 2 lesion(s) on the R upper back. The patient tolerated the procedure well and left the dermatology clinic in good condition.    Follow-up: Follow up next month as scheduled. OK to cancel if improved.     Staff and Scribe and Resident:     Scribe Disclosure:   I, RICK HERNANDEZ, am serving as a scribe; to document services personally performed by Lorraine Dozier MD-based on data collection and the provider's statements to me.    Nicole Mccrary MD  Dermatology Resident PGY4    Staff Physician Comments:   I saw and evaluated the patient with the resident and I agree with the assessment and plan.  I was present for the entire minor procedure and examination.    Lorraine Dozier MD    Department of Dermatology  [facility]: Phone: [phone], Fax: [phone]  5/7/2024     ____________________________________________    CC: Derm Problem (Patient presents for additional cryotherapy treatment to their hands. The patient reports painful scar on their back that was previously treated with steroid injections that they would like to have treated. )    HPI:  Ms. Cathy Arroyo is a(n) 43 year old female who presents today as a return patient for warts and a symptomatic scar. Last seen a little over a month ago at which time warts on the hands were treated with LN2. Since this visit, things have improved significantly. She reminds us that the 2 warts on the L palm have been there since she was 10 years old. The one on the R hand is resolved. Still has a symptomatic wart on the L index finger, but it did improve with LN2. She also has a scar on the R upper back which has been treated with ILK before with improvement. It  is itchy again so she hopes for more ILK. Would also like a scalp bump evaluated.     Patient is otherwise feeling well, without additional skin concerns.    Labs Reviewed:  N/A    Physical Exam:  Vitals: LMP 02/16/2024 (Exact Date)   SKIN: Focused examination of the below areas was performed.  - Verrucous papule on L index finger  - x2 small ?verrucous papules on the L palm  - Hypertrophic scar on R upper back, relatively soft with some firmer areas on the periphery  - No other lesions of concern on areas examined.     Medications:  Current Outpatient Medications   Medication Sig Dispense Refill    ADDERALL XR 25 MG 24 hr capsule Take by mouth daily      albuterol (PROAIR HFA/PROVENTIL HFA/VENTOLIN HFA) 108 (90 Base) MCG/ACT inhaler Inhale 2 puffs into the lungs every 6 hours 18 g 3    amphetamine-dextroamphetamine (ADDERALL) 10 MG tablet Take 1 tablet by mouth daily at 2 pm      ARIPiprazole (ABILIFY) 10 MG tablet Take 0.5 tablets (5 mg) by mouth daily      cetirizine (ZYRTEC) 10 MG tablet Take 1 tablet (10 mg) by mouth daily 90 tablet 3    diazepam (VALIUM) 5 MG tablet Take 5 mg by mouth as needed      fluticasone-salmeterol (ADVAIR DISKUS) 250-50 MCG/ACT inhaler Inhale 1 puff into the lungs 2 times daily 14 each 11    ipratropium - albuterol 0.5 mg/2.5 mg/3 mL (DUONEB) 0.5-2.5 (3) MG/3ML neb solution Take 1 vial (3 mLs) by nebulization every 6 hours as needed for shortness of breath or wheezing 90 mL 1    levothyroxine (SYNTHROID/LEVOTHROID) 175 MCG tablet Take 1 tablet (175 mcg) by mouth daily 90 tablet 2    medical cannabis (Patient's own supply) Take 1 Dose by mouth See Admin Instructions (The purpose of this order is to document that the patient reports taking medical cannabis.  This is not a prescription, and is not used to certify that the patient has a qualifying medical condition.)    Tangerine Oral Suspension Unflavored 1-5 ml up to two times daily.  Total THC = 240 mg, Total CBD Trace      medical  cannabis (Patient's own supply) Take 1 Dose by mouth See Admin Instructions (The purpose of this order is to document that the patient reports taking medical cannabis.  This is not a prescription, and is not used to certify that the patient has a qualifying medical condition.)    [place] oral suspension: take 1-3 ml by mouth two times daily  Total CBD 1200 mg. Total THC 60 mg      methocarbamol (ROBAXIN) 500 MG tablet Take 1 tablet (500 mg) by mouth 3 times daily as needed for muscle spasms 60 tablet 1    montelukast (SINGULAIR) 10 MG tablet Take 1 tablet (10 mg) by mouth every morning 90 tablet 3    prochlorperazine (COMPAZINE) 10 MG tablet Take 1 tablet (10 mg) by mouth every 6 hours as needed for nausea or vomiting 10 tablet 3    rizatriptan (MAXALT-MLT) 10 MG ODT Take 1 tablet (10 mg) by mouth at onset of headache for migraine (repeat in 2 hours if needed) May repeat in 2 hours. Max 3 tablets/24 hours. 18 tablet 11    topiramate (TOPAMAX) 50 MG tablet Take 1 tablet (50 mg) by mouth daily       Current Facility-Administered Medications   Medication Dose Route Frequency Provider Last Rate Last Admin    triamcinolone acetonide (KENALOG-10) injection 3 mg  0.3 mL Intra-Lesional Once Lorraine Dozier MD          Past Medical History:   Patient Active Problem List   Diagnosis    Pineocytoma (H)    Attention deficit disorder    Seasonal allergic rhinitis    Bipolar affective disorder in remission (H24)    GBS (group B Streptococcus carrier), +RV culture, currently pregnant    Pre-eclampsia    Moderate persistent asthma without complication    Chronic rhinitis    Tobacco use disorder    Abnormal cytology    S/P total thyroidectomy    History of Graves' disease    Nonintractable migraine, unspecified migraine type    Post-surgical hypothyroidism    Abnormal results of thyroid function studies    Otitis externa    Polyhydramnios affecting pregnancy    Medical cannabis use    Labor and delivery indication for care or  intervention    Indication for care in labor and delivery, antepartum    Morbid obesity (H)    Urticarial vasculitis    Dehydration    Breast cancer screening, high risk patient    Bipolar affective disorder, currently manic mixed, moderate (H)    Bilateral chronic knee pain    Lumbar radiculopathy     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    Post partum depression     Post-surgical hypothyroidism 05/2017    Problem, psychiatric 2001 diagnosed Bipolar    Recurrent otitis media chronic ear infections in childhood, recently had a double ear infection followed by another ear infection soon after    Sleep apnea 2019    had a sleep study when pregnant with my second child    Uncomplicated asthma         CC Referred Self, MD  No address on file on close of this encounter.

## 2024-05-01 NOTE — PATIENT INSTRUCTIONS
Cryotherapy    What is it?  Use of a very cold liquid, such as liquid nitrogen, to freeze and destroy abnormal skin cells that need to be removed    What should I expect?  Tenderness and redness  A small blister that might grow and fill with dark purple blood. There may be crusting.  More than one treatment may be needed if the lesions do not go away.    How do I care for the treated area?  Gently wash the area with your hands when bathing.  Use a thin layer of Vaseline to help with healing. You may use a Band-Aid.   The area should heal within 7-10 days and may leave behind a pink or lighter color.   Do not use an antibiotic or Neosporin ointment.   You may take acetaminophen (Tylenol) for pain.     Call your doctor if you have:  Severe pain  Signs of infection (warmth, redness, cloudy yellow drainage, and or a bad smell)  Questions or concerns    Who should I call with questions?      [facility]: [phone]      [facility]: [phone]      For urgent needs outside of business hours call the [facility] at [phone] and ask for the dermatology resident on call

## 2024-05-06 ENCOUNTER — OFFICE VISIT (OUTPATIENT)
Dept: FAMILY MEDICINE | Facility: CLINIC | Age: 44
End: 2024-05-06
Payer: COMMERCIAL

## 2024-05-06 ENCOUNTER — HOSPITAL ENCOUNTER (OUTPATIENT)
Dept: GENERAL RADIOLOGY | Facility: CLINIC | Age: 44
Discharge: HOME | End: 2024-05-06
Attending: NURSE PRACTITIONER | Admitting: NURSE PRACTITIONER
Payer: COMMERCIAL

## 2024-05-06 VITALS
RESPIRATION RATE: 20 BRPM | TEMPERATURE: 98 F | OXYGEN SATURATION: 98 % | SYSTOLIC BLOOD PRESSURE: 118 MMHG | BODY MASS INDEX: 37.34 KG/M2 | HEART RATE: 78 BPM | DIASTOLIC BLOOD PRESSURE: 76 MMHG | WEIGHT: 242 LBS

## 2024-05-06 DIAGNOSIS — Z86.39 HISTORY OF GRAVES' DISEASE: ICD-10-CM

## 2024-05-06 DIAGNOSIS — G43.009 MIGRAINE WITHOUT AURA AND WITHOUT STATUS MIGRAINOSUS, NOT INTRACTABLE: ICD-10-CM

## 2024-05-06 DIAGNOSIS — E89.0 S/P TOTAL THYROIDECTOMY: ICD-10-CM

## 2024-05-06 DIAGNOSIS — E89.0 POST-SURGICAL HYPOTHYROIDISM: ICD-10-CM

## 2024-05-06 DIAGNOSIS — J45.41 MODERATE PERSISTENT ASTHMA WITH ACUTE EXACERBATION: ICD-10-CM

## 2024-05-06 DIAGNOSIS — J30.2 SEASONAL ALLERGIC RHINITIS, UNSPECIFIED TRIGGER: ICD-10-CM

## 2024-05-06 DIAGNOSIS — J45.41 MODERATE PERSISTENT ASTHMA WITH ACUTE EXACERBATION: Primary | ICD-10-CM

## 2024-05-06 DIAGNOSIS — J45.40 MODERATE PERSISTENT ASTHMA WITHOUT COMPLICATION: ICD-10-CM

## 2024-05-06 PROCEDURE — 71046 X-RAY EXAM CHEST 2 VIEWS: CPT

## 2024-05-06 PROCEDURE — 71046 X-RAY EXAM CHEST 2 VIEWS: CPT | Mod: 26 | Performed by: RADIOLOGY

## 2024-05-06 PROCEDURE — 99215 OFFICE O/P EST HI 40 MIN: CPT | Performed by: NURSE PRACTITIONER

## 2024-05-06 RX ORDER — LEVOTHYROXINE SODIUM 175 UG/1
175 TABLET ORAL DAILY
Qty: 90 TABLET | Refills: 2 | Status: SHIPPED | OUTPATIENT
Start: 2024-05-06 | End: 2024-07-12

## 2024-05-06 RX ORDER — RIZATRIPTAN BENZOATE 10 MG/1
10 TABLET, ORALLY DISINTEGRATING ORAL
Qty: 18 TABLET | Refills: 11 | Status: SHIPPED | OUTPATIENT
Start: 2024-05-06 | End: 2024-05-25

## 2024-05-06 RX ORDER — CETIRIZINE HYDROCHLORIDE 10 MG/1
10 TABLET ORAL DAILY
Qty: 90 TABLET | Refills: 3 | Status: SHIPPED | OUTPATIENT
Start: 2024-05-06

## 2024-05-06 RX ORDER — MONTELUKAST SODIUM 10 MG/1
10 TABLET ORAL EVERY MORNING
Qty: 90 TABLET | Refills: 3 | Status: SHIPPED | OUTPATIENT
Start: 2024-05-06

## 2024-05-06 RX ORDER — FLUTICASONE FUROATE 27.5 UG/1
2 SPRAY, METERED NASAL DAILY
Qty: 9.1 ML | Refills: 11 | Status: SHIPPED | OUTPATIENT
Start: 2024-05-06 | End: 2024-09-03

## 2024-05-06 NOTE — PATIENT INSTRUCTIONS
Start the Airspura inhaler  Get CXR today  Start Flonase Sensamist for better administration than the regular floanse  Schedule with allergist (who also sees asthma)  I can also help with asthma

## 2024-05-06 NOTE — RESULT ENCOUNTER NOTE
Cathy,    No sign of pneumonia or any other worrisome things in the lungs. I think this is a long asthma exacerbation, unfortunately. But taking the Advair every day is surely helping it not be worse and I'm hopeful that the Airspura will also help.  If you get to a point where you do want to take prednisone, please let me know.  If you have any questions, please feel free to contact the clinic.    DEVEN Winston

## 2024-05-06 NOTE — PROGRESS NOTES
Assessment & Plan     Moderate persistent asthma without complication  Lung sounds entirely wheezing, but could be enough to obscure rales. CXR ordered. Discussed options if no other cause found than asthma exacerbation. Unfortunately, both prednisone and methylprednisone have caused jose eduardo phases in the past and patient is coming off of drug-induced jose eduardo recently. Discussed the pulm recommend Airspura and I would be in favor of this for the additional inhaled steroid support. Discussed the DONA guidelines in favor of this strategy as well.   - Adult Allergy/Asthma  Referral; Future  - montelukast (SINGULAIR) 10 MG tablet; Take 1 tablet (10 mg) by mouth every morning    Moderate persistent asthma with acute exacerbation  As above  - XR Chest 2 Views; Future  - fluticasone furoate (FLONASE SENSIMIST) 27.5 MCG/SPRAY nasal spray; Spray 2 sprays into both nostrils daily  - Adult Allergy/Asthma  Referral; Future    Seasonal allergic rhinitis, unspecified trigger  Add flonase with trial of the senimist delivery for better administration. Referred to allergist for consideration of other allergy therapies. Discussed how allergies --> asthma control  - fluticasone furoate (FLONASE SENSIMIST) 27.5 MCG/SPRAY nasal spray; Spray 2 sprays into both nostrils daily  - Adult Allergy/Asthma  Referral; Future  - cetirizine (ZYRTEC) 10 MG tablet; Take 1 tablet (10 mg) by mouth daily  - montelukast (SINGULAIR) 10 MG tablet; Take 1 tablet (10 mg) by mouth every morning    Post-surgical hypothyroidism  Refilled to new pharmacy  Labs UTD  - levothyroxine (SYNTHROID/LEVOTHROID) 175 MCG tablet; Take 1 tablet (175 mcg) by mouth daily    S/P total thyroidectomy  As above  - levothyroxine (SYNTHROID/LEVOTHROID) 175 MCG tablet; Take 1 tablet (175 mcg) by mouth daily    History of Graves' disease  As above  - levothyroxine (SYNTHROID/LEVOTHROID) 175 MCG tablet; Take 1 tablet (175 mcg) by mouth daily    Migraine  without aura and without status migrainosus, not intractable  Refilled to new pharmacy  - rizatriptan (MAXALT-MLT) 10 MG ODT; Take 1 tablet (10 mg) by mouth at onset of headache for migraine (repeat in 2 hours if needed) May repeat in 2 hours. Max 3 tablets/24 hours.    Ordering of each unique test  Prescription drug management  I spent a total of 42 minutes on the day of the visit.   Time spent by me doing chart review, history and exam, documentation and further activities per the note        Patient Instructions    Start the Airspura inhaler  Get CXR today  Start Flonase Sensamist for better administration than the regular floanse  Schedule with allergist (who also sees asthma)  I can also help with asthma    Subjective   Cathy is a 43 year old, presenting for the following health issues:  URI (Since march 16th/Some gastro and uri symptoms/Saw pulm, started azithromycin )        5/6/2024     2:59 PM   Additional Questions   Roomed by Justin NIX     URI       Asthma and respiratory virus - March 16th abrupt onset lethargy, nasal congestion and rhinorrhea, respiratory symptoms including cough, nausea and vomiting. Other symptoms resolved, but continued to have respiratory symptoms (cough, shortness of breath, wheezing, rattling) and left nasal congestion. Saw pulmonology 4/16 and prescribed the azithromycin, which she took from 4/18-4/23. No change resulted from azithromycin except change in color of nasal mucous.     Has been compliant with ICS-LABA - taking daily minimally once a day 100% of the time.    Allergies - not doing well. Taking antihistamine (alternates fexofenadine and cetirizine depending on cost) and montelukast. Hasn't had benefit from fluticasone nasal because administration hasn't been effective and goes into her throat.    Jhoana recently from topiramate (which was administered for weight management) and has now been discontinued. Continues to be followed by psychiatry      Review of  Systems  Constitutional, HEENT, cardiovascular, pulmonary, gi and gu systems are negative, except as otherwise noted.      Objective    /76 (BP Location: Right arm, Patient Position: Sitting, Cuff Size: Adult Large)   Pulse 78   Temp 98  F (36.7  C) (Temporal)   Resp 20   Wt 109.8 kg (242 lb)   SpO2 98%   BMI 37.34 kg/m    Body mass index is 37.34 kg/m .  Physical Exam   GENERAL: alert and no distress  EYES: Eyes grossly normal to inspection, PERRL and conjunctivae and sclerae normal  HENT: ear canals and TM's normal, nose and mouth without ulcers or lesions  NECK: no adenopathy, no asymmetry, masses, or scars  RESP: expiratory wheezes R upper anterior, R upper posterior, R mid posterior, R lower anterior, R lower posterior, L upper anterior, L upper posterior, L mid posterior, and L lower posterior and inspiratory wheezes R upper anterior, R upper posterior, R lower anterior, R lower posterior, L upper anterior, L upper posterior, L mid posterior, and L lower posterior  CV: regular rate and rhythm, normal S1 S2, no S3 or S4, no murmur, click or rub, no peripheral edema   PSYCH: mentation appears normal, affect normal/bright          Signed Electronically by: JANNET Liu CNP

## 2024-05-08 ENCOUNTER — APPOINTMENT (OUTPATIENT)
Dept: CT IMAGING | Facility: CLINIC | Age: 44
End: 2024-05-08
Attending: PHYSICIAN ASSISTANT
Payer: COMMERCIAL

## 2024-05-08 ENCOUNTER — APPOINTMENT (OUTPATIENT)
Dept: GENERAL RADIOLOGY | Facility: CLINIC | Age: 44
End: 2024-05-08
Attending: PHYSICIAN ASSISTANT
Payer: COMMERCIAL

## 2024-05-08 ENCOUNTER — HOSPITAL ENCOUNTER (EMERGENCY)
Facility: CLINIC | Age: 44
Discharge: HOME | End: 2024-05-08
Attending: EMERGENCY MEDICINE | Admitting: EMERGENCY MEDICINE
Payer: COMMERCIAL

## 2024-05-08 ENCOUNTER — NURSE TRIAGE (OUTPATIENT)
Dept: FAMILY MEDICINE | Facility: CLINIC | Age: 44
End: 2024-05-08

## 2024-05-08 ENCOUNTER — MYC MEDICAL ADVICE (OUTPATIENT)
Dept: FAMILY MEDICINE | Facility: CLINIC | Age: 44
End: 2024-05-08

## 2024-05-08 VITALS
OXYGEN SATURATION: 94 % | RESPIRATION RATE: 18 BRPM | HEART RATE: 92 BPM | DIASTOLIC BLOOD PRESSURE: 83 MMHG | HEIGHT: 68 IN | TEMPERATURE: 98.9 F | BODY MASS INDEX: 37.13 KG/M2 | WEIGHT: 245 LBS | SYSTOLIC BLOOD PRESSURE: 133 MMHG

## 2024-05-08 DIAGNOSIS — J45.40 MODERATE PERSISTENT ASTHMA WITHOUT COMPLICATION: Primary | ICD-10-CM

## 2024-05-08 DIAGNOSIS — J45.20 MILD INTERMITTENT ASTHMA WITHOUT COMPLICATION: ICD-10-CM
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
## 2024-05-08 DIAGNOSIS — S30.0XXA CONTUSION OF LOWER BACK, INITIAL ENCOUNTER: ICD-10-CM

## 2024-05-08 DIAGNOSIS — S70.01XA CONTUSION OF RIGHT HIP, INITIAL ENCOUNTER: ICD-10-CM

## 2024-05-08 LAB — HCG UR QL: NEGATIVE

## 2024-05-08 PROCEDURE — 72131 CT LUMBAR SPINE W/O DYE: CPT

## 2024-05-08 PROCEDURE — 99284 EMERGENCY DEPT VISIT MOD MDM: CPT | Mod: FS | Performed by: EMERGENCY MEDICINE

## 2024-05-08 PROCEDURE — 250N000013 HC RX MED GY IP 250 OP 250 PS 637: Performed by: PHYSICIAN ASSISTANT

## 2024-05-08 PROCEDURE — 81025 URINE PREGNANCY TEST: CPT | Performed by: EMERGENCY MEDICINE

## 2024-05-08 PROCEDURE — 99284 EMERGENCY DEPT VISIT MOD MDM: CPT | Mod: 25 | Performed by: EMERGENCY MEDICINE

## 2024-05-08 PROCEDURE — 73502 X-RAY EXAM HIP UNI 2-3 VIEWS: CPT

## 2024-05-08 RX ORDER — ALBUTEROL SULFATE 90 UG/1
2 AEROSOL, METERED RESPIRATORY (INHALATION) EVERY 6 HOURS
Qty: 18 G | Refills: 3 | Status: CANCELLED | OUTPATIENT
Start: 2024-05-08

## 2024-05-08 RX ORDER — IBUPROFEN 600 MG/1
600 TABLET, FILM COATED ORAL ONCE
Status: COMPLETED | OUTPATIENT
Start: 2024-05-08 | End: 2024-05-08

## 2024-05-08 RX ORDER — METHOCARBAMOL 500 MG/1
500 TABLET, FILM COATED ORAL ONCE
Status: COMPLETED | OUTPATIENT
Start: 2024-05-08 | End: 2024-05-08

## 2024-05-08 RX ORDER — LIDOCAINE 4 G/G
1 PATCH TOPICAL
Status: DISCONTINUED | OUTPATIENT
Start: 2024-05-08 | End: 2024-05-08 | Stop reason: HOSPADM

## 2024-05-08 RX ADMIN — METHOCARBAMOL 500 MG: 500 TABLET ORAL at 14:15

## 2024-05-08 RX ADMIN — IBUPROFEN 600 MG: 600 TABLET, FILM COATED ORAL at 14:15

## 2024-05-08 ASSESSMENT — ACTIVITIES OF DAILY LIVING (ADL)
ADLS_ACUITY_SCORE: 35
ADLS_ACUITY_SCORE: 33

## 2024-05-08 ASSESSMENT — COLUMBIA-SUICIDE SEVERITY RATING SCALE - C-SSRS
1. IN THE PAST MONTH, HAVE YOU WISHED YOU WERE DEAD OR WISHED YOU COULD GO TO SLEEP AND NOT WAKE UP?: NO
6. HAVE YOU EVER DONE ANYTHING, STARTED TO DO ANYTHING, OR PREPARED TO DO ANYTHING TO END YOUR LIFE?: NO
2. HAVE YOU ACTUALLY HAD ANY THOUGHTS OF KILLING YOURSELF IN THE PAST MONTH?: NO
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
## 2024-05-08 NOTE — ED TRIAGE NOTES
Patient slipped on cat vomit coming out of the bathroom. Patient hit both knees on the door and then fell on her right side, hitting her right hip and elbow. Patient now has severe back pain on top of her chronic back pain.

## 2024-05-08 NOTE — TELEPHONE ENCOUNTER
Please see My chart message. Pended order and pharmacy if you are okay filling for pt. Thanks    Constance Lynn RN  [facility]

## 2024-05-08 NOTE — TELEPHONE ENCOUNTER
Pa approved earlier this year, but patient didn't take and it was cancelled by originating prescriber. This is just to refill the medication. DEVEN Winston

## 2024-05-08 NOTE — DISCHARGE INSTRUCTIONS
Here in the emergency room you have reassuring evaluation.  We obtained a CT of your back as well as an x-ray of your right hip and these are negative do not show any visible broken bones.  We discussed symptoms are most consistent with bruise otherwise known as contusion.  This sort of injury should improve on its own within about a week or so.  Recommend Tylenol ibuprofen heat ice using her muscle relaxants as needed at home.    If you develop any new or worsening symptoms, is important to return right away to the emergency department for further evaluation and management.

## 2024-05-09 ENCOUNTER — TELEPHONE (OUTPATIENT)
Dept: ALLERGY | Facility: CLINIC | Age: 44
End: 2024-05-09
Payer: COMMERCIAL

## 2024-05-09 NOTE — TELEPHONE ENCOUNTER
[facility] Call Center    Phone Message    May a detailed message be left on voicemail: no     Reason for Call: Appointment Intake    Referring Provider Name:   Sherrill Dasilva, JANNET CALIX in  PRIMARY CARE  Diagnosis and/or Symptoms: Moderate persistent asthma without complication [J45.40] Moderate persistent asthma with acute exacerbation [J45.41] Seasonal allergic rhinitis, unspecified trigger [J30.2]   Scheduled: 9/30/2024 with Jared Smith MD    FYI: unable to stop taking any allergy medications/antihistamines for 7 days prior to their appointment. Routing to clinic per protcols    Action Taken: Message routed to:  Other: [facility]    Travel Screening: Not Applicable        No need to reply to sender

## 2024-05-25 ENCOUNTER — OFFICE VISIT (OUTPATIENT)
Dept: URGENT CARE | Facility: URGENT CARE | Age: 44
End: 2024-05-25
Payer: COMMERCIAL

## 2024-05-25 VITALS
OXYGEN SATURATION: 98 % | RESPIRATION RATE: 16 BRPM | SYSTOLIC BLOOD PRESSURE: 122 MMHG | BODY MASS INDEX: 38.35 KG/M2 | DIASTOLIC BLOOD PRESSURE: 80 MMHG | TEMPERATURE: 99.1 F | WEIGHT: 248.5 LBS | HEART RATE: 91 BPM

## 2024-05-25 DIAGNOSIS — J45.40 MODERATE PERSISTENT ASTHMA WITHOUT COMPLICATION: ICD-10-CM

## 2024-05-25 DIAGNOSIS — J02.9 SORE THROAT: Primary | ICD-10-CM

## 2024-05-25 DIAGNOSIS — R06.2 WHEEZING: ICD-10-CM

## 2024-05-25 LAB
DEPRECATED S PYO AG THROAT QL EIA: NEGATIVE
FLUAV AG SPEC QL IA: NEGATIVE
FLUBV AG SPEC QL IA: NEGATIVE

## 2024-05-25 PROCEDURE — 87635 SARS-COV-2 COVID-19 AMP PRB: CPT | Performed by: PHYSICIAN ASSISTANT

## 2024-05-25 PROCEDURE — 87804 INFLUENZA ASSAY W/OPTIC: CPT | Performed by: PHYSICIAN ASSISTANT

## 2024-05-25 PROCEDURE — 87651 STREP A DNA AMP PROBE: CPT | Performed by: PHYSICIAN ASSISTANT

## 2024-05-25 PROCEDURE — 99214 OFFICE O/P EST MOD 30 MIN: CPT | Performed by: PHYSICIAN ASSISTANT

## 2024-05-25 NOTE — PROGRESS NOTES
SUBJECTIVE:  Cathy Arroyo is a 43 year old female who comes in with a 1 day history of URI related symptoms.  Patient states that yesterday she started with a sore throat along with nasal congestion and mild cough and sinus drainage.  She does have a history of asthma and has heard some wheezing along with some slight shortness of breath.  She does have daily budesonide and albuterol combination inhaler.  She is to take it 4 times a day but typically only takes it twice a day.  She states that she is unable to take prednisone because of her bipolar and she goes into a manic episode.  She is concerned for possible strep, influenza or COVID.  Unsure of any exposures.  She has been using some Tylenol also for symptomatic relief.  Also has taken some cannabis Gummies at night to help.  She otherwise at baseline health.  Does admit to continued daily smoking of cigarettes.    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    Post partum depression     Post-surgical hypothyroidism 05/2017    Problem, psychiatric 2001 diagnosed Bipolar    Recurrent otitis media chronic ear infections in childhood, recently had a double ear infection followed by another ear infection soon after    Sleep apnea 2019    had a sleep study when pregnant with my second child    Uncomplicated asthma      Current Outpatient Medications   Medication Sig Dispense Refill    ADDERALL XR 25 MG 24 hr capsule Take by mouth daily      albuterol (PROAIR HFA/PROVENTIL HFA/VENTOLIN HFA) 108 (90 Base) MCG/ACT inhaler Inhale 2 puffs into the lungs every 6 hours 18 g 3    Albuterol-Budesonide 90-80 MCG/ACT AERO Inhale 2 puffs into the  lungs 4 times daily as needed (asthma exacerbation and wheezing) 10.7 g 5    ARIPiprazole (ABILIFY) 10 MG tablet Take 0.5 tablets (5 mg) by mouth daily      cetirizine (ZYRTEC) 10 MG tablet Take 1 tablet (10 mg) by mouth daily 90 tablet 3    Fexofenadine HCl (ALLEGRA PO)       fluticasone-salmeterol (ADVAIR DISKUS) 250-50 MCG/ACT inhaler Inhale 1 puff into the lungs 2 times daily 14 each 11    levothyroxine (SYNTHROID/LEVOTHROID) 175 MCG tablet Take 1 tablet (175 mcg) by mouth daily 90 tablet 2    medical cannabis (Patient's own supply) Take 1 Dose by mouth See Admin Instructions (The purpose of this order is to document that the patient reports taking medical cannabis.  This is not a prescription, and is not used to certify that the patient has a qualifying medical condition.)    Tangerine Oral Suspension Unflavored 1-5 ml up to two times daily.  Total THC = 240 mg, Total CBD Trace      medical cannabis (Patient's own supply) Take 1 Dose by mouth See Admin Instructions (The purpose of this order is to document that the patient reports taking medical cannabis.  This is not a prescription, and is not used to certify that the patient has a qualifying medical condition.)    [place] oral suspension: take 1-3 ml by mouth two times daily  Total CBD 1200 mg. Total THC 60 mg      montelukast (SINGULAIR) 10 MG tablet Take 1 tablet (10 mg) by mouth every morning 90 tablet 3    fluticasone furoate (FLONASE SENSIMIST) 27.5 MCG/SPRAY nasal spray Spray 2 sprays into both nostrils daily (Patient not taking: Reported on 5/25/2024) 9.1 mL 11     Current Facility-Administered Medications   Medication Dose Route Frequency Provider Last Rate Last Admin    triamcinolone acetonide (KENALOG-10) injection 3 mg  0.3 mL Intra-Lesional Once Lorraine Dozier MD        triamcinolone acetonide (KENALOG-10) injection 3.5 mg  3.5 mg Intra-Lesional Once          Social History     Socioeconomic History    Marital status:      Spouse name:  Not on file    Number of children: Not on file    Years of education: Not on file    Highest education level: Not on file   Occupational History    Not on file   Tobacco Use    Smoking status: Every Day     Current packs/day: 0.00     Average packs/day: 0.3 packs/day for 18.0 years (4.5 ttl pk-yrs)     Types: Cigarettes     Start date: 2004     Last attempt to quit: 2022     Years since quittin.3     Passive exposure: Current    Smokeless tobacco: Never    Tobacco comments:     Stopped smoking for both pregnamcies and breastfeeding of both children     Working on quitting smoking (23)   Vaping Use    Vaping status: Former    Quit date: 2023    Substances: Nicotine, Flavoring    Devices: Disposable   Substance and Sexual Activity    Alcohol use: Yes     Alcohol/week: 4.0 standard drinks of alcohol     Types: 4 Drinks containing 0.5 oz of alcohol per week    Drug use: Not Currently     Types: Marijuana, Psilocybin     Comment: medical marijuana    Sexual activity: Yes     Partners: Male     Birth control/protection: Female Surgical, None   Other Topics Concern    Parent/sibling w/ CABG, MI or angioplasty before 65F 55M? No   Social History Narrative    Caffeine intake/servings daily - 1    Calcium intake/servings daily - 3    Exercise 5 times weekly - describe ; encouraged walking daily    Sunscreen used - Yes    Seatbelts used - Yes    Guns stored in the home - No    Self Breast Exam - Yes    Pap test up to date -  No    Eye exam up to date -  No    Dental exam up to date -  No    DEXA scan up to date -  No    Flex Sig/Colonoscopy up to date -  No    Mammography up to date -  No    Immunizations reviewed and up to date - Yes    Abuse: Current or Past (Physical, Sexual or Emotional) - No    Do you feel safe in your environment - Yes    Do you cope well with stress - Yes    Do you suffer from insomnia - No             Social Determinants of Health     Financial Resource Strain: Low Risk   (1/21/2024)    Financial Resource Strain     Within the past 12 months, have you or your family members you live with been unable to get utilities (heat, electricity) when it was really needed?: No   Food Insecurity: Low Risk  (1/21/2024)    Food Insecurity     Within the past 12 months, did you worry that your food would run out before you got money to buy more?: No     Within the past 12 months, did the food you bought just not last and you didn t have money to get more?: No   Transportation Needs: Low Risk  (1/21/2024)    Transportation Needs     Within the past 12 months, has lack of transportation kept you from medical appointments, getting your medicines, non-medical meetings or appointments, work, or from getting things that you need?: No   Physical Activity: Not on file   Stress: Not on file   Social Connections: Not on file   Interpersonal Safety: Low Risk  (12/12/2023)    Interpersonal Safety     Do you feel physically and emotionally safe where you currently live?: Yes     Within the past 12 months, have you been hit, slapped, kicked or otherwise physically hurt by someone?: No     Within the past 12 months, have you been humiliated or emotionally abused in other ways by your partner or ex-partner?: No   Housing Stability: Low Risk  (1/21/2024)    Housing Stability     Do you have housing? : Yes     Are you worried about losing your housing?: No     ROS  negative other than stated above    Exam:  GENERAL APPEARANCE: healthy, alert and no distress  EYES: EOMI,  PERRL  HENT: ear canals and TM's normal and nose and mouth without ulcers or lesions  NECK: no adenopathy, no asymmetry, masses, or scars and thyroid normal to palpation  RESP: expiratory wheezes throughout  CV: regular rates and rhythm, normal S1 S2, no S3 or S4 and no murmur, click or rub -  SKIN: no suspicious lesions or rashes    Results for orders placed or performed in visit on 05/25/24   Streptococcus A Rapid Screen w/Reflex to PCR      Status: Normal    Specimen: Throat; Swab   Result Value Ref Range    Group A Strep antigen Negative Negative   Influenza A & B Antigen - Clinic Collect     Status: Normal    Specimen: Nose; Swab   Result Value Ref Range    Influenza A antigen Negative Negative    Influenza B antigen Negative Negative    Narrative    Test results must be correlated with clinical data. If necessary, results should be confirmed by a molecular assay or viral culture.       COVID results pending     assessment/plan:  (J02.9) Sore throat  (primary encounter diagnosis)  Comment:   Plan: Streptococcus A Rapid Screen w/Reflex to PCR,         Influenza A & B Antigen - Clinic Collect,         Symptomatic COVID-19 Virus (Coronavirus) by PCR        Nose, Group A Streptococcus PCR Throat Swab        \    Patient with 1 day history of URI related symptoms as above.  Her strep and influenza were negative.  COVID results are pending.  This does appear to be viral in nature.  Patient does have underlying persistent asthma and has baseline wheezing.  Is taking combination medication of budesonide along with albuterol.  States that she is unable to take prednisone as it causes her bipolar to have more of a manic episode.  She will increase her inhalers as needed.  Red flag signs were reviewed.  Culture is pending for strep.  She is otherwise appears well.    (R06.2) Wheezing  Comment:   Plan:     (J45.40) Moderate persistent asthma without complication  Comment:   Plan:   As above

## 2024-05-26 LAB
GROUP A STREP BY PCR: NOT DETECTED
SARS-COV-2 RNA RESP QL NAA+PROBE: NEGATIVE

## 2024-06-07 ENCOUNTER — OFFICE VISIT (OUTPATIENT)
Dept: DERMATOLOGY | Facility: CLINIC | Age: 44
End: 2024-06-07
Payer: COMMERCIAL

## 2024-06-07 DIAGNOSIS — B07.9 VERRUCA VULGARIS: Primary | ICD-10-CM

## 2024-06-07 DIAGNOSIS — Z87.2 HISTORY OF SEBORRHEIC KERATOSIS: ICD-10-CM

## 2024-06-07 DIAGNOSIS — L90.5 SCAR: ICD-10-CM

## 2024-06-07 DIAGNOSIS — D23.5 DYSPLASTIC NEVUS OF TRUNK: ICD-10-CM

## 2024-06-07 PROCEDURE — 17110 DESTRUCTION B9 LES UP TO 14: CPT | Performed by: DERMATOLOGY

## 2024-06-07 NOTE — PATIENT INSTRUCTIONS
Cryotherapy    What is it?  Use of a very cold liquid, such as liquid nitrogen, to freeze and destroy abnormal skin cells that need to be removed    What should I expect?  Tenderness and redness  A small blister that might grow and fill with dark purple blood. There may be crusting.  More than one treatment may be needed if the lesions do not go away.    How do I care for the treated area?  Gently wash the area with your hands when bathing.  Use a thin layer of Vaseline to help with healing. You may use a Band-Aid.   The area should heal within 7-10 days and may leave behind a pink or lighter color.   Do not use an antibiotic or Neosporin ointment.   You may take acetaminophen (Tylenol) for pain.     Call your doctor if you have:  Severe pain  Signs of infection (warmth, redness, cloudy yellow drainage, and or a bad smell)  Questions or concerns    Who should I call with questions?      [facility]: [phone]      [facility]: [phone]      For urgent needs outside of business hours call the [facility] at [phone] and ask for the dermatology resident on call

## 2024-06-07 NOTE — PROGRESS NOTES
[facility] Dermatology Note  Encounter Date: Jun 7, 2024  Office Visit     Dermatology Problem List:  1. Severe DN  - right upper back, s/p excision 9/26/23   2. Cyst, left mandible  - excision 9/26/23  3. Verruca, hands.  - Cryo 7/11/23, 9/12/23, 10/18/23, 12/13/23, 02/28/24 , 06/07/2024  - sal acid recommended, patient hasn't tried yet 12/13/23  4. Hypertrophic scar, R upper back  - S/p ILK 02/28/24, 5/1/24     Soc hx:     ____________________________________________    Assessment & Plan:    # Verruca vulgaris  One remaining lesion, almost gone.  - Cryotherapy performed, see procedure note    # H/o severe DN.  - continue annual FBSE    Procedures Performed:   - Cryotherapy procedure note, location(s): L index finger. After verbal consent and discussion of risks and benefits including, but not limited to, dyspigmentation/scar, blister, and pain, 1 lesion(s) was(were) treated with 1-2 mm freeze border for 1-2 cycles with liquid nitrogen. Post cryotherapy instructions were provided.    Follow-up: Follow up next month as scheduled. OK to cancel if improved.     Staff and Scribe:  Scribe Disclosure:   By signing my name below, I, Mallory Campa, attest that this documentation has been prepared under the direction and in the presence of Lorraine Dozier MD.  - Electronically Signed: Mallory Campa 06/07/24       Provider Disclosure:   The documentation recorded by the scribe accurately reflects the services I personally performed and the decisions made by me.    Lorraine Dozier MD    Department of Dermatology  [facility]: Phone: [phone], Fax: [phone]  6/13/2024         ____________________________________________    CC: Derm Problem (Cathy is here today for a recheck- she mentions that her wart on her left index finger is smaller in size but not resolved )    HPI:  Ms. Cathy Arroyo  is a(n) 43 year old female who presents today as a return patient for recheck.    Patient reports she has areas the size of a mechanical pencil and they have been present intermittently throughout her life.    Patient is otherwise feeling well, without additional skin concerns.    Labs Reviewed:  N/A    Physical exam:  Vitals: There were no vitals taken for this visit.  GEN: This is a well developed, well-nourished female in no acute distress, in a pleasant mood.    SKIN: Focused examination of the hands was performed.  - 1 verrucous papule on L 2nd digit  - No other lesions of concern on areas examined.       Medications:  Current Outpatient Medications   Medication Sig Dispense Refill    ADDERALL XR 25 MG 24 hr capsule Take by mouth daily      albuterol (PROAIR HFA/PROVENTIL HFA/VENTOLIN HFA) 108 (90 Base) MCG/ACT inhaler Inhale 2 puffs into the lungs every 6 hours 18 g 3    Albuterol-Budesonide 90-80 MCG/ACT AERO Inhale 2 puffs into the lungs 4 times daily as needed (asthma exacerbation and wheezing) 10.7 g 5    ARIPiprazole (ABILIFY) 10 MG tablet Take 0.5 tablets (5 mg) by mouth daily      cetirizine (ZYRTEC) 10 MG tablet Take 1 tablet (10 mg) by mouth daily 90 tablet 3    Fexofenadine HCl (ALLEGRA PO)       fluticasone furoate (FLONASE SENSIMIST) 27.5 MCG/SPRAY nasal spray Spray 2 sprays into both nostrils daily 9.1 mL 11    fluticasone-salmeterol (ADVAIR DISKUS) 250-50 MCG/ACT inhaler Inhale 1 puff into the lungs 2 times daily 14 each 11    levothyroxine (SYNTHROID/LEVOTHROID) 175 MCG tablet Take 1 tablet (175 mcg) by mouth daily 90 tablet 2    medical cannabis (Patient's own supply) Take 1 Dose by mouth See Admin Instructions (The purpose of this order is to document that the patient reports taking medical cannabis.  This is not a prescription, and is not used to certify that the patient has a qualifying medical condition.)    Tangerine Oral Suspension Unflavored 1-5 ml up to two times daily.  Total THC = 240  mg, Total CBD Trace      medical cannabis (Patient's own supply) Take 1 Dose by mouth See Admin Instructions (The purpose of this order is to document that the patient reports taking medical cannabis.  This is not a prescription, and is not used to certify that the patient has a qualifying medical condition.)    [place] oral suspension: take 1-3 ml by mouth two times daily  Total CBD 1200 mg. Total THC 60 mg      montelukast (SINGULAIR) 10 MG tablet Take 1 tablet (10 mg) by mouth every morning 90 tablet 3     Current Facility-Administered Medications   Medication Dose Route Frequency Provider Last Rate Last Admin    triamcinolone acetonide (KENALOG-10) injection 3 mg  0.3 mL Intra-Lesional Once Lorraine Dozier MD        triamcinolone acetonide (KENALOG-10) injection 3.5 mg  3.5 mg Intra-Lesional Once           Past Medical History:   Patient Active Problem List   Diagnosis    Pineocytoma (H)    Attention deficit disorder    Seasonal allergic rhinitis    Bipolar affective disorder in remission (H24)    GBS (group B Streptococcus carrier), +RV culture, currently pregnant    Pre-eclampsia    Moderate persistent asthma without complication    Chronic rhinitis    Tobacco use disorder    Abnormal cytology    S/P total thyroidectomy    History of Graves' disease    Nonintractable migraine, unspecified migraine type    Post-surgical hypothyroidism    Abnormal results of thyroid function studies    Otitis externa    Polyhydramnios affecting pregnancy    Medical cannabis use    Labor and delivery indication for care or intervention    Indication for care in labor and delivery, antepartum    Morbid obesity (H)    Urticarial vasculitis    Dehydration    Breast cancer screening, high risk patient    Bipolar affective disorder, currently manic mixed, moderate (H)    Bilateral chronic knee pain    Lumbar radiculopathy     Past Medical History:   Diagnosis Date    Allergic rhinitis 1990    Allergic state     Anxiety     Bipolar 1  disorder (H)     Depressive disorder     Elevated cholesterol     Graves disease 2017    Hearing problem     Migraines 2/2019    have suffered from migraines since having brain surgery    Obese     BMI 37.48    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    Post partum depression     Post-surgical hypothyroidism 05/2017    Problem, psychiatric 2001 diagnosed Bipolar    Recurrent otitis media chronic ear infections in childhood, recently had a double ear infection followed by another ear infection soon after    Sleep apnea 2019    had a sleep study when pregnant with my second child    Uncomplicated asthma         CC Referred Self, MD  No address on file on close of this encounter.

## 2024-06-07 NOTE — LETTER
6/7/2024       RE: Cathy Arroyo  [address]  [facility] [address]     Dear Colleague,    Thank you for referring your patient, Cathy Arroyo, to the [facility] [place] [facility]. Please see a copy of my visit note below.    [facility] Dermatology Note  Encounter Date: Jun 7, 2024  Office Visit     Dermatology Problem List:  1. Severe DN  - right upper back, s/p excision 9/26/23   2. Cyst, left mandible  - excision 9/26/23  3. Verruca, hands.  - Cryo 7/11/23, 9/12/23, 10/18/23, 12/13/23, 02/28/24 , 06/07/2024  - sal acid recommended, patient hasn't tried yet 12/13/23  4. Hypertrophic scar, R upper back  - S/p ILK 02/28/24, 5/1/24     Soc hx:     ____________________________________________    Assessment & Plan:    # Verruca vulgaris  One remaining lesion, almost gone.  - Cryotherapy performed, see procedure note    # H/o severe DN.  - continue annual FBSE    Procedures Performed:   - Cryotherapy procedure note, location(s): L index finger. After verbal consent and discussion of risks and benefits including, but not limited to, dyspigmentation/scar, blister, and pain, 1 lesion(s) was(were) treated with 1-2 mm freeze border for 1-2 cycles with liquid nitrogen. Post cryotherapy instructions were provided.    Follow-up: Follow up next month as scheduled. OK to cancel if improved.     Staff and Scribe:  Scribe Disclosure:   By signing my name below, I, Mallory Campa, attest that this documentation has been prepared under the direction and in the presence of Lorraine Dozier MD.  - Electronically Signed: Mallory Campa 06/07/24       Provider Disclosure:   The documentation recorded by the scribe accurately reflects the services I personally performed and the decisions made by me.    Lorraine Dozier MD    Department of Dermatology  [facility]  [facility]: Phone: [phone], Fax: [phone]  6/13/2024         ____________________________________________    CC: Derm Problem (Cathy is here today for a recheck- she mentions that her wart on her left index finger is smaller in size but not resolved )    HPI:  Ms. Cathy Arroyo is a(n) 43 year old female who presents today as a return patient for recheck.    Patient reports she has areas the size of a mechanical pencil and they have been present intermittently throughout her life.    Patient is otherwise feeling well, without additional skin concerns.    Labs Reviewed:  N/A    Physical exam:  Vitals: There were no vitals taken for this visit.  GEN: This is a well developed, well-nourished female in no acute distress, in a pleasant mood.    SKIN: Focused examination of the hands was performed.  - 1 verrucous papule on L 2nd digit  - No other lesions of concern on areas examined.       Medications:  Current Outpatient Medications   Medication Sig Dispense Refill    ADDERALL XR 25 MG 24 hr capsule Take by mouth daily      albuterol (PROAIR HFA/PROVENTIL HFA/VENTOLIN HFA) 108 (90 Base) MCG/ACT inhaler Inhale 2 puffs into the lungs every 6 hours 18 g 3    Albuterol-Budesonide 90-80 MCG/ACT AERO Inhale 2 puffs into the lungs 4 times daily as needed (asthma exacerbation and wheezing) 10.7 g 5    ARIPiprazole (ABILIFY) 10 MG tablet Take 0.5 tablets (5 mg) by mouth daily      cetirizine (ZYRTEC) 10 MG tablet Take 1 tablet (10 mg) by mouth daily 90 tablet 3    Fexofenadine HCl (ALLEGRA PO)       fluticasone furoate (FLONASE SENSIMIST) 27.5 MCG/SPRAY nasal spray Spray 2 sprays into both nostrils daily 9.1 mL 11    fluticasone-salmeterol (ADVAIR DISKUS) 250-50 MCG/ACT inhaler Inhale 1 puff into the lungs 2 times daily 14 each 11    levothyroxine (SYNTHROID/LEVOTHROID) 175 MCG tablet Take 1 tablet (175 mcg) by mouth daily 90 tablet 2    medical cannabis (Patient's own supply) Take  1 Dose by mouth See Admin Instructions (The purpose of this order is to document that the patient reports taking medical cannabis.  This is not a prescription, and is not used to certify that the patient has a qualifying medical condition.)    Tangerine Oral Suspension Unflavored 1-5 ml up to two times daily.  Total THC = 240 mg, Total CBD Trace      medical cannabis (Patient's own supply) Take 1 Dose by mouth See Admin Instructions (The purpose of this order is to document that the patient reports taking medical cannabis.  This is not a prescription, and is not used to certify that the patient has a qualifying medical condition.)    [place] oral suspension: take 1-3 ml by mouth two times daily  Total CBD 1200 mg. Total THC 60 mg      montelukast (SINGULAIR) 10 MG tablet Take 1 tablet (10 mg) by mouth every morning 90 tablet 3     Current Facility-Administered Medications   Medication Dose Route Frequency Provider Last Rate Last Admin    triamcinolone acetonide (KENALOG-10) injection 3 mg  0.3 mL Intra-Lesional Once Lorraine Dozier MD        triamcinolone acetonide (KENALOG-10) injection 3.5 mg  3.5 mg Intra-Lesional Once           Past Medical History:   Patient Active Problem List   Diagnosis    Pineocytoma (H)    Attention deficit disorder    Seasonal allergic rhinitis    Bipolar affective disorder in remission (H24)    GBS (group B Streptococcus carrier), +RV culture, currently pregnant    Pre-eclampsia    Moderate persistent asthma without complication    Chronic rhinitis    Tobacco use disorder    Abnormal cytology    S/P total thyroidectomy    History of Graves' disease    Nonintractable migraine, unspecified migraine type    Post-surgical hypothyroidism    Abnormal results of thyroid function studies    Otitis externa    Polyhydramnios affecting pregnancy    Medical cannabis use    Labor and delivery indication for care or intervention    Indication for care in labor and delivery, antepartum    Morbid  obesity (H)    Urticarial vasculitis    Dehydration    Breast cancer screening, high risk patient    Bipolar affective disorder, currently manic mixed, moderate (H)    Bilateral chronic knee pain    Lumbar radiculopathy     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    Post partum depression     Post-surgical hypothyroidism 05/2017    Problem, psychiatric 2001 diagnosed Bipolar    Recurrent otitis media chronic ear infections in childhood, recently had a double ear infection followed by another ear infection soon after    Sleep apnea 2019    had a sleep study when pregnant with my second child    Uncomplicated asthma         CC Referred Self,

## 2024-06-07 NOTE — NURSING NOTE
Dermatology Rooming Note    Cathy Arroyo's goals for this visit include:   Chief Complaint   Patient presents with    Derm Problem     Cathy is here today for a recheck- she mentions that her wart on her left index finger is smaller in size but not resolved      Drew POLLARD CMA

## 2024-06-11 ENCOUNTER — OFFICE VISIT (OUTPATIENT)
Dept: FAMILY MEDICINE | Facility: CLINIC | Age: 44
End: 2024-06-11
Payer: COMMERCIAL

## 2024-06-11 VITALS
DIASTOLIC BLOOD PRESSURE: 79 MMHG | OXYGEN SATURATION: 99 % | HEIGHT: 68 IN | WEIGHT: 248.5 LBS | HEART RATE: 79 BPM | SYSTOLIC BLOOD PRESSURE: 116 MMHG | BODY MASS INDEX: 37.66 KG/M2 | RESPIRATION RATE: 10 BRPM | TEMPERATURE: 97.5 F

## 2024-06-11 DIAGNOSIS — J45.41 MODERATE PERSISTENT ASTHMA WITH ACUTE EXACERBATION: Primary | ICD-10-CM

## 2024-06-11 DIAGNOSIS — J45.20 MILD INTERMITTENT ASTHMA WITHOUT COMPLICATION: ICD-10-CM

## 2024-06-11 PROCEDURE — G2211 COMPLEX E/M VISIT ADD ON: HCPCS | Performed by: NURSE PRACTITIONER

## 2024-06-11 PROCEDURE — 99214 OFFICE O/P EST MOD 30 MIN: CPT | Performed by: NURSE PRACTITIONER

## 2024-06-11 RX ORDER — METHYLPREDNISOLONE 4 MG
TABLET, DOSE PACK ORAL
Qty: 21 TABLET | Refills: 0 | Status: SHIPPED | OUTPATIENT
Start: 2024-06-11 | End: 2024-09-20

## 2024-06-11 RX ORDER — DEXTROAMPHETAMINE SACCHARATE, AMPHETAMINE ASPARTATE, DEXTROAMPHETAMINE SULFATE AND AMPHETAMINE SULFATE 2.5; 2.5; 2.5; 2.5 MG/1; MG/1; MG/1; MG/1
30 TABLET ORAL
COMMUNITY
Start: 2024-06-04 | End: 2024-09-20

## 2024-06-11 RX ORDER — ALBUTEROL SULFATE 90 UG/1
2 AEROSOL, METERED RESPIRATORY (INHALATION) EVERY 6 HOURS
Qty: 18 G | Refills: 3 | Status: SHIPPED | OUTPATIENT
Start: 2024-06-11

## 2024-06-11 ASSESSMENT — ENCOUNTER SYMPTOMS
COUGH: 1
WHEEZING: 1

## 2024-06-11 NOTE — PATIENT INSTRUCTIONS
Methylprednisone steroid pack  Albuterol refill    Schedule with new pulmonologist specifically for COPD evaluation

## 2024-06-11 NOTE — PROGRESS NOTES
Assessment & Plan     Moderate persistent asthma with acute exacerbation  I have concern for COPD. Last PFTs in 2022 did not demonstrate COPD. However, patient has had continued symptoms despite both very compliant ICS-LABA use, allergy control and antibiotics. Increased sputum production. Patient typically very much avoids steroids due to impact on jose eduardo. However, with persistent and significant symptoms, she is ok to try this at this time. Follow-up next week  Schedule with pulmonology for COPD renee  Discussed nicotine cessation options and patient unfortunately has had significant mental health or reaction negative side effects with nicotine replacements, Chantix and Wellbutrin.  - Adult Pulmonary Medicine  Referral; Future  - Gram stain; Future  - Respiratory Aerobic Bacterial Culture with Gram Stain; Future  - methylPREDNISolone (MEDROL DOSEPAK) 4 MG tablet therapy pack; Follow Package Directions    Mild intermittent asthma without complication  - albuterol (PROAIR HFA/PROVENTIL HFA/VENTOLIN HFA) 108 (90 Base) MCG/ACT inhaler; Inhale 2 puffs into the lungs every 6 hours    The longitudinal plan of care for the diagnosis(es)/condition(s) as documented were addressed during this visit. Due to the added complexity in care, I will continue to support Cathy in the subsequent management and with ongoing continuity of care.Prescription drug management  I spent a total of 37 minutes on the day of the visit.   Time spent by me doing chart review, history and exam, documentation and further activities per the note          Patient Instructions   Methylprednisone steroid pack  Albuterol refill    Schedule with new pulmonologist specifically for COPD evaluation      Subjective   Cathy is a 43 year old, presenting for the following health issues:  Breathing Problem (Struggling to breathe for ~2 months; lost voice yesterday (6/10)), Cough (Coughing up phlegm), and Wheezing        6/11/2024    12:54 PM    Additional Questions   Roomed by Anjana RACHEL     Cough  Associated symptoms include wheezing.   Wheezing  Associated symptoms include coughing.   History of Present Illness     Asthma:  She presents for follow up of asthma.  She has some cough, some wheezing, and some shortness of breath.  She is using a relief medication 2-3 times per day. She does not miss any doses of her controller medication throughout the week. Patient is aware of the following triggers: animal dander, dust mites, emotions, humidity, mold, smoke, strong odors and fumes and upper respiratory infections. The patient has not had a visit to the Emergency Room, Urgent Care or Hospital due to asthma since the last clinic visit.     She eats 0-1 servings of fruits and vegetables daily.She consumes 1 sweetened beverage(s) daily.She exercises with enough effort to increase her heart rate 9 or less minutes per day.  She exercises with enough effort to increase her heart rate 3 or less days per week.   She is taking medications regularly.     Continuation of respiratory symptoms that started April 10  Took course of azithromycin 4/19  Seen 5/6 and lung exam wheezing only and no rales. CXR was normal  Coughing, productive cough, wheezing, hears bubbles and pops and crackling. Lost voice last night. Mild congestion  Very consistently taking Advair 2 puffs twice daily. Taking antihistamine and montlukast  Still taking albuterol up to 8 times a day        12/12/2023    11:55 AM 4/9/2024    12:14 AM 6/10/2024     9:53 AM   ACT Total Scores   ACT TOTAL SCORE (Goal Greater than or Equal to 20) 22 11 15   In the past 12 months, how many times did you visit the emergency room for your asthma without being admitted to the hospital? 0 0 0   In the past 12 months, how many times were you hospitalized overnight because of your asthma? 0 0 0         Review of Systems  Constitutional, HEENT, cardiovascular, pulmonary, gi and gu systems are negative, except as otherwise  "noted.      Objective    /79 (BP Location: Right arm, Patient Position: Sitting, Cuff Size: Adult Large)   Pulse 79   Temp 97.5  F (36.4  C) (Temporal)   Resp 10   Ht 1.727 m (5' 8\")   Wt 112.7 kg (248 lb 8 oz)   LMP  (LMP Unknown)   SpO2 99%   BMI 37.78 kg/m    Body mass index is 37.78 kg/m .  Physical Exam   GENERAL: alert and no distress  NECK: no adenopathy, no asymmetry, masses, or scars  RESP: lungs clear to auscultation - no rales, rhonchi or wheezes, rhonchi throughout, expiratory wheezes throughout, and inspiratory wheezes throughout  CV: regular rate and rhythm, normal S1 S2, no S3 or S4, no murmur, click or rub, no peripheral edema   PSYCH: mentation appears normal, affect normal/bright, tearful, and anxious            Signed Electronically by: JANNET Liu CNP    "

## 2024-06-13 DIAGNOSIS — J45.41 MODERATE PERSISTENT ASTHMA WITH ACUTE EXACERBATION: Primary | ICD-10-CM

## 2024-06-17 ENCOUNTER — TELEPHONE (OUTPATIENT)
Dept: PULMONOLOGY | Facility: CLINIC | Age: 44
End: 2024-06-17
Payer: COMMERCIAL

## 2024-06-17 NOTE — TELEPHONE ENCOUNTER
Patient Contacted for the patient to call back and schedule the following:    Appointment type: CXR  Provider: Francesco  Return date: 9/9 or 9/16  Specialty phone number: [phone]  Additional appointment(s) needed: cxr  Additonal Notes: Tl Duke RN

## 2024-06-18 ENCOUNTER — LAB (OUTPATIENT)
Dept: LAB | Facility: CLINIC | Age: 44
End: 2024-06-18
Payer: COMMERCIAL

## 2024-06-18 DIAGNOSIS — J45.41 MODERATE PERSISTENT ASTHMA WITH ACUTE EXACERBATION: ICD-10-CM

## 2024-06-18 PROCEDURE — 87205 SMEAR GRAM STAIN: CPT

## 2024-06-18 PROCEDURE — 87070 CULTURE OTHR SPECIMN AEROBIC: CPT

## 2024-06-19 ENCOUNTER — OFFICE VISIT (OUTPATIENT)
Dept: FAMILY MEDICINE | Facility: CLINIC | Age: 44
End: 2024-06-19
Payer: COMMERCIAL

## 2024-06-19 VITALS
OXYGEN SATURATION: 98 % | HEART RATE: 85 BPM | HEIGHT: 68 IN | DIASTOLIC BLOOD PRESSURE: 72 MMHG | RESPIRATION RATE: 12 BRPM | SYSTOLIC BLOOD PRESSURE: 124 MMHG | TEMPERATURE: 96.9 F | WEIGHT: 247 LBS | BODY MASS INDEX: 37.44 KG/M2

## 2024-06-19 DIAGNOSIS — F17.200 TOBACCO USE DISORDER: ICD-10-CM

## 2024-06-19 DIAGNOSIS — J45.41 MODERATE PERSISTENT ASTHMA WITH ACUTE EXACERBATION: Primary | ICD-10-CM

## 2024-06-19 PROCEDURE — G2211 COMPLEX E/M VISIT ADD ON: HCPCS | Performed by: NURSE PRACTITIONER

## 2024-06-19 PROCEDURE — 99215 OFFICE O/P EST HI 40 MIN: CPT | Performed by: NURSE PRACTITIONER

## 2024-06-19 RX ORDER — METHYLPREDNISOLONE 4 MG/1
TABLET ORAL
Qty: 42 TABLET | Refills: 0 | Status: CANCELLED | OUTPATIENT
Start: 2024-06-19 | End: 2024-06-29

## 2024-06-19 RX ORDER — METHYLPREDNISOLONE 4 MG
TABLET, DOSE PACK ORAL
Qty: 21 TABLET | Refills: 0 | Status: CANCELLED | OUTPATIENT
Start: 2024-06-19

## 2024-06-19 ASSESSMENT — PAIN SCALES - GENERAL: PAINLEVEL: NO PAIN (0)

## 2024-06-19 NOTE — PROGRESS NOTES
Assessment & Plan     Moderate persistent asthma with acute exacerbation  Some improvement in symptoms following medrol dosepak. Peak flow today at about approx 76% and mutually decided against a second or higher course of steroids. Didn't have jose eduardo with the medrol dosepak. Unclear if this is dose dependent. For a future exacerbation, would consider medrol dosepak earlier in the course. For more severe symptoms like this, would probably lean toward a higher starting dose.  - General PFT Lab (Please always keep checked); Future  - Pulmonary Function Test; Future    Tobacco use disorder  Ordering PFTs and plan e-consult after. Appt scheduled for Sept.  - General PFT Lab (Please always keep checked); Future  - Pulmonary Function Test; Future    The longitudinal plan of care for the diagnosis(es)/condition(s) as documented were addressed during this visit. Due to the added complexity in care, I will continue to support Cathy in the subsequent management and with ongoing continuity of care.Ordering of each unique test  I spent a total of 42 minutes on the day of the visit.   Time spent by me doing chart review, history and exam, documentation and further activities per the note      BMI      Patient Instructions   Look at [facility] - Dr. Carolyne Sanchez    We'll forgot the additional steroids today  If we did do steroids again, I'd recommend we do the methylprednisolone  If we were to do the steroids again in a case like this, I'd probably do a starting dose of 32 mg and a 10 day course    Schedule pulmonary function testing  After these are complete then I will submit an e-visit to pulmonology    Subjective   Cathy is a 43 year old, presenting for the following health issues:  Cough (Lung issues, here to listen to lungs. )        6/19/2024    11:09 AM   Additional Questions   Roomed by Molly JOHNSTON     History of Present Illness     Asthma:  She presents for follow up of asthma.  She has some cough, some wheezing, and  "some shortness of breath.  She is using a relief medication 2-3 times per day. She does not miss any doses of her controller medication throughout the week. Patient is aware of the following triggers: animal dander, dust mites, emotions, humidity, mold, smoke, strong odors and fumes and upper respiratory infections. The patient has not had a visit to the Emergency Room, Urgent Care or Hospital due to asthma since the last clinic visit.     She eats 0-1 servings of fruits and vegetables daily.She consumes 1 sweetened beverage(s) daily.She exercises with enough effort to increase her heart rate 9 or less minutes per day.  She exercises with enough effort to increase her heart rate 3 or less days per week.   She is taking medications regularly.     Breathing better, but still in a much worse place than baseline with breathing, wheezing and coughing. Getting what is best described as Charley Horse of the lungs in multiple places over lung spaces. Was able to cough up mucous for sputum culture. Gram stain unremarkable. No new symptoms suggestive of infection. Pulmonology consult for COPD scheduled for September.     Peak flow 354, 400, 350  Average 368  Expected around 483 - 76%        Review of Systems    ROS:5 point ROS including CONST, HEENT, Respiratory, CV, and GI other than that noted in the HPI,  is negative         Objective    /72 (BP Location: Right arm, Patient Position: Sitting, Cuff Size: Adult Large)   Pulse 85   Temp 96.9  F (36.1  C) (Temporal)   Resp 12   Ht 1.727 m (5' 8\")   Wt 112 kg (247 lb)   LMP 05/26/2024   SpO2 98%   BMI 37.56 kg/m    Body mass index is 37.56 kg/m .  Physical Exam   GENERAL: alert and no distress  RESP: expiratory wheezes throughout and inspiratory wheezes throughout  CV: regular rate and rhythm, normal S1 S2, no S3 or S4, no murmur, click or rub, no peripheral edema   PSYCH: mentation appears normal, affect normal/bright            Signed Electronically by: Sherrill CUNNINGHAM" JANNET Dasilva CNP

## 2024-06-19 NOTE — PATIENT INSTRUCTIONS
Look at [facility] - Dr. Carolyne Sanchez    We'll forgot the additional steroids today  If we did do steroids again, I'd recommend we do the methylprednisolone  If we were to do the steroids again in a case like this, I'd probably do a starting dose of 32 mg and a 10 day course    Schedule pulmonary function testing  After these are complete then I will submit an e-visit to pulmonology

## 2024-06-20 LAB
BACTERIA SPT CULT: NORMAL
GRAM STAIN RESULT: NORMAL

## 2024-06-21 ENCOUNTER — TELEPHONE (OUTPATIENT)
Dept: FAMILY MEDICINE | Facility: CLINIC | Age: 44
End: 2024-06-21
Payer: COMMERCIAL

## 2024-06-21 NOTE — RESULT ENCOUNTER NOTE
Cathy,    Still no growth on the sputum culture..  If you have any questions, please feel free to contact the clinic.    DEVEN Winston

## 2024-06-21 NOTE — TELEPHONE ENCOUNTER
Patient is calling for test results, and wants to know what they mean, She  got results via [employer]

## 2024-07-01 ENCOUNTER — OFFICE VISIT (OUTPATIENT)
Dept: PULMONOLOGY | Facility: CLINIC | Age: 44
End: 2024-07-01
Attending: NURSE PRACTITIONER
Payer: COMMERCIAL

## 2024-07-01 DIAGNOSIS — J45.41 MODERATE PERSISTENT ASTHMA WITH ACUTE EXACERBATION: ICD-10-CM

## 2024-07-01 DIAGNOSIS — F17.200 TOBACCO USE DISORDER: ICD-10-CM

## 2024-07-01 PROCEDURE — 94726 PLETHYSMOGRAPHY LUNG VOLUMES: CPT | Performed by: INTERNAL MEDICINE

## 2024-07-01 PROCEDURE — 94060 EVALUATION OF WHEEZING: CPT | Performed by: INTERNAL MEDICINE

## 2024-07-01 PROCEDURE — 94729 DIFFUSING CAPACITY: CPT | Performed by: INTERNAL MEDICINE

## 2024-07-01 NOTE — PROGRESS NOTES
Cathy Arroyo comes into clinic today at the request of Sherrill POWER CNP Ordering Provider for PFT      Adonis Landry, RRT

## 2024-07-02 LAB
DLCOUNC-%PRED-PRE: 137 %
DLCOUNC-PRE: 32.47 ML/MIN/MMHG
DLCOUNC-PRED: 23.53 ML/MIN/MMHG
ERV-%PRED-PRE: 60 %
ERV-PRE: 0.83 L
ERV-PRED: 1.38 L
EXPTIME-PRE: 9.34 SEC
FEF2575-%PRED-POST: 90 %
FEF2575-%PRED-PRE: 62 %
FEF2575-POST: 2.91 L/SEC
FEF2575-PRE: 2.01 L/SEC
FEF2575-PRED: 3.21 L/SEC
FEFMAX-%PRED-PRE: 77 %
FEFMAX-PRE: 5.86 L/SEC
FEFMAX-PRED: 7.58 L/SEC
FEV1-%PRED-PRE: 96 %
FEV1-PRE: 3.05 L
FEV1FEV6-PRE: 68 %
FEV1FEV6-PRED: 84 %
FEV1FVC-PRE: 68 %
FEV1FVC-PRED: 82 %
FEV1SVC-PRE: 69 %
FEV1SVC-PRED: 80 %
FIFMAX-PRE: 4.39 L/SEC
FRCPLETH-%PRED-PRE: 111 %
FRCPLETH-PRE: 3.26 L
FRCPLETH-PRED: 2.91 L
FVC-%PRED-PRE: 116 %
FVC-PRE: 4.47 L
FVC-PRED: 3.85 L
IC-%PRED-PRE: 130 %
IC-PRE: 3.59 L
IC-PRED: 2.76 L
RVPLETH-%PRED-PRE: 135 %
RVPLETH-PRE: 2.42 L
RVPLETH-PRED: 1.79 L
TLCPLETH-%PRED-PRE: 122 %
TLCPLETH-PRE: 6.85 L
TLCPLETH-PRED: 5.61 L
VA-%PRED-PRE: 112 %
VA-PRE: 6.25 L
VC-%PRED-PRE: 112 %
VC-PRE: 4.42 L
VC-PRED: 3.92 L

## 2024-07-03 ENCOUNTER — MYC MEDICAL ADVICE (OUTPATIENT)
Dept: FAMILY MEDICINE | Facility: CLINIC | Age: 44
End: 2024-07-03
Payer: COMMERCIAL

## 2024-07-03 DIAGNOSIS — J45.40 MODERATE PERSISTENT ASTHMA WITHOUT COMPLICATION: Primary | ICD-10-CM

## 2024-07-03 DIAGNOSIS — F17.200 TOBACCO USE DISORDER: ICD-10-CM

## 2024-07-03 DIAGNOSIS — R94.2 ABNORMAL PULMONARY FUNCTION TEST: ICD-10-CM

## 2024-07-03 PROCEDURE — 99207 E-CONSULT TO PULMONARY (ADULT OUTPT PROVIDER TO SPECIALIST WRITTEN QUESTION & RESPONSE): CPT | Performed by: NURSE PRACTITIONER

## 2024-07-08 ENCOUNTER — TELEPHONE (OUTPATIENT)
Dept: FAMILY MEDICINE | Facility: CLINIC | Age: 44
End: 2024-07-08
Payer: COMMERCIAL

## 2024-07-08 NOTE — TELEPHONE ENCOUNTER
Sent [employer] message just now. Probably doesn't need a call in addition. Thanks!  DEVEN Winston

## 2024-07-08 NOTE — TELEPHONE ENCOUNTER
Pt called seeing how long it may take to have those PFT results reviewed.   Advised that we will let her know.   Thanks,   Kurt Espinoza RN  [facility]

## 2024-07-09 ENCOUNTER — MYC MEDICAL ADVICE (OUTPATIENT)
Dept: FAMILY MEDICINE | Facility: CLINIC | Age: 44
End: 2024-07-09

## 2024-07-09 ENCOUNTER — E-CONSULT (OUTPATIENT)
Dept: PULMONOLOGY | Facility: CLINIC | Age: 44
End: 2024-07-09
Payer: COMMERCIAL

## 2024-07-09 DIAGNOSIS — J45.41 MODERATE PERSISTENT ASTHMA WITH ACUTE EXACERBATION: Primary | ICD-10-CM

## 2024-07-09 DIAGNOSIS — J45.40 MODERATE PERSISTENT ASTHMA WITHOUT COMPLICATION: ICD-10-CM

## 2024-07-09 PROCEDURE — 99207 PR NON-BILLABLE SERV PER CHARTING: CPT | Performed by: INTERNAL MEDICINE

## 2024-07-09 NOTE — PROGRESS NOTES
7/9/2024     E-Consult has been denied due to: Complexity of question, needs in-person referral.    Interprofessional consultation requested by:  Sherrill Dasilva APRN CNP      Clinical Question/Purpose: MY CLINICAL QUESTION IS: Most recent PFT plus patient symptoms seem consistent with COPD, which would be new diagnosis for patient in addition to asthma. She does have long history of smoking. Recently not well controlled asthma despite consistent ICS use.     Patient assessment and information reviewed: Patient has possible new diagnosis of COPD.  There are many questions to address, and treatment decisions should optimally be made with shared decision-making.  Diagnosis of COPD can be complicated by the phenotype of COPD, which also can influence treatment decisions.  Or this could  be chronic asthma.    Recommendations: Full pulmonary consult is recommended.      The recommendations provided in this E-Consult are based on a review of clinical data pertinent to the clinical question presented, without a review of the patient's complete medical record or, the benefit of a comprehensive in-person or virtual patient evaluation. This consultation should not replace the clinical judgement and evaluation of the provider ordering this E-Consult. Any new clinical issues, or changes in patient status since the filing of this E-Consult will need to be taken into account when assessing these recommendations.       My total time spent reviewing clinical information and formulating assessment was 5 minutes.      Yue Cope MD

## 2024-07-10 ENCOUNTER — TELEPHONE (OUTPATIENT)
Dept: PULMONOLOGY | Facility: CLINIC | Age: 44
End: 2024-07-10
Payer: COMMERCIAL

## 2024-07-10 NOTE — TELEPHONE ENCOUNTER
"Per Bryanna: \"This is a current Dr Egan patient who was last seen in April, not sure why she didn't get scheduled for a follow up. She can schedule a follow up with his LUCILA slot on 7/31. Doesn't need those additional PFT/Xray that she's scheduled for right now since she's not a new patient.\"   "

## 2024-07-11 NOTE — TELEPHONE ENCOUNTER
Spoke with pt to reschedule to sooner appt date on 7/15 with Dr Arthur. Pt wanted sooner follow up and also another provider if possible, per Asia's msg.

## 2024-07-12 DIAGNOSIS — E89.0 POST-SURGICAL HYPOTHYROIDISM: ICD-10-CM

## 2024-07-12 DIAGNOSIS — E89.0 S/P TOTAL THYROIDECTOMY: ICD-10-CM

## 2024-07-12 DIAGNOSIS — Z86.39 HISTORY OF GRAVES' DISEASE: ICD-10-CM

## 2024-07-15 ENCOUNTER — TELEPHONE (OUTPATIENT)
Dept: NEUROSURGERY | Facility: CLINIC | Age: 44
End: 2024-07-15
Payer: COMMERCIAL

## 2024-07-15 RX ORDER — LEVOTHYROXINE SODIUM 175 UG/1
175 TABLET ORAL DAILY
Qty: 90 TABLET | Refills: 1 | Status: SHIPPED | OUTPATIENT
Start: 2024-07-15

## 2024-07-15 NOTE — TELEPHONE ENCOUNTER
Callback to Cathy,  Leaving message it is best to seek treatment in [facility] where she is at, unknown what it might be, only imaging can help, best to get treatment and evaluation in [facility].    Callback for questions/concerns

## 2024-07-15 NOTE — TELEPHONE ENCOUNTER
[facility] Call Center    Phone Message    May a detailed message be left on voicemail: yes     Reason for Call: Other: Patient stated she had a Craniotomy done in April of 2014, and she can feel a lose screw in the back of her head, she says she can hear it when she moves her head certain ways, and she can feel it when she touches it in the back of her head. Patient says she does have some pressure in her head but not sure if it is from that or if it is from flying. She just got to [facility] and she is freaking out not sure what to do. Patient doesn't know if she should try and see a doctor in [place] or if she will be ok till she gets home. Please review and call back.      Action Taken: Message routed to:  [facility]     Travel Screening: Not Applicable     Date of Service:

## 2024-07-16 ENCOUNTER — MYC MEDICAL ADVICE (OUTPATIENT)
Dept: NEUROSURGERY | Facility: CLINIC | Age: 44
End: 2024-07-16
Payer: COMMERCIAL

## 2024-07-16 ENCOUNTER — TELEPHONE (OUTPATIENT)
Dept: NEUROSURGERY | Facility: CLINIC | Age: 44
End: 2024-07-16
Payer: COMMERCIAL

## 2024-07-16 ENCOUNTER — MYC MEDICAL ADVICE (OUTPATIENT)
Dept: FAMILY MEDICINE | Facility: CLINIC | Age: 44
End: 2024-07-16
Payer: COMMERCIAL

## 2024-07-16 NOTE — TELEPHONE ENCOUNTER
Patient in [facility] ED for issue.  LOV with Dr Mckeon 2013.    Please have ED evaluate and treat if needed.    Thank you

## 2024-07-16 NOTE — TELEPHONE ENCOUNTER
Spoke with Cathy,   Patient in [facility] ED, asking for a STAT CT or MRI order for head/brain.    No unable to fax any order for treatment, please allow ER to evaluate and treat if needed.    Voices understanding.

## 2024-07-16 NOTE — TELEPHONE ENCOUNTER
[facility] Call Center    Phone Message    May a detailed message be left on voicemail: yes     Reason for Call: Other: Pt called stating she needs to get a STAT order for imaging for CT or MRI for Pt imaging to be covered by insurance and that prior authorization needs to be marked urgent so it is covered. Please call back to advise      Action Taken: Message routed to:  [facility] Neurosurgery    Travel Screening: Not Applicable     Date of Service:

## 2024-07-17 NOTE — TELEPHONE ENCOUNTER
Spoke with patient and relayed information below from PCP. She verbalized understanding and states she does not want to go back to the ER in [place]. She wonders if she could rent a car and drive back instead of fly. She would request a My Chart message response to this question.

## 2024-07-18 ENCOUNTER — TELEPHONE (OUTPATIENT)
Dept: NEUROSURGERY | Facility: CLINIC | Age: 44
End: 2024-07-18
Payer: COMMERCIAL

## 2024-07-18 NOTE — TELEPHONE ENCOUNTER
"[facility] Call Center    Phone Message    May a detailed message be left on voicemail: yes     Reason for Call: Other: Pt calling to schedule with Linda, she was just discharged from [facility] [place] [facility] ED and will be home in [place] on Saturday 7/20 and wants to schedule with Linda. Writer unable to see available template for anything soon. Slots on 7/23 say \"vacation\" Pt states she has a screw floating around. Please call back to schedule.      Action Taken: Message routed to:  [facility]     Travel Screening: Not Applicable     Date of Service:                                                                     "

## 2024-07-19 NOTE — TELEPHONE ENCOUNTER
Callback to Cathy, leaving message to call Destiny NIX to work on appointment with Dr Mckeon.    Call Destiny  [phone]

## 2024-07-22 ENCOUNTER — TELEPHONE (OUTPATIENT)
Dept: NEUROSURGERY | Facility: CLINIC | Age: 44
End: 2024-07-22
Payer: COMMERCIAL

## 2024-07-22 ENCOUNTER — MYC MEDICAL ADVICE (OUTPATIENT)
Dept: NEUROSURGERY | Facility: CLINIC | Age: 44
End: 2024-07-22
Payer: COMMERCIAL

## 2024-07-22 DIAGNOSIS — D44.5 PINEOCYTOMA (H): Primary | ICD-10-CM

## 2024-07-22 NOTE — TELEPHONE ENCOUNTER
[facility] Call Center    Phone Message    May a detailed message be left on voicemail: yes     Reason for Call: Other: Patient states she is emotionally and physically in pain. When she turns her head to the right and left she can feel the screw and it is pain full and then when she looks up she can also feel the screw move. Since she landed in [facility] she has had a horrible headache and it has not gone away. She does not understand shy nobody wants to help her. Patient states everybody says that this is not a STAT situation. Patient is losing her mind cause she is in a lot of pain and she can't wait a week for an MRI it needs to be an STAT MRI, she says that if it is not changed she will go to the [facility] for an MRI. She claims the provider and the nurse aren't taking this serious. Please review and call back.       Action Taken: Message routed to:  [facility]     Travel Screening: Not Applicable     Date of Service:

## 2024-07-22 NOTE — TELEPHONE ENCOUNTER
"Spoke with patient, currently driving ALONE from [place] to [facility] and is \"in [place] somewhere\".  Patient states in some discomfort back of head #5.  Patient states will try Cannabis for pain.  Explained it is not safe to drive and use Cannabis, please pull over at a rest stop.  Patient crying on the phone, stating she just is so worried and scared.  Explained the Hosptial in [facility] did release her and the one CT Head without contrast had small piece of hardware lose, but did not state urgent and no other issues. Tried to calm patient.  I did recommend if she is feeling like she is not able to drive, feeling overwhelmed or in  pain to stop, can call 911 from her car.  Either way recommended patient stop driving now and get some rest.  If needed local ED.      MRI Brain scheduled for 8/5 and appointment with Hannah RILEY on 8/23.  I can help move up appointment with Hannah if needed.      Patient states voices understanding.  Recommend rest and talk to a friend, and stop driving until she feels like it is safe to drive.     "

## 2024-07-22 NOTE — TELEPHONE ENCOUNTER
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     7/26/21 LOV with OSVALDO Osborne  MRI BRAIN  We reviewed her MRI from 7/16/2021 which shows no evidence of recurrent pineal tumor. No other intracranial mass or abnormality. Normal ventricle size.   PLAN:  Patient to Follow up in 2 years with MRI Brain with and without contrast.  Order entered.   NOTE sent to scheduling for Hannah RILEY RTN in clinic with MRI Brain with and without contrast prior.      Patient calling stating she is driving home from [place] now after being discharged from [facility].  CT Head on 7/16/24:   A single screw from a fixation plate along the mid craniotomy margin at the left paramedian aspect of the craniotomy is outside of the outer occipital cortex and separate from the plate. IMPRESSION: 1. No CT evidence of an acute intracranial abnormality. 2. Suboccipital craniotomy. A single screw along the mid level of the craniotomy at the left paramedian aspect of the craniotomy is outside of the outer occipital cortex and separate from it's fixation plate.     Explained appears stable from CT Head report, note sent to scheduling for OV laurtia Young with MR Brain with and without prior.

## 2024-07-23 ENCOUNTER — HOSPITAL ENCOUNTER (EMERGENCY)
Facility: CLINIC | Age: 44
Discharge: HOME | End: 2024-07-23
Attending: EMERGENCY MEDICINE | Admitting: EMERGENCY MEDICINE
Payer: COMMERCIAL

## 2024-07-23 ENCOUNTER — APPOINTMENT (OUTPATIENT)
Dept: CT IMAGING | Facility: CLINIC | Age: 44
End: 2024-07-23
Attending: EMERGENCY MEDICINE
Payer: COMMERCIAL

## 2024-07-23 VITALS
TEMPERATURE: 98.7 F | BODY MASS INDEX: 37.44 KG/M2 | WEIGHT: 247 LBS | SYSTOLIC BLOOD PRESSURE: 144 MMHG | OXYGEN SATURATION: 100 % | HEART RATE: 83 BPM | DIASTOLIC BLOOD PRESSURE: 88 MMHG | RESPIRATION RATE: 20 BRPM | HEIGHT: 68 IN

## 2024-07-23 DIAGNOSIS — R51.9 ACUTE NONINTRACTABLE HEADACHE, UNSPECIFIED HEADACHE TYPE: ICD-10-CM

## 2024-07-23 LAB
ANION GAP SERPL CALCULATED.3IONS-SCNC: 11 MMOL/L (ref 7–15)
BASOPHILS # BLD AUTO: 0.1 10E3/UL (ref 0–0.2)
BASOPHILS NFR BLD AUTO: 1 %
BUN SERPL-MCNC: 9.9 MG/DL (ref 6–20)
CALCIUM SERPL-MCNC: 9.2 MG/DL (ref 8.8–10.4)
CHLORIDE SERPL-SCNC: 104 MMOL/L (ref 98–107)
CREAT SERPL-MCNC: 0.66 MG/DL (ref 0.51–0.95)
EGFRCR SERPLBLD CKD-EPI 2021: >90 ML/MIN/1.73M2
EOSINOPHIL # BLD AUTO: 0.2 10E3/UL (ref 0–0.7)
EOSINOPHIL NFR BLD AUTO: 3 %
ERYTHROCYTE [DISTWIDTH] IN BLOOD BY AUTOMATED COUNT: 13.2 % (ref 10–15)
GLUCOSE SERPL-MCNC: 87 MG/DL (ref 70–99)
HCG SERPL QL: NEGATIVE
HCO3 SERPL-SCNC: 24 MMOL/L (ref 22–29)
HCT VFR BLD AUTO: 38.8 % (ref 35–47)
HGB BLD-MCNC: 13 G/DL (ref 11.7–15.7)
HOLD SPECIMEN: NORMAL
IMM GRANULOCYTES # BLD: 0 10E3/UL
IMM GRANULOCYTES NFR BLD: 0 %
LYMPHOCYTES # BLD AUTO: 3.3 10E3/UL (ref 0.8–5.3)
LYMPHOCYTES NFR BLD AUTO: 36 %
MCH RBC QN AUTO: 29.5 PG (ref 26.5–33)
MCHC RBC AUTO-ENTMCNC: 33.5 G/DL (ref 31.5–36.5)
MCV RBC AUTO: 88 FL (ref 78–100)
MONOCYTES # BLD AUTO: 0.6 10E3/UL (ref 0–1.3)
MONOCYTES NFR BLD AUTO: 7 %
NEUTROPHILS # BLD AUTO: 4.9 10E3/UL (ref 1.6–8.3)
NEUTROPHILS NFR BLD AUTO: 53 %
NRBC # BLD AUTO: 0 10E3/UL
NRBC BLD AUTO-RTO: 0 /100
PLATELET # BLD AUTO: 316 10E3/UL (ref 150–450)
POTASSIUM SERPL-SCNC: 4.2 MMOL/L (ref 3.4–5.3)
RBC # BLD AUTO: 4.4 10E6/UL (ref 3.8–5.2)
SODIUM SERPL-SCNC: 139 MMOL/L (ref 135–145)
WBC # BLD AUTO: 9.2 10E3/UL (ref 4–11)

## 2024-07-23 PROCEDURE — 96375 TX/PRO/DX INJ NEW DRUG ADDON: CPT | Performed by: EMERGENCY MEDICINE

## 2024-07-23 PROCEDURE — 99284 EMERGENCY DEPT VISIT MOD MDM: CPT | Mod: GC | Performed by: NEUROLOGICAL SURGERY

## 2024-07-23 PROCEDURE — 85025 COMPLETE CBC W/AUTO DIFF WBC: CPT | Performed by: EMERGENCY MEDICINE

## 2024-07-23 PROCEDURE — 84703 CHORIONIC GONADOTROPIN ASSAY: CPT | Performed by: EMERGENCY MEDICINE

## 2024-07-23 PROCEDURE — 96374 THER/PROPH/DIAG INJ IV PUSH: CPT | Performed by: EMERGENCY MEDICINE

## 2024-07-23 PROCEDURE — 258N000003 HC RX IP 258 OP 636: Performed by: EMERGENCY MEDICINE

## 2024-07-23 PROCEDURE — 70450 CT HEAD/BRAIN W/O DYE: CPT | Mod: 26 | Performed by: RADIOLOGY

## 2024-07-23 PROCEDURE — 250N000013 HC RX MED GY IP 250 OP 250 PS 637: Performed by: EMERGENCY MEDICINE

## 2024-07-23 PROCEDURE — 80048 BASIC METABOLIC PNL TOTAL CA: CPT | Performed by: EMERGENCY MEDICINE

## 2024-07-23 PROCEDURE — 96361 HYDRATE IV INFUSION ADD-ON: CPT | Performed by: EMERGENCY MEDICINE

## 2024-07-23 PROCEDURE — 250N000011 HC RX IP 250 OP 636: Performed by: EMERGENCY MEDICINE

## 2024-07-23 PROCEDURE — 99285 EMERGENCY DEPT VISIT HI MDM: CPT | Performed by: EMERGENCY MEDICINE

## 2024-07-23 PROCEDURE — 99285 EMERGENCY DEPT VISIT HI MDM: CPT | Mod: 25 | Performed by: EMERGENCY MEDICINE

## 2024-07-23 PROCEDURE — 36415 COLL VENOUS BLD VENIPUNCTURE: CPT | Performed by: EMERGENCY MEDICINE

## 2024-07-23 PROCEDURE — 70450 CT HEAD/BRAIN W/O DYE: CPT

## 2024-07-23 RX ORDER — RIZATRIPTAN BENZOATE 10 MG/1
10 TABLET, ORALLY DISINTEGRATING ORAL
Status: COMPLETED | OUTPATIENT
Start: 2024-07-23 | End: 2024-07-23

## 2024-07-23 RX ORDER — MORPHINE SULFATE 4 MG/ML
4 INJECTION, SOLUTION INTRAMUSCULAR; INTRAVENOUS ONCE
Status: COMPLETED | OUTPATIENT
Start: 2024-07-23 | End: 2024-07-23

## 2024-07-23 RX ORDER — ONDANSETRON 2 MG/ML
4 INJECTION INTRAMUSCULAR; INTRAVENOUS ONCE
Status: COMPLETED | OUTPATIENT
Start: 2024-07-23 | End: 2024-07-23

## 2024-07-23 RX ORDER — CHLORHEXIDINE GLUCONATE ORAL RINSE 1.2 MG/ML
15 SOLUTION DENTAL 2 TIMES DAILY
Status: DISCONTINUED | OUTPATIENT
Start: 2024-07-23 | End: 2024-07-23

## 2024-07-23 RX ADMIN — SODIUM CHLORIDE 1000 ML: 9 INJECTION, SOLUTION INTRAVENOUS at 18:34

## 2024-07-23 RX ADMIN — MORPHINE SULFATE 4 MG: 4 INJECTION INTRAVENOUS at 17:29

## 2024-07-23 RX ADMIN — ONDANSETRON 4 MG: 2 INJECTION INTRAMUSCULAR; INTRAVENOUS at 17:29

## 2024-07-23 RX ADMIN — RIZATRIPTAN BENZOATE 10 MG: 10 TABLET, ORALLY DISINTEGRATING ORAL at 20:05

## 2024-07-23 ASSESSMENT — COLUMBIA-SUICIDE SEVERITY RATING SCALE - C-SSRS
2. HAVE YOU ACTUALLY HAD ANY THOUGHTS OF KILLING YOURSELF IN THE PAST MONTH?: NO
6. HAVE YOU EVER DONE ANYTHING, STARTED TO DO ANYTHING, OR PREPARED TO DO ANYTHING TO END YOUR LIFE?: NO
1. IN THE PAST MONTH, HAVE YOU WISHED YOU WERE DEAD OR WISHED YOU COULD GO TO SLEEP AND NOT WAKE UP?: NO

## 2024-07-23 ASSESSMENT — ACTIVITIES OF DAILY LIVING (ADL)
ADLS_ACUITY_SCORE: 35

## 2024-07-23 NOTE — ED PROVIDER NOTES
[place] EMERGENCY DEPARTMENT ([facility])    7/23/24       ED PROVIDER NOTE   History     Chief Complaint   Patient presents with    Sudden Severe Headache     HPI  Cathy Arroyo is a 43 year old female who presents to the ED for a headache.  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  There she had imaging showing one of the screws had  from its plates.  Admission had recommended but she ultimately left and then came back on 7/17 and was admitted.  No acute neurosurgical intervention was recommended and she was ultimately discharged to follow-up with her neurosurgeon here.  The patient reports that about an hour ago she developed a more severe headache that came on when she was turning her head.  There is associated photophobia and phonophobia.  Pain is diffuse to her head.      Past Medical History  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    Post partum depression     Post-surgical hypothyroidism 05/2017    Problem, psychiatric 2001 diagnosed Bipolar    Recurrent otitis media chronic ear infections in childhood, recently had a double ear infection followed by another ear infection soon after    Sleep apnea 2019    had a sleep study when pregnant with my second child    Uncomplicated asthma      Past Surgical History:   Procedure Laterality Date    BIOPSY  29 y/o colpos, and in the last 2 years for thyroid    Colposcopy, thyroid nodule biopsy twice     BLOOD PATCH N/A 10/11/2016    Procedure: EPIDURAL BLOOD PATCH;  Surgeon: GENERIC ANESTHESIA PROVIDER;  Location: [facility] OR     SECTION, TUBAL LIGATION, COMBINED N/A 2019    Procedure:  SECTION, WITH TUBAL LIGATION;  Surgeon: Kathy Rutledge MD;  Location: [facility] L+D    CRANIOTOMY, EXCISE TUMOR COMPLEX, COMBINED  2014    Procedure: COMBINED CRANIOTOMY, EXCISE TUMOR COMPLEX;  Supracerabellar  Infratentorial Approach for Resection of Tumor ;  Surgeon: Kate Mckeon MD;  Location: [facility] OR    ENT SURGERY  2017    thyroidectomy    GYN SURGERY      colposcopy    THYROIDECTOMY N/A 2017    Procedure: THYROIDECTOMY;  Total Thyroidectomy;  Surgeon: Pamela Villasenor MD;  Location: [facility] OR     ADDERALL XR 25 MG 24 hr capsule  albuterol (PROAIR HFA/PROVENTIL HFA/VENTOLIN HFA) 108 (90 Base) MCG/ACT inhaler  Albuterol-Budesonide 90-80 MCG/ACT AERO  amphetamine-dextroamphetamine (ADDERALL) 10 MG tablet  ARIPiprazole (ABILIFY) 10 MG tablet  cetirizine (ZYRTEC) 10 MG tablet  Fexofenadine HCl (ALLEGRA PO)  fluticasone furoate (FLONASE SENSIMIST) 27.5 MCG/SPRAY nasal spray  fluticasone-salmeterol (ADVAIR DISKUS) 250-50 MCG/ACT inhaler  levothyroxine (SYNTHROID/LEVOTHROID) 175 MCG tablet  medical cannabis (Patient's own supply)  medical cannabis (Patient's own supply)  methylPREDNISolone (MEDROL DOSEPAK) 4 MG tablet therapy pack  montelukast (SINGULAIR) 10 MG tablet  tiotropium (SPIRIVA RESPIMAT) 2.5 MCG/ACT inhaler      Allergies   Allergen Reactions    Adacel [Tetanus-Diphth-Acell Pertussis] Swelling    Adhesive Tape Hives    Ciprofloxacin      Causes visual hallucinations.    Codeine Sulfate Nausea and Vomiting    Nicoderm [Nicotine]      Patch and Gum, pt reported allergic reaction     Tegaderm Transparent Dressing (Informational Only) Hives    Tetanus Toxoid      Pt states she had an infection at injection site.     Vicodin [Hydrocodone-Acetaminophen] Nausea and Vomiting     Methimazole Rash     Family History  Family History   Problem Relation Age of Onset    Other Cancer Mother 62        salivary gland cancer    Genitourinary Problems Mother     Bipolar Disorder Mother         unipolar depression    Depression Mother     Thyroid Disease Mother         benign tumor    Melanoma Mother     Cancer Mother         salivary gland cancer    Migraines Mother     Skin Cancer Mother     Asthma Father     Mental Illness Father         Bipolar 2    Obesity Father     Asthma Maternal Grandmother     Osteoporosis Maternal Grandmother     Glaucoma Maternal Grandmother     Hypertension Maternal Grandmother     Macular Degeneration Maternal Grandmother     Skin Cancer Maternal Grandmother     Prostate Cancer Maternal Grandfather 69        no history of smoking    Bladder Cancer Maternal Grandfather 71    Colon Cancer Maternal Grandfather 70    Diabetes Paternal Grandfather     Bipolar Disorder Brother         unipolar depression    Asthma Brother     Depression Brother     Breast Cancer Maternal Aunt 37        negative BRCA1/2 testing    Melanoma Maternal Aunt 64    Thyroid Disease Paternal Aunt     Breast Cancer Paternal Aunt 58        bilateral; negative testing    Cancer Paternal Aunt     Breast Cancer Other         paternal great aunt     Social History   Social History     Tobacco Use    Smoking status: Every Day     Current packs/day: 0.50     Average packs/day: 0.3 packs/day for 19.6 years (5.3 ttl pk-yrs)     Types: Cigarettes     Start date: 1/21/2004     Last attempt to quit: 1/25/2022     Passive exposure: Current    Smokeless tobacco: Never    Tobacco comments:     Stopped smoking for both pregnamcies and breastfeeding of both children     Working on quitting smoking (6/27/23)   Vaping Use    Vaping status: Former    Quit date: 11/28/2023    Substances: Nicotine, Flavoring    Devices: Disposable   Substance Use Topics    Alcohol use: Yes     Alcohol/week: 4.0 standard drinks of alcohol      "Types: 4 Drinks containing 0.5 oz of alcohol per week    Drug use: Not Currently     Types: Marijuana, Psilocybin     Comment: medical marijuana      A medically appropriate review of systems was performed with pertinent positives and negatives noted in the HPI, and all other systems negative.    Physical Exam   BP: (!) 132/95  Pulse: 83  Temp: 98.7  F (37.1  C)  Resp: 20  Height: 172.7 cm (5' 8\")  Weight: 112 kg (247 lb)  SpO2: 99 %  Physical Exam  Constitutional:       General: She is not in acute distress.     Appearance: She is not toxic-appearing.   HENT:      Head: Normocephalic and atraumatic.      Nose: Nose normal.      Mouth/Throat:      Mouth: Mucous membranes are moist.      Pharynx: Oropharynx is clear.   Eyes:      Conjunctiva/sclera: Conjunctivae normal.      Pupils: Pupils are equal, round, and reactive to light.   Cardiovascular:      Rate and Rhythm: Normal rate and regular rhythm.   Pulmonary:      Effort: Pulmonary effort is normal. No respiratory distress.      Breath sounds: No wheezing or rales.   Musculoskeletal:         General: Normal range of motion.      Cervical back: Normal range of motion.   Skin:     General: Skin is warm and dry.   Neurological:      General: No focal deficit present.      Mental Status: She is alert and oriented to person, place, and time.      Sensory: No sensory deficit.      Motor: No weakness.           ED Course, Procedures, & Data      Procedures                Results for orders placed or performed during the hospital encounter of 07/23/24   CT Head w/o Contrast     Status: None    Narrative    EXAM: CT HEAD W/O CONTRAST  7/23/2024 5:52 PM     HISTORY: headache       COMPARISON: MR brain 3/14/2023    TECHNIQUE: Using multidetector thin collimation helical acquisition  technique, axial, coronal and sagittal CT images from the skull base  to the vertex were obtained without intravenous contrast.   (topogram) image(s) also obtained and reviewed. Dose " reduction  techniques were used.    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 No acute intracranial hemorrhage, mass effect, or midline  shift. No CT findings of acute infarct or hydrocephalus. Preserved  subarachnoid spaces.    Atraumatic calvarium. No substantial paranasal sinus mucosal disease.  Clear right mastoid air cells fluid within the left mastoid air  cells.. Nonfocal orbits.       Impression    IMPRESSION: No acute intracranial pathology.    I have personally reviewed the examination and initial interpretation  and I agree with the findings.    SANTY ALATORRE MD         SYSTEM ID:  [id number]   [place] Draw     Status: None    Narrative    The following orders were created for panel order [place] Draw.  Procedure                               Abnormality         Status                     ---------                               -----------         ------                     Extra Blue Top Tube[[id number]]                                                         Extra Red Top Tube[[id number]]                               Final result               Extra Green Top (Lithium...[[id number]]                      Final result               Extra Purple Top Tube[[id number]]                            Final result                 Please view results for these tests on the individual orders.   Extra Red Top Tube     Status: None   Result Value Ref Range    Hold Specimen JIC    Extra Green Top (Lithium Heparin) Tube     Status: None   Result Value Ref Range    Hold Specimen JIC    Extra Purple Top Tube     Status: None   Result Value Ref Range    Hold Specimen JIC    Basic metabolic panel     Status: Normal   Result Value Ref Range    Sodium 139 135 - 145 mmol/L    Potassium 4.2 3.4 - 5.3 mmol/L    Chloride 104 98 - 107 mmol/L    Carbon Dioxide (CO2) 24 22 - 29 mmol/L    Anion Gap 11 7 - 15 mmol/L    Urea Nitrogen 9.9 6.0 - 20.0 mg/dL    Creatinine 0.66 0.51 - 0.95 mg/dL     GFR Estimate >90 >60 mL/min/1.73m2    Calcium 9.2 8.8 - 10.4 mg/dL    Glucose 87 70 - 99 mg/dL   HCG qualitative pregnancy (blood)     Status: Normal   Result Value Ref Range    hCG Serum Qualitative Negative Negative   CBC with platelets and differential     Status: None   Result Value Ref Range    WBC Count 9.2 4.0 - 11.0 10e3/uL    RBC Count 4.40 3.80 - 5.20 10e6/uL    Hemoglobin 13.0 11.7 - 15.7 g/dL    Hematocrit 38.8 35.0 - 47.0 %    MCV 88 78 - 100 fL    MCH 29.5 26.5 - 33.0 pg    MCHC 33.5 31.5 - 36.5 g/dL    RDW 13.2 10.0 - 15.0 %    Platelet Count 316 150 - 450 10e3/uL    % Neutrophils 53 %    % Lymphocytes 36 %    % Monocytes 7 %    % Eosinophils 3 %    % Basophils 1 %    % Immature Granulocytes 0 %    NRBCs per 100 WBC 0 <1 /100    Absolute Neutrophils 4.9 1.6 - 8.3 10e3/uL    Absolute Lymphocytes 3.3 0.8 - 5.3 10e3/uL    Absolute Monocytes 0.6 0.0 - 1.3 10e3/uL    Absolute Eosinophils 0.2 0.0 - 0.7 10e3/uL    Absolute Basophils 0.1 0.0 - 0.2 10e3/uL    Absolute Immature Granulocytes 0.0 <=0.4 10e3/uL    Absolute NRBCs 0.0 10e3/uL   CBC with platelets differential     Status: None    Narrative    The following orders were created for panel order CBC with platelets differential.  Procedure                               Abnormality         Status                     ---------                               -----------         ------                     CBC with platelets and d...[[id number]]                      Final result                 Please view results for these tests on the individual orders.     Medications   rizatriptan (MAXALT-MLT) ODT tab 10 mg (has no administration in time range)   ondansetron (ZOFRAN) injection 4 mg (4 mg Intravenous $Given 7/23/24 1729)   morphine (PF) injection 4 mg (4 mg Intravenous $Given 7/23/24 1729)   sodium chloride 0.9% BOLUS 1,000 mL (1,000 mLs Intravenous $New Bag 7/23/24 1834)     Labs Ordered and Resulted from Time of ED Arrival to Time of ED Departure   BASIC  METABOLIC PANEL - Normal       Result Value    Sodium 139      Potassium 4.2      Chloride 104      Carbon Dioxide (CO2) 24      Anion Gap 11      Urea Nitrogen 9.9      Creatinine 0.66      GFR Estimate >90      Calcium 9.2      Glucose 87     HCG QUALITATIVE PREGNANCY - Normal    hCG Serum Qualitative Negative     CBC WITH PLATELETS AND DIFFERENTIAL    WBC Count 9.2      RBC Count 4.40      Hemoglobin 13.0      Hematocrit 38.8      MCV 88      MCH 29.5      MCHC 33.5      RDW 13.2      Platelet Count 316      % Neutrophils 53      % Lymphocytes 36      % Monocytes 7      % Eosinophils 3      % Basophils 1      % Immature Granulocytes 0      NRBCs per 100 WBC 0      Absolute Neutrophils 4.9      Absolute Lymphocytes 3.3      Absolute Monocytes 0.6      Absolute Eosinophils 0.2      Absolute Basophils 0.1      Absolute Immature Granulocytes 0.0      Absolute NRBCs 0.0       CT Head w/o Contrast   Final Result   IMPRESSION: No acute intracranial pathology.      I have personally reviewed the examination and initial interpretation   and I agree with the findings.      SANTY ALATORRE MD            SYSTEM ID:  [id number]             Critical care was not performed.     Medical Decision Making  The patient's presentation was of moderate complexity (an undiagnosed new problem with uncertain prognosis).    The patient's evaluation involved:  review of external note(s) from 3+ sources (outside ED notes, hospitalist note, discharge summary)  review of 3+ test result(s) ordered prior to this encounter (CBC, CMP, prior CT scan from outside hospital)  ordering and/or review of 3+ test(s) in this encounter (see separate area of note for details)  discussion of management or test interpretation with another health professional (see separate area of note for details)    The patient's management necessitated high risk (a parenteral controlled substance) and further care after sign-out to Dr. Cárdenas (see their note for further  management).    Assessment & Plan    This is a 43-year-old female with history of prior craniotomy with a headache.  She had been seen at outside ED for a similar headache and has been found to have a issue with one of her screws from her prior craniotomy.  Pain worsened today so came to the ED.  A CT was obtained to rule out bleed or other acute change and this did come back reassuring.  Blood work is simply reassuring with no leukocytosis or anemia.    Given the concern for hardware issue neurosurgery was consulted.  They reviewed the images and saw the patient, believe that the craniotomy and hardware appears stable compared to her prior imaging and that is unlikely to be the cause of her symptoms.  They did not recommend obtaining MRI.  I did reevaluate patient and she reported some improvement.  We discussed these recommendations and she was agreeable with getting a dose of her home Maxalt and follow-up with neurosurgery.    The patient was given a dose of her Maxalt.  She was stable for discharge.  Return precautions were discussed all questions answered.    I have reviewed the nursing notes. I have reviewed the findings, diagnosis, plan and need for follow up with the patient.    New Prescriptions    No medications on file       Final diagnoses:   Acute nonintractable headache, unspecified headache type       Prashant Centeno MD  [facility] EMERGENCY DEPARTMENT  7/23/2024     Prashant Centeno MD  07/23/24 1910       Prashant Centeno MD  07/23/24 1943

## 2024-07-23 NOTE — CONSULTS
[facility]       NEUROSURGERY CONSULTATION NOTE    This consultation was requested by Dr. Shine from the Emergency Medicine service.    Reason for Consultation  Headache, concern for relation to prior craniotomy hardware    HPI:    Cathy Arroyo is a 43 year old female known to our service who underwent a pineal gland tumor resection with Dr. Mckeon in 2014 via a supracerebellar infratentorial approach. She presents today with 8 days of headache after imaging demonstrating concerns that one of the screws had come loose with associated headache and neck pain with movement of the head.     There she had imaging showing one of the screws had  from its plates.  Admission had recommended but she ultimately left and then came back on 7/17 and was admitted. No acute neurosurgical intervention was recommended and she was ultimately discharged to follow-up with her neurosurgeon here. The patient endorses she has had 8 days of headache that had acutely increased about an hour before presentation. She developed a more severe headache that came on when she was turning her head. Patient endorses photophobia. Pain is band-like involving the bitemporal and frontal areas, then spreads to involve the posterior aspect of her head. She also endorses neck pain associated with turning her head. Endorses nausea while her headache is migratory. Denies recent trauma except an exacerbation of the headache due to flying (has experienced this previously). Endorses mild tingling at top of her head. Denies loss of consciousness, incontinence. Denies fever/chills.      PAST MEDICAL HISTORY:   Past Medical History:   Diagnosis Date    Allergic rhinitis 1990    Allergic state     Anxiety     Bipolar 1 disorder (H)     Depressive disorder     Elevated cholesterol     Graves disease 2017    Hearing problem     Migraines 2/2019    have suffered from migraines since having brain surgery     Obese     BMI 37.48    Pineal tumor     s/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 tumor    Post partum depression     Post-surgical hypothyroidism 2017    Problem, psychiatric 2001 diagnosed Bipolar    Recurrent otitis media chronic ear infections in childhood, recently had a double ear infection followed by another ear infection soon after    Sleep apnea 2019    had a sleep study when pregnant with my second child    Uncomplicated asthma        PAST SURGICAL HISTORY:   Past Surgical History:   Procedure Laterality Date    BIOPSY  29 y/o colpos, and in the last 2 years for thyroid    Colposcopy, thyroid nodule biopsy twice    BLOOD PATCH N/A 10/11/2016    Procedure: EPIDURAL BLOOD PATCH;  Surgeon: GENERIC ANESTHESIA PROVIDER;  Location: [facility] OR     SECTION, TUBAL LIGATION, COMBINED N/A 2019    Procedure:  SECTION, WITH TUBAL LIGATION;  Surgeon: Kathy Rutledge MD;  Location: [facility] L+D    CRANIOTOMY, EXCISE TUMOR COMPLEX, COMBINED  2014    Procedure: COMBINED CRANIOTOMY, EXCISE TUMOR COMPLEX;  Supracerabellar  Infratentorial Approach for Resection of Tumor ;  Surgeon: Kate Mckeon MD;  Location: [facility] OR    ENT SURGERY      thyroidectomy    GYN SURGERY      colposcopy    THYROIDECTOMY N/A 2017    Procedure: THYROIDECTOMY;  Total Thyroidectomy;  Surgeon: Pamlea Villasenor MD;  Location:  OR       FAMILY HISTORY:   Family History   Problem Relation Age of Onset    Other Cancer Mother 62        salivary gland cancer    Genitourinary Problems Mother     Bipolar Disorder Mother         unipolar depression    Depression Mother     Thyroid Disease Mother         benign tumor    Melanoma Mother     Cancer Mother         salivary gland cancer    Migraines Mother     Skin Cancer Mother     Asthma Father     Mental Illness Father         Bipolar 2    Obesity Father     Asthma Maternal Grandmother     Osteoporosis Maternal Grandmother     Glaucoma Maternal Grandmother      Hypertension Maternal Grandmother     Macular Degeneration Maternal Grandmother     Skin Cancer Maternal Grandmother     Prostate Cancer Maternal Grandfather 69        no history of smoking    Bladder Cancer Maternal Grandfather 71    Colon Cancer Maternal Grandfather 70    Diabetes Paternal Grandfather     Bipolar Disorder Brother         unipolar depression    Asthma Brother     Depression Brother     Breast Cancer Maternal Aunt 37        negative BRCA1/2 testing    Melanoma Maternal Aunt 64    Thyroid Disease Paternal Aunt     Breast Cancer Paternal Aunt 58        bilateral; negative testing    Cancer Paternal Aunt     Breast Cancer Other         paternal great aunt       SOCIAL HISTORY:   Social History     Tobacco Use    Smoking status: Every Day     Current packs/day: 0.50     Average packs/day: 0.3 packs/day for 19.6 years (5.3 ttl pk-yrs)     Types: Cigarettes     Start date: 1/21/2004     Last attempt to quit: 1/25/2022     Passive exposure: Current    Smokeless tobacco: Never    Tobacco comments:     Stopped smoking for both pregnamcies and breastfeeding of both children     Working on quitting smoking (6/27/23)   Substance Use Topics    Alcohol use: Yes     Alcohol/week: 4.0 standard drinks of alcohol     Types: 4 Drinks containing 0.5 oz of alcohol per week       MEDICATIONS:  Current Outpatient Medications   Medication Sig Dispense Refill    ADDERALL XR 25 MG 24 hr capsule Take by mouth daily      albuterol (PROAIR HFA/PROVENTIL HFA/VENTOLIN HFA) 108 (90 Base) MCG/ACT inhaler Inhale 2 puffs into the lungs every 6 hours 18 g 3    Albuterol-Budesonide 90-80 MCG/ACT AERO Inhale 2 puffs into the lungs 4 times daily as needed (asthma exacerbation and wheezing) 10.7 g 5    amphetamine-dextroamphetamine (ADDERALL) 10 MG tablet       ARIPiprazole (ABILIFY) 10 MG tablet Take 0.5 tablets (5 mg) by mouth daily      cetirizine (ZYRTEC) 10 MG tablet Take 1 tablet (10 mg) by mouth daily 90 tablet 3    Fexofenadine  HCl (ALLEGRA PO)       fluticasone furoate (FLONASE SENSIMIST) 27.5 MCG/SPRAY nasal spray Spray 2 sprays into both nostrils daily 9.1 mL 11    fluticasone-salmeterol (ADVAIR DISKUS) 250-50 MCG/ACT inhaler Inhale 1 puff into the lungs 2 times daily 14 each 11    levothyroxine (SYNTHROID/LEVOTHROID) 175 MCG tablet Take 1 tablet (175 mcg) by mouth daily 90 tablet 1    medical cannabis (Patient's own supply) Take 1 Dose by mouth See Admin Instructions (The purpose of this order is to document that the patient reports taking medical cannabis.  This is not a prescription, and is not used to certify that the patient has a qualifying medical condition.)    Tangerine Oral Suspension Unflavored 1-5 ml up to two times daily.  Total THC = 240 mg, Total CBD Trace      medical cannabis (Patient's own supply) Take 1 Dose by mouth See Admin Instructions (The purpose of this order is to document that the patient reports taking medical cannabis.  This is not a prescription, and is not used to certify that the patient has a qualifying medical condition.)    Cheyney oral suspension: take 1-3 ml by mouth two times daily  Total CBD 1200 mg. Total THC 60 mg      methylPREDNISolone (MEDROL DOSEPAK) 4 MG tablet therapy pack Follow Package Directions 21 tablet 0    montelukast (SINGULAIR) 10 MG tablet Take 1 tablet (10 mg) by mouth every morning 90 tablet 3    tiotropium (SPIRIVA RESPIMAT) 2.5 MCG/ACT inhaler Inhale 2 puffs into the lungs daily 4 g 5       Allergies:  Allergies   Allergen Reactions    Adacel [Tetanus-Diphth-Acell Pertussis] Swelling    Adhesive Tape Hives    Ciprofloxacin      Causes visual hallucinations.    Codeine Sulfate Nausea and Vomiting    Nicoderm [Nicotine]      Patch and Gum, pt reported allergic reaction 6/24    Tegaderm Transparent Dressing (Informational Only) Hives    Tetanus Toxoid      Pt states she had an infection at injection site.     Vicodin [Hydrocodone-Acetaminophen] Nausea and Vomiting    Methimazole  "Rash       ROS: 10 point ROS of systems including Constitutional, Eyes, Respiratory, Cardiovascular, Gastroenterology, Genitourinary, Integumentary, Muscularskeletal, Psychiatric were all negative except for pertinent positives noted in my HPI.    Physical exam:   Blood pressure (!) 132/95, pulse 83, temperature 98.7  F (37.1  C), temperature source Oral, resp. rate 20, height 1.727 m (5' 8\"), weight 112 kg (247 lb), last menstrual period 05/26/2024, SpO2 99%, not currently breastfeeding.  General: awake and alert  HEENT: Palpable craniectomy site, well healed, no evidence of dehiscence. No redness/rashes.  PULM: breathing comfortably on room air  MSK: No gross deformities  : rectal tone deferred  NEUROLOGIC:  -- Awake; Alert; oriented x 3  -- Follows commands briskly  -- +repetition, calculation, and naming  -- Speech fluent, spontaneous. No aphasia or dysarthria.  -- no gaze preference. No apparent hemineglect.  Cranial Nerves:  -- PERRL ~3mm bilat and brisk, extraocular movements intact  -- Face symmetrical, tongue midline  -- Sensory V1-V3 intact bilaterally  -- palate elevates symmetrically  -- hearing grossly intact bilat  -- Trapezii 5/5 strength bilat symmetric    Motor:  Normal bulk / tone; no tremor, rigidity, or bradykinesia.  No muscle wasting or fasciculations  No Pronator Drift     Delt Bi Tri Hand Flexion/  Extension Iliopsoas Quadriceps Hamstrings Tibialis Anterior Gastroc    C5 C6 C7 C8/T1 L2 L3 L4-S1 L4 S1   R 5 5 5 5 5 5 5 5 5   L 5 5 5 5 5 5 5 5 5     Sensory:  intact to LT x 4 extremities     Reflexes: no clonus       BR Derrick Pat Bab     C6 UMN L2-4 UMN   R 2+ Norm 2+ Norm   L 2+ Norm 2+ Norm      Gait: Deferred.       IMAGING:  Narrative & Impression   EXAM: CT HEAD W/O CONTRAST  7/23/2024 5:52 PM      HISTORY: headache        COMPARISON: MR brain 3/14/2023     TECHNIQUE: Using multidetector thin collimation helical acquisition  technique, axial, coronal and sagittal CT images from the skull " base  to the vertex were obtained without intravenous contrast.   (topogram) image(s) also obtained and reviewed. Dose reduction  techniques were used.     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 No acute intracranial hemorrhage, mass effect, or midline  shift. No CT findings of acute infarct or hydrocephalus. Preserved  subarachnoid spaces.     Atraumatic calvarium. No substantial paranasal sinus mucosal disease.  Clear right mastoid air cells fluid within the left mastoid air  cells.. Nonfocal orbits.                                                                       IMPRESSION: No acute intracranial pathology.     I have personally reviewed the examination and initial interpretation  and I agree with the findings.     SANTY ALATORRE MD         SYSTEM ID:  [id number]     Reported screw as demonstrated by patient on screenshot, demonstrated stable positioning since 2018 upon staff review.        LABS:   Last Comprehensive Metabolic Panel:  Lab Results   Component Value Date     07/23/2024    POTASSIUM 4.2 07/23/2024    CHLORIDE 104 07/23/2024    CO2 24 07/23/2024    ANIONGAP 11 07/23/2024    GLC 87 07/23/2024    BUN 9.9 07/23/2024    CR 0.66 07/23/2024    GFRESTIMATED >90 07/23/2024    RUPALI 9.2 07/23/2024         Lab Results   Component Value Date    WBC 9.2 07/23/2024    WBC 9.3 07/10/2021     Lab Results   Component Value Date    RBC 4.40 07/23/2024    RBC 4.75 07/10/2021     Lab Results   Component Value Date    HGB 13.0 07/23/2024    HGB 13.8 07/10/2021     Lab Results   Component Value Date    HCT 38.8 07/23/2024    HCT 42.9 07/10/2021     Lab Results   Component Value Date    MCV 88 07/23/2024    MCV 90 07/10/2021     Lab Results   Component Value Date    MCH 29.5 07/23/2024    MCH 29.1 07/10/2021     Lab Results   Component Value Date    MCHC 33.5 07/23/2024    MCHC 32.2 07/10/2021     Lab Results   Component Value Date    RDW 13.2 07/23/2024     RDW 13.9 07/10/2021     Lab Results   Component Value Date     07/23/2024     07/10/2021     INR   Date Value Ref Range Status   04/23/2018 1.01 0.86 - 1.14 Final      PTT   Date Value Ref Range Status   04/23/2018 29 22 - 37 sec Final      @Fibrinogen1@    ASSESSMENT:    Cathy Arroyo is a 43 year old female known to our service who underwent a pineal gland tumor resection with Dr. Mckeon in 2014 via a supracerebellar infratentorial approach. She presents today with acute headache after imaging demonstrating concerns that one of the screws had come loose with associated headache and neck pain with movement of the head. Independent review of CT imaging form 2018 demonstrates that this hardware has been stable positioning since 2018. Her current acute headaches are likely unrelated to the hardware positioning.      RECOMMENDATIONS:  No neurosurgical intervention indicated at this time   Headache management per primary  Follow-up already scheduled for patient in 1 month with Neurosurgery in clinic 8/23, will keep this date.       IFRAH CASTRO MD, PhD  Neurosurgery Resident, PGY-2    The patient was discussed with Dr. Story, neurosurgery chief resident, and Dr. Krishna, neurosurgery staff.

## 2024-07-23 NOTE — TELEPHONE ENCOUNTER
[facility] Call Center    Phone Message    May a detailed message be left on voicemail: yes     Reason for Call: Other: Patient calling back to speak with someone regarding if she will be able to get her MRI stat and not routine.  She is waiting for a call back.  Please call her back when you can.  She is much better today and very apologetic the way she talked to Destiny. She is going into the ER [facility].      Action Taken: Message routed to:  [facility] ([facility] Neurosurgery     Travel Screening: Not Applicable     Date of Service:

## 2024-07-23 NOTE — ED TRIAGE NOTES
"Triage Assessment & Note:    Chief Complaint: Ptnt ambulatory to triage with complaints of \"un quantifiable pressure and pain\" all over their head. 30 minutes ago ptnt states large change in screw placement with photophobia, nausea, headache worsening.  Hx of screw placement >10 years ago with headache starting one week ago, presented to [place] ER with CT scan (in our system) and admission recommendation. Got the clear to come home with outptnt MRI scheduled but not done yet.     Duration of C/o: 30 minutes    BP (!) 132/95   Pulse 83   Temp 98.7  F (37.1  C) (Oral)   Resp 20   Ht 1.727 m (5' 8\")   Wt 112 kg (247 lb)   LMP 05/26/2024   SpO2 99%   BMI 37.56 kg/m      Deanne Smith RN  July 23, 2024            "

## 2024-07-24 ENCOUNTER — TELEPHONE (OUTPATIENT)
Dept: NEUROSURGERY | Facility: CLINIC | Age: 44
End: 2024-07-24
Payer: COMMERCIAL

## 2024-07-24 NOTE — DISCHARGE INSTRUCTIONS
Neurosurgery would like to have you follow-up with them in clinic.  They reviewed your CT scans and it appears that the hardware is stable when compared to your prior scans.    You can continue to take your regular medications and migraine medications.  If you develop any new or worsening symptoms please return to the emergency department.

## 2024-07-24 NOTE — TELEPHONE ENCOUNTER
"Gloria calling stating she is \"freaking out\" with this lose screw in her head and the Neurosurgeon in the ED did not listen to me\".  I explained he did review everything and all is stable at this point.  MRI Brain is scheduled and F/U is scheduled.  Tried to calm and reassure patient, Patient states has increased appointment time with therapy and has support at home.  Tried to remain positive, as this issue is not Emergent and no need to have emergent treatment per the [facility] ED yesterday.  Recommended patient callback if needed for reassurance and support  Voices understanding.       "

## 2024-07-25 NOTE — CONFIDENTIAL NOTE
RECORDS RECEIVED FROM: internal    DATE RECEIVED:  8/2/24   NOTES STATUS DETAILS   OFFICE NOTE from referring provider internal    Sherrill Dasilva APRN CNP      MEDICATION LIST internal     IMAGING  (NEED IMAGES AND REPORTS)     CHEST XRAY (CXR) internal  5.6.24, 6.6.23, 9.25.22   TESTS     PULMONARY FUNCTION TESTING (PFT) internal  7.1.24

## 2024-07-28 ASSESSMENT — ASTHMA QUESTIONNAIRES
QUESTION_1 LAST FOUR WEEKS HOW MUCH OF THE TIME DID YOUR ASTHMA KEEP YOU FROM GETTING AS MUCH DONE AT WORK, SCHOOL OR AT HOME: A LITTLE OF THE TIME
QUESTION_5 LAST FOUR WEEKS HOW WOULD YOU RATE YOUR ASTHMA CONTROL: POORLY CONTROLLED
ACT_TOTALSCORE: 13
ACT_TOTALSCORE: 13
QUESTION_4 LAST FOUR WEEKS HOW OFTEN HAVE YOU USED YOUR RESCUE INHALER OR NEBULIZER MEDICATION (SUCH AS ALBUTEROL): ONE OR TWO TIMES PER DAY
QUESTION_2 LAST FOUR WEEKS HOW OFTEN HAVE YOU HAD SHORTNESS OF BREATH: MORE THAN ONCE A DAY
QUESTION_3 LAST FOUR WEEKS HOW OFTEN DID YOUR ASTHMA SYMPTOMS (WHEEZING, COUGHING, SHORTNESS OF BREATH, CHEST TIGHTNESS OR PAIN) WAKE YOU UP AT NIGHT OR EARLIER THAN USUAL IN THE MORNING: ONCE OR TWICE

## 2024-07-29 NOTE — LETTER
5/15/2017       RE: Cathy Arroyo  [address]  [address]  [place] [id number]     Dear Colleague,    Thank you for referring your patient, Cathy Arroyo, to the [facility] at [facility]. Please see a copy of my visit note below.    HISTORY OF PRESENT ILLNESS:  This is a 36-year-old female who underwent a total thyroidectomy for poorly controlled Graves disease on 2017.  She was discharged to home the same day.  Since the surgery, the patient has had no problems with voice quality, inspiration or swallowing.  She has had no symptoms of hypocalcemia.  She was discharged home on 1 gram of elemental calcium daily and 2 Tums 3 times a day.  She states she has not required more Tums.      PHYSICAL EXAMINATION:  Her wound is healing well.  Dermabond was removed and the sutures were clipped at the skin edges.  There is no evidence of hematoma, seroma or infection.      Pathology was reviewed with the patient and there was no evidence of malignancy, consistent with Graves disease.      ASSESSMENT:  Followup status post total thyroidectomy.      PLAN:  Dr. Franco has placed an order for the patient to have thyroid function tests at 2-4 weeks postop.  We will also check her calcium and vitamin D to assure that she does not need any long-term calcium or vitamin D replacement.         MERCEDES TEJADA MD             D: 2017 13:46   T: 2017 14:11   MT: andrea      Name:     CATHY ARROYO   MRN:      [id number]        Account:      [id number]   :      1980           Service Date: 05/15/2017      Document: [id number]            Universal Safety Interventions

## 2024-07-30 ENCOUNTER — OFFICE VISIT (OUTPATIENT)
Dept: FAMILY MEDICINE | Facility: CLINIC | Age: 44
End: 2024-07-30
Payer: COMMERCIAL

## 2024-07-30 VITALS
SYSTOLIC BLOOD PRESSURE: 118 MMHG | WEIGHT: 249 LBS | HEART RATE: 86 BPM | BODY MASS INDEX: 37.74 KG/M2 | RESPIRATION RATE: 20 BRPM | OXYGEN SATURATION: 98 % | TEMPERATURE: 97.8 F | DIASTOLIC BLOOD PRESSURE: 70 MMHG | HEIGHT: 68 IN

## 2024-07-30 DIAGNOSIS — D44.5 PINEOCYTOMA (H): ICD-10-CM

## 2024-07-30 DIAGNOSIS — J45.40 MODERATE PERSISTENT ASTHMA WITHOUT COMPLICATION: Primary | ICD-10-CM

## 2024-07-30 PROCEDURE — G2211 COMPLEX E/M VISIT ADD ON: HCPCS | Performed by: NURSE PRACTITIONER

## 2024-07-30 PROCEDURE — 99215 OFFICE O/P EST HI 40 MIN: CPT | Performed by: NURSE PRACTITIONER

## 2024-07-30 ASSESSMENT — PAIN SCALES - GENERAL: PAINLEVEL: MILD PAIN (3)

## 2024-07-30 NOTE — PROGRESS NOTES
"  Assessment & Plan     Moderate persistent asthma without complication  Has been very consistent with ICS-LABA  Improvement on Spiriva  Upcoming pulmonology consult to consider COPD diagnosis    Pineocytoma (H)  Long discussion about plate and screw from craniotomy. I do feel a palpable asymmetry at the area patient identifies that could be hardware, but could also be normal asymmetry of the skull. Not having cognitive changes. Is having headaches and new sensation of the scalp. Has neurosurgery consult upcoming. Discussed through Cathy's concerns to help clarify and develop a list of questions for the appt.        Patient Instructions   Clarify questions for neurosurgery  1) Does the hardware look exactly the same as the day it was installed?     - if yes, what is the explanation for the [place] neurosurgery team reading since multiple neurosurgeons looked at it and the imaging showed \"a single screw along the mid level of the craniotomy at the left paramedian aspect of the craniotomy is outside of the outer occipital cortex and separate from it's fixation plate\"   2) If no, what is different and what would cause it be different?  3) How would hardware impact headache frequency or characteristics - either in normal position or not?  4) What could cause a new onset awareness of hardware - both tactile and audio?  5) Is it possible that the plates are disrupted by a new growth of a tumor?  6) If there is a screw out of place, is it possible to live with that rather than have surgery to remove or replace?    The longitudinal plan of care for the diagnosis(es)/condition(s) as documented were addressed during this visit. Due to the added complexity in care, I will continue to support Cathy in the subsequent management and with ongoing continuity of care.Review of external notes as documented elsewhere in note  I spent a total of 41 minutes on the day of the visit.   Time spent by me doing chart review, history and " "exam, documentation and further activities per the note    Subjective   Cathy is a 43 year old, presenting for the following health issues:  Asthma      7/30/2024    11:00 AM   Additional Questions   Roomed by Brigette NIX     History of Present Illness     Asthma:  She presents for follow up of asthma.  She has some cough, some wheezing, and some shortness of breath.  She is using a relief medication a few times a week. She does not miss any doses of her controller medication throughout the week. Patient is aware of the following triggers: animal dander, cold air, dust mites, emotions, exercise or sports, humidity, mold, pollens, smoke, strong odors and fumes and upper respiratory infections. The patient has had a visit to the Emergency Room, Urgent Care or Hospital due to asthma since the last clinic visit. She has been to the Emergency Room or Urgent Care 0 times.She has had a Hospitalization 0 times.    She eats 0-1 servings of fruits and vegetables daily.She consumes 1 sweetened beverage(s) daily.She exercises with enough effort to increase her heart rate 9 or less minutes per day.  She exercises with enough effort to increase her heart rate 3 or less days per week.   She is taking medications regularly.     Pulmonology consult on Friday - clarify if COPA plus asthma  Was able to start Spiriva and feels it is working - less gunk, coughing is less  On quit plan to stop with vaping that restarted on trip to [place] and stress with craniotomy and family death - keeping mini journal of why she wants to smoke, harm reduction and sucking through a straw.      Review of Systems  Constitutional, HEENT, cardiovascular, pulmonary, gi and gu systems are negative, except as otherwise noted.      Objective    /70 (BP Location: Left arm, Patient Position: Sitting, Cuff Size: Adult Large)   Pulse 86   Temp 97.8  F (36.6  C) (Temporal)   Resp 20   Ht 1.727 m (5' 8\")   Wt 112.9 kg (249 lb)   LMP 05/12/2024 (Within Days) "   SpO2 98%   BMI 37.86 kg/m    Body mass index is 37.86 kg/m .  Physical Exam   GENERAL: alert and no distress  RESP: lungs clear to auscultation - no rales, rhonchi or wheezes  CV: regular rate and rhythm, normal S1 S2, no S3 or S4, no murmur, click or rub, no peripheral edema   MS: palpable ridge/asymmetry mid left of center scalp  PSYCH: mentation appears normal, affect normal/bright            Signed Electronically by: JANNET Liu CNP

## 2024-07-30 NOTE — PATIENT INSTRUCTIONS
"Clarify questions for neurosurgery  1) Does the hardware look exactly the same as the day it was installed?     - if yes, what is the explanation for the [place] neurosurgery team reading since multiple neurosurgeons looked at it and the imaging showed \"a single screw along the mid level of the craniotomy at the left paramedian aspect of the craniotomy is outside of the outer occipital cortex and separate from it's fixation plate\"   2) If no, what is different and what would cause it be different?  3) How would hardware impact headache frequency or characteristics - either in normal position or not?  4) What could cause a new onset awareness of hardware - both tactile and audio?  5) Is it possible that the plates are disrupted by a new growth of a tumor?  6) If there is a screw out of place, is it possible to live with that rather than have surgery to remove or replace?  "

## 2024-08-02 ENCOUNTER — PRE VISIT (OUTPATIENT)
Dept: PULMONOLOGY | Facility: CLINIC | Age: 44
End: 2024-08-02

## 2024-08-02 ENCOUNTER — ANCILLARY PROCEDURE (OUTPATIENT)
Dept: GENERAL RADIOLOGY | Facility: CLINIC | Age: 44
End: 2024-08-02
Attending: STUDENT IN AN ORGANIZED HEALTH CARE EDUCATION/TRAINING PROGRAM
Payer: COMMERCIAL

## 2024-08-02 ENCOUNTER — OFFICE VISIT (OUTPATIENT)
Dept: PULMONOLOGY | Facility: CLINIC | Age: 44
End: 2024-08-02
Attending: NURSE PRACTITIONER
Payer: COMMERCIAL

## 2024-08-02 VITALS
HEIGHT: 68 IN | BODY MASS INDEX: 37.59 KG/M2 | HEART RATE: 75 BPM | OXYGEN SATURATION: 97 % | WEIGHT: 248 LBS | SYSTOLIC BLOOD PRESSURE: 119 MMHG | DIASTOLIC BLOOD PRESSURE: 79 MMHG

## 2024-08-02 DIAGNOSIS — J45.41 MODERATE PERSISTENT ASTHMA WITH ACUTE EXACERBATION: ICD-10-CM

## 2024-08-02 PROCEDURE — G0463 HOSPITAL OUTPT CLINIC VISIT: HCPCS | Performed by: INTERNAL MEDICINE

## 2024-08-02 PROCEDURE — 71046 X-RAY EXAM CHEST 2 VIEWS: CPT | Mod: GC | Performed by: STUDENT IN AN ORGANIZED HEALTH CARE EDUCATION/TRAINING PROGRAM

## 2024-08-02 PROCEDURE — 99205 OFFICE O/P NEW HI 60 MIN: CPT | Mod: GC | Performed by: INTERNAL MEDICINE

## 2024-08-02 RX ORDER — FLUTICASONE PROPIONATE AND SALMETEROL 250; 50 UG/1; UG/1
2 POWDER RESPIRATORY (INHALATION) EVERY 12 HOURS
Qty: 60 EACH | Refills: 4 | Status: SHIPPED | OUTPATIENT
Start: 2024-08-02 | End: 2024-09-03

## 2024-08-02 NOTE — PROGRESS NOTES
Pulmonology Clinic Appointment     Cathy Arroyo MRN# [id number]   Age: 43 year old YOB: 1980       Reason for consult:    Cathy Arroyo is a 43 year old year old female who is being seen for New Patient (New Asthma )        Requesting/Referring physician:    Sherrill Dasilva           Assessment and Plan:     Moderate Persistent Asthma, uncontrolled   Asthma since childhood, currently uncontrolled with daily wheezing and dry cough. Also with significant smoking history. Presents to pulmonology clinic given overall worsening of symptoms in addition to new productive cough with daily white to clear sputum with concerns for possible COPD. Reviewed pulmonary function testing which revealed moderate reversible obstruction and increased DLCO, consistent with uncontrolled asthma. Sputum production most likely attributed to uncontrolled asthma (she admits inconsistent to minimal use of her advair) vs. less likely COPD (inconsistent with elevated DLCO and improvement in obstruction with albuterol on PFT). Plan at this time will be to achieve better control of her asthma, counseled on correct inhaler use and provided with spacer. Will plan to see patient back in 6 months with pulmonary function testing including FENO.   - provided with spacer  - renewed Advair BID  - continue spiriva daily  - albuterol prn   - return to clinic in 6 months with PFT & FENO    Tobacco Use Disorder   Discussed tobacco use habits at todays visit, currently seems to be in the contemplative stage. Has a very detailed quit plan she intends to follow when she is ready; she identified life stressors as the main reason she is not yet ready to initiate plan (grandfather recently passed). She has tried many NRT in the past, her current plan involves cannabis as an altnerative, journaling during each cigarette to better understand triggers and using a straw to inhale through. She has roughly 15 pack year - she will need lung cancer  screening with LDCT when she turns 57.   - continue to encourage cessation/reduction  - LDCT when 56 yo for lung cancer screening           History of Present Illness:   Cathy Arroyo is a 43 year old year old female with medical history of asthma since childhood, BP1D, Obesity and tobacco use who is referred for evaluation of persistent respiratory symptoms of shortness of breath, wheezing and new productive cough.    Asthma since she 9 years old. Was on montelukast and ventolin since there. Asthma gets worse with allergies, anxiety, and exercise. Symptoms are mainly wheezing and cough.  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 Also started using Vapes, recently quit for 6 months but then started again after her grandfather passed away. Daily cannabis since 19 years old. Around a year and a half ago, noticed new sputum production (daily basis) which made her concerned she may be developing COPD. Sputum is white to yellowish in color.     ROS is negative for fevers, chills, recent travel or sick contacts. Does report chest tightness and some pleurisy.     Occupation:   Smoking: yes,   Allergies: suspects allergies to pollens, pet dander, does have allergies to adhesive tapes and some medications listed in chart  Pets: Cats, Dogs         Past Medical History:     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    Post partum depression     Post-surgical hypothyroidism 05/2017    Problem, psychiatric 2001 diagnosed Bipolar    Recurrent otitis media chronic ear infections in childhood, recently had a double ear infection followed by another ear  infection soon after    Sleep apnea 2019    had a sleep study when pregnant with my second child    Uncomplicated asthma           Past Surgical History:     Past Surgical History:   Procedure Laterality Date    BIOPSY  29 y/o colpos, and in the last 2 years for thyroid    Colposcopy, thyroid nodule biopsy twice    BLOOD PATCH N/A 10/11/2016    Procedure: EPIDURAL BLOOD PATCH;  Surgeon: GENERIC ANESTHESIA PROVIDER;  Location: [facility] OR     SECTION, TUBAL LIGATION, COMBINED N/A 2019    Procedure:  SECTION, WITH TUBAL LIGATION;  Surgeon: Kathy Rutledge MD;  Location: [facility] L+D    CRANIOTOMY, EXCISE TUMOR COMPLEX, COMBINED  2014    Procedure: COMBINED CRANIOTOMY, EXCISE TUMOR COMPLEX;  Supracerabellar  Infratentorial Approach for Resection of Tumor ;  Surgeon: Kate Mckeon MD;  Location: [facility] OR    ENT SURGERY  2017    thyroidectomy    GYN SURGERY      colposcopy    THYROIDECTOMY N/A 2017    Procedure: THYROIDECTOMY;  Total Thyroidectomy;  Surgeon: Pamela Villasenor MD;  Location:  OR          Social History:     Social History     Socioeconomic History    Marital status:      Spouse name: Not on file    Number of children: Not on file    Years of education: Not on file    Highest education level: Not on file   Occupational History    Not on file   Tobacco Use    Smoking status: Every Day     Current packs/day: 0.50     Average packs/day: 0.3 packs/day for 19.6 years (5.3 ttl pk-yrs)     Types: Cigarettes     Start date: 2004     Last attempt to quit: 2022     Passive exposure: Current    Smokeless tobacco: Never    Tobacco comments:     Stopped smoking for both pregnamcies and breastfeeding of both children     Working on quitting smoking (23)   Vaping Use    Vaping status: Every Day    Last attempt to quit: 2023    Substances: Nicotine, Flavoring    Devices: Disposable   Substance and Sexual Activity    Alcohol use: Yes      Alcohol/week: 4.0 standard drinks of alcohol     Types: 4 Drinks containing 0.5 oz of alcohol per week    Drug use: Not Currently     Types: Marijuana, Psilocybin     Comment: medical marijuana    Sexual activity: Yes     Partners: Male     Birth control/protection: Female Surgical, None   Other Topics Concern    Parent/sibling w/ CABG, MI or angioplasty before 65F 55M? No   Social History Narrative    Caffeine intake/servings daily - 1    Calcium intake/servings daily - 3    Exercise 5 times weekly - describe ; encouraged walking daily    Sunscreen used - Yes    Seatbelts used - Yes    Guns stored in the home - No    Self Breast Exam - Yes    Pap test up to date -  No    Eye exam up to date -  No    Dental exam up to date -  No    DEXA scan up to date -  No    Flex Sig/Colonoscopy up to date -  No    Mammography up to date -  No    Immunizations reviewed and up to date - Yes    Abuse: Current or Past (Physical, Sexual or Emotional) - No    Do you feel safe in your environment - Yes    Do you cope well with stress - Yes    Do you suffer from insomnia - No             Social Determinants of Health     Financial Resource Strain: Low Risk  (1/21/2024)    Financial Resource Strain     Within the past 12 months, have you or your family members you live with been unable to get utilities (heat, electricity) when it was really needed?: No   Food Insecurity: Low Risk  (1/21/2024)    Food Insecurity     Within the past 12 months, did you worry that your food would run out before you got money to buy more?: No     Within the past 12 months, did the food you bought just not last and you didn t have money to get more?: No   Transportation Needs: Low Risk  (1/21/2024)    Transportation Needs     Within the past 12 months, has lack of transportation kept you from medical appointments, getting your medicines, non-medical meetings or appointments, work, or from getting things that you need?: No   Physical Activity: Not on file    Stress: Not on file   Social Connections: Not on file   Interpersonal Safety: Low Risk  (6/11/2024)    Interpersonal Safety     Do you feel physically and emotionally safe where you currently live?: Yes     Within the past 12 months, have you been hit, slapped, kicked or otherwise physically hurt by someone?: No     Within the past 12 months, have you been humiliated or emotionally abused in other ways by your partner or ex-partner?: No   Housing Stability: Low Risk  (1/21/2024)    Housing Stability     Do you have housing? : Yes     Are you worried about losing your housing?: No           Family History:     Family History   Problem Relation Age of Onset    Other Cancer Mother 62        salivary gland cancer    Genitourinary Problems Mother     Bipolar Disorder Mother         unipolar depression    Depression Mother     Thyroid Disease Mother         benign tumor    Melanoma Mother     Cancer Mother         salivary gland cancer    Migraines Mother     Skin Cancer Mother     Asthma Father     Mental Illness Father         Bipolar 2    Obesity Father     Asthma Maternal Grandmother     Osteoporosis Maternal Grandmother     Glaucoma Maternal Grandmother     Hypertension Maternal Grandmother     Macular Degeneration Maternal Grandmother     Skin Cancer Maternal Grandmother     Prostate Cancer Maternal Grandfather 69        no history of smoking    Bladder Cancer Maternal Grandfather 71    Colon Cancer Maternal Grandfather 70    Diabetes Paternal Grandfather     Bipolar Disorder Brother         unipolar depression    Asthma Brother     Depression Brother     Breast Cancer Maternal Aunt 37        negative BRCA1/2 testing    Melanoma Maternal Aunt 64    Thyroid Disease Paternal Aunt     Breast Cancer Paternal Aunt 58        bilateral; negative testing    Cancer Paternal Aunt     Breast Cancer Other         paternal great aunt           Immunizations:     Immunization History   Administered Date(s) Administered     COVID-19 12+ (2023-24) (Pfizer) 11/20/2023    COVID-19 Bivalent 12+ (Pfizer) 11/15/2022    COVID-19 Monovalent 18+ (Moderna) 10/31/2021    COVID-19 Vaccine ([facility]) 04/05/2021    DTaP, Unspecified 02/12/2014, 02/15/2019    HPV Quadrivalent 04/29/2008, 09/28/2009, 10/28/2009, 12/28/2009    Hepatitis B, Adult 01/06/2010, 09/10/2010    Influenza (H1N1) 12/28/2009    Influenza (IIV3) PF 12/10/1998, 11/01/2000, 11/21/2002, 10/29/2004, 11/18/2005, 10/30/2006, 12/13/2007, 11/23/2010, 01/28/2013    Influenza Vaccine >6 months,quad, PF 01/17/2014, 11/04/2014, 11/23/2015, 10/15/2016, 10/16/2018, 11/06/2019, 11/20/2020, 09/29/2021, 11/15/2022, 09/12/2023    MMR 09/16/1993, 05/16/2019    Pneumococcal 20 valent Conjugate (Prevnar 20) 12/20/2022    Pneumococcal 23 valent 11/20/2020    TDAP (Adacel,Boostrix) 02/12/2014    TDAP Vaccine (Adacel) 02/15/2019    Td (Adult), Adsorbed 01/06/2004          Allergies:     Allergies   Allergen Reactions    Adacel [Tetanus-Diphth-Acell Pertussis] Swelling    Adhesive Tape Hives    Ciprofloxacin      Causes visual hallucinations.    Codeine Sulfate Nausea and Vomiting    Nicoderm [Nicotine]      Patch and Gum, pt reported allergic reaction 6/24    Tegaderm Transparent Dressing (Informational Only) Hives    Tetanus Toxoid      Pt states she had an infection at injection site.     Vicodin [Hydrocodone-Acetaminophen] Nausea and Vomiting    Methimazole Rash          Medications:     Current Outpatient Medications   Medication Sig Dispense Refill    ADDERALL XR 25 MG 24 hr capsule Take by mouth daily      albuterol (PROAIR HFA/PROVENTIL HFA/VENTOLIN HFA) 108 (90 Base) MCG/ACT inhaler Inhale 2 puffs into the lungs every 6 hours 18 g 3    Albuterol-Budesonide 90-80 MCG/ACT AERO Inhale 2 puffs into the lungs 4 times daily as needed (asthma exacerbation and wheezing) 10.7 g 5    amphetamine-dextroamphetamine (ADDERALL) 10 MG tablet 30 mg      ARIPiprazole (ABILIFY) 10 MG tablet Take 0.5 tablets (5 mg)  by mouth daily      cetirizine (ZYRTEC) 10 MG tablet Take 1 tablet (10 mg) by mouth daily 90 tablet 3    Fexofenadine HCl (ALLEGRA PO)       fluticasone furoate (FLONASE SENSIMIST) 27.5 MCG/SPRAY nasal spray Spray 2 sprays into both nostrils daily 9.1 mL 11    fluticasone-salmeterol (ADVAIR DISKUS) 250-50 MCG/ACT inhaler Inhale 1 puff into the lungs 2 times daily 14 each 11    levothyroxine (SYNTHROID/LEVOTHROID) 175 MCG tablet Take 1 tablet (175 mcg) by mouth daily 90 tablet 1    medical cannabis (Patient's own supply) Take 1 Dose by mouth See Admin Instructions (The purpose of this order is to document that the patient reports taking medical cannabis.  This is not a prescription, and is not used to certify that the patient has a qualifying medical condition.)    Tangerine Oral Suspension Unflavored 1-5 ml up to two times daily.  Total THC = 240 mg, Total CBD Trace      medical cannabis (Patient's own supply) Take 1 Dose by mouth See Admin Instructions (The purpose of this order is to document that the patient reports taking medical cannabis.  This is not a prescription, and is not used to certify that the patient has a qualifying medical condition.)    [place] oral suspension: take 1-3 ml by mouth two times daily  Total CBD 1200 mg. Total THC 60 mg      methylPREDNISolone (MEDROL DOSEPAK) 4 MG tablet therapy pack Follow Package Directions 21 tablet 0    montelukast (SINGULAIR) 10 MG tablet Take 1 tablet (10 mg) by mouth every morning 90 tablet 3    tiotropium (SPIRIVA RESPIMAT) 2.5 MCG/ACT inhaler Inhale 2 puffs into the lungs daily 4 g 5     Current Facility-Administered Medications   Medication Dose Route Frequency Provider Last Rate Last Admin    triamcinolone acetonide (KENALOG-10) injection 3 mg  0.3 mL Intra-Lesional Once Lorraine Dozier MD        triamcinolone acetonide (KENALOG-10) injection 3.5 mg  3.5 mg Intra-Lesional Once               Review of Systems:   The Review of Systems is negative other than  "noted in the HPI       Physical Exam:   /79 (BP Location: Right arm, Patient Position: Sitting, Cuff Size: Adult Large)   Pulse 75   Ht 1.727 m (5' 8\")   Wt 112.5 kg (248 lb)   LMP 05/12/2024 (Within Days)   SpO2 97%   BMI 37.71 kg/m      GENERAL APPEARANCE:  alert, and in no apparent distress.  EYES: scleral are anicteric, no conjunctivitis  MOUTH: Oral mucosa is moist, without any lesions, no tonsillar enlargement, no oropharyngeal exudate.  RESP: Good air flow throughout. Obvious expiratory wheezes throughout lung fields bilaterally.  No crackles. No rhonchi. Normal chest expansion  CV: Normal S1, S2, regular rhythm, normal rate. No murmur.  No rub. No gallop. No LE edema.   ABDOMEN:  soft, nontender, Normal bowel sounds  MS: extremities normal. No clubbing. No cyanosis.  SKIN: no rash on limited exam   NEURO: speech normal, normal strength and tone, normal gait and stance  PSYCH: normal mood and affect         Data:     PFTs 7/1/2024  Reviewed and interpreted by me:   Moderate reversible obstruction with increased DLCO. Also increased TLC suggestive of air trapping.             Reviewed CXR from today, 8/2/2024:  Agree with radiology impression. Normal CXR, some mild flattening of the diaphragm.       I spent a total of 60 minutes on the day of the encounter dedicated to the office visit with Cathy Arroyo.    This included review of vitals, labs, imaging, other diagnostic tests (I.e. PFTs, ECHO), face-to-face interaction, physical exam, counseling the patient +/- family members, and/or coordinating care.    This patient was seen and discussed with attending physician, Dr. Dunbar.     Mallory Ordonez MD  PGY-2  Internal Medicine  [facility]                   "

## 2024-08-02 NOTE — LETTER
8/2/2024      Cathy Arroyo  [address]  [facility] [address]      Dear Colleague,    Thank you for referring your patient, Cathy Arroyo, to the [facility] [place]. Please see a copy of my visit note below.      Pulmonology Clinic Appointment     Cathy Arroyo MRN# [id number]   Age: 43 year old YOB: 1980       Reason for consult:    Cathy Arroyo is a 43 year old year old female who is being seen for New Patient (New Asthma )        Requesting/Referring physician:    Sherrill Dasilva           Assessment and Plan:     Moderate Persistent Asthma, uncontrolled   Asthma since childhood, currently uncontrolled with daily wheezing and dry cough. Also with significant smoking history. Presents to pulmonology clinic given overall worsening of symptoms in addition to new productive cough with daily white to clear sputum with concerns for possible COPD. Reviewed pulmonary function testing which revealed moderate reversible obstruction and increased DLCO, consistent with uncontrolled asthma. Sputum production most likely attributed to uncontrolled asthma (she admits inconsistent to minimal use of her advair) vs. less likely COPD (inconsistent with elevated DLCO and improvement in obstruction with albuterol on PFT). Plan at this time will be to achieve better control of her asthma, counseled on correct inhaler use and provided with spacer. Will plan to see patient back in 6 months with pulmonary function testing including FENO.   - provided with spacer  - renewed Advair BID  - continue spiriva daily  - albuterol prn   - return to clinic in 6 months with PFT & FENO    Tobacco Use Disorder   Discussed tobacco use habits at todays visit, currently seems to be in the contemplative stage. Has a very detailed quit plan she intends to follow when she is ready; she identified life stressors as the main reason she is not yet ready to initiate plan  (grandfather recently passed). She has tried many NRT in the past, her current plan involves cannabis as an altnerative, journaling during each cigarette to better understand triggers and using a straw to inhale through. She has roughly 15 pack year - she will need lung cancer screening with LDCT when she turns 57.   - continue to encourage cessation/reduction  - LDCT when 56 yo for lung cancer screening           History of Present Illness:   Cathy Arroyo is a 43 year old year old female with medical history of asthma since childhood, BP1D, Obesity and tobacco use who is referred for evaluation of persistent respiratory symptoms of shortness of breath, wheezing and new productive cough.    Asthma since she 9 years old. Was on montelukast and ventolin since there. Asthma gets worse with allergies, anxiety, and exercise. Symptoms are mainly wheezing and cough.  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 Also started using Vapes, recently quit for 6 months but then started again after her grandfather passed away. Daily cannabis since 19 years old. Around a year and a half ago, noticed new sputum production (daily basis) which made her concerned she may be developing COPD. Sputum is white to yellowish in color.     ROS is negative for fevers, chills, recent travel or sick contacts. Does report chest tightness and some pleurisy.     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     Post partum depression      Post-surgical hypothyroidism 2017     Problem, psychiatric 2001 diagnosed Bipolar     Recurrent otitis media chronic ear infections in childhood, recently had a double ear infection followed by another ear infection soon after     Sleep apnea 2019    had a sleep study when pregnant with my second child     Uncomplicated asthma           Past Surgical History:     Past Surgical History:   Procedure Laterality Date     BIOPSY  29 y/o colpos, and in the last 2 years for thyroid    Colposcopy, thyroid nodule biopsy twice     BLOOD PATCH N/A 10/11/2016    Procedure: EPIDURAL BLOOD PATCH;  Surgeon: GENERIC ANESTHESIA PROVIDER;  Location: [facility] OR      SECTION, TUBAL LIGATION, COMBINED N/A 2019    Procedure:  SECTION, WITH TUBAL LIGATION;  Surgeon: Kathy Rutledge MD;  Location: [facility] L+D     CRANIOTOMY, EXCISE TUMOR COMPLEX, COMBINED  2014    Procedure: COMBINED CRANIOTOMY, EXCISE TUMOR COMPLEX;  Supracerabellar  Infratentorial Approach for Resection of Tumor ;  Surgeon: Kate Mckeon MD;  [facility]: [facility] OR     ENT SURGERY      thyroidectomy     GYN SURGERY      colposcopy     THYROIDECTOMY N/A 2017    Procedure: THYROIDECTOMY;  Total Thyroidectomy;  Surgeon: Pamela Villasenor MD;  Location:  OR          Social History:     Social History     Socioeconomic History     Marital status:      Spouse name: Not on file     Number of children: Not on file     Years of education: Not on file     Highest education level: Not on file   Occupational History     Not on file   Tobacco Use     Smoking status: Every Day     Current packs/day: 0.50     Average packs/day: 0.3 packs/day for 19.6 years (5.3 ttl pk-yrs)     Types: Cigarettes     Start date: 2004     Last attempt to quit: 2022     Passive exposure: Current     Smokeless tobacco: Never     Tobacco comments:      Stopped smoking for both pregnamcies and breastfeeding of both children     Working on quitting smoking (6/27/23)   Vaping Use     Vaping status: Every Day     Last attempt to quit: 11/28/2023     Substances: Nicotine, Flavoring     Devices: Disposable   Substance and Sexual Activity     Alcohol use: Yes     Alcohol/week: 4.0 standard drinks of alcohol     Types: 4 Drinks containing 0.5 oz of alcohol per week     Drug use: Not Currently     Types: Marijuana, Psilocybin     Comment: medical marijuana     Sexual activity: Yes     Partners: Male     Birth control/protection: Female Surgical, None   Other Topics Concern     Parent/sibling w/ CABG, MI or angioplasty before 65F 55M? No   Social History Narrative    Caffeine intake/servings daily - 1    Calcium intake/servings daily - 3    Exercise 5 times weekly - describe ; encouraged walking daily    Sunscreen used - Yes    Seatbelts used - Yes    Guns stored in the home - No    Self Breast Exam - Yes    Pap test up to date -  No    Eye exam up to date -  No    Dental exam up to date -  No    DEXA scan up to date -  No    Flex Sig/Colonoscopy up to date -  No    Mammography up to date -  No    Immunizations reviewed and up to date - Yes    Abuse: Current or Past (Physical, Sexual or Emotional) - No    Do you feel safe in your environment - Yes    Do you cope well with stress - Yes    Do you suffer from insomnia - No             Social Determinants of Health     Financial Resource Strain: Low Risk  (1/21/2024)    Financial Resource Strain      Within the past 12 months, have you or your family members you live with been unable to get utilities (heat, electricity) when it was really needed?: No   Food Insecurity: Low Risk  (1/21/2024)    Food Insecurity      Within the past 12 months, did you worry that your food would run out before you got money to buy more?: No      Within the past 12 months, did the food you bought just not last and you didn t have money to get more?:  No   Transportation Needs: Low Risk  (1/21/2024)    Transportation Needs      Within the past 12 months, has lack of transportation kept you from medical appointments, getting your medicines, non-medical meetings or appointments, work, or from getting things that you need?: No   Physical Activity: Not on file   Stress: Not on file   Social Connections: Not on file   Interpersonal Safety: Low Risk  (6/11/2024)    Interpersonal Safety      Do you feel physically and emotionally safe where you currently live?: Yes      Within the past 12 months, have you been hit, slapped, kicked or otherwise physically hurt by someone?: No      Within the past 12 months, have you been humiliated or emotionally abused in other ways by your partner or ex-partner?: No   Housing Stability: Low Risk  (1/21/2024)    Housing Stability      Do you have housing? : Yes      Are you worried about losing your housing?: No           Family History:     Family History   Problem Relation Age of Onset     Other Cancer Mother 62        salivary gland cancer     Genitourinary Problems Mother      Bipolar Disorder Mother         unipolar depression     Depression Mother      Thyroid Disease Mother         benign tumor     Melanoma Mother      Cancer Mother         salivary gland cancer     Migraines Mother      Skin Cancer Mother      Asthma Father      Mental Illness Father         Bipolar 2     Obesity Father      Asthma Maternal Grandmother      Osteoporosis Maternal Grandmother      Glaucoma Maternal Grandmother      Hypertension Maternal Grandmother      Macular Degeneration Maternal Grandmother      Skin Cancer Maternal Grandmother      Prostate Cancer Maternal Grandfather 69        no history of smoking     Bladder Cancer Maternal Grandfather 71     Colon Cancer Maternal Grandfather 70     Diabetes Paternal Grandfather      Bipolar Disorder Brother         unipolar depression     Asthma Brother      Depression Brother      Breast Cancer Maternal  Aunt 37        negative BRCA1/2 testing     Melanoma Maternal Aunt 64     Thyroid Disease Paternal Aunt      Breast Cancer Paternal Aunt 58        bilateral; negative testing     Cancer Paternal Aunt      Breast Cancer Other         paternal great aunt           Immunizations:     Immunization History   Administered Date(s) Administered     COVID-19 12+ (2023-24) (Pfizer) 11/20/2023     COVID-19 Bivalent 12+ (Pfizer) 11/15/2022     COVID-19 Monovalent 18+ (Moderna) 10/31/2021     COVID-19 Vaccine (Moses) 04/05/2021     DTaP, Unspecified 02/12/2014, 02/15/2019     HPV Quadrivalent 04/29/2008, 09/28/2009, 10/28/2009, 12/28/2009     Hepatitis B, Adult 01/06/2010, 09/10/2010     Influenza (H1N1) 12/28/2009     Influenza (IIV3) PF 12/10/1998, 11/01/2000, 11/21/2002, 10/29/2004, 11/18/2005, 10/30/2006, 12/13/2007, 11/23/2010, 01/28/2013     Influenza Vaccine >6 months,quad, PF 01/17/2014, 11/04/2014, 11/23/2015, 10/15/2016, 10/16/2018, 11/06/2019, 11/20/2020, 09/29/2021, 11/15/2022, 09/12/2023     MMR 09/16/1993, 05/16/2019     Pneumococcal 20 valent Conjugate (Prevnar 20) 12/20/2022     Pneumococcal 23 valent 11/20/2020     TDAP (Adacel,Boostrix) 02/12/2014     TDAP Vaccine (Adacel) 02/15/2019     Td (Adult), Adsorbed 01/06/2004          Allergies:     Allergies   Allergen Reactions     Adacel [Tetanus-Diphth-Acell Pertussis] Swelling     Adhesive Tape Hives     Ciprofloxacin      Causes visual hallucinations.     Codeine Sulfate Nausea and Vomiting     Nicoderm [Nicotine]      Patch and Gum, pt reported allergic reaction 6/24     Tegaderm Transparent Dressing (Informational Only) Hives     Tetanus Toxoid      Pt states she had an infection at injection site.      Vicodin [Hydrocodone-Acetaminophen] Nausea and Vomiting     Methimazole Rash          Medications:     Current Outpatient Medications   Medication Sig Dispense Refill     ADDERALL XR 25 MG 24 hr capsule Take by mouth daily       albuterol (PROAIR  HFA/PROVENTIL HFA/VENTOLIN HFA) 108 (90 Base) MCG/ACT inhaler Inhale 2 puffs into the lungs every 6 hours 18 g 3     Albuterol-Budesonide 90-80 MCG/ACT AERO Inhale 2 puffs into the lungs 4 times daily as needed (asthma exacerbation and wheezing) 10.7 g 5     amphetamine-dextroamphetamine (ADDERALL) 10 MG tablet 30 mg       ARIPiprazole (ABILIFY) 10 MG tablet Take 0.5 tablets (5 mg) by mouth daily       cetirizine (ZYRTEC) 10 MG tablet Take 1 tablet (10 mg) by mouth daily 90 tablet 3     Fexofenadine HCl (ALLEGRA PO)        fluticasone furoate (FLONASE SENSIMIST) 27.5 MCG/SPRAY nasal spray Spray 2 sprays into both nostrils daily 9.1 mL 11     fluticasone-salmeterol (ADVAIR DISKUS) 250-50 MCG/ACT inhaler Inhale 1 puff into the lungs 2 times daily 14 each 11     levothyroxine (SYNTHROID/LEVOTHROID) 175 MCG tablet Take 1 tablet (175 mcg) by mouth daily 90 tablet 1     medical cannabis (Patient's own supply) Take 1 Dose by mouth See Admin Instructions (The purpose of this order is to document that the patient reports taking medical cannabis.  This is not a prescription, and is not used to certify that the patient has a qualifying medical condition.)    Tangerine Oral Suspension Unflavored 1-5 ml up to two times daily.  Total THC = 240 mg, Total CBD Trace       medical cannabis (Patient's own supply) Take 1 Dose by mouth See Admin Instructions (The purpose of this order is to document that the patient reports taking medical cannabis.  This is not a prescription, and is not used to certify that the patient has a qualifying medical condition.)    [place] oral suspension: take 1-3 ml by mouth two times daily  Total CBD 1200 mg. Total THC 60 mg       methylPREDNISolone (MEDROL DOSEPAK) 4 MG tablet therapy pack Follow Package Directions 21 tablet 0     montelukast (SINGULAIR) 10 MG tablet Take 1 tablet (10 mg) by mouth every morning 90 tablet 3     tiotropium (SPIRIVA RESPIMAT) 2.5 MCG/ACT inhaler Inhale 2 puffs into the lungs  "daily 4 g 5     Current Facility-Administered Medications   Medication Dose Route Frequency Provider Last Rate Last Admin     triamcinolone acetonide (KENALOG-10) injection 3 mg  0.3 mL Intra-Lesional Once Lorraine Dozier MD         triamcinolone acetonide (KENALOG-10) injection 3.5 mg  3.5 mg Intra-Lesional Once               Review of Systems:   The Review of Systems is negative other than noted in the HPI       Physical Exam:   /79 (BP Location: Right arm, Patient Position: Sitting, Cuff Size: Adult Large)   Pulse 75   Ht 1.727 m (5' 8\")   Wt 112.5 kg (248 lb)   LMP 05/12/2024 (Within Days)   SpO2 97%   BMI 37.71 kg/m      GENERAL APPEARANCE:  alert, and in no apparent distress.  EYES: scleral are anicteric, no conjunctivitis  MOUTH: Oral mucosa is moist, without any lesions, no tonsillar enlargement, no oropharyngeal exudate.  RESP: Good air flow throughout. Obvious expiratory wheezes throughout lung fields bilaterally.  No crackles. No rhonchi. Normal chest expansion  CV: Normal S1, S2, regular rhythm, normal rate. No murmur.  No rub. No gallop. No LE edema.   ABDOMEN:  soft, nontender, Normal bowel sounds  MS: extremities normal. No clubbing. No cyanosis.  SKIN: no rash on limited exam   NEURO: speech normal, normal strength and tone, normal gait and stance  PSYCH: normal mood and affect         Data:     PFTs 7/1/2024  Reviewed and interpreted by me:   Moderate reversible obstruction with increased DLCO. Also increased TLC suggestive of air trapping.             Reviewed CXR from today, 8/2/2024:  Agree with radiology impression. Normal CXR, some mild flattening of the diaphragm.       I spent a total of 60 minutes on the day of the encounter dedicated to the office visit with Cathy Arroyo.    This included review of vitals, labs, imaging, other diagnostic tests (I.e. PFTs, ECHO), face-to-face interaction, physical exam, counseling the patient +/- family members, and/or coordinating " care.    This patient was seen and discussed with attending physician, Dr. Dunbar.     Mallory Ordonez MD  PGY-2  Internal Medicine  [facility]                     Attestation signed by Yennifer Dunbar MD at 8/2/2024  6:40 PM:  I saw and examined the patient with Resident.  Case discussed - agree with note.  Patient is 43F with history of asthma since childhood, obstruction on spirometry and positive BD response today. Active smoking is probably the most prominent factor for difficulty controlling her asthma but she has a detailed, focused plan for reducing cigarettes gradually. Underlying bipolar disorder is a challenge for her to quit and she has insight. We are adjusting her asthma management per below, and provided her with peak flow meter to target above 400, and notify us if she is persisting below 350 despite rescue inhaler (< 70% for age and height), in order to help with symptom awareness. Will have her return with PFTs in 6 months.     Total time spent today on patient encounter, documentation, review of chart and care coordination was 60 minutes.     Yennifer Dunbar M.D.      Again, thank you for allowing me to participate in the care of your patient.        Sincerely,        Yennifer Dunbar MD

## 2024-08-02 NOTE — NURSING NOTE
Chief Complaint   Patient presents with    New Patient     New Asthma      Vitals were taken and medications were reconciled.    Brittnee Zuniga RMA  1:34 PM

## 2024-08-02 NOTE — PATIENT INSTRUCTIONS
Peak flow ideal goal 450, use inhaler if 400 or less, call or message us if less than 350.    For clinical questions, please call our nursing line [phone]    For scheduling, please call [phone]

## 2024-08-05 ENCOUNTER — HOSPITAL ENCOUNTER (OUTPATIENT)
Dept: MRI IMAGING | Facility: CLINIC | Age: 44
Discharge: HOME | End: 2024-08-05
Attending: NEUROLOGICAL SURGERY | Admitting: NEUROLOGICAL SURGERY
Payer: COMMERCIAL

## 2024-08-05 DIAGNOSIS — D44.5 PINEOCYTOMA (H): ICD-10-CM

## 2024-08-05 PROCEDURE — 70553 MRI BRAIN STEM W/O & W/DYE: CPT

## 2024-08-05 PROCEDURE — A9585 GADOBUTROL INJECTION: HCPCS | Performed by: NEUROLOGICAL SURGERY

## 2024-08-05 PROCEDURE — 255N000002 HC RX 255 OP 636: Performed by: NEUROLOGICAL SURGERY

## 2024-08-05 RX ORDER — GADOBUTROL 604.72 MG/ML
11 INJECTION INTRAVENOUS ONCE
Status: COMPLETED | OUTPATIENT
Start: 2024-08-05 | End: 2024-08-05

## 2024-08-05 RX ADMIN — GADOBUTROL 11 ML: 604.72 INJECTION INTRAVENOUS at 07:55

## 2024-08-08 PROBLEM — E86.0 DEHYDRATION: Status: RESOLVED | Noted: 2019-06-07 | Resolved: 2024-08-08

## 2024-08-08 PROBLEM — H60.90 OTITIS EXTERNA: Status: RESOLVED | Noted: 2019-03-14 | Resolved: 2024-08-08

## 2024-08-23 ENCOUNTER — OFFICE VISIT (OUTPATIENT)
Dept: NEUROSURGERY | Facility: CLINIC | Age: 44
End: 2024-08-23
Payer: COMMERCIAL

## 2024-08-23 VITALS
OXYGEN SATURATION: 96 % | HEART RATE: 83 BPM | RESPIRATION RATE: 16 BRPM | DIASTOLIC BLOOD PRESSURE: 84 MMHG | SYSTOLIC BLOOD PRESSURE: 128 MMHG

## 2024-08-23 DIAGNOSIS — D44.5 PINEOCYTOMA (H): Primary | ICD-10-CM

## 2024-08-23 PROCEDURE — 99213 OFFICE O/P EST LOW 20 MIN: CPT | Performed by: PHYSICIAN ASSISTANT

## 2024-08-23 ASSESSMENT — PAIN SCALES - GENERAL: PAINLEVEL: MILD PAIN (3)

## 2024-08-23 NOTE — PROGRESS NOTES
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 She underwent a supracerebellar infratentorial craniotomy for resection of her pineal gland tumor in  with Dr. Mckeon. She last met with Dr. Mckeon 2023 at which time he recommended follow up in 3 years with repeat MRI. Unfortunately earlier this summer the patient was in [place] and noticed palpable hardware/irregularity on her posterior scalp. She presented to the local Emergency Department in [place], [place] and was admitted. She was told by Neurosurgery staff there that she had a single screw that was outside the occipital cortex. She is here for follow up regarding those concerns. She notes that the area is painful when her head is in certain positions, particularly looking up, and she has some pain with palpation. She also has noticed a \"clicking\" sound inside her head when she moves the area which is new to her in the past 1 month or so. She has a history of migraines, is overdue for Neurology follow up with Dr. Sears. She did present to the [facility] Emergency Department with migraine headaches and was seen by our inpatient team at that time who reviewed imaging and felt that her CT findings were stable back to 2018. Today she reports a great deal of anxiety regarding her care in [place] and the symptoms she is having. She denies hardware exposure or intractable pain at this time. She had an updated MRI done recently to review today.     Review of Systems:   Pertinent items are noted " in HPI or as in patient entered ROS below, remainder of complete ROS is negative.     Physical Exam:   /84   Pulse 83   Resp 16   LMP 05/12/2024 (Within Days)   SpO2 96%    General: No acute distress.    Eyes: Conjunctivae are normal.  MSK: Moves all extremities.  No obvious deformity.  Neuro: The patient is fully oriented. Speech is normal. Facial nerve function is normal, rated as a House Brackmann 1. Gait is normal.   Psych: Normal mood and affect. Behavior is normal.    Scalp: Palpation of the posterior scalp reveals some irregularities over the craniotomy site without exposed hardware or significant pain with palpation.     Imaging:  We reviewed her recent MRI, along with previous head CT's particularly looking at the cranial hardware. There is no evidence of pineal tumor recurrence. I do not appreciate significant change in the appearance of her cranial hardware compared to previous studies.      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 We discussed that discomfort from craniotomy fixation hardware is a rare complication of surgery, and that if intolerable it is possible to pursue hardware removal, though this would require a surgery and carry the risks of infection, bleeding, and not fixing her pain. She is not interested in any intervention at this time. I gave reassurance regarding her imaging findings, and unfortunately she's had a great deal of anxiety surrounding her recent care in [place]. We'll check in with her in 6 months by phone visit to follow up, and she'll let us know if she has worsening or new concerns in the meantime. She'll need a repeat MRI in 3 years to track her pineocytoma resection.        Hannah Reddy PA-C  Department of Neurosurgery

## 2024-08-23 NOTE — NURSING NOTE
Chief Complaint   Patient presents with    Follow Up     Return tumor neurosurg   Evaristo Shaikh

## 2024-08-23 NOTE — PATIENT INSTRUCTIONS
Follow up in 6 months, by phone visit.  Please let us know if you have any new concerns in the meantime.   Take good care!

## 2024-08-26 ENCOUNTER — ANCILLARY PROCEDURE (OUTPATIENT)
Dept: MRI IMAGING | Facility: CLINIC | Age: 44
End: 2024-08-26
Attending: PHYSICIAN ASSISTANT
Payer: COMMERCIAL

## 2024-08-26 DIAGNOSIS — Z91.89 AT HIGH RISK FOR BREAST CANCER: ICD-10-CM

## 2024-08-26 PROCEDURE — 77049 MRI BREAST C-+ W/CAD BI: CPT | Performed by: RADIOLOGY

## 2024-08-26 PROCEDURE — A9585 GADOBUTROL INJECTION: HCPCS | Performed by: RADIOLOGY

## 2024-08-26 RX ORDER — GADOBUTROL 604.72 MG/ML
15 INJECTION INTRAVENOUS ONCE
Status: COMPLETED | OUTPATIENT
Start: 2024-08-26 | End: 2024-08-26

## 2024-08-26 RX ADMIN — GADOBUTROL 11 ML: 604.72 INJECTION INTRAVENOUS at 11:39

## 2024-08-26 NOTE — DISCHARGE INSTRUCTIONS
MRI Contrast Discharge Instructions    The IV contrast you received today will pass out of your body in your  urine. This will happen in the next 24 hours. You will not feel this process.  Your urine will not change color.    Drink at least 4 extra glasses of water or juice today (unless your doctor  has restricted your fluids). This reduces the stress on your kidneys.  You may take your regular medicines.    If you are on dialysis: It is best to have dialysis today.    If you have a reaction: Most reactions happen right away. If you have  any new symptoms after leaving the hospital (such as hives or swelling),  call your hospital at the correct number below. Or call your family doctor.  If you have breathing distress or wheezing, call 911.    Special instructions: ***    I have read and understand the above information.    Signature:______________________________________ Date:___________    Staff:__________________________________________ Date:___________     Time:__________    [place] [facility] Departments:    ___[place]: [phone]  ___[facility]: [phone]  ___[place]: [phone] ___[facility]: [phone]  ___[facility]: [phone]  ___[facility]: [phone]  ___[phone]  ___[facility]: [phone]  ___[phone]

## 2024-08-26 NOTE — PROGRESS NOTES
FOLLOW UP   Aug 28, 2024     Cathy Arroyo is a 43 year old woman who presents with family history of breast cancer.     HPI:    History of resection of pineal gland for pineocytoma.     She was seen in  for breast pain, nipple discharge, and lumps. Mammogram, ultrasound, breast MRI, prolactin and TSH were normal.     Family history of maternal aunt diagnosed with breast cancer at age 37, maternal great aunt, and paternal aunt diagnosed with breast cancer at age 58. She had negative genetic testing in . She has been doing high risk screening with annual mammogram and annual breast MRI.     Today she denies any breast mass, skin change, nipple inversion or nipple discharge. She had a breast MRI 24 that was normal. Chronic stable breast pain.     BREAST-SPECIFIC HISTORY:    Previous breast imaging: Yes   - 17 b/l dmammo and right axillary ultrasound for lump prominent fat and normal lymph nodes BI-RADS 2  - 19 b/l Dmammo and right axillary ultrasound for pain: accessory breast tissue BI-RADS 2  - 22 Smammo BI_RADS 1  - 21 breast MRI for high risk screening BI-RADS 1  - 22 breast MRI for high risk screening BI-RADS 1  - 22 bilateral diagnostic mammogram and bilateral breast ultrasound: benign cysts BI-RADS 2  - 23 b/l Dmammo and left breast ultrasound for non-spontaneous nipple discharge: benign cysts BI-RADS 2  23 breast MRI BI-RADS 1  - 24 Smammo BI-RADS 1  - 24 breast MRI BI-RADS 2    Prior breast biopsies/surgeries: No    Prior history of breast cancer or DCIS: No  Prior radiation history: No  Self breast exams: No  Breast density: scattered fibroglandular densities    GYN HISTORY:  . Age at 1st pregnancy: 35. Breastfeeding history: Yes.   Age at menarche: 17  Menopausal: premenopausal    Contraception: female surgical   Menopausal hormone replacement therapy: No   Increased risk with more than 3-5 years with combination  "estrogen/progesterone    RISK ASSESSMENT: < 3% 5 year risk and > 20% lifetime risk   Bridget: 0.8% 5 year risk   JAYSHREE/Hugher-Cuzick: 21.4% lifetime risk (her height is 5'8\")    FAMILY HISTORY:  Breast ca: Yes   - maternal aunt, 37, BRCA negative. Also had melanoma (1 aunt)  - paternal aunt, 58, BRCA negative (2 aunts)  - paternal great aunt  Ovarian ca: No  Pancreatic ca: No  Prostate: Yes  - maternal grandfather  Gastric ca: No  Melanoma: Yes   - maternal aunt, also had breast cancer  Colon ca: Yes   - maternal grandfather  Other cancer: Yes   - maternal grandfather with bladder cancer, 71  - maternal cousin with esophageal cancer  - maternal aunt with kidney cancer.   Other genetic, testing, syndromes, or clotting disorders: no  - maternal aunt negative for BRCA gene mutation      PAST MEDICAL HISTORY  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    Post partum depression     Post-surgical hypothyroidism 05/2017    Pre-eclampsia 10/14/2016    10/16/2018 - was induced for postdates and gestational hypertension with last pregnancy. No history of chronic hypertension. RX for aspirin sent and labs drawn at new OB visit.       Problem, psychiatric 2001 diagnosed Bipolar    Recurrent otitis media chronic ear infections in childhood, recently had a double ear infection followed by another ear infection soon after    Sleep apnea 2019    had a sleep study when pregnant with my second child    Uncomplicated asthma      PAST SURGICAL HISTORY   Past Surgical History:   Procedure Laterality Date    BIOPSY  29 y/o colpos, and in the last 2 years for thyroid    Colposcopy, thyroid nodule biopsy twice    BLOOD " PATCH N/A 10/11/2016    Procedure: EPIDURAL BLOOD PATCH;  Surgeon: GENERIC ANESTHESIA PROVIDER;  [facility]     SECTION, TUBAL LIGATION, COMBINED N/A 2019    Procedure:  SECTION, WITH TUBAL LIGATION;  Surgeon: Kathy Rutledge MD;  [facility] L[facility]D    CRANIOTOMY, EXCISE TUMOR COMPLEX, COMBINED  2014    Procedure: COMBINED CRANIOTOMY, EXCISE TUMOR COMPLEX;  Supracerabellar  Infratentorial Approach for Resection of Tumor ;  Surgeon: Kate Mckeon MD;  [facility]    ENT SURGERY  2017    thyroidectomy    GYN SURGERY      colposcopy    THYROIDECTOMY N/A 2017    Procedure: THYROIDECTOMY;  Total Thyroidectomy;  Surgeon: Pamela Villasenor MD;  Location:  OR     MEDICATIONS  Current Outpatient Medications   Medication Sig Dispense Refill    ADDERALL XR 25 MG 24 hr capsule Take by mouth daily      albuterol (PROAIR HFA/PROVENTIL HFA/VENTOLIN HFA) 108 (90 Base) MCG/ACT inhaler Inhale 2 puffs into the lungs every 6 hours 18 g 3    Albuterol-Budesonide 90-80 MCG/ACT AERO Inhale 2 puffs into the lungs 4 times daily as needed (asthma exacerbation and wheezing) 10.7 g 5    amphetamine-dextroamphetamine (ADDERALL) 10 MG tablet 30 mg      ARIPiprazole (ABILIFY) 10 MG tablet Take 0.5 tablets (5 mg) by mouth daily      cetirizine (ZYRTEC) 10 MG tablet Take 1 tablet (10 mg) by mouth daily 90 tablet 3    Fexofenadine HCl (ALLEGRA PO)       fluticasone furoate (FLONASE SENSIMIST) 27.5 MCG/SPRAY nasal spray Spray 2 sprays into both nostrils daily 9.1 mL 11    fluticasone-salmeterol (ADVAIR DISKUS) 250-50 MCG/ACT inhaler Inhale 1 puff into the lungs 2 times daily 14 each 11    fluticasone-salmeterol (ADVAIR) 250-50 MCG/ACT inhaler Inhale 2 puffs into the lungs every 12 hours for 30 days 60 each 4    levothyroxine (SYNTHROID/LEVOTHROID) 175 MCG tablet Take 1 tablet (175 mcg) by mouth daily 90 tablet 1    medical cannabis (Patient's own supply) Take 1 Dose by mouth See  Admin Instructions (The purpose of this order is to document that the patient reports taking medical cannabis.  This is not a prescription, and is not used to certify that the patient has a qualifying medical condition.)    Tangerine Oral Suspension Unflavored 1-5 ml up to two times daily.  Total THC = 240 mg, Total CBD Trace      medical cannabis (Patient's own supply) Take 1 Dose by mouth See Admin Instructions (The purpose of this order is to document that the patient reports taking medical cannabis.  This is not a prescription, and is not used to certify that the patient has a qualifying medical condition.)    [place] oral suspension: take 1-3 ml by mouth two times daily  Total CBD 1200 mg. Total THC 60 mg      methylPREDNISolone (MEDROL DOSEPAK) 4 MG tablet therapy pack Follow Package Directions 21 tablet 0    montelukast (SINGULAIR) 10 MG tablet Take 1 tablet (10 mg) by mouth every morning 90 tablet 3    tiotropium (SPIRIVA RESPIMAT) 2.5 MCG/ACT inhaler Inhale 2 puffs into the lungs daily 4 g 5     ALLERGIES  Allergies   Allergen Reactions    Adacel [Tetanus-Diphth-Acell Pertussis] Swelling    Adhesive Tape Hives    Ciprofloxacin      Causes visual hallucinations.    Codeine Sulfate Nausea and Vomiting    Nicoderm [Nicotine]      Patch and Gum, pt reported allergic reaction 6/24    Tegaderm Transparent Dressing (Informational Only) Hives    Tetanus Toxoid      Pt states she had an infection at injection site.     Vicodin [Hydrocodone-Acetaminophen] Nausea and Vomiting    Methimazole Rash      SOCIAL HISTORY:  Smokes: Yes  EtOH: Yes, 1 per week   Exercise: active job as a   Works as a  6 days per week. She has  5 year old and 8 year old. She lives in [place]. History of addiction. Her 5 year old is interested in music. Her 8 year old has Autism.     ROS:  Change in vision No  Headaches: no  Respiratory: No shortness of breath, dyspnea on exertion, cough, or hemoptysis   Cardiovascular:  negative   Gastrointestinal: negative Abdominal pain: no  Breast: chronic bilateral breast pain.   Musculoskeletal: negative Joint pain No Back pain: no  Psychiatric: negative  Hematologic/Lymphatic/Immunologic: negative  Endocrine: negative    EXAM  /83 (BP Location: Right arm, Patient Position: Sitting, Cuff Size: Adult Large)   Pulse 85   Temp 99  F (37.2  C) (Oral)   Resp 17   Wt 111.9 kg (246 lb 11.2 oz)   LMP  (LMP Unknown)   SpO2 97%   BMI 37.51 kg/m     PHYSICAL EXAM  Respiratory: breathing non labored.   Breasts: Examination was done in both the upright and supine positions.  Breasts are symmetrical . No dominant fixed or suspicious masses noted. No skin or nipple changes. No nipple discharge.   No clavicular, cervical, or axillary lymphadenopathy.     ASSESSMENT/PLAN:    Cathy Arroyo is a 43 year old woman with family history of breast cancer. She meets NCCN guidelines for high risk screening.     1) Family history of breast cancer  She meets NCCN guidelines for high risk screening based on family history with lifetime risk for breast cancer of >20%. Screening recommendations based on NCCN guidelines.  Clinical encounter every 6-12 months. Annual mammogram with kristin alternating with annual breast MRI.    She plans to continue with annual mammogram for high risk screening.   - Be familiar with your breast and promptly report any changes to your health care provider.  - Screening mammogram with clinic visit due: 2/28/25    2) Lifestyle Modifications were provided.   - Maintain your best healthy weight. Higher body fat and adult weight gain is associated with increased risk for breast cancer. This increase in risk has been attributed to increase in circulating endogenous estrogen levels from fat tissue.   - Any alcohol intake increases the risk for breast cancer. If you choose to drink alcohol limit alcohol consumption to less than 1 drink (1 ounce of liquor, 6 ounces of wine, or 8 ounces of  beer) per day or less than 3 drinks per week.  - Be active daily and void being sedentary.   - Vitamin D may decrease the risk of developing breast cancer.     3) Breast pain  Breast pain is common. Up to 70% of women will experience breast pain in their lifetime.   Breast pain is most often related to normal physiologic change (hormonal fluctuations) and is a rare symptom of breast cancer.   Management of breast pain  Evaluation with imaging is important to determine if the pain is due to normal physiologic changes related to hormonal fluctuations or to a pathologic process.   Physical support  Wear a supportive Bra that is professionally fit and sized. Be sure your bra is not overly compressive or restrictive.   Wear a sports bra when exercising.   Wear comfortable breast support while sleeping.   Improving omega 3 fatty acid intake may improve breast pain. Eat 1-2 tablespoons of flaxseed daily.   Plant-based diets may help with breast pain.  Breast massage. (see handout)   Warm compresses or ice packs can be used  Acupuncture may help with breast pain.   [facility]   Consider eliminating or decrease caffeine (chocolate, tea, coffee, soda) for a two week period of time to see if pain improves/resolves  Evening Primrose Oil starting with 500 mg/day. May increase to 1000 mg 3 times daily for 6 months. Can be tried.   Chasteberry 400-500 mg/day can be tried.   If you are on hormone based medications adjusting or decreasing these medications may help.   If needed over the counter topical diclofenac cream can intermittently be used.     Miriam Lora PA-C    20 minutes spent on the date of the encounter doing chart review, review of test results, interpretation of tests, patient visit and documentation.

## 2024-08-28 ENCOUNTER — ONCOLOGY VISIT (OUTPATIENT)
Dept: SURGERY | Facility: CLINIC | Age: 44
End: 2024-08-28
Attending: PHYSICIAN ASSISTANT
Payer: COMMERCIAL

## 2024-08-28 VITALS
WEIGHT: 246.7 LBS | BODY MASS INDEX: 37.51 KG/M2 | TEMPERATURE: 99 F | DIASTOLIC BLOOD PRESSURE: 83 MMHG | HEART RATE: 85 BPM | OXYGEN SATURATION: 97 % | RESPIRATION RATE: 17 BRPM | SYSTOLIC BLOOD PRESSURE: 116 MMHG

## 2024-08-28 DIAGNOSIS — Z12.31 ENCOUNTER FOR SCREENING MAMMOGRAM FOR BREAST CANCER: Primary | ICD-10-CM

## 2024-08-28 PROCEDURE — G0463 HOSPITAL OUTPT CLINIC VISIT: HCPCS | Performed by: PHYSICIAN ASSISTANT

## 2024-08-28 PROCEDURE — 99213 OFFICE O/P EST LOW 20 MIN: CPT | Performed by: PHYSICIAN ASSISTANT

## 2024-08-28 ASSESSMENT — PAIN SCALES - GENERAL: PAINLEVEL: NO PAIN (0)

## 2024-08-28 NOTE — LETTER
2024      Cathy Arroyo  [address]  [place] [address]      Dear Colleague,    Thank you for referring your patient, Cathy Arroyo, to the [facility]. Please see a copy of my visit note below.    FOLLOW UP   Aug 28, 2024     Cathy Arroyo is a 43 year old woman who presents with family history of breast cancer.     HPI:    History of resection of pineal gland for pineocytoma.     She was seen in  for breast pain, nipple discharge, and lumps. Mammogram, ultrasound, breast MRI, prolactin and TSH were normal.     Family history of maternal aunt diagnosed with breast cancer at age 37, maternal great aunt, and paternal aunt diagnosed with breast cancer at age 58. She had negative genetic testing in . She has been doing high risk screening with annual mammogram and annual breast MRI.     Today she denies any breast mass, skin change, nipple inversion or nipple discharge. She had a breast MRI 24 that was normal. Chronic stable breast pain.     BREAST-SPECIFIC HISTORY:    Previous breast imaging: Yes   - 17 b/l dmammo and right axillary ultrasound for lump prominent fat and normal lymph nodes BI-RADS 2  - 19 b/l Dmammo and right axillary ultrasound for pain: accessory breast tissue BI-RADS 2  - 22 Smammo BI_RADS 1  - 21 breast MRI for high risk screening BI-RADS 1  - 22 breast MRI for high risk screening BI-RADS 1  - 22 bilateral diagnostic mammogram and bilateral breast ultrasound: benign cysts BI-RADS 2  - 23 b/l Dmammo and left breast ultrasound for non-spontaneous nipple discharge: benign cysts BI-RADS 2  23 breast MRI BI-RADS 1  - 24 Smammo BI-RADS 1  - 24 breast MRI BI-RADS 2    Prior breast biopsies/surgeries: No    Prior history of breast cancer or DCIS: No  Prior radiation history: No  Self breast exams: No  Breast density: scattered fibroglandular densities    GYN HISTORY:  . Age at 1st pregnancy:  "35. Breastfeeding history: Yes.   Age at menarche: 17  Menopausal: premenopausal    Contraception: female surgical   Menopausal hormone replacement therapy: No   Increased risk with more than 3-5 years with combination estrogen/progesterone    RISK ASSESSMENT: < 3% 5 year risk and > 20% lifetime risk   Bridget: 0.8% 5 year risk   JAYSHREE/Breana-[place]: 21.4% lifetime risk (her height is 5'8\")    FAMILY HISTORY:  Breast ca: Yes   - maternal aunt, 37, BRCA negative. Also had melanoma (1 aunt)  - paternal aunt, 58, BRCA negative (2 aunts)  - paternal great aunt  Ovarian ca: No  Pancreatic ca: No  Prostate: Yes  - maternal grandfather  Gastric ca: No  Melanoma: Yes   - maternal aunt, also had breast cancer  Colon ca: Yes   - maternal grandfather  Other cancer: Yes   - maternal grandfather with bladder cancer, 71  - maternal cousin with esophageal cancer  - maternal aunt with kidney cancer.   Other genetic, testing, syndromes, or clotting disorders: no  - maternal aunt negative for BRCA gene mutation      PAST MEDICAL HISTORY  Past Medical History:   Diagnosis Date     Allergic rhinitis 1990     Allergic state      Anxiety      Bipolar 1 disorder (H)      Depressive disorder      Elevated cholesterol      Graves disease 2017     Hearing problem      Migraines 2/2019    have suffered from migraines since having brain surgery     Obese     BMI 37.48     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     Post partum depression      Post-surgical hypothyroidism 05/2017     Pre-eclampsia 10/14/2016    10/16/2018 - was induced for postdates and gestational hypertension with last pregnancy. No history of chronic hypertension. RX for aspirin sent and labs drawn at new OB visit.        Problem, psychiatric 2001 diagnosed Bipolar     Recurrent otitis media chronic ear infections in childhood, recently had a double ear infection followed by another ear infection soon after     Sleep apnea 2019    had a sleep study when pregnant with " my second child     Uncomplicated asthma      PAST SURGICAL HISTORY   Past Surgical History:   Procedure Laterality Date     BIOPSY  29 y/o colpos, and in the last 2 years for thyroid    Colposcopy, thyroid nodule biopsy twice     BLOOD PATCH N/A 10/11/2016    Procedure: EPIDURAL BLOOD PATCH;  Surgeon: GENERIC ANESTHESIA PROVIDER;  [facility]      SECTION, TUBAL LIGATION, COMBINED N/A 2019    Procedure:  SECTION, WITH TUBAL LIGATION;  Surgeon: Kathy Rutledge MD;  [facility] L[facility]D     CRANIOTOMY, EXCISE TUMOR COMPLEX, COMBINED  2014    Procedure: COMBINED CRANIOTOMY, EXCISE TUMOR COMPLEX;  Supracerabellar  Infratentorial Approach for Resection of Tumor ;  Surgeon: Kate Mckeon MD;  [facility]     ENT SURGERY  2017    thyroidectomy     GYN SURGERY      colposcopy     THYROIDECTOMY N/A 2017    Procedure: THYROIDECTOMY;  Total Thyroidectomy;  Surgeon: Pamela Villasenor MD;  Location:  OR     MEDICATIONS  Current Outpatient Medications   Medication Sig Dispense Refill     ADDERALL XR 25 MG 24 hr capsule Take by mouth daily       albuterol (PROAIR HFA/PROVENTIL HFA/VENTOLIN HFA) 108 (90 Base) MCG/ACT inhaler Inhale 2 puffs into the lungs every 6 hours 18 g 3     Albuterol-Budesonide 90-80 MCG/ACT AERO Inhale 2 puffs into the lungs 4 times daily as needed (asthma exacerbation and wheezing) 10.7 g 5     amphetamine-dextroamphetamine (ADDERALL) 10 MG tablet 30 mg       ARIPiprazole (ABILIFY) 10 MG tablet Take 0.5 tablets (5 mg) by mouth daily       cetirizine (ZYRTEC) 10 MG tablet Take 1 tablet (10 mg) by mouth daily 90 tablet 3     Fexofenadine HCl (ALLEGRA PO)        fluticasone furoate (FLONASE SENSIMIST) 27.5 MCG/SPRAY nasal spray Spray 2 sprays into both nostrils daily 9.1 mL 11     fluticasone-salmeterol (ADVAIR DISKUS) 250-50 MCG/ACT inhaler Inhale 1 puff into the lungs 2 times daily 14 each 11     fluticasone-salmeterol (ADVAIR) 250-50  MCG/ACT inhaler Inhale 2 puffs into the lungs every 12 hours for 30 days 60 each 4     levothyroxine (SYNTHROID/LEVOTHROID) 175 MCG tablet Take 1 tablet (175 mcg) by mouth daily 90 tablet 1     medical cannabis (Patient's own supply) Take 1 Dose by mouth See Admin Instructions (The purpose of this order is to document that the patient reports taking medical cannabis.  This is not a prescription, and is not used to certify that the patient has a qualifying medical condition.)    Tangerine Oral Suspension Unflavored 1-5 ml up to two times daily.  Total THC = 240 mg, Total CBD Trace       medical cannabis (Patient's own supply) Take 1 Dose by mouth See Admin Instructions (The purpose of this order is to document that the patient reports taking medical cannabis.  This is not a prescription, and is not used to certify that the patient has a qualifying medical condition.)    [place] oral suspension: take 1-3 ml by mouth two times daily  Total CBD 1200 mg. Total THC 60 mg       methylPREDNISolone (MEDROL DOSEPAK) 4 MG tablet therapy pack Follow Package Directions 21 tablet 0     montelukast (SINGULAIR) 10 MG tablet Take 1 tablet (10 mg) by mouth every morning 90 tablet 3     tiotropium (SPIRIVA RESPIMAT) 2.5 MCG/ACT inhaler Inhale 2 puffs into the lungs daily 4 g 5     ALLERGIES  Allergies   Allergen Reactions     Adacel [Tetanus-Diphth-Acell Pertussis] Swelling     Adhesive Tape Hives     Ciprofloxacin      Causes visual hallucinations.     Codeine Sulfate Nausea and Vomiting     Nicoderm [Nicotine]      Patch and Gum, pt reported allergic reaction 6/24     Tegaderm Transparent Dressing (Informational Only) Hives     Tetanus Toxoid      Pt states she had an infection at injection site.      Vicodin [Hydrocodone-Acetaminophen] Nausea and Vomiting     Methimazole Rash      SOCIAL HISTORY:  Smokes: Yes  EtOH: Yes, 1 per week   Exercise: active job as a   Works as a  6 days per week. She has  5 year old  and 8 year old. She lives in [place]. History of addiction. Her 5 year old is interested in music. Her 8 year old has Autism.     ROS:  Change in vision No  Headaches: no  Respiratory: No shortness of breath, dyspnea on exertion, cough, or hemoptysis   Cardiovascular: negative   Gastrointestinal: negative Abdominal pain: no  Breast: chronic bilateral breast pain.   Musculoskeletal: negative Joint pain No Back pain: no  Psychiatric: negative  Hematologic/Lymphatic/Immunologic: negative  Endocrine: negative    EXAM  /83 (BP Location: Right arm, Patient Position: Sitting, Cuff Size: Adult Large)   Pulse 85   Temp 99  F (37.2  C) (Oral)   Resp 17   Wt 111.9 kg (246 lb 11.2 oz)   LMP  (LMP Unknown)   SpO2 97%   BMI 37.51 kg/m     PHYSICAL EXAM  Respiratory: breathing non labored.   Breasts: Examination was done in both the upright and supine positions.  Breasts are symmetrical . No dominant fixed or suspicious masses noted. No skin or nipple changes. No nipple discharge.   No clavicular, cervical, or axillary lymphadenopathy.     ASSESSMENT/PLAN:    Cathy Arroyo is a 43 year old woman with family history of breast cancer. She meets NCCN guidelines for high risk screening.     1) Family history of breast cancer  She meets NCCN guidelines for high risk screening based on family history with lifetime risk for breast cancer of >20%. Screening recommendations based on NCCN guidelines.  Clinical encounter every 6-12 months. Annual mammogram with kristin alternating with annual breast MRI.    She plans to continue with annual mammogram for high risk screening.   - Be familiar with your breast and promptly report any changes to your health care provider.  - Screening mammogram with clinic visit due: 2/28/25    2) Lifestyle Modifications were provided.   - Maintain your best healthy weight. Higher body fat and adult weight gain is associated with increased risk for breast cancer. This increase in risk has been  attributed to increase in circulating endogenous estrogen levels from fat tissue.   - Any alcohol intake increases the risk for breast cancer. If you choose to drink alcohol limit alcohol consumption to less than 1 drink (1 ounce of liquor, 6 ounces of wine, or 8 ounces of beer) per day or less than 3 drinks per week.  - Be active daily and void being sedentary.   - Vitamin D may decrease the risk of developing breast cancer.     3) Breast pain  Breast pain is common. Up to 70% of women will experience breast pain in their lifetime.   Breast pain is most often related to normal physiologic change (hormonal fluctuations) and is a rare symptom of breast cancer.   Management of breast pain  Evaluation with imaging is important to determine if the pain is due to normal physiologic changes related to hormonal fluctuations or to a pathologic process.   Physical support  Wear a supportive Bra that is professionally fit and sized. Be sure your bra is not overly compressive or restrictive.   Wear a sports bra when exercising.   Wear comfortable breast support while sleeping.   Improving omega 3 fatty acid intake may improve breast pain. Eat 1-2 tablespoons of flaxseed daily.   Plant-based diets may help with breast pain.  Breast massage. (see handout)   Warm compresses or ice packs can be used  Acupuncture may help with breast pain.   [facility]   Consider eliminating or decrease caffeine (chocolate, tea, coffee, soda) for a two week period of time to see if pain improves/resolves  Evening Primrose Oil starting with 500 mg/day. May increase to 1000 mg 3 times daily for 6 months. Can be tried.   Chasteberry 400-500 mg/day can be tried.   If you are on hormone based medications adjusting or decreasing these medications may help.   If needed over the counter topical diclofenac cream can intermittently be used.     Miriam Lora PA-C    20 minutes spent on the date of the encounter doing chart  review, review of test results, interpretation of tests, patient visit and documentation.       Again, thank you for allowing me to participate in the care of your patient.        Sincerely,        Miriam Lora PA-C

## 2024-08-28 NOTE — PATIENT INSTRUCTIONS
Cathy Arroyo is a 43 year old woman with family history of breast cancer.    1) Family history of breast cancer  She meets NCCN guidelines for high risk screening based on family history with lifetime risk for breast cancer of >20%. Screening recommendations based on NCCN guidelines.  Clinical encounter every 6-12 months. Annual mammogram with kristin alternating with annual breast MRI.    She plans to continue with annual mammogram for high risk screening.   - Be familiar with your breast and promptly report any changes to your health care provider.  - Screening mammogram with clinic visit due: 2/28/25    2) Lifestyle Modifications were provided.   - Maintain your best healthy weight. Higher body fat and adult weight gain is associated with increased risk for breast cancer. This increase in risk has been attributed to increase in circulating endogenous estrogen levels from fat tissue.   - Any alcohol intake increases the risk for breast cancer. If you choose to drink alcohol limit alcohol consumption to less than 1 drink (1 ounce of liquor, 6 ounces of wine, or 8 ounces of beer) per day or less than 3 drinks per week.  - Be active daily and void being sedentary.   - Vitamin D may decrease the risk of developing breast cancer.     3) Breast pain  Breast pain is common. Up to 70% of women will experience breast pain in their lifetime.   Breast pain is most often related to normal physiologic change (hormonal fluctuations) and is a rare symptom of breast cancer.   Management of breast pain  Evaluation with imaging is important to determine if the pain is due to normal physiologic changes related to hormonal fluctuations or to a pathologic process.   Physical support  Wear a supportive Bra that is professionally fit and sized. Be sure your bra is not overly compressive or restrictive.   Wear a sports bra when exercising.   Wear comfortable breast support while sleeping.   Improving omega 3 fatty acid intake may  improve breast pain. Eat 1-2 tablespoons of flaxseed daily.   Plant-based diets may help with breast pain.  Breast massage. (see handout)   Warm compresses or ice packs can be used  Acupuncture may help with breast pain.   [facility]   Consider eliminating or decrease caffeine (chocolate, tea, coffee, soda) for a two week period of time to see if pain improves/resolves  Evening Primrose Oil starting with 500 mg/day. May increase to 1000 mg 3 times daily for 6 months. Can be tried.   Chasteberry 400-500 mg/day can be tried.   If you are on hormone based medications adjusting or decreasing these medications may help.   If needed over the counter topical diclofenac cream can intermittently be used.

## 2024-08-28 NOTE — NURSING NOTE
"Oncology Rooming Note    August 28, 2024 11:07 AM   Cathy Arroyo is a 43 year old female who presents for:    Chief Complaint   Patient presents with    Oncology Clinic Visit     High risk for breast cancer     Initial Vitals: LMP 05/12/2024 (Within Days)  Estimated body mass index is 37.71 kg/m  as calculated from the following:    Height as of 8/2/24: 1.727 m (5' 8\").    Weight as of 8/2/24: 112.5 kg (248 lb). There is no height or weight on file to calculate BSA.  Data Unavailable Comment: Data Unavailable   Patient's last menstrual period was 05/12/2024 (within days).  Allergies reviewed: Yes  Medications reviewed: Yes    Medications: Medication refills not needed today.  Pharmacy name entered into [employer]:    [facility][id number] - [place], [place] - [address] [id number] - [place], [place] - [address] [facility] #[id number] - [place], [place] - [address] AT [address] [place] [address] & [facility] - [place], [place] - [address] [facility]    Frailty Screening:   Is the patient here for a new oncology consult visit in cancer care? 2. No      Clinical concerns: Pt reports no new concerns.       Jammie Gresham, EMT     "

## 2024-09-02 ENCOUNTER — NURSE TRIAGE (OUTPATIENT)
Dept: NURSING | Facility: CLINIC | Age: 44
End: 2024-09-02
Payer: COMMERCIAL

## 2024-09-02 ENCOUNTER — APPOINTMENT (OUTPATIENT)
Dept: GENERAL RADIOLOGY | Facility: CLINIC | Age: 44
End: 2024-09-02
Attending: EMERGENCY MEDICINE
Payer: COMMERCIAL

## 2024-09-02 ENCOUNTER — HOSPITAL ENCOUNTER (EMERGENCY)
Facility: CLINIC | Age: 44
Discharge: HOME | End: 2024-09-02
Attending: EMERGENCY MEDICINE | Admitting: EMERGENCY MEDICINE
Payer: COMMERCIAL

## 2024-09-02 VITALS
SYSTOLIC BLOOD PRESSURE: 121 MMHG | TEMPERATURE: 99 F | DIASTOLIC BLOOD PRESSURE: 77 MMHG | RESPIRATION RATE: 16 BRPM | BODY MASS INDEX: 37.74 KG/M2 | HEIGHT: 68 IN | WEIGHT: 249 LBS | HEART RATE: 99 BPM | OXYGEN SATURATION: 97 %

## 2024-09-02 DIAGNOSIS — J18.9 PNEUMONIA DUE TO INFECTIOUS ORGANISM, UNSPECIFIED LATERALITY, UNSPECIFIED PART OF LUNG: ICD-10-CM

## 2024-09-02 DIAGNOSIS — E89.0 POST-SURGICAL HYPOTHYROIDISM: ICD-10-CM

## 2024-09-02 DIAGNOSIS — R09.89 CHOKING SENSATION: ICD-10-CM

## 2024-09-02 LAB
ALBUMIN SERPL BCG-MCNC: 3.8 G/DL (ref 3.5–5.2)
ALBUMIN UR-MCNC: NEGATIVE MG/DL
ALP SERPL-CCNC: 69 U/L (ref 40–150)
ALT SERPL W P-5'-P-CCNC: 12 U/L (ref 0–50)
ANION GAP SERPL CALCULATED.3IONS-SCNC: 11 MMOL/L (ref 7–15)
APPEARANCE UR: CLEAR
AST SERPL W P-5'-P-CCNC: 16 U/L (ref 0–45)
BACTERIA #/AREA URNS HPF: ABNORMAL /HPF
BASOPHILS # BLD AUTO: 0 10E3/UL (ref 0–0.2)
BASOPHILS NFR BLD AUTO: 0 %
BILIRUB SERPL-MCNC: <0.2 MG/DL
BILIRUB UR QL STRIP: NEGATIVE
BUN SERPL-MCNC: 5.5 MG/DL (ref 6–20)
CALCIUM SERPL-MCNC: 8.9 MG/DL (ref 8.8–10.4)
CHLORIDE SERPL-SCNC: 102 MMOL/L (ref 98–107)
COLOR UR AUTO: ABNORMAL
CREAT SERPL-MCNC: 0.58 MG/DL (ref 0.51–0.95)
EGFRCR SERPLBLD CKD-EPI 2021: >90 ML/MIN/1.73M2
EOSINOPHIL # BLD AUTO: 0.2 10E3/UL (ref 0–0.7)
EOSINOPHIL NFR BLD AUTO: 2 %
ERYTHROCYTE [DISTWIDTH] IN BLOOD BY AUTOMATED COUNT: 13.2 % (ref 10–15)
GLUCOSE SERPL-MCNC: 87 MG/DL (ref 70–99)
GLUCOSE UR STRIP-MCNC: NEGATIVE MG/DL
GROUP A STREP BY PCR: NOT DETECTED
HCO3 SERPL-SCNC: 25 MMOL/L (ref 22–29)
HCT VFR BLD AUTO: 37.6 % (ref 35–47)
HGB BLD-MCNC: 12.6 G/DL (ref 11.7–15.7)
HGB UR QL STRIP: NEGATIVE
IMM GRANULOCYTES # BLD: 0 10E3/UL
IMM GRANULOCYTES NFR BLD: 0 %
KETONES UR STRIP-MCNC: NEGATIVE MG/DL
LEUKOCYTE ESTERASE UR QL STRIP: NEGATIVE
LYMPHOCYTES # BLD AUTO: 2.2 10E3/UL (ref 0.8–5.3)
LYMPHOCYTES NFR BLD AUTO: 22 %
MCH RBC QN AUTO: 29.4 PG (ref 26.5–33)
MCHC RBC AUTO-ENTMCNC: 33.5 G/DL (ref 31.5–36.5)
MCV RBC AUTO: 88 FL (ref 78–100)
MONOCYTES # BLD AUTO: 0.9 10E3/UL (ref 0–1.3)
MONOCYTES NFR BLD AUTO: 9 %
NEUTROPHILS # BLD AUTO: 6.5 10E3/UL (ref 1.6–8.3)
NEUTROPHILS NFR BLD AUTO: 67 %
NITRATE UR QL: NEGATIVE
NRBC # BLD AUTO: 0 10E3/UL
NRBC BLD AUTO-RTO: 0 /100
PH UR STRIP: 7 [PH] (ref 5–7)
PLATELET # BLD AUTO: 247 10E3/UL (ref 150–450)
POTASSIUM SERPL-SCNC: 4.4 MMOL/L (ref 3.4–5.3)
PROT SERPL-MCNC: 7 G/DL (ref 6.4–8.3)
RBC # BLD AUTO: 4.28 10E6/UL (ref 3.8–5.2)
RBC URINE: 1 /HPF
SODIUM SERPL-SCNC: 138 MMOL/L (ref 135–145)
SP GR UR STRIP: 1.01 (ref 1–1.03)
UROBILINOGEN UR STRIP-MCNC: NORMAL MG/DL
WBC # BLD AUTO: 9.8 10E3/UL (ref 4–11)
WBC URINE: 1 /HPF

## 2024-09-02 PROCEDURE — 87633 RESP VIRUS 12-25 TARGETS: CPT | Performed by: EMERGENCY MEDICINE

## 2024-09-02 PROCEDURE — 87651 STREP A DNA AMP PROBE: CPT | Performed by: EMERGENCY MEDICINE

## 2024-09-02 PROCEDURE — 96361 HYDRATE IV INFUSION ADD-ON: CPT | Performed by: EMERGENCY MEDICINE

## 2024-09-02 PROCEDURE — 96375 TX/PRO/DX INJ NEW DRUG ADDON: CPT | Performed by: EMERGENCY MEDICINE

## 2024-09-02 PROCEDURE — 81001 URINALYSIS AUTO W/SCOPE: CPT | Performed by: EMERGENCY MEDICINE

## 2024-09-02 PROCEDURE — 99284 EMERGENCY DEPT VISIT MOD MDM: CPT | Mod: 25 | Performed by: EMERGENCY MEDICINE

## 2024-09-02 PROCEDURE — 99284 EMERGENCY DEPT VISIT MOD MDM: CPT | Performed by: EMERGENCY MEDICINE

## 2024-09-02 PROCEDURE — 258N000003 HC RX IP 258 OP 636: Performed by: EMERGENCY MEDICINE

## 2024-09-02 PROCEDURE — 80053 COMPREHEN METABOLIC PANEL: CPT | Performed by: EMERGENCY MEDICINE

## 2024-09-02 PROCEDURE — 71046 X-RAY EXAM CHEST 2 VIEWS: CPT

## 2024-09-02 PROCEDURE — 84443 ASSAY THYROID STIM HORMONE: CPT

## 2024-09-02 PROCEDURE — 250N000011 HC RX IP 250 OP 636: Performed by: EMERGENCY MEDICINE

## 2024-09-02 PROCEDURE — 36415 COLL VENOUS BLD VENIPUNCTURE: CPT | Performed by: EMERGENCY MEDICINE

## 2024-09-02 PROCEDURE — 96374 THER/PROPH/DIAG INJ IV PUSH: CPT | Performed by: EMERGENCY MEDICINE

## 2024-09-02 PROCEDURE — 85025 COMPLETE CBC W/AUTO DIFF WBC: CPT | Performed by: EMERGENCY MEDICINE

## 2024-09-02 PROCEDURE — 87581 M.PNEUMON DNA AMP PROBE: CPT | Performed by: EMERGENCY MEDICINE

## 2024-09-02 PROCEDURE — 250N000013 HC RX MED GY IP 250 OP 250 PS 637: Performed by: EMERGENCY MEDICINE

## 2024-09-02 RX ORDER — DOXYCYCLINE 100 MG/1
100 CAPSULE ORAL ONCE
Status: COMPLETED | OUTPATIENT
Start: 2024-09-02 | End: 2024-09-02

## 2024-09-02 RX ORDER — DEXAMETHASONE SODIUM PHOSPHATE 10 MG/ML
10 INJECTION, SOLUTION INTRAMUSCULAR; INTRAVENOUS ONCE
Status: COMPLETED | OUTPATIENT
Start: 2024-09-02 | End: 2024-09-02

## 2024-09-02 RX ORDER — AMOXICILLIN 500 MG/1
1000 CAPSULE ORAL 3 TIMES DAILY
Qty: 42 CAPSULE | Refills: 0 | Status: SHIPPED | OUTPATIENT
Start: 2024-09-02 | End: 2024-09-09

## 2024-09-02 RX ORDER — LORAZEPAM 2 MG/ML
0.5 INJECTION INTRAMUSCULAR ONCE
Status: COMPLETED | OUTPATIENT
Start: 2024-09-02 | End: 2024-09-02

## 2024-09-02 RX ORDER — DOXYCYCLINE 100 MG/1
100 CAPSULE ORAL 2 TIMES DAILY
Qty: 14 CAPSULE | Refills: 0 | Status: SHIPPED | OUTPATIENT
Start: 2024-09-02 | End: 2024-09-09

## 2024-09-02 RX ORDER — KETOROLAC TROMETHAMINE 15 MG/ML
15 INJECTION, SOLUTION INTRAMUSCULAR; INTRAVENOUS ONCE
Status: COMPLETED | OUTPATIENT
Start: 2024-09-02 | End: 2024-09-02

## 2024-09-02 RX ADMIN — DEXAMETHASONE SODIUM PHOSPHATE 10 MG: 10 INJECTION, SOLUTION INTRAMUSCULAR; INTRAVENOUS at 19:08

## 2024-09-02 RX ADMIN — LORAZEPAM 0.5 MG: 2 INJECTION INTRAMUSCULAR; INTRAVENOUS at 22:00

## 2024-09-02 RX ADMIN — SODIUM CHLORIDE 1000 ML: 9 INJECTION, SOLUTION INTRAVENOUS at 19:10

## 2024-09-02 RX ADMIN — DOXYCYCLINE HYCLATE 100 MG: 100 CAPSULE ORAL at 22:55

## 2024-09-02 RX ADMIN — AMOXICILLIN AND CLAVULANATE POTASSIUM 1 TABLET: 875; 125 TABLET, FILM COATED ORAL at 22:55

## 2024-09-02 RX ADMIN — KETOROLAC TROMETHAMINE 15 MG: 15 INJECTION, SOLUTION INTRAMUSCULAR; INTRAVENOUS at 19:09

## 2024-09-02 ASSESSMENT — ACTIVITIES OF DAILY LIVING (ADL)
ADLS_ACUITY_SCORE: 35
ADLS_ACUITY_SCORE: 33
ADLS_ACUITY_SCORE: 35

## 2024-09-02 ASSESSMENT — COLUMBIA-SUICIDE SEVERITY RATING SCALE - C-SSRS
6. HAVE YOU EVER DONE ANYTHING, STARTED TO DO ANYTHING, OR PREPARED TO DO ANYTHING TO END YOUR LIFE?: NO
2. HAVE YOU ACTUALLY HAD ANY THOUGHTS OF KILLING YOURSELF IN THE PAST MONTH?: NO
1. IN THE PAST MONTH, HAVE YOU WISHED YOU WERE DEAD OR WISHED YOU COULD GO TO SLEEP AND NOT WAKE UP?: NO

## 2024-09-02 NOTE — ED PROVIDER NOTES
"  ED PROVIDER NOTE  September 2, 2024  History     Chief Complaint   Patient presents with    Fever     Patient presents due to fevers for the past 5 days. Patient has been taking Tylenol and Advil at home with minimal relief. Patient reports 7/10 stiffness to head and neck. Patient took a COVID test that was negative.      HPI  Cathy Arroyo is a 43 year old female who has a history significant for bipolar 1 disorder, Graves' disease, obesity, asthma, and tobacco dependence.  She arrives today to the emergency room due to concern of nasal congestion, rhinorrhea, ear pain, headache, and sore throat.  Patient reports initial onset with headache 2 days ago.  This was gradual in onset typical of a \"daily tension\" headache that she gets sometimes preceding migraine headache.  This is rated at moderate in intensity.  Over the subsequent 2 days patient reports above symptoms.  She did take a home COVID test which is negative.  She reports minor cough which he equates to tobacco use and minor asthma exacerbation and no significant shortness of breath, chest pain, palpitations.  She reported a fever prior to arrival of 100 Fahrenheit taking acetaminophen and Advil without significant relief.  Patient does report some neck discomfort this is in the anterior lymph node chains.  She feels her lymph nodes are swollen.  No discomfort in the upper cervical spine or nuchal rigidity.  No unusual rash.  No head trauma.  No suppressed immune system or IV drug use.      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 No history of chronic hypertension. RX for aspirin sent and labs drawn at new OB visit.       Problem, psychiatric  diagnosed Bipolar    Recurrent otitis media chronic ear infections in childhood, recently had a double ear infection followed by another ear infection soon after    Sleep apnea 2019    had a sleep study when pregnant with my second child    Uncomplicated asthma      Past Surgical History:   Procedure Laterality Date    BIOPSY  31 y/o colpos, and in the last 2 years for thyroid    Colposcopy, thyroid nodule biopsy twice    BLOOD PATCH N/A 10/11/2016    Procedure: EPIDURAL BLOOD PATCH;  Surgeon: GENERIC ANESTHESIA PROVIDER;  Location: [facility] OR     SECTION, TUBAL LIGATION, COMBINED N/A 2019    Procedure:  SECTION, WITH TUBAL LIGATION;  Surgeon: Kathy Rutledge MD;  Location: [facility] L+D    CRANIOTOMY, EXCISE TUMOR COMPLEX, COMBINED  2014    Procedure: COMBINED CRANIOTOMY, EXCISE TUMOR COMPLEX;  Supracerabellar  Infratentorial Approach for Resection of Tumor ;  Surgeon: Kate Mckeon MD;  [facility]    ENT SURGERY  2017    thyroidectomy    GYN SURGERY      colposcopy    THYROIDECTOMY N/A 2017    Procedure: THYROIDECTOMY;  Total Thyroidectomy;  Surgeon: Pamela Villasenor MD;  [facility]: [facility] OR     amoxicillin (AMOXIL) 500 MG capsule  doxycycline hyclate (VIBRAMYCIN) 100 MG capsule  ADDERALL XR 25 MG 24 hr capsule  albuterol (PROAIR HFA/PROVENTIL HFA/VENTOLIN HFA) 108 (90 Base) MCG/ACT inhaler  Albuterol-Budesonide 90-80 MCG/ACT AERO  amphetamine-dextroamphetamine (ADDERALL) 10 MG tablet  ARIPiprazole (ABILIFY) 10 MG tablet  cetirizine (ZYRTEC) 10 MG tablet  Fexofenadine HCl (ALLEGRA PO)  fluticasone furoate (FLONASE SENSIMIST) 27.5 MCG/SPRAY nasal spray  fluticasone-salmeterol (ADVAIR DISKUS) 250-50 MCG/ACT  inhaler  fluticasone-salmeterol (ADVAIR) 250-50 MCG/ACT inhaler  levothyroxine (SYNTHROID/LEVOTHROID) 175 MCG tablet  medical cannabis (Patient's own supply)  medical cannabis (Patient's own supply)  methylPREDNISolone (MEDROL DOSEPAK) 4 MG tablet therapy pack  montelukast (SINGULAIR) 10 MG tablet  tiotropium (SPIRIVA RESPIMAT) 2.5 MCG/ACT inhaler      Allergies   Allergen Reactions    Adacel [Tetanus-Diphth-Acell Pertussis] Swelling    Adhesive Tape Hives    Ciprofloxacin      Causes visual hallucinations.    Codeine Sulfate Nausea and Vomiting    Nicoderm [Nicotine]      Patch and Gum, pt reported allergic reaction 6/24    Oxycodone-Acetaminophen Nausea and Vomiting    Tegaderm Transparent Dressing (Informational Only) Hives    Tetanus Toxoid      Pt states she had an infection at injection site.     Vicodin [Hydrocodone-Acetaminophen] Nausea and Vomiting    Methimazole Rash     Family History  Family History   Problem Relation Age of Onset    Other Cancer Mother 62        salivary gland cancer    Genitourinary Problems Mother     Bipolar Disorder Mother         unipolar depression    Depression Mother     Thyroid Disease Mother         benign tumor    Melanoma Mother     Cancer Mother         salivary gland cancer    Migraines Mother     Skin Cancer Mother     Asthma Father     Mental Illness Father         Bipolar 2    Obesity Father     Asthma Maternal Grandmother     Osteoporosis Maternal Grandmother     Glaucoma Maternal Grandmother     Hypertension Maternal Grandmother     Macular Degeneration Maternal Grandmother     Skin Cancer Maternal Grandmother     Prostate Cancer Maternal Grandfather 69        no history of smoking    Bladder Cancer Maternal Grandfather 71    Colon Cancer Maternal Grandfather 70    Diabetes Paternal Grandfather     Bipolar Disorder Brother         unipolar depression    Asthma Brother     Depression Brother     Breast Cancer Maternal Aunt 37        negative BRCA1/2 testing    Melanoma  "Maternal Aunt 64    Thyroid Disease Paternal Aunt     Breast Cancer Paternal Aunt 58        bilateral; negative testing    Cancer Paternal Aunt     Breast Cancer Other         paternal great aunt     Social History   Social History     Tobacco Use    Smoking status: Every Day     Current packs/day: 0.50     Average packs/day: 0.3 packs/day for 19.7 years (5.3 ttl pk-yrs)     Types: Cigarettes     Start date: 1/21/2004     Last attempt to quit: 1/25/2022     Passive exposure: Current    Smokeless tobacco: Never    Tobacco comments:     Stopped smoking for both pregnamcies and breastfeeding of both children     Working on quitting smoking (6/27/23)   Vaping Use    Vaping status: Every Day    Last attempt to quit: 11/28/2023    Substances: Nicotine, Flavoring    Devices: Disposable   Substance Use Topics    Alcohol use: Yes     Alcohol/week: 4.0 standard drinks of alcohol     Types: 4 Drinks containing 0.5 oz of alcohol per week    Drug use: Not Currently     Types: Marijuana, Psilocybin     Comment: medical marijuana         A medically appropriate review of systems was performed with pertinent positives and negatives noted in the HPI, and all other systems negative.      Physical Exam   BP: 121/77  Pulse: 99  Temp: 99  F (37.2  C)  Resp: 16  Height: 172.7 cm (5' 8\")  Weight: 112.9 kg (249 lb)  SpO2: 97 %      Physical Exam  Vitals and nursing note reviewed.   Constitutional:       General: She is not in acute distress.     Appearance: Normal appearance. She is not ill-appearing, toxic-appearing or diaphoretic.   HENT:      Head: Normocephalic and atraumatic.      Right Ear: External ear normal. No middle ear effusion. There is no impacted cerumen.      Left Ear: External ear normal.  No middle ear effusion. There is no impacted cerumen.      Nose: Congestion present.      Mouth/Throat:      Mouth: Mucous membranes are moist. No oral lesions.      Dentition: No gum lesions.      Pharynx: Oropharynx is clear. Posterior " oropharyngeal erythema present. No pharyngeal swelling, oropharyngeal exudate or uvula swelling.      Tonsils: No tonsillar abscesses.   Eyes:      Extraocular Movements: Extraocular movements intact.      Conjunctiva/sclera: Conjunctivae normal.      Pupils: Pupils are equal, round, and reactive to light.   Cardiovascular:      Rate and Rhythm: Normal rate.      Pulses: Normal pulses.      Heart sounds: Normal heart sounds. No murmur heard.     No friction rub.   Pulmonary:      Effort: Pulmonary effort is normal. No respiratory distress.      Breath sounds: No stridor. No wheezing.   Musculoskeletal:         General: No deformity or signs of injury. Normal range of motion.      Cervical back: Normal range of motion and neck supple. No erythema, rigidity or crepitus. No pain with movement. Normal range of motion.   Lymphadenopathy:      Cervical: Cervical adenopathy present.   Skin:     General: Skin is warm.      Capillary Refill: Capillary refill takes less than 2 seconds.      Coloration: Skin is not pale.      Findings: No bruising or erythema.   Neurological:      General: No focal deficit present.      Mental Status: She is alert and oriented to person, place, and time.      Cranial Nerves: No cranial nerve deficit.      Motor: No weakness.   Psychiatric:         Mood and Affect: Mood normal.         Behavior: Behavior normal.         ED Course        Procedures         Results for orders placed or performed during the hospital encounter of 09/02/24 (from the past 24 hour(s))   XR Chest 2 Views    Narrative    EXAM: XR CHEST 2 VIEWS  LOCATION: [facility]  DATE: 9/2/2024    INDICATION: cough, sob  COMPARISON: 6/6/2023      Impression    IMPRESSION: Subtle hazy retrocardiac opacity within the left lower lobe may represent pneumonia..  Normal cardiac size. No pleural effusion or pneumothorax.   UA with Microscopic reflex to Culture    Specimen: Urine, Clean Catch    Result Value Ref Range    Color Urine Light Yellow Colorless, Straw, Light Yellow, Yellow    Appearance Urine Clear Clear    Glucose Urine Negative Negative mg/dL    Bilirubin Urine Negative Negative    Ketones Urine Negative Negative mg/dL    Specific Gravity Urine 1.009 1.003 - 1.035    Blood Urine Negative Negative    pH Urine 7.0 5.0 - 7.0    Protein Albumin Urine Negative Negative mg/dL    Urobilinogen Urine Normal Normal, 2.0 mg/dL    Nitrite Urine Negative Negative    Leukocyte Esterase Urine Negative Negative    Bacteria Urine Few (A) None Seen /HPF    RBC Urine 1 <=2 /HPF    WBC Urine 1 <=5 /HPF    Narrative    Urine Culture not indicated   CBC with platelets differential    Narrative    The following orders were created for panel order CBC with platelets differential.  Procedure                               Abnormality         Status                     ---------                               -----------         ------                     CBC with platelets and d...[[id number]]                      Final result                 Please view results for these tests on the individual orders.   Comprehensive metabolic panel   Result Value Ref Range    Sodium 138 135 - 145 mmol/L    Potassium 4.4 3.4 - 5.3 mmol/L    Carbon Dioxide (CO2) 25 22 - 29 mmol/L    Anion Gap 11 7 - 15 mmol/L    Urea Nitrogen 5.5 (L) 6.0 - 20.0 mg/dL    Creatinine 0.58 0.51 - 0.95 mg/dL    GFR Estimate >90 >60 mL/min/1.73m2    Calcium 8.9 8.8 - 10.4 mg/dL    Chloride 102 98 - 107 mmol/L    Glucose 87 70 - 99 mg/dL    Alkaline Phosphatase 69 40 - 150 U/L    AST 16 0 - 45 U/L    ALT 12 0 - 50 U/L    Protein Total 7.0 6.4 - 8.3 g/dL    Albumin 3.8 3.5 - 5.2 g/dL    Bilirubin Total <0.2 <=1.2 mg/dL   CBC with platelets and differential   Result Value Ref Range    WBC Count 9.8 4.0 - 11.0 10e3/uL    RBC Count 4.28 3.80 - 5.20 10e6/uL    Hemoglobin 12.6 11.7 - 15.7 g/dL    Hematocrit 37.6 35.0 - 47.0 %    MCV 88 78 - 100 fL    MCH 29.4 26.5  - 33.0 pg    MCHC 33.5 31.5 - 36.5 g/dL    RDW 13.2 10.0 - 15.0 %    Platelet Count 247 150 - 450 10e3/uL    % Neutrophils 67 %    % Lymphocytes 22 %    % Monocytes 9 %    % Eosinophils 2 %    % Basophils 0 %    % Immature Granulocytes 0 %    NRBCs per 100 WBC 0 <1 /100    Absolute Neutrophils 6.5 1.6 - 8.3 10e3/uL    Absolute Lymphocytes 2.2 0.8 - 5.3 10e3/uL    Absolute Monocytes 0.9 0.0 - 1.3 10e3/uL    Absolute Eosinophils 0.2 0.0 - 0.7 10e3/uL    Absolute Basophils 0.0 0.0 - 0.2 10e3/uL    Absolute Immature Granulocytes 0.0 <=0.4 10e3/uL    Absolute NRBCs 0.0 10e3/uL   Group A Streptococcus PCR Throat Swab    Specimen: Throat; Swab   Result Value Ref Range    Group A strep by PCR Not Detected Not Detected    Narrative    The Xpert Xpress Strep A test, performed on the [employer], is a rapid, qualitative in vitro diagnostic test for the detection of Streptococcus pyogenes (Group A ß-hemolytic Streptococcus, Strep A) in throat swab specimens from patients with signs and symptoms of pharyngitis. The Xpert Xpress Strep A test can be used as an aid in the diagnosis of Group A Streptococcal pharyngitis. The assay is not intended to monitor treatment for Group A Streptococcus infections. The Xpert Xpress Strep A test utilizes an automated real-time polymerase chain reaction (PCR) to detect Streptococcus pyogenes DNA.     *Note: Due to a large number of results and/or encounters for the requested time period, some results have not been displayed. A complete set of results can be found in Results Review.     Medications   sodium chloride 0.9% BOLUS 1,000 mL (0 mLs Intravenous Stopped 9/2/24 2251)   phenylephrine (MARINE-SYNEPHRINE) 0.25 % spray 2 spray (2 sprays Nasal Not Given 9/2/24 1909)   ketorolac (TORADOL) injection 15 mg (15 mg Intravenous $Given 9/2/24 1909)   dexAMETHasone PF (DECADRON) injection 10 mg (10 mg Intravenous $Given 9/2/24 1908)   LORazepam (ATIVAN) injection 0.5 mg (0.5 mg  Intravenous $Given 9/2/24 2200)   amoxicillin-clavulanate (AUGMENTIN) 875-125 MG per tablet 1 tablet (1 tablet Oral $Given 9/2/24 2255)   doxycycline hyclate (VIBRAMYCIN) capsule 100 mg (100 mg Oral $Given 9/2/24 2255)             Critical care was not performed.     Medical Decision Making  The patient's presentation was of moderate complexity (an acute complicated injury).    The patient's evaluation involved:  review of external note(s) from 3+ sources (see separate area of note for details)  review of 3+ test result(s) ordered prior to this encounter (see separate area of note for details)  ordering and/or review of 3+ test(s) in this encounter (see separate area of note for details)    The patient's management necessitated moderate risk (prescription drug management including medications given in the ED).    Assessments & Plan (with Medical Decision Making)     Cathy Arroyo is a 43 year old female who has a history significant for bipolar 1 disorder, Graves' disease, obesity, asthma, and tobacco dependence.  She arrives today to the emergency room due to concern of nasal congestion, rhinorrhea, ear pain, headache, and sore throat.  Upon arrival patient currently noted to be alert.  Presently afebrile and hemodynamically stable.  GCS 15.  She is no visible signs of external trauma to the head or neck.  No nuchal rigidity.  No suppressed mentation.  I discussed differential including meningitis encephalitis with very low clinical suspicion for this.  The patient is absolutely adamant that she did not want lumbar puncture.  I would agree that my clinical suspicion for bacterial meningitis is quite low.  Predominant symptoms including nasal congestion, rhinorrhea, sore throat, headache and bilateral ear pain most consistent with viral syndrome.  She is speaking in full sentences time without increased work of breathing lower suspicion for lower respiratory tract disease such as pneumonia.  Unlikely to be  urinary tract infection but patient reports increase urination in the setting of increased oral hydration.  No unusual rash or evidence of cellulitis.    Laboratory studies overall reassuring with no significant stenosis, anemia or electrolyte abnormality.  Symptomatically improved on reassessment.  Viral panel pending.  Chest x-ray demonstrating possible pneumonia.  Constellation of symptoms still favoring viral etiology but discussed risk and benefits of antibiotic management for possible bacterial pneumonia.  She feels comfortable returning to home.  I would strongly encourage close outpatient follow-up PCP to ensure resolution of symptoms.  Should she have change, progression or worsening of symptoms we will have her call or return emergently for reevaluation.    I have reviewed the nursing notes.    I have reviewed the findings, diagnosis, plan and need for follow up with the patient.    New Prescriptions    AMOXICILLIN (AMOXIL) 500 MG CAPSULE    Take 2 capsules (1,000 mg) by mouth 3 times daily for 7 days.    DOXYCYCLINE HYCLATE (VIBRAMYCIN) 100 MG CAPSULE    Take 1 capsule (100 mg) by mouth 2 times daily for 7 days.       Final diagnoses:   Pneumonia due to infectious organism, unspecified laterality, unspecified part of lung       PA PAUL MD    9/2/2024   [facility] EMERGENCY DEPARTMENT     Pa Paul MD  09/02/24 7002

## 2024-09-02 NOTE — TELEPHONE ENCOUNTER
Patient tested negative for Covid. Patient thinks she may have influenza.   Symptoms are fever, fever started Saturday with high of 101.6. Patient treating with alternating tylenol and advil. Ear pain, neck pain, headache, throat pain, congestion, feeling out of it, sweating, body aches. Patient also feeling faint yesterday. She was fearful of falling when she got up and would see stars when she coughs too much.   Patient using albuterol every 4-6 hours. Word finding difficulty on the call. Patient also taking her advair as prescribed. Cough is productive of yellow phlegm. Symptoms started with a headache on Saturday morning.   Patient does have shortness of breath at rest along with wheezing.   Patient unable to find her pulse oximeter  Protocol recommends ED now  Patient will have another adult drive.  Wen Rai RN   09/02/24 12:27 PM  [facility] Nurse Advisor          Reason for Disposition   [1] MODERATE difficulty breathing (e.g., speaks in phrases, SOB even at rest, pulse 100-120) AND [2] still present when not coughing    Additional Information   Negative: SEVERE difficulty breathing (e.g., struggling for each breath, speaks in single words)   Negative: Bluish (or gray) lips or face now   Negative: [1] Difficulty breathing AND [2] exposure to flames, smoke, or fumes   Negative: [1] Stridor AND [2] difficulty breathing   Negative: Sounds like a life-threatening emergency to the triager   Negative: [1] Previous asthma attacks AND [2] this feels like asthma attack   Negative: Dry cough (non-productive;  no sputum or minimal clear sputum)    Protocols used: Cough - Acute Ihydwmcmva-A-LV

## 2024-09-02 NOTE — ED TRIAGE NOTES
Patient presents due to fevers for the past 5 days. Patient has been taking Tylenol and Advil at home with minimal relief. Patient reports 7/10 stiffness to head and neck. Patient took a COVID test that was negative.      Triage Assessment (Adult)       Row Name 09/02/24 1758          Triage Assessment    Airway WDL WDL        Respiratory WDL    Respiratory WDL WDL        Skin Circulation/Temperature WDL    Skin Circulation/Temperature WDL WDL        Cardiac WDL    Cardiac WDL WDL        Peripheral/Neurovascular WDL    Peripheral Neurovascular WDL WDL        Cognitive/Neuro/Behavioral WDL    Cognitive/Neuro/Behavioral WDL WDL

## 2024-09-03 ENCOUNTER — MYC MEDICAL ADVICE (OUTPATIENT)
Dept: FAMILY MEDICINE | Facility: CLINIC | Age: 44
End: 2024-09-03

## 2024-09-03 ENCOUNTER — OFFICE VISIT (OUTPATIENT)
Dept: FAMILY MEDICINE | Facility: CLINIC | Age: 44
End: 2024-09-03
Payer: COMMERCIAL

## 2024-09-03 VITALS
RESPIRATION RATE: 22 BRPM | HEIGHT: 68 IN | HEART RATE: 109 BPM | BODY MASS INDEX: 36.98 KG/M2 | DIASTOLIC BLOOD PRESSURE: 84 MMHG | OXYGEN SATURATION: 97 % | SYSTOLIC BLOOD PRESSURE: 121 MMHG | WEIGHT: 244 LBS | TEMPERATURE: 98.7 F

## 2024-09-03 DIAGNOSIS — R09.89 CHOKING SENSATION: ICD-10-CM

## 2024-09-03 DIAGNOSIS — J45.41 MODERATE PERSISTENT ASTHMA WITH ACUTE EXACERBATION: ICD-10-CM

## 2024-09-03 DIAGNOSIS — E89.0 POST-SURGICAL HYPOTHYROIDISM: Primary | ICD-10-CM

## 2024-09-03 DIAGNOSIS — R09.A2 GLOBUS SENSATION: ICD-10-CM

## 2024-09-03 DIAGNOSIS — R49.9 CHANGE IN VOICE: ICD-10-CM

## 2024-09-03 DIAGNOSIS — R07.0 THROAT PAIN: ICD-10-CM

## 2024-09-03 DIAGNOSIS — N76.0 ACUTE VAGINITIS: ICD-10-CM

## 2024-09-03 LAB
C PNEUM DNA SPEC QL NAA+PROBE: NOT DETECTED
FLUAV H1 2009 PAND RNA SPEC QL NAA+PROBE: NOT DETECTED
FLUAV H1 RNA SPEC QL NAA+PROBE: NOT DETECTED
FLUAV H3 RNA SPEC QL NAA+PROBE: NOT DETECTED
FLUAV RNA SPEC QL NAA+PROBE: NOT DETECTED
FLUBV RNA SPEC QL NAA+PROBE: NOT DETECTED
HADV DNA SPEC QL NAA+PROBE: DETECTED
HCOV PNL SPEC NAA+PROBE: NOT DETECTED
HMPV RNA SPEC QL NAA+PROBE: NOT DETECTED
HPIV1 RNA SPEC QL NAA+PROBE: NOT DETECTED
HPIV2 RNA SPEC QL NAA+PROBE: NOT DETECTED
HPIV3 RNA SPEC QL NAA+PROBE: NOT DETECTED
HPIV4 RNA SPEC QL NAA+PROBE: NOT DETECTED
M PNEUMO DNA SPEC QL NAA+PROBE: DETECTED
RSV RNA SPEC QL NAA+PROBE: NOT DETECTED
RSV RNA SPEC QL NAA+PROBE: NOT DETECTED
RV+EV RNA SPEC QL NAA+PROBE: NOT DETECTED
TSH SERPL DL<=0.005 MIU/L-ACNC: 1.6 UIU/ML (ref 0.3–4.2)

## 2024-09-03 PROCEDURE — 99215 OFFICE O/P EST HI 40 MIN: CPT | Performed by: NURSE PRACTITIONER

## 2024-09-03 PROCEDURE — G2211 COMPLEX E/M VISIT ADD ON: HCPCS | Performed by: NURSE PRACTITIONER

## 2024-09-03 RX ORDER — FLUTICASONE PROPIONATE AND SALMETEROL 250; 50 UG/1; UG/1
2 POWDER RESPIRATORY (INHALATION) EVERY 12 HOURS
Qty: 60 EACH | Refills: 4 | Status: SHIPPED | OUTPATIENT
Start: 2024-09-03 | End: 2024-09-19

## 2024-09-03 RX ORDER — FLUCONAZOLE 150 MG/1
150 TABLET ORAL ONCE
Qty: 1 TABLET | Refills: 0 | Status: SHIPPED | OUTPATIENT
Start: 2024-09-03 | End: 2024-09-03

## 2024-09-03 ASSESSMENT — ASTHMA QUESTIONNAIRES
ACT_TOTALSCORE: 12
QUESTION_3 LAST FOUR WEEKS HOW OFTEN DID YOUR ASTHMA SYMPTOMS (WHEEZING, COUGHING, SHORTNESS OF BREATH, CHEST TIGHTNESS OR PAIN) WAKE YOU UP AT NIGHT OR EARLIER THAN USUAL IN THE MORNING: ONCE A WEEK
QUESTION_4 LAST FOUR WEEKS HOW OFTEN HAVE YOU USED YOUR RESCUE INHALER OR NEBULIZER MEDICATION (SUCH AS ALBUTEROL): ONE OR TWO TIMES PER DAY
QUESTION_2 LAST FOUR WEEKS HOW OFTEN HAVE YOU HAD SHORTNESS OF BREATH: MORE THAN ONCE A DAY
QUESTION_5 LAST FOUR WEEKS HOW WOULD YOU RATE YOUR ASTHMA CONTROL: SOMEWHAT CONTROLLED
ACT_TOTALSCORE: 12
QUESTION_1 LAST FOUR WEEKS HOW MUCH OF THE TIME DID YOUR ASTHMA KEEP YOU FROM GETTING AS MUCH DONE AT WORK, SCHOOL OR AT HOME: SOME OF THE TIME

## 2024-09-03 ASSESSMENT — PAIN SCALES - GENERAL: PAINLEVEL: MODERATE PAIN (5)

## 2024-09-03 NOTE — DISCHARGE INSTRUCTIONS
I was sorry that you have been feeling so symptomatic over the past several days.  As we discussed your symptoms favor a viral cause however your chest x-ray demonstrates a possible early pneumonia which we would plan to treat as per our discussion.  Please take your course of antibiotics and follow-up with your primary care physician should you have persistent symptoms.  Should you have change, progression or any worsening of symptoms we are certainly happy to reevaluate you emergently at any time.

## 2024-09-03 NOTE — PATIENT INSTRUCTIONS
Thyroid lab running - no need for additional poke today  Reordered the Advair (pulm had ordered it, but it oddly had an expiration date on it) - not sure about coverage  ENT referral - schedule soonest  Start omeprazole 20 mg daily - take with other meds (separate thyroid from everything else)  Preventative in October    Take fluconazole after antibiotics are completely done if you can wait

## 2024-09-03 NOTE — PROGRESS NOTES
Assessment & Plan     Post-surgical hypothyroidism  S/p thyroidectomy for Graves, not cancer. Not clear what imaging of thyroid region would be helpful, if at all.   - TSH with free T4 reflex; Future    Choking sensation  I didn't appreciate any discrete lymphadenopathy. Patient does have pneumonia currently. In discussing with others, I do think ENT referral for scope is best options. Placed ENT referral for [facility], but will also place for outside for possible quicker access. Also discussed starting PPI so that arrives to ENT with this conservative therapy trial already done  - TSH with free T4 reflex; Future  - Adult ENT  Referral; Future  - omeprazole (PRILOSEC) 20 MG DR capsule; Take 1 capsule (20 mg) by mouth daily.    Throat pain  As above  - Adult ENT  Referral; Future  - omeprazole (PRILOSEC) 20 MG DR capsule; Take 1 capsule (20 mg) by mouth daily.    Change in voice  As above  - Adult ENT  Referral; Future  - omeprazole (PRILOSEC) 20 MG DR capsule; Take 1 capsule (20 mg) by mouth daily.    Globus sensation  As above  - Adult ENT  Referral; Future  - omeprazole (PRILOSEC) 20 MG DR capsule; Take 1 capsule (20 mg) by mouth daily.    Moderate persistent asthma with acute exacerbation  Refilled  - fluticasone-salmeterol (ADVAIR) 250-50 MCG/ACT inhaler; Inhale 2 puffs into the lungs every 12 hours.    Acute vaginitis  Anticipates yeast infection with antibiotics  - fluconazole (DIFLUCAN) 150 MG tablet; Take 1 tablet (150 mg) by mouth once for 1 dose.          BMI        Patient Instructions   Thyroid lab running - no need for additional poke today  Reordered the Advair (pulm had ordered it, but it oddly had an expiration date on it) - not sure about coverage  ENT referral - schedule soonest  Start omeprazole 20 mg daily - take with other meds (separate thyroid from everything else)  Preventative in October    Take fluconazole after antibiotics are completely done if you can  wait    The longitudinal plan of care for the diagnosis(es)/condition(s) as documented were addressed during this visit. Due to the added complexity in care, I will continue to support Cathy in the subsequent management and with ongoing continuity of care.  Ordering of each unique test  Prescription drug management  I spent a total of 48 minutes on the day of the visit.   Time spent by me doing chart review, history and exam, documentation and further activities per the note    Subjective   Cathy is a 43 year old, presenting for the following health issues:  Throat Problem, Recheck Medication (Rx for diflucan due to being on antibiotics), and Mental Health Problem (Due to being on steroids pt would like something that can calm you down)      9/3/2024     1:38 PM   Additional Questions   Roomed by Meghana Maddox     Mental Health Problem    History of Present Illness       Reason for visit:  Resched appt from the 17th, feeling tightness in throat area and shirts with collars feeling restrictive/tight, and sensation of pressure on throat near where my thyroid gland was...  also went to er last night & need diflucan bc the gave me antibiot   She is taking medications regularly.     In the ED yesterday and diagnosed with bacterial pneumonia. Given antibiotics (doxycyline and amoxicillin) and would like to have fluconazole for anticipated yeast infection. Was given dexamethasone and into manic phase secondary to this - felt immediately into jose eduardo. Given ativan to help with this in the ED and wondering about taking this at home. Does have diazepam from psychiatry and usually takes 5 mg.     Feeling of choking around the neck and has noticed she needs to cut her shirts to make space. Also burbling sensation that is more prominent with leaning forward or on stomach. Does have occasional reflux, but not often. Read this can indicate thyroid cancer. Symptoms lately include: change in voice in past four months, throat pain  "unrelieved by typical measures, swollen lymph nodes. Wondering about thyroid level recheck.    Pulmonology appt - didn't diagnose COPD. Since visit has dropped Spiriva. Has been consistently taking Advair twice a week. Started taking consistently at the end of July. Has much difficulty with the Diskus form of Advair and has much better compliance with HFA. Would like PA for HFA. Rinses throat after Advair each time.      Review of Systems  Constitutional, neuro, ENT, endocrine, pulmonary, cardiac, gastrointestinal, genitourinary, musculoskeletal, integument and psychiatric systems are negative, except as otherwise noted.      Objective    /84   Pulse 109   Temp 98.7  F (37.1  C) (Temporal)   Resp 22   Ht 1.727 m (5' 8\")   Wt 110.7 kg (244 lb)   LMP 08/13/2024 (Exact Date)   SpO2 97%   BMI 37.10 kg/m    Body mass index is 37.1 kg/m .  Physical Exam   GENERAL: alert and no distress  EYES: Eyes grossly normal to inspection, PERRL and conjunctivae and sclerae normal  HENT: ear canals and TM's normal, nose and mouth without ulcers or lesions  NECK: no adenopathy, no asymmetry, masses, or scars  RESP: expiratory wheezes throughout  CV: regular rate and rhythm, normal S1 S2, no S3 or S4, no murmur, click or rub, no peripheral edema   PSYCH: mentation appears normal, affect normal/bright            Signed Electronically by: JANNET iLu CNP    "

## 2024-09-04 ENCOUNTER — TELEPHONE (OUTPATIENT)
Dept: FAMILY MEDICINE | Facility: CLINIC | Age: 44
End: 2024-09-04
Payer: COMMERCIAL

## 2024-09-04 DIAGNOSIS — J45.40 MODERATE PERSISTENT ASTHMA WITHOUT COMPLICATION: Primary | ICD-10-CM

## 2024-09-04 RX ORDER — FLUTICASONE PROPIONATE AND SALMETEROL 500; 50 UG/1; UG/1
1 POWDER RESPIRATORY (INHALATION) EVERY 12 HOURS
Qty: 3 EACH | Refills: 3 | Status: SHIPPED | OUTPATIENT
Start: 2024-09-04

## 2024-09-04 NOTE — TELEPHONE ENCOUNTER
We need a new script for the higher dose adavair diskus 500/50mcg with directions of one puff twice daily dosing to be sent the [place] pharmacy..    Kandice Duran  Pharmacy Technician   [facility]  [phone]

## 2024-09-10 ENCOUNTER — TELEPHONE (OUTPATIENT)
Dept: FAMILY MEDICINE | Facility: CLINIC | Age: 44
End: 2024-09-10
Payer: COMMERCIAL

## 2024-09-10 NOTE — TELEPHONE ENCOUNTER
[phone]     Still feeling very ill. Fever is gone. Has finished course of antibiotics. - doxycycline and amoxicillin. Respiratory panel detected adenovirus and m.pneumoniae.

## 2024-09-10 NOTE — TELEPHONE ENCOUNTER
Pt was seen in the ED in 9/2/24. Please see note.  Pt then had a follow up appointment with PCP on 9/3/24. Pt states that the only thing that has improved is that she no longer has a fever. Pt has been vomiting up her medication the last 2 days. Pt states that she is very fatigued. Her throat continues to hurt. Left ear is completely plugged. She feels that her breathing has not changed since she was seen in the ER were she was diagnosed with pneumonia. Pt would like to know what next steps she should take.     Pt would not have availability for a ride until late this afternoon/4pm.     Please advise, thanks    Constance Lynn RN  [facility]

## 2024-09-17 VITALS
BODY MASS INDEX: 36.98 KG/M2 | WEIGHT: 244 LBS | SYSTOLIC BLOOD PRESSURE: 160 MMHG | RESPIRATION RATE: 16 BRPM | HEART RATE: 89 BPM | OXYGEN SATURATION: 95 % | DIASTOLIC BLOOD PRESSURE: 103 MMHG | HEIGHT: 68 IN | TEMPERATURE: 98.6 F

## 2024-09-17 PROCEDURE — 99284 EMERGENCY DEPT VISIT MOD MDM: CPT | Mod: 25 | Performed by: EMERGENCY MEDICINE

## 2024-09-17 PROCEDURE — 99284 EMERGENCY DEPT VISIT MOD MDM: CPT | Performed by: EMERGENCY MEDICINE

## 2024-09-17 ASSESSMENT — COLUMBIA-SUICIDE SEVERITY RATING SCALE - C-SSRS
6. HAVE YOU EVER DONE ANYTHING, STARTED TO DO ANYTHING, OR PREPARED TO DO ANYTHING TO END YOUR LIFE?: NO
1. IN THE PAST MONTH, HAVE YOU WISHED YOU WERE DEAD OR WISHED YOU COULD GO TO SLEEP AND NOT WAKE UP?: NO
2. HAVE YOU ACTUALLY HAD ANY THOUGHTS OF KILLING YOURSELF IN THE PAST MONTH?: NO

## 2024-09-18 ENCOUNTER — APPOINTMENT (OUTPATIENT)
Dept: GENERAL RADIOLOGY | Facility: CLINIC | Age: 44
End: 2024-09-18
Attending: EMERGENCY MEDICINE
Payer: COMMERCIAL

## 2024-09-18 ENCOUNTER — HOSPITAL ENCOUNTER (EMERGENCY)
Facility: CLINIC | Age: 44
Discharge: HOME | End: 2024-09-18
Attending: EMERGENCY MEDICINE | Admitting: EMERGENCY MEDICINE
Payer: COMMERCIAL

## 2024-09-18 ENCOUNTER — NURSE TRIAGE (OUTPATIENT)
Dept: NURSING | Facility: CLINIC | Age: 44
End: 2024-09-18

## 2024-09-18 ENCOUNTER — TELEPHONE (OUTPATIENT)
Dept: AUDIOLOGY | Facility: CLINIC | Age: 44
End: 2024-09-18

## 2024-09-18 DIAGNOSIS — H91.90 HEARING LOSS, UNSPECIFIED HEARING LOSS TYPE, UNSPECIFIED LATERALITY: ICD-10-CM

## 2024-09-18 DIAGNOSIS — H93.8X3 EAR PRESSURE, BILATERAL: Primary | ICD-10-CM

## 2024-09-18 LAB
ALBUMIN SERPL BCG-MCNC: 4.1 G/DL (ref 3.5–5.2)
ALP SERPL-CCNC: 84 U/L (ref 40–150)
ALT SERPL W P-5'-P-CCNC: 15 U/L (ref 0–50)
ANION GAP SERPL CALCULATED.3IONS-SCNC: 10 MMOL/L (ref 7–15)
AST SERPL W P-5'-P-CCNC: 15 U/L (ref 0–45)
BASOPHILS # BLD AUTO: 0.1 10E3/UL (ref 0–0.2)
BASOPHILS NFR BLD AUTO: 1 %
BILIRUB SERPL-MCNC: 0.2 MG/DL
BUN SERPL-MCNC: 12.2 MG/DL (ref 6–20)
CALCIUM SERPL-MCNC: 8.9 MG/DL (ref 8.8–10.4)
CHLORIDE SERPL-SCNC: 102 MMOL/L (ref 98–107)
CREAT SERPL-MCNC: 0.87 MG/DL (ref 0.51–0.95)
EGFRCR SERPLBLD CKD-EPI 2021: 85 ML/MIN/1.73M2
EOSINOPHIL # BLD AUTO: 0.2 10E3/UL (ref 0–0.7)
EOSINOPHIL NFR BLD AUTO: 3 %
ERYTHROCYTE [DISTWIDTH] IN BLOOD BY AUTOMATED COUNT: 13.3 % (ref 10–15)
GLUCOSE SERPL-MCNC: 100 MG/DL (ref 70–99)
HCO3 SERPL-SCNC: 26 MMOL/L (ref 22–29)
HCT VFR BLD AUTO: 39.7 % (ref 35–47)
HGB BLD-MCNC: 13.2 G/DL (ref 11.7–15.7)
IMM GRANULOCYTES # BLD: 0 10E3/UL
IMM GRANULOCYTES NFR BLD: 0 %
LYMPHOCYTES # BLD AUTO: 3.1 10E3/UL (ref 0.8–5.3)
LYMPHOCYTES NFR BLD AUTO: 34 %
MCH RBC QN AUTO: 29.3 PG (ref 26.5–33)
MCHC RBC AUTO-ENTMCNC: 33.2 G/DL (ref 31.5–36.5)
MCV RBC AUTO: 88 FL (ref 78–100)
MONOCYTES # BLD AUTO: 0.7 10E3/UL (ref 0–1.3)
MONOCYTES NFR BLD AUTO: 8 %
NEUTROPHILS # BLD AUTO: 5 10E3/UL (ref 1.6–8.3)
NEUTROPHILS NFR BLD AUTO: 55 %
NRBC # BLD AUTO: 0 10E3/UL
NRBC BLD AUTO-RTO: 0 /100
PLATELET # BLD AUTO: 374 10E3/UL (ref 150–450)
POTASSIUM SERPL-SCNC: 3.9 MMOL/L (ref 3.4–5.3)
PROT SERPL-MCNC: 7.4 G/DL (ref 6.4–8.3)
RBC # BLD AUTO: 4.51 10E6/UL (ref 3.8–5.2)
SODIUM SERPL-SCNC: 138 MMOL/L (ref 135–145)
WBC # BLD AUTO: 9.1 10E3/UL (ref 4–11)

## 2024-09-18 PROCEDURE — 71046 X-RAY EXAM CHEST 2 VIEWS: CPT

## 2024-09-18 PROCEDURE — 85025 COMPLETE CBC W/AUTO DIFF WBC: CPT | Performed by: EMERGENCY MEDICINE

## 2024-09-18 PROCEDURE — 36415 COLL VENOUS BLD VENIPUNCTURE: CPT | Performed by: EMERGENCY MEDICINE

## 2024-09-18 PROCEDURE — 80053 COMPREHEN METABOLIC PANEL: CPT | Performed by: EMERGENCY MEDICINE

## 2024-09-18 ASSESSMENT — ACTIVITIES OF DAILY LIVING (ADL)
ADLS_ACUITY_SCORE: 33
ADLS_ACUITY_SCORE: 35

## 2024-09-18 NOTE — TELEPHONE ENCOUNTER
"Nurse Triage SBAR    Is this a 2nd Level Triage? YES, LICENSED PRACTITIONER REVIEW IS REQUIRED    Situation: Patient has Emergent Referral in place: sudden onset hearing loss, ENT resident saw in ED and is concerned for possible sensorinural hearing loss     Background: Recently treated for pneumonia on abx, Monday developed symptoms of fullness in ears. Tuesday began experiencing difficulty with hearing in left ear, was seen in ER last night and ultimately referred for Audiology/ENT eval.    Assessment:  - Left ear, severe hearing impairment  - Began Monday: first started as a fullness in the ear. Tuesday was when she noticed significant change in hearing ability.  - Pain on and off, describes as \"pressure\", states she is unable to assign rating on number scale bc she lives with chronic pain  - Endorses constant tinnitus, whooshing sound  - Also reports her balance has felt off, was dizzy yesterday.  - Afebrile.  - Reports she is still having residual effects from pneumonia but CXR last night improved.    Protocol Recommended Disposition:   See in Office Today    Recommendation: Contact patient to let her know when and where she can be seen. She is ok with an appointment anywhere that she can be scheduled. Advised to callback if worsening, new symptoms, questions. Pt verbalizes understanding and will wait for callback.    Routed to provider/clinic    Letha FRANCO RN  [facility] Triage & Med Refill Team        Reason for Disposition   Patient wants to be seen    Additional Information   Negative: Followed an ear injury   Negative: Decreased hearing with nasal allergies   Negative: Part of a cold   Negative: Follows air travel or mountain driving   Negative: Earwax, questions about   Negative: Dizziness is main symptom   Negative: Tinnitus (e.g., ringing, hissing, beating) is main symptom   Negative: Patient sounds very sick or weak to the triager   Negative: Ringing in the ears (tinnitus) and taking " "aspirin and dosage sounds high (i.e., > 1500 mg/day)   Negative: Hearing loss in one or both ears of sudden onset and present now   Negative: Decreased hearing and taking medication that can damage hearing (i.e., gentamycin, tobramycin, furosemide, ethacrynic acid, cisplatin, quinidine)   Negative: Decreased hearing followed sudden, extremely loud noise (e.g., explosion, not just loud concert)   Negative: Earache    Answer Assessment - Initial Assessment Questions  1. DESCRIPTION: \"What type of hearing problem are you having? Describe it for me.\" (e.g., complete hearing loss, partial loss)  Partial    2. LOCATION: \"One or both ears?\" If one, ask: \"Which ear?\"  LEFT ear    3. SEVERITY: \"Can you hear anything?\" If Yes, ask: \"What can you hear?\" (e.g., ticking watch, whisper, talking)  Significant difficulty hearing even the phone ringing from left ear when right ear occluded    4. ONSET: \"When did this begin?\" \"Did it start suddenly or come on gradually?\"  Constant     5. PATTERN: \"Does this come and go, or has it been constant since it started?\"  Constant since yesterday    6. PAIN: \"Is there any pain in your ear(s)?\" (Scale 1-10; or mild, moderate, severe)  - NONE (0): no pain  - MILD (1-3): doesn't interfere with normal activities   - MODERATE (4-7): interferes with normal activities or awakens from sleep   - SEVERE (8-10): excruciating pain, unable to do any normal activities     On and off - discomfort only - not currently, occasionally will have some discomfort while talking or belching - pressure change feeling    7. CAUSE: \"What do you think is causing this hearing problem?\"  Recent pneumonia    8. OTHER SYMPTOMS: \"Do you have any other symptoms?\" (e.g., dizziness, ringing in ears)  Balance is affected, dizzy on/off       9. PREGNANCY: \"Is there any chance you are pregnant?\" \"When was your last menstrual period?\"   No    Protocols used: Hearing Loss or Change-A-OH    "

## 2024-09-18 NOTE — ED PROVIDER NOTES
ED Provider Note  [facility]      History     Chief Complaint   Patient presents with    Hearing Problem     HPI  Cathy Arroyo is a 43 year old female with a history of Graves disease, asthma, and bipolar 1 disorder who presents with hearing loss, cough, and shortness of breath. The patient reports she was diagnosed with pneumonia and tested positive for adenovirus and mycoplasma. She was started on a 7 day course of doxycycline and amoxicillin but only completed 5 days of the course due to vomiting during the final two days. She since has had resolution of fevers and body aches, but has had continued productive cough and shortness of breath. She has been taking her albuterol inhaler every 4 hours and feels wheezy. She also experienced onset of left sided ear fullness and hearing loss today. She notes some sounds cause pain in her ear as well. She has had fatigue and headache. She has tried ibuprofen and cannabis for the headache which has not helped. She has not been eating as much as normal, but has been trying to keep up with drinking water.     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 No history of chronic hypertension. RX for aspirin sent and labs drawn at new OB visit.       Problem, psychiatric 2001 diagnosed Bipolar    Recurrent otitis media chronic ear infections in childhood, recently had a double ear infection followed by another ear infection  soon after    Sleep apnea 2019    had a sleep study when pregnant with my second child    Uncomplicated asthma      Past Surgical History:   Procedure Laterality Date    BIOPSY  31 y/o colpos, and in the last 2 years for thyroid    Colposcopy, thyroid nodule biopsy twice    BLOOD PATCH N/A 10/11/2016    Procedure: EPIDURAL BLOOD PATCH;  Surgeon: GENERIC ANESTHESIA PROVIDER;  Location: [facility] OR     SECTION, TUBAL LIGATION, COMBINED N/A 2019    Procedure:  SECTION, WITH TUBAL LIGATION;  Surgeon: Kathy Rutledge MD;  Location: [facility] L+D    CRANIOTOMY, EXCISE TUMOR COMPLEX, COMBINED  2014    Procedure: COMBINED CRANIOTOMY, EXCISE TUMOR COMPLEX;  Supracerabellar  Infratentorial Approach for Resection of Tumor ;  Surgeon: Kate Mckeon MD;  Location: [facility] OR    ENT SURGERY  2017    thyroidectomy    GYN SURGERY      colposcopy    THYROIDECTOMY N/A 2017    Procedure: THYROIDECTOMY;  Total Thyroidectomy;  Surgeon: Pamela Villasenor MD;  Location: [facility] OR     ADDERALL XR 25 MG 24 hr capsule  albuterol (PROAIR HFA/PROVENTIL HFA/VENTOLIN HFA) 108 (90 Base) MCG/ACT inhaler  amphetamine-dextroamphetamine (ADDERALL) 10 MG tablet  ARIPiprazole (ABILIFY) 10 MG tablet  cetirizine (ZYRTEC) 10 MG tablet  Fexofenadine HCl (ALLEGRA PO)  fluticasone-salmeterol (ADVAIR) 250-50 MCG/ACT inhaler  fluticasone-salmeterol (ADVAIR) 500-50 MCG/ACT inhaler  levothyroxine (SYNTHROID/LEVOTHROID) 175 MCG tablet  medical cannabis (Patient's own supply)  medical cannabis (Patient's own supply)  methylPREDNISolone (MEDROL DOSEPAK) 4 MG tablet therapy pack  montelukast (SINGULAIR) 10 MG tablet  omeprazole (PRILOSEC) 20 MG DR capsule  tiotropium (SPIRIVA RESPIMAT) 2.5 MCG/ACT inhaler      Allergies   Allergen Reactions    Adacel [Tetanus-Diphth-Acell Pertussis] Swelling    Adhesive Tape Hives    Ciprofloxacin      Causes visual hallucinations.    Codeine Sulfate Nausea and Vomiting    Nicoderm [Nicotine]       Patch and Gum, pt reported allergic reaction 6/24    Oxycodone-Acetaminophen Nausea and Vomiting    Tegaderm Transparent Dressing (Informational Only) Hives    Tetanus Toxoid      Pt states she had an infection at injection site.     Vicodin [Hydrocodone-Acetaminophen] Nausea and Vomiting    Methimazole Rash     Family History  Family History   Problem Relation Age of Onset    Other Cancer Mother 62        salivary gland cancer    Genitourinary Problems Mother     Bipolar Disorder Mother         unipolar depression    Depression Mother     Thyroid Disease Mother         benign tumor    Melanoma Mother     Cancer Mother         salivary gland cancer    Migraines Mother     Skin Cancer Mother     Asthma Father     Mental Illness Father         Bipolar 2    Obesity Father     Asthma Maternal Grandmother     Osteoporosis Maternal Grandmother     Glaucoma Maternal Grandmother     Hypertension Maternal Grandmother     Macular Degeneration Maternal Grandmother     Skin Cancer Maternal Grandmother     Prostate Cancer Maternal Grandfather 69        no history of smoking    Bladder Cancer Maternal Grandfather 71    Colon Cancer Maternal Grandfather 70    Diabetes Paternal Grandfather     Bipolar Disorder Brother         unipolar depression    Asthma Brother     Depression Brother     Breast Cancer Maternal Aunt 37        negative BRCA1/2 testing    Melanoma Maternal Aunt 64    Thyroid Disease Paternal Aunt     Breast Cancer Paternal Aunt 58        bilateral; negative testing    Cancer Paternal Aunt     Breast Cancer Other         paternal great aunt     Social History   Social History     Tobacco Use    Smoking status: Every Day     Current packs/day: 0.50     Average packs/day: 0.3 packs/day for 19.7 years (5.4 ttl pk-yrs)     Types: Cigarettes     Start date: 1/21/2004     Last attempt to quit: 1/25/2022     Passive exposure: Current    Smokeless tobacco: Never    Tobacco comments:     Stopped smoking for both  "pregnamcies and breastfeeding of both children     Working on quitting smoking (6/27/23)   Vaping Use    Vaping status: Former    Quit date: 11/28/2023    Substances: Nicotine, Flavoring    Devices: Disposable   Substance Use Topics    Alcohol use: Yes     Alcohol/week: 4.0 standard drinks of alcohol     Types: 4 Drinks containing 0.5 oz of alcohol per week    Drug use: Not Currently     Types: Marijuana, Psilocybin     Comment: medical marijuana      A complete review of systems was performed with pertinent positives and negatives noted in the HPI, and all other systems negative.    Physical Exam   BP: (!) 160/103  Pulse: 89  Temp: 98.6  F (37  C)  Resp: 16  Height: 172.7 cm (5' 8\")  Weight: 110.7 kg (244 lb)  SpO2: 95 %  Physical Exam  Constitutional:       General: She is not in acute distress.  HENT:      Head: Normocephalic and atraumatic.      Right Ear: Ear canal and external ear normal.      Left Ear: Ear canal and external ear normal. Tympanic membrane is erythematous.      Ears:      Guzman exam findings: Does not lateralize.     Right Rinne: AC > BC.     Left Rinne: AC > BC.  Eyes:      General: No scleral icterus.     Conjunctiva/sclera: Conjunctivae normal.   Cardiovascular:      Rate and Rhythm: Normal rate and regular rhythm.   Pulmonary:      Effort: Pulmonary effort is normal. No respiratory distress.      Breath sounds: No wheezing.   Abdominal:      General: There is no distension.      Palpations: Abdomen is soft.      Tenderness: There is no abdominal tenderness.   Musculoskeletal:      Cervical back: Normal range of motion.   Skin:     General: Skin is warm and dry.   Neurological:      General: No focal deficit present.      Mental Status: She is alert and oriented to person, place, and time.       ED Course, Procedures, & Data      Procedures       A consult was obtained from the ENT service. The case was discussed with the on call resident, Dr. Hoyt from that service.          Results " for orders placed or performed during the hospital encounter of 09/18/24   Chest XR,  PA & LAT     Status: None    Narrative    EXAM: XR CHEST 2 VIEWS  LOCATION: [facility]  DATE: 9/18/2024    INDICATION: cough, dyspnea  COMPARISON: 9/2/2024      Impression    IMPRESSION: Negative chest. Retrocardiac consolidation has resolved.   Comprehensive metabolic panel     Status: Abnormal   Result Value Ref Range    Sodium 138 135 - 145 mmol/L    Potassium 3.9 3.4 - 5.3 mmol/L    Carbon Dioxide (CO2) 26 22 - 29 mmol/L    Anion Gap 10 7 - 15 mmol/L    Urea Nitrogen 12.2 6.0 - 20.0 mg/dL    Creatinine 0.87 0.51 - 0.95 mg/dL    GFR Estimate 85 >60 mL/min/1.73m2    Calcium 8.9 8.8 - 10.4 mg/dL    Chloride 102 98 - 107 mmol/L    Glucose 100 (H) 70 - 99 mg/dL    Alkaline Phosphatase 84 40 - 150 U/L    AST 15 0 - 45 U/L    ALT 15 0 - 50 U/L    Protein Total 7.4 6.4 - 8.3 g/dL    Albumin 4.1 3.5 - 5.2 g/dL    Bilirubin Total 0.2 <=1.2 mg/dL   CBC with platelets and differential     Status: None   Result Value Ref Range    WBC Count 9.1 4.0 - 11.0 10e3/uL    RBC Count 4.51 3.80 - 5.20 10e6/uL    Hemoglobin 13.2 11.7 - 15.7 g/dL    Hematocrit 39.7 35.0 - 47.0 %    MCV 88 78 - 100 fL    MCH 29.3 26.5 - 33.0 pg    MCHC 33.2 31.5 - 36.5 g/dL    RDW 13.3 10.0 - 15.0 %    Platelet Count 374 150 - 450 10e3/uL    % Neutrophils 55 %    % Lymphocytes 34 %    % Monocytes 8 %    % Eosinophils 3 %    % Basophils 1 %    % Immature Granulocytes 0 %    NRBCs per 100 WBC 0 <1 /100    Absolute Neutrophils 5.0 1.6 - 8.3 10e3/uL    Absolute Lymphocytes 3.1 0.8 - 5.3 10e3/uL    Absolute Monocytes 0.7 0.0 - 1.3 10e3/uL    Absolute Eosinophils 0.2 0.0 - 0.7 10e3/uL    Absolute Basophils 0.1 0.0 - 0.2 10e3/uL    Absolute Immature Granulocytes 0.0 <=0.4 10e3/uL    Absolute NRBCs 0.0 10e3/uL   CBC with platelets differential     Status: None    Narrative    The following orders were created for panel order CBC  with platelets differential.  Procedure                               Abnormality         Status                     ---------                               -----------         ------                     CBC with platelets and d...[[id number]]                      Final result                 Please view results for these tests on the individual orders.     Medications - No data to display  Labs Ordered and Resulted from Time of ED Arrival to Time of ED Departure   COMPREHENSIVE METABOLIC PANEL - Abnormal       Result Value    Sodium 138      Potassium 3.9      Carbon Dioxide (CO2) 26      Anion Gap 10      Urea Nitrogen 12.2      Creatinine 0.87      GFR Estimate 85      Calcium 8.9      Chloride 102      Glucose 100 (*)     Alkaline Phosphatase 84      AST 15      ALT 15      Protein Total 7.4      Albumin 4.1      Bilirubin Total 0.2     CBC WITH PLATELETS AND DIFFERENTIAL    WBC Count 9.1      RBC Count 4.51      Hemoglobin 13.2      Hematocrit 39.7      MCV 88      MCH 29.3      MCHC 33.2      RDW 13.3      Platelet Count 374      % Neutrophils 55      % Lymphocytes 34      % Monocytes 8      % Eosinophils 3      % Basophils 1      % Immature Granulocytes 0      NRBCs per 100 WBC 0      Absolute Neutrophils 5.0      Absolute Lymphocytes 3.1      Absolute Monocytes 0.7      Absolute Eosinophils 0.2      Absolute Basophils 0.1      Absolute Immature Granulocytes 0.0      Absolute NRBCs 0.0       Chest XR,  PA & LAT   Final Result   IMPRESSION: Negative chest. Retrocardiac consolidation has resolved.             Critical care was not performed.     Medical Decision Making  The patient's presentation was of moderate complexity (an acute illness with systemic symptoms).    The patient's evaluation involved:  review of external note(s) from 1 sources (previous note)  review of 2 test result(s) ordered prior to this encounter (previous results)  ordering and/or review of 3+ test(s) in this encounter (see separate area  of note for details)  discussion of management or test interpretation with another health professional (ENT resident)    The patient's management necessitated only low risk treatment.    Assessment & Plan    Cathy Arroyo is  43 year old female with a history of graves disease, asthma, and bipolar 1 disorder who presents with hearing loss, productive cough, and shortness of breath. The patient was diagnosed with adenovirus and mycoplasma pneumonia on 9/2. She completed 5/7 days of amoxicillin and doxycycline due to vomiting. Now with continued symptoms and new onset left sided hearing loss along with some vertiginous symptoms. Differential diagnosis includes post infectious bronchitis, progressive pneumonia, otitis media, sensorineural hearing loss. Vitals on arrival were within normal limits. On exam, left tympanic membrane was erythematous and opaque with no fluid effusion visible. Rinne testing with AC>BC on left. Labs were reassuring, no leukocytosis, CMP within normal limits. Chest Xray with resolution of prior retrocardiac consolidation.     Discussed the hearing loss with ENT who saw the patient in the ED. Unclear if the hearing loss is due to an effusion or sensorineural hearing loss. Recommended follow up with audiogram in clinic tomorrow. ENT discussed treatment options with patient and decision was made to defer starting high dose steroids until audiogram is complete.     Considered starting a course of steroids for the persistent cough, however with the possibility of starting high dose steroids for the hearing loss decided to hold off for now. Patient already has scheduled follow up with her PCP on 9/19. Recommended she keep this appointment and discuss further management options with PCP. Patient was in agreement with this plan. She is encouraged to return with any worsening or concerns.     I have reviewed the nursing notes. I have reviewed the findings, diagnosis, plan and need for follow up with  the patient.    Discharge Medication List as of 9/18/2024  4:00 AM          Final diagnoses:   Hearing loss, unspecified hearing loss type, unspecified laterality     Kiera Bernabe, MS4  Medical Student - [facility]    --    ED Attending Physician Attestation    I Laura Ayoub MD, cared for this patient with the Medical Student. I performed, or re-performed, the physical exam and medical decision-making. I have verified the accuracy of all the medical student findings and documentation above, and have edited as necessary.    Summary of HPI, PE, ED Course   Patient is a 43 year old female evaluated in the emergency department for sensation of ear fullness and decreased hearing, as well as residual productive cough and wheeze following treatment for pna. Left TM opaque and erythematous with mild bulge. Pt with wheeze (appears to originate in upper airway) with forced expiration only - lungs otherwise clear and sats normal. CXR clear. Suspect residual bronchial irritation. In light of associated tinnitus and dizziness, discussed with ENT who did evaluate the pt. They do have concern for possible sensorineural hearing loss.They will arrange for audiology eval later today. They are considering high dose steroids. Resp status may benefit from short course of steroid treatment, but in light of possible high dose steroid treatment for ear concern, would hold off on starting tonight. Pt is encouraged to follow-up with PCP this week, and return with any concerns.  After the completion of care in the emergency department, the patient was discharged.      Laura Ayoub MD  Emergency Medicine       Laura Ayoub  [facility] EMERGENCY DEPARTMENT  9/17/2024     Laura Ayoub MD  09/18/24 [id number]

## 2024-09-18 NOTE — TELEPHONE ENCOUNTER
Patient confirmed scheduled appointment:  Date: 9/19  Time: 1  Provider: Misael  Location: [facility]   Testing/imaging:   Additional notes:

## 2024-09-18 NOTE — DISCHARGE INSTRUCTIONS
You should get a call today with a time for an audiology evaluation. Return with any concerns. Follow up with your clinic doctor within a week regarding your ongoing cough.

## 2024-09-18 NOTE — CONSULTS
Otolaryngology Consult Note  September 18, 2024      CC: sudden hearing loss    HPI: Cathy Arroyo is a 43 year old female with a past medical history of migraine, Grave's disease s/p thyroidectomy,  pineocytoma s/p resection in 2014, and bipolar disorder presenting to the ED today with concerns for sudden left sided hearing loss. In the ED, patient was afebrile, vitally stable, and saturating appropriately on room air. Labs wnl. Given nature of sudden hearing loss, ENT was consulted for further evaluation and management.    On chart review and on discussion with patient today, patient had a URI/pneumonia a little over two weeks ago. She was seen in the ED at that time and her respiratory panel was positive for adenovirus and mycoplasma pneumonia. She was treated with a 7 day course of amoxicillin and doxycycline. In association with her respiratory symptoms, she also noted some left ear discomfort and muffled hearing. These symptoms overall have been unchanged, however today she felt as though her hearing from her left ear was significantly reduced. She thinks it started sometime this morning, however it became particularly apparent while she was out to a cafe this evening and was having a hard time following conversations with her friends. In addition to the sudden hearing loss, she also has noticed tinnitus and possible vertigo, although she notes she is clumsy at baseline. She has never experienced a sudden hearing loss like this before. She has an audiogram from 5/2023 which was obtained due to recent ear infections as well as mastoid effusions noted on surveillance MRI for her pineocytoma. Audiogram demonstrated type A tympanograms and normal hearing bilaterally. She has a history of recurrent ear infections as a child and several as an adult but no prior ear surgeries.     Past Medical History:   Diagnosis Date    Allergic rhinitis 1990    Allergic state     Anxiety     Bipolar 1 disorder (H)      Depressive disorder     Elevated cholesterol     Graves disease     Hearing problem     Migraines 2019    have suffered from migraines since having brain surgery    Obese     BMI 37.48    Pineal tumor     s/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 tumor    Post partum depression     Post-surgical hypothyroidism 2017    Pre-eclampsia 10/14/2016    10/16/2018 - was induced for postdates and gestational hypertension with last pregnancy. No history of chronic hypertension. RX for aspirin sent and labs drawn at new OB visit.       Problem, psychiatric  diagnosed Bipolar    Recurrent otitis media chronic ear infections in childhood, recently had a double ear infection followed by another ear infection soon after    Sleep apnea     had a sleep study when pregnant with my second child    Uncomplicated asthma        Past Surgical History:   Procedure Laterality Date    BIOPSY  29 y/o colpos, and in the last 2 years for thyroid    Colposcopy, thyroid nodule biopsy twice    BLOOD PATCH N/A 10/11/2016    Procedure: EPIDURAL BLOOD PATCH;  Surgeon: GENERIC ANESTHESIA PROVIDER;  Location: [facility] OR     SECTION, TUBAL LIGATION, COMBINED N/A 2019    Procedure:  SECTION, WITH TUBAL LIGATION;  Surgeon: Kathy Rutledge MD;  Location: [facility] L+D    CRANIOTOMY, EXCISE TUMOR COMPLEX, COMBINED  2014    Procedure: COMBINED CRANIOTOMY, EXCISE TUMOR COMPLEX;  Supracerabellar  Infratentorial Approach for Resection of Tumor ;  Surgeon: Kate Mckeon MD;  [facility]: [facility] OR    ENT SURGERY  2017    thyroidectomy    GYN SURGERY      colposcopy    THYROIDECTOMY N/A 2017    Procedure: THYROIDECTOMY;  Total Thyroidectomy;  Surgeon: Pamela Villasenor MD;  [facility]       Current Outpatient Medications   Medication Sig Dispense Refill    ADDERALL XR 25 MG 24 hr capsule Take by mouth daily      albuterol (PROAIR HFA/PROVENTIL HFA/VENTOLIN HFA) 108 (90 Base) MCG/ACT inhaler Inhale 2 puffs into  the lungs every 6 hours 18 g 3    amphetamine-dextroamphetamine (ADDERALL) 10 MG tablet 30 mg (Patient not taking: Reported on 8/28/2024)      ARIPiprazole (ABILIFY) 10 MG tablet Take 0.5 tablets (5 mg) by mouth daily      cetirizine (ZYRTEC) 10 MG tablet Take 1 tablet (10 mg) by mouth daily 90 tablet 3    Fexofenadine HCl (ALLEGRA PO)       fluticasone-salmeterol (ADVAIR) 250-50 MCG/ACT inhaler Inhale 2 puffs into the lungs every 12 hours. 60 each 4    fluticasone-salmeterol (ADVAIR) 500-50 MCG/ACT inhaler Inhale 1 puff into the lungs every 12 hours. 3 each 3    levothyroxine (SYNTHROID/LEVOTHROID) 175 MCG tablet Take 1 tablet (175 mcg) by mouth daily 90 tablet 1    medical cannabis (Patient's own supply) Take 1 Dose by mouth See Admin Instructions (The purpose of this order is to document that the patient reports taking medical cannabis.  This is not a prescription, and is not used to certify that the patient has a qualifying medical condition.)    Tangerine Oral Suspension Unflavored 1-5 ml up to two times daily.  Total THC = 240 mg, Total CBD Trace      medical cannabis (Patient's own supply) Take 1 Dose by mouth See Admin Instructions (The purpose of this order is to document that the patient reports taking medical cannabis.  This is not a prescription, and is not used to certify that the patient has a qualifying medical condition.)    [place] oral suspension: take 1-3 ml by mouth two times daily  Total CBD 1200 mg. Total THC 60 mg      methylPREDNISolone (MEDROL DOSEPAK) 4 MG tablet therapy pack Follow Package Directions (Patient not taking: Reported on 9/3/2024) 21 tablet 0    montelukast (SINGULAIR) 10 MG tablet Take 1 tablet (10 mg) by mouth every morning 90 tablet 3    omeprazole (PRILOSEC) 20 MG DR capsule Take 1 capsule (20 mg) by mouth daily. 60 capsule 0    tiotropium (SPIRIVA RESPIMAT) 2.5 MCG/ACT inhaler Inhale 2 puffs into the lungs daily 4 g 5          Allergies   Allergen Reactions    Adacel  [Tetanus-Diphth-Acell Pertussis] Swelling    Adhesive Tape Hives    Ciprofloxacin      Causes visual hallucinations.    Codeine Sulfate Nausea and Vomiting    Nicoderm [Nicotine]      Patch and Gum, pt reported allergic reaction 6/24    Oxycodone-Acetaminophen Nausea and Vomiting    Tegaderm Transparent Dressing (Informational Only) Hives    Tetanus Toxoid      Pt states she had an infection at injection site.     Vicodin [Hydrocodone-Acetaminophen] Nausea and Vomiting    Methimazole Rash       Social History     Socioeconomic History    Marital status:      Spouse name: Not on file    Number of children: Not on file    Years of education: Not on file    Highest education level: Not on file   Occupational History    Not on file   Tobacco Use    Smoking status: Every Day     Current packs/day: 0.50     Average packs/day: 0.3 packs/day for 19.7 years (5.4 ttl pk-yrs)     Types: Cigarettes     Start date: 1/21/2004     Last attempt to quit: 1/25/2022     Passive exposure: Current    Smokeless tobacco: Never    Tobacco comments:     Stopped smoking for both pregnamcies and breastfeeding of both children     Working on quitting smoking (6/27/23)   Vaping Use    Vaping status: Former    Quit date: 11/28/2023    Substances: Nicotine, Flavoring    Devices: Disposable   Substance and Sexual Activity    Alcohol use: Yes     Alcohol/week: 4.0 standard drinks of alcohol     Types: 4 Drinks containing 0.5 oz of alcohol per week    Drug use: Not Currently     Types: Marijuana, Psilocybin     Comment: medical marijuana    Sexual activity: Yes     Partners: Male     Birth control/protection: Female Surgical, None   Other Topics Concern    Parent/sibling w/ CABG, MI or angioplasty before 65F 55M? No   Social History Narrative    Caffeine intake/servings daily - 1    Calcium intake/servings daily - 3    Exercise 5 times weekly - describe ; encouraged walking daily    Sunscreen used - Yes    Seatbelts used - Yes    Guns stored  in the home - No    Self Breast Exam - Yes    Pap test up to date -  No    Eye exam up to date -  No    Dental exam up to date -  No    DEXA scan up to date -  No    Flex Sig/Colonoscopy up to date -  No    Mammography up to date -  No    Immunizations reviewed and up to date - Yes    Abuse: Current or Past (Physical, Sexual or Emotional) - No    Do you feel safe in your environment - Yes    Do you cope well with stress - Yes    Do you suffer from insomnia - No             Social Determinants of Health     Financial Resource Strain: Low Risk  (1/21/2024)    Financial Resource Strain     Within the past 12 months, have you or your family members you live with been unable to get utilities (heat, electricity) when it was really needed?: No   Food Insecurity: Low Risk  (1/21/2024)    Food Insecurity     Within the past 12 months, did you worry that your food would run out before you got money to buy more?: No     Within the past 12 months, did the food you bought just not last and you didn t have money to get more?: No   Transportation Needs: Low Risk  (1/21/2024)    Transportation Needs     Within the past 12 months, has lack of transportation kept you from medical appointments, getting your medicines, non-medical meetings or appointments, work, or from getting things that you need?: No   Physical Activity: Not on file   Stress: Not on file   Social Connections: Not on file   Interpersonal Safety: Low Risk  (6/11/2024)    Interpersonal Safety     Do you feel physically and emotionally safe where you currently live?: Yes     Within the past 12 months, have you been hit, slapped, kicked or otherwise physically hurt by someone?: No     Within the past 12 months, have you been humiliated or emotionally abused in other ways by your partner or ex-partner?: No   Housing Stability: Low Risk  (1/21/2024)    Housing Stability     Do you have housing? : Yes     Are you worried about losing your housing?: No       Family History  "  Problem Relation Age of Onset    Other Cancer Mother 62        salivary gland cancer    Genitourinary Problems Mother     Bipolar Disorder Mother         unipolar depression    Depression Mother     Thyroid Disease Mother         benign tumor    Melanoma Mother     Cancer Mother         salivary gland cancer    Migraines Mother     Skin Cancer Mother     Asthma Father     Mental Illness Father         Bipolar 2    Obesity Father     Asthma Maternal Grandmother     Osteoporosis Maternal Grandmother     Glaucoma Maternal Grandmother     Hypertension Maternal Grandmother     Macular Degeneration Maternal Grandmother     Skin Cancer Maternal Grandmother     Prostate Cancer Maternal Grandfather 69        no history of smoking    Bladder Cancer Maternal Grandfather 71    Colon Cancer Maternal Grandfather 70    Diabetes Paternal Grandfather     Bipolar Disorder Brother         unipolar depression    Asthma Brother     Depression Brother     Breast Cancer Maternal Aunt 37        negative BRCA1/2 testing    Melanoma Maternal Aunt 64    Thyroid Disease Paternal Aunt     Breast Cancer Paternal Aunt 58        bilateral; negative testing    Cancer Paternal Aunt     Breast Cancer Other         paternal great aunt         PHYSICAL EXAM:  BP (!) 160/103   Pulse 89   Temp 98.6  F (37  C) (Oral)   Resp 16   Ht 1.727 m (5' 8\")   Wt 110.7 kg (244 lb)   LMP 09/08/2024   SpO2 95%   BMI 37.10 kg/m    General: resting in ED bed, no acute distress  HEENT: HB I/VI bilaterally. Neck soft and flat. EOMI without nystagmus. Right EAC clear, TM intact, middle ear clear. Left EAC clear, TM intact with slight increased vascularity over the malleus. Serous effusion likely present. Guzman lateralizes to the left. AC > BC bilaterally.   Respiratory: Breathing non-labored on RA. Mild wheeze present         ROUTINE IP LABS (Last four results)  BMP  Recent Labs   Lab 09/18/24  0057      POTASSIUM 3.9   CHLORIDE 102   RUPALI 8.9   CO2 26   BUN " 12.2   CR 0.87   *     CBC  Recent Labs   Lab 09/18/24  0057   WBC 9.1   RBC 4.51   HGB 13.2   HCT 39.7   MCV 88   MCH 29.3   MCHC 33.2   RDW 13.3        INRNo lab results found in last 7 days.    Imaging:   MRI BRAIN WITHOUT AND WITH CONTRAST  8/5/2024 7:52 AM      FINDINGS: Redemonstrated postsurgical changes of occipital craniotomy for prior pineal mass resection. Minimal FLAIR hyperintense signal in the superior cerebellum is unchanged and likely postoperative. No abnormal nodular or masslike enhancement in the region of the resected mass. There is no evidence of acute infarct, hemorrhage, or herniation. The brain parenchyma, ventricles and subarachnoid spaces appear normal.      There is no abnormal intracranial enhancement.      Paranasal sinuses are clear. Left greater than right mastoid fluid. The major arterial T2 flow voids at the base of the brain appear patent.                                                                    IMPRESSION:  Stable postsurgical changes without evidence of residual or recurrent pineal mass.        Assessment and Plan  Cathy Arroyo is a 43 year old female presenting to the ED with concerns for sudden left sided hearing loss in the setting of recently treated adenovirus and mycoplasma pneumonia URI. Fork exam lateralized to the left, consistent with fluid in the middle ear, and exam demonstrates likely serous effusion. Her hearing loss is likely conductive in nature due to fluid in the middle ear following URI, however, given sudden change in hearing today several weeks out from onset of URI, there is greater concern for sudden sensorineural hearing loss. I discussed need for urgent audiogram as well as initiation of treatment to have the best outcome if this is indeed true SSNHL. I discussed the option of beginning high dose PO steroids as one treatment option and patient became tearful, explaining to me that steroids severely impact her bipolar disorder. I  suggested an alternative approach would be to obtain the audiogram tomorrow, and if the results were consistent with SNHL, she could proceed with transtympanic steroid injections. Patient much preferred the latter suggestion, and we therefore opted to forgo empiric PO steroids.      - no acute surgical intervention indicated  - urgent audiogram for hearing loss evaluation (referral placed by ED, message sent to schedulers by myself)   - defer PO steroids at this time   - okay to DC from ENT perspective    To be staffed with Dr. Evans in the AM     Emiliana Hoyt MD  Otolaryngology-Head & Neck Surgery, PGY-3  Please contact ENT with questions by dialing [phone] and entering job code [phone] when prompted.

## 2024-09-18 NOTE — ED TRIAGE NOTES
Triage Assessment (Adult)       Row Name 09/17/24 1790          Triage Assessment    Airway WDL WDL        Respiratory WDL    Respiratory WDL WDL        Skin Circulation/Temperature WDL    Skin Circulation/Temperature WDL WDL

## 2024-09-19 ENCOUNTER — OFFICE VISIT (OUTPATIENT)
Dept: AUDIOLOGY | Facility: CLINIC | Age: 44
End: 2024-09-19
Payer: COMMERCIAL

## 2024-09-19 ENCOUNTER — OFFICE VISIT (OUTPATIENT)
Dept: FAMILY MEDICINE | Facility: CLINIC | Age: 44
End: 2024-09-19
Payer: COMMERCIAL

## 2024-09-19 ENCOUNTER — MYC MEDICAL ADVICE (OUTPATIENT)
Dept: FAMILY MEDICINE | Facility: CLINIC | Age: 44
End: 2024-09-19

## 2024-09-19 VITALS
BODY MASS INDEX: 36.8 KG/M2 | WEIGHT: 242 LBS | HEART RATE: 102 BPM | OXYGEN SATURATION: 96 % | DIASTOLIC BLOOD PRESSURE: 77 MMHG | SYSTOLIC BLOOD PRESSURE: 113 MMHG | RESPIRATION RATE: 17 BRPM | TEMPERATURE: 98.1 F

## 2024-09-19 DIAGNOSIS — J30.2 SEASONAL ALLERGIC RHINITIS, UNSPECIFIED TRIGGER: ICD-10-CM

## 2024-09-19 DIAGNOSIS — R05.8 POST-VIRAL COUGH SYNDROME: ICD-10-CM

## 2024-09-19 DIAGNOSIS — J45.40 MODERATE PERSISTENT ASTHMA WITHOUT COMPLICATION: ICD-10-CM

## 2024-09-19 DIAGNOSIS — Z87.01 HISTORY OF PNEUMONIA: ICD-10-CM

## 2024-09-19 DIAGNOSIS — H93.12 TINNITUS, LEFT: ICD-10-CM

## 2024-09-19 DIAGNOSIS — Z87.01 HISTORY OF PNEUMONIA: Primary | ICD-10-CM

## 2024-09-19 DIAGNOSIS — H90.3 ASYMMETRICAL SENSORINEURAL HEARING LOSS: Primary | ICD-10-CM

## 2024-09-19 DIAGNOSIS — H90.12 CONDUCTIVE HEARING LOSS OF LEFT EAR WITH UNRESTRICTED HEARING OF RIGHT EAR: Primary | ICD-10-CM

## 2024-09-19 DIAGNOSIS — R42 DIZZINESS: ICD-10-CM

## 2024-09-19 PROCEDURE — 99214 OFFICE O/P EST MOD 30 MIN: CPT | Performed by: NURSE PRACTITIONER

## 2024-09-19 PROCEDURE — 92557 COMPREHENSIVE HEARING TEST: CPT | Performed by: AUDIOLOGIST-HEARING AID FITTER

## 2024-09-19 PROCEDURE — 92565 STENGER TEST PURE TONE: CPT | Performed by: AUDIOLOGIST-HEARING AID FITTER

## 2024-09-19 PROCEDURE — 92550 TYMPANOMETRY & REFLEX THRESH: CPT | Performed by: AUDIOLOGIST-HEARING AID FITTER

## 2024-09-19 PROCEDURE — G2211 COMPLEX E/M VISIT ADD ON: HCPCS | Performed by: NURSE PRACTITIONER

## 2024-09-19 RX ORDER — FLUTICASONE PROPIONATE 50 MCG
1 SPRAY, SUSPENSION (ML) NASAL DAILY
Qty: 16 G | Refills: 11 | Status: SHIPPED | OUTPATIENT
Start: 2024-09-19

## 2024-09-19 RX ORDER — GUAIFENESIN 600 MG/1
1200 TABLET, EXTENDED RELEASE ORAL 2 TIMES DAILY
Qty: 30 TABLET | Refills: 0 | Status: SHIPPED | OUTPATIENT
Start: 2024-09-19 | End: 2024-10-01

## 2024-09-19 ASSESSMENT — PAIN SCALES - GENERAL: PAINLEVEL: NO PAIN (0)

## 2024-09-19 NOTE — PROGRESS NOTES
AUDIOLOGY REPORT    SUMMARY: Audiology visit completed. See audiogram for results.      RECOMMENDATIONS: Follow-up with ENT.  Results were handed off to Rocio Tyler PA-C in person, who indicated she would be scheduling the patient in one of her LUCILA slots.      Xi Colbert, CCC-A, [facility]  Licensed Audiologist  [place] #[id number]    Cc Sherrill POWER CNP

## 2024-09-19 NOTE — Clinical Note
Cathy Sewell wanted me to send you her hearing test results.  Left ear has mild to moderate conductive hearing loss with abnormal left tympanogram (shallow with negative pressure).  She said you were going to use this information to decide on her breathing meds.   Please let me know if questions.   Xi Colbert, CCC-A, [facility] Licensed Audiologist [place] #[id number]

## 2024-09-19 NOTE — PROGRESS NOTES
Assessment & Plan     Asymmetrical sensorineural hearing loss  Hearing screening in clinic today demonstrates left side alteration. No clear AOM on left side. Patient does have formal audiogram today, but I can't see that this includes or a separate appt is arranged for ENT MD consult. Patient very clear that she wants to consider oral steroids if indicated and have potential to regain hearing  - Adult ENT  Referral; Future    History of pneumonia  I'd likely recommend oral steroids at this point. Will defer prescribing at this time to see if this is recommended for her hearing loss as that dosing would likely also benefit breathing. If not prescribed for hearing loss, we discussed medrol dosepak instead.  - guaiFENesin (MUCINEX) 600 MG 12 hr tablet; Take 2 tablets (1,200 mg) by mouth 2 times daily.    Post-viral cough syndrome  - guaiFENesin (MUCINEX) 600 MG 12 hr tablet; Take 2 tablets (1,200 mg) by mouth 2 times daily.    Dizziness  Concern for Meniere's with hearing loss, dizziness and tinnitus  - Adult ENT  Referral; Future    Tinnitus, left  - Adult ENT  Referral; Future    Seasonal allergic rhinitis, unspecified trigger  - fluticasone (FLONASE) 50 MCG/ACT nasal spray; Spray 1 spray into both nostrils daily.          The longitudinal plan of care for the diagnosis(es)/condition(s) as documented were addressed during this visit. Due to the added complexity in care, I will continue to support Cathy in the subsequent management and with ongoing continuity of care.Ordering of each unique test  Prescription drug management  I spent a total of 33 minutes on the day of the visit.   Time spent by me doing chart review, history and exam, documentation and further activities per the note    Subjective   Cathy is a 43 year old, presenting for the following health issues:  Ear Fullness (PT was in the ER and wants an Emergency referral to ENT, hearing loss in Left Ear, also wants to discuss  "cough from ER Visit  )      9/19/2024    10:21 AM   Additional Questions   Roomed by Ed SANCHEZ     History of Present Illness       Reason for visit:  Ear discomfort & hearing loss in left ear  Symptom onset:  1-3 days ago  Symptoms include:  Cannot hear well out of left ear, low sounds I can hear better but it hurt my ear  Symptom intensity:  Severe  Symptom progression:  Worsening  Had these symptoms before:  No  What makes it worse:  Low noises, the reverberation of my own voice when speaking causes pain  What makes it better:  No   She is taking medications regularly.     Sudden change in hearing that is progressively worsening.   Reports dizziness and unstable gait. Experiencing low hum or sound akin to \"holding up a seashell to the ear\"  Wondering about Menieres.   ENT saw Cathy in the ED yesterday and suspected that this is sensorineural hearing loss  Has audiogram scheduled for today  Typically avoids steroids, but wants to aggressively treat to regain hearing.      Review of Systems  Constitutional, HEENT, cardiovascular, pulmonary, gi and gu systems are negative, except as otherwise noted.      Objective    /77 (BP Location: Right arm, Patient Position: Sitting, Cuff Size: Adult Large)   Pulse 102   Temp 98.1  F (36.7  C) (Temporal)   Resp 17   Wt 109.8 kg (242 lb)   LMP 09/08/2024 (Exact Date)   SpO2 96%   BMI 36.80 kg/m    Body mass index is 36.8 kg/m .  Physical Exam   GENERAL: alert and no distress  NECK: no adenopathy, no asymmetry, masses, or scars  RESP: expiratory wheezes throughout  CV: regular rate and rhythm, normal S1 S2, no S3 or S4, no murmur, click or rub, no peripheral edema   PSYCH: mentation appears normal, affect normal/bright  PSYCH: mentation appears normal, affect normal/bright, tearful, and anxious    Hearing screening -      1000 Hz on Level 40 dB (Conditioning sound) -- Pass   1000 Hz on Level 20 dB -- Pass   2000 Hz on Level 20 dB -- Pass   4000 Hz on Level 20 dB -- " Pass   LEFT EAR     4000 Hz on Level 20 dB -- [id number] Hz on Level 20 dB. REFER. Data is abnormal. Taken on 9/19/24 1106   2000 Hz on Level 20 dB -- [id number] Hz on Level 20 dB. REFER. Data is abnormal. Taken on 9/19/24 1106   1000 Hz on Level 20 dB -- [id number] Hz on Level 20 dB. REFER. Data is abnormal. Taken on 9/19/24 1106   500 Hz on Level 25 dB -- [id number] Hz on Level 25 dB. REFER. Data is abnormal. Taken on 9/19/24 1106   RIGHT EAR     500 Hz on Level 25 dB -- Pass   Results     Hearing Screen Results -- RESCREENHearing Screen Results. RESCREEN. Data is abnormal. Taken on 9/19/24 1106   Hearing Screen Results- Second Attempt -- REFER                 Signed Electronically by: JANNET Liu CNP

## 2024-09-19 NOTE — TELEPHONE ENCOUNTER
FUTURE VISIT INFORMATION      FUTURE VISIT INFORMATION:  Date: 10/14/24  Time: 8 AM  Location: [facility] - ENT  REFERRAL INFORMATION:  Referring provider:    Referring providers clinic:    Reason for visit/diagnosis:  Left conductive hearing loss and abnormal left tymp.  Audio done 9/19/24. OK per Rocio     RECORDS REQUESTED FROM      Clinic name Comments Records Status Imaging Status    PRIMARY CARE  9/19/24 OV/ referral -Sherrill Dasilva, APRN CNP  [facility] 9/19/24 OV with Yue Douglas AuD   9/19/24, 5/2/23 audiogram [facility] ENT 5/2/23 OV with Nissen, Rick L, MD  [facility]    [facility] 7/23/24 CT head  3/14/23 MR brain Epic PACS

## 2024-09-20 ENCOUNTER — APPOINTMENT (OUTPATIENT)
Dept: ULTRASOUND IMAGING | Facility: CLINIC | Age: 44
End: 2024-09-20
Attending: PHYSICIAN ASSISTANT
Payer: COMMERCIAL

## 2024-09-20 ENCOUNTER — HOSPITAL ENCOUNTER (INPATIENT)
Facility: CLINIC | Age: 44
LOS: 3 days | Discharge: HOME | End: 2024-09-23
Attending: EMERGENCY MEDICINE | Admitting: PEDIATRICS
Payer: COMMERCIAL

## 2024-09-20 ENCOUNTER — APPOINTMENT (OUTPATIENT)
Dept: CT IMAGING | Facility: CLINIC | Age: 44
End: 2024-09-20
Attending: PHYSICIAN ASSISTANT
Payer: COMMERCIAL

## 2024-09-20 DIAGNOSIS — R00.0 TACHYCARDIA: ICD-10-CM

## 2024-09-20 DIAGNOSIS — B37.31 YEAST INFECTION OF THE VAGINA: ICD-10-CM

## 2024-09-20 DIAGNOSIS — I26.99 OTHER PULMONARY EMBOLISM WITHOUT ACUTE COR PULMONALE, UNSPECIFIED CHRONICITY (H): ICD-10-CM

## 2024-09-20 DIAGNOSIS — H66.93 BILATERAL OTITIS MEDIA: ICD-10-CM

## 2024-09-20 DIAGNOSIS — I26.99 ACUTE PULMONARY EMBOLISM WITHOUT ACUTE COR PULMONALE, UNSPECIFIED PULMONARY EMBOLISM TYPE (H): Primary | ICD-10-CM

## 2024-09-20 DIAGNOSIS — J18.0 BRONCHOPNEUMONIA: ICD-10-CM

## 2024-09-20 DIAGNOSIS — H66.93 BILATERAL OTITIS MEDIA, UNSPECIFIED OTITIS MEDIA TYPE: ICD-10-CM

## 2024-09-20 DIAGNOSIS — I26.99 PULMONARY EMBOLISM (H): ICD-10-CM

## 2024-09-20 LAB
ALBUMIN SERPL BCG-MCNC: 4 G/DL (ref 3.5–5.2)
ALBUMIN UR-MCNC: NEGATIVE MG/DL
ALP SERPL-CCNC: 84 U/L (ref 40–150)
ALT SERPL W P-5'-P-CCNC: 14 U/L (ref 0–50)
ANION GAP SERPL CALCULATED.3IONS-SCNC: 11 MMOL/L (ref 7–15)
APPEARANCE UR: CLEAR
AST SERPL W P-5'-P-CCNC: 14 U/L (ref 0–45)
ATRIAL RATE - MUSE: 97 BPM
BACTERIA #/AREA URNS HPF: ABNORMAL /HPF
BACTERIA SPT CULT: NORMAL
BASOPHILS # BLD AUTO: 0 10E3/UL (ref 0–0.2)
BASOPHILS NFR BLD AUTO: 0 %
BILIRUB SERPL-MCNC: 0.3 MG/DL
BILIRUB UR QL STRIP: NEGATIVE
BUN SERPL-MCNC: 10.2 MG/DL (ref 6–20)
C PNEUM DNA SPEC QL NAA+PROBE: NOT DETECTED
CALCIUM SERPL-MCNC: 9.1 MG/DL (ref 8.8–10.4)
CHLORIDE SERPL-SCNC: 102 MMOL/L (ref 98–107)
COLOR UR AUTO: ABNORMAL
CREAT SERPL-MCNC: 0.69 MG/DL (ref 0.51–0.95)
DIASTOLIC BLOOD PRESSURE - MUSE: NORMAL MMHG
EGFRCR SERPLBLD CKD-EPI 2021: >90 ML/MIN/1.73M2
EOSINOPHIL # BLD AUTO: 0.2 10E3/UL (ref 0–0.7)
EOSINOPHIL NFR BLD AUTO: 2 %
ERYTHROCYTE [DISTWIDTH] IN BLOOD BY AUTOMATED COUNT: 13.2 % (ref 10–15)
FLUAV H1 2009 PAND RNA SPEC QL NAA+PROBE: NOT DETECTED
FLUAV H1 RNA SPEC QL NAA+PROBE: NOT DETECTED
FLUAV H3 RNA SPEC QL NAA+PROBE: NOT DETECTED
FLUAV RNA SPEC QL NAA+PROBE: NEGATIVE
FLUAV RNA SPEC QL NAA+PROBE: NOT DETECTED
FLUBV RNA RESP QL NAA+PROBE: NEGATIVE
FLUBV RNA SPEC QL NAA+PROBE: NOT DETECTED
GLUCOSE SERPL-MCNC: 99 MG/DL (ref 70–99)
GLUCOSE UR STRIP-MCNC: NEGATIVE MG/DL
GRAM STAIN RESULT: NORMAL
HADV DNA SPEC QL NAA+PROBE: NOT DETECTED
HCG SERPL QL: NEGATIVE
HCO3 SERPL-SCNC: 23 MMOL/L (ref 22–29)
HCOV PNL SPEC NAA+PROBE: NOT DETECTED
HCT VFR BLD AUTO: 38.1 % (ref 35–47)
HGB BLD-MCNC: 12.8 G/DL (ref 11.7–15.7)
HGB UR QL STRIP: NEGATIVE
HMPV RNA SPEC QL NAA+PROBE: NOT DETECTED
HPIV1 RNA SPEC QL NAA+PROBE: NOT DETECTED
HPIV2 RNA SPEC QL NAA+PROBE: NOT DETECTED
HPIV3 RNA SPEC QL NAA+PROBE: NOT DETECTED
HPIV4 RNA SPEC QL NAA+PROBE: NOT DETECTED
IMM GRANULOCYTES # BLD: 0 10E3/UL
IMM GRANULOCYTES NFR BLD: 0 %
INTERPRETATION ECG - MUSE: NORMAL
KETONES UR STRIP-MCNC: NEGATIVE MG/DL
L PNEUMO1 AG UR QL IA: NEGATIVE
LEUKOCYTE ESTERASE UR QL STRIP: NEGATIVE
LYMPHOCYTES # BLD AUTO: 1.7 10E3/UL (ref 0.8–5.3)
LYMPHOCYTES NFR BLD AUTO: 14 %
M PNEUMO DNA SPEC QL NAA+PROBE: NOT DETECTED
MCH RBC QN AUTO: 29.3 PG (ref 26.5–33)
MCHC RBC AUTO-ENTMCNC: 33.6 G/DL (ref 31.5–36.5)
MCV RBC AUTO: 87 FL (ref 78–100)
MONOCYTES # BLD AUTO: 0.9 10E3/UL (ref 0–1.3)
MONOCYTES NFR BLD AUTO: 8 %
MRSA DNA SPEC QL NAA+PROBE: NEGATIVE
MUCOUS THREADS #/AREA URNS LPF: PRESENT /LPF
NEUTROPHILS # BLD AUTO: 9.1 10E3/UL (ref 1.6–8.3)
NEUTROPHILS NFR BLD AUTO: 76 %
NITRATE UR QL: NEGATIVE
NRBC # BLD AUTO: 0 10E3/UL
NRBC BLD AUTO-RTO: 0 /100
NT-PROBNP SERPL-MCNC: 67 PG/ML (ref 0–450)
P AXIS - MUSE: 59 DEGREES
PH UR STRIP: 5.5 [PH] (ref 5–7)
PLATELET # BLD AUTO: 324 10E3/UL (ref 150–450)
POTASSIUM SERPL-SCNC: 4.2 MMOL/L (ref 3.4–5.3)
PR INTERVAL - MUSE: 160 MS
PROT SERPL-MCNC: 6.9 G/DL (ref 6.4–8.3)
QRS DURATION - MUSE: 88 MS
QT - MUSE: 332 MS
QTC - MUSE: 421 MS
R AXIS - MUSE: 67 DEGREES
RBC # BLD AUTO: 4.37 10E6/UL (ref 3.8–5.2)
RBC URINE: 1 /HPF
RSV RNA SPEC NAA+PROBE: NEGATIVE
RSV RNA SPEC QL NAA+PROBE: NOT DETECTED
RSV RNA SPEC QL NAA+PROBE: NOT DETECTED
RV+EV RNA SPEC QL NAA+PROBE: NOT DETECTED
S PNEUM AG SPEC QL: NEGATIVE
SA TARGET DNA: NEGATIVE
SARS-COV-2 RNA RESP QL NAA+PROBE: NEGATIVE
SODIUM SERPL-SCNC: 136 MMOL/L (ref 135–145)
SP GR UR STRIP: 1.01 (ref 1–1.03)
SPECIMEN TYPE: NORMAL
SQUAMOUS EPITHELIAL: 1 /HPF
SYSTOLIC BLOOD PRESSURE - MUSE: NORMAL MMHG
T AXIS - MUSE: 268 DEGREES
TROPONIN T SERPL HS-MCNC: <6 NG/L
UROBILINOGEN UR STRIP-MCNC: NORMAL MG/DL
VENTRICULAR RATE- MUSE: 97 BPM
WBC # BLD AUTO: 12 10E3/UL (ref 4–11)
WBC URINE: 10 /HPF

## 2024-09-20 PROCEDURE — 87637 SARSCOV2&INF A&B&RSV AMP PRB: CPT | Performed by: PHYSICIAN ASSISTANT

## 2024-09-20 PROCEDURE — 85025 COMPLETE CBC W/AUTO DIFF WBC: CPT | Performed by: PHYSICIAN ASSISTANT

## 2024-09-20 PROCEDURE — 83880 ASSAY OF NATRIURETIC PEPTIDE: CPT | Performed by: PHYSICIAN ASSISTANT

## 2024-09-20 PROCEDURE — 94640 AIRWAY INHALATION TREATMENT: CPT

## 2024-09-20 PROCEDURE — 93010 ELECTROCARDIOGRAM REPORT: CPT | Performed by: EMERGENCY MEDICINE

## 2024-09-20 PROCEDURE — 250N000013 HC RX MED GY IP 250 OP 250 PS 637: Performed by: PHYSICIAN ASSISTANT

## 2024-09-20 PROCEDURE — 36415 COLL VENOUS BLD VENIPUNCTURE: CPT | Performed by: PHYSICIAN ASSISTANT

## 2024-09-20 PROCEDURE — 87205 SMEAR GRAM STAIN: CPT | Performed by: PHYSICIAN ASSISTANT

## 2024-09-20 PROCEDURE — 71275 CT ANGIOGRAPHY CHEST: CPT

## 2024-09-20 PROCEDURE — 250N000013 HC RX MED GY IP 250 OP 250 PS 637: Performed by: EMERGENCY MEDICINE

## 2024-09-20 PROCEDURE — 87899 AGENT NOS ASSAY W/OPTIC: CPT | Performed by: PHYSICIAN ASSISTANT

## 2024-09-20 PROCEDURE — 250N000013 HC RX MED GY IP 250 OP 250 PS 637: Performed by: INTERNAL MEDICINE

## 2024-09-20 PROCEDURE — 250N000009 HC RX 250: Performed by: PHYSICIAN ASSISTANT

## 2024-09-20 PROCEDURE — 99285 EMERGENCY DEPT VISIT HI MDM: CPT | Mod: FS | Performed by: EMERGENCY MEDICINE

## 2024-09-20 PROCEDURE — 99207 PR APP CREDIT; MD BILLING SHARED VISIT: CPT | Performed by: INTERNAL MEDICINE

## 2024-09-20 PROCEDURE — 84484 ASSAY OF TROPONIN QUANT: CPT | Performed by: PHYSICIAN ASSISTANT

## 2024-09-20 PROCEDURE — 99418 PROLNG IP/OBS E/M EA 15 MIN: CPT | Mod: FS | Performed by: PHYSICIAN ASSISTANT

## 2024-09-20 PROCEDURE — 250N000011 HC RX IP 250 OP 636: Performed by: PHYSICIAN ASSISTANT

## 2024-09-20 PROCEDURE — 84703 CHORIONIC GONADOTROPIN ASSAY: CPT | Performed by: PHYSICIAN ASSISTANT

## 2024-09-20 PROCEDURE — 93970 EXTREMITY STUDY: CPT

## 2024-09-20 PROCEDURE — 258N000003 HC RX IP 258 OP 636: Performed by: PHYSICIAN ASSISTANT

## 2024-09-20 PROCEDURE — 81003 URINALYSIS AUTO W/O SCOPE: CPT | Performed by: PHYSICIAN ASSISTANT

## 2024-09-20 PROCEDURE — 120N000002 HC R&B MED SURG/OB UMMC

## 2024-09-20 PROCEDURE — 80053 COMPREHEN METABOLIC PANEL: CPT | Performed by: PHYSICIAN ASSISTANT

## 2024-09-20 PROCEDURE — 99285 EMERGENCY DEPT VISIT HI MDM: CPT | Mod: 25 | Performed by: EMERGENCY MEDICINE

## 2024-09-20 PROCEDURE — 87641 MR-STAPH DNA AMP PROBE: CPT | Performed by: PHYSICIAN ASSISTANT

## 2024-09-20 PROCEDURE — 99223 1ST HOSP IP/OBS HIGH 75: CPT | Mod: FS | Performed by: PHYSICIAN ASSISTANT

## 2024-09-20 PROCEDURE — 93005 ELECTROCARDIOGRAM TRACING: CPT | Performed by: EMERGENCY MEDICINE

## 2024-09-20 PROCEDURE — 87040 BLOOD CULTURE FOR BACTERIA: CPT | Performed by: PHYSICIAN ASSISTANT

## 2024-09-20 PROCEDURE — 87486 CHLMYD PNEUM DNA AMP PROBE: CPT | Performed by: PHYSICIAN ASSISTANT

## 2024-09-20 RX ORDER — CETIRIZINE HYDROCHLORIDE 10 MG/1
10 TABLET ORAL EVERY OTHER DAY
Status: DISCONTINUED | OUTPATIENT
Start: 2024-09-21 | End: 2024-09-23 | Stop reason: HOSPADM

## 2024-09-20 RX ORDER — IBUPROFEN 600 MG/1
600 TABLET, FILM COATED ORAL ONCE
Status: COMPLETED | OUTPATIENT
Start: 2024-09-20 | End: 2024-09-20

## 2024-09-20 RX ORDER — BENZONATATE 100 MG/1
100 CAPSULE ORAL 3 TIMES DAILY
Status: DISCONTINUED | OUTPATIENT
Start: 2024-09-21 | End: 2024-09-23 | Stop reason: HOSPADM

## 2024-09-20 RX ORDER — IPRATROPIUM BROMIDE AND ALBUTEROL SULFATE 2.5; .5 MG/3ML; MG/3ML
3 SOLUTION RESPIRATORY (INHALATION) ONCE
Status: COMPLETED | OUTPATIENT
Start: 2024-09-20 | End: 2024-09-20

## 2024-09-20 RX ORDER — SODIUM CHLORIDE, SODIUM LACTATE, POTASSIUM CHLORIDE, CALCIUM CHLORIDE 600; 310; 30; 20 MG/100ML; MG/100ML; MG/100ML; MG/100ML
INJECTION, SOLUTION INTRAVENOUS CONTINUOUS
Status: DISCONTINUED | OUTPATIENT
Start: 2024-09-20 | End: 2024-09-22

## 2024-09-20 RX ORDER — MONTELUKAST SODIUM 10 MG/1
10 TABLET ORAL EVERY MORNING
Status: DISCONTINUED | OUTPATIENT
Start: 2024-09-21 | End: 2024-09-23 | Stop reason: HOSPADM

## 2024-09-20 RX ORDER — VANCOMYCIN HYDROCHLORIDE
1500 EVERY 12 HOURS
Status: DISCONTINUED | OUTPATIENT
Start: 2024-09-20 | End: 2024-09-21

## 2024-09-20 RX ORDER — ONDANSETRON 2 MG/ML
4 INJECTION INTRAMUSCULAR; INTRAVENOUS EVERY 6 HOURS PRN
Status: DISCONTINUED | OUTPATIENT
Start: 2024-09-20 | End: 2024-09-23 | Stop reason: HOSPADM

## 2024-09-20 RX ORDER — RIZATRIPTAN BENZOATE 10 MG/1
10 TABLET, ORALLY DISINTEGRATING ORAL
Status: DISCONTINUED | OUTPATIENT
Start: 2024-09-20 | End: 2024-09-23 | Stop reason: HOSPADM

## 2024-09-20 RX ORDER — CEFTRIAXONE 1 G/1
1 INJECTION, POWDER, FOR SOLUTION INTRAMUSCULAR; INTRAVENOUS ONCE
Status: COMPLETED | OUTPATIENT
Start: 2024-09-20 | End: 2024-09-20

## 2024-09-20 RX ORDER — RIZATRIPTAN BENZOATE 10 MG/1
10 TABLET, ORALLY DISINTEGRATING ORAL
COMMUNITY

## 2024-09-20 RX ORDER — ACETAMINOPHEN 650 MG/1
650 SUPPOSITORY RECTAL EVERY 4 HOURS PRN
Status: DISCONTINUED | OUTPATIENT
Start: 2024-09-20 | End: 2024-09-23 | Stop reason: HOSPADM

## 2024-09-20 RX ORDER — ONDANSETRON 4 MG/1
4 TABLET, ORALLY DISINTEGRATING ORAL EVERY 6 HOURS PRN
Status: DISCONTINUED | OUTPATIENT
Start: 2024-09-20 | End: 2024-09-23 | Stop reason: HOSPADM

## 2024-09-20 RX ORDER — ARIPIPRAZOLE 5 MG/1
5 TABLET ORAL DAILY
Status: DISCONTINUED | OUTPATIENT
Start: 2024-09-21 | End: 2024-09-23 | Stop reason: HOSPADM

## 2024-09-20 RX ORDER — FLUTICASONE FUROATE AND VILANTEROL 200; 25 UG/1; UG/1
1 POWDER RESPIRATORY (INHALATION) DAILY
Status: DISCONTINUED | OUTPATIENT
Start: 2024-09-20 | End: 2024-09-23 | Stop reason: HOSPADM

## 2024-09-20 RX ORDER — ALBUTEROL SULFATE 90 UG/1
2 AEROSOL, METERED RESPIRATORY (INHALATION)
Status: DISCONTINUED | OUTPATIENT
Start: 2024-09-20 | End: 2024-09-23 | Stop reason: HOSPADM

## 2024-09-20 RX ORDER — AZITHROMYCIN 500 MG/5ML
500 INJECTION, POWDER, LYOPHILIZED, FOR SOLUTION INTRAVENOUS ONCE
Status: COMPLETED | OUTPATIENT
Start: 2024-09-20 | End: 2024-09-20

## 2024-09-20 RX ORDER — CEFTRIAXONE 2 G/1
2 INJECTION, POWDER, FOR SOLUTION INTRAMUSCULAR; INTRAVENOUS EVERY 24 HOURS
Status: DISCONTINUED | OUTPATIENT
Start: 2024-09-20 | End: 2024-09-20

## 2024-09-20 RX ORDER — FLUTICASONE PROPIONATE 50 MCG
1 SPRAY, SUSPENSION (ML) NASAL DAILY
Status: DISCONTINUED | OUTPATIENT
Start: 2024-09-20 | End: 2024-09-23 | Stop reason: HOSPADM

## 2024-09-20 RX ORDER — ENOXAPARIN SODIUM 150 MG/ML
1 INJECTION SUBCUTANEOUS EVERY 12 HOURS
Status: DISCONTINUED | OUTPATIENT
Start: 2024-09-20 | End: 2024-09-22

## 2024-09-20 RX ORDER — AMOXICILLIN 250 MG
1 CAPSULE ORAL 2 TIMES DAILY PRN
Status: DISCONTINUED | OUTPATIENT
Start: 2024-09-20 | End: 2024-09-23 | Stop reason: HOSPADM

## 2024-09-20 RX ORDER — LEVOTHYROXINE SODIUM 175 UG/1
175 TABLET ORAL DAILY
Status: DISCONTINUED | OUTPATIENT
Start: 2024-09-21 | End: 2024-09-23 | Stop reason: HOSPADM

## 2024-09-20 RX ORDER — AZITHROMYCIN 250 MG/1
250 TABLET, FILM COATED ORAL DAILY
Status: DISCONTINUED | OUTPATIENT
Start: 2024-09-21 | End: 2024-09-23 | Stop reason: HOSPADM

## 2024-09-20 RX ORDER — ALBUTEROL SULFATE 90 UG/1
2 AEROSOL, METERED RESPIRATORY (INHALATION) EVERY 6 HOURS
Status: DISCONTINUED | OUTPATIENT
Start: 2024-09-20 | End: 2024-09-20

## 2024-09-20 RX ORDER — FEXOFENADINE HCL 60 MG/1
60 TABLET, FILM COATED ORAL EVERY OTHER DAY
Status: DISCONTINUED | OUTPATIENT
Start: 2024-09-22 | End: 2024-09-23 | Stop reason: HOSPADM

## 2024-09-20 RX ORDER — CEFTRIAXONE 2 G/1
2 INJECTION, POWDER, FOR SOLUTION INTRAMUSCULAR; INTRAVENOUS EVERY 24 HOURS
Status: DISCONTINUED | OUTPATIENT
Start: 2024-09-21 | End: 2024-09-20

## 2024-09-20 RX ORDER — ACETAMINOPHEN 325 MG/1
650 TABLET ORAL EVERY 4 HOURS PRN
Status: DISCONTINUED | OUTPATIENT
Start: 2024-09-20 | End: 2024-09-23 | Stop reason: HOSPADM

## 2024-09-20 RX ORDER — CALCIUM CARBONATE 500 MG/1
1000 TABLET, CHEWABLE ORAL 4 TIMES DAILY PRN
Status: DISCONTINUED | OUTPATIENT
Start: 2024-09-20 | End: 2024-09-23 | Stop reason: HOSPADM

## 2024-09-20 RX ORDER — IOPAMIDOL 755 MG/ML
100 INJECTION, SOLUTION INTRAVASCULAR ONCE
Status: COMPLETED | OUTPATIENT
Start: 2024-09-20 | End: 2024-09-20

## 2024-09-20 RX ORDER — DEXTROAMPHETAMINE SACCHARATE, AMPHETAMINE ASPARTATE MONOHYDRATE, DEXTROAMPHETAMINE SULFATE AND AMPHETAMINE SULFATE 7.5; 7.5; 7.5; 7.5 MG/1; MG/1; MG/1; MG/1
30 CAPSULE, EXTENDED RELEASE ORAL DAILY
COMMUNITY

## 2024-09-20 RX ORDER — BENZONATATE 100 MG/1
100 CAPSULE ORAL 3 TIMES DAILY PRN
Status: DISCONTINUED | OUTPATIENT
Start: 2024-09-20 | End: 2024-09-20

## 2024-09-20 RX ORDER — GUAIFENESIN 600 MG/1
1200 TABLET, EXTENDED RELEASE ORAL 2 TIMES DAILY
Status: DISCONTINUED | OUTPATIENT
Start: 2024-09-20 | End: 2024-09-20

## 2024-09-20 RX ORDER — DEXTROAMPHETAMINE SACCHARATE, AMPHETAMINE ASPARTATE, DEXTROAMPHETAMINE SULFATE AND AMPHETAMINE SULFATE 2.5; 2.5; 2.5; 2.5 MG/1; MG/1; MG/1; MG/1
10 TABLET ORAL DAILY
COMMUNITY

## 2024-09-20 RX ORDER — LIDOCAINE 40 MG/G
CREAM TOPICAL
Status: DISCONTINUED | OUTPATIENT
Start: 2024-09-20 | End: 2024-09-23 | Stop reason: HOSPADM

## 2024-09-20 RX ORDER — METHYLPREDNISOLONE 4 MG
TABLET, DOSE PACK ORAL
Qty: 21 TABLET | Refills: 0 | Status: SHIPPED | OUTPATIENT
Start: 2024-09-20 | End: 2024-10-01

## 2024-09-20 RX ORDER — DIAZEPAM 5 MG
5 TABLET ORAL PRN
COMMUNITY

## 2024-09-20 RX ORDER — AMOXICILLIN 250 MG
2 CAPSULE ORAL 2 TIMES DAILY PRN
Status: DISCONTINUED | OUTPATIENT
Start: 2024-09-20 | End: 2024-09-23 | Stop reason: HOSPADM

## 2024-09-20 RX ORDER — IPRATROPIUM BROMIDE AND ALBUTEROL SULFATE 2.5; .5 MG/3ML; MG/3ML
3 SOLUTION RESPIRATORY (INHALATION)
Status: DISCONTINUED | OUTPATIENT
Start: 2024-09-20 | End: 2024-09-23 | Stop reason: HOSPADM

## 2024-09-20 RX ORDER — AZITHROMYCIN 500 MG/5ML
500 INJECTION, POWDER, LYOPHILIZED, FOR SOLUTION INTRAVENOUS EVERY 24 HOURS
Status: DISCONTINUED | OUTPATIENT
Start: 2024-09-20 | End: 2024-09-20

## 2024-09-20 RX ORDER — DEXTROAMPHETAMINE SACCHARATE, AMPHETAMINE ASPARTATE MONOHYDRATE, DEXTROAMPHETAMINE SULFATE AND AMPHETAMINE SULFATE 3.75; 3.75; 3.75; 3.75 MG/1; MG/1; MG/1; MG/1
30 CAPSULE, EXTENDED RELEASE ORAL DAILY
Status: DISCONTINUED | OUTPATIENT
Start: 2024-09-21 | End: 2024-09-23 | Stop reason: HOSPADM

## 2024-09-20 RX ORDER — CEFEPIME HYDROCHLORIDE 2 G/1
2 INJECTION, POWDER, FOR SOLUTION INTRAVENOUS EVERY 8 HOURS
Status: DISCONTINUED | OUTPATIENT
Start: 2024-09-20 | End: 2024-09-23

## 2024-09-20 RX ADMIN — SODIUM CHLORIDE, POTASSIUM CHLORIDE, SODIUM LACTATE AND CALCIUM CHLORIDE: 600; 310; 30; 20 INJECTION, SOLUTION INTRAVENOUS at 20:53

## 2024-09-20 RX ADMIN — ENOXAPARIN SODIUM 105 MG: 120 INJECTION SUBCUTANEOUS at 15:41

## 2024-09-20 RX ADMIN — SODIUM CHLORIDE 90 ML: 9 INJECTION, SOLUTION INTRAVENOUS at 14:36

## 2024-09-20 RX ADMIN — VANCOMYCIN HYDROCHLORIDE 1500 MG: 10 INJECTION, POWDER, LYOPHILIZED, FOR SOLUTION INTRAVENOUS at 20:42

## 2024-09-20 RX ADMIN — UMECLIDINIUM 1 PUFF: 62.5 AEROSOL, POWDER ORAL at 20:04

## 2024-09-20 RX ADMIN — AZITHROMYCIN MONOHYDRATE 500 MG: 500 INJECTION, POWDER, LYOPHILIZED, FOR SOLUTION INTRAVENOUS at 17:02

## 2024-09-20 RX ADMIN — IPRATROPIUM BROMIDE AND ALBUTEROL SULFATE 3 ML: .5; 3 SOLUTION RESPIRATORY (INHALATION) at 14:16

## 2024-09-20 RX ADMIN — FLUTICASONE PROPIONATE 1 SPRAY: 50 SPRAY, METERED NASAL at 18:33

## 2024-09-20 RX ADMIN — FLUTICASONE FUROATE AND VILANTEROL TRIFENATATE 1 PUFF: 200; 25 POWDER RESPIRATORY (INHALATION) at 18:32

## 2024-09-20 RX ADMIN — IOPAMIDOL 74 ML: 755 INJECTION, SOLUTION INTRAVENOUS at 14:35

## 2024-09-20 RX ADMIN — IPRATROPIUM BROMIDE AND ALBUTEROL SULFATE 3 ML: .5; 3 SOLUTION RESPIRATORY (INHALATION) at 20:31

## 2024-09-20 RX ADMIN — CEFEPIME 2 G: 2 INJECTION, POWDER, FOR SOLUTION INTRAVENOUS at 19:48

## 2024-09-20 RX ADMIN — IBUPROFEN 600 MG: 600 TABLET, FILM COATED ORAL at 14:16

## 2024-09-20 RX ADMIN — CEFTRIAXONE SODIUM 1 G: 1 INJECTION, POWDER, FOR SOLUTION INTRAMUSCULAR; INTRAVENOUS at 15:42

## 2024-09-20 ASSESSMENT — COLUMBIA-SUICIDE SEVERITY RATING SCALE - C-SSRS
2. HAVE YOU ACTUALLY HAD ANY THOUGHTS OF KILLING YOURSELF IN THE PAST MONTH?: NO
1. IN THE PAST MONTH, HAVE YOU WISHED YOU WERE DEAD OR WISHED YOU COULD GO TO SLEEP AND NOT WAKE UP?: NO
6. HAVE YOU EVER DONE ANYTHING, STARTED TO DO ANYTHING, OR PREPARED TO DO ANYTHING TO END YOUR LIFE?: NO

## 2024-09-20 ASSESSMENT — ACTIVITIES OF DAILY LIVING (ADL)
ADLS_ACUITY_SCORE: 35
ADLS_ACUITY_SCORE: 33
ADLS_ACUITY_SCORE: 35

## 2024-09-20 NOTE — PROGRESS NOTES
Pt is going to inpatient [address] [place] [address], report is given to Jen swenson RN. Pt is aware of transfer and there is no concern .

## 2024-09-20 NOTE — TELEPHONE ENCOUNTER
Pt calling, per [facility], also requesting refill for methylprednisolone for her breathing stating she has been unable to function without it.    Pt states ENT told her they don't really do emergency type appointments and that instead to call PCP or go to UC so she is calling us for advisement. Advised that due to it being Friday and ENT not being able to get her in sooner that the best way for treating would be UC or ER. Pt is agreeable to plan and will call when out of ER to make follow-up appt.    Thanks!  Bipin ARITA RN   [facility]

## 2024-09-20 NOTE — PHARMACY-VANCOMYCIN DOSING SERVICE
Pharmacy Vancomycin Initial Note  Date of Service 2024  Patient's  1980  43 year old, female    Indication: Healthcare-Associated Pneumonia    Current estimated CrCl = Estimated Creatinine Clearance: 139.4 mL/min (based on SCr of 0.69 mg/dL).    Creatinine for last 3 days  2024: 12:57 AM Creatinine 0.87 mg/dL  2024:  1:51 PM Creatinine 0.69 mg/dL    Recent Vancomycin Level(s) for last 3 days  No results found for requested labs within last 3 days.      Vancomycin IV Administrations (past 72 hours)        No vancomycin orders with administrations in past 72 hours.                    Nephrotoxins and other renal medications (From now, onward)      Start     Dose/Rate Route Frequency Ordered Stop    24 1900  vancomycin (VANCOCIN) 1,500 mg in 0.9% NaCl 250 mL intermittent infusion         1,500 mg  166.7 mL/hr over 90 Minutes Intravenous EVERY 12 HOURS 24 1831              Contrast Orders - past 72 hours (72h ago, onward)      Start     Dose/Rate Route Frequency Stop    24 1420  iopamidol (ISOVUE-370) solution 100 mL         100 mL Intravenous ONCE 24 1435            [employer] Prediction of Planned Initial Vancomycin Regimen  Regimen: 1500 mg IV every 12 hours.  Start time: 18:27 on 2024  Exposure target: AUC24 (range)400-600 mg/L.hr   AUC24,ss: 544 mg/L.hr  Probability of AUC24 > 400: 81 %  Ctrough,ss: 17.1 mg/L  Probability of Ctrough,ss > 20: 37 %  Probability of nephrotoxicity (Lodise ZINA ): 13 %          Plan:  Start vancomycin  1500 mg IV q12h.   Vancomycin monitoring method: AUC  Vancomycin therapeutic monitoring goal: 400-600 mg*h/L  Pharmacy will check vancomycin levels as appropriate in 1-3 Days.    Serum creatinine levels will be ordered daily for the first week of therapy and at least twice weekly for subsequent weeks.      Jayant Bey, [facility]

## 2024-09-20 NOTE — H&P
[facility]    History and Physical - Hospitalist Service, GOLD TEAM        Date of Admission:  9/20/2024    Assessment & Plan   Cathy Arroyo is a 43 year old female admitted on 9/20/2024 for pneumonia, PE, and R ear pain. She has history of pineocytoma s/p craniotomy (2014), recently pneumonia and adenovirus, and recent L hearing loss.     # CAP vs HAP  Patient presents with SOB without hypoxia. She was seen in ED on 9/2/24 at that time with SOB and L ear pain. Found to have pneumonia due to mycoplasma (positive on sputum) and adenovirus. She was treated with one dose of dexamethasone in light of pneumonia exacerbating her asthma, as well as sent home on Augmentin and doxycycline. She all but finished the course but developed vomiting the last 2 days, thus she likely vomited up 1-2 days of the abx.  She does note interval improvement while on antibiotics of her symptoms, but that her symptoms returned after the course of treatment.  She then developed shortness of breath, persistent productive cough, and low-grade fevers at home.  -- CT chest PE suggestive of bronchopneumonia with multifocal areas of clustered pulm nodules with tree-in-bud nodularity. Likely reactive lymphadenopathy.   -- WBC 12   -- Covid / flu / RSV negative  -- given azithromycin + ceftriaxone by ED  Plan:   - broaden to cefepime + vanco given c/f HAP or resistant organism  - cont azithromycin (9/20-9/24)  - sputum culture  - legionella and strep pneumo testing  - resp panel  - MRSA nares  - tessalon perles PRN    # Bilateral small segmental pulmonary emboli  Presented with SOB without hypoxia as above. Other vitals stable. No recent travel, surgeries, periods of immobility. No OCPs. Does smoke, trying to cut down and currently at 7 cigarettes per day.  No chest pain.  -- CT PE confirms several small segmental pulmonary emboli seen along upper lobe branches of both pulmonary arteries.  No evidence  of right heart strain.   -- Negative BLE US for DVT.   -- normal trop  -- Hx of pineal tumor that was benign on path. High risk breast ca, with screening breast MRI in the last few months that was negative.   -- pulm nodules with smoking history as already noted.   Plan:   - Lovenox treatment dosing 1 mg/kg BID  - test claim request to pharmD for DOACs    # Asthma exacerbation  # Moderate persistent asthma  Likely r/t pneumonia as above.  Expiratory wheezes throughout all lung fields on exam.   - scheduled duoneb Q4H while awake  - PTA montelukast daily  - PTA inhaler subto Breo Ellipta daily  - PTA inhaler sub to incruse ellipta daily  - hold PTA scheduled albuterol Q6H   - PRN albuterol Q2H  - treatment of pneumonia as above    # Bilateral hearing impairment  # Recurrent otitis media as adult.   Patient states a few weeks ago started developing L ear pain and fullness, with impaired hearing. At that time did have URI symptoms with the above pneumonia symptoms. She was sent home on abx of Augmentin and doxy as above. She states her symptoms did not really go away and this past week she developed R ear pain, fullness, and impaired hearing.  On Tuesday she was at a coffee shop with friends and had difficulty understanding their conversation. Of note this recurrence of SOB and pneumonia is not associated with URI symptoms. Has also noted tinnitus and possible vertigo. Has history of recurrent ear infections as a child and several as an adult but no prior ear surgeries.  -- On my exam, able to converse appropriately and has functional hearing to bilateral ears. Tms do seem to be bulging with some erythema.   -- Saw PCP and audiologist OP on 9/19 whose evaluation results are right normal/borderline normal hearing and left mild to moderate conductive hearing loss. ENT referral was placed. Visit on 10/14/2024.   Plan:   - ENT consult  - broad spectrum abx as above  - monitor for any clinical worsening    # Hx pineal gland  "tumor resection 2014  # Acute on chronic headaches  # Hx migraines  Performed by Dr. Mckeon of [facility] via a supracerebellar infratentorial approach. Follows with NSG. Path benign. Had persistent HA and neck pain this summer. Imaging at the time demonstrated concerns that one of the screws had come loose. Saw NSG in July and they felt that hardware positioning was stable and unlikely acute headaches were related to hardware.   - cont PTA rizatriptan rescue med    # Pulmonary nodules and c/f lymphoma  As above, chest CT obtained for SOB. Read for \" multifocal areas of clustered pulmonary nodules with tree-in-bud nodularity, moderate bronchial wall thickening and mucous plugging.  Findings suggestive of bronchopneumonia.  Recommend clinical correlation and 36-month CT chest follow-up exam.  Also read for multiple enlarged mediastinal and hilar lymph nodes, likely reactive.  Lymphoproliferative disorder such as lymphoma is considered to be less likely but difficult to definitively exclude.  Recommend attention on follow-up exam.  - plan for repeat chest CT OP for follow-up    # Post surgical hypothyroidism  # Grave's s/p total thyroidectomy  - cont PTA levothyroxine    # Bipolar affective disorder  # Anxiety  # PTSD  At home on medical marijuana for PTSD.   - cont PTA abilify    # Tobacco dependence  Interested in cessation.  Has had difficulty due to stated allergy to nicotine products, and contraindications for Wellbutrin and Chantix with her bipolar disorder.  - cessation consult    # Dyslipidemia  # Obesity  # Hx sleep apnea    # High risk for breast cancer  Just had breast screening MRI this summer. Follows with onc.           Diet: Combination Diet Regular Diet Adult  DVT Prophylaxis: Enoxaparin (Lovenox) subcutaneous treatment dosing  Lee Catheter: Not present  Lines: None     Cardiac Monitoring: None  Code Status: Full Code    Clinically Significant Risk Factors Present on Admission                          # " "Obesity: Estimated body mass index is 36.8 kg/m  as calculated from the following:    Height as of this encounter: 1.727 m (5' 8\").    Weight as of this encounter: 109.8 kg (242 lb).       # Asthma: noted on problem list        Disposition Plan     Medically Ready for Discharge: Anticipated in 2-4 Days         The patient's care was discussed with the Attending Physician, Dr. Camacho and Patient.    Altagracia Goyal PA-C  Hospitalist Service, [facility]  Securely message with Stylr (more info)  Text page via [facility] Paging/Directory   See signed in provider for up to date coverage information    ______________________________________________________________________    Chief Complaint   SOB    History is obtained from the patient    History of Present Illness   Cathy Arroyo is a 43 year old female who is seen for a medical evaluation. Patient presents with SOB without hypoxia. She was seen in ED on 9/2/24 at that time with SOB and L ear pain. Found to have pneumonia due to mycoplasma (positive on sputum) and adenovirus. She was treated with one dose of dexamethasone in light of pneumonia exacerbating her asthma, as well as sent home on Augmentin and doxycycline. She all but finished the course but developed vomiting the last 2 days, thus she likely vomited up 1-2 days of the abx.  She does note interval improvement while on antibiotics of her symptoms, but that her symptoms returned after the course of treatment.  She then developed shortness of breath, persistent productive cough, and low-grade fevers at home. Patient also states a few weeks ago started developing L ear pain and fullness, with impaired hearing. At that time did have URI symptoms with the above pneumonia symptoms. She was sent home on abx of Augmentin and doxy as above. She states her symptoms did not really go away and this past week she developed R ear pain, fullness, and impaired hearing.  On " Tuesday she was at a coffee shop with friends and had difficulty understanding their conversation. Of note this recurrence of SOB and pneumonia is not associated with URI symptoms. Has also noted tinnitus and possible vertigo. Has history of recurrent ear infections as a child and several as an adult but no prior ear surgeries. Patient has not had many provoking factors for VTE besides smoking. She has had diarrhea x 2 days and occasional n/v recently. No abd pain.       Past Medical History    Past Medical History:   Diagnosis Date    Allergic rhinitis     Allergic state     Anxiety     Bipolar 1 disorder (H)     Depressive disorder     Elevated cholesterol     Graves disease     Hearing problem     Migraines 2019    have suffered from migraines since having brain surgery    Obese     BMI 37.48    Pineal tumor     s/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 tumor    Post partum depression     Post-surgical hypothyroidism 2017    Pre-eclampsia 10/14/2016    10/16/2018 - was induced for postdates and gestational hypertension with last pregnancy. No history of chronic hypertension. RX for aspirin sent and labs drawn at new OB visit.       Problem, psychiatric  diagnosed Bipolar    Recurrent otitis media chronic ear infections in childhood, recently had a double ear infection followed by another ear infection soon after    Sleep apnea     had a sleep study when pregnant with my second child    Uncomplicated asthma        Past Surgical History   Past Surgical History:   Procedure Laterality Date    BIOPSY  29 y/o colpos, and in the last 2 years for thyroid    Colposcopy, thyroid nodule biopsy twice    BLOOD PATCH N/A 10/11/2016    Procedure: EPIDURAL BLOOD PATCH;  Surgeon: GENERIC ANESTHESIA PROVIDER;  Location: [facility] OR     SECTION, TUBAL LIGATION, COMBINED N/A 2019    Procedure:  SECTION, WITH TUBAL LIGATION;  Surgeon: Kathy Rutledge MD;  Location: [facility] L+D    CRANIOTOMY, EXCISE TUMOR  COMPLEX, COMBINED  02/19/2014    Procedure: COMBINED CRANIOTOMY, EXCISE TUMOR COMPLEX;  Supracerabellar  Infratentorial Approach for Resection of Tumor ;  Surgeon: Kate Mckeon MD;  Location: [facility] OR    ENT SURGERY  2017    thyroidectomy    GYN SURGERY      colposcopy    THYROIDECTOMY N/A 05/03/2017    Procedure: THYROIDECTOMY;  Total Thyroidectomy;  Surgeon: Pamela Villasenor MD;  Location:  OR       Prior to Admission Medications   Prior to Admission Medications   Prescriptions Last Dose Informant Patient Reported? Taking?   ARIPiprazole (ABILIFY) 10 MG tablet 9/20/2024  Yes Yes   Sig: Take 0.5 tablets (5 mg) by mouth daily   Fexofenadine HCl (ALLEGRA PO) Unknown  Yes Yes   albuterol (PROAIR HFA/PROVENTIL HFA/VENTOLIN HFA) 108 (90 Base) MCG/ACT inhaler 9/20/2024  No Yes   Sig: Inhale 2 puffs into the lungs every 6 hours   amphetamine-dextroamphetamine (ADDERALL XR) 30 MG 24 hr capsule 9/20/2024  Yes Yes   Sig: Take 30 mg by mouth daily.   amphetamine-dextroamphetamine (ADDERALL) 10 MG tablet Not Taking  Yes No   Sig: Take 10 mg by mouth daily.   Patient not taking: Reported on 9/20/2024   cetirizine (ZYRTEC) 10 MG tablet 9/20/2024  No Yes   Sig: Take 1 tablet (10 mg) by mouth daily   diazepam (VALIUM) 5 MG tablet Past Month  Yes Yes   Sig: Take 5 mg by mouth as needed for anxiety.   fluticasone (FLONASE) 50 MCG/ACT nasal spray   No No   Sig: Spray 1 spray into both nostrils daily.   fluticasone-salmeterol (ADVAIR) 500-50 MCG/ACT inhaler 9/19/2024  No Yes   Sig: Inhale 1 puff into the lungs every 12 hours.   guaiFENesin (MUCINEX) 600 MG 12 hr tablet   No No   Sig: Take 2 tablets (1,200 mg) by mouth 2 times daily.   levothyroxine (SYNTHROID/LEVOTHROID) 175 MCG tablet 9/20/2024  No Yes   Sig: Take 1 tablet (175 mcg) by mouth daily   medical cannabis (Patient's own supply)   Yes No   Sig: Take 1 Dose by mouth See Admin Instructions (The purpose of this order is to document that the patient reports  taking medical cannabis.  This is not a prescription, and is not used to certify that the patient has a qualifying medical condition.)    Tangerine Oral Suspension Unflavored 1-5 ml up to two times daily.  Total THC = 240 mg, Total CBD Trace   medical cannabis (Patient's own supply)   Yes No   Sig: Take 1 Dose by mouth See Admin Instructions (The purpose of this order is to document that the patient reports taking medical cannabis.  This is not a prescription, and is not used to certify that the patient has a qualifying medical condition.)    Center oral suspension: take 1-3 ml by mouth two times daily  Total CBD 1200 mg. Total THC 60 mg   methylPREDNISolone (MEDROL DOSEPAK) 4 MG tablet therapy pack   No No   Sig: Follow Package Directions   montelukast (SINGULAIR) 10 MG tablet 9/20/2024  No Yes   Sig: Take 1 tablet (10 mg) by mouth every morning   omeprazole (PRILOSEC) 20 MG DR capsule   No No   Sig: Take 1 capsule (20 mg) by mouth daily.   rizatriptan (MAXALT-MLT) 10 MG ODT Unknown  Yes Yes   Sig: Take 10 mg by mouth at onset of headache for migraine.   tiotropium (SPIRIVA RESPIMAT) 2.5 MCG/ACT inhaler Past Week  No Yes   Sig: Inhale 2 puffs into the lungs daily      Facility-Administered Medications: None        Review of Systems    The 10 point Review of Systems is negative other than noted in the HPI.     Physical Exam   Vital Signs: Temp: 98.9  F (37.2  C) Temp src: Oral BP: 127/83 Pulse: 87   Resp: 20 SpO2: 98 % O2 Device: None (Room air)    Weight: 242 lbs 0 oz    GENERAL: adult female seen sitting in bed. NAD.   NEURO / PSYCH: Alert, converses appropriately. No focal deficits. Moves all extremities.   HEENT: Anicteric sclera.   CV: RRR. S1, S2. No murmurs appreciated.  2+ right radial pulse.  RESPIRATORY: Effort normal. Lungs CTAB with expiratory wheezes.   GI: Abdomen soft and non distended with bowel sounds rare. No tenderness or guarding to palpation.   MSK: no gross deformities  EXTREMITIES:  nonedematous  SKIN: No jaundice. No rashes or lesions to exposed areas.       Medical Decision Making       120 MINUTES SPENT BY ME on the date of service doing chart review, history, exam, documentation & further activities per the note.      Data     I have personally reviewed the following data over the past 24 hrs:    12.0 (H)  \   12.8   / 324     136 102 10.2 /  99   4.2 23 0.69 \     ALT: 14 AST: 14 AP: 84 TBILI: 0.3   ALB: 4.0 TOT PROTEIN: 6.9 LIPASE: N/A     Trop: <6 BNP: 67

## 2024-09-20 NOTE — PHARMACY-ADMISSION MEDICATION HISTORY
Pharmacist Admission Medication History    Admission medication history is complete. The information provided in this note is only as accurate as the sources available at the time of the update.    Medication reconciliation/reorder completed by provider prior to medication history? No    Information Source(s): Patient and Patient's pharmacy via in-person    Pertinent Information: Discussed medication history with patient who was familiar with medications and also had medication list on her phone. Patient reported the following:  - Usually takes Adderall XR 30mg but will decrease to 10mg or 20mg IR when she is manic  - Has not started taking guaifenesin, flonase, methylprednisolone, or omeprazole  - Is currently out of fexofenadine but prefers to take this with cetirizine    Changes made to PTA medication list:  Added: Rizatriptan, valium  Deleted: None  Changed: None    Allergies reviewed with patient and updates made in EHR: yes    Medication History Completed By: RADHA KING [facility] 9/20/2024 4:16 PM    Prior to Admission medications    Medication Sig Last Dose Taking? Auth Provider Long Term End Date   albuterol (PROAIR HFA/PROVENTIL HFA/VENTOLIN HFA) 108 (90 Base) MCG/ACT inhaler Inhale 2 puffs into the lungs every 6 hours 9/20/2024 Yes Sherrill Dasilva APRN CNP Yes    amphetamine-dextroamphetamine (ADDERALL XR) 30 MG 24 hr capsule Take 30 mg by mouth daily. 9/20/2024 Yes Unknown, Entered By History     ARIPiprazole (ABILIFY) 10 MG tablet Take 0.5 tablets (5 mg) by mouth daily 9/20/2024 Yes Sherrill Dasilva APRN CNP Yes    cetirizine (ZYRTEC) 10 MG tablet Take 1 tablet (10 mg) by mouth daily 9/20/2024 Yes Sherrill Dasilva APRN CNP No    diazepam (VALIUM) 5 MG tablet Take 5 mg by mouth as needed for anxiety. Past Month Yes Unknown, Entered By History No    Fexofenadine HCl (ALLEGRA PO)  Unknown Yes Reported, Patient No    fluticasone-salmeterol (ADVAIR) 500-50 MCG/ACT inhaler Inhale 1 puff into the lungs every 12  hours. 9/19/2024 Yes Sherrill Dasilva APRN CNP Yes    levothyroxine (SYNTHROID/LEVOTHROID) 175 MCG tablet Take 1 tablet (175 mcg) by mouth daily 9/20/2024 Yes Sherrill Dasilva APRN CNP Yes    montelukast (SINGULAIR) 10 MG tablet Take 1 tablet (10 mg) by mouth every morning 9/20/2024 Yes Sherrill Dasilva APRN CNP Yes    rizatriptan (MAXALT-MLT) 10 MG ODT Take 10 mg by mouth at onset of headache for migraine. Unknown Yes Unknown, Entered By History No    tiotropium (SPIRIVA RESPIMAT) 2.5 MCG/ACT inhaler Inhale 2 puffs into the lungs daily Past Week Yes Sherrill Dasilva APRN CNP Yes    amphetamine-dextroamphetamine (ADDERALL) 10 MG tablet   Patient not taking: Reported on 8/28/2024   Reported, Patient     fluticasone (FLONASE) 50 MCG/ACT nasal spray Spray 1 spray into both nostrils daily.   Sherrill Dasilva APRN CNP No    guaiFENesin (MUCINEX) 600 MG 12 hr tablet Take 2 tablets (1,200 mg) by mouth 2 times daily.   Sherrill Dasilva APRN CNP     medical cannabis (Patient's own supply) Take 1 Dose by mouth See Admin Instructions (The purpose of this order is to document that the patient reports taking medical cannabis.  This is not a prescription, and is not used to certify that the patient has a qualifying medical condition.)    Tangerine Oral Suspension Unflavored 1-5 ml up to two times daily.  Total THC = 240 mg, Total CBD Trace   Unknown, Entered By History     medical cannabis (Patient's own supply) Take 1 Dose by mouth See Admin Instructions (The purpose of this order is to document that the patient reports taking medical cannabis.  This is not a prescription, and is not used to certify that the patient has a qualifying medical condition.)    [place] oral suspension: take 1-3 ml by mouth two times daily  Total CBD 1200 mg. Total THC 60 mg   Unknown, Entered By History     methylPREDNISolone (MEDROL DOSEPAK) 4 MG tablet therapy pack Follow Package Directions   Sherrill Dasilva APRN CNP     omeprazole (PRILOSEC) 20 MG  DR capsule Take 1 capsule (20 mg) by mouth daily.   Sherrill Dasilva, APRN CNP  11/2/24

## 2024-09-20 NOTE — ED PROVIDER NOTES
ED Provider Note  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    Patient states that since being diagnosed with pneumonia 9-24 she is continue to have ongoing productive cough feeling that her sputum is become more productive over the last few days.  She also notes she did episode for mopped assist about a week ago which resolved and then any further episodes.  She tells me she did have elevated temperature yesterday Tmax 100.2 at home hide is continue to still feel generally ill.  She report some sore throat, as well as new onset right ear pain starting yesterday.  She did report some muffled hearing in the right ear without significant hearing loss or any drainage from her right ear.  She notes she continues to have decreased hearing in her left ear.  She denies any current vision changes stating that yesterday she had episode of nearly passing out and has difficulty describing this noting that she continues to have a similar feeling when she takes in a big breath without other pleuritic chest pain.  She denies any spinning type sensation or definitive vertigo.  She notes she is having some shortness of breath which she attributes to her chronic asthma she is been using a butyryl and daily steroid inhaler without significant improvement.  She continues to have some anterior neck discomfort she been ongoing for several months without any other chest  pain.  No abdominal pain she states she did throw up yesterday relating to her near syncopal event without any further vomiting.  She also reports loose stools over the last few days without watery stools.  She reports urinary frequency without other urinary symptoms attributes to increasing fluid intake.    Patient states her main concern today is with her right ear pain.  She phoning if she needs to be on high-dose steroids for hearing loss.  She follows with ENT and did recently undergo audiology testing yesterday.  She was found to have right normal/borderline hearing with left mild to moderate conductive hearing loss.    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 No history of chronic hypertension. RX for aspirin sent and labs drawn at new OB visit.       Problem, psychiatric  diagnosed Bipolar    Recurrent otitis media chronic ear infections in childhood, recently had a double ear infection followed by another ear infection soon after    Sleep apnea 2019    had a sleep study when pregnant with my second child    Uncomplicated asthma      Past Surgical History:   Procedure Laterality Date    BIOPSY  29 y/o colpos, and in the last 2 years for thyroid    Colposcopy, thyroid nodule biopsy twice    BLOOD PATCH N/A 10/11/2016    Procedure: EPIDURAL BLOOD PATCH;  Surgeon: GENERIC ANESTHESIA PROVIDER;  Location: [facility] OR     SECTION, TUBAL LIGATION, COMBINED N/A 2019     Procedure:  SECTION, WITH TUBAL LIGATION;  Surgeon: Kathy Rutledge MD;  Location: [facility] L+D    CRANIOTOMY, EXCISE TUMOR COMPLEX, COMBINED  2014    Procedure: COMBINED CRANIOTOMY, EXCISE TUMOR COMPLEX;  Supracerabellar  Infratentorial Approach for Resection of Tumor ;  Surgeon: Kate Mckeon MD;  Location: [facility] OR    ENT SURGERY      thyroidectomy    GYN SURGERY      colposcopy    THYROIDECTOMY N/A 2017    Procedure: THYROIDECTOMY;  Total Thyroidectomy;  Surgeon: Pamela Villasenor MD;  Location:  OR     No current outpatient medications on file.    Allergies   Allergen Reactions    Adacel [Tetanus-Diphth-Acell Pertussis] Swelling    Adhesive Tape Hives    Ciprofloxacin      Causes visual hallucinations.    Codeine Sulfate Nausea and Vomiting    Nicoderm [Nicotine]      Patch and Gum, pt reported allergic reaction     Oxycodone-Acetaminophen Nausea and Vomiting    Tegaderm Transparent Dressing (Informational Only) Hives    Tetanus Toxoid      Pt states she had an infection at injection site.     Vicodin [Hydrocodone-Acetaminophen] Nausea and Vomiting    Methimazole Rash     Family History  Family History   Problem Relation Age of Onset    Other Cancer Mother 62        salivary gland cancer    Genitourinary Problems Mother     Bipolar Disorder Mother         unipolar depression    Depression Mother     Thyroid Disease Mother         benign tumor    Melanoma Mother     Cancer Mother         salivary gland cancer    Migraines Mother     Skin Cancer Mother     Asthma Father     Mental Illness Father         Bipolar 2    Obesity Father     Asthma Maternal Grandmother     Osteoporosis Maternal Grandmother     Glaucoma Maternal Grandmother     Hypertension Maternal Grandmother     Macular Degeneration Maternal Grandmother     Skin Cancer Maternal Grandmother     Prostate Cancer Maternal Grandfather 69        no history of smoking    Bladder Cancer Maternal Grandfather 71  "   Colon Cancer Maternal Grandfather 70    Diabetes Paternal Grandfather     Bipolar Disorder Brother         unipolar depression    Asthma Brother     Depression Brother     Breast Cancer Maternal Aunt 37        negative BRCA1/2 testing    Melanoma Maternal Aunt 64    Thyroid Disease Paternal Aunt     Breast Cancer Paternal Aunt 58        bilateral; negative testing    Cancer Paternal Aunt     Breast Cancer Other         paternal great aunt     Social History   Social History     Tobacco Use    Smoking status: Every Day     Current packs/day: 0.50     Average packs/day: 0.3 packs/day for 19.7 years (5.4 ttl pk-yrs)     Types: Cigarettes     Start date: 1/21/2004     Last attempt to quit: 1/25/2022     Passive exposure: Current    Smokeless tobacco: Never    Tobacco comments:     Stopped smoking for both pregnamcies and breastfeeding of both children     Working on quitting smoking (6/27/23)   Vaping Use    Vaping status: Former    Quit date: 11/28/2023    Substances: Nicotine, Flavoring    Devices: Disposable   Substance Use Topics    Alcohol use: Yes     Alcohol/week: 4.0 standard drinks of alcohol     Types: 4 Drinks containing 0.5 oz of alcohol per week    Drug use: Not Currently     Types: Marijuana, Psilocybin     Comment: medical marijuana      A medically appropriate review of systems was performed with pertinent positives and negatives noted in the HPI, and all other systems negative.    Physical Exam   BP: 126/84  Pulse: 114  Temp: 99.9  F (37.7  C)  Resp: 18  Height: 172.7 cm (5' 8\")  Weight: 109.8 kg (242 lb)  SpO2: 96 %  Physical Exam      GENERAL APPEARANCE: The patient is well developed, well appearing, and in no acute distress.  HEAD:  Normocephalic and atraumatic.   EENT: Voice normal.  No erythema or bulging noted to both TMs erythema and bulging noted of both TMs.  Uvula midline without exudates.  NECK: Trachea is midline.  LUNGS: Breath sounds are equal and clear bilaterally. No wheezes, rhonchi, " or rales.  HEART: Regular rate and normal rhythm.  EKG 9/20/2024:  ABDOMEN: Soft, flat, and benign. No mass, tenderness, guarding, or rebound.Bowel sounds are present.  EXTREMITIES: No cyanosis, clubbing, or edema.  NEUROLOGIC: No focal sensory or motor deficits are noted.  PSYCHIATRIC: The patient is awake, alert.  Appropriate mood and affect.  SKIN: Warm, dry, and well perfused. Good turgor.      ED Course, Procedures, & Data      EKG 9/20/2024: Normal sinus rhythm, ventricular rate 97 QTc 421.  When interpreted by myself the rhythm is regular.  No visible ST elevation or depression to suggest ischemia.     Results for orders placed or performed during the hospital encounter of 09/20/24   CT Chest Pulmonary Embolism w Contrast     Status: None    Narrative    CT CHEST PULMONARY EMBOLISM W CONTRAST 9/20/2024 2:47 PM    CLINICAL HISTORY: Tachycardia, ongoing cough despite clear chest  x-ray, quadrant pleuritic pain evaluate PE pneumonia  TECHNIQUE: CT angiogram chest during arterial phase injection IV  contrast. 2D and 3D MIP reconstructions were performed by the CT  technologist. Dose reduction techniques were used.     CONTRAST: 74 mL isovue 370    COMPARISON: CT chest performed on 2/10/2022    FINDINGS:  ANGIOGRAM CHEST: Several small filling defects are seen along the  segmental branches of the right upper (series 4, image 96) and left  upper (series 4, images 88 and 95) branches of the pulmonary arteries  Thoracic aorta is negative for dissection. No CT evidence of right  heart strain.    LUNGS AND PLEURA: No pleural effusion or pneumothorax is seen.  Multiple new clustered pulmonary nodules with areas of tree-in-bud  nodularity are seen in the lungs bilaterally, most conspicuously in  the right upper, left upper and left lower lobes. One of the largest  nodules measures up to 9 mm in the left upper lobe (series 6, image  79). There are also areas of mucous plugging and moderate bronchial  wall thickening seen  bilaterally. Mosaic attenuation is also seen  bilaterally suggestive of air trapping.    MEDIASTINUM/AXILLAE: Multiple enlarged mediastinal and hilar lymph  nodes are present, one of the largest measuring up to 1.2 cm along the  right hilum (series 4, image 102). Another example includes a 10 mm  lymph node in the subcarinal space (series 4, image 124). Heart size  is normal.    UPPER ABDOMEN: The visualized portions appear unremarkable.    MUSCULOSKELETAL: No suspicious osseous lesions are seen.      Impression    IMPRESSION:  1.  Several small segmental pulmonary emboli are seen along the upper  lobe branches of both pulmonary arteries. No evidence of right-sided  heart strain is seen.  2.  Multifocal areas of clustered pulmonary nodules with tree-in-bud  nodularity, moderate bronchial wall thickening and mucous plugging.  Findings are suggestive of bronchopneumonia. Recommend clinical  correlation and 3-6 month CT chest follow-up exam.  3.  Multiple enlarged mediastinal and hilar lymph nodes, likely  reactive. Lymphoproliferative disorder such as lymphoma is considered  to be less likely but difficult to definitively exclude. Recommend  attention on follow-up exam.    Findings were communicated with Dr. Gilliam at 15:02 hours on  9/20/2024.    REFERENCE:  Guidelines for Management of Incidental Pulmonary Nodules Detected on  CT Images: From the Fleischner Society 2017.   Guidelines apply to incidental nodules in patients who are 35 years or  older.  Guidelines do not apply to lung cancer screening, patients with  immunosuppression, or patients with known primary cancer.    MULTIPLE NODULES  Nodule size <6 mm  Low-risk patients: No follow-up needed.  High-risk patients: Optional follow-up at 12 months.    Nodule size 6 mm or larger  Low-risk patients: Follow-up CT at 3-6 months, then consider CT at  18-24 months.  High-risk patients: Follow-up CT at 3-6 months, then at 18-24 months  if no change.  -Use most  suspicious nodule as guide to management.    Consider referral to lung nodule clinic.    ERNESTO HANNAH MD         SYSTEM ID:  [id number]   US Lower Extremity Venous Duplex Bilateral     Status: None    Narrative    US LOWER EXTREMITY VENOUS DUPLEX BILATERAL 9/20/2024 4:32 PM    CLINICAL HISTORY: Pulmonary embolus, evaluate DVT  TECHNIQUE: Venous Duplex ultrasound of bilateral lower extremities  with and without compression, augmentation and duplex. Color flow and  spectral Doppler with waveform analysis performed.    COMPARISON: None.    FINDINGS: Exam includes the common femoral, femoral, popliteal veins  as well as segmentally visualized deep calf veins and greater  saphenous vein.     RIGHT: No deep vein thrombosis. No superficial thrombophlebitis. No  popliteal cyst.    LEFT: No deep vein thrombosis. No superficial thrombophlebitis. No  popliteal cyst.      Impression    IMPRESSION:  1.  No evidence of deep venous thrombosis in the bilateral lower  extremities.    AUREA KHAN MD         SYSTEM ID:  [id number]   Comprehensive metabolic panel     Status: Normal   Result Value Ref Range    Sodium 136 135 - 145 mmol/L    Potassium 4.2 3.4 - 5.3 mmol/L    Carbon Dioxide (CO2) 23 22 - 29 mmol/L    Anion Gap 11 7 - 15 mmol/L    Urea Nitrogen 10.2 6.0 - 20.0 mg/dL    Creatinine 0.69 0.51 - 0.95 mg/dL    GFR Estimate >90 >60 mL/min/1.73m2    Calcium 9.1 8.8 - 10.4 mg/dL    Chloride 102 98 - 107 mmol/L    Glucose 99 70 - 99 mg/dL    Alkaline Phosphatase 84 40 - 150 U/L    AST 14 0 - 45 U/L    ALT 14 0 - 50 U/L    Protein Total 6.9 6.4 - 8.3 g/dL    Albumin 4.0 3.5 - 5.2 g/dL    Bilirubin Total 0.3 <=1.2 mg/dL   Symptomatic Influenza A/B, RSV, & SARS-CoV2 PCR (COVID-19) Nose     Status: Normal    Specimen: Nose; Swab   Result Value Ref Range    Influenza A PCR Negative Negative    Influenza B PCR Negative Negative    RSV PCR Negative Negative    SARS CoV2 PCR Negative Negative    Narrative    Testing was performed using  the Xpert Xpress CoV2/Flu/RSV Assay on the [employer] GeneXpert Instrument. This test should be ordered for the detection of SARS-CoV-2, influenza, and RSV viruses in individuals who meet clinical and/or epidemiological criteria. Test performance is unknown in asymptomatic patients. This test is for in vitro diagnostic use under the FDA EUA for laboratories certified under CLIA to perform high or moderate complexity testing. This test has not been FDA cleared or approved. A negative result does not rule out the presence of PCR inhibitors in the specimen or target RNA in concentration below the limit of detection for the assay. If only one viral target is positive but coinfection with multiple targets is suspected, the sample should be re-tested with another FDA cleared, approved, or authorized test, if coinfection would change clinical management. This test was validated by the [facility]. These laboratories are certified under the Clinical Laboratory Improvement Amendments of 1988 (CLIA-88) as qualified to perform high complexity laboratory testing.   UA with Microscopic reflex to Culture     Status: Abnormal    Specimen: Urine, Catheter   Result Value Ref Range    Color Urine Light Yellow Colorless, Straw, Light Yellow, Yellow    Appearance Urine Clear Clear    Glucose Urine Negative Negative mg/dL    Bilirubin Urine Negative Negative    Ketones Urine Negative Negative mg/dL    Specific Gravity Urine 1.013 1.003 - 1.035    Blood Urine Negative Negative    pH Urine 5.5 5.0 - 7.0    Protein Albumin Urine Negative Negative mg/dL    Urobilinogen Urine Normal Normal, 2.0 mg/dL    Nitrite Urine Negative Negative    Leukocyte Esterase Urine Negative Negative    Bacteria Urine Moderate (A) None Seen /HPF    Mucus Urine Present (A) None Seen /LPF    RBC Urine 1 <=2 /HPF    WBC Urine 10 (H) <=5 /HPF    Squamous Epithelials Urine 1 <=1 /HPF    Narrative    Urine Culture not indicated   Troponin T, High  Sensitivity     Status: Normal   Result Value Ref Range    Troponin T, High Sensitivity <6 <=14 ng/L   CBC with platelets and differential     Status: Abnormal   Result Value Ref Range    WBC Count 12.0 (H) 4.0 - 11.0 10e3/uL    RBC Count 4.37 3.80 - 5.20 10e6/uL    Hemoglobin 12.8 11.7 - 15.7 g/dL    Hematocrit 38.1 35.0 - 47.0 %    MCV 87 78 - 100 fL    MCH 29.3 26.5 - 33.0 pg    MCHC 33.6 31.5 - 36.5 g/dL    RDW 13.2 10.0 - 15.0 %    Platelet Count 324 150 - 450 10e3/uL    % Neutrophils 76 %    % Lymphocytes 14 %    % Monocytes 8 %    % Eosinophils 2 %    % Basophils 0 %    % Immature Granulocytes 0 %    NRBCs per 100 WBC 0 <1 /100    Absolute Neutrophils 9.1 (H) 1.6 - 8.3 10e3/uL    Absolute Lymphocytes 1.7 0.8 - 5.3 10e3/uL    Absolute Monocytes 0.9 0.0 - 1.3 10e3/uL    Absolute Eosinophils 0.2 0.0 - 0.7 10e3/uL    Absolute Basophils 0.0 0.0 - 0.2 10e3/uL    Absolute Immature Granulocytes 0.0 <=0.4 10e3/uL    Absolute NRBCs 0.0 10e3/uL   Nt probnp inpatient     Status: Normal   Result Value Ref Range    N terminal Pro BNP Inpatient 67 0 - 450 pg/mL   HCG qualitative pregnancy (blood)     Status: Normal   Result Value Ref Range    hCG Serum Qualitative Negative Negative   EKG 12 lead     Status: None   Result Value Ref Range    Systolic Blood Pressure  mmHg    Diastolic Blood Pressure  mmHg    Ventricular Rate 97 BPM    Atrial Rate 97 BPM    CT Interval 160 ms    QRS Duration 88 ms     ms    QTc 421 ms    P Axis 59 degrees    R AXIS 67 degrees    T Axis 268 degrees    Interpretation ECG       Sinus rhythm  ST & T wave abnormality, consider inferolateral ischemia  Abnormal ECG  Unconfirmed report - interpretation of this ECG is computer generated - see medical record for final interpretation  Confirmed by - EMERGENCY ROOM, PHYSICIAN ([id number]),  DEJAN KEEN ([id number]) on 9/20/2024 2:44:27 PM     Respiratory Aerobic Bacterial Culture with Gram Stain     Status: None    Specimen: Expectorate; Sputum    Result Value Ref Range    Culture       >10 Squamous epithelial cells/low power field indicates oral contamination. Please recollect.    Gram Stain Result >10 Squamous epithelial cells/low power field     Gram Stain Result >25 PMNs/low power field     Gram Stain Result 3+ Mixed armida    MRSA MSSA PCR, Nasal Swab     Status: None    Specimen: Nares, Bilateral; Swab   Result Value Ref Range    MRSA Target DNA Negative Negative    SA Target DNA Negative     Narrative    The [employer]  Xpert SA Nasal Complete assay performed in the [employer]  Dx System is a qualitative in vitro diagnostic test designed for rapid detection of Staphylococcus aureus (SA) and methicillin-resistant Staphylococcus aureus (MRSA) from nasal swabs in patients at risk for nasal colonization. The test utilizes automated real-time polymerase chain reaction (PCR) to detect MRSA/SA DNA. The Xpert SA Nasal Complete assay is intended to aid in the prevention and control of MRSA/SA infections in healthcare settings. The assay is not intended to diagnose, guide or monitor treatment for MRSA/SA infections, or provide results of susceptibility to methicillin. A negative result does not preclude MRSA/SA nasal colonization.    Respiratory Panel PCR     Status: Normal    Specimen: Nasopharyngeal; Swab   Result Value Ref Range    Adenovirus Not Detected Not Detected    Coronavirus Not Detected Not Detected    Human Metapneumovirus Not Detected Not Detected    Human Rhin/Enterovirus Not Detected Not Detected    Influenza A Not Detected Not Detected    Influenza A, H1 Not Detected Not Detected    Influenza A 2009 H1N1 Not Detected Not Detected    Influenza A, H3 Not Detected Not Detected    Influenza B Not Detected Not Detected    Parainfluenza Virus 1 Not Detected Not Detected    Parainfluenza Virus 2 Not Detected Not Detected    Parainfluenza Virus 3 Not Detected Not Detected    Parainfluenza Virus 4 Not Detected Not Detected    Respiratory Syncytial Virus A  Not Detected Not Detected    Respiratory Syncytial Virus B Not Detected Not Detected    Chlamydia Pneumoniae Not Detected Not Detected    Mycoplasma Pneumoniae Not Detected Not Detected    Narrative    The [employer] Respiratory Panel is a qualitative nucleic acid, multiplex, in vitro diagnostic test for the simultaneous detection and identification of multiple respiratory viral and bacterial nucleic acids in nasopharyngeal swabs collected in viral transport media from individual exhibiting signs and symptoms of respiratory infection. The assay has received FDA approval for the testing of nasopharyngeal (NP) swabs only. This test is used for clinical purposes and should not be regarded as investigational or for research. This laboratory is certified under the Clinical Laboratory Improvement Amendments of 1988 (CLIA-88) as qualified to perform high complexity clinical laboratory testing.   CBC with platelets differential     Status: Abnormal    Narrative    The following orders were created for panel order CBC with platelets differential.  Procedure                               Abnormality         Status                     ---------                               -----------         ------                     CBC with platelets and d...[[id number]]  Abnormal            Final result                 Please view results for these tests on the individual orders.     Medications   sodium chloride 0.9 % bag 500mL for CT scan flush use (100 mLs Intravenous Not Given 9/20/24 1834)   enoxaparin ANTICOAGULANT (LOVENOX) injection 105 mg (105 mg Subcutaneous $Given 9/20/24 1541)   amphetamine-dextroamphetamine (ADDERALL XR) ER cap 30 mg ( Oral Automatically Held 9/24/24 0800)   ARIPiprazole (ABILIFY) tablet 5 mg (has no administration in time range)   cetirizine (zyrTEC) tablet 10 mg (has no administration in time range)   fexofenadine (ALLEGRA) tablet 60 mg (has no administration in time range)   fluticasone (FLONASE) 50 MCG/ACT  spray 1 spray (1 spray Both Nostrils $Given 9/20/24 1833)   fluticasone-vilanterol (BREO ELLIPTA) 200-25 MCG/ACT inhaler 1 puff (1 puff Inhalation $Given 9/20/24 1832)   levothyroxine (SYNTHROID/LEVOTHROID) tablet 175 mcg (has no administration in time range)   montelukast (SINGULAIR) tablet 10 mg (has no administration in time range)   rizatriptan (MAXALT-MLT) ODT tab 10 mg (has no administration in time range)   lidocaine 1 % 0.1-1 mL (has no administration in time range)   lidocaine (LMX4) cream (has no administration in time range)   sodium chloride (PF) 0.9% PF flush 3 mL (3 mLs Intracatheter Not Given 9/20/24 1831)   sodium chloride (PF) 0.9% PF flush 3 mL (has no administration in time range)   senna-docusate (SENOKOT-S/PERICOLACE) 8.6-50 MG per tablet 1 tablet (has no administration in time range)     Or   senna-docusate (SENOKOT-S/PERICOLACE) 8.6-50 MG per tablet 2 tablet (has no administration in time range)   calcium carbonate (TUMS) chewable tablet 1,000 mg (has no administration in time range)   Patient is already receiving anticoagulation with heparin, enoxaparin (LOVENOX), warfarin (COUMADIN)  or other anticoagulant medication (has no administration in time range)   acetaminophen (TYLENOL) tablet 650 mg (has no administration in time range)     Or   acetaminophen (TYLENOL) Suppository 650 mg (has no administration in time range)   ondansetron (ZOFRAN ODT) ODT tab 4 mg (has no administration in time range)     Or   ondansetron (ZOFRAN) injection 4 mg (has no administration in time range)   ipratropium - albuterol 0.5 mg/2.5 mg/3 mL (DUONEB) neb solution 3 mL (3 mLs Nebulization $Given 9/20/24 2031)   benzonatate (TESSALON) capsule 100 mg (has no administration in time range)   umeclidinium (INCRUSE ELLIPTA) 62.5 MCG/ACT inhaler 1 puff (1 puff Inhalation $Given 9/20/24 2004)   ceFEPIme (MAXIPIME) 2 g vial to attach to  mL bag for ADULTS or NS 50 mL bag for PEDS (2 g Intravenous $New Bag 9/20/24  1948)   albuterol (PROVENTIL HFA/VENTOLIN HFA) inhaler (has no administration in time range)   lactated ringers infusion ( Intravenous Canceled Entry 9/20/24 2054)   azithromycin (ZITHROMAX) tablet 250 mg (has no administration in time range)   vancomycin (VANCOCIN) 1,500 mg in 0.9% NaCl 250 mL intermittent infusion (1,500 mg Intravenous $New Bag 9/20/24 2042)   ipratropium - albuterol 0.5 mg/2.5 mg/3 mL (DUONEB) neb solution 3 mL (3 mLs Nebulization $Given 9/20/24 1416)   ibuprofen (ADVIL/MOTRIN) tablet 600 mg (600 mg Oral $Given 9/20/24 1416)   iopamidol (ISOVUE-370) solution 100 mL (74 mLs Intravenous $Given 9/20/24 1435)   cefTRIAXone (ROCEPHIN) 1 g vial to attach to  mL bag for ADULTS or NS 50 mL bag for PEDS (1 g Intravenous $New Bag 9/20/24 1542)   azithromycin (ZITHROMAX) 500 mg in  mL intermittent infusion (500 mg Intravenous $New Bag 9/20/24 1702)     Labs Ordered and Resulted from Time of ED Arrival to Time of ED Departure   ROUTINE UA WITH MICROSCOPIC REFLEX TO CULTURE - Abnormal       Result Value    Color Urine Light Yellow      Appearance Urine Clear      Glucose Urine Negative      Bilirubin Urine Negative      Ketones Urine Negative      Specific Gravity Urine 1.013      Blood Urine Negative      pH Urine 5.5      Protein Albumin Urine Negative      Urobilinogen Urine Normal      Nitrite Urine Negative      Leukocyte Esterase Urine Negative      Bacteria Urine Moderate (*)     Mucus Urine Present (*)     RBC Urine 1      WBC Urine 10 (*)     Squamous Epithelials Urine 1     CBC WITH PLATELETS AND DIFFERENTIAL - Abnormal    WBC Count 12.0 (*)     RBC Count 4.37      Hemoglobin 12.8      Hematocrit 38.1      MCV 87      MCH 29.3      MCHC 33.6      RDW 13.2      Platelet Count 324      % Neutrophils 76      % Lymphocytes 14      % Monocytes 8      % Eosinophils 2      % Basophils 0      % Immature Granulocytes 0      NRBCs per 100 WBC 0      Absolute Neutrophils 9.1 (*)     Absolute  Lymphocytes 1.7      Absolute Monocytes 0.9      Absolute Eosinophils 0.2      Absolute Basophils 0.0      Absolute Immature Granulocytes 0.0      Absolute NRBCs 0.0     COMPREHENSIVE METABOLIC PANEL - Normal    Sodium 136      Potassium 4.2      Carbon Dioxide (CO2) 23      Anion Gap 11      Urea Nitrogen 10.2      Creatinine 0.69      GFR Estimate >90      Calcium 9.1      Chloride 102      Glucose 99      Alkaline Phosphatase 84      AST 14      ALT 14      Protein Total 6.9      Albumin 4.0      Bilirubin Total 0.3     INFLUENZA A/B, RSV, & SARS-COV2 PCR - Normal    Influenza A PCR Negative      Influenza B PCR Negative      RSV PCR Negative      SARS CoV2 PCR Negative     TROPONIN T, HIGH SENSITIVITY - Normal    Troponin T, High Sensitivity <6     NT PROBNP INPATIENT - Normal    N terminal Pro BNP Inpatient 67     HCG QUALITATIVE PREGNANCY - Normal    hCG Serum Qualitative Negative     BLOOD CULTURE     US Lower Extremity Venous Duplex Bilateral   Final Result   IMPRESSION:   1.  No evidence of deep venous thrombosis in the bilateral lower   extremities.      AUREA KHAN MD            SYSTEM ID:  [id number]      CT Chest Pulmonary Embolism w Contrast   Final Result   IMPRESSION:   1.  Several small segmental pulmonary emboli are seen along the upper   lobe branches of both pulmonary arteries. No evidence of right-sided   heart strain is seen.   2.  Multifocal areas of clustered pulmonary nodules with tree-in-bud   nodularity, moderate bronchial wall thickening and mucous plugging.   Findings are suggestive of bronchopneumonia. Recommend clinical   correlation and 3-6 month CT chest follow-up exam.   3.  Multiple enlarged mediastinal and hilar lymph nodes, likely   reactive. Lymphoproliferative disorder such as lymphoma is considered   to be less likely but difficult to definitively exclude. Recommend   attention on follow-up exam.      Findings were communicated with Dr. Gilliam at 15:02 hours on    9/20/2024.      REFERENCE:   Guidelines for Management of Incidental Pulmonary Nodules Detected on   CT Images: From the Fleischner Society 2017.    Guidelines apply to incidental nodules in patients who are 35 years or   older.   Guidelines do not apply to lung cancer screening, patients with   immunosuppression, or patients with known primary cancer.      MULTIPLE NODULES   Nodule size <6 mm   Low-risk patients: No follow-up needed.   High-risk patients: Optional follow-up at 12 months.      Nodule size 6 mm or larger   Low-risk patients: Follow-up CT at 3-6 months, then consider CT at   18-24 months.   High-risk patients: Follow-up CT at 3-6 months, then at 18-24 months   if no change.   -Use most suspicious nodule as guide to management.      Consider referral to lung nodule clinic.      ERNESTO HANNAH MD            SYSTEM ID:  [id number]             Critical care was not performed.     Medical Decision Making  The patient's presentation was of high complexity (an acute health issue posing potential threat to life or bodily function).    The patient's evaluation involved:  review of 3+ test result(s) ordered prior to this encounter (see separate area of note for details)  ordering and/or review of 3+ test(s) in this encounter (see separate area of note for details)  independent interpretation of testing performed by another health professional (see separate area of note for details)  discussion of management or test interpretation with another health professional (hospitalist)    The patient's management necessitated moderate risk (prescription drug management including medications given in the ED), moderate risk (IV contrast administration), and high risk (a decision regarding hospitalization).    Assessment & Plan    This is a medically complex 43-year-old female presenting with concerns for new right otalgia and decreased hearing in the setting of recent left decreased hearing, recent pneumonia.   Presentation to the department patient presents tachycardic rate 114.  She is normoxic on room air and has a temp of 99.9.  On exam for me she does have some inspiratory wheezes in the bases and coarse breath sounds in upper lung fields.  She is a soft abdomen.  On your exam she does have findings consistent with otitis media bilaterally.  Consider broad differential including possibility of recurrent pneumonia, as with possibility of pulmonary embolism as well with the tachycardia, low-grade fever, and dyspnea on exertion.  Initiated broad workup including EKG screening laboratory studies CT PE and cardiac biomarkers.  Initial EKG without findings for ischemia.  Laboratory studies show leukocytosis 12.0, chemistry within normal limits, normal troponin, normal BNP, negative respiratory swab.  UA without findings suggest UTI.Blood culture was initiated as well as pending.  Pregnancy negative.  CT PE returns with findings consistent with new small segmental pulmonary emboli along with findings consistent with bronchopneumonia.  With these results patient initiated on community-acquired ammonia coverage ceftriaxone azithromycin.  Lovenox initiated.  Bilateral lower extremity initiated.  She Labid to the medicine service for further evaluation and management.  Case discussed with the hospitalist.    Patient seen and discussed with attending physician , who agrees with my plan of care.      I have reviewed the nursing notes. I have reviewed the findings, diagnosis, plan and need for follow up with the patient.    Current Discharge Medication List          Final diagnoses:   Bilateral otitis media   Bronchopneumonia   Pulmonary embolism (H)       Ya Isak, PAC  [facility] EMERGENCY DEPARTMENT  9/20/2024     Ya Parisi PA-C  09/20/24 6291    --    ED Attending Physician Attestation    I Chance Castillo MD, cared for this patient with the Advanced Practice Provider (KYUNG). I personally  provided a substantive portion of the care for this patient, including approving the care plan for the number and complexity of problems addressed and taking responsibility related to the risk of complications and/or morbidity or mortality of patient management. Please see the KYUNG's documentation for full details.    Summary of HPI, PE, ED Course   Patient is a 43 year old female evaluated in the emergency department for SOB, ear pain. Exam and ED course notable for PE on CT, started, admitted on lovenox as had pre syncopal symptoms and tachycardia with tachypnea. After the completion of care in the emergency department, the patient was admitted to inpatient.      Chance Castillo MD  Emergency Medicine          Chance Castillo MD  09/23/24 0657

## 2024-09-20 NOTE — ED TRIAGE NOTES
Pt seen on 9/2, diagnosed with adenovirus, Pneumonia, also having ear fullness, loss of hearing. Problem persisted and got worse over last two days. Increased temp, hearing loss now in both ears.      Triage Assessment (Adult)       Row Name 09/20/24 1247          Triage Assessment    Airway WDL WDL        Respiratory WDL    Respiratory WDL X;cough     Cough Frequency infrequent     Cough Type productive        Skin Circulation/Temperature WDL    Skin Circulation/Temperature WDL WDL        Cardiac WDL    Cardiac WDL WDL        Peripheral/Neurovascular WDL    Peripheral Neurovascular WDL WDL        Cognitive/Neuro/Behavioral WDL    Cognitive/Neuro/Behavioral WDL WDL

## 2024-09-21 LAB
ANION GAP SERPL CALCULATED.3IONS-SCNC: 9 MMOL/L (ref 7–15)
BUN SERPL-MCNC: 11.3 MG/DL (ref 6–20)
CALCIUM SERPL-MCNC: 8.5 MG/DL (ref 8.8–10.4)
CHLORIDE SERPL-SCNC: 107 MMOL/L (ref 98–107)
CREAT SERPL-MCNC: 0.58 MG/DL (ref 0.51–0.95)
EGFRCR SERPLBLD CKD-EPI 2021: >90 ML/MIN/1.73M2
ERYTHROCYTE [DISTWIDTH] IN BLOOD BY AUTOMATED COUNT: 13.3 % (ref 10–15)
GLUCOSE SERPL-MCNC: 120 MG/DL (ref 70–99)
HCO3 SERPL-SCNC: 22 MMOL/L (ref 22–29)
HCT VFR BLD AUTO: 36.7 % (ref 35–47)
HGB BLD-MCNC: 11.6 G/DL (ref 11.7–15.7)
MCH RBC QN AUTO: 28.4 PG (ref 26.5–33)
MCHC RBC AUTO-ENTMCNC: 31.6 G/DL (ref 31.5–36.5)
MCV RBC AUTO: 90 FL (ref 78–100)
PLATELET # BLD AUTO: 279 10E3/UL (ref 150–450)
POTASSIUM SERPL-SCNC: 4.1 MMOL/L (ref 3.4–5.3)
RBC # BLD AUTO: 4.08 10E6/UL (ref 3.8–5.2)
SODIUM SERPL-SCNC: 138 MMOL/L (ref 135–145)
WBC # BLD AUTO: 9.5 10E3/UL (ref 4–11)

## 2024-09-21 PROCEDURE — 250N000011 HC RX IP 250 OP 636: Performed by: INTERNAL MEDICINE

## 2024-09-21 PROCEDURE — 999N000157 HC STATISTIC RCP TIME EA 10 MIN

## 2024-09-21 PROCEDURE — 250N000011 HC RX IP 250 OP 636: Performed by: PHYSICIAN ASSISTANT

## 2024-09-21 PROCEDURE — 99232 SBSQ HOSP IP/OBS MODERATE 35: CPT | Performed by: INTERNAL MEDICINE

## 2024-09-21 PROCEDURE — 258N000003 HC RX IP 258 OP 636: Performed by: PHYSICIAN ASSISTANT

## 2024-09-21 PROCEDURE — 250N000013 HC RX MED GY IP 250 OP 250 PS 637: Performed by: PHYSICIAN ASSISTANT

## 2024-09-21 PROCEDURE — 250N000013 HC RX MED GY IP 250 OP 250 PS 637: Performed by: STUDENT IN AN ORGANIZED HEALTH CARE EDUCATION/TRAINING PROGRAM

## 2024-09-21 PROCEDURE — 80048 BASIC METABOLIC PNL TOTAL CA: CPT | Performed by: PHYSICIAN ASSISTANT

## 2024-09-21 PROCEDURE — 94640 AIRWAY INHALATION TREATMENT: CPT | Mod: 76

## 2024-09-21 PROCEDURE — 36415 COLL VENOUS BLD VENIPUNCTURE: CPT | Performed by: PHYSICIAN ASSISTANT

## 2024-09-21 PROCEDURE — 258N000003 HC RX IP 258 OP 636: Performed by: INTERNAL MEDICINE

## 2024-09-21 PROCEDURE — 120N000002 HC R&B MED SURG/OB UMMC

## 2024-09-21 PROCEDURE — 85027 COMPLETE CBC AUTOMATED: CPT | Performed by: PHYSICIAN ASSISTANT

## 2024-09-21 PROCEDURE — 250N000009 HC RX 250: Performed by: PHYSICIAN ASSISTANT

## 2024-09-21 PROCEDURE — 250N000013 HC RX MED GY IP 250 OP 250 PS 637: Performed by: INTERNAL MEDICINE

## 2024-09-21 RX ORDER — LORAZEPAM 1 MG/1
1 TABLET ORAL ONCE
Status: COMPLETED | OUTPATIENT
Start: 2024-09-21 | End: 2024-09-21

## 2024-09-21 RX ORDER — IBUPROFEN 200 MG
400 TABLET ORAL EVERY 6 HOURS PRN
Status: DISCONTINUED | OUTPATIENT
Start: 2024-09-21 | End: 2024-09-23 | Stop reason: HOSPADM

## 2024-09-21 RX ORDER — BENZONATATE 100 MG/1
100 CAPSULE ORAL 3 TIMES DAILY PRN
Status: DISCONTINUED | OUTPATIENT
Start: 2024-09-21 | End: 2024-09-23 | Stop reason: HOSPADM

## 2024-09-21 RX ORDER — GUAIFENESIN/DEXTROMETHORPHAN 100-10MG/5
10 SYRUP ORAL EVERY 4 HOURS PRN
Status: DISCONTINUED | OUTPATIENT
Start: 2024-09-21 | End: 2024-09-23 | Stop reason: HOSPADM

## 2024-09-21 RX ORDER — METHYLPREDNISOLONE SODIUM SUCCINATE 40 MG/ML
40 INJECTION, POWDER, LYOPHILIZED, FOR SOLUTION INTRAMUSCULAR; INTRAVENOUS EVERY 24 HOURS
Status: DISCONTINUED | OUTPATIENT
Start: 2024-09-21 | End: 2024-09-23 | Stop reason: HOSPADM

## 2024-09-21 RX ORDER — SODIUM CHLORIDE 9 MG/ML
INJECTION, SOLUTION INTRAVENOUS
Status: COMPLETED
Start: 2024-09-21 | End: 2024-09-21

## 2024-09-21 RX ADMIN — ACETAMINOPHEN 650 MG: 325 TABLET ORAL at 01:33

## 2024-09-21 RX ADMIN — CEFEPIME 2 G: 2 INJECTION, POWDER, FOR SOLUTION INTRAVENOUS at 17:46

## 2024-09-21 RX ADMIN — FLUTICASONE PROPIONATE 1 SPRAY: 50 SPRAY, METERED NASAL at 08:31

## 2024-09-21 RX ADMIN — VANCOMYCIN HYDROCHLORIDE 1500 MG: 10 INJECTION, POWDER, LYOPHILIZED, FOR SOLUTION INTRAVENOUS at 07:04

## 2024-09-21 RX ADMIN — METHYLPREDNISOLONE SODIUM SUCCINATE 40 MG: 40 INJECTION, POWDER, FOR SOLUTION INTRAMUSCULAR; INTRAVENOUS at 14:03

## 2024-09-21 RX ADMIN — BENZONATATE 100 MG: 100 CAPSULE ORAL at 08:30

## 2024-09-21 RX ADMIN — IBUPROFEN 400 MG: 200 TABLET, FILM COATED ORAL at 07:05

## 2024-09-21 RX ADMIN — ENOXAPARIN SODIUM 105 MG: 120 INJECTION SUBCUTANEOUS at 15:43

## 2024-09-21 RX ADMIN — CEFEPIME 2 G: 2 INJECTION, POWDER, FOR SOLUTION INTRAVENOUS at 10:10

## 2024-09-21 RX ADMIN — BENZONATATE 100 MG: 100 CAPSULE ORAL at 02:19

## 2024-09-21 RX ADMIN — SODIUM CHLORIDE, POTASSIUM CHLORIDE, SODIUM LACTATE AND CALCIUM CHLORIDE: 600; 310; 30; 20 INJECTION, SOLUTION INTRAVENOUS at 08:47

## 2024-09-21 RX ADMIN — IPRATROPIUM BROMIDE AND ALBUTEROL SULFATE 3 ML: .5; 3 SOLUTION RESPIRATORY (INHALATION) at 07:41

## 2024-09-21 RX ADMIN — SODIUM CHLORIDE 500 ML: 9 INJECTION, SOLUTION INTRAVENOUS at 10:28

## 2024-09-21 RX ADMIN — ENOXAPARIN SODIUM 105 MG: 120 INJECTION SUBCUTANEOUS at 03:21

## 2024-09-21 RX ADMIN — CETIRIZINE HYDROCHLORIDE 10 MG: 10 TABLET, FILM COATED ORAL at 08:30

## 2024-09-21 RX ADMIN — BENZONATATE 100 MG: 100 CAPSULE ORAL at 19:45

## 2024-09-21 RX ADMIN — FLUTICASONE FUROATE AND VILANTEROL TRIFENATATE 1 PUFF: 200; 25 POWDER RESPIRATORY (INHALATION) at 08:31

## 2024-09-21 RX ADMIN — ARIPIPRAZOLE 5 MG: 5 TABLET ORAL at 08:30

## 2024-09-21 RX ADMIN — LEVOTHYROXINE SODIUM 175 MCG: 175 TABLET ORAL at 08:30

## 2024-09-21 RX ADMIN — AZITHROMYCIN DIHYDRATE 250 MG: 250 TABLET ORAL at 08:30

## 2024-09-21 RX ADMIN — BENZONATATE 100 MG: 100 CAPSULE ORAL at 13:04

## 2024-09-21 RX ADMIN — MONTELUKAST 10 MG: 10 TABLET, FILM COATED ORAL at 08:30

## 2024-09-21 RX ADMIN — CEFEPIME 2 G: 2 INJECTION, POWDER, FOR SOLUTION INTRAVENOUS at 02:02

## 2024-09-21 RX ADMIN — IBUPROFEN 400 MG: 200 TABLET, FILM COATED ORAL at 22:06

## 2024-09-21 RX ADMIN — IPRATROPIUM BROMIDE AND ALBUTEROL SULFATE 3 ML: .5; 3 SOLUTION RESPIRATORY (INHALATION) at 13:20

## 2024-09-21 RX ADMIN — UMECLIDINIUM 1 PUFF: 62.5 AEROSOL, POWDER ORAL at 08:31

## 2024-09-21 RX ADMIN — SODIUM CHLORIDE, POTASSIUM CHLORIDE, SODIUM LACTATE AND CALCIUM CHLORIDE: 600; 310; 30; 20 INJECTION, SOLUTION INTRAVENOUS at 22:00

## 2024-09-21 NOTE — PROGRESS NOTES
"VS: /83 (BP Location: Left arm)   Pulse 87   Temp 98.9  F (37.2  C) (Oral)   Resp 20   Ht 1.727 m (5' 8\")   Wt 109.8 kg (242 lb)   LMP 09/08/2024 (Exact Date)   SpO2 98%   BMI 36.80 kg/m      Orientation Pt is Alert and Oriented x 4   Respiratory Frequent productive cough. Denies SOB, no accessory muscles used. Pt saturations  98% on room air    Mobility/ Activity independent   Bowel/Bladder: Continent of both bowel and bladder. Voiding appropriately   IV /LDA Right and Left PIV. R SL Left infusing Abx   Diet Regular diet, tolerating well.    Gastrointestinal Denies n/v/d. Abdomen soft and non tender.    Skin / Wounds / Dressings: No outstanding skin issues noted    Pain Pain in ears    Equipment: Iv pole    Circulation/ Sensation Denies numbness or tingling.   PRNS Na   Plan Continue to monitor   Additional info: Fullness / loss of hearing in ears. Pt feeling anxious about diagnosis and tearful in fear of potentially having lung cancer.    8MS ADMISSION    D: Patient admitted/transferred from [facility] ER via Hospital Transport for Bronchopneumonia, Pulmonary embolism and bilateral ear infection .     I: Upon arrival to the unit patient was oriented to room, unit, and call light. Patient s vital signs were obtained. Allergies reviewed and allergy band applied. Provider notified of patient s arrival on the unit. Adult AVS completed. Head to toe assessment completed. Education assessment completed. Care plan initiated.    A: Vital signs stable upon admission. P: Continue to monitor patient s symptoms and intervene as needed. Continue with plan of care. Notify provider with any concerns or changes in patient status.    "

## 2024-09-21 NOTE — PROGRESS NOTES
Brief ENT Note    Consult was originally placed due to concern for otitis media with associated hearing loss last night. From discussion with primary team, on evaluation this morning her symptoms have improved. They do not feel ENT inpatient consult is required. She has outpatient follow up 10/14 with ENT for this. Agree with current plan to treat otitis media medically.     Emy Serna MD

## 2024-09-21 NOTE — PROGRESS NOTES
"  VS: /86   Pulse 82   Temp 99.1  F (37.3  C) (Oral)   Resp 20   Ht 1.727 m (5' 8\")   Wt 109.8 kg (242 lb)   LMP 09/08/2024 (Exact Date)   SpO2 95%   BMI 36.80 kg/m      Orientation Pt is Alert and Oriented x 4   Respiratory Lung sounds clear bilaterally. No cough. Denies SOB, no accessory muscles used. Pt saturations 95% on room air    Mobility/ Activity ind   Bowel/Bladder: Pt continent of both bowel and bladder, voiding appropriately, last BM today   IV /LDA RPIV infusing LR at 50ml/hr   Diet Regular- tolerating well    Gastrointestinal Had some abdominal cramping for a period this morning- denied pain meds. Pain went away after an hour or so. Bowel sounds active, abdomen is soft and non tender    Skin / Wounds / Dressings: No outstanding skin issues noted    Pain Abd pain, occasional headache.    Equipment: na   Circulation/ Sensation Denies numbness or tingling.   PRNS na   Plan Continue to monitor   Additional info: Pt very sleepy today.      " (2) more than 100 beats/min

## 2024-09-21 NOTE — PROGRESS NOTES
[facility]    Medicine Progress Note - Hospitalist Service, GOLD TEAM 17    Date of Admission:  9/20/2024    Assessment & Plan   Cathy Arroyo is a 43 year old female admitted on 9/20/2024 for pneumonia, PE, and R ear pain.  She has history of pineocytoma s/p craniotomy (2014), recently pneumonia and adenovirus, and recent L hearing loss.      # CAP vs HAP  ---   Patient presented with SOB without hypoxia.   ---   She was seen in ED on 9/2/24 at that time with SOB and L ear pain. Found to have pneumonia due to mycoplasma (positive on sputum) and adenovirus. She was treated with one dose of dexamethasone in light of pneumonia exacerbating her asthma, as well as sent home on Augmentin and doxycycline.  She all but finished the course but developed vomiting the last 2 days, thus she likely vomited up 1-2 days of the abx.  She does note interval improvement while on antibiotics of her symptoms, but that her symptoms returned after the course of treatment.  She then developed shortness of breath, persistent productive cough, and low-grade fevers at home.  Presented to [facility] ED on 9/20 seeking medical attention  ---   CT chest PE 9/20 suggestive of bronchopneumonia with multifocal areas of clustered pulm nodules with tree-in-bud nodularity. Likely reactive lymphadenopathy.   ---   WBC 12.0 ---> 9.5    ---   Covid / flu / RSV PCR negative  ---   Given azithromycin + ceftriaxone by ED staff  Plan:   ---   Started pt on cefepime + vanco given c/f HAP or resistant organism at admit  ---   Started pt Azithromycin at admit for atypical coverage  ---   Sputum culture   ---   Urine legionella and strep pneumo ags negative  ---   MRSA nasal swab PCR negative  ---   discharge Vancomycin  ---   Schedule tessalon perles  ---   Add robitussin DM  ---   Remained on RA     #  Bilateral small segmental pulmonary emboli  ---   Presented with SOB without hypoxia as above. Other  vitals stable. No recent travel, surgeries, periods of immobility. No OCPs. Does smoke, trying to cut down and currently at 7 cigarettes per day.  No chest pain.  ---   CT PE confirms several small segmental pulmonary emboli seen along upper lobe branches of both pulmonary arteries.  No evidence of right heart strain.   ---   Negative BLE US for DVT.   ---   Normal trop  ---   Hx of pineal tumor that was benign on path. High risk breast ca, with screening breast MRI in the last few months that was negative.   ---   Pulm nodules with smoking history as already noted.   Plan:   ---   Continue Lovenox treatment dosing 1 mg/kg BID  ---   Test claim request to pharmD for DOACs     # Asthma exacerbation  # Moderate persistent asthma  ---   Likely r/t pneumonia as above.  Expiratory wheezes throughout all lung fields on exam.   ---   Scheduled duoneb Q4H while awake  ---   PTA montelukast daily  ---   PTA inhaler subto Breo Ellipta daily  ---   PTA inhaler sub to incruse ellipta daily  ---   Hhold PTA scheduled albuterol Q6H   ---   PRN albuterol Q2H  ---   Start solu medrol 40 mg iv daily  ---   Treatment of pneumonia as above     #  Bilateral hearing impairment  #  Recurrent otitis media as adult.   ---   Patient states a few weeks ago started developing L ear pain and fullness, with impaired hearing. At that time did have URI symptoms with the above pneumonia symptoms. She was sent home on abx of Augmentin and doxy as above. She states her symptoms did not really go away and this past week she developed R ear pain, fullness, and impaired hearing.  On Tuesday she was at a coffee shop with friends and had difficulty understanding their conversation. Of note this recurrence of SOB and pneumonia is not associated with URI symptoms. Has also noted tinnitus and possible vertigo. Has history of recurrent ear infections as a child and several as an adult but no prior ear surgeries.  ----   On exam, able to converse  "appropriately and has functional hearing to bilateral ears. Tms do seem to be bulging with some erythema.   ----   Saw PCP and audiologist OP on 9/19 whose evaluation results are right normal/borderline normal hearing and left mild to moderate conductive hearing loss. ENT referral was placed. Visit on 10/14/2024.   Plan:   ---   Hold on ENT consult since pt's hearing better today  ---   Broad spectrum abx as above  ---   Mmonitor for any clinical worsening     #  Hx pineal gland tumor resection 2014  #  Acute on chronic headaches  #  Hx migraines  ---   [place] [facility] [place]acerebellar [place]fratentorial [place]. [place] [facility] [place]. Had persistent HA and neck pain this summer. Imaging at the time demonstrated concerns that one of the screws had come loose. Saw NSG in July and they felt that hardware positioning was stable and unlikely acute headaches were related to hardware.   ---   Continue PTA rizatriptan rescue med     #  Pulmonary nodules and c/f lymphoma  ---   As above, chest CT obtained for SOB. Read for \" multifocal areas of clustered pulmonary nodules with tree-in-bud nodularity, moderate bronchial wall thickening and mucous plugging.  Findings suggestive of bronchopneumonia.  Recommend clinical correlation and 36-month CT chest follow-up exam.  Also read for multiple enlarged mediastinal and hilar lymph nodes, likely reactive.  Lymphoproliferative disorder such as lymphoma is considered to be less likely but difficult to definitively exclude.  Recommend attention on follow-up exam.  ---   Plan for repeat chest CT OP for follow-up     #  Post surgical hypothyroidism  #  Grave's s/p total thyroidectomy  ---   Cont PTA levothyroxine     #  Bipolar affective disorder  #  Anxiety  #  PTSD  ---   At home on medical marijuana for PTSD.   ---   Continue PTA abilify     #  Tobacco dependence  ---   Interested in cessation.  Has had difficulty due to stated allergy to nicotine products, " and contraindications for Wellbutrin and Chantix with her bipolar disorder.  ---   Cessation consult     #  Dyslipidemia  #  Obesity  #  Hx sleep apnea     #  High risk for breast cancer  ---   Just had breast screening MRI this summer. Follows with onc.            Diet: Combination Diet Regular Diet Adult  DVT Prophylaxis: Enoxaparin (Lovenox) subcutaneous treatment dosing  Lee Catheter: Not present  Lines: None     Cardiac Monitoring: None  Code Status: Full Code  Disposition Plan   Medically Ready for Discharge: Anticipated in 2-4 Days           Forrest Camacho MD  Hospitalist Service, GOLD TEAM [address] [place] [address]  Securely message with [employer] (more info)  Text page via [facility] Paging/Directory   See signed in provider for up to date coverage information  ______________________________________________________________________    Interval History   Not much improvement in cough  Did not sleep well because of cough  On RA  She says she hear better through her right ear  No new complaints    Physical Exam   Vital Signs: Temp: 99.1  F (37.3  C) Temp src: Oral BP: 134/86 Pulse: 82   Resp: 20 SpO2: 95 % O2 Device: None (Room air)    Weight: 242 lbs 0 oz  General: Morbidly obese, aao x 3, NAD.  HEENT:  NC/AT, PERRL, EOMI, neck supple, no thyromegaly, op clear, mmm.  CVS:  NL s 1 and s2, no m/r/g.  Lungs:  Coarse BS w/ end exp wheezing   Abd:  Soft, + bs, NT, no rebound or gaurding, no fluid shift.  Ext:  No c/c.  Lymph:  No edema.  Neuro:  Nonfocal.  Musculoskeletal: No calf tenderness to palpation.    Skin:  No rash.  Psychiatry:  Mood and affect appropriate.        Data     I have personally reviewed the following data over the past 24 hrs:    9.5  \   11.6 (L)   / 279     138 107 11.3 /  120 (H)   4.1 22 0.58 \     ALT: 14 AST: 14 AP: 84 TBILI: 0.3   ALB: 4.0 TOT PROTEIN: 6.9 LIPASE: N/A     Trop: <6 BNP: 67       Imaging results reviewed over the past 24 hrs:   Recent Results  (from the past 24 hour(s))   CT Chest Pulmonary Embolism w Contrast    Narrative    CT CHEST PULMONARY EMBOLISM W CONTRAST 9/20/2024 2:47 PM    CLINICAL HISTORY: Tachycardia, ongoing cough despite clear chest  x-ray, quadrant pleuritic pain evaluate PE pneumonia  TECHNIQUE: CT angiogram chest during arterial phase injection IV  contrast. 2D and 3D MIP reconstructions were performed by the CT  technologist. Dose reduction techniques were used.     CONTRAST: 74 mL isovue 370    COMPARISON: CT chest performed on 2/10/2022    FINDINGS:  ANGIOGRAM CHEST: Several small filling defects are seen along the  segmental branches of the right upper (series 4, image 96) and left  upper (series 4, images 88 and 95) branches of the pulmonary arteries  Thoracic aorta is negative for dissection. No CT evidence of right  heart strain.    LUNGS AND PLEURA: No pleural effusion or pneumothorax is seen.  Multiple new clustered pulmonary nodules with areas of tree-in-bud  nodularity are seen in the lungs bilaterally, most conspicuously in  the right upper, left upper and left lower lobes. One of the largest  nodules measures up to 9 mm in the left upper lobe (series 6, image  79). There are also areas of mucous plugging and moderate bronchial  wall thickening seen bilaterally. Mosaic attenuation is also seen  bilaterally suggestive of air trapping.    MEDIASTINUM/AXILLAE: Multiple enlarged mediastinal and hilar lymph  nodes are present, one of the largest measuring up to 1.2 cm along the  right hilum (series 4, image 102). Another example includes a 10 mm  lymph node in the subcarinal space (series 4, image 124). Heart size  is normal.    UPPER ABDOMEN: The visualized portions appear unremarkable.    MUSCULOSKELETAL: No suspicious osseous lesions are seen.      Impression    IMPRESSION:  1.  Several small segmental pulmonary emboli are seen along the upper  lobe branches of both pulmonary arteries. No evidence of right-sided  heart strain  is seen.  2.  Multifocal areas of clustered pulmonary nodules with tree-in-bud  nodularity, moderate bronchial wall thickening and mucous plugging.  Findings are suggestive of bronchopneumonia. Recommend clinical  correlation and 3-6 month CT chest follow-up exam.  3.  Multiple enlarged mediastinal and hilar lymph nodes, likely  reactive. Lymphoproliferative disorder such as lymphoma is considered  to be less likely but difficult to definitively exclude. Recommend  attention on follow-up exam.    Findings were communicated with Dr. Gilliam at 15:02 hours on  9/20/2024.    REFERENCE:  Guidelines for Management of Incidental Pulmonary Nodules Detected on  CT Images: From the Fleischner Society 2017.   Guidelines apply to incidental nodules in patients who are 35 years or  older.  Guidelines do not apply to lung cancer screening, patients with  immunosuppression, or patients with known primary cancer.    MULTIPLE NODULES  Nodule size <6 mm  Low-risk patients: No follow-up needed.  High-risk patients: Optional follow-up at 12 months.    Nodule size 6 mm or larger  Low-risk patients: Follow-up CT at 3-6 months, then consider CT at  18-24 months.  High-risk patients: Follow-up CT at 3-6 months, then at 18-24 months  if no change.  -Use most suspicious nodule as guide to management.    Consider referral to lung nodule clinic.    ERNESTO HANNAH MD         SYSTEM ID:  [id number]   US Lower Extremity Venous Duplex Bilateral    Narrative    US LOWER EXTREMITY VENOUS DUPLEX BILATERAL 9/20/2024 4:32 PM    CLINICAL HISTORY: Pulmonary embolus, evaluate DVT  TECHNIQUE: Venous Duplex ultrasound of bilateral lower extremities  with and without compression, augmentation and duplex. Color flow and  spectral Doppler with waveform analysis performed.    COMPARISON: None.    FINDINGS: Exam includes the common femoral, femoral, popliteal veins  as well as segmentally visualized deep calf veins and greater  saphenous vein.     RIGHT: No  deep vein thrombosis. No superficial thrombophlebitis. No  popliteal cyst.    LEFT: No deep vein thrombosis. No superficial thrombophlebitis. No  popliteal cyst.      Impression    IMPRESSION:  1.  No evidence of deep venous thrombosis in the bilateral lower  extremities.    AUREA KHAN MD         SYSTEM ID:  [id number]

## 2024-09-21 NOTE — PLAN OF CARE
Goal Outcome Evaluation:    Plan of Care Reviewed With: patient    Overall Patient Progress: no changeOverall Patient Progress: no change  Shift [id number]:  VS: Temp: 98.5  F (36.9  C) Temp src: Oral BP: 127/83 Pulse: 86   Resp: 20 SpO2: 98 % O2 Device: None (Room air)      O2: Stable On RA. Denies nausea, shortness of breath, and chest pain.   Output: Pt is continent of both bowel and bladder   Last BM: 9/20/2024.    Activity: Pt ambulates independently.    Skin: Visible skin intact.    Pain: Headache, body ache,managed with tylenol.   Neuro: A&Ox4, able to make needs known. Pt denies numbness and tingling in all extremities.   Dressing: None   Diet: Regular diet. No N/V reported   LDA: R PIV infusing LR at 50 mL/hr   Equipment: Call light within reach, Personal items,    Plan: Continue POC, abx   Additional Info: Pt is coughing and managed with tessalon capsule.

## 2024-09-22 PROCEDURE — 258N000003 HC RX IP 258 OP 636: Performed by: PHYSICIAN ASSISTANT

## 2024-09-22 PROCEDURE — 250N000013 HC RX MED GY IP 250 OP 250 PS 637: Performed by: STUDENT IN AN ORGANIZED HEALTH CARE EDUCATION/TRAINING PROGRAM

## 2024-09-22 PROCEDURE — 94640 AIRWAY INHALATION TREATMENT: CPT | Mod: 76

## 2024-09-22 PROCEDURE — 99232 SBSQ HOSP IP/OBS MODERATE 35: CPT | Performed by: INTERNAL MEDICINE

## 2024-09-22 PROCEDURE — 250N000011 HC RX IP 250 OP 636: Performed by: INTERNAL MEDICINE

## 2024-09-22 PROCEDURE — 999N000157 HC STATISTIC RCP TIME EA 10 MIN

## 2024-09-22 PROCEDURE — 120N000002 HC R&B MED SURG/OB UMMC

## 2024-09-22 PROCEDURE — 94640 AIRWAY INHALATION TREATMENT: CPT

## 2024-09-22 PROCEDURE — 250N000011 HC RX IP 250 OP 636: Performed by: PHYSICIAN ASSISTANT

## 2024-09-22 PROCEDURE — 250N000009 HC RX 250: Performed by: PHYSICIAN ASSISTANT

## 2024-09-22 PROCEDURE — 250N000013 HC RX MED GY IP 250 OP 250 PS 637: Performed by: INTERNAL MEDICINE

## 2024-09-22 PROCEDURE — 250N000013 HC RX MED GY IP 250 OP 250 PS 637: Performed by: PHYSICIAN ASSISTANT

## 2024-09-22 RX ORDER — LORAZEPAM 2 MG/ML
1 INJECTION INTRAMUSCULAR DAILY
Status: DISCONTINUED | OUTPATIENT
Start: 2024-09-22 | End: 2024-09-23 | Stop reason: HOSPADM

## 2024-09-22 RX ADMIN — AZITHROMYCIN DIHYDRATE 250 MG: 250 TABLET ORAL at 08:14

## 2024-09-22 RX ADMIN — BENZONATATE 100 MG: 100 CAPSULE ORAL at 13:43

## 2024-09-22 RX ADMIN — IPRATROPIUM BROMIDE AND ALBUTEROL SULFATE 3 ML: .5; 3 SOLUTION RESPIRATORY (INHALATION) at 14:15

## 2024-09-22 RX ADMIN — RIVAROXABAN 15 MG: 10 TABLET, FILM COATED ORAL at 17:39

## 2024-09-22 RX ADMIN — FLUTICASONE PROPIONATE 1 SPRAY: 50 SPRAY, METERED NASAL at 08:14

## 2024-09-22 RX ADMIN — MONTELUKAST 10 MG: 10 TABLET, FILM COATED ORAL at 08:14

## 2024-09-22 RX ADMIN — BENZONATATE 100 MG: 100 CAPSULE ORAL at 12:01

## 2024-09-22 RX ADMIN — ARIPIPRAZOLE 5 MG: 5 TABLET ORAL at 08:14

## 2024-09-22 RX ADMIN — IBUPROFEN 400 MG: 200 TABLET, FILM COATED ORAL at 03:47

## 2024-09-22 RX ADMIN — CEFEPIME 2 G: 2 INJECTION, POWDER, FOR SOLUTION INTRAVENOUS at 19:12

## 2024-09-22 RX ADMIN — METHYLPREDNISOLONE SODIUM SUCCINATE 40 MG: 40 INJECTION, POWDER, FOR SOLUTION INTRAMUSCULAR; INTRAVENOUS at 13:37

## 2024-09-22 RX ADMIN — FEXOFENADINE HYDROCHLORIDE 60 MG: 60 TABLET ORAL at 08:14

## 2024-09-22 RX ADMIN — BENZONATATE 100 MG: 100 CAPSULE ORAL at 03:47

## 2024-09-22 RX ADMIN — IPRATROPIUM BROMIDE AND ALBUTEROL SULFATE 3 ML: .5; 3 SOLUTION RESPIRATORY (INHALATION) at 17:28

## 2024-09-22 RX ADMIN — CEFEPIME 2 G: 2 INJECTION, POWDER, FOR SOLUTION INTRAVENOUS at 02:24

## 2024-09-22 RX ADMIN — IPRATROPIUM BROMIDE AND ALBUTEROL SULFATE 3 ML: .5; 3 SOLUTION RESPIRATORY (INHALATION) at 09:11

## 2024-09-22 RX ADMIN — LEVOTHYROXINE SODIUM 175 MCG: 175 TABLET ORAL at 08:14

## 2024-09-22 RX ADMIN — LORAZEPAM 1 MG: 2 INJECTION INTRAMUSCULAR; INTRAVENOUS at 17:40

## 2024-09-22 RX ADMIN — CEFEPIME 2 G: 2 INJECTION, POWDER, FOR SOLUTION INTRAVENOUS at 12:03

## 2024-09-22 RX ADMIN — BENZONATATE 100 MG: 100 CAPSULE ORAL at 20:03

## 2024-09-22 RX ADMIN — FLUTICASONE FUROATE AND VILANTEROL TRIFENATATE 1 PUFF: 200; 25 POWDER RESPIRATORY (INHALATION) at 08:14

## 2024-09-22 RX ADMIN — ENOXAPARIN SODIUM 105 MG: 120 INJECTION SUBCUTANEOUS at 02:30

## 2024-09-22 RX ADMIN — BENZONATATE 100 MG: 100 CAPSULE ORAL at 08:14

## 2024-09-22 RX ADMIN — SODIUM CHLORIDE, POTASSIUM CHLORIDE, SODIUM LACTATE AND CALCIUM CHLORIDE: 600; 310; 30; 20 INJECTION, SOLUTION INTRAVENOUS at 21:05

## 2024-09-22 RX ADMIN — UMECLIDINIUM 1 PUFF: 62.5 AEROSOL, POWDER ORAL at 08:14

## 2024-09-22 NOTE — PLAN OF CARE
Goal Outcome Evaluation:     Plan of Care Reviewed With: patient    Overall Patient Progress: no changeOverall Patient Progress: no change    Shift 0921-0666:  VS: Temp: 98  F (36.7  C) Temp src: Oral BP: (!) 140/77 Pulse: 79   Resp: 18 SpO2: 97 % O2 Device: None (Room air)      O2: Stable On RA. Pt denies chest pain and SOB.    Output: Continent of both bowel and bladder.   Last BM: 9/21/2024.    Activity: Pt independent in room.   Skin: Visible skin intact.    Pain: Pt denies pain, headache managed with prn Ibuprofen   Neuro: A&Ox4. Pt is able to make needs known. Denies numbness and tingling.   Dressing: None   Diet: Regular diet. Denies N/V   LDA: R PIV infusing LR  at 50mL/hr   Equipment: Call light within reach, Personal items,    Plan: Continue POC.   Additional Info:

## 2024-09-22 NOTE — PROGRESS NOTES
"  VS: /82 (BP Location: Right arm)   Pulse 79   Temp 98.2  F (36.8  C) (Oral)   Resp 18   Ht 1.727 m (5' 8\")   Wt 109.8 kg (242 lb)   LMP 09/08/2024 (Exact Date)   SpO2 96%   BMI 36.80 kg/m       O2: RA   Output: Voiding without difficulty    Last BM: 9/21   Activity: Up ad luis   Skin: CDI   Pain: Pt denies any pain at this time    CMS: A?Ox4   Dressing: CDI   Diet: Regular   LDA: PIVLLFA Infusing NS 50ml/hr   Plan: Continue with care    Additional Info: Pt experienced some anxiety during her coughing fits today. Ativan was ordered.       "

## 2024-09-22 NOTE — PROGRESS NOTES
[facility]    Medicine Progress Note - Hospitalist Service, GOLD TEAM 17    Date of Admission:  9/20/2024    Assessment & Plan   Cathy Arroyo is a 43 year old female admitted on 9/20/2024 for pneumonia, PE, and R ear pain.  She has history of pineocytoma s/p craniotomy (2014), recently pneumonia and adenovirus, and recent L hearing loss.      # CAP vs HAP  ---   Patient presented with SOB without hypoxia.   ---   She was seen in ED on 9/2/24 at that time with SOB and L ear pain. Found to have pneumonia due to mycoplasma (positive on sputum) and adenovirus. She was treated with one dose of dexamethasone in light of pneumonia exacerbating her asthma, as well as sent home on Augmentin and doxycycline.  She all but finished the course but developed vomiting the last 2 days, thus she likely vomited up 1-2 days of the abx.  She does note interval improvement while on antibiotics of her symptoms, but that her symptoms returned after the course of treatment.  She then developed shortness of breath, persistent productive cough, and low-grade fevers at home.  Presented to [facility] ED on 9/20 seeking medical attention  ---   CT chest PE 9/20 suggestive of bronchopneumonia with multifocal areas of clustered pulm nodules with tree-in-bud nodularity. Likely reactive lymphadenopathy.   ---   WBC 12.0 ---> 9.5    ---   Covid / flu / RSV PCR negative  ---   Given azithromycin + ceftriaxone by ED staff  Plan:   ---   Started pt on cefepime + vanco at admit given c/f HAP or resistant organism   ---   Started pt Azithromycin at admit for atypical coverage  ---   Sputum culture   ---   Urine legionella and strep pneumo ags negative  ---   MRSA nasal swab PCR negative  ---   Discontinued IV Vancomycin on 9/21  ---   Continue schedule tessalon perles  ---   Scheduled robitussin DM 10 ml q6 hrs  ---   Remained on RA     #  Bilateral small segmental pulmonary emboli  ---   Presented  with SOB without hypoxia as above. Other vitals stable. No recent travel, surgeries, periods of immobility. No OCPs. Does smoke, trying to cut down and currently at 7 cigarettes per day.  No chest pain.  ---   CT PE confirms several small segmental pulmonary emboli seen along upper lobe branches of both pulmonary arteries.  No evidence of right heart strain.   ---   Negative BLE US for DVT.   ---   Normal trop  ---   Hx of pineal tumor that was benign on path. High risk breast ca, with screening breast MRI in the last few months that was negative.   ---   Pulm nodules with smoking history as already noted.   Plan:   ---   Continue Lovenox treatment dosing 1 mg/kg BID  ---   Pharmacy test claim for DOACs requested   ---   Transition to [email or web address]     # Asthma exacerbation  # Moderate persistent asthma  ---   Likely r/t pneumonia as above.  Expiratory wheezes throughout all lung fields on exam.   ---   Scheduled duoneb Q4H while awake  ---   PTA montelukast daily  ---   PTA inhaler subto Breo Ellipta daily  ---   PTA inhaler sub to incruse ellipta daily  ---   Hhold PTA scheduled albuterol Q6H   ---   PRN albuterol Q2H  ---   Start solu medrol 40 mg iv daily, does not tolerate steroid (causes agitation) w/o Ativan   ---   Ativan 1 mg daily ordered  ---   Treatment of pneumonia as above     #  Bilateral hearing impairment  #  Recurrent otitis media as adult.   ---   Patient states a few weeks ago started developing L ear pain and fullness, with impaired hearing. At that time did have URI symptoms with the above pneumonia symptoms. She was sent home on abx of Augmentin and doxy as above. She states her symptoms did not really go away and this past week she developed R ear pain, fullness, and impaired hearing.  On Tuesday she was at a coffee shop with friends and had difficulty understanding their conversation. Of note this recurrence of SOB and pneumonia is not associated with URI symptoms. Has also noted tinnitus  "and possible vertigo. Has history of recurrent ear infections as a child and several as an adult but no prior ear surgeries.  ----   On exam, able to converse appropriately and has functional hearing to bilateral ears. Tms do seem to be bulging with some erythema.   ----   Saw PCP and audiologist OP on 9/19 whose evaluation results are right normal/borderline normal hearing and left mild to moderate conductive hearing loss. ENT referral was placed. Visit on 10/14/2024.   Plan:   ---   Discontinue ENT consult since pt's decreased hearing have resolved  ---   Broad spectrum abx as above  ---   Mmonitor for any clinical worsening     #  Hx pineal gland tumor resection 2014  #  Acute on chronic headaches  #  Hx migraines  ---   [place] [facility] [place]acerebellar [place]fratentorial approach. Follows with NS. Path benign. Had persistent HA and neck pain this summer. Imaging at the time demonstrated concerns that one of the screws had come loose. Saw NSG in July and they felt that hardware positioning was stable and unlikely acute headaches were related to hardware.   ---   Continue PTA rizatriptan rescue med     #  Pulmonary nodules and c/f lymphoma  ---   As above, chest CT obtained for SOB. Read for \" multifocal areas of clustered pulmonary nodules with tree-in-bud nodularity, moderate bronchial wall thickening and mucous plugging.  Findings suggestive of bronchopneumonia.  Recommend clinical correlation and 36-month CT chest follow-up exam.  Also read for multiple enlarged mediastinal and hilar lymph nodes, likely reactive.  Lymphoproliferative disorder such as lymphoma is considered to be less likely but difficult to definitively exclude.  Recommend attention on follow-up exam.  ---   Plan for repeat chest CT OP for follow-up     #  Post surgical hypothyroidism  #  Grave's s/p total thyroidectomy  ---   Cont PTA levothyroxine     #  Bipolar affective disorder  #  Anxiety  #  PTSD  ---   At home on " medical marijuana for PTSD.   ---   Continue PTA abilify     #  Tobacco dependence  ---   Interested in cessation.  Has had difficulty due to stated allergy to nicotine products, and contraindications for Wellbutrin and Chantix with her bipolar disorder.  ---   Cessation consult     #  Dyslipidemia  #  Obesity  #  Hx sleep apnea     #  High risk for breast cancer  ---   Just had breast screening MRI this summer. Follows with onc.            Diet: Combination Diet Regular Diet Adult  DVT Prophylaxis: Enoxaparin (Lovenox) subcutaneous treatment dosing  Lee Catheter: Not present  Lines: None     Cardiac Monitoring: None  Code Status: Full Code  Disposition Plan   Medically Ready for Discharge: Anticipated in 2-4 Days           Forrest Camacho MD  Hospitalist Service, GOLD TEAM 17  [facility]  Securely message with [employer] (more info)  Text page via [employer] Paging/Directory   See signed in provider for up to date coverage information  ______________________________________________________________________    Interval History   Did not sleep well last night due to incessant cough  SOB better  Denied CP  Remained on RA  She says she feels better than when she presented to the ED    Physical Exam   Vital Signs: Temp: 98.2  F (36.8  C) Temp src: Oral BP: 139/82 Pulse: 79   Resp: 18 SpO2: 97 % O2 Device: None (Room air)    Weight: 242 lbs 0 oz  General: Morbidly obese, aao x 3, NAD.  HEENT:  NC/AT, PERRL, EOMI, neck supple, no thyromegaly, op clear, mmm.  CVS:  NL s 1 and s2, no m/r/g.  Lungs:  Coarse BS w/ end exp wheezing   Abd:  Soft, + bs, NT, no rebound or gaurding, no fluid shift.  Ext:  No c/c.  Lymph:  No edema.  Neuro:  Nonfocal.  Musculoskeletal: No calf tenderness to palpation.    Skin:  No rash.  Psychiatry:  Mood and affect appropriate.        Data         Imaging results reviewed over the past 24 hrs:   No results found for this or any previous visit (from the past 24  hour(s)).

## 2024-09-23 VITALS
BODY MASS INDEX: 36.68 KG/M2 | RESPIRATION RATE: 16 BRPM | SYSTOLIC BLOOD PRESSURE: 136 MMHG | DIASTOLIC BLOOD PRESSURE: 92 MMHG | HEART RATE: 75 BPM | WEIGHT: 242 LBS | TEMPERATURE: 98.2 F | OXYGEN SATURATION: 95 % | HEIGHT: 68 IN

## 2024-09-23 LAB
POTASSIUM SERPL-SCNC: 4.1 MMOL/L (ref 3.4–5.3)
PROCALCITONIN SERPL IA-MCNC: 0.04 NG/ML

## 2024-09-23 PROCEDURE — 250N000013 HC RX MED GY IP 250 OP 250 PS 637: Performed by: INTERNAL MEDICINE

## 2024-09-23 PROCEDURE — 99239 HOSP IP/OBS DSCHRG MGMT >30: CPT | Performed by: INTERNAL MEDICINE

## 2024-09-23 PROCEDURE — 84145 PROCALCITONIN (PCT): CPT | Performed by: INTERNAL MEDICINE

## 2024-09-23 PROCEDURE — 250N000009 HC RX 250: Performed by: PHYSICIAN ASSISTANT

## 2024-09-23 PROCEDURE — 94640 AIRWAY INHALATION TREATMENT: CPT | Mod: 76

## 2024-09-23 PROCEDURE — 250N000013 HC RX MED GY IP 250 OP 250 PS 637: Performed by: PHYSICIAN ASSISTANT

## 2024-09-23 PROCEDURE — 999N000157 HC STATISTIC RCP TIME EA 10 MIN

## 2024-09-23 PROCEDURE — 250N000011 HC RX IP 250 OP 636: Performed by: PHYSICIAN ASSISTANT

## 2024-09-23 PROCEDURE — 999N000040 HC STATISTIC CONSULT NO CHARGE VASC ACCESS

## 2024-09-23 PROCEDURE — 250N000011 HC RX IP 250 OP 636: Performed by: INTERNAL MEDICINE

## 2024-09-23 PROCEDURE — 84132 ASSAY OF SERUM POTASSIUM: CPT

## 2024-09-23 PROCEDURE — 36415 COLL VENOUS BLD VENIPUNCTURE: CPT

## 2024-09-23 RX ORDER — FLUCONAZOLE 150 MG/1
150 TABLET ORAL ONCE
Status: COMPLETED | OUTPATIENT
Start: 2024-09-23 | End: 2024-09-23

## 2024-09-23 RX ORDER — FLUCONAZOLE 150 MG/1
150 TABLET ORAL
Qty: 1 TABLET | Refills: 0 | Status: SHIPPED | OUTPATIENT
Start: 2024-09-23 | End: 2024-10-01

## 2024-09-23 RX ORDER — GUAIFENESIN/DEXTROMETHORPHAN 100-10MG/5
10 SYRUP ORAL EVERY 4 HOURS PRN
COMMUNITY
Start: 2024-09-23 | End: 2024-10-01

## 2024-09-23 RX ORDER — ACETAMINOPHEN 325 MG/1
650 TABLET ORAL EVERY 6 HOURS PRN
COMMUNITY
Start: 2024-09-23

## 2024-09-23 RX ADMIN — FLUCONAZOLE 150 MG: 150 TABLET ORAL at 11:45

## 2024-09-23 RX ADMIN — UMECLIDINIUM 1 PUFF: 62.5 AEROSOL, POWDER ORAL at 08:31

## 2024-09-23 RX ADMIN — CEFEPIME 2 G: 2 INJECTION, POWDER, FOR SOLUTION INTRAVENOUS at 02:26

## 2024-09-23 RX ADMIN — LEVOTHYROXINE SODIUM 175 MCG: 175 TABLET ORAL at 08:31

## 2024-09-23 RX ADMIN — RIVAROXABAN 15 MG: 10 TABLET, FILM COATED ORAL at 08:32

## 2024-09-23 RX ADMIN — ARIPIPRAZOLE 5 MG: 5 TABLET ORAL at 08:32

## 2024-09-23 RX ADMIN — LORAZEPAM 1 MG: 2 INJECTION INTRAMUSCULAR; INTRAVENOUS at 08:35

## 2024-09-23 RX ADMIN — FLUTICASONE PROPIONATE 1 SPRAY: 50 SPRAY, METERED NASAL at 08:30

## 2024-09-23 RX ADMIN — IPRATROPIUM BROMIDE AND ALBUTEROL SULFATE 3 ML: .5; 3 SOLUTION RESPIRATORY (INHALATION) at 09:26

## 2024-09-23 RX ADMIN — MONTELUKAST 10 MG: 10 TABLET, FILM COATED ORAL at 08:32

## 2024-09-23 RX ADMIN — GUAIFENESIN AND DEXTROMETHORPHAN 10 ML: 100; 10 SYRUP ORAL at 01:07

## 2024-09-23 RX ADMIN — FLUTICASONE FUROATE AND VILANTEROL TRIFENATATE 1 PUFF: 200; 25 POWDER RESPIRATORY (INHALATION) at 08:30

## 2024-09-23 RX ADMIN — CETIRIZINE HYDROCHLORIDE 10 MG: 10 TABLET, FILM COATED ORAL at 08:32

## 2024-09-23 RX ADMIN — BENZONATATE 100 MG: 100 CAPSULE ORAL at 08:33

## 2024-09-23 RX ADMIN — AZITHROMYCIN DIHYDRATE 250 MG: 250 TABLET ORAL at 08:32

## 2024-09-23 ASSESSMENT — ACTIVITIES OF DAILY LIVING (ADL)
ADLS_ACUITY_SCORE: 20
ADLS_ACUITY_SCORE: 35
ADLS_ACUITY_SCORE: 20
ADLS_ACUITY_SCORE: 20
ADLS_ACUITY_SCORE: 35
ADLS_ACUITY_SCORE: 20
ADLS_ACUITY_SCORE: 35
ADLS_ACUITY_SCORE: 20

## 2024-09-23 NOTE — DISCHARGE INSTRUCTIONS
Results for orders placed or performed during the hospital encounter of 09/20/24   CT Chest Pulmonary Embolism w Contrast    Narrative    CT CHEST PULMONARY EMBOLISM W CONTRAST 9/20/2024 2:47 PM    CLINICAL HISTORY: Tachycardia, ongoing cough despite clear chest  x-ray, quadrant pleuritic pain evaluate PE pneumonia  TECHNIQUE: CT angiogram chest during arterial phase injection IV  contrast. 2D and 3D MIP reconstructions were performed by the CT  technologist. Dose reduction techniques were used.     CONTRAST: 74 mL isovue 370    COMPARISON: CT chest performed on 2/10/2022    FINDINGS:  ANGIOGRAM CHEST: Several small filling defects are seen along the  segmental branches of the right upper (series 4, image 96) and left  upper (series 4, images 88 and 95) branches of the pulmonary arteries  Thoracic aorta is negative for dissection. No CT evidence of right  heart strain.    LUNGS AND PLEURA: No pleural effusion or pneumothorax is seen.  Multiple new clustered pulmonary nodules with areas of tree-in-bud  nodularity are seen in the lungs bilaterally, most conspicuously in  the right upper, left upper and left lower lobes. One of the largest  nodules measures up to 9 mm in the left upper lobe (series 6, image  79). There are also areas of mucous plugging and moderate bronchial  wall thickening seen bilaterally. Mosaic attenuation is also seen  bilaterally suggestive of air trapping.    MEDIASTINUM/AXILLAE: Multiple enlarged mediastinal and hilar lymph  nodes are present, one of the largest measuring up to 1.2 cm along the  right hilum (series 4, image 102). Another example includes a 10 mm  lymph node in the subcarinal space (series 4, image 124). Heart size  is normal.    UPPER ABDOMEN: The visualized portions appear unremarkable.    MUSCULOSKELETAL: No suspicious osseous lesions are seen.      Impression    IMPRESSION:  1.  Several small segmental pulmonary emboli are seen along the upper  lobe branches of both  pulmonary arteries. No evidence of right-sided  heart strain is seen.  2.  Multifocal areas of clustered pulmonary nodules with tree-in-bud  nodularity, moderate bronchial wall thickening and mucous plugging.  Findings are suggestive of bronchopneumonia. Recommend clinical  correlation and 3-6 month CT chest follow-up exam.  3.  Multiple enlarged mediastinal and hilar lymph nodes, likely  reactive. Lymphoproliferative disorder such as lymphoma is considered  to be less likely but difficult to definitively exclude. Recommend  attention on follow-up exam.      REFERENCE:  Guidelines for Management of Incidental Pulmonary Nodules Detected on  CT Images: From the Fleischner Society 2017.   Guidelines apply to incidental nodules in patients who are 35 years or  older.  Guidelines do not apply to lung cancer screening, patients with  immunosuppression, or patients with known primary cancer.    MULTIPLE NODULES  Nodule size <6 mm  Low-risk patients: No follow-up needed.  High-risk patients: Optional follow-up at 12 months.    Nodule size 6 mm or larger  Low-risk patients: Follow-up CT at 3-6 months, then consider CT at  18-24 months.  High-risk patients: Follow-up CT at 3-6 months, then at 18-24 months  if no change.  -Use most suspicious nodule as guide to management.    Consider referral to lung nodule clinic.          SYSTEM ID:  [id number]   US Lower Extremity Venous Duplex Bilateral    Narrative    US LOWER EXTREMITY VENOUS DUPLEX BILATERAL 9/20/2024 4:32 PM    CLINICAL HISTORY: Pulmonary embolus, evaluate DVT  TECHNIQUE: Venous Duplex ultrasound of bilateral lower extremities  with and without compression, augmentation and duplex. Color flow and  spectral Doppler with waveform analysis performed.    COMPARISON: None.    FINDINGS: Exam includes the common femoral, femoral, popliteal veins  as well as segmentally visualized deep calf veins and greater  saphenous vein.     RIGHT: No deep vein thrombosis. No superficial  thrombophlebitis. No  popliteal cyst.    LEFT: No deep vein thrombosis. No superficial thrombophlebitis. No  popliteal cyst.      Impression    IMPRESSION:  1.  No evidence of deep venous thrombosis in the bilateral lower  extremities.       *Note: Due to a large number of results and/or encounters for the requested time period, some results have not been displayed. A complete set of results can be found in Results Review.

## 2024-09-23 NOTE — PLAN OF CARE
Plan of Care Reviewed With: patient    Overall Patient Progress: no change    Shift 5802-3670:  VS: Temp: 98.4  F (36.9  C) Temp src: Oral BP: 127/75 Pulse: 80   Resp: 18 SpO2: 95 % O2 Device: None (Room air)         O2: Stable On RA. Denies nausea, shortness of breath, and chest pain.   Output: Pt is continent of both bowel and bladder   Last BM: 9/22/2024.    Activity: Pt is independent in room.    Skin: Visible skin intact.    Pain: Pt denies pain.    Neuro: A&Ox4, able to make needs known. Pt denies numbness and tingling in all extremities.   Dressing: None   Diet: Regular diet. No N/V reported   LDA: PIV is patent in the R forearm.    Equipment: Call light within reach, Personal items.   Plan: Continue POC, abx    Additional Info: For cough managed with scheduled Tessalon and prn Robitussin DM.  LR IVF discontinued.

## 2024-09-23 NOTE — DISCHARGE SUMMARY
"[facility]  Hospitalist Discharge Summary      Date of Admission:  9/20/2024  Date of Discharge:  9/23/2024  Discharging Provider: Phi Bernstein MD  Discharge Service: Hospitalist Service, GOLD TEAM 17    Discharge Diagnoses     Acute Pulmonary Embolism   Community acquired pneumonia  Asthma exacerbation.   Recurrent otitis media       Clinically Significant Risk Factors     # Obesity: Estimated body mass index is 36.8 kg/m  as calculated from the following:    Height as of this encounter: 1.727 m (5' 8\").    Weight as of this encounter: 109.8 kg (242 lb).       Follow-ups Needed After Discharge   Follow-up Appointments     Adult [facility] Follow-up and recommended labs and tests      Follow up with primary care provider, Sherrill Dasilva, within 7 days for   hospital follow- up.      Follow up with Hematology in 2-3 months. (Acute Pulmonary Embolism and   Anticoagulation Management)      Appointments on [place] and/or [facility] (with [facility] or [facility]   provider or service). Call [phone] if you haven't heard regarding   these appointments within 7 days of discharge.            Discharge Disposition   Discharged to home  Condition at discharge: Stable    Hospital Course       Cathy Arroyo is a 43 year old female admitted on 9/20/2024 for pneumonia, PE, and R ear pain.  She has history of pineocytoma s/p craniotomy (2014), recently pneumonia and adenovirus, and recent L hearing loss.      # Suspect Community acquire pna.   ---   Patient presented with SOB without hypoxia.   ---   She was seen in ED on 9/2/24 at that time with SOB and L ear pain. Found to have pneumonia due to mycoplasma (positive on sputum) and adenovirus. She was treated with one dose of dexamethasone in light of pneumonia exacerbating her asthma, as well as sent home on Augmentin and doxycycline.  She all but finished the course but developed vomiting the last 2 days, thus she likely " vomited up 1-2 days of the abx.  She does note interval improvement while on antibiotics of her symptoms, but that her symptoms returned after the course of treatment.  She then developed shortness of breath, persistent productive cough, and low-grade fevers at home.  Presented to [facility] ED on 9/20 seeking medical attention  ---   CT chest PE 9/20 suggestive of bronchopneumonia with multifocal areas of clustered pulm nodules with tree-in-bud nodularity. Likely reactive lymphadenopathy.   ---   WBC 12.0 ---> 9.5    ---   Covid / flu / RSV PCR negative  ---   Given azithromycin + ceftriaxone by ED staff  Plan:   ---   Admitted and empirically started pt on iv cefepime + vancomycin concern for resistant organism    ---   Started on Azithromycin at admit for atypical coverage  ---   Sputum culture sent: negative.   -- RPV, BC, legionella and pneumococal Ag neg. MRSA nasal swab neg.   ---   Discontinued IV Vancomycin on 9/21  ---   cough meds. IS.   ---    Completed total 7 days of antibiotics combined outpatient and inpatient course.      #  Bilateral small segmental pulmonary emboli  ---   Presented with SOB without hypoxia as above. Other vitals stable. No recent travel, surgeries, periods of immobility. No OCPs. Does smoke, trying to cut down and currently at 7 cigarettes per day.  No chest pain.  ---   CT PE confirms several small segmental pulmonary emboli seen along upper lobe branches of both pulmonary arteries.  No evidence of right heart strain.   ---   Negative BLE US for DVT.   ---   Normal troponin   ---   Hx of pineal tumor that was benign on path. High risk breast ca, with screening breast MRI in the last few months that was negative.   ---   Pulm nodules with smoking history as already noted.   Plan:   ---   Continue Lovenox treatment dosing 1 mg/kg BID  ---   Pharmacy test claim for DOACs requested   ---   Transitioned to rivaroxaban.   --    Duration: 3-6 months.   --     Follow-up with  hematology outpt.   Patient counseled.      # Asthma exacerbation  # Moderate persistent asthma  ---   Likely r/t pneumonia as above.  Expiratory wheezes throughout all lung fields on exam.   ---   Scheduled duoneb Q4H while awake  ---   PTA montelukast daily  ---   PTA inhaler subto Breo Ellipta daily  ---   PTA inhaler sub to incruse ellipta daily  ---   [place] PTA scheduled albuterol Q6H   ---   PRN albuterol Q2H  ---   Given solu medrol 40 mg iv daily, does not tolerate steroid (causes agitation) w/o Ativan   ---   Ativan 1 mg daily ordered  ---   Treatment of pneumonia as above  ---   on day of discharge, on RA, doing better. Steroids stopped.      #  Bilateral hearing impairment  #  Recurrent otitis media as adult.   ---   Patient states a few weeks ago started developing L ear pain and fullness, with impaired hearing. At that time did have URI symptoms with the above pneumonia symptoms. She was sent home on abx of Augmentin and doxy as above. She states her symptoms did not really go away and this past week she developed R ear pain, fullness, and impaired hearing.  On Tuesday she was at a coffee shop with friends and had difficulty understanding their conversation. Of note this recurrence of SOB and pneumonia is not associated with URI symptoms. Has also noted tinnitus and possible vertigo. Has history of recurrent ear infections as a child and several as an adult but no prior ear surgeries.  ----   On exam, able to converse appropriately and has functional hearing to bilateral ears. Tms do seem to be bulging with some erythema.   ----   Saw PCP and audiologist OP on 9/19 whose evaluation results are right normal/borderline normal hearing and left mild to moderate conductive hearing loss. ENT referral was placed. Visit on 10/14/2024.   Plan:   ---   Discontinue ENT consult since pt's decreased hearing have resolved  ---   Broad spectrum abx as above  ---   Monitor for any clinical worsening  Outpatient  "follow-up      #  Hx pineal gland tumor resection 2014  #  Acute on chronic headaches  #  Hx migraines  ---   Performed by Dr. Mckeon of [facility] via a supracerebellar infratentorial approach. Follows with NSG. Path benign. Had persistent HA and neck pain this summer. Imaging at the time demonstrated concerns that one of the screws had come loose. Saw NSG in July and they felt that hardware positioning was stable and unlikely acute headaches were related to hardware.   ---   Continue PTA rizatriptan rescue med     #  Pulmonary nodules and c/f lymphoma  ---   As above, chest CT obtained for SOB. Read for \" multifocal areas of clustered pulmonary nodules with tree-in-bud nodularity, moderate bronchial wall thickening and mucous plugging.  Findings suggestive of bronchopneumonia.  Recommend clinical correlation and 36-month CT chest follow-up exam.  Also read for multiple enlarged mediastinal and hilar lymph nodes, likely reactive.  Lymphoproliferative disorder such as lymphoma is considered to be less likely but difficult to definitively exclude.  Recommend attention on follow-up exam.  ---   Plan for repeat chest CT OP for follow-up     #  Post surgical hypothyroidism  #  Grave's s/p total thyroidectomy  ---   Cont PTA levothyroxine     #  Bipolar affective disorder  #  Anxiety  #  PTSD  ---   At home on medical marijuana for PTSD.   ---   Continue PTA abilify     #  Tobacco dependence  ---   Interested in cessation.  Has had difficulty due to stated allergy to nicotine products, and contraindications for Wellbutrin and Chantix with her bipolar disorder.  ---   Cessation encouraged.      #  Dyslipidemia  #  Obesity  #  Hx sleep apnea     #  High risk for breast cancer  ---   Just had breast screening MRI this summer. Follows with onc.                Consultations This Hospital Stay   SMOKING CESSATION PROGRAM IP CONSULT  PHARMACY TO DOSE VANCO  ENT IP CONSULT  PHARMACY LIAISON FOR MEDICATION COVERAGE CONSULT  PHARMACY " LIAISON FOR MEDICATION COVERAGE CONSULT  NURSING TO CONSULT FOR VASCULAR ACCESS CARE IP CONSULT    Code Status   Full Code    Time Spent on this Encounter   I, Phi Bernstein MD, personally saw the patient today and spent greater than 30 minutes discharging this patient.       Phi Bernstein MD  [facility] [address]  [address] [address]  Phone: [phone]  Fax: [phone]  ______________________________________________________________________    Physical Exam   Vital Signs: Temp: 98.2  F (36.8  C) Temp src: Oral BP: (!) 136/92 Pulse: 75   Resp: 16 SpO2: 95 % O2 Device: None (Room air)    Weight: 242 lbs 0 oz  General Appearance: Awake, interactive, NAD  HEENT: AT/NC, Anicteric, Moist MM  Neck: Supple.   Respiratory: Normal work of breathing. Occasional exp wheeze. On RA  Cardiovascular: S1 S2 Regular.  GI/Abd: Soft. NT. ND. No g/r. BS+  Extremities: Distally wwp.  Neuro: AO x 4, Grossly non focal.   Skin: No acute rash on exposed areas.   Psychiatry: Stable mood.        Primary Care Physician   Sherrill Dasilva    Discharge Orders      Primary Care - Care Coordination Referral      Reason for your hospital stay    Bronchopneumonia (mycoplasma and adenovirus)  Acute pulmonary embolism.    Will need CT Chest follow-up in 3-6 months. See under discharge instructions.     Activity    Your activity upon discharge: activity as tolerated     Adult [facility] Follow-up and recommended labs and tests    Follow up with primary care provider, Sherrill Dasilva, within 7 days for hospital follow- up.      Follow up with Hematology in 2-3 months. (Acute Pulmonary Embolism and Anticoagulation Management)      Appointments on [place] and/or [facility] (with [facility] or [facility] provider or service). Call [phone] if you haven't heard regarding these appointments within 7 days of discharge.     Diet    Follow this diet upon discharge: Current Diet:Orders Placed This Encounter      Combination Diet Regular  Diet Adult       Significant Results and Procedures   Most Recent 3 CBC's:  Recent Labs   Lab Test 09/21/24  0554 09/20/24  1351 09/18/24  0057   WBC 9.5 12.0* 9.1   HGB 11.6* 12.8 13.2   MCV 90 87 88    324 374     Most Recent 3 BMP's:  Recent Labs   Lab Test 09/23/24  0802 09/21/24  0554 09/20/24  1351 09/18/24  0057   NA  --  138 136 138   POTASSIUM 4.1 4.1 4.2 3.9   CHLORIDE  --  107 102 102   CO2  --  22 23 26   BUN  --  11.3 10.2 12.2   CR  --  0.58 0.69 0.87   ANIONGAP  --  9 11 10   RUPALI  --  8.5* 9.1 8.9   GLC  --  120* 99 100*     Most Recent 2 LFT's:  Recent Labs   Lab Test 09/20/24  1351 09/18/24  0057   AST 14 15   ALT 14 15   ALKPHOS 84 84   BILITOTAL 0.3 0.2     Most Recent 3 INR's:  Recent Labs   Lab Test 04/23/18  1814 04/30/17  1821   INR 1.01 0.87     7-Day Micro Results       Collected Updated Procedure Result Status      09/20/2024 2105 09/20/2024 2223 Legionella Urinary Antigen and Streptococcus pneumoniae antigen [[id number]]    Urine, Clean Catch    Final result Component Value   Legionella pneumophila serogroup 1 urinary antigen Negative   A negative result does not exclude the possibility of a Legionella infection, as it can be caused by other serogroups and species of Legionella.   Streptococcus pneumoniae antigen Negative   A negative result does not exclude a Streptococcus pneumoniae infection.   Legionella pneumophila Urinary/Strep pneumoniae Antigen Specimen Type Urine            09/20/2024 1845 09/20/2024 2114 MRSA MSSA PCR, Nasal Swab [[id number]]    Swab from Nares, Bilateral    Final result Component Value   MRSA Target DNA Negative   SA Target DNA Negative            09/20/2024 1845 09/20/2024 2152 Respiratory Panel PCR [[id number]]    Swab from Nasopharyngeal    Final result Component Value   Adenovirus Not Detected   Coronavirus Not Detected   This test detects Coronavirus 229E, HKU1, NL63 and OC43 but does not distinguish between them. It does not detect MERS  ( Respiratory Syndrome), SARS (Severe Acute Respiratory Syndrome) or 2019-nCoV (Novel 2019) Coronavirus.   Human Metapneumovirus Not Detected   Human Rhin/Enterovirus Not Detected   Influenza A Not Detected   Influenza A, H1 Not Detected   Influenza A 2009 H1N1 Not Detected   Influenza A, H3 Not Detected   Influenza B Not Detected   Parainfluenza Virus 1 Not Detected   Parainfluenza Virus 2 Not Detected   Parainfluenza Virus 3 Not Detected   Parainfluenza Virus 4 Not Detected   Respiratory Syncytial Virus A Not Detected   Respiratory Syncytial Virus B Not Detected   Chlamydia Pneumoniae Not Detected   Mycoplasma Pneumoniae Not Detected            09/20/2024 1830 09/20/2024 2101 Respiratory Aerobic Bacterial Culture with Gram Stain [[id number]]   Sputum from Expectorate    Final result Component Value   Culture >10 Squamous epithelial cells/low power field indicates oral contamination. Please recollect.   Gram Stain Result >10 Squamous epithelial cells/low power field    >25 PMNs/low power field    3+ Mixed armida               09/20/2024 1539 09/25/2024 1903 Blood Culture Peripheral Blood [[id number]]   Peripheral Blood    Final result Component Value   Culture No Growth               09/20/2024 1352 09/20/2024 1513 Symptomatic Influenza A/B, RSV, & SARS-CoV2 PCR (COVID-19) Nose [[id number]]    Swab from Nose    Final result Component Value   Influenza A PCR Negative   Influenza B PCR Negative   RSV PCR Negative   SARS CoV2 PCR Negative   NEGATIVE: SARS-CoV-2 (COVID-19) RNA not detected, presumed negative.                  Most Recent Urinalysis:  Recent Labs   Lab Test 09/20/24  1354 09/02/24  1855 03/05/24  1604   COLOR Light Yellow   < > Yellow   APPEARANCE Clear   < > Clear   URINEGLC Negative   < > Negative   URINEBILI Negative   < > Negative   URINEKETONE Negative   < > Negative   SG 1.013   < > >=1.030   UBLD Negative   < > Negative   URINEPH 5.5   < > 5.5   PROTEIN Negative   < > Negative    UROBILINOGEN  --   --  0.2   NITRITE Negative   < > Negative   LEUKEST Negative   < > Negative   RBCU 1   < >  --    WBCU 10*   < >  --     < > = values in this interval not displayed.     Most Recent ESR & CRP:  Recent Labs   Lab Test 03/16/19  0826 03/14/19  1630   SED  --  52*   CRP 45.0* 103.0*   ,   Results for orders placed or performed during the hospital encounter of 09/20/24   CT Chest Pulmonary Embolism w Contrast    Narrative    CT CHEST PULMONARY EMBOLISM W CONTRAST 9/20/2024 2:47 PM    CLINICAL HISTORY: Tachycardia, ongoing cough despite clear chest  x-ray, quadrant pleuritic pain evaluate PE pneumonia  TECHNIQUE: CT angiogram chest during arterial phase injection IV  contrast. 2D and 3D MIP reconstructions were performed by the CT  technologist. Dose reduction techniques were used.     CONTRAST: 74 mL isovue 370    COMPARISON: CT chest performed on 2/10/2022    FINDINGS:  ANGIOGRAM CHEST: Several small filling defects are seen along the  segmental branches of the right upper (series 4, image 96) and left  upper (series 4, images 88 and 95) branches of the pulmonary arteries  Thoracic aorta is negative for dissection. No CT evidence of right  heart strain.    LUNGS AND PLEURA: No pleural effusion or pneumothorax is seen.  Multiple new clustered pulmonary nodules with areas of tree-in-bud  nodularity are seen in the lungs bilaterally, most conspicuously in  the right upper, left upper and left lower lobes. One of the largest  nodules measures up to 9 mm in the left upper lobe (series 6, image  79). There are also areas of mucous plugging and moderate bronchial  wall thickening seen bilaterally. Mosaic attenuation is also seen  bilaterally suggestive of air trapping.    MEDIASTINUM/AXILLAE: Multiple enlarged mediastinal and hilar lymph  nodes are present, one of the largest measuring up to 1.2 cm along the  right hilum (series 4, image 102). Another example includes a 10 mm  lymph node in the subcarinal  space (series 4, image 124). Heart size  is normal.    UPPER ABDOMEN: The visualized portions appear unremarkable.    MUSCULOSKELETAL: No suspicious osseous lesions are seen.      Impression    IMPRESSION:  1.  Several small segmental pulmonary emboli are seen along the upper  lobe branches of both pulmonary arteries. No evidence of right-sided  heart strain is seen.  2.  Multifocal areas of clustered pulmonary nodules with tree-in-bud  nodularity, moderate bronchial wall thickening and mucous plugging.  Findings are suggestive of bronchopneumonia. Recommend clinical  correlation and 3-6 month CT chest follow-up exam.  3.  Multiple enlarged mediastinal and hilar lymph nodes, likely  reactive. Lymphoproliferative disorder such as lymphoma is considered  to be less likely but difficult to definitively exclude. Recommend  attention on follow-up exam.    Findings were communicated with Dr. Gilliam at 15:02 hours on  9/20/2024.    REFERENCE:  Guidelines for Management of Incidental Pulmonary Nodules Detected on  CT Images: From the Fleischner Society 2017.   Guidelines apply to incidental nodules in patients who are 35 years or  older.  Guidelines do not apply to lung cancer screening, patients with  immunosuppression, or patients with known primary cancer.    MULTIPLE NODULES  Nodule size <6 mm  Low-risk patients: No follow-up needed.  High-risk patients: Optional follow-up at 12 months.    Nodule size 6 mm or larger  Low-risk patients: Follow-up CT at 3-6 months, then consider CT at  18-24 months.  High-risk patients: Follow-up CT at 3-6 months, then at 18-24 months  if no change.  -Use most suspicious nodule as guide to management.    Consider referral to lung nodule clinic.    ERNESTO HANNAH MD         SYSTEM ID:  [id number]   US Lower Extremity Venous Duplex Bilateral    Narrative    US LOWER EXTREMITY VENOUS DUPLEX BILATERAL 9/20/2024 4:32 PM    CLINICAL HISTORY: Pulmonary embolus, evaluate DVT  TECHNIQUE: Venous  Duplex ultrasound of bilateral lower extremities  with and without compression, augmentation and duplex. Color flow and  spectral Doppler with waveform analysis performed.    COMPARISON: None.    FINDINGS: Exam includes the common femoral, femoral, popliteal veins  as well as segmentally visualized deep calf veins and greater  saphenous vein.     RIGHT: No deep vein thrombosis. No superficial thrombophlebitis. No  popliteal cyst.    LEFT: No deep vein thrombosis. No superficial thrombophlebitis. No  popliteal cyst.      Impression    IMPRESSION:  1.  No evidence of deep venous thrombosis in the bilateral lower  extremities.    AUREA KHAN MD         SYSTEM ID:  [id number]     *Note: Due to a large number of results and/or encounters for the requested time period, some results have not been displayed. A complete set of results can be found in Results Review.       Discharge Medications   Current Discharge Medication List        START taking these medications    Details   acetaminophen (TYLENOL) 325 MG tablet Take 2 tablets (650 mg) by mouth every 6 hours as needed for mild pain, other or fever (and adjunct with moderate or severe pain or per patient request).      fluconazole (DIFLUCAN) 150 MG tablet Take 1 tablet (150 mg) by mouth once as needed (concern for vulvovagina yeast infection).  Qty: 1 tablet, Refills: 0    Associated Diagnoses: Yeast infection of the vagina      guaiFENesin-dextromethorphan (ROBITUSSIN DM) 100-10 MG/5ML syrup Take 10 mLs by mouth every 4 hours as needed for cough.      Rivaroxaban ANTICOAGULANT 15 & 20 MG TBPK Starter Therapy Pack Take 15 mg by mouth 2 times daily (with meals) for 20 days, THEN 20 mg daily with food.  Qty: 51 each, Refills: 0    Associated Diagnoses: Acute pulmonary embolism without acute cor pulmonale, unspecified pulmonary embolism type           CONTINUE these medications which have NOT CHANGED    Details   albuterol (PROAIR HFA/PROVENTIL HFA/VENTOLIN HFA) 108 (90  Base) MCG/ACT inhaler Inhale 2 puffs into the lungs every 6 hours  Qty: 18 g, Refills: 3    Comments: Pharmacy may dispense brand covered by insurance (Proair, or proventil or ventolin or generic albuterol inhaler)  Associated Diagnoses: Mild intermittent asthma without complication      amphetamine-dextroamphetamine (ADDERALL XR) 30 MG 24 hr capsule Take 30 mg by mouth daily.      ARIPiprazole (ABILIFY) 10 MG tablet Take 0.5 tablets (5 mg) by mouth daily      cetirizine (ZYRTEC) 10 MG tablet Take 1 tablet (10 mg) by mouth daily  Qty: 90 tablet, Refills: 3    Associated Diagnoses: Seasonal allergic rhinitis, unspecified trigger      diazepam (VALIUM) 5 MG tablet Take 5 mg by mouth as needed for anxiety.      Fexofenadine HCl (ALLEGRA PO)       fluticasone-salmeterol (ADVAIR) 500-50 MCG/ACT inhaler Inhale 1 puff into the lungs every 12 hours.  Qty: 3 each, Refills: 3    Associated Diagnoses: Moderate persistent asthma without complication      levothyroxine (SYNTHROID/LEVOTHROID) 175 MCG tablet Take 1 tablet (175 mcg) by mouth daily  Qty: 90 tablet, Refills: 1    Associated Diagnoses: Post-surgical hypothyroidism; S/P total thyroidectomy; History of Graves' disease      montelukast (SINGULAIR) 10 MG tablet Take 1 tablet (10 mg) by mouth every morning  Qty: 90 tablet, Refills: 3    Associated Diagnoses: Moderate persistent asthma without complication; Seasonal allergic rhinitis, unspecified trigger      rizatriptan (MAXALT-MLT) 10 MG ODT Take 10 mg by mouth at onset of headache for migraine.      tiotropium (SPIRIVA RESPIMAT) 2.5 MCG/ACT inhaler Inhale 2 puffs into the lungs daily  Qty: 4 g, Refills: 5    Associated Diagnoses: Moderate persistent asthma with acute exacerbation; Moderate persistent asthma without complication      amphetamine-dextroamphetamine (ADDERALL) 10 MG tablet Take 10 mg by mouth daily.      fluticasone (FLONASE) 50 MCG/ACT nasal spray Spray 1 spray into both nostrils daily.  Qty: 16 g, Refills:  11    Associated Diagnoses: Seasonal allergic rhinitis, unspecified trigger      guaiFENesin (MUCINEX) 600 MG 12 hr tablet Take 2 tablets (1,200 mg) by mouth 2 times daily.  Qty: 30 tablet, Refills: 0    Associated Diagnoses: History of pneumonia; Post-viral cough syndrome      !! medical cannabis (Patient's own supply) Take 1 Dose by mouth See Admin Instructions (The purpose of this order is to document that the patient reports taking medical cannabis.  This is not a prescription, and is not used to certify that the patient has a qualifying medical condition.)    Tangerine Oral Suspension Unflavored 1-5 ml up to two times daily.  Total THC = 240 mg, Total CBD Trace      !! medical cannabis (Patient's own supply) Take 1 Dose by mouth See Admin Instructions (The purpose of this order is to document that the patient reports taking medical cannabis.  This is not a prescription, and is not used to certify that the patient has a qualifying medical condition.)    V[place] oral suspension: take 1-3 ml by mouth two times daily  Total CBD 1200 mg. Total THC 60 mg      methylPREDNISolone (MEDROL DOSEPAK) 4 MG tablet therapy pack Follow Package Directions  Qty: 21 tablet, Refills: 0    Associated Diagnoses: History of pneumonia; Post-viral cough syndrome; Moderate persistent asthma without complication      omeprazole (PRILOSEC) 20 MG DR capsule Take 1 capsule (20 mg) by mouth daily.  Qty: 60 capsule, Refills: 0    Associated Diagnoses: Choking sensation; Throat pain; Change in voice; Globus sensation       !! - Potential duplicate medications found. Please discuss with provider.        Allergies   Allergies   Allergen Reactions    Adacel [Tetanus-Diphth-Acell Pertussis] Swelling    Adhesive Tape Hives    Ciprofloxacin      Causes visual hallucinations.    Codeine Sulfate Nausea and Vomiting    Nicoderm [Nicotine]      Patch and Gum, pt reported allergic reaction 6/24    Oxycodone-Acetaminophen Nausea and Vomiting    Tegaderm  Transparent Dressing (Informational Only) Hives    Tetanus Toxoid      Pt states she had an infection at injection site.     Vicodin [Hydrocodone-Acetaminophen] Nausea and Vomiting    Methimazole Rash

## 2024-09-23 NOTE — PLAN OF CARE
"Goal Outcome Evaluation:      Plan of Care Reviewed With: patient    Overall Patient Progress: improvingOverall Patient Progress: improving    Shift: 8665-2641  VS: BP (!) 136/92 (BP Location: Right arm)   Pulse 75   Temp 98.2  F (36.8  C) (Oral)   Resp 16   Ht 1.727 m (5' 8\")   Wt 109.8 kg (242 lb)   LMP 09/08/2024 (Exact Date)   SpO2 95%   BMI 36.80 kg/m      Pain: Denied pain.   Neuro: A&Ox4.Makes needs known  Cardiac: WDL. Denied chest pain.  Respiratory: WDL. Denied SOB. On RA  Diet/Appetite: Tolerating reg diet.   /GI: Continent of B&B. LBM 9/22  LDA's: None  Skin: No new skin deficits noted.  Activity: Independent.     Pertinent Labs/Lab Collection: On RN managed K+ recheck in AM     Plan: Continue w/poc           "

## 2024-09-23 NOTE — CONSULTS
Consulted to run a test claim for DOACs.    Patient has pharmacy benefits through [facility] [employer]. Per insurance, the following are covered and preferred under the patient's plans:     Xarelto tabs - $0  Eliquis tabs - $0  Pradaxa caps - $0     The following are not covered:  Dabigatran caps  Savaysa tabs      Please feel free to contact me with any other test claims, prior authorizations, or insurance questions regarding outpatient medications.     Thanks!      Alberta Jhons [facility]  Discharge Pharmacy Liaison  [facility]  Pronouns: She/Her/Hers    Securely message with [employer], Epic Secure Chat, or [employer]  Phone: [phone]  Fax: [phone]  [email or web address]

## 2024-09-23 NOTE — CONSULTS
"Consult received for Vascular access care.  Pt requests to not have just-in-case IV placed.. For additional needs place \"Nursing to Consult for Vascular Access\" [id number] order in EPIC.  "

## 2024-09-24 ENCOUNTER — PATIENT OUTREACH (OUTPATIENT)
Dept: CARE COORDINATION | Facility: CLINIC | Age: 44
End: 2024-09-24
Payer: COMMERCIAL

## 2024-09-24 NOTE — LETTER
[facility] CARE COORDINATION  [address]  [place] [address]    September 25, 2024    Cathy Arroyo  [address]  [place] [address]      Dear Cathy,    I am a clinic care coordinator who works with JANNET Liu CNP with the [facility]. I recently tried to call and was unable to reach you. Below is a description of clinic care coordination and how I can further assist you.       The clinic care coordination team is made up of a registered nurse, , financial resource worker and community health worker who understand the health care system. The goal of clinic care coordination is to help you manage your health and improve access to the health care system. Our team works alongside your provider to assist you in determining your health and social needs. We can help you obtain health care and community resources, providing you with necessary information and education. We can work with you through any barriers and develop a care plan that helps coordinate and strengthen the communication between you and your care team.  Our services are voluntary and are offered without charge to you personally.    Please feel free to contact me with any questions or concerns regarding care coordination and what we can offer.      We are focused on providing you with the highest-quality healthcare experience possible.    Sincerely,     Annelise Elam, CHADDN, RN, PHN   Care Coordinator-Ambulatory Care Management  [facility]  [phone]

## 2024-09-24 NOTE — PROGRESS NOTES
Clinic Care Coordination Contact  [facility]/Voicemail    Clinical Data: Care Coordinator Outreach    Outreach Documentation Number of Outreach Attempt   9/24/2024  10:12 AM 1       Left message on patient's voicemail with call back information and requested return call.    Plan:  Care Coordinator will try to reach patient again in 1-2 business days.    CHADD HernandezN, RN, PHN   Care Coordinator-Ambulatory Care Management  [facility]  [phone]

## 2024-09-25 ENCOUNTER — TELEPHONE (OUTPATIENT)
Dept: FAMILY MEDICINE | Facility: CLINIC | Age: 44
End: 2024-09-25
Payer: COMMERCIAL

## 2024-09-25 ENCOUNTER — MYC MEDICAL ADVICE (OUTPATIENT)
Dept: FAMILY MEDICINE | Facility: CLINIC | Age: 44
End: 2024-09-25
Payer: COMMERCIAL

## 2024-09-25 ENCOUNTER — TELEPHONE (OUTPATIENT)
Dept: PULMONOLOGY | Facility: CLINIC | Age: 44
End: 2024-09-25
Payer: COMMERCIAL

## 2024-09-25 ENCOUNTER — MYC MEDICAL ADVICE (OUTPATIENT)
Dept: NEUROSURGERY | Facility: CLINIC | Age: 44
End: 2024-09-25
Payer: COMMERCIAL

## 2024-09-25 LAB — BACTERIA BLD CULT: NO GROWTH

## 2024-09-25 NOTE — TELEPHONE ENCOUNTER
Cathy was reading up on her meds and read that the anticoagulant she takes warns people about spinal embolism. She said she has had a few spinal fluid leaks. Please advise.

## 2024-09-25 NOTE — PROGRESS NOTES
Clinic Care Coordination Contact  [facility]/Voicemail    Clinical Data: Care Coordinator Outreach    Outreach Documentation Number of Outreach Attempt   9/24/2024  10:12 AM 1   9/25/2024   2:32 PM 2       Left message on patient's voicemail with call back information and requested return call.    Plan: Care Coordinator will send unable to contact letter with care coordinator contact information via [employer]. Care Coordinator will do no further outreaches at this time.    Za Figueredo, RN Clinic Care Coordinator  [facility] Clinics: [place], [place] (on-site Wednesdays), [facility] (on-site Thursdays) & [facility].  [email or web address]  Phone: [phone]

## 2024-09-25 NOTE — TELEPHONE ENCOUNTER
[facility] Call Center    Phone Message    May a detailed message be left on voicemail: yes     Reason for Call: Other: Pt is calling to see if she can get in soon to see Dr Dunbar following a recent hospital visit. Provider's next available is in December. Pt would like provider to look at her CT scans as well. Pt would like a call back at ph: [phone].     Action Taken: Message routed to:  [facility] Pulm     Travel Screening: Not Applicable     Date of Service: 9/25

## 2024-09-25 NOTE — TELEPHONE ENCOUNTER
Called pt and LMOM to callback clinic, also sent [employer] message relaying message.    Thanks!  Bipin ARITA RN   [facility]

## 2024-09-25 NOTE — TELEPHONE ENCOUNTER
Spoke to pt to offer sooner appt 10/4 with Dr Dunbar. Pt is scheduled and aware of appt. Per pt, she wanted to make sure that she is proactively doing the right follow-ups after her hospital admission, she is very anxious and states she missed a call from inpatient care coordination yesterday. Writer informed her that per care coordinator's note, they should be reaching back out to her in 1-2 days to try again. Pt is aware.

## 2024-09-25 NOTE — TELEPHONE ENCOUNTER
I would generally think this is ok, but it is really a question for neurosurgery and is out of family practice scope. How is she feeling since starting the anticoagulant? What follow-up did they recommend in the ER?    DEVEN Winston

## 2024-09-26 NOTE — TELEPHONE ENCOUNTER
[facility] Call [facility]    Phone Message    May a detailed message be left on voicemail: yes     Reason for Call: Other: Patient is calling with questions about hey anticoagulant, please review and call back.      Action Taken: Message routed to:  [facility]): [facility] Neuro[facility]    Travel Screening: Not Applicable

## 2024-09-26 NOTE — PROGRESS NOTES
Clinic Care Coordination Contact  Transitions of Care Outreach  Chief Complaint   Patient presents with    Clinic Care Coordination - Post Hospital       Most Recent Admission Date: 9/20/2024   Most Recent Admission Diagnosis: Bronchopneumonia - J18.0  Pulmonary embolism - I26.99  Bilateral otitis media - H66.93     Most Recent Discharge Date: 9/23/2024   Most Recent Discharge Diagnosis: Bilateral otitis media - H66.93  Bronchopneumonia - J18.0  Pulmonary embolism - I26.99  Acute pulmonary embolism without acute cor pulmonale, unspecified pulmonary embolism type - I26.99  Other pulmonary embolism without acute cor pulmonale, unspecified chronicity - I26.99  Bilateral otitis media, unspecified otitis media type - H66.93  Tachycardia - R00.0  Yeast infection of the vagina - B37.31     Transitions of Care Assessment    Discharge Assessment  How are you doing now that you are home?: I am doing ok, I went back to work.  How are your symptoms? (Red Flag symptoms escalate to triage hotline per guidelines): Improved  Do you know how to contact your clinic care team if you have future questions or changes to your health status? : Yes  Does the patient have their discharge instructions? : Yes  Does the patient have questions regarding their discharge instructions? : Yes (see comment) (discussed hospital follow up)  Were you started on any new medications or were there changes to any of your previous medications? : Yes  Does the patient have all of their medications?: Yes  Do you have questions regarding any of your medications? : Yes (see comment) (Pt has consulted neuro about her anticoagulant and a warning on the website about people who have a past history of spinal fluid leakage.)  Do you have all of your needed medical supplies or equipment (DME)?  (i.e. oxygen tank, CPAP, cane, etc.): Yes         Post-op (Clinicians Only)  Did the patient have surgery or a procedure: No  Fever: No  Chills: No  Eating & Drinking: eating  and drinking without complaints/concerns  Bowel Function: normal  Urinary Status: voiding without complaint/concerns        Follow up Plan     Discharge Follow-Up  Discharge follow up appointment scheduled in alignment with recommended follow up timeframe or Transitions of Risk Category? (Low = within 30 days; Moderate= within 14 days; High= within 7 days): Yes  Discharge Follow Up Appointment Date: 10/01/24  Discharge Follow Up Appointment Scheduled with?: Primary Care Provider    Future Appointments   Date Time Provider Department Center   10/1/2024 11:40 AM Sherrill Dasilva APRN CNP [facility]   10/4/2024  8:30 AM Yennifer Dunbar MD [facility]   10/9/2024 10:00 AM Sherrill Dasilva APRN CNP [facility]   10/14/2024  8:00 AM Rocio Tyler PA-C [facility]   11/6/2024 10:00 AM Lorraine Dozier MD [facility]   1/7/2025 10:30 AM Jared Smith MD [facility]   [facility]2025 12:00 [facility]   2/14/2025 12:30 PM UC PFL A [facility]   2/14/2025  1:00 PM Yennifer Dunbar MD [facility]   3/5/2025 10:30 AM UCSCMA1 [facility]   3/5/2025 11:00 AM Miriam Lora PA-C [facility]       Outpatient Plan as outlined on AVS reviewed with patient. Patient returned calls. She reports that she is doing well. She has all her medications and has scheduled follow up. She had a question about her anticoagulant and if it was safe to use with her history of spinal fluid leakage and blood patch. She has reached out to neuro surgery about this and is awaiting a response. No ongoing Care Coordination needs at this time.     For any urgent concerns, please contact our 24 hour nurse triage line: [phone] ([phone])       CHADD HernandezN, RN, PHN   Care Coordinator-Ambulatory Care Management  [facility]  [phone]

## 2024-09-27 NOTE — TELEPHONE ENCOUNTER
My Chart note sent to patient:  It looks like she was diagnosed with pulmonary embolism recently. I would recommend she continue with the rivaroxaban as directed, as the benefits far outweigh the risks. She could discuss more with Primary Care and Hematology, but from our perspective would defer to them.   Per NANCY RILEY

## 2024-10-01 ENCOUNTER — OFFICE VISIT (OUTPATIENT)
Dept: FAMILY MEDICINE | Facility: CLINIC | Age: 44
End: 2024-10-01
Payer: COMMERCIAL

## 2024-10-01 ENCOUNTER — PATIENT OUTREACH (OUTPATIENT)
Dept: ONCOLOGY | Facility: CLINIC | Age: 44
End: 2024-10-01

## 2024-10-01 VITALS
HEART RATE: 118 BPM | HEIGHT: 68 IN | SYSTOLIC BLOOD PRESSURE: 134 MMHG | TEMPERATURE: 98.3 F | OXYGEN SATURATION: 99 % | DIASTOLIC BLOOD PRESSURE: 76 MMHG | RESPIRATION RATE: 20 BRPM | WEIGHT: 239.5 LBS | BODY MASS INDEX: 36.3 KG/M2

## 2024-10-01 DIAGNOSIS — R91.8 PULMONARY NODULES: ICD-10-CM

## 2024-10-01 DIAGNOSIS — J45.40 MODERATE PERSISTENT ASTHMA WITHOUT COMPLICATION: ICD-10-CM

## 2024-10-01 DIAGNOSIS — J45.41 MODERATE PERSISTENT ASTHMA WITH ACUTE EXACERBATION: ICD-10-CM

## 2024-10-01 DIAGNOSIS — I26.99 ACUTE PULMONARY EMBOLISM WITHOUT ACUTE COR PULMONALE, UNSPECIFIED PULMONARY EMBOLISM TYPE (H): Primary | ICD-10-CM

## 2024-10-01 PROCEDURE — 99495 TRANSJ CARE MGMT MOD F2F 14D: CPT | Performed by: NURSE PRACTITIONER

## 2024-10-01 PROCEDURE — 90471 IMMUNIZATION ADMIN: CPT | Performed by: NURSE PRACTITIONER

## 2024-10-01 PROCEDURE — 90656 IIV3 VACC NO PRSV 0.5 ML IM: CPT | Performed by: NURSE PRACTITIONER

## 2024-10-01 ASSESSMENT — PAIN SCALES - GENERAL: PAINLEVEL: NO PAIN (0)

## 2024-10-01 NOTE — PROGRESS NOTES
New IP (Interventional Pulmonology) referral rec'd.  Chart reviewed.       New Patient: Interventional Pulmonary (Lung nodule) Nurse Navigator Note    Referring provider: Sherrill Dasilva APRN CNPRj [facility]    Referred to (specialty): Interventional Pulmonary (Lung nodule)    Requested provider (if applicable): n/a    Date Referral Received: 10/1/2024    Evaluation for : lung nodule follow-up - has general pulm primary, but needs lung nodule follow-up    Clinical History (per Nurse review of records provided):    **BOOK MARKED**    7/1/24General PFT LabCT CHEST PULMONARY EMBOLISM W CONTRAST 9/20/2024 2:47 PM     CLINICAL HISTORY: Tachycardia, ongoing cough despite clear chest  x-ray, quadrant pleuritic pain evaluate PE pneumonia  TECHNIQUE: CT angiogram chest during arterial phase injection IV  contrast. 2D and 3D MIP reconstructions were performed by the CT  technologist. Dose reduction techniques were used.      CONTRAST: 74 mL isovue 370     COMPARISON: CT chest performed on 2/10/2022     FINDINGS:  ANGIOGRAM CHEST: Several small filling defects are seen along the  segmental branches of the right upper (series 4, image 96) and left  upper (series 4, images 88 and 95) branches of the pulmonary arteries  Thoracic aorta is negative for dissection. No CT evidence of right  heart strain.     LUNGS AND PLEURA: No pleural effusion or pneumothorax is seen.  Multiple new clustered pulmonary nodules with areas of tree-in-bud  nodularity are seen in the lungs bilaterally, most conspicuously in  the right upper, left upper and left lower lobes. One of the largest  nodules measures up to 9 mm in the left upper lobe (series 6, image  79). There are also areas of mucous plugging and moderate bronchial  wall thickening seen bilaterally. Mosaic attenuation is also seen  bilaterally suggestive of air trapping.     MEDIASTINUM/AXILLAE: Multiple enlarged mediastinal and hilar  lymph  nodes are present, one of the largest measuring up to 1.2 cm along the  right hilum (series 4, image 102). Another example includes a 10 mm  lymph node in the subcarinal space (series 4, image 124). Heart size  is normal.     UPPER ABDOMEN: The visualized portions appear unremarkable.     MUSCULOSKELETAL: No suspicious osseous lesions are seen.                                                                      IMPRESSION:  1.  Several small segmental pulmonary emboli are seen along the upper  lobe branches of both pulmonary arteries. No evidence of right-sided  heart strain is seen.  2.  Multifocal areas of clustered pulmonary nodules with tree-in-bud  nodularity, moderate bronchial wall thickening and mucous plugging.  Findings are suggestive of bronchopneumonia. Recommend clinical  correlation and 3-6 month CT chest follow-up exam.  3.  Multiple enlarged mediastinal and hilar lymph nodes, likely  reactive. Lymphoproliferative disorder such as lymphoma is considered  to be less likely but difficult to definitively exclude. Recommend  attention on follow-up exam.     Records Location: [facility]     Records Needed: none    Additional testing needed prior to consult: NONE

## 2024-10-01 NOTE — PROGRESS NOTES
"  Assessment & Plan     Acute pulmonary embolism without acute cor pulmonale, unspecified pulmonary embolism type (H)  Follow-up for evaluation of possible need for ongoing anticoagulation vs limited time. Also has question about which type of anticoagulation is best in context of the blood patches she has for previous spinal fluid leak. Neurosurgery has said benefit of anticoagulation outweighs risk, but question remains about if there is a different anticoagulation option.  - Adult Oncology/Hematology  Referral; Future    Pulmonary nodules  Follow-up with lung nodule clinic  - Adult Oncology/Hematology  Referral; Future  - Adult Pulmonary Medicine  Referral; Future    Moderate persistent asthma with acute exacerbation  Refill of Spiriva  - tiotropium (SPIRIVA RESPIMAT) 2.5 MCG/ACT inhaler; Inhale 2 puffs into the lungs daily.    Moderate persistent asthma without complication  Refilled  - tiotropium (SPIRIVA RESPIMAT) 2.5 MCG/ACT inhaler; Inhale 2 puffs into the lungs daily.        MED REC REQUIRED  Post Medication Reconciliation Status:  Discharge medications reconciled and changed, see notes/orders  BMI  Estimated body mass index is 36.42 kg/m  as calculated from the following:    Height as of this encounter: 1.727 m (5' 8\").    Weight as of this encounter: 108.6 kg (239 lb 8 oz).         Patient Instructions   Do take the omeprazole 20 mg in the morning with your other medications, NOT with thyroid medicine    Do continue to take the Spiriva 2 puffs once a day    Héctor Morgan is a 43 year old, presenting for the following health issues:  Hospital F/U      10/1/2024    11:50 AM   Additional Questions   Roomed by Brigette NIX     HPI        Hospital Follow-up Visit:    Hospital/Nursing Home/ Rehab Facility: [facility]  Date of Admission: 9/20/2024  Date of Discharge: 9/23/2024  Reason(s) for Admission: Acute PE and asthma " "exacerbation  Was the patient in the ICU or did the patient experience delirium during hospitalization?  No  Do you have any other stressors you would like to discuss with your provider? Relationship Concerns and Financial Concerns    Problems taking medications regularly:  None  Medication changes since discharge: None  Problems adhering to non-medication therapy:  None    Summary of hospitalization:  [facility] discharge summary reviewed  Diagnostic Tests/Treatments reviewed.  Follow up needed: none  Other Healthcare Providers Involved in Patient s Care:         Specialist appointment - hematology and lung nodule clinic  Update since discharge: improved.       Plan of care communicated with patient     Needs hematology referral  Has pulmonology appt this Friday (Dr. Dunbar - regular pulmonologist)    Wt Readings from Last 5 Encounters:   10/01/24 108.6 kg (239 lb 8 oz)   09/20/24 109.8 kg (242 lb)   09/19/24 109.8 kg (242 lb)   09/17/24 110.7 kg (244 lb)   09/03/24 110.7 kg (244 lb)                   Review of Systems  Constitutional, HEENT, cardiovascular, pulmonary, gi and gu systems are negative, except as otherwise noted.      Objective    /76 (BP Location: Left arm, Patient Position: Sitting, Cuff Size: Adult Large)   Pulse 118   Temp 98.3  F (36.8  C) (Temporal)   Resp 20   Ht 1.727 m (5' 8\")   Wt 108.6 kg (239 lb 8 oz)   LMP 09/08/2024 (Exact Date)   SpO2 99%   BMI 36.42 kg/m    Body mass index is 36.42 kg/m .  Physical Exam   GENERAL: alert and no distress  NECK: no adenopathy, no asymmetry, masses, or scars  RESP: expiratory wheezes throughout and inspiratory wheezes throughout  CV: regular rate and rhythm, normal S1 S2, no S3 or S4, no murmur, click or rub, no peripheral edema             Signed Electronically by: Sherrill Dasilva, JANNET CNP    "

## 2024-10-01 NOTE — PATIENT INSTRUCTIONS
Do take the omeprazole 20 mg in the morning with your other medications, NOT with thyroid medicine    Do continue to take the Spiriva 2 puffs once a day

## 2024-10-02 ENCOUNTER — TELEPHONE (OUTPATIENT)
Dept: NEUROSURGERY | Facility: CLINIC | Age: 44
End: 2024-10-02
Payer: COMMERCIAL

## 2024-10-02 NOTE — TELEPHONE ENCOUNTER
Patient confirmed scheduled appointment:  Date: 2/25/25  Time: 1130AM  Visit type: Neurosurg phone call  Provider: Hannah Cleveland on 10/2/2024 at 3:56 PM

## 2024-10-03 NOTE — PROGRESS NOTES
Pulmonology Clinic Appointment     Cathy Arroyo MRN# [id number]   Age: 43 year old YOB: 1980       Reason for consult:    Cathy Arroyo is a 43 year old year old female who is being seen for Follow Up (Hospital follow up )        Requesting/Referring physician:    Sherrill Dasilva           Assessment and Plan:     Moderate Persistent Asthma, uncontrolled   Patient is 43F with asthma since childhood, seems to be better controlled this visit with less daily wheezing and dry cough. Also with significant smoking history. Reviewed pulmonary function testing which revealed moderate reversible obstruction and increased DLCO with similar findings on repeat PFTs 7/2024, consistent with uncontrolled asthma. She did reduce her cigarettes from 10 per day to 5 per day, correlating with improving symptoms recently. She had a recent CTA that showed multiple segmental PE (unprovoked) and micronodular disease (likely inflammatory). She is now on xarelto.    - Continue current albuterol PRN with spacer   - renewed Advair BID  - Restart spiriva daily (patient discontinued)  - Educational materials on spiriva use provided   - return to clinic in 6 months with PFT & FENO    2. Micronodular disease ETHEL largest 9 mm  Mostly inflammatory features related to probable viral pneumonitis or early RB-ILD   - Repeat CT chest in 6-12 months     3. Right hilar lymphadenopathy, likely reactive     4. Multiple segmental pulmonary emboli, unprovoked  - Continue xarelto  - Seeing hematology as a new patient soon    5. Pineocytoma s/p craniotomy 2014     6. Tobacco Use Disorder   - continue to encourage cessation/reduction  - LDCT when 56 yo for lung cancer screening           History of Present Illness:   Cathy Arroyo is a 43 year old year old female with medical history of asthma since childhood, BP1D, Obesity and tobacco use who is referred for evaluation of persistent respiratory symptoms of shortness of breath, wheezing  and new productive cough.    Asthma since she 9 years old. Was on montelukast and ventolin since there. Asthma gets worse with allergies, anxiety, and exercise. Symptoms are mainly wheezing and cough.  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 Also started using Vapes, recently quit for 6 months but then started again after her grandfather passed away. Daily cannabis since 19 years old. Around a year and a half ago, noticed new sputum production (daily basis) which made her concerned she may be developing COPD. Sputum is white to yellowish in color.     Occupation:   Smoking: yes,   Allergies: suspects allergies to pollens, pet dander, does have allergies to adhesive tapes and some medications listed in chart  Pets: Cats, Dogs    Interval updates 10/4/24:  Cathy returns for follow up. She had recurrent symptoms of dyspnea and cough, with negative CXR so she underwent CTA which did show multiple segmental PE. She has no risk factors for PE except that she was on OCP years ago. She has a referral to hematology and scheduled to see them soon. She is receiving xarelto with compliance. She continues to have intermittent asthma symptoms such as REHMAN, wheezing and cough. She is taking advair but forgets to take spiriva. She reduced her smoking from 10 cig to 5 cig per day.            Physical Exam:   /86 (BP Location: Right arm, Patient Position: Sitting, Cuff Size: Adult Large)   Pulse 99   LMP 09/08/2024 (Exact Date)   SpO2 100%     GENERAL APPEARANCE:  alert, and in no apparent distress.  RESP: Good air flow throughout. Obvious expiratory wheezes throughout lung fields bilaterally.  No crackles. No rhonchi. Normal chest expansion  CV: Normal S1, S2, regular rhythm, normal rate. No murmur.  No rub. No gallop. No LE edema.   MS: extremities normal. No clubbing. No cyanosis.  SKIN: no rash on limited exam   NEURO: speech normal, normal strength and tone, normal  gait and stance  PSYCH: normal mood and affect         Data:     PFTs 7/1/2024  Reviewed and interpreted by me:   Moderate reversible obstruction with increased DLCO. Also increased TLC suggestive of air trapping.               I spent a total of 30 minutes on the day of the encounter dedicated to the office visit with Cathy Arroyo.

## 2024-10-04 ENCOUNTER — OFFICE VISIT (OUTPATIENT)
Dept: PULMONOLOGY | Facility: CLINIC | Age: 44
End: 2024-10-04
Attending: INTERNAL MEDICINE
Payer: COMMERCIAL

## 2024-10-04 VITALS — HEART RATE: 99 BPM | OXYGEN SATURATION: 100 % | SYSTOLIC BLOOD PRESSURE: 117 MMHG | DIASTOLIC BLOOD PRESSURE: 86 MMHG

## 2024-10-04 DIAGNOSIS — J45.40 MODERATE PERSISTENT ASTHMA, UNSPECIFIED WHETHER COMPLICATED: Primary | ICD-10-CM

## 2024-10-04 PROCEDURE — G0463 HOSPITAL OUTPT CLINIC VISIT: HCPCS | Performed by: INTERNAL MEDICINE

## 2024-10-04 PROCEDURE — 99214 OFFICE O/P EST MOD 30 MIN: CPT | Performed by: INTERNAL MEDICINE

## 2024-10-04 ASSESSMENT — PAIN SCALES - GENERAL: PAINLEVEL: NO PAIN (0)

## 2024-10-04 NOTE — LETTER
10/4/2024      Cathy Arroyo  [address]  [facility] [address]      Dear Colleague,    Thank you for referring your patient, Cathy Arroyo, to the [facility] [place]. Please see a copy of my visit note below.      Pulmonology Clinic Appointment     Cathy Arroyo MRN# [id number]   Age: 43 year old YOB: 1980       Reason for consult:    Cathy Arroyo is a 43 year old year old female who is being seen for Follow Up (Hospital follow up )        Requesting/Referring physician:    Sherrill Dasilva           Assessment and Plan:     Moderate Persistent Asthma, uncontrolled   Patient is 43F with asthma since childhood, seems to be better controlled this visit with less daily wheezing and dry cough. Also with significant smoking history. Reviewed pulmonary function testing which revealed moderate reversible obstruction and increased DLCO with similar findings on repeat PFTs 7/2024, consistent with uncontrolled asthma. She did reduce her cigarettes from 10 per day to 5 per day, correlating with improving symptoms recently. She had a recent CTA that showed multiple segmental PE (unprovoked) and micronodular disease (likely inflammatory). She is now on xarelto.    - Continue current albuterol PRN with spacer   - renewed Advair BID  - Restart spiriva daily (patient discontinued)  - Educational materials on spiriva use provided   - return to clinic in 6 months with PFT & FENO    2. Micronodular disease ETHEL largest 9 mm  Mostly inflammatory features related to probable viral pneumonitis or early RB-ILD   - Repeat CT chest in 6-12 months     3. Right hilar lymphadenopathy, likely reactive     4. Multiple segmental pulmonary emboli, unprovoked  - Continue xarelto  - Seeing hematology as a new patient soon    5. Pineocytoma s/p craniotomy 2014     6. Tobacco Use Disorder   - continue to encourage cessation/reduction  - LDCT when 56 yo for lung cancer  screening           History of Present Illness:   Cathy Arroyo is a 43 year old year old female with medical history of asthma since childhood, BP1D, Obesity and tobacco use who is referred for evaluation of persistent respiratory symptoms of shortness of breath, wheezing and new productive cough.    Asthma since she 9 years old. Was on montelukast and ventolin since there. Asthma gets worse with allergies, anxiety, and exercise. Symptoms are mainly wheezing and cough.  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 Also started using Vapes, recently quit for 6 months but then started again after her grandfather passed away. Daily cannabis since 19 years old. Around a year and a half ago, noticed new sputum production (daily basis) which made her concerned she may be developing COPD. Sputum is white to yellowish in color.     Occupation:   Smoking: yes,   Allergies: suspects allergies to pollens, pet dander, does have allergies to adhesive tapes and some medications listed in chart  Pets: Cats, Dogs    Interval updates 10/4/24:  Cathy returns for follow up. She had recurrent symptoms of dyspnea and cough, with negative CXR so she underwent CTA which did show multiple segmental PE. She has no risk factors for PE except that she was on OCP years ago. She has a referral to hematology and scheduled to see them soon. She is receiving xarelto with compliance. She continues to have intermittent asthma symptoms such as REHMAN, wheezing and cough. She is taking advair but forgets to take spiriva. She reduced her smoking from 10 cig to 5 cig per day.            Physical Exam:   /86 (BP Location: Right arm, Patient Position: Sitting, Cuff Size: Adult Large)   Pulse 99   LMP 09/08/2024 (Exact Date)   SpO2 100%     GENERAL APPEARANCE:  alert, and in no apparent distress.  RESP: Good air flow throughout. Obvious expiratory wheezes throughout lung fields bilaterally.  No  crackles. No rhonchi. Normal chest expansion  CV: Normal S1, S2, regular rhythm, normal rate. No murmur.  No rub. No gallop. No LE edema.   MS: extremities normal. No clubbing. No cyanosis.  SKIN: no rash on limited exam   NEURO: speech normal, normal strength and tone, normal gait and stance  PSYCH: normal mood and affect         Data:     PFTs 7/1/2024  Reviewed and interpreted by me:   Moderate reversible obstruction with increased DLCO. Also increased TLC suggestive of air trapping.               I spent a total of 30 minutes on the day of the encounter dedicated to the office visit with Cathy Arroyo.                      Again, thank you for allowing me to participate in the care of your patient.        Sincerely,        Yennifer Dunbar MD

## 2024-10-04 NOTE — PATIENT INSTRUCTIONS
For clinical questions, please call our nursing line [phone]    For scheduling, please call [phone]

## 2024-10-04 NOTE — NURSING NOTE
Chief Complaint   Patient presents with    Follow Up     Hospital follow up      Vitals were taken and medications were reconciled.    Brittnee Zuniga RMA  8:44 AM

## 2024-10-05 ENCOUNTER — NURSE TRIAGE (OUTPATIENT)
Dept: NURSING | Facility: CLINIC | Age: 44
End: 2024-10-05
Payer: COMMERCIAL

## 2024-10-05 NOTE — TELEPHONE ENCOUNTER
"Given anticoagulant: in hospital on Heparin, now on oral medication. Now has her period (day 1) and is \"bleeding profusely\"= \"my periods are normally heavy, but this is heavier. I have had to change my super plus tampon in 2+1/2 hrs, usually lasts 4 hrs, and blood was running down my leg.\"   RX Rivaroxaban 15mg twice daily.     Triaged to a disposition of see provider within 24 hrs. ER and UC information also discussed per guidelines. Instructed to call back if further questions or worsening sx. She will continue to monitor her bleeding, and intends to go to UC tomorrow unless sx worsen tonight.     Nancy Grigsby RN Triage Nurse Advisor 2:53 PM 10/5/2024  Reason for Disposition   Taking Coumadin (warfarin) or other strong blood thinner, or known bleeding disorder (e.g., thrombocytopenia)    Additional Information   Negative: Shock suspected (e.g., cold/pale/clammy skin, too weak to stand, low BP, rapid pulse)   Negative: Difficult to awaken or acting confused (e.g., disoriented, slurred speech)   Negative: Passed out (i.e., lost consciousness, collapsed and was not responding)   Negative: Sounds like a life-threatening emergency to the triager   Negative: Followed a genital area injury (e.g., vagina, vulva)   Negative: Pregnant 20 or more weeks   Negative: Pregnant < 20 weeks  (less than 5 months)   Negative: Postpartum (from 0 to 6 weeks after delivery)   Negative: Bleeding occurring > 12 months after menopause   Negative: Bleeding from sexual abuse or rape   Negative: [1] Vaginal discharge is main symptom AND [2] small amount of blood   Negative: SEVERE abdominal pain   Negative: SEVERE dizziness (e.g., unable to stand, requires support to walk, feels like passing out now)   Negative: SEVERE vaginal bleeding (e.g., soaking 2 pads or tampons per hour and present 2 or more hours; 1 menstrual cup every 2 hours)   Negative: Patient sounds very sick or weak to the triager   Negative: MODERATE vaginal bleeding (e.g., " soaking 1 pad or tampon per hour and present > 6 hours; 1 menstrual cup every 6 hours)   Negative: [1] Constant abdominal pain AND [2] present > 2 hours   Negative: Pale skin (pallor) of new-onset or worsening   Negative: Passed tissue (e.g., gray-white)    Protocols used: Vaginal Bleeding - [id number]

## 2024-10-06 ENCOUNTER — MYC MEDICAL ADVICE (OUTPATIENT)
Dept: FAMILY MEDICINE | Facility: CLINIC | Age: 44
End: 2024-10-06
Payer: COMMERCIAL

## 2024-10-08 SDOH — HEALTH STABILITY: PHYSICAL HEALTH: ON AVERAGE, HOW MANY DAYS PER WEEK DO YOU ENGAGE IN MODERATE TO STRENUOUS EXERCISE (LIKE A BRISK WALK)?: 0 DAYS

## 2024-10-08 SDOH — HEALTH STABILITY: PHYSICAL HEALTH: ON AVERAGE, HOW MANY MINUTES DO YOU ENGAGE IN EXERCISE AT THIS LEVEL?: 0 MIN

## 2024-10-08 ASSESSMENT — SOCIAL DETERMINANTS OF HEALTH (SDOH): HOW OFTEN DO YOU GET TOGETHER WITH FRIENDS OR RELATIVES?: THREE TIMES A WEEK

## 2024-10-09 ENCOUNTER — OFFICE VISIT (OUTPATIENT)
Dept: FAMILY MEDICINE | Facility: CLINIC | Age: 44
End: 2024-10-09
Payer: COMMERCIAL

## 2024-10-09 ENCOUNTER — TELEPHONE (OUTPATIENT)
Dept: OBGYN | Facility: CLINIC | Age: 44
End: 2024-10-09

## 2024-10-09 VITALS
SYSTOLIC BLOOD PRESSURE: 119 MMHG | WEIGHT: 243 LBS | DIASTOLIC BLOOD PRESSURE: 79 MMHG | TEMPERATURE: 98.3 F | HEART RATE: 80 BPM | RESPIRATION RATE: 18 BRPM | HEIGHT: 68 IN | OXYGEN SATURATION: 99 % | BODY MASS INDEX: 36.83 KG/M2

## 2024-10-09 DIAGNOSIS — I26.99 ACUTE PULMONARY EMBOLISM WITHOUT ACUTE COR PULMONALE, UNSPECIFIED PULMONARY EMBOLISM TYPE (H): ICD-10-CM

## 2024-10-09 DIAGNOSIS — Z87.42 HISTORY OF ABNORMAL CERVICAL PAP SMEAR: ICD-10-CM

## 2024-10-09 DIAGNOSIS — N92.0 MENORRHAGIA WITH REGULAR CYCLE: ICD-10-CM

## 2024-10-09 DIAGNOSIS — Z13.1 SCREENING FOR DIABETES MELLITUS: ICD-10-CM

## 2024-10-09 DIAGNOSIS — Z00.00 ROUTINE GENERAL MEDICAL EXAMINATION AT A HEALTH CARE FACILITY: Primary | ICD-10-CM

## 2024-10-09 LAB
ERYTHROCYTE [DISTWIDTH] IN BLOOD BY AUTOMATED COUNT: 13.7 % (ref 10–15)
EST. AVERAGE GLUCOSE BLD GHB EST-MCNC: 108 MG/DL
FERRITIN SERPL-MCNC: 23 NG/ML (ref 6–175)
HBA1C MFR BLD: 5.4 % (ref 0–5.6)
HCT VFR BLD AUTO: 37.2 % (ref 35–47)
HGB BLD-MCNC: 12.3 G/DL (ref 11.7–15.7)
IRON BINDING CAPACITY (ROCHE): 330 UG/DL (ref 240–430)
IRON SATN MFR SERPL: 11 % (ref 15–46)
IRON SERPL-MCNC: 35 UG/DL (ref 37–145)
MCH RBC QN AUTO: 29.1 PG (ref 26.5–33)
MCHC RBC AUTO-ENTMCNC: 33.1 G/DL (ref 31.5–36.5)
MCV RBC AUTO: 88 FL (ref 78–100)
PLATELET # BLD AUTO: 311 10E3/UL (ref 150–450)
RBC # BLD AUTO: 4.22 10E6/UL (ref 3.8–5.2)
WBC # BLD AUTO: 6.2 10E3/UL (ref 4–11)

## 2024-10-09 PROCEDURE — 36415 COLL VENOUS BLD VENIPUNCTURE: CPT | Performed by: NURSE PRACTITIONER

## 2024-10-09 PROCEDURE — 82728 ASSAY OF FERRITIN: CPT | Performed by: NURSE PRACTITIONER

## 2024-10-09 PROCEDURE — 99396 PREV VISIT EST AGE 40-64: CPT | Performed by: NURSE PRACTITIONER

## 2024-10-09 PROCEDURE — 99214 OFFICE O/P EST MOD 30 MIN: CPT | Mod: 25 | Performed by: NURSE PRACTITIONER

## 2024-10-09 PROCEDURE — 83540 ASSAY OF IRON: CPT | Performed by: NURSE PRACTITIONER

## 2024-10-09 PROCEDURE — 83036 HEMOGLOBIN GLYCOSYLATED A1C: CPT | Performed by: NURSE PRACTITIONER

## 2024-10-09 PROCEDURE — 83550 IRON BINDING TEST: CPT | Performed by: NURSE PRACTITIONER

## 2024-10-09 PROCEDURE — 85027 COMPLETE CBC AUTOMATED: CPT | Performed by: NURSE PRACTITIONER

## 2024-10-09 ASSESSMENT — ASTHMA QUESTIONNAIRES
HOSPITALIZATION_OVERNIGHT_LAST_YEAR_TOTAL: ONE
QUESTION_2 LAST FOUR WEEKS HOW OFTEN HAVE YOU HAD SHORTNESS OF BREATH: MORE THAN ONCE A DAY
QUESTION_1 LAST FOUR WEEKS HOW MUCH OF THE TIME DID YOUR ASTHMA KEEP YOU FROM GETTING AS MUCH DONE AT WORK, SCHOOL OR AT HOME: SOME OF THE TIME
QUESTION_5 LAST FOUR WEEKS HOW WOULD YOU RATE YOUR ASTHMA CONTROL: WELL CONTROLLED
ACT_TOTALSCORE: 10
QUESTION_3 LAST FOUR WEEKS HOW OFTEN DID YOUR ASTHMA SYMPTOMS (WHEEZING, COUGHING, SHORTNESS OF BREATH, CHEST TIGHTNESS OR PAIN) WAKE YOU UP AT NIGHT OR EARLIER THAN USUAL IN THE MORNING: FOUR OR MORE NIGHTS A WEEK
ACT_TOTALSCORE: 10
QUESTION_4 LAST FOUR WEEKS HOW OFTEN HAVE YOU USED YOUR RESCUE INHALER OR NEBULIZER MEDICATION (SUCH AS ALBUTEROL): THREE OR MORE TIMES PER DAY

## 2024-10-09 ASSESSMENT — PAIN SCALES - GENERAL: PAINLEVEL: MILD PAIN (3)

## 2024-10-09 NOTE — TELEPHONE ENCOUNTER
[facility] to schedule IUD insert appt in November. OK for MD, Vivien, or RAVEN.    Jenelle  She/her/hers  [facility]                    From Zamzam Dasilva: Could you call this patient to get set up for a Mirena IUD insertion sometime in later November? High anxiety. I'll write diazepam for it.

## 2024-10-09 NOTE — RESULT ENCOUNTER NOTE
Cathy,    A1C is completely normal. Not looking anemic right now, but if periods continue to be this heavy, we'll check again.  If you have any questions, please feel free to contact the clinic.    DEVEN Winston

## 2024-10-09 NOTE — PROGRESS NOTES
"Preventive Care Visit  [facility] PRIMARY CARE  JANNET Liu CNP, Nurse Practitioner - Family  Oct 9, 2024      Assessment & Plan     Routine general medical examination at a health care facility  Reviewed and updated health maintenance and recommendations     Acute pulmonary embolism without acute cor pulmonale, unspecified pulmonary embolism type (H)  Refilled to get to hematology appt. Reviewed ED note and it appears that consult noted expecting ongoing anticoagulation  - rivaroxaban ANTICOAGULANT (XARELTO) 20 MG TABS tablet; Take 1 tablet (20 mg) by mouth daily (with dinner).    Menorrhagia with regular cycle  With expected ongoing anticoagulation, advise a Mirena IUD as it would be likely periods would continue to be heavier. No anemia at this time  - CBC with platelets; Future  - Ferritin; Future  - Iron and iron binding capacity; Future  - CBC with platelets  - Ferritin  - Iron and iron binding capacity    Screening for diabetes mellitus  - Hemoglobin A1c; Future  - Hemoglobin A1c    History of abnormal cervical Pap smear  Noted - not due    Patient has been advised of split billing requirements and indicates understanding: Yes        BMI  Estimated body mass index is 36.95 kg/m  as calculated from the following:    Height as of this encounter: 1.727 m (5' 8\").    Weight as of this encounter: 110.2 kg (243 lb).   Weight management plan: Discussed healthy diet and exercise guidelines    Counseling  Appropriate preventive services were addressed with this patient via screening, questionnaire, or discussion as appropriate for fall prevention, nutrition, physical activity, Tobacco-use cessation, social engagement, weight loss and cognition.  Checklist reviewing preventive services available has been given to the patient.      Patient Instructions   Dr. Carolyne Sanchez - jaylin Hsu substlakeshia    Review of external notes as documented elsewhere in note  Ordering of each unique " test  Prescription drug management      Héctor Morgan is a 44 year old, presenting for the following:  Physical, Medication Request (Follow up Xarelto), and Abnormal Bleeding Problem (Bleeding a lot during period)        10/9/2024    10:00 AM   Additional Questions   Roomed by Anjana RACHEL        Health Care Directive  Patient has a Health Care Directive on file  Advance care planning document is on file and is current.    HPI    Recent PE - hematology appt is in November. Will need refill of anticoagulant as it will run out beforehand    Heavy period with last menses and while on anticoagulant          10/8/2024   General Health   How would you rate your overall physical health? (!) FAIR   Feel stress (tense, anxious, or unable to sleep) To some extent      (!) STRESS CONCERN      10/8/2024   Nutrition   Three or more servings of calcium each day? Yes   Diet: Regular (no restrictions)   How many servings of fruit and vegetables per day? (!) 0-1   How many sweetened beverages each day? 0-1            10/8/2024   Exercise   Days per week of moderate/strenous exercise 0 days   Average minutes spent exercising at this level 0 min      (!) EXERCISE CONCERN      10/8/2024   Social Factors   Frequency of gathering with friends or relatives Three times a week   Worry food won't last until get money to buy more No   Food not last or not have enough money for food? No   Do you have housing? (Housing is defined as stable permanent housing and does not include staying ouside in a car, in a tent, in an abandoned building, in an overnight shelter, or couch-surfing.) Yes   Are you worried about losing your housing? No   Lack of transportation? No   Unable to get utilities (heat,electricity)? No            10/8/2024   Dental   Dentist two times every year? (!) NO            10/8/2024   TB Screening   Were you born outside of the [place]? No        Today's PHQ-2 Score:       1/23/2024    12:17 PM   PHQ-2 ( 1999 [employer])   Q1: Little  interest or pleasure in doing things 0   Q2: Feeling down, depressed or hopeless 1   PHQ-2 Score 1   Q1: Little interest or pleasure in doing things Not at all   Q2: Feeling down, depressed or hopeless Several days   PHQ-2 Score 1         10/8/2024   Substance Use   Alcohol more than 3/day or more than 7/wk No   Do you use any other substances recreationally? (!) CANNABIS PRODUCTS        Social History     Tobacco Use    Smoking status: Every Day     Current packs/day: 0.25     Average packs/day: 0.3 packs/day for 19.8 years (5.4 ttl pk-yrs)     Types: Cigarettes     Start date: 1/21/2004     Last attempt to quit: 1/25/2022     Passive exposure: Current    Smokeless tobacco: Never    Tobacco comments:     10/15/24 5 cigarettes daily.     Stopped smoking for both pregnamcies and breastfeeding of both children     Working on quitting smoking (6/27/23)   Vaping Use    Vaping status: Former    Quit date: 11/28/2023    Substances: Nicotine, Flavoring    Devices: Disposable   Substance Use Topics    Alcohol use: Yes     Alcohol/week: 4.0 standard drinks of alcohol     Types: 4 Drinks containing 0.5 oz of alcohol per week     Comment: one drink once a week    Drug use: Not Currently     Types: Marijuana, Psilocybin     Comment: medical marijuana           2/28/2024   LAST FHS-7 RESULTS   1st degree relative breast or ovarian cancer No   Any relative bilateral breast cancer No   Any male have breast cancer No   Any ONE woman have BOTH breast AND ovarian cancer No   Any woman with breast cancer before 50yrs No   2 or more relatives with breast AND/OR ovarian cancer Yes   2 or more relatives with breast AND/OR bowel cancer Yes        Mammogram Screening - Annual screen due to greater than 20% lifetime risk as estimated by Breast Cancer Risk Calculator        10/8/2024   STI Screening   New sexual partner(s) since last STI/HIV test? No        History of abnormal Pap smear: YES - reflected in Problem List and Health Maintenance  accordingly  2011-ASCUS, 2010-LSIL, 2015-NIL, neg HPV, 2018 - NIL, neg HPV, 2021 - NIL, neg HPV         Latest Ref Rng & Units 12/12/2023    12:02 PM 12/14/2021    11:58 AM 2/8/2018    12:26 PM   PAP / HPV   PAP  Negative for Intraepithelial Lesion or Malignancy (NILM)  Negative for Intraepithelial Lesion or Malignancy (NILM)     HPV 16 DNA Negative Negative  Negative  Negative    HPV 18 DNA Negative Negative  Negative  Negative    Other HR HPV Negative Negative  Negative  Negative      ASCVD Risk   The 10-year ASCVD risk score (Sarahy SANTOS, et al., 2019) is: 2.2%    Values used to calculate the score:      Age: 44 years      Sex: Female      Is Non- : No      Diabetic: No      Tobacco smoker: Yes      Systolic Blood Pressure: 123 mmHg      Is BP treated: No      HDL Cholesterol: 53 mg/dL      Total Cholesterol: 190 mg/dL       Reviewed and updated as needed this visit by Provider   Tobacco   Meds  Problems  Med Hx  Surg Hx  Fam Hx  Soc Hx Sexual   Activity          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    Post partum depression     Post-surgical hypothyroidism 05/2017    Pre-eclampsia 10/14/2016    10/16/2018 - was induced for postdates and gestational hypertension with last pregnancy. No history of chronic hypertension. RX for aspirin sent and labs drawn at new OB visit.       Problem, psychiatric 2001 diagnosed Bipolar    Recurrent otitis media chronic ear infections in childhood, recently had a double ear infection followed by another ear infection soon after    Sleep apnea 2019    had a sleep study when pregnant with my second child     "Uncomplicated asthma      Past Surgical History:   Procedure Laterality Date    BIOPSY  31 y/o colpos, and in the last 2 years for thyroid    Colposcopy, thyroid nodule biopsy twice    BLOOD PATCH N/A 10/11/2016    Procedure: EPIDURAL BLOOD PATCH;  Surgeon: GENERIC ANESTHESIA PROVIDER;  Location: [facility] OR     SECTION, TUBAL LIGATION, COMBINED N/A 2019    Procedure:  SECTION, WITH TUBAL LIGATION;  Surgeon: Kathy Rutledge MD;  Location: [facility] L+D    CRANIOTOMY, EXCISE TUMOR COMPLEX, COMBINED  2014    Procedure: COMBINED CRANIOTOMY, EXCISE TUMOR COMPLEX;  Supracerabellar  Infratentorial Approach for Resection of Tumor ;  Surgeon: Kate Mckeon MD;  Location: [facility] OR    ENT SURGERY  2017    thyroidectomy    GYN SURGERY      colposcopy    THYROIDECTOMY N/A 2017    Procedure: THYROIDECTOMY;  Total Thyroidectomy;  Surgeon: Pamela Villasenor MD;  Location:  OR         Review of Systems  Constitutional, neuro, ENT, endocrine, pulmonary, cardiac, gastrointestinal, genitourinary, musculoskeletal, integument and psychiatric systems are negative, except as otherwise noted.     Objective    Exam  /79 (BP Location: Right arm, Patient Position: Sitting, Cuff Size: Adult Large)   Pulse 80   Temp 98.3  F (36.8  C) (Temporal)   Resp 18   Ht 1.727 m (5' 8\")   Wt 110.2 kg (243 lb)   LMP 10/05/2024 (Exact Date)   SpO2 99%   BMI 36.95 kg/m     Estimated body mass index is 36.95 kg/m  as calculated from the following:    Height as of this encounter: 1.727 m (5' 8\").    Weight as of this encounter: 110.2 kg (243 lb).    Physical Exam  GENERAL: alert and no distress  EYES: Eyes grossly normal to inspection, PERRL and conjunctivae and sclerae normal  HENT: ear canals and TM's normal, nose and mouth without ulcers or lesions  NECK: no adenopathy, no asymmetry, masses, or scars  RESP: expiratory wheezes throughout and inspiratory wheezes throughout  CV: regular rate and " rhythm, normal S1 S2, no S3 or S4, no murmur, click or rub, no peripheral edema  ABDOMEN: soft, nontender, no hepatosplenomegaly, no masses and bowel sounds normal  MS: no gross musculoskeletal defects noted, no edema  SKIN: no suspicious lesions or rashes  NEURO: Normal strength and tone, mentation intact and speech normal  PSYCH: mentation appears normal, affect normal/bright        Signed Electronically by: JANNET Liu CNP

## 2024-10-10 ENCOUNTER — MYC MEDICAL ADVICE (OUTPATIENT)
Dept: FAMILY MEDICINE | Facility: CLINIC | Age: 44
End: 2024-10-10
Payer: COMMERCIAL

## 2024-10-10 NOTE — RESULT ENCOUNTER NOTE
Cathy,    Iron does look a little low even though no anemia. It might be worth starting an iron supplement every other day.  If you have any questions, please feel free to contact the clinic.    DEVEN Winston

## 2024-10-11 ENCOUNTER — MYC REFILL (OUTPATIENT)
Dept: FAMILY MEDICINE | Facility: CLINIC | Age: 44
End: 2024-10-11
Payer: COMMERCIAL

## 2024-10-11 DIAGNOSIS — E89.0 POST-SURGICAL HYPOTHYROIDISM: ICD-10-CM

## 2024-10-11 DIAGNOSIS — R09.A2 GLOBUS SENSATION: ICD-10-CM

## 2024-10-11 DIAGNOSIS — E89.0 S/P TOTAL THYROIDECTOMY: ICD-10-CM

## 2024-10-11 DIAGNOSIS — Z86.39 HISTORY OF GRAVES' DISEASE: ICD-10-CM

## 2024-10-11 DIAGNOSIS — R07.0 THROAT PAIN: ICD-10-CM

## 2024-10-11 DIAGNOSIS — R49.9 CHANGE IN VOICE: ICD-10-CM

## 2024-10-11 DIAGNOSIS — R09.89 CHOKING SENSATION: ICD-10-CM

## 2024-10-11 RX ORDER — LEVOTHYROXINE SODIUM 175 UG/1
175 TABLET ORAL DAILY
Qty: 90 TABLET | Refills: 1 | Status: CANCELLED | OUTPATIENT
Start: 2024-10-11

## 2024-10-11 RX ORDER — LEVOTHYROXINE SODIUM 175 UG/1
175 TABLET ORAL DAILY
Qty: 90 TABLET | Refills: 1 | OUTPATIENT
Start: 2024-10-11

## 2024-10-11 RX ORDER — LEVOTHYROXINE SODIUM 175 UG/1
175 TABLET ORAL DAILY
Qty: 90 TABLET | Refills: 0 | Status: SHIPPED | OUTPATIENT
Start: 2024-10-11 | End: 2024-11-12

## 2024-10-11 NOTE — TELEPHONE ENCOUNTER
Jamaal Wyman! I got Cathy scheduled with Victoria on 12/9 to hopefully time that for when her period is just finishing. She mentioned she does already take 5MG of diazepam as needed for anxiety and was wondering if she should just take several of those or would you prefer to write a one time dose?

## 2024-10-11 NOTE — TELEPHONE ENCOUNTER
RECORDS STATUS - ALL OTHER DIAGNOSIS      RECORDS RECEIVED FROM: [facility] - Internal Records   DATE RECEIVED: 10/11

## 2024-10-11 NOTE — TELEPHONE ENCOUNTER
Ok to take the supply of diazepam she already has, but I'd recommend 10 mg.  Not sure who has a chance to call and review that with her.  Can't take ibuprofen ahead due to being on anticoagulant. DEVEN Winston

## 2024-10-12 PROBLEM — M54.2 NECK PAIN: Status: ACTIVE | Noted: 2024-07-17

## 2024-10-12 RX ORDER — DEXTROAMPHETAMINE SULFATE, DEXTROAMPHETAMINE SACCHARATE, AMPHETAMINE SULFATE AND AMPHETAMINE ASPARTATE 6.25; 6.25; 6.25; 6.25 MG/1; MG/1; MG/1; MG/1
CAPSULE, EXTENDED RELEASE ORAL
COMMUNITY
Start: 2024-10-12

## 2024-10-13 NOTE — PROGRESS NOTES
Pre-visit planning and chart review completed.     NEW patient appointment:    10/15 with Dr. Navid Markham  9/20 CT chest wo contrast    - On Xarelto  - Current smoker.    CE updated. Medications, allergies, problem list, and immunizations reconciled.

## 2024-10-14 ENCOUNTER — OFFICE VISIT (OUTPATIENT)
Dept: OTOLARYNGOLOGY | Facility: CLINIC | Age: 44
End: 2024-10-14
Payer: COMMERCIAL

## 2024-10-14 ENCOUNTER — PRE VISIT (OUTPATIENT)
Dept: OTOLARYNGOLOGY | Facility: CLINIC | Age: 44
End: 2024-10-14

## 2024-10-14 VITALS
BODY MASS INDEX: 37.56 KG/M2 | DIASTOLIC BLOOD PRESSURE: 82 MMHG | HEART RATE: 91 BPM | WEIGHT: 247.8 LBS | HEIGHT: 68 IN | SYSTOLIC BLOOD PRESSURE: 123 MMHG | OXYGEN SATURATION: 99 %

## 2024-10-14 DIAGNOSIS — H90.3 ASYMMETRICAL SENSORINEURAL HEARING LOSS: ICD-10-CM

## 2024-10-14 DIAGNOSIS — R49.0 HOARSENESS: Primary | ICD-10-CM

## 2024-10-14 DIAGNOSIS — J34.89 NASAL VESTIBULITIS: ICD-10-CM

## 2024-10-14 PROBLEM — H93.12 TINNITUS, LEFT: Status: ACTIVE | Noted: 2024-10-14

## 2024-10-14 PROBLEM — R42 DIZZINESS: Status: ACTIVE | Noted: 2024-10-14

## 2024-10-14 PROCEDURE — 99215 OFFICE O/P EST HI 40 MIN: CPT | Mod: 25 | Performed by: PHYSICIAN ASSISTANT

## 2024-10-14 PROCEDURE — 31575 DIAGNOSTIC LARYNGOSCOPY: CPT | Performed by: PHYSICIAN ASSISTANT

## 2024-10-14 RX ORDER — OMEPRAZOLE 40 MG/1
40 CAPSULE, DELAYED RELEASE ORAL DAILY
Qty: 90 CAPSULE | Refills: 0 | Status: SHIPPED | OUTPATIENT
Start: 2024-10-14 | End: 2025-01-12

## 2024-10-14 RX ORDER — MUPIROCIN 20 MG/G
OINTMENT TOPICAL
Qty: 1 G | Refills: 0 | Status: SHIPPED | OUTPATIENT
Start: 2024-10-14 | End: 2024-10-24

## 2024-10-14 ASSESSMENT — PAIN SCALES - GENERAL: PAINLEVEL: MILD PAIN (2)

## 2024-10-14 NOTE — LETTER
10/14/2024       RE: Cathy Arroyo  [address]  [facility] [address]     Dear Colleague,    Thank you for referring your patient, Cathy Arroyo, to the [facility] [place] at [facility]. Please see a copy of my visit note below.      [facility] [place]  [address]  [address]  [facility] [address]  Phone: [phone]  Fax: [phone]    Patient:  Cathy Arroyo, Date of birth 1980  Date of Visit:  10/14/2024  Referring Provider Referred Self      Assessment & Plan     Asymmetrical sensorineural hearing loss  Hearing loss is resolved per patient. Should repeat audiogram if hearing change returns.  - Adult ENT  Referral    Hoarseness  Likely due to smoking and Andrea's edema. Patient is working on quitting. Recommend increasing omeprazole to help with LPRD and voice.  - IMAGESTREAM RECORDING ORDER  - omeprazole (PRILOSEC) 40 MG DR capsule  Dispense: 90 capsule; Refill: 0    Nasal vestibulitis  Bactroban prescribed.  - mupirocin (BACTROBAN) 2 % external ointment  Dispense: 1 g; Refill: 0      Review of external notes as documented elsewhere in note  45 minutes spent by me on the date of the encounter doing chart review, history and exam, documentation and further activities per the note      History of Present Illness    Pertinent history obtain from: chart review and patient    Patient is scheduled for follow up regarding sudden hearing loss. She reports her hearing issues have resolved and would like to discuss her throat. Patient presents to clinic to today for voice changes.  She sings daily and uses a microphone in her voice has never been an issue before.  She had a PE in April which she feels coincided with when her voice started changing.  She reports her voice started going in and out with no change in her singing frequency.  She reports her voice  "has not gotten better since then, she is only able to sing 1 song patient is not normal for her.  She has been consistently hoarse for a month.  [employer] throat comfort TV is the only thing she has found to help.  She has been smoking since she is 20-year-old and currently smokes 5 cigarettes/day.  She is trying a harm reduction approach with smoking cannabis instead.  She has a history of thyroidectomy.  She notes breathing issues but mainly lung related as she has a history of COPD, asthma, and multiple PEs.  She does feel like pills get stuck in her throat and she is started to take Prilosec more recently.  She does note throat pain when she strains her voice more.      PHYSICAL EXAM:  /82 (BP Location: Right arm, Patient Position: Sitting, Cuff Size: Adult Large)   Pulse 91   Ht 1.727 m (5' 8\")   Wt 112.4 kg (247 lb 12.8 oz)   LMP 10/05/2024 (Exact Date)   SpO2 99%   BMI 37.68 kg/m    Constitutional:  The patient was unaccompanied, well-groomed, and in no acute distress.     Skin: Normal:  warm and pink without rash    Neurologic: Alert and oriented x 3.  Voice normal.    Psychiatric: The patient's affect was calm, cooperative, and appropriate.  Somewhat anxious   Communication:  Normal; communicates verbally, normal voice quality.    Respiratory: Breathing comfortably without stridor or exertion of accessory muscles.    Head/Face:  Normocephalic and atraumatic.  No lesions or scars.    Eyes: Extraocular movement intact. Clear sclera. Wearing glasses   Ears: Pinnae and tragus non-tender. No external deformity, hearing normal to conversational voice    Nose: No anterior drainage, no external deformity   Oral Cavity: Normal tongue, adequate dentition, moist.   Neck: Supple with normal laryngeal and tracheal landmarks. Normal range of motion.           Due to hoarseness, fiberoptic laryngoscopy was indicated. After obtaining verbal consent, the nose was topically decongested and anesthetized. I then " passed the scope through the nasal cavity.  The nasal cavity showed nasal vestibulitis.  The nasopharynx was mucosally covered and symmetric.  The Eustachian tube openings were unobstructed.  Going further down I had a clear view of the base of tongue which had normal appearing lingual tonsillar tissue.  The base of tongue was free of lesions, and the vallecula was open.  The epiglottis was smooth and mucosally covered.  The supraglottic larynx was then clearly visualized. It was normal. The vocal cords moved with full abduction and adduction. Evidence of Andrea's edema of cords. They were white and no lesions were seen.  The pyriform sinuses were open, and the limited view of the postcricoid region did not show any lesions. Minor post cricoid edema.      Audiogram: 9/19/2024 - data independently reviewed  Right ear: normal/borderline normal hearing   Left ear: mild to moderate conductive hearing loss   SRT right: 15 left: 40   WR right: 100% left: 100%   Acoustic Reflexes: right ipsi present, others absent  Tympanograms: type A right, type As left     Right ear improved 10dB at 250-500Hz and decreased 15dB at 8000 Hz compared to 5/2/2023, air conduction decreased 20-50 dB left compared to 5/2/2023    Due to scheduling error, did not receive repeat audiogram prior to today's visit        Rocio Tyler PA-C   Otolaryngology-Head & Neck Surgery    Chart documentation was completed with Dragon voice-recognition software. Even though reviewed, this chart may still contain some grammatical, spelling, and word errors.             Again, thank you for allowing me to participate in the care of your patient.      Sincerely,    Rocio Tyler PA-C

## 2024-10-14 NOTE — NURSING NOTE
"Chief Complaint   Patient presents with    Consult   Height 1.727 m (5' 8\"), weight 112.4 kg (247 lb 12.8 oz), last menstrual period 10/05/2024, not currently breastfeeding. Antonino Schultz, EMT    "

## 2024-10-14 NOTE — PATIENT INSTRUCTIONS
You were seen in the ENT Clinic today by Rocio Tyler. If you have any questions or concerns after your appointment, please contact us (see below)    The following has been recommended for you based upon your appointment today:  Increase omeprazole to 40 mg once a day  Mupirocin ointment to right nostril    Please return to clinic as needed    How to Contact Us:  Send a [employer] message to your provider. Our team will respond to you via Watch[employer]. Occasionally, we will need to call you to get further information.  For urgent matters (Monday-Friday), call the ENT Clinic: [phone] and speak with a call center team member - they will route your call appropriately.   If you'd like to speak directly with a nurse, please find our contact information below. We do our best to check voicemail frequently throughout the day, and will work to call you back within 1-2 days. For urgent matters, please use the general clinic phone numbers listed above.      Jimena MACEDO LPN  Direct: [phone]

## 2024-10-14 NOTE — PROGRESS NOTES
[facility] E[facility] NOSE AND THROAT CLINIC [address] [address]  Phone: [phone]  Fax: [phone]    Patient:  Cathy Arroyo, Date of birth 1980  Date of Visit:  10/14/2024  Referring Provider Referred Self      Assessment & Plan      Asymmetrical sensorineural hearing loss  Hearing loss is resolved per patient. Should repeat audiogram if hearing change returns.  - Adult ENT  Referral    Hoarseness  Likely due to smoking and Andrea's edema. Patient is working on quitting. Recommend increasing omeprazole to help with LPRD and voice.  - IMAGESTREAM RECORDING ORDER  - omeprazole (PRILOSEC) 40 MG DR capsule  Dispense: 90 capsule; Refill: 0    Nasal vestibulitis  Bactroban prescribed.  - mupirocin (BACTROBAN) 2 % external ointment  Dispense: 1 g; Refill: 0      Review of external notes as documented elsewhere in note  45 minutes spent by me on the date of the encounter doing chart review, history and exam, documentation and further activities per the note      History of Present Illness     Pertinent history obtain from: chart review and patient    Patient is scheduled for follow up regarding sudden hearing loss. She reports her hearing issues have resolved and would like to discuss her throat. Patient presents to clinic to today for voice changes.  She sings daily and uses a microphone in her voice has never been an issue before.  She had a PE in April which she feels coincided with when her voice started changing.  She reports her voice started going in and out with no change in her singing frequency.  She reports her voice has not gotten better since then, she is only able to sing 1 song patient is not normal for her.  She has been consistently hoarse for a month.  [employer] throat comfort TV is the only thing she has found to help.  She has been smoking since she is 20-year-old and currently smokes 5 cigarettes/day.  She is trying a harm  "reduction approach with smoking cannabis instead.  She has a history of thyroidectomy.  She notes breathing issues but mainly lung related as she has a history of COPD, asthma, and multiple PEs.  She does feel like pills get stuck in her throat and she is started to take Prilosec more recently.  She does note throat pain when she strains her voice more.      PHYSICAL EXAM:  /82 (BP Location: Right arm, Patient Position: Sitting, Cuff Size: Adult Large)   Pulse 91   Ht 1.727 m (5' 8\")   Wt 112.4 kg (247 lb 12.8 oz)   LMP 10/05/2024 (Exact Date)   SpO2 99%   BMI 37.68 kg/m    Constitutional:  The patient was unaccompanied, well-groomed, and in no acute distress.     Skin: Normal:  warm and pink without rash    Neurologic: Alert and oriented x 3.  Voice normal.    Psychiatric: The patient's affect was calm, cooperative, and appropriate.  Somewhat anxious   Communication:  Normal; communicates verbally, normal voice quality.    Respiratory: Breathing comfortably without stridor or exertion of accessory muscles.    Head/Face:  Normocephalic and atraumatic.  No lesions or scars.    Eyes: Extraocular movement intact. Clear sclera. Wearing glasses   Ears: Pinnae and tragus non-tender. No external deformity, hearing normal to conversational voice    Nose: No anterior drainage, no external deformity   Oral Cavity: Normal tongue, adequate dentition, moist.   Neck: Supple with normal laryngeal and tracheal landmarks. Normal range of motion.           Due to hoarseness, fiberoptic laryngoscopy was indicated. After obtaining verbal consent, the nose was topically decongested and anesthetized. I then passed the scope through the nasal cavity.  The nasal cavity showed nasal vestibulitis.  The nasopharynx was mucosally covered and symmetric.  The Eustachian tube openings were unobstructed.  Going further down I had a clear view of the base of tongue which had normal appearing lingual tonsillar tissue.  The base of tongue " was free of lesions, and the vallecula was open.  The epiglottis was smooth and mucosally covered.  The supraglottic larynx was then clearly visualized. It was normal. The vocal cords moved with full abduction and adduction. Evidence of Andrea's edema of cords. They were white and no lesions were seen.  The pyriform sinuses were open, and the limited view of the postcricoid region did not show any lesions. Minor post cricoid edema.      Audiogram: 9/19/2024 - data independently reviewed  Right ear: normal/borderline normal hearing   Left ear: mild to moderate conductive hearing loss   SRT right: 15 left: 40   WR right: 100% left: 100%   Acoustic Reflexes: right ipsi present, others absent  Tympanograms: type A right, type As left     Right ear improved 10dB at 250-500Hz and decreased 15dB at 8000 Hz compared to 5/2/2023, air conduction decreased 20-50 dB left compared to 5/2/2023    Due to scheduling error, did not receive repeat audiogram prior to today's visit        Rocio Tyler PA-C   Otolaryngology-Head & Neck Surgery    Chart documentation was completed with Dragon voice-recognition software. Even though reviewed, this chart may still contain some grammatical, spelling, and word errors.

## 2024-10-15 ENCOUNTER — VIRTUAL VISIT (OUTPATIENT)
Dept: PULMONOLOGY | Facility: CLINIC | Age: 44
End: 2024-10-15
Attending: NURSE PRACTITIONER
Payer: COMMERCIAL

## 2024-10-15 ENCOUNTER — PRE VISIT (OUTPATIENT)
Dept: PULMONOLOGY | Facility: CLINIC | Age: 44
End: 2024-10-15
Payer: COMMERCIAL

## 2024-10-15 ENCOUNTER — TELEPHONE (OUTPATIENT)
Dept: PULMONOLOGY | Facility: CLINIC | Age: 44
End: 2024-10-15
Payer: COMMERCIAL

## 2024-10-15 VITALS
DIASTOLIC BLOOD PRESSURE: 82 MMHG | SYSTOLIC BLOOD PRESSURE: 123 MMHG | HEIGHT: 68 IN | BODY MASS INDEX: 37.56 KG/M2 | WEIGHT: 247.8 LBS

## 2024-10-15 DIAGNOSIS — R91.8 PULMONARY NODULES: ICD-10-CM

## 2024-10-15 PROCEDURE — 99204 OFFICE O/P NEW MOD 45 MIN: CPT | Mod: 95 | Performed by: INTERNAL MEDICINE

## 2024-10-15 ASSESSMENT — PAIN SCALES - GENERAL: PAINLEVEL: MILD PAIN (2)

## 2024-10-15 NOTE — TELEPHONE ENCOUNTER
Left Voicemail (1st Attempt) for the patient to call back and schedule the following:    Appointment type: KALLIEM  Provider: AHMET  Return date: NEXT AVAILABLE  Specialty phone number: [phone]  Additional appointment(s) needed: YOGI RAYO  Additonal Notes: N/A

## 2024-10-15 NOTE — NURSING NOTE
[facility] [facility] [address]    Is the patient currently in the state of [place]? YES    Visit mode:VIDEO    If the visit is dropped, the patient can be reconnected by: VIDEO VISIT: Text to cell phone:   Telephone Information:   Mobile [phone]       Will anyone else be joining the visit? NO  (If patient encounters technical issues they should call [phone]    Are changes needed to the allergy or medication list? No, Pt stated no changes to allergies, and Pt stated no med changes    Are refills needed on medications prescribed by this physician? NO    Rooming Documentation:  Questionnaire(s) not done per department protocol    Reason for visit: Consult (New Oncology )    Sade HAWLEY

## 2024-10-15 NOTE — PROGRESS NOTES
"Virtual Visit Details    Type of service:  Video Visit   See note            LUNG NODULE & INTERVENTIONAL PULMONARY CLINIC  [facility], [facility], [facility] [address] [address]  Phone: [phone]  Fax: [phone]    Patient:  Cathy Arroyo, Age 44 year old, Date of birth 1980, MRN# [id number]  Date of Visit:  10/15/2024  Referring Provider Sherrill Dasilva    Reason for Consultation: Lung Nodule     The patient has been notified of following:   \"This video visit will be conducted via a call between you and your physician/provider. We have found that certain health care needs can be provided without the need for an in-person physical exam.  This service lets us provide the care you need with a video conversation.  If a prescription is necessary we can send it directly to your pharmacy.  If lab work is needed we can place an order for that and you can then stop by our lab to have the test done at a later time.  Video visits are billed at different rates depending on your insurance coverage.  Please reach out to your insurance provider with any questions.  If during the course of the call the physician/provider feels a video visit is not appropriate, you will not be charged for this service.\"  Patient has given verbal consent for Video visit? Yes  How would you like to obtain your AVS? Please refer to rooming staff note  Patient would like the video invitation sent by: Please refer to rooming staff note   Will anyone else be joining your video visit? Please refer to rooming staff note    Video-Visit Details     Type of service:  Video Visit  Video Start Time: 245  Video End Time: 255  Provider Name: Navid Markham MD, MHA   Originating Location (pt. Location): Home  Provider Location: Off campus   Distant Location (provider location): Home/Clinic  Platform used for Video Visit: " AmWell/Doximity    Assessment and Plan:    1. Bilateral tree-in-bud nodularity with air trapping and mediastinal/hilar LAD. Most likely reactive causes vs active infection. She reported that she had bronchial PNA at that time treated with IV antibiotics and prednisone. Plan to repeat CT in 3mo. If nodularity are still persistent, would consider ABPA in the ddx.     2. PE. Appt to see hematology    Billing: I spent a total of 45min spent on date of encounter which includes prep time, visit with the patient and post visit work including documentation and nursing communication     Navid Markham MD, MHA  Associate Professor of Medicine  Section of Interventional Pulmonology   Division of Pulmonary, Allergy, Critical Care and Sleep Medicine   [facility]  Pager: [phone]   Office: [phone]  Email: [email or web address]    Vandana Li RN   Interventional Pulmonary Care Coordinator   Office: [phone]  Email: [email or web address]     Kelton Fam  Interventional Pulmonary Surgery Scheduler   Office: [phone]  Email: [email or web address]         History:     Cathy Arroyo is a 44 year old female with sig h/o for asthma, MDD, graves disease, bipolar disorder who is here for evaluation/followup of Lung Nodule.    - No new resp sx or complaints. Denies dyspnea or cough.   - here to eval nodules and mediastinal hilar LAD   - Personal hx of cancer: grandmother   - Family hx of cancer: no  - Exposure hx: Denies asbestos or radon exposure   - Tobacco hx: Current Smoker, 0.25ppd since they were 23yo  - My interpretation of the images relevant for this visit includes: nodules   - My interpretation of the PFT's relevant for this visit includes: Obstructive pattern     Culprit Nodule(s):   1.  Several small segmental pulmonary emboli are seen along the upper  lobe branches of both pulmonary arteries. No evidence of right-sided  heart strain is seen.  2.  Multifocal  areas of clustered pulmonary nodules with tree-in-bud  nodularity, moderate bronchial wall thickening and mucous plugging.  Findings are suggestive of bronchopneumonia. Recommend clinical  correlation and 3-6 month CT chest follow-up exam.  3.  Multiple enlarged mediastinal and hilar lymph nodes, likely  reactive. Lymphoproliferative disorder such as lymphoma is considered  to be less likely but difficult to definitively exclude. Recommend  attention on follow-up exam.       Other active medical problems include:   - has asthma. Seeing pulmonary at [facility]   - has segmental PE. 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 No history of chronic hypertension. RX for aspirin sent and labs drawn at new OB visit.       Problem, psychiatric  diagnosed Bipolar    Recurrent otitis media chronic ear infections in childhood, recently had a double ear infection followed by another ear infection soon after    Sleep apnea 2019    had a sleep study when pregnant with my second child    Uncomplicated asthma              Past Surgical History:      Past Surgical History:   Procedure Laterality Date    BIOPSY  29 y/o colpos, and in the last 2 years for thyroid    Colposcopy, thyroid nodule biopsy twice    BLOOD PATCH N/A 10/11/2016    Procedure: EPIDURAL BLOOD PATCH;  Surgeon: GENERIC ANESTHESIA PROVIDER;  Location: [facility] OR     SECTION, TUBAL LIGATION, COMBINED N/A  2019    Procedure:  SECTION, WITH TUBAL LIGATION;  Surgeon: Kathy Rutledge MD;  Location: [facility] L+D    CRANIOTOMY, EXCISE TUMOR COMPLEX, COMBINED  2014    Procedure: COMBINED CRANIOTOMY, EXCISE TUMOR COMPLEX;  Supracerabellar  Infratentorial Approach for Resection of Tumor ;  Surgeon: Kate Mckeon MD;  Location: [facility] OR    ENT SURGERY  2017    thyroidectomy    GYN SURGERY      colposcopy    THYROIDECTOMY N/A 2017    Procedure: THYROIDECTOMY;  Total Thyroidectomy;  Surgeon: Pamela Villasenor MD;  Location:  OR            Social History:     Social History     Tobacco Use    Smoking status: Every Day     Current packs/day: 0.25     Average packs/day: 0.3 packs/day for 19.8 years (5.4 ttl pk-yrs)     Types: Cigarettes     Start date: 2004     Last attempt to quit: 2022     Passive exposure: Current    Smokeless tobacco: Never    Tobacco comments:     Stopped smoking for both pregnamcies and breastfeeding of both children     Working on quitting smoking (25)   Substance Use Topics    Alcohol use: Yes     Alcohol/week: 4.0 standard drinks of alcohol     Types: 4 Drinks containing 0.5 oz of alcohol per week     Comment: one drink once a week            Family History:     Family History   Problem Relation Age of Onset    Other Cancer Mother 62        salivary gland cancer    Genitourinary Problems Mother     Bipolar Disorder Mother         unipolar depression    Depression Mother     Thyroid Disease Mother         benign tumor    Melanoma Mother     Cancer Mother         salivary gland cancer    Migraines Mother     Skin Cancer Mother     Asthma Father     Mental Illness Father         Bipolar 2    Obesity Father     Colon Polyps Father 64    Bipolar Disorder Brother         unipolar depression    Asthma Brother     Depression Brother     Asthma Maternal Grandmother     Osteoporosis Maternal Grandmother     Glaucoma Maternal Grandmother     Hypertension  Maternal Grandmother     Macular Degeneration Maternal Grandmother     Skin Cancer Maternal Grandmother     Prostate Cancer Maternal Grandfather 69        no history of smoking    Bladder Cancer Maternal Grandfather 71    Colon Cancer Maternal Grandfather 70    Diabetes Paternal Grandfather     Breast Cancer Maternal Aunt 37        negative BRCA1/2 testing    Melanoma Maternal Aunt 64    Thyroid Disease Paternal Aunt     Breast Cancer Paternal Aunt 58        bilateral; negative testing    Cancer Paternal Aunt     Breast Cancer Other         paternal great aunt             Allergies:      Allergies   Allergen Reactions    Adhesive Tape Hives    Nicotine Anaphylaxis, Hives and Rash     Patch - rash/hives  Lozenges - throat swelling    Tegaderm Transparent Dressing (Informational Only) Hives    Adacel [Tetanus-Diphth-Acell Pertussis] Swelling    Ciprofloxacin Visual Disturbance and Hallucination    Codeine Sulfate Nausea and Vomiting    Methimazole Rash    Oxycodone-Acetaminophen Nausea and Vomiting    Tetanus Toxoid      Pt states she had an infection at injection site.     Tetanus-Diphtheria Toxoids Td      Caused staph infection - childhood/teenager    Vicodin [Hydrocodone-Acetaminophen] Nausea and Vomiting            Medications:     Current Outpatient Medications   Medication Sig Dispense Refill    ADDERALL XR 25 MG 24 hr capsule       albuterol (PROAIR HFA/PROVENTIL HFA/VENTOLIN HFA) 108 (90 Base) MCG/ACT inhaler Inhale 2 puffs into the lungs every 6 hours 18 g 3    amphetamine-dextroamphetamine (ADDERALL XR) 30 MG 24 hr capsule Take 30 mg by mouth daily.      amphetamine-dextroamphetamine (ADDERALL) 10 MG tablet Take 10 mg by mouth daily.      ARIPiprazole (ABILIFY) 10 MG tablet Take 0.5 tablets (5 mg) by mouth daily      cetirizine (ZYRTEC) 10 MG tablet Take 1 tablet (10 mg) by mouth daily 90 tablet 3    diazepam (VALIUM) 5 MG tablet Take 5 mg by mouth as needed for anxiety.      Fexofenadine HCl (ALLEGRA PO)        fluticasone (FLONASE) 50 MCG/ACT nasal spray Spray 1 spray into both nostrils daily. 16 g 11    fluticasone-salmeterol (ADVAIR) 500-50 MCG/ACT inhaler Inhale 1 puff into the lungs every 12 hours. 3 each 3    levothyroxine (SYNTHROID/LEVOTHROID) 175 MCG tablet Take 1 tablet (175 mcg) by mouth daily. 90 tablet 0    medical cannabis (Patient's own supply) Take 1 Dose by mouth See Admin Instructions (The purpose of this order is to document that the patient reports taking medical cannabis.  This is not a prescription, and is not used to certify that the patient has a qualifying medical condition.)    [place]anger[place] Oral Suspension Unflavored 1-5 ml up to two times daily.  Total THC = 240 mg, Total CBD Trace      medical cannabis (Patient's own supply) Take 1 Dose by mouth See Admin Instructions (The purpose of this order is to document that the patient reports taking medical cannabis.  This is not a prescription, and is not used to certify that the patient has a qualifying medical condition.)    [place] oral suspension: take 1-3 ml by mouth two times daily  Total CBD 1200 mg. Total THC 60 mg      montelukast (SINGULAIR) 10 MG tablet Take 1 tablet (10 mg) by mouth every morning 90 tablet 3    mupirocin (BACTROBAN) 2 % external ointment Apply a small amount to right front of nose 2 times a day for 10 days 1 g 0    omeprazole (PRILOSEC) 20 MG DR capsule Take 1 capsule (20 mg) by mouth daily. 60 capsule 0    omeprazole (PRILOSEC) 40 MG DR capsule Take 1 capsule (40 mg) by mouth daily. 90 capsule 0    rivaroxaban ANTICOAGULANT (XARELTO) 20 MG TABS tablet Take 1 tablet (20 mg) by mouth daily (with dinner). 90 tablet 0    Rivaroxaban ANTICOAGULANT 15 & 20 MG TBPK Starter Therapy Pack Take 15 mg by mouth 2 times daily (with meals) for 20 days, THEN 20 mg daily with food. 51 each 0    rizatriptan (MAXALT-MLT) 10 MG ODT Take 10 mg by mouth at onset of headache for migraine.      tiotropium (SPIRIVA RESPIMAT) 2.5 MCG/ACT inhaler  Inhale 2 puffs into the lungs daily. 4 g 5     No current facility-administered medications for this visit.            Review of Systems:     12-point ROS reviewed and abnormalities stated in the history.         Physical Exam:     Constitutional - looks well, in no apparent distress  Eyes - no redness or discharge  Respiratory -breathing appears comfortable.  No cough.  Skin - No appreciable discoloration or lesions (very limited exam)  Neurological - No apparent tremors. Speech fluent and articlate  Psychiatric - no signs of delirium or anxiety     Exam limited to that easily identified on a virtual visit. The rest of a comprehensive physical examination is deferred due to PHE (public health emergency) video visit restrictions.         Current Laboratory Data:   All laboratory and imaging data reviewed.          Latest Ref Rng & Units 7/1/2024     2:07 PM   PFT   FVC L 4.47    FEV1 L 3.05    FVC% % 116    FEV1% % 96

## 2024-10-15 NOTE — LETTER
"10/15/2024       RE: Cathy Arroyo  [address]  [facility] [address]     Dear Colleague,    Thank you for referring your patient, Cathy Arroyo, to the [facility] at [facility]. Please see a copy of my visit note below.    Virtual Visit Details    Type of service:  Video Visit   See note            LUNG NODULE & INTERVENTIONAL PULMONARY CLINIC  CLINICS & SURGERY CENTER, [facility], [facility]ER [facility]  [address] [address]  Phone: [phone]  Fax: [phone]    Patient:  Cathy Arroyo, Age 44 year old, Date of birth 1980, MRN# [id number]  Date of Visit:  10/15/2024  Referring Provider Sherrill Dasilva    Reason for Consultation: Lung Nodule     The patient has been notified of following:   \"This video visit will be conducted via a call between you and your physician/provider. We have found that certain health care needs can be provided without the need for an in-person physical exam.  This service lets us provide the care you need with a video conversation.  If a prescription is necessary we can send it directly to your pharmacy.  If lab work is needed we can place an order for that and you can then stop by our lab to have the test done at a later time.  Video visits are billed at different rates depending on your insurance coverage.  Please reach out to your insurance provider with any questions.  If during the course of the call the physician/provider feels a video visit is not appropriate, you will not be charged for this service.\"  Patient has given verbal consent for Video visit? Yes  How would you like to obtain your AVS? Please refer to rooming staff note  Patient would like the video invitation sent by: Please refer to rooming staff note   Will anyone else be joining your video visit? Please refer to rooming staff " note    Video-Visit Details     Type of service:  Video Visit  Video Start Time: 245  Video End Time: 255  Provider Name: Navid Markham MD, MHA   Originating Location (pt. Location): Home  Provider Location: Off campus   Distant Location (provider location): Home/Clinic  Platform used for Video Visit: Clare/Debra    Assessment and Plan:    1. Bilateral tree-in-bud nodularity with air trapping and mediastinal/hilar LAD. Most likely reactive causes vs active infection. She reported that she had bronchial PNA at that time treated with IV antibiotics and prednisone. Plan to repeat CT in 3mo. If nodularity are still persistent, would consider ABPA in the ddx.     2. PE. Appt to see hematology    Billing: I spent a total of 45min spent on date of encounter which includes prep time, visit with the patient and post visit work including documentation and nursing communication     Navid Markham MD, MHA  Associate Professor of Medicine  Section of Interventional Pulmonology   Division of Pulmonary, Allergy, Critical Care and Sleep Medicine   [facility]  Pager: [phone]   Office: [phone]  Email: [email or web address]    Vandana Li RN   Interventional Pulmonary Care Coordinator   Office: [phone]  Email: [email or web address]     Kelton Fam  Interventional Pulmonary Surgery Scheduler   Office: [phone]  Email: [email or web address]         History:     Cathy Arroyo is a 44 year old female with sig h/o for asthma, MDD, graves disease, bipolar disorder who is here for evaluation/followup of Lung Nodule.    - No new resp sx or complaints. Denies dyspnea or cough.   - here to eval nodules and mediastinal hilar LAD   - Personal hx of cancer: grandmother   - Family hx of cancer: no  - Exposure hx: Denies asbestos or radon exposure   - Tobacco hx: Current Smoker, 0.25ppd since they were 23yo  - My interpretation of the images relevant for this visit includes:  nodules   - My interpretation of the PFT's relevant for this visit includes: Obstructive pattern     Culprit Nodule(s):   1.  Several small segmental pulmonary emboli are seen along the upper  lobe branches of both pulmonary arteries. No evidence of right-sided  heart strain is seen.  2.  Multifocal areas of clustered pulmonary nodules with tree-in-bud  nodularity, moderate bronchial wall thickening and mucous plugging.  Findings are suggestive of bronchopneumonia. Recommend clinical  correlation and 3-6 month CT chest follow-up exam.  3.  Multiple enlarged mediastinal and hilar lymph nodes, likely  reactive. Lymphoproliferative disorder such as lymphoma is considered  to be less likely but difficult to definitively exclude. Recommend  attention on follow-up exam.       Other active medical problems include:   - has asthma. Seeing pulmonary at [facility]   - has segmental PE. Seeing hematology            Past Medical History:      Past Medical History:   Diagnosis Date     Allergic rhinitis 1990     Allergic state      Anxiety      Bipolar 1 disorder (H)      Depressive disorder      Elevated cholesterol      Graves disease 2017     Hearing problem      Migraines 2/2019    have suffered from migraines since having brain surgery     Obese     BMI 37.48     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     Post partum depression      Post-surgical hypothyroidism 05/2017     Pre-eclampsia 10/14/2016    10/16/2018 - was induced for postdates and gestational hypertension with last pregnancy. No history of chronic hypertension. RX for aspirin sent and labs drawn at new OB visit.        Problem, psychiatric 2001 diagnosed Bipolar     Recurrent otitis media chronic ear infections in childhood, recently had a double ear infection followed by another ear infection soon after     Sleep apnea 2019    had a sleep study when pregnant with my second child     Uncomplicated asthma              Past Surgical History:      Past  Surgical History:   Procedure Laterality Date     BIOPSY  29 y/o colpos, and in the last 2 years for thyroid    Colposcopy, thyroid nodule biopsy twice     BLOOD PATCH N/A 10/11/2016    Procedure: EPIDURAL BLOOD PATCH;  Surgeon: GENERIC ANESTHESIA PROVIDER;  Location: [facility] OR      SECTION, TUBAL LIGATION, COMBINED N/A 2019    Procedure:  SECTION, WITH TUBAL LIGATION;  Surgeon: Kathy Rutledge MD;  Location: [facility] L+D     CRANIOTOMY, EXCISE TUMOR COMPLEX, COMBINED  2014    Procedure: COMBINED CRANIOTOMY, EXCISE TUMOR COMPLEX;  Supracerabellar  Infratentorial Approach for Resection of Tumor ;  Surgeon: Kate Mckeon MD;  [facility]: [facility] OR     ENT SURGERY  2017    thyroidectomy     GYN SURGERY      colposcopy     THYROIDECTOMY N/A 2017    Procedure: THYROIDECTOMY;  Total Thyroidectomy;  Surgeon: Pamela Villasenor MD;  Location:  OR            Social History:     Social History     Tobacco Use     Smoking status: Every Day     Current packs/day: 0.25     Average packs/day: 0.3 packs/day for 19.8 years (5.4 ttl pk-yrs)     Types: Cigarettes     Start date: 2004     Last attempt to quit: 2022     Passive exposure: Current     Smokeless tobacco: Never     Tobacco comments:     Stopped smoking for both pregnamcies and breastfeeding of both children     Working on quitting smoking (25)   Substance Use Topics     Alcohol use: Yes     Alcohol/week: 4.0 standard drinks of alcohol     Types: 4 Drinks containing 0.5 oz of alcohol per week     Comment: one drink once a week            Family History:     Family History   Problem Relation Age of Onset     Other Cancer Mother 62        salivary gland cancer     Genitourinary Problems Mother      Bipolar Disorder Mother         unipolar depression     Depression Mother      Thyroid Disease Mother         benign tumor     Melanoma Mother      Cancer Mother         salivary gland cancer     Migraines Mother       Skin Cancer Mother      Asthma Father      Mental Illness Father         Bipolar 2     Obesity Father      Colon Polyps Father 64     Bipolar Disorder Brother         unipolar depression     Asthma Brother      Depression Brother      Asthma Maternal Grandmother      Osteoporosis Maternal Grandmother      Glaucoma Maternal Grandmother      Hypertension Maternal Grandmother      Macular Degeneration Maternal Grandmother      Skin Cancer Maternal Grandmother      Prostate Cancer Maternal Grandfather 69        no history of smoking     Bladder Cancer Maternal Grandfather 71     Colon Cancer Maternal Grandfather 70     Diabetes Paternal Grandfather      Breast Cancer Maternal Aunt 37        negative BRCA1/2 testing     Melanoma Maternal Aunt 64     Thyroid Disease Paternal Aunt      Breast Cancer Paternal Aunt 58        bilateral; negative testing     Cancer Paternal Aunt      Breast Cancer Other         paternal great aunt             Allergies:      Allergies   Allergen Reactions     Adhesive Tape Hives     Nicotine Anaphylaxis, Hives and Rash     Patch - rash/hives  Lozenges - throat swelling     Tegaderm Transparent Dressing (Informational Only) Hives     Adacel [Tetanus-Diphth-Acell Pertussis] Swelling     Ciprofloxacin Visual Disturbance and Hallucination     Codeine Sulfate Nausea and Vomiting     Methimazole Rash     Oxycodone-Acetaminophen Nausea and Vomiting     Tetanus Toxoid      Pt states she had an infection at injection site.      Tetanus-Diphtheria Toxoids Td      Caused staph infection - childhood/teenager     Vicodin [Hydrocodone-Acetaminophen] Nausea and Vomiting            Medications:     Current Outpatient Medications   Medication Sig Dispense Refill     ADDERALL XR 25 MG 24 hr capsule        albuterol (PROAIR HFA/PROVENTIL HFA/VENTOLIN HFA) 108 (90 Base) MCG/ACT inhaler Inhale 2 puffs into the lungs every 6 hours 18 g 3     amphetamine-dextroamphetamine (ADDERALL XR) 30 MG 24 hr capsule Take 30 mg  by mouth daily.       amphetamine-dextroamphetamine (ADDERALL) 10 MG tablet Take 10 mg by mouth daily.       ARIPiprazole (ABILIFY) 10 MG tablet Take 0.5 tablets (5 mg) by mouth daily       cetirizine (ZYRTEC) 10 MG tablet Take 1 tablet (10 mg) by mouth daily 90 tablet 3     diazepam (VALIUM) 5 MG tablet Take 5 mg by mouth as needed for anxiety.       Fexofenadine HCl (ALLEGRA PO)        fluticasone (FLONASE) 50 MCG/ACT nasal spray Spray 1 spray into both nostrils daily. 16 g 11     fluticasone-salmeterol (ADVAIR) 500-50 MCG/ACT inhaler Inhale 1 puff into the lungs every 12 hours. 3 each 3     levothyroxine (SYNTHROID/LEVOTHROID) 175 MCG tablet Take 1 tablet (175 mcg) by mouth daily. 90 tablet 0     medical cannabis (Patient's own supply) Take 1 Dose by mouth See Admin Instructions (The purpose of this order is to document that the patient reports taking medical cannabis.  This is not a prescription, and is not used to certify that the patient has a qualifying medical condition.)    Tangerine Oral Suspension Unflavored 1-5 ml up to two times daily.  Total THC = 240 mg, Total CBD Trace       medical cannabis (Patient's own supply) Take 1 Dose by mouth See Admin Instructions (The purpose of this order is to document that the patient reports taking medical cannabis.  This is not a prescription, and is not used to certify that the patient has a qualifying medical condition.)    Goodwell oral suspension: take 1-3 ml by mouth two times daily  Total CBD 1200 mg. Total THC 60 mg       montelukast (SINGULAIR) 10 MG tablet Take 1 tablet (10 mg) by mouth every morning 90 tablet 3     mupirocin (BACTROBAN) 2 % external ointment Apply a small amount to right front of nose 2 times a day for 10 days 1 g 0     omeprazole (PRILOSEC) 20 MG DR capsule Take 1 capsule (20 mg) by mouth daily. 60 capsule 0     omeprazole (PRILOSEC) 40 MG DR capsule Take 1 capsule (40 mg) by mouth daily. 90 capsule 0     rivaroxaban ANTICOAGULANT (XARELTO)  20 MG TABS tablet Take 1 tablet (20 mg) by mouth daily (with dinner). 90 tablet 0     Rivaroxaban ANTICOAGULANT 15 & 20 MG TBPK Starter Therapy Pack Take 15 mg by mouth 2 times daily (with meals) for 20 days, THEN 20 mg daily with food. 51 each 0     rizatriptan (MAXALT-MLT) 10 MG ODT Take 10 mg by mouth at onset of headache for migraine.       tiotropium (SPIRIVA RESPIMAT) 2.5 MCG/ACT inhaler Inhale 2 puffs into the lungs daily. 4 g 5     No current facility-administered medications for this visit.            Review of Systems:     12-point ROS reviewed and abnormalities stated in the history.         Physical Exam:     Constitutional - looks well, in no apparent distress  Eyes - no redness or discharge  Respiratory -breathing appears comfortable.  No cough.  Skin - No appreciable discoloration or lesions (very limited exam)  Neurological - No apparent tremors. Speech fluent and articlate  Psychiatric - no signs of delirium or anxiety     Exam limited to that easily identified on a virtual visit. The rest of a comprehensive physical examination is deferred due to PHE (public health emergency) video visit restrictions.         Current Laboratory Data:   All laboratory and imaging data reviewed.          Latest Ref Rng & Units 7/1/2024     2:07 PM   PFT   FVC L 4.47    FEV1 L 3.05    FVC% % 116    FEV1% % 96                        Again, thank you for allowing me to participate in the care of your patient.      Sincerely,    Navid Markham MD

## 2024-10-17 ENCOUNTER — DOCUMENTATION ONLY (OUTPATIENT)
Dept: OTHER | Facility: CLINIC | Age: 44
End: 2024-10-17
Payer: COMMERCIAL

## 2024-10-19 ENCOUNTER — E-VISIT (OUTPATIENT)
Dept: URGENT CARE | Facility: CLINIC | Age: 44
End: 2024-10-19
Payer: COMMERCIAL

## 2024-10-19 DIAGNOSIS — H10.30 ACUTE CONJUNCTIVITIS, UNSPECIFIED ACUTE CONJUNCTIVITIS TYPE, UNSPECIFIED LATERALITY: Primary | ICD-10-CM

## 2024-10-19 PROCEDURE — 99207 PR NON-BILLABLE SERV PER CHARTING: CPT | Performed by: PREVENTIVE MEDICINE

## 2024-10-19 RX ORDER — ERYTHROMYCIN 5 MG/G
OINTMENT OPHTHALMIC
Qty: 3.5 G | Refills: 0 | Status: SHIPPED | OUTPATIENT
Start: 2024-10-19 | End: 2024-10-26

## 2024-10-19 NOTE — PATIENT INSTRUCTIONS
Thank you for choosing us for your care. I have placed an order for a prescription so that you can start treatment. View your full visit summary for details by clicking on the link below. Your pharmacist will able to address any questions you may have about the medication.     If you re not feeling better within 2-3 days, please schedule an appointment.  You can schedule an appointment right here in [facility], or call [phone]  If the visit is for the same symptoms as your eVisit, we ll refund the cost of your eVisit if seen within seven days.    Pinkeye: Care Instructions  Overview     Pinkeye is redness and swelling of the eye surface and the conjunctiva (the lining of the eyelid and the covering of the white part of the eye). Pinkeye is also called conjunctivitis. Pinkeye is often caused by infection with bacteria or a virus. Dry air, allergies, smoke, and chemicals are other common causes.  Pinkeye often gets better on its own in 7 to 10 days. Antibiotics only help if the pinkeye is caused by bacteria. Pinkeye caused by infection spreads easily. If an allergy or chemical is causing pinkeye, it will not go away unless you can avoid whatever is causing it.  Follow-up care is a key part of your treatment and safety. Be sure to make and go to all appointments, and call your doctor if you are having problems. It's also a good idea to know your test results and keep a list of the medicines you take.  How can you care for yourself at home?  Wash your hands often. Always wash them before and after you treat pinkeye or touch your eyes or face.  Use moist cotton or a clean, wet cloth to remove crust. Wipe from the inside corner of the eye to the outside. Use a clean part of the cloth for each wipe.  Put cold or warm wet cloths on your eye a few times a day if the eye hurts.  Do not wear contact lenses or eye makeup until the pinkeye is gone. Throw away any eye makeup you were using when you got pinkeye. Clean your  "contacts and storage case. If you wear disposable contacts, use a new pair when your eye has cleared and it is safe to wear contacts again.  If the doctor gave you antibiotic ointment or eyedrops, use them as directed. Use the medicine for as long as instructed, even if your eye starts looking better soon. Keep the bottle tip clean, and do not let it touch the eye area.  To put in eyedrops or ointment:  Tilt your head back, and pull your lower eyelid down with one finger.  Drop or squirt the medicine inside the lower lid.  Close your eye for 30 to 60 seconds to let the drops or ointment move around.  Do not touch the ointment or dropper tip to your eyelashes or any other surface.  Do not share towels, pillows, or washcloths while you have pinkeye.  When should you call for help?   Call your doctor now or seek immediate medical care if:    You have pain in your eye, not just irritation on the surface.     You have a change in vision or loss of vision.     You have an increase in discharge from the eye.     Your eye has not started to improve or begins to get worse within 48 hours after you start using antibiotics.     Pinkeye lasts longer than 7 days.   Watch closely for changes in your health, and be sure to contact your doctor if you have any problems.  Where can you learn more?  Go to https://www.[employer].net/patiented  Enter Y392 in the search box to learn more about \"Pinkeye: Care Instructions.\"  Current as of: June 5, 2023  Content Version: 14.2 2024 [facility] [facility]   Care instructions adapted under license by your healthcare professional. If you have questions about a medical condition or this instruction, always ask your healthcare professional. [employer] disclaims any warranty or liability for your use of this information.    "

## 2024-10-22 ENCOUNTER — OFFICE VISIT (OUTPATIENT)
Dept: FAMILY MEDICINE | Facility: CLINIC | Age: 44
End: 2024-10-22
Payer: COMMERCIAL

## 2024-10-22 VITALS
RESPIRATION RATE: 17 BRPM | DIASTOLIC BLOOD PRESSURE: 80 MMHG | HEIGHT: 68 IN | WEIGHT: 243 LBS | BODY MASS INDEX: 36.83 KG/M2 | OXYGEN SATURATION: 99 % | SYSTOLIC BLOOD PRESSURE: 121 MMHG | HEART RATE: 91 BPM | TEMPERATURE: 97.5 F

## 2024-10-22 DIAGNOSIS — R06.02 SHORTNESS OF BREATH: ICD-10-CM

## 2024-10-22 DIAGNOSIS — R05.3 CHRONIC COUGH: Primary | ICD-10-CM

## 2024-10-22 PROCEDURE — 99213 OFFICE O/P EST LOW 20 MIN: CPT

## 2024-10-22 RX ORDER — AZITHROMYCIN 250 MG/1
TABLET, FILM COATED ORAL
Qty: 6 TABLET | Refills: 0 | Status: SHIPPED | OUTPATIENT
Start: 2024-10-22 | End: 2024-10-22

## 2024-10-22 RX ORDER — FLUCONAZOLE 150 MG/1
150 TABLET ORAL ONCE
Qty: 1 TABLET | Refills: 0 | Status: SHIPPED | OUTPATIENT
Start: 2024-10-22 | End: 2024-10-22

## 2024-10-22 ASSESSMENT — ENCOUNTER SYMPTOMS
EYE PAIN: 1
SHORTNESS OF BREATH: 1

## 2024-10-22 ASSESSMENT — PAIN SCALES - GENERAL: PAINLEVEL: MILD PAIN (2)

## 2024-10-22 NOTE — PROGRESS NOTES
Assessment & Plan     Chronic cough      Shortness of breath    -Sputum culture    Patient has had issues with an onset shortness of breath and dry cough that turned into productive cough this morning, along with headache.  She is most concerned about  the return of a potential pulmonary embolism related to her dosage switch from Xarelto to 20 mg daily 9 days ago.  She denies missing any doses of her Xarelto.  We discussed the possibility of trying treatment for the potential bronchopneumonia noted in her CT scan, but she does not have any focal findings on lung exam and no fever or hypoxia noted that would point towards active pneumonia. Could attempt another sputum culture.    My leading theory is that she might have some costochondritis related to cough with potential asthma/COPD and chronic tobacco use.  Unclear etiology for lesions in CT scan: could be a treatment-resistant or fungal pneumonia, or potentially lymphoma as noted in scan report. Cough was sharp and pleuritic in nature.   Will contact her pulmonology provider regarding switch of blood thinner dose (to see if this is a concern), and I did recommend she reach out to pulmonology team regarding her symptoms.    Eustachian tube dysfunction    No active infection noted. Advised restarting Flonase and trying some nasal saline.      Héctor Morgan is a 44 year old, presenting for the following health issues:  Fullness on the left ear on Wednesday/Thursday of last week. Going on for 1 week. Hearing loss. No fever/chills.     Hoping to wear contacts again.Discharge has been getting better.  Pain/discharge improved.    Has imaging scheduled. Still on Xarelto 20 mg.  Felt much better on the higher dosage packs. First diagnosed with September 29th. Does not miss doses of her xarelto.  Had shortness of breath, fell asleep for 1 hour. Sudden onset headache, and asked him for tylenol. This happened at 0530 this morning. Dry coughing , but started sounding  "productive. Then when headache came, and went back to no sputum. This has been occurring since April. She found that her dosage pack was 1 week and 2 days ago. Albuterol has been unhelpful.     No coughing blood (yellow sputum)    Ear Problem (Ear infection reoccurrence from Sept, hearing loss), Shortness of Breath (Pt is concerned she had another pulmonary embolism last night, she would like a chest CT sooner than the one scheduled for Dec), and Eye Problem (Check to see if her eyes have recovered enough from the pink eye to use contacts again)      10/22/2024     3:32 PM   Additional Questions   Roomed by Meghana Maddox     Ear Problem    Shortness of Breath    Eye Problem     History of Present Illness       Reason for visit:  Ear fullness & hearing loss in left ear.  No pain yet but worried about infection as my last ear infection presented exactly the same way.  Same ear that my last ear infection from late September started in.    She eats 0-1 servings of fruits and vegetables daily.She consumes 1 sweetened beverage(s) daily.She exercises with enough effort to increase her heart rate 9 or less minutes per day.  She exercises with enough effort to increase her heart rate 3 or less days per week.   She is taking medications regularly.           Objective    /80   Pulse 91   Temp 97.5  F (36.4  C) (Temporal)   Resp 17   Ht 1.727 m (5' 8\")   Wt 110.2 kg (243 lb)   LMP 10/05/2024 (Exact Date)   SpO2 99%   BMI 36.95 kg/m    Body mass index is 36.95 kg/m .  Physical Exam   GENERAL: alert and no distress  ENT: slight erythema noted on upper part of left TM without bulging.  Right TM normal.  RESP: Bilateral lower lung field wheezing and rhonchi noted. No focal findings. Breathing non-labored.  CV: regular rate and rhythm, normal S1 S2, no S3 or S4, no murmur  PSYCH: mentation appears normal, affect normal/bright            Signed Electronically by: Gamal Bazan NP    "

## 2024-10-23 ENCOUNTER — TELEPHONE (OUTPATIENT)
Dept: HEMATOLOGY | Facility: CLINIC | Age: 44
End: 2024-10-23
Payer: COMMERCIAL

## 2024-10-23 ENCOUNTER — MYC MEDICAL ADVICE (OUTPATIENT)
Dept: FAMILY MEDICINE | Facility: CLINIC | Age: 44
End: 2024-10-23
Payer: COMMERCIAL

## 2024-10-23 NOTE — CONFIDENTIAL NOTE
"[id number]  Cathy Arroyo  44 year old female  [facility] Diagnosis: Acute PE  [facility] Provider: Curt    Incoming voicemail from Cathy reporting increased symptoms since decreasing dosing from Xarelto 20 mg twice daily to Xarelto 20 mg once a day. Per PCP note she has not missed any doses. She is wanting to be seen sooner in clinic to discuss the possibility of new clot or of increasing dosing.     RN did call her back. She did not answer. RN did leave a detailed voicemail that while unlikely patients can \"fail\" Xarelto. If we were worried about her having a new PE I.e increased/worsening symptoms she would most likely need to be scanned and switched to an alternative medication (do not just increase Xarelto dose). RN did leave a direct call back number so plan can be determined/recommendations can be finalized.     Maya Vega  MSN, RN, PHN  [facility]  Office: [phone]  Fax: [phone]    "

## 2024-10-24 ENCOUNTER — NURSE TRIAGE (OUTPATIENT)
Dept: FAMILY MEDICINE | Facility: CLINIC | Age: 44
End: 2024-10-24

## 2024-10-24 ENCOUNTER — HOSPITAL ENCOUNTER (EMERGENCY)
Facility: CLINIC | Age: 44
Discharge: HOME | End: 2024-10-24
Attending: STUDENT IN AN ORGANIZED HEALTH CARE EDUCATION/TRAINING PROGRAM | Admitting: STUDENT IN AN ORGANIZED HEALTH CARE EDUCATION/TRAINING PROGRAM
Payer: COMMERCIAL

## 2024-10-24 VITALS
SYSTOLIC BLOOD PRESSURE: 113 MMHG | HEART RATE: 82 BPM | WEIGHT: 244 LBS | OXYGEN SATURATION: 97 % | DIASTOLIC BLOOD PRESSURE: 75 MMHG | RESPIRATION RATE: 16 BRPM | TEMPERATURE: 98.3 F | HEIGHT: 68 IN | BODY MASS INDEX: 36.98 KG/M2

## 2024-10-24 DIAGNOSIS — I49.3 FREQUENT PVCS: ICD-10-CM

## 2024-10-24 DIAGNOSIS — R00.2 PALPITATIONS: ICD-10-CM

## 2024-10-24 LAB
ANION GAP SERPL CALCULATED.3IONS-SCNC: 10 MMOL/L (ref 7–15)
BASOPHILS # BLD AUTO: 0.1 10E3/UL (ref 0–0.2)
BASOPHILS NFR BLD AUTO: 1 %
BUN SERPL-MCNC: 10.2 MG/DL (ref 6–20)
CALCIUM SERPL-MCNC: 9.6 MG/DL (ref 8.8–10.4)
CHLORIDE SERPL-SCNC: 104 MMOL/L (ref 98–107)
CREAT SERPL-MCNC: 0.69 MG/DL (ref 0.51–0.95)
EGFRCR SERPLBLD CKD-EPI 2021: >90 ML/MIN/1.73M2
EOSINOPHIL # BLD AUTO: 0.2 10E3/UL (ref 0–0.7)
EOSINOPHIL NFR BLD AUTO: 2 %
ERYTHROCYTE [DISTWIDTH] IN BLOOD BY AUTOMATED COUNT: 13.4 % (ref 10–15)
GLUCOSE SERPL-MCNC: 95 MG/DL (ref 70–99)
HCO3 SERPL-SCNC: 24 MMOL/L (ref 22–29)
HCT VFR BLD AUTO: 37.7 % (ref 35–47)
HGB BLD-MCNC: 12.4 G/DL (ref 11.7–15.7)
IMM GRANULOCYTES # BLD: 0 10E3/UL
IMM GRANULOCYTES NFR BLD: 0 %
LYMPHOCYTES # BLD AUTO: 3.2 10E3/UL (ref 0.8–5.3)
LYMPHOCYTES NFR BLD AUTO: 39 %
MAGNESIUM SERPL-MCNC: 1.9 MG/DL (ref 1.7–2.3)
MCH RBC QN AUTO: 28.1 PG (ref 26.5–33)
MCHC RBC AUTO-ENTMCNC: 32.9 G/DL (ref 31.5–36.5)
MCV RBC AUTO: 86 FL (ref 78–100)
MONOCYTES # BLD AUTO: 0.6 10E3/UL (ref 0–1.3)
MONOCYTES NFR BLD AUTO: 7 %
NEUTROPHILS # BLD AUTO: 4.3 10E3/UL (ref 1.6–8.3)
NEUTROPHILS NFR BLD AUTO: 52 %
NRBC # BLD AUTO: 0 10E3/UL
NRBC BLD AUTO-RTO: 0 /100
PLATELET # BLD AUTO: 356 10E3/UL (ref 150–450)
POTASSIUM SERPL-SCNC: 3.9 MMOL/L (ref 3.4–5.3)
RBC # BLD AUTO: 4.41 10E6/UL (ref 3.8–5.2)
SODIUM SERPL-SCNC: 138 MMOL/L (ref 135–145)
TROPONIN T SERPL HS-MCNC: <6 NG/L
TSH SERPL DL<=0.005 MIU/L-ACNC: 1.79 UIU/ML (ref 0.3–4.2)
WBC # BLD AUTO: 8.3 10E3/UL (ref 4–11)

## 2024-10-24 PROCEDURE — 87077 CULTURE AEROBIC IDENTIFY: CPT

## 2024-10-24 PROCEDURE — 93005 ELECTROCARDIOGRAM TRACING: CPT | Performed by: STUDENT IN AN ORGANIZED HEALTH CARE EDUCATION/TRAINING PROGRAM

## 2024-10-24 PROCEDURE — 87185 SC STD ENZYME DETCJ PER NZM: CPT

## 2024-10-24 PROCEDURE — 85025 COMPLETE CBC W/AUTO DIFF WBC: CPT | Performed by: PHYSICIAN ASSISTANT

## 2024-10-24 PROCEDURE — 36415 COLL VENOUS BLD VENIPUNCTURE: CPT | Performed by: PHYSICIAN ASSISTANT

## 2024-10-24 PROCEDURE — 87205 SMEAR GRAM STAIN: CPT

## 2024-10-24 PROCEDURE — 99284 EMERGENCY DEPT VISIT MOD MDM: CPT | Mod: FS | Performed by: STUDENT IN AN ORGANIZED HEALTH CARE EDUCATION/TRAINING PROGRAM

## 2024-10-24 PROCEDURE — 87070 CULTURE OTHR SPECIMN AEROBIC: CPT

## 2024-10-24 PROCEDURE — 83735 ASSAY OF MAGNESIUM: CPT | Performed by: PHYSICIAN ASSISTANT

## 2024-10-24 PROCEDURE — 84484 ASSAY OF TROPONIN QUANT: CPT | Performed by: PHYSICIAN ASSISTANT

## 2024-10-24 PROCEDURE — 84443 ASSAY THYROID STIM HORMONE: CPT | Performed by: PHYSICIAN ASSISTANT

## 2024-10-24 PROCEDURE — 80048 BASIC METABOLIC PNL TOTAL CA: CPT | Performed by: PHYSICIAN ASSISTANT

## 2024-10-24 PROCEDURE — 99284 EMERGENCY DEPT VISIT MOD MDM: CPT | Mod: 25 | Performed by: STUDENT IN AN ORGANIZED HEALTH CARE EDUCATION/TRAINING PROGRAM

## 2024-10-24 PROCEDURE — 93010 ELECTROCARDIOGRAM REPORT: CPT | Performed by: PHYSICIAN ASSISTANT

## 2024-10-24 ASSESSMENT — ACTIVITIES OF DAILY LIVING (ADL)
ADLS_ACUITY_SCORE: 0

## 2024-10-24 ASSESSMENT — ENCOUNTER SYMPTOMS: PALPITATIONS: 1

## 2024-10-24 NOTE — TELEPHONE ENCOUNTER
"Patient calling stating she is having a heart arrythmia or palpitations stating that starting over this past weekend she started having \"skipped beats\" followed by pounding heart rate she can feel in her throat. As going through protocol pt states it is feeling weird at times of just not feeling like a normal rhythm \"like not sinus\" and is unsure if the smoking and anxiety she is having and doing is affecting it.    Advised pt she should be seen in ER as due to stating that she previously has had abnormal EKG's and that it feels like it is skipping and having irregular rhythm she can feel when she's feeling her pulse. Pt agreeable to plan and going to head to ER and/or see if someone can bring her and then head to ER.    Routing as FYI unless further recommendations.    Thanks!  Bipin ARITA RN   [facility]    Reason for Disposition   Patient sounds very sick or weak to the triager    Additional Information   Negative: Passed out (i.e., lost consciousness, collapsed and was not responding)   Negative: Shock suspected (e.g., cold/pale/clammy skin, too weak to stand, low BP, rapid pulse)   Negative: Difficult to awaken or acting confused (e.g., disoriented, slurred speech)   Negative: Visible sweat on face or sweat dripping down face   Negative: Unable to walk, or can only walk with assistance (e.g., requires support)   Negative: Received SHOCK from implantable cardiac defibrillator and has persisting symptoms (i.e., palpitations, lightheadedness)   Negative: Dizziness, lightheadedness, or weakness and heart beating very rapidly (e.g., > 140 / minute)   Negative: Dizziness, lightheadedness, or weakness and heart beating very slowly (e.g., < 50 / minute)   Negative: Sounds like a life-threatening emergency to the triager   Negative: Chest pain   Negative: Difficulty breathing   Negative: Dizziness, lightheadedness, or weakness   Negative: Heart beating very rapidly (e.g., > 140 / minute) and present now  " "(Exception: During exercise.)   Negative: Heart beating very slowly (e.g., < 50 / minute)  (Exception: Athlete and heart rate normal for caller.)   Negative: New or worsened shortness of breath with activity (dyspnea on exertion)    Answer Assessment - Initial Assessment Questions  1. DESCRIPTION: \"Please describe your heart rate or heartbeat that you are having\" (e.g., fast/slow, regular/irregular, skipped or extra beats, \"palpitations\")      Skipped beats   2. ONSET: \"When did it start?\" (Minutes, hours or days)       Over past weekend  3. DURATION: \"How long does it last\" (e.g., seconds, minutes, hours)      Intermittent throughout the day  4. PATTERN \"Does it come and go, or has it been constant since it started?\"  \"Does it get worse with exertion?\"   \"Are you feeling it now?\"      No  5. TAP: \"Using your hand, can you tap out what you are feeling on a chair or table in front of you, so that I can hear?\" (Note: not all patients can do this)        Tap, tap, skip, tap, tap, tap, skip  6. HEART RATE: \"Can you tell me your heart rate?\" \"How many beats in 15 seconds?\"  (Note: not all patients can do this)        N/a  7. RECURRENT SYMPTOM: \"Have you ever had this before?\" If Yes, ask: \"When was the last time?\" and \"What happened that time?\"       No  8. CAUSE: \"What do you think is causing the palpitations?\"      Unsure, possibly anxiety?  9. CARDIAC HISTORY: \"Do you have any history of heart disease?\" (e.g., heart attack, angina, bypass surgery, angioplasty, arrhythmia)       No  10. OTHER SYMPTOMS: \"Do you have any other symptoms?\" (e.g., dizziness, chest pain, sweating, difficulty breathing)        States she saw \"stars\" earlier when coughing but wasn't feeling skipped beats  11. PREGNANCY: \"Is there any chance you are pregnant?\" \"When was your last menstrual period?\"        No    Protocols used: Heart Rate and Heartbeat [id number]    "

## 2024-10-24 NOTE — ED TRIAGE NOTES
Patient reports she noted her heart was skipping a beat 45 mins ago, she is not sure if its anxiety but patient has hx of multiple PE's and currently on xarelto.     Triage Assessment (Adult)       Row Name 10/24/24 0037          Triage Assessment    Airway WDL WDL        Respiratory WDL    Respiratory WDL WDL        Skin Circulation/Temperature WDL    Skin Circulation/Temperature WDL WDL        Cardiac WDL    Cardiac WDL WDL        Peripheral/Neurovascular WDL    Peripheral Neurovascular WDL WDL        Cognitive/Neuro/Behavioral WDL    Cognitive/Neuro/Behavioral WDL WDL

## 2024-10-24 NOTE — ED PROVIDER NOTES
ED Provider Note  [facility]      History     Chief Complaint   Patient presents with    Palpitations     Patient reports she feels like her heart is skipping a beat       Palpitations    43yo F pmhx bipolar disorder, asthma, s/p thyroidectomy and PE on AC p/w intermittent palpitations x few days.  Described as heart racing and skipping a beat.  Associated with intermittent SOB, but denies CP, dizziness, nausea, vomiting, previous similar symptoms.  Reports compliance with her AC.    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 No history of chronic hypertension. RX for aspirin sent and labs drawn at new OB visit.       Problem, psychiatric  diagnosed Bipolar    Recurrent otitis media chronic ear infections in childhood, recently had a double ear infection followed by another ear infection soon after    Sleep apnea     had a sleep study when pregnant with my second child    Uncomplicated asthma      Past Surgical History:   Procedure Laterality Date    BIOPSY  31 y/o colpos, and in the last 2 years for thyroid    Colposcopy, thyroid nodule biopsy twice    BLOOD PATCH N/A 10/11/2016    Procedure: EPIDURAL BLOOD PATCH;  Surgeon: GENERIC ANESTHESIA PROVIDER;  Location: [facility] OR     SECTION, TUBAL LIGATION, COMBINED N/A 2019    Procedure:  SECTION, WITH TUBAL LIGATION;  Surgeon: Kathy Rutledge MD;  Location: [facility] L+D    CRANIOTOMY,  EXCISE TUMOR COMPLEX, COMBINED  02/19/2014    Procedure: COMBINED CRANIOTOMY, EXCISE TUMOR COMPLEX;  Supracerabellar  Infratentorial Approach for Resection of Tumor ;  Surgeon: Kate Mckeon MD;  Location: [facility] OR    ENT SURGERY  2017    thyroidectomy    GYN SURGERY      colposcopy    THYROIDECTOMY N/A 05/03/2017    Procedure: THYROIDECTOMY;  Total Thyroidectomy;  Surgeon: Pamela Villasenor MD;  Location: [facility] OR     ADDERALL XR 25 MG 24 hr capsule  ARIPiprazole (ABILIFY) 10 MG tablet  cetirizine (ZYRTEC) 10 MG tablet  diazepam (VALIUM) 5 MG tablet  fluticasone (FLONASE) 50 MCG/ACT nasal spray  fluticasone-salmeterol (ADVAIR) 500-50 MCG/ACT inhaler  montelukast (SINGULAIR) 10 MG tablet  omeprazole (PRILOSEC) 40 MG DR capsule  rivaroxaban ANTICOAGULANT (XARELTO) 20 MG TABS tablet  tiotropium (SPIRIVA RESPIMAT) 2.5 MCG/ACT inhaler  albuterol (PROAIR HFA/PROVENTIL HFA/VENTOLIN HFA) 108 (90 Base) MCG/ACT inhaler  amphetamine-dextroamphetamine (ADDERALL XR) 30 MG 24 hr capsule  amphetamine-dextroamphetamine (ADDERALL) 10 MG tablet  erythromycin (ROMYCIN) 5 MG/GM ophthalmic ointment  Fexofenadine HCl (ALLEGRA PO)  levothyroxine (SYNTHROID/LEVOTHROID) 175 MCG tablet  medical cannabis (Patient's own supply)  mupirocin (BACTROBAN) 2 % external ointment  omeprazole (PRILOSEC) 20 MG DR capsule  Rivaroxaban ANTICOAGULANT 15 & 20 MG TBPK Starter Therapy Pack  rizatriptan (MAXALT-MLT) 10 MG ODT      Allergies   Allergen Reactions    Adhesive Tape Hives    Nicotine Anaphylaxis, Hives and Rash     Patch - rash/hives  Lozenges - throat swelling    Tegaderm Transparent Dressing (Informational Only) Hives    Adacel [Tetanus-Diphth-Acell Pertussis] Swelling    Ciprofloxacin Visual Disturbance and Hallucination    Codeine Sulfate Nausea and Vomiting    Methimazole Rash    Oxycodone-Acetaminophen Nausea and Vomiting    Tetanus Toxoid      Pt states she had an infection at injection site.     Tetanus-Diphtheria Toxoids Td       Caused staph infection - childhood/teenager    Vicodin [Hydrocodone-Acetaminophen] Nausea and Vomiting     Family History  Family History   Problem Relation Age of Onset    Other Cancer Mother 62        salivary gland cancer    Genitourinary Problems Mother     Bipolar Disorder Mother         unipolar depression    Depression Mother     Thyroid Disease Mother         benign tumor    Melanoma Mother     Cancer Mother         salivary gland cancer    Migraines Mother     Skin Cancer Mother     Asthma Father     Mental Illness Father         Bipolar 2    Obesity Father     Colon Polyps Father 64    Bipolar Disorder Brother         unipolar depression    Asthma Brother     Depression Brother     Asthma Maternal Grandmother     Osteoporosis Maternal Grandmother     Glaucoma Maternal Grandmother     Hypertension Maternal Grandmother     Macular Degeneration Maternal Grandmother     Skin Cancer Maternal Grandmother     Prostate Cancer Maternal Grandfather 69        no history of smoking    Bladder Cancer Maternal Grandfather 71    Colon Cancer Maternal Grandfather 70    Diabetes Paternal Grandfather     Breast Cancer Maternal Aunt 37        negative BRCA1/2 testing    Melanoma Maternal Aunt 64    Thyroid Disease Paternal Aunt     Breast Cancer Paternal Aunt 58        bilateral; negative testing    Cancer Paternal Aunt     Breast Cancer Other         paternal great aunt     Social History   Social History     Tobacco Use    Smoking status: Every Day     Current packs/day: 0.25     Average packs/day: 0.3 packs/day for 19.8 years (5.4 ttl pk-yrs)     Types: Cigarettes     Start date: 1/21/2004     Last attempt to quit: 1/25/2022     Passive exposure: Current    Smokeless tobacco: Never    Tobacco comments:     10/15/24 5 cigarettes daily.     Stopped smoking for both pregnamcies and breastfeeding of both children     Working on quitting smoking (6/27/23)   Vaping Use    Vaping status: Former    Quit date: 11/28/2023     "Substances: Nicotine, Flavoring    Devices: Disposable   Substance Use Topics    Alcohol use: Yes     Alcohol/week: 4.0 standard drinks of alcohol     Types: 4 Drinks containing 0.5 oz of alcohol per week     Comment: one drink once a week    Drug use: Not Currently     Types: Marijuana, Psilocybin     Comment: medical marijuana      A medically appropriate review of systems was performed with pertinent positives and negatives noted in the HPI, and all other systems negative.    Physical Exam   BP: 120/80  Pulse: 88  Temp: 98.3  F (36.8  C)  Resp: 16  Height: 172.7 cm (5' 8\")  Weight: 110.7 kg (244 lb)  SpO2: 98 %  Physical Exam  Constitutional:       General: She is not in acute distress.     Appearance: Normal appearance. She is not diaphoretic.   HENT:      Head: Atraumatic.      Mouth/Throat:      Mouth: Mucous membranes are moist.   Eyes:      Conjunctiva/sclera: Conjunctivae normal.   Cardiovascular:      Rate and Rhythm: Normal rate and regular rhythm.      Heart sounds: Normal heart sounds.   Pulmonary:      Effort: Pulmonary effort is normal. No respiratory distress.      Breath sounds: Normal breath sounds.   Musculoskeletal:      Cervical back: Neck supple.   Skin:     General: Skin is warm.   Neurological:      Mental Status: She is alert.           ED Course, Procedures, & Data      Procedures            EKG Interpretation:      Interpreted by Lloyd Shah PA-C  Time reviewed: 1745   Symptoms at time of EKG: Palpitations   Rhythm: normal sinus   Rate: Normal  Axis: Normal  Ectopy: premature ventricular contractions (unifocal)  Conduction: normal  ST Segments/ T Waves: No ST-T wave changes  Q Waves: none  Comparison to prior: PVCs    Clinical Impression: Sinus rhythm with occasional PVC                 Results for orders placed or performed during the hospital encounter of 10/24/24   Basic metabolic panel     Status: Normal   Result Value Ref Range    Sodium 138 135 - 145 mmol/L    Potassium 3.9 3.4 " - 5.3 mmol/L    Chloride 104 98 - 107 mmol/L    Carbon Dioxide (CO2) 24 22 - 29 mmol/L    Anion Gap 10 7 - 15 mmol/L    Urea Nitrogen 10.2 6.0 - 20.0 mg/dL    Creatinine 0.69 0.51 - 0.95 mg/dL    GFR Estimate >90 >60 mL/min/1.73m2    Calcium 9.6 8.8 - 10.4 mg/dL    Glucose 95 70 - 99 mg/dL   TSH with free T4 reflex     Status: Normal   Result Value Ref Range    TSH 1.79 0.30 - 4.20 uIU/mL   Magnesium     Status: Normal   Result Value Ref Range    Magnesium 1.9 1.7 - 2.3 mg/dL   Troponin T, High Sensitivity     Status: Normal   Result Value Ref Range    Troponin T, High Sensitivity <6 <=14 ng/L   CBC with platelets and differential     Status: None   Result Value Ref Range    WBC Count 8.3 4.0 - 11.0 10e3/uL    RBC Count 4.41 3.80 - 5.20 10e6/uL    Hemoglobin 12.4 11.7 - 15.7 g/dL    Hematocrit 37.7 35.0 - 47.0 %    MCV 86 78 - 100 fL    MCH 28.1 26.5 - 33.0 pg    MCHC 32.9 31.5 - 36.5 g/dL    RDW 13.4 10.0 - 15.0 %    Platelet Count 356 150 - 450 10e3/uL    % Neutrophils 52 %    % Lymphocytes 39 %    % Monocytes 7 %    % Eosinophils 2 %    % Basophils 1 %    % Immature Granulocytes 0 %    NRBCs per 100 WBC 0 <1 /100    Absolute Neutrophils 4.3 1.6 - 8.3 10e3/uL    Absolute Lymphocytes 3.2 0.8 - 5.3 10e3/uL    Absolute Monocytes 0.6 0.0 - 1.3 10e3/uL    Absolute Eosinophils 0.2 0.0 - 0.7 10e3/uL    Absolute Basophils 0.1 0.0 - 0.2 10e3/uL    Absolute Immature Granulocytes 0.0 <=0.4 10e3/uL    Absolute NRBCs 0.0 10e3/uL   CBC with platelets differential     Status: None    Narrative    The following orders were created for panel order CBC with platelets differential.  Procedure                               Abnormality         Status                     ---------                               -----------         ------                     CBC with platelets and d...[[id number]]                      Final result                 Please view results for these tests on the individual orders.     Medications - No data to  display  Labs Ordered and Resulted from Time of ED Arrival to Time of ED Departure   BASIC METABOLIC PANEL - Normal       Result Value    Sodium 138      Potassium 3.9      Chloride 104      Carbon Dioxide (CO2) 24      Anion Gap 10      Urea Nitrogen 10.2      Creatinine 0.69      GFR Estimate >90      Calcium 9.6      Glucose 95     TSH WITH FREE T4 REFLEX - Normal    TSH 1.79     MAGNESIUM - Normal    Magnesium 1.9     TROPONIN T, HIGH SENSITIVITY - Normal    Troponin T, High Sensitivity <6     CBC WITH PLATELETS AND DIFFERENTIAL    WBC Count 8.3      RBC Count 4.41      Hemoglobin 12.4      Hematocrit 37.7      MCV 86      MCH 28.1      MCHC 32.9      RDW 13.4      Platelet Count 356      % Neutrophils 52      % Lymphocytes 39      % Monocytes 7      % Eosinophils 2      % Basophils 1      % Immature Granulocytes 0      NRBCs per 100 WBC 0      Absolute Neutrophils 4.3      Absolute Lymphocytes 3.2      Absolute Monocytes 0.6      Absolute Eosinophils 0.2      Absolute Basophils 0.1      Absolute Immature Granulocytes 0.0      Absolute NRBCs 0.0       No orders to display          Critical care was not performed.     Medical Decision Making  The patient's presentation was of high complexity (an acute health issue posing potential threat to life or bodily function).    The patient's evaluation involved:  review of external note(s) from 3+ sources (clinical)  ordering and/or review of 3+ test(s) in this encounter (see separate area of note for details)    The patient's management necessitated high risk (a decision regarding hospitalization).    Assessment & Plan    43yo F pmhx bipolar disorder, asthma, s/p thyroidectomy and PE on AC p/w intermittent palpitations x few days.  Compliant with AC.  In ED is HDS/AF, NAD.  EKG shows a sinus rhythm with occasional PVC, which is likely the sensation that she is feeling.  Did send labs including electrolytes to evaluate for electrolyte abnormality and unremarkable.   Similarly, thyroid dysfunction was ruled out with a normal TSH.  Patient has no leukocytosis or anemia.  Troponin was negative.  Patient was monitored on continuous cardiac monitoring, with an occasional PVC, but no other arrhythmias appreciated.  These PVCs are likely etiology for her subjective palpitations.  Given infrequent nature, feel she is safe for discharge at this time.  She was advised to follow-up with her PCP for discussion of consideration of Zio patch.  Symptoms are unlikely related to PE, given anticoagulated state and normal vital signs.  Patient discharged with ER return precautions.    --    ED Attending Physician Attestation    I El Oleary MD, cared for this patient with the Advanced Practice Provider (KYUNG). I personally provided a substantive portion of the care for this patient, including approving the care plan for the number and complexity of problems addressed and taking responsibility related to the risk of complications and/or morbidity or mortality of patient management. Please see the KYUNG's documentation for full details.    Summary of HPI, PE, ED Course   Patient is a 44 year old female evaluated in the emergency department for palpitations. Exam and ED course notable for reassuring workup, physical exam, and hx. After the completion of care in the emergency department, the patient was discharged.      El Oleary MD  Emergency Medicine      I have reviewed the nursing notes. I have reviewed the findings, diagnosis, plan and need for follow up with the patient.    New Prescriptions    No medications on file       Final diagnoses:   Palpitations   Frequent PVCs         Lloyd Shah PA-C  [facility] EMERGENCY DEPARTMENT  10/24/2024     Lloyd Shah PA-C  10/24/24 1957       El Oleary MD  10/24/24 2005

## 2024-10-26 ENCOUNTER — NURSE TRIAGE (OUTPATIENT)
Dept: NURSING | Facility: CLINIC | Age: 44
End: 2024-10-26
Payer: COMMERCIAL

## 2024-10-26 DIAGNOSIS — A49.2 HAEMOPHILUS INFLUENZAE INFECTION: Primary | ICD-10-CM

## 2024-10-26 LAB
BACTERIA SPT CULT: ABNORMAL
BACTERIA SPT CULT: ABNORMAL
GRAM STAIN RESULT: ABNORMAL

## 2024-10-26 RX ORDER — CEFPODOXIME PROXETIL 200 MG/1
200 TABLET, FILM COATED ORAL 2 TIMES DAILY
Qty: 14 TABLET | Refills: 0 | Status: SHIPPED | OUTPATIENT
Start: 2024-10-26 | End: 2024-11-02

## 2024-10-26 NOTE — TELEPHONE ENCOUNTER
"Nurse Triage SBAR    Is this a 2nd Level Triage? Yes    Situation/Background: Patient is calling with concern of MyChart release positive for Haemophilus influenza. Specimen results are from 10/24/24. Patient stated she looked online for treatment of Haemophilus influenza. Patient stated she was treated with \"appropriate antibiotics\" when hospitalized approximately 5 weeks ago, 9/20/24.   Patient denies triage of symptoms. Patient states she was treated by IV 5 weeks ago. Treated for PE's and pneumonia.     Patient is most concerned about passing on illness. Patient is asking if she is contagious. Patient is asking if she can do her work as a .   Patient continues to have a cough, is not running a fever. Patient feels she can wait for treatment, if additional treatment is needed.     PCP is Sherrill Dasilva, lab ordering Provider Gamal Bazan NP at [facility].     Assessment:   Patient was having ear pain, saw Gamal Bazan at [facility].   Patient declines triage.     Dry cough with \"some\" intermittent mucous.     Recommendation: Per disposition, Page on Call Provider. Patient requested that a detailed message is left on voice mail if the patient does not answer the return call. Advised patient to call back if worsening symptoms or additional concerns.     Protocol Recommended Disposition: Paged/Called Provider      Provider consult indicated.  Reason for page: Positive lab result; patient question  Page sent to Dr. Bertha Osborn by Answering Service at 1:49 PM.   Karis Pool RN        No response from Dr. Osborn. Answering service left message for Provider at 2:22 PM, no answer.  Answering service paged back-up provider Dr. Cirilo Moran at 2:24 PM.   Karis Pool RN      Provider, Dr. Cirilo Moran, returning page to Nurse Advisors at 2:24 PM  Provider recommended plan of care: Provider will review and call back to nurse triage.   Karis Pool RN        Provider, Dr. Moran, " returning page to Nurse Advisors at 2:47 PM    Provider recommended plan of care: Patient should be treated again with antibiotic.  VORB: Cefpodoxime 200 mg twice daily x 7 days.   Patient can be contagious if not treated.  Patient should utilize hand washing hygiene, respiratory precautions at home and for 24 hours after starting antibiotic.  Isolate at home, wear a mask and hand-washing precautions  Patient should isolate at home during first 24 hours. After 24 hours of the antibiotics should be okay to end isolation and return to work.  Follow up with PCP on Monday.     Routed encounter to PCP Care Team as GILBERTO Pool RN on 10/26/2024 at 3:06 PM    Provider Recommendation Follow Up:   Unable to reach patient/caregiver. Detailed message with Provider's recommendations left on patient's voice mail per the patient request. Left message to return call to [phone] with any questions. Upon return call please notify caller of provider's recommendations.              Routed encounter to PCP Care Team as GILBERTO Pool RN on 10/26/2024 at 1:25 PM  [facility] Nurse Advisors  Reason for Disposition   [1] Follow-up call from patient regarding patient's clinical status AND [2] information urgent    Protocols used: PCP Call - No Triage-A-

## 2024-10-28 ENCOUNTER — OFFICE VISIT (OUTPATIENT)
Dept: HEMATOLOGY | Facility: CLINIC | Age: 44
End: 2024-10-28
Attending: INTERNAL MEDICINE
Payer: COMMERCIAL

## 2024-10-28 ENCOUNTER — VIRTUAL VISIT (OUTPATIENT)
Dept: FAMILY MEDICINE | Facility: CLINIC | Age: 44
End: 2024-10-28
Payer: COMMERCIAL

## 2024-10-28 ENCOUNTER — PATIENT OUTREACH (OUTPATIENT)
Dept: CARE COORDINATION | Facility: CLINIC | Age: 44
End: 2024-10-28

## 2024-10-28 VITALS
BODY MASS INDEX: 36.78 KG/M2 | HEART RATE: 107 BPM | HEIGHT: 68 IN | TEMPERATURE: 97.7 F | WEIGHT: 242.7 LBS | OXYGEN SATURATION: 98 %

## 2024-10-28 DIAGNOSIS — J14 PNEUMONIA DUE TO HAEMOPHILUS INFLUENZAE, UNSPECIFIED LATERALITY, UNSPECIFIED PART OF LUNG (H): Primary | ICD-10-CM

## 2024-10-28 DIAGNOSIS — I26.99 ACUTE PULMONARY EMBOLISM WITHOUT ACUTE COR PULMONALE, UNSPECIFIED PULMONARY EMBOLISM TYPE (H): ICD-10-CM

## 2024-10-28 DIAGNOSIS — R91.8 PULMONARY NODULES: ICD-10-CM

## 2024-10-28 DIAGNOSIS — J45.40 MODERATE PERSISTENT ASTHMA WITHOUT COMPLICATION: ICD-10-CM

## 2024-10-28 PROCEDURE — 99205 OFFICE O/P NEW HI 60 MIN: CPT | Performed by: INTERNAL MEDICINE

## 2024-10-28 PROCEDURE — 86147 CARDIOLIPIN ANTIBODY EA IG: CPT | Performed by: INTERNAL MEDICINE

## 2024-10-28 PROCEDURE — 86146 BETA-2 GLYCOPROTEIN ANTIBODY: CPT | Performed by: INTERNAL MEDICINE

## 2024-10-28 PROCEDURE — G0463 HOSPITAL OUTPT CLINIC VISIT: HCPCS | Performed by: INTERNAL MEDICINE

## 2024-10-28 PROCEDURE — 99443 PR PHYSICIAN TELEPHONE EVALUATION 21-30 MIN: CPT | Mod: 93 | Performed by: NURSE PRACTITIONER

## 2024-10-28 PROCEDURE — 36415 COLL VENOUS BLD VENIPUNCTURE: CPT | Performed by: INTERNAL MEDICINE

## 2024-10-28 RX ORDER — AZITHROMYCIN 250 MG/1
TABLET, FILM COATED ORAL
Qty: 6 TABLET | Refills: 0 | Status: SHIPPED | OUTPATIENT
Start: 2024-10-28 | End: 2024-10-28

## 2024-10-28 RX ORDER — AZITHROMYCIN 250 MG/1
TABLET, FILM COATED ORAL
Qty: 6 TABLET | Refills: 0 | Status: SHIPPED | OUTPATIENT
Start: 2024-10-28 | End: 2024-11-01

## 2024-10-28 NOTE — LETTER
Cathy Arroyo  [address]  [place] [address]    Dear Cathy Arroyo,      I am a team member within the [facility] Center with [facility]. I recently contacted you to ensure you were doing well following a recent visit within our health system. I also wanted to take this chance to introduce Clinic Care Coordination.     Below is a description of Clinic Care Coordination and how this team can further assist you:       The Clinic Care Coordination team is made up of a Registered Nurse, , Financial Resource Worker, and a Community Health Worker who understand and can help navigate the health care system. The goal of clinic care coordination is to help you manage your health, improve access to care, and achieve optimal health outcomes. They work alongside your provider to assist you in determining your health and social needs, obtain health care and community resources, and provide you with necessary information and education. Clinic Care Coordination can work with you through any barriers and develop a care plan that helps coordinate and strengthen the relationship between you and your care team.    If you wish to connect with the Clinic Care Coordination Team, please let your [facility] Primary Care Provider or Clinic Care Team know and they can place a referral. The Clinic Care Coordination team will then reach out by phone to further support you.    We are focused on providing you with the highest-quality healthcare experience possible.    Sincerely,   Your care team with [facility]

## 2024-10-28 NOTE — PROGRESS NOTES
Cathy is a 44 year old who is being evaluated via a billable telephone visit.    What phone number would you like to be contacted at? [phone]   How would you like to obtain your AVS? Erica  Originating Location (pt. Location): Home    Distant Location (provider location):  On-site    End:  1:22 PM   Start:  12:56 PM       Assessment & Plan     Pneumonia due to Haemophilus influenzae, unspecified laterality, unspecified part of lung (H)  Initially treated for m. Pneumoniae per sputum in September. Most recent sputum shows h.flu. None of the antibiotics received in September or early Oct would cover for h.flu. Given context of PE and severe asthma and smoking, I do want to treat with abx for h.flu. Patient open to this. She prefers azithromycin over augmentin due to significant nausea and vomiting with augmentin in the past. We don't need to pursue the PA for cefpodoxime at this time.   - azithromycin (ZITHROMAX) 250 MG tablet; Take 2 tablets (500 mg) by mouth daily for 1 day, THEN 1 tablet (250 mg) daily for 4 days.    Acute pulmonary embolism without acute cor pulmonale, unspecified pulmonary embolism type (H)  Continues on anticoagulation.    Moderate persistent asthma without complication  Still having asthma symptoms.    Reviewed ED visit for palpitations and ED provider notes occasional PVCs. Discussed benign nature.     The longitudinal plan of care for the diagnosis(es)/condition(s) as documented were addressed during this visit. Due to the added complexity in care, I will continue to support Cathy in the subsequent management and with ongoing continuity of care.Review of prior external note(s) from - ED x4  Prescription drug management  I spent a total of 30 minutes on the day of the visit.   Time spent by me doing chart review, history and exam, documentation and further activities per the note    Subjective   Cathy is a 44 year old, presenting for the following health issues:  Cough (/)    History  of Present Illness       Reason for visit:  Ear fullness & hearing loss in left ear.  No pain yet but worried about infection as my last ear infection presented exactly the same way.  Same ear that my last ear infection from late September started in.    She eats 0-1 servings of fruits and vegetables daily.She consumes 1 sweetened beverage(s) daily.She exercises with enough effort to increase her heart rate 9 or less minutes per day.  She exercises with enough effort to increase her heart rate 3 or less days per week.   She is taking medications regularly.     Feeling much better and ok overall. Still having asthma symptoms and coughing fits, but much less than last week. Had double pink eye last week.    Left sputum sample last 4 days ago and result came back showing h.flu.  Two days ago, on call doctor ordered cefpodoxine 200 mg BID x7. This was denied by insurance, requiring a PA.     9/2/24 - ED diagnosis pneumonia, testing showed adenovirus and m.pneumoniae (per sputum), treated with seven day courses of oral doxycycline  mg and amoxicillin 500 mg TID  9/18/24 - ED diagnosis hearing loss - no new medications  9/24 - ED --> hospital admission. Diagnosed with PE. Started on cefepime and vancomycin and azithromycin, in addition to dose of ceftriaxone in ED prior to admission. Difficult to determine how many doses of each of those patient received. Started on anticoagulant.           Review of Systems  Constitutional, HEENT, cardiovascular, pulmonary, gi and gu systems are negative, except as otherwise noted.      Objective           Vitals:  No vitals were obtained today due to virtual visit.    Physical Exam   General: Alert and no distress //Respiratory: No audible wheeze, cough, or shortness of breath // Psychiatric:  Appropriate affect, tone, and pace of words            Phone call duration: 26 minutes  Signed Electronically by: JANNET Liu CNP

## 2024-10-29 LAB
B2 GLYCOPROT1 IGG SERPL IA-ACNC: 0.9 U/ML
B2 GLYCOPROT1 IGM SERPL IA-ACNC: <2.4 U/ML
CARDIOLIPIN IGG SER IA-ACNC: 9 GPL-U/ML
CARDIOLIPIN IGG SER IA-ACNC: NEGATIVE
CARDIOLIPIN IGM SER IA-ACNC: 2 MPL-U/ML
CARDIOLIPIN IGM SER IA-ACNC: NEGATIVE
HOLD SPECIMEN: NORMAL

## 2024-10-29 NOTE — PROGRESS NOTES
[facility] Care Resource Center Contact  [facility]/Voicemail     Clinical Data: Care Coordination ED-sourced Outreach-     Outreach attempted x 3.  CHW did reach patient on second attempt on 10/28/2024 but pt was about to start a telehpone visit with her provider and CHW offered to call back. CHW left message on patient's voicemail on 10/27/2024 and 10/29/2024, providing [facility]'s 24/7 scheduling and nurse triage phone number [phone] ([phone]) for questions/concerns and/or to schedule an appt with an [facility] provider.      Care Coordination introduction letter with explanation of Clinic Care Coordination services sent to patient via [employer]. Clinic Care Coordination services remain available via referral if needed.    Plan: [facility] will do no further outreaches at this time.       SAUL Rodríguez  [facility], [facility]    *Connected Care Resource Team does NOT follow patient ongoing. Referrals are identified based on internal discharge reports and the outreach is to ensure patient has an understanding of their discharge instructions.

## 2024-10-29 NOTE — PROGRESS NOTES
[facility]  [facility] for [facility]ot[facility]  [address], [address], [place] [address]  Main: [phone], Fax: [phone]        Outpatient Clinic Visit  Date:  10/28/2024    Cathy rAroyo is a 44-year-old woman referred for evaluation and recommendations regarding duration of anticoagulation.  She is here today with her .    She was hospitalized here in September 2024, presenting with dyspnea, productive cough, and low-grade fever and was found have pneumonia.  She also has underlying asthma which was diagnosed in childhood and that condition was exacerbated by the pulmonary infection.  During the course of her evaluation she had a CT scan which again showed changes consistent with pulmonary infection, but she was also found to have several small filling defects in segmental branches of the pulmonary arteries in the right upper and left upper lobes.  Ultrasounds of the lower extremities showed no evidence of deep vein thrombosis.  She was not noted to have any recent travel, surgeries, periods of immobility, or estrogen use.  She was initially anticoagulated with enoxaparin, and discharged on rivaroxaban.    Since starting anticoagulation she has noticed increased bruising, and significant increase in her menstrual bleeding which had previously been heavy prior to anticoagulation.  She has been seen by her primary care physician and referred to OB/GYN for Mirena IUD placement which is currently scheduled for December 2024.  She has not had other bleeding issues since starting anticoagulation.    There is no known family history of venous thrombosis, although the known details of her family medical history seem to be somewhat limited.    Her past medical history is remarkable for asthma, recurrent respiratory tract infections, history of migraines, history of pineal gland tumor resection in 2014, history of Graves' disease status post thyroidectomy, and mental  health issues including bipolar disorder, anxiety, and PTSD.  She is also followed in oncology clinic given high risk for breast cancer based on family history.  She is up-to-date with appropriate screening.    She works as a [employer].  She is , has 2 school-aged children, ages 5 and 8.  The older child has autism.  She uses tobacco, has successfully reduced her use, and is working on quitting altogether.    Physical exam:  She appears in no distress.  She has multiple tattoos, but no unusual bruises or petechiae were noted.    Labs:  Recent labs reviewed.  Renal and liver function are normal.  Iron panel consistent with iron deficiency with a serum iron of 35, iron binding capacity 330, iron saturation 11, ferritin 23.  She has not anemic, however, with a hemoglobin 12.4 and MCV of 86.  The rest of her CBC and differential is also normal.    Imaging:  We reviewed together her CT scan from 9/20/2024, showing 3 areas of small segmental pulmonary emboli.      ASSESSMENT / PLAN:  Bilateral pulmonary emboli, September 2024    Cathy presented in September 2024 with pneumonia, and an asthma exacerbation.  During her evaluation she was noted to have small segmental pulmonary emboli.  Based on the small size of these clots, it is very unlikely that she was symptomatic from them.  No DVT was found.  Thus, I would consider these pulmonary emboli to be incidentally discovered.    Other than the active pulmonary infection, she does not appear to have any other obvious triggers for venous thrombosis.  She has now completed 1 month of anticoagulation with rivaroxaban and is tolerating it reasonably well with the exception of heavy menstrual bleeding.  Based on her report, it sounds as though her menstrual periods were heavy prior to starting anticoagulation but they have clearly increased.  Recent labs indicate that she is iron deficient but not anemic.  She is scheduled for Mirena IUD placement in December and I  encouraged her to follow through with this.    There is no known family history of venous thrombosis, although details of her family history appear to be somewhat unknown.  We discussed genetic thrombophilia testing, but decided to hold off on that for now, as she will require at least 3 months of anticoagulation regardless of those results.  We did, however, test for antiphospholipid antibodies (cardiolipin and beta-2 glycoprotein antibodies) today.  Lupus anticoagulant was not sent given that she is on a DOAC which will almost certainly result in a false positive result.    Regarding duration of anticoagulation, we discussed the importance of the distinction between provoked versus unprovoked venous thrombosis and how that impacts our recommendations.  Given that these clots appear to have been incidentally discovered, and were associated with an active pulmonary infection, I would be inclined against recommending long-term anticoagulation.  That said, she does appear to have frequent pulmonary symptoms related to her underlying asthma and recurrent pulmonary infections, and given that she has now had an episode of venous thrombosis, not keeping her on anticoagulation could lead to more frequent repeat CT scans.  Another consideration is her heavy menstrual bleeding which is clearly worsened on anticoagulation, although she is scheduled for a Mirena IUD placement in the near future which will hopefully address that issue adequately.    Our plan is for her to complete an initial 3-month course of anticoagulation, and then rediscuss all of the issues outlined above.  She will remain on rivaroxaban, and we discussed the importance of taking this at strict 24-hour dosing intervals and also with food to ensure proper absorption.  She was given our contact information and encouraged to call with any new questions or concerns between now and the time of her follow-up visit.    Total time on date of encounter 60 minutes,  including review of medical records and labs, clinic visit, and documentation.      Monster Elias MD  Professor of Medicine  Division of Hematology, Oncology, and Transplantation  Director, [facility]

## 2024-10-30 ENCOUNTER — TELEPHONE (OUTPATIENT)
Dept: HEMATOLOGY | Facility: CLINIC | Age: 44
End: 2024-10-30
Payer: COMMERCIAL

## 2024-10-30 NOTE — TELEPHONE ENCOUNTER
[id number]  Cathy Arroyo  44 year old female  [facility] Diagnosis: PE  [facility] Provider: Curt    Incoming call from Cathy on 10/29/24 afternoon. She is scheduled to see Dr. Elias on 12/17/24. She is wondering if it is okay if she continues with her IUD placement on 12/9/24 even though that will be before she reviews long-term AC plan with Dr. Elias.     Dr. Elias recommends she keep her IUD placement scheduled as is. He reached out to her OB provider to see if she needs to hold AC prior to IUD insertion.    RN called her back on 10/29 and 10/30. Left voicemail with call-back number. RN sent [employer] message as well. Will monitor for response from OB provider.    Marce Smith RN, BSN, PCCN  Nurse Clinician    [facility]  [address] [place] [address], [address], [place] [address]   Office, direct: [phone]  Main office number: [phone]  Pronouns: She, her, hers

## 2024-10-31 NOTE — TELEPHONE ENCOUNTER
DIAGNOSIS: plantar fasciitis?     APPOINTMENT DATE: 11.5.24   NOTES STATUS DETAILS   MEDICATION LIST Internal

## 2024-11-04 ENCOUNTER — TELEPHONE (OUTPATIENT)
Dept: FAMILY MEDICINE | Facility: CLINIC | Age: 44
End: 2024-11-04

## 2024-11-05 ENCOUNTER — PRE VISIT (OUTPATIENT)
Dept: ORTHOPEDICS | Facility: CLINIC | Age: 44
End: 2024-11-05

## 2024-11-05 ENCOUNTER — DOCUMENTATION ONLY (OUTPATIENT)
Dept: ANTICOAGULATION | Facility: CLINIC | Age: 44
End: 2024-11-05

## 2024-11-05 NOTE — PROGRESS NOTES
Anticoagulant Therapeutic Duplication    Duplicate orders identified: same medication but different dose, form, frequency or route    Duplicate orders appropriate-has starter pack order and ongoing therapy order     Active anticoagulant: rivaroxaban (Xarelto)    Plan made per [facility] anticoagulation protocol.    Christine Spence RN  11/5/2024

## 2024-11-11 ENCOUNTER — OFFICE VISIT (OUTPATIENT)
Dept: AUDIOLOGY | Facility: CLINIC | Age: 44
End: 2024-11-11
Payer: COMMERCIAL

## 2024-11-11 DIAGNOSIS — H90.12 CONDUCTIVE HEARING LOSS OF LEFT EAR WITH UNRESTRICTED HEARING OF RIGHT EAR: Primary | ICD-10-CM

## 2024-11-11 DIAGNOSIS — H90.3 ASYMMETRICAL SENSORINEURAL HEARING LOSS: Primary | ICD-10-CM

## 2024-11-11 NOTE — PROGRESS NOTES
AUDIOLOGY REPORT    SUMMARY: Audiology visit completed. See audiogram for results.      RECOMMENDATIONS: Follow-up with ENT.      Tamika Chiu  Audiologist  [place] License  #[id number]

## 2024-11-12 ENCOUNTER — OFFICE VISIT (OUTPATIENT)
Dept: FAMILY MEDICINE | Facility: CLINIC | Age: 44
End: 2024-11-12
Payer: COMMERCIAL

## 2024-11-12 VITALS
WEIGHT: 246.5 LBS | HEIGHT: 68 IN | OXYGEN SATURATION: 97 % | BODY MASS INDEX: 37.36 KG/M2 | DIASTOLIC BLOOD PRESSURE: 82 MMHG | HEART RATE: 88 BPM | RESPIRATION RATE: 16 BRPM | SYSTOLIC BLOOD PRESSURE: 122 MMHG | TEMPERATURE: 98.4 F

## 2024-11-12 DIAGNOSIS — E89.0 S/P TOTAL THYROIDECTOMY: ICD-10-CM

## 2024-11-12 DIAGNOSIS — E89.0 POST-SURGICAL HYPOTHYROIDISM: ICD-10-CM

## 2024-11-12 DIAGNOSIS — Z86.39 HISTORY OF GRAVES' DISEASE: ICD-10-CM

## 2024-11-12 DIAGNOSIS — J45.40 MODERATE PERSISTENT ASTHMA WITHOUT COMPLICATION: ICD-10-CM

## 2024-11-12 DIAGNOSIS — F31.12 BIPOLAR AFFECTIVE DISORDER, CURRENTLY MANIC, MODERATE (H): Primary | ICD-10-CM

## 2024-11-12 DIAGNOSIS — Z11.3 ROUTINE SCREENING FOR STI (SEXUALLY TRANSMITTED INFECTION): ICD-10-CM

## 2024-11-12 LAB — T PALLIDUM AB SER QL: NONREACTIVE

## 2024-11-12 PROCEDURE — 87389 HIV-1 AG W/HIV-1&-2 AB AG IA: CPT | Performed by: NURSE PRACTITIONER

## 2024-11-12 PROCEDURE — 86780 TREPONEMA PALLIDUM: CPT | Performed by: NURSE PRACTITIONER

## 2024-11-12 PROCEDURE — 87491 CHLMYD TRACH DNA AMP PROBE: CPT | Performed by: NURSE PRACTITIONER

## 2024-11-12 PROCEDURE — 99215 OFFICE O/P EST HI 40 MIN: CPT | Performed by: NURSE PRACTITIONER

## 2024-11-12 PROCEDURE — 87591 N.GONORRHOEAE DNA AMP PROB: CPT | Performed by: NURSE PRACTITIONER

## 2024-11-12 PROCEDURE — 36415 COLL VENOUS BLD VENIPUNCTURE: CPT | Performed by: NURSE PRACTITIONER

## 2024-11-12 PROCEDURE — G2211 COMPLEX E/M VISIT ADD ON: HCPCS | Performed by: NURSE PRACTITIONER

## 2024-11-12 RX ORDER — LEVOTHYROXINE SODIUM 175 UG/1
175 TABLET ORAL DAILY
Qty: 90 TABLET | Refills: 3 | Status: SHIPPED | OUTPATIENT
Start: 2024-11-12

## 2024-11-12 RX ORDER — OLANZAPINE 5 MG/1
5 TABLET ORAL AT BEDTIME
COMMUNITY
Start: 2024-11-11

## 2024-11-12 ASSESSMENT — ASTHMA QUESTIONNAIRES
ACT_TOTALSCORE: 21
QUESTION_2 LAST FOUR WEEKS HOW OFTEN HAVE YOU HAD SHORTNESS OF BREATH: ONCE OR TWICE A WEEK
QUESTION_1 LAST FOUR WEEKS HOW MUCH OF THE TIME DID YOUR ASTHMA KEEP YOU FROM GETTING AS MUCH DONE AT WORK, SCHOOL OR AT HOME: NONE OF THE TIME
EMERGENCY_ROOM_LAST_YEAR_TOTAL: ONE
ACT_TOTALSCORE: 21
QUESTION_4 LAST FOUR WEEKS HOW OFTEN HAVE YOU USED YOUR RESCUE INHALER OR NEBULIZER MEDICATION (SUCH AS ALBUTEROL): ONCE A WEEK OR LESS
QUESTION_3 LAST FOUR WEEKS HOW OFTEN DID YOUR ASTHMA SYMPTOMS (WHEEZING, COUGHING, SHORTNESS OF BREATH, CHEST TIGHTNESS OR PAIN) WAKE YOU UP AT NIGHT OR EARLIER THAN USUAL IN THE MORNING: NOT AT ALL
QUESTION_5 LAST FOUR WEEKS HOW WOULD YOU RATE YOUR ASTHMA CONTROL: SOMEWHAT CONTROLLED

## 2024-11-12 ASSESSMENT — PAIN SCALES - GENERAL: PAINLEVEL_OUTOF10: MILD PAIN (2)

## 2024-11-12 NOTE — PATIENT INSTRUCTIONS
I will talk to Mackenzie  I do worry that we might be in a bind trying to decide between inhaled steroid and mental health  I put in a referral for pharmacist to look at the asthma medication potential for causing jose eduardo    Ok to stop the flonase   I do think you should be taking the inhaled steroid  Please DO take the Spirva - this is NOT a steroid  I want the pharmacist to also look at the montelukast

## 2024-11-12 NOTE — PROGRESS NOTES
Assessment & Plan     Bipolar affective disorder, currently manic mixed, moderate (H)  Jhoana episode - likely lasting since spring  Attributes at least some to steroid use. Has historically avoided steroids, but this year with poorly controlled asthma has had two rounds to my knowledge. Inhaled steroids have much less systemic absorption than oral and are essential for asthma control. How much less systemic absorption is unclear. I referred Cathy to [facility] to help get more clarity on how these medications will fit into overall management of asthma in the context of steroids trigering jhoana. Message left with psychiatry with the above info. Consent to communicate collected and will be abstracted  - Med Therapy Management Referral    Moderate persistent asthma without complication  As above  - Med Therapy Management Referral    Routine screening for STI (sexually transmitted infection)  Will need HI vna syphilis repeated in a month to three months  - Chlamydia trachomatis/Neisseria gonorrhoeae by PCR; Future  - HIV Antigen Antibody Combo; Future  - Treponema Abs w Reflex to RPR and Titer; Future  - Chlamydia trachomatis/Neisseria gonorrhoeae by PCR  - HIV Antigen Antibody Combo  - Treponema Abs w Reflex to RPR and Titer    Post-surgical hypothyroidism  Refilled - labs euthyroid  - levothyroxine (SYNTHROID/LEVOTHROID) 175 MCG tablet; Take 1 tablet (175 mcg) by mouth daily.    S/P total thyroidectomy  - levothyroxine (SYNTHROID/LEVOTHROID) 175 MCG tablet; Take 1 tablet (175 mcg) by mouth daily.    History of Graves' disease  - levothyroxine (SYNTHROID/LEVOTHROID) 175 MCG tablet; Take 1 tablet (175 mcg) by mouth daily.          Nicotine/Tobacco Cessation  She reports that she has been smoking cigarettes. She started smoking about 20 years ago. She has a 5.4 pack-year smoking history. She has been exposed to tobacco smoke. She has never used smokeless tobacco.  Nicotine/Tobacco Cessation Plan  Self help information given  "to patient        Patient Instructions   I will talk to Mackenzie  I do worry that we might be in a bind trying to decide between inhaled steroid and mental health  I put in a referral for pharmacist to look at the asthma medication potential for causing jose eduardo    Ok to stop the flonase   I do think you should be taking the inhaled steroid  Please DO take the Spirva - this is NOT a steroid  I want the pharmacist to also look at the montelukast    The longitudinal plan of care for the diagnosis(es)/condition(s) as documented were addressed during this visit. Due to the added complexity in care, I will continue to support Cathy in the subsequent management and with ongoing continuity of care.Prescription drug management  I spent a total of 48 minutes on the day of the visit.   Time spent by me today doing chart review, history and exam, documentation and further activities per the note    Subjective   Cathy is a 44 year old, presenting for the following health issues:  Asthma, Recheck Medication (Athsma medication), and STD (Would like testing.)    STD    History of Present Illness       Reason for visit:  Cessasion of flutocasone via psychiatrists orders following severe mixed manic episode with psychosis   She is taking medications regularly.     Has psychotic break this past weekend  Has been working with psychiatry and made med changes   - holding stimulants for 2-3 days   - started zyprexa 2.5-5 mg once a day   - appt with psychiatry 11/18   - stop steroids   - taking time off work  Feels lucid and safe today  Has plan for sleep    Psychiatry Dr. Faraz Diaz  [phone]  Therapist Nimo Bradshaw  [phone]        Objective    /82   Pulse 88   Temp 98.4  F (36.9  C) (Temporal)   Resp 16   Ht 1.727 m (5' 8\")   Wt 111.8 kg (246 lb 8 oz)   LMP 11/03/2024 (Exact Date)   SpO2 97%   BMI 37.48 kg/m    Body mass index is 37.48 kg/m .  Physical Exam   GENERAL: alert and no distress  RESP: expiratory " wheezes throughout and inspiratory wheezes throughout  CV: regular rate and rhythm, normal S1 S2, no S3 or S4, no murmur, click or rub, no peripheral edema   PSYCH: mentation appears normal, tearful, and anxious            Signed Electronically by: JANNET Liu CNP

## 2024-11-13 LAB
C TRACH DNA SPEC QL PROBE+SIG AMP: NEGATIVE
HIV 1+2 AB+HIV1 P24 AG SERPL QL IA: NONREACTIVE
N GONORRHOEA DNA SPEC QL NAA+PROBE: NEGATIVE

## 2024-11-14 ENCOUNTER — OFFICE VISIT (OUTPATIENT)
Dept: OTOLARYNGOLOGY | Facility: CLINIC | Age: 44
End: 2024-11-14
Payer: COMMERCIAL

## 2024-11-14 VITALS — HEIGHT: 68 IN | BODY MASS INDEX: 36.53 KG/M2 | WEIGHT: 241 LBS

## 2024-11-14 DIAGNOSIS — H65.92 OME (OTITIS MEDIA WITH EFFUSION), LEFT: Primary | ICD-10-CM

## 2024-11-14 RX ORDER — CIPROFLOXACIN AND DEXAMETHASONE 3; 1 MG/ML; MG/ML
SUSPENSION/ DROPS AURICULAR (OTIC)
Qty: 7.5 ML | Refills: 0 | Status: SHIPPED | OUTPATIENT
Start: 2024-11-14 | End: 2024-11-24

## 2024-11-14 ASSESSMENT — PAIN SCALES - GENERAL: PAINLEVEL_OUTOF10: MILD PAIN (2)

## 2024-11-14 NOTE — NURSING NOTE
"Chief Complaint   Patient presents with    RECHECK   Height 1.727 m (5' 8\"), weight 109.3 kg (241 lb), last menstrual period 11/03/2024, not currently breastfeeding. Antonino Schultz, EMT    "

## 2024-11-14 NOTE — LETTER
11/14/2024       RE: Cathy Arroyo  [address]  [facility] [address]     Dear Colleague,    Thank you for referring your patient, Cathy Arroyo, to the [facility] [place] at [facility]. Please see a copy of my visit note below.      [facility] [place]  [address]  [address]TH FLO[address]  [facility] [address]  Phone: [phone]  Fax: [phone]    Patient:  Cathy Arroyo, Date of birth 1980  Date of Visit:  Nov 14, 2024  Referring Provider No ref. provider found      Assessment & Plan     OME (otitis media with effusion), left  Drops prescribed for 5 days. Follow up with audiogram in 6 weeks.  - MYRINGOTOMY W ASP/EUSTATIAN TUBE INFLATION      History of Present Illness    Pertinent history obtain from: chart review and patient    Last seen in clinic by myself on 10/14/2024 for hearing loss, hoarseness, and nasal vestibulitis.    She reports 4 weeks ago her hearing decreased in her left ear. She was treated with antibiotics but symptoms did not resolve.      PHYSICAL EXAM:  LMP 11/03/2024 (Exact Date)   Constitutional:  The patient was unaccompanied, well-groomed, and in no acute distress.     Skin: Normal:  warm and pink without rash    Neurologic: Alert and oriented x 3. Cooperative.    Psychiatric: The patient's affect was calm, cooperative, and appropriate.     Communication:  Normal; communicates verbally, voice quality hoarse   Respiratory: Breathing comfortably without stridor or exertion of accessory muscles   Head/Face:  Normocephalic and atraumatic.  No lesions or scars.    Eyes: Extraocular movement intact. Clear sclera.   Ears: Pinnae and tragus non-tender. No external deformity, hearing normal to conversational voice   Nose: No anterior drainage, no external deformity   Oral Cavity: Normal tongue, adequate dentition, moist.   Neck: Supple with normal laryngeal  and tracheal landmarks. Normal range of motion.       Otologic microscope exam:   Patient's ear pathology required use of the binocular microscope for the purpose of cleaning and improving visualization in order to assure a more accurate diagnostic evaluation.     Right ear was examined under the microscope.  Normal appearing TM, nicely aerated middle ear space.    Left ear was also examined under the microscope. Visible effusion with air bubbles. Intact TM, nicely aerated middle ear space.       Procedure:  Informed consent was obtained and signed.  Time Out was conducted with confirmation of the patient's name, side of the procedure and procedure to be done.  The left ear was examined under the microscope.  Phenol was placed on the inferior TM. Small myringotomy incision was made at about the 7 o'clock position. Thin fluid was suctioned from the middle ear. The patient tolerated the procedure well with no complications.         Audiogram: 11/11/2024 - data independently reviewed  Right ear: Normal hearing with air-bone gap at 250Hz   Left ear: Normal to borderline normal conductive HL   SRT right: 20 dB left: 20 dB   WR right: 100% left: 100%   Acoustic Reflexes:  DNT due to abnormal tymps  Tympanograms: type C right, type B left     Right thresholds stable from 9/19, significant AC threshold improvement from 9/19    Rocio Tyler PA-C   Otolaryngology-Head & Neck Surgery               Again, thank you for allowing me to participate in the care of your patient.      Sincerely,    Rocio Tyler PA-C

## 2024-11-14 NOTE — PROGRESS NOTES
"[facility] [address] [address]  Phone: [phone]  Fax: [phone]    Patient:  Cathy Arroyo, Date of birth 1980  Date of Visit:  2024  Referring Provider No ref. provider found      Assessment & Plan      OME (otitis media with effusion), left  ***  - MYRINGOTOMY W ASP/EUSTATIAN TUBE INFLATION      {[facility]  Documentation (Optional):366651}  { E&M time (Optional):257643}  {Provider  Link to [facility] Help Grid :889519}     History of Present Illness     Pertinent history obtain from: {historychoice:075227::\"patient\"}    Last seen in clinic by myself ** for ***.    She reports 4 weeks ago her hearing decreased in her left ear.       PHYSICAL EXAM:  LMP 2024 (Exact Date)   Constitutional:  The patient was {bbgeneral:503719}, well-groomed, and in no acute distress.     Skin: Normal:  warm and {bbskin:973732} without rash    Neurologic: Alert and oriented x 3. Cooperative.    Psychiatric: The patient's affect was calm, cooperative, and appropriate.     Communication:  Normal; communicates verbally, voice quality {[facility] VOICE QUALITY:452324}   Respiratory: Breathing {bbairway:841470}   Head/Face:  Normocephalic and atraumatic.  No lesions or scars.    Eyes: Extraocular movement intact. Clear sclera. {bbglasses (Optional):434659}   Ears: Pinnae and tragus non-tender. No external deformity, hearing {bbhearin}, {bbhearing1 (Optional):691172}, canals and TM's are {bbotoscope:778485}    Nose: No anterior drainage, {bbnoseext:831325}   Oral Cavity: Normal tongue, adequate dentition, moist.   Neck: Supple with normal laryngeal and tracheal landmarks. Normal range of motion.       Procedure:  Informed consent was obtained and signed.  Time Out was conducted with confirmation of the patient's name, side of the procedure and procedure to be done.  The left ear was examined under the microscope.  Phenol was " placed on the ***. Small myringotomy incision was made at ***. The patient tolerated the procedure well with no complications.       Insert audiogram***    Audiogram: 11/14/2024 *** - data independently reviewed  Right ear: ***   Left ear: ***   SRT right: *** dB left: *** dB   WR right: ***% left: ***%   Acoustic Reflexes: {bbreflexes:216398}  Tympanograms: type *** right, type *** left      Compared to prior audiogram {bbprioraudio:358615}    Rocio Tyler PA-C   Otolaryngology-Head & Neck Surgery        {Do not delete. Used for tracking note template use:720000}

## 2024-11-20 ENCOUNTER — VIRTUAL VISIT (OUTPATIENT)
Dept: PHARMACY | Facility: CLINIC | Age: 44
End: 2024-11-20
Attending: NURSE PRACTITIONER
Payer: COMMERCIAL

## 2024-11-20 DIAGNOSIS — F31.12 BIPOLAR AFFECTIVE DISORDER, CURRENTLY MANIC, MODERATE (H): ICD-10-CM

## 2024-11-20 DIAGNOSIS — I26.99 ACUTE PULMONARY EMBOLISM WITHOUT ACUTE COR PULMONALE, UNSPECIFIED PULMONARY EMBOLISM TYPE (H): ICD-10-CM

## 2024-11-20 DIAGNOSIS — J30.2 SEASONAL ALLERGIC RHINITIS, UNSPECIFIED TRIGGER: ICD-10-CM

## 2024-11-20 DIAGNOSIS — E89.0 POST-SURGICAL HYPOTHYROIDISM: ICD-10-CM

## 2024-11-20 DIAGNOSIS — J45.40 MODERATE PERSISTENT ASTHMA WITHOUT COMPLICATION: Primary | ICD-10-CM

## 2024-11-20 DIAGNOSIS — K21.9 GASTROESOPHAGEAL REFLUX DISEASE, UNSPECIFIED WHETHER ESOPHAGITIS PRESENT: ICD-10-CM

## 2024-11-20 RX ORDER — FLUTICASONE PROPIONATE AND SALMETEROL 250; 50 UG/1; UG/1
1 POWDER RESPIRATORY (INHALATION) EVERY 12 HOURS
COMMUNITY

## 2024-11-20 RX ORDER — FEXOFENADINE HCL 180 MG/1
180 TABLET ORAL DAILY
Qty: 90 TABLET | Refills: 3 | Status: CANCELLED | OUTPATIENT
Start: 2024-11-20

## 2024-11-20 RX ORDER — FLUTICASONE FUROATE AND VILANTEROL 100; 25 UG/1; UG/1
1 POWDER RESPIRATORY (INHALATION) DAILY
Qty: 28 EACH | Refills: 11 | Status: SHIPPED | OUTPATIENT
Start: 2024-11-20

## 2024-11-20 NOTE — PROGRESS NOTES
Medication Therapy Management (MTM) Encounter    ASSESSMENT:                            Medication Adherence/Access: See below for considerations.    Asthma/allergies  Reviewed literature regarding systemic absorption of fluticasone and psychiatric side effects.  Inhaled fluticasone (Advair) - systemic absorption through lungs, in trials systemic levels range from negligible to 266 pg/mL in patients with asthma. Estimated bioavailability for Advair diskus  ~5.5%.  Psychiatric effects - There are various reports of psychiatric disturbance, including emotional lability, euphoria, depression, aggressiveness, and insomnia, associated with use of ICS. Few such patients have so far been reported, suggesting that this is very infrequent, and a causal link with ICS has not been established in most of the cases.   Nasal fluticasone (Flonase) - systemic absorption <2% of dose  Montelukast - black box warning for neuropsychiatric disturbances. Studies have mixed results regarding this finding. A scientific review of clinical trial data requested by the FDA did not find an increase in suicidal ideation in subjects taking montelukast compared with those on placebo.  Another study looked at the onset of psychotic symptoms and found increased rates in adolescents in the year following start of montelukast.    Discussed the above information.  Ultimately she would benefit from taking scheduled inhaled corticosteroid and risk this had contributed to her recent psychosis is low.  Using low to moderate doses will also limit systemic exposure and she would like to continue 250mcg Advair she has at home. Reviewed instructions on use of Advair, will also look into switching to Breo inhaler to replace Advair due to her difficulty remembering PM doses of her inhaler.  Consider continuing to take Flonase daily; with very low systemic absorption, not expecting this had contributed to psychosis. Patient is agreeable.  Discussed the mixed  information regarding montelukast.  Risk of psychosis with montelukast is reported at rate of <1/96230.  Consider continuing to take montelukast for 2 months while she is consistently using inhaled steroid, then could consider trial off montelukast. She is uncomfortable with this plan and ultimately wants to discontinue montelukast now.    Mental health  Improving, follow-up plan in place with psychiatry.   Reviewed increase risk of jose eduardo with use of stimulants and she is aware.  Doses have been stable for years. There is a drug interaction with omeprazole and Adderall, leading to increased plasma concentrations of Adderall for ~3 hours. Unknown if this may have contributed to recent psychosis. See below regarding recommendations about omeprazole.    Hypothyroid:   Stable    GERD  Reviewed side effects of omeprazole. Per [facility]market reports--  Nervous system: Abnormal dreams, aggressive behavior, agitation, anxiety, apathy, confusion, dementia (Ref), depression, drowsiness, fatigue, hallucination (Ref), insomnia, myasthenia, nervousness, pain, paresthesia, sleep disturbance, tremor, vertigo.  Discussed with patient. She is unsure how much omeprazole has helped symptoms and would like to taper off medication.   Will taper.    PE  Reviewed side effects of Xarelto.   Nervous system: Anxiety (1%), depression (1%), dizziness (2%), fatigue (pediatric patients: 7%; adults: 1%), insomnia (2%)   Discussed with patient, she is agreeable to continuing medication.   Briefly discussed use of Mirena IUD, she will consider.     PLAN:                            Take Advair 250 1 puff twice daily    Take Flonase daily as prescribed    Decrease omeprazole to 20mg daily x14 days, then use as needed thereafter    Stop montelukast     Pending insurance coverage:  Stop Advair  Start Breo 100/25 1 puff daily    Follow-up: 1 month    SUBJECTIVE/OBJECTIVE:                          Cathy Arroyo is a 44 year old female seen for an initial  "visit. She was referred to me from Sherrill Dasilva      Reason for visit: discuss systemic absorption and side effects of inhaled steroid. Concerned steroid use caused psychotic symptoms.    Allergies/ADRs: Reviewed in chart  Past Medical History: Reviewed in chart  Tobacco: She reports that she has been smoking cigarettes. She started smoking about 20 years ago. She has a 5.4 pack-year smoking history. She has been exposed to tobacco smoke. She has never used smokeless tobacco.Nicotine/Tobacco Cessation Plan  She is working on cutting back     Alcohol: not currently using  THC/CBD: medical cannabis, oral intake or flower, less vaping. Has taken since age 19. Stable dose.  Psilocybin - takes for 1 week as needed for depressive episode (contains passion fruit), has taken x15 years    Medication Adherence/Access:   Best at remembering to take medications in the morning.   Not using Advair correctly, see below    Asthma /allergies  Advair - prescribed 500/50 but she has 250/50 at home and had been using 2 puffs once a day. Worried about taking this since her recent episode of psychosis, see below.  Spiriva respimat daily  Montelukast 10mg daily - has taken since childhood  Cetirizine 10mg daily (likes to take fexofenadine, rotate with cetirizine, however insurance does not cover)  Flonase 1 spray each nostril once daily   Albuterol MDI as needed  Patient reports no current medication side effects.        Patient reports the following symptoms: recent hospitalization.  Knows oral steroids increase jose eduardo.   Had never been adherent to inhaled and nasal steroid in the past, but after 9/20 hospitalization, she improved adherence.       Mental Health     Bipolar  Aripiprazole 5mg once daily - uses as mood stabilizer.  Adderall XR 25mg daily - takes when \"hypomanic or depressed\"  Adderall 5-10mg daily - takes when manic in place of the 25mg  Olanzapine 2.5mg at bedtime - only takes with jose eduardo symptoms as needed, rarely " takes. Knocks her out.  Diazepam 5mg as needed for panic - rarely takes  Patient reports no current medication side effects.  Reports tardive dyskinesia with higher doses than 5mg.     Reports mixed manic episode with psychotic features and suicidal ideation 1.5 weeks ago.  Feeling better, more clear headed. She has been able to stop olanzapine and go back to regular Adderall dose. Typically doesn't get psychosis, first time in 15 years.  Worried her asthma medications caused this.  She is followed by psychiatry.        Hypothyroidism   Levothyroxine 175 mcg daily  Patient is having the following symptoms: none.    S/p total thyroidectomy due to Grave's     GERD:   Omeprazole 40mg daily  Recommended 10/14 by ENT for stomach acid affecting hoarse voice.  Has noticed an improvement in symptoms, also has reduced cigarette smoking.    PE  Xarelto 20mg daily  Diagnosed during hospitalization 9/20-23, also treated for pneumonia and asthma exacerbation with IV Solu Medrol.  Side effects: heavy bleeding with menstruation. Has been recommended to have Mirena IUD, but she is quite nervous about this given the recent episode of psychosis and history of intolerance to birth control.     Today's Vitals: LMP 11/03/2024 (Exact Date)   ----------------      I spent 60 minutes with this patient today. All changes were made via collaborative practice agreement with JANNET Liu CNP. A copy of the visit note was provided to the patient's provider(s).    A summary of these recommendations was sent via [employer].    Caroline Carreon, PharmD, BCACP   Medication Therapy Management Pharmacist   [facility]  [phone]     Telemedicine Visit Details  The patient's medications can be safely assessed via a telemedicine encounter.  Type of service:  Telephone visit  Originating Location (pt. Location): Home    Distant Location (provider location):  On-site  Start Time: 10:01 AM  End Time:  11:03 AM     Medication Therapy Recommendations  Gastroesophageal reflux disease, unspecified whether esophagitis present   1 Current Medication: omeprazole (PRILOSEC) 40 MG DR capsule (Discontinued)   Current Medication Sig: Take 1 capsule (40 mg) by mouth daily.   Rationale: Undesirable effect - Adverse medication event - Safety   Recommendation: Discontinue Medication   Status: Accepted per CPA   Identified Date: 11/20/2024 Completed Date: 11/20/2024         Moderate persistent asthma without complication   1 Current Medication: fluticasone-salmeterol (ADVAIR) 250-50 MCG/ACT inhaler   Current Medication Sig: Inhale 1 puff into the lungs every 12 hours.   Rationale: Does not understand instructions - Adherence - Adherence   Recommendation: Provide Education   Status: Patient Agreed - Adherence/Education   Identified Date: 11/20/2024 Completed Date: 11/20/2024         2 Current Medication: fluticasone-salmeterol (ADVAIR) 250-50 MCG/ACT inhaler   Current Medication Sig: Inhale 1 puff into the lungs every 12 hours.   Rationale: Patient forgets to take - Adherence - Adherence   Recommendation: Change Medication - Breo Ellipta 100-25 MCG/ACT Aepb   Status: Accepted per CPA   Identified Date: 11/20/2024 Completed Date: 11/20/2024         3 Current Medication: montelukast (SINGULAIR) 10 MG tablet (Discontinued)   Current Medication Sig: Take 1 tablet (10 mg) by mouth every morning   Rationale: Patient prefers not to take - Adherence - Adherence   Recommendation: Discontinue Medication   Status: Accepted per CPA   Identified Date: 11/20/2024 Completed Date: 11/20/2024

## 2024-11-20 NOTE — PATIENT INSTRUCTIONS
"Recommendations from today's MTM visit:                                                    MTM (medication therapy management) is a service provided by a clinical pharmacist designed to help you get the most of out of your medicines.   Today we reviewed what your medicines are for, how to know if they are working, that your medicines are safe and how to make your medicine regimen as easy as possible.      Take Advair 250 1 puff twice daily - important to space these out 1 puff morning and 1 puff night    Take Flonase daily as prescribed    Decrease omeprazole to 20mg daily x14 days, then use as needed thereafter    Stop montelukast     Pending insurance coverage:  Stop Advair  Start Breo 100/25 1 puff daily    You were right-- Allegra is still not covered by your insurance.     Follow-up: 1 month    It was great speaking with you today.  I value your experience and would be very thankful for your time in providing feedback in our clinic survey. In the next few days, you may receive an email or text message from [employer] with a link to a survey related to your  clinical pharmacist.\"     To schedule another MTM appointment, please call the clinic directly or you may call the MTM scheduling line at [phone] or toll-free at [phone].     My Clinical Pharmacist's contact information:                                                      Please feel free to contact me with any questions or concerns you have.      Caroline Carreon, PharmD, BCACP   Medication Therapy Management Pharmacist   [facility]  [phone]    "

## 2024-11-26 ENCOUNTER — OFFICE VISIT (OUTPATIENT)
Dept: ORTHOPEDICS | Facility: CLINIC | Age: 44
End: 2024-11-26
Payer: COMMERCIAL

## 2024-11-26 VITALS — BODY MASS INDEX: 36.53 KG/M2 | HEIGHT: 68 IN | WEIGHT: 241 LBS

## 2024-11-26 DIAGNOSIS — M72.2 PLANTAR FASCIITIS, BILATERAL: Primary | ICD-10-CM

## 2024-11-26 PROCEDURE — 99213 OFFICE O/P EST LOW 20 MIN: CPT | Performed by: FAMILY MEDICINE

## 2024-11-26 NOTE — PATIENT INSTRUCTIONS
You were seen in the ENT Clinic today by Rocio Tyler. If you have any questions or concerns after your appointment, please contact us (see below)    The following has been recommended for you based upon your appointment today:  Ciprodex to left ear for 5 days  Audiogram/hearing test before follow up    Please return to clinic 1/15 at 12:40pm    How to Contact Us:  Send a [employer] message to your provider. Our team will respond to you via [employer]. Occasionally, we will need to call you to get further information.  For urgent matters (Monday-Friday), call the ENT Clinic: [phone] and speak with a call center team member - they will route your call appropriately.   If you'd like to speak directly with a nurse, please find our contact information below. We do our best to check voicemail frequently throughout the day, and will work to call you back within 1-2 days. For urgent matters, please use the general clinic phone numbers listed above.      Jimena MACEDO LPN  Direct: [phone]

## 2024-11-26 NOTE — PROGRESS NOTES
CHIEF COMPLAINT:  Consult (Right foot)       HISTORY OF PRESENT ILLNESS  Ms. Arroyo is a 44 year old female who presents to clinic today with right foot pain.  Cathy has been experiencing worsening foot pain over the past many months.  She works on her feet as a hairstylist.  She points to the base of her heel.        Additional history: as documented    MEDICAL HISTORY  Patient Active Problem List   Diagnosis    Pineocytoma (H)    Attention deficit disorder    Seasonal allergic rhinitis    Bipolar affective disorder in remission (H)    Moderate persistent asthma without complication    Chronic rhinitis    Tobacco use disorder    Abnormal cytology    S/P total thyroidectomy    History of Graves' disease    Nonintractable migraine, unspecified migraine type    Post-surgical hypothyroidism    Medical cannabis use    Morbid obesity (H)    Urticarial vasculitis    Breast cancer screening, high risk patient    Bipolar affective disorder, currently manic mixed, moderate (H)    Bilateral chronic knee pain    Lumbar radiculopathy    Bronchopneumonia    Pulmonary embolism (H)    Bilateral otitis media    Neck pain    Asymmetrical sensorineural hearing loss    Tinnitus, left    Dizziness    Pneumonia due to Haemophilus influenzae, unspecified laterality, unspecified part of lung (H)       Current Outpatient Medications   Medication Sig Dispense Refill    ADDERALL XR 25 MG 24 hr capsule       albuterol (PROAIR HFA/PROVENTIL HFA/VENTOLIN HFA) 108 (90 Base) MCG/ACT inhaler Inhale 2 puffs into the lungs every 6 hours 18 g 3    amphetamine-dextroamphetamine (ADDERALL) 10 MG tablet Take 10 mg by mouth daily.      ARIPiprazole (ABILIFY) 10 MG tablet Take 0.5 tablets (5 mg) by mouth daily      cetirizine (ZYRTEC) 10 MG tablet Take 1 tablet (10 mg) by mouth daily 90 tablet 3    diazepam (VALIUM) 5 MG tablet Take 5 mg by mouth as needed for anxiety.      diclofenac (VOLTAREN) 1 % topical gel Apply 4 g topically 4 times daily as  needed for moderate pain. 100 g 1    fluticasone-salmeterol (ADVAIR) 250-50 MCG/ACT inhaler Inhale 1 puff into the lungs every 12 hours.      fluticasone-vilanterol (BREO ELLIPTA) 100-25 MCG/ACT inhaler Inhale 1 puff into the lungs daily. 28 each 11    levothyroxine (SYNTHROID/LEVOTHROID) 175 MCG tablet Take 1 tablet (175 mcg) by mouth daily. 90 tablet 3    medical cannabis (Patient's own supply) Take 1 Dose by mouth See Admin Instructions (The purpose of this order is to document that the patient reports taking medical cannabis.  This is not a prescription, and is not used to certify that the patient has a qualifying medical condition.)    [place] oral suspension: take 1-3 ml by mouth two times daily  Total CBD 1200 mg. Total THC 60 mg      omeprazole (PRILOSEC) 20 MG DR capsule Take 1 capsule (20 mg) by mouth daily for 14 days, THEN 1 capsule (20 mg) daily as needed. 28 capsule 0    rivaroxaban ANTICOAGULANT (XARELTO) 20 MG TABS tablet Take 1 tablet (20 mg) by mouth daily (with dinner). 90 tablet 0    rizatriptan (MAXALT-MLT) 10 MG ODT Take 10 mg by mouth at onset of headache for migraine.      tiotropium (SPIRIVA RESPIMAT) 2.5 MCG/ACT inhaler Inhale 2 puffs into the lungs daily. 4 g 5    OLANZapine (ZYPREXA) 5 MG tablet Take 5 mg by mouth at bedtime. Pt takes differently 2.5 mg at bed time         Allergies   Allergen Reactions    Adhesive Tape Hives    Nicotine Anaphylaxis, Hives and Rash     Patch - rash/hives  Lozenges - throat swelling    Tegaderm Transparent Dressing (Informational Only) Hives    Adacel [Tetanus-Diphth-Acell Pertussis] Swelling    Ciprofloxacin Visual Disturbance and Hallucination    Codeine Sulfate Nausea and Vomiting    Methimazole Rash    Oxycodone-Acetaminophen Nausea and Vomiting    Tetanus Toxoid      Pt states she had an infection at injection site.     Tetanus-Diphtheria Toxoids Td      Caused staph infection - childhood/teenager    Vicodin [Hydrocodone-Acetaminophen] Nausea and  Vomiting       Family History   Problem Relation Age of Onset    Other Cancer Mother 62        salivary gland cancer    Genitourinary Problems Mother     Bipolar Disorder Mother         unipolar depression    Depression Mother     Thyroid Disease Mother         benign tumor    Melanoma Mother     Cancer Mother         salivary gland cancer    Migraines Mother     Skin Cancer Mother     Asthma Father     Mental Illness Father         Bipolar 2    Obesity Father     Colon Polyps Father 64    Bipolar Disorder Brother         unipolar depression    Asthma Brother     Depression Brother     Asthma Maternal Grandmother     Osteoporosis Maternal Grandmother     Glaucoma Maternal Grandmother     Hypertension Maternal Grandmother     Macular Degeneration Maternal Grandmother     Skin Cancer Maternal Grandmother     Prostate Cancer Maternal Grandfather 69        no history of smoking    Bladder Cancer Maternal Grandfather 71    Colon Cancer Maternal Grandfather 70    Diabetes Paternal Grandfather     Breast Cancer Maternal Aunt 37        negative BRCA1/2 testing    Melanoma Maternal Aunt 64    Thyroid Disease Paternal Aunt     Breast Cancer Paternal Aunt 58        bilateral; negative testing    Cancer Paternal Aunt     Breast Cancer Other         paternal great aunt       Additional medical/Social/Surgical histories reviewed in EPIC and updated as appropriate.        PHYSICAL EXAM  General  - normal appearance, in no obvious distress  Musculoskeletal - right foot  - stance: normal gait without limp  - inspection: no swelling or effusion, normal bone and joint alignment, no obvious deformity  - palpation: tender at the medial calcaneal tubercle  - ROM: normal active and passive ROM of great and lesser toes, no pain with MT translation  - strength: 5/5 in all planes  Neuro  - no sensory or motor deficit, grossly normal coordination, normal muscle tone               ASSESSMENT & PLAN  Ms. Arroyo is a 44 year old female who  presents to clinic today with right foot pain.    Cathy has clinical signs and symptoms consistent with plantar fasciitis.    We discussed treatment with conservative means through footwear, icing, and alleviating exacerbating activities.    It was a pleasure seeing Cathy today.    Marcos Dalal DO, [facility]  Primary Care Sports Medicine      This note was constructed using Dragon dictation software, please excuse any minor errors in spelling, grammar, or syntax.

## 2024-11-26 NOTE — LETTER
11/26/2024      RE: Cathy Arroyo  [address] [facility] [address]     Dear Colleague,    Thank you for referring your patient, Cathy Arroyo, to the [facility] [place]. Please see a copy of my visit note below.    CHIEF COMPLAINT:  Consult (Right foot)       HISTORY OF PRESENT ILLNESS  Ms. Arroyo is a 44 year old female who presents to clinic today with right foot pain.  Cathy has been experiencing worsening foot pain over the past many months.  She works on her feet as a hairstylist.  She points to the base of her heel.        Additional history: as documented    MEDICAL HISTORY  Patient Active Problem List   Diagnosis     Pineocytoma (H)     Attention deficit disorder     Seasonal allergic rhinitis     Bipolar affective disorder in remission (H)     Moderate persistent asthma without complication     Chronic rhinitis     Tobacco use disorder     Abnormal cytology     S/P total thyroidectomy     History of Graves' disease     Nonintractable migraine, unspecified migraine type     Post-surgical hypothyroidism     Medical cannabis use     Morbid obesity (H)     Urticarial vasculitis     Breast cancer screening, high risk patient     Bipolar affective disorder, currently manic mixed, moderate (H)     Bilateral chronic knee pain     Lumbar radiculopathy     Bronchopneumonia     Pulmonary embolism (H)     Bilateral otitis media     Neck pain     Asymmetrical sensorineural hearing loss     Tinnitus, left     Dizziness     Pneumonia due to Haemophilus influenzae, unspecified laterality, unspecified part of lung (H)       Current Outpatient Medications   Medication Sig Dispense Refill     ADDERALL XR 25 MG 24 hr capsule        albuterol (PROAIR HFA/PROVENTIL HFA/VENTOLIN HFA) 108 (90 Base) MCG/ACT inhaler Inhale 2 puffs into the lungs every 6 hours 18 g 3     amphetamine-dextroamphetamine (ADDERALL) 10 MG tablet Take 10 mg by mouth daily.       ARIPiprazole (ABILIFY) 10  MG tablet Take 0.5 tablets (5 mg) by mouth daily       cetirizine (ZYRTEC) 10 MG tablet Take 1 tablet (10 mg) by mouth daily 90 tablet 3     diazepam (VALIUM) 5 MG tablet Take 5 mg by mouth as needed for anxiety.       diclofenac (VOLTAREN) 1 % topical gel Apply 4 g topically 4 times daily as needed for moderate pain. 100 g 1     fluticasone-salmeterol (ADVAIR) 250-50 MCG/ACT inhaler Inhale 1 puff into the lungs every 12 hours.       fluticasone-vilanterol (BREO ELLIPTA) 100-25 MCG/ACT inhaler Inhale 1 puff into the lungs daily. 28 each 11     levothyroxine (SYNTHROID/LEVOTHROID) 175 MCG tablet Take 1 tablet (175 mcg) by mouth daily. 90 tablet 3     medical cannabis (Patient's own supply) Take 1 Dose by mouth See Admin Instructions (The purpose of this order is to document that the patient reports taking medical cannabis.  This is not a prescription, and is not used to certify that the patient has a qualifying medical condition.)    [place] oral suspension: take 1-3 ml by mouth two times daily  Total CBD 1200 mg. Total THC 60 mg       omeprazole (PRILOSEC) 20 MG DR capsule Take 1 capsule (20 mg) by mouth daily for 14 days, THEN 1 capsule (20 mg) daily as needed. 28 capsule 0     rivaroxaban ANTICOAGULANT (XARELTO) 20 MG TABS tablet Take 1 tablet (20 mg) by mouth daily (with dinner). 90 tablet 0     rizatriptan (MAXALT-MLT) 10 MG ODT Take 10 mg by mouth at onset of headache for migraine.       tiotropium (SPIRIVA RESPIMAT) 2.5 MCG/ACT inhaler Inhale 2 puffs into the lungs daily. 4 g 5     OLANZapine (ZYPREXA) 5 MG tablet Take 5 mg by mouth at bedtime. Pt takes differently 2.5 mg at bed time         Allergies   Allergen Reactions     Adhesive Tape Hives     Nicotine Anaphylaxis, Hives and Rash     Patch - rash/hives  Lozenges - throat swelling     Tegaderm Transparent Dressing (Informational Only) Hives     Adacel [Tetanus-Diphth-Acell Pertussis] Swelling     Ciprofloxacin Visual Disturbance and Hallucination      Codeine Sulfate Nausea and Vomiting     Methimazole Rash     Oxycodone-Acetaminophen Nausea and Vomiting     Tetanus Toxoid      Pt states she had an infection at injection site.      Tetanus-Diphtheria Toxoids Td      Caused staph infection - childhood/teenager     Vicodin [Hydrocodone-Acetaminophen] Nausea and Vomiting       Family History   Problem Relation Age of Onset     Other Cancer Mother 62        salivary gland cancer     Genitourinary Problems Mother      Bipolar Disorder Mother         unipolar depression     Depression Mother      Thyroid Disease Mother         benign tumor     Melanoma Mother      Cancer Mother         salivary gland cancer     Migraines Mother      Skin Cancer Mother      Asthma Father      Mental Illness Father         Bipolar 2     Obesity Father      Colon Polyps Father 64     Bipolar Disorder Brother         unipolar depression     Asthma Brother      Depression Brother      Asthma Maternal Grandmother      Osteoporosis Maternal Grandmother      Glaucoma Maternal Grandmother      Hypertension Maternal Grandmother      Macular Degeneration Maternal Grandmother      Skin Cancer Maternal Grandmother      Prostate Cancer Maternal Grandfather 69        no history of smoking     Bladder Cancer Maternal Grandfather 71     Colon Cancer Maternal Grandfather 70     Diabetes Paternal Grandfather      Breast Cancer Maternal Aunt 37        negative BRCA1/2 testing     Melanoma Maternal Aunt 64     Thyroid Disease Paternal Aunt      Breast Cancer Paternal Aunt 58        bilateral; negative testing     Cancer Paternal Aunt      Breast Cancer Other         paternal great aunt       Additional medical/Social/Surgical histories reviewed in [facility] and updated as appropriate.        PHYSICAL EXAM  General  - normal appearance, in no obvious distress  Musculoskeletal - right foot  - stance: normal gait without limp  - inspection: no swelling or effusion, normal bone and joint alignment, no obvious  deformity  - palpation: tender at the medial calcaneal tubercle  - ROM: normal active and passive ROM of great and lesser toes, no pain with MT translation  - strength: 5/5 in all planes  Neuro  - no sensory or motor deficit, grossly normal coordination, normal muscle tone               ASSESSMENT & PLAN  Ms. Arroyo is a 44 year old female who presents to clinic today with right foot pain.    Cathy has clinical signs and symptoms consistent with plantar fasciitis.    We discussed treatment with conservative means through footwear, icing, and alleviating exacerbating activities.    It was a pleasure seeing Cathy today.    Marcos Dalal DO, [facility]  Primary Care Sports Medicine      This note was constructed using Dragon dictation software, please excuse any minor errors in spelling, grammar, or syntax.      Again, thank you for allowing me to participate in the care of your patient.      Sincerely,    Marcos Dalal DO

## 2024-11-27 ENCOUNTER — NURSE TRIAGE (OUTPATIENT)
Dept: FAMILY MEDICINE | Facility: CLINIC | Age: 44
End: 2024-11-27
Payer: COMMERCIAL

## 2024-11-27 NOTE — TELEPHONE ENCOUNTER
Pt calling stating she is having heavy vaginal bleeding and is wondering if she should go to the ER. She states she is soaking through a super tampon every 45 minutes today and did have some dizziness the last time she went to the bathroom, and also is on 20mg of xarelto.    Per protocol advised would need to be seen in ER, pt is hesistant to go but states if bleeding does not improve in next hour will present in ER. Advised with her symptoms going sooner rather than later would be better.    Thanks!  Bipin ARITA RN   [facility]      Reason for Disposition   MODERATE vaginal bleeding (i.e., soaking pad or tampon per hour and present > 6 hours; 1 menstrual cup every 6 hours)   Taking Coumadin (warfarin) or other strong blood thinner, or known bleeding disorder (e.g., thrombocytopenia)    Additional Information   Negative: Pregnant 20 or more weeks (5 months or more)   Negative: Pregnant < 20 weeks (less than 5 months)   Negative: Postpartum (from 0 to 6 weeks after delivery)   Negative: Vaginal discharge is main symptom and bleeding is slight   Negative: SEVERE abdominal pain (e.g., excruciating)   Negative: SEVERE dizziness (e.g., unable to stand, requires support to walk, feels like passing out now)   Negative: SEVERE vaginal bleeding (e.g., soaking 2 pads or tampons per hour and present 2 or more hours; 1 menstrual cup every 2 hours)   Negative: Patient sounds very sick or weak to the triager   Negative: Constant abdominal pain lasting > 2 hours   Negative: Pale skin (pallor) of new-onset or getting worse    Protocols used: Vaginal Bleeding - [id number]

## 2024-12-05 NOTE — PATIENT INSTRUCTIONS

## 2024-12-09 ENCOUNTER — OFFICE VISIT (OUTPATIENT)
Dept: MIDWIFE SERVICES | Facility: CLINIC | Age: 44
End: 2024-12-09
Payer: COMMERCIAL

## 2024-12-09 VITALS
WEIGHT: 247.6 LBS | BODY MASS INDEX: 37.65 KG/M2 | OXYGEN SATURATION: 99 % | HEART RATE: 85 BPM | TEMPERATURE: 98.1 F | DIASTOLIC BLOOD PRESSURE: 80 MMHG | SYSTOLIC BLOOD PRESSURE: 115 MMHG

## 2024-12-09 DIAGNOSIS — Z30.430 ENCOUNTER FOR INSERTION OF INTRAUTERINE CONTRACEPTIVE DEVICE: Primary | ICD-10-CM

## 2024-12-09 LAB — HCG UR QL: NEGATIVE

## 2024-12-09 PROCEDURE — 58300 INSERT INTRAUTERINE DEVICE: CPT | Performed by: ADVANCED PRACTICE MIDWIFE

## 2024-12-09 PROCEDURE — 81025 URINE PREGNANCY TEST: CPT | Performed by: ADVANCED PRACTICE MIDWIFE

## 2024-12-09 NOTE — PROGRESS NOTES
IUD Insertion:  CONSULT:    Is a pregnancy test required: Yes.  Was it positive or negative?  Negative; has tubal ligation  Was a consent obtained?  Yes    Subjective: Cathy Arroyo is a 44 year old  presents for IUD and desires Mirena type IUD.    Patient has been given the opportunity to ask questions about all forms of birth control, including all options appropriate for Cathy Arroyo. Discussed that no method of birth control, except abstinence is 100% effective against pregnancy or sexually transmitted infection.     Cathy Arroyo understands she may have the IUD removed at any time. IUD should be removed by a health care provider.    The entire insertion procedure was reviewed with the patient, including care after placement.    Patient's last menstrual period was 2024 (exact date). Has current contraception. No allergy to betadine or shellfish. Patient declines STD screening  HCG Qual Urine   Date Value Ref Range Status   2020 Negative NEG^Negative Final     Comment:     This test is for screening purposes.  Results should be interpreted along with   the clinical picture.  Confirmation testing is available if warranted by   ordering NQS843, HCG Quantitative Pregnancy.       hCG Urine Qualitative   Date Value Ref Range Status   2024 Negative Negative Final     Comment:     This test is for screening purposes.  Results should be interpreted along with the clinical picture.  Confirmation testing is available if warranted by ordering SVO243, HCG Quantitative Pregnancy.         /80 (BP Location: Left arm, Patient Position: Sitting, Cuff Size: Adult Large)   Pulse 85   Temp 98.1  F (36.7  C) (Temporal)   Wt 112.3 kg (247 lb 9.6 oz)   LMP 2024 (Exact Date)   SpO2 99%   BMI 37.65 kg/m      Pelvic Exam:   EG/BUS: normal genital architecture without lesions, erythema or abnormal secretions.   Vagina: moist, pink, rugae with physiologic discharge and  secretions  Cervix: parous no lesions and pink, moist, closed, without lesion or CMT  Uterus: anteverted position, mobile, no pain  Adnexa: within normal limits and no masses, nodularity, tenderness    PROCEDURE NOTE: -- IUD Insertion    Reason for Insertion: abnormal uterine bleeding    Paracervical block performed.  Under sterile technique, cervix was visualized with speculum and prepped with Betadine solution swab x 3. Tenaculum was placed for stability. The uterus was gently straightened and sounded to 9.0 cm. IUD prepared for placement, and IUD inserted according to 's instructions without difficulty or significant resitance, and deployed at the fundus. The strings were visualized and trimmed to 4.0 cm from the external os. Tenaculum was removed and hemostasis noted. Speculum removed.  Patient tolerated procedure well.    EBL: minimal    Complications: none    ASSESSMENT:     ICD-10-CM    1. Encounter for pregnancy test  Z32.00 HCG qualitative urine             PLAN:    Given 's handouts, including when to have IUD removed, list of danger s/sx, side effects and follow up recommended. Encouraged condom use for prevention of STD. Back up contraception advised for 7 days if progestin method. Advised to call for any fever, for prolonged or severe pain or bleeding, abnormal vaginal discharge, or unable to palpate strings. She was advised to use pain medications (ibuprofen) as needed for mild to moderate pain. Advised to follow-up in clinic in 4-6 weeks for IUD string check if unable to find strings or as directed by provider.     JANNET Mahmood CNM

## 2024-12-14 ENCOUNTER — MYC MEDICAL ADVICE (OUTPATIENT)
Dept: HEMATOLOGY | Facility: CLINIC | Age: 44
End: 2024-12-14
Payer: COMMERCIAL

## 2024-12-16 DIAGNOSIS — N92.0 MENORRHAGIA WITH REGULAR CYCLE: ICD-10-CM

## 2024-12-16 DIAGNOSIS — Z86.718 PERSONAL HISTORY OF VENOUS THROMBOSIS AND EMBOLISM: Primary | ICD-10-CM

## 2024-12-16 DIAGNOSIS — Z79.01 ANTICOAGULATED: ICD-10-CM

## 2024-12-16 NOTE — TELEPHONE ENCOUNTER
[id number]  Cathy Arroyo  44 year old female  [facility] Diagnosis: VTE  [facility] Provider: Curt    Incoming call from Cathy. She is wondering if genetic thrombophilia testing is needed prior to her appointment tomorrow. She sees the ordered CBC and iron testing.     RN spoke with Dr. Elias. He wants to discuss this testing with her during the appointment tomorrow further before ordering. He is okay if she forgoes the CBC and iron testing today and gets it completed after the appointment in case there are other labs ordered. RN assured patient that we can follow-up with her based off lab results over phone or mychart.    Patient verbalized understanding and agrees to plan. She will cancel her lab appointment.    Marce Smith RN, BSN, PCCN  Nurse Clinician    [facility]  [address] [place] [address], [address], [place] [address]   Office, direct: [phone]  Main office number: [phone]  Pronouns: She, her, hers

## 2024-12-17 ENCOUNTER — VIRTUAL VISIT (OUTPATIENT)
Dept: HEMATOLOGY | Facility: CLINIC | Age: 44
End: 2024-12-17
Attending: INTERNAL MEDICINE
Payer: COMMERCIAL

## 2024-12-17 DIAGNOSIS — I26.99 ACUTE PULMONARY EMBOLISM WITHOUT ACUTE COR PULMONALE, UNSPECIFIED PULMONARY EMBOLISM TYPE (H): ICD-10-CM

## 2024-12-17 DIAGNOSIS — Z86.711 HISTORY OF PULMONARY EMBOLISM: Primary | ICD-10-CM

## 2024-12-17 PROCEDURE — 99443 PR PHYSICIAN TELEPHONE EVALUATION 21-30 MIN: CPT | Mod: 93 | Performed by: INTERNAL MEDICINE

## 2024-12-17 NOTE — PROGRESS NOTES
[facility]  [facility]  [address], [address], [place] [address]  Main: [phone], Fax: [phone]        Outpatient Clinic Visit  Date:  12/17/2024    NOTE:  This visit was conducted by telephone, with the patient's approval.  Patient location: Home  Provider location: Onsite    Cathy Arroyo is a 44-year-old woman with a recent history of venous thromboembolism whose visit today was to discuss duration of anticoagulation and whether or not thrombophilia testing should be performed.  Details of her history can be found my previous consultation from 10/28/2024.  In summary, she was hospitalized in September 2024 with pneumonia and an asthma exacerbation.  Imaging at that time demonstrated several small filling defects in segmental branches of the pulmonary arteries in the right upper and left upper lobes.  Ultrasound showed no evidence of lower extremity deep vein thrombosis.  Given the small size of these clots, our impression is that they were likely incidentally found and not directly causing any of her symptoms as she clearly had pneumonia and an exacerbation of her asthma at the time she presented.  No other potential triggers for venous thrombosis were identified.    She was anticoagulated and has remained on rivaroxaban which she is tolerating without difficulty aside from heavy menstrual bleeding.  This had already been a problem before she started anticoagulation.  She had a Mirena IUD placed a little over a week ago which will hopefully adequately address that issue.  Otherwise she continues to tolerate anticoagulation without any difficulty.    There is no known family history of venous thrombosis, although the known details of her family medical history are limited.  She has 2 school-aged children, ages 5 and 8.     Her past medical history is remarkable for asthma, recurrent respiratory tract infections, history of migraines, history  no lesions,  no deformities,  no traumatic injuries,  no significant scars are present,  chest wall non-tender,  no masses present, breathing is unlabored without accessory muscle use,normal breath sounds of pineal gland tumor resection in 2014, history of Graves' disease status post thyroidectomy, and mental health issues including bipolar disorder, anxiety, and PTSD.  She is also followed in oncology clinic given high risk for breast cancer based on family history.  She is up-to-date with appropriate screening.    Physical exam:  Not done, telephone visit.    Labs:  At the time of her previous visit, we assessed for antiphospholipid antibodies.  Cardiolipin and beta-2 glycoprotein IgG and IgM titers were all negative.  Lupus anticoagulant was not tested given that she is on a direct oral anticoagulant.      ASSESSMENT / PLAN:  Bilateral pulmonary emboli, September 2024    Cathy presented in September 2024 with pneumonia and an asthma exacerbation, and was incidentally found to have small bilateral pulmonary emboli.  She has now completed a 3-month course of anticoagulation.  Other than her pulmonary infection, no other triggers for venous thrombosis were identified.  She has tolerated anticoagulation without difficulty, aside from heavy menstrual bleeding which was a problem even before starting anticoagulation, and for which she just had a Mirena IUD placed.      We discussed again the issues surrounding recommendations for duration of anticoagulation and the importance of identifying whether a clotting event was provoked or unprovoked.  In her case, we could say this was provoked by the pulmonary infection, although that is not typically a major risk factor.  She remains quite concerned about the possibility of recurrent thrombotic events.  In addition, because she has frequent pulmonary symptoms, if she is not kept on long-term anticoagulation, we are concerned that she will end up having frequent CT scans to rule out recurrent clotting, when she presents with exacerbations of her asthma.  Thus, we together decided that staying on long-term anticoagulation is likely going to be the best option for her.    We  also discussed the role of genetic thrombophilia testing.  I explained that if we make the decision to keep her on long-term anticoagulation, then this testing is not typically indicated, as it will not change our treatment approach.  However, she has concerns about the possibility of a genetic thrombophilia primarily because of her 2 young children and because she really does not know any details of her family medical history.  Thus, she would strongly prefer to have the testing performed, even if it ultimately does not change our recommendation regarding duration of anticoagulation.    Thus, our plan going forward will be for her to continue on rivaroxaban 20 mg daily for now.  We can consider decreasing to the prophylactic intensity dose after another 3 months of anticoagulation.  We will place orders for the genetic thrombophilia panel to be performed at her convenience.  Once we see those results, we will connect with her by [employer] or telephone, depending on whether there are any findings of note.    Total time on date of encounter 40 minutes, including review of medical records and labs, telephone visit (30 minutes), and documentation.      Monster Elias MD  Professor of Medicine  Division of Hematology, Oncology, and Transplantation  Director, [facility]

## 2024-12-26 ENCOUNTER — LAB (OUTPATIENT)
Dept: LAB | Facility: CLINIC | Age: 44
End: 2024-12-26
Payer: COMMERCIAL

## 2024-12-26 DIAGNOSIS — Z79.01 ANTICOAGULATED: ICD-10-CM

## 2024-12-26 DIAGNOSIS — Z86.718 PERSONAL HISTORY OF VENOUS THROMBOSIS AND EMBOLISM: ICD-10-CM

## 2024-12-26 DIAGNOSIS — N92.0 MENORRHAGIA WITH REGULAR CYCLE: ICD-10-CM

## 2024-12-26 DIAGNOSIS — Z86.711 HISTORY OF PULMONARY EMBOLISM: ICD-10-CM

## 2024-12-26 LAB
BASOPHILS # BLD AUTO: 0 10E3/UL (ref 0–0.2)
BASOPHILS NFR BLD AUTO: 1 %
EOSINOPHIL # BLD AUTO: 0.2 10E3/UL (ref 0–0.7)
EOSINOPHIL NFR BLD AUTO: 2 %
ERYTHROCYTE [DISTWIDTH] IN BLOOD BY AUTOMATED COUNT: 13.7 % (ref 10–15)
FACTOR 2 INTERPRETATION: NORMAL
FACTOR V INTERPRETATION: NORMAL
FERRITIN SERPL-MCNC: 10 NG/ML (ref 6–175)
HCT VFR BLD AUTO: 36.1 % (ref 35–47)
HGB BLD-MCNC: 11.5 G/DL (ref 11.7–15.7)
IMM GRANULOCYTES # BLD: 0 10E3/UL
IMM GRANULOCYTES NFR BLD: 0 %
IRON BINDING CAPACITY (ROCHE): 371 UG/DL (ref 240–430)
IRON SATN MFR SERPL: 6 % (ref 15–46)
IRON SERPL-MCNC: 23 UG/DL (ref 37–145)
LAB DIRECTOR COMMENTS: NORMAL
LAB DIRECTOR DISCLAIMER: NORMAL
LAB DIRECTOR INTERPRETATION: NORMAL
LAB DIRECTOR METHODOLOGY: NORMAL
LAB DIRECTOR RESULTS: NORMAL
LOCATION OF TASK: NORMAL
LYMPHOCYTES # BLD AUTO: 2.6 10E3/UL (ref 0.8–5.3)
LYMPHOCYTES NFR BLD AUTO: 31 %
MCH RBC QN AUTO: 26.4 PG (ref 26.5–33)
MCHC RBC AUTO-ENTMCNC: 31.9 G/DL (ref 31.5–36.5)
MCV RBC AUTO: 83 FL (ref 78–100)
MONOCYTES # BLD AUTO: 0.5 10E3/UL (ref 0–1.3)
MONOCYTES NFR BLD AUTO: 7 %
NEUTROPHILS # BLD AUTO: 5 10E3/UL (ref 1.6–8.3)
NEUTROPHILS NFR BLD AUTO: 60 %
NRBC # BLD AUTO: 0 10E3/UL
NRBC BLD AUTO-RTO: 0 /100
PLATELET # BLD AUTO: 346 10E3/UL (ref 150–450)
RBC # BLD AUTO: 4.35 10E6/UL (ref 3.8–5.2)
SPECIMEN TYPE: NORMAL
WBC # BLD AUTO: 8.3 10E3/UL (ref 4–11)

## 2024-12-26 PROCEDURE — 85300 ANTITHROMBIN III ACTIVITY: CPT | Performed by: INTERNAL MEDICINE

## 2024-12-26 PROCEDURE — 99000 SPECIMEN HANDLING OFFICE-LAB: CPT | Performed by: PATHOLOGY

## 2024-12-26 PROCEDURE — 36415 COLL VENOUS BLD VENIPUNCTURE: CPT | Performed by: PATHOLOGY

## 2024-12-26 PROCEDURE — 83550 IRON BINDING TEST: CPT | Performed by: PATHOLOGY

## 2024-12-26 PROCEDURE — 83540 ASSAY OF IRON: CPT | Performed by: PATHOLOGY

## 2024-12-26 PROCEDURE — 85306 CLOT INHIBIT PROT S FREE: CPT | Performed by: INTERNAL MEDICINE

## 2024-12-26 PROCEDURE — 85303 CLOT INHIBIT PROT C ACTIVITY: CPT | Performed by: INTERNAL MEDICINE

## 2024-12-26 PROCEDURE — G0452 MOLECULAR PATHOLOGY INTERPR: HCPCS | Mod: 26 | Performed by: STUDENT IN AN ORGANIZED HEALTH CARE EDUCATION/TRAINING PROGRAM

## 2024-12-26 PROCEDURE — 85025 COMPLETE CBC W/AUTO DIFF WBC: CPT | Performed by: PATHOLOGY

## 2024-12-26 PROCEDURE — 81241 F5 GENE: CPT | Performed by: INTERNAL MEDICINE

## 2024-12-26 PROCEDURE — 82728 ASSAY OF FERRITIN: CPT | Performed by: PATHOLOGY

## 2024-12-30 LAB
AT III ACT/NOR PPP CHRO: 106 % (ref 85–135)
PROT C ACT/NOR PPP CHRO: 100 % (ref 70–170)
PROT S FREE AG ACT/NOR PPP IA: 79 % (ref 55–125)

## 2024-12-31 DIAGNOSIS — N92.1 MENORRHAGIA WITH IRREGULAR CYCLE: ICD-10-CM

## 2024-12-31 DIAGNOSIS — D50.0 IRON DEFICIENCY ANEMIA DUE TO CHRONIC BLOOD LOSS: Primary | ICD-10-CM

## 2024-12-31 RX ORDER — ALBUTEROL SULFATE 0.83 MG/ML
2.5 SOLUTION RESPIRATORY (INHALATION)
OUTPATIENT
Start: 2024-12-31

## 2024-12-31 RX ORDER — DIPHENHYDRAMINE HYDROCHLORIDE 50 MG/ML
50 INJECTION INTRAMUSCULAR; INTRAVENOUS
Start: 2024-12-31

## 2024-12-31 RX ORDER — HEPARIN SODIUM (PORCINE) LOCK FLUSH IV SOLN 100 UNIT/ML 100 UNIT/ML
5 SOLUTION INTRAVENOUS
OUTPATIENT
Start: 2024-12-31

## 2024-12-31 RX ORDER — EPINEPHRINE 1 MG/ML
0.3 INJECTION, SOLUTION, CONCENTRATE INTRAVENOUS EVERY 5 MIN PRN
OUTPATIENT
Start: 2024-12-31

## 2024-12-31 RX ORDER — MEPERIDINE HYDROCHLORIDE 25 MG/ML
25 INJECTION INTRAMUSCULAR; INTRAVENOUS; SUBCUTANEOUS
OUTPATIENT
Start: 2024-12-31

## 2024-12-31 RX ORDER — ALBUTEROL SULFATE 90 UG/1
1-2 INHALANT RESPIRATORY (INHALATION)
Start: 2024-12-31

## 2024-12-31 RX ORDER — DIPHENHYDRAMINE HYDROCHLORIDE 50 MG/ML
25 INJECTION INTRAMUSCULAR; INTRAVENOUS
Start: 2024-12-31

## 2024-12-31 RX ORDER — HEPARIN SODIUM,PORCINE 10 UNIT/ML
5-20 VIAL (ML) INTRAVENOUS DAILY PRN
OUTPATIENT
Start: 2024-12-31

## 2025-01-02 ENCOUNTER — DOCUMENTATION ONLY (OUTPATIENT)
Dept: ORTHOPEDICS | Facility: CLINIC | Age: 45
End: 2025-01-02
Payer: COMMERCIAL

## 2025-01-02 DIAGNOSIS — M72.2 PLANTAR FASCIITIS: Primary | ICD-10-CM

## 2025-01-03 ENCOUNTER — HOSPITAL ENCOUNTER (EMERGENCY)
Facility: CLINIC | Age: 45
Discharge: HOME | End: 2025-01-03
Attending: EMERGENCY MEDICINE | Admitting: EMERGENCY MEDICINE
Payer: COMMERCIAL

## 2025-01-03 ENCOUNTER — MYC MEDICAL ADVICE (OUTPATIENT)
Dept: OBGYN | Facility: CLINIC | Age: 45
End: 2025-01-03

## 2025-01-03 ENCOUNTER — APPOINTMENT (OUTPATIENT)
Dept: ULTRASOUND IMAGING | Facility: CLINIC | Age: 45
End: 2025-01-03
Attending: EMERGENCY MEDICINE
Payer: COMMERCIAL

## 2025-01-03 VITALS
DIASTOLIC BLOOD PRESSURE: 93 MMHG | HEIGHT: 68 IN | HEART RATE: 98 BPM | TEMPERATURE: 98.2 F | RESPIRATION RATE: 18 BRPM | WEIGHT: 245 LBS | SYSTOLIC BLOOD PRESSURE: 141 MMHG | OXYGEN SATURATION: 97 % | BODY MASS INDEX: 37.13 KG/M2

## 2025-01-03 DIAGNOSIS — N93.9 EPISODE OF HEAVY VAGINAL BLEEDING: Primary | ICD-10-CM

## 2025-01-03 DIAGNOSIS — N93.9 VAGINAL BLEEDING: ICD-10-CM

## 2025-01-03 LAB
ANION GAP SERPL CALCULATED.3IONS-SCNC: 10 MMOL/L (ref 7–15)
APTT PPP: 34 SECONDS (ref 22–38)
BASOPHILS # BLD AUTO: 0 10E3/UL (ref 0–0.2)
BASOPHILS NFR BLD AUTO: 0 %
BUN SERPL-MCNC: 8.3 MG/DL (ref 6–20)
CALCIUM SERPL-MCNC: 8.7 MG/DL (ref 8.8–10.4)
CHLORIDE SERPL-SCNC: 104 MMOL/L (ref 98–107)
CREAT SERPL-MCNC: 0.61 MG/DL (ref 0.51–0.95)
EGFRCR SERPLBLD CKD-EPI 2021: >90 ML/MIN/1.73M2
EOSINOPHIL # BLD AUTO: 0.3 10E3/UL (ref 0–0.7)
EOSINOPHIL NFR BLD AUTO: 4 %
ERYTHROCYTE [DISTWIDTH] IN BLOOD BY AUTOMATED COUNT: 13.8 % (ref 10–15)
GLUCOSE SERPL-MCNC: 95 MG/DL (ref 70–99)
HCO3 SERPL-SCNC: 25 MMOL/L (ref 22–29)
HCT VFR BLD AUTO: 36 % (ref 35–47)
HGB BLD-MCNC: 11 G/DL (ref 11.7–15.7)
HGB BLD-MCNC: 11.5 G/DL (ref 11.7–15.7)
HOLD SPECIMEN: NORMAL
IMM GRANULOCYTES # BLD: 0 10E3/UL
IMM GRANULOCYTES NFR BLD: 0 %
INR PPP: 1.07 (ref 0.85–1.15)
LACTATE SERPL-SCNC: 1.1 MMOL/L (ref 0.7–2)
LYMPHOCYTES # BLD AUTO: 2.6 10E3/UL (ref 0.8–5.3)
LYMPHOCYTES NFR BLD AUTO: 36 %
MCH RBC QN AUTO: 26.7 PG (ref 26.5–33)
MCHC RBC AUTO-ENTMCNC: 31.9 G/DL (ref 31.5–36.5)
MCV RBC AUTO: 84 FL (ref 78–100)
MONOCYTES # BLD AUTO: 0.5 10E3/UL (ref 0–1.3)
MONOCYTES NFR BLD AUTO: 7 %
NEUTROPHILS # BLD AUTO: 3.8 10E3/UL (ref 1.6–8.3)
NEUTROPHILS NFR BLD AUTO: 53 %
NRBC # BLD AUTO: 0 10E3/UL
NRBC BLD AUTO-RTO: 0 /100
PLATELET # BLD AUTO: 386 10E3/UL (ref 150–450)
POTASSIUM SERPL-SCNC: 4.3 MMOL/L (ref 3.4–5.3)
RBC # BLD AUTO: 4.31 10E6/UL (ref 3.8–5.2)
SODIUM SERPL-SCNC: 139 MMOL/L (ref 135–145)
WBC # BLD AUTO: 7.3 10E3/UL (ref 4–11)

## 2025-01-03 PROCEDURE — 76856 US EXAM PELVIC COMPLETE: CPT

## 2025-01-03 PROCEDURE — 258N000003 HC RX IP 258 OP 636: Performed by: EMERGENCY MEDICINE

## 2025-01-03 PROCEDURE — 96361 HYDRATE IV INFUSION ADD-ON: CPT | Performed by: EMERGENCY MEDICINE

## 2025-01-03 PROCEDURE — 85018 HEMOGLOBIN: CPT | Performed by: EMERGENCY MEDICINE

## 2025-01-03 PROCEDURE — 83605 ASSAY OF LACTIC ACID: CPT | Performed by: EMERGENCY MEDICINE

## 2025-01-03 PROCEDURE — 99284 EMERGENCY DEPT VISIT MOD MDM: CPT | Performed by: EMERGENCY MEDICINE

## 2025-01-03 PROCEDURE — 85730 THROMBOPLASTIN TIME PARTIAL: CPT | Performed by: EMERGENCY MEDICINE

## 2025-01-03 PROCEDURE — 82310 ASSAY OF CALCIUM: CPT | Performed by: EMERGENCY MEDICINE

## 2025-01-03 PROCEDURE — 36415 COLL VENOUS BLD VENIPUNCTURE: CPT | Performed by: EMERGENCY MEDICINE

## 2025-01-03 PROCEDURE — 96360 HYDRATION IV INFUSION INIT: CPT | Performed by: EMERGENCY MEDICINE

## 2025-01-03 PROCEDURE — 85004 AUTOMATED DIFF WBC COUNT: CPT | Performed by: EMERGENCY MEDICINE

## 2025-01-03 PROCEDURE — 99284 EMERGENCY DEPT VISIT MOD MDM: CPT | Mod: 25 | Performed by: EMERGENCY MEDICINE

## 2025-01-03 PROCEDURE — 85610 PROTHROMBIN TIME: CPT | Performed by: EMERGENCY MEDICINE

## 2025-01-03 PROCEDURE — 80048 BASIC METABOLIC PNL TOTAL CA: CPT | Performed by: EMERGENCY MEDICINE

## 2025-01-03 RX ADMIN — SODIUM CHLORIDE 1000 ML: 9 INJECTION, SOLUTION INTRAVENOUS at 16:16

## 2025-01-03 ASSESSMENT — ACTIVITIES OF DAILY LIVING (ADL)
ADLS_ACUITY_SCORE: 46

## 2025-01-03 NOTE — ED TRIAGE NOTES
Patient reports being on Xarelto for pulmonary emboli. Patient reports having vaginal bleeding that started 16 days ago. Patient reports passing big clots today. Patient reports passing large clots 3 inches by 1.5 inches. Patient reports changing her tampon every half hour to hour. Patient reports she had an IUD placed a month ago. Patient is scheduled for an iron infusion.     Patient is dizzy and concerned about the blood loss.

## 2025-01-03 NOTE — ED PROVIDER NOTES
[facility] [place] ([facility])    1/03/25       ED PROVIDER NOTE    History   No chief complaint on file.    Chief complaint: Vaginal bleeding    LAISHA Arroyo is a 44 year old female with a past medical history of menorrhagia, obesity, PE anticoagulated on Xarelto, anemia, bipolar disorder, tobacco abuse, medical cannabis, thyroidectomy, and nonintractable migraine, who presents to the ED for evaluation of vaginal bleeding. Patient reports her bleeding started around 16 days ago and progressively worsened. Patient reports she has had to use many tampons and started wearing a pad overnight. Patient states today she passed two large clots at around 2:30 PM and has been light headed and dizzy ever since. Patient reports her dizziness worsens when walking and is still there when sitting. Patient reports shortness of breath which resulted in her feeling like she had an asthma attack from anxiety. Patient reports she took her inhaler and felt better. Patient reports she has used 3 extra large tampons today. Patient notes yesterday she used at least 2 tampons from 6 PM to when she slept, but was unsure of how many more. Patient states she last took her blood thinner this morning. Patient reports pelvic cramping and vaginal spotting starting when she first had a Mirena IUD inserted on 12/09/2024. Patient notes she started her blood thinner in September. Patient denies chest discomfort.     Past Medical History  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    Post partum depression     Post-surgical hypothyroidism 05/2017    Pre-eclampsia 10/14/2016    10/16/2018 - was  induced for postdates and gestational hypertension with last pregnancy. No history of chronic hypertension. RX for aspirin sent and labs drawn at new OB visit.       Problem, psychiatric  diagnosed Bipolar    Recurrent otitis media chronic ear infections in childhood, recently had a double ear infection followed by another ear infection soon after    Sleep apnea 2019    had a sleep study when pregnant with my second child    Uncomplicated asthma      Past Surgical History:   Procedure Laterality Date    BIOPSY  29 y/o colpos, and in the last 2 years for thyroid    Colposcopy, thyroid nodule biopsy twice    BLOOD PATCH N/A 10/11/2016    Procedure: EPIDURAL BLOOD PATCH;  Surgeon: GENERIC ANESTHESIA PROVIDER;  [facility]     SECTION, TUBAL LIGATION, COMBINED N/A 2019    Procedure:  SECTION, WITH TUBAL LIGATION;  Surgeon: Kathy Rutledge MD;  [facility] L[facility]D    CRANIOTOMY, EXCISE TUMOR COMPLEX, COMBINED  2014    Procedure: COMBINED CRANIOTOMY, EXCISE TUMOR COMPLEX;  Supracerabellar  Infratentorial Approach for Resection of Tumor ;  Surgeon: Kate Mckeon MD;  [facility] OR    ENT SURGERY  2017    thyroidectomy    GYN SURGERY      colposcopy    THYROIDECTOMY N/A 2017    Procedure: THYROIDECTOMY;  Total Thyroidectomy;  Surgeon: Pamela Villasenor MD;  [facility]     ADDERALL XR 25 MG 24 hr capsule  albuterol (PROAIR HFA/PROVENTIL HFA/VENTOLIN HFA) 108 (90 Base) MCG/ACT inhaler  amphetamine-dextroamphetamine (ADDERALL) 10 MG tablet  ARIPiprazole (ABILIFY) 10 MG tablet  cetirizine (ZYRTEC) 10 MG tablet  diazepam (VALIUM) 5 MG tablet  diclofenac (VOLTAREN) 1 % topical gel  fluticasone-salmeterol (ADVAIR) 250-50 MCG/ACT inhaler  fluticasone-vilanterol (BREO ELLIPTA) 100-25 MCG/ACT inhaler  levonorgestrel (MIRENA) 52 MG (20 mcg/day) IUD  levothyroxine (SYNTHROID/LEVOTHROID) 175 MCG tablet  medical cannabis (Patient's own supply)  OLANZapine (ZYPREXA)  5 MG tablet  rivaroxaban ANTICOAGULANT (XARELTO) 20 MG TABS tablet  rizatriptan (MAXALT-MLT) 10 MG ODT  tiotropium (SPIRIVA RESPIMAT) 2.5 MCG/ACT inhaler      Allergies   Allergen Reactions    Adhesive Tape Hives    Nicotine Anaphylaxis, Hives and Rash     Patch - rash/hives  Lozenges - throat swelling    Tegaderm Transparent Dressing (Informational Only) Hives    Adacel [Tetanus-Diphth-Acell Pertussis] Swelling    Ciprofloxacin Visual Disturbance and Hallucination    Codeine Sulfate Nausea and Vomiting    Methimazole Rash    Oxycodone-Acetaminophen Nausea and Vomiting    Tetanus Toxoid      Pt states she had an infection at injection site.     Tetanus-Diphtheria Toxoids Td      Caused staph infection - childhood/teenager    Vicodin [Hydrocodone-Acetaminophen] Nausea and Vomiting     Family History  Family History   Problem Relation Age of Onset    Other Cancer Mother 62        salivary gland cancer    Genitourinary Problems Mother     Bipolar Disorder Mother         unipolar depression    Depression Mother     Thyroid Disease Mother         benign tumor    Melanoma Mother     Cancer Mother         salivary gland cancer    Migraines Mother     Skin Cancer Mother     Asthma Father     Mental Illness Father         Bipolar 2    Obesity Father     Colon Polyps Father 64    Bipolar Disorder Brother         unipolar depression    Asthma Brother     Depression Brother     Asthma Maternal Grandmother     Osteoporosis Maternal Grandmother     Glaucoma Maternal Grandmother     Hypertension Maternal Grandmother     Macular Degeneration Maternal Grandmother     Skin Cancer Maternal Grandmother     Prostate Cancer Maternal Grandfather 69        no history of smoking    Bladder Cancer Maternal Grandfather 71    Colon Cancer Maternal Grandfather 70    Diabetes Paternal Grandfather     Breast Cancer Maternal Aunt 37        negative BRCA1/2 testing    Melanoma Maternal Aunt 64    Thyroid Disease Paternal Aunt     Breast Cancer  Paternal Aunt 58        bilateral; negative testing    Cancer Paternal Aunt     Breast Cancer Other         paternal great aunt     Social History   Social History     Tobacco Use    Smoking status: Every Day     Current packs/day: 0.25     Average packs/day: 0.3 packs/day for 20.0 years (5.4 ttl pk-yrs)     Types: Cigarettes     Start date: 1/21/2004     Last attempt to quit: 1/25/2022     Passive exposure: Current    Smokeless tobacco: Never    Tobacco comments:     10/15/24 5 cigarettes daily.     Stopped smoking for both pregnamcies and breastfeeding of both children     Working on quitting smoking (6/27/23)   Vaping Use    Vaping status: Former    Quit date: 11/28/2023    Substances: Nicotine, Flavoring    Devices: Disposable   Substance Use Topics    Alcohol use: Yes     Alcohol/week: 4.0 standard drinks of alcohol     Types: 4 Drinks containing 0.5 oz of alcohol per week     Comment: one drink once a week    Drug use: Not Currently     Types: Marijuana, Psilocybin     Comment: medical marijuana      A complete review of systems was performed with pertinent positives and negatives noted in the HPI, and all other systems negative.    Physical Exam      Physical Exam  Vitals and nursing note reviewed.   Constitutional:       General: She is not in acute distress.     Appearance: Normal appearance. She is well-developed.   HENT:      Head: Normocephalic and atraumatic.   Eyes:      General: No scleral icterus.     Conjunctiva/sclera: Conjunctivae normal.   Cardiovascular:      Rate and Rhythm: Normal rate.   Pulmonary:      Effort: Pulmonary effort is normal. No respiratory distress.   Abdominal:      General: Abdomen is flat.   Musculoskeletal:      Cervical back: Normal range of motion and neck supple.   Skin:     General: Skin is warm and dry.      Findings: No rash.   Neurological:      Mental Status: She is alert and oriented to person, place, and time.             ED Course, Procedures, & Data       Procedures            No results found for any visits on 01/03/25.  Medications - No data to display  Labs Ordered and Resulted from Time of ED Arrival to Time of ED Departure - No data to display  No orders to display          Critical care was not performed.     Medical Decision Making  The patient's presentation was of high complexity (an acute health issue posing potential threat to life or bodily function).    The patient's evaluation involved:  ordering and/or review of 3+ test(s) in this encounter (see separate area of note for details)  discussion of management or test interpretation with another health professional (GYN)    The patient's management necessitated only low risk treatment.    Assessment & Plan      44 year old female with a past medical history of menorrhagia, obesity, PE anticoagulated on Xarelto, anemia, bipolar disorder, tobacco abuse, medical cannabis, thyroidectomy, and nonintractable migraine, who presents to the ED for evaluation of vaginal bleeding.  Vital signs notable for blood pressure ovation 141/93 but otherwise afebrile stable including normal pulse ox at 97% on room air.  IV established, labs sent which revealed a hemoglobin 11.5 otherwise normal CBC electrolytes, normal coag panel.  Patient sent to ultrasound for imaging of the pelvis which revealed IUD in good position.  Case discussed with GYN with reassurance that her IUD unlikely to be causing bleeding In good position.  Plan for formal GYN consult and repeat hemoglobin after a liter normal saline IV fluid bolus and 5 hours of observation here in emergency department, however patient declined and insisted on being discharged home.  Repeat hemoglobin sent and [facility]  Plan for follow-up with GYN as soon as possible next week.  Patient expressed verbal standing on anticipatory guidance return precautions emergency room.    I have reviewed the nursing notes. I have reviewed the findings, diagnosis,  plan and need for follow up with the patient.    New Prescriptions    No medications on file       Final diagnoses:   Vaginal bleeding       Manoj BARAHONA, am serving as a trained medical scribe to document services personally performed by Yadira Tobias MD based on the provider's statements to me on January 3, 2025.  This document has been checked and approved by the attending provider.    Yadira BARAHONA MD, was physically present and have reviewed and verified the accuracy of this note documented by Manoj Gurrola, medical scribe.      Yadira Tobias MD  [facility] EMERGENCY DEPARTMENT  1/3/2025     Yadira Tobias MD  01/03/25 [id number]

## 2025-01-04 ASSESSMENT — ASTHMA QUESTIONNAIRES
QUESTION_2 LAST FOUR WEEKS HOW OFTEN HAVE YOU HAD SHORTNESS OF BREATH: ONCE OR TWICE A WEEK
ACT_TOTALSCORE: 22
QUESTION_1 LAST FOUR WEEKS HOW MUCH OF THE TIME DID YOUR ASTHMA KEEP YOU FROM GETTING AS MUCH DONE AT WORK, SCHOOL OR AT HOME: NONE OF THE TIME
ACT_TOTALSCORE: 22
QUESTION_5 LAST FOUR WEEKS HOW WOULD YOU RATE YOUR ASTHMA CONTROL: WELL CONTROLLED
QUESTION_3 LAST FOUR WEEKS HOW OFTEN DID YOUR ASTHMA SYMPTOMS (WHEEZING, COUGHING, SHORTNESS OF BREATH, CHEST TIGHTNESS OR PAIN) WAKE YOU UP AT NIGHT OR EARLIER THAN USUAL IN THE MORNING: NOT AT ALL
QUESTION_4 LAST FOUR WEEKS HOW OFTEN HAVE YOU USED YOUR RESCUE INHALER OR NEBULIZER MEDICATION (SUCH AS ALBUTEROL): ONCE A WEEK OR LESS

## 2025-01-04 NOTE — DISCHARGE INSTRUCTIONS
Please check MyChart tonight for your repeat hemoglobin.     Call GYN on Monday morning and arrange to be seen soon as possible.

## 2025-01-04 NOTE — ED NOTES
Pt given discharge instructions. Did not want vitals redone prior to departure as her and her visitor wanted to leave

## 2025-01-06 RX ORDER — MEDROXYPROGESTERONE ACETATE 10 MG
10 TABLET ORAL 2 TIMES DAILY
Qty: 20 TABLET | Refills: 0 | Status: SHIPPED | OUTPATIENT
Start: 2025-01-06 | End: 2025-01-16

## 2025-01-07 ENCOUNTER — OFFICE VISIT (OUTPATIENT)
Dept: ALLERGY | Facility: CLINIC | Age: 45
End: 2025-01-07
Attending: NURSE PRACTITIONER
Payer: COMMERCIAL

## 2025-01-07 VITALS
HEART RATE: 87 BPM | WEIGHT: 245 LBS | SYSTOLIC BLOOD PRESSURE: 123 MMHG | OXYGEN SATURATION: 97 % | DIASTOLIC BLOOD PRESSURE: 84 MMHG | BODY MASS INDEX: 37.13 KG/M2 | HEIGHT: 68 IN

## 2025-01-07 DIAGNOSIS — J30.1 SEASONAL ALLERGIC RHINITIS DUE TO POLLEN: ICD-10-CM

## 2025-01-07 DIAGNOSIS — R09.81 NASAL CONGESTION: ICD-10-CM

## 2025-01-07 DIAGNOSIS — L29.9 ITCHING: ICD-10-CM

## 2025-01-07 DIAGNOSIS — L50.9 HIVES: Primary | ICD-10-CM

## 2025-01-07 DIAGNOSIS — J30.81 ALLERGIC RHINITIS DUE TO ANIMALS: ICD-10-CM

## 2025-01-07 DIAGNOSIS — J45.40 MODERATE PERSISTENT ASTHMA WITHOUT COMPLICATION: ICD-10-CM

## 2025-01-07 PROCEDURE — 95004 PERQ TESTS W/ALRGNC XTRCS: CPT | Performed by: INTERNAL MEDICINE

## 2025-01-07 PROCEDURE — 99204 OFFICE O/P NEW MOD 45 MIN: CPT | Mod: 25 | Performed by: INTERNAL MEDICINE

## 2025-01-07 RX ORDER — FLUTICASONE FUROATE, UMECLIDINIUM BROMIDE AND VILANTEROL TRIFENATATE 100; 62.5; 25 UG/1; UG/1; UG/1
1 POWDER RESPIRATORY (INHALATION) DAILY
Qty: 1 EACH | Refills: 4 | Status: SHIPPED | OUTPATIENT
Start: 2025-01-07

## 2025-01-07 NOTE — PROGRESS NOTES
Per provider verbal order, placed Adult Environmental Panel scratch test.  Verbal consent was obtained by MD prior to procedure.  Once panels were placed, patient was monitored for 15 minutes in clinic.  Provider read test after 15 minutes.  Pt tolerated procedure well.  All questions and concerns were addressed at office visit.     Nicole Underwood RN

## 2025-01-07 NOTE — PROGRESS NOTES
Cathy Arroyo was seen in the Allergy Clinic at [facility].    Cathy Arroyo is a 44 year old female being seen today at the request of Sherrill Dasilva APRN CNP/[facility] in consultation for Moderate persistent asthma and a history of allergic rhinitis based on testing as a 9-year-old.      For her asthma she currently uses Breo 100, 1 puff daily and is recommended to be using Spiriva which she routinely forgets.  She rarely uses albuterol.  She feels like her asthma is well-controlled but does have some wheezing today.    Since stopping Zyrtec a week ago she has had increased itching of her skin as well as hives.  She does have iron deficiency anemia.  When she takes antihistamines the itching and the hives are well-controlled.    She does have nasal congestion and itchy eyes and sneezing when off antihistamines.  She does have cats at home and does have a cat allergy.  She uses Flonase as needed.    She also has problems with a rash when she touches banana or colin or avocado.  Also latex will cause rash.      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    Post partum depression     Post-surgical hypothyroidism 05/2017    Pre-eclampsia 10/14/2016    10/16/2018 - was induced for postdates and gestational hypertension with last pregnancy. No history of chronic hypertension. RX for aspirin sent and labs drawn at new OB visit.       Problem, psychiatric 2001 diagnosed Bipolar    Recurrent otitis media chronic ear infections in childhood, recently had a double ear infection followed by another ear infection soon after    Sleep  apnea     had a sleep study when pregnant with my second child    Uncomplicated asthma      Family History   Problem Relation Age of Onset    Other Cancer Mother 62        salivary gland cancer    Genitourinary Problems Mother     Bipolar Disorder Mother         unipolar depression    Depression Mother     Thyroid Disease Mother         benign tumor    Melanoma Mother     Cancer Mother         salivary gland cancer    Migraines Mother     Skin Cancer Mother     Asthma Father     Mental Illness Father         Bipolar 2    Obesity Father     Colon Polyps Father 64    Bipolar Disorder Brother         unipolar depression    Asthma Brother     Depression Brother     Asthma Maternal Grandmother     Osteoporosis Maternal Grandmother     Glaucoma Maternal Grandmother     Hypertension Maternal Grandmother     Macular Degeneration Maternal Grandmother     Skin Cancer Maternal Grandmother     Prostate Cancer Maternal Grandfather 69        no history of smoking    Bladder Cancer Maternal Grandfather 71    Colon Cancer Maternal Grandfather 70    Diabetes Paternal Grandfather     Breast Cancer Maternal Aunt 37        negative BRCA1/2 testing    Melanoma Maternal Aunt 64    Thyroid Disease Paternal Aunt     Breast Cancer Paternal Aunt 58        bilateral; negative testing    Cancer Paternal Aunt     Breast Cancer Other         paternal great aunt     Past Surgical History:   Procedure Laterality Date    BIOPSY  29 y/o colpos, and in the last 2 years for thyroid    Colposcopy, thyroid nodule biopsy twice    BLOOD PATCH N/A 10/11/2016    Procedure: EPIDURAL BLOOD PATCH;  Surgeon: GENERIC ANESTHESIA PROVIDER;  [facility]     SECTION, TUBAL LIGATION, COMBINED N/A 2019    Procedure:  SECTION, WITH TUBAL LIGATION;  Surgeon: Kathy Rutledge MD;  [facility] L+D    CRANIOTOMY, EXCISE TUMOR COMPLEX, COMBINED  2014    Procedure: COMBINED CRANIOTOMY, EXCISE TUMOR COMPLEX;  Supracerabellar   Infratentorial Approach for Resection of Tumor ;  Surgeon: Kate Mckeon MD;  [facility] OR    ENT SURGERY  2017    thyroidectomy    GYN SURGERY      colposcopy    THYROIDECTOMY N/A 05/03/2017    Procedure: THYROIDECTOMY;  Total Thyroidectomy;  Surgeon: Pamela Villasenor MD;  Location:  OR       ENVIRONMENTAL HISTORY:   Pets inside the house include 3 cat(s).  Do you smoke cigarettes or other recreational drugs? No There is/are 1 smokers living in the house. The house does not have a damp basement.     SOCIAL HISTORY:   Cathy is employed as . She lives with her 2 children and spouse.      Review of Systems      Current Outpatient Medications:     ADDERALL XR 25 MG 24 hr capsule, , Disp: , Rfl:     albuterol (PROAIR HFA/PROVENTIL HFA/VENTOLIN HFA) 108 (90 Base) MCG/ACT inhaler, Inhale 2 puffs into the lungs every 6 hours, Disp: 18 g, Rfl: 3    amphetamine-dextroamphetamine (ADDERALL) 10 MG tablet, Take 10 mg by mouth daily., Disp: , Rfl:     ARIPiprazole (ABILIFY) 10 MG tablet, Take 0.5 tablets (5 mg) by mouth daily, Disp: , Rfl:     diazepam (VALIUM) 5 MG tablet, Take 5 mg by mouth as needed for anxiety., Disp: , Rfl:     Fluticasone-Umeclidin-Vilant (TRELEGY ELLIPTA) 100-62.5-25 MCG/ACT oral inhaler, Inhale 1 puff into the lungs daily., Disp: 1 each, Rfl: 4    fluticasone-vilanterol (BREO ELLIPTA) 100-25 MCG/ACT inhaler, Inhale 1 puff into the lungs daily., Disp: 28 each, Rfl: 11    levonorgestrel (MIRENA) 52 MG (20 mcg/day) IUD, 1 each by Intrauterine route once., Disp: , Rfl:     levothyroxine (SYNTHROID/LEVOTHROID) 175 MCG tablet, Take 1 tablet (175 mcg) by mouth daily., Disp: 90 tablet, Rfl: 3    medical cannabis (Patient's own supply), Take 1 Dose by mouth See Admin Instructions (The purpose of this order is to document that the patient reports taking medical cannabis.  This is not a prescription, and is not used to certify that the patient has a qualifying medical  "condition.)  Cobalt oral suspension: take 1-3 ml by mouth two times daily Total CBD 1200 mg. Total THC 60 mg, Disp: , Rfl:     rivaroxaban ANTICOAGULANT (XARELTO) 20 MG TABS tablet, Take 1 tablet (20 mg) by mouth daily (with dinner)., Disp: 90 tablet, Rfl: 3    rizatriptan (MAXALT-MLT) 10 MG ODT, Take 10 mg by mouth at onset of headache for migraine., Disp: , Rfl:     cetirizine (ZYRTEC) 10 MG tablet, Take 1 tablet (10 mg) by mouth daily, Disp: 90 tablet, Rfl: 3    medroxyPROGESTERone (PROVERA) 10 MG tablet, Take 1 tablet (10 mg) by mouth 2 times daily for 10 days. (Patient not taking: Reported on 1/7/2025), Disp: 20 tablet, Rfl: 0    tiotropium (SPIRIVA RESPIMAT) 2.5 MCG/ACT inhaler, Inhale 2 puffs into the lungs daily. (Patient not taking: Reported on 1/7/2025), Disp: 4 g, Rfl: 5  Allergies   Allergen Reactions    Adhesive Tape Hives    Nicotine Anaphylaxis, Hives and Rash     Patch - rash/hives  Lozenges - throat swelling    Tegaderm Transparent Dressing (Informational Only) Hives    Adacel [Tetanus-Diphth-Acell Pertussis] Swelling    Ciprofloxacin Visual Disturbance and Hallucination    Codeine Sulfate Nausea and Vomiting    Methimazole Rash    Oxycodone-Acetaminophen Nausea and Vomiting    Tetanus Toxoid      Pt states she had an infection at injection site.     Tetanus-Diphtheria Toxoids Td      Caused staph infection - childhood/teenager    Vicodin [Hydrocodone-Acetaminophen] Nausea and Vomiting         EXAM:   /84   Pulse 87   Ht 1.727 m (5' 7.99\")   Wt 111.1 kg (245 lb)   LMP 11/27/2024 (Exact Date)   SpO2 97%   BMI 37.26 kg/m      Physical Exam    Constitutional:       General: She is not in acute distress.     Appearance: Normal appearance. She is not ill-appearing.   HENT:      Head: Normocephalic and atraumatic.      Nose: Nose normal. No congestion or rhinorrhea.      Mouth/Throat:      Mouth: Mucous membranes are moist.      Pharynx: Oropharynx is clear. No posterior oropharyngeal " erythema.   Eyes:      General:         Right eye: No discharge.         Left eye: No discharge.   Cardiovascular:      Rate and Rhythm: Normal rate and regular rhythm.      Heart sounds: Normal heart sounds.   Pulmonary:      Effort: Pulmonary effort is normal.      Breath sounds: Normal breath sounds.  She does have some wheezing on exam on both full inspiration as well as expiration.  Skin:     General: Skin is warm.      Findings: No erythema or rash.   Neurological:      General: No focal deficit present.      Mental Status: She is alert. Mental status is at baseline.   Psychiatric:         Mood and Affect: Mood normal.         Behavior: Behavior normal.      WORKUP: Skin testing was positive to allergens as listed below.    ENVIRONMENTAL PERCUTANEOUS SKIN TESTING: ADULT      1/7/2025    11:00 AM   [facility]   Consent Y   Ordering Physician Dr. Smith   Interpreting Physician Dr. Smith   Testing Technician Nicole MACEDO RN   Location Arm   Time start: 11:07   Time End: 11:22   Positive Control: Histatrol*ALK 1 mg/ml 5/10   Negative Control: 50% Glycerin 0   Cat Hair*ALK (10,000 BAU/ml) 5/15   AP Dog Hair/Dander (1:100 w/v) 8/25   Dust Mite p. 30,000 AU/ml 0   Dust Mite f. (30,000 AU/ml) 0   Nii (W/F in millimeters) 0   Heath Grass (100,000 BAU/mL) 4/10   Red [place] Plane (W/F in millimeters) 0   Maple/Lucas (W/F in millimeters) 0   Hackberry (W/F in millimeters) 0   Grainger (W/F in millimeters) 0   Aguada *ALK (W/F in millimeters) 0   American Elm (W/F in millimeters) 0   White Pine (W/F in millimeters) 0   Black [place] (W/F in millimeters) 0   Birch Mix (W/F in millimeters) 3/4   [place] (W/F in millimeters) 0   Oak (W/F in millimeters) 0   Cocklebur (W/F in millimeters) 0   White Cloud (W/F in millimeters) 0   White Jeronimo (W/F in millimeters) 0   Careless (W/F in millimeters) 0   Nettle (W/F in millimeters) 0   English Plantain (W/F in millimeters) 0   Kochia (W/F in millimeters) 0   Neri's Quarter  (W/F in millimeters) 0   Marshelder (W/F in millimeters) 0   Ragweed Mix* ALK (W/F in millimeters) 0   Russian Thistle (W/F in millimeters) 0   Sagebrush/Mugwort (W/F in millimeters) 0   Sheep Sorrel (W/F in millimeters) 0   Feather Mix* ALK (W/F in millimeters) 0   Penicillium Mix (1:10 w/v) 0   Curvularia spicifera (1:10 w/v) 4/8   Epicoccum (1:10 w/v) 4/10   Aspergillus fumigatus (1:10 w/v): 0   Alternaria tenius (1:10 w/v) 5/15   H. Cladosporium (1:10 w/v) 0   Phoma herbarum (1:10 w/v) 0         ASSESSMENT/PLAN:  Cathy Arroyo is a 44 year old female seen today for evaluation of asthma as well as allergic rhinitis.  She is also having problems with itching and hives.  This responds well to antihistamines which she has been off for the last week.  Will make changes in her asthma therapy since she return to routinely forgets to use Spiriva.  Trelegy would be a great option if covered by insurance.  She is responding well to Breo and remembers to use this routinely.  She did have a pulmonary function test a few months ago which was within normal range for her FEV1 and FVC.  It did show evidence of reversibility.    She also has problems with latex as well as contact of banana on mangling avocado.    For her allergies we did discuss allergy shots in detail today.  She would find that difficult at this time due to multiple other medical obligations.  She will consider in the future.    Restart Cetrizine 10 mg once to twice daily  Flonase as needed  Pataday as needed  Allergy shots if not improving  Did prescribe Trelegy which would replace Breo and Spiriva, 1 puff daily.  Compliance should increase with the combination of the 3 medications and 1 inhaler.  She does have wheezing on exam today.    Follow-up in 3 months      Thank you for allowing me to participate in the care of Cathy Arroyo.      I spent 45 minutes on the date of the encounter doing chart review, history and exam, documentation and further  coordination as noted above exclusive of separately reported interpretations    Jared Smith MD  Allergy/Immunology  [facility]

## 2025-01-07 NOTE — PROGRESS NOTES
Pre-visit planning and chart review completed.     RETURN patient appointment:    1/28 with Dr. Navid Markham  1/14 CT chest wo contrast    - 3 month follow-up .    CE updated. Medications, allergies, problem list, and immunizations reconciled.

## 2025-01-07 NOTE — LETTER
1/7/2025      Cathy Arroyo  [address]  [facility] [address]      Dear Colleague,    Thank you for referring your patient, Cathy Arroyo, to the [facility] [place]. Please see a copy of my visit note below.    Cathy Arroyo was seen in the Allergy Clinic at [facility].    Cathy Arroyo is a 44 year old female being seen today at the request of Sherrill Dasilva APRN [facility] in consultation for Moderate persistent asthma and a history of allergic rhinitis based on testing as a 9-year-old.      For her asthma she currently uses Breo 100, 1 puff daily and is recommended to be using Spiriva which she routinely forgets.  She rarely uses albuterol.  She feels like her asthma is well-controlled but does have some wheezing today.    Since stopping Zyrtec a week ago she has had increased itching of her skin as well as hives.  She does have iron deficiency anemia.  When she takes antihistamines the itching and the hives are well-controlled.    She does have nasal congestion and itchy eyes and sneezing when off antihistamines.  She does have cats at home and does have a cat allergy.  She uses Flonase as needed.    She also has problems with a rash when she touches banana or colin or avocado.  Also latex will cause rash.      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 No history of chronic hypertension.  RX for aspirin sent and labs drawn at new OB visit.        Problem, psychiatric 2001 diagnosed Bipolar     Recurrent otitis media chronic ear infections in childhood, recently had a double ear infection followed by another ear infection soon after     Sleep apnea 2019    had a sleep study when pregnant with my second child     Uncomplicated asthma      Family History   Problem Relation Age of Onset     Other Cancer Mother 62        salivary gland cancer     Genitourinary Problems Mother      Bipolar Disorder Mother         unipolar depression     Depression Mother      Thyroid Disease Mother         benign tumor     Melanoma Mother      Cancer Mother         salivary gland cancer     Migraines Mother      Skin Cancer Mother      Asthma Father      Mental Illness Father         Bipolar 2     Obesity Father      Colon Polyps Father 64     Bipolar Disorder Brother         unipolar depression     Asthma Brother      Depression Brother      Asthma Maternal Grandmother      Osteoporosis Maternal Grandmother      Glaucoma Maternal Grandmother      Hypertension Maternal Grandmother      Macular Degeneration Maternal Grandmother      Skin Cancer Maternal Grandmother      Prostate Cancer Maternal Grandfather 69        no history of smoking     Bladder Cancer Maternal Grandfather 71     Colon Cancer Maternal Grandfather 70     Diabetes Paternal Grandfather      Breast Cancer Maternal Aunt 37        negative BRCA1/2 testing     Melanoma Maternal Aunt 64     Thyroid Disease Paternal Aunt      Breast Cancer Paternal Aunt 58        bilateral; negative testing     Cancer Paternal Aunt      Breast Cancer Other         paternal great aunt     Past Surgical History:   Procedure Laterality Date     BIOPSY  31 y/o colpos, and in the last 2 years for thyroid    Colposcopy, thyroid nodule biopsy twice     BLOOD PATCH N/A 10/11/2016    Procedure: EPIDURAL BLOOD PATCH;  Surgeon: GENERIC ANESTHESIA PROVIDER;  Location: [facility] OR       SECTION, TUBAL LIGATION, COMBINED N/A 2019    Procedure:  SECTION, WITH TUBAL LIGATION;  Surgeon: Kathy Rutledge MD;  [facility]: [facility] L[facility]D     CRANIOTOMY, EXCISE TUMOR COMPLEX, COMBINED  2014    Procedure: COMBINED CRANIOTOMY, EXCISE TUMOR COMPLEX;  Supracerabellar  Infratentorial Approach for Resection of Tumor ;  Surgeon: Kate Mckeon MD;  [facility]     ENT SURGERY  2017    thyroidectomy     GYN SURGERY      colposcopy     THYROIDECTOMY N/A 2017    Procedure: THYROIDECTOMY;  Total Thyroidectomy;  Surgeon: Pamela Villasenor MD;  Location:  OR       ENVIRONMENTAL HISTORY:   Pets inside the house include 3 cat(s).  Do you smoke cigarettes or other recreational drugs? No There is/are 1 smokers living in the house. The house does not have a damp basement.     SOCIAL HISTORY:   Cathy is employed as . She lives with her 2 children and spouse.      Review of Systems      Current Outpatient Medications:      ADDERALL XR 25 MG 24 hr capsule, , Disp: , Rfl:      albuterol (PROAIR HFA/PROVENTIL HFA/VENTOLIN HFA) 108 (90 Base) MCG/ACT inhaler, Inhale 2 puffs into the lungs every 6 hours, Disp: 18 g, Rfl: 3     amphetamine-dextroamphetamine (ADDERALL) 10 MG tablet, Take 10 mg by mouth daily., Disp: , Rfl:      ARIPiprazole (ABILIFY) 10 MG tablet, Take 0.5 tablets (5 mg) by mouth daily, Disp: , Rfl:      diazepam (VALIUM) 5 MG tablet, Take 5 mg by mouth as needed for anxiety., Disp: , Rfl:      Fluticasone-Umeclidin-Vilant (TRELEGY ELLIPTA) 100-62.5-25 MCG/ACT oral inhaler, Inhale 1 puff into the lungs daily., Disp: 1 each, Rfl: 4     fluticasone-vilanterol (BREO ELLIPTA) 100-25 MCG/ACT inhaler, Inhale 1 puff into the lungs daily., Disp: 28 each, Rfl: 11     levonorgestrel (MIRENA) 52 MG (20 mcg/day) IUD, 1 each by Intrauterine route once., Disp: , Rfl:      levothyroxine (SYNTHROID/LEVOTHROID) 175 MCG tablet, Take 1 tablet (175 mcg) by mouth daily., Disp:  "90 tablet, Rfl: 3     medical cannabis (Patient's own supply), Take 1 Dose by mouth See Admin Instructions (The purpose of this order is to document that the patient reports taking medical cannabis.  This is not a prescription, and is not used to certify that the patient has a qualifying medical condition.)  [place] oral suspension: take 1-3 ml by mouth two times daily Total CBD 1200 mg. Total THC 60 mg, Disp: , Rfl:      rivaroxaban ANTICOAGULANT (XARELTO) 20 MG TABS tablet, Take 1 tablet (20 mg) by mouth daily (with dinner)., Disp: 90 tablet, Rfl: 3     rizatriptan (MAXALT-MLT) 10 MG ODT, Take 10 mg by mouth at onset of headache for migraine., Disp: , Rfl:      cetirizine (ZYRTEC) 10 MG tablet, Take 1 tablet (10 mg) by mouth daily, Disp: 90 tablet, Rfl: 3     medroxyPROGESTERone (PROVERA) 10 MG tablet, Take 1 tablet (10 mg) by mouth 2 times daily for 10 days. (Patient not taking: Reported on 1/7/2025), Disp: 20 tablet, Rfl: 0     tiotropium (SPIRIVA RESPIMAT) 2.5 MCG/ACT inhaler, Inhale 2 puffs into the lungs daily. (Patient not taking: Reported on 1/7/2025), Disp: 4 g, Rfl: 5  Allergies   Allergen Reactions     Adhesive Tape Hives     Nicotine Anaphylaxis, Hives and Rash     Patch - rash/hives  Lozenges - throat swelling     Tegaderm Transparent Dressing (Informational Only) Hives     Adacel [Tetanus-Diphth-Acell Pertussis] Swelling     Ciprofloxacin Visual Disturbance and Hallucination     Codeine Sulfate Nausea and Vomiting     Methimazole Rash     Oxycodone-Acetaminophen Nausea and Vomiting     Tetanus Toxoid      Pt states she had an infection at injection site.      Tetanus-Diphtheria Toxoids Td      Caused staph infection - childhood/teenager     Vicodin [Hydrocodone-Acetaminophen] Nausea and Vomiting         EXAM:   /84   Pulse 87   Ht 1.727 m (5' 7.99\")   Wt 111.1 kg (245 lb)   LMP 11/27/2024 (Exact Date)   SpO2 97%   BMI 37.26 kg/m      Physical Exam    Constitutional:       General: She is " not in acute distress.     Appearance: Normal appearance. She is not ill-appearing.   HENT:      Head: Normocephalic and atraumatic.      Nose: Nose normal. No congestion or rhinorrhea.      Mouth/Throat:      Mouth: Mucous membranes are moist.      Pharynx: Oropharynx is clear. No posterior oropharyngeal erythema.   Eyes:      General:         Right eye: No discharge.         Left eye: No discharge.   Cardiovascular:      Rate and Rhythm: Normal rate and regular rhythm.      Heart sounds: Normal heart sounds.   Pulmonary:      Effort: Pulmonary effort is normal.      Breath sounds: Normal breath sounds.  She does have some wheezing on exam on both full inspiration as well as expiration.  Skin:     General: Skin is warm.      Findings: No erythema or rash.   Neurological:      General: No focal deficit present.      Mental Status: She is alert. Mental status is at baseline.   Psychiatric:         Mood and Affect: Mood normal.         Behavior: Behavior normal.      WORKUP: Skin testing was positive to allergens as listed below.    ENVIRONMENTAL PERCUTANEOUS SKIN TESTING: ADULT      1/7/2025    11:00 AM   [facility]   Consent Y   Ordering Physician Dr. Smith   Interpreting Physician Dr. Smith   Testing Technician Nicole MACEDO RN   Location Arm   Time start: 11:07   Time End: 11:22   Positive Control: Histatrol*ALK 1 mg/ml 5/10   Negative Control: 50% Glycerin 0   Cat Hair*ALK (10,000 BAU/ml) 5/15   AP Dog Hair/Dander (1:100 w/v) 8/25   Dust Mite p. 30,000 AU/ml 0   Dust Mite f. (30,000 AU/ml) 0   Nii (W/F in millimeters) 0   Heath Grass (100,000 BAU/mL) 4/10   Red Kimble (W/F in millimeters) 0   Maple/Racine (W/F in millimeters) 0   Hackberry (W/F in millimeters) 0   Genesee (W/F in millimeters) 0   [place] *ALK (W/F in millimeters) 0   American Elm (W/F in millimeters) 0   Baker (W/F in millimeters) 0   Black [place] (W/F in millimeters) 0   Birch Mix (W/F in millimeters) 3/4   [place] (W/F  in millimeters) 0   Oak (W/F in millimeters) 0   Cocklebur (W/F in millimeters) 0   Woodbine (W/F in millimeters) 0   White Jeronimo (W/F in millimeters) 0   Careless (W/F in millimeters) 0   Nettle (W/F in millimeters) 0   English Plantain (W/F in millimeters) 0   Kochia (W/F in millimeters) 0   Lamb's Quarter (W/F in millimeters) 0   Marshelder (W/F in millimeters) 0   Ragweed Mix* ALK (W/F in millimeters) 0   Russian Thistle (W/F in millimeters) 0   Sagebrush/Mugwort (W/F in millimeters) 0   Sheep Sorrel (W/F in millimeters) 0   Feather Mix* ALK (W/F in millimeters) 0   Penicillium Mix (1:10 w/v) 0   Curvularia spicifera (1:10 w/v) 4/8   Epicoccum (1:10 w/v) 4/10   Aspergillus fumigatus (1:10 w/v): 0   Alternaria tenius (1:10 w/v) 5/15   H. Cladosporium (1:10 w/v) 0   Phoma herbarum (1:10 w/v) 0         ASSESSMENT/PLAN:  Cathy Arroyo is a 44 year old female seen today for evaluation of asthma as well as allergic rhinitis.  She is also having problems with itching and hives.  This responds well to antihistamines which she has been off for the last week.  Will make changes in her asthma therapy since she return to routinely forgets to use Spiriva.  Trelegy would be a great option if covered by insurance.  She is responding well to Breo and remembers to use this routinely.  She did have a pulmonary function test a few months ago which was within normal range for her FEV1 and FVC.  It did show evidence of reversibility.    She also has problems with latex as well as contact of banana on mangling avocado.    For her allergies we did discuss allergy shots in detail today.  She would find that difficult at this time due to multiple other medical obligations.  She will consider in the future.    Restart Cetrizine 10 mg once to twice daily  Flonase as needed  Pataday as needed  Allergy shots if not improving  Did prescribe Trelegy which would replace Breo and Spiriva, 1 puff daily.  Compliance should increase with the  combination of the 3 medications and 1 inhaler.  She does have wheezing on exam today.    Follow-up in 3 months      Thank you for allowing me to participate in the care of Cathy Arroyo.      I spent 45 minutes on the date of the encounter doing chart review, history and exam, documentation and further coordination as noted above exclusive of separately reported interpretations    Jared Smith MD  Allergy/Immunology  [facility]      Per provider verbal order, placed Adult Environmental Panel scratch test.  Verbal consent was obtained by MD prior to procedure.  Once panels were placed, patient was monitored for 15 minutes in clinic.  Provider read test after 15 minutes.  Pt tolerated procedure well.  All questions and concerns were addressed at office visit.     Nicole Underwood, BOYD      Again, thank you for allowing me to participate in the care of your patient.        Sincerely,        Jared Smith MD    Electronically signed

## 2025-01-07 NOTE — PATIENT INSTRUCTIONS
Restart Cetrizine 10 mg once to twice daily  Flonase as needed  Pataday as needed  Allergy shots if not improving  Continue asthma medications, did prescribe Trelegy which would replace Breo and Spiriva, 1 puff daily.

## 2025-01-08 ENCOUNTER — MYC MEDICAL ADVICE (OUTPATIENT)
Dept: HEMATOLOGY | Facility: CLINIC | Age: 45
End: 2025-01-08
Payer: COMMERCIAL

## 2025-01-08 DIAGNOSIS — I26.99 ACUTE PULMONARY EMBOLISM WITHOUT ACUTE COR PULMONALE, UNSPECIFIED PULMONARY EMBOLISM TYPE (H): ICD-10-CM

## 2025-01-08 DIAGNOSIS — D50.0 IRON DEFICIENCY ANEMIA DUE TO CHRONIC BLOOD LOSS: ICD-10-CM

## 2025-01-08 DIAGNOSIS — N92.1 MENORRHAGIA WITH IRREGULAR CYCLE: Primary | ICD-10-CM

## 2025-01-13 ENCOUNTER — ANCILLARY PROCEDURE (OUTPATIENT)
Dept: ULTRASOUND IMAGING | Facility: CLINIC | Age: 45
End: 2025-01-13
Attending: ADVANCED PRACTICE MIDWIFE
Payer: COMMERCIAL

## 2025-01-13 ENCOUNTER — INFUSION THERAPY VISIT (OUTPATIENT)
Dept: ONCOLOGY | Facility: CLINIC | Age: 45
End: 2025-01-13
Payer: COMMERCIAL

## 2025-01-13 VITALS
SYSTOLIC BLOOD PRESSURE: 129 MMHG | DIASTOLIC BLOOD PRESSURE: 84 MMHG | HEART RATE: 76 BPM | RESPIRATION RATE: 16 BRPM | TEMPERATURE: 99.3 F | OXYGEN SATURATION: 98 %

## 2025-01-13 DIAGNOSIS — N92.1 MENORRHAGIA WITH IRREGULAR CYCLE: ICD-10-CM

## 2025-01-13 DIAGNOSIS — N93.9 EPISODE OF HEAVY VAGINAL BLEEDING: ICD-10-CM

## 2025-01-13 DIAGNOSIS — D50.0 IRON DEFICIENCY ANEMIA DUE TO CHRONIC BLOOD LOSS: Primary | ICD-10-CM

## 2025-01-13 PROCEDURE — 258N000003 HC RX IP 258 OP 636: Performed by: INTERNAL MEDICINE

## 2025-01-13 PROCEDURE — 76856 US EXAM PELVIC COMPLETE: CPT | Mod: GC | Performed by: RADIOLOGY

## 2025-01-13 PROCEDURE — 250N000011 HC RX IP 250 OP 636: Performed by: INTERNAL MEDICINE

## 2025-01-13 PROCEDURE — 96365 THER/PROPH/DIAG IV INF INIT: CPT

## 2025-01-13 PROCEDURE — 76830 TRANSVAGINAL US NON-OB: CPT | Mod: GC | Performed by: RADIOLOGY

## 2025-01-13 RX ORDER — DIPHENHYDRAMINE HYDROCHLORIDE 50 MG/ML
25 INJECTION INTRAMUSCULAR; INTRAVENOUS
Start: 2025-01-13

## 2025-01-13 RX ORDER — HEPARIN SODIUM (PORCINE) LOCK FLUSH IV SOLN 100 UNIT/ML 100 UNIT/ML
5 SOLUTION INTRAVENOUS
OUTPATIENT
Start: 2025-01-13

## 2025-01-13 RX ORDER — EPINEPHRINE 1 MG/ML
0.3 INJECTION, SOLUTION, CONCENTRATE INTRAVENOUS EVERY 5 MIN PRN
OUTPATIENT
Start: 2025-01-13

## 2025-01-13 RX ORDER — METHYLPREDNISOLONE SODIUM SUCCINATE 40 MG/ML
40 INJECTION INTRAMUSCULAR; INTRAVENOUS
Start: 2025-01-13

## 2025-01-13 RX ORDER — MEPERIDINE HYDROCHLORIDE 25 MG/ML
25 INJECTION INTRAMUSCULAR; INTRAVENOUS; SUBCUTANEOUS
OUTPATIENT
Start: 2025-01-13

## 2025-01-13 RX ORDER — DIPHENHYDRAMINE HYDROCHLORIDE 50 MG/ML
50 INJECTION INTRAMUSCULAR; INTRAVENOUS
Start: 2025-01-13

## 2025-01-13 RX ORDER — ALBUTEROL SULFATE 0.83 MG/ML
2.5 SOLUTION RESPIRATORY (INHALATION)
OUTPATIENT
Start: 2025-01-13

## 2025-01-13 RX ORDER — HEPARIN SODIUM,PORCINE 10 UNIT/ML
5-20 VIAL (ML) INTRAVENOUS DAILY PRN
OUTPATIENT
Start: 2025-01-13

## 2025-01-13 RX ORDER — ALBUTEROL SULFATE 90 UG/1
1-2 INHALANT RESPIRATORY (INHALATION)
Start: 2025-01-13

## 2025-01-13 RX ADMIN — SODIUM CHLORIDE 25 MG: 9 INJECTION, SOLUTION INTRAVENOUS at 12:23

## 2025-01-13 RX ADMIN — SODIUM CHLORIDE 975 MG: 9 INJECTION, SOLUTION INTRAVENOUS at 13:32

## 2025-01-13 NOTE — PROGRESS NOTES
Infusion Nursing Note:  Cathy Arroyo presents today for New Infed Infusion.        Note: Cathy comes to infusion today with no questions or concerns.   She  has  generalized pain that she rates at 2/10 today.  She declines needing any additional information printed out on infed   .      Intravenous Access:  Peripheral IV placed by vascular access        Post Infusion Assessment:  Patient tolerated infusion without incident.  Patient observed for 60 minutes post test dose per protocol.       Discharge Plan:     AVS to patient via MYCHART.  Patient will return 4/1/25 to see Dr Elias.  Patient discharged in stable condition accompanied by: self.  Departure Mode: Ambulatory.      Karis Terrazas RN

## 2025-01-14 ENCOUNTER — TELEPHONE (OUTPATIENT)
Dept: MIDWIFE SERVICES | Facility: CLINIC | Age: 45
End: 2025-01-14

## 2025-01-14 NOTE — TELEPHONE ENCOUNTER
[facility] Call Center    Phone Message    May a detailed message be left on voicemail: yes     Reason for Call: Other: Pt is very frustrated about the US and the provera advice and would like Mary Gunn to respond specifically since she is who she has been working with and is most aware of her situation. Pt did send a [employer] message at 8:50 that was attached to a [employer] thread on 1/7, but pt is worried that since she can't send any more messages on it, that her message won't be read. Writer assured pt that the clinic will read it, they just haven't gotten to it yet, but offered to see if Mary could reach out to discuss pt's concerns since she is frustrated with the various responses. Please contact pt.      Action Taken: Message routed to:  Other: RD OB    Travel Screening: Not Applicable     Date of Service:

## 2025-01-21 ENCOUNTER — VIRTUAL VISIT (OUTPATIENT)
Dept: PHARMACY | Facility: CLINIC | Age: 45
End: 2025-01-21
Attending: NURSE PRACTITIONER
Payer: COMMERCIAL

## 2025-01-21 DIAGNOSIS — J45.40 MODERATE PERSISTENT ASTHMA WITHOUT COMPLICATION: Primary | ICD-10-CM

## 2025-01-21 DIAGNOSIS — N92.1 MENORRHAGIA WITH IRREGULAR CYCLE: ICD-10-CM

## 2025-01-21 DIAGNOSIS — H90.3 ASYMMETRICAL SENSORINEURAL HEARING LOSS: Primary | ICD-10-CM

## 2025-01-21 DIAGNOSIS — F31.12 BIPOLAR AFFECTIVE DISORDER, CURRENTLY MANIC, MODERATE (H): ICD-10-CM

## 2025-01-21 PROCEDURE — 99605 MTMS BY PHARM NP 15 MIN: CPT | Mod: 93 | Performed by: PHARMACIST

## 2025-01-21 PROCEDURE — 99607 MTMS BY PHARM ADDL 15 MIN: CPT | Mod: 93 | Performed by: PHARMACIST

## 2025-01-21 NOTE — PATIENT INSTRUCTIONS
"Recommendations from today's MTM visit:                                                         Glad you're doing better!    For inhalers:  Stop Spiriva and Breo inhalers. Start Trelegy 1 puff once a day.     Psilocybin can increase risk of side effects from stimulants. Consider reducing your dose of stimulant 2 days in advance to psilocybin use.    Follow-up: annually or sooner as needed    It was great speaking with you today.  I value your experience and would be very thankful for your time in providing feedback in our clinic survey. In the next few days, you may receive an email or text message from [employer] with a link to a survey related to your  clinical pharmacist.\"     To schedule another MTM appointment, please call the clinic directly or you may call the MTM scheduling line at [phone] or toll-free at [phone].     My Clinical Pharmacist's contact information:                                                      Please feel free to contact me with any questions or concerns you have.      Caroline Carreon, PharmD, BCACP   Medication Therapy Management Pharmacist   [facility]  [phone]    "

## 2025-01-21 NOTE — PROGRESS NOTES
Medication Therapy Management (MTM) Encounter    ASSESSMENT:                            Medication Adherence/Access: See below for considerations.    Asthma/allergies:   Cat RILEY was approved - patient advised, reviewed instructions to switch inhalers    Mental health  Improved.   Reviewed psilocybin can increase risk of side effects from stimulants. Consider reducing dose of Adderall 2 days prior to psilocybin use.    Menorrhagia   Appears to be improving. Followup plan in place with OB.    PLAN:                            Stop Spiriva and Breo inhalers. Start Trelegy 1 puff daily as prescribed.    Follow-up: annually or sooner as needed    SUBJECTIVE/OBJECTIVE:                          Cathy Arroyo is a 44 year old female seen for a follow-up visit.       Reason for visit: recheck medications.    Allergies/ADRs: Reviewed in chart  Past Medical History: Reviewed in chart  Tobacco: She reports that she has been smoking cigarettes. She started smoking about 21 years ago. She has a 5.5 pack-year smoking history. She has been exposed to tobacco smoke. She has never used smokeless tobacco.Nicotine/Tobacco Cessation Plan  She is working on Tvinci back.    Alcohol: not currently using  THC/CBD: medical cannabis, oral intake or flower, less vaping. Has taken since age 19. Stable dose.  Psilocybin - takes for 1 week as needed for depressive episode (contains passion fruit), has taken x15 years    Medication Adherence/Access: best at remembering medications in the morning    Asthma   /allergies  Trelegy - hasn't picked up, didn't think it was covered by insurance  Breo 1 puff daily (forgets to take about once a week, remembering better overall)  Spiriva - hasn't been using it at all  Montelukast  - stopped, doesn't notice any difference at all. Overall feels like asthma is better.   Cetirizine 10mg 1-2 times daily (likes to take fexofenadine, rotate with cetirizine)  Flonase 1 spray each nostril once daily   Albuterol  "MDI as needed  Patient reports no current medication side effects.        Patient reports the following symptoms: rash after IV iron infusion and resolved that day. Now also having hives, comes and goes. Has also had flu symptoms with known exposure (her children positive for influenza). Feeling better now.    Has a new dog, knows she is allergic. Managing with antihistamine but hives as above.       Mental Health   Bipolar  Aripiprazole 5mg once daily - uses as mood stabilizer.  Adderall XR 25mg daily - takes when \"hypomanic or depressed\"  Adderall 5-10mg daily - takes when manic in place of the 25mg  Olanzapine 2.5mg at bedtime - only takes with jose eduardo symptoms as needed, rarely takes. Knocks her out.  Diazepam 5mg as needed for panic - rarely takes  Patient reports no current medication side effects.  Reports tardive dyskinesia with higher doses than 5mg of aripiprazole.     Had recent jose eduardo, no grandiosity, mostly noted rapid speech. Reduced Adderall to 10mg in the morning, symptoms passed after 3 weeks. Did not need to take olanzapine for this episode. Feels like she is overall back in control and able to manage her symptoms confidently.   Wonders about drug interactions with psilocybin. Typically she reduces her Adderall when she takes psilocybin but still feels a little more stimulated the day after use.   She is followed by psychiatry.   She is followed by a therapist.            Menorrhagia:   IUD placed. Continues to have bleeding but lighter period this time.   She has follow-up plan with OB to further discuss.   She will be continuing Xarelto lifelong due to PE, see hematology notes.      Today's Vitals: LMP 11/27/2024 (Exact Date)   ----------------      I spent 30 minutes with this patient today. All changes were made via collaborative practice agreement with Sherrill Dasilva, JANNET CALIX.     A summary of these recommendations was sent via bewarket.    Caroline Carreon, PharmD, BCACP   Medication Therapy " Management Pharmacist   [facility]  [phone]     Telemedicine Visit Details  The patient's medications can be safely assessed via a telemedicine encounter.  Type of service:  Telephone visit  Originating Location (pt. Location): Home    Distant Location (provider location):  On-site  Start Time: 10:09 AM  End Time: 10:38 AM     Medication Therapy Recommendations  Moderate persistent asthma without complication   1 Current Medication: Fluticasone-Umeclidin-Vilant (TRELEGY ELLIPTA) 100-62.5-25 MCG/ACT oral inhaler   Current Medication Sig: Inhale 1 puff into the lungs daily.   Rationale: Does not understand instructions - Adherence - Adherence   Recommendation: Start Medication   Status: Patient Agreed - Adherence/Education   Identified Date: 1/21/2025 Completed Date: 1/21/2025

## 2025-01-24 ENCOUNTER — OFFICE VISIT (OUTPATIENT)
Dept: OTOLARYNGOLOGY | Facility: CLINIC | Age: 45
End: 2025-01-24
Payer: COMMERCIAL

## 2025-01-24 VITALS
SYSTOLIC BLOOD PRESSURE: 123 MMHG | BODY MASS INDEX: 37.28 KG/M2 | DIASTOLIC BLOOD PRESSURE: 79 MMHG | TEMPERATURE: 97.6 F | WEIGHT: 246 LBS | HEART RATE: 89 BPM | OXYGEN SATURATION: 96 % | HEIGHT: 68 IN

## 2025-01-24 DIAGNOSIS — H90.3 ASYMMETRICAL SENSORINEURAL HEARING LOSS: Primary | ICD-10-CM

## 2025-01-24 ASSESSMENT — PAIN SCALES - GENERAL: PAINLEVEL_OUTOF10: MILD PAIN (2)

## 2025-01-24 NOTE — PATIENT INSTRUCTIONS
You were seen in the ENT Clinic today by Rocio Tyler. If you have any questions or concerns after your appointment, please contact us (see below)    The following has been recommended for you based upon your appointment today:  CT scan of temporal bone to evaluate stapes    Please return to clinic as discussed    How to Contact Us:  Send a [employer] message to your provider. Our team will respond to you via [employer]. Occasionally, we will need to call you to get further information.  For urgent matters (Monday-Friday), call the ENT Clinic: [phone] and speak with a call center team member - they will route your call appropriately.   If you'd like to speak directly with a nurse, please find our contact information below. We do our best to check voicemail frequently throughout the day, and will work to call you back within 1-2 days. For urgent matters, please use the general clinic phone numbers listed above.      Jimena MACEDO LPN  Direct: [phone]

## 2025-01-24 NOTE — NURSING NOTE
"Chief Complaint   Patient presents with    RECHECK     Follow up with audio     Blood pressure 123/79, pulse 89, temperature 97.6  F (36.4  C), height 1.727 m (5' 8\"), weight 111.6 kg (246 lb), last menstrual period 11/27/2024, SpO2 96%, not currently breastfeeding.  Suresh Huston LPN    "

## 2025-01-24 NOTE — LETTER
"1/24/2025       RE: Cathy Arroyo  [address]  [facility] [address]     Dear Colleague,    Thank you for referring your patient, Cathy Arroyo, to the [facility] EAR NOSE AND THROAT CLINIC [place] at [facility]. Please see a copy of my visit note below.      [facility] E[facility] NOSE AND THROAT CLINIC [place]  [address]  4TH FLOOR  [facility] [address]  Phone: [phone]  Fax: [phone]    Patient:  Cathy Arroyo, Date of birth 1980  Date of Visit:  Jan 24, 2025  Referring Provider Rocio Tyler      Assessment & Plan     Asymmetrical sensorineural hearing loss  Hearing is no longer asymmetrical. Patient continues to have abnormal tympanograms without explanation despite normal tympanograms initially. CT ordered to evaluate for any underlying temporal bone abnormality.  - CT Temporal Bones wo Contrast        20 minutes spent by me on the date of the encounter doing chart review, history and exam, documentation and further activities per the note      History of Present Illness    Pertinent history obtain from: chart review and patient    Maternal aunt has history of stapes surgery  Maternal side has history of stapes abnormality  Imbalance and sensation of falling with closing eyes in shower  Dizziness when standing from sitting  Sensory overload with improvement in hearing  Omeprazole discontinued, contributed to psychotic episode due to interaction with psych mediactions    PHYSICAL EXAM:  /79   Pulse 89   Temp 97.6  F (36.4  C)   Ht 1.727 m (5' 8\")   Wt 111.6 kg (246 lb)   LMP 11/27/2024 (Exact Date)   SpO2 96%   BMI 37.40 kg/m    Constitutional:  The patient was unaccompanied, well-groomed, and in no acute distress.     Skin: Normal:  warm and pink without rash    Neurologic: Alert and oriented x 3. Cooperative.    Psychiatric: The patient's affect was calm, cooperative, and appropriate.   "   Communication:  Normal; communicates verbally, voice quality WNL   Respiratory: Breathing comfortably without stridor or exertion of accessory muscles   Head/Face:  Normocephalic and atraumatic.  No lesions or scars.    Eyes: Extraocular movement intact. Clear sclera.   Ears: Pinnae and tragus non-tender. No external deformity, hearing normal to conversational voice, canals and TM's are clear and intact bilaterally no retractions or effusions   Nose: No anterior drainage, no external deformity   Oral Cavity: Normal tongue, adequate dentition, moist.   Neck: Supple with normal laryngeal and tracheal landmarks. Normal range of motion.           Audiogram: 1/24/2025 - data independently reviewed  Right ear: Normal hearing   Left ear: Normal hearing   SRT right: 25 dB left: 25 dB   WR right: 100% left: 100%   Acoustic Reflexes: right ipsi present  Tympanograms: type C right, type C left      Compared to prior audiogram improvement in left air conduction, word rec stable    Rocio Tyler PA-C   Otolaryngology-Head & Neck Surgery               Again, thank you for allowing me to participate in the care of your patient.      Sincerely,    Rocio Tyler PA-C

## 2025-01-24 NOTE — PROGRESS NOTES
"[facility] EAR NOSE AND THROAT CLINIC [address] [address]  Phone: [phone]  Fax: [phone]    Patient:  Cathy Arroyo, Date of birth 1980  Date of Visit:  Jan 24, 2025  Referring Provider Rocio Tyler      Assessment & Plan      Asymmetrical sensorineural hearing loss  Hearing is no longer asymmetrical. Patient continues to have abnormal tympanograms without explanation despite normal tympanograms initially. CT ordered to evaluate for any underlying temporal bone abnormality.  - CT Temporal Bones wo Contrast        20 minutes spent by me on the date of the encounter doing chart review, history and exam, documentation and further activities per the note      History of Present Illness     Pertinent history obtain from: chart review and patient    Maternal aunt has history of stapes surgery  Maternal side has history of stapes abnormality  Imbalance and sensation of falling with closing eyes in shower  Dizziness when standing from sitting  Sensory overload with improvement in hearing  Omeprazole discontinued, contributed to psychotic episode due to interaction with psych mediactions    PHYSICAL EXAM:  /79   Pulse 89   Temp 97.6  F (36.4  C)   Ht 1.727 m (5' 8\")   Wt 111.6 kg (246 lb)   LMP 11/27/2024 (Exact Date)   SpO2 96%   BMI 37.40 kg/m    Constitutional:  The patient was unaccompanied, well-groomed, and in no acute distress.     Skin: Normal:  warm and pink without rash    Neurologic: Alert and oriented x 3. Cooperative.    Psychiatric: The patient's affect was calm, cooperative, and appropriate.     Communication:  Normal; communicates verbally, voice quality WNL   Respiratory: Breathing comfortably without stridor or exertion of accessory muscles   Head/Face:  Normocephalic and atraumatic.  No lesions or scars.    Eyes: Extraocular movement intact. Clear sclera.   Ears: Pinnae and tragus non-tender. No external " deformity, hearing normal to conversational voice, canals and TM's are clear and intact bilaterally no retractions or effusions   Nose: No anterior drainage, no external deformity   Oral Cavity: Normal tongue, adequate dentition, moist.   Neck: Supple with normal laryngeal and tracheal landmarks. Normal range of motion.           Audiogram: 1/24/2025 - data independently reviewed  Right ear: Normal hearing   Left ear: Normal hearing   SRT right: 25 dB left: 25 dB   WR right: 100% left: 100%   Acoustic Reflexes: right ipsi present  Tympanograms: type C right, type C left      Compared to prior audiogram improvement in left air conduction, word rec stable    Rocio Tyler PA-C   Otolaryngology-Head & Neck Surgery

## 2025-02-03 ENCOUNTER — ANCILLARY PROCEDURE (OUTPATIENT)
Dept: CT IMAGING | Facility: CLINIC | Age: 45
End: 2025-02-03
Attending: PHYSICIAN ASSISTANT
Payer: COMMERCIAL

## 2025-02-03 DIAGNOSIS — H90.3 ASYMMETRICAL SENSORINEURAL HEARING LOSS: ICD-10-CM

## 2025-02-03 PROCEDURE — 70480 CT ORBIT/EAR/FOSSA W/O DYE: CPT | Performed by: INTERNAL MEDICINE

## 2025-02-17 ENCOUNTER — VIRTUAL VISIT (OUTPATIENT)
Dept: PULMONOLOGY | Facility: CLINIC | Age: 45
End: 2025-02-17
Payer: COMMERCIAL

## 2025-02-17 DIAGNOSIS — R91.8 PULMONARY NODULES: Primary | ICD-10-CM

## 2025-02-17 PROCEDURE — 98005 SYNCH AUDIO-VIDEO EST LOW 20: CPT | Performed by: PHYSICIAN ASSISTANT

## 2025-02-17 ASSESSMENT — PAIN SCALES - GENERAL: PAINLEVEL_OUTOF10: NO PAIN (0)

## 2025-02-17 NOTE — PROGRESS NOTES
Virtual Visit Details    Type of service:  Video Visit     Originating Location (pt. Location): Home    Distant Location (provider location):  On-site  Platform used for Video Visit: [facility]    LUNG NODULE & INTERVENTIONAL PULMONARY CLINIC  [facility]     Cathy Arroyo MRN# [id number]   Age: 44 year old YOB: 1980     Reason for Consultation: lung nodule(s)    Requesting Physician: Navid Markham MD  [address] [place] [id number]       Assessment and Plan:    1. ETHEL nodules and mediastinal/hilar adenopathy. Noted on CT 9/2024. Resolved on CT chest 1/2025, no other abnormal lung findings.       Follow up PRN    Rosalia Cerda PA-C  Interventional and General Pulmonology  Department of Pulmonary, Allergy, Critical Care and Sleep Medicine   [facility]     20 minutes spent reviewing chart, reviewing test results, talking with and examining patient, formulating plan, and documentation on the day of the encounter.          History:     Cathy Arroyo is a 44 year old female with who is here for lung nodule(s).    She had a CT PE 9/20/2024 for tachycardia, cough, and pleuritic pain. Saw Dr. Markham 10/15/2024 who recommended 3 month follow up CT chest for lung nodules.   Woke up this morning feeling like she has a new URI. Nose/sinuses are congested. No cough or dyspnea.     - Personal hx of cancer: no  - Exposure hx: Denies asbestos or radon exposure , no known TB exposures  - Tobacco hx: Current Smoker, 0.25ppd since they were 23yo   - Images reviewed this visit: mediastinal and hilar LAD, ETHEL nodules on CT 9/2024   - My interpretation of the PFT's relevant for this visit includes: Obstructive pattern     Other pertinent active medical problems include:   - has asthma. Seeing pulmonary at [facility]   - has segmental PE. 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 No history of chronic hypertension. RX for aspirin sent and labs drawn at new OB visit.       Problem, psychiatric  diagnosed Bipolar    Recurrent otitis media chronic ear infections in childhood, recently had a double ear infection followed by another ear infection soon after    Sleep apnea     had a sleep study when pregnant with my second child    Uncomplicated asthma            Past Surgical History:      Past Surgical History:   Procedure Laterality Date    BIOPSY  31 y/o colpos, and in the last 2 years for thyroid    Colposcopy, thyroid nodule biopsy twice    BLOOD PATCH N/A 10/11/2016    Procedure: EPIDURAL BLOOD PATCH;  Surgeon: GENERIC ANESTHESIA PROVIDER;  Location: [facility] OR     SECTION, TUBAL LIGATION, COMBINED N/A 2019    Procedure:  SECTION, WITH TUBAL LIGATION;  Surgeon: Kathy Rutledge MD;  Location: [facility] L+D    CRANIOTOMY, EXCISE TUMOR COMPLEX, COMBINED  2014    Procedure: COMBINED CRANIOTOMY, EXCISE TUMOR COMPLEX;  Supracerabellar  Infratentorial Approach for Resection of Tumor ;  Surgeon: Kate Mckeon MD;  Location: [facility] OR    ENT SURGERY      thyroidectomy    GYN SURGERY      colposcopy    THYROIDECTOMY N/A 2017    Procedure: THYROIDECTOMY;  Total Thyroidectomy;  Surgeon: Pamela Villasenor MD;  Location:  OR          Social History:     Social History     Tobacco Use    Smoking status: Every Day     Current packs/day: 0.25     Average packs/day: 0.3 packs/day  for 20.1 years (5.5 ttl pk-yrs)     Types: Cigarettes     Start date: 1/21/2004     Last attempt to quit: 1/25/2022     Passive exposure: Current    Smokeless tobacco: Never    Tobacco comments:     1/7/25 6 cigarettes per day by Patient     10/15/24 5 cigarettes daily.     Stopped smoking for both pregnamcies and breastfeeding of both children     Working on quitting smoking (6/27/23)   Substance Use Topics    Alcohol use: Yes     Alcohol/week: 4.0 standard drinks of alcohol     Types: 4 Drinks containing 0.5 oz of alcohol per week     Comment: one drink once a week          Family History:     Family History   Problem Relation Age of Onset    Other Cancer Mother 62        salivary gland cancer    Genitourinary Problems Mother     Bipolar Disorder Mother         unipolar depression    Depression Mother     Thyroid Disease Mother         benign tumor    Melanoma Mother     Cancer Mother         salivary gland cancer    Migraines Mother     Skin Cancer Mother     Asthma Father     Mental Illness Father         Bipolar 2    Obesity Father     Colon Polyps Father 64    Bipolar Disorder Brother         unipolar depression    Asthma Brother     Depression Brother     Asthma Maternal Grandmother     Osteoporosis Maternal Grandmother     Glaucoma Maternal Grandmother     Hypertension Maternal Grandmother     Macular Degeneration Maternal Grandmother     Skin Cancer Maternal Grandmother     Prostate Cancer Maternal Grandfather 69        no history of smoking    Bladder Cancer Maternal Grandfather 71    Colon Cancer Maternal Grandfather 70    Diabetes Paternal Grandfather     Breast Cancer Maternal Aunt 37        negative BRCA1/2 testing    Melanoma Maternal Aunt 64    Thyroid Disease Paternal Aunt     Breast Cancer Paternal Aunt 58        bilateral; negative testing    Cancer Paternal Aunt     Breast Cancer Other         paternal great aunt           Allergies:      Allergies   Allergen Reactions    Adhesive Tape Hives     Nicotine Anaphylaxis, Hives and Rash     Patch - rash/hives  Lozenges - throat swelling    Tegaderm Transparent Dressing (Informational Only) Hives    Adacel [Tetanus-Diphth-Acell Pertussis] Swelling    Ciprofloxacin Visual Disturbance and Hallucination    Codeine Sulfate Nausea and Vomiting    Methimazole Rash    Oxycodone-Acetaminophen Nausea and Vomiting    Tetanus Toxoid      Pt states she had an infection at injection site.     Tetanus-Diphtheria Toxoids Td      Caused staph infection - childhood/teenager    Vicodin [Hydrocodone-Acetaminophen] Nausea and Vomiting          Medications:     Current Outpatient Medications   Medication Sig Dispense Refill    ADDERALL XR 25 MG 24 hr capsule       albuterol (PROAIR HFA/PROVENTIL HFA/VENTOLIN HFA) 108 (90 Base) MCG/ACT inhaler Inhale 2 puffs into the lungs every 6 hours 18 g 3    amphetamine-dextroamphetamine (ADDERALL) 10 MG tablet Take 10 mg by mouth daily.      ARIPiprazole (ABILIFY) 10 MG tablet Take 0.5 tablets (5 mg) by mouth daily      cetirizine (ZYRTEC) 10 MG tablet Take 1 tablet (10 mg) by mouth daily 90 tablet 3    diazepam (VALIUM) 5 MG tablet Take 5 mg by mouth as needed for anxiety.      Fluticasone-Umeclidin-Vilant (TRELEGY ELLIPTA) 100-62.5-25 MCG/ACT oral inhaler Inhale 1 puff into the lungs daily. 1 each 4    levonorgestrel (MIRENA) 52 MG (20 mcg/day) IUD 1 each by Intrauterine route once.      levothyroxine (SYNTHROID/LEVOTHROID) 175 MCG tablet Take 1 tablet (175 mcg) by mouth daily. 90 tablet 3    medical cannabis (Patient's own supply) Take 1 Dose by mouth See Admin Instructions (The purpose of this order is to document that the patient reports taking medical cannabis.  This is not a prescription, and is not used to certify that the patient has a qualifying medical condition.)    Waka oral suspension: take 1-3 ml by mouth two times daily  Total CBD 1200 mg. Total THC 60 mg      rivaroxaban ANTICOAGULANT (XARELTO) 20 MG TABS tablet Take 1 tablet  (20 mg) by mouth daily (with dinner). 90 tablet 3    rizatriptan (MAXALT-MLT) 10 MG ODT Take 10 mg by mouth at onset of headache for migraine.      fluticasone-vilanterol (BREO ELLIPTA) 100-25 MCG/ACT inhaler Inhale 1 puff into the lungs daily. 28 each 11    tiotropium (SPIRIVA RESPIMAT) 2.5 MCG/ACT inhaler Inhale 2 puffs into the lungs daily. (Patient not taking: Reported on 1/21/2025) 4 g 5     No current facility-administered medications for this visit.            Physical Exam:   There were no vitals taken for this visit.  Wt Readings from Last 4 Encounters:   01/24/25 111.6 kg (246 lb)   01/07/25 111.1 kg (245 lb)   01/03/25 111.1 kg (245 lb)   12/09/24 112.3 kg (247 lb 9.6 oz)     General: Well appearing  Lungs: Nonlabored breathing  Neuro: Answering questions appropriately  Psych: Normal affect       Imaging/Lab Data   All laboratory and imaging data reviewed.    No results found. However, due to the size of the patient record, not all encounters were searched. Please check Results Review for a complete set of results.

## 2025-02-17 NOTE — NURSING NOTE
[place] [facility] [facility] [place] [address]    Is the patient currently in the state of [place]? YES    Visit mode: VIDEO    If the visit is dropped, the patient can be reconnected by:VIDEO VISIT: Text to cell phone:   Telephone Information:   Mobile [phone]       Will anyone else be joining the visit? NO  (If patient encounters technical issues they should call [phone])    Are changes needed to the allergy or medication list? Pt stated no changes to allergies and Pt stated no med changes    Are refills needed on medications prescribed by this physician? NO    Rooming Documentation:  Questionnaire(s) completed    Reason for visit: RECHECK    Dominic LIF

## 2025-02-18 ENCOUNTER — TELEPHONE (OUTPATIENT)
Dept: HEMATOLOGY | Facility: CLINIC | Age: 45
End: 2025-02-18

## 2025-02-18 ENCOUNTER — OFFICE VISIT (OUTPATIENT)
Dept: OBGYN | Facility: CLINIC | Age: 45
End: 2025-02-18
Payer: COMMERCIAL

## 2025-02-18 VITALS
DIASTOLIC BLOOD PRESSURE: 87 MMHG | HEART RATE: 87 BPM | BODY MASS INDEX: 37.4 KG/M2 | WEIGHT: 246 LBS | TEMPERATURE: 97.1 F | SYSTOLIC BLOOD PRESSURE: 133 MMHG | OXYGEN SATURATION: 99 %

## 2025-02-18 DIAGNOSIS — N92.1 MENORRHAGIA WITH IRREGULAR CYCLE: Primary | ICD-10-CM

## 2025-02-18 DIAGNOSIS — I26.99 ACUTE PULMONARY EMBOLISM WITHOUT ACUTE COR PULMONALE, UNSPECIFIED PULMONARY EMBOLISM TYPE (H): ICD-10-CM

## 2025-02-18 DIAGNOSIS — Z97.5 IUD (INTRAUTERINE DEVICE) IN PLACE: ICD-10-CM

## 2025-02-18 DIAGNOSIS — D50.0 IRON DEFICIENCY ANEMIA DUE TO CHRONIC BLOOD LOSS: ICD-10-CM

## 2025-02-18 DIAGNOSIS — Z20.822 COVID-19 VIRUS RNA TEST RESULT UNKNOWN: ICD-10-CM

## 2025-02-18 LAB
ERYTHROCYTE [DISTWIDTH] IN BLOOD BY AUTOMATED COUNT: 16.2 % (ref 10–15)
FERRITIN SERPL-MCNC: 85 NG/ML (ref 6–175)
HCT VFR BLD AUTO: 40.8 % (ref 35–47)
HGB BLD-MCNC: 12.9 G/DL (ref 11.7–15.7)
IRON BINDING CAPACITY (ROCHE): 269 UG/DL (ref 240–430)
IRON SATN MFR SERPL: 15 % (ref 15–46)
IRON SERPL-MCNC: 40 UG/DL (ref 37–145)
MCH RBC QN AUTO: 27.1 PG (ref 26.5–33)
MCHC RBC AUTO-ENTMCNC: 31.6 G/DL (ref 31.5–36.5)
MCV RBC AUTO: 86 FL (ref 78–100)
PLATELET # BLD AUTO: 265 10E3/UL (ref 150–450)
RBC # BLD AUTO: 4.76 10E6/UL (ref 3.8–5.2)
WBC # BLD AUTO: 5.4 10E3/UL (ref 4–11)

## 2025-02-18 PROCEDURE — 85027 COMPLETE CBC AUTOMATED: CPT | Performed by: OBSTETRICS & GYNECOLOGY

## 2025-02-18 PROCEDURE — 83540 ASSAY OF IRON: CPT | Performed by: OBSTETRICS & GYNECOLOGY

## 2025-02-18 PROCEDURE — 36415 COLL VENOUS BLD VENIPUNCTURE: CPT | Performed by: OBSTETRICS & GYNECOLOGY

## 2025-02-18 PROCEDURE — 83550 IRON BINDING TEST: CPT | Performed by: OBSTETRICS & GYNECOLOGY

## 2025-02-18 PROCEDURE — 87635 SARS-COV-2 COVID-19 AMP PRB: CPT | Performed by: OBSTETRICS & GYNECOLOGY

## 2025-02-18 PROCEDURE — 82728 ASSAY OF FERRITIN: CPT | Performed by: OBSTETRICS & GYNECOLOGY

## 2025-02-18 PROCEDURE — 99204 OFFICE O/P NEW MOD 45 MIN: CPT | Performed by: OBSTETRICS & GYNECOLOGY

## 2025-02-18 NOTE — TELEPHONE ENCOUNTER
[id number]  Cathy Arroyo  44 year old female  [facility] Diagnosis: Hx of PE  [facility] Provider: Dr. Elias    Call received from Cathy with questions about her labs from today.    Cathy is concerned about her RDW count being slightly elevated and is wondering what Dr. Elias thinks about it.     Staff reassured Cathy that this lab result is only slightly out of range and is not immediately concerning. Her additional labs are still in process.     Plan made to alert Dr. Elias to Cathy's concern about RDW but that we will wait to reach out to her until the rest of her labs result and Dr. Elias has had a chance to review them.    Cathy states that her whole family is sick right now, her COVID test is in process.    Cathy verbalized understanding and denies additional questions at this time.    Lauren FLORENTINON, RN, PHN   [facility]   Office: [phone]  Fax: [phone]       Addendum 02/19: Patient states home test came back positive for COVID.    Ralph Tatum RN    Addendum 2/19/2025 2:03 PM:  RN reviewed patient's labs with Dr. Elias. Explained that slightly increased RDW is expected after receiving IV iron. No cause for concern per Dr. Elias. Iron labs look okay. Recommend recheck in 2 to 3 months. For Covid dx, if patient decides to take paxlovid-advised to contact our team as we would need to adjust anticoagulation.     RN called Cathy and gave her above information. She verbalizes understanding and agrees with plan. RN will send self reminder in 2 months to remind pt to get labs done.      Marce Smith RN, BSN, PCCN  Nurse Clinician    [facility]  [address] [place] [address], [address], [place] [address]   Office, direct: [phone]  Main office number: [phone]  Pronouns: She, her, hers

## 2025-02-18 NOTE — PROGRESS NOTES
GYN Clinic Visit  Date of visit: 2025   Chief Complaint: AUB    HPI:   Cathy Arroyo is a 44 year old  female with PMH significant for pulmonary emboli anticoagulated on xarelto, anemia, bipolar disorder, Graves disease s/p thyroidectomy, pineal gland resection, obesity and migraines who presents to discuss uterine bleeding.    H/o menorrhagia, got worse after starting anticoagulation  Will be on Xarelto (rivaroxaban) forever for PEs of unknown etiology, no evidence of DVT  Currently on treatment dose (20mg daily)  Mirena IUD inserted 24 to help manage heavy menses  Went to ED x2 in  for heavy bleeding. Was going through a pad and a tampon in an hour at the heaviest  /3 - IUD in good position, Endometrial stripe 17mm, normal right ovary, left ovary not seen. Hgb 11.0  / - IUD in good position, endometrial stripe 12mm, normal right ovary, left ovary not completely visualized.  Was offered provera but did not take it    Patient's last menstrual period was 2025. Period this month started as pink mucus x4d. Took diflucan. Then got period 2d later. Flow is pretty light. Bled through a tampon on day 3 but then got lighter. Minimal now. Reports that bleeding this month was manageable.      just diagnosed with COVID. She has been sneezing and slightly congested. No fevers or cough or SOB.    Pap history:  11/23/10: LSIL pap with + HR HPV (care everywhere)  : [place] MAHESH I (care everywhere)  10/03/11: Ascus with + HR HPV result. (care everywhere)  12: NIL pap (care everywhere)  13: NIL pap(care everywhere)  01/27/15: NIL pap, Neg HR HPV result. (care everywhere)  18: NIL pap, Neg HR HPV result. Plan pap in 3 years (routine screening).  21 NIL pap, Neg HPV. Routine screening  23 NIL pap, neg HPV      Obstetric History:   OB History    Para Term  AB Living   5 3 3 0 2 3   SAB IAB Ectopic Multiple Live Births   2 0 0 0 3      # Outcome Date GA  Lbr Darren/2nd Weight Sex Type Anes PTL Lv   5 Term 19 39w2d  4.13 kg (9 lb 1.7 oz) F CS-LTranv Spinal N NORAH      Name: MEE,FEMALE-ZEINA      Apgar1: 8  Apgar5: 9   4 SAB 07/10/18           3 Term 10/11/16 41w1d 08:43 / 06:05 3.402 kg (7 lb 8 oz) F Vag-Spont Local, EPI N NORAH      Name: [email or web address] ZEINA      Apgar1: 8  Apgar5: 9   2 SAB      SAB      1 Term  41w1d    Vag-Spont   NORAH       Past Medical History:  Past Medical History:   Diagnosis Date    Allergic rhinitis     Allergic state     Anxiety     Bipolar 1 disorder (H)     Depressive disorder     Elevated cholesterol     Graves disease     Hearing problem     Migraines 2019    have suffered from migraines since having brain surgery    Obese     BMI 37.48    Pineal tumor     s/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 tumor    Post partum depression     Post-surgical hypothyroidism 2017    Pre-eclampsia 10/14/2016    10/16/2018 - was induced for postdates and gestational hypertension with last pregnancy. No history of chronic hypertension. RX for aspirin sent and labs drawn at new OB visit.       Problem, psychiatric  diagnosed Bipolar    Recurrent otitis media chronic ear infections in childhood, recently had a double ear infection followed by another ear infection soon after    Sleep apnea     had a sleep study when pregnant with my second child    Uncomplicated asthma        Past Surgical History:  Past Surgical History:   Procedure Laterality Date    BIOPSY  29 y/o colpos, and in the last 2 years for thyroid    Colposcopy, thyroid nodule biopsy twice    BLOOD PATCH N/A 10/11/2016    Procedure: EPIDURAL BLOOD PATCH;  Surgeon: GENERIC ANESTHESIA PROVIDER;  Location: [facility] OR     SECTION, TUBAL LIGATION, COMBINED N/A 2019    Procedure:  SECTION, WITH TUBAL LIGATION;  Surgeon: Kathy Rutledge MD;  Location: [facility] L+D    CRANIOTOMY, EXCISE TUMOR COMPLEX, COMBINED  2014    Procedure: COMBINED CRANIOTOMY, EXCISE  TUMOR COMPLEX;  Supracerabellar  Infratentorial Approach for Resection of Tumor ;  Surgeon: Kate Mckeon MD;  Location: [facility] OR    ENT SURGERY  2017    thyroidectomy    GYN SURGERY      colposcopy    THYROIDECTOMY N/A 05/03/2017    Procedure: THYROIDECTOMY;  Total Thyroidectomy;  Surgeon: Pamela Villasenor MD;  Location:  OR         Medications:  Current Outpatient Medications   Medication Sig Dispense Refill    albuterol (PROAIR HFA/PROVENTIL HFA/VENTOLIN HFA) 108 (90 Base) MCG/ACT inhaler Inhale 2 puffs into the lungs every 6 hours 18 g 3    amphetamine-dextroamphetamine (ADDERALL) 10 MG tablet Take 10 mg by mouth daily.      ARIPiprazole (ABILIFY) 10 MG tablet Take 0.5 tablets (5 mg) by mouth daily      cetirizine (ZYRTEC) 10 MG tablet Take 1 tablet (10 mg) by mouth daily 90 tablet 3    diazepam (VALIUM) 5 MG tablet Take 5 mg by mouth as needed for anxiety.      Fluticasone-Umeclidin-Vilant (TRELEGY ELLIPTA) 100-62.5-25 MCG/ACT oral inhaler Inhale 1 puff into the lungs daily. 1 each 4    levonorgestrel (MIRENA) 52 MG (20 mcg/day) IUD 1 each by Intrauterine route once.      levothyroxine (SYNTHROID/LEVOTHROID) 175 MCG tablet Take 1 tablet (175 mcg) by mouth daily. 90 tablet 3    medical cannabis (Patient's own supply) Take 1 Dose by mouth See Admin Instructions (The purpose of this order is to document that the patient reports taking medical cannabis.  This is not a prescription, and is not used to certify that the patient has a qualifying medical condition.)    [place] oral suspension: take 1-3 ml by mouth two times daily  Total CBD 1200 mg. Total THC 60 mg      rivaroxaban ANTICOAGULANT (XARELTO) 20 MG TABS tablet Take 1 tablet (20 mg) by mouth daily (with dinner). 90 tablet 3    rizatriptan (MAXALT-MLT) 10 MG ODT Take 10 mg by mouth at onset of headache for migraine.      ADDERALL XR 25 MG 24 hr capsule       fluticasone-vilanterol (BREO ELLIPTA) 100-25 MCG/ACT inhaler Inhale 1 puff into the  "lungs daily. 28 each 11    tiotropium (SPIRIVA RESPIMAT) 2.5 MCG/ACT inhaler Inhale 2 puffs into the lungs daily. (Patient not taking: Reported on 2025) 4 g 5     No current facility-administered medications for this visit.       Allergy:  Allergies   Allergen Reactions    Adhesive Tape Hives    Nicotine Anaphylaxis, Hives and Rash     Patch - rash/hives  Lozenges - throat swelling    Tegaderm Transparent Dressing (Informational Only) Hives    Adacel [Tetanus-Diphth-Acell Pertussis] Swelling    Ciprofloxacin Visual Disturbance and Hallucination    Codeine Sulfate Nausea and Vomiting    Methimazole Rash    Oxycodone-Acetaminophen Nausea and Vomiting    Tetanus Toxoid      Pt states she had an infection at injection site.     Tetanus-Diphtheria Toxoids Td      Caused staph infection - childhood/teenager    Vicodin [Hydrocodone-Acetaminophen] Nausea and Vomiting       Physical Exam:  Vitals:    25 1328   BP: 133/87   Patient Position: Sitting   Pulse: 87   SpO2: 99%   Weight: 111.6 kg (246 lb)     Estimated body mass index is 37.4 kg/m  as calculated from the following:    Height as of 25: 1.727 m (5' 8\").    Weight as of this encounter: 111.6 kg (246 lb).     Gen: healthy, alert, active, no distress      Assessment:  Cathy Arroyo is a 44 year old  who presents to discuss abnormal uterine bleeding that was exacerbated by anticoagulation and is now starting to be controlled with Mirena intrauterine device (inserted 24)    Plan:  1. Menorrhagia with irregular cycle (Primary)  Discussed no structural issues identified and IUD in good position. Discussed it is reassuring that bleeding is lighter this month and likely due to IUD. Discussed that over the next 3 months bleeding should continue to get lighter with Mirena IUD, typical reduction of bleeding by 90% at 6mo. Discussed her bleeding may not reduce that much bc anticoagulation but goal is normal menses that does not cause anemia. If " persistent AUB, consider endometrial sampling. She is not interested in oral progesterone and not a candidate for TXA. Discussed endometrial ablation and hysterectomy as back up plans to control bleeding if IUD is not effective.   - CBC with platelets  - Ferritin  - Iron and iron binding capacity    2. IUD (intrauterine device) in place  Discussed can be in place for up to 8yr but sometimes bleeding benefit wanes at 4-6 yrs and we can replace it early if that happens.     3. Iron deficiency anemia due to chronic blood loss    - CBC with platelets  - Ferritin  - Iron and iron binding capacity    4. Acute pulmonary embolism without acute cor pulmonale, unspecified pulmonary embolism type (H)    - CBC with platelets  - Ferritin  - Iron and iron binding capacity    5. COVID-19 virus RNA test result unknown    - COVID-19 Virus (Coronavirus) by PCR Nose; Future  - COVID-19 Virus (Coronavirus) by PCR Nose      Aliya Brooks MD

## 2025-02-19 LAB — SARS-COV-2 RNA RESP QL NAA+PROBE: POSITIVE

## 2025-02-25 ENCOUNTER — VIRTUAL VISIT (OUTPATIENT)
Dept: NEUROSURGERY | Facility: CLINIC | Age: 45
End: 2025-02-25
Attending: PHYSICIAN ASSISTANT
Payer: COMMERCIAL

## 2025-02-25 DIAGNOSIS — D44.5 PINEOCYTOMA (H): Primary | ICD-10-CM

## 2025-02-25 PROCEDURE — 99207 PR NO BILLABLE SERVICE THIS VISIT: CPT | Performed by: PHYSICIAN ASSISTANT

## 2025-02-25 PROCEDURE — 1126F AMNT PAIN NOTED NONE PRSNT: CPT | Performed by: PHYSICIAN ASSISTANT

## 2025-02-25 NOTE — PATIENT INSTRUCTIONS
Follow up in 2 years, with brain MRI prior to appointment.  Please don't hesitate to reach out sooner as needed.

## 2025-02-25 NOTE — PROGRESS NOTES
"Virtual Visit Details    Type of service:  Telephone Visit   Phone call duration: 5 minutes   Originating Location (pt. Location): Home    Distant Location (provider location):  Off-site  Telephone visit completed due to the patient did not consent to a video visit.      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 She underwent a supracerebellar infratentorial craniotomy for resection of her pineal gland tumor in  with Dr. Mckeon and he's been following her since that time. I met with Cathy in 2024 after she was seen while in [place] and had hardware concerns; please see my previous note for details. We agreed to talk now to check in on how things are going. She notes that she still hears her hardware every once in awhile, generally there is a \"clicking\" sound, and still notices mild pain intermittently, but that symptoms are stable to improved. She feels better knowing what is going on, and thinks this relates to better tolerance of the symptoms as well. She denies new concerns.    She just recovered recently from Covid-19 infection.    Review of Systems:   Pertinent items are noted in HPI or as in patient entered ROS below, remainder of complete ROS is negative.     Physical Exam:   Exam today is limited due to telephone visit. Speech is normal. Answers questions appropriately.    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 I encouraged her to reach out sooner as needed.       Hannah Reddy PA-C  Department of Neurosurgery    Please do not bill for this brief follow up telephone visit.

## 2025-02-25 NOTE — NURSING NOTE
[facility] [address] [facility] [address]    Is the patient currently in the state of [place]? YES    Visit mode: TELEPHONE    If the visit is dropped, the patient can be reconnected by:TELEPHONE VISIT: Phone number:   Telephone Information:   Mobile [phone]       Will anyone else be joining the visit? NO  (If patient encounters technical issues they should call [phone])    Are changes needed to the allergy or medication list? Pt stated no med changes    Are refills needed on medications prescribed by this physician? NO    Rooming Documentation:  Questionnaire(s) completed    Reason for visit: BROCK LIF

## 2025-02-27 ASSESSMENT — ASTHMA QUESTIONNAIRES
ACT_TOTALSCORE: 22
QUESTION_3 LAST FOUR WEEKS HOW OFTEN DID YOUR ASTHMA SYMPTOMS (WHEEZING, COUGHING, SHORTNESS OF BREATH, CHEST TIGHTNESS OR PAIN) WAKE YOU UP AT NIGHT OR EARLIER THAN USUAL IN THE MORNING: NOT AT ALL
QUESTION_4 LAST FOUR WEEKS HOW OFTEN HAVE YOU USED YOUR RESCUE INHALER OR NEBULIZER MEDICATION (SUCH AS ALBUTEROL): ONCE A WEEK OR LESS
QUESTION_2 LAST FOUR WEEKS HOW OFTEN HAVE YOU HAD SHORTNESS OF BREATH: ONCE OR TWICE A WEEK
QUESTION_5 LAST FOUR WEEKS HOW WOULD YOU RATE YOUR ASTHMA CONTROL: WELL CONTROLLED
QUESTION_1 LAST FOUR WEEKS HOW MUCH OF THE TIME DID YOUR ASTHMA KEEP YOU FROM GETTING AS MUCH DONE AT WORK, SCHOOL OR AT HOME: NONE OF THE TIME
ACT_TOTALSCORE: 22

## 2025-02-28 ENCOUNTER — OFFICE VISIT (OUTPATIENT)
Dept: PULMONOLOGY | Facility: CLINIC | Age: 45
End: 2025-02-28
Attending: INTERNAL MEDICINE
Payer: COMMERCIAL

## 2025-02-28 VITALS — OXYGEN SATURATION: 98 % | HEART RATE: 103 BPM | DIASTOLIC BLOOD PRESSURE: 85 MMHG | SYSTOLIC BLOOD PRESSURE: 135 MMHG

## 2025-02-28 DIAGNOSIS — J45.41 MODERATE PERSISTENT ASTHMA WITH ACUTE EXACERBATION: ICD-10-CM

## 2025-02-28 PROCEDURE — G0463 HOSPITAL OUTPT CLINIC VISIT: HCPCS | Performed by: INTERNAL MEDICINE

## 2025-02-28 PROCEDURE — 99213 OFFICE O/P EST LOW 20 MIN: CPT | Performed by: INTERNAL MEDICINE

## 2025-02-28 PROCEDURE — 99214 OFFICE O/P EST MOD 30 MIN: CPT | Mod: 25 | Performed by: INTERNAL MEDICINE

## 2025-02-28 ASSESSMENT — PAIN SCALES - GENERAL: PAINLEVEL_OUTOF10: MILD PAIN (3)

## 2025-02-28 NOTE — NURSING NOTE
Chief Complaint   Patient presents with    return asthma     Medications reviewed and vital signs taken.   Charles Rich, EMT

## 2025-02-28 NOTE — LETTER
2/28/2025      Cathy Arroyo  [address]  [facility] [address]      Dear Colleague,    Thank you for referring your patient, Cathy Arroyo, to the [facility] [place]. Please see a copy of my visit note below.      Pulmonology Clinic Appointment     Cathy Arroyo MRN# [id number]   Age: 43 year old YOB: 1980       Reason for consult:    Cathy Arroyo is a 43 year old year old female who is being seen for return asthma        Requesting/Referring physician:    Sherrill Dasilva           Assessment and Plan:     Moderate Persistent Asthma, improved control compared to prior    Patient is 44F with asthma since childhood, seems to be better controlled this visit with less daily wheezing and dry cough. Also with significant smoking history. Her obstructive parameters appear to be better on today's PFTs, correlating probably with reduced smoking and starting trelegy last visit, also her FeNO is negative. She also has good symptomatic response to trelegy and using her albuterol less often. She has no nocturnal symptoms. She is recovering from COVID infection diagnosed 2/19/25, she did not receive any treatments (declined paxlovid for concerns over drug-drug interaction).   - Continue current albuterol PRN with spacer   - Continue trelegy ellipta daily   - return to clinic in 6 months with PFT    2. Micronodular disease ETHEL largest 9 mm  Resolved on follow up CT chest 1/2025     3. Right hilar lymphadenopathy, likely reactive     4. Multiple segmental pulmonary emboli, unprovoked  - Continue xarelto  - Seeing hematology    5. Pineocytoma s/p craniotomy 2014     6. Tobacco Use Disorder   - continue to encourage cessation/reduction  - Suggested hypnosis and dummy cigarette to help with cessation, none of the pharmacologic agents worked for her before   - LDCT when 58 yo for lung cancer screening      Total time spent today on patient encounter,  documentation, review of chart and care coordination was 30 minutes.   Yennifer Dunbar MD          History of Present Illness:   Cathy Arroyo is a 44 year old year old female with medical history of asthma since childhood, BP1D, Obesity and tobacco use who is referred for evaluation of persistent respiratory symptoms of shortness of breath, wheezing and new productive cough.    Asthma since she 9 years old. Was on montelukast and ventolin since there. Asthma gets worse with allergies, anxiety, and exercise. Symptoms are mainly wheezing and cough.  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 Also started using Vapes, recently quit for 6 months but then started again after her grandfather passed away. Daily cannabis since 19 years old. Around a year and a half ago, noticed new sputum production (daily basis) which made her concerned she may be developing COPD. Sputum is white to yellowish in color.       Interval updates 2/28/25:   White sputum produciton, more recently   Covid 2/16 symptoms, tested positive 2/18, declined paxlovid due to concerns over interaction with xarelto.    Reduced cigarettes to 5-7 per day. Toelrating trelegy ellipta which she feels it has improved her symptom control.       Occupation:   Smoking: yes,   Allergies: suspects allergies to pollens, pet dander, does have allergies to adhesive tapes and some medications listed in chart  Pets: Cats, Dogs    Interval updates 10/4/24:  Cathy returns for follow up. She had recurrent symptoms of dyspnea and cough, with negative CXR so she underwent CTA which did show multiple segmental PE. She has no risk factors for PE except that she was on OCP years ago. She has a referral to hematology and scheduled to see them soon. She is receiving xarelto with compliance. She continues to have intermittent asthma symptoms such as REHMAN, wheezing and cough. She is taking advair but forgets to take spiriva. She  "reduced her smoking from 10 cig to 5 cig per day.            Physical Exam:   /85 (BP Location: Right arm, Patient Position: Sitting, Cuff Size: Adult Regular)   Pulse 103   LMP 02/12/2025   SpO2 98%     GENERAL APPEARANCE:  alert, and in no apparent distress.  RESP: Good air flow throughout. Obvious expiratory wheezes throughout lung fields bilaterally.  No crackles. No rhonchi. Normal chest expansion  CV: Normal S1, S2, regular rhythm, normal rate. No murmur.  No rub. No gallop. No LE edema.   MS: extremities normal. No clubbing. No cyanosis.  SKIN: no rash on limited exam   NEURO: speech normal, normal strength and tone, normal gait and stance  PSYCH: normal mood and affect         Data:     PFTs 7/1/2024  Reviewed and interpreted by me:   Moderate reversible obstruction with increased DLCO. Also increased TLC suggestive of air trapping.           Most Recent [facility] Pulmonary Function Testing    FVC-Pred   Date Value Ref Range Status   02/28/2025 3.84 L      FVC-Pre   Date Value Ref Range Status   02/28/2025 4.85 L      FVC-%Pred-Pre   Date Value Ref Range Status   02/28/2025 126 %      FEV1-Pre   Date Value Ref Range Status   02/28/2025 3.46 L      FEV1-%Pred-Pre   Date Value Ref Range Status   02/28/2025 110 %      FEV1FVC-Pred   Date Value Ref Range Status   02/28/2025 82 %      FEV1FVC-Pre   Date Value Ref Range Status   02/28/2025 71 %      No results found for: \"20029\"  FEFMax-Pred   Date Value Ref Range Status   02/28/2025 7.57 L/sec      FEFMax-Pre   Date Value Ref Range Status   02/28/2025 6.33 L/sec      FEFMax-%Pred-Pre   Date Value Ref Range Status   02/28/2025 83 %      ExpTime-Pre   Date Value Ref Range Status   02/28/2025 7.43 sec      FIFMax-Pre   Date Value Ref Range Status   02/28/2025 4.39 L/sec      FEV1FEV6-Pred   Date Value Ref Range Status   02/28/2025 83 %      FEV1FEV6-Pre   Date Value Ref Range Status   02/28/2025 72 %      No results found for: \"20055\"                    Again, " thank you for allowing me to participate in the care of your patient.        Sincerely,        Yennifer Dunbar MD    Electronically signed

## 2025-02-28 NOTE — PROGRESS NOTES
Pulmonology Clinic Appointment     Cathy Arroyo MRN# [id number]   Age: 43 year old YOB: 1980       Reason for consult:    Cathy Arroyo is a 43 year old year old female who is being seen for return asthma        Requesting/Referring physician:    Sherrill Dasilva           Assessment and Plan:     Moderate Persistent Asthma, improved control compared to prior    Patient is 44F with asthma since childhood, seems to be better controlled this visit with less daily wheezing and dry cough. Also with significant smoking history. Her obstructive parameters appear to be better on today's PFTs, correlating probably with reduced smoking and starting trelegy last visit, also her FeNO is negative. She also has good symptomatic response to trelegy and using her albuterol less often. She has no nocturnal symptoms. She is recovering from COVID infection diagnosed 2/19/25, she did not receive any treatments (declined paxlovid for concerns over drug-drug interaction).   - Continue current albuterol PRN with spacer   - Continue trelegy ellipta daily   - return to clinic in 6 months with PFT    2. Micronodular disease ETHEL largest 9 mm  Resolved on follow up CT chest 1/2025     3. Right hilar lymphadenopathy, likely reactive     4. Multiple segmental pulmonary emboli, unprovoked  - Continue xarelto  - Seeing hematology    5. Pineocytoma s/p craniotomy 2014     6. Tobacco Use Disorder   - continue to encourage cessation/reduction  - Suggested hypnosis and dummy cigarette to help with cessation, none of the pharmacologic agents worked for her before   - LDCT when 58 yo for lung cancer screening      Total time spent today on patient encounter, documentation, review of chart and care coordination was 30 minutes.   dana Dunbar MD          History of Present Illness:   Cathy Arroyo is a 44 year old year old female with medical history of asthma since childhood, BP1D, Obesity and tobacco use who is referred for  evaluation of persistent respiratory symptoms of shortness of breath, wheezing and new productive cough.    Asthma since she 9 years old. Was on montelukast and ventolin since there. Asthma gets worse with allergies, anxiety, and exercise. Symptoms are mainly wheezing and cough.  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 Also started using Vapes, recently quit for 6 months but then started again after her grandfather passed away. Daily cannabis since 19 years old. Around a year and a half ago, noticed new sputum production (daily basis) which made her concerned she may be developing COPD. Sputum is white to yellowish in color.       Interval updates 2/28/25:   White sputum produciton, more recently   Covid 2/16 symptoms, tested positive 2/18, declined paxlovid due to concerns over interaction with xarelto.    Reduced cigarettes to 5-7 per day. Toelrating trelegy ellipta which she feels it has improved her symptom control.       Occupation:   Smoking: yes,   Allergies: suspects allergies to pollens, pet dander, does have allergies to adhesive tapes and some medications listed in chart  Pets: Cats, Dogs    Interval updates 10/4/24:  Cathy returns for follow up. She had recurrent symptoms of dyspnea and cough, with negative CXR so she underwent CTA which did show multiple segmental PE. She has no risk factors for PE except that she was on OCP years ago. She has a referral to hematology and scheduled to see them soon. She is receiving xarelto with compliance. She continues to have intermittent asthma symptoms such as REHMAN, wheezing and cough. She is taking advair but forgets to take spiriva. She reduced her smoking from 10 cig to 5 cig per day.            Physical Exam:   /85 (BP Location: Right arm, Patient Position: Sitting, Cuff Size: Adult Regular)   Pulse 103   LMP 02/12/2025   SpO2 98%     GENERAL APPEARANCE:  alert, and in no apparent distress.  RESP:  "Good air flow throughout. Obvious expiratory wheezes throughout lung fields bilaterally.  No crackles. No rhonchi. Normal chest expansion  CV: Normal S1, S2, regular rhythm, normal rate. No murmur.  No rub. No gallop. No LE edema.   MS: extremities normal. No clubbing. No cyanosis.  SKIN: no rash on limited exam   NEURO: speech normal, normal strength and tone, normal gait and stance  PSYCH: normal mood and affect         Data:     PFTs 7/1/2024  Reviewed and interpreted by me:   Moderate reversible obstruction with increased DLCO. Also increased TLC suggestive of air trapping.           Most Recent [facility] Pulmonary Function Testing    FVC-Pred   Date Value Ref Range Status   02/28/2025 3.84 L      FVC-Pre   Date Value Ref Range Status   02/28/2025 4.85 L      FVC-%Pred-Pre   Date Value Ref Range Status   02/28/2025 126 %      FEV1-Pre   Date Value Ref Range Status   02/28/2025 3.46 L      FEV1-%Pred-Pre   Date Value Ref Range Status   02/28/2025 110 %      FEV1FVC-Pred   Date Value Ref Range Status   02/28/2025 82 %      FEV1FVC-Pre   Date Value Ref Range Status   02/28/2025 71 %      No results found for: \"20029\"  FEFMax-Pred   Date Value Ref Range Status   02/28/2025 7.57 L/sec      FEFMax-Pre   Date Value Ref Range Status   02/28/2025 6.33 L/sec      FEFMax-%Pred-Pre   Date Value Ref Range Status   02/28/2025 83 %      ExpTime-Pre   Date Value Ref Range Status   02/28/2025 7.43 sec      FIFMax-Pre   Date Value Ref Range Status   02/28/2025 4.39 L/sec      FEV1FEV6-Pred   Date Value Ref Range Status   02/28/2025 83 %      FEV1FEV6-Pre   Date Value Ref Range Status   02/28/2025 72 %      No results found for: \"20055\"                  "

## 2025-03-01 ENCOUNTER — ANCILLARY PROCEDURE (OUTPATIENT)
Dept: GENERAL RADIOLOGY | Facility: CLINIC | Age: 45
End: 2025-03-01
Attending: STUDENT IN AN ORGANIZED HEALTH CARE EDUCATION/TRAINING PROGRAM
Payer: COMMERCIAL

## 2025-03-01 ENCOUNTER — OFFICE VISIT (OUTPATIENT)
Dept: ORTHOPEDICS | Facility: CLINIC | Age: 45
End: 2025-03-01
Payer: COMMERCIAL

## 2025-03-01 ENCOUNTER — LAB (OUTPATIENT)
Dept: LAB | Facility: CLINIC | Age: 45
End: 2025-03-01
Payer: COMMERCIAL

## 2025-03-01 ENCOUNTER — NURSE TRIAGE (OUTPATIENT)
Dept: NURSING | Facility: CLINIC | Age: 45
End: 2025-03-01
Payer: COMMERCIAL

## 2025-03-01 ENCOUNTER — TELEPHONE (OUTPATIENT)
Dept: ORTHOPEDICS | Facility: CLINIC | Age: 45
End: 2025-03-01

## 2025-03-01 DIAGNOSIS — M79.671 RIGHT FOOT PAIN: Primary | ICD-10-CM

## 2025-03-01 DIAGNOSIS — M79.671 RIGHT FOOT PAIN: ICD-10-CM

## 2025-03-01 DIAGNOSIS — M72.2 PLANTAR FASCIITIS: ICD-10-CM

## 2025-03-01 LAB — VIT D+METAB SERPL-MCNC: 34 NG/ML (ref 20–50)

## 2025-03-01 PROCEDURE — 73630 X-RAY EXAM OF FOOT: CPT | Mod: RT | Performed by: RADIOLOGY

## 2025-03-01 PROCEDURE — 36415 COLL VENOUS BLD VENIPUNCTURE: CPT | Performed by: PATHOLOGY

## 2025-03-01 PROCEDURE — 82306 VITAMIN D 25 HYDROXY: CPT | Performed by: STUDENT IN AN ORGANIZED HEALTH CARE EDUCATION/TRAINING PROGRAM

## 2025-03-01 PROCEDURE — 99000 SPECIMEN HANDLING OFFICE-LAB: CPT | Performed by: PATHOLOGY

## 2025-03-01 PROCEDURE — 99214 OFFICE O/P EST MOD 30 MIN: CPT | Performed by: STUDENT IN AN ORGANIZED HEALTH CARE EDUCATION/TRAINING PROGRAM

## 2025-03-01 NOTE — PROGRESS NOTES
[facility]  Sports Medicine Clinic  Clinics and Surgery Center           SUBJECTIVE       Cathy Arroyo is a 44 year old female presenting to clinic today weith right foot pain.    Sports Medicine Clinic Visit    PCP: Sherrill Dasilva    Cathy Arroyo is a 44 year old female who is seen  as self referral presenting with right foot pain    Injury: No specific injury; patient went for a 1 mile walk about 2 days ago     Location of Pain: right foot pain  Duration of Pain: Chronic; sees Dr. Basil Dalal for plantar fascitis. Pain started this morning   Rating of Pain: 7/10  Pain is better with: Rest   Pain is worse with: weightbearing   Additional Features: Swelling   Treatment so far consists of: Supportive shoes and orthotics   Prior History of related problems: Plantar fascitis     LMP 02/12/2025      PMH, Medications and Allergies were reviewed and updated as needed.    ROS:  As noted above otherwise negative.    Patient Active Problem List   Diagnosis    Pineocytoma (H)    Attention deficit disorder    Seasonal allergic rhinitis    Bipolar affective disorder in remission    Moderate persistent asthma without complication    Chronic rhinitis    Tobacco use disorder    Abnormal cytology    S/P total thyroidectomy    History of Graves' disease    Nonintractable migraine, unspecified migraine type    Post-surgical hypothyroidism    Medical cannabis use    Morbid obesity (H)    Urticarial vasculitis    Breast cancer screening, high risk patient    Bipolar affective disorder, currently manic mixed, moderate (H)    Bilateral chronic knee pain    Lumbar radiculopathy    Bronchopneumonia    Pulmonary embolism (H)    Bilateral otitis media    Neck pain    Asymmetrical sensorineural hearing loss    Tinnitus, left    Dizziness    Pneumonia due to Haemophilus influenzae, unspecified laterality, unspecified part of lung    Iron deficiency anemia due to chronic blood loss    Menorrhagia with irregular cycle        Current Outpatient Medications   Medication Sig Dispense Refill    ADDERALL XR 25 MG 24 hr capsule       albuterol (PROAIR HFA/PROVENTIL HFA/VENTOLIN HFA) 108 (90 Base) MCG/ACT inhaler Inhale 2 puffs into the lungs every 6 hours 18 g 3    amphetamine-dextroamphetamine (ADDERALL) 10 MG tablet Take 10 mg by mouth daily.      ARIPiprazole (ABILIFY) 10 MG tablet Take 0.5 tablets (5 mg) by mouth daily      cetirizine (ZYRTEC) 10 MG tablet Take 1 tablet (10 mg) by mouth daily 90 tablet 3    diazepam (VALIUM) 5 MG tablet Take 5 mg by mouth as needed for anxiety.      Fluticasone-Umeclidin-Vilant (TRELEGY ELLIPTA) 100-62.5-25 MCG/ACT oral inhaler Inhale 1 puff into the lungs daily. 1 each 4    levonorgestrel (MIRENA) 52 MG (20 mcg/day) IUD 1 each by Intrauterine route once.      levothyroxine (SYNTHROID/LEVOTHROID) 175 MCG tablet Take 1 tablet (175 mcg) by mouth daily. 90 tablet 3    medical cannabis (Patient's own supply) Take 1 Dose by mouth See Admin Instructions (The purpose of this order is to document that the patient reports taking medical cannabis.  This is not a prescription, and is not used to certify that the patient has a qualifying medical condition.)    [place] oral suspension: take 1-3 ml by mouth two times daily  Total CBD 1200 mg. Total THC 60 mg      rivaroxaban ANTICOAGULANT (XARELTO) 20 MG TABS tablet Take 1 tablet (20 mg) by mouth daily (with dinner). 90 tablet 3    rizatriptan (MAXALT-MLT) 10 MG ODT Take 10 mg by mouth at onset of headache for migraine.              OBJECTIVE:       Vitals: There were no vitals filed for this visit.  BMI: There is no height or weight on file to calculate BMI.    Gen:  Well nourished and in no acute distress  HEENT: Extraocular movement intact  Neck: Supple  Pulm:  Breathing Comfortably. No increased respiratory effort.  Psych: Euthymic. Appropriately answers questions    MSK: Right foot without evidence of erythema, edema, or ecchymosis.  Palpable tenderness along  the plantar ligament, as well as the medial plantar aspect of the calcaneus.  She does have a positive compression test of the calcaneus with antalgic gait.  Full range of motion of the foot and ankle.  Medial soft tissue tenderness along the deltoid and inferior aspect of the medial malleolus correlating to the posterior tibial tendon.  Positive calcaneal compression.  Negative anterior drawer.  Positive talar medially.  Negative external rotation.      XRAY : Independent evaluation of the right foot does show calcaneal enthesophyte in the area of the plantar ligament.  There is evidence of an accessory ossicle on lateral view to the posterior tibial talar joint.  No other acute osseous abnormality seen.          ASSESSMENT and PLAN:     Cathy was seen today for pain.    Diagnoses and all orders for this visit:    Right foot pain  -     X-ray rt Foot G/E 3 vws*; Future  -     MR Foot Right w/o Contrast; Future    Plantar fasciitis  -     MR Foot Right w/o Contrast; Future      Gloria is a 44-year-old female presenting to clinic today with ongoing chronic right foot pain.  Patient had an exacerbation of the pain today, after walking over a mile yesterday.  She does have a history of low vitamin D, that has been difficult to manage given the findings of exacerbation of some of her previous diagnosis with higher levels of vitamin D.  However this does put her in a situation for stress injury.  She also has evidence of plan of fasciitis, with some findings of surrounding soft tissue pathology such as the plantar ligament, as well as the posterior tibial tendon.    Plan:  Given the findings and concerns as above, I have recommended an MRI of the foot for further evaluation of any bony stress injuries, as well as to assess the posterior tibial tendon and the plantar ligament.  Have recommended the patient enter into a short cam boot, with ambulation and this at all times.  She can remove it for showering, and sitting  around the house as needed.  Anytime she is up I would like her to be in it.  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  We will have her follow-up with either myself or Dr. Marcos Dalal, to go over the results.  She does have an appointment with Dr. Marcos Dalal on the 18th, and would like to continue to follow-up with him if the MRI is done by that time.  I am fine with this.  But she can follow-up in our clinic if MRI is not complete by that point.  All questions answered.  Repeat vitamin D level has also been ordered.  Return precautions and ER precautions have been discussed.    Options for treatment and/or follow-up care were reviewed with the patient was actively involved in the decision making process. Patient verbalized understanding and was in agreement with the plan.    Derick Dalal DO  , Sports Medicine  Department of Family Medicine and [facility]  [facility]

## 2025-03-01 NOTE — TELEPHONE ENCOUNTER
Other: Patient is calling regarding foot that she has been seen for today woke up and foot is pretty swollen and looking weird and is painful 6/10, wondering what she should do.       Could we send this information to you [facility] or would you prefer to receive a phone call?:   Patient would prefer a phone call   Okay to leave a detailed message?: Yes at Cell number on file:    Telephone Information:   Mobile [phone]

## 2025-03-01 NOTE — LETTER
3/1/2025      RE: Cathy Arroyo  [address]  [place] [address]     Dear Colleague,    Thank you for referring your patient, Cathy Arroyo, to the [facility] SP[facility]ICINE [facility] [place]. Please see a copy of my visit note below.    [facility]  Sports Medicine Clinic  Clinics and Surgery Center           SUBJECTIVE       Cathy Arroyo is a 44 year old female presenting to clinic today weith right foot pain.    Sports Medicine Clinic Visit    PCP: Sherrill Dasilva    Cathy Arroyo is a 44 year old female who is seen  as self referral presenting with right foot pain    Injury: No specific injury; patient went for a 1 mile walk about 2 days ago     Location of Pain: right foot pain  Duration of Pain: Chronic; sees Dr. Basil Dalal for plantar fascitis. Pain started this morning   Rating of Pain: 7/10  Pain is better with: Rest   Pain is worse with: weightbearing   Additional Features: Swelling   Treatment so far consists of: Supportive shoes and orthotics   Prior History of related problems: Plantar fascitis     LMP 02/12/2025      PMH, Medications and Allergies were reviewed and updated as needed.    ROS:  As noted above otherwise negative.    Patient Active Problem List   Diagnosis     Pineocytoma (H)     Attention deficit disorder     Seasonal allergic rhinitis     Bipolar affective disorder in remission     Moderate persistent asthma without complication     Chronic rhinitis     Tobacco use disorder     Abnormal cytology     S/P total thyroidectomy     History of Graves' disease     Nonintractable migraine, unspecified migraine type     Post-surgical hypothyroidism     Medical cannabis use     Morbid obesity (H)     Urticarial vasculitis     Breast cancer screening, high risk patient     Bipolar affective disorder, currently manic mixed, moderate (H)     Bilateral chronic knee pain     Lumbar radiculopathy     Bronchopneumonia     Pulmonary embolism (H)     Bilateral  otitis media     Neck pain     Asymmetrical sensorineural hearing loss     Tinnitus, left     Dizziness     Pneumonia due to Haemophilus influenzae, unspecified laterality, unspecified part of lung     Iron deficiency anemia due to chronic blood loss     Menorrhagia with irregular cycle       Current Outpatient Medications   Medication Sig Dispense Refill     ADDERALL XR 25 MG 24 hr capsule        albuterol (PROAIR HFA/PROVENTIL HFA/VENTOLIN HFA) 108 (90 Base) MCG/ACT inhaler Inhale 2 puffs into the lungs every 6 hours 18 g 3     amphetamine-dextroamphetamine (ADDERALL) 10 MG tablet Take 10 mg by mouth daily.       ARIPiprazole (ABILIFY) 10 MG tablet Take 0.5 tablets (5 mg) by mouth daily       cetirizine (ZYRTEC) 10 MG tablet Take 1 tablet (10 mg) by mouth daily 90 tablet 3     diazepam (VALIUM) 5 MG tablet Take 5 mg by mouth as needed for anxiety.       Fluticasone-Umeclidin-Vilant (TRELEGY ELLIPTA) 100-62.5-25 MCG/ACT oral inhaler Inhale 1 puff into the lungs daily. 1 each 4     levonorgestrel (MIRENA) 52 MG (20 mcg/day) IUD 1 each by Intrauterine route once.       levothyroxine (SYNTHROID/LEVOTHROID) 175 MCG tablet Take 1 tablet (175 mcg) by mouth daily. 90 tablet 3     medical cannabis (Patient's own supply) Take 1 Dose by mouth See Admin Instructions (The purpose of this order is to document that the patient reports taking medical cannabis.  This is not a prescription, and is not used to certify that the patient has a qualifying medical condition.)    [place] oral suspension: take 1-3 ml by mouth two times daily  Total CBD 1200 mg. Total THC 60 mg       rivaroxaban ANTICOAGULANT (XARELTO) 20 MG TABS tablet Take 1 tablet (20 mg) by mouth daily (with dinner). 90 tablet 3     rizatriptan (MAXALT-MLT) 10 MG ODT Take 10 mg by mouth at onset of headache for migraine.              OBJECTIVE:       Vitals: There were no vitals filed for this visit.  BMI: There is no height or weight on file to calculate BMI.    Gen:   Well nourished and in no acute distress  HEENT: Extraocular movement intact  Neck: Supple  Pulm:  Breathing Comfortably. No increased respiratory effort.  Psych: Euthymic. Appropriately answers questions    MSK: Right foot without evidence of erythema, edema, or ecchymosis.  Palpable tenderness along the plantar ligament, as well as the medial plantar aspect of the calcaneus.  She does have a positive compression test of the calcaneus with antalgic gait.  Full range of motion of the foot and ankle.  Medial soft tissue tenderness along the deltoid and inferior aspect of the medial malleolus correlating to the posterior tibial tendon.  Positive calcaneal compression.  Negative anterior drawer.  Positive talar medially.  Negative external rotation.      XRAY : Independent evaluation of the right foot does show calcaneal enthesophyte in the area of the plantar ligament.  There is evidence of an accessory ossicle on lateral view to the posterior tibial talar joint.  No other acute osseous abnormality seen.          ASSESSMENT and PLAN:     Cathy was seen today for pain.    Diagnoses and all orders for this visit:    Right foot pain  -     X-ray rt Foot G/E 3 vws*; Future  -     MR Foot Right w/o Contrast; Future    Plantar fasciitis  -     MR Foot Right w/o Contrast; Future      Gloria is a 44-year-old female presenting to clinic today with ongoing chronic right foot pain.  Patient had an exacerbation of the pain today, after walking over a mile yesterday.  She does have a history of low vitamin D, that has been difficult to manage given the findings of exacerbation of some of her previous diagnosis with higher levels of vitamin D.  However this does put her in a situation for stress injury.  She also has evidence of plan of fasciitis, with some findings of surrounding soft tissue pathology such as the plantar ligament, as well as the posterior tibial tendon.    Plan:  Given the findings and concerns as above, I have  recommended an MRI of the foot for further evaluation of any bony stress injuries, as well as to assess the posterior tibial tendon and the plantar ligament.  Have recommended the patient enter into a short cam boot, with ambulation and this at all times.  She can remove it for showering, and sitting around the house as needed.  Anytime she is up I would like her to be in it.  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  We will have her follow-up with either myself or Dr. Marcos Dalal, to go over the results.  She does have an appointment with Dr. Marcos Dalal on the 18th, and would like to continue to follow-up with him if the MRI is done by that time.  I am fine with this.  But she can follow-up in our clinic if MRI is not complete by that point.  All questions answered.  Repeat vitamin D level has also been ordered.  Return precautions and ER precautions have been discussed.    Options for treatment and/or follow-up care were reviewed with the patient was actively involved in the decision making process. Patient verbalized understanding and was in agreement with the plan.    Derick Dalal DO  , Sports Medicine  Department of Family Medicine and [facility]      Again, thank you for allowing me to participate in the care of your patient.      Sincerely,    Derick Dalal DO

## 2025-03-01 NOTE — TELEPHONE ENCOUNTER
"Transferred from Orthopedics. Woke up and can't put pressure on her foot, swollen, unable to walk on it. Constant pain=\"6\" while seated, if she tries to walk the pain=\"worse\". DX with plantar fasciitis.  Accupressure, massage, shoe inserts, special shoes. I am worried I might have a fractured foot.   Limping x 1 month.. I don't take pain medication. I am on blood thinner and can't take Advil-the only thing that usually worked.   [facility] Sports Medicine clinic on [address]: Dr Marcos Dalal     Triaged to a disposition of See HCP within 4 hrs or PCP triage. She intends to go to ortho UC today.     Nancy Grigsby RN Triage Nurse Advisor 9:23 AM 3/1/2025  Reason for Disposition   [1] SEVERE pain (e.g., excruciating, unable to do any normal activities) AND [2] not improved after 2 hours of pain medicine    Additional Information   Negative: Followed a foot injury   Negative: [1] Swollen foot AND [2] fever   Negative: Entire foot is cool or blue in comparison to other foot   Negative: Purple or black skin on foot or toe   Negative: [1] Red area or streak AND [2] fever   Negative: Patient sounds very sick or weak to the triager   Negative: Diabetes mellitus   Negative: Toe pain is main symptom   Negative: Ankle pain is main symptom   Negative: Thigh or calf pain is main symptom    Protocols used: Foot Pain-A-AH    "

## 2025-03-03 ENCOUNTER — ANCILLARY PROCEDURE (OUTPATIENT)
Dept: MRI IMAGING | Facility: CLINIC | Age: 45
End: 2025-03-03
Attending: STUDENT IN AN ORGANIZED HEALTH CARE EDUCATION/TRAINING PROGRAM
Payer: COMMERCIAL

## 2025-03-03 ENCOUNTER — ANCILLARY PROCEDURE (OUTPATIENT)
Dept: MRI IMAGING | Facility: CLINIC | Age: 45
End: 2025-03-03
Attending: CLINICAL NURSE SPECIALIST
Payer: COMMERCIAL

## 2025-03-03 DIAGNOSIS — M79.671 RIGHT FOOT PAIN: ICD-10-CM

## 2025-03-03 DIAGNOSIS — Q34.1 MEDIASTINAL CYST: ICD-10-CM

## 2025-03-03 DIAGNOSIS — M72.2 PLANTAR FASCIITIS: ICD-10-CM

## 2025-03-03 PROCEDURE — 72157 MRI CHEST SPINE W/O & W/DYE: CPT | Performed by: STUDENT IN AN ORGANIZED HEALTH CARE EDUCATION/TRAINING PROGRAM

## 2025-03-03 PROCEDURE — A9585 GADOBUTROL INJECTION: HCPCS | Performed by: STUDENT IN AN ORGANIZED HEALTH CARE EDUCATION/TRAINING PROGRAM

## 2025-03-03 PROCEDURE — 73721 MRI JNT OF LWR EXTRE W/O DYE: CPT | Mod: RT | Performed by: RADIOLOGY

## 2025-03-03 RX ORDER — GADOBUTROL 604.72 MG/ML
15 INJECTION INTRAVENOUS ONCE
Status: COMPLETED | OUTPATIENT
Start: 2025-03-03 | End: 2025-03-03

## 2025-03-03 RX ADMIN — GADOBUTROL 11 ML: 604.72 INJECTION INTRAVENOUS at 12:48

## 2025-03-03 NOTE — DISCHARGE INSTRUCTIONS
MRI Contrast Discharge Instructions    The IV contrast you received today will pass out of your body in your  urine. This will happen in the next 24 hours. You will not feel this process.  Your urine will not change color.    Drink at least 4 extra glasses of water or juice today (unless your doctor  has restricted your fluids). This reduces the stress on your kidneys.  You may take your regular medicines.    If you are on dialysis: It is best to have dialysis today.    If you have a reaction: Most reactions happen right away. If you have  any new symptoms after leaving the hospital (such as hives or swelling),  call your hospital at the correct number below. Or call your family doctor.  If you have breathing distress or wheezing, call 911.    Special instructions: ***    I have read and understand the above information.    Signature:______________________________________ Date:___________    Staff:__________________________________________ Date:___________     Time:__________    [place] Rad[facility] Departments:    ___[place]: [phone]  ___[facility]: [phone]  ___[place]: [phone] ___[facility]: [phone]  ___[facility]: [phone]  ___[facility]: [phone]  ___[phone]  ___[facility]: [phone]  ___[phone]

## 2025-03-03 NOTE — TELEPHONE ENCOUNTER
I attempted to return the patient's phone call regarding her foot concerns. Patient is scheduled for foot MRI today, 3/3/25. Her Follow-up with Dr. Marcos Dalal is not until 3/18/25. He has an opening tomorrow at 3PM that patient can take if interested. She should continue wearing CAM walking boot as instructed on 3/1/25 by Dr. Derick Dalal. In addition, if ibuprofen and Tylenol are safe for her to take she can try these OTC medications as well as rest, ice and elevation. No answer, left message with this information and our phone number to call back and discuss moving Follow-up appointment to 3/4/25 with Dr. Dalal. Slot held for this patient. Aliya James ATC on 3/3/2025 at 8:34 AM

## 2025-03-04 ENCOUNTER — OFFICE VISIT (OUTPATIENT)
Dept: ORTHOPEDICS | Facility: CLINIC | Age: 45
End: 2025-03-04
Payer: COMMERCIAL

## 2025-03-04 DIAGNOSIS — M72.2 PLANTAR FASCIITIS: ICD-10-CM

## 2025-03-04 DIAGNOSIS — M79.671 RIGHT FOOT PAIN: ICD-10-CM

## 2025-03-04 PROCEDURE — 99213 OFFICE O/P EST LOW 20 MIN: CPT | Performed by: FAMILY MEDICINE

## 2025-03-04 NOTE — PROGRESS NOTES
CHIEF COMPLAINT:  Follow Up of the Right Foot       HISTORY OF PRESENT ILLNESS  Ms. Arroyo is a 44 year old female who presents to clinic today with right foot and ankle pain.  Cathy has been experiencing foot and ankle pain for about 4 months and it is increasingly getting worse. She did have MRI of the ankle completed.         Additional history: as documented    MEDICAL HISTORY  Patient Active Problem List   Diagnosis    Pineocytoma (H)    Attention deficit disorder    Seasonal allergic rhinitis    Bipolar affective disorder in remission    Moderate persistent asthma without complication    Chronic rhinitis    Tobacco use disorder    Abnormal cytology    S/P total thyroidectomy    History of Graves' disease    Nonintractable migraine, unspecified migraine type    Post-surgical hypothyroidism    Medical cannabis use    Morbid obesity (H)    Urticarial vasculitis    Breast cancer screening, high risk patient    Bipolar affective disorder, currently manic mixed, moderate (H)    Bilateral chronic knee pain    Lumbar radiculopathy    Bronchopneumonia    Pulmonary embolism (H)    Bilateral otitis media    Neck pain    Asymmetrical sensorineural hearing loss    Tinnitus, left    Dizziness    Pneumonia due to Haemophilus influenzae, unspecified laterality, unspecified part of lung    Iron deficiency anemia due to chronic blood loss    Menorrhagia with irregular cycle       Current Outpatient Medications   Medication Sig Dispense Refill    ADDERALL XR 25 MG 24 hr capsule       albuterol (PROAIR HFA/PROVENTIL HFA/VENTOLIN HFA) 108 (90 Base) MCG/ACT inhaler Inhale 2 puffs into the lungs every 6 hours 18 g 3    amphetamine-dextroamphetamine (ADDERALL) 10 MG tablet Take 10 mg by mouth daily.      ARIPiprazole (ABILIFY) 10 MG tablet Take 0.5 tablets (5 mg) by mouth daily      cetirizine (ZYRTEC) 10 MG tablet Take 1 tablet (10 mg) by mouth daily 90 tablet 3    diazepam (VALIUM) 5 MG tablet Take 5 mg by mouth as needed for  anxiety.      Fluticasone-Umeclidin-Vilant (TRELEGY ELLIPTA) 100-62.5-25 MCG/ACT oral inhaler Inhale 1 puff into the lungs daily. 1 each 4    levonorgestrel (MIRENA) 52 MG (20 mcg/day) IUD 1 each by Intrauterine route once.      levothyroxine (SYNTHROID/LEVOTHROID) 175 MCG tablet Take 1 tablet (175 mcg) by mouth daily. 90 tablet 3    medical cannabis (Patient's own supply) Take 1 Dose by mouth See Admin Instructions (The purpose of this order is to document that the patient reports taking medical cannabis.  This is not a prescription, and is not used to certify that the patient has a qualifying medical condition.)    [place] oral suspension: take 1-3 ml by mouth two times daily  Total CBD 1200 mg. Total THC 60 mg      rivaroxaban ANTICOAGULANT (XARELTO) 20 MG TABS tablet Take 1 tablet (20 mg) by mouth daily (with dinner). 90 tablet 3    rizatriptan (MAXALT-MLT) 10 MG ODT Take 10 mg by mouth at onset of headache for migraine.         Allergies   Allergen Reactions    Adhesive Tape Hives    Nicotine Anaphylaxis, Hives and Rash     Patch - rash/hives  Lozenges - throat swelling    Tegaderm Transparent Dressing (Informational Only) Hives    Adacel [Tetanus-Diphth-Acell Pertussis] Swelling    Ciprofloxacin Visual Disturbance and Hallucination    Codeine Sulfate Nausea and Vomiting    Methimazole Rash    Oxycodone-Acetaminophen Nausea and Vomiting    Tetanus Toxoid      Pt states she had an infection at injection site.     Tetanus-Diphtheria Toxoids Td      Caused staph infection - childhood/teenager    Vicodin [Hydrocodone-Acetaminophen] Nausea and Vomiting       Family History   Problem Relation Age of Onset    Other Cancer Mother 62        salivary gland cancer    Genitourinary Problems Mother     Bipolar Disorder Mother         unipolar depression    Depression Mother     Thyroid Disease Mother         benign tumor    Melanoma Mother     Cancer Mother         salivary gland cancer    Migraines Mother     Skin Cancer  Mother     Asthma Father     Mental Illness Father         Bipolar 2    Obesity Father     Colon Polyps Father 64    Bipolar Disorder Brother         unipolar depression    Asthma Brother     Depression Brother     Asthma Maternal Grandmother     Osteoporosis Maternal Grandmother     Glaucoma Maternal Grandmother     Hypertension Maternal Grandmother     Macular Degeneration Maternal Grandmother     Skin Cancer Maternal Grandmother     Prostate Cancer Maternal Grandfather 69        no history of smoking    Bladder Cancer Maternal Grandfather 71    Colon Cancer Maternal Grandfather 70    Diabetes Paternal Grandfather     Breast Cancer Maternal Aunt 37        negative BRCA1/2 testing    Melanoma Maternal Aunt 64    Thyroid Disease Paternal Aunt     Breast Cancer Paternal Aunt 58        bilateral; negative testing    Cancer Paternal Aunt     Breast Cancer Other         paternal great aunt       Additional medical/Social/Surgical histories reviewed in [facility] and updated as appropriate.        PHYSICAL EXAM  General  - normal appearance, in no obvious distress  Musculoskeletal - right foot  - stance: mildly antalgic gait  Neuro  - no sensory or motor deficit, grossly normal coordination, normal muscle tone               ASSESSMENT & PLAN  Ms. Arroyo is a 44 year old female who presents to clinic today with plantar fasciitis.    I reviewed her MR in the room with her, this reads  1. Moderate plantar fasciitis with partial-thickness tearing of the  proximal central cord and surrounding bone marrow and soft tissue  edema.  2. Mild peroneal tenosynovitis.  3. Talonavicular joint degenerative change.  4. Sinus tarsi bursitis versus sinus Tarsi ganglion cyst measuring up  to 2.3 cm.    Cathy and I discussed these results in some depth, we also discussed their applications.  I do think that her pain is likely arising from a combination of her partial tear of the plantar fascia as well as her edema within the calcaneus.    I do  think that it would be highly advisable for her to wear her boot, the main issue is that she has pain from the unevenness of the boot.  She does have a fashionable boot that she can wear on her right foot, that I believe will help keep her even.    We should follow-up via phone in 2 weeks to reassess    It was a pleasure seeing Cathy today.    Marcos Dalal DO, CAM  Primary Care Sports Medicine      This note was constructed using Dragon dictation software, please excuse any minor errors in spelling, grammar, or syntax.

## 2025-03-04 NOTE — LETTER
3/4/2025      RE: [place] Arroyo  [address] [facility] [facility] [address]     Dear Colleague,    Thank you for referring your patient, Cathy Arroyo, to the [facility] [place]. Please see a copy of my visit note below.    CHIEF COMPLAINT:  Follow Up of the Right Foot       HISTORY OF PRESENT ILLNESS  Ms. Arroyo is a 44 year old female who presents to clinic today with right foot and ankle pain.  Cathy has been experiencing foot and ankle pain for about 4 months and it is increasingly getting worse. She did have MRI of the ankle completed.         Additional history: as documented    MEDICAL HISTORY  Patient Active Problem List   Diagnosis     Pineocytoma (H)     Attention deficit disorder     Seasonal allergic rhinitis     Bipolar affective disorder in remission     Moderate persistent asthma without complication     Chronic rhinitis     Tobacco use disorder     Abnormal cytology     S/P total thyroidectomy     History of Graves' disease     Nonintractable migraine, unspecified migraine type     Post-surgical hypothyroidism     Medical cannabis use     Morbid obesity (H)     Urticarial vasculitis     Breast cancer screening, high risk patient     Bipolar affective disorder, currently manic mixed, moderate (H)     Bilateral chronic knee pain     Lumbar radiculopathy     Bronchopneumonia     Pulmonary embolism (H)     Bilateral otitis media     Neck pain     Asymmetrical sensorineural hearing loss     Tinnitus, left     Dizziness     Pneumonia due to Haemophilus influenzae, unspecified laterality, unspecified part of lung     Iron deficiency anemia due to chronic blood loss     Menorrhagia with irregular cycle       Current Outpatient Medications   Medication Sig Dispense Refill     ADDERALL XR 25 MG 24 hr capsule        albuterol (PROAIR HFA/PROVENTIL HFA/VENTOLIN HFA) 108 (90 Base) MCG/ACT inhaler Inhale 2 puffs into the lungs every 6 hours 18 g 3      amphetamine-dextroamphetamine (ADDERALL) 10 MG tablet Take 10 mg by mouth daily.       ARIPiprazole (ABILIFY) 10 MG tablet Take 0.5 tablets (5 mg) by mouth daily       cetirizine (ZYRTEC) 10 MG tablet Take 1 tablet (10 mg) by mouth daily 90 tablet 3     diazepam (VALIUM) 5 MG tablet Take 5 mg by mouth as needed for anxiety.       Fluticasone-Umeclidin-Vilant (TRELEGY ELLIPTA) 100-62.5-25 MCG/ACT oral inhaler Inhale 1 puff into the lungs daily. 1 each 4     levonorgestrel (MIRENA) 52 MG (20 mcg/day) IUD 1 each by Intrauterine route once.       levothyroxine (SYNTHROID/LEVOTHROID) 175 MCG tablet Take 1 tablet (175 mcg) by mouth daily. 90 tablet 3     medical cannabis (Patient's own supply) Take 1 Dose by mouth See Admin Instructions (The purpose of this order is to document that the patient reports taking medical cannabis.  This is not a prescription, and is not used to certify that the patient has a qualifying medical condition.)    [place] oral suspension: take 1-3 ml by mouth two times daily  Total CBD 1200 mg. Total THC 60 mg       rivaroxaban ANTICOAGULANT (XARELTO) 20 MG TABS tablet Take 1 tablet (20 mg) by mouth daily (with dinner). 90 tablet 3     rizatriptan (MAXALT-MLT) 10 MG ODT Take 10 mg by mouth at onset of headache for migraine.         Allergies   Allergen Reactions     Adhesive Tape Hives     Nicotine Anaphylaxis, Hives and Rash     Patch - rash/hives  Lozenges - throat swelling     Tegaderm Transparent Dressing (Informational Only) Hives     Adacel [Tetanus-Diphth-Acell Pertussis] Swelling     Ciprofloxacin Visual Disturbance and Hallucination     Codeine Sulfate Nausea and Vomiting     Methimazole Rash     Oxycodone-Acetaminophen Nausea and Vomiting     Tetanus Toxoid      Pt states she had an infection at injection site.      Tetanus-Diphtheria Toxoids Td      Caused staph infection - childhood/teenager     Vicodin [Hydrocodone-Acetaminophen] Nausea and Vomiting       Family History   Problem  Relation Age of Onset     Other Cancer Mother 62        salivary gland cancer     Genitourinary Problems Mother      Bipolar Disorder Mother         unipolar depression     Depression Mother      Thyroid Disease Mother         benign tumor     Melanoma Mother      Cancer Mother         salivary gland cancer     Migraines Mother      Skin Cancer Mother      Asthma Father      Mental Illness Father         Bipolar 2     Obesity Father      Colon Polyps Father 64     Bipolar Disorder Brother         unipolar depression     Asthma Brother      Depression Brother      Asthma Maternal Grandmother      Osteoporosis Maternal Grandmother      Glaucoma Maternal Grandmother      Hypertension Maternal Grandmother      Macular Degeneration Maternal Grandmother      Skin Cancer Maternal Grandmother      Prostate Cancer Maternal Grandfather 69        no history of smoking     Bladder Cancer Maternal Grandfather 71     Colon Cancer Maternal Grandfather 70     Diabetes Paternal Grandfather      Breast Cancer Maternal Aunt 37        negative BRCA1/2 testing     Melanoma Maternal Aunt 64     Thyroid Disease Paternal Aunt      Breast Cancer Paternal Aunt 58        bilateral; negative testing     Cancer Paternal Aunt      Breast Cancer Other         paternal great aunt       Additional medical/Social/Surgical histories reviewed in [facility] and updated as appropriate.        PHYSICAL EXAM  General  - normal appearance, in no obvious distress  Musculoskeletal - right foot  - stance: mildly antalgic gait  Neuro  - no sensory or motor deficit, grossly normal coordination, normal muscle tone               ASSESSMENT & PLAN  Ms. Arroyo is a 44 year old female who presents to clinic today with plantar fasciitis.    I reviewed her MR in the room with her, this reads  1. Moderate plantar fasciitis with partial-thickness tearing of the  proximal central cord and surrounding bone marrow and soft tissue  edema.  2. Mild peroneal tenosynovitis.  3.  Talonavicular joint degenerative change.  4. Sinus tarsi bursitis versus sinus Tarsi ganglion cyst measuring up  to 2.3 cm.    Cathy and I discussed these results in some depth, we also discussed their applications.  I do think that her pain is likely arising from a combination of her partial tear of the plantar fascia as well as her edema within the calcaneus.    I do think that it would be highly advisable for her to wear her boot, the main issue is that she has pain from the unevenness of the boot.  She does have a fashionable boot that she can wear on her right foot, that I believe will help keep her even.    We should follow-up via phone in 2 weeks to reassess    It was a pleasure seeing Cathy today.    Marcos Dalal DO, CAM  Primary Care Sports Medicine      This note was constructed using Dragon dictation software, please excuse any minor errors in spelling, grammar, or syntax.      Again, thank you for allowing me to participate in the care of your patient.      Sincerely,    Marcos Dalal DO

## 2025-03-12 ENCOUNTER — PATIENT OUTREACH (OUTPATIENT)
Dept: SURGERY | Facility: CLINIC | Age: 45
End: 2025-03-12
Payer: COMMERCIAL

## 2025-03-12 NOTE — TELEPHONE ENCOUNTER
[facility]: Thoracic Surgery                                                                                     Received concerned voicemail message from patient. In message patient reports that she received a message stating that she needed to have another MRI scheduled but when she called to schedule there were no imaging orders. Patient recently had a T-spine MRI on 3/3/2025 and is now concerned that it showed something new. Patient sounds distraught in message.     Called and spoke with patient. Informed patient that Dr Gurrola reviewed her recent T-Spine MRI and it says that it is stable and nothing concerning on MRI for Dr Gurrola. Reassured patient that no additional imaging has been ordered by Dr Gurrola or Thoracic Surgery. Further explained that Dr Gurrola is going to have her case reviewed at our upcoming nodule conference this coming Friday to inquire if yearly MRI's are still warranted since they have been stable. Patient verbalized her understanding and appreciated writer's return call.     Writer will update patient on team's recommendations after nodule conference.    Valarie Martinez RN, BSN  Thoracic Surgery RN Care Coordinator

## 2025-03-17 ENCOUNTER — TELEPHONE (OUTPATIENT)
Dept: PULMONOLOGY | Facility: CLINIC | Age: 45
End: 2025-03-17
Payer: COMMERCIAL

## 2025-03-17 NOTE — TELEPHONE ENCOUNTER
Called and spoke to patient to inform her that she does not need a follow up chest CT or follow up with nodule clinic at this time. Patient appreciative of the call and agrees with plan. Has no further questions or concerns at this time.   Taj Lester RN on 3/17/2025 at 10:50 AM

## 2025-03-18 ENCOUNTER — VIRTUAL VISIT (OUTPATIENT)
Dept: ORTHOPEDICS | Facility: CLINIC | Age: 45
End: 2025-03-18
Payer: COMMERCIAL

## 2025-03-18 DIAGNOSIS — M72.2 PLANTAR FASCIITIS: Primary | ICD-10-CM

## 2025-03-18 PROCEDURE — 98013 SYNCH AUDIO-ONLY EST LOW 20: CPT | Performed by: FAMILY MEDICINE

## 2025-03-18 NOTE — LETTER
3/18/2025       RE: Cathy Arroyo  [address]  [facility] [address]     Dear Colleague,    Thank you for referring your patient, Cathy Arroyo, to the [facility] MEDICINE CLIN[facility] [place] at [facility]. Please see a copy of my visit note below.    Cathy is a 44 year old who is being evaluated via a billable telephone visit.      Originating Location (pt. Location): Home    Distant Location (provider location):  On-site  Telephone visit completed due to the patient did not have access to video, while the distant provider did.    Assessment & Plan    Plantar fasciitis  Given her lack of improvement so far I do think it would be reasonable to refer her to podiatry, we can get this referral started now.  However, I do think she should continue to wear the boot as ongoing immobilization very well may continue to help her plantar fasciitis.    We should follow-up in 2 weeks for this and her other musculoskeletal issues.    It was a pleasure talking to Gloria today.    Basil Dalal DO CAQSM        Subjective  Cathy is a 44 year old woman following up with plantar fasciitis.  Cathy has not felt better, if anything she has felt worse since our last visit.  She feels like the boot is making her symptoms worse.  She also feels numbness in her foot.          Objective          Vitals:  No vitals were obtained today due to virtual visit.    Physical Exam   General: Alert and no distress //Respiratory: No audible wheeze, cough, or shortness of breath // Psychiatric:  Appropriate affect, tone, and pace of words            Phone call duration: 12 minutes  Signed Electronically by: Marcos Dalal DO         Again, thank you for allowing me to participate in the care of your patient.      Sincerely,    Marcos Dalal DO

## 2025-03-18 NOTE — PROGRESS NOTES
Cathy is a 44 year old who is being evaluated via a billable telephone visit.      Originating Location (pt. Location): Home    Distant Location (provider location):  On-site  Telephone visit completed due to the patient did not have access to video, while the distant provider did.    Assessment & Plan     Plantar fasciitis  Given her lack of improvement so far I do think it would be reasonable to refer her to podiatry, we can get this referral started now.  However, I do think she should continue to wear the boot as ongoing immobilization very well may continue to help her plantar fasciitis.    We should follow-up in 2 weeks for this and her other musculoskeletal issues.    It was a pleasure talking to Gloria today.    Basil Dalal DO CAQSM        Subjective   Cathy is a 44 year old woman following up with plantar fasciitis.  Cathy has not felt better, if anything she has felt worse since our last visit.  She feels like the boot is making her symptoms worse.  She also feels numbness in her foot.          Objective           Vitals:  No vitals were obtained today due to virtual visit.    Physical Exam   General: Alert and no distress //Respiratory: No audible wheeze, cough, or shortness of breath // Psychiatric:  Appropriate affect, tone, and pace of words            Phone call duration: 12 minutes  Signed Electronically by: Marcos Dalal DO

## 2025-03-20 ENCOUNTER — MYC MEDICAL ADVICE (OUTPATIENT)
Dept: ORTHOPEDICS | Facility: CLINIC | Age: 45
End: 2025-03-20
Payer: COMMERCIAL

## 2025-03-20 NOTE — TELEPHONE ENCOUNTER
Left Voicemail (1st Attempt) and Sent Mychart (1st Attempt) for the patient to call back and schedule the following:    Appointment type: Return Foot/Ankle  Provider: Dr.David Dalal  Return date: 4/2 laurel Jackson  Specialty phone number: [phone]

## 2025-04-01 ENCOUNTER — VIRTUAL VISIT (OUTPATIENT)
Dept: HEMATOLOGY | Facility: CLINIC | Age: 45
End: 2025-04-01
Attending: INTERNAL MEDICINE
Payer: COMMERCIAL

## 2025-04-01 ENCOUNTER — VIRTUAL VISIT (OUTPATIENT)
Dept: ORTHOPEDICS | Facility: CLINIC | Age: 45
End: 2025-04-01
Attending: FAMILY MEDICINE
Payer: COMMERCIAL

## 2025-04-01 DIAGNOSIS — M72.2 PLANTAR FASCIITIS: Primary | ICD-10-CM

## 2025-04-01 DIAGNOSIS — I26.99 ACUTE PULMONARY EMBOLISM WITHOUT ACUTE COR PULMONALE, UNSPECIFIED PULMONARY EMBOLISM TYPE (H): ICD-10-CM

## 2025-04-01 PROCEDURE — 98013 SYNCH AUDIO-ONLY EST LOW 20: CPT | Performed by: FAMILY MEDICINE

## 2025-04-01 PROCEDURE — 98015 SYNCH AUDIO-ONLY EST HIGH 40: CPT | Performed by: INTERNAL MEDICINE

## 2025-04-01 NOTE — LETTER
"4/1/2025       RE: Cathy Arroyo  [address]  [facility] [address]     Dear Colleague,    Thank you for referring your patient, Cathy Arroyo, to the [facility] MEDICINE CLIN[facility] [place] [facility]. Please see a copy of my visit note below.    Cathy is a 44 year old who is being evaluated via a billable telephone visit.      Originating Location (pt. Location): Home    Distant Location (provider location):  On-site  Telephone visit completed due to inaccessibility of video     Assessment & Plan    Plantar fasciitis  We revisited her symptoms in some detail, we also discussed her progress thus far and treatment strategies moving forward.  I am referring her to physical therapy, which I do think will help now and in the future moving forward.  - Physical Therapy  Referral; Future  Also, we had discussed a podiatry referral at our last visit that I do think would be reasonable for her to move forward with.  This would be not only for plantar fasciitis treatment moving forward but to discuss her ankle and if her sinus tarsi cyst may be asymptomatic pain generator.  - Orthopedic  Referral; Future    It was a pleasure talking to Cathy.    Basil Dalal, DO      BMI  Estimated body mass index is 37.4 kg/m  as calculated from the following:    Height as of 1/24/25: 1.727 m (5' 8\").    Weight as of 2/18/25: 111.6 kg (246 lb).             No follow-ups on file.    Subjective  Cathy is a 44 year old woman following up via telephone with plantar fasciitis.    Cathy is doing quite a bit better since last seeing me.  Sheobtained a new boot that fit her better, this made a world of difference with regards to her symptoms.  She still has pain at her ankle that radiates down into her foot and will send zaps on occasion, this can be debilitating.          Objective          Vitals:  No vitals were obtained today due to virtual " visit.    Physical Exam   General: Alert and no distress //Respiratory: No audible wheeze, cough, or shortness of breath // Psychiatric:  Appropriate affect, tone, and pace of words            Phone call duration: 14 minutes  Signed Electronically by: Marcos Dalal DO        Again, thank you for allowing me to participate in the care of your patient.      Sincerely,    Marcos Dalal DO

## 2025-04-01 NOTE — PATIENT INSTRUCTIONS
[facility] Outpatient Physical Therapy Locations    To schedule an appointment please call our scheduling department at [phone]    To fax a referral to be scheduled fax to [phone]    [place]: [address], [facility]: [address] [facility]: [address], [facility]: [address], [facility]: [address], [facility]: [address] [facility]: [address] [place], [place]  [place]: [address] [facility] , [address], [facility]: [address] [facility] , [address], [facility]: [address] [facility]: [address] [place] [address], [facility]: [address], [facility]: [address], [facility]: [address], [facility]: [address] [facility]: [address], [facility]: [facility]: [address] [facility], [address], [facility]: [address], [facility]: [address], [place], [place]  [place]: [address] [facility]: [address] [place] [address]: [address], [facility]: [address] [facility] [address], [place]  [place]: [address], [facility]: [address], [facility]: [address] [facility] [place], [place]  [facility]: [address], [place]  [facility] [place]: [address] [place] [address]  [place]: [address] [facility], [address], [facility] [address] [facility],  [facility] [address] [facility] [place], [place]

## 2025-04-01 NOTE — PROGRESS NOTES
"Cathy is a 44 year old who is being evaluated via a billable telephone visit.      Originating Location (pt. Location): Home    Distant Location (provider location):  On-site  Telephone visit completed due to inaccessibility of video     Assessment & Plan     Plantar fasciitis  We revisited her symptoms in some detail, we also discussed her progress thus far and treatment strategies moving forward.  I am referring her to physical therapy, which I do think will help now and in the future moving forward.  - Physical Therapy  Referral; Future  Also, we had discussed a podiatry referral at our last visit that I do think would be reasonable for her to move forward with.  This would be not only for plantar fasciitis treatment moving forward but to discuss her ankle and if her sinus tarsi cyst may be asymptomatic pain generator.  - Orthopedic  Referral; Future    It was a pleasure talking to Cathy.    Basil Dalal DO      BMI  Estimated body mass index is 37.4 kg/m  as calculated from the following:    Height as of 1/24/25: 1.727 m (5' 8\").    Weight as of 2/18/25: 111.6 kg (246 lb).             No follow-ups on file.    Subjective   Cathy is a 44 year old woman following up via telephone with plantar fasciitis.    Cathy is doing quite a bit better since last seeing me.  Sheobtained a new boot that fit her better, this made a world of difference with regards to her symptoms.  She still has pain at her ankle that radiates down into her foot and will send zaps on occasion, this can be debilitating.          Objective           Vitals:  No vitals were obtained today due to virtual visit.    Physical Exam   General: Alert and no distress //Respiratory: No audible wheeze, cough, or shortness of breath // Psychiatric:  Appropriate affect, tone, and pace of words            Phone call duration: 14 minutes  Signed Electronically by: Marcos Dalal DO      "

## 2025-04-01 NOTE — PROGRESS NOTES
[facility]  [facility]  [address], [address], [place] [address]  Main: [phone], Fax: [phone]        Outpatient Clinic Visit  Date:  04/01/2025    NOTE:  This visit was conducted by telephone, with the patient's approval.  Patient location: Home  Provider location: Onsite    Cathy Arroyo is a 44-year-old woman with a history of venous thromboembolism who was scheduled today for routine follow-up.    Background history:  She was hospitalized in September 2024 with pneumonia and an asthma exacerbation.  Imaging demonstrated several small filling defects in segmental branches of the pulmonary arteries in the right upper and left upper lobes.  Ultrasound showed no evidence of lower extremity deep vein thrombosis.  Given the small size of these clots, our impression was that they were likely incidentally found and not directly causing her symptoms as she clearly had pneumonia and an exacerbation of her asthma at the time she presented.  No other potential triggers for venous thrombosis were identified.  She has remained on rivaroxaban since that time.  As outlined in previous notes, we decided together that long-term anticoagulation was the best option for her.    There is no known family history of venous thrombosis, although the known details of her family medical history are limited.  She has 2 school-aged children.  Thrombophilia workup performed in December 2024 showed no evidence of genetic thrombophilia.  Previous testing had also demonstrated no evidence of antiphospholipid antibodies.    Her past medical history is remarkable for asthma, recurrent respiratory tract infections, history of migraines, history of pineal gland tumor resection in 2014, history of Graves' disease status post thyroidectomy, and mental health issues including bipolar disorder, anxiety, and PTSD.  She is also followed in oncology clinic given high risk for breast  cancer based on family history.    Interval history:  Since her last visit in December 2024, she has remained on rivaroxaban 20 mg daily.  She continues to tolerate this well.  She did have heavy menstrual bleeding, which had been an issue even prior to starting anticoagulation, but had a Mirena IUD placed in December 2024.  It took some time, but this eventually resulted in a significant reduction in her menstrual blood loss.  She reports now that she is only having spotting.  She was seen by OB/GYN in mid February 2025, and as outlined in that note, there are no identified structural issues and her IUD appears to be in good position.    We also gave her a dose of IV iron (low molecular weight iron dextran) in mid January 2025 for iron-deficiency anemia.  She tolerated the infusion well initially, although reports that after she returned home a few hours later she developed a rash on her legs.  She does not recall whether it was itchy or not.  It resolved within a day or two, and there were no other symptoms noted.      Physical exam:  Not done, telephone visit.    Labs:  Most recent labs are from mid February 2025.  Hemoglobin was 12.9, improved from 11 prior to receiving IV iron.  Ferritin was 85, improved from 10.      ASSESSMENT / PLAN:  Bilateral pulmonary emboli, September 2024  Heavy menstrual bleeding and iron deficiency, resolved  Cathy is currently doing well and as outlined above and in previous notes, we have elected for her to remain on long-term anticoagulation.  She has now completed 6 months of therapy following her thrombotic event, and we will decrease to prophylactic intensity (rivaroxaban 10 mg daily) for long-term secondary prevention.    Her heavy menstrual bleeding has been successfully addressed with placement of a Mirena IUD (December 2024).  She received IV iron in mid January 2025 and follow-up labs in February showed that her anemia had resolved and her ferritin was in the normal range.   However, her ferritin was not quite as high as we would expect it to be, and is possible she may still need additional IV iron in order to be fully iron replete.  Will plan for repeat iron panel to be checked in late April, and we will contact her with results via [employer].  Unclear whether the rash she noticed on her legs several hours following the INFeD infusion was directly related, but if we choose to give her more IV iron we will likely pick a different preparation.    Will plan to see her back in 6 months, sooner with new questions or concerns.    Total time on date of encounter 40 minutes, including review of medical records and labs, telephone visit (30 minutes in medical discussion), and documentation.      Monster Elias MD  Professor of Medicine  Division of Hematology, Oncology, and Transplantation  Director, [facility]

## 2025-04-02 ENCOUNTER — TELEPHONE (OUTPATIENT)
Dept: ORTHOPEDICS | Facility: CLINIC | Age: 45
End: 2025-04-02
Payer: COMMERCIAL

## 2025-04-02 ENCOUNTER — PATIENT OUTREACH (OUTPATIENT)
Dept: CARE COORDINATION | Facility: CLINIC | Age: 45
End: 2025-04-02
Payer: COMMERCIAL

## 2025-04-03 NOTE — TELEPHONE ENCOUNTER
DIAGNOSIS: Plantar fasciitis right foot/Dr. Marcos Dalal/[facility]/MRI done 03/03/25 at [facility]/DS 04/03/25    APPOINTMENT DATE: 5/14/25   NOTES STATUS DETAILS   OFFICE NOTE from referring provider Internal 4/1/25 - Marcos Dalal, DO - Sports Med     MEDICATION LIST Internal    MRI Internal 3/3/25 - MR Ankle Right   XRAYS (IMAGES & REPORTS) Internal 3/1/25 - XR Foot Right

## 2025-04-08 NOTE — PROGRESS NOTES
FOLLOW UP  Apr 10, 2025     Cathy Arroyo is a 43 year old woman who presents with family history of breast cancer.     HPI:    History of resection of pineal gland for pineocytoma.      She was seen in  for breast pain, nipple discharge, and lumps. Mammogram, ultrasound, breast MRI, prolactin and TSH were normal.      Family history of maternal aunt diagnosed with breast cancer at age 37, maternal great aunt, and paternal aunt diagnosed with breast cancer at age 58. She had negative genetic testing in .     She has been doing high risk screening with annual mammogram and annual breast MRI.      Today she denies any breast mass, skin change, nipple inversion or nipple discharge. Chronic stable breast pain.     BREAST-SPECIFIC HISTORY:    Previous breast imaging: Yes   - 17 b/l dmammo and right axillary ultrasound for lump prominent fat and normal lymph nodes BI-RADS 2  - 19 b/l Dmammo and right axillary ultrasound for pain: accessory breast tissue BI-RADS 2  - 22 Smammo BI_RADS 1  - 21 breast MRI for high risk screening BI-RADS 1  - 22 breast MRI for high risk screening BI-RADS 1  - 22 bilateral diagnostic mammogram and bilateral breast ultrasound: benign cysts BI-RADS 2  - 23 b/l Dmammo and left breast ultrasound for non-spontaneous nipple discharge: benign cysts BI-RADS 2  23 breast MRI BI-RADS 1  - 24 Smammo BI-RADS 1  - 24 breast MRI BI-RADS 2  - 4/10/25 Smammo BI-RADS 1     Prior breast biopsies/surgeries: No     Prior history of breast cancer or DCIS: No  Prior radiation history: No  Self breast exams: No    GYN HISTORY:  . Age at 1st pregnancy: 35. Breastfeeding history: Yes.   Age at menarche: 17  Menopausal: premenopausal    Contraception: female surgical   Menopausal hormone replacement therapy: No   Increased risk with more than 3-5 years with combination estrogen/progesterone    RISK ASSESSMENT:  Breast density: scattered fibroglandular  "densities  < 3% 5 year risk by Bridget, < 5% 10 year risk by JAYSHREE, and > 20% lifetime risk by JAYSHREE  Bridget: 0.8%5 year risk   JAYSHREE/Kike: 21.7% lifetime risk, 3.4 10 year risk   Height 5'8\"    FAMILY HISTORY:  Breast ca: Yes   - maternal aunt, 37, BRCA negative. Also had melanoma (1 aunt)  - paternal aunt, 58, BRCA negative (2 aunts)  - paternal great aunt  Ovarian ca: No  Pancreatic ca: No  Prostate: Yes  - maternal grandfather  Gastric ca: No  Melanoma: Yes   - maternal aunt, also had breast cancer  Colon ca: Yes   - maternal grandfather  Other cancer: Yes   - maternal grandfather with bladder cancer, 71  - maternal cousin with esophageal cancer  - maternal aunt with kidney cancer.   Other genetic, testing, syndromes, or clotting disorders: no  - maternal aunt negative for BRCA gene mutation      PAST MEDICAL HISTORY  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    Post partum depression     Post-surgical hypothyroidism 05/2017    Pre-eclampsia 10/14/2016    10/16/2018 - was induced for postdates and gestational hypertension with last pregnancy. No history of chronic hypertension. RX for aspirin sent and labs drawn at new OB visit.       Problem, psychiatric 2001 diagnosed Bipolar    Recurrent otitis media chronic ear infections in childhood, recently had a double ear infection followed by another ear infection soon after    Sleep apnea 2019    had a sleep study when pregnant with my second child    Uncomplicated asthma      PAST SURGICAL HISTORY   Past Surgical History:   Procedure Laterality Date    BIOPSY  31 y/o colpos, and in the last 2 years for thyroid    Colposcopy, thyroid nodule " biopsy twice    BLOOD PATCH N/A 10/11/2016    Procedure: EPIDURAL BLOOD PATCH;  Surgeon: GENERIC ANESTHESIA PROVIDER;  Location: [facility] OR     SECTION, TUBAL LIGATION, COMBINED N/A 2019    Procedure:  SECTION, WITH TUBAL LIGATION;  Surgeon: Kathy Rutledge MD;  Location: [facility] L+D    CRANIOTOMY, EXCISE TUMOR COMPLEX, COMBINED  2014    Procedure: COMBINED CRANIOTOMY, EXCISE TUMOR COMPLEX;  Supracerabellar  Infratentorial Approach for Resection of Tumor ;  Surgeon: Kate Mckeon MD;  Location: [facility] OR    ENT SURGERY  2017    thyroidectomy    GYN SURGERY      colposcopy    THYROIDECTOMY N/A 2017    Procedure: THYROIDECTOMY;  Total Thyroidectomy;  Surgeon: Pamela Villasenor MD;  Location:  OR     MEDICATIONS  Current Outpatient Medications   Medication Sig Dispense Refill    ADDERALL XR 25 MG 24 hr capsule       albuterol (PROAIR HFA/PROVENTIL HFA/VENTOLIN HFA) 108 (90 Base) MCG/ACT inhaler Inhale 2 puffs into the lungs every 6 hours 18 g 3    amphetamine-dextroamphetamine (ADDERALL) 10 MG tablet Take 10 mg by mouth daily.      ARIPiprazole (ABILIFY) 10 MG tablet Take 0.5 tablets (5 mg) by mouth daily      cetirizine (ZYRTEC) 10 MG tablet Take 1 tablet (10 mg) by mouth daily 90 tablet 3    diazepam (VALIUM) 5 MG tablet Take 5 mg by mouth as needed for anxiety.      Fluticasone-Umeclidin-Vilant (TRELEGY ELLIPTA) 100-62.5-25 MCG/ACT oral inhaler Inhale 1 puff into the lungs daily. 1 each 4    levonorgestrel (MIRENA) 52 MG (20 mcg/day) IUD 1 each by Intrauterine route once.      levothyroxine (SYNTHROID/LEVOTHROID) 175 MCG tablet Take 1 tablet (175 mcg) by mouth daily. 90 tablet 3    medical cannabis (Patient's own supply) Take 1 Dose by mouth See Admin Instructions (The purpose of this order is to document that the patient reports taking medical cannabis.  This is not a prescription, and is not used to certify that the patient has a qualifying medical  condition.)    Cobalt oral suspension: take 1-3 ml by mouth two times daily  Total CBD 1200 mg. Total THC 60 mg      rivaroxaban ANTICOAGULANT (XARELTO) 10 MG TABS tablet Take 1 tablet (10 mg) by mouth daily (with dinner). 90 tablet 3    rizatriptan (MAXALT-MLT) 10 MG ODT Take 10 mg by mouth at onset of headache for migraine.       ALLERGIES  Allergies   Allergen Reactions    Adhesive Tape Hives    Nicotine Anaphylaxis, Hives and Rash     Patch - rash/hives  Lozenges - throat swelling    Tegaderm Transparent Dressing (Informational Only) Hives    Adacel [Tetanus-Diphth-Acell Pertussis] Swelling    Ciprofloxacin Visual Disturbance and Hallucination    Codeine Sulfate Nausea and Vomiting    Methimazole Rash    Oxycodone-Acetaminophen Nausea and Vomiting    Tetanus Toxoid      Pt states she had an infection at injection site.     Tetanus-Diphtheria Toxoids Td      Caused staph infection - childhood/teenager    Vicodin [Hydrocodone-Acetaminophen] Nausea and Vomiting      SOCIAL HISTORY:  Smokes: Yes  EtOH: Yes, 1 per week   Exercise: active job as a   Works as a  6 days per week. She has 5 year old and 8 year old. She lives in [place]. History of addiction. Her 5 year old is interested in music. Her 8 year old has Autism.   Dancing.    ROS:   Change in vision No  Headaches: no  Respiratory: No shortness of breath, dyspnea on exertion, cough, or hemoptysis   Cardiovascular: negative   Gastrointestinal: negative Abdominal pain: no  Breast: negative  Musculoskeletal: negative Joint pain No Back pain: no  Psychiatric: negative  Hematologic/Lymphatic/Immunologic: negative  Endocrine: negative    EXAM  /77 (BP Location: Left arm, Patient Position: Sitting, Cuff Size: Adult Large)   Pulse 84   Temp 98.7  F (37.1  C) (Oral)   Resp 16   Wt 113.5 kg (250 lb 4.8 oz)   LMP 02/12/2025   SpO2 96%   BMI 38.06 kg/m     PHYSICAL EXAM  Respiratory: breathing non labored.   Breasts: Examination was  done in both the upright and supine positions.  Breasts are symmetrical . No masses noted. No skin or nipple changes. No nipple discharge.   No clavicular, cervical, or axillary lymphadenopathy.     INVESTIGATIONS:    4/10/25 screening mammogram: BI-RADS 1    ASSESSMENT/PLAN:    Cathy Arroyo is a 44 year old woman with family history of breast cancer. She meets NCCN guidelines for high risk screening.     1) Family history of breast cancer  She meets NCCN guidelines for high risk screening based on family history with lifetime risk for breast cancer of >20%. Screening recommendations based on NCCN guidelines.  Clinical encounter every 6-12 months. Annual mammogram with kristin alternating with annual breast MRI.    - Be familiar with your breast and promptly report any changes to your health care provider.  - Breast MRI with clinic visit due 10/2025.  - Screening mammogram with clinic visit due: 4/11/26    2) Lifestyle Modifications were provided.   - Maintain your best healthy weight. Higher body fat and adult weight gain is associated with increased risk for breast cancer. This increase in risk has been attributed to increase in circulating endogenous estrogen levels from fat tissue.   - Any alcohol intake increases the risk for breast cancer. If you choose to drink alcohol limit alcohol consumption to less than 1 drink (1 ounce of liquor, 6 ounces of wine, or 8 ounces of beer) per day or less than 3 drinks per week.  - Be active daily and void being sedentary.   - Vitamin D may decrease the risk of developing breast cancer.     Miriam Lora PA-C    20 minutes spent on the date of the encounter doing chart review, review of test results, interpretation of tests, patient visit and documentation.

## 2025-04-10 ENCOUNTER — ONCOLOGY VISIT (OUTPATIENT)
Dept: SURGERY | Facility: CLINIC | Age: 45
End: 2025-04-10
Attending: PHYSICIAN ASSISTANT
Payer: COMMERCIAL

## 2025-04-10 VITALS
HEART RATE: 84 BPM | DIASTOLIC BLOOD PRESSURE: 77 MMHG | WEIGHT: 250.3 LBS | OXYGEN SATURATION: 96 % | RESPIRATION RATE: 16 BRPM | SYSTOLIC BLOOD PRESSURE: 127 MMHG | BODY MASS INDEX: 38.06 KG/M2 | TEMPERATURE: 98.7 F

## 2025-04-10 DIAGNOSIS — Z91.89 AT HIGH RISK FOR BREAST CANCER: Primary | ICD-10-CM

## 2025-04-10 PROCEDURE — G0463 HOSPITAL OUTPT CLINIC VISIT: HCPCS | Performed by: PHYSICIAN ASSISTANT

## 2025-04-10 ASSESSMENT — PAIN SCALES - GENERAL: PAINLEVEL_OUTOF10: NO PAIN (0)

## 2025-04-10 NOTE — PATIENT INSTRUCTIONS
Cathy Arroyo is a 44 year old woman with family history of breast cancer. She meets NCCN guidelines for high risk screening.     1) Family history of breast cancer  She meets NCCN guidelines for high risk screening based on family history with lifetime risk for breast cancer of >20%. Screening recommendations based on NCCN guidelines.  Clinical encounter every 6-12 months. Annual mammogram with kristin alternating with annual breast MRI.    - Be familiar with your breast and promptly report any changes to your health care provider.  - Breast MRI with clinic visit due 10/2025.  - Screening mammogram with clinic visit due: 4/11/26    2) Lifestyle Modifications were provided.   - Maintain your best healthy weight. Higher body fat and adult weight gain is associated with increased risk for breast cancer. This increase in risk has been attributed to increase in circulating endogenous estrogen levels from fat tissue.   - Any alcohol intake increases the risk for breast cancer. If you choose to drink alcohol limit alcohol consumption to less than 1 drink (1 ounce of liquor, 6 ounces of wine, or 8 ounces of beer) per day or less than 3 drinks per week.  - Be active daily and void being sedentary.   - Vitamin D may decrease the risk of developing breast cancer.

## 2025-04-10 NOTE — LETTER
4/10/2025      Cathy Arroyo  [address]  [facility] [address]      Dear Colleague,    Thank you for referring your patient, Cathy Arroyo, to the [facility]. Please see a copy of my visit note below.    FOLLOW UP  Apr 10, 2025     Cathy Arroyo is a 43 year old woman who presents with family history of breast cancer.     HPI:    History of resection of pineal gland for pineocytoma.      She was seen in  for breast pain, nipple discharge, and lumps. Mammogram, ultrasound, breast MRI, prolactin and TSH were normal.      Family history of maternal aunt diagnosed with breast cancer at age 37, maternal great aunt, and paternal aunt diagnosed with breast cancer at age 58. She had negative genetic testing in .     She has been doing high risk screening with annual mammogram and annual breast MRI.      Today she denies any breast mass, skin change, nipple inversion or nipple discharge. Chronic stable breast pain.     BREAST-SPECIFIC HISTORY:    Previous breast imaging: Yes   - 17 b/l dmammo and right axillary ultrasound for lump prominent fat and normal lymph nodes BI-RADS 2  - 19 b/l Dmammo and right axillary ultrasound for pain: accessory breast tissue BI-RADS 2  - 22 Smammo BI_RADS 1  - 21 breast MRI for high risk screening BI-RADS 1  - 22 breast MRI for high risk screening BI-RADS 1  - 22 bilateral diagnostic mammogram and bilateral breast ultrasound: benign cysts BI-RADS 2  - 23 b/l Dmammo and left breast ultrasound for non-spontaneous nipple discharge: benign cysts BI-RADS 2  23 breast MRI BI-RADS 1  - 24 Smammo BI-RADS 1  - 24 breast MRI BI-RADS 2  - 4/10/25 Smammo BI-RADS 1     Prior breast biopsies/surgeries: No     Prior history of breast cancer or DCIS: No  Prior radiation history: No  Self breast exams: No    GYN HISTORY:  . Age at 1st pregnancy: 35. Breastfeeding history: Yes.   Age at menarche:  "17  Menopausal: premenopausal    Contraception: female surgical   Menopausal hormone replacement therapy: No   Increased risk with more than 3-5 years with combination estrogen/progesterone    RISK ASSESSMENT:  Breast density: scattered fibroglandular densities  < 3% 5 year risk by Bridget, < 5% 10 year risk by JAYSHREE, and > 20% lifetime risk by JAYSHREE  Bridget: 0.8%5 year risk   JAYSHREE/Kike: 21.7% lifetime risk, 3.4 10 year risk   Height 5'8\"    FAMILY HISTORY:  Breast ca: Yes   - maternal aunt, 37, BRCA negative. Also had melanoma (1 aunt)  - paternal aunt, 58, BRCA negative (2 aunts)  - paternal great aunt  Ovarian ca: No  Pancreatic ca: No  Prostate: Yes  - maternal grandfather  Gastric ca: No  Melanoma: Yes   - maternal aunt, also had breast cancer  Colon ca: Yes   - maternal grandfather  Other cancer: Yes   - maternal grandfather with bladder cancer, 71  - maternal cousin with esophageal cancer  - maternal aunt with kidney cancer.   Other genetic, testing, syndromes, or clotting disorders: no  - maternal aunt negative for BRCA gene mutation      PAST MEDICAL HISTORY  Past Medical History:   Diagnosis Date     Allergic rhinitis 1990     Allergic state      Anxiety      Bipolar 1 disorder (H)      Depressive disorder      Elevated cholesterol      Graves disease 2017     Hearing problem      Migraines 2/2019    have suffered from migraines since having brain surgery     Obese     BMI 37.48     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     Post partum depression      Post-surgical hypothyroidism 05/2017     Pre-eclampsia 10/14/2016    10/16/2018 - was induced for postdates and gestational hypertension with last pregnancy. No history of chronic hypertension. RX for aspirin sent and labs drawn at new OB visit.        Problem, psychiatric 2001 diagnosed Bipolar     Recurrent otitis media chronic ear infections in childhood, recently had a double ear infection followed by another ear infection soon after     " Sleep apnea     had a sleep study when pregnant with my second child     Uncomplicated asthma      PAST SURGICAL HISTORY   Past Surgical History:   Procedure Laterality Date     BIOPSY  31 y/o colpos, and in the last 2 years for thyroid    Colposcopy, thyroid nodule biopsy twice     BLOOD PATCH N/A 10/11/2016    Procedure: EPIDURAL BLOOD PATCH;  Surgeon: GENERIC ANESTHESIA PROVIDER;  Location: [facility] OR      SECTION, TUBAL LIGATION, COMBINED N/A 2019    Procedure:  SECTION, WITH TUBAL LIGATION;  Surgeon: Kathy Rutledge MD;  Location: [facility] L+D     CRANIOTOMY, EXCISE TUMOR COMPLEX, COMBINED  2014    Procedure: COMBINED CRANIOTOMY, EXCISE TUMOR COMPLEX;  Supracerabellar  Infratentorial Approach for Resection of Tumor ;  Surgeon: Kate Mckeno MD;  Location: [facility]     ENT SURGERY  2017    thyroidectomy     GYN SURGERY      colposcopy     THYROIDECTOMY N/A 2017    Procedure: THYROIDECTOMY;  Total Thyroidectomy;  Surgeon: Pamela Villasenor MD;  Location:  OR     MEDICATIONS  Current Outpatient Medications   Medication Sig Dispense Refill     ADDERALL XR 25 MG 24 hr capsule        albuterol (PROAIR HFA/PROVENTIL HFA/VENTOLIN HFA) 108 (90 Base) MCG/ACT inhaler Inhale 2 puffs into the lungs every 6 hours 18 g 3     amphetamine-dextroamphetamine (ADDERALL) 10 MG tablet Take 10 mg by mouth daily.       ARIPiprazole (ABILIFY) 10 MG tablet Take 0.5 tablets (5 mg) by mouth daily       cetirizine (ZYRTEC) 10 MG tablet Take 1 tablet (10 mg) by mouth daily 90 tablet 3     diazepam (VALIUM) 5 MG tablet Take 5 mg by mouth as needed for anxiety.       Fluticasone-Umeclidin-Vilant (TRELEGY ELLIPTA) 100-62.5-25 MCG/ACT oral inhaler Inhale 1 puff into the lungs daily. 1 each 4     levonorgestrel (MIRENA) 52 MG (20 mcg/day) IUD 1 each by Intrauterine route once.       levothyroxine (SYNTHROID/LEVOTHROID) 175 MCG tablet Take 1 tablet (175 mcg) by mouth daily. 90 tablet 3      medical cannabis (Patient's own supply) Take 1 Dose by mouth See Admin Instructions (The purpose of this order is to document that the patient reports taking medical cannabis.  This is not a prescription, and is not used to certify that the patient has a qualifying medical condition.)    [place] oral suspension: take 1-3 ml by mouth two times daily  Total CBD 1200 mg. Total THC 60 mg       rivaroxaban ANTICOAGULANT (XARELTO) 10 MG TABS tablet Take 1 tablet (10 mg) by mouth daily (with dinner). 90 tablet 3     rizatriptan (MAXALT-MLT) 10 MG ODT Take 10 mg by mouth at onset of headache for migraine.       ALLERGIES  Allergies   Allergen Reactions     Adhesive Tape Hives     Nicotine Anaphylaxis, Hives and Rash     Patch - rash/hives  Lozenges - throat swelling     Tegaderm Transparent Dressing (Informational Only) Hives     Adacel [Tetanus-Diphth-Acell Pertussis] Swelling     Ciprofloxacin Visual Disturbance and Hallucination     Codeine Sulfate Nausea and Vomiting     Methimazole Rash     Oxycodone-Acetaminophen Nausea and Vomiting     Tetanus Toxoid      Pt states she had an infection at injection site.      Tetanus-Diphtheria Toxoids Td      Caused staph infection - childhood/teenager     Vicodin [Hydrocodone-Acetaminophen] Nausea and Vomiting      SOCIAL HISTORY:  Smokes: Yes  EtOH: Yes, 1 per week   Exercise: active job as a   Works as a  6 days per week. She has 5 year old and 8 year old. She lives in [place]. History of addiction. Her 5 year old is interested in music. Her 8 year old has Autism.   Dancing.    ROS:   Change in vision No  Headaches: no  Respiratory: No shortness of breath, dyspnea on exertion, cough, or hemoptysis   Cardiovascular: negative   Gastrointestinal: negative Abdominal pain: no  Breast: negative  Musculoskeletal: negative Joint pain No Back pain: no  Psychiatric: negative  Hematologic/Lymphatic/Immunologic: negative  Endocrine: negative    EXAM  /77 (BP  Location: Left arm, Patient Position: Sitting, Cuff Size: Adult Large)   Pulse 84   Temp 98.7  F (37.1  C) (Oral)   Resp 16   Wt 113.5 kg (250 lb 4.8 oz)   LMP 02/12/2025   SpO2 96%   BMI 38.06 kg/m     PHYSICAL EXAM  Respiratory: breathing non labored.   Breasts: Examination was done in both the upright and supine positions.  Breasts are symmetrical . No masses noted. No skin or nipple changes. No nipple discharge.   No clavicular, cervical, or axillary lymphadenopathy.     INVESTIGATIONS:    4/10/25 screening mammogram: BI-RADS 1    ASSESSMENT/PLAN:    Cathy Arroyo is a 44 year old woman with family history of breast cancer. She meets NCCN guidelines for high risk screening.     1) Family history of breast cancer  She meets NCCN guidelines for high risk screening based on family history with lifetime risk for breast cancer of >20%. Screening recommendations based on NCCN guidelines.  Clinical encounter every 6-12 months. Annual mammogram with kristin alternating with annual breast MRI.    - Be familiar with your breast and promptly report any changes to your health care provider.  - Breast MRI with clinic visit due 10/2025.  - Screening mammogram with clinic visit due: 4/11/26    2) Lifestyle Modifications were provided.   - Maintain your best healthy weight. Higher body fat and adult weight gain is associated with increased risk for breast cancer. This increase in risk has been attributed to increase in circulating endogenous estrogen levels from fat tissue.   - Any alcohol intake increases the risk for breast cancer. If you choose to drink alcohol limit alcohol consumption to less than 1 drink (1 ounce of liquor, 6 ounces of wine, or 8 ounces of beer) per day or less than 3 drinks per week.  - Be active daily and void being sedentary.   - Vitamin D may decrease the risk of developing breast cancer.     Miriam Lora PA-C    20 minutes spent on the date of the encounter doing chart review, review of test  results, interpretation of tests, patient visit and documentation.        Again, thank you for allowing me to participate in the care of your patient.        Sincerely,        Miriam Lora PA-C    Electronically signed

## 2025-04-10 NOTE — NURSING NOTE
"Oncology Rooming Note    April 10, 2025 9:36 AM   Cathy Arroyo is a 44 year old female who presents for:    Chief Complaint   Patient presents with    Oncology Clinic Visit     At High Risk for Breast Cancer     Initial Vitals: /77 (BP Location: Left arm, Patient Position: Sitting, Cuff Size: Adult Large)   Pulse 84   Temp 98.7  F (37.1  C) (Oral)   Resp 16   Wt 113.5 kg (250 lb 4.8 oz)   LMP 02/12/2025   SpO2 96%   BMI 38.06 kg/m   Estimated body mass index is 38.06 kg/m  as calculated from the following:    Height as of 1/24/25: 1.727 m (5' 8\").    Weight as of this encounter: 113.5 kg (250 lb 4.8 oz). Body surface area is 2.33 meters squared.  No Pain (0) Comment: Data Unavailable   Patient's last menstrual period was 02/12/2025.  Allergies reviewed: Yes  Medications reviewed: Yes    Medications: Medication refills not needed today.  Pharmacy name entered into [employer]:    [facility] #[id number] - [place], [place] - [address]  [facility][id number] - [place], [place] - [address] [id number] - [place], [place] - [address] AT [address] [place] [address] & [facility] - [place], [place] - [address]    Frailty Screening:   Is the patient here for a new oncology consult visit in cancer care? 2. No    PHQ9:  Did this patient require a PHQ9?: No      Clinical concerns: Imaging review       Rose Mary Calle LPN  4/10/2025              "

## 2025-04-15 ENCOUNTER — DOCUMENTATION ONLY (OUTPATIENT)
Dept: ORTHOPEDICS | Facility: CLINIC | Age: 45
End: 2025-04-15
Payer: COMMERCIAL

## 2025-04-15 DIAGNOSIS — M72.2 PLANTAR FASCIITIS OF RIGHT FOOT: Primary | ICD-10-CM

## 2025-04-24 ENCOUNTER — TELEPHONE (OUTPATIENT)
Dept: HEMATOLOGY | Facility: CLINIC | Age: 45
End: 2025-04-24
Payer: COMMERCIAL

## 2025-04-24 NOTE — TELEPHONE ENCOUNTER
[id number]  Cathy Arroyo  44 year old female  CBCD Diagnosis: PE 9/2024; taking Xarelto 10 mg daily  CBCD Provider: Curt    Bilateral pulmonary emboli, September 2024 (from MTR note 4/1/25; has appt scheduled in Dec)  Heavy menstrual bleeding and iron deficiency, resolved  Cathy is currently doing well and as outlined above and in previous notes, we have elected for her to remain on long-term anticoagulation.  She has now completed 6 months of therapy following her thrombotic event, and we will decrease to prophylactic intensity (rivaroxaban 10 mg daily) for long-term secondary prevention.    Patient called and having dental consult tomorrow and possible tooth pulled. However she doesn't know for sure if it will happen tomorrow or not. She has left lower tooth pain. She has taken her Xarelto today already, but wondering if she should hold tomorrow am dose. If needs surgery, she would like to know if she should hold 48 hours. Will route to provider pool.  Annmarie Martinez, MSN, RN, PHN -Nurse Clinician, [facility] [phone]        Addendum:  Discussed with Dr. Elias. He stated it was okay to hold tomorrow am dose prior to dental appointment. If she should require dental extraction/surgery following this appointment, he recommended holding 48 hours prior.  Message relayed to patient.  Annmarie Martinez, MSN, RN, PHN -Nurse Clinician, [facility] [phone]

## 2025-04-28 ENCOUNTER — OFFICE VISIT (OUTPATIENT)
Dept: FAMILY MEDICINE | Facility: CLINIC | Age: 45
End: 2025-04-28
Payer: COMMERCIAL

## 2025-04-28 VITALS
SYSTOLIC BLOOD PRESSURE: 130 MMHG | HEIGHT: 68 IN | HEART RATE: 101 BPM | WEIGHT: 252 LBS | TEMPERATURE: 97.8 F | DIASTOLIC BLOOD PRESSURE: 82 MMHG | RESPIRATION RATE: 17 BRPM | BODY MASS INDEX: 38.19 KG/M2 | OXYGEN SATURATION: 98 %

## 2025-04-28 DIAGNOSIS — D50.0 IRON DEFICIENCY ANEMIA DUE TO CHRONIC BLOOD LOSS: ICD-10-CM

## 2025-04-28 DIAGNOSIS — N92.1 MENORRHAGIA WITH IRREGULAR CYCLE: ICD-10-CM

## 2025-04-28 DIAGNOSIS — J45.41 MODERATE PERSISTENT ASTHMA WITH ACUTE EXACERBATION: Primary | ICD-10-CM

## 2025-04-28 LAB
ERYTHROCYTE [DISTWIDTH] IN BLOOD BY AUTOMATED COUNT: 13.5 % (ref 10–15)
HCT VFR BLD AUTO: 44.8 % (ref 35–47)
HGB BLD-MCNC: 14.8 G/DL (ref 11.7–15.7)
MCH RBC QN AUTO: 28.5 PG (ref 26.5–33)
MCHC RBC AUTO-ENTMCNC: 33 G/DL (ref 31.5–36.5)
MCV RBC AUTO: 86 FL (ref 78–100)
PLATELET # BLD AUTO: 309 10E3/UL (ref 150–450)
RBC # BLD AUTO: 5.2 10E6/UL (ref 3.8–5.2)
WBC # BLD AUTO: 8.9 10E3/UL (ref 4–11)

## 2025-04-28 PROCEDURE — G2211 COMPLEX E/M VISIT ADD ON: HCPCS | Performed by: NURSE PRACTITIONER

## 2025-04-28 PROCEDURE — 1125F AMNT PAIN NOTED PAIN PRSNT: CPT | Performed by: NURSE PRACTITIONER

## 2025-04-28 PROCEDURE — 99214 OFFICE O/P EST MOD 30 MIN: CPT | Mod: 25 | Performed by: NURSE PRACTITIONER

## 2025-04-28 PROCEDURE — 36415 COLL VENOUS BLD VENIPUNCTURE: CPT | Performed by: NURSE PRACTITIONER

## 2025-04-28 PROCEDURE — 3075F SYST BP GE 130 - 139MM HG: CPT | Performed by: NURSE PRACTITIONER

## 2025-04-28 PROCEDURE — 90471 IMMUNIZATION ADMIN: CPT | Performed by: NURSE PRACTITIONER

## 2025-04-28 PROCEDURE — 3079F DIAST BP 80-89 MM HG: CPT | Performed by: NURSE PRACTITIONER

## 2025-04-28 PROCEDURE — 90715 TDAP VACCINE 7 YRS/> IM: CPT | Performed by: NURSE PRACTITIONER

## 2025-04-28 ASSESSMENT — PAIN SCALES - GENERAL: PAINLEVEL_OUTOF10: MILD PAIN (3)

## 2025-04-28 NOTE — PROGRESS NOTES
"  Assessment & Plan     Moderate persistent asthma with acute exacerbation  Symptoms and exam not consistent with pneumonia at this time. Discussed with the patient that c/w asthma exacerbation. If able to take, I'd advise prednisone burst with the level of wheezing Cathy is having, but she declines due to potential to precipitate a manic episode which she experienced with medications last year. At this time, her sats are ok and she can move air throughout, though wheezing is significant. Continue on the ICS. Also encouraged continued cigarette cessation and she is planning to purse an oral fixation tool.    Menorrhagia with irregular cycle  Labs from specialist released  - CBC with platelets  - Ferritin  - Iron and iron binding capacity    Iron deficiency anemia due to chronic blood loss  As above  - CBC with platelets  - Ferritin  - Iron and iron binding capacity          BMI  Estimated body mass index is 38.32 kg/m  as calculated from the following:    Height as of this encounter: 1.727 m (5' 8\").    Weight as of this encounter: 114.3 kg (252 lb).         Follow-up   Return in about 3 months (around 7/28/2025) for Asthma Recheck and ACT.        The longitudinal plan of care for the diagnosis(es)/condition(s) as documented were addressed during this visit. Due to the added complexity in care, I will continue to support Cathy in the subsequent management and with ongoing continuity of care.Review of prior external note(s) from - pulmonology  Prescription drug management      Subjective   Cathy is a 44 year old, presenting for the following health issues:  Cough (One week/Was on a trip wtihout inhaler, now back /Coughing so much that peeing/vomitting occurs/)        4/28/2025     3:44 PM   Additional Questions   Roomed by Justin NIX     History of Present Illness       Reason for visit:  Asthma flare up, uncontrolled productive cough  Symptom onset:  3-7 days ago  Symptoms include:  Asthma, productive " "cough  Symptom intensity:  Moderate  Symptom progression:  Staying the same  Had these symptoms before:  Yes  Has tried/received treatment for these symptoms:  Yes  Previous treatment was successful:  Yes  Prior treatment description:  Antibiotics  What makes it worse:  No  What makes it better:  No   She is taking medications regularly.      Unsure if she's had fever or chills. No runny nose or sore throat. Had headache and fatigue, which is attributed to travel. Doesn't feel \"sick.\" Sputum has been frothy and pink tinged at times.  Once she starts coughing cannot stop. No sick contacts. Symptoms started ahead of trip to [place] so total of eight days. Pulmonology has ruled out COPD.     Taking both cetirizine and fexofenadine, which she does in spring and fall for allergies.    Down to 6 cigarettes per day. Had \"dry December and January\" and continues to have very minimal alcohol. Does not want medicine assistance for smoking cessation. Wanted to look into the necklace device that pulmonology mentioned. It is more the motion than nicotine.     Last visit with pulmonology 2/28/25 reviewed. Obstructive patterns on PFTs improved thought 2/2 inhaler compliance and reduced smoking. Has follow-up scheduled for August with repeat PFTs          11/12/2024     9:04 AM 1/4/2025    10:37 AM 2/27/2025     3:28 PM   ACT Total Scores   ACT TOTAL SCORE (Goal Greater than or Equal to 20) 21  22  22    In the past 12 months, how many times did you visit the emergency room for your asthma without being admitted to the hospital? 1 0 0   In the past 12 months, how many times were you hospitalized overnight because of your asthma? 0 0 0       Patient-reported         Review of Systems    ROS:5 point ROS including CONST, HEENT, Respiratory, CV, and GI other than that noted in the HPI,  is negative         Objective    /82   Pulse 101   Temp 97.8  F (36.6  C) (Temporal)   Resp 17   Ht 1.727 m (5' 8\")   Wt 114.3 kg (252 lb)   " SpO2 98%   BMI 38.32 kg/m    Body mass index is 38.32 kg/m .  Physical Exam   GENERAL: alert and no distress  EYES: Eyes grossly normal to inspection, PERRL and conjunctivae and sclerae normal  HENT: ear canals and TM's normal, nose and mouth without ulcers or lesions  NECK: no adenopathy, no asymmetry, masses, or scars  RESP: no rales , no rhonchi, and expiratory wheezes throughout  CV: regular rate and rhythm, normal S1 S2, no S3 or S4, no murmur, click or rub, no peripheral edema   PSYCH: mentation appears normal, affect normal/bright          Signed Electronically by: JANNET Liu CNP

## 2025-04-29 LAB
FERRITIN SERPL-MCNC: 62 NG/ML (ref 6–175)
IRON BINDING CAPACITY (ROCHE): 280 UG/DL (ref 240–430)
IRON SATN MFR SERPL: 33 % (ref 15–46)
IRON SERPL-MCNC: 91 UG/DL (ref 37–145)

## 2025-05-14 ENCOUNTER — PRE VISIT (OUTPATIENT)
Dept: ORTHOPEDICS | Facility: CLINIC | Age: 45
End: 2025-05-14

## 2025-06-17 ENCOUNTER — OFFICE VISIT (OUTPATIENT)
Dept: OPTOMETRY | Facility: CLINIC | Age: 45
End: 2025-06-17
Payer: COMMERCIAL

## 2025-06-17 DIAGNOSIS — H04.129 DRY EYE: ICD-10-CM

## 2025-06-17 DIAGNOSIS — H52.13 MYOPIA OF BOTH EYES: Primary | ICD-10-CM

## 2025-06-17 PROCEDURE — 92310 CONTACT LENS FITTING OU: CPT | Performed by: OPTOMETRIST

## 2025-06-17 PROCEDURE — 92014 COMPRE OPH EXAM EST PT 1/>: CPT | Performed by: OPTOMETRIST

## 2025-06-17 PROCEDURE — 92015 DETERMINE REFRACTIVE STATE: CPT | Performed by: OPTOMETRIST

## 2025-06-17 ASSESSMENT — CUP TO DISC RATIO
OS_RATIO: 0.15
OD_RATIO: 0.15

## 2025-06-17 ASSESSMENT — CONF VISUAL FIELD
OD_SUPERIOR_NASAL_RESTRICTION: 0
OS_INFERIOR_NASAL_RESTRICTION: 0
OS_NORMAL: 1
OS_SUPERIOR_NASAL_RESTRICTION: 0
OS_SUPERIOR_TEMPORAL_RESTRICTION: 0
OD_NORMAL: 1
OD_INFERIOR_TEMPORAL_RESTRICTION: 0
OD_INFERIOR_NASAL_RESTRICTION: 0
OS_INFERIOR_TEMPORAL_RESTRICTION: 0
METHOD: COUNTING FINGERS
OD_SUPERIOR_TEMPORAL_RESTRICTION: 0

## 2025-06-17 ASSESSMENT — REFRACTION_MANIFEST
OD_CYLINDER: SPHERE
OD_SPHERE: -5.00
OD_ADD: +0.50
OS_SPHERE: -5.00
OS_CYLINDER: SPHERE

## 2025-06-17 ASSESSMENT — REFRACTION_WEARINGRX
OS_SPHERE: -5.50
OD_CYLINDER: SPHERE
SPECS_TYPE: SVL
OS_CYLINDER: SPHERE
OD_SPHERE: -5.50

## 2025-06-17 ASSESSMENT — REFRACTION_CURRENTRX
OS_BRAND: ALCON TOTAL 30
OD_SPHERE: -5.00
OD_DIAMETER: 14.2
OS_BASECURVE: 8.40
OD_BRAND: ALCON TOTAL 30
OD_BASECURVE: 8.40
OS_SPHERE: -5.00
OS_DIAMETER: 14.2

## 2025-06-17 ASSESSMENT — EXTERNAL EXAM - LEFT EYE: OS_EXAM: NORMAL

## 2025-06-17 ASSESSMENT — VISUAL ACUITY
CORRECTION_TYPE: CONTACTS
OD_CC: 20/20
OD_CC: 20/20
OS_CC: 20/20
OS_CC: 20/20
METHOD: SNELLEN - LINEAR

## 2025-06-17 ASSESSMENT — TONOMETRY
OS_IOP_MMHG: 20
IOP_METHOD: APPLANATION
OD_IOP_MMHG: 19

## 2025-06-17 ASSESSMENT — SLIT LAMP EXAM - LIDS
COMMENTS: NORMAL
COMMENTS: NORMAL

## 2025-06-17 ASSESSMENT — EXTERNAL EXAM - RIGHT EYE: OD_EXAM: NORMAL

## 2025-06-17 NOTE — PROGRESS NOTES
Chief Complaint   Patient presents with    Annual Eye Exam        Last Eye Exam: 07/2023  Dilated Previously: Declined dilation today    What are you currently using to see?  glasses and contacts - would like a contact lens eval today        Distance Vision Acuity: Satisfied with vision    Near Vision Acuity: Satisfied with vision while reading and using computer with glasses and contacts     Eye Comfort: mattery  Do you use eye drops? : No      Sammie Daniel - Optometric Assistant           Medical, surgical and family histories reviewed and updated 6/17/2025.       OBJECTIVE: See Ophthalmology exam    ASSESSMENT:    ICD-10-CM    1. Myopia of both eyes  H52.13       2. Dry eye  H04.129         Cut contact lens prescription by 0.25 OU  PLAN:   Recommend less wear time with contact lenses   to prevent infection  Artificial tears when they are out/ rewetting drops when they are in     Nury Bales OD

## 2025-06-17 NOTE — LETTER
6/17/2025      Cathy Arroyo  [address]  [facility] [address]      Dear Colleague,    Thank you for referring your patient, Cathy Arroyo, to the [facility]. Please see a copy of my visit note below.    Chief Complaint   Patient presents with     Annual Eye Exam        Last Eye Exam: 07/2023  Dilated Previously: Declined dilation today    What are you currently using to see?  glasses and contacts - would like a contact lens eval today        Distance Vision Acuity: Satisfied with vision    Near Vision Acuity: Satisfied with vision while reading and using computer with glasses and contacts     Eye Comfort: mattery  Do you use eye drops? : No      Sammie Daniel - Optometric Assistant           Medical, surgical and family histories reviewed and updated 6/17/2025.       OBJECTIVE: See Ophthalmology exam    ASSESSMENT:    ICD-10-CM    1. Myopia of both eyes  H52.13       2. Dry eye  H04.129         Cut contact lens prescription by 0.25 OU  PLAN:   Recommend less wear time with contact lenses   to prevent infection  Artificial tears when they are out/ rewetting drops when they are in     Nury Bales OD     Again, thank you for allowing me to participate in the care of your patient.        Sincerely,        Nury Bales, OD    Electronically signed

## 2025-06-18 ENCOUNTER — OFFICE VISIT (OUTPATIENT)
Dept: ORTHOPEDICS | Facility: CLINIC | Age: 45
End: 2025-06-18
Attending: FAMILY MEDICINE
Payer: COMMERCIAL

## 2025-06-18 DIAGNOSIS — M67.40 GANGLION: Primary | ICD-10-CM

## 2025-06-18 DIAGNOSIS — M72.2 PLANTAR FASCIITIS: ICD-10-CM

## 2025-06-18 PROCEDURE — 99204 OFFICE O/P NEW MOD 45 MIN: CPT | Performed by: PODIATRIST

## 2025-06-18 NOTE — PROGRESS NOTES
Date of Service: 6/18/2025  Chief Complaint   Patient presents with    Consult     Plantar fasciitis - right.     Marcos Dalal, DO  Ref Provider            HPI: Cathy is a 44 year old female who presents today for further evaluation of right foot plantar fasciitis. She notes that she has had the pain for about 7 months.  She saw Dr. Dalal in sports medicine.  Before she had seen him, she had an MRI of the right foot and ankle.  It was noted on MRI that she had moderate plantar fasciitis with partial thickness tearing of the proximal central cord and bone marrow edema.  She also had peroneal tenosynovitis.  She has sinus tarsi bursitis versus ganglion cyst as well.  She was wearing a cam boot at the time.  She saw Dr. Dalal again on March 18 and noted that the pain was continuing.  She saw him again 2 weeks later and her pain has started to decrease.  She still noted that she had pain in the ankle that radiated down into her foot.  She notes that the pain feels like a shocking pain in the bottom of the left foot that sometimes does not go up her leg.  She does note that over-the-counter orthotics do help.    Review of Systems: No nausea, vomiting, diarrhea, fever, chills, night sweats, shortness of breath, chest pain.    PMH:   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    Post partum depression     Post-surgical hypothyroidism 05/2017    Pre-eclampsia 10/14/2016    10/16/2018 - was induced for postdates and gestational hypertension with last pregnancy. No history of chronic hypertension. RX for aspirin sent and labs drawn at new OB visit.       Problem, psychiatric 2001 diagnosed Bipolar     Recurrent otitis media chronic ear infections in childhood, recently had a double ear infection followed by another ear infection soon after    Sleep apnea 2019    had a sleep study when pregnant with my second child    Uncomplicated asthma        PSxH:   Past Surgical History:   Procedure Laterality Date    BIOPSY  31 y/o colpos, and in the last 2 years for thyroid    Colposcopy, thyroid nodule biopsy twice    BLOOD PATCH N/A 10/11/2016    Procedure: EPIDURAL BLOOD PATCH;  Surgeon: GENERIC ANESTHESIA PROVIDER;  [facility]     SECTION, TUBAL LIGATION, COMBINED N/A 2019    Procedure:  SECTION, WITH TUBAL LIGATION;  Surgeon: Kathy Rutledge MD;  [facility] L[facility]    CRANIOTOMY, EXCISE TUMOR COMPLEX, COMBINED  2014    Procedure: COMBINED CRANIOTOMY, EXCISE TUMOR COMPLEX;  Supracerabellar  Infratentorial Approach for Resection of Tumor ;  Surgeon: Kate Mckeon MD;  [facility]    ENT SURGERY  2017    thyroidectomy    GYN SURGERY      colposcopy    THYROIDECTOMY N/A 2017    Procedure: THYROIDECTOMY;  Total Thyroidectomy;  Surgeon: Pamela Villasenor MD;  [facility]       Allergies: Adhesive tape, Nicotine, Tegaderm transparent dressing (informational only), Adacel [tetanus-diphth-acell pertussis], Ciprofloxacin, Codeine sulfate, Methimazole, Oxycodone-acetaminophen, Tetanus toxoid, Tetanus-diphtheria toxoids td, and Vicodin [hydrocodone-acetaminophen]    SH:   Social History     Socioeconomic History    Marital status:      Spouse name: Not on file    Number of children: Not on file    Years of education: Not on file    Highest education level: Not on file   Occupational History    Not on file   Tobacco Use    Smoking status: Every Day     Current packs/day: 0.25     Average packs/day: 0.3 packs/day for 20.5 years (5.6 ttl pk-yrs)     Types: Cigarettes     Start date: 2004     Last attempt to quit: 2022     Passive exposure:  Current    Smokeless tobacco: Never    Tobacco comments:     2/28/25 4-6 cigarettes per day per pt     1/7/25 6 cigarettes per day by Patient     10/15/24 5 cigarettes daily.     Stopped smoking for both pregnamcies and breastfeeding of both children     Working on quitting smoking (6/27/23)   Vaping Use    Vaping status: Former    Quit date: 11/28/2023    Substances: Nicotine, Flavoring    Devices: Disposable   Substance and Sexual Activity    Alcohol use: Yes     Alcohol/week: 4.0 standard drinks of alcohol     Types: 4 Drinks containing 0.5 oz of alcohol per week     Comment: one drink once a week    Drug use: Not Currently     Types: Marijuana, Psilocybin     Comment: medical marijuana    Sexual activity: Yes     Partners: Male     Birth control/protection: Female Surgical, None   Other Topics Concern    Parent/sibling w/ CABG, MI or angioplasty before 65F 55M? No   Social History Narrative    Caffeine intake/servings daily - 1    Calcium intake/servings daily - 3    Exercise 5 times weekly - describe ; encouraged walking daily    Sunscreen used - Yes    Seatbelts used - Yes    Guns stored in the home - No    Self Breast Exam - Yes    Pap test up to date -  No    Eye exam up to date -  No    Dental exam up to date -  No    DEXA scan up to date -  No    Flex Sig/Colonoscopy up to date -  No    Mammography up to date -  No    Immunizations reviewed and up to date - Yes    Abuse: Current or Past (Physical, Sexual or Emotional) - No    Do you feel safe in your environment - Yes    Do you cope well with stress - Yes    Do you suffer from insomnia - No             Social Drivers of Health     Financial Resource Strain: Low Risk  (10/8/2024)    Financial Resource Strain     Within the past 12 months, have you or your family members you live with been unable to get utilities (heat, electricity) when it was really needed?: No   Food Insecurity: Low Risk  (10/8/2024)    Food Insecurity     Within the past 12 months, did  you worry that your food would run out before you got money to buy more?: No     Within the past 12 months, did the food you bought just not last and you didn t have money to get more?: No   Transportation Needs: Low Risk  (10/8/2024)    Transportation Needs     Within the past 12 months, has lack of transportation kept you from medical appointments, getting your medicines, non-medical meetings or appointments, work, or from getting things that you need?: No   Physical Activity: Inactive (10/8/2024)    Exercise Vital Sign     Days of Exercise per Week: 0 days     Minutes of Exercise per Session: 0 min   Stress: Stress Concern Present (10/8/2024)    [employer] [place] [employer] - Occupational Stress Questionnaire     Feeling of Stress : To some extent   Social Connections: Unknown (10/8/2024)    Social Connection and Isolation Panel [NHANES]     Frequency of Communication with Friends and Family: Not on file     Frequency of Social Gatherings with Friends and Family: Three times a week     Attends Evangelical Services: Not on file     Active Member of Clubs or Organizations: Not on file     Attends Club or Organization Meetings: Not on file     Marital Status: Not on file   Interpersonal Safety: Low Risk  (10/9/2024)    Interpersonal Safety     Do you feel physically and emotionally safe where you currently live?: Yes     Within the past 12 months, have you been hit, slapped, kicked or otherwise physically hurt by someone?: No     Within the past 12 months, have you been humiliated or emotionally abused in other ways by your partner or ex-partner?: No   Housing Stability: Low Risk  (10/8/2024)    Housing Stability     Do you have housing? : Yes     Are you worried about losing your housing?: No       FH:   Family History   Problem Relation Age of Onset    Other Cancer Mother 62        salivary gland cancer    Genitourinary Problems Mother     Bipolar Disorder Mother         unipolar depression    Depression  Mother     Thyroid Disease Mother         benign tumor    Melanoma Mother     Cancer Mother         salivary gland cancer    Migraines Mother     Skin Cancer Mother     Asthma Father     Mental Illness Father         Bipolar 2    Obesity Father     Colon Polyps Father 64    Bipolar Disorder Brother         unipolar depression    Asthma Brother     Depression Brother     Asthma Maternal Grandmother     Osteoporosis Maternal Grandmother     Glaucoma Maternal Grandmother     Hypertension Maternal Grandmother     Macular Degeneration Maternal Grandmother     Skin Cancer Maternal Grandmother     Prostate Cancer Maternal Grandfather 69        no history of smoking    Bladder Cancer Maternal Grandfather 71    Colon Cancer Maternal Grandfather 70    Diabetes Paternal Grandfather     Breast Cancer Maternal Aunt 37        negative BRCA1/2 testing    Melanoma Maternal Aunt 64    Thyroid Disease Paternal Aunt     Breast Cancer Paternal Aunt 58        bilateral; negative testing    Cancer Paternal Aunt     Breast Cancer Other         paternal great aunt       Objective:  Data Unavailable Data Unavailable Data Unavailable Data Unavailable Data Unavailable 0 lbs 0 oz    PT and DP pulses are 2/4 bilaterally. CRT is instant. Positive pedal hair.   Gross sensation is intact bilaterally. Positive Tinel's sign with percussion of the right tibial nerve.  Equinus is noted bilaterally. No pain with active or passive ROM of the ankle, MTJ, 1st ray, or halluces bilaterally,.  Some pain noted with palpation in the sinus tarsi on the right foot.  No pain with subtalar joint range of motion.  Pain is noted with palpation of the plantar calcaneus.  No pain along the course of the PT, peroneal, Achilles tendons bilaterally.  Nails normal bilaterally. No open lesions are noted.     Assessment:   Encounter Diagnoses   Name Primary?    Ganglion Yes    Plantar fasciitis          Plan:  - Pt seen and evaluated.  - Because of the tear in the fascia, I  would recommend holding off on steroid injection for now. I recommend CMOs. She agrees to this. I molded these and sent them to the lab.  - Activity as tolerated.  - See again in 6 weeks.   On the date of service has been 45 minutes with patient care, documentation, chart/image review, and care coordination.

## 2025-06-18 NOTE — LETTER
6/18/2025      Cathy Arroyo  [address]  [facility] [address]      Dear Colleague,    Thank you for referring your patient, Cathy Arroyo, to the [facility] [place]. Please see a copy of my visit note below.    Date of Service: 6/18/2025  Chief Complaint   Patient presents with     Consult     Plantar fasciitis - right.     Marcos Dalal, DO  Ref Provider            HPI: Cathy is a 44 year old female who presents today for further evaluation of right foot plantar fasciitis. She notes that she has had the pain for about 7 months.  She saw Dr. Dalal in sports medicine.  Before she had seen him, she had an MRI of the right foot and ankle.  It was noted on MRI that she had moderate plantar fasciitis with partial thickness tearing of the proximal central cord and bone marrow edema.  She also had peroneal tenosynovitis.  She has sinus tarsi bursitis versus ganglion cyst as well.  She was wearing a cam boot at the time.  She saw Dr. Dalal again on March 18 and noted that the pain was continuing.  She saw him again 2 weeks later and her pain has started to decrease.  She still noted that she had pain in the ankle that radiated down into her foot.  She notes that the pain feels like a shocking pain in the bottom of the left foot that sometimes does not go up her leg.  She does note that over-the-counter orthotics do help.    Review of Systems: No nausea, vomiting, diarrhea, fever, chills, night sweats, shortness of breath, chest pain.    PMH:   Past Medical History:   Diagnosis Date     Allergic rhinitis 1990     Allergic state      Anxiety      Bipolar 1 disorder (H)      Depressive disorder      Elevated cholesterol      Graves disease 2017     Hearing problem      Migraines 2/2019    have suffered from migraines since having brain surgery     Obese     BMI 37.48     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     Post partum depression      Post-surgical hypothyroidism  2017     Pre-eclampsia 10/14/2016    10/16/2018 - was induced for postdates and gestational hypertension with last pregnancy. No history of chronic hypertension. RX for aspirin sent and labs drawn at new OB visit.        Problem, psychiatric  diagnosed Bipolar     Recurrent otitis media chronic ear infections in childhood, recently had a double ear infection followed by another ear infection soon after     Sleep apnea 2019    had a sleep study when pregnant with my second child     Uncomplicated asthma        PSxH:   Past Surgical History:   Procedure Laterality Date     BIOPSY  31 y/o colpos, and in the last 2 years for thyroid    Colposcopy, thyroid nodule biopsy twice     BLOOD PATCH N/A 10/11/2016    Procedure: EPIDURAL BLOOD PATCH;  Surgeon: GENERIC ANESTHESIA PROVIDER;  [facility]      SECTION, TUBAL LIGATION, COMBINED N/A 2019    Procedure:  SECTION, WITH TUBAL LIGATION;  Surgeon: Kathy Rutledge MD;  [facility] L[facility]     CRANIOTOMY, EXCISE TUMOR COMPLEX, COMBINED  2014    Procedure: COMBINED CRANIOTOMY, EXCISE TUMOR COMPLEX;  Supracerabellar  Infratentorial Approach for Resection of Tumor ;  Surgeon: Kate Mckeon MD;  [facility]     ENT SURGERY  2017    thyroidectomy     GYN SURGERY      colposcopy     THYROIDECTOMY N/A 2017    Procedure: THYROIDECTOMY;  Total Thyroidectomy;  Surgeon: Pamela Villasenor MD;  [facility]       Allergies: Adhesive tape, Nicotine, Tegaderm transparent dressing (informational only), Adacel [tetanus-diphth-acell pertussis], Ciprofloxacin, Codeine sulfate, Methimazole, Oxycodone-acetaminophen, Tetanus toxoid, Tetanus-diphtheria toxoids td, and Vicodin [hydrocodone-acetaminophen]    SH:   Social History     Socioeconomic History     Marital status:      Spouse name: Not on file     Number of children: Not on file     Years of education: Not on file     Highest education level: Not on file    Occupational History     Not on file   Tobacco Use     Smoking status: Every Day     Current packs/day: 0.25     Average packs/day: 0.3 packs/day for 20.5 years (5.6 ttl pk-yrs)     Types: Cigarettes     Start date: 1/21/2004     Last attempt to quit: 1/25/2022     Passive exposure: Current     Smokeless tobacco: Never     Tobacco comments:     2/28/25 4-6 cigarettes per day per pt     1/7/25 6 cigarettes per day by Patient     10/15/24 5 cigarettes daily.     Stopped smoking for both pregnamcies and breastfeeding of both children     Working on quitting smoking (6/27/23)   Vaping Use     Vaping status: Former     Quit date: 11/28/2023     Substances: Nicotine, Flavoring     Devices: Disposable   Substance and Sexual Activity     Alcohol use: Yes     Alcohol/week: 4.0 standard drinks of alcohol     Types: 4 Drinks containing 0.5 oz of alcohol per week     Comment: one drink once a week     Drug use: Not Currently     Types: Marijuana, Psilocybin     Comment: medical marijuana     Sexual activity: Yes     Partners: Male     Birth control/protection: Female Surgical, None   Other Topics Concern     Parent/sibling w/ CABG, MI or angioplasty before 65F 55M? No   Social History Narrative    Caffeine intake/servings daily - 1    Calcium intake/servings daily - 3    Exercise 5 times weekly - describe ; encouraged walking daily    Sunscreen used - Yes    Seatbelts used - Yes    Guns stored in the home - No    Self Breast Exam - Yes    Pap test up to date -  No    Eye exam up to date -  No    Dental exam up to date -  No    DEXA scan up to date -  No    Flex Sig/Colonoscopy up to date -  No    Mammography up to date -  No    Immunizations reviewed and up to date - Yes    Abuse: Current or Past (Physical, Sexual or Emotional) - No    Do you feel safe in your environment - Yes    Do you cope well with stress - Yes    Do you suffer from insomnia - No             Social Drivers of Health     Financial Resource Strain: Low Risk   (10/8/2024)    Financial Resource Strain      Within the past 12 months, have you or your family members you live with been unable to get utilities (heat, electricity) when it was really needed?: No   Food Insecurity: Low Risk  (10/8/2024)    Food Insecurity      Within the past 12 months, did you worry that your food would run out before you got money to buy more?: No      Within the past 12 months, did the food you bought just not last and you didn t have money to get more?: No   Transportation Needs: Low Risk  (10/8/2024)    Transportation Needs      Within the past 12 months, has lack of transportation kept you from medical appointments, getting your medicines, non-medical meetings or appointments, work, or from getting things that you need?: No   Physical Activity: Inactive (10/8/2024)    Exercise Vital Sign      Days of Exercise per Week: 0 days      Minutes of Exercise per Session: 0 min   Stress: Stress Concern Present (10/8/2024)    [employer] [place] [employer] - Occupational Stress Questionnaire      Feeling of Stress : To some extent   Social Connections: Unknown (10/8/2024)    Social Connection and Isolation Panel [NHANES]      Frequency of Communication with Friends and Family: Not on file      Frequency of Social Gatherings with Friends and Family: Three times a week      Attends Scientologist Services: Not on file      Active Member of Clubs or Organizations: Not on file      Attends Club or Organization Meetings: Not on file      Marital Status: Not on file   Interpersonal Safety: Low Risk  (10/9/2024)    Interpersonal Safety      Do you feel physically and emotionally safe where you currently live?: Yes      Within the past 12 months, have you been hit, slapped, kicked or otherwise physically hurt by someone?: No      Within the past 12 months, have you been humiliated or emotionally abused in other ways by your partner or ex-partner?: No   Housing Stability: Low Risk  (10/8/2024)     Housing Stability      Do you have housing? : Yes      Are you worried about losing your housing?: No       FH:   Family History   Problem Relation Age of Onset     Other Cancer Mother 62        salivary gland cancer     Genitourinary Problems Mother      Bipolar Disorder Mother         unipolar depression     Depression Mother      Thyroid Disease Mother         benign tumor     Melanoma Mother      Cancer Mother         salivary gland cancer     Migraines Mother      Skin Cancer Mother      Asthma Father      Mental Illness Father         Bipolar 2     Obesity Father      Colon Polyps Father 64     Bipolar Disorder Brother         unipolar depression     Asthma Brother      Depression Brother      Asthma Maternal Grandmother      Osteoporosis Maternal Grandmother      Glaucoma Maternal Grandmother      Hypertension Maternal Grandmother      Macular Degeneration Maternal Grandmother      Skin Cancer Maternal Grandmother      Prostate Cancer Maternal Grandfather 69        no history of smoking     Bladder Cancer Maternal Grandfather 71     Colon Cancer Maternal Grandfather 70     Diabetes Paternal Grandfather      Breast Cancer Maternal Aunt 37        negative BRCA1/2 testing     Melanoma Maternal Aunt 64     Thyroid Disease Paternal Aunt      Breast Cancer Paternal Aunt 58        bilateral; negative testing     Cancer Paternal Aunt      Breast Cancer Other         paternal great aunt       Objective:  Data Unavailable Data Unavailable Data Unavailable Data Unavailable Data Unavailable 0 lbs 0 oz    PT and DP pulses are 2/4 bilaterally. CRT is instant. Positive pedal hair.   Gross sensation is intact bilaterally. Positive Tinel's sign with percussion of the right tibial nerve.  Equinus is noted bilaterally. No pain with active or passive ROM of the ankle, MTJ, 1st ray, or halluces bilaterally,.  Some pain noted with palpation in the sinus tarsi on the right foot.  No pain with subtalar joint range of motion.  Pain  is noted with palpation of the plantar calcaneus.  No pain along the course of the PT, peroneal, Achilles tendons bilaterally.  Nails normal bilaterally. No open lesions are noted.     Assessment:   Encounter Diagnoses   Name Primary?     Ganglion Yes     Plantar fasciitis          Plan:  - Pt seen and evaluated.  - Because of the tear in the fascia, I would recommend holding off on steroid injection for now. I recommend CMOs. She agrees to this. I molded these and sent them to the lab.  - Activity as tolerated.  - See again in 6 weeks.   On the date of service has been 45 minutes with patient care, documentation, chart/image review, and care coordination.          Again, thank you for allowing me to participate in the care of your patient.        Sincerely,        Adonis Colón DPM    Electronically signed

## 2025-06-22 ENCOUNTER — HOSPITAL ENCOUNTER (EMERGENCY)
Facility: CLINIC | Age: 45
Discharge: HOME | End: 2025-06-22
Attending: EMERGENCY MEDICINE | Admitting: EMERGENCY MEDICINE
Payer: COMMERCIAL

## 2025-06-22 VITALS
SYSTOLIC BLOOD PRESSURE: 137 MMHG | DIASTOLIC BLOOD PRESSURE: 82 MMHG | HEIGHT: 68 IN | HEART RATE: 81 BPM | WEIGHT: 250 LBS | TEMPERATURE: 97 F | BODY MASS INDEX: 37.89 KG/M2 | OXYGEN SATURATION: 98 % | RESPIRATION RATE: 16 BRPM

## 2025-06-22 DIAGNOSIS — T67.5XXA HEAT EXHAUSTION, INITIAL ENCOUNTER: ICD-10-CM

## 2025-06-22 DIAGNOSIS — R42 ORTHOSTATIC LIGHTHEADEDNESS: ICD-10-CM

## 2025-06-22 LAB
ALBUMIN UR-MCNC: NEGATIVE MG/DL
ANION GAP SERPL CALCULATED.3IONS-SCNC: 12 MMOL/L (ref 7–15)
APPEARANCE UR: CLEAR
ATRIAL RATE - MUSE: 71 BPM
BACTERIA #/AREA URNS HPF: ABNORMAL /HPF
BASOPHILS # BLD AUTO: 0 10E3/UL (ref 0–0.2)
BASOPHILS NFR BLD AUTO: 1 %
BILIRUB UR QL STRIP: NEGATIVE
BUN SERPL-MCNC: 8.6 MG/DL (ref 6–20)
CALCIUM SERPL-MCNC: 9 MG/DL (ref 8.8–10.4)
CHLORIDE SERPL-SCNC: 103 MMOL/L (ref 98–107)
CLUE CELLS: ABNORMAL
COLOR UR AUTO: ABNORMAL
CREAT SERPL-MCNC: 0.67 MG/DL (ref 0.51–0.95)
DIASTOLIC BLOOD PRESSURE - MUSE: NORMAL MMHG
EGFRCR SERPLBLD CKD-EPI 2021: >90 ML/MIN/1.73M2
EOSINOPHIL # BLD AUTO: 0.1 10E3/UL (ref 0–0.7)
EOSINOPHIL NFR BLD AUTO: 1 %
ERYTHROCYTE [DISTWIDTH] IN BLOOD BY AUTOMATED COUNT: 13.2 % (ref 10–15)
GLUCOSE SERPL-MCNC: 99 MG/DL (ref 70–99)
GLUCOSE UR STRIP-MCNC: NEGATIVE MG/DL
HCG UR QL: NEGATIVE
HCO3 SERPL-SCNC: 21 MMOL/L (ref 22–29)
HCT VFR BLD AUTO: 38.4 % (ref 35–47)
HGB BLD-MCNC: 12.9 G/DL (ref 11.7–15.7)
HGB UR QL STRIP: NEGATIVE
IMM GRANULOCYTES # BLD: 0 10E3/UL
IMM GRANULOCYTES NFR BLD: 0 %
INTERPRETATION ECG - MUSE: NORMAL
KETONES UR STRIP-MCNC: NEGATIVE MG/DL
LEUKOCYTE ESTERASE UR QL STRIP: NEGATIVE
LYMPHOCYTES # BLD AUTO: 2.6 10E3/UL (ref 0.8–5.3)
LYMPHOCYTES NFR BLD AUTO: 29 %
MAGNESIUM SERPL-MCNC: 2.1 MG/DL (ref 1.7–2.3)
MCH RBC QN AUTO: 29.3 PG (ref 26.5–33)
MCHC RBC AUTO-ENTMCNC: 33.6 G/DL (ref 31.5–36.5)
MCV RBC AUTO: 87 FL (ref 78–100)
MONOCYTES # BLD AUTO: 0.6 10E3/UL (ref 0–1.3)
MONOCYTES NFR BLD AUTO: 6 %
NEUTROPHILS # BLD AUTO: 5.6 10E3/UL (ref 1.6–8.3)
NEUTROPHILS NFR BLD AUTO: 63 %
NITRATE UR QL: NEGATIVE
NRBC # BLD AUTO: 0 10E3/UL
NRBC BLD AUTO-RTO: 0 /100
P AXIS - MUSE: 37 DEGREES
PH UR STRIP: 6 [PH] (ref 5–7)
PLATELET # BLD AUTO: 285 10E3/UL (ref 150–450)
POTASSIUM SERPL-SCNC: 3.5 MMOL/L (ref 3.4–5.3)
PR INTERVAL - MUSE: 158 MS
QRS DURATION - MUSE: 96 MS
QT - MUSE: 402 MS
QTC - MUSE: 436 MS
R AXIS - MUSE: 58 DEGREES
RBC # BLD AUTO: 4.41 10E6/UL (ref 3.8–5.2)
RBC URINE: 0 /HPF
SODIUM SERPL-SCNC: 136 MMOL/L (ref 135–145)
SP GR UR STRIP: 1 (ref 1–1.03)
SQUAMOUS EPITHELIAL: 5 /HPF
SYSTOLIC BLOOD PRESSURE - MUSE: NORMAL MMHG
T AXIS - MUSE: 28 DEGREES
TRICHOMONAS, WET PREP: ABNORMAL
UROBILINOGEN UR STRIP-MCNC: NORMAL MG/DL
VENTRICULAR RATE- MUSE: 71 BPM
WBC # BLD AUTO: 8.9 10E3/UL (ref 4–11)
WBC URINE: 1 /HPF
WBC'S/HIGH POWER FIELD, WET PREP: ABNORMAL
YEAST, WET PREP: ABNORMAL

## 2025-06-22 PROCEDURE — 87591 N.GONORRHOEAE DNA AMP PROB: CPT | Performed by: EMERGENCY MEDICINE

## 2025-06-22 PROCEDURE — 80048 BASIC METABOLIC PNL TOTAL CA: CPT | Performed by: EMERGENCY MEDICINE

## 2025-06-22 PROCEDURE — 81025 URINE PREGNANCY TEST: CPT | Performed by: EMERGENCY MEDICINE

## 2025-06-22 PROCEDURE — 85025 COMPLETE CBC W/AUTO DIFF WBC: CPT | Performed by: EMERGENCY MEDICINE

## 2025-06-22 PROCEDURE — 258N000003 HC RX IP 258 OP 636: Performed by: EMERGENCY MEDICINE

## 2025-06-22 PROCEDURE — 99284 EMERGENCY DEPT VISIT MOD MDM: CPT | Mod: 25

## 2025-06-22 PROCEDURE — 36415 COLL VENOUS BLD VENIPUNCTURE: CPT | Performed by: EMERGENCY MEDICINE

## 2025-06-22 PROCEDURE — 96374 THER/PROPH/DIAG INJ IV PUSH: CPT

## 2025-06-22 PROCEDURE — 96361 HYDRATE IV INFUSION ADD-ON: CPT

## 2025-06-22 PROCEDURE — 87491 CHLMYD TRACH DNA AMP PROBE: CPT | Performed by: EMERGENCY MEDICINE

## 2025-06-22 PROCEDURE — 87210 SMEAR WET MOUNT SALINE/INK: CPT | Performed by: EMERGENCY MEDICINE

## 2025-06-22 PROCEDURE — 250N000011 HC RX IP 250 OP 636: Performed by: EMERGENCY MEDICINE

## 2025-06-22 PROCEDURE — 83735 ASSAY OF MAGNESIUM: CPT | Performed by: EMERGENCY MEDICINE

## 2025-06-22 PROCEDURE — 81001 URINALYSIS AUTO W/SCOPE: CPT | Performed by: EMERGENCY MEDICINE

## 2025-06-22 RX ORDER — ONDANSETRON 2 MG/ML
4 INJECTION INTRAMUSCULAR; INTRAVENOUS EVERY 30 MIN PRN
Status: DISCONTINUED | OUTPATIENT
Start: 2025-06-22 | End: 2025-06-22 | Stop reason: HOSPADM

## 2025-06-22 RX ADMIN — ONDANSETRON 4 MG: 2 INJECTION, SOLUTION INTRAMUSCULAR; INTRAVENOUS at 16:14

## 2025-06-22 RX ADMIN — SODIUM CHLORIDE 1000 ML: 0.9 INJECTION, SOLUTION INTRAVENOUS at 16:14

## 2025-06-22 ASSESSMENT — ACTIVITIES OF DAILY LIVING (ADL)
ADLS_ACUITY_SCORE: 46
ADLS_ACUITY_SCORE: 46

## 2025-06-22 NOTE — DISCHARGE INSTRUCTIONS
Drink plenty of fluids at home and rest.    Please return to the emergency department as needed for new or worsening symptoms including fainting, vomiting unable to keep anything down, severe confusion, any other concerning symptoms.

## 2025-06-22 NOTE — ED TRIAGE NOTES
Pt has been fatigued and feels tired and overheated   Pt states she feels like she is going to pass out and is slightly confused   Pt has been moving lately       Pt also admits to THC use

## 2025-06-22 NOTE — ED PROVIDER NOTES
"  Emergency Department Note      History of Present Illness     Chief Complaint   Dehydration (/)      HPI   Cathy Arroyo is a 44 year old female on Xarelto for PE with past medical history significant for PE who presents via car from home with chief complant of dehydration. The patient states she just moved 2 days ago (25). Since then she has been feeling fatigued and overheated. She states she feels \"cognitively delayed\" and has had intermittent blurry vision. She notes she has had heat exhaustion before and that this episode feels worse. She endorses lightheadedness. She has had urinary symptoms significant for increased frequency. No chest pain or shortness of breath. No chills. She endorses tobacco and cannabis use 2 days ago.     Independent Historian   None    Review of External Notes   I reviewed a family practice note from 25.    Past Medical History     Medical History and Problem List   Pineocytoma  Attention deficit disorder  Bipolar affective disorder  Asthma  Tobacco use disorder  Grave's disease  Migraine  Hypothyroidism  Medical cannabis use  Morbid obesity  Urticarial vasculitis  Lumbar radiculopathy  Bronchopneumonia  Pulmonary embolism  Asymmetrical sensorineural hearing loss  Tinnitus  Pneumonia  Anemia  Menorrhagia  Sleep apnea    Medications   Adderall  Albuterol  Aripiprazole  Diazepam  Levonorgestrel  Levothyroxine  Medical cannabis  Xarelto  Rizatriptan    Surgical History     Craniotomy  Thyroidectomy      Physical Exam     Patient Vitals for the past 24 hrs:   BP Temp Temp src Pulse Resp SpO2 Height Weight   25 1510 137/82 97  F (36.1  C) Temporal 81 16 98 % 1.727 m (5' 8\") 113.4 kg (250 lb)     Physical Exam  Constitutional: Well developed, nontox appearance  Head: Atraumatic.   Mouth/Throat: Oropharynx is clear and moist.   Neck:  no stridor  Eyes: no scleral icterus, PERRL, EOMI  Cardiovascular: RRR, 2+ bilat radial pulses  Pulmonary/Chest: nml resp effort, " Clear BS bilat  Abdominal: ND, soft, NT, no rebound or guarding   Ext: Warm, well perfused, no edema  Neurological: A&O, symmetric facies, moves ext x4  Skin: Skin is warm and dry.   Psychiatric: Anxious  Nursing note and vitals reviewed.      Diagnostics     Lab Results   Labs Ordered and Resulted from Time of ED Arrival to Time of ED Departure   BASIC METABOLIC PANEL - Abnormal       Result Value    Sodium 136      Potassium 3.5      Chloride 103      Carbon Dioxide (CO2) 21 (*)     Anion Gap 12      Urea Nitrogen 8.6      Creatinine 0.67      GFR Estimate >90      Calcium 9.0      Glucose 99     ROUTINE UA WITH MICROSCOPIC REFLEX TO CULTURE - Abnormal    Color Urine Light Yellow      Appearance Urine Clear      Glucose Urine Negative      Bilirubin Urine Negative      Ketones Urine Negative      Specific Gravity Urine 1.005      Blood Urine Negative      pH Urine 6.0      Protein Albumin Urine Negative      Urobilinogen Urine Normal      Nitrite Urine Negative      Leukocyte Esterase Urine Negative      Bacteria Urine Few (*)     RBC Urine 0      WBC Urine 1      Squamous Epithelials Urine 5 (*)    WET PREPARATION - Abnormal    Trichomonas Absent      Yeast Absent      Clue Cells Absent      WBCs/high power field 1+ (*)    HCG QUALITATIVE URINE - Normal    hCG Urine Qualitative Negative     MAGNESIUM - Normal    Magnesium 2.1     CBC WITH PLATELETS AND DIFFERENTIAL    WBC Count 8.9      RBC Count 4.41      Hemoglobin 12.9      Hematocrit 38.4      MCV 87      MCH 29.3      MCHC 33.6      RDW 13.2      Platelet Count 285      % Neutrophils 63      % Lymphocytes 29      % Monocytes 6      % Eosinophils 1      % Basophils 1      % Immature Granulocytes 0      NRBCs per 100 WBC 0      Absolute Neutrophils 5.6      Absolute Lymphocytes 2.6      Absolute Monocytes 0.6      Absolute Eosinophils 0.1      Absolute Basophils 0.0      Absolute Immature Granulocytes 0.0      Absolute NRBCs 0.0     CHLAMYDIA TRACHOMATIS PCR    NEISSERIA GONORRHOEAE PCR       Imaging   No orders to display       EKG     ECG results from 06/22/25   EKG 12-lead, tracing only     Value    Systolic Blood Pressure     Diastolic Blood Pressure     Ventricular Rate 71    Atrial Rate 71    SD Interval 158    QRS Duration 96        QTc 436    P Axis 37    R AXIS 58    T Axis 28    Interpretation ECG      Sinus rhythm  Nonspecific ST abnormality  Abnormal ECG  No changes as compared to prior, dated 09/20/24.  I reviewed at 1607, 06/22/25.       *Note: Due to a large number of results and/or encounters for the requested time period, some results have not been displayed. A complete set of results can be found in Results Review.      Independent Interpretation   EKG as noted above    ED Course      Medications Administered   Medications   ondansetron (ZOFRAN) injection 4 mg (4 mg Intravenous $Given 6/22/25 1614)   sodium chloride 0.9% BOLUS 1,000 mL (1,000 mLs Intravenous $New Bag 6/22/25 1614)       Procedures   Procedures     Discussion of Management   None    ED Course   ED Course as of 06/22/25 1727   Sun Jun 22, 2025   1527 I obtained history and examined the patient as noted above.    1543 I rechecked and updated the patient.    1545 Per RN, the patient would like STD testing.    1727 I rechecked and updated the patient. We discussed plan for discharge and the patient is comfortable with this.        Additional Documentation  Social Determinants of Health: include marijuana use.    Medical Decision Making / Diagnosis     CMS Diagnoses: None    MIPS   None               MDM   Cathy Arroyo is a 44 year old female presenting with lightheadedness,, subjective confusion    Differential diagnosis includes heat exhaustion related illness, electrolyte abnormality, dehydration, arrhythmia, pregnancy.  Vital signs reassuring.  Patient seems somewhat anxious.  Doubt CVA, angitis, encephalitis, history and physical exam.  Labs significant for minimal metabolic  acidosis otherwise unremarkable.  Interventions as noted above with significant improvement in symptoms.  STI testing pending and patient was informed that she will call if they returned abnormal.  Wet prep unremarkable other than WBCs.  Given improvement in symptoms and reassuring labs, at this time I feel the pt is safe for discharge.  Recommendations given regarding follow up with PCP and return to the emergency department as needed for new or worsening symptoms.  Pt counseled on all results, disposition and diagnosis.  They are understanding and agreeable to plan. Patient discharged in improved condition.        Disposition   The patient was discharged.     Diagnosis     ICD-10-CM    1. Heat exhaustion, initial encounter  T67.5XXA       2. Orthostatic lightheadedness  R42            Discharge Medications   New Prescriptions    No medications on file     Scribe Disclosure:  I, Scott Pacheco, am serving as a scribe at 3:49 PM on 6/22/2025 to document services personally performed by Hamilton Sherman MD based on my observations and the provider's statements to me.        Hamilton Sherman MD  06/22/25 3891

## 2025-06-23 LAB
C TRACH DNA SPEC QL NAA+PROBE: NEGATIVE
N GONORRHOEA DNA SPEC QL NAA+PROBE: NEGATIVE
SPECIMEN TYPE: NORMAL
SPECIMEN TYPE: NORMAL

## 2025-06-24 NOTE — PROGRESS NOTES
33w2d  Was hospitalized for severe ear infection. Finishes course of abx tomorrow. Feels a whole lot better now. Suspects the baby is breech again. Has ultrasounds scheduled in near future to f/u on polyhydramonios and fetal position. Would refer to OB/GYN if baby continues to be breech. Reports good fetal movement. Denies leaking of fluid, vaginal bleeding, regular uterine contractions, headache or other concerns.  RTC in 2 wks SCOTT  
Quality 485: Psoriasis - Improvement In Patient-Reported Itch Severity: Itch severity assessment score is reduced by 3 or more points from the initial (index) assessment score to the follow-up visit score
Detail Level: Detailed

## 2025-06-26 ENCOUNTER — HOSPITAL ENCOUNTER (EMERGENCY)
Facility: CLINIC | Age: 45
Discharge: HOME | End: 2025-06-26
Attending: INTERNAL MEDICINE
Payer: COMMERCIAL

## 2025-06-26 VITALS
DIASTOLIC BLOOD PRESSURE: 88 MMHG | BODY MASS INDEX: 36.95 KG/M2 | SYSTOLIC BLOOD PRESSURE: 122 MMHG | TEMPERATURE: 98.2 F | OXYGEN SATURATION: 98 % | WEIGHT: 243 LBS | RESPIRATION RATE: 18 BRPM | HEART RATE: 66 BPM

## 2025-06-26 DIAGNOSIS — R42 DIZZINESS: ICD-10-CM

## 2025-06-26 DIAGNOSIS — R06.00 DYSPNEA, UNSPECIFIED TYPE: ICD-10-CM

## 2025-06-26 DIAGNOSIS — Z86.59 HISTORY OF BIPOLAR DISORDER: ICD-10-CM

## 2025-06-26 PROBLEM — F41.9 ANXIETY DISORDER, UNSPECIFIED: Status: ACTIVE | Noted: 2025-06-26

## 2025-06-26 LAB
ALBUMIN SERPL BCG-MCNC: 4 G/DL (ref 3.5–5.2)
ALBUMIN UR-MCNC: NEGATIVE MG/DL
ALP SERPL-CCNC: 67 U/L (ref 40–150)
ALT SERPL W P-5'-P-CCNC: 20 U/L (ref 0–50)
AMPHETAMINES UR QL SCN: ABNORMAL
ANION GAP SERPL CALCULATED.3IONS-SCNC: 18 MMOL/L (ref 7–15)
APPEARANCE UR: CLEAR
AST SERPL W P-5'-P-CCNC: 39 U/L (ref 0–45)
ATRIAL RATE - MUSE: 83 BPM
BACTERIA #/AREA URNS HPF: ABNORMAL /HPF
BARBITURATES UR QL SCN: ABNORMAL
BASE EXCESS BLDV CALC-SCNC: -1 MMOL/L (ref -3–3)
BASOPHILS # BLD AUTO: 0 10E3/UL (ref 0–0.2)
BASOPHILS NFR BLD AUTO: 0 %
BENZODIAZ UR QL SCN: ABNORMAL
BILIRUB SERPL-MCNC: 0.4 MG/DL
BILIRUB UR QL STRIP: NEGATIVE
BUN SERPL-MCNC: 6.9 MG/DL (ref 6–20)
BZE UR QL SCN: ABNORMAL
CALCIUM SERPL-MCNC: 9.1 MG/DL (ref 8.8–10.4)
CANNABINOIDS UR QL SCN: ABNORMAL
CHLORIDE SERPL-SCNC: 103 MMOL/L (ref 98–107)
COLOR UR AUTO: ABNORMAL
CREAT SERPL-MCNC: 0.68 MG/DL (ref 0.51–0.95)
DIASTOLIC BLOOD PRESSURE - MUSE: NORMAL MMHG
EGFRCR SERPLBLD CKD-EPI 2021: >90 ML/MIN/1.73M2
EOSINOPHIL # BLD AUTO: 0 10E3/UL (ref 0–0.7)
EOSINOPHIL NFR BLD AUTO: 0 %
ERYTHROCYTE [DISTWIDTH] IN BLOOD BY AUTOMATED COUNT: 13.2 % (ref 10–15)
FENTANYL UR QL: ABNORMAL
FLUAV RNA SPEC QL NAA+PROBE: NEGATIVE
FLUBV RNA RESP QL NAA+PROBE: NEGATIVE
GLUCOSE SERPL-MCNC: 98 MG/DL (ref 70–99)
GLUCOSE UR STRIP-MCNC: NEGATIVE MG/DL
HCG SERPL QL: NEGATIVE
HCO3 BLDV-SCNC: 23 MMOL/L (ref 21–28)
HCO3 SERPL-SCNC: 17 MMOL/L (ref 22–29)
HCT VFR BLD AUTO: 37.3 % (ref 35–47)
HGB BLD-MCNC: 12.8 G/DL (ref 11.7–15.7)
HGB UR QL STRIP: NEGATIVE
HOLD SPECIMEN: NORMAL
IMM GRANULOCYTES # BLD: 0 10E3/UL
IMM GRANULOCYTES NFR BLD: 0 %
INR PPP: 1.36 (ref 0.85–1.15)
INTERPRETATION ECG - MUSE: NORMAL
KETONES UR STRIP-MCNC: NEGATIVE MG/DL
LACTATE BLD-SCNC: 0.4 MMOL/L (ref 0.7–2)
LEUKOCYTE ESTERASE UR QL STRIP: NEGATIVE
LYMPHOCYTES # BLD AUTO: 2.6 10E3/UL (ref 0.8–5.3)
LYMPHOCYTES NFR BLD AUTO: 26 %
MCH RBC QN AUTO: 29.2 PG (ref 26.5–33)
MCHC RBC AUTO-ENTMCNC: 34.3 G/DL (ref 31.5–36.5)
MCV RBC AUTO: 85 FL (ref 78–100)
MONOCYTES # BLD AUTO: 0.5 10E3/UL (ref 0–1.3)
MONOCYTES NFR BLD AUTO: 5 %
MUCOUS THREADS #/AREA URNS LPF: PRESENT /LPF
NEUTROPHILS # BLD AUTO: 7 10E3/UL (ref 1.6–8.3)
NEUTROPHILS NFR BLD AUTO: 68 %
NITRATE UR QL: NEGATIVE
NRBC # BLD AUTO: 0 10E3/UL
NRBC BLD AUTO-RTO: 0 /100
NT-PROBNP SERPL-MCNC: 111 PG/ML (ref 0–178)
OPIATES UR QL SCN: ABNORMAL
P AXIS - MUSE: 47 DEGREES
PCO2 BLDV: 36 MM HG (ref 40–50)
PCP QUAL URINE (ROCHE): ABNORMAL
PH BLDV: 7.42 [PH] (ref 7.32–7.43)
PH UR STRIP: 7 [PH] (ref 5–7)
PLATELET # BLD AUTO: 291 10E3/UL (ref 150–450)
PO2 BLDV: 28 MM HG (ref 25–47)
POTASSIUM SERPL-SCNC: 4.3 MMOL/L (ref 3.4–5.3)
PR INTERVAL - MUSE: 142 MS
PROT SERPL-MCNC: 6.7 G/DL (ref 6.4–8.3)
PROTHROMBIN TIME: 17.2 SECONDS (ref 11.8–14.8)
QRS DURATION - MUSE: 90 MS
QT - MUSE: 380 MS
QTC - MUSE: 446 MS
R AXIS - MUSE: 65 DEGREES
RBC # BLD AUTO: 4.39 10E6/UL (ref 3.8–5.2)
RBC URINE: 0 /HPF
RSV RNA SPEC NAA+PROBE: NEGATIVE
SAO2 % BLDV: 54 % (ref 70–75)
SARS-COV-2 RNA RESP QL NAA+PROBE: NEGATIVE
SODIUM SERPL-SCNC: 138 MMOL/L (ref 135–145)
SP GR UR STRIP: 1.01 (ref 1–1.03)
SQUAMOUS EPITHELIAL: 2 /HPF
SYSTOLIC BLOOD PRESSURE - MUSE: NORMAL MMHG
T AXIS - MUSE: 57 DEGREES
TROPONIN T SERPL HS-MCNC: <6 NG/L
TSH SERPL DL<=0.005 MIU/L-ACNC: 1.48 UIU/ML (ref 0.3–4.2)
UROBILINOGEN UR STRIP-MCNC: NORMAL MG/DL
VENTRICULAR RATE- MUSE: 83 BPM
WBC # BLD AUTO: 10.2 10E3/UL (ref 4–11)
WBC URINE: 1 /HPF

## 2025-06-26 PROCEDURE — 81001 URINALYSIS AUTO W/SCOPE: CPT | Performed by: INTERNAL MEDICINE

## 2025-06-26 PROCEDURE — 84703 CHORIONIC GONADOTROPIN ASSAY: CPT | Performed by: INTERNAL MEDICINE

## 2025-06-26 PROCEDURE — 96360 HYDRATION IV INFUSION INIT: CPT | Performed by: INTERNAL MEDICINE

## 2025-06-26 PROCEDURE — 80307 DRUG TEST PRSMV CHEM ANLYZR: CPT | Performed by: INTERNAL MEDICINE

## 2025-06-26 PROCEDURE — 82040 ASSAY OF SERUM ALBUMIN: CPT | Performed by: INTERNAL MEDICINE

## 2025-06-26 PROCEDURE — 93005 ELECTROCARDIOGRAM TRACING: CPT | Performed by: INTERNAL MEDICINE

## 2025-06-26 PROCEDURE — 84484 ASSAY OF TROPONIN QUANT: CPT | Performed by: INTERNAL MEDICINE

## 2025-06-26 PROCEDURE — 258N000003 HC RX IP 258 OP 636: Performed by: INTERNAL MEDICINE

## 2025-06-26 PROCEDURE — 83880 ASSAY OF NATRIURETIC PEPTIDE: CPT | Performed by: INTERNAL MEDICINE

## 2025-06-26 PROCEDURE — 85610 PROTHROMBIN TIME: CPT | Performed by: INTERNAL MEDICINE

## 2025-06-26 PROCEDURE — 87637 SARSCOV2&INF A&B&RSV AMP PRB: CPT | Performed by: INTERNAL MEDICINE

## 2025-06-26 PROCEDURE — 99284 EMERGENCY DEPT VISIT MOD MDM: CPT | Mod: 25 | Performed by: INTERNAL MEDICINE

## 2025-06-26 PROCEDURE — 96361 HYDRATE IV INFUSION ADD-ON: CPT | Performed by: INTERNAL MEDICINE

## 2025-06-26 PROCEDURE — 36415 COLL VENOUS BLD VENIPUNCTURE: CPT | Performed by: INTERNAL MEDICINE

## 2025-06-26 PROCEDURE — 84443 ASSAY THYROID STIM HORMONE: CPT | Performed by: INTERNAL MEDICINE

## 2025-06-26 PROCEDURE — 83605 ASSAY OF LACTIC ACID: CPT

## 2025-06-26 PROCEDURE — 99284 EMERGENCY DEPT VISIT MOD MDM: CPT | Performed by: INTERNAL MEDICINE

## 2025-06-26 PROCEDURE — 85004 AUTOMATED DIFF WBC COUNT: CPT | Performed by: INTERNAL MEDICINE

## 2025-06-26 RX ADMIN — SODIUM CHLORIDE, SODIUM LACTATE, POTASSIUM CHLORIDE, AND CALCIUM CHLORIDE 1000 ML: .6; .31; .03; .02 INJECTION, SOLUTION INTRAVENOUS at 14:17

## 2025-06-26 ASSESSMENT — ACTIVITIES OF DAILY LIVING (ADL)
ADLS_ACUITY_SCORE: 46

## 2025-06-26 NOTE — DISCHARGE INSTRUCTIONS
Continue to adjust medications as per Psychiatrist, may take 1-2 weeks, follow up with your Psychiatrist and Primary MD 3-7 days.

## 2025-06-26 NOTE — PLAN OF CARE
Cathy Arroyo  June 26, 2025  Plan of Care Hand-off Note     Patient Recommended Care Path: discharge    Clinical Substantiation:  Patient presents to the ED via self for increased shortness of breath. Pt has Hx of bipolar I history, depression, anxiety. Per chart review pt reports   she has had difficulty breathing and lightheadedness ongoing for the past few days.  She initially thought this was related to exhaustion from moving around, so she has been resting.  However, even after resting, her symptoms have gotten worse and she believes that it is caused by her new medication for bipolar 1 disorder, Caplyta.   Pt denies SI, NSSIB, HI, AVH, and ABEL. Patient reports establish outpatient providers of psychiatry and therapy. Patient informed writer that patient had talked to psychiatry that they have filled a new prescription of Abilify that patient had used successfully for several years. Patients report recent stressors of divorce, moving, and chronic pain. After risk assessment and consulting with the attending patient is not an imminent risk to self and feel safe to discharge.    Goals for crisis stabilization:  safety plan reviewed    Next steps for Care Team:  n/a    Treatment Objectives Addressed:  rapport building, identifying and practicing coping strategies, processing feelings, safety planning, identifying an appropriate aftercare plan, identifying treatment goals    Therapeutic Interventions:  Engaged in guided discovery, explored patient's perspectives and helped expand them through socratic dialogue.    Has a specific means been identified for suicidal.homicide actions: No  If yes, describe:    Explain action steps toward mitigation:    Document completion of mitigation action:    The follow up action still needed prior to discharge:      Patient coping skills attempted to reduce the crisis:  help seeking    Collateral contact information:       Legal Status: Voluntary/Patient has signed consent for  treatment                                                   Reviewed court records: yes     Psychiatry Consult:     Za Trevino Psychotherapist  [facility] - [facility]  Callback: [phone]

## 2025-06-26 NOTE — ED PROVIDER NOTES
ED Provider Note  [facility]      History     Chief Complaint   Patient presents with    Shortness of Breath     Started on a new medication recently within the past 10 days- Caplyta. Started having extreme dizziness last night, ongoing SOB but it is getting worse     HPI  Cathy Arroyo is a 44 year old female with a history of PE on Xarelto, bipolar 1 disorder, depression, anxiety, Graves' disease who presents to the emergency department with shortness of breath.    Patient reports she has had difficulty breathing and lightheadedness ongoing for the past few days.  She initially thought this was related to exhaustion from moving around, so she has been resting.  However, even after resting, her symptoms have gotten worse and she believes that it is caused by her new medication for bipolar 1 disorder, Caplyta.  She states she called her psychiatrist's office who recommended that she present to the ED due to symptoms.  She states she did not take her Cayplta this morning and last took it yesterday morning.  She endorses a history of pulmonary embolism and states she has not had any missed doses of her Xarelto.    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 No history of chronic hypertension. RX for aspirin sent and labs drawn at new OB visit.       Problem, psychiatric 2001 diagnosed Bipolar    Recurrent otitis media chronic ear infections in childhood,  recently had a double ear infection followed by another ear infection soon after    Sleep apnea 2019    had a sleep study when pregnant with my second child    Uncomplicated asthma      Past Surgical History:   Procedure Laterality Date    BIOPSY  29 y/o colpos, and in the last 2 years for thyroid    Colposcopy, thyroid nodule biopsy twice    BLOOD PATCH N/A 10/11/2016    Procedure: EPIDURAL BLOOD PATCH;  Surgeon: GENERIC ANESTHESIA PROVIDER;  Location: [facility] OR     SECTION, TUBAL LIGATION, COMBINED N/A 2019    Procedure:  SECTION, WITH TUBAL LIGATION;  Surgeon: Kathy Rutledge MD;  Location: [facility] L+D    CRANIOTOMY, EXCISE TUMOR COMPLEX, COMBINED  2014    Procedure: COMBINED CRANIOTOMY, EXCISE TUMOR COMPLEX;  Supracerabellar  Infratentorial Approach for Resection of Tumor ;  Surgeon: Kate Mckeon MD;  Location: [facility] OR    ENT SURGERY  2017    thyroidectomy    GYN SURGERY      colposcopy    THYROIDECTOMY N/A 2017    Procedure: THYROIDECTOMY;  Total Thyroidectomy;  Surgeon: Pamela Villasenor MD;  Location: [facility] OR     ADDERALL XR 25 MG 24 hr capsule  albuterol (PROAIR HFA/PROVENTIL HFA/VENTOLIN HFA) 108 (90 Base) MCG/ACT inhaler  amphetamine-dextroamphetamine (ADDERALL) 10 MG tablet  ARIPiprazole (ABILIFY) 10 MG tablet  cetirizine (ZYRTEC) 10 MG tablet  diazepam (VALIUM) 5 MG tablet  Fluticasone-Umeclidin-Vilant (TRELEGY ELLIPTA) 100-62.5-25 MCG/ACT oral inhaler  levonorgestrel (MIRENA) 52 MG (20 mcg/day) IUD  levothyroxine (SYNTHROID/LEVOTHROID) 175 MCG tablet  medical cannabis (Patient's own supply)  rivaroxaban ANTICOAGULANT (XARELTO) 10 MG TABS tablet  rizatriptan (MAXALT-MLT) 10 MG ODT      Allergies   Allergen Reactions    Adhesive Tape Hives    Nicotine Anaphylaxis, Hives and Rash     Patch - rash/hives  Lozenges - throat swelling    Tegaderm Transparent Dressing (Informational Only) Hives    Adacel [Tetanus-Diphth-Acell Pertussis] Swelling    Ciprofloxacin  Visual Disturbance and Hallucination    Codeine Sulfate Nausea and Vomiting    Methimazole Rash    Oxycodone-Acetaminophen Nausea and Vomiting    Tetanus Toxoid      Pt states she had an infection at injection site.     Tetanus-Diphtheria Toxoids Td      Caused staph infection - childhood/teenager    Vicodin [Hydrocodone-Acetaminophen] Nausea and Vomiting     Family History  Family History   Problem Relation Age of Onset    Other Cancer Mother 62        salivary gland cancer    Genitourinary Problems Mother     Bipolar Disorder Mother         unipolar depression    Depression Mother     Thyroid Disease Mother         benign tumor    Melanoma Mother     Cancer Mother         salivary gland cancer    Migraines Mother     Skin Cancer Mother     Asthma Father     Mental Illness Father         Bipolar 2    Obesity Father     Colon Polyps Father 64    Bipolar Disorder Brother         unipolar depression    Asthma Brother     Depression Brother     Asthma Maternal Grandmother     Osteoporosis Maternal Grandmother     Glaucoma Maternal Grandmother     Hypertension Maternal Grandmother     Macular Degeneration Maternal Grandmother     Skin Cancer Maternal Grandmother     Prostate Cancer Maternal Grandfather 69        no history of smoking    Bladder Cancer Maternal Grandfather 71    Colon Cancer Maternal Grandfather 70    Diabetes Paternal Grandfather     Breast Cancer Maternal Aunt 37        negative BRCA1/2 testing    Melanoma Maternal Aunt 64    Thyroid Disease Paternal Aunt     Breast Cancer Paternal Aunt 58        bilateral; negative testing    Cancer Paternal Aunt     Breast Cancer Other         paternal great aunt     Social History   Social History     Tobacco Use    Smoking status: Every Day     Current packs/day: 0.25     Average packs/day: 0.3 packs/day for 20.5 years (5.6 ttl pk-yrs)     Types: Cigarettes     Start date: 1/21/2004     Last attempt to quit: 1/25/2022     Passive exposure: Current    Smokeless  tobacco: Never    Tobacco comments:     2/28/25 4-6 cigarettes per day per pt     1/7/25 6 cigarettes per day by Patient     10/15/24 5 cigarettes daily.     Stopped smoking for both pregnamcies and breastfeeding of both children     Working on quitting smoking (6/27/23)   Vaping Use    Vaping status: Former    Quit date: 11/28/2023    Substances: Nicotine, Flavoring    Devices: Disposable   Substance Use Topics    Alcohol use: Yes     Alcohol/week: 4.0 standard drinks of alcohol     Types: 4 Drinks containing 0.5 oz of alcohol per week     Comment: one drink once a week    Drug use: Not Currently     Types: Marijuana, Psilocybin     Comment: medical marijuana      A medically appropriate review of systems was performed with pertinent positives and negatives noted in the HPI, and all other systems negative.    Physical Exam   BP: 138/88  Pulse: 87  Temp: 98.1  F (36.7  C)  Resp: 21  Weight: 110.2 kg (243 lb)  SpO2: 99 %  Sitting Orthostatic BP: 125/79  Sitting Orthostatic Pulse: 70 bpm  Standing Orthostatic BP: 130/72  Standing Orthostatic Pulse: 78 bpm  Physical Exam  Vitals and nursing note reviewed.   Constitutional:       General: She is not in acute distress.     Appearance: She is not diaphoretic.   HENT:      Head: Normocephalic and atraumatic.   Eyes:      Extraocular Movements: Extraocular movements intact.      Conjunctiva/sclera: Conjunctivae normal.   Cardiovascular:      Rate and Rhythm: Normal rate and regular rhythm.      Heart sounds: Normal heart sounds. No murmur heard.     No friction rub. No gallop.   Pulmonary:      Effort: Pulmonary effort is normal. No respiratory distress.      Breath sounds: Normal breath sounds. No stridor. No wheezing, rhonchi or rales.   Chest:      Chest wall: No tenderness.   Abdominal:      General: Abdomen is flat. Bowel sounds are normal. There is no distension.      Palpations: Abdomen is soft. There is no mass.      Tenderness: There is no abdominal tenderness.  There is no right CVA tenderness, left CVA tenderness, guarding or rebound.      Hernia: No hernia is present.   Musculoskeletal:         General: No tenderness. Normal range of motion.      Cervical back: Normal range of motion and neck supple.   Skin:     General: Skin is warm.      Findings: No rash.   Neurological:      General: No focal deficit present.      Cranial Nerves: No cranial nerve deficit.      Sensory: No sensory deficit.      Motor: No weakness.      Coordination: Coordination normal.      Gait: Gait normal.      Deep Tendon Reflexes: Reflexes normal.   Psychiatric:         Attention and Perception: Attention normal.         Mood and Affect: Mood is anxious and depressed. Mood is not elated. Affect is labile. Affect is not blunt, flat, angry, tearful or inappropriate.         Speech: She is communicative. Speech is rapid and pressured and tangential. Speech is not delayed or slurred.         Behavior: Behavior is not slowed, withdrawn or combative.         Thought Content: Thought content is not paranoid or delusional. Thought content does not include homicidal or suicidal ideation.           ED Course, Procedures, & Data     ED Course as of 06/26/25 1848   Thu Jun 26, 2025   1652 Called lab-they said blood hemolyzed. Need to redraw for trop.     Procedures            Results for orders placed or performed during the hospital encounter of 06/26/25   Extra Blue Top Tube   Result Value Ref Range    Hold Specimen JIC    Extra Red Top Tube   Result Value Ref Range    Hold Specimen jic    Extra Green Top (Lithium Heparin) Tube   Result Value Ref Range    Hold Specimen JIC    Extra Purple Top Tube   Result Value Ref Range    Hold Specimen JIC    INR   Result Value Ref Range    INR 1.36 (H) 0.85 - 1.15    PT 17.2 (H) 11.8 - 14.8 Seconds   Comprehensive metabolic panel   Result Value Ref Range    Sodium 138 135 - 145 mmol/L    Potassium 4.3 3.4 - 5.3 mmol/L    Carbon Dioxide (CO2) 17 (L) 22 - 29 mmol/L     Anion Gap 18 (H) 7 - 15 mmol/L    Urea Nitrogen 6.9 6.0 - 20.0 mg/dL    Creatinine 0.68 0.51 - 0.95 mg/dL    GFR Estimate >90 >60 mL/min/1.73m2    Calcium 9.1 8.8 - 10.4 mg/dL    Chloride 103 98 - 107 mmol/L    Glucose 98 70 - 99 mg/dL    Alkaline Phosphatase 67 40 - 150 U/L    AST 39 0 - 45 U/L    ALT 20 0 - 50 U/L    Protein Total 6.7 6.4 - 8.3 g/dL    Albumin 4.0 3.5 - 5.2 g/dL    Bilirubin Total 0.4 <=1.2 mg/dL   Result Value Ref Range    Troponin T, High Sensitivity <6 <=14 ng/L   NT-proBNP   Result Value Ref Range    NT-proBNP 111 0 - 178 pg/mL   Influenza A/B, RSV and SARS-CoV2 PCR (COVID-19) Nose    Specimen: Nose; Swab   Result Value Ref Range    Influenza A PCR Negative Negative    Influenza B PCR Negative Negative    RSV PCR Negative Negative    SARS CoV2 PCR Negative Negative   HCG qualitative Blood   Result Value Ref Range    hCG Serum Qualitative Negative Negative   UA with Microscopic reflex to Culture    Specimen: Urine, Midstream   Result Value Ref Range    Color Urine Light Yellow Colorless, Straw, Light Yellow, Yellow    Appearance Urine Clear Clear    Glucose Urine Negative Negative mg/dL    Bilirubin Urine Negative Negative    Ketones Urine Negative Negative mg/dL    Specific Gravity Urine 1.006 1.003 - 1.035    Blood Urine Negative Negative    pH Urine 7.0 5.0 - 7.0    Protein Albumin Urine Negative Negative mg/dL    Urobilinogen Urine Normal Normal mg/dL    Nitrite Urine Negative Negative    Leukocyte Esterase Urine Negative Negative    Bacteria Urine Few (A) None Seen /HPF    Mucus Urine Present (A) None Seen /LPF    RBC Urine 0 <=2 /HPF    WBC Urine 1 <=5 /HPF    Squamous Epithelials Urine 2 (H) <=1 /HPF   TSH with free T4 reflex   Result Value Ref Range    TSH 1.48 0.30 - 4.20 uIU/mL   Urine Drug Screen Panel   Result Value Ref Range    Amphetamines Urine Screen Positive (A) Screen Negative    Barbituates Urine Screen Negative Screen Negative    Benzodiazepine Urine Screen Negative Screen  Negative    Cannabinoids Urine Screen Positive (A) Screen Negative    Cocaine Urine Screen Negative Screen Negative    Fentanyl Qual Urine Screen Negative Screen Negative    Opiates Urine Screen Negative Screen Negative    PCP Urine Screen Negative Screen Negative   CBC with platelets and differential   Result Value Ref Range    WBC Count 10.2 4.0 - 11.0 10e3/uL    RBC Count 4.39 3.80 - 5.20 10e6/uL    Hemoglobin 12.8 11.7 - 15.7 g/dL    Hematocrit 37.3 35.0 - 47.0 %    MCV 85 78 - 100 fL    MCH 29.2 26.5 - 33.0 pg    MCHC 34.3 31.5 - 36.5 g/dL    RDW 13.2 10.0 - 15.0 %    Platelet Count 291 150 - 450 10e3/uL    % Neutrophils 68 %    % Lymphocytes 26 %    % Monocytes 5 %    % Eosinophils 0 %    % Basophils 0 %    % Immature Granulocytes 0 %    NRBCs per 100 WBC 0 <1 /100    Absolute Neutrophils 7.0 1.6 - 8.3 10e3/uL    Absolute Lymphocytes 2.6 0.8 - 5.3 10e3/uL    Absolute Monocytes 0.5 0.0 - 1.3 10e3/uL    Absolute Eosinophils 0.0 0.0 - 0.7 10e3/uL    Absolute Basophils 0.0 0.0 - 0.2 10e3/uL    Absolute Immature Granulocytes 0.0 <=0.4 10e3/uL    Absolute NRBCs 0.0 10e3/uL   Extra Blue Top Tube   Result Value Ref Range    Hold Specimen JIC    Extra Green Top (Lithium Heparin) Tube   Result Value Ref Range    Hold Specimen JIC    Extra Purple Top Tube   Result Value Ref Range    Hold Specimen JIC    iStat Gases (lactate) venous, POCT   Result Value Ref Range    Lactic Acid POCT 0.4 (L) 0.7 - 2.0 mmol/L    Bicarbonate Venous POCT 23 21 - 28 mmol/L    O2 Sat, Venous POCT 54 (L) 70 - 75 %    pCO2 Venous POCT 36 (L) 40 - 50 mm Hg    pH Venous POCT 7.42 7.32 - 7.43    pO2 Venous POCT 28 25 - 47 mm Hg    Base Excess/Deficit (+/-) POCT -1.0 -3.0 - 3.0 mmol/L   EKG 12 lead   Result Value Ref Range    Systolic Blood Pressure  mmHg    Diastolic Blood Pressure  mmHg    Ventricular Rate 83 BPM    Atrial Rate 83 BPM    FL Interval 142 ms    QRS Duration 90 ms     ms    QTc 446 ms    P Axis 47 degrees    R AXIS 65 degrees     T Axis 57 degrees    Interpretation ECG       Sinus rhythm  Nonspecific ST abnormality  Abnormal ECG       Medications   lactated ringers BOLUS 1,000 mL (0 mLs Intravenous Stopped 6/26/25 [id number])          Critical care was not performed.     Medical Decision Making  The patient's presentation was of moderate complexity (an acute illness with systemic symptoms).    The patient's evaluation involved:  ordering and/or review of 3+ test(s) in this encounter (see separate area of note for details)    The patient's management necessitated moderate risk (prescription drug management including medications given in the ED).    Assessment & Plan    Persistent dyspnea and dizziness after switched to new med from abilify for bipolar, recent visit to ED felt to be heat exhaustion, h/o PE on xarelto-not missed a dose, vital good with no tachycardia tachypenia or hypoxia, CG4 with slight hyperventilation but no metabolic acidosis, EKG labs with normal trop and bnp, slightly low CO2 and up anion gap but lactic acid normal, possibly mild dehydration, given LR 1 litter, DEC assessment also done-discontinued new med last dose yesterday, restarting abilify, will discharge and follow up with her Psych and PMD within one week.    I have reviewed the nursing notes. I have reviewed the findings, diagnosis, plan and need for follow up with the patient.    Discharge Medication List as of 6/26/2025  5:49 PM          Final diagnoses:   Dyspnea, unspecified type   Dizziness   History of bipolar disorder       Erica Torres MD  [facility] EMERGENCY DEPARTMENT  6/26/2025     Erica Torres MD  06/26/25 [id number]

## 2025-06-26 NOTE — CONSULTS
Diagnostic Evaluation Consultation  Crisis Assessment    Patient Name: Cathy Arroyo  Age:  44 year old  Legal Sex: female  Gender Identity: female  Pronouns:      Race: White  Ethnicity: Not  or   Language: English      Patient was assessed: In person   Crisis Assessment Start Date: 06/26/25  Crisis Assessment Start Time: 1523  Crisis Assessment Stop Time: 1545  Patient location: [facility] Emergency Department                                 Referral Data and Chief Complaint  Cathy Arroyo presents to the ED by  self. Patient is presenting to the ED for the following concerns: Anxiety, Health stressors, Other (see comment) (reaction to medication). Factors that make the mental health crisis life threatening or complex are: Patient presents to the ED via self for shortness of breath and anxiety. Pt has Hx of bipolar I history, depression, anxiety..      Informed Consent and Assessment Methods  Explained the crisis assessment process, including applicable information disclosures and limits to confidentiality, assessed understanding of the process, and obtained consent to proceed with the assessment.  Assessment methods included conducting a formal interview with patient, review of medical records, collaboration with medical staff, and obtaining relevant collateral information from family and community providers when available.  : done     History of the Crisis   Patient presents to the ED via self for increased shortness of breath. Pt has Hx of bipolar I history, depression, anxiety. Per chart review pt reports   she has had difficulty breathing and lightheadedness ongoing for the past few days.  She initially thought this was related to exhaustion from moving around, so she has been resting.  However, even after resting, her symptoms have gotten worse and she believes that it is caused by her new medication for bipolar 1 disorder, Caplyta.   Pt denies SI, NSSIB, HI, AVH, and ABEL.  Patient reports establish outpatient providers of psychiatry and therapy. Patient informed writer that patient had talked to psychiatry that they have filled a new prescription of Abilify that patient had used successfully for several years. Patients reports recent stressors of divorce, moving, and chronic pain.    Brief Psychosocial History  Family:   (pt is seperated and going through a divorce), Children yes  Support System:  Sibling(s), Friend  Employment Status:  employed full-time  Source of Income:  salary/wages  Financial Environmental Concerns:  none  Current Hobbies:  arts/crafts, music, outdoor activities, exercise/fitness  Barriers in Personal Life:  mental health concerns    Significant Clinical History  Current Anxiety Symptoms:  anxious, shortness of breath or racing heart  Current Depression/Trauma:   (none reported)  Current Somatic Symptoms:  anxious, shortness of breath or racing heart  Current Psychosis/Thought Disturbance:   (none reported)  Current Eating Symptoms:   (none reported)  Chemical Use History:  Alcohol: None  Benzodiazepines: None  Opiates: None  Cocaine: None  Marijuana: Other (comments) (medical marijuana)  Last Use:: 06/23/25  Other Use: None   Past diagnosis:  Anxiety Disorder, Depression, Bipolar Disorder  Family history:  No known history of mental health or chemical health concerns  Past treatment:  Individual therapy, Psychiatric Medication Management  Details of most recent treatment:  Patient has established outpatient mental health supports of psychiatry and therapy.  Other relevant history:       Have there been any medication changes in the past two weeks:  yes, please comment  new medication for bipolar 1 disorder, Caplyta. Patient has contacted established psychiatrist who has filled new prescription for Abilify.    Is the patient compliant with medications:  yes        Collateral Information  Is there collateral information: No     Collateral information name,  relationship, phone number:       What happened today:       What is different about patient's functioning:       What do you think the patient needs:      Has patient made comments about wanting to kill themselves/others:      If d/c is recommended, can they take part in safety/aftercare planning:       Additional collateral information:        Risk Assessment  [place] Suicide Severity Rating Scale Full Clinical Version:  Suicidal Ideation  Q6 Suicide Behavior (Lifetime): no          [place] Suicide Severity Rating Scale Recent:   Suicidal Ideation (Recent)  Q1 Wished to be Dead (Past Month): no  Q2 Suicidal Thoughts (Past Month): no  Level of Risk per Screen: no risks indicated          Environmental or Psychosocial Events: loss of a relationship due to divorce/separation, other (see comment) (recent move)  Protective Factors: Protective Factors: strong bond to family unit, community support, or employment, responsibilities and duties to others, including pets and children, lives in a responsibly safe and stable environment, able to access care without barriers, help seeking, supportive ongoing medical and mental health care relationships, sense of importance of health and wellness, good treatment engagement, sense of belonging, optimistic outlook - identification of future goals    Does the patient have thoughts of harming others? Feels Like Hurting Others: no  Previous Attempt to Hurt Others: no  Is the patient engaging in sexually inappropriate behavior?: no  Does Patient have a known history of aggressive behavior: No    Is the patient engaging in sexually inappropriate behavior?  no        Mental Status Exam   Affect: Appropriate  Appearance: Appropriate  Attention Span/Concentration: Attentive  Eye Contact: Engaged    Fund of Knowledge: Appropriate   Language /Speech Content: Fluent  Language /Speech Volume: Normal  Language /Speech Rate/Productions: Normal  Recent Memory: Intact  Remote Memory:  Intact  Mood: Anxious, Normal  Orientation to Person: Yes   Orientation to Place: Yes  Orientation to Time of Day: Yes  Orientation to Date: Yes     Situation (Do they understand why they are here?): Yes  Psychomotor Behavior: Normal  Thought Content: Clear  Thought Form: Intact     Medication  Psychotropic medications:   Medication Orders - Psychiatric (From admission, onward)      None             Current Care Team  Patient Care Team:  Sherrill Dasilva, APRN CNP as PCP - General (Nurse Practitioner - Family)  Ishaan García MD as MD (Orthopaedic Surgery)  Leatha Franco MD as MD (INTERNAL MEDICINE - ENDOCRINOLOGY, DIABETES & METABOLISM)  Nimisha Gonzalez MD as MD (Infectious Diseases)  Sade Dozier CNM as Midwife (OB/Gyn)  Sherrill Dasilva APRN CNP as Assigned PCP  Stephan Alexis MD as MD (Surgery)  Julissa Sears MD as MD (Neurology)  Scott Egan MD as MD (Critical Care)  Miriam Lora PA-C as Physician Assistant (Surgery)  Gabbi Marie PA-C as Physician Assistant (Dermatology)  Melissa Lange GC as Genetic Counselor (Genetic )  Nury Bales OD as MD (Ophthalmology)  Agata Knapp PA-C as Physician Assistant (Dermatology)  Bhavya Escobar APRN CNP as Nurse Practitioner  Jared Smith MD as MD (Allergy & Immunology)  Yennifer Dunbar MD as Assigned Pulmonology Provider  Hannah Reddy PA-C as Assigned Neuroscience Provider  Faraz Diaz MD as Physician (Psychiatry)  Kate Mckeon MD as MD (Neurological Surgery)  Caroline Carreon RP as Pharmacist (Pharmacist)  Caroline Carreon RPH as Assigned MTM Pharmacist  Aliya Brooks MD as Assigned OBGYN Provider  Jared Smith MD as Assigned Allergy Provider  Rocio Tyler PA-C as Assigned Surgical Provider  Lorraine Dozier MD as Assigned Dermatology Provider  Miriam Lora PA-C as Assigned Cancer Care Provider  Eldon  Adonis Bennett DPM as Assigned Musculoskeletal Provider    Diagnosis  Patient Active Problem List   Diagnosis Code    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    Attention deficit disorder F98.8    Seasonal allergic rhinitis J30.2    Bipolar affective disorder in remission F31.70    Moderate persistent asthma without complication J45.40    Chronic rhinitis J31.0    Tobacco use disorder F17.200    Abnormal cytology R89.6    S/P total thyroidectomy Z98.890, Z90.89    History of Graves' disease Z86.39    Nonintractable migraine, unspecified migraine type G43.009    Post-surgical hypothyroidism E89.0    Medical cannabis use Z79.899    Morbid obesity (H) E66.01    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    Breast cancer screening, high risk patient Z12.39    Bipolar affective disorder, currently manic mixed, moderate (H) F31.12    Bilateral chronic knee pain M25.561, M25.562, G89.29    Lumbar radiculopathy M54.16    Bronchopneumonia J18.0    Pulmonary embolism (H) I26.99    Bilateral otitis media H66.93    Neck pain M54.2    Asymmetrical sensorineural hearing loss H90.3    Tinnitus, left H93.12    Dizziness R42    Pneumonia due to Haemophilus influenzae, unspecified laterality, unspecified part of lung J14    Iron deficiency anemia due to chronic blood loss D50.0    Menorrhagia with irregular cycle N92.1    Anxiety disorder, unspecified F41.9       Primary Problem This Admission  Active Hospital Problems    *Anxiety disorder, unspecified        Clinical Summary and Substantiation of Recommendations   Clinical Substantiation:  Patient presents to the ED via self for increased shortness of breath. Pt has Hx of bipolar I history, depression, anxiety. Per chart review pt reports   she has had difficulty breathing and lightheadedness ongoing for the past few days.  She initially thought this was related to exhaustion from moving around, so she has been resting.  However, even after resting, her symptoms have gotten worse and she believes that it is caused  by her new medication for bipolar 1 disorder, Caplyta.   Pt denies SI, NSSIB, HI, AVH, and ABEL. Patient reports establish outpatient providers of psychiatry and therapy. Patient informed writer that patient had talked to psychiatry that they have filled a new prescription of Abilify that patient had used successfully for several years. Patients reports recent stressors of divorce, moving, and chronic pain. After risk assessment and consulting with the attending patient is not an imminent risk to self and feel safe to discharge.    Goals for crisis stabilization:  safety plan reviewed    Next steps for Care Team:  n/a    Treatment Objectives Addressed:  rapport building, identifying and practicing coping strategies, processing feelings, safety planning, identifying an appropriate aftercare plan, identifying treatment goals    Therapeutic Interventions:  Engaged in guided discovery, explored patient's perspectives and helped expand them through socratic dialogue.    Has a specific means been identified for suicidal/homicide actions: No    If yes, describe:       Explain action steps toward mitigation:       Document completion of mitigation actions:       The follow up action still needed prior to discharge:       Patient coping skills attempted to reduce the crisis:  help seeking    Disposition  Recommended referrals: Other. please comment (pt has established [facility] supports)        Reviewed case and recommendations with attending provider. Attending Name: Dr. Torres       Attending concurs with disposition: yes       Patient and/or validated legal guardian concurs with disposition:   yes       Final disposition:  discharge      Legal status: Voluntary/Patient has signed consent for treatment                                                                      Reviewed court records: yes       Assessment Details   Total duration spent with the patient: 22 min     CPT code(s) utilized: 56862 - Psychotherapy (with  patient) - 30 (16-37*) min    Za Trevino Psychotherapist  [facility] - Triage & Transition Services  Callback: [phone]

## 2025-06-26 NOTE — ED TRIAGE NOTES
Pt started on a new medication, within the last two weeks. The medication is Caplyta the lowest dose. Pt started noticing last night having extreme dizziness. Having shortness of breath for a few days, was thinking she was having some anxiety and was exerting herself more, but it is getting worse.      Triage Assessment (Adult)       Row Name 06/26/25 7018          Triage Assessment    Airway WDL WDL        Respiratory WDL    Respiratory WDL X;rhythm/pattern     Rhythm/Pattern, Respiratory shortness of breath;labored        Skin Circulation/Temperature WDL    Skin Circulation/Temperature WDL WDL        Cardiac WDL    Cardiac WDL WDL        Peripheral/Neurovascular WDL    Peripheral Neurovascular WDL WDL        Cognitive/Neuro/Behavioral WDL    Cognitive/Neuro/Behavioral WDL WDL

## 2025-06-27 LAB
ATRIAL RATE - MUSE: 83 BPM
DIASTOLIC BLOOD PRESSURE - MUSE: NORMAL MMHG
INTERPRETATION ECG - MUSE: NORMAL
P AXIS - MUSE: 47 DEGREES
PR INTERVAL - MUSE: 142 MS
QRS DURATION - MUSE: 90 MS
QT - MUSE: 380 MS
QTC - MUSE: 446 MS
R AXIS - MUSE: 65 DEGREES
SYSTOLIC BLOOD PRESSURE - MUSE: NORMAL MMHG
T AXIS - MUSE: 57 DEGREES
VENTRICULAR RATE- MUSE: 83 BPM

## 2025-06-28 ENCOUNTER — TELEPHONE (OUTPATIENT)
Dept: BEHAVIORAL HEALTH | Facility: CLINIC | Age: 45
End: 2025-06-28
Payer: COMMERCIAL

## 2025-07-08 ENCOUNTER — APPOINTMENT (OUTPATIENT)
Dept: OPTOMETRY | Facility: CLINIC | Age: 45
End: 2025-07-08
Payer: COMMERCIAL

## 2025-07-08 ENCOUNTER — MYC MEDICAL ADVICE (OUTPATIENT)
Dept: FAMILY MEDICINE | Facility: CLINIC | Age: 45
End: 2025-07-08

## 2025-07-08 ENCOUNTER — OFFICE VISIT (OUTPATIENT)
Dept: FAMILY MEDICINE | Facility: CLINIC | Age: 45
End: 2025-07-08
Payer: COMMERCIAL

## 2025-07-08 VITALS
TEMPERATURE: 98 F | DIASTOLIC BLOOD PRESSURE: 85 MMHG | BODY MASS INDEX: 35.92 KG/M2 | RESPIRATION RATE: 12 BRPM | SYSTOLIC BLOOD PRESSURE: 126 MMHG | OXYGEN SATURATION: 99 % | WEIGHT: 237 LBS | HEART RATE: 93 BPM | HEIGHT: 68 IN

## 2025-07-08 DIAGNOSIS — N76.0 BV (BACTERIAL VAGINOSIS): ICD-10-CM

## 2025-07-08 DIAGNOSIS — E66.01 MORBID OBESITY (H): ICD-10-CM

## 2025-07-08 DIAGNOSIS — J45.20 MILD INTERMITTENT ASTHMA WITHOUT COMPLICATION: ICD-10-CM

## 2025-07-08 DIAGNOSIS — J45.40 MODERATE PERSISTENT ASTHMA WITHOUT COMPLICATION: Primary | ICD-10-CM

## 2025-07-08 DIAGNOSIS — B96.89 BV (BACTERIAL VAGINOSIS): ICD-10-CM

## 2025-07-08 DIAGNOSIS — Z11.3 ROUTINE SCREENING FOR STI (SEXUALLY TRANSMITTED INFECTION): ICD-10-CM

## 2025-07-08 LAB
C TRACH DNA SPEC QL PROBE+SIG AMP: NEGATIVE
CLUE CELLS: PRESENT
HIV 1+2 AB+HIV1 P24 AG SERPL QL IA: NONREACTIVE
N GONORRHOEA DNA SPEC QL NAA+PROBE: NEGATIVE
SPECIMEN TYPE: NORMAL
T PALLIDUM AB SER QL: NONREACTIVE
TRICHOMONAS, WET PREP: ABNORMAL
WBC'S/HIGH POWER FIELD, WET PREP: ABNORMAL
YEAST, WET PREP: ABNORMAL

## 2025-07-08 PROCEDURE — 87389 HIV-1 AG W/HIV-1&-2 AB AG IA: CPT | Performed by: NURSE PRACTITIONER

## 2025-07-08 PROCEDURE — 1125F AMNT PAIN NOTED PAIN PRSNT: CPT | Performed by: NURSE PRACTITIONER

## 2025-07-08 PROCEDURE — 3074F SYST BP LT 130 MM HG: CPT | Performed by: NURSE PRACTITIONER

## 2025-07-08 PROCEDURE — 3079F DIAST BP 80-89 MM HG: CPT | Performed by: NURSE PRACTITIONER

## 2025-07-08 PROCEDURE — 36415 COLL VENOUS BLD VENIPUNCTURE: CPT | Performed by: NURSE PRACTITIONER

## 2025-07-08 PROCEDURE — 87491 CHLMYD TRACH DNA AMP PROBE: CPT | Performed by: NURSE PRACTITIONER

## 2025-07-08 PROCEDURE — G2211 COMPLEX E/M VISIT ADD ON: HCPCS | Performed by: NURSE PRACTITIONER

## 2025-07-08 PROCEDURE — 87210 SMEAR WET MOUNT SALINE/INK: CPT | Performed by: NURSE PRACTITIONER

## 2025-07-08 PROCEDURE — 87591 N.GONORRHOEAE DNA AMP PROB: CPT | Performed by: NURSE PRACTITIONER

## 2025-07-08 PROCEDURE — 86780 TREPONEMA PALLIDUM: CPT | Performed by: NURSE PRACTITIONER

## 2025-07-08 PROCEDURE — 92340 FIT SPECTACLES MONOFOCAL: CPT | Performed by: OPTOMETRIST

## 2025-07-08 PROCEDURE — 99215 OFFICE O/P EST HI 40 MIN: CPT | Performed by: NURSE PRACTITIONER

## 2025-07-08 RX ORDER — FLUTICASONE FUROATE, UMECLIDINIUM BROMIDE AND VILANTEROL TRIFENATATE 100; 62.5; 25 UG/1; UG/1; UG/1
1 POWDER RESPIRATORY (INHALATION) DAILY
Qty: 1 EACH | Refills: 4 | Status: SHIPPED | OUTPATIENT
Start: 2025-07-08

## 2025-07-08 RX ORDER — ALBUTEROL SULFATE 90 UG/1
2 INHALANT RESPIRATORY (INHALATION) EVERY 6 HOURS
Qty: 18 G | Refills: 3 | Status: SHIPPED | OUTPATIENT
Start: 2025-07-08

## 2025-07-08 RX ORDER — METRONIDAZOLE 7.5 MG/G
1 GEL VAGINAL DAILY
Qty: 70 G | Refills: 0 | Status: SHIPPED | OUTPATIENT
Start: 2025-07-08

## 2025-07-08 ASSESSMENT — PAIN SCALES - GENERAL: PAINLEVEL_OUTOF10: MILD PAIN (3)

## 2025-07-08 NOTE — PROGRESS NOTES
Assessment & Plan     Moderate persistent asthma without complication  Missing inhaler sometimes, but lungs sound pretty good today and ACT is good. Has reduced cigarettes, now to work on reducing vaping.  - Fluticasone-Umeclidin-Vilant (TRELEGY ELLIPTA) 100-62.5-25 MCG/ACT oral inhaler; Inhale 1 puff into the lungs daily.    Routine screening for STI (sexually transmitted infection)  Did have encounter 3 weeks ago, so ideal to recheck HIV and syphilis in a month.  - Chlamydia trachomatis/Neisseria gonorrhoeae by PCR; Future  - HIV Antigen Antibody Combo; Future  - Treponema Abs w Reflex to RPR and Titer; Future  - Wet prep - lab collect; Future  - Chlamydia trachomatis/Neisseria gonorrhoeae by PCR  - HIV Antigen Antibody Combo  - Treponema Abs w Reflex to RPR and Titer  - Wet prep - lab collect    Mild intermittent asthma without complication  refilled  - albuterol (PROAIR HFA/PROVENTIL HFA/VENTOLIN HFA) 108 (90 Base) MCG/ACT inhaler; Inhale 2 puffs into the lungs every 6 hours.    Morbid obesity (H)  Diet and exercise discussed    BV  Metrogel x 5 nights. Emphasize not sexually transmitted    Follow-up   Return in about 3 months (around 10/8/2025) for Asthma Recheck and ACT, Routine Visit.        The longitudinal plan of care for the diagnosis(es)/condition(s) as documented were addressed during this visit. Due to the added complexity in care, I will continue to support Cathy in the subsequent management and with ongoing continuity of care.Ordering of each unique test  Prescription drug management  I spent a total of 49 minutes on the day of the visit.   Time spent by me today doing chart review, history and exam, documentation and further activities per the note    Subjective   Cathy is a 44 year old, presenting for the following health issues:  sti testing, Results (Lab results from ED visit), and Asthma        7/8/2025     9:45 AM   Additional Questions   Roomed by Meghana Maddox     History of Present  "Illness       Reason for visit:  STD/STI/HIV testing & asthma follow-up   She is taking medications regularly.      1) STI testing - infidelity, two new partners one three weeks ago (unprotected) and last week (protected)    2) review labs from ED - had medication reaction to new bipolar medication - nausea, vomiting, belching,     3) mental health -  cheated, Cathy has filed for divorce and moved out. Getting kids into \"changing family\" group at school. Mental health has improved since living in her own apartment. Recent trial of new medication, Caplyta.     4) asthma - not taking inhaler consistently, vaping currently, down to three cigarettes per day.       11/12/2024     9:04 AM 1/4/2025    10:37 AM 2/27/2025     3:28 PM   ACT Total Scores   ACT TOTAL SCORE (Goal Greater than or Equal to 20) 21  22  22    In the past 12 months, how many times did you visit the emergency room for your asthma without being admitted to the hospital? 1 0 0   In the past 12 months, how many times were you hospitalized overnight because of your asthma? 0 0 0       Patient-reported         Review of Systems  Constitutional, HEENT, cardiovascular, pulmonary, gi and gu systems are negative, except as otherwise noted.      Objective    /85   Pulse 93   Temp 98  F (36.7  C) (Temporal)   Resp 12   Ht 1.727 m (5' 8\")   Wt 107.5 kg (237 lb)   LMP  (LMP Unknown)   SpO2 99%   BMI 36.04 kg/m    Body mass index is 36.04 kg/m .  Physical Exam   GENERAL: alert and no distress  EYES: Eyes grossly normal to inspection, PERRL and conjunctivae and sclerae normal  HENT: ear canals and TM's normal, nose and mouth without ulcers or lesions  NECK: no adenopathy, no asymmetry, masses, or scars  RESP: lungs clear to auscultation - no rales, rhonchi; expiratory wheezes at end expiration  CV: regular rate and rhythm, normal S1 S2, no S3 or S4, no murmur, click or rub, no peripheral edema   ABDOMEN: soft, tenderness lower left and " suprapubic, no hepatosplenomegaly, no masses and bowel sounds normal            Signed Electronically by: JANNET Liu CNP

## 2025-07-08 NOTE — TELEPHONE ENCOUNTER
Test Results        Who ordered the test:  PCP    Type of test: Lab    Date of test:  07.08.025    Where was the test performed:  [facility]    What are your questions/concerns?:  Would you please put in a medication to help with the BV that has just been seen on test results. [facility] preferred. Also, would like to know when she might resume sexual relations.    Could we send this information to you in [facility] or would you prefer to receive a phone call?:   Patient would prefer a phone call   Okay to leave a detailed message?: Yes at Cell number on file:    Telephone Information:   Mobile [phone]

## 2025-07-12 ENCOUNTER — MYC MEDICAL ADVICE (OUTPATIENT)
Dept: FAMILY MEDICINE | Facility: CLINIC | Age: 45
End: 2025-07-12
Payer: COMMERCIAL

## 2025-07-12 DIAGNOSIS — B37.31 YEAST INFECTION OF THE VAGINA: Primary | ICD-10-CM

## 2025-07-14 RX ORDER — FLUCONAZOLE 150 MG/1
150 TABLET ORAL
Qty: 3 TABLET | Refills: 0 | Status: SHIPPED | OUTPATIENT
Start: 2025-07-14 | End: 2025-07-21

## 2025-08-14 ENCOUNTER — MYC MEDICAL ADVICE (OUTPATIENT)
Dept: HEMATOLOGY | Facility: CLINIC | Age: 45
End: 2025-08-14
Payer: COMMERCIAL

## 2025-08-18 DIAGNOSIS — J30.2 SEASONAL ALLERGIC RHINITIS, UNSPECIFIED TRIGGER: ICD-10-CM

## 2025-08-18 RX ORDER — CETIRIZINE HYDROCHLORIDE 10 MG/1
10 TABLET ORAL DAILY
Qty: 90 TABLET | Refills: 0 | Status: SHIPPED | OUTPATIENT
Start: 2025-08-18

## 2025-08-19 DIAGNOSIS — Z11.3 SCREEN FOR STD (SEXUALLY TRANSMITTED DISEASE): Primary | ICD-10-CM

## 2025-08-21 ENCOUNTER — LAB (OUTPATIENT)
Dept: LAB | Facility: CLINIC | Age: 45
End: 2025-08-21
Payer: MEDICAID

## 2025-08-21 ENCOUNTER — TELEPHONE (OUTPATIENT)
Dept: FAMILY MEDICINE | Facility: CLINIC | Age: 45
End: 2025-08-21

## 2025-08-21 DIAGNOSIS — D50.0 IRON DEFICIENCY ANEMIA DUE TO CHRONIC BLOOD LOSS: ICD-10-CM

## 2025-08-21 DIAGNOSIS — Z11.3 SCREEN FOR STD (SEXUALLY TRANSMITTED DISEASE): ICD-10-CM

## 2025-08-21 DIAGNOSIS — E89.0 POST-SURGICAL HYPOTHYROIDISM: Primary | ICD-10-CM

## 2025-08-21 DIAGNOSIS — N92.1 MENORRHAGIA WITH IRREGULAR CYCLE: ICD-10-CM

## 2025-08-21 LAB
CLUE CELLS: PRESENT
ERYTHROCYTE [DISTWIDTH] IN BLOOD BY AUTOMATED COUNT: 12.7 % (ref 10–15)
FERRITIN SERPL-MCNC: 57 NG/ML (ref 6–175)
HCT VFR BLD AUTO: 43 % (ref 35–47)
HGB BLD-MCNC: 14.4 G/DL (ref 11.7–15.7)
HIV 1+2 AB+HIV1 P24 AG SERPL QL IA: NONREACTIVE
IRON BINDING CAPACITY (ROCHE): 302 UG/DL (ref 240–430)
IRON SATN MFR SERPL: 16 % (ref 15–46)
IRON SERPL-MCNC: 47 UG/DL (ref 37–145)
MCH RBC QN AUTO: 29.3 PG (ref 26.5–33)
MCHC RBC AUTO-ENTMCNC: 33.5 G/DL (ref 31.5–36.5)
MCV RBC AUTO: 87.4 FL (ref 78–100)
PLATELET # BLD AUTO: 316 10E3/UL (ref 150–450)
RBC # BLD AUTO: 4.92 10E6/UL (ref 3.8–5.2)
T PALLIDUM AB SER QL: NONREACTIVE
TRICHOMONAS, WET PREP: ABNORMAL
WBC # BLD AUTO: 7.34 10E3/UL (ref 4–11)
WBC'S/HIGH POWER FIELD, WET PREP: ABNORMAL
YEAST, WET PREP: ABNORMAL

## 2025-09-02 ASSESSMENT — ASTHMA QUESTIONNAIRES
QUESTION_4 LAST FOUR WEEKS HOW OFTEN HAVE YOU USED YOUR RESCUE INHALER OR NEBULIZER MEDICATION (SUCH AS ALBUTEROL): NOT AT ALL
QUESTION_1 LAST FOUR WEEKS HOW MUCH OF THE TIME DID YOUR ASTHMA KEEP YOU FROM GETTING AS MUCH DONE AT WORK, SCHOOL OR AT HOME: NONE OF THE TIME
QUESTION_5 LAST FOUR WEEKS HOW WOULD YOU RATE YOUR ASTHMA CONTROL: COMPLETELY CONTROLLED
QUESTION_2 LAST FOUR WEEKS HOW OFTEN HAVE YOU HAD SHORTNESS OF BREATH: NOT AT ALL
ACT_TOTALSCORE: 25
QUESTION_3 LAST FOUR WEEKS HOW OFTEN DID YOUR ASTHMA SYMPTOMS (WHEEZING, COUGHING, SHORTNESS OF BREATH, CHEST TIGHTNESS OR PAIN) WAKE YOU UP AT NIGHT OR EARLIER THAN USUAL IN THE MORNING: NOT AT ALL

## 2025-09-04 ENCOUNTER — HOSPITAL ENCOUNTER (OUTPATIENT)
Dept: GENERAL RADIOLOGY | Facility: CLINIC | Age: 45
Discharge: HOME | End: 2025-09-04
Attending: NURSE PRACTITIONER
Payer: COMMERCIAL

## 2025-09-04 ENCOUNTER — OFFICE VISIT (OUTPATIENT)
Dept: FAMILY MEDICINE | Facility: CLINIC | Age: 45
End: 2025-09-04
Payer: COMMERCIAL

## 2025-09-04 VITALS
DIASTOLIC BLOOD PRESSURE: 64 MMHG | TEMPERATURE: 97.7 F | HEIGHT: 68 IN | BODY MASS INDEX: 34.17 KG/M2 | OXYGEN SATURATION: 96 % | RESPIRATION RATE: 17 BRPM | HEART RATE: 79 BPM | SYSTOLIC BLOOD PRESSURE: 114 MMHG | WEIGHT: 225.5 LBS

## 2025-09-04 DIAGNOSIS — R00.0 TACHYCARDIA: ICD-10-CM

## 2025-09-04 DIAGNOSIS — Z11.3 ROUTINE SCREENING FOR STI (SEXUALLY TRANSMITTED INFECTION): ICD-10-CM

## 2025-09-04 DIAGNOSIS — R63.4 UNINTENTIONAL WEIGHT LOSS OF 10% BODY WEIGHT WITHIN 6 MONTHS: ICD-10-CM

## 2025-09-04 DIAGNOSIS — K52.9 CHRONIC DIARRHEA: ICD-10-CM

## 2025-09-04 DIAGNOSIS — M25.50 POLYARTHRALGIA: ICD-10-CM

## 2025-09-04 DIAGNOSIS — R63.4 UNINTENTIONAL WEIGHT LOSS OF 10% BODY WEIGHT WITHIN 6 MONTHS: Primary | ICD-10-CM

## 2025-09-04 DIAGNOSIS — J06.9 VIRAL URI WITH COUGH: ICD-10-CM

## 2025-09-04 LAB
BASOPHILS # BLD AUTO: <0.04 10E3/UL (ref 0–0.2)
BASOPHILS NFR BLD AUTO: 0.5 %
CRP SERPL-MCNC: 11.9 MG/L
EOSINOPHIL # BLD AUTO: 0.25 10E3/UL (ref 0–0.7)
EOSINOPHIL NFR BLD AUTO: 4.4 %
ERYTHROCYTE [DISTWIDTH] IN BLOOD BY AUTOMATED COUNT: 13 % (ref 10–15)
ERYTHROCYTE [SEDIMENTATION RATE] IN BLOOD BY WESTERGREN METHOD: 14 MM/HR (ref 0–20)
EST. AVERAGE GLUCOSE BLD GHB EST-MCNC: 108 MG/DL
HBA1C MFR BLD: 5.4 % (ref 0–5.6)
HCT VFR BLD AUTO: 42.6 % (ref 35–47)
HGB BLD-MCNC: 13.8 G/DL (ref 11.7–15.7)
HIV 1+2 AB+HIV1 P24 AG SERPL QL IA: NONREACTIVE
IMM GRANULOCYTES # BLD: <0.04 10E3/UL
IMM GRANULOCYTES NFR BLD: 0.2 %
LYMPHOCYTES # BLD AUTO: 1.77 10E3/UL (ref 0.8–5.3)
LYMPHOCYTES NFR BLD AUTO: 31.5 %
MCH RBC QN AUTO: 28.5 PG (ref 26.5–33)
MCHC RBC AUTO-ENTMCNC: 32.4 G/DL (ref 31.5–36.5)
MCV RBC AUTO: 88 FL (ref 78–100)
MONOCYTES # BLD AUTO: 0.55 10E3/UL (ref 0–1.3)
MONOCYTES NFR BLD AUTO: 9.8 %
NEUTROPHILS # BLD AUTO: 3.01 10E3/UL (ref 1.6–8.3)
NEUTROPHILS NFR BLD AUTO: 53.6 %
PLATELET # BLD AUTO: 282 10E3/UL (ref 150–450)
RBC # BLD AUTO: 4.84 10E6/UL (ref 3.8–5.2)
RHEUMATOID FACT SERPL-ACNC: <10 IU/ML
T PALLIDUM AB SER QL: NONREACTIVE
WBC # BLD AUTO: 5.62 10E3/UL (ref 4–11)

## 2025-09-04 PROCEDURE — 71046 X-RAY EXAM CHEST 2 VIEWS: CPT

## 2025-09-04 PROCEDURE — 74018 RADEX ABDOMEN 1 VIEW: CPT

## 2025-09-04 ASSESSMENT — PAIN SCALES - GENERAL: PAINLEVEL_OUTOF10: MILD PAIN (3)

## (undated) DEVICE — SOL WATER IRRIG 1000ML BOTTLE 07139-09

## (undated) DEVICE — ESU LIGASURE OPEN SEALER/DIVIDER SM JAW 16.5MM LF1212A

## (undated) DEVICE — DRSG TEGADERM 2 3/8X2 3/4" 1624W

## (undated) DEVICE — SUCTION CANISTER MEDIVAC LINER 1500ML W/LID 65651-515

## (undated) DEVICE — SOL NACL 0.9% IRRIG 1000ML BOTTLE 07138-09

## (undated) DEVICE — ESU GROUND PAD UNIVERSAL W/O CORD

## (undated) DEVICE — SU SILK 3-0 TIE 12X30" A304H

## (undated) DEVICE — BNDG ABDOMINAL BINDER 10X26-50" 08140145

## (undated) DEVICE — BARRIER SEPRAFILM 5X6" SINGLE SHEET 4301-02

## (undated) DEVICE — SUCTION MANIFOLD NEPTUNE 2 SYS 4 PORT 0702-020-000

## (undated) DEVICE — STOCKING SLEEVE COMPRESSION CALF LG

## (undated) DEVICE — SU MONOCRYL 5-0 P-3 18" UND Y493G

## (undated) DEVICE — SU VICRYL 0 CT-1 36" J346H

## (undated) DEVICE — TUBE ENDOTRACHEAL NIM TRIVANTAGE 6.0MM 8229706

## (undated) DEVICE — SUCTION SLEEVE NEPTUNE 2 125MM 0703-005-125

## (undated) DEVICE — SPECIMEN CONTAINER W/10% BUFFERED FORMALIN 120ML 591201

## (undated) DEVICE — SOL NACL 0.9% IRRIG 500ML BOTTLE 2F7123

## (undated) DEVICE — SU SILK 2-0 TIE 12X30" A305H

## (undated) DEVICE — GLOVE PROTEXIS W/NEU-THERA 6.5  2D73TE65

## (undated) DEVICE — DRSG STERI STRIP 1/4X3" R1541

## (undated) DEVICE — BASIN SET MAJOR

## (undated) DEVICE — GOWN LG DISP 9515

## (undated) DEVICE — CLIP HORIZON MED BLUE 002200

## (undated) DEVICE — PREP PAD ALCOHOL 6818

## (undated) DEVICE — CLIP HORIZON SM RED WIDE SLOT 001201

## (undated) DEVICE — SURGICEL FIBRILLAR HEMOSTAT 2"X4" JJ1962

## (undated) DEVICE — STRAP KNEE/BODY 31143004

## (undated) DEVICE — ESU PENCIL SMOKE EVAC W/ROCKER SWITCH 0703-047-000

## (undated) DEVICE — CATH TRAY FOLEY 16FR SILICONE 907416

## (undated) DEVICE — PACK ENT MINOR CUSTOM ASC

## (undated) DEVICE — PREP CHLORAPREP 26ML TINTED ORANGE  260815

## (undated) DEVICE — GLOVE PROTEXIS BLUE W/NEU-THERA 7.0  2D73EB70

## (undated) DEVICE — ESU GROUND PAD ADULT W/CORD E7507

## (undated) DEVICE — SU VICRYL 3-0 CTX 36" UND J980H

## (undated) DEVICE — SOL ADH LIQUID BENZOIN SWAB 0.6ML C1544

## (undated) DEVICE — GLOVE ESTEEM POWDER FREE SMT 6.5  2D72PT65

## (undated) DEVICE — LINEN TOWEL PACK X5 5464

## (undated) DEVICE — ESU ELEC BLADE 2.75" COATED/INSULATED E1455

## (undated) DEVICE — SU VICRYL 4-0 KS 27" UND J662H

## (undated) DEVICE — NIM PROBE PRASS INCREMENTING TIP 8225825

## (undated) DEVICE — SU CHROMIC 3-0 SH 27" G122H

## (undated) DEVICE — PACK C-SECTION LF PL15OTA83B

## (undated) DEVICE — ADHESIVE SWIFTSET 0.8ML OCTYL SS6

## (undated) RX ORDER — ONDANSETRON 2 MG/ML
INJECTION INTRAMUSCULAR; INTRAVENOUS
Status: DISPENSED
Start: 2019-05-06

## (undated) RX ORDER — ONDANSETRON 2 MG/ML
INJECTION INTRAMUSCULAR; INTRAVENOUS
Status: DISPENSED
Start: 2017-05-03

## (undated) RX ORDER — OXYCODONE AND ACETAMINOPHEN 5; 325 MG/1; MG/1
TABLET ORAL
Status: DISPENSED
Start: 2017-05-03

## (undated) RX ORDER — PROPOFOL 10 MG/ML
INJECTION, EMULSION INTRAVENOUS
Status: DISPENSED
Start: 2017-05-03

## (undated) RX ORDER — REMIFENTANIL HYDROCHLORIDE 1 MG/ML
INJECTION, POWDER, LYOPHILIZED, FOR SOLUTION INTRAVENOUS
Status: DISPENSED
Start: 2017-05-03

## (undated) RX ORDER — FENTANYL CITRATE 50 UG/ML
INJECTION, SOLUTION INTRAMUSCULAR; INTRAVENOUS
Status: DISPENSED
Start: 2017-05-03

## (undated) RX ORDER — GABAPENTIN 300 MG/1
CAPSULE ORAL
Status: DISPENSED
Start: 2017-05-03

## (undated) RX ORDER — KETOROLAC TROMETHAMINE 30 MG/ML
INJECTION, SOLUTION INTRAMUSCULAR; INTRAVENOUS
Status: DISPENSED
Start: 2019-05-06

## (undated) RX ORDER — FENTANYL CITRATE 50 UG/ML
INJECTION, SOLUTION INTRAMUSCULAR; INTRAVENOUS
Status: DISPENSED
Start: 2019-05-06

## (undated) RX ORDER — DEXAMETHASONE SODIUM PHOSPHATE 4 MG/ML
INJECTION, SOLUTION INTRA-ARTICULAR; INTRALESIONAL; INTRAMUSCULAR; INTRAVENOUS; SOFT TISSUE
Status: DISPENSED
Start: 2017-05-03

## (undated) RX ORDER — PHENYLEPHRINE HCL IN 0.9% NACL 1 MG/10 ML
SYRINGE (ML) INTRAVENOUS
Status: DISPENSED
Start: 2019-05-06

## (undated) RX ORDER — ALBUTEROL SULFATE 0.83 MG/ML
SOLUTION RESPIRATORY (INHALATION)
Status: DISPENSED
Start: 2022-03-07

## (undated) RX ORDER — MORPHINE SULFATE 1 MG/ML
INJECTION, SOLUTION EPIDURAL; INTRATHECAL; INTRAVENOUS
Status: DISPENSED
Start: 2019-05-06

## (undated) RX ORDER — ACETAMINOPHEN 325 MG/1
TABLET ORAL
Status: DISPENSED
Start: 2017-05-03

## (undated) RX ORDER — METHYLPREDNISOLONE ACETATE 80 MG/ML
INJECTION, SUSPENSION INTRA-ARTICULAR; INTRALESIONAL; INTRAMUSCULAR; SOFT TISSUE
Status: DISPENSED
Start: 2021-05-24

## (undated) RX ORDER — CEFAZOLIN SODIUM 1 G/3ML
INJECTION, POWDER, FOR SOLUTION INTRAMUSCULAR; INTRAVENOUS
Status: DISPENSED
Start: 2019-05-06

## (undated) RX ORDER — LIDOCAINE HYDROCHLORIDE 10 MG/ML
INJECTION, SOLUTION EPIDURAL; INFILTRATION; INTRACAUDAL; PERINEURAL
Status: DISPENSED
Start: 2021-05-24

## (undated) RX ORDER — BETAMETHASONE SODIUM PHOSPHATE AND BETAMETHASONE ACETATE 3; 3 MG/ML; MG/ML
INJECTION, SUSPENSION INTRA-ARTICULAR; INTRALESIONAL; INTRAMUSCULAR; SOFT TISSUE
Status: DISPENSED
Start: 2021-05-24

## (undated) RX ORDER — LIDOCAINE HYDROCHLORIDE 10 MG/ML
INJECTION, SOLUTION EPIDURAL; INFILTRATION; INTRACAUDAL; PERINEURAL
Status: DISPENSED
Start: 2020-09-21